# Patient Record
Sex: MALE | Race: BLACK OR AFRICAN AMERICAN | NOT HISPANIC OR LATINO | Employment: OTHER | URBAN - METROPOLITAN AREA
[De-identification: names, ages, dates, MRNs, and addresses within clinical notes are randomized per-mention and may not be internally consistent; named-entity substitution may affect disease eponyms.]

---

## 2017-01-03 RX ORDER — GLIMEPIRIDE 4 MG/1
TABLET ORAL
Qty: 90 TABLET | Refills: 1 | Status: SHIPPED | OUTPATIENT
Start: 2017-01-03 | End: 2017-06-25 | Stop reason: SDUPTHER

## 2017-01-03 RX ORDER — GLIMEPIRIDE 4 MG/1
TABLET ORAL
Qty: 90 TABLET | Refills: 0 | Status: SHIPPED | OUTPATIENT
Start: 2017-01-03 | End: 2017-01-03

## 2017-01-09 ENCOUNTER — OFFICE VISIT (OUTPATIENT)
Dept: FAMILY MEDICINE | Facility: CLINIC | Age: 82
End: 2017-01-09
Payer: MEDICARE

## 2017-01-09 VITALS
OXYGEN SATURATION: 97 % | WEIGHT: 183 LBS | RESPIRATION RATE: 18 BRPM | DIASTOLIC BLOOD PRESSURE: 80 MMHG | TEMPERATURE: 98 F | HEART RATE: 77 BPM | BODY MASS INDEX: 26.2 KG/M2 | SYSTOLIC BLOOD PRESSURE: 152 MMHG | HEIGHT: 70 IN

## 2017-01-09 DIAGNOSIS — J06.9 UPPER RESPIRATORY TRACT INFECTION, UNSPECIFIED TYPE: ICD-10-CM

## 2017-01-09 DIAGNOSIS — I10 ESSENTIAL HYPERTENSION: Primary | ICD-10-CM

## 2017-01-09 PROCEDURE — 99999 PR PBB SHADOW E&M-EST. PATIENT-LVL III: CPT | Mod: PBBFAC,,, | Performed by: FAMILY MEDICINE

## 2017-01-09 PROCEDURE — 99499 UNLISTED E&M SERVICE: CPT | Mod: S$GLB,,, | Performed by: FAMILY MEDICINE

## 2017-01-09 PROCEDURE — 1126F AMNT PAIN NOTED NONE PRSNT: CPT | Mod: S$GLB,,, | Performed by: FAMILY MEDICINE

## 2017-01-09 PROCEDURE — 1160F RVW MEDS BY RX/DR IN RCRD: CPT | Mod: S$GLB,,, | Performed by: FAMILY MEDICINE

## 2017-01-09 PROCEDURE — 1159F MED LIST DOCD IN RCRD: CPT | Mod: S$GLB,,, | Performed by: FAMILY MEDICINE

## 2017-01-09 PROCEDURE — 99214 OFFICE O/P EST MOD 30 MIN: CPT | Mod: S$GLB,,, | Performed by: FAMILY MEDICINE

## 2017-01-09 PROCEDURE — 3077F SYST BP >= 140 MM HG: CPT | Mod: S$GLB,,, | Performed by: FAMILY MEDICINE

## 2017-01-09 PROCEDURE — 3079F DIAST BP 80-89 MM HG: CPT | Mod: S$GLB,,, | Performed by: FAMILY MEDICINE

## 2017-01-09 PROCEDURE — 1157F ADVNC CARE PLAN IN RCRD: CPT | Mod: S$GLB,,, | Performed by: FAMILY MEDICINE

## 2017-01-09 NOTE — PROGRESS NOTES
Chief Complaint   Patient presents with    Diabetes     3 month follow-up    Hypertension    Cough    Chest Congestion       HPI  Peter Lopez is a 81 y.o. male with multiple medical diagnoses as listed in the medical history and problem list that presents for HTN, DM follow up.    DM2 - overall doing well with metformin.     HTN - states changed benicar to generic, states medications causing mild dizziness and will take it at night instead.  States non compliant and has missed doses.     URI - follow up from 10 days ago, overall improved, cough continues.     Pt is known to me and was last seen by me on 2016.    PAST MEDICAL HISTORY:  Past Medical History   Diagnosis Date    Chronic hepatitis C without mention of hepatic coma      genotype 1b; treatment naive    Diabetes mellitus type I     Hypertension        PAST SURGICAL HISTORY:  History reviewed. No pertinent past surgical history.    SOCIAL HISTORY:  Social History     Social History    Marital status:      Spouse name: N/A    Number of children: N/A    Years of education: N/A     Occupational History    Not on file.     Social History Main Topics    Smoking status: Former Smoker    Smokeless tobacco: Former User     Quit date: 1986    Alcohol use 0.0 oz/week     0 Standard drinks or equivalent per week      Comment: 2 bottles beer on weekends    Drug use: No    Sexual activity: Yes     Partners: Female     Other Topics Concern    Not on file     Social History Narrative    , resides in [place]. 7 children but lost one. 16 grandchildren. 4 great-grandchildren. Works at [employer]       FAMILY HISTORY:  Family History   Problem Relation Age of Onset    Cancer Mother     Asbestos Father     Liver disease Neg Hx        ALLERGIES AND MEDICATIONS: updated and reviewed.  Review of patient's allergies indicates:  No Known Allergies  Current Outpatient Prescriptions   Medication Sig Dispense Refill    glimepiride  "(AMARYL) 4 MG tablet TAKE 1 TABLET BY MOUTH EVERY DAY WITH BREAKFAST 90 tablet 1    metformin (GLUCOPHAGE) 1000 MG tablet TAKE 1 TABLET BY MOUTH TWICE A DAY WITH MEALS 60 tablet 5    olmesartan-hydrochlorothiazide (BENICAR HCT) 40-12.5 mg Tab Take 1 tablet by mouth once daily. 90 tablet 1     No current facility-administered medications for this visit.        ROS  Review of Systems   Constitutional: Negative for activity change, fatigue and fever.   HENT: Positive for congestion. Negative for rhinorrhea and sore throat.    Eyes: Negative for visual disturbance.   Respiratory: Positive for cough. Negative for chest tightness and shortness of breath.    Cardiovascular: Negative for chest pain.   Gastrointestinal: Negative for abdominal pain, diarrhea, nausea and vomiting.   Genitourinary: Negative for dysuria, frequency and urgency.   Musculoskeletal: Negative for back pain and myalgias.   Skin: Negative for rash.   Neurological: Negative for dizziness, syncope and numbness.   Psychiatric/Behavioral: Negative for agitation.       Physical Exam  Vitals:    01/09/17 0823   BP: (!) 152/80   Pulse: 77   Resp: 18   Temp: 98 °F (36.7 °C)    Body mass index is 26.26 kg/(m^2).  Weight: 83 kg (182 lb 15.7 oz)   Height: 5' 10" (177.8 cm)     Physical Exam   Constitutional: He is oriented to person, place, and time. He appears well-developed and well-nourished.   HENT:   Head: Normocephalic.   Eyes: EOM are normal.   Neck: Normal range of motion.   Neurological: He is alert and oriented to person, place, and time.   Skin: Skin is warm and dry.   Psychiatric: He has a normal mood and affect. His behavior is normal. Judgment and thought content normal.   Nursing note and vitals reviewed.      Health Maintenance       Date Due Completion Date    TETANUS VACCINE 12/15/1955 ---    Zoster Vaccine 12/15/1997 ---    Pneumococcal (65+) (2 of 2 - PCV13) 1/8/2015 1/8/2014    Urine Microalbumin 1/21/2016 1/21/2015    Foot Exam 7/6/2016 " 7/6/2015 (Done)    Override on 7/6/2015: Done    Hemoglobin A1c 4/10/2017 10/10/2016    Eye Exam 10/25/2017 10/25/2016 (Done)    Override on 10/25/2016: Done (dr reardon)    Override on 4/25/2016: Done    Override on 4/6/2015: Done    Override on 9/19/2014: Done (Dr. Gilbert Denney)    Lipid Panel 11/14/2017 11/14/2016          Assessment & Plan    Essential hypertension  - Continue current medication regimen as prescribed  - Overall doing well, stressed compliance with medications.     Uncontrolled type 2 diabetes mellitus without complication, without long-term current use of insulin  - Continue current medication regimen as prescribed  - Doing well at this time    URI  - Cont cough, overall improved.   - Continue current medication regimen as prescribed  - Hydrate and rest    Return in about 4 weeks (around 2/6/2017), or if symptoms worsen or fail to improve.

## 2017-03-27 ENCOUNTER — OFFICE VISIT (OUTPATIENT)
Dept: FAMILY MEDICINE | Facility: CLINIC | Age: 82
End: 2017-03-27
Payer: MEDICARE

## 2017-03-27 VITALS
HEIGHT: 70 IN | OXYGEN SATURATION: 96 % | TEMPERATURE: 97 F | BODY MASS INDEX: 25.85 KG/M2 | HEART RATE: 93 BPM | WEIGHT: 180.56 LBS | SYSTOLIC BLOOD PRESSURE: 126 MMHG | DIASTOLIC BLOOD PRESSURE: 60 MMHG | RESPIRATION RATE: 16 BRPM

## 2017-03-27 DIAGNOSIS — R07.89 CHEST TIGHTNESS: ICD-10-CM

## 2017-03-27 DIAGNOSIS — J01.10 ACUTE FRONTAL SINUSITIS, RECURRENCE NOT SPECIFIED: ICD-10-CM

## 2017-03-27 DIAGNOSIS — I10 ESSENTIAL HYPERTENSION: Primary | ICD-10-CM

## 2017-03-27 PROCEDURE — 93005 ELECTROCARDIOGRAM TRACING: CPT | Mod: S$GLB,,, | Performed by: FAMILY MEDICINE

## 2017-03-27 PROCEDURE — 99214 OFFICE O/P EST MOD 30 MIN: CPT | Mod: S$GLB,,, | Performed by: FAMILY MEDICINE

## 2017-03-27 PROCEDURE — 1157F ADVNC CARE PLAN IN RCRD: CPT | Mod: S$GLB,,, | Performed by: FAMILY MEDICINE

## 2017-03-27 PROCEDURE — 99499 UNLISTED E&M SERVICE: CPT | Mod: S$GLB,,, | Performed by: FAMILY MEDICINE

## 2017-03-27 PROCEDURE — 93010 ELECTROCARDIOGRAM REPORT: CPT | Mod: S$GLB,,, | Performed by: INTERNAL MEDICINE

## 2017-03-27 PROCEDURE — 99999 PR PBB SHADOW E&M-EST. PATIENT-LVL III: CPT | Mod: PBBFAC,,, | Performed by: FAMILY MEDICINE

## 2017-03-27 PROCEDURE — 1159F MED LIST DOCD IN RCRD: CPT | Mod: S$GLB,,, | Performed by: FAMILY MEDICINE

## 2017-03-27 PROCEDURE — 1160F RVW MEDS BY RX/DR IN RCRD: CPT | Mod: S$GLB,,, | Performed by: FAMILY MEDICINE

## 2017-03-27 PROCEDURE — 1126F AMNT PAIN NOTED NONE PRSNT: CPT | Mod: S$GLB,,, | Performed by: FAMILY MEDICINE

## 2017-03-27 PROCEDURE — 3078F DIAST BP <80 MM HG: CPT | Mod: S$GLB,,, | Performed by: FAMILY MEDICINE

## 2017-03-27 PROCEDURE — 3074F SYST BP LT 130 MM HG: CPT | Mod: S$GLB,,, | Performed by: FAMILY MEDICINE

## 2017-03-27 RX ORDER — METFORMIN HYDROCHLORIDE 1000 MG/1
1000 TABLET ORAL 2 TIMES DAILY WITH MEALS
Qty: 60 TABLET | Refills: 5 | Status: SHIPPED | OUTPATIENT
Start: 2017-03-27 | End: 2017-10-16 | Stop reason: SDUPTHER

## 2017-03-27 RX ORDER — LORATADINE 10 MG/1
10 TABLET ORAL DAILY
Qty: 30 TABLET | Refills: 1 | Status: SHIPPED | OUTPATIENT
Start: 2017-03-27 | End: 2017-10-16 | Stop reason: SDUPTHER

## 2017-03-27 RX ORDER — AZITHROMYCIN 250 MG/1
TABLET, FILM COATED ORAL
Qty: 6 TABLET | Refills: 0 | Status: SHIPPED | OUTPATIENT
Start: 2017-03-27 | End: 2017-07-10 | Stop reason: ALTCHOICE

## 2017-03-27 RX ORDER — OLMESARTAN MEDOXOMIL AND HYDROCHLOROTHIAZIDE 40/12.5 40; 12.5 MG/1; MG/1
1 TABLET ORAL DAILY
Qty: 90 TABLET | Refills: 1 | Status: SHIPPED | OUTPATIENT
Start: 2017-03-27 | End: 2017-07-27 | Stop reason: SDUPTHER

## 2017-03-27 NOTE — PROGRESS NOTES
Chief Complaint   Patient presents with    Cough    Medication Refill       HPI  Peter oLpez is a 81 y.o. male with multiple medical diagnoses as listed in the medical history and problem list that presents for follow up.    Cough - 1.5 week hx, meds: nyquil, centrum plus, cough suppressant, prod cough brown/yellow, +subj fever, +PND. No changes throughout day, no changes with exertion/working,     HTN - overall well controlled. Denies BV, HA    DM2 - overall stable, states no new neuro symptoms.     Pt is known to me and was last seen by me on 2017.    PAST MEDICAL HISTORY:  Past Medical History:   Diagnosis Date    Chronic hepatitis C without mention of hepatic coma     genotype 1b; treatment naive    Diabetes mellitus type I     Hypertension        PAST SURGICAL HISTORY:  History reviewed. No pertinent surgical history.    SOCIAL HISTORY:  Social History     Social History    Marital status:      Spouse name: N/A    Number of children: N/A    Years of education: N/A     Occupational History    Not on file.     Social History Main Topics    Smoking status: Former Smoker    Smokeless tobacco: Former User     Quit date: 1986    Alcohol use 0.0 oz/week     0 Standard drinks or equivalent per week      Comment: 2 bottles beer on weekends    Drug use: No    Sexual activity: Yes     Partners: Female     Other Topics Concern    Not on file     Social History Narrative    , resides in [place]. 7 children but lost one. 16 grandchildren. 4 great-grandchildren. Works at [employer]       FAMILY HISTORY:  Family History   Problem Relation Age of Onset    Cancer Mother     Asbestos Father     Liver disease Neg Hx        ALLERGIES AND MEDICATIONS: updated and reviewed.  Review of patient's allergies indicates:  No Known Allergies  Current Outpatient Prescriptions   Medication Sig Dispense Refill    glimepiride (AMARYL) 4 MG tablet TAKE 1 TABLET BY MOUTH EVERY DAY WITH BREAKFAST 90  "tablet 1    azithromycin (ZITHROMAX Z-BARAK) 250 MG tablet 2 tabs today, then 1 tab per day for 4 days. 6 tablet 0    loratadine (CLARITIN) 10 mg tablet Take 1 tablet (10 mg total) by mouth once daily. 30 tablet 1    metformin (GLUCOPHAGE) 1000 MG tablet Take 1 tablet (1,000 mg total) by mouth 2 (two) times daily with meals. 60 tablet 5    olmesartan-hydrochlorothiazide (BENICAR HCT) 40-12.5 mg Tab Take 1 tablet by mouth once daily. 90 tablet 1     No current facility-administered medications for this visit.        ROS  Review of Systems   Constitutional: Negative for activity change, appetite change, fatigue and fever.   HENT: Positive for postnasal drip, sinus pressure and sneezing. Negative for congestion, rhinorrhea and sore throat.    Eyes: Positive for itching. Negative for visual disturbance.   Respiratory: Negative for cough, chest tightness, shortness of breath and wheezing.    Cardiovascular: Negative for chest pain.   Gastrointestinal: Negative for abdominal pain, diarrhea, nausea and vomiting.   Endocrine: Negative for polydipsia.   Genitourinary: Negative for dysuria, frequency and urgency.   Musculoskeletal: Negative for back pain, myalgias and neck stiffness.   Skin: Negative for rash.   Neurological: Negative for dizziness, syncope and numbness.   Psychiatric/Behavioral: Negative for agitation and dysphoric mood.       Physical Exam  Vitals:    03/27/17 0826   BP: 126/60   Pulse: 93   Resp: 16   Temp: 97.4 °F (36.3 °C)    Body mass index is 25.91 kg/(m^2).  Weight: 81.9 kg (180 lb 8.9 oz)   Height: 5' 10" (177.8 cm)     Physical Exam   Constitutional: He is oriented to person, place, and time. He appears well-developed and well-nourished.   HENT:   Head: Normocephalic and atraumatic.   Erythematous pharynx   Eyes: Conjunctivae and EOM are normal. Pupils are equal, round, and reactive to light.   Neck: Normal range of motion. Neck supple.   Cardiovascular: Normal rate, regular rhythm and normal " heart sounds.    Pulmonary/Chest: Effort normal and breath sounds normal.   Abdominal: Soft. Bowel sounds are normal.   Musculoskeletal: Normal range of motion.   Neurological: He is alert and oriented to person, place, and time. He has normal reflexes.   Skin: Skin is warm and dry.   Psychiatric: He has a normal mood and affect. His behavior is normal. Judgment and thought content normal.       Health Maintenance       Date Due Completion Date    TETANUS VACCINE 12/15/1955 ---    Zoster Vaccine 12/15/1997 ---    Pneumococcal (65+) (2 of 2 - PCV13) 1/8/2015 1/8/2014    Urine Microalbumin 1/21/2016 1/21/2015    Foot Exam 7/6/2016 7/6/2015 (Done)    Override on 7/6/2015: Done    Hemoglobin A1c 4/10/2017 10/10/2016    Eye Exam 10/25/2017 10/25/2016 (Done)    Override on 10/25/2016: Done (dr reardon)    Override on 4/25/2016: Done    Override on 4/6/2015: Done    Override on 9/19/2014: Done (Dr. Gilbert Denney)    Lipid Panel 11/14/2017 11/14/2016          Assessment & Plan    Essential hypertension  -     olmesartan-hydrochlorothiazide (BENICAR HCT) 40-12.5 mg Tab; Take 1 tablet by mouth once daily.  Dispense: 90 tablet; Refill: 1  - Continue current medication regimen as prescribed    Uncontrolled type 2 diabetes mellitus without complication, without long-term current use of insulin  -     metformin (GLUCOPHAGE) 1000 MG tablet; Take 1 tablet (1,000 mg total) by mouth 2 (two) times daily with meals.  Dispense: 60 tablet; Refill: 5  - Continue current medication regimen as prescribed    Chest tightness  -     EKG 12-lead; Future    Acute frontal sinusitis, recurrence not specified  -     azithromycin (ZITHROMAX Z-BARAK) 250 MG tablet; 2 tabs today, then 1 tab per day for 4 days.  Dispense: 6 tablet; Refill: 0  -     loratadine (CLARITIN) 10 mg tablet; Take 1 tablet (10 mg total) by mouth once daily.  Dispense: 30 tablet; Refill: 1  - Will treat at this time.         Return in about 3 months (around 6/27/2017).

## 2017-03-27 NOTE — MR AVS SNAPSHOT
[facility] [address]  [place] [address]  Phone: [phone]  Fax: [phone]                  Peter Lopez   3/27/2017 8:40 AM   Office Visit    Description:  Male : 1935   Provider:  Soren Lloyd MD   Department:  [facility]           Reason for Visit     Cough     Medication Refill           Diagnoses this Visit        Comments    Essential hypertension    -  Primary     Uncontrolled type 2 diabetes mellitus without complication, without long-term current use of insulin         Chest tightness         Acute frontal sinusitis, recurrence not specified                To Do List           Goals (5 Years of Data)     None      Follow-Up and Disposition     Return in about 3 months (around 2017).       These Medications        Disp Refills Start End    metformin (GLUCOPHAGE) 1000 MG tablet 60 tablet 5 3/27/2017     Take 1 tablet (1,000 mg total) by mouth 2 (two) times daily with meals. - Oral    Pharmacy: [facility]/pharmacy [address] [place] [address] Ph #: [phone]       olmesartan-hydrochlorothiazide (BENICAR HCT) 40-12.5 mg Tab 90 tablet 1 3/27/2017     Take 1 tablet by mouth once daily. - Oral    Pharmacy: [facility]/pharmacy [address] [place] [address] [address] [place] [address] Ph #: [phone]       azithromycin (ZITHROMAX Z-BARAK) 250 MG tablet 6 tablet 0 3/27/2017     2 tabs today, then 1 tab per day for 4 days.    [facility] [place] [address] Ph #: [phone]       loratadine (CLARITIN) 10 mg tablet 30 tablet 1 3/27/2017     Take 1 tablet (10 mg total) by mouth once daily. - Oral    Pharmacy: [facility]/pharmacy [address] [place] [address] [address] [place] [address] Ph #: [phone]         [facility]     [facility] On Call Nurse Care Line -  Assistance  Registered nurses in the [facility] provide clinical advisement, health education, appointment booking, and  other advisory services.  Call for this free service at [phone].             Medications           Message regarding Medications     Verify the changes and/or additions to your medication regime listed below are the same as discussed with your clinician today.  If any of these changes or additions are incorrect, please notify your healthcare provider.        START taking these NEW medications        Refills    azithromycin (ZITHROMAX Z-ABRAK) 250 MG tablet 0    Si tabs today, then 1 tab per day for 4 days.    Class: Normal    loratadine (CLARITIN) 10 mg tablet 1    Sig: Take 1 tablet (10 mg total) by mouth once daily.    Class: Normal    Route: Oral      CHANGE how you are taking these medications     Start Taking Instead of    metformin (GLUCOPHAGE) 1000 MG tablet metformin (GLUCOPHAGE) 1000 MG tablet    Dosage:  Take 1 tablet (1,000 mg total) by mouth 2 (two) times daily with meals. Dosage:  TAKE 1 TABLET BY MOUTH TWICE A DAY WITH MEALS    Reason for Change:  Reorder            Verify that the below list of medications is an accurate representation of the medications you are currently taking.  If none reported, the list may be blank. If incorrect, please contact your healthcare provider. Carry this list with you in case of emergency.           Current Medications     glimepiride (AMARYL) 4 MG tablet TAKE 1 TABLET BY MOUTH EVERY DAY WITH BREAKFAST    azithromycin (ZITHROMAX Z-BARKA) 250 MG tablet 2 tabs today, then 1 tab per day for 4 days.    loratadine (CLARITIN) 10 mg tablet Take 1 tablet (10 mg total) by mouth once daily.    metformin (GLUCOPHAGE) 1000 MG tablet Take 1 tablet (1,000 mg total) by mouth 2 (two) times daily with meals.    olmesartan-hydrochlorothiazide (BENICAR HCT) 40-12.5 mg Tab Take 1 tablet by mouth once daily.           Clinical Reference Information           Your Vitals Were     BP Pulse Temp Resp Height Weight    126/60 (BP Location: Left arm, Patient Position: Sitting, BP Method:  "Manual) 93 97.4 °F (36.3 °C) (Oral) 16 5' 10" (1.778 m) 81.9 kg (180 lb 8.9 oz)    SpO2 BMI             96% 25.91 kg/m2         Blood Pressure          Most Recent Value    BP  126/60      Allergies as of 3/27/2017     No Known Allergies      Immunizations Administered on Date of Encounter - 3/27/2017     None      Orders Placed During Today's Visit     Future Labs/Procedures Expected by Expires    EKG 12-lead  As directed 3/27/2018      [facility] Sign-Up     Activating your [facility] account is as easy as 1-2-3!     1) Visit [email or web address], select Sign Up Now, enter this activation code and your date of birth, then select Next.  [email or web address]  Expires: 5/11/2017  9:45 AM      2) Create a username and password to use when you visit [facility] in the future and select a security question in case you lose your password and select Next.    3) Enter your e-mail address and click Sign Up!    Additional Information  If you have questions, please e-mail [email or web address] or call [phone] to talk to our [facility] staff. Remember, [facility] is NOT to be used for urgent needs. For medical emergencies, dial 911.         Language Assistance Services     ATTENTION: Language assistance services are available, free of charge. Please call [phone].      ATENCIÓN: Si habla español, tiene a taylor disposición servicios gratuitos de asistencia lingüística. Llame al [phone].     CHÚ Ý: N?u b?n nói Ti?ng Vi?t, có các d?ch v? h? tr? ngôn ng? mi?n phí dành cho b?n. G?i s? [phone].         [facility] complies with applicable Federal civil rights laws and does not discriminate on the basis of race, color, national origin, age, disability, or sex.        "

## 2017-04-21 DIAGNOSIS — E11.9 TYPE 2 DIABETES MELLITUS WITHOUT COMPLICATION: ICD-10-CM

## 2017-05-05 DIAGNOSIS — E11.9 TYPE 2 DIABETES MELLITUS WITHOUT COMPLICATION: ICD-10-CM

## 2017-05-22 LAB
LEFT EYE DM RETINOPATHY: ABNORMAL
RIGHT EYE DM RETINOPATHY: ABNORMAL

## 2017-06-26 RX ORDER — GLIMEPIRIDE 4 MG/1
TABLET ORAL
Qty: 90 TABLET | Refills: 0 | Status: SHIPPED | OUTPATIENT
Start: 2017-06-26 | End: 2017-09-29 | Stop reason: SDUPTHER

## 2017-07-10 ENCOUNTER — LAB VISIT (OUTPATIENT)
Dept: LAB | Facility: HOSPITAL | Age: 82
End: 2017-07-10
Attending: FAMILY MEDICINE
Payer: MEDICARE

## 2017-07-10 ENCOUNTER — OFFICE VISIT (OUTPATIENT)
Dept: FAMILY MEDICINE | Facility: CLINIC | Age: 82
End: 2017-07-10
Payer: MEDICARE

## 2017-07-10 VITALS
WEIGHT: 186.75 LBS | HEART RATE: 99 BPM | DIASTOLIC BLOOD PRESSURE: 60 MMHG | TEMPERATURE: 98 F | RESPIRATION RATE: 18 BRPM | BODY MASS INDEX: 26.74 KG/M2 | SYSTOLIC BLOOD PRESSURE: 136 MMHG | HEIGHT: 70 IN | OXYGEN SATURATION: 95 %

## 2017-07-10 DIAGNOSIS — N40.0 BENIGN PROSTATIC HYPERPLASIA WITHOUT LOWER URINARY TRACT SYMPTOMS: ICD-10-CM

## 2017-07-10 DIAGNOSIS — Z00.00 ANNUAL PHYSICAL EXAM: ICD-10-CM

## 2017-07-10 DIAGNOSIS — I10 ESSENTIAL HYPERTENSION: Primary | ICD-10-CM

## 2017-07-10 LAB
ALBUMIN SERPL BCP-MCNC: 3.7 G/DL
ALP SERPL-CCNC: 48 U/L
ALT SERPL W/O P-5'-P-CCNC: 45 U/L
ANION GAP SERPL CALC-SCNC: 11 MMOL/L
AST SERPL-CCNC: 39 U/L
BASOPHILS # BLD AUTO: 0.02 K/UL
BASOPHILS NFR BLD: 0.5 %
BILIRUB SERPL-MCNC: 0.6 MG/DL
BUN SERPL-MCNC: 23 MG/DL
CALCIUM SERPL-MCNC: 10.7 MG/DL
CHLORIDE SERPL-SCNC: 102 MMOL/L
CHOLEST/HDLC SERPL: 3.3 {RATIO}
CO2 SERPL-SCNC: 31 MMOL/L
COMPLEXED PSA SERPL-MCNC: 19 NG/ML
CREAT SERPL-MCNC: 1.5 MG/DL
DIFFERENTIAL METHOD: NORMAL
EOSINOPHIL # BLD AUTO: 0.1 K/UL
EOSINOPHIL NFR BLD: 2.9 %
ERYTHROCYTE [DISTWIDTH] IN BLOOD BY AUTOMATED COUNT: 13.8 %
EST. GFR  (AFRICAN AMERICAN): 50.5 ML/MIN/1.73 M^2
EST. GFR  (NON AFRICAN AMERICAN): 43.7 ML/MIN/1.73 M^2
GLUCOSE SERPL-MCNC: 224 MG/DL
HCT VFR BLD AUTO: 47.1 %
HDL/CHOLESTEROL RATIO: 29.9 %
HDLC SERPL-MCNC: 174 MG/DL
HDLC SERPL-MCNC: 52 MG/DL
HGB BLD-MCNC: 15.2 G/DL
LDLC SERPL CALC-MCNC: 97 MG/DL
LYMPHOCYTES # BLD AUTO: 1.8 K/UL
LYMPHOCYTES NFR BLD: 41 %
MCH RBC QN AUTO: 28.9 PG
MCHC RBC AUTO-ENTMCNC: 32.3 %
MCV RBC AUTO: 90 FL
MONOCYTES # BLD AUTO: 0.3 K/UL
MONOCYTES NFR BLD: 5.7 %
NEUTROPHILS # BLD AUTO: 2.2 K/UL
NEUTROPHILS NFR BLD: 49.7 %
NONHDLC SERPL-MCNC: 122 MG/DL
PLATELET # BLD AUTO: 277 K/UL
PMV BLD AUTO: 10.4 FL
POTASSIUM SERPL-SCNC: 4.9 MMOL/L
PROT SERPL-MCNC: 7.5 G/DL
RBC # BLD AUTO: 5.26 M/UL
SODIUM SERPL-SCNC: 144 MMOL/L
T4 FREE SERPL-MCNC: 1.15 NG/DL
TRIGL SERPL-MCNC: 125 MG/DL
TSH SERPL DL<=0.005 MIU/L-ACNC: 1.24 UIU/ML
WBC # BLD AUTO: 4.42 K/UL

## 2017-07-10 PROCEDURE — 84443 ASSAY THYROID STIM HORMONE: CPT

## 2017-07-10 PROCEDURE — 99397 PER PM REEVAL EST PAT 65+ YR: CPT | Mod: S$GLB,,, | Performed by: FAMILY MEDICINE

## 2017-07-10 PROCEDURE — 36415 COLL VENOUS BLD VENIPUNCTURE: CPT | Mod: PO

## 2017-07-10 PROCEDURE — 99999 PR PBB SHADOW E&M-EST. PATIENT-LVL III: CPT | Mod: PBBFAC,,, | Performed by: FAMILY MEDICINE

## 2017-07-10 PROCEDURE — 83036 HEMOGLOBIN GLYCOSYLATED A1C: CPT

## 2017-07-10 PROCEDURE — 84153 ASSAY OF PSA TOTAL: CPT

## 2017-07-10 PROCEDURE — 85025 COMPLETE CBC W/AUTO DIFF WBC: CPT

## 2017-07-10 PROCEDURE — 80053 COMPREHEN METABOLIC PANEL: CPT

## 2017-07-10 PROCEDURE — 99499 UNLISTED E&M SERVICE: CPT | Mod: S$GLB,,, | Performed by: FAMILY MEDICINE

## 2017-07-10 PROCEDURE — 80061 LIPID PANEL: CPT

## 2017-07-10 PROCEDURE — 84439 ASSAY OF FREE THYROXINE: CPT

## 2017-07-10 RX ORDER — METFORMIN HYDROCHLORIDE 1000 MG/1
1000 TABLET ORAL 2 TIMES DAILY WITH MEALS
Qty: 180 TABLET | Refills: 1 | Status: CANCELLED | OUTPATIENT
Start: 2017-07-10

## 2017-07-10 NOTE — PROGRESS NOTES
Chief Complaint   Patient presents with    Diabetes     patient is overdue for his A1c level       HPI  Peter Lopez is a 81 y.o. male with multiple medical diagnoses as listed in the medical history and problem list that presents for annual exam.    Annual exam - overall feeling well, denies new symptoms at this time    DM2 - needs labs, compliant with medications at this time    HTN - overall doing well, compliant with medications. No CP, HA or blurry vision.     Pt is known to me and was last seen by me on 3/27/2017.    PAST MEDICAL HISTORY:  Past Medical History:   Diagnosis Date    Chronic hepatitis C without mention of hepatic coma     genotype 1b; treatment naive    Diabetes mellitus type I     Hypertension        PAST SURGICAL HISTORY:  History reviewed. No pertinent surgical history.    SOCIAL HISTORY:  Social History     Social History    Marital status:      Spouse name: N/A    Number of children: N/A    Years of education: N/A     Occupational History    Not on file.     Social History Main Topics    Smoking status: Former Smoker    Smokeless tobacco: Former User     Quit date: 1986    Alcohol use 0.0 oz/week      Comment: 2 bottles beer on weekends    Drug use: No    Sexual activity: Yes     Partners: Female     Other Topics Concern    Not on file     Social History Narrative    , resides in [place]. 7 children but lost one. 16 grandchildren. 4 great-grandchildren. Works at [employer]       FAMILY HISTORY:  Family History   Problem Relation Age of Onset    Cancer Mother     Asbestos Father     Liver disease Neg Hx        ALLERGIES AND MEDICATIONS: updated and reviewed.  Review of patient's allergies indicates:  No Known Allergies  Current Outpatient Prescriptions   Medication Sig Dispense Refill    glimepiride (AMARYL) 4 MG tablet TAKE 1 TABLET BY MOUTH EVERY DAY WITH BREAKFAST 90 tablet 0    loratadine (CLARITIN) 10 mg tablet Take 1 tablet (10 mg total) by  "mouth once daily. 30 tablet 1    metformin (GLUCOPHAGE) 1000 MG tablet Take 1 tablet (1,000 mg total) by mouth 2 (two) times daily with meals. 60 tablet 5    olmesartan-hydrochlorothiazide (BENICAR HCT) 40-12.5 mg Tab Take 1 tablet by mouth once daily. 90 tablet 1     No current facility-administered medications for this visit.        ROS  Review of Systems   Constitutional: Negative for activity change, fatigue and fever.   HENT: Negative for congestion, rhinorrhea and sore throat.    Eyes: Negative for visual disturbance.   Respiratory: Negative for cough, chest tightness and shortness of breath.    Cardiovascular: Negative for chest pain.   Gastrointestinal: Negative for abdominal pain, diarrhea, nausea and vomiting.   Genitourinary: Negative for dysuria, frequency and urgency.   Musculoskeletal: Positive for arthralgias and myalgias. Negative for back pain.   Skin: Negative for rash.   Neurological: Negative for dizziness, syncope and numbness.   Psychiatric/Behavioral: Negative for agitation.       Physical Exam  Vitals:    07/10/17 0912   BP: 136/60   Pulse: 99   Resp: 18   Temp: 97.6 °F (36.4 °C)    Body mass index is 26.79 kg/m².  Weight: 84.7 kg (186 lb 11.7 oz)   Height: 5' 10" (177.8 cm)     Physical Exam   Constitutional: He is oriented to person, place, and time. He appears well-developed and well-nourished.   HENT:   Head: Normocephalic and atraumatic.   Eyes: Conjunctivae and EOM are normal. Pupils are equal, round, and reactive to light.   Neck: Normal range of motion. Neck supple.   Cardiovascular: Normal rate, regular rhythm and normal heart sounds.    Pulmonary/Chest: Effort normal and breath sounds normal.   Abdominal: Soft. Bowel sounds are normal.   Musculoskeletal: Normal range of motion.   Neurological: He is alert and oriented to person, place, and time. He has normal reflexes.   Skin: Skin is warm and dry.   Psychiatric: He has a normal mood and affect. His behavior is normal. Judgment " and thought content normal.       Health Maintenance       Date Due Completion Date    TETANUS VACCINE 12/15/1955 ---    Zoster Vaccine 12/15/1997 ---    Pneumococcal (65+) (2 of 2 - PCV13) 01/08/2015 1/8/2014    Urine Microalbumin 01/21/2016 1/21/2015    Hemoglobin A1c 04/10/2017 10/10/2016    Influenza Vaccine 08/01/2017 10/10/2016    Lipid Panel 11/14/2017 11/14/2016    Foot Exam 03/27/2018 3/27/2017    Override on 7/6/2015: Done    Eye Exam 05/22/2018 5/22/2017 (Done)    Override on 5/22/2017: Done (Dr. Olsen)    Override on 10/25/2016: Done (dr olsen)    Override on 4/25/2016: Done    Override on 4/6/2015: Done    Override on 9/19/2014: Done (Dr. Gilbert Denney)          Assessment & Plan    Essential hypertension  - Continue current medication regimen as prescribed  - Will assess labs    Uncontrolled type 2 diabetes mellitus without complication, without long-term current use of insulin  -     Comprehensive metabolic panel; Future; Expected date: 07/10/2017  -     CBC auto differential; Future; Expected date: 07/10/2017  -     Lipid panel; Future; Expected date: 07/10/2017  - Continue current medication regimen as prescribed    Benign prostatic hyperplasia without lower urinary tract symptoms  -     PSA, Screening; Future; Expected date: 07/10/2017  - Will assess labs    Annual physical exam  -     T4, free; Future; Expected date: 07/10/2017  -     TSH; Future; Expected date: 07/10/2017  -     Hemoglobin A1c; Future; Expected date: 07/10/2017  - Counseled on age appropriate medical preventative services, including age appropriate cancer screenings, over all nutritional health, need for a consistent exercise regimen and an over all push towards maintaining a vigorous and active lifestyle.      - Counseled on age appropriate vaccines and discussed upcoming health care needs based on age/gender.  Spent time with patient counseling on need for a good patient/doctor relationship moving forward.  Discussed use  of common OTC medications and supplements.  Discussed common dietary aids and use of caffeine and the need for good sleep hygiene and stress management.          Return in about 4 weeks (around 8/7/2017).

## 2017-07-11 LAB
ESTIMATED AVG GLUCOSE: 137 MG/DL
HBA1C MFR BLD HPLC: 6.4 %

## 2017-07-27 DIAGNOSIS — I10 ESSENTIAL HYPERTENSION: ICD-10-CM

## 2017-07-27 RX ORDER — OLMESARTAN MEDOXOMIL AND HYDROCHLOROTHIAZIDE 40/12.5 40; 12.5 MG/1; MG/1
1 TABLET ORAL DAILY
Qty: 90 TABLET | Refills: 1 | Status: SHIPPED | OUTPATIENT
Start: 2017-07-27 | End: 2017-10-16 | Stop reason: SDUPTHER

## 2017-09-12 ENCOUNTER — OFFICE VISIT (OUTPATIENT)
Dept: FAMILY MEDICINE | Facility: CLINIC | Age: 82
End: 2017-09-12
Payer: MEDICARE

## 2017-09-12 VITALS
WEIGHT: 185.19 LBS | OXYGEN SATURATION: 94 % | BODY MASS INDEX: 26.51 KG/M2 | RESPIRATION RATE: 14 BRPM | SYSTOLIC BLOOD PRESSURE: 128 MMHG | DIASTOLIC BLOOD PRESSURE: 72 MMHG | HEIGHT: 70 IN | HEART RATE: 107 BPM | TEMPERATURE: 99 F

## 2017-09-12 DIAGNOSIS — I10 ESSENTIAL HYPERTENSION: ICD-10-CM

## 2017-09-12 DIAGNOSIS — R50.9 FEVER, UNSPECIFIED FEVER CAUSE: ICD-10-CM

## 2017-09-12 DIAGNOSIS — Z23 NEED FOR PNEUMOCOCCAL VACCINATION: Primary | ICD-10-CM

## 2017-09-12 DIAGNOSIS — N39.0 URINARY TRACT INFECTION WITHOUT HEMATURIA, SITE UNSPECIFIED: ICD-10-CM

## 2017-09-12 LAB
BILIRUB SERPL-MCNC: NORMAL MG/DL
BLOOD URINE, POC: NORMAL
COLOR, POC UA: YELLOW
GLUCOSE UR QL STRIP: NORMAL
KETONES UR QL STRIP: NORMAL
LEUKOCYTE ESTERASE URINE, POC: NORMAL
NITRITE, POC UA: NORMAL
PH, POC UA: 5
PROTEIN, POC: 30
SPECIFIC GRAVITY, POC UA: 1.01
UROBILINOGEN, POC UA: NORMAL

## 2017-09-12 PROCEDURE — 87088 URINE BACTERIA CULTURE: CPT

## 2017-09-12 PROCEDURE — 3074F SYST BP LT 130 MM HG: CPT | Mod: S$GLB,,, | Performed by: FAMILY MEDICINE

## 2017-09-12 PROCEDURE — 3078F DIAST BP <80 MM HG: CPT | Mod: S$GLB,,, | Performed by: FAMILY MEDICINE

## 2017-09-12 PROCEDURE — 99999 PR PBB SHADOW E&M-EST. PATIENT-LVL III: CPT | Mod: PBBFAC,,, | Performed by: FAMILY MEDICINE

## 2017-09-12 PROCEDURE — 1159F MED LIST DOCD IN RCRD: CPT | Mod: S$GLB,,, | Performed by: FAMILY MEDICINE

## 2017-09-12 PROCEDURE — 87077 CULTURE AEROBIC IDENTIFY: CPT

## 2017-09-12 PROCEDURE — 87086 URINE CULTURE/COLONY COUNT: CPT

## 2017-09-12 PROCEDURE — 99499 UNLISTED E&M SERVICE: CPT | Mod: S$GLB,,, | Performed by: FAMILY MEDICINE

## 2017-09-12 PROCEDURE — 3008F BODY MASS INDEX DOCD: CPT | Mod: S$GLB,,, | Performed by: FAMILY MEDICINE

## 2017-09-12 PROCEDURE — 87186 SC STD MICRODIL/AGAR DIL: CPT

## 2017-09-12 PROCEDURE — 99214 OFFICE O/P EST MOD 30 MIN: CPT | Mod: S$GLB,,, | Performed by: FAMILY MEDICINE

## 2017-09-12 PROCEDURE — 81001 URINALYSIS AUTO W/SCOPE: CPT | Mod: S$GLB,,, | Performed by: FAMILY MEDICINE

## 2017-09-12 PROCEDURE — 1126F AMNT PAIN NOTED NONE PRSNT: CPT | Mod: S$GLB,,, | Performed by: FAMILY MEDICINE

## 2017-09-12 RX ORDER — CIPROFLOXACIN 500 MG/1
500 TABLET ORAL 2 TIMES DAILY
Qty: 14 TABLET | Refills: 0 | Status: SHIPPED | OUTPATIENT
Start: 2017-09-12 | End: 2017-09-19

## 2017-09-12 NOTE — PROGRESS NOTES
Chief Complaint   Patient presents with    Fever     fever past four days       HPI  Peter Lopez is a 81 y.o. male with multiple medical diagnoses as listed in the medical history and problem list that presents for fever.    Fever - day hx, +sore throat, mild cough, +subj fever, mild night sweats. Meds: aleve improved, no N/V or diarrhea.     Pt is known to me and was last seen by me on 7/10/2017.    PAST MEDICAL HISTORY:  Past Medical History:   Diagnosis Date    Chronic hepatitis C without mention of hepatic coma     genotype 1b; treatment naive    Diabetes mellitus type I     Hypertension        PAST SURGICAL HISTORY:  History reviewed. No pertinent surgical history.    SOCIAL HISTORY:  Social History     Social History    Marital status:      Spouse name: N/A    Number of children: N/A    Years of education: N/A     Occupational History    Not on file.     Social History Main Topics    Smoking status: Former Smoker    Smokeless tobacco: Former User     Quit date: 1986    Alcohol use 0.0 oz/week      Comment: 2 bottles beer on weekends    Drug use: No    Sexual activity: Yes     Partners: Female     Other Topics Concern    Not on file     Social History Narrative    , resides in [place]. 7 children but lost one. 16 grandchildren. 4 great-grandchildren. Works at [employer]       FAMILY HISTORY:  Family History   Problem Relation Age of Onset    Cancer Mother     Asbestos Father     Liver disease Neg Hx        ALLERGIES AND MEDICATIONS: updated and reviewed.  Review of patient's allergies indicates:  No Known Allergies  Current Outpatient Prescriptions   Medication Sig Dispense Refill    glimepiride (AMARYL) 4 MG tablet TAKE 1 TABLET BY MOUTH EVERY DAY WITH BREAKFAST 90 tablet 0    loratadine (CLARITIN) 10 mg tablet Take 1 tablet (10 mg total) by mouth once daily. 30 tablet 1    metformin (GLUCOPHAGE) 1000 MG tablet Take 1 tablet (1,000 mg total) by mouth 2 (two) times  "daily with meals. 60 tablet 5    olmesartan-hydrochlorothiazide (BENICAR HCT) 40-12.5 mg Tab TAKE 1 TABLET BY MOUTH ONCE DAILY. 90 tablet 1    ciprofloxacin HCl (CIPRO) 500 MG tablet Take 1 tablet (500 mg total) by mouth 2 (two) times daily. 14 tablet 0     No current facility-administered medications for this visit.        ROS  Review of Systems   Constitutional: Negative for activity change, appetite change and fever.   HENT: Positive for postnasal drip and rhinorrhea.    Respiratory: Positive for cough. Negative for shortness of breath and wheezing.    Cardiovascular: Negative for chest pain.   Gastrointestinal: Negative for abdominal pain, diarrhea and nausea.   Endocrine: Negative for polydipsia.   Genitourinary: Negative for dysuria.   Musculoskeletal: Negative for neck stiffness.   Skin: Negative for rash.   Neurological: Negative for numbness.   Psychiatric/Behavioral: Negative for agitation and dysphoric mood.       Physical Exam  Vitals:    09/12/17 1515   BP: 128/72   Pulse: 107   Resp: 14   Temp: 98.7 °F (37.1 °C)    Body mass index is 26.57 kg/m².  Weight: 84 kg (185 lb 3 oz)   Height: 5' 10" (177.8 cm)     Physical Exam   Constitutional: He is oriented to person, place, and time. He appears well-developed and well-nourished.   HENT:   Head: Normocephalic and atraumatic.   Eyes: Conjunctivae and EOM are normal. Pupils are equal, round, and reactive to light.   Neck: Normal range of motion. Neck supple.   Cardiovascular: Normal rate, regular rhythm and normal heart sounds.    Pulmonary/Chest: Effort normal and breath sounds normal.   Abdominal: Soft. Bowel sounds are normal.   Musculoskeletal: Normal range of motion.   Neurological: He is alert and oriented to person, place, and time. He has normal reflexes.   Skin: Skin is warm and dry.   Psychiatric: He has a normal mood and affect. His behavior is normal. Judgment and thought content normal.       Health Maintenance       Date Due Completion Date    " TETANUS VACCINE 12/15/1955 ---    Zoster Vaccine 12/15/1997 ---    Pneumococcal (65+) (2 of 2 - PCV13) 01/08/2015 1/8/2014    Urine Microalbumin 03/04/2017 3/4/2016    Influenza Vaccine 08/01/2017 10/10/2016    Hemoglobin A1c 01/10/2018 7/10/2017    Foot Exam 03/27/2018 3/27/2017    Override on 7/6/2015: Done    Eye Exam 05/22/2018 5/22/2017 (Done)    Override on 5/22/2017: Done (Dr. Olsen)    Override on 10/25/2016: Done (dr olsen)    Override on 4/25/2016: Done    Override on 4/6/2015: Done    Override on 9/19/2014: Done (Dr. Gilbert Denney)    Lipid Panel 07/10/2018 7/10/2017          Assessment & Plan    Fever, unspecified fever cause  -     POCT urinalysis, dipstick or tablet reag    Essential hypertension  - Continue current medication regimen as prescribed    Uncontrolled type 2 diabetes mellitus without complication, without long-term current use of insulin  - Continue current medication regimen as prescribed    Urinary tract infection without hematuria, site unspecified  -     Urine culture  -     ciprofloxacin HCl (CIPRO) 500 MG tablet; Take 1 tablet (500 mg total) by mouth 2 (two) times daily.  Dispense: 14 tablet; Refill: 0  - Treat and culture at this time          Return in about 2 weeks (around 9/26/2017).

## 2017-09-18 LAB — BACTERIA UR CULT: NORMAL

## 2017-09-29 RX ORDER — GLIMEPIRIDE 4 MG/1
TABLET ORAL
Qty: 90 TABLET | Refills: 0 | Status: SHIPPED | OUTPATIENT
Start: 2017-09-29 | End: 2017-10-16 | Stop reason: SDUPTHER

## 2017-10-16 ENCOUNTER — OFFICE VISIT (OUTPATIENT)
Dept: FAMILY MEDICINE | Facility: CLINIC | Age: 82
End: 2017-10-16
Payer: MEDICARE

## 2017-10-16 VITALS
WEIGHT: 185.63 LBS | SYSTOLIC BLOOD PRESSURE: 144 MMHG | BODY MASS INDEX: 28.13 KG/M2 | HEART RATE: 85 BPM | DIASTOLIC BLOOD PRESSURE: 80 MMHG | OXYGEN SATURATION: 97 % | HEIGHT: 68 IN | TEMPERATURE: 97 F

## 2017-10-16 DIAGNOSIS — I10 ESSENTIAL HYPERTENSION: ICD-10-CM

## 2017-10-16 DIAGNOSIS — Z23 NEED FOR INFLUENZA VACCINATION: ICD-10-CM

## 2017-10-16 DIAGNOSIS — J30.9 CHRONIC ALLERGIC RHINITIS, UNSPECIFIED SEASONALITY, UNSPECIFIED TRIGGER: Primary | ICD-10-CM

## 2017-10-16 PROCEDURE — 99499 UNLISTED E&M SERVICE: CPT | Mod: S$GLB,,, | Performed by: FAMILY MEDICINE

## 2017-10-16 PROCEDURE — 90662 IIV NO PRSV INCREASED AG IM: CPT | Mod: S$GLB,,, | Performed by: FAMILY MEDICINE

## 2017-10-16 PROCEDURE — 99999 PR PBB SHADOW E&M-EST. PATIENT-LVL III: CPT | Mod: PBBFAC,,, | Performed by: FAMILY MEDICINE

## 2017-10-16 PROCEDURE — 99214 OFFICE O/P EST MOD 30 MIN: CPT | Mod: S$GLB,,, | Performed by: FAMILY MEDICINE

## 2017-10-16 PROCEDURE — G0008 ADMIN INFLUENZA VIRUS VAC: HCPCS | Mod: S$GLB,,, | Performed by: FAMILY MEDICINE

## 2017-10-16 RX ORDER — METFORMIN HYDROCHLORIDE 1000 MG/1
1000 TABLET ORAL 2 TIMES DAILY WITH MEALS
Qty: 60 TABLET | Refills: 5 | Status: SHIPPED | OUTPATIENT
Start: 2017-10-16 | End: 2018-03-01 | Stop reason: SDUPTHER

## 2017-10-16 RX ORDER — GLIMEPIRIDE 4 MG/1
4 TABLET ORAL DAILY
Qty: 90 TABLET | Refills: 2 | Status: SHIPPED | OUTPATIENT
Start: 2017-10-16 | End: 2018-10-11 | Stop reason: SDUPTHER

## 2017-10-16 RX ORDER — OLMESARTAN MEDOXOMIL AND HYDROCHLOROTHIAZIDE 40/12.5 40; 12.5 MG/1; MG/1
1 TABLET ORAL DAILY
Qty: 90 TABLET | Refills: 2 | Status: SHIPPED | OUTPATIENT
Start: 2017-10-16 | End: 2018-12-18 | Stop reason: SDUPTHER

## 2017-10-16 RX ORDER — LORATADINE 10 MG/1
10 TABLET ORAL DAILY
Qty: 30 TABLET | Refills: 1 | Status: SHIPPED | OUTPATIENT
Start: 2017-10-16 | End: 2018-11-19 | Stop reason: SDUPTHER

## 2017-10-16 NOTE — PROGRESS NOTES
Chief Complaint   Patient presents with    Hypertension    Follow-up       HPI  Peter Lopez is a 81 y.o. male with multiple medical diagnoses as listed in the medical history and problem list that presents for follow up.      HTN - overall doing well. Denies CP, HA or blurry vision.     DM2 - overall also states doing well, compliant with medications, no new neuro symptoms.     Pt is known to me and was last seen by me on 2017.    PAST MEDICAL HISTORY:  Past Medical History:   Diagnosis Date    Chronic hepatitis C without mention of hepatic coma     genotype 1b; treatment naive    Diabetes mellitus type I     Hypertension        PAST SURGICAL HISTORY:  History reviewed. No pertinent surgical history.    SOCIAL HISTORY:  Social History     Social History    Marital status:      Spouse name: N/A    Number of children: N/A    Years of education: N/A     Occupational History    Not on file.     Social History Main Topics    Smoking status: Former Smoker    Smokeless tobacco: Former User     Quit date: 1986    Alcohol use 0.0 oz/week      Comment: 2 bottles beer on weekends    Drug use: No    Sexual activity: Yes     Partners: Female     Other Topics Concern    Not on file     Social History Narrative    , resides in [place]. 7 children but lost one. 16 grandchildren. 4 great-grandchildren. Works at [employer]       FAMILY HISTORY:  Family History   Problem Relation Age of Onset    Cancer Mother     Asbestos Father     Liver disease Neg Hx        ALLERGIES AND MEDICATIONS: updated and reviewed.  Review of patient's allergies indicates:  No Known Allergies  Current Outpatient Prescriptions   Medication Sig Dispense Refill    glimepiride (AMARYL) 4 MG tablet Take 1 tablet (4 mg total) by mouth once daily. 90 tablet 2    metformin (GLUCOPHAGE) 1000 MG tablet Take 1 tablet (1,000 mg total) by mouth 2 (two) times daily with meals. 60 tablet 5     "olmesartan-hydrochlorothiazide (BENICAR HCT) 40-12.5 mg Tab Take 1 tablet by mouth once daily. 90 tablet 2    loratadine (CLARITIN) 10 mg tablet Take 1 tablet (10 mg total) by mouth once daily. 30 tablet 1     No current facility-administered medications for this visit.        ROS  Review of Systems   Constitutional: Negative for activity change, fatigue and fever.   HENT: Negative for congestion, rhinorrhea and sore throat.    Eyes: Negative for visual disturbance.   Respiratory: Negative for cough, chest tightness and shortness of breath.    Cardiovascular: Negative for chest pain.   Gastrointestinal: Negative for abdominal pain, diarrhea, nausea and vomiting.   Genitourinary: Negative for dysuria, frequency and urgency.   Musculoskeletal: Positive for arthralgias and myalgias. Negative for back pain.   Skin: Negative for rash.   Neurological: Negative for dizziness, syncope and numbness.   Psychiatric/Behavioral: Negative for agitation.       Physical Exam  Vitals:    10/16/17 0803   BP: (!) 144/80   Pulse: 85   Temp: 97.3 °F (36.3 °C)    Body mass index is 28.22 kg/m².  Weight: 84.2 kg (185 lb 10 oz)   Height: 5' 8" (172.7 cm)     Physical Exam   Constitutional: He is oriented to person, place, and time. He appears well-developed and well-nourished.   HENT:   Head: Normocephalic and atraumatic.   Eyes: Conjunctivae and EOM are normal. Pupils are equal, round, and reactive to light.   Neck: Normal range of motion. Neck supple.   Cardiovascular: Normal rate, regular rhythm and normal heart sounds.    Pulmonary/Chest: Effort normal and breath sounds normal.   Abdominal: Soft. Bowel sounds are normal.   Musculoskeletal: Normal range of motion.   Neurological: He is alert and oriented to person, place, and time. He has normal reflexes.   Skin: Skin is warm and dry.   Psychiatric: He has a normal mood and affect. His behavior is normal. Judgment and thought content normal.       Health Maintenance       Date Due " Completion Date    TETANUS VACCINE 12/15/1955 ---    Zoster Vaccine 12/15/1997 ---    Pneumococcal (65+) (2 of 2 - PCV13) 01/08/2015 1/8/2014    Urine Microalbumin 03/04/2017 3/4/2016    Influenza Vaccine 08/01/2017 10/10/2016    Hemoglobin A1c 01/10/2018 7/10/2017    Foot Exam 03/27/2018 3/27/2017    Override on 7/6/2015: Done    Eye Exam 05/22/2018 5/22/2017 (Done)    Override on 5/22/2017: Done (Dr. Olsen)    Override on 10/25/2016: Done (dr olsen)    Override on 4/25/2016: Done    Override on 4/6/2015: Done    Override on 9/19/2014: Done (Dr. Gilbert Denney)    Lipid Panel 07/10/2018 7/10/2017          Assessment & Plan    Chronic allergic rhinitis, unspecified seasonality, unspecified trigger  -     loratadine (CLARITIN) 10 mg tablet; Take 1 tablet (10 mg total) by mouth once daily.  Dispense: 30 tablet; Refill: 1  - Continue current medication regimen as prescribed    Uncontrolled type 2 diabetes mellitus without complication, without long-term current use of insulin  -     glimepiride (AMARYL) 4 MG tablet; Take 1 tablet (4 mg total) by mouth once daily.  Dispense: 90 tablet; Refill: 2  -     metformin (GLUCOPHAGE) 1000 MG tablet; Take 1 tablet (1,000 mg total) by mouth 2 (two) times daily with meals.  Dispense: 60 tablet; Refill: 5  - Continue current medication regimen as prescribed  - Overall doing well    Essential hypertension  -     olmesartan-hydrochlorothiazide (BENICAR HCT) 40-12.5 mg Tab; Take 1 tablet by mouth once daily.  Dispense: 90 tablet; Refill: 2  - Cont to monitor closely    Need for influenza vaccination        -     Influenza - High Dose (65+) (PF) (IM)    Return in about 3 months (around 1/16/2018).

## 2018-02-16 ENCOUNTER — OFFICE VISIT (OUTPATIENT)
Dept: FAMILY MEDICINE | Facility: CLINIC | Age: 83
End: 2018-02-16
Payer: MEDICARE

## 2018-02-16 VITALS
BODY MASS INDEX: 28.13 KG/M2 | WEIGHT: 185.63 LBS | HEART RATE: 87 BPM | RESPIRATION RATE: 20 BRPM | TEMPERATURE: 98 F | OXYGEN SATURATION: 95 % | SYSTOLIC BLOOD PRESSURE: 124 MMHG | HEIGHT: 68 IN | DIASTOLIC BLOOD PRESSURE: 66 MMHG

## 2018-02-16 DIAGNOSIS — E11.8 TYPE 2 DIABETES MELLITUS WITH COMPLICATION, WITHOUT LONG-TERM CURRENT USE OF INSULIN: Primary | ICD-10-CM

## 2018-02-16 DIAGNOSIS — I10 ESSENTIAL HYPERTENSION: ICD-10-CM

## 2018-02-16 PROCEDURE — 3008F BODY MASS INDEX DOCD: CPT | Mod: S$GLB,,, | Performed by: FAMILY MEDICINE

## 2018-02-16 PROCEDURE — 1126F AMNT PAIN NOTED NONE PRSNT: CPT | Mod: S$GLB,,, | Performed by: FAMILY MEDICINE

## 2018-02-16 PROCEDURE — 1159F MED LIST DOCD IN RCRD: CPT | Mod: S$GLB,,, | Performed by: FAMILY MEDICINE

## 2018-02-16 PROCEDURE — 99999 PR PBB SHADOW E&M-EST. PATIENT-LVL III: CPT | Mod: PBBFAC,,, | Performed by: FAMILY MEDICINE

## 2018-02-16 PROCEDURE — 99214 OFFICE O/P EST MOD 30 MIN: CPT | Mod: S$GLB,,, | Performed by: FAMILY MEDICINE

## 2018-02-16 PROCEDURE — 99499 UNLISTED E&M SERVICE: CPT | Mod: S$GLB,,, | Performed by: FAMILY MEDICINE

## 2018-02-16 NOTE — PROGRESS NOTES
Chief Complaint   Patient presents with    Diabetes    Hypertension       HPI  Peter Lopez is a 82 y.o. male with multiple medical diagnoses as listed in the medical history and problem list that presents for DM2, HTN.    HTN - overall doing well, denies CP, HA or blurry vision    DM2 - overall doing well, denies new neuro symptoms.     Pt is known to me and was last seen by me on 10/16/2017.    PAST MEDICAL HISTORY:  Past Medical History:   Diagnosis Date    Chronic hepatitis C without mention of hepatic coma     genotype 1b; treatment naive    Diabetes mellitus type I     Hypertension        PAST SURGICAL HISTORY:  History reviewed. No pertinent surgical history.    SOCIAL HISTORY:  Social History     Social History    Marital status:      Spouse name: N/A    Number of children: N/A    Years of education: N/A     Occupational History    Not on file.     Social History Main Topics    Smoking status: Former Smoker    Smokeless tobacco: Former User     Quit date: 1986    Alcohol use 0.0 oz/week      Comment: 2 bottles beer on weekends    Drug use: No    Sexual activity: Yes     Partners: Female     Other Topics Concern    Not on file     Social History Narrative    , resides in [place]. 7 children but lost one. 16 grandchildren. 4 great-grandchildren. Works at [employer]       FAMILY HISTORY:  Family History   Problem Relation Age of Onset    Cancer Mother     Asbestos Father     Liver disease Neg Hx        ALLERGIES AND MEDICATIONS: updated and reviewed.  Review of patient's allergies indicates:  No Known Allergies  Current Outpatient Prescriptions   Medication Sig Dispense Refill    glimepiride (AMARYL) 4 MG tablet Take 1 tablet (4 mg total) by mouth once daily. 90 tablet 2    loratadine (CLARITIN) 10 mg tablet Take 1 tablet (10 mg total) by mouth once daily. 30 tablet 1    metformin (GLUCOPHAGE) 1000 MG tablet Take 1 tablet (1,000 mg total) by mouth 2 (two) times  "daily with meals. 60 tablet 5    olmesartan-hydrochlorothiazide (BENICAR HCT) 40-12.5 mg Tab Take 1 tablet by mouth once daily. 90 tablet 2     No current facility-administered medications for this visit.        ROS  Review of Systems   Constitutional: Negative for activity change, fatigue and fever.   HENT: Negative for congestion, rhinorrhea and sore throat.    Eyes: Negative for visual disturbance.   Respiratory: Negative for cough, chest tightness and shortness of breath.    Cardiovascular: Negative for chest pain.   Gastrointestinal: Negative for abdominal pain, diarrhea, nausea and vomiting.   Genitourinary: Negative for dysuria, frequency and urgency.   Musculoskeletal: Positive for arthralgias and myalgias. Negative for back pain.   Skin: Negative for rash.   Neurological: Negative for dizziness, syncope and numbness.   Psychiatric/Behavioral: Negative for agitation.       Physical Exam  Vitals:    02/16/18 1457   BP: 124/66   Pulse: 87   Resp: 20   Temp: 98 °F (36.7 °C)    Body mass index is 28.22 kg/m².  Weight: 84.2 kg (185 lb 10 oz)   Height: 5' 8" (172.7 cm)     Physical Exam   Constitutional: He is oriented to person, place, and time. He appears well-developed and well-nourished.   HENT:   Head: Normocephalic and atraumatic.   Eyes: Conjunctivae and EOM are normal. Pupils are equal, round, and reactive to light.   Neck: Normal range of motion. Neck supple.   Cardiovascular: Normal rate, regular rhythm and normal heart sounds.    Pulmonary/Chest: Effort normal and breath sounds normal.   Abdominal: Soft. Bowel sounds are normal.   Musculoskeletal: Normal range of motion.   Neurological: He is alert and oriented to person, place, and time. He has normal reflexes.   Skin: Skin is warm and dry.   Psychiatric: He has a normal mood and affect. His behavior is normal. Judgment and thought content normal.       Health Maintenance       Date Due Completion Date    Urine Microalbumin 03/04/2017 3/4/2016    " Hemoglobin A1c 01/10/2018 7/10/2017    Foot Exam 03/27/2018 3/27/2017    Override on 7/6/2015: Done    Pneumococcal (65+) (2 of 2 - PCV13) 02/16/2019 (Originally 1/8/2015) 1/8/2014    Eye Exam 05/22/2018 5/22/2017 (Done)    Override on 5/22/2017: Done (Dr. Olsen)    Override on 10/25/2016: Done (dr olsen)    Override on 4/25/2016: Done    Override on 4/6/2015: Done    Override on 9/19/2014: Done (Dr. Gilbert Denney)    Lipid Panel 07/10/2018 7/10/2017    TETANUS VACCINE 02/16/2028 2/16/2018 (Declined)    Override on 2/16/2018: Declined          Assessment & Plan    Type 2 diabetes mellitus with complication, without long-term current use of insulin, Uncontrolled type 2 diabetes mellitus without complication, without long-term current use of insulin  -     Hemoglobin A1c; Future; Expected date: 02/16/2018  - Assess labs today  -     Microalbumin/creatinine urine ratio    Essential hypertension  - Continue current medication regimen as prescribed  - Overall doing well    Follow-up in about 4 weeks (around 3/16/2018).

## 2018-03-02 RX ORDER — METFORMIN HYDROCHLORIDE 1000 MG/1
TABLET ORAL
Qty: 180 TABLET | Refills: 1 | Status: SHIPPED | OUTPATIENT
Start: 2018-03-02 | End: 2019-07-12 | Stop reason: SDUPTHER

## 2018-03-11 DIAGNOSIS — I10 ESSENTIAL HYPERTENSION: ICD-10-CM

## 2018-03-12 RX ORDER — OLMESARTAN MEDOXOMIL AND HYDROCHLOROTHIAZIDE 40/12.5 40; 12.5 MG/1; MG/1
1 TABLET ORAL DAILY
Qty: 90 TABLET | Refills: 1 | Status: SHIPPED | OUTPATIENT
Start: 2018-03-12 | End: 2019-04-15 | Stop reason: SDUPTHER

## 2018-04-09 ENCOUNTER — LAB VISIT (OUTPATIENT)
Dept: LAB | Facility: HOSPITAL | Age: 83
End: 2018-04-09
Attending: FAMILY MEDICINE
Payer: MEDICARE

## 2018-04-09 DIAGNOSIS — E11.8 TYPE 2 DIABETES MELLITUS WITH COMPLICATION, WITHOUT LONG-TERM CURRENT USE OF INSULIN: ICD-10-CM

## 2018-04-09 LAB
ESTIMATED AVG GLUCOSE: 126 MG/DL
HBA1C MFR BLD HPLC: 6 %

## 2018-04-09 PROCEDURE — 36415 COLL VENOUS BLD VENIPUNCTURE: CPT | Mod: PO

## 2018-04-09 PROCEDURE — 83036 HEMOGLOBIN GLYCOSYLATED A1C: CPT

## 2018-08-09 ENCOUNTER — HOSPITAL ENCOUNTER (EMERGENCY)
Facility: HOSPITAL | Age: 83
Discharge: HOME | End: 2018-08-09
Attending: EMERGENCY MEDICINE
Payer: COMMERCIAL

## 2018-08-09 VITALS
SYSTOLIC BLOOD PRESSURE: 176 MMHG | TEMPERATURE: 98 F | RESPIRATION RATE: 14 BRPM | BODY MASS INDEX: 25.9 KG/M2 | HEART RATE: 80 BPM | OXYGEN SATURATION: 95 % | WEIGHT: 185 LBS | HEIGHT: 71 IN | DIASTOLIC BLOOD PRESSURE: 102 MMHG

## 2018-08-09 DIAGNOSIS — S09.90XA INJURY OF HEAD, INITIAL ENCOUNTER: Primary | ICD-10-CM

## 2018-08-09 DIAGNOSIS — S01.81XA FACIAL LACERATION, INITIAL ENCOUNTER: ICD-10-CM

## 2018-08-09 PROCEDURE — 99283 EMERGENCY DEPT VISIT LOW MDM: CPT | Mod: 25

## 2018-08-09 PROCEDURE — 12011 RPR F/E/E/N/L/M 2.5 CM/<: CPT

## 2018-08-10 NOTE — DISCHARGE INSTRUCTIONS
Please return to the Emergency Department for any new or worsening symptoms including: worsening pain or bleeding, fever, chest pain, shortness of breath, loss of consciousness, dizziness, weakness, or any other concerns.     Please follow up with your Primary Care Provider within in the week. If you do not have one, you may contact the one listed on your discharge paperwork or you may also call the [facility] Appointment Desk at [phone] to schedule an appointment with one.

## 2018-08-10 NOTE — ED PROVIDER NOTES
Encounter Date: 8/9/2018    83 y/o male which presents with left eyebrow laceration. He was seen at [facility] and told he needs a CT to make sure he doesn't have a fracture under his laceration. Pt denies any headache, blurry vision, or eye pain. He was working at [employer] and hit himself with a hammer has he was trying to remove an anchor bolt from the wall.     I initially evaluated this patient and ordered workup while in [facility]. The patient will receive a full evaluation by an ED provider when space is available.  All results from tests ordered in [facility] will not be followed by the [facility] team, including myself. All results will be followed by the ED provider.       History     Chief Complaint   Patient presents with    Laceration     States he has a cut on his forehead that occured at 12:40 today. States tetanus shot is up to date     CC:  Laceration    HPI: Peter Lopez, a 82 y.o. male that presents to the ED for a laceration that occurred around 12:30 p.m..  He works at [employer], and was removing a anchor pulled from a wall and the in the hammer hit him above the left eye.  He reports no loss of consciousness, dizziness, vision changes, or paresthesias.  No medications or treatment attempted prior to arrival.  His last tetanus was approximately 4-5 years ago.          The history is provided by the patient. No  was used.     Review of patient's allergies indicates:  No Known Allergies  Past Medical History:   Diagnosis Date    Chronic hepatitis C without mention of hepatic coma     genotype 1b; treatment naive    Diabetes mellitus type I     Hypertension      No past surgical history on file.  Family History   Problem Relation Age of Onset    Cancer Mother     Asbestos Father     Liver disease Neg Hx      Social History   Substance Use Topics    Smoking status: Former Smoker    Smokeless tobacco: Former User     Quit date: 01/1986    Alcohol use 0.0 oz/week      Comment: 2 bottles beer on  weekends     Review of Systems   Constitutional: Negative for chills and fever.   HENT: Negative for ear discharge and trouble swallowing.    Eyes: Negative for photophobia, pain, discharge and visual disturbance.   Respiratory: Negative for chest tightness and shortness of breath.    Cardiovascular: Negative for chest pain and leg swelling.   Gastrointestinal: Negative for nausea and vomiting.   Musculoskeletal: Negative for back pain, neck pain and neck stiffness.   Skin: Positive for wound (Laceration above left eyebrow). Negative for rash.   Neurological: Negative for syncope, weakness, numbness and headaches.   Psychiatric/Behavioral: Negative for confusion.       Physical Exam     Initial Vitals [08/09/18 1934]   BP Pulse Resp Temp SpO2   -- 95 16 -- (!) 93 %      MAP       --         Physical Exam    Nursing note and vitals reviewed.  Constitutional: He appears well-developed and well-nourished. He is not diaphoretic. He is cooperative.  Non-toxic appearance. He does not have a sickly appearance. He does not appear ill. No distress.   HENT:   Head: Normocephalic and atraumatic.       Right Ear: Tympanic membrane and external ear normal. No hemotympanum.   Left Ear: Tympanic membrane and external ear normal. No hemotympanum.   Nose: No septal deviation or nasal septal hematoma.   Mouth/Throat: Uvula is midline and oropharynx is clear and moist.   Eyes: Conjunctivae, EOM and lids are normal. Pupils are equal, round, and reactive to light.   Neck: Normal range of motion.   Cardiovascular: Normal rate and regular rhythm.   Pulses:       Radial pulses are 2+ on the right side, and 2+ on the left side.   Pulmonary/Chest: No tachypnea and no bradypnea. No respiratory distress.   Musculoskeletal: Normal range of motion.   Neurological: He is alert and oriented to person, place, and time. He has normal strength. No sensory deficit. Coordination normal. GCS eye subscore is 4. GCS verbal subscore is 5. GCS motor  subscore is 6.   Skin: Skin is warm and dry. Laceration (0.75 cm laceration) noted. No bruising and no rash noted. No erythema.   Psychiatric: He has a normal mood and affect. His speech is normal and behavior is normal. Judgment and thought content normal.         ED Course   Lac Repair  Date/Time: 8/9/2018 9:15 PM  Performed by: ROB LALA  Authorized by: MARJAN MURGUIA   Consent Done: Yes  Consent: Verbal consent obtained.  Risks and benefits: risks, benefits and alternatives were discussed  Consent given by: patient  Patient understanding: patient states understanding of the procedure being performed  Patient identity confirmed: verbally with patient  Body area: head/neck  Location details: left eyebrow  Laceration length: 0.8 cm  Tendon involvement: none  Nerve involvement: none  Anesthesia: local infiltration    Anesthesia:  Anesthetic total: 0 mL  Irrigation solution: saline  Irrigation method: syringe  Amount of cleaning: standard  Debridement: none  Degree of undermining: none  Skin closure: glue  Technique: simple  Approximation: close  Approximation difficulty: simple  Patient tolerance: Patient tolerated the procedure well with no immediate complications        Labs Reviewed - No data to display       Imaging Results    None                APC / Resident Notes:   This is an evaluation of a 82 y.o. male that presents to the Emergency Department for a Laceration. Physical Exam shows a non-toxic, afebrile, and well appearing male. There is a laceration above the left brow. There is no surrounding erythema or area of increased warmth. No facial crepitus surrounding the laceration. EOM's are intact. No midline cervical tenderness to palpation. Equal sensation to the extremity. No visible tendon lacerations observed on exam.  The wound was irrigated and visually inspected prior to closure. No visible foreign bodies noted. Patient's blood pressure mildly elevated in the ED today, he does report his  blood pressure is usually elevated when he goes to the Doctors office.  Vital Signs Are Reassuring. Tetanus is up to date.  The wound is very well approximated, does not appear to be deep.  It is amenable for closure with glue. RESULTS:  CT of the max face with some left periorbital soft tissue swelling/injury. The wound was closed per the procedure note.      My overall impression is Laceration. I considered, but at this time, do not suspect cellulitis, compartment syndrome, underlying fracture, ocular injury, or suspect any retained foreign body at this time.     D/C Information: Laceration/Wound Care/Suture Removal instructions given. The diagnosis, treatment plan, instructions for follow-up and reevaluation with his PCP or Return to ED for suture removal as well as ED return precautions were discussed and understanding was verbalized. All questions or concerns have been addressed. This case was discussed with Dr. Piña who is in agreement with my assessment and plan. BURAK Vogel, [id number]              Attending Attestation:     Physician Attestation Statement for NP/PA:   I discussed this assessment and plan of this patient with the NP/PA, but I did not personally examine the patient. The face to face encounter was performed by the NP/PA.                     Clinical Impression:   The primary encounter diagnosis was Injury of head, initial encounter. A diagnosis of Facial laceration, initial encounter was also pertinent to this visit.      Disposition:   Disposition: Discharged  Condition: Stable                        JUAN ALBERTO Reyes  08/10/18 0011

## 2018-10-01 ENCOUNTER — OFFICE VISIT (OUTPATIENT)
Dept: FAMILY MEDICINE | Facility: CLINIC | Age: 83
End: 2018-10-01
Payer: MEDICARE

## 2018-10-01 ENCOUNTER — LAB VISIT (OUTPATIENT)
Dept: LAB | Facility: HOSPITAL | Age: 83
End: 2018-10-01
Attending: FAMILY MEDICINE
Payer: MEDICARE

## 2018-10-01 VITALS
OXYGEN SATURATION: 97 % | SYSTOLIC BLOOD PRESSURE: 124 MMHG | DIASTOLIC BLOOD PRESSURE: 78 MMHG | HEART RATE: 92 BPM | WEIGHT: 182.75 LBS | BODY MASS INDEX: 25.58 KG/M2 | HEIGHT: 71 IN | TEMPERATURE: 98 F | RESPIRATION RATE: 20 BRPM

## 2018-10-01 DIAGNOSIS — Z13.6 ENCOUNTER FOR LIPID SCREENING FOR CARDIOVASCULAR DISEASE: ICD-10-CM

## 2018-10-01 DIAGNOSIS — E11.9 TYPE 2 DIABETES MELLITUS WITHOUT COMPLICATION, WITHOUT LONG-TERM CURRENT USE OF INSULIN: ICD-10-CM

## 2018-10-01 DIAGNOSIS — Z23 NEED FOR INFLUENZA VACCINATION: ICD-10-CM

## 2018-10-01 DIAGNOSIS — Z13.220 ENCOUNTER FOR LIPID SCREENING FOR CARDIOVASCULAR DISEASE: ICD-10-CM

## 2018-10-01 DIAGNOSIS — I10 ESSENTIAL HYPERTENSION: ICD-10-CM

## 2018-10-01 DIAGNOSIS — I10 ESSENTIAL HYPERTENSION: Primary | ICD-10-CM

## 2018-10-01 LAB
ALBUMIN SERPL BCP-MCNC: 3.9 G/DL
ALP SERPL-CCNC: 54 U/L
ALT SERPL W/O P-5'-P-CCNC: 47 U/L
ANION GAP SERPL CALC-SCNC: 11 MMOL/L
AST SERPL-CCNC: 55 U/L
BASOPHILS # BLD AUTO: 0.02 K/UL
BASOPHILS NFR BLD: 0.5 %
BILIRUB SERPL-MCNC: 0.7 MG/DL
BUN SERPL-MCNC: 17 MG/DL
CALCIUM SERPL-MCNC: 10.2 MG/DL
CHLORIDE SERPL-SCNC: 104 MMOL/L
CHOLEST SERPL-MCNC: 152 MG/DL
CHOLEST SERPL-MCNC: 152 MG/DL
CHOLEST/HDLC SERPL: 2.8 {RATIO}
CHOLEST/HDLC SERPL: 2.8 {RATIO}
CO2 SERPL-SCNC: 28 MMOL/L
CREAT SERPL-MCNC: 1.4 MG/DL
DIFFERENTIAL METHOD: NORMAL
EOSINOPHIL # BLD AUTO: 0.1 K/UL
EOSINOPHIL NFR BLD: 1.5 %
ERYTHROCYTE [DISTWIDTH] IN BLOOD BY AUTOMATED COUNT: 13.8 %
EST. GFR  (AFRICAN AMERICAN): 54.5 ML/MIN/1.73 M^2
EST. GFR  (NON AFRICAN AMERICAN): 47.1 ML/MIN/1.73 M^2
ESTIMATED AVG GLUCOSE: 128 MG/DL
GLUCOSE SERPL-MCNC: 180 MG/DL
HBA1C MFR BLD HPLC: 6.1 %
HCT VFR BLD AUTO: 48.4 %
HDLC SERPL-MCNC: 55 MG/DL
HDLC SERPL-MCNC: 55 MG/DL
HDLC SERPL: 36.2 %
HDLC SERPL: 36.2 %
HGB BLD-MCNC: 15.5 G/DL
IMM GRANULOCYTES # BLD AUTO: 0.01 K/UL
IMM GRANULOCYTES NFR BLD AUTO: 0.2 %
LDLC SERPL CALC-MCNC: 68.4 MG/DL
LDLC SERPL CALC-MCNC: 68.4 MG/DL
LYMPHOCYTES # BLD AUTO: 1.9 K/UL
LYMPHOCYTES NFR BLD: 46 %
MCH RBC QN AUTO: 29.2 PG
MCHC RBC AUTO-ENTMCNC: 32 G/DL
MCV RBC AUTO: 91 FL
MONOCYTES # BLD AUTO: 0.3 K/UL
MONOCYTES NFR BLD: 6.2 %
NEUTROPHILS # BLD AUTO: 1.8 K/UL
NEUTROPHILS NFR BLD: 45.6 %
NONHDLC SERPL-MCNC: 97 MG/DL
NONHDLC SERPL-MCNC: 97 MG/DL
NRBC BLD-RTO: 0 /100 WBC
PLATELET # BLD AUTO: 299 K/UL
PMV BLD AUTO: 10.7 FL
POTASSIUM SERPL-SCNC: 5 MMOL/L
PROT SERPL-MCNC: 7.4 G/DL
RBC # BLD AUTO: 5.31 M/UL
SODIUM SERPL-SCNC: 143 MMOL/L
T4 FREE SERPL-MCNC: 1.15 NG/DL
TRIGL SERPL-MCNC: 143 MG/DL
TRIGL SERPL-MCNC: 143 MG/DL
TSH SERPL DL<=0.005 MIU/L-ACNC: 1.25 UIU/ML
WBC # BLD AUTO: 4.04 K/UL

## 2018-10-01 PROCEDURE — 3078F DIAST BP <80 MM HG: CPT | Mod: CPTII,,, | Performed by: FAMILY MEDICINE

## 2018-10-01 PROCEDURE — 99214 OFFICE O/P EST MOD 30 MIN: CPT | Mod: S$PBB,,, | Performed by: FAMILY MEDICINE

## 2018-10-01 PROCEDURE — 3074F SYST BP LT 130 MM HG: CPT | Mod: CPTII,,, | Performed by: FAMILY MEDICINE

## 2018-10-01 PROCEDURE — 84439 ASSAY OF FREE THYROXINE: CPT

## 2018-10-01 PROCEDURE — 80053 COMPREHEN METABOLIC PANEL: CPT

## 2018-10-01 PROCEDURE — 80061 LIPID PANEL: CPT

## 2018-10-01 PROCEDURE — 1101F PT FALLS ASSESS-DOCD LE1/YR: CPT | Mod: CPTII,,, | Performed by: FAMILY MEDICINE

## 2018-10-01 PROCEDURE — 36415 COLL VENOUS BLD VENIPUNCTURE: CPT | Mod: PO

## 2018-10-01 PROCEDURE — 99213 OFFICE O/P EST LOW 20 MIN: CPT | Mod: PBBFAC,PO,25 | Performed by: FAMILY MEDICINE

## 2018-10-01 PROCEDURE — 83036 HEMOGLOBIN GLYCOSYLATED A1C: CPT

## 2018-10-01 PROCEDURE — 99999 PR PBB SHADOW E&M-EST. PATIENT-LVL III: CPT | Mod: PBBFAC,,, | Performed by: FAMILY MEDICINE

## 2018-10-01 PROCEDURE — 90662 IIV NO PRSV INCREASED AG IM: CPT | Mod: PBBFAC,PO

## 2018-10-01 PROCEDURE — 84443 ASSAY THYROID STIM HORMONE: CPT

## 2018-10-01 PROCEDURE — 85025 COMPLETE CBC W/AUTO DIFF WBC: CPT

## 2018-10-01 NOTE — PROGRESS NOTES
Chief Complaint   Patient presents with    Diabetes    Hypertension       HPI  Peter Lopez is a 82 y.o. male with multiple medical diagnoses as listed in the medical history and problem list that presents for DM2, HTN.    DM2 - overall doing well, BG well controlled at home.       HTN - overall doing well, denies CP, BELCHER     Pt is known to me and was last seen by me on 2018.    PAST MEDICAL HISTORY:  Past Medical History:   Diagnosis Date    Chronic hepatitis C without mention of hepatic coma     genotype 1b; treatment naive    Diabetes mellitus type I     Hypertension        PAST SURGICAL HISTORY:  History reviewed. No pertinent surgical history.    SOCIAL HISTORY:  Social History     Socioeconomic History    Marital status:      Spouse name: Not on file    Number of children: Not on file    Years of education: Not on file    Highest education level: Not on file   Social Needs    Financial resource strain: Not on file    Food insecurity - worry: Not on file    Food insecurity - inability: Not on file    Transportation needs - medical: Not on file    Transportation needs - non-medical: Not on file   Occupational History    Not on file   Tobacco Use    Smoking status: Former Smoker    Smokeless tobacco: Former User     Quit date: 1986   Substance and Sexual Activity    Alcohol use: Yes     Alcohol/week: 0.0 oz     Comment: 2 bottles beer on weekends    Drug use: No    Sexual activity: Yes     Partners: Female   Other Topics Concern    Not on file   Social History Narrative    , resides in [place]. 7 children but lost one. 16 grandchildren. 4 great-grandchildren. Works at [employer]       FAMILY HISTORY:  Family History   Problem Relation Age of Onset    Cancer Mother     Asbestos Father     Liver disease Neg Hx        ALLERGIES AND MEDICATIONS: updated and reviewed.  Review of patient's allergies indicates:  No Known Allergies  Current Outpatient Medications  "  Medication Sig Dispense Refill    glimepiride (AMARYL) 4 MG tablet Take 1 tablet (4 mg total) by mouth once daily. 90 tablet 2    loratadine (CLARITIN) 10 mg tablet Take 1 tablet (10 mg total) by mouth once daily. 30 tablet 1    metFORMIN (GLUCOPHAGE) 1000 MG tablet TAKE 1 TABLET BY MOUTH 2 TIMES DAILY WITH MEAL 180 tablet 1    olmesartan-hydrochlorothiazide (BENICAR HCT) 40-12.5 mg Tab Take 1 tablet by mouth once daily. 90 tablet 2    olmesartan-hydrochlorothiazide (BENICAR HCT) 40-12.5 mg Tab TAKE 1 TABLET BY MOUTH ONCE DAILY. 90 tablet 1     No current facility-administered medications for this visit.        ROS  Review of Systems   Constitutional: Negative for activity change, fatigue and fever.   HENT: Negative for congestion, rhinorrhea and sore throat.    Eyes: Negative for visual disturbance.   Respiratory: Negative for cough, chest tightness and shortness of breath.    Cardiovascular: Negative for chest pain.   Gastrointestinal: Negative for abdominal pain, diarrhea, nausea and vomiting.   Genitourinary: Negative for dysuria, frequency and urgency.   Musculoskeletal: Positive for arthralgias and myalgias. Negative for back pain.   Skin: Negative for rash.   Neurological: Negative for dizziness, syncope and numbness.   Psychiatric/Behavioral: Negative for agitation.       Physical Exam  Vitals:    10/01/18 0856   BP: 124/78   Pulse: 92   Resp: 20   Temp: 98 °F (36.7 °C)    Body mass index is 25.49 kg/m².  Weight: 82.9 kg (182 lb 12.2 oz)   Height: 5' 11" (180.3 cm)     Physical Exam   Constitutional: He is oriented to person, place, and time. He appears well-developed and well-nourished.   HENT:   Head: Normocephalic and atraumatic.   Eyes: Conjunctivae and EOM are normal. Pupils are equal, round, and reactive to light.   Neck: Normal range of motion. Neck supple.   Cardiovascular: Normal rate, regular rhythm and normal heart sounds.   Pulmonary/Chest: Effort normal and breath sounds normal. "   Abdominal: Soft. Bowel sounds are normal.   Musculoskeletal: Normal range of motion.   Neurological: He is alert and oriented to person, place, and time. He has normal reflexes.   Skin: Skin is warm and dry.   Psychiatric: He has a normal mood and affect. His behavior is normal. Judgment and thought content normal.       Health Maintenance       Date Due Completion Date    Foot Exam 03/27/2018 3/27/2017    Override on 7/6/2015: Done    Eye Exam 05/22/2018 5/22/2017 (Done)    Override on 5/22/2017: Done (Dr. Olsen)    Override on 10/25/2016: Done (dr olsen)    Override on 4/25/2016: Done    Override on 4/6/2015: Done    Override on 9/19/2014: Done (Dr. Gilbert Denney)    Lipid Panel 07/10/2018 7/10/2017    Influenza Vaccine 08/01/2018 10/16/2017    Pneumococcal (65+) (2 of 2 - PCV13) 02/16/2019 (Originally 1/8/2015) 1/8/2014    Hemoglobin A1c 10/09/2018 4/9/2018    Urine Microalbumin 04/09/2019 4/9/2018    TETANUS VACCINE 02/16/2028 2/16/2018 (Declined)    Override on 2/16/2018: Declined          Assessment & Plan    Essential hypertension  -     CBC auto differential; Future; Expected date: 10/01/2018  -     Comprehensive metabolic panel; Future; Expected date: 10/01/2018  -     T4, free; Future; Expected date: 10/01/2018  -     TSH; Future; Expected date: 10/01/2018  -     Lipid panel; Future; Expected date: 10/01/2018  - Assess labs today    Need for influenza vaccination  -     Influenza - High Dose (65+) (PF) (IM)    Encounter for lipid screening for cardiovascular disease  -     Lipid panel; Future; Expected date: 10/01/2018    Type 2 diabetes mellitus without complication, without long-term current use of insulin  -     Hemoglobin A1c; Future; Expected date: 10/01/2018  - Overall doing well, will assess labs         Follow-up in about 3 months (around 1/1/2019), or if symptoms worsen or fail to improve.

## 2018-10-11 RX ORDER — GLIMEPIRIDE 4 MG/1
TABLET ORAL
Qty: 90 TABLET | Refills: 2 | Status: SHIPPED | OUTPATIENT
Start: 2018-10-11 | End: 2019-06-12 | Stop reason: SDUPTHER

## 2018-10-11 RX ORDER — METFORMIN HYDROCHLORIDE 1000 MG/1
TABLET ORAL
Qty: 60 TABLET | Refills: 5 | Status: SHIPPED | OUTPATIENT
Start: 2018-10-11 | End: 2019-04-15 | Stop reason: SDUPTHER

## 2018-10-15 ENCOUNTER — TELEPHONE (OUTPATIENT)
Dept: FAMILY MEDICINE | Facility: CLINIC | Age: 83
End: 2018-10-15

## 2018-10-15 DIAGNOSIS — J06.9 UPPER RESPIRATORY TRACT INFECTION, UNSPECIFIED TYPE: Primary | ICD-10-CM

## 2018-10-15 NOTE — TELEPHONE ENCOUNTER
----- Message from Jose Aliciaaaronmeghan sent at 10/15/2018  7:11 AM CDT -----  Contact: Bernadette/Spouse/[phone]  MELISSA Fleming called stating that the patient has fever, chills, and a cough.    .  [facility]/pharmacy #[id number] - [place] - [address]  [address] [address]  Phone: [phone] Fax: [phone]    Thank you.

## 2018-10-15 NOTE — TELEPHONE ENCOUNTER
Patient's wife states the patient is not sleeping and has a cough. She's that it happens every year after taking the FLU shot.

## 2018-10-16 NOTE — TELEPHONE ENCOUNTER
----- Message from Jorge Taylor sent at 10/16/2018  1:59 PM CDT -----  Contact: Bernadette(wife)[phone]  Patient's wife called the staff again. She would like something sent to the pharmacy for the patient. She reports that after taking the flu shot, the patient became sick and starting coughing. She would like it sent to [facility] on [address] .

## 2018-10-17 RX ORDER — BENZONATATE 100 MG/1
100 CAPSULE ORAL 3 TIMES DAILY PRN
Qty: 30 CAPSULE | Refills: 1 | Status: SHIPPED | OUTPATIENT
Start: 2018-10-17 | End: 2018-10-25

## 2018-10-17 NOTE — TELEPHONE ENCOUNTER
Called in tessalon perles for cough. If pt has other symptoms, SOB/Chest pains, feeling very sick, would recommend being seen at  or another office.

## 2018-10-25 ENCOUNTER — OFFICE VISIT (OUTPATIENT)
Dept: FAMILY MEDICINE | Facility: CLINIC | Age: 83
End: 2018-10-25
Payer: MEDICARE

## 2018-10-25 VITALS
DIASTOLIC BLOOD PRESSURE: 70 MMHG | RESPIRATION RATE: 16 BRPM | BODY MASS INDEX: 24.88 KG/M2 | OXYGEN SATURATION: 95 % | HEIGHT: 71 IN | TEMPERATURE: 98 F | WEIGHT: 177.69 LBS | HEART RATE: 94 BPM | SYSTOLIC BLOOD PRESSURE: 124 MMHG

## 2018-10-25 DIAGNOSIS — J18.9 ATYPICAL PNEUMONIA: Primary | ICD-10-CM

## 2018-10-25 PROCEDURE — 1101F PT FALLS ASSESS-DOCD LE1/YR: CPT | Mod: CPTII,,, | Performed by: PHYSICIAN ASSISTANT

## 2018-10-25 PROCEDURE — 3078F DIAST BP <80 MM HG: CPT | Mod: CPTII,,, | Performed by: PHYSICIAN ASSISTANT

## 2018-10-25 PROCEDURE — 99214 OFFICE O/P EST MOD 30 MIN: CPT | Mod: PBBFAC,PO | Performed by: PHYSICIAN ASSISTANT

## 2018-10-25 PROCEDURE — 99999 PR PBB SHADOW E&M-EST. PATIENT-LVL IV: CPT | Mod: PBBFAC,,, | Performed by: PHYSICIAN ASSISTANT

## 2018-10-25 PROCEDURE — 99214 OFFICE O/P EST MOD 30 MIN: CPT | Mod: S$PBB,,, | Performed by: PHYSICIAN ASSISTANT

## 2018-10-25 PROCEDURE — 3074F SYST BP LT 130 MM HG: CPT | Mod: CPTII,,, | Performed by: PHYSICIAN ASSISTANT

## 2018-10-25 RX ORDER — PROMETHAZINE HYDROCHLORIDE AND DEXTROMETHORPHAN HYDROBROMIDE 6.25; 15 MG/5ML; MG/5ML
5 SYRUP ORAL NIGHTLY PRN
Qty: 180 ML | Refills: 0 | Status: SHIPPED | OUTPATIENT
Start: 2018-10-25 | End: 2018-11-04

## 2018-10-25 RX ORDER — AZITHROMYCIN 250 MG/1
TABLET, FILM COATED ORAL
Qty: 6 TABLET | Refills: 0 | Status: SHIPPED | OUTPATIENT
Start: 2018-10-25 | End: 2018-11-19 | Stop reason: ALTCHOICE

## 2018-10-25 NOTE — PROGRESS NOTES
Subjective:       Patient ID: Peter Lopez is a 82 y.o. male.    Chief Complaint: Sinus Problem (cold, fever, cough, nasal congestion)    Cough   This is a new problem. The current episode started 1 to 4 weeks ago (20 days). The problem has been unchanged. The problem occurs hourly. The cough is productive of sputum. Associated symptoms include chills, a fever and sweats. Pertinent negatives include no myalgias, nasal congestion, rhinorrhea, shortness of breath or wheezing. Treatments tried: tessalon, mucinex, coricidin. There is no history of asthma, bronchitis or pneumonia.     Social History     Socioeconomic History    Marital status:      Spouse name: Not on file    Number of children: Not on file    Years of education: Not on file    Highest education level: Not on file   Social Needs    Financial resource strain: Not on file    Food insecurity - worry: Not on file    Food insecurity - inability: Not on file    Transportation needs - medical: Not on file    Transportation needs - non-medical: Not on file   Occupational History    Not on file   Tobacco Use    Smoking status: Former Smoker    Smokeless tobacco: Former User     Quit date: 1986   Substance and Sexual Activity    Alcohol use: Yes     Alcohol/week: 0.0 oz     Comment: 2 bottles beer on weekends    Drug use: No    Sexual activity: Yes     Partners: Female   Other Topics Concern    Not on file   Social History Narrative    , resides in [place]. 7 children but lost one. 16 grandchildren. 4 great-grandchildren. Works at [employer]       Review of Systems   Constitutional: Positive for chills and fever.   HENT: Negative for rhinorrhea.    Respiratory: Positive for cough. Negative for shortness of breath and wheezing.    Musculoskeletal: Negative for myalgias.       Objective:      Physical Exam   Constitutional: He is oriented to person, place, and time. He appears well-developed and well-nourished. No distress.    HENT:   Head: Normocephalic.   Cardiovascular: Normal rate and regular rhythm.   Pulmonary/Chest: Effort normal and breath sounds normal.   Lymphadenopathy:     He has no cervical adenopathy.   Neurological: He is alert and oriented to person, place, and time.   Skin: He is not diaphoretic.   Vitals reviewed.      Assessment:       1. Atypical pneumonia        Plan:     Peter was seen today for sinus problem.    Diagnoses and all orders for this visit:    Atypical pneumonia  -     azithromycin (Z-BARAK) 250 MG tablet; Take 2 pills day 1, then 1 pill day 2-5  -     promethazine-dextromethorphan (PROMETHAZINE-DM) 6.25-15 mg/5 mL Syrp; Take 5 mLs by mouth nightly as needed.  -     Push fluids, if no change return for xray

## 2018-11-12 ENCOUNTER — OFFICE VISIT (OUTPATIENT)
Dept: PODIATRY | Facility: CLINIC | Age: 83
End: 2018-11-12
Payer: MEDICARE

## 2018-11-12 VITALS
WEIGHT: 177 LBS | HEIGHT: 71 IN | DIASTOLIC BLOOD PRESSURE: 76 MMHG | BODY MASS INDEX: 24.78 KG/M2 | SYSTOLIC BLOOD PRESSURE: 112 MMHG

## 2018-11-12 DIAGNOSIS — B35.1 ONYCHOMYCOSIS DUE TO DERMATOPHYTE: ICD-10-CM

## 2018-11-12 DIAGNOSIS — E11.49 TYPE II DIABETES MELLITUS WITH NEUROLOGICAL MANIFESTATIONS: Primary | ICD-10-CM

## 2018-11-12 PROCEDURE — 1101F PT FALLS ASSESS-DOCD LE1/YR: CPT | Mod: CPTII,S$GLB,, | Performed by: PODIATRIST

## 2018-11-12 PROCEDURE — 99999 PR PBB SHADOW E&M-EST. PATIENT-LVL III: CPT | Mod: PBBFAC,,, | Performed by: PODIATRIST

## 2018-11-12 PROCEDURE — 99202 OFFICE O/P NEW SF 15 MIN: CPT | Mod: S$GLB,,, | Performed by: PODIATRIST

## 2018-11-12 PROCEDURE — 3078F DIAST BP <80 MM HG: CPT | Mod: CPTII,S$GLB,, | Performed by: PODIATRIST

## 2018-11-12 PROCEDURE — 3074F SYST BP LT 130 MM HG: CPT | Mod: CPTII,S$GLB,, | Performed by: PODIATRIST

## 2018-11-12 NOTE — PROGRESS NOTES
Subjective:      Patient ID: Peter Lopez is a 82 y.o. male.    Chief Complaint: Diabetes Mellitus (PCP Dr Schneider); Diabetic Foot Exam; and Nail Care    Peter is a 82 y.o. male who presents to the clinic upon referral from Dr. Aguiar  for evaluation and treatment of diabetic feet. Peter has a past medical history of Chronic hepatitis C without mention of hepatic coma, Diabetes mellitus type I, and Hypertension. Patient relates no major problem with feet. Reports elongated nails that catch on his socks. Requesting nail trimming.     PCP: Soren Schneider MD    Date Last Seen by PCP: 10/25/18    Current shoe gear: Tennis shoes    Hemoglobin A1C   Date Value Ref Range Status   10/01/2018 6.1 (H) 4.0 - 5.6 % Final     Comment:     ADA Screening Guidelines:  5.7-6.4%  Consistent with prediabetes  >or=6.5%  Consistent with diabetes  High levels of fetal hemoglobin interfere with the HbA1C  assay. Heterozygous hemoglobin variants (HbS, HgC, etc)do  not significantly interfere with this assay.   However, presence of multiple variants may affect accuracy.     04/09/2018 6.0 (H) 4.0 - 5.6 % Final     Comment:     According to ADA guidelines, hemoglobin A1c <7.0% represents  optimal control in non-pregnant diabetic patients. Different  metrics may apply to specific patient populations.   Standards of Medical Care in Diabetes-2016.  For the purpose of screening for the presence of diabetes:  <5.7%     Consistent with the absence of diabetes  5.7-6.4%  Consistent with increasing risk for diabetes   (prediabetes)  >or=6.5%  Consistent with diabetes  Currently, no consensus exists for use of hemoglobin A1c  for diagnosis of diabetes for children.  This Hemoglobin A1c assay has significant interference with fetal   hemoglobin   (HbF). The results are invalid for patients with abnormal amounts of   HbF,   including those with known Hereditary Persistence   of Fetal Hemoglobin. Heterozygous hemoglobin variants (HbAS, HbAC,   HbAD, HbAE, HbA2)  do not significantly interfere with this assay;   however, presence of multiple variants in a sample may impact the %   interference.     07/10/2017 6.4 (H) 4.0 - 5.6 % Final     Comment:     According to ADA guidelines, hemoglobin A1c <7.0% represents  optimal control in non-pregnant diabetic patients. Different  metrics may apply to specific patient populations.   Standards of Medical Care in Diabetes-2016.  For the purpose of screening for the presence of diabetes:  <5.7%     Consistent with the absence of diabetes  5.7-6.4%  Consistent with increasing risk for diabetes   (prediabetes)  >or=6.5%  Consistent with diabetes  Currently, no consensus exists for use of hemoglobin A1c  for diagnosis of diabetes for children.  This Hemoglobin A1c assay has significant interference with fetal   hemoglobin   (HbF). The results are invalid for patients with abnormal amounts of   HbF,   including those with known Hereditary Persistence   of Fetal Hemoglobin. Heterozygous hemoglobin variants (HbAS, HbAC,   HbAD, HbAE, HbA2) do not significantly interfere with this assay;   however, presence of multiple variants in a sample may impact the %   interference.             Review of Systems   Constitution: Negative for chills, diaphoresis, fever and weakness.   Cardiovascular: Negative for claudication, cyanosis, leg swelling and syncope.   Respiratory: Negative for cough and shortness of breath.    Skin: Positive for color change and nail changes. Negative for suspicious lesions.   Musculoskeletal: Negative for falls, joint pain, muscle cramps and muscle weakness.   Gastrointestinal: Negative for diarrhea, nausea and vomiting.   Neurological: Positive for paresthesias. Negative for disturbances in coordination, numbness, sensory change and tremors.   Psychiatric/Behavioral: Negative for altered mental status.           Objective:      Physical Exam   Constitutional: He appears well-developed. He is cooperative.   Oriented to time,  place, and person.   Cardiovascular:   DP and PT pulses are palpable bilaterally. 3 sec capillary refill time and toes and feet are warm to touch proximally .        Musculoskeletal:   Equinus noted b/l ankles with < 10 deg DF noted. MMT 5/5 in DF/PF/Inv/Ev resistance with no reproduction of pain in any direction. Passive range of motion of ankle and pedal joints is painless b/l.     Feet:   Right Foot:   Skin Integrity: Negative for callus or dry skin.   Left Foot:   Skin Integrity: Negative for callus or dry skin.   Lymphadenopathy:   Negative lymphadenopathy bilateral popliteal fossa and tarsal tunnel.   Neurological: He is alert.   Light touch, proprioception, and sharp/dull sensation are all intact bilaterally. Protective threshold with the Sumner-Wienstein monofilament is intact bilaterally.  Subjective paresthesias with no clearly identifiable source or trigger.      Skin:   Toenails 1-5 bilaterally are elongated by 2-3 mm, thickened by 2-3 mm, discolored/yellowed, dystrophic, brittle with subungual debris.       Psychiatric: He has a normal mood and affect.             Assessment:       Encounter Diagnoses   Name Primary?    Type II diabetes mellitus with neurological manifestations Yes    Onychomycosis due to dermatophyte          Plan:       Peter was seen today for diabetes mellitus, diabetic foot exam and nail care.    Diagnoses and all orders for this visit:    Type II diabetes mellitus with neurological manifestations    Onychomycosis due to dermatophyte      I counseled the patient on his conditions, their implications and medical management.    - Shoe inspection. Diabetic Foot Education. Patient reminded of the importance of good nutrition and blood sugar control to help prevent podiatric complications of diabetes. Patient instructed on proper foot hygeine. We discussed wearing proper shoe gear, daily foot inspections, never walking without protective shoe gear, never putting sharp instruments to  feet, routine podiatric nail visits every 6  months.   - With patient's permission, nails were aggressively reduced and debrided x 10 to their soft tissue attachment mechanically and with electric , removing all offending nail and debris. Patient relates relief following the procedure. He will continue to monitor the areas daily, inspect his feet, wear protective shoe gear when ambulatory, moisturizer to maintain skin integrity and follow in this office in approximately 6 months, sooner p.r.n.     F/u 6 months     Lucia Aguiar DPM

## 2018-11-19 ENCOUNTER — OFFICE VISIT (OUTPATIENT)
Dept: FAMILY MEDICINE | Facility: CLINIC | Age: 83
End: 2018-11-19
Payer: MEDICARE

## 2018-11-19 VITALS
SYSTOLIC BLOOD PRESSURE: 140 MMHG | OXYGEN SATURATION: 95 % | BODY MASS INDEX: 25 KG/M2 | DIASTOLIC BLOOD PRESSURE: 80 MMHG | TEMPERATURE: 98 F | WEIGHT: 178.56 LBS | HEIGHT: 71 IN | HEART RATE: 106 BPM | RESPIRATION RATE: 20 BRPM

## 2018-11-19 DIAGNOSIS — R42 DIZZINESS: ICD-10-CM

## 2018-11-19 DIAGNOSIS — J30.9 CHRONIC ALLERGIC RHINITIS: ICD-10-CM

## 2018-11-19 DIAGNOSIS — F43.21 GRIEVING: ICD-10-CM

## 2018-11-19 DIAGNOSIS — J06.9 UPPER RESPIRATORY TRACT INFECTION, UNSPECIFIED TYPE: Primary | ICD-10-CM

## 2018-11-19 PROCEDURE — 1101F PT FALLS ASSESS-DOCD LE1/YR: CPT | Mod: CPTII,S$GLB,, | Performed by: FAMILY MEDICINE

## 2018-11-19 PROCEDURE — 3079F DIAST BP 80-89 MM HG: CPT | Mod: CPTII,S$GLB,, | Performed by: FAMILY MEDICINE

## 2018-11-19 PROCEDURE — 99214 OFFICE O/P EST MOD 30 MIN: CPT | Mod: S$GLB,,, | Performed by: FAMILY MEDICINE

## 2018-11-19 PROCEDURE — 3077F SYST BP >= 140 MM HG: CPT | Mod: CPTII,S$GLB,, | Performed by: FAMILY MEDICINE

## 2018-11-19 PROCEDURE — 99999 PR PBB SHADOW E&M-EST. PATIENT-LVL III: CPT | Mod: PBBFAC,,, | Performed by: FAMILY MEDICINE

## 2018-11-19 RX ORDER — BENZONATATE 100 MG/1
100 CAPSULE ORAL 2 TIMES DAILY PRN
Qty: 30 CAPSULE | Refills: 2 | Status: SHIPPED | OUTPATIENT
Start: 2018-11-19 | End: 2018-11-29

## 2018-11-19 RX ORDER — LORATADINE 10 MG/1
10 TABLET ORAL DAILY
Qty: 30 TABLET | Refills: 1 | Status: SHIPPED | OUTPATIENT
Start: 2018-11-19 | End: 2019-01-10

## 2018-11-19 NOTE — PROGRESS NOTES
Chief Complaint   Patient presents with    Grief    Cough       HPI  Peter Lopez is a 82 y.o. male with multiple medical diagnoses as listed in the medical history and problem list that presents for follow up.    Pt's wife recently passed 2 days ago.     Cough - chronic, dx with atypical PNA, rx: abx, cough med, overall has improved although intermittent, +sputum, white/clear.     DM2 - amaryl, metformin    HTN - benicar HCT, overall doing well.     Pt is known to me and was last seen by me on 10/1/2018.    PAST MEDICAL HISTORY:  Past Medical History:   Diagnosis Date    Chronic hepatitis C without mention of hepatic coma     genotype 1b; treatment naive    Diabetes mellitus type I     Hypertension        PAST SURGICAL HISTORY:  History reviewed. No pertinent surgical history.    SOCIAL HISTORY:  Social History     Socioeconomic History    Marital status:      Spouse name: Not on file    Number of children: Not on file    Years of education: Not on file    Highest education level: Not on file   Social Needs    Financial resource strain: Not on file    Food insecurity - worry: Not on file    Food insecurity - inability: Not on file    Transportation needs - medical: Not on file    Transportation needs - non-medical: Not on file   Occupational History    Not on file   Tobacco Use    Smoking status: Former Smoker    Smokeless tobacco: Former User     Quit date: 1986   Substance and Sexual Activity    Alcohol use: Yes     Alcohol/week: 0.0 oz     Comment: 2 bottles beer on weekends    Drug use: No    Sexual activity: Yes     Partners: Female   Other Topics Concern    Not on file   Social History Narrative    , resides in [place]. 7 children but lost one. 16 grandchildren. 4 great-grandchildren. Works at "[employer] Home       FAMILY HISTORY:  Family History   Problem Relation Age of Onset    Cancer Mother     Asbestos Father     Liver disease Neg Hx        ALLERGIES AND  "MEDICATIONS: updated and reviewed.  Review of patient's allergies indicates:  No Known Allergies  Current Outpatient Medications   Medication Sig Dispense Refill    glimepiride (AMARYL) 4 MG tablet TAKE ONE TABLET BY MOUTH ONCE DAILY 90 tablet 2    loratadine (CLARITIN) 10 mg tablet Take 1 tablet (10 mg total) by mouth once daily. 30 tablet 1    metFORMIN (GLUCOPHAGE) 1000 MG tablet TAKE 1 TABLET BY MOUTH 2 TIMES DAILY WITH MEAL 180 tablet 1    metFORMIN (GLUCOPHAGE) 1000 MG tablet TAKE ONE TABLET BY MOUTH TWICE DAILY WITH MEALS 60 tablet 5    olmesartan-hydrochlorothiazide (BENICAR HCT) 40-12.5 mg Tab Take 1 tablet by mouth once daily. 90 tablet 2    olmesartan-hydrochlorothiazide (BENICAR HCT) 40-12.5 mg Tab TAKE 1 TABLET BY MOUTH ONCE DAILY. 90 tablet 1     No current facility-administered medications for this visit.        ROS  Review of Systems   Constitutional: Negative for activity change, fatigue and fever.   HENT: Negative for congestion, rhinorrhea and sore throat.    Eyes: Negative for visual disturbance.   Respiratory: Positive for cough. Negative for chest tightness and shortness of breath.    Cardiovascular: Negative for chest pain.   Gastrointestinal: Negative for abdominal pain, diarrhea, nausea and vomiting.   Genitourinary: Negative for dysuria, frequency and urgency.   Musculoskeletal: Negative for back pain and myalgias.   Skin: Negative for rash.   Neurological: Negative for dizziness, syncope and numbness.   Psychiatric/Behavioral: Negative for agitation.       Physical Exam  Vitals:    11/19/18 1526   BP: (!) 140/80   Pulse: 106   Resp: 20   Temp: 97.6 °F (36.4 °C)    Body mass index is 24.91 kg/m².  Weight: 81 kg (178 lb 9.2 oz)   Height: 5' 11" (180.3 cm)     Physical Exam   Constitutional: He is oriented to person, place, and time. He appears well-developed and well-nourished.   HENT:   Head: Normocephalic and atraumatic.   Eyes: Conjunctivae and EOM are normal. Pupils are equal, " round, and reactive to light.   Neck: Normal range of motion. Neck supple.   Cardiovascular: Normal rate, regular rhythm and normal heart sounds.   Pulmonary/Chest: Effort normal and breath sounds normal.   Abdominal: Soft. Bowel sounds are normal.   Musculoskeletal: Normal range of motion.   Neurological: He is alert and oriented to person, place, and time. He has normal reflexes.   Skin: Skin is warm and dry.   Psychiatric: He has a normal mood and affect. His behavior is normal. Judgment and thought content normal.       Health Maintenance       Date Due Completion Date    Pneumococcal (65+) (2 of 2 - PCV13) 02/16/2019 (Originally 1/8/2015) 1/8/2014    Hemoglobin A1c 04/01/2019 10/1/2018    Urine Microalbumin 04/09/2019 4/9/2018    Eye Exam 06/11/2019 6/11/2018    Override on 5/22/2017: Done (Dr. Olsen)    Override on 10/25/2016: Done (dr olsen)    Override on 4/25/2016: Done    Override on 4/6/2015: Done    Override on 9/19/2014: Done (Dr. Gilbert Denney)    Lipid Panel 10/01/2019 10/1/2018    Foot Exam 11/12/2019 11/12/2018 (Done)    Override on 11/12/2018: Done    Override on 7/6/2015: Done    TETANUS VACCINE 02/16/2028 2/16/2018 (Declined)    Override on 2/16/2018: Declined          Assessment & Plan    Upper respiratory tract infection, unspecified type  -     loratadine (CLARITIN) 10 mg tablet; Take 1 tablet (10 mg total) by mouth once daily.  Dispense: 30 tablet; Refill: 1  -     benzonatate (TESSALON) 100 MG capsule; Take 1 capsule (100 mg total) by mouth 2 (two) times daily as needed for Cough.  Dispense: 30 capsule; Refill: 2  - Will provide conservative relief    Chronic allergic rhinitis  -     loratadine (CLARITIN) 10 mg tablet; Take 1 tablet (10 mg total) by mouth once daily.  Dispense: 30 tablet; Refill: 1  -     benzonatate (TESSALON) 100 MG capsule; Take 1 capsule (100 mg total) by mouth 2 (two) times daily as needed for Cough.  Dispense: 30 capsule; Refill: 2    Grieving  - Marked  increased stressors at this time, due to wife's passing        Follow-up in about 4 weeks (around 12/17/2018), or if symptoms worsen or fail to improve.

## 2018-11-30 ENCOUNTER — OFFICE VISIT (OUTPATIENT)
Dept: FAMILY MEDICINE | Facility: CLINIC | Age: 83
End: 2018-11-30
Payer: MEDICARE

## 2018-11-30 ENCOUNTER — LAB VISIT (OUTPATIENT)
Dept: LAB | Facility: HOSPITAL | Age: 83
End: 2018-11-30
Attending: FAMILY MEDICINE
Payer: MEDICARE

## 2018-11-30 VITALS
DIASTOLIC BLOOD PRESSURE: 82 MMHG | RESPIRATION RATE: 20 BRPM | HEIGHT: 71 IN | WEIGHT: 175.5 LBS | HEART RATE: 88 BPM | BODY MASS INDEX: 24.57 KG/M2 | SYSTOLIC BLOOD PRESSURE: 130 MMHG | OXYGEN SATURATION: 96 % | TEMPERATURE: 98 F

## 2018-11-30 DIAGNOSIS — R42 DIZZINESS: ICD-10-CM

## 2018-11-30 DIAGNOSIS — I10 ESSENTIAL HYPERTENSION: Primary | ICD-10-CM

## 2018-11-30 DIAGNOSIS — E11.65 UNCONTROLLED TYPE 2 DIABETES MELLITUS WITH HYPERGLYCEMIA: ICD-10-CM

## 2018-11-30 LAB
ALBUMIN SERPL BCP-MCNC: 4 G/DL
ALP SERPL-CCNC: 38 U/L
ALT SERPL W/O P-5'-P-CCNC: 43 U/L
ANION GAP SERPL CALC-SCNC: 10 MMOL/L
AST SERPL-CCNC: 40 U/L
BASOPHILS # BLD AUTO: 0.03 K/UL
BASOPHILS NFR BLD: 0.8 %
BILIRUB SERPL-MCNC: 0.6 MG/DL
BILIRUB SERPL-MCNC: NEGATIVE MG/DL
BLOOD URINE, POC: NEGATIVE
BUN SERPL-MCNC: 22 MG/DL
CALCIUM SERPL-MCNC: 10.8 MG/DL
CHLORIDE SERPL-SCNC: 103 MMOL/L
CO2 SERPL-SCNC: 31 MMOL/L
COLOR, POC UA: YELLOW
CREAT SERPL-MCNC: 1.3 MG/DL
DIFFERENTIAL METHOD: ABNORMAL
EOSINOPHIL # BLD AUTO: 0 K/UL
EOSINOPHIL NFR BLD: 1 %
ERYTHROCYTE [DISTWIDTH] IN BLOOD BY AUTOMATED COUNT: 13.5 %
EST. GFR  (AFRICAN AMERICAN): 59.6 ML/MIN/1.73 M^2
EST. GFR  (NON AFRICAN AMERICAN): 51.5 ML/MIN/1.73 M^2
GLUCOSE SERPL-MCNC: 57 MG/DL
GLUCOSE UR QL STRIP: NORMAL
HCT VFR BLD AUTO: 46 %
HGB BLD-MCNC: 14.4 G/DL
IMM GRANULOCYTES # BLD AUTO: 0 K/UL
IMM GRANULOCYTES NFR BLD AUTO: 0 %
KETONES UR QL STRIP: NEGATIVE
LEUKOCYTE ESTERASE URINE, POC: ABNORMAL
LYMPHOCYTES # BLD AUTO: 1.9 K/UL
LYMPHOCYTES NFR BLD: 49.7 %
MCH RBC QN AUTO: 28.1 PG
MCHC RBC AUTO-ENTMCNC: 31.3 G/DL
MCV RBC AUTO: 90 FL
MONOCYTES # BLD AUTO: 0.3 K/UL
MONOCYTES NFR BLD: 7.9 %
NEUTROPHILS # BLD AUTO: 1.6 K/UL
NEUTROPHILS NFR BLD: 40.6 %
NITRITE, POC UA: NEGATIVE
NRBC BLD-RTO: 0 /100 WBC
PH, POC UA: 5
PLATELET # BLD AUTO: 353 K/UL
PMV BLD AUTO: 10.3 FL
POTASSIUM SERPL-SCNC: 4.9 MMOL/L
PROT SERPL-MCNC: 7.3 G/DL
PROTEIN, POC: ABNORMAL
RBC # BLD AUTO: 5.12 M/UL
SODIUM SERPL-SCNC: 144 MMOL/L
SPECIFIC GRAVITY, POC UA: 1.02
UROBILINOGEN, POC UA: NORMAL
WBC # BLD AUTO: 3.82 K/UL

## 2018-11-30 PROCEDURE — 3079F DIAST BP 80-89 MM HG: CPT | Mod: CPTII,S$GLB,, | Performed by: FAMILY MEDICINE

## 2018-11-30 PROCEDURE — 81001 URINALYSIS AUTO W/SCOPE: CPT | Mod: S$GLB,,, | Performed by: FAMILY MEDICINE

## 2018-11-30 PROCEDURE — 93010 ELECTROCARDIOGRAM REPORT: CPT | Mod: S$GLB,,, | Performed by: INTERNAL MEDICINE

## 2018-11-30 PROCEDURE — 1101F PT FALLS ASSESS-DOCD LE1/YR: CPT | Mod: CPTII,S$GLB,, | Performed by: FAMILY MEDICINE

## 2018-11-30 PROCEDURE — 99214 OFFICE O/P EST MOD 30 MIN: CPT | Mod: 25,S$GLB,, | Performed by: FAMILY MEDICINE

## 2018-11-30 PROCEDURE — 99999 PR PBB SHADOW E&M-EST. PATIENT-LVL III: CPT | Mod: PBBFAC,,, | Performed by: FAMILY MEDICINE

## 2018-11-30 PROCEDURE — 80053 COMPREHEN METABOLIC PANEL: CPT

## 2018-11-30 PROCEDURE — 36415 COLL VENOUS BLD VENIPUNCTURE: CPT | Mod: PO

## 2018-11-30 PROCEDURE — 99499 UNLISTED E&M SERVICE: CPT | Mod: S$GLB,,, | Performed by: FAMILY MEDICINE

## 2018-11-30 PROCEDURE — 85025 COMPLETE CBC W/AUTO DIFF WBC: CPT

## 2018-11-30 PROCEDURE — 3075F SYST BP GE 130 - 139MM HG: CPT | Mod: CPTII,S$GLB,, | Performed by: FAMILY MEDICINE

## 2018-11-30 PROCEDURE — 93005 ELECTROCARDIOGRAM TRACING: CPT | Mod: S$GLB,,, | Performed by: FAMILY MEDICINE

## 2018-11-30 NOTE — PROGRESS NOTES
Chief Complaint   Patient presents with    Dizziness    Cough       HPI  Peter Lopez is a 82 y.o. male with multiple medical diagnoses as listed in the medical history and problem list that presents for follow up.    Dizziness - episodes, more in am, no spinning sensation, no blurry vision, 1.5 week hx, intermittent aleve PM to help sleep. Increased urinary frequency.      Pt is known to me and was last seen by me on 2018.    PAST MEDICAL HISTORY:  Past Medical History:   Diagnosis Date    Chronic hepatitis C without mention of hepatic coma     genotype 1b; treatment naive    Diabetes mellitus type I     Hypertension        PAST SURGICAL HISTORY:  History reviewed. No pertinent surgical history.    SOCIAL HISTORY:  Social History     Socioeconomic History    Marital status:      Spouse name: Not on file    Number of children: Not on file    Years of education: Not on file    Highest education level: Not on file   Social Needs    Financial resource strain: Not on file    Food insecurity - worry: Not on file    Food insecurity - inability: Not on file    Transportation needs - medical: Not on file    Transportation needs - non-medical: Not on file   Occupational History    Not on file   Tobacco Use    Smoking status: Former Smoker    Smokeless tobacco: Former User     Quit date: 1986   Substance and Sexual Activity    Alcohol use: Yes     Alcohol/week: 0.0 oz     Comment: 2 bottles beer on weekends    Drug use: No    Sexual activity: Yes     Partners: Female   Other Topics Concern    Not on file   Social History Narrative    , resides in [place]. 7 children but lost one. 16 grandchildren. 4 great-grandchildren. Works at [employer]       FAMILY HISTORY:  Family History   Problem Relation Age of Onset    Cancer Mother     Asbestos Father     Liver disease Neg Hx        ALLERGIES AND MEDICATIONS: updated and reviewed.  Review of patient's allergies indicates:  No  "Known Allergies  Current Outpatient Medications   Medication Sig Dispense Refill    glimepiride (AMARYL) 4 MG tablet TAKE ONE TABLET BY MOUTH ONCE DAILY 90 tablet 2    loratadine (CLARITIN) 10 mg tablet Take 1 tablet (10 mg total) by mouth once daily. 30 tablet 1    metFORMIN (GLUCOPHAGE) 1000 MG tablet TAKE 1 TABLET BY MOUTH 2 TIMES DAILY WITH MEAL 180 tablet 1    metFORMIN (GLUCOPHAGE) 1000 MG tablet TAKE ONE TABLET BY MOUTH TWICE DAILY WITH MEALS 60 tablet 5    olmesartan-hydrochlorothiazide (BENICAR HCT) 40-12.5 mg Tab Take 1 tablet by mouth once daily. 90 tablet 2    olmesartan-hydrochlorothiazide (BENICAR HCT) 40-12.5 mg Tab TAKE 1 TABLET BY MOUTH ONCE DAILY. 90 tablet 1     No current facility-administered medications for this visit.        ROS  Review of Systems   Constitutional: Negative for activity change, fatigue and fever.   HENT: Negative for congestion, rhinorrhea and sore throat.    Eyes: Negative for visual disturbance.   Respiratory: Positive for cough. Negative for chest tightness and shortness of breath.    Cardiovascular: Negative for chest pain.   Gastrointestinal: Negative for abdominal pain, diarrhea, nausea and vomiting.   Genitourinary: Negative for dysuria, frequency and urgency.   Musculoskeletal: Negative for back pain and myalgias.   Skin: Negative for rash.   Neurological: Positive for dizziness. Negative for syncope and numbness.   Psychiatric/Behavioral: Negative for agitation.       Physical Exam  Vitals:    11/30/18 1305   BP: 130/82   Pulse: 88   Resp: 20   Temp: 98.3 °F (36.8 °C)    Body mass index is 24.48 kg/m².  Weight: 79.6 kg (175 lb 7.8 oz)   Height: 5' 11" (180.3 cm)     Physical Exam   Constitutional: He is oriented to person, place, and time. He appears well-developed and well-nourished.   HENT:   Head: Normocephalic and atraumatic.   Eyes: Conjunctivae and EOM are normal. Pupils are equal, round, and reactive to light.   Neck: Normal range of motion. Neck supple. "   Cardiovascular: Normal rate, regular rhythm and normal heart sounds.   Pulmonary/Chest: Effort normal and breath sounds normal.   Abdominal: Soft. Bowel sounds are normal.   Musculoskeletal: Normal range of motion.   Neurological: He is alert and oriented to person, place, and time. He has normal reflexes.   Skin: Skin is warm and dry.   Psychiatric: He has a normal mood and affect. His behavior is normal. Judgment and thought content normal.       Health Maintenance       Date Due Completion Date    Pneumococcal (65+) (2 of 2 - PCV13) 02/16/2019 (Originally 1/8/2015) 1/8/2014    Hemoglobin A1c 04/01/2019 10/1/2018    Urine Microalbumin 04/09/2019 4/9/2018    Eye Exam 06/11/2019 6/11/2018    Override on 5/22/2017: Done (Dr. Olsen)    Override on 10/25/2016: Done (dr olsen)    Override on 4/25/2016: Done    Override on 4/6/2015: Done    Override on 9/19/2014: Done (Dr. Gilbert Denney)    Lipid Panel 10/01/2019 10/1/2018    Foot Exam 11/12/2019 11/12/2018 (Done)    Override on 11/12/2018: Done    Override on 7/6/2015: Done    TETANUS VACCINE 02/16/2028 2/16/2018 (Declined)    Override on 2/16/2018: Declined          Assessment & Plan    Essential hypertension  - Continue current medication regimen as prescribed    Uncontrolled type 2 diabetes mellitus with hyperglycemia  - Continue current medication regimen as prescribed    Dizziness  -     POCT URINE DIPSTICK WITH MICROSCOPE, AUTOMATED  -     CBC auto differential; Future; Expected date: 11/30/2018  -     Comprehensive metabolic panel; Future; Expected date: 11/30/2018  -     EKG 12-lead; Future  - Will assess labs, monitor hydration   - Increased stressors at this time, daughter here for support        Follow-up in about 4 weeks (around 12/28/2018).

## 2018-12-10 ENCOUNTER — OFFICE VISIT (OUTPATIENT)
Dept: FAMILY MEDICINE | Facility: CLINIC | Age: 83
End: 2018-12-10
Payer: MEDICARE

## 2018-12-10 VITALS
TEMPERATURE: 98 F | WEIGHT: 183 LBS | HEART RATE: 93 BPM | SYSTOLIC BLOOD PRESSURE: 136 MMHG | HEIGHT: 71 IN | DIASTOLIC BLOOD PRESSURE: 70 MMHG | RESPIRATION RATE: 18 BRPM | OXYGEN SATURATION: 96 % | BODY MASS INDEX: 25.62 KG/M2

## 2018-12-10 DIAGNOSIS — Z63.4 BEREAVEMENT: ICD-10-CM

## 2018-12-10 DIAGNOSIS — E11.65 UNCONTROLLED TYPE 2 DIABETES MELLITUS WITH HYPERGLYCEMIA: ICD-10-CM

## 2018-12-10 DIAGNOSIS — I10 ESSENTIAL HYPERTENSION: Primary | ICD-10-CM

## 2018-12-10 PROCEDURE — 3075F SYST BP GE 130 - 139MM HG: CPT | Mod: CPTII,S$GLB,, | Performed by: FAMILY MEDICINE

## 2018-12-10 PROCEDURE — 99214 OFFICE O/P EST MOD 30 MIN: CPT | Mod: S$GLB,,, | Performed by: FAMILY MEDICINE

## 2018-12-10 PROCEDURE — 1101F PT FALLS ASSESS-DOCD LE1/YR: CPT | Mod: CPTII,S$GLB,, | Performed by: FAMILY MEDICINE

## 2018-12-10 PROCEDURE — 99999 PR PBB SHADOW E&M-EST. PATIENT-LVL III: CPT | Mod: PBBFAC,,, | Performed by: FAMILY MEDICINE

## 2018-12-10 PROCEDURE — 3078F DIAST BP <80 MM HG: CPT | Mod: CPTII,S$GLB,, | Performed by: FAMILY MEDICINE

## 2018-12-10 SDOH — SOCIAL DETERMINANTS OF HEALTH (SDOH): DISSAPEARANCE AND DEATH OF FAMILY MEMBER: Z63.4

## 2018-12-10 NOTE — PROGRESS NOTES
Chief Complaint   Patient presents with    Follow-up     Cough       HPI  Peter Lopez is a 83 y.o. male with multiple medical diagnoses as listed in the medical history and problem list that presents for follow up.      Pt states previous cough, has markedly improved. Also previous dizziness has markedly improved also.     Insomnia - has been affected by recent passing of wife. Meds: nyquil,     Pt is known to me and was last seen by me on 2018.    PAST MEDICAL HISTORY:  Past Medical History:   Diagnosis Date    Chronic hepatitis C without mention of hepatic coma     genotype 1b; treatment naive    Diabetes mellitus type I     Hypertension        PAST SURGICAL HISTORY:  History reviewed. No pertinent surgical history.    SOCIAL HISTORY:  Social History     Socioeconomic History    Marital status:      Spouse name: Not on file    Number of children: Not on file    Years of education: Not on file    Highest education level: Not on file   Social Needs    Financial resource strain: Not on file    Food insecurity - worry: Not on file    Food insecurity - inability: Not on file    Transportation needs - medical: Not on file    Transportation needs - non-medical: Not on file   Occupational History    Not on file   Tobacco Use    Smoking status: Former Smoker    Smokeless tobacco: Former User     Quit date: 1986   Substance and Sexual Activity    Alcohol use: Yes     Alcohol/week: 0.0 oz     Comment: 2 bottles beer on weekends    Drug use: No    Sexual activity: Yes     Partners: Female   Other Topics Concern    Not on file   Social History Narrative    , resides in [place]. 7 children but lost one. 16 grandchildren. 4 great-grandchildren. Works at [employer]       FAMILY HISTORY:  Family History   Problem Relation Age of Onset    Cancer Mother     Asbestos Father     Liver disease Neg Hx        ALLERGIES AND MEDICATIONS: updated and reviewed.  Review of patient's  "allergies indicates:  No Known Allergies  Current Outpatient Medications   Medication Sig Dispense Refill    glimepiride (AMARYL) 4 MG tablet TAKE ONE TABLET BY MOUTH ONCE DAILY 90 tablet 2    loratadine (CLARITIN) 10 mg tablet Take 1 tablet (10 mg total) by mouth once daily. 30 tablet 1    metFORMIN (GLUCOPHAGE) 1000 MG tablet TAKE 1 TABLET BY MOUTH 2 TIMES DAILY WITH MEAL 180 tablet 1    metFORMIN (GLUCOPHAGE) 1000 MG tablet TAKE ONE TABLET BY MOUTH TWICE DAILY WITH MEALS 60 tablet 5    olmesartan-hydrochlorothiazide (BENICAR HCT) 40-12.5 mg Tab Take 1 tablet by mouth once daily. 90 tablet 2    olmesartan-hydrochlorothiazide (BENICAR HCT) 40-12.5 mg Tab TAKE 1 TABLET BY MOUTH ONCE DAILY. 90 tablet 1     No current facility-administered medications for this visit.        ROS  Review of Systems   Constitutional: Negative for activity change, fatigue and fever.   HENT: Negative for congestion, rhinorrhea and sore throat.    Eyes: Negative for visual disturbance.   Respiratory: Positive for cough. Negative for chest tightness and shortness of breath.    Cardiovascular: Negative for chest pain.   Gastrointestinal: Negative for abdominal pain, diarrhea, nausea and vomiting.   Genitourinary: Negative for dysuria, frequency and urgency.   Musculoskeletal: Negative for back pain and myalgias.   Skin: Negative for rash.   Neurological: Negative for dizziness, syncope and numbness.   Psychiatric/Behavioral: Negative for agitation.       Physical Exam  Vitals:    12/10/18 0850   BP: 136/70   Pulse: 93   Resp: 18   Temp: 98.2 °F (36.8 °C)    Body mass index is 25.52 kg/m².  Weight: 83 kg (182 lb 15.7 oz)   Height: 5' 11" (180.3 cm)     Physical Exam   Constitutional: He is oriented to person, place, and time. He appears well-developed and well-nourished.   HENT:   Head: Normocephalic and atraumatic.   Eyes: Conjunctivae and EOM are normal. Pupils are equal, round, and reactive to light.   Neck: Normal range of motion. " Neck supple.   Cardiovascular: Normal rate, regular rhythm and normal heart sounds.   Pulmonary/Chest: Effort normal and breath sounds normal.   Abdominal: Soft. Bowel sounds are normal.   Musculoskeletal: Normal range of motion.   Neurological: He is alert and oriented to person, place, and time. He has normal reflexes.   Skin: Skin is warm and dry.   Psychiatric: He has a normal mood and affect. His behavior is normal. Judgment and thought content normal.       Health Maintenance       Date Due Completion Date    Pneumococcal Vaccine (65+ Low/Medium Risk) (2 of 2 - PCV13) 02/16/2019 (Originally 1/8/2015) 1/8/2014    Hemoglobin A1c 04/01/2019 10/1/2018    Urine Microalbumin 04/09/2019 4/9/2018    Eye Exam 06/11/2019 6/11/2018    Override on 5/22/2017: Done (Dr. Olsen)    Override on 10/25/2016: Done (dr olsen)    Override on 4/25/2016: Done    Override on 4/6/2015: Done    Override on 9/19/2014: Done (Dr. Gilbert Denney)    Lipid Panel 10/01/2019 10/1/2018    Foot Exam 11/12/2019 11/12/2018 (Done)    Override on 11/12/2018: Done    Override on 7/6/2015: Done    TETANUS VACCINE 02/16/2028 2/16/2018 (Declined)    Override on 2/16/2018: Declined          Assessment & Plan    Essential hypertension  - Continue current medication regimen as prescribed  - Overall doing well    Uncontrolled type 2 diabetes mellitus with hyperglycemia  - Continue current medication regimen as prescribed    Bereavement  - Overall improving, wife unfortunately passed recently  - States has plans to move out of the home and into assisted living  - No S/HI, +soiritual     Follow-up in about 4 weeks (around 1/7/2019), or if symptoms worsen or fail to improve.

## 2018-12-18 DIAGNOSIS — I10 ESSENTIAL HYPERTENSION: ICD-10-CM

## 2018-12-18 RX ORDER — OLMESARTAN MEDOXOMIL AND HYDROCHLOROTHIAZIDE 40/12.5 40; 12.5 MG/1; MG/1
TABLET ORAL
Qty: 90 TABLET | Refills: 2 | Status: SHIPPED | OUTPATIENT
Start: 2018-12-18 | End: 2019-07-29 | Stop reason: DRUGHIGH

## 2019-01-10 ENCOUNTER — OFFICE VISIT (OUTPATIENT)
Dept: FAMILY MEDICINE | Facility: CLINIC | Age: 84
End: 2019-01-10
Payer: MEDICARE

## 2019-01-10 ENCOUNTER — HOSPITAL ENCOUNTER (OUTPATIENT)
Dept: RADIOLOGY | Facility: HOSPITAL | Age: 84
Discharge: HOME | End: 2019-01-10
Attending: FAMILY MEDICINE
Payer: MEDICARE

## 2019-01-10 VITALS
HEIGHT: 71 IN | RESPIRATION RATE: 20 BRPM | OXYGEN SATURATION: 92 % | SYSTOLIC BLOOD PRESSURE: 158 MMHG | DIASTOLIC BLOOD PRESSURE: 82 MMHG | HEART RATE: 104 BPM | TEMPERATURE: 99 F | BODY MASS INDEX: 25.93 KG/M2 | WEIGHT: 185.19 LBS

## 2019-01-10 DIAGNOSIS — J06.9 UPPER RESPIRATORY TRACT INFECTION, UNSPECIFIED TYPE: ICD-10-CM

## 2019-01-10 DIAGNOSIS — J06.9 UPPER RESPIRATORY TRACT INFECTION, UNSPECIFIED TYPE: Primary | ICD-10-CM

## 2019-01-10 DIAGNOSIS — J18.9 ATYPICAL PNEUMONIA: ICD-10-CM

## 2019-01-10 PROCEDURE — 99214 PR OFFICE/OUTPT VISIT, EST, LEVL IV, 30-39 MIN: ICD-10-PCS | Mod: S$GLB,,, | Performed by: FAMILY MEDICINE

## 2019-01-10 PROCEDURE — 99999 PR PBB SHADOW E&M-EST. PATIENT-LVL III: CPT | Mod: PBBFAC,,, | Performed by: FAMILY MEDICINE

## 2019-01-10 PROCEDURE — 71046 XR CHEST PA AND LATERAL: ICD-10-PCS | Mod: 26,,, | Performed by: RADIOLOGY

## 2019-01-10 PROCEDURE — 99214 OFFICE O/P EST MOD 30 MIN: CPT | Mod: S$GLB,,, | Performed by: FAMILY MEDICINE

## 2019-01-10 PROCEDURE — 3077F SYST BP >= 140 MM HG: CPT | Mod: CPTII,S$GLB,, | Performed by: FAMILY MEDICINE

## 2019-01-10 PROCEDURE — 71046 X-RAY EXAM CHEST 2 VIEWS: CPT | Mod: TC,FY,PO

## 2019-01-10 PROCEDURE — 99999 PR PBB SHADOW E&M-EST. PATIENT-LVL III: ICD-10-PCS | Mod: PBBFAC,,, | Performed by: FAMILY MEDICINE

## 2019-01-10 PROCEDURE — 1101F PR PT FALLS ASSESS DOC 0-1 FALLS W/OUT INJ PAST YR: ICD-10-PCS | Mod: CPTII,S$GLB,, | Performed by: FAMILY MEDICINE

## 2019-01-10 PROCEDURE — 71046 X-RAY EXAM CHEST 2 VIEWS: CPT | Mod: 26,,, | Performed by: RADIOLOGY

## 2019-01-10 PROCEDURE — 3079F PR MOST RECENT DIASTOLIC BLOOD PRESSURE 80-89 MM HG: ICD-10-PCS | Mod: CPTII,S$GLB,, | Performed by: FAMILY MEDICINE

## 2019-01-10 PROCEDURE — 3079F DIAST BP 80-89 MM HG: CPT | Mod: CPTII,S$GLB,, | Performed by: FAMILY MEDICINE

## 2019-01-10 PROCEDURE — 3077F PR MOST RECENT SYSTOLIC BLOOD PRESSURE >= 140 MM HG: ICD-10-PCS | Mod: CPTII,S$GLB,, | Performed by: FAMILY MEDICINE

## 2019-01-10 PROCEDURE — 1101F PT FALLS ASSESS-DOCD LE1/YR: CPT | Mod: CPTII,S$GLB,, | Performed by: FAMILY MEDICINE

## 2019-01-10 RX ORDER — LORATADINE 10 MG/1
10 TABLET ORAL DAILY
Qty: 30 TABLET | Refills: 1 | Status: ON HOLD | OUTPATIENT
Start: 2019-01-10 | End: 2020-04-22 | Stop reason: HOSPADM

## 2019-01-10 RX ORDER — GUAIFENESIN/DEXTROMETHORPHAN 100-10MG/5
5 SYRUP ORAL 2 TIMES DAILY
Qty: 236 ML | Refills: 0 | Status: SHIPPED | OUTPATIENT
Start: 2019-01-10 | End: 2019-01-20

## 2019-01-10 RX ORDER — AZITHROMYCIN 250 MG/1
250 TABLET, FILM COATED ORAL DAILY
Qty: 6 TABLET | Refills: 0 | Status: SHIPPED | OUTPATIENT
Start: 2019-01-10 | End: 2019-01-22 | Stop reason: ALTCHOICE

## 2019-01-10 RX ORDER — AZITHROMYCIN 250 MG/1
250 TABLET, FILM COATED ORAL DAILY
Qty: 6 TABLET | Refills: 0 | Status: SHIPPED | OUTPATIENT
Start: 2019-01-10 | End: 2019-01-10 | Stop reason: SDUPTHER

## 2019-01-10 NOTE — PROGRESS NOTES
Chief Complaint   Patient presents with    URI       HPI  Peter Lopez is a 83 y.o. male with multiple medical diagnoses as listed in the medical history and problem list that presents for URI.    URI - 1 week hx, +PND, +fatigue, +sore throat, prod cough.     Pt is known to me and was last seen by me on 12/10/2018.    PAST MEDICAL HISTORY:  Past Medical History:   Diagnosis Date    Chronic hepatitis C without mention of hepatic coma     genotype 1b; treatment naive    Diabetes mellitus type I     Hypertension        PAST SURGICAL HISTORY:  History reviewed. No pertinent surgical history.    SOCIAL HISTORY:  Social History     Socioeconomic History    Marital status:      Spouse name: Not on file    Number of children: Not on file    Years of education: Not on file    Highest education level: Not on file   Social Needs    Financial resource strain: Not on file    Food insecurity - worry: Not on file    Food insecurity - inability: Not on file    Transportation needs - medical: Not on file    Transportation needs - non-medical: Not on file   Occupational History    Not on file   Tobacco Use    Smoking status: Former Smoker    Smokeless tobacco: Former User     Quit date: 1986   Substance and Sexual Activity    Alcohol use: Yes     Alcohol/week: 0.0 oz     Comment: 2 bottles beer on weekends    Drug use: No    Sexual activity: Yes     Partners: Female   Other Topics Concern    Not on file   Social History Narrative    , resides in [place]. 7 children but lost one. 16 grandchildren. 4 great-grandchildren. Works at [employer]       FAMILY HISTORY:  Family History   Problem Relation Age of Onset    Cancer Mother     Asbestos Father     Liver disease Neg Hx        ALLERGIES AND MEDICATIONS: updated and reviewed.  Review of patient's allergies indicates:  No Known Allergies  Current Outpatient Medications   Medication Sig Dispense Refill    glimepiride (AMARYL) 4 MG tablet  "TAKE ONE TABLET BY MOUTH ONCE DAILY 90 tablet 2    metFORMIN (GLUCOPHAGE) 1000 MG tablet TAKE 1 TABLET BY MOUTH 2 TIMES DAILY WITH MEAL 180 tablet 1    metFORMIN (GLUCOPHAGE) 1000 MG tablet TAKE ONE TABLET BY MOUTH TWICE DAILY WITH MEALS 60 tablet 5    olmesartan-hydrochlorothiazide (BENICAR HCT) 40-12.5 mg Tab TAKE 1 TABLET BY MOUTH ONCE DAILY. 90 tablet 1    olmesartan-hydrochlorothiazide (BENICAR HCT) 40-12.5 mg Tab TAKE ONE TABLET BY MOUTH ONCE DAILY 90 tablet 2    azithromycin (Z-BARAK) 250 MG tablet Take 1 tablet (250 mg total) by mouth once daily. Take 2 tabs now, then 1 tablet per day for 4 days. 6 tablet 0    loratadine (CLARITIN) 10 mg tablet Take 1 tablet (10 mg total) by mouth once daily. 30 tablet 1     No current facility-administered medications for this visit.        ROS  Review of Systems   Constitutional: Negative for activity change, appetite change, fatigue and fever.   HENT: Positive for postnasal drip, sinus pressure and sneezing. Negative for congestion, rhinorrhea and sore throat.    Eyes: Positive for itching. Negative for visual disturbance.   Respiratory: Positive for cough. Negative for chest tightness, shortness of breath and wheezing.    Cardiovascular: Negative for chest pain.   Gastrointestinal: Negative for abdominal pain, diarrhea, nausea and vomiting.   Endocrine: Negative for polydipsia.   Genitourinary: Negative for dysuria, frequency and urgency.   Musculoskeletal: Negative for back pain, myalgias and neck stiffness.   Skin: Negative for rash.   Neurological: Negative for dizziness, syncope and numbness.   Psychiatric/Behavioral: Negative for agitation and dysphoric mood.       Physical Exam  Vitals:    01/10/19 1519   BP: (!) 158/82   Pulse: 104   Resp: 20   Temp: 98.6 °F (37 °C)    Body mass index is 25.83 kg/m².  Weight: 84 kg (185 lb 3 oz)   Height: 5' 11" (180.3 cm)     Physical Exam   Constitutional: He is oriented to person, place, and time. He appears well-developed " and well-nourished.   HENT:   Head: Normocephalic.   Mouth/Throat: No oropharyngeal exudate.   Erythematous pharynx  Erythematous, edematous nares  Mild dull TMs bilaterally   Eyes: Pupils are equal, round, and reactive to light. No scleral icterus.   Neck: Normal range of motion. Neck supple.   Cardiovascular: Normal rate, regular rhythm and normal heart sounds.   Pulmonary/Chest: Effort normal and breath sounds normal. No respiratory distress.   Abdominal: Soft. Bowel sounds are normal. There is no tenderness.   Lymphadenopathy:     He has cervical adenopathy.   Neurological: He is alert and oriented to person, place, and time.   Skin: Skin is warm. No rash noted.   Psychiatric: He has a normal mood and affect. His behavior is normal. Judgment and thought content normal.       Health Maintenance       Date Due Completion Date    Pneumococcal Vaccine (65+ Low/Medium Risk) (2 of 2 - PCV13) 02/16/2019 (Originally 1/8/2015) 1/8/2014    Hemoglobin A1c 04/01/2019 10/1/2018    Urine Microalbumin 04/09/2019 4/9/2018    Eye Exam 06/11/2019 6/11/2018    Override on 5/22/2017: Done (Dr. Olsen)    Override on 10/25/2016: Done (dr olsen)    Override on 4/25/2016: Done    Override on 4/6/2015: Done    Override on 9/19/2014: Done (Dr. Gilbert Denney)    Lipid Panel 10/01/2019 10/1/2018    Foot Exam 11/12/2019 11/12/2018 (Done)    Override on 11/12/2018: Done    Override on 7/6/2015: Done    TETANUS VACCINE 02/16/2028 2/16/2018 (Declined)    Override on 2/16/2018: Declined          Assessment & Plan    Upper respiratory tract infection, unspecified type  -     X-Ray Chest PA And Lateral; Future; Expected date: 01/10/2019    Atypical pneumonia  -     Discontinue: azithromycin (Z-BARAK) 250 MG tablet; Take 1 tablet (250 mg total) by mouth once daily. Take 2 tabs now, then 1 tablet per day for 4 days.  Dispense: 6 tablet; Refill: 0  -     loratadine (CLARITIN) 10 mg tablet; Take 1 tablet (10 mg total) by mouth once daily.   Dispense: 30 tablet; Refill: 1  -     dextromethorphan-guaifenesin  mg/5 ml (ROBITUSSIN-DM)  mg/5 mL liquid; Take 5 mLs by mouth 2 (two) times daily. for 10 days  Dispense: 236 mL; Refill: 0  -     azithromycin (Z-BARAK) 250 MG tablet; Take 1 tablet (250 mg total) by mouth once daily. Take 2 tabs now, then 1 tablet per day for 4 days.  Dispense: 6 tablet; Refill: 0  - Will treat at this time, evaluated CXR        Follow-up in about 4 weeks (around 2/7/2019).

## 2019-01-22 ENCOUNTER — OFFICE VISIT (OUTPATIENT)
Dept: FAMILY MEDICINE | Facility: CLINIC | Age: 84
End: 2019-01-22
Payer: MEDICARE

## 2019-01-22 VITALS
HEART RATE: 102 BPM | HEIGHT: 70 IN | TEMPERATURE: 98 F | DIASTOLIC BLOOD PRESSURE: 70 MMHG | RESPIRATION RATE: 18 BRPM | SYSTOLIC BLOOD PRESSURE: 118 MMHG | WEIGHT: 181.69 LBS | OXYGEN SATURATION: 96 % | BODY MASS INDEX: 26.01 KG/M2

## 2019-01-22 DIAGNOSIS — E11.65 UNCONTROLLED TYPE 2 DIABETES MELLITUS WITH HYPERGLYCEMIA: ICD-10-CM

## 2019-01-22 DIAGNOSIS — J06.9 UPPER RESPIRATORY TRACT INFECTION, UNSPECIFIED TYPE: ICD-10-CM

## 2019-01-22 DIAGNOSIS — I10 ESSENTIAL HYPERTENSION: Primary | ICD-10-CM

## 2019-01-22 PROCEDURE — 3074F PR MOST RECENT SYSTOLIC BLOOD PRESSURE < 130 MM HG: ICD-10-PCS | Mod: CPTII,S$GLB,, | Performed by: FAMILY MEDICINE

## 2019-01-22 PROCEDURE — 99999 PR PBB SHADOW E&M-EST. PATIENT-LVL III: ICD-10-PCS | Mod: PBBFAC,,, | Performed by: FAMILY MEDICINE

## 2019-01-22 PROCEDURE — 3078F DIAST BP <80 MM HG: CPT | Mod: CPTII,S$GLB,, | Performed by: FAMILY MEDICINE

## 2019-01-22 PROCEDURE — 3078F PR MOST RECENT DIASTOLIC BLOOD PRESSURE < 80 MM HG: ICD-10-PCS | Mod: CPTII,S$GLB,, | Performed by: FAMILY MEDICINE

## 2019-01-22 PROCEDURE — 3074F SYST BP LT 130 MM HG: CPT | Mod: CPTII,S$GLB,, | Performed by: FAMILY MEDICINE

## 2019-01-22 PROCEDURE — 1101F PR PT FALLS ASSESS DOC 0-1 FALLS W/OUT INJ PAST YR: ICD-10-PCS | Mod: CPTII,S$GLB,, | Performed by: FAMILY MEDICINE

## 2019-01-22 PROCEDURE — 99999 PR PBB SHADOW E&M-EST. PATIENT-LVL III: CPT | Mod: PBBFAC,,, | Performed by: FAMILY MEDICINE

## 2019-01-22 PROCEDURE — 99214 OFFICE O/P EST MOD 30 MIN: CPT | Mod: S$GLB,,, | Performed by: FAMILY MEDICINE

## 2019-01-22 PROCEDURE — 99214 PR OFFICE/OUTPT VISIT, EST, LEVL IV, 30-39 MIN: ICD-10-PCS | Mod: S$GLB,,, | Performed by: FAMILY MEDICINE

## 2019-01-22 PROCEDURE — 1101F PT FALLS ASSESS-DOCD LE1/YR: CPT | Mod: CPTII,S$GLB,, | Performed by: FAMILY MEDICINE

## 2019-01-22 RX ORDER — CODEINE PHOSPHATE AND GUAIFENESIN 10; 100 MG/5ML; MG/5ML
5 SOLUTION ORAL 3 TIMES DAILY PRN
Qty: 180 ML | Refills: 0 | Status: SHIPPED | OUTPATIENT
Start: 2019-01-22 | End: 2019-02-01

## 2019-01-22 RX ORDER — AZELASTINE 1 MG/ML
1 SPRAY, METERED NASAL 2 TIMES DAILY
Qty: 30 ML | Refills: 1 | Status: SHIPPED | OUTPATIENT
Start: 2019-01-22 | End: 2019-07-29 | Stop reason: ALTCHOICE

## 2019-01-22 NOTE — PROGRESS NOTES
Chief Complaint   Patient presents with    Cough       HPI  Peter Lopez is a 83 y.o. male with multiple medical diagnoses as listed in the medical history and problem list that presents for cough.    Cough - intermittent productive, meds: OTC, no CP, no SOB, no fever.     Pt is known to me and was last seen by me on 1/10/2019.    PAST MEDICAL HISTORY:  Past Medical History:   Diagnosis Date    Chronic hepatitis C without mention of hepatic coma     genotype 1b; treatment naive    Diabetes mellitus type I     Hypertension        PAST SURGICAL HISTORY:  History reviewed. No pertinent surgical history.    SOCIAL HISTORY:  Social History     Socioeconomic History    Marital status:      Spouse name: Not on file    Number of children: Not on file    Years of education: Not on file    Highest education level: Not on file   Social Needs    Financial resource strain: Not on file    Food insecurity - worry: Not on file    Food insecurity - inability: Not on file    Transportation needs - medical: Not on file    Transportation needs - non-medical: Not on file   Occupational History    Not on file   Tobacco Use    Smoking status: Former Smoker    Smokeless tobacco: Former User     Quit date: 1986   Substance and Sexual Activity    Alcohol use: Yes     Alcohol/week: 0.0 oz     Comment: 2 bottles beer on weekends    Drug use: No    Sexual activity: Yes     Partners: Female   Other Topics Concern    Not on file   Social History Narrative    , resides in [place]. 7 children but lost one. 16 grandchildren. 4 great-grandchildren. Works at [employer]       FAMILY HISTORY:  Family History   Problem Relation Age of Onset    Cancer Mother     Asbestos Father     Liver disease Neg Hx        ALLERGIES AND MEDICATIONS: updated and reviewed.  Review of patient's allergies indicates:  No Known Allergies  Current Outpatient Medications   Medication Sig Dispense Refill    glimepiride (AMARYL) 4  "MG tablet TAKE ONE TABLET BY MOUTH ONCE DAILY 90 tablet 2    loratadine (CLARITIN) 10 mg tablet Take 1 tablet (10 mg total) by mouth once daily. 30 tablet 1    metFORMIN (GLUCOPHAGE) 1000 MG tablet TAKE 1 TABLET BY MOUTH 2 TIMES DAILY WITH MEAL 180 tablet 1    metFORMIN (GLUCOPHAGE) 1000 MG tablet TAKE ONE TABLET BY MOUTH TWICE DAILY WITH MEALS 60 tablet 5    olmesartan-hydrochlorothiazide (BENICAR HCT) 40-12.5 mg Tab TAKE 1 TABLET BY MOUTH ONCE DAILY. 90 tablet 1    olmesartan-hydrochlorothiazide (BENICAR HCT) 40-12.5 mg Tab TAKE ONE TABLET BY MOUTH ONCE DAILY 90 tablet 2    azelastine (ASTELIN) 137 mcg (0.1 %) nasal spray 1 spray (137 mcg total) by Nasal route 2 (two) times daily. 30 mL 1     No current facility-administered medications for this visit.        ROS  Review of Systems   Constitutional: Negative for activity change, fatigue and fever.   HENT: Negative for congestion, rhinorrhea and sore throat.    Eyes: Negative for visual disturbance.   Respiratory: Positive for cough. Negative for chest tightness and shortness of breath.    Cardiovascular: Negative for chest pain.   Gastrointestinal: Negative for abdominal pain, diarrhea, nausea and vomiting.   Genitourinary: Negative for dysuria, frequency and urgency.   Musculoskeletal: Negative for back pain and myalgias.   Skin: Negative for rash.   Neurological: Negative for dizziness, syncope and numbness.   Psychiatric/Behavioral: Negative for agitation.       Physical Exam  Vitals:    01/22/19 1428   BP: 118/70   Pulse: 102   Resp: 18   Temp: 98 °F (36.7 °C)    Body mass index is 26.07 kg/m².  Weight: 82.4 kg (181 lb 10.5 oz)   Height: 5' 10" (177.8 cm)     Physical Exam   Constitutional: He is oriented to person, place, and time. He appears well-developed and well-nourished.   HENT:   Head: Normocephalic and atraumatic.   Eyes: Conjunctivae and EOM are normal. Pupils are equal, round, and reactive to light.   Neck: Normal range of motion. Neck supple. "   Cardiovascular: Normal rate, regular rhythm and normal heart sounds.   Pulmonary/Chest: Effort normal and breath sounds normal.   Abdominal: Soft. Bowel sounds are normal.   Musculoskeletal: Normal range of motion.   Neurological: He is alert and oriented to person, place, and time. He has normal reflexes.   Skin: Skin is warm and dry.   Psychiatric: He has a normal mood and affect. His behavior is normal. Judgment and thought content normal.       Health Maintenance       Date Due Completion Date    Pneumococcal Vaccine (65+ Low/Medium Risk) (2 of 2 - PCV13) 02/16/2019 (Originally 1/8/2015) 1/8/2014    Hemoglobin A1c 04/01/2019 10/1/2018    Urine Microalbumin 04/09/2019 4/9/2018    Eye Exam 06/11/2019 6/11/2018    Override on 5/22/2017: Done (Dr. Olsen)    Override on 10/25/2016: Done (dr olsen)    Override on 4/25/2016: Done    Override on 4/6/2015: Done    Override on 9/19/2014: Done (Dr. Gilbert Denney)    Lipid Panel 10/01/2019 10/1/2018    Foot Exam 11/12/2019 11/12/2018 (Done)    Override on 11/12/2018: Done    Override on 7/6/2015: Done    TETANUS VACCINE 02/16/2028 2/16/2018 (Declined)    Override on 2/16/2018: Declined          Assessment & Plan    Essential hypertension  - Continue current medication regimen as prescribed    Uncontrolled type 2 diabetes mellitus with hyperglycemia  - Continue current medication regimen as prescribed    Upper respiratory tract infection, unspecified type  -     guaifenesin-codeine 100-10 mg/5 ml (CHERATUSSIN AC)  mg/5 mL syrup; Take 5 mLs by mouth 3 (three) times daily as needed.  Dispense: 180 mL; Refill: 0  -     azelastine (ASTELIN) 137 mcg (0.1 %) nasal spray; 1 spray (137 mcg total) by Nasal route 2 (two) times daily.  Dispense: 30 mL; Refill: 1  - Discussed multiple causes of chronic cough will monitor closely        Follow-up in about 4 weeks (around 2/19/2019).

## 2019-02-22 ENCOUNTER — OFFICE VISIT (OUTPATIENT)
Dept: FAMILY MEDICINE | Facility: CLINIC | Age: 84
End: 2019-02-22
Payer: MEDICARE

## 2019-02-22 VITALS
DIASTOLIC BLOOD PRESSURE: 60 MMHG | OXYGEN SATURATION: 96 % | TEMPERATURE: 98 F | RESPIRATION RATE: 20 BRPM | SYSTOLIC BLOOD PRESSURE: 124 MMHG | HEIGHT: 70 IN | BODY MASS INDEX: 26.48 KG/M2 | WEIGHT: 184.94 LBS | HEART RATE: 104 BPM

## 2019-02-22 DIAGNOSIS — R05.3 CHRONIC COUGHING: ICD-10-CM

## 2019-02-22 DIAGNOSIS — I10 ESSENTIAL HYPERTENSION: Primary | ICD-10-CM

## 2019-02-22 DIAGNOSIS — E11.65 UNCONTROLLED TYPE 2 DIABETES MELLITUS WITH HYPERGLYCEMIA: ICD-10-CM

## 2019-02-22 PROCEDURE — 99214 PR OFFICE/OUTPT VISIT, EST, LEVL IV, 30-39 MIN: ICD-10-PCS | Mod: S$GLB,,, | Performed by: FAMILY MEDICINE

## 2019-02-22 PROCEDURE — 1101F PT FALLS ASSESS-DOCD LE1/YR: CPT | Mod: CPTII,S$GLB,, | Performed by: FAMILY MEDICINE

## 2019-02-22 PROCEDURE — 3074F SYST BP LT 130 MM HG: CPT | Mod: CPTII,S$GLB,, | Performed by: FAMILY MEDICINE

## 2019-02-22 PROCEDURE — 3078F PR MOST RECENT DIASTOLIC BLOOD PRESSURE < 80 MM HG: ICD-10-PCS | Mod: CPTII,S$GLB,, | Performed by: FAMILY MEDICINE

## 2019-02-22 PROCEDURE — 3074F PR MOST RECENT SYSTOLIC BLOOD PRESSURE < 130 MM HG: ICD-10-PCS | Mod: CPTII,S$GLB,, | Performed by: FAMILY MEDICINE

## 2019-02-22 PROCEDURE — 1101F PR PT FALLS ASSESS DOC 0-1 FALLS W/OUT INJ PAST YR: ICD-10-PCS | Mod: CPTII,S$GLB,, | Performed by: FAMILY MEDICINE

## 2019-02-22 PROCEDURE — 99999 PR PBB SHADOW E&M-EST. PATIENT-LVL III: CPT | Mod: PBBFAC,,, | Performed by: FAMILY MEDICINE

## 2019-02-22 PROCEDURE — 3078F DIAST BP <80 MM HG: CPT | Mod: CPTII,S$GLB,, | Performed by: FAMILY MEDICINE

## 2019-02-22 PROCEDURE — 99999 PR PBB SHADOW E&M-EST. PATIENT-LVL III: ICD-10-PCS | Mod: PBBFAC,,, | Performed by: FAMILY MEDICINE

## 2019-02-22 PROCEDURE — 99214 OFFICE O/P EST MOD 30 MIN: CPT | Mod: S$GLB,,, | Performed by: FAMILY MEDICINE

## 2019-02-22 NOTE — PROGRESS NOTES
Chief Complaint   Patient presents with    Follow-up    Cough       HPI  Peter Lopez is a 83 y.o. male with multiple medical diagnoses as listed in the medical history and problem list that presents for chronic cough.     Chronic cough - few month hx overall, mild prod, previous URI has improved.     Pt is known to me and was last seen by me on 2019.    PAST MEDICAL HISTORY:  Past Medical History:   Diagnosis Date    Chronic hepatitis C without mention of hepatic coma     genotype 1b; treatment naive    Diabetes mellitus type I     Hypertension        PAST SURGICAL HISTORY:  History reviewed. No pertinent surgical history.    SOCIAL HISTORY:  Social History     Socioeconomic History    Marital status:      Spouse name: Not on file    Number of children: Not on file    Years of education: Not on file    Highest education level: Not on file   Social Needs    Financial resource strain: Not on file    Food insecurity - worry: Not on file    Food insecurity - inability: Not on file    Transportation needs - medical: Not on file    Transportation needs - non-medical: Not on file   Occupational History    Not on file   Tobacco Use    Smoking status: Former Smoker    Smokeless tobacco: Former User     Quit date: 1986   Substance and Sexual Activity    Alcohol use: Yes     Alcohol/week: 0.0 oz     Comment: 2 bottles beer on weekends    Drug use: No    Sexual activity: Yes     Partners: Female   Other Topics Concern    Not on file   Social History Narrative    , resides in [place]. 7 children but lost one. 16 grandchildren. 4 great-grandchildren. Works at [employer]       FAMILY HISTORY:  Family History   Problem Relation Age of Onset    Cancer Mother     Asbestos Father     Liver disease Neg Hx        ALLERGIES AND MEDICATIONS: updated and reviewed.  Review of patient's allergies indicates:  No Known Allergies  Current Outpatient Medications   Medication Sig Dispense  "Refill    azelastine (ASTELIN) 137 mcg (0.1 %) nasal spray 1 spray (137 mcg total) by Nasal route 2 (two) times daily. 30 mL 1    glimepiride (AMARYL) 4 MG tablet TAKE ONE TABLET BY MOUTH ONCE DAILY 90 tablet 2    loratadine (CLARITIN) 10 mg tablet Take 1 tablet (10 mg total) by mouth once daily. 30 tablet 1    metFORMIN (GLUCOPHAGE) 1000 MG tablet TAKE 1 TABLET BY MOUTH 2 TIMES DAILY WITH MEAL 180 tablet 1    metFORMIN (GLUCOPHAGE) 1000 MG tablet TAKE ONE TABLET BY MOUTH TWICE DAILY WITH MEALS 60 tablet 5    olmesartan-hydrochlorothiazide (BENICAR HCT) 40-12.5 mg Tab TAKE 1 TABLET BY MOUTH ONCE DAILY. 90 tablet 1    olmesartan-hydrochlorothiazide (BENICAR HCT) 40-12.5 mg Tab TAKE ONE TABLET BY MOUTH ONCE DAILY 90 tablet 2    ranitidine (ZANTAC) 150 MG tablet Take 1 tablet (150 mg total) by mouth nightly. 30 tablet 2     No current facility-administered medications for this visit.        ROS  Review of Systems   Constitutional: Negative for activity change, fatigue and fever.   HENT: Negative for congestion, rhinorrhea and sore throat.    Eyes: Negative for visual disturbance.   Respiratory: Positive for cough. Negative for chest tightness and shortness of breath.    Cardiovascular: Negative for chest pain.   Gastrointestinal: Negative for abdominal pain, diarrhea, nausea and vomiting.   Genitourinary: Negative for dysuria, frequency and urgency.   Musculoskeletal: Negative for back pain and myalgias.   Skin: Negative for rash.   Neurological: Negative for dizziness, syncope and numbness.   Psychiatric/Behavioral: Negative for agitation.       Physical Exam  Vitals:    02/22/19 1417   BP: 124/60   Pulse: 104   Resp: 20   Temp: 98.1 °F (36.7 °C)    Body mass index is 26.54 kg/m².  Weight: 83.9 kg (184 lb 15.5 oz)   Height: 5' 10" (177.8 cm)     Physical Exam   Constitutional: He is oriented to person, place, and time. He appears well-developed and well-nourished.   HENT:   Head: Normocephalic and atraumatic. "   Eyes: Conjunctivae and EOM are normal. Pupils are equal, round, and reactive to light.   Neck: Normal range of motion. Neck supple.   Cardiovascular: Normal rate, regular rhythm and normal heart sounds.   Pulmonary/Chest: Effort normal and breath sounds normal.   Abdominal: Soft. Bowel sounds are normal.   Musculoskeletal: Normal range of motion.   Neurological: He is alert and oriented to person, place, and time. He has normal reflexes.   Skin: Skin is warm and dry.   Psychiatric: He has a normal mood and affect. His behavior is normal. Judgment and thought content normal.       Health Maintenance       Date Due Completion Date    Pneumococcal Vaccine (65+ Low/Medium Risk) (2 of 2 - PCV13) 01/08/2015 1/8/2014    Hemoglobin A1c 04/01/2019 10/1/2018    Lipid Panel 10/01/2019 10/1/2018    Foot Exam 11/12/2019 11/12/2018 (Done)    Override on 11/12/2018: Done    Override on 7/6/2015: Done    Urine Microalbumin 11/30/2019 11/30/2018    Eye Exam 12/17/2019 12/17/2018    Override on 5/22/2017: Done (Dr. Olsen)    Override on 10/25/2016: Done (dr olsen)    Override on 4/25/2016: Done    Override on 4/6/2015: Done    Override on 9/19/2014: Done (Dr. Gilbert Denney)    TETANUS VACCINE 02/16/2028 2/16/2018 (Declined)    Override on 2/16/2018: Declined          Assessment & Plan    Essential hypertension  - Continue current medication regimen as prescribed  - Overall doing well    Uncontrolled type 2 diabetes mellitus with hyperglycemia  - Continue current medication regimen as prescribed    Chronic coughing  -     ranitidine (ZANTAC) 150 MG tablet; Take 1 tablet (150 mg total) by mouth nightly.  Dispense: 30 tablet; Refill: 2  - Discussed differential of chronic cough, will trial GERD treatment as symptoms are affected by meals/night.         Follow-up in about 3 months (around 5/22/2019), or if symptoms worsen or fail to improve.

## 2019-02-26 ENCOUNTER — OFFICE VISIT (OUTPATIENT)
Dept: FAMILY MEDICINE | Facility: CLINIC | Age: 84
End: 2019-02-26
Payer: MEDICARE

## 2019-02-26 VITALS
HEART RATE: 86 BPM | RESPIRATION RATE: 20 BRPM | BODY MASS INDEX: 26.34 KG/M2 | TEMPERATURE: 98 F | WEIGHT: 184 LBS | HEIGHT: 70 IN | DIASTOLIC BLOOD PRESSURE: 78 MMHG | OXYGEN SATURATION: 95 % | SYSTOLIC BLOOD PRESSURE: 140 MMHG

## 2019-02-26 DIAGNOSIS — R05.8 POST-VIRAL COUGH SYNDROME: Primary | ICD-10-CM

## 2019-02-26 PROCEDURE — 1101F PT FALLS ASSESS-DOCD LE1/YR: CPT | Mod: CPTII,S$GLB,, | Performed by: FAMILY MEDICINE

## 2019-02-26 PROCEDURE — 99999 PR PBB SHADOW E&M-EST. PATIENT-LVL III: CPT | Mod: PBBFAC,,, | Performed by: FAMILY MEDICINE

## 2019-02-26 PROCEDURE — 3077F PR MOST RECENT SYSTOLIC BLOOD PRESSURE >= 140 MM HG: ICD-10-PCS | Mod: CPTII,S$GLB,, | Performed by: FAMILY MEDICINE

## 2019-02-26 PROCEDURE — 1101F PR PT FALLS ASSESS DOC 0-1 FALLS W/OUT INJ PAST YR: ICD-10-PCS | Mod: CPTII,S$GLB,, | Performed by: FAMILY MEDICINE

## 2019-02-26 PROCEDURE — 99999 PR PBB SHADOW E&M-EST. PATIENT-LVL III: ICD-10-PCS | Mod: PBBFAC,,, | Performed by: FAMILY MEDICINE

## 2019-02-26 PROCEDURE — 3078F PR MOST RECENT DIASTOLIC BLOOD PRESSURE < 80 MM HG: ICD-10-PCS | Mod: CPTII,S$GLB,, | Performed by: FAMILY MEDICINE

## 2019-02-26 PROCEDURE — 3077F SYST BP >= 140 MM HG: CPT | Mod: CPTII,S$GLB,, | Performed by: FAMILY MEDICINE

## 2019-02-26 PROCEDURE — 99214 PR OFFICE/OUTPT VISIT, EST, LEVL IV, 30-39 MIN: ICD-10-PCS | Mod: S$GLB,,, | Performed by: FAMILY MEDICINE

## 2019-02-26 PROCEDURE — 3078F DIAST BP <80 MM HG: CPT | Mod: CPTII,S$GLB,, | Performed by: FAMILY MEDICINE

## 2019-02-26 PROCEDURE — 99214 OFFICE O/P EST MOD 30 MIN: CPT | Mod: S$GLB,,, | Performed by: FAMILY MEDICINE

## 2019-02-26 NOTE — PROGRESS NOTES
Subjective:       Patient ID: Peter Lopez is a 83 y.o. male.    Chief Complaint: Establish Care    HPI   84 yo male presents for f/u for cough. Pt states his cough is improving after his cough meds. He endorses congestion. Pt was given flonase but states he stopped the medication. Denies any f/c.    Review of Systems   Constitutional: Negative.    HENT: Negative.    Respiratory: Positive for cough (improving).    Cardiovascular: Negative.    Gastrointestinal: Negative.    Genitourinary: Negative.    Musculoskeletal: Negative.    Neurological: Negative.    Psychiatric/Behavioral: Negative.         Past Medical History:   Diagnosis Date    Chronic hepatitis C without mention of hepatic coma     genotype 1b; treatment naive    Diabetes mellitus type I     Hypertension      History reviewed. No pertinent surgical history.  Family History   Problem Relation Age of Onset    Cancer Mother     Asbestos Father     Liver disease Neg Hx      Social History     Socioeconomic History    Marital status:      Spouse name: None    Number of children: None    Years of education: None    Highest education level: None   Social Needs    Financial resource strain: None    Food insecurity - worry: None    Food insecurity - inability: None    Transportation needs - medical: None    Transportation needs - non-medical: None   Occupational History    None   Tobacco Use    Smoking status: Former Smoker    Smokeless tobacco: Former User     Quit date: 1986   Substance and Sexual Activity    Alcohol use: Yes     Alcohol/week: 0.0 oz     Comment: 2 bottles beer on weekends    Drug use: No    Sexual activity: Yes     Partners: Female   Other Topics Concern    None   Social History Narrative    , resides in [place]. 7 children but lost one. 16 grandchildren. 4 great-grandchildren. Works at [employer]        Objective:      Vitals:    19 1546   BP: (!) 140/78   Pulse: 86   Resp: 20   Temp: 98.2  °F (36.8 °C)     Physical Exam   Constitutional: He is oriented to person, place, and time. No distress.   HENT:   Head: Normocephalic and atraumatic.   Mouth/Throat: No oropharyngeal exudate.   B/l nasal turbinate swelling   Eyes: Conjunctivae are normal.   Neck: Neck supple.   Cardiovascular: Normal rate, regular rhythm and normal heart sounds. Exam reveals no gallop and no friction rub.   No murmur heard.  Pulmonary/Chest: Effort normal and breath sounds normal. He has no wheezes. He has no rales.   Musculoskeletal: He exhibits no edema.   Lymphadenopathy:     He has no cervical adenopathy.   Neurological: He is alert and oriented to person, place, and time.   Skin: Skin is warm and dry.   Psychiatric: He has a normal mood and affect. His behavior is normal. Judgment and thought content normal.        Assessment:       1. Post-viral cough syndrome        Plan:       Post-viral cough syndrome    continue cough meds till cough resolves  Restart flonase.    Follow-up if symptoms worsen or fail to improve.            Ran Lewis MD  2/26/2019 3:58 PM

## 2019-04-02 ENCOUNTER — OFFICE VISIT (OUTPATIENT)
Dept: FAMILY MEDICINE | Facility: CLINIC | Age: 84
End: 2019-04-02
Payer: MEDICARE

## 2019-04-02 VITALS
DIASTOLIC BLOOD PRESSURE: 84 MMHG | HEART RATE: 102 BPM | TEMPERATURE: 98 F | HEIGHT: 70 IN | SYSTOLIC BLOOD PRESSURE: 147 MMHG | OXYGEN SATURATION: 95 % | WEIGHT: 183.44 LBS | RESPIRATION RATE: 16 BRPM | BODY MASS INDEX: 26.26 KG/M2

## 2019-04-02 DIAGNOSIS — J01.90 ACUTE BACTERIAL SINUSITIS: Primary | ICD-10-CM

## 2019-04-02 DIAGNOSIS — B96.89 ACUTE BACTERIAL SINUSITIS: Primary | ICD-10-CM

## 2019-04-02 PROCEDURE — 3079F PR MOST RECENT DIASTOLIC BLOOD PRESSURE 80-89 MM HG: ICD-10-PCS | Mod: CPTII,S$GLB,, | Performed by: NURSE PRACTITIONER

## 2019-04-02 PROCEDURE — 99214 OFFICE O/P EST MOD 30 MIN: CPT | Mod: S$GLB,,, | Performed by: NURSE PRACTITIONER

## 2019-04-02 PROCEDURE — 1101F PR PT FALLS ASSESS DOC 0-1 FALLS W/OUT INJ PAST YR: ICD-10-PCS | Mod: CPTII,S$GLB,, | Performed by: NURSE PRACTITIONER

## 2019-04-02 PROCEDURE — 3079F DIAST BP 80-89 MM HG: CPT | Mod: CPTII,S$GLB,, | Performed by: NURSE PRACTITIONER

## 2019-04-02 PROCEDURE — 3077F PR MOST RECENT SYSTOLIC BLOOD PRESSURE >= 140 MM HG: ICD-10-PCS | Mod: CPTII,S$GLB,, | Performed by: NURSE PRACTITIONER

## 2019-04-02 PROCEDURE — 99214 PR OFFICE/OUTPT VISIT, EST, LEVL IV, 30-39 MIN: ICD-10-PCS | Mod: S$GLB,,, | Performed by: NURSE PRACTITIONER

## 2019-04-02 PROCEDURE — 3077F SYST BP >= 140 MM HG: CPT | Mod: CPTII,S$GLB,, | Performed by: NURSE PRACTITIONER

## 2019-04-02 PROCEDURE — 99999 PR PBB SHADOW E&M-EST. PATIENT-LVL III: ICD-10-PCS | Mod: PBBFAC,,, | Performed by: NURSE PRACTITIONER

## 2019-04-02 PROCEDURE — 1101F PT FALLS ASSESS-DOCD LE1/YR: CPT | Mod: CPTII,S$GLB,, | Performed by: NURSE PRACTITIONER

## 2019-04-02 PROCEDURE — 99999 PR PBB SHADOW E&M-EST. PATIENT-LVL III: CPT | Mod: PBBFAC,,, | Performed by: NURSE PRACTITIONER

## 2019-04-02 RX ORDER — FLUTICASONE PROPIONATE 50 MCG
1 SPRAY, SUSPENSION (ML) NASAL DAILY
Qty: 15.8 ML | Refills: 1 | Status: SHIPPED | OUTPATIENT
Start: 2019-04-02 | End: 2019-10-28 | Stop reason: SDUPTHER

## 2019-04-02 RX ORDER — AMOXICILLIN AND CLAVULANATE POTASSIUM 875; 125 MG/1; MG/1
1 TABLET, FILM COATED ORAL 2 TIMES DAILY
Qty: 14 TABLET | Refills: 0 | Status: SHIPPED | OUTPATIENT
Start: 2019-04-02 | End: 2019-07-29 | Stop reason: ALTCHOICE

## 2019-04-02 NOTE — PROGRESS NOTES
Routine Office Visit    Patient Name: Peter Lopez    : 1935  MRN: [id number]    Chief Complaint:  Sinus Problem    Subjective:  Peter is a 83 y.o. male who presents today for:    1.  Patient reports to the clinic with a 2 week history of runny nose, sinus pressure, nonproductive cough, subjective fevers.  Denies any chills denies any shortness of breath.  He never actually took his temperature at home.  He does reports some wheezing during the cough as well.  Denies any nausea vomiting or diarrhea.  Denies any heartburn like symptoms.  He does not smoke.  He has had the flu shot this year.  He has tried Mucinex, NyQuil, and Flonase for the symptoms.  Only the NyQuil helped with the symptoms somewhat.  Denies any sick contacts.    Past Medical History  Past Medical History:   Diagnosis Date    Chronic hepatitis C without mention of hepatic coma     genotype 1b; treatment naive    Diabetes mellitus type I     Hypertension        Past Surgical History  No past surgical history on file.    Family History  Family History   Problem Relation Age of Onset    Cancer Mother     Asbestos Father     Liver disease Neg Hx        Social History      Current Medications  Current Outpatient Medications on File Prior to Visit   Medication Sig Dispense Refill    glimepiride (AMARYL) 4 MG tablet TAKE ONE TABLET BY MOUTH ONCE DAILY 90 tablet 2    metFORMIN (GLUCOPHAGE) 1000 MG tablet TAKE 1 TABLET BY MOUTH 2 TIMES DAILY WITH MEAL 180 tablet 1    metFORMIN (GLUCOPHAGE) 1000 MG tablet TAKE ONE TABLET BY MOUTH TWICE DAILY WITH MEALS 60 tablet 5    olmesartan-hydrochlorothiazide (BENICAR HCT) 40-12.5 mg Tab TAKE 1 TABLET BY MOUTH ONCE DAILY. 90 tablet 1    olmesartan-hydrochlorothiazide (BENICAR HCT) 40-12.5 mg Tab TAKE ONE TABLET BY MOUTH ONCE DAILY 90 tablet 2    azelastine (ASTELIN) 137 mcg (0.1 %) nasal spray 1 spray (137 mcg total) by Nasal route 2 (two) times daily. 30 mL 1    loratadine (CLARITIN) 10 mg tablet Take 1  "tablet (10 mg total) by mouth once daily. 30 tablet 1    ranitidine (ZANTAC) 150 MG tablet Take 1 tablet (150 mg total) by mouth nightly. 30 tablet 2     No current facility-administered medications on file prior to visit.        Allergies   Review of patient's allergies indicates:  No Known Allergies    Review of Systems (Pertinent positives)  Review of Systems   Constitutional: Positive for fever and malaise/fatigue. Negative for chills, diaphoresis and weight loss.   HENT: Positive for congestion, sinus pain and sore throat. Negative for ear discharge, ear pain, hearing loss, nosebleeds and tinnitus.    Eyes: Negative.    Respiratory: Positive for cough and wheezing. Negative for hemoptysis, sputum production, shortness of breath and stridor.    Cardiovascular: Negative for chest pain, palpitations, orthopnea, claudication, leg swelling and PND.   Gastrointestinal: Negative.    Genitourinary: Negative.    Musculoskeletal: Negative.    Neurological: Negative.    Endo/Heme/Allergies: Negative.    Psychiatric/Behavioral: Negative.        BP (!) 147/84 (BP Location: Left arm, Patient Position: Sitting, BP Method: Small (Automatic))   Pulse 102   Temp 98.4 °F (36.9 °C) (Oral)   Resp 16   Ht 5' 10" (1.778 m)   Wt 83.2 kg (183 lb 6.8 oz)   SpO2 95%   BMI 26.32 kg/m²     Physical Exam   Constitutional: He is oriented to person, place, and time. He appears well-developed. No distress.   HENT:   Head: Normocephalic and atraumatic.   Right Ear: Tympanic membrane, external ear and ear canal normal.   Left Ear: Tympanic membrane, external ear and ear canal normal.   Nose: Mucosal edema and rhinorrhea present. Right sinus exhibits frontal sinus tenderness. Right sinus exhibits no maxillary sinus tenderness. Left sinus exhibits frontal sinus tenderness. Left sinus exhibits no maxillary sinus tenderness.   Mouth/Throat: Uvula is midline and mucous membranes are normal. Posterior oropharyngeal erythema present.   Eyes: " Pupils are equal, round, and reactive to light. Conjunctivae and EOM are normal.   Neck: Normal range of motion. Neck supple.   Cardiovascular: Normal rate, regular rhythm, normal heart sounds and intact distal pulses. Exam reveals no gallop and no friction rub.   No murmur heard.  Pulmonary/Chest: Effort normal and breath sounds normal. No stridor. No respiratory distress. He has no wheezes. He has no rales. He exhibits no tenderness.   Abdominal: Soft. Bowel sounds are normal. He exhibits no distension and no mass. There is no tenderness. There is no rebound and no guarding. No hernia.   Musculoskeletal: Normal range of motion.   Lymphadenopathy:     He has no cervical adenopathy.   Neurological: He is alert and oriented to person, place, and time.   Skin: Skin is warm and dry. Capillary refill takes less than 2 seconds. He is not diaphoretic.        Assessment/Plan:  Peter Lopez is a 83 y.o. male who presents today for :    Peter was seen today for cough, tension headache and fever.    Diagnoses and all orders for this visit:    Acute bacterial sinusitis  -     amoxicillin-clavulanate 875-125mg (AUGMENTIN) 875-125 mg per tablet; Take 1 tablet by mouth 2 (two) times daily.  -     fluticasone (FLONASE) 50 mcg/actuation nasal spray; 1 spray (50 mcg total) by Each Nare route once daily.  -     POCT Influenza A/B    POCT influenza negative for both a and B.  Symptoms greater than 10 days.  Positive sinus tenderness.  Will treat with antibiotic at this time.  Patient was instructed to take all antibiotics as prescribed.  Drink plenty of water.  Take Tylenol for any aches pains or fevers.  May continue Mucinex and NyQuil for the symptoms.  Over-the-counter Robitussin DM for cough.  Follow-up if no relief in 1-2 weeks.        This office note has been dictated.  This dictation has been generated using M-Modal Fluency Direct dictation; some phonetic errors may occur.   My collaborating physician is Dr. Suresh Can.

## 2019-04-15 ENCOUNTER — OFFICE VISIT (OUTPATIENT)
Dept: FAMILY MEDICINE | Facility: CLINIC | Age: 84
End: 2019-04-15
Payer: MEDICARE

## 2019-04-15 VITALS
TEMPERATURE: 98 F | SYSTOLIC BLOOD PRESSURE: 136 MMHG | OXYGEN SATURATION: 96 % | HEART RATE: 87 BPM | RESPIRATION RATE: 16 BRPM | BODY MASS INDEX: 26.16 KG/M2 | WEIGHT: 182.75 LBS | HEIGHT: 70 IN | DIASTOLIC BLOOD PRESSURE: 82 MMHG

## 2019-04-15 DIAGNOSIS — R05.9 COUGH: ICD-10-CM

## 2019-04-15 DIAGNOSIS — E11.21 DIABETES MELLITUS WITH NEPHROPATHY: Primary | ICD-10-CM

## 2019-04-15 DIAGNOSIS — B18.2 CHRONIC HEPATITIS C WITHOUT HEPATIC COMA: ICD-10-CM

## 2019-04-15 DIAGNOSIS — N18.30 CKD (CHRONIC KIDNEY DISEASE) STAGE 3, GFR 30-59 ML/MIN: ICD-10-CM

## 2019-04-15 DIAGNOSIS — I10 HYPERTENSION, BENIGN: ICD-10-CM

## 2019-04-15 PROCEDURE — 99999 PR PBB SHADOW E&M-EST. PATIENT-LVL IV: CPT | Mod: PBBFAC,,, | Performed by: FAMILY MEDICINE

## 2019-04-15 PROCEDURE — 3075F PR MOST RECENT SYSTOLIC BLOOD PRESS GE 130-139MM HG: ICD-10-PCS | Mod: CPTII,S$GLB,, | Performed by: FAMILY MEDICINE

## 2019-04-15 PROCEDURE — 99999 PR PBB SHADOW E&M-EST. PATIENT-LVL IV: ICD-10-PCS | Mod: PBBFAC,,, | Performed by: FAMILY MEDICINE

## 2019-04-15 PROCEDURE — 1101F PT FALLS ASSESS-DOCD LE1/YR: CPT | Mod: CPTII,S$GLB,, | Performed by: FAMILY MEDICINE

## 2019-04-15 PROCEDURE — 99499 UNLISTED E&M SERVICE: CPT | Mod: S$GLB,,, | Performed by: FAMILY MEDICINE

## 2019-04-15 PROCEDURE — 3079F DIAST BP 80-89 MM HG: CPT | Mod: CPTII,S$GLB,, | Performed by: FAMILY MEDICINE

## 2019-04-15 PROCEDURE — 3079F PR MOST RECENT DIASTOLIC BLOOD PRESSURE 80-89 MM HG: ICD-10-PCS | Mod: CPTII,S$GLB,, | Performed by: FAMILY MEDICINE

## 2019-04-15 PROCEDURE — 3075F SYST BP GE 130 - 139MM HG: CPT | Mod: CPTII,S$GLB,, | Performed by: FAMILY MEDICINE

## 2019-04-15 PROCEDURE — 1101F PR PT FALLS ASSESS DOC 0-1 FALLS W/OUT INJ PAST YR: ICD-10-PCS | Mod: CPTII,S$GLB,, | Performed by: FAMILY MEDICINE

## 2019-04-15 PROCEDURE — 99214 PR OFFICE/OUTPT VISIT, EST, LEVL IV, 30-39 MIN: ICD-10-PCS | Mod: S$GLB,,, | Performed by: FAMILY MEDICINE

## 2019-04-15 PROCEDURE — 99499 RISK ADDL DX/OHS AUDIT: ICD-10-PCS | Mod: S$GLB,,, | Performed by: FAMILY MEDICINE

## 2019-04-15 PROCEDURE — 99214 OFFICE O/P EST MOD 30 MIN: CPT | Mod: S$GLB,,, | Performed by: FAMILY MEDICINE

## 2019-04-15 RX ORDER — IPRATROPIUM BROMIDE 42 UG/1
2 SPRAY, METERED NASAL 4 TIMES DAILY
Qty: 15 ML | Refills: 0 | Status: SHIPPED | OUTPATIENT
Start: 2019-04-15 | End: 2020-06-16

## 2019-04-15 RX ORDER — BENZONATATE 100 MG/1
100 CAPSULE ORAL 3 TIMES DAILY PRN
Qty: 30 CAPSULE | Refills: 0 | Status: SHIPPED | OUTPATIENT
Start: 2019-04-15 | End: 2019-07-29

## 2019-04-15 NOTE — PROGRESS NOTES
Routine Office Visit    Patient Name: Peter Lopez    : 1935  MRN: [id number]    Subjective:  Peter is a 83 y.o. male who presents today for:   Chief Complaint   Patient presents with    Establish Care    Cough     coughing @ night breaks sleep taking nyquil       83-year-old male with diabetes, hypertension, and history of hepatitis-C comes in for routine care of his hypertension and diabetes.  The patient reports that he was recently seen for an upper respiratory infection.  He states that most of his symptoms are better but he continues to have a a productive cough.  He denies fevers, chills, shortness of breath, and wheezing.  He denies chest pain.  The patient does not check his sugar levels.  In terms of his hepatitis-C.  It is unclear whether he had this treated.  In the chart, he was referred to hepatology, but as per the chart note when he was contacted by the hepatologist, he declined being seen.  However, the patient reports that he received injections for the hepatitis for a prolonged period of time.  He states that everything was fine.    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 No pertinent surgical history.     Family History  Family History   Problem Relation Age of Onset    Cancer Mother     Asbestos Father     Liver disease Neg Hx        Social History  Social History     Socioeconomic History    Marital status:      Spouse name: Not on file    Number of children: Not on file    Years of education: Not on file    Highest education level: Not on file   Occupational History    Not on file   Social Needs    Financial resource strain: Not on file    Food insecurity:     Worry: Not on file     Inability: Not on file    Transportation needs:     Medical: Not on file     Non-medical:  Not on file   Tobacco Use    Smoking status: Former Smoker    Smokeless tobacco: Former User     Quit date: 1986   Substance and Sexual Activity    Alcohol use: Yes     Alcohol/week: 0.0 oz     Comment: 2 bottles beer on weekends    Drug use: No    Sexual activity: Yes     Partners: Female   Lifestyle    Physical activity:     Days per week: Not on file     Minutes per session: Not on file    Stress: Not on file   Relationships    Social connections:     Talks on phone: Not on file     Gets together: Not on file     Attends [employer] service: Not on file     Active member of club or organization: Not on file     Attends meetings of clubs or organizations: Not on file     Relationship status: Not on file   Other Topics Concern    Not on file   Social History Narrative    , resides in [place]. 7 children but lost one. 16 grandchildren. 4 great-grandchildren. Works at [employer]       Current Medications  Current Outpatient Medications on File Prior to Visit   Medication Sig Dispense Refill    glimepiride (AMARYL) 4 MG tablet TAKE ONE TABLET BY MOUTH ONCE DAILY 90 tablet 2    metFORMIN (GLUCOPHAGE) 1000 MG tablet TAKE 1 TABLET BY MOUTH 2 TIMES DAILY WITH MEAL 180 tablet 1    olmesartan-hydrochlorothiazide (BENICAR HCT) 40-12.5 mg Tab TAKE ONE TABLET BY MOUTH ONCE DAILY 90 tablet 2    amoxicillin-clavulanate 875-125mg (AUGMENTIN) 875-125 mg per tablet Take 1 tablet by mouth 2 (two) times daily. 14 tablet 0    azelastine (ASTELIN) 137 mcg (0.1 %) nasal spray 1 spray (137 mcg total) by Nasal route 2 (two) times daily. 30 mL 1    fluticasone (FLONASE) 50 mcg/actuation nasal spray 1 spray (50 mcg total) by Each Nare route once daily. 15.8 mL 1    loratadine (CLARITIN) 10 mg tablet Take 1 tablet (10 mg total) by mouth once daily. 30 tablet 1    ranitidine (ZANTAC) 150 MG tablet Take 1 tablet (150 mg total) by mouth nightly. 30 tablet 2    [DISCONTINUED] metFORMIN (GLUCOPHAGE) 1000 MG  "tablet TAKE ONE TABLET BY MOUTH TWICE DAILY WITH MEALS 60 tablet 5    [DISCONTINUED] olmesartan-hydrochlorothiazide (BENICAR HCT) 40-12.5 mg Tab TAKE 1 TABLET BY MOUTH ONCE DAILY. 90 tablet 1     No current facility-administered medications on file prior to visit.        Allergies   Review of patient's allergies indicates:  No Known Allergies    Review of Systems   Constitutional: Negative for chills and fever.   HENT: Negative for congestion, postnasal drip and rhinorrhea.    Respiratory: Positive for cough (productive). Negative for shortness of breath and wheezing.    Cardiovascular: Negative for chest pain and palpitations.   Gastrointestinal: Negative for abdominal pain, constipation, diarrhea and nausea.   Genitourinary: Negative for dysuria.     /82 (BP Location: Right arm, Patient Position: Sitting, BP Method: Medium (Manual))   Pulse 87   Temp 97.9 °F (36.6 °C) (Oral)   Resp 16   Ht 5' 10" (1.778 m)   Wt 82.9 kg (182 lb 12.2 oz)   SpO2 96%   BMI 26.22 kg/m²     Physical Exam   Constitutional: He appears well-developed and well-nourished.   HENT:   Head: Normocephalic.   Right Ear: External ear normal.   Left Ear: External ear normal.   Nose: Nose normal.   Mouth/Throat: No oropharyngeal exudate.   Neck: Normal range of motion. Neck supple. No tracheal deviation present.   Cardiovascular: Normal rate, regular rhythm, normal heart sounds and intact distal pulses.   No murmur heard.  Pulmonary/Chest: Effort normal and breath sounds normal. He has no wheezes. He has no rales.   Abdominal: Soft. Bowel sounds are normal. He exhibits no mass. There is no tenderness.   Musculoskeletal: He exhibits no edema.   Lymphadenopathy:     He has no cervical adenopathy.   Skin: He is not diaphoretic.   Vitals reviewed.        Assessment/Plan:  Peter was seen today for establish care and cough.    Diagnoses and all orders for this visit:    Diabetes mellitus with nephropathy  -     Comprehensive metabolic panel; " Future  -     CBC auto differential; Future  -     Hemoglobin A1c; Future  -     Microalbumin/creatinine urine ratio; Future  The last A1c was well controlled.  Will continue current regimen.  Check labs.  Follow-up in 6 months.    CKD (chronic kidney disease) stage 3, GFR 30-59 ml/min  -     CBC auto differential; Future  -     PTH, intact; Future  -     Vitamin D; Future  Patient has had labs consistent with chronic kidney disease stage 3 for several years now.  Meaning of this was discussed with the patient.  He is on an angiotensin receptor blocker.    Hypertension, benign  -     Comprehensive metabolic panel; Future  Blood pressure currently under fair control.  Continue current regimen.    Chronic hepatitis C without hepatic coma  -     HEPATITIS C GENOTYPE; Future  -     HCV FibroSURE; Future  Treatment unclear.  Will check labs.    Cough  -     ipratropium (ATROVENT) 42 mcg (0.06 %) nasal spray; 2 sprays by Nasal route 4 (four) times daily.  -     benzonatate (TESSALON) 100 MG capsule; Take 1 capsule (100 mg total) by mouth 3 (three) times daily as needed for Cough.  Discussed with patient that after upper respiratory infections he may have a lingering cough for 1-2 months.  He is otherwise asymptomatic.  Will treat symptoms a of the cough with Atrovent and Tessalon.        This office note has been dictated.  This dictation has been generated using M-Modal Fluency Direct dictation; some phonetic errors may occur.

## 2019-04-29 ENCOUNTER — TELEPHONE (OUTPATIENT)
Dept: FAMILY MEDICINE | Facility: CLINIC | Age: 84
End: 2019-04-29

## 2019-04-29 NOTE — TELEPHONE ENCOUNTER
Please call patient and let him know that labs show that his hepatitis c is still active. Schedule appt so we can discuss.

## 2019-05-03 ENCOUNTER — OFFICE VISIT (OUTPATIENT)
Dept: FAMILY MEDICINE | Facility: CLINIC | Age: 84
End: 2019-05-03
Payer: MEDICARE

## 2019-05-03 VITALS
BODY MASS INDEX: 26.16 KG/M2 | DIASTOLIC BLOOD PRESSURE: 72 MMHG | TEMPERATURE: 98 F | HEART RATE: 90 BPM | SYSTOLIC BLOOD PRESSURE: 126 MMHG | RESPIRATION RATE: 17 BRPM | OXYGEN SATURATION: 97 % | WEIGHT: 182.75 LBS | HEIGHT: 70 IN

## 2019-05-03 DIAGNOSIS — B18.2 CHRONIC HEPATITIS C WITHOUT HEPATIC COMA: ICD-10-CM

## 2019-05-03 DIAGNOSIS — R05.9 COUGH: Primary | ICD-10-CM

## 2019-05-03 PROCEDURE — 1101F PR PT FALLS ASSESS DOC 0-1 FALLS W/OUT INJ PAST YR: ICD-10-PCS | Mod: CPTII,S$GLB,, | Performed by: FAMILY MEDICINE

## 2019-05-03 PROCEDURE — 99214 OFFICE O/P EST MOD 30 MIN: CPT | Mod: 25,S$GLB,, | Performed by: FAMILY MEDICINE

## 2019-05-03 PROCEDURE — 94640 AIRWAY INHALATION TREATMENT: CPT | Mod: S$GLB,,, | Performed by: FAMILY MEDICINE

## 2019-05-03 PROCEDURE — 99499 UNLISTED E&M SERVICE: CPT | Mod: S$GLB,,, | Performed by: FAMILY MEDICINE

## 2019-05-03 PROCEDURE — 3078F PR MOST RECENT DIASTOLIC BLOOD PRESSURE < 80 MM HG: ICD-10-PCS | Mod: CPTII,S$GLB,, | Performed by: FAMILY MEDICINE

## 2019-05-03 PROCEDURE — 94640 PR INHAL RX, AIRWAY OBST/DX SPUTUM INDUCT: ICD-10-PCS | Mod: S$GLB,,, | Performed by: FAMILY MEDICINE

## 2019-05-03 PROCEDURE — 99214 PR OFFICE/OUTPT VISIT, EST, LEVL IV, 30-39 MIN: ICD-10-PCS | Mod: 25,S$GLB,, | Performed by: FAMILY MEDICINE

## 2019-05-03 PROCEDURE — 3078F DIAST BP <80 MM HG: CPT | Mod: CPTII,S$GLB,, | Performed by: FAMILY MEDICINE

## 2019-05-03 PROCEDURE — 3074F SYST BP LT 130 MM HG: CPT | Mod: CPTII,S$GLB,, | Performed by: FAMILY MEDICINE

## 2019-05-03 PROCEDURE — 99999 PR PBB SHADOW E&M-EST. PATIENT-LVL IV: ICD-10-PCS | Mod: PBBFAC,,, | Performed by: FAMILY MEDICINE

## 2019-05-03 PROCEDURE — 3074F PR MOST RECENT SYSTOLIC BLOOD PRESSURE < 130 MM HG: ICD-10-PCS | Mod: CPTII,S$GLB,, | Performed by: FAMILY MEDICINE

## 2019-05-03 PROCEDURE — 99999 PR PBB SHADOW E&M-EST. PATIENT-LVL IV: CPT | Mod: PBBFAC,,, | Performed by: FAMILY MEDICINE

## 2019-05-03 PROCEDURE — 99499 RISK ADDL DX/OHS AUDIT: ICD-10-PCS | Mod: S$GLB,,, | Performed by: FAMILY MEDICINE

## 2019-05-03 PROCEDURE — 1101F PT FALLS ASSESS-DOCD LE1/YR: CPT | Mod: CPTII,S$GLB,, | Performed by: FAMILY MEDICINE

## 2019-05-03 RX ORDER — ALBUTEROL SULFATE 90 UG/1
2 AEROSOL, METERED RESPIRATORY (INHALATION) EVERY 4 HOURS PRN
Qty: 1 INHALER | Refills: 2 | Status: SHIPPED | OUTPATIENT
Start: 2019-05-03 | End: 2020-03-30

## 2019-05-03 RX ORDER — ALBUTEROL SULFATE 0.83 MG/ML
2.5 SOLUTION RESPIRATORY (INHALATION)
Status: COMPLETED | OUTPATIENT
Start: 2019-05-03 | End: 2019-05-03

## 2019-05-03 RX ADMIN — ALBUTEROL SULFATE 2.5 MG: 0.83 SOLUTION RESPIRATORY (INHALATION) at 03:05

## 2019-05-06 ENCOUNTER — LAB VISIT (OUTPATIENT)
Dept: LAB | Facility: HOSPITAL | Age: 84
End: 2019-05-06
Attending: FAMILY MEDICINE
Payer: MEDICARE

## 2019-05-06 DIAGNOSIS — B18.2 CHRONIC HEPATITIS C WITHOUT HEPATIC COMA: ICD-10-CM

## 2019-05-06 PROCEDURE — 36415 COLL VENOUS BLD VENIPUNCTURE: CPT | Mod: PN

## 2019-05-06 PROCEDURE — 87517 HEPATITIS B DNA QUANT: CPT

## 2019-05-06 PROCEDURE — 87340 HEPATITIS B SURFACE AG IA: CPT

## 2019-05-06 PROCEDURE — 86706 HEP B SURFACE ANTIBODY: CPT

## 2019-05-06 PROCEDURE — 86704 HEP B CORE ANTIBODY TOTAL: CPT

## 2019-05-06 NOTE — PROGRESS NOTES
Routine Office Visit    Patient Name: Peter Lopez    : 1935  MRN: [id number]    Subjective:  Peter is a 83 y.o. male who presents today for:   Chief Complaint   Patient presents with    Follow-up    Cough     impaired sleep from hard non productive cough--not getting any better     83-year-old male comes in for follow-up on cough.  He has been going on for 3 to 4 weeks now.  He reports that it worsens at night time.  He states that he feels some chest tightness when he has it.  He states that the cough is dry.  He states that it makes it difficult to sleep.  He denies fevers and chills.  He denies abdominal pain.  The he denies swelling in the legs.  He denies inability to walk up a flight of stairs without shortness of breath.  He has no fevers or chills.    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 No pertinent surgical history.     Family History  Family History   Problem Relation Age of Onset    Cancer Mother     Asbestos Father     Liver disease Neg Hx        Social History  Social History     Socioeconomic History    Marital status:      Spouse name: Not on file    Number of children: Not on file    Years of education: Not on file    Highest education level: Not on file   Occupational History    Not on file   Social Needs    Financial resource strain: Not on file    Food insecurity:     Worry: Not on file     Inability: Not on file    Transportation needs:     Medical: Not on file     Non-medical: Not on file   Tobacco Use    Smoking status: Former Smoker    Smokeless tobacco: Former User     Quit date: 1986   Substance and Sexual Activity    Alcohol use: Yes     Alcohol/week: 0.0 oz     Comment: 2 bottles beer on weekends    Drug use: No    Sexual activity: Yes     Partners: Female    Lifestyle    Physical activity:     Days per week: Not on file     Minutes per session: Not on file    Stress: Not on file   Relationships    Social connections:     Talks on phone: Not on file     Gets together: Not on file     Attends [employer] service: Not on file     Active member of club or organization: Not on file     Attends meetings of clubs or organizations: Not on file     Relationship status: Not on file   Other Topics Concern    Not on file   Social History Narrative    , resides in [place]. 7 children but lost one. 16 grandchildren. 4 great-grandchildren. Works at [employer]       Current Medications  Current Outpatient Medications on File Prior to Visit   Medication Sig Dispense Refill    benzonatate (TESSALON) 100 MG capsule Take 1 capsule (100 mg total) by mouth 3 (three) times daily as needed for Cough. 30 capsule 0    fluticasone (FLONASE) 50 mcg/actuation nasal spray 1 spray (50 mcg total) by Each Nare route once daily. 15.8 mL 1    glimepiride (AMARYL) 4 MG tablet TAKE ONE TABLET BY MOUTH ONCE DAILY 90 tablet 2    ipratropium (ATROVENT) 42 mcg (0.06 %) nasal spray 2 sprays by Nasal route 4 (four) times daily. 15 mL 0    metFORMIN (GLUCOPHAGE) 1000 MG tablet TAKE 1 TABLET BY MOUTH 2 TIMES DAILY WITH MEAL 180 tablet 1    olmesartan-hydrochlorothiazide (BENICAR HCT) 40-12.5 mg Tab TAKE ONE TABLET BY MOUTH ONCE DAILY 90 tablet 2    amoxicillin-clavulanate 875-125mg (AUGMENTIN) 875-125 mg per tablet Take 1 tablet by mouth 2 (two) times daily. 14 tablet 0    azelastine (ASTELIN) 137 mcg (0.1 %) nasal spray 1 spray (137 mcg total) by Nasal route 2 (two) times daily. 30 mL 1    loratadine (CLARITIN) 10 mg tablet Take 1 tablet (10 mg total) by mouth once daily. 30 tablet 1    ranitidine (ZANTAC) 150 MG tablet Take 1 tablet (150 mg total) by mouth nightly. 30 tablet 2     No current facility-administered medications on file prior to visit.        Allergies   Review of  "patient's allergies indicates:  No Known Allergies    Review of Systems   Constitutional: Negative for chills and fever.   Respiratory: Positive for cough, chest tightness and shortness of breath (on and off). Negative for wheezing.    Cardiovascular: Negative for chest pain and palpitations.   Gastrointestinal: Negative for abdominal pain.   Genitourinary: Negative for dysuria.       /72 (BP Location: Left arm, Patient Position: Sitting, BP Method: Medium (Manual))   Pulse 90   Temp 97.6 °F (36.4 °C) (Oral)   Resp 17   Ht 5' 10" (1.778 m)   Wt 82.9 kg (182 lb 12.2 oz)   SpO2 97%   BMI 26.22 kg/m²     Physical Exam   Constitutional: He appears well-developed and well-nourished.   HENT:   Head: Normocephalic.   Right Ear: External ear normal.   Left Ear: External ear normal.   Nose: Nose normal.   Mouth/Throat: No oropharyngeal exudate.   Neck: Normal range of motion. Neck supple. No tracheal deviation present.   Cardiovascular: Normal rate, regular rhythm, normal heart sounds and intact distal pulses.   No murmur heard.  Pulmonary/Chest: Effort normal and breath sounds normal. He has no wheezes. He has no rales.   Abdominal: Soft. Bowel sounds are normal. He exhibits no mass. There is no tenderness.   Musculoskeletal: He exhibits no edema.   Lymphadenopathy:     He has no cervical adenopathy.   Skin: He is not diaphoretic.   Vitals reviewed.    Lab Visit on 04/15/2019   Component Date Value Ref Range Status    Microalbum.,U,Random 04/15/2019 19.0  ug/mL Final    Creatinine, Random Ur 04/15/2019 35.0  23.0 - 375.0 mg/dL Final    Comment: The random urine reference ranges provided were established   for 24 hour urine collections.  No reference ranges exist for  random urine specimens.  Correlate clinically.      Microalb Creat Ratio 04/15/2019 54.3* 0.0 - 30.0 ug/mg Final   Lab Visit on 04/15/2019   Component Date Value Ref Range Status    Sodium 04/15/2019 140  136 - 145 mmol/L Final    Potassium " 04/15/2019 5.2* 3.5 - 5.1 mmol/L Final    Specimen slightly hemolyzed    Chloride 04/15/2019 102  95 - 110 mmol/L Final    CO2 04/15/2019 29  23 - 29 mmol/L Final    Glucose 04/15/2019 75  70 - 110 mg/dL Final    BUN, Bld 04/15/2019 17  8 - 23 mg/dL Final    Creatinine 04/15/2019 1.1  0.5 - 1.4 mg/dL Final    Calcium 04/15/2019 10.2  8.7 - 10.5 mg/dL Final    Total Protein 04/15/2019 7.7  6.0 - 8.4 g/dL Final    Albumin 04/15/2019 3.9  3.5 - 5.2 g/dL Final    Total Bilirubin 04/15/2019 0.6  0.1 - 1.0 mg/dL Final    Comment: For infants and newborns, interpretation of results should be based  on gestational age, weight and in agreement with clinical  observations.  Premature Infant recommended reference ranges:  Up to 24 hours.............<8.0 mg/dL  Up to 48 hours............<12.0 mg/dL  3-5 days..................<15.0 mg/dL  6-29 days.................<15.0 mg/dL      Alkaline Phosphatase 04/15/2019 53* 55 - 135 U/L Final    AST 04/15/2019 46* 10 - 40 U/L Final    ALT 04/15/2019 43  10 - 44 U/L Final    Anion Gap 04/15/2019 9  8 - 16 mmol/L Final    eGFR if African American 04/15/2019 >60  >60 mL/min/1.73 m^2 Final    eGFR if non African American 04/15/2019 >60  >60 mL/min/1.73 m^2 Final    Comment: Calculation used to obtain the estimated glomerular filtration  rate (eGFR) is the CKD-EPI equation.       WBC 04/15/2019 3.50* 3.90 - 12.70 K/uL Final    RBC 04/15/2019 5.08  4.60 - 6.20 M/uL Final    Hemoglobin 04/15/2019 14.8  14.0 - 18.0 g/dL Final    Hematocrit 04/15/2019 45.6  40.0 - 54.0 % Final    Mean Corpuscular Volume 04/15/2019 90  82 - 98 fL Final    Mean Corpuscular Hemoglobin 04/15/2019 29.1  27.0 - 31.0 pg Final    Mean Corpuscular Hemoglobin Conc 04/15/2019 32.5  32.0 - 36.0 g/dL Final    RDW 04/15/2019 13.6  11.5 - 14.5 % Final    Platelets 04/15/2019 369* 150 - 350 K/uL Final    MPV 04/15/2019 9.7  9.2 - 12.9 fL Final    Gran # (ANC) 04/15/2019 1.7* 1.8 - 7.7 K/uL Final     Lymph # 04/15/2019 1.6  1.0 - 4.8 K/uL Final    Mono # 04/15/2019 0.2* 0.3 - 1.0 K/uL Final    Eos # 04/15/2019 0.0  0.0 - 0.5 K/uL Final    Baso # 04/15/2019 0.02  0.00 - 0.20 K/uL Final    Gran% 04/15/2019 47.1  38.0 - 73.0 % Final    Lymph% 04/15/2019 44.9  18.0 - 48.0 % Final    Mono% 04/15/2019 6.3  4.0 - 15.0 % Final    Eosinophil% 04/15/2019 1.1  0.0 - 8.0 % Final    Basophil% 04/15/2019 0.6  0.0 - 1.9 % Final    Differential Method 04/15/2019 Automated   Final    Hemoglobin A1C 04/15/2019 6.1* 4.0 - 5.6 % Final    Comment: ADA Screening Guidelines:  5.7-6.4%  Consistent with prediabetes  >or=6.5%  Consistent with diabetes  High levels of fetal hemoglobin interfere with the HbA1C  assay. Heterozygous hemoglobin variants (HbS, HgC, etc)do  not significantly interfere with this assay.   However, presence of multiple variants may affect accuracy.      Estimated Avg Glucose 04/15/2019 128  68 - 131 mg/dL Final    PTH, Intact 04/15/2019 38.6  9.0 - 77.0 pg/mL Final    Vit D, 25-Hydroxy 04/15/2019 16* 30 - 96 ng/mL Final    Comment: Vitamin D deficiency.........<10 ng/mL                              Vitamin D insufficiency......10-29 ng/mL       Vitamin D sufficiency........> or equal to 30 ng/mL  Vitamin D toxicity............>100 ng/mL      HCV Qualitative Result 04/15/2019 DETECTED* Not detected Final    HCV Quantitative Result 04/15/2019 721,525* <12 IU/mL Final    HCV Quantitative Log 04/15/2019 5.86* <1.08 Log (10) IU/mL Final    Hepatitis C Virus Genotype 04/15/2019 1b*  Final    Comment: The HCV quantitation (viral load) procedure utilizes a   real-time reverse transcriptase polymerase chain reaction  (PCR) test from [employer].  The amplification  target is a conserved region of the HCV genome.  The  lower limit of quantitation is 12 IU/mL (1.08 Log IU/mL)  and the upper limit of quantitation is 100 million IU/mL  8.00 log IU/mL).  The qualitative limit of detection  is 12 IU/mL  (1.08 Log IU/mL).  The hepatitis C virus (HCV) genotype was determined using  reverse transcription and PCR amplification of the 5   untranslated region and the core region of the HCV genome  followed by electrochemical detection (Favor XT8).   Specimens with HCV viral loads <625 IU/mL cannot be  genotyped.  These tests should not be used to establish a diagnosis  of HCV infection.    The HCV genotype test uses commercial reagents that have  not been approved or cleared by the FDA.  The FDA has  determined that such clearance or approval is not  necessary.  The performance characterist                           ics of this  procedure were determined by [facility].   Test performed at [facility],  [address] [place]  [address]     [phone]  Tolu Rodrigues MD  - Medical Director      Fibrosis Score 04/15/2019 0.63   Final    Fibrosis Stage 04/15/2019 F3   Final    FibroTest Interpretation 04/15/2019 advanced fibrosis   Final    Comment: FibroTest estimates liver fibrosis  FibroTest Score    Stage    Interpretation  -----------------------------------------  0.00-0.21        F0       no fibrosis  0.21-0.27        F0-F1    no fibrosis  0.27-0.31        F1       minimal fibrosis  0.31-0.48        F1-F2    minimal fibrosis  0.48-0.58        F2       moderate fibrosis  0.58-0.72        F3       advanced fibrosis  0.72-0.74        F3-F4    advanced fibrosis  0.74-1.00        F4       severe fibrosis (Cirrhosis)      Necroinflammat Activity Score 04/15/2019 0.39   Final    Necroinflammat Activity Grade 04/15/2019 A1-A2   Final    Necroinflammat Interp 04/15/2019 minimal activity   Final    Comment: ActiTest estimates necroinflammatory activity  ActiTest Score     Grade    Interpretation  ----------------------------------------  0.00-0.17        A0       no activity  0.17-0.29        A0-A1    no activity  0.29-0.36        A1       minimal activity  0.36-0.52        A1-A2     minimal activity  0.52-0.60        A2       significant activity  0.60-0.62        A2-A3    significant activity  0.62-1.00        A3       severe activity      FibroTest-ActiTest Comment 04/15/2019 SEE BELOW   Final    Comment: The reliability of results is dependent on compliance  with the preanalytical and analytical conditions  recommended by [employer] [facility]. The tests have to be  deferred for: acute hemolysis, acute hepatitis,  acute inflammation, extra hepatic cholestasis. The  advice of a specialist should be sought for  interpretation in chronic hemolysis and Gilbert's  syndrome. The test interpretation is not validated  in liver transplant patients. Isolated extreme values  of one of the components should lead to caution in  interpreting the results. In case of discordance  between a biopsy result and a test, it is recommended  to seek advice of a specialist. The causes of these  discordances could be due to a flaw of the test or to  a flaw in the biopsy: i.e. a liver biopsy has a 33%  variability rate for one fibrosis stage. FibroTest is  interpretable for chronic hepatitis B and C, alcoholic  and non alcoholic steatosis. ActiTest is interpretable  for chronic hepatitis B and C.  -------------------ADD                           ITIONAL INFORMATION-------------------  This test was developed and its performance characteristics   determined by [facility] in a manner consistent with   CLIA requirements. This test has not been cleared or   approved by the U.S. Food and Drug Administration.      Buck [employer] Serial Numver 04/15/2019 [id number]   Final    Apolipoprotein A-1 04/15/2019 171  >=120 mg/dL Final    Alpha 2-Macroglobulins, Qn 04/15/2019 333* 100 - 280 mg/dL Final    Haptoglobin 04/15/2019 139  30 - 200 mg/dL Final    ALT (SGPT) 04/15/2019 48  7 - 55 U/L Final    GGT 04/15/2019 40  8 - 61 U/L Final    BILIRUBIN, TOTAL 04/15/2019 0.6  <=1.2 mg/dL Final    Comment: Test Performed by:  [facility]  [facility] - [address] [address]           Assessment/Plan:  Peter was seen today for follow-up and cough.    Diagnoses and all orders for this visit:    Cough  -     X-Ray Chest PA And Lateral; Future  -     albuterol nebulizer solution 2.5 mg  -     albuterol (PROAIR HFA) 90 mcg/actuation inhaler; Inhale 2 puffs into the lungs every 4 (four) hours as needed (cough/shortness of breath). Rescue  Will repeat chest x-ray.  The patient received albuterol in the office and subsequently felt much better.  He states that the chest tightness that he had been having before felt much better.  Auscultation post nebulizer treatment revealed improved air exchange.   Patient educated on how to use a and albuterol inhaler.    Follow-up in a 1 to 2 weeks, however if patient has any worsening should come in sooner or present to the emergency room.    Chronic hepatitis C without hepatic coma  -     Hepatitis B surface antigen; Future  -     Hepatitis B core antibody, total; Future  -     Hepatitis B surface antibody; Future  -     HEPATITIS B VIRAL DNA, QUANTITATIVE; Future  Patient's hepatitis C labs were discussed.  The patient interested in treatment.    Discussed with patient that will need further labs prior to applying for medication and he agrees to get this done.      This office note has been dictated.  This dictation has been generated using M-Modal Fluency Direct dictation; some phonetic errors may occur.

## 2019-05-07 LAB
HBV CORE AB SERPL QL IA: POSITIVE
HBV SURFACE AB SER-ACNC: POSITIVE M[IU]/ML
HBV SURFACE AG SERPL QL IA: NEGATIVE

## 2019-05-09 ENCOUNTER — TELEPHONE (OUTPATIENT)
Dept: PHARMACY | Facility: CLINIC | Age: 84
End: 2019-05-09

## 2019-05-09 LAB
HBV DNA SERPL NAA+PROBE-ACNC: <10 IU/ML
HBV DNA SERPL NAA+PROBE-LOG IU: <1 LOG (10) IU/ML
HBV DNA SERPL QL NAA+PROBE: NOT DETECTED

## 2019-05-09 NOTE — PROGRESS NOTES
Addendum  Labs received and reviewed. Will send Rx for Mavyret, 8 week for prior authorization to specialty pharmacy  -5/9/2019  -Lucas Lloyd Jr., MD

## 2019-05-13 NOTE — TELEPHONE ENCOUNTER
DOCUMENTATION ONLY:  Prior authorization for Mavyret approved from 5/13/2019 to 07/08/2019 x 8 weeks of treatment.   Case ID# None    Co-pay: $2434.08    Patient Assistance IS required.     Forward to patient assistance for review.

## 2019-05-14 NOTE — TELEPHONE ENCOUNTER
Mavyret is approved by the patient's insurance.  We will reach out to the patient to notify of the approval, offer consultation, and schedule a delivery of the medication.     Sending a staff message to Dr Lloyd regarding approval

## 2019-05-14 NOTE — TELEPHONE ENCOUNTER
FOR DOCUMENTATION ONLY:  Financial Assistance for Mavyret is approved from 02/13/2019 to 5-13-20  [employer]  BIN: [id number]  ZAINAB: VERONICA  Id: [id number]  GRP: [id number]  7000.00 Eduardo

## 2019-05-15 ENCOUNTER — TELEPHONE (OUTPATIENT)
Dept: PHARMACY | Facility: CLINIC | Age: 84
End: 2019-05-15

## 2019-05-15 NOTE — TELEPHONE ENCOUNTER
Initial Mavyret consult completed on 5/15. Mavyret will be shipped on  to arrive at patient's home on  via [employer]. Patient will start Mavyret on TBD with provider. Address confirmed, CC on file. Confirmed 2 patient identifiers - name and . Therapy Appropriate.     Mavyret 100/40mg- Take three tablets by mouth once daily WITH FOOD x 8 weeks  Counseling was reviewed:   1. Patient MUST take Mavret at the SAME time every day.   2. Potential Side effects include: nausea, headaches, diarrhea and fatigue.   Headache: Patient may treat with OTC remedies. If Tylenol is used, dose should not exceed 2000mg per day.    4. Medication list reviewed. No DDIs or allergies noted. Patient MUST contact myself or provider prior to starting any new OTC, herbal, or prescription drugs to avoid potential DDIs.    DDI: Medication list reviewed and potential DDIs addressed.    Discussed the importance of staying well hydrated while on therapy. Compliance stressed - patient to take missed doses as soon as remembered, but NOT to take 2 doses in one day. Patient will report questions or concerns to myself or practitioner. Patient verbalizes understanding. Patient plans to start Mavyret on TBD with provider.  Consultation included: indication; goals of treatment; administration; storage and handling; side effects; how to handle side effects; the importance of compliance; how to handle missed doses; the importance of laboratory monitoring; the importance of keeping all follow up appointments.  Patient understands to report any medication changes to [facility] and provider. All questions answered and addressed to patients satisfaction. F/U will occur 7-10 days from start of treatment, [facility] to contact patient in 3 weeks for refills.     JOHNNY Jean.Ph., [facility]  Clinical Pharmacist, HIV/HCV  [facility]  Phone: [phone]

## 2019-05-15 NOTE — TELEPHONE ENCOUNTER
Patient called for initial consult and ship on Mavyret - na lvm.     Gilbert Vicente, JOHNNY.Ph., AAHIVP  Clinical Pharmacist, HIV/HCV  [facility]  Phone: [phone]

## 2019-05-23 ENCOUNTER — OFFICE VISIT (OUTPATIENT)
Dept: FAMILY MEDICINE | Facility: CLINIC | Age: 84
End: 2019-05-23
Payer: MEDICARE

## 2019-05-23 VITALS
OXYGEN SATURATION: 95 % | RESPIRATION RATE: 16 BRPM | SYSTOLIC BLOOD PRESSURE: 138 MMHG | BODY MASS INDEX: 25.62 KG/M2 | HEIGHT: 70 IN | DIASTOLIC BLOOD PRESSURE: 78 MMHG | HEART RATE: 100 BPM | WEIGHT: 179 LBS | TEMPERATURE: 98 F

## 2019-05-23 DIAGNOSIS — N18.30 CKD (CHRONIC KIDNEY DISEASE) STAGE 3, GFR 30-59 ML/MIN: ICD-10-CM

## 2019-05-23 DIAGNOSIS — I10 HYPERTENSION, BENIGN: ICD-10-CM

## 2019-05-23 DIAGNOSIS — B18.2 CHRONIC HEPATITIS C WITHOUT HEPATIC COMA: Primary | ICD-10-CM

## 2019-05-23 DIAGNOSIS — E11.21 DIABETES MELLITUS WITH NEPHROPATHY: ICD-10-CM

## 2019-05-23 PROCEDURE — 3075F SYST BP GE 130 - 139MM HG: CPT | Mod: CPTII,S$GLB,, | Performed by: FAMILY MEDICINE

## 2019-05-23 PROCEDURE — 99214 OFFICE O/P EST MOD 30 MIN: CPT | Mod: S$GLB,,, | Performed by: FAMILY MEDICINE

## 2019-05-23 PROCEDURE — 99214 PR OFFICE/OUTPT VISIT, EST, LEVL IV, 30-39 MIN: ICD-10-PCS | Mod: S$GLB,,, | Performed by: FAMILY MEDICINE

## 2019-05-23 PROCEDURE — 3078F DIAST BP <80 MM HG: CPT | Mod: CPTII,S$GLB,, | Performed by: FAMILY MEDICINE

## 2019-05-23 PROCEDURE — 99999 PR PBB SHADOW E&M-EST. PATIENT-LVL III: ICD-10-PCS | Mod: PBBFAC,,, | Performed by: FAMILY MEDICINE

## 2019-05-23 PROCEDURE — 99999 PR PBB SHADOW E&M-EST. PATIENT-LVL III: CPT | Mod: PBBFAC,,, | Performed by: FAMILY MEDICINE

## 2019-05-23 PROCEDURE — 1101F PT FALLS ASSESS-DOCD LE1/YR: CPT | Mod: CPTII,S$GLB,, | Performed by: FAMILY MEDICINE

## 2019-05-23 PROCEDURE — 3075F PR MOST RECENT SYSTOLIC BLOOD PRESS GE 130-139MM HG: ICD-10-PCS | Mod: CPTII,S$GLB,, | Performed by: FAMILY MEDICINE

## 2019-05-23 PROCEDURE — 3078F PR MOST RECENT DIASTOLIC BLOOD PRESSURE < 80 MM HG: ICD-10-PCS | Mod: CPTII,S$GLB,, | Performed by: FAMILY MEDICINE

## 2019-05-23 PROCEDURE — 1101F PR PT FALLS ASSESS DOC 0-1 FALLS W/OUT INJ PAST YR: ICD-10-PCS | Mod: CPTII,S$GLB,, | Performed by: FAMILY MEDICINE

## 2019-05-27 ENCOUNTER — TELEPHONE (OUTPATIENT)
Dept: PHARMACY | Facility: CLINIC | Age: 84
End: 2019-05-27

## 2019-05-27 NOTE — TELEPHONE ENCOUNTER
7 day touch base:    Confirmed 2 patient identifiers - name and .     Patient reported starting Mavyret on , taking medication with food daily. Reports a slight headache that resolved on it's own, other than that no side effects . No missed doses, no new medications, no new allergies or health conditions reported at this time. All questions answered and addressed to patients satisfaction. Pt verbalized understanding.  Will follow-up at refill.

## 2019-05-31 NOTE — PROGRESS NOTES
Routine Office Visit    Patient Name: Peter Lopez    : 1935  MRN: [id number]    Subjective:  Peter is a 83 y.o. male who presents today for:   Chief Complaint   Patient presents with    Hepatitis C     Mavyret med education    Medication Management     83-year-old male with diabetes, chronic kidney disease, hypertension, and the hepatitis C, comes in to discuss initiation of a hepatitis-C medication.  He has received the medication.  He has not started yet as he was instructed to wait until he saw me.  Since his last visit he reports that he takes all his other medications as prescribed a, and is not having any concerns with them.     HCV Labs  Genotype:      1b                 Viral load:                     721,525 (4/15/19)  Fibrosure:                    F3  Hep B sAg:                  Neg   Hep B cAb:                  Positive  Hep B sAb:                  Positive  HBV VL:                       Not detected  Hep B vaccine:            N/A      Past Medical History  Past Medical History:   Diagnosis Date    Chronic hepatitis C without mention of hepatic coma     genotype 1b; treatment naive    Diabetes mellitus type I     Hypertension        Past Surgical History  History reviewed. No pertinent surgical history.     Family History  Family History   Problem Relation Age of Onset    Cancer Mother     Asbestos Father     Liver disease Neg Hx        Social History  Social History     Socioeconomic History    Marital status:      Spouse name: Not on file    Number of children: Not on file    Years of education: Not on file    Highest education level: Not on file   Occupational History    Not on file   Social Needs    Financial resource strain: Not on file    Food insecurity:     Worry: Not on file     Inability: Not on file    Transportation needs:     Medical: Not on file     Non-medical: Not on file   Tobacco Use    Smoking status: Former Smoker    Smokeless tobacco: Former User     Quit  date: 1986   Substance and Sexual Activity    Alcohol use: Yes     Alcohol/week: 0.0 oz     Comment: 2 bottles beer on weekends    Drug use: No    Sexual activity: Yes     Partners: Female   Lifestyle    Physical activity:     Days per week: Not on file     Minutes per session: Not on file    Stress: Not on file   Relationships    Social connections:     Talks on phone: Not on file     Gets together: Not on file     Attends [employer] service: Not on file     Active member of club or organization: Not on file     Attends meetings of clubs or organizations: Not on file     Relationship status: Not on file   Other Topics Concern    Not on file   Social History Narrative    , resides in [place]. 7 children but lost one. 16 grandchildren. 4 great-grandchildren. Works at [employer]       Current Medications  Current Outpatient Medications on File Prior to Visit   Medication Sig Dispense Refill    albuterol (PROAIR HFA) 90 mcg/actuation inhaler Inhale 2 puffs into the lungs every 4 (four) hours as needed (cough/shortness of breath). Rescue 1 Inhaler 2    amoxicillin-clavulanate 875-125mg (AUGMENTIN) 875-125 mg per tablet Take 1 tablet by mouth 2 (two) times daily. 14 tablet 0    azelastine (ASTELIN) 137 mcg (0.1 %) nasal spray 1 spray (137 mcg total) by Nasal route 2 (two) times daily. 30 mL 1    benzonatate (TESSALON) 100 MG capsule Take 1 capsule (100 mg total) by mouth 3 (three) times daily as needed for Cough. 30 capsule 0    fluticasone (FLONASE) 50 mcg/actuation nasal spray 1 spray (50 mcg total) by Each Nare route once daily. 15.8 mL 1    glecaprevir-pibrentasvir (MAVYRET) 100-40 mg Tab Take 3 tablets by mouth once daily. 84 tablet 1    glimepiride (AMARYL) 4 MG tablet TAKE ONE TABLET BY MOUTH ONCE DAILY 90 tablet 2    ipratropium (ATROVENT) 42 mcg (0.06 %) nasal spray 2 sprays by Nasal route 4 (four) times daily. 15 mL 0    loratadine (CLARITIN) 10 mg tablet Take 1 tablet (10  "mg total) by mouth once daily. 30 tablet 1    metFORMIN (GLUCOPHAGE) 1000 MG tablet TAKE 1 TABLET BY MOUTH 2 TIMES DAILY WITH MEAL 180 tablet 1    olmesartan-hydrochlorothiazide (BENICAR HCT) 40-12.5 mg Tab TAKE ONE TABLET BY MOUTH ONCE DAILY 90 tablet 2    ranitidine (ZANTAC) 150 MG tablet Take 1 tablet (150 mg total) by mouth nightly. 30 tablet 2     No current facility-administered medications on file prior to visit.        Allergies   Review of patient's allergies indicates:  No Known Allergies    Review of Systems   Constitutional: Negative for chills, fever and unexpected weight change.   HENT: Negative for congestion, postnasal drip and rhinorrhea.    Respiratory: Negative for cough, shortness of breath and wheezing.    Cardiovascular: Negative for chest pain and palpitations.   Gastrointestinal: Negative for abdominal pain, constipation, diarrhea and nausea.   Genitourinary: Negative for dysuria.     /78 (BP Location: Left arm, Patient Position: Sitting, BP Method: Medium (Manual))   Pulse 100   Temp 98 °F (36.7 °C) (Oral)   Resp 16   Ht 5' 10" (1.778 m)   Wt 81.2 kg (179 lb 0.2 oz)   SpO2 95%   BMI 25.69 kg/m²     Physical Exam   Constitutional: He appears well-developed and well-nourished.   HENT:   Head: Normocephalic.   Right Ear: External ear normal.   Left Ear: External ear normal.   Nose: Nose normal.   Mouth/Throat: No oropharyngeal exudate.   Neck: Normal range of motion. Neck supple. No tracheal deviation present.   Cardiovascular: Normal rate, regular rhythm, normal heart sounds and intact distal pulses.   No murmur heard.  Pulmonary/Chest: Effort normal and breath sounds normal. He has no wheezes. He has no rales.   Abdominal: Soft. Bowel sounds are normal. He exhibits no mass. There is no tenderness.   Musculoskeletal: He exhibits no edema.   Lymphadenopathy:     He has no cervical adenopathy.   Skin: He is not diaphoretic.   Vitals reviewed.        Assessment/Plan:  Peter was seen " today for hepatitis c and medication management.    Diagnoses and all orders for this visit:    Chronic hepatitis C without hepatic coma  Discussed with patient long term consequences of hepatitis C, as well as benefits, risks, and side effects of treatment. Discussed modes of transmission. Encouraged patient not to share items which may lead to contamination/transmission such as toothbrushes, razor blades, and nail clippers.  Reviewed medication interactions.  Correct way of taking medication discussed with patient.  Patient was able to explain back how to take the medication.  Patient agrees to contact me before starting any other medications so we can review potential interactions.  Follow-up in the office in two weeks.  As patient is F3 fibrotic score, he will need annual surveillance for hepatocellular carcinoma even after treatment.    Medication:                 Mavyret for 8 weeks  Treatment start:          5/24/19  End of week 4:            6/21/19  End of week 8:   7/19/19  SVR12:                        After 10/11/19    Diabetes mellitus with nephropathy  Continue current regimen.  Patient agrees to monitor his sugars to make sure that they stay well controlled.    CKD (chronic kidney disease) stage 3, GFR 30-59 ml/min  No adjustment needed on hepatitis-C medication because of his chronic kidney disease.    Hypertension, benign  Continue current regimen.              -Lucas Lloyd Jr., MD, AAHIVS          This office note has been dictated.  This dictation has been generated using M-Modal Fluency Direct dictation; some phonetic errors may occur.

## 2019-06-03 ENCOUNTER — TELEPHONE (OUTPATIENT)
Dept: ADMINISTRATIVE | Facility: HOSPITAL | Age: 84
End: 2019-06-03

## 2019-06-03 NOTE — LETTER
Ml 3, 2019    Dr Olsen             [facility]  [address]  [facility] [address]  Phone: [phone] Ml 3, 2019     Patient: Peter Lopez    YOB: 1935   MRN   [id number]    Peter Lomasis a patient of Dr. Lucas Lloyd (PCP) at [facility]. While reviewing his/her chart, it has come to our attention that there is an outstanding lab/procedure. Please help keep our Health Maintenance records as accurate and as up to date as possible by supplying the following:     Diabetic Eye exam of 6/2018  To Present                                                                             Please fax to [facility] @ [phone].    Thank you for your assistance in our patient's healthcare.     Sincerely,     Meka Pugh MA  Panel Care Coordinator  [facility]

## 2019-06-06 ENCOUNTER — TELEPHONE (OUTPATIENT)
Dept: PHARMACY | Facility: CLINIC | Age: 84
End: 2019-06-06

## 2019-06-06 ENCOUNTER — OFFICE VISIT (OUTPATIENT)
Dept: FAMILY MEDICINE | Facility: CLINIC | Age: 84
End: 2019-06-06
Payer: MEDICARE

## 2019-06-06 VITALS
RESPIRATION RATE: 16 BRPM | WEIGHT: 179.69 LBS | DIASTOLIC BLOOD PRESSURE: 82 MMHG | SYSTOLIC BLOOD PRESSURE: 133 MMHG | TEMPERATURE: 98 F | HEART RATE: 103 BPM | OXYGEN SATURATION: 96 % | HEIGHT: 70 IN | BODY MASS INDEX: 25.73 KG/M2

## 2019-06-06 DIAGNOSIS — E11.21 DIABETES MELLITUS WITH NEPHROPATHY: ICD-10-CM

## 2019-06-06 DIAGNOSIS — N18.30 CKD (CHRONIC KIDNEY DISEASE) STAGE 3, GFR 30-59 ML/MIN: ICD-10-CM

## 2019-06-06 DIAGNOSIS — I10 ESSENTIAL HYPERTENSION: ICD-10-CM

## 2019-06-06 DIAGNOSIS — B18.2 CHRONIC HEPATITIS C WITHOUT HEPATIC COMA: Primary | ICD-10-CM

## 2019-06-06 LAB — GLUCOSE SERPL-MCNC: 208 MG/DL (ref 70–110)

## 2019-06-06 PROCEDURE — 3075F PR MOST RECENT SYSTOLIC BLOOD PRESS GE 130-139MM HG: ICD-10-PCS | Mod: CPTII,S$GLB,, | Performed by: FAMILY MEDICINE

## 2019-06-06 PROCEDURE — 1101F PT FALLS ASSESS-DOCD LE1/YR: CPT | Mod: CPTII,S$GLB,, | Performed by: FAMILY MEDICINE

## 2019-06-06 PROCEDURE — 3079F PR MOST RECENT DIASTOLIC BLOOD PRESSURE 80-89 MM HG: ICD-10-PCS | Mod: CPTII,S$GLB,, | Performed by: FAMILY MEDICINE

## 2019-06-06 PROCEDURE — 82962 POCT GLUCOSE, HAND-HELD DEVICE: ICD-10-PCS | Mod: S$GLB,,, | Performed by: FAMILY MEDICINE

## 2019-06-06 PROCEDURE — 99499 UNLISTED E&M SERVICE: CPT | Mod: S$GLB,,, | Performed by: FAMILY MEDICINE

## 2019-06-06 PROCEDURE — 82962 GLUCOSE BLOOD TEST: CPT | Mod: S$GLB,,, | Performed by: FAMILY MEDICINE

## 2019-06-06 PROCEDURE — 99214 PR OFFICE/OUTPT VISIT, EST, LEVL IV, 30-39 MIN: ICD-10-PCS | Mod: S$GLB,,, | Performed by: FAMILY MEDICINE

## 2019-06-06 PROCEDURE — 3079F DIAST BP 80-89 MM HG: CPT | Mod: CPTII,S$GLB,, | Performed by: FAMILY MEDICINE

## 2019-06-06 PROCEDURE — 99499 RISK ADDL DX/OHS AUDIT: ICD-10-PCS | Mod: S$GLB,,, | Performed by: FAMILY MEDICINE

## 2019-06-06 PROCEDURE — 3075F SYST BP GE 130 - 139MM HG: CPT | Mod: CPTII,S$GLB,, | Performed by: FAMILY MEDICINE

## 2019-06-06 PROCEDURE — 1101F PR PT FALLS ASSESS DOC 0-1 FALLS W/OUT INJ PAST YR: ICD-10-PCS | Mod: CPTII,S$GLB,, | Performed by: FAMILY MEDICINE

## 2019-06-06 PROCEDURE — 99999 PR PBB SHADOW E&M-EST. PATIENT-LVL III: CPT | Mod: PBBFAC,,, | Performed by: FAMILY MEDICINE

## 2019-06-06 PROCEDURE — 99214 OFFICE O/P EST MOD 30 MIN: CPT | Mod: S$GLB,,, | Performed by: FAMILY MEDICINE

## 2019-06-06 PROCEDURE — 99999 PR PBB SHADOW E&M-EST. PATIENT-LVL III: ICD-10-PCS | Mod: PBBFAC,,, | Performed by: FAMILY MEDICINE

## 2019-06-06 NOTE — TELEPHONE ENCOUNTER
Patient states has 2 weeks of Mavyret on hand and started Mavyret on 5/24.  Patient advised that [facility] will call back in 1 week to setup refill.  Patient denies any issues with taking Mavyret and denies any side effects.

## 2019-06-06 NOTE — PROGRESS NOTES
Routine Office Visit    Patient Name: Peter Lopez    : 1935  MRN: [id number]    Subjective:  Peter is a 83 y.o. male who presents today for:   Chief Complaint   Patient presents with    Hepatitis C    Follow-up     83-year-old male with hypertension, hepatitis-C, chronic kidney disease stage 3, and diabetes comes in for routine follow-up on the hepatitis Cels[place] since starting medication two weeks ago.  He reports that he is tolerating the medicine well.  He reports that at first he had some headaches but they have resolved.  He reports no other side effects.  He reports he takes it every morning.  For his diabetes, the patient is not checking his fingersticks.  He states that for breakfast he usually has donuts.  He reports taking all his other medications without concerns.    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 No pertinent surgical history.     Family History  Family History   Problem Relation Age of Onset    Cancer Mother     Asbestos Father     Liver disease Neg Hx        Social History  Social History     Socioeconomic History    Marital status:      Spouse name: Not on file    Number of children: Not on file    Years of education: Not on file    Highest education level: Not on file   Occupational History    Not on file   Social Needs    Financial resource strain: Not on file    Food insecurity:     Worry: Not on file     Inability: Not on file    Transportation needs:     Medical: Not on file     Non-medical: Not on file   Tobacco Use    Smoking status: Former Smoker    Smokeless tobacco: Former User     Quit date: 1986   Substance and Sexual Activity    Alcohol use: Yes     Alcohol/week: 0.0 oz     Comment: 2 bottles beer on weekends    Drug use: No    Sexual activity: Yes      Partners: Female   Lifestyle    Physical activity:     Days per week: Not on file     Minutes per session: Not on file    Stress: Not on file   Relationships    Social connections:     Talks on phone: Not on file     Gets together: Not on file     Attends Jainism service: Not on file     Active member of club or organization: Not on file     Attends meetings of clubs or organizations: Not on file     Relationship status: Not on file   Other Topics Concern    Not on file   Social History Narrative    , resides in [place]. 7 children but lost one. 16 grandchildren. 4 great-grandchildren. Works at [employer]       Current Medications  Current Outpatient Medications on File Prior to Visit   Medication Sig Dispense Refill    albuterol (PROAIR HFA) 90 mcg/actuation inhaler Inhale 2 puffs into the lungs every 4 (four) hours as needed (cough/shortness of breath). Rescue 1 Inhaler 2    amoxicillin-clavulanate 875-125mg (AUGMENTIN) 875-125 mg per tablet Take 1 tablet by mouth 2 (two) times daily. 14 tablet 0    azelastine (ASTELIN) 137 mcg (0.1 %) nasal spray 1 spray (137 mcg total) by Nasal route 2 (two) times daily. 30 mL 1    benzonatate (TESSALON) 100 MG capsule Take 1 capsule (100 mg total) by mouth 3 (three) times daily as needed for Cough. 30 capsule 0    fluticasone (FLONASE) 50 mcg/actuation nasal spray 1 spray (50 mcg total) by Each Nare route once daily. 15.8 mL 1    glecaprevir-pibrentasvir (MAVYRET) 100-40 mg Tab Take 3 tablets by mouth once daily. 84 tablet 1    glimepiride (AMARYL) 4 MG tablet TAKE ONE TABLET BY MOUTH ONCE DAILY 90 tablet 2    ipratropium (ATROVENT) 42 mcg (0.06 %) nasal spray 2 sprays by Nasal route 4 (four) times daily. 15 mL 0    loratadine (CLARITIN) 10 mg tablet Take 1 tablet (10 mg total) by mouth once daily. 30 tablet 1    metFORMIN (GLUCOPHAGE) 1000 MG tablet TAKE 1 TABLET BY MOUTH 2 TIMES DAILY WITH MEAL 180 tablet 1     "olmesartan-hydrochlorothiazide (BENICAR HCT) 40-12.5 mg Tab TAKE ONE TABLET BY MOUTH ONCE DAILY 90 tablet 2    ranitidine (ZANTAC) 150 MG tablet Take 1 tablet (150 mg total) by mouth nightly. 30 tablet 2     No current facility-administered medications on file prior to visit.        Allergies   Review of patient's allergies indicates:  No Known Allergies    Review of Systems   Constitutional: Negative for chills, fever and unexpected weight change.   HENT: Negative for congestion, postnasal drip and rhinorrhea.    Respiratory: Negative for cough, shortness of breath and wheezing.    Cardiovascular: Negative for chest pain and palpitations.   Gastrointestinal: Negative for abdominal pain, constipation, diarrhea and nausea.   Genitourinary: Negative for dysuria.     /82 (BP Location: Left arm, Patient Position: Sitting, BP Method: Medium (Automatic))   Pulse 103   Temp 97.7 °F (36.5 °C) (Oral)   Resp 16   Ht 5' 10" (1.778 m)   Wt 81.5 kg (179 lb 10.8 oz)   SpO2 96%   BMI 25.78 kg/m²     Physical Exam   Constitutional: He appears well-developed and well-nourished.   HENT:   Head: Normocephalic.   Right Ear: External ear normal.   Left Ear: External ear normal.   Nose: Nose normal.   Mouth/Throat: No oropharyngeal exudate.   Neck: Normal range of motion. Neck supple. No tracheal deviation present.   Cardiovascular: Normal rate, regular rhythm, normal heart sounds and intact distal pulses.   No murmur heard.  Pulmonary/Chest: Effort normal and breath sounds normal. He has no wheezes. He has no rales.   Abdominal: Soft. Bowel sounds are normal. He exhibits no mass. There is no tenderness.   Musculoskeletal: He exhibits no edema.   Lymphadenopathy:     He has no cervical adenopathy.   Skin: He is not diaphoretic.   Vitals reviewed.        Assessment/Plan:  Peter was seen today for hepatitis c and follow-up.    Diagnoses and all orders for this visit:    Chronic hepatitis C without hepatic coma  -     " Comprehensive metabolic panel; Future  -     Hepatitis C RNA, quantitative, PCR; Future  -     Hepatitis C RNA, quantitative, PCR; Future  -     HCV FibroSURE; Future  -     Comprehensive metabolic panel; Future  Patient doing well.  Continue Mavyret.  Will do labs once patient has completed four weeks of treatment and again at the end of treatment.  A patient to follow up in the office one week after and of treatment labs.  Advised not to start any other medications without letting me know first and he expressed understanding.  I spoke to his daughter by phone and updated her on his health status.  This was by his request    Diabetes mellitus with nephropathy  -     POCT Glucose, Hand-Held Device  Patient encouraged to please start checking his fingerstick levels.  However he declines.  Will continue to monitor his fingersticks when he comes to the office.    CKD (chronic kidney disease) stage 3, GFR 30-59 ml/min  Patient's medications are renally dosed    Essential hypertension  Continue current blood pressure regimen.              -Lucas Lloyd Jr., MD, AAHIVS          This office note has been dictated.  This dictation has been generated using M-Modal Fluency Direct dictation; some phonetic errors may occur.

## 2019-06-12 DIAGNOSIS — R05.9 COUGH: ICD-10-CM

## 2019-06-12 RX ORDER — GLIMEPIRIDE 4 MG/1
4 TABLET ORAL DAILY
Qty: 90 TABLET | Refills: 2 | Status: SHIPPED | OUTPATIENT
Start: 2019-06-12 | End: 2019-06-12 | Stop reason: SDUPTHER

## 2019-06-12 RX ORDER — GLIMEPIRIDE 4 MG/1
4 TABLET ORAL DAILY
Qty: 90 TABLET | Refills: 2 | Status: SHIPPED | OUTPATIENT
Start: 2019-06-12 | End: 2019-06-14 | Stop reason: SDUPTHER

## 2019-06-13 NOTE — TELEPHONE ENCOUNTER
Mavyret (2 of 2)  refill confirmed. We will ship Mavyret refill on  via fedex to arrive on . $0.00 copay- 004. Confirmed 2 patient identifiers - name and .     Patient has 7 doses of Mavyret remaining and takes it with breakfast daily.  Pt reports they are not having any side effects so far. No missed doses, no new medications, no new allergies or health conditions reported at this time.. All questions answered and addressed to patients satisfaction. Pt verbalized understanding.

## 2019-06-14 RX ORDER — GLIMEPIRIDE 4 MG/1
4 TABLET ORAL DAILY
Qty: 90 TABLET | Refills: 2 | Status: ON HOLD | OUTPATIENT
Start: 2019-06-14 | End: 2020-03-29 | Stop reason: HOSPADM

## 2019-06-21 PROBLEM — I70.0 ATHEROSCLEROSIS OF AORTA: Status: ACTIVE | Noted: 2019-05-03

## 2019-06-24 ENCOUNTER — LAB VISIT (OUTPATIENT)
Dept: LAB | Facility: HOSPITAL | Age: 84
End: 2019-06-24
Attending: FAMILY MEDICINE
Payer: MEDICARE

## 2019-06-24 ENCOUNTER — OFFICE VISIT (OUTPATIENT)
Dept: PODIATRY | Facility: CLINIC | Age: 84
End: 2019-06-24
Payer: MEDICARE

## 2019-06-24 VITALS — BODY MASS INDEX: 25.62 KG/M2 | WEIGHT: 179 LBS | HEIGHT: 70 IN

## 2019-06-24 DIAGNOSIS — B35.1 ONYCHOMYCOSIS DUE TO DERMATOPHYTE: ICD-10-CM

## 2019-06-24 DIAGNOSIS — E11.49 TYPE II DIABETES MELLITUS WITH NEUROLOGICAL MANIFESTATIONS: Primary | ICD-10-CM

## 2019-06-24 DIAGNOSIS — B18.2 CHRONIC HEPATITIS C WITHOUT HEPATIC COMA: ICD-10-CM

## 2019-06-24 LAB
ALBUMIN SERPL BCP-MCNC: 3.8 G/DL (ref 3.5–5.2)
ALP SERPL-CCNC: 52 U/L (ref 55–135)
ALT SERPL W/O P-5'-P-CCNC: 26 U/L (ref 10–44)
ANION GAP SERPL CALC-SCNC: 10 MMOL/L (ref 8–16)
AST SERPL-CCNC: 31 U/L (ref 10–40)
BILIRUB SERPL-MCNC: 0.8 MG/DL (ref 0.1–1)
BUN SERPL-MCNC: 20 MG/DL (ref 8–23)
CALCIUM SERPL-MCNC: 9.9 MG/DL (ref 8.7–10.5)
CHLORIDE SERPL-SCNC: 99 MMOL/L (ref 95–110)
CO2 SERPL-SCNC: 29 MMOL/L (ref 23–29)
CREAT SERPL-MCNC: 1.3 MG/DL (ref 0.5–1.4)
EST. GFR  (AFRICAN AMERICAN): 59.2 ML/MIN/1.73 M^2
EST. GFR  (NON AFRICAN AMERICAN): 51.2 ML/MIN/1.73 M^2
GLUCOSE SERPL-MCNC: 106 MG/DL (ref 70–110)
POTASSIUM SERPL-SCNC: 4.1 MMOL/L (ref 3.5–5.1)
PROT SERPL-MCNC: 7.4 G/DL (ref 6–8.4)
SODIUM SERPL-SCNC: 138 MMOL/L (ref 136–145)

## 2019-06-24 PROCEDURE — 99999 PR PBB SHADOW E&M-EST. PATIENT-LVL II: CPT | Mod: PBBFAC,,, | Performed by: PODIATRIST

## 2019-06-24 PROCEDURE — 80053 COMPREHEN METABOLIC PANEL: CPT

## 2019-06-24 PROCEDURE — 99213 OFFICE O/P EST LOW 20 MIN: CPT | Mod: S$GLB,,, | Performed by: PODIATRIST

## 2019-06-24 PROCEDURE — 1101F PT FALLS ASSESS-DOCD LE1/YR: CPT | Mod: CPTII,S$GLB,, | Performed by: PODIATRIST

## 2019-06-24 PROCEDURE — 1101F PR PT FALLS ASSESS DOC 0-1 FALLS W/OUT INJ PAST YR: ICD-10-PCS | Mod: CPTII,S$GLB,, | Performed by: PODIATRIST

## 2019-06-24 PROCEDURE — 36415 COLL VENOUS BLD VENIPUNCTURE: CPT | Mod: PO

## 2019-06-24 PROCEDURE — 99999 PR PBB SHADOW E&M-EST. PATIENT-LVL II: ICD-10-PCS | Mod: PBBFAC,,, | Performed by: PODIATRIST

## 2019-06-24 PROCEDURE — 87522 HEPATITIS C REVRS TRNSCRPJ: CPT

## 2019-06-24 PROCEDURE — 99213 PR OFFICE/OUTPT VISIT, EST, LEVL III, 20-29 MIN: ICD-10-PCS | Mod: S$GLB,,, | Performed by: PODIATRIST

## 2019-06-24 RX ORDER — CICLOPIROX 80 MG/ML
SOLUTION TOPICAL NIGHTLY
Qty: 1 BOTTLE | Refills: 3 | Status: ON HOLD | OUTPATIENT
Start: 2019-06-24 | End: 2020-04-22 | Stop reason: HOSPADM

## 2019-06-26 LAB
HCV RNA SERPL NAA+PROBE-LOG IU: <1.08 LOG (10) IU/ML
HCV RNA SERPL QL NAA+PROBE: NOT DETECTED IU/ML
HCV RNA SPEC NAA+PROBE-ACNC: <12 IU/ML

## 2019-06-28 NOTE — PROGRESS NOTES
Subjective:      Patient ID: Peter Lopez is a 83 y.o. male.    Chief Complaint: Diabetes Mellitus (PCP Dr Lloyd ); Diabetic Foot Exam; and Nail Problem    Peter is a 83 y.o. male who presents to the clinic upon referral from Dr. Steele ref. provider found  for evaluation and treatment of diabetic feet. Peter has a past medical history of Chronic hepatitis C without mention of hepatic coma, Diabetes mellitus type I, and Hypertension. Patient relates no major problem with feet. Reports elongated nails that catch on his socks. Requesting nail trimming.     PCP: Lucas Lloyd Jr, MD    Date Last Seen by PCP: 10/25/18    Current shoe gear: Tennis shoes    Hemoglobin A1C   Date Value Ref Range Status   04/15/2019 6.1 (H) 4.0 - 5.6 % Final     Comment:     ADA Screening Guidelines:  5.7-6.4%  Consistent with prediabetes  >or=6.5%  Consistent with diabetes  High levels of fetal hemoglobin interfere with the HbA1C  assay. Heterozygous hemoglobin variants (HbS, HgC, etc)do  not significantly interfere with this assay.   However, presence of multiple variants may affect accuracy.     10/01/2018 6.1 (H) 4.0 - 5.6 % Final     Comment:     ADA Screening Guidelines:  5.7-6.4%  Consistent with prediabetes  >or=6.5%  Consistent with diabetes  High levels of fetal hemoglobin interfere with the HbA1C  assay. Heterozygous hemoglobin variants (HbS, HgC, etc)do  not significantly interfere with this assay.   However, presence of multiple variants may affect accuracy.     04/09/2018 6.0 (H) 4.0 - 5.6 % Final     Comment:     According to ADA guidelines, hemoglobin A1c <7.0% represents  optimal control in non-pregnant diabetic patients. Different  metrics may apply to specific patient populations.   Standards of Medical Care in Diabetes-2016.  For the purpose of screening for the presence of diabetes:  <5.7%     Consistent with the absence of diabetes  5.7-6.4%  Consistent with increasing risk for diabetes   (prediabetes)  >or=6.5%  Consistent with  diabetes  Currently, no consensus exists for use of hemoglobin A1c  for diagnosis of diabetes for children.  This Hemoglobin A1c assay has significant interference with fetal   hemoglobin   (HbF). The results are invalid for patients with abnormal amounts of   HbF,   including those with known Hereditary Persistence   of Fetal Hemoglobin. Heterozygous hemoglobin variants (HbAS, HbAC,   HbAD, HbAE, HbA2) do not significantly interfere with this assay;   however, presence of multiple variants in a sample may impact the %   interference.             Review of Systems   Constitution: Negative for chills, diaphoresis and fever.   Cardiovascular: Negative for claudication, cyanosis, leg swelling and syncope.   Respiratory: Negative for cough and shortness of breath.    Skin: Positive for color change and nail changes. Negative for suspicious lesions.   Musculoskeletal: Negative for falls, joint pain, muscle cramps and muscle weakness.   Gastrointestinal: Negative for diarrhea, nausea and vomiting.   Neurological: Positive for paresthesias. Negative for disturbances in coordination, numbness, sensory change, tremors and weakness.   Psychiatric/Behavioral: Negative for altered mental status.           Objective:      Physical Exam   Constitutional: He appears well-developed. He is cooperative.   Oriented to time, place, and person.   Cardiovascular:   DP and PT pulses are palpable bilaterally. 3 sec capillary refill time and toes and feet are warm to touch proximally .        Musculoskeletal:   Equinus noted b/l ankles with < 10 deg DF noted. MMT 5/5 in DF/PF/Inv/Ev resistance with no reproduction of pain in any direction. Passive range of motion of ankle and pedal joints is painless b/l.     Feet:   Right Foot:   Skin Integrity: Negative for callus or dry skin.   Left Foot:   Skin Integrity: Negative for callus or dry skin.   Lymphadenopathy:   Negative lymphadenopathy bilateral popliteal fossa and tarsal tunnel.    Neurological: He is alert.   Light touch, proprioception, and sharp/dull sensation are all intact bilaterally. Protective threshold with the [place]-Wienstein monofilament is intact bilaterally.  Subjective paresthesias with no clearly identifiable source or trigger.      Skin:   Toenails 1-5 bilaterally are elongated by 2-3 mm, thickened by 2-3 mm, discolored/yellowed, dystrophic, brittle with subungual debris.       Psychiatric: He has a normal mood and affect.             Assessment:       Encounter Diagnoses   Name Primary?    Type II diabetes mellitus with neurological manifestations Yes    Onychomycosis due to dermatophyte          Plan:       Peter was seen today for diabetes mellitus, diabetic foot exam and nail problem.    Diagnoses and all orders for this visit:    Type II diabetes mellitus with neurological manifestations    Onychomycosis due to dermatophyte    Other orders  -     ciclopirox (PENLAC) 8 % Soln; Apply topically nightly.      I counseled the patient on his conditions, their implications and medical management.    - Shoe inspection. Diabetic Foot Education. Patient reminded of the importance of good nutrition and blood sugar control to help prevent podiatric complications of diabetes. Patient instructed on proper foot hygeine. We discussed wearing proper shoe gear, daily foot inspections, never walking without protective shoe gear, never putting sharp instruments to feet, routine podiatric nail visits every 6  months.   - With patient's permission, nails were aggressively reduced and debrided x 10 to their soft tissue attachment mechanically and with electric , removing all offending nail and debris. Patient relates relief following the procedure. He will continue to monitor the areas daily, inspect his feet, wear protective shoe gear when ambulatory, moisturizer to maintain skin integrity and follow in this office in approximately 6 months, sooner p.r.n.     At patient's request, I  discussed different treatments for toenail fungus. We discussed oral antifungals but I did not recommend them as a first line treatment since the medication is taken internally and can have side effects such as rash, taste disturbances, and liver enzyme elevation. We discussed topical Penlac to be applied daily and removed weekly. Pt. Expresses understanding and would like to try the Penlac. Rx sent to the pharmacy.       F/u 6 months     Lucia Aguiar DPM

## 2019-07-08 ENCOUNTER — TELEPHONE (OUTPATIENT)
Dept: FAMILY MEDICINE | Facility: CLINIC | Age: 84
End: 2019-07-08

## 2019-07-08 NOTE — TELEPHONE ENCOUNTER
----- Message from Freida Connelly sent at 7/8/2019 12:18 PM CDT -----  Contact: pt  Please call pt, states he was given a med (doesn't know name), 2 month supply and he only have 7 more. Please advise [phone] if does not answer please leave msg.

## 2019-07-12 RX ORDER — METFORMIN HYDROCHLORIDE 1000 MG/1
TABLET ORAL
Qty: 180 TABLET | Refills: 1 | Status: SHIPPED | OUTPATIENT
Start: 2019-07-12 | End: 2020-01-07

## 2019-07-22 ENCOUNTER — LAB VISIT (OUTPATIENT)
Dept: LAB | Facility: HOSPITAL | Age: 84
End: 2019-07-22
Attending: FAMILY MEDICINE
Payer: MEDICARE

## 2019-07-22 DIAGNOSIS — B18.2 CHRONIC HEPATITIS C WITHOUT HEPATIC COMA: ICD-10-CM

## 2019-07-22 LAB
ALBUMIN SERPL BCP-MCNC: 4 G/DL (ref 3.5–5.2)
ALP SERPL-CCNC: 49 U/L (ref 55–135)
ALT SERPL W/O P-5'-P-CCNC: 26 U/L (ref 10–44)
ANION GAP SERPL CALC-SCNC: 12 MMOL/L (ref 8–16)
AST SERPL-CCNC: 30 U/L (ref 10–40)
BILIRUB SERPL-MCNC: 0.7 MG/DL (ref 0.1–1)
BUN SERPL-MCNC: 21 MG/DL (ref 8–23)
CALCIUM SERPL-MCNC: 9.7 MG/DL (ref 8.7–10.5)
CHLORIDE SERPL-SCNC: 98 MMOL/L (ref 95–110)
CO2 SERPL-SCNC: 29 MMOL/L (ref 23–29)
CREAT SERPL-MCNC: 1.3 MG/DL (ref 0.5–1.4)
EST. GFR  (AFRICAN AMERICAN): 59 ML/MIN/1.73 M^2
EST. GFR  (NON AFRICAN AMERICAN): 51 ML/MIN/1.73 M^2
GLUCOSE SERPL-MCNC: 176 MG/DL (ref 70–110)
POTASSIUM SERPL-SCNC: 3.8 MMOL/L (ref 3.5–5.1)
PROT SERPL-MCNC: 7.2 G/DL (ref 6–8.4)
SODIUM SERPL-SCNC: 139 MMOL/L (ref 136–145)

## 2019-07-22 PROCEDURE — 81596 NFCT DS CHRNC HCV 6 ASSAYS: CPT

## 2019-07-22 PROCEDURE — 80053 COMPREHEN METABOLIC PANEL: CPT

## 2019-07-22 PROCEDURE — 36415 COLL VENOUS BLD VENIPUNCTURE: CPT | Mod: PN

## 2019-07-22 PROCEDURE — 87522 HEPATITIS C REVRS TRNSCRPJ: CPT

## 2019-07-25 LAB
A2 MACROGLOB SERPL-MCNC: 350 MG/DL (ref 100–280)
ALT SERPL W P-5'-P-CCNC: 27 U/L (ref 7–55)
APO A-I SERPL-MCNC: 140 MG/DL
BILIRUB SERPL-MCNC: 0.6 MG/DL
BIOPREDICTIVE SERIAL NUMBER: ABNORMAL
FIBROSIS STAGE SERPL QL: ABNORMAL
FIBROTEST INTERPRETATION: ABNORMAL
FIBROTEST-ACTITEST COMMENT: ABNORMAL
GGT SERPL-CCNC: 17 U/L (ref 8–61)
HAPTOGLOB SERPL-MCNC: 143 MG/DL (ref 30–200)
HCV RNA SERPL NAA+PROBE-LOG IU: <1.08 LOG (10) IU/ML
HCV RNA SERPL QL NAA+PROBE: NOT DETECTED IU/ML
HCV RNA SPEC NAA+PROBE-ACNC: <12 IU/ML
LIVER FIBR SCORE SERPL CALC.FIBROSURE: 0.65
NECROINFLAMMAT INTERP: ABNORMAL
NECROINFLAMMATORY ACT GRADE SERPL QL: ABNORMAL
NECROINFLAMMATORY ACT SCORE SERPL: 0.2

## 2019-07-29 ENCOUNTER — OFFICE VISIT (OUTPATIENT)
Dept: FAMILY MEDICINE | Facility: CLINIC | Age: 84
End: 2019-07-29
Payer: MEDICARE

## 2019-07-29 VITALS
DIASTOLIC BLOOD PRESSURE: 84 MMHG | OXYGEN SATURATION: 96 % | SYSTOLIC BLOOD PRESSURE: 142 MMHG | BODY MASS INDEX: 25.15 KG/M2 | HEIGHT: 70 IN | TEMPERATURE: 98 F | WEIGHT: 175.69 LBS | RESPIRATION RATE: 16 BRPM | HEART RATE: 101 BPM

## 2019-07-29 DIAGNOSIS — N52.9 ERECTILE DYSFUNCTION, UNSPECIFIED ERECTILE DYSFUNCTION TYPE: ICD-10-CM

## 2019-07-29 DIAGNOSIS — I70.0 ATHEROSCLEROSIS OF AORTA: ICD-10-CM

## 2019-07-29 DIAGNOSIS — B18.2 CHRONIC HEPATITIS C WITHOUT HEPATIC COMA: Primary | ICD-10-CM

## 2019-07-29 DIAGNOSIS — E11.21 DIABETES MELLITUS WITH NEPHROPATHY: ICD-10-CM

## 2019-07-29 DIAGNOSIS — I10 ESSENTIAL HYPERTENSION: ICD-10-CM

## 2019-07-29 PROCEDURE — 1101F PT FALLS ASSESS-DOCD LE1/YR: CPT | Mod: CPTII,S$GLB,, | Performed by: FAMILY MEDICINE

## 2019-07-29 PROCEDURE — 1101F PR PT FALLS ASSESS DOC 0-1 FALLS W/OUT INJ PAST YR: ICD-10-PCS | Mod: CPTII,S$GLB,, | Performed by: FAMILY MEDICINE

## 2019-07-29 PROCEDURE — 99214 PR OFFICE/OUTPT VISIT, EST, LEVL IV, 30-39 MIN: ICD-10-PCS | Mod: S$GLB,,, | Performed by: FAMILY MEDICINE

## 2019-07-29 PROCEDURE — 3079F PR MOST RECENT DIASTOLIC BLOOD PRESSURE 80-89 MM HG: ICD-10-PCS | Mod: CPTII,S$GLB,, | Performed by: FAMILY MEDICINE

## 2019-07-29 PROCEDURE — 99214 OFFICE O/P EST MOD 30 MIN: CPT | Mod: S$GLB,,, | Performed by: FAMILY MEDICINE

## 2019-07-29 PROCEDURE — 99499 RISK ADDL DX/OHS AUDIT: ICD-10-PCS | Mod: S$GLB,,, | Performed by: FAMILY MEDICINE

## 2019-07-29 PROCEDURE — 99999 PR PBB SHADOW E&M-EST. PATIENT-LVL III: ICD-10-PCS | Mod: PBBFAC,,, | Performed by: FAMILY MEDICINE

## 2019-07-29 PROCEDURE — 99499 UNLISTED E&M SERVICE: CPT | Mod: S$GLB,,, | Performed by: FAMILY MEDICINE

## 2019-07-29 PROCEDURE — 99999 PR PBB SHADOW E&M-EST. PATIENT-LVL III: CPT | Mod: PBBFAC,,, | Performed by: FAMILY MEDICINE

## 2019-07-29 PROCEDURE — 3079F DIAST BP 80-89 MM HG: CPT | Mod: CPTII,S$GLB,, | Performed by: FAMILY MEDICINE

## 2019-07-29 PROCEDURE — 3077F PR MOST RECENT SYSTOLIC BLOOD PRESSURE >= 140 MM HG: ICD-10-PCS | Mod: CPTII,S$GLB,, | Performed by: FAMILY MEDICINE

## 2019-07-29 PROCEDURE — 3077F SYST BP >= 140 MM HG: CPT | Mod: CPTII,S$GLB,, | Performed by: FAMILY MEDICINE

## 2019-07-29 RX ORDER — SILDENAFIL 100 MG/1
100 TABLET, FILM COATED ORAL DAILY PRN
Qty: 12 TABLET | Refills: 5 | Status: SHIPPED | OUTPATIENT
Start: 2019-07-29 | End: 2019-08-19 | Stop reason: SDUPTHER

## 2019-07-29 RX ORDER — OLMESARTAN MEDOXOMIL AND HYDROCHLOROTHIAZIDE 40/25 40; 25 MG/1; MG/1
1 TABLET ORAL DAILY
Qty: 90 TABLET | Refills: 1 | Status: SHIPPED | OUTPATIENT
Start: 2019-07-29 | End: 2020-01-07

## 2019-07-29 NOTE — PROGRESS NOTES
Routine Office Visit    Patient Name: Peter Lopez    : 1935  MRN: [id number]    Subjective:  Peter is a 83 y.o. male who presents today for:   Chief Complaint   Patient presents with    Hepatitis C    Follow-up     83-year-old male with hypertension, diabetes, and status post eight weeks of treatment for hepatitis C with Mayveret, comes in for follow-up on hepatitis-C.  He reports that he finished the medication without any concerns.  He reports that overall he feels fine.  He reports taking his hypertension and diabetic medications without concerns.  He is not routinely checking his sugars at home.  He does report that occasionally has some dizziness, but attributes this to drinking very little water.  He works outside and is whether he needs to increase his water intake.  He states dizziness happens just occasionally.    The patient does report that he has longstanding erectile dysfunction, and would like to try Viagra for it.  He states that he has taken in the past.  He was to make sure that it does not interact with other medications.    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 No pertinent surgical history.     Family History  Family History   Problem Relation Age of Onset    Cancer Mother     Asbestos Father     Liver disease Neg Hx        Social History  Social History     Socioeconomic History    Marital status:      Spouse name: Not on file    Number of children: Not on file    Years of education: Not on file    Highest education level: Not on file   Occupational History    Not on file   Social Needs    Financial resource strain: Not on file    Food insecurity:     Worry: Not on file     Inability: Not on file    Transportation needs:     Medical: Not on file     Non-medical: Not on  file   Tobacco Use    Smoking status: Former Smoker    Smokeless tobacco: Former User     Quit date: 1986   Substance and Sexual Activity    Alcohol use: Yes     Alcohol/week: 0.0 oz     Comment: 2 bottles beer on weekends    Drug use: No    Sexual activity: Yes     Partners: Female   Lifestyle    Physical activity:     Days per week: Not on file     Minutes per session: Not on file    Stress: Not on file   Relationships    Social connections:     Talks on phone: Not on file     Gets together: Not on file     Attends [employer] service: Not on file     Active member of club or organization: Not on file     Attends meetings of clubs or organizations: Not on file     Relationship status: Not on file   Other Topics Concern    Not on file   Social History Narrative    , resides in [place]. 7 children but lost one. 16 grandchildren. 4 great-grandchildren. Works at [email or web address]       Current Medications  Current Outpatient Medications on File Prior to Visit   Medication Sig Dispense Refill    albuterol (PROAIR HFA) 90 mcg/actuation inhaler Inhale 2 puffs into the lungs every 4 (four) hours as needed (cough/shortness of breath). Rescue 1 Inhaler 2    ciclopirox (PENLAC) 8 % Soln Apply topically nightly. 1 Bottle 3    fluticasone (FLONASE) 50 mcg/actuation nasal spray 1 spray (50 mcg total) by Each Nare route once daily. 15.8 mL 1    glimepiride (AMARYL) 4 MG tablet Take 1 tablet (4 mg total) by mouth once daily. 90 tablet 2    ipratropium (ATROVENT) 42 mcg (0.06 %) nasal spray 2 sprays by Nasal route 4 (four) times daily. 15 mL 0    loratadine (CLARITIN) 10 mg tablet Take 1 tablet (10 mg total) by mouth once daily. 30 tablet 1    metFORMIN (GLUCOPHAGE) 1000 MG tablet TAKE 1 TABLET BY MOUTH 2 TIMES DAILY WITH MEAL 180 tablet 1    ranitidine (ZANTAC) 150 MG tablet Take 1 tablet (150 mg total) by mouth nightly. 30 tablet 2    [DISCONTINUED] amoxicillin-clavulanate 875-125mg (AUGMENTIN)  "875-125 mg per tablet Take 1 tablet by mouth 2 (two) times daily. 14 tablet 0    [DISCONTINUED] azelastine (ASTELIN) 137 mcg (0.1 %) nasal spray 1 spray (137 mcg total) by Nasal route 2 (two) times daily. 30 mL 1    [DISCONTINUED] benzonatate (TESSALON) 100 MG capsule Take 1 capsule (100 mg total) by mouth 3 (three) times daily as needed for Cough. 30 capsule 0    [DISCONTINUED] glecaprevir-pibrentasvir (MAVYRET) 100-40 mg Tab Take 3 tablets by mouth once daily. 84 tablet 1    [DISCONTINUED] olmesartan-hydrochlorothiazide (BENICAR HCT) 40-12.5 mg Tab TAKE ONE TABLET BY MOUTH ONCE DAILY 90 tablet 2     No current facility-administered medications on file prior to visit.        Allergies   Review of patient's allergies indicates:  No Known Allergies    Review of Systems   Constitutional: Negative for chills, fever and unexpected weight change.   HENT: Negative for congestion, postnasal drip and rhinorrhea.    Respiratory: Negative for cough, shortness of breath and wheezing.    Cardiovascular: Negative for chest pain and palpitations.   Gastrointestinal: Negative for abdominal pain, constipation, diarrhea and nausea.   Genitourinary: Negative for dysuria.     BP (!) 142/84 (BP Location: Left arm, Patient Position: Sitting, BP Method: Small (Manual))   Pulse 101   Temp 97.7 °F (36.5 °C) (Oral)   Resp 16   Ht 5' 10" (1.778 m)   Wt 79.7 kg (175 lb 11.3 oz)   SpO2 96%   BMI 25.21 kg/m²     Physical Exam   Constitutional: He appears well-developed and well-nourished.   HENT:   Head: Normocephalic.   Right Ear: External ear normal.   Left Ear: External ear normal.   Nose: Nose normal.   Mouth/Throat: No oropharyngeal exudate.   Neck: Normal range of motion. Neck supple. No tracheal deviation present.   Cardiovascular: Normal rate, regular rhythm, normal heart sounds and intact distal pulses.   No murmur heard.  Pulmonary/Chest: Effort normal and breath sounds normal. He has no wheezes. He has no rales. "   Abdominal: Soft. Bowel sounds are normal. He exhibits no mass. There is no tenderness.   Musculoskeletal: He exhibits no edema.   Lymphadenopathy:     He has no cervical adenopathy.   Skin: He is not diaphoretic.   Vitals reviewed.    Lab Visit on 07/22/2019   Component Date Value Ref Range Status    Sodium 07/22/2019 139  136 - 145 mmol/L Final    Potassium 07/22/2019 3.8  3.5 - 5.1 mmol/L Final    Chloride 07/22/2019 98  95 - 110 mmol/L Final    CO2 07/22/2019 29  23 - 29 mmol/L Final    Glucose 07/22/2019 176* 70 - 110 mg/dL Final    BUN, Bld 07/22/2019 21  8 - 23 mg/dL Final    Creatinine 07/22/2019 1.3  0.5 - 1.4 mg/dL Final    Calcium 07/22/2019 9.7  8.7 - 10.5 mg/dL Final    Total Protein 07/22/2019 7.2  6.0 - 8.4 g/dL Final    Albumin 07/22/2019 4.0  3.5 - 5.2 g/dL Final    Total Bilirubin 07/22/2019 0.7  0.1 - 1.0 mg/dL Final    Comment: For infants and newborns, interpretation of results should be based  on gestational age, weight and in agreement with clinical  observations.  Premature Infant recommended reference ranges:  Up to 24 hours.............<8.0 mg/dL  Up to 48 hours............<12.0 mg/dL  3-5 days..................<15.0 mg/dL  6-29 days.................<15.0 mg/dL      Alkaline Phosphatase 07/22/2019 49* 55 - 135 U/L Final    AST 07/22/2019 30  10 - 40 U/L Final    ALT 07/22/2019 26  10 - 44 U/L Final    Anion Gap 07/22/2019 12  8 - 16 mmol/L Final    eGFR if  07/22/2019 59* >60 mL/min/1.73 m^2 Final    eGFR if non African American 07/22/2019 51* >60 mL/min/1.73 m^2 Final    Comment: Calculation used to obtain the estimated glomerular filtration  rate (eGFR) is the CKD-EPI equation.       HCV Log 07/22/2019 <1.08  <1.08 Log (10) IU/mL Final    Comment: This procedure utilizes a real-time polymerase chain   reaction test from [employer].  The amplification   target is a conserved region of the HCV genome.  The lower  limit of quantitation is 12 IU/mL  (1.08 Log IU/mL) and the   upper limit of quantitation is 100 million IU/mL (8.00 Log  IU/mL). The qualitative limit of detection is 12 IU/mL   (1.08 Log IU/mL).  Specimens reported as DETECTED but <12 IU/mL contain   detectable levels of hepatitis C RNA but the viral load is  below the limit of quantitation.  A Not detected result  does not rule out infection.  Test performed at [facility],  [address] , [place]  [address]     [phone]  Tolu Rodrigues MD  - Medical Director      HCV, Qualitative 07/22/2019 Not detected  Not detected IU/mL Final    HCV RNA Quant PCR 07/22/2019 <12  <12 IU/mL Final    Fibrosis Score 07/22/2019 0.65   Final    Fibrosis Stage 07/22/2019 F3   Final    FibroTest Interpretation 07/22/2019 advanced fibrosis   Final    Comment: FibroTest estimates liver fibrosis  FibroTest Score    Stage    Interpretation  -----------------------------------------  0.00-0.21        F0       no fibrosis  0.21-0.27        F0-F1    no fibrosis  0.27-0.31        F1       minimal fibrosis  0.31-0.48        F1-F2    minimal fibrosis  0.48-0.58        F2       moderate fibrosis  0.58-0.72        F3       advanced fibrosis  0.72-0.74        F3-F4    advanced fibrosis  0.74-1.00        F4       severe fibrosis (Cirrhosis)      Necroinflammat Activity Score 07/22/2019 0.20   Final    Necroinflammat Activity Grade 07/22/2019 A0-A1   Final    Necroinflammat Interp 07/22/2019 no activity   Final    Comment: ActiTest estimates necroinflammatory activity  ActiTest Score     Grade    Interpretation  ----------------------------------------  0.00-0.17        A0       no activity  0.17-0.29        A0-A1    no activity  0.29-0.36        A1       minimal activity  0.36-0.52        A1-A2    minimal activity  0.52-0.60        A2       significant activity  0.60-0.62        A2-A3    significant activity  0.62-1.00        A3       severe activity      FibroTest-ActiTest Comment 07/22/2019 SEE  BELOW   Final    Comment: The reliability of results is dependent on compliance  with the preanalytical and analytical conditions  recommended by [employer]. The tests have to be  deferred for: acute hemolysis, acute hepatitis,  acute inflammation, extra hepatic cholestasis. The  advice of a specialist should be sought for  interpretation in chronic hemolysis and Gilbert's  syndrome. The test interpretation is not validated  in liver transplant patients. Isolated extreme values  of one of the components should lead to caution in  interpreting the results. In case of discordance  between a biopsy result and a test, it is recommended  to seek advice of a specialist. The causes of these  discordances could be due to a flaw of the test or to  a flaw in the biopsy: i.e. a liver biopsy has a 33%  variability rate for one fibrosis stage. FibroTest is  interpretable for chronic hepatitis B and C, alcoholic  and non alcoholic steatosis. ActiTest is interpretable  for chronic hepatitis B and C.  -------------------ADD                           ITIONAL INFORMATION-------------------  This test was developed and its performance characteristics   determined by [facility] in a manner consistent with   CLIA requirements. This test has not been cleared or   approved by the U.S. Food and Drug Administration.      Phylogyive Serial Numver 07/22/2019 [id number]   Final    Apolipoprotein A-1 07/22/2019 140  >=120 mg/dL Final    Alpha 2-Macroglobulins, Qn 07/22/2019 350* 100 - 280 mg/dL Final    Haptoglobin 07/22/2019 143  30 - 200 mg/dL Final    ALT (SGPT) 07/22/2019 27  7 - 55 U/L Final    GGT 07/22/2019 17  8 - 61 U/L Final    BILIRUBIN, TOTAL 07/22/2019 0.6  <=1.2 mg/dL Final    Comment: Test Performed by:  [facility]  [address] [place] [address]  Test Performed by:  [facility]  [address] [place] [address]            Assessment/Plan:  Peter was seen today for hepatitis c and follow-up.    Diagnoses and all orders for this visit:    Chronic hepatitis C without hepatic coma  -     Comprehensive metabolic panel; Future  -     Hepatitis C RNA, quantitative, PCR; Future  Patient given his results showing no virus detected and treatment.  Discussed with him that we to get labs done in October for his SVR12, and I explained to him what this meant.    Essential hypertension  -     olmesartan-hydrochlorothiazide (BENICAR HCT) 40-25 mg per tablet; Take 1 tablet by mouth once daily.  Review blood pressure shows that his blood pressure readings have been elevated.  I will increase the diuretic component of his combination medicine, and have the patient follow up in two weeks..    Erectile dysfunction, unspecified erectile dysfunction type  -     sildenafil (VIAGRA) 100 MG tablet; Take 1 tablet (100 mg total) by mouth daily as needed for Erectile Dysfunction. 1 hour before sex, maximum of 1 tablet in 24 hours  Prescription for Viagra given.  Patient educated on how to take.  Warning signs for which to go to the hospital were discussed.    Diabetes mellitus with nephropathy  Continue current diabetic regimen.    Atherosclerosis of aorta  Will repeat his lipid panel at next visit, however patient has declined medication previously.              -Lucas Lloyd Jr., MD, AAHIVS          This office note has been dictated.  This dictation has been generated using M-Modal Fluency Direct dictation; some phonetic errors may occur.

## 2019-08-19 ENCOUNTER — OFFICE VISIT (OUTPATIENT)
Dept: FAMILY MEDICINE | Facility: CLINIC | Age: 84
End: 2019-08-19
Payer: MEDICARE

## 2019-08-19 VITALS
DIASTOLIC BLOOD PRESSURE: 76 MMHG | BODY MASS INDEX: 25.31 KG/M2 | WEIGHT: 176.81 LBS | SYSTOLIC BLOOD PRESSURE: 136 MMHG | OXYGEN SATURATION: 95 % | HEIGHT: 70 IN | HEART RATE: 95 BPM | RESPIRATION RATE: 16 BRPM | TEMPERATURE: 98 F

## 2019-08-19 DIAGNOSIS — I10 ESSENTIAL HYPERTENSION: Primary | ICD-10-CM

## 2019-08-19 DIAGNOSIS — N52.9 ERECTILE DYSFUNCTION, UNSPECIFIED ERECTILE DYSFUNCTION TYPE: ICD-10-CM

## 2019-08-19 DIAGNOSIS — Z23 NEED FOR VACCINATION: ICD-10-CM

## 2019-08-19 DIAGNOSIS — E11.21 DIABETES MELLITUS WITH NEPHROPATHY: ICD-10-CM

## 2019-08-19 PROCEDURE — 90670 PNEUMOCOCCAL CONJUGATE VACCINE 13-VALENT LESS THAN 5YO & GREATER THAN: ICD-10-PCS | Mod: S$GLB,,, | Performed by: FAMILY MEDICINE

## 2019-08-19 PROCEDURE — 99214 OFFICE O/P EST MOD 30 MIN: CPT | Mod: 25,S$GLB,, | Performed by: FAMILY MEDICINE

## 2019-08-19 PROCEDURE — G0009 PNEUMOCOCCAL CONJUGATE VACCINE 13-VALENT LESS THAN 5YO & GREATER THAN: ICD-10-PCS | Mod: S$GLB,,, | Performed by: FAMILY MEDICINE

## 2019-08-19 PROCEDURE — 3078F DIAST BP <80 MM HG: CPT | Mod: CPTII,S$GLB,, | Performed by: FAMILY MEDICINE

## 2019-08-19 PROCEDURE — 99999 PR PBB SHADOW E&M-EST. PATIENT-LVL IV: CPT | Mod: PBBFAC,,, | Performed by: FAMILY MEDICINE

## 2019-08-19 PROCEDURE — 99499 UNLISTED E&M SERVICE: CPT | Mod: S$GLB,,, | Performed by: FAMILY MEDICINE

## 2019-08-19 PROCEDURE — 90670 PCV13 VACCINE IM: CPT | Mod: S$GLB,,, | Performed by: FAMILY MEDICINE

## 2019-08-19 PROCEDURE — 99999 PR PBB SHADOW E&M-EST. PATIENT-LVL IV: ICD-10-PCS | Mod: PBBFAC,,, | Performed by: FAMILY MEDICINE

## 2019-08-19 PROCEDURE — 1101F PT FALLS ASSESS-DOCD LE1/YR: CPT | Mod: CPTII,S$GLB,, | Performed by: FAMILY MEDICINE

## 2019-08-19 PROCEDURE — 3078F PR MOST RECENT DIASTOLIC BLOOD PRESSURE < 80 MM HG: ICD-10-PCS | Mod: CPTII,S$GLB,, | Performed by: FAMILY MEDICINE

## 2019-08-19 PROCEDURE — G0009 ADMIN PNEUMOCOCCAL VACCINE: HCPCS | Mod: S$GLB,,, | Performed by: FAMILY MEDICINE

## 2019-08-19 PROCEDURE — 1101F PR PT FALLS ASSESS DOC 0-1 FALLS W/OUT INJ PAST YR: ICD-10-PCS | Mod: CPTII,S$GLB,, | Performed by: FAMILY MEDICINE

## 2019-08-19 PROCEDURE — 3075F SYST BP GE 130 - 139MM HG: CPT | Mod: CPTII,S$GLB,, | Performed by: FAMILY MEDICINE

## 2019-08-19 PROCEDURE — 99499 RISK ADDL DX/OHS AUDIT: ICD-10-PCS | Mod: S$GLB,,, | Performed by: FAMILY MEDICINE

## 2019-08-19 PROCEDURE — 99214 PR OFFICE/OUTPT VISIT, EST, LEVL IV, 30-39 MIN: ICD-10-PCS | Mod: 25,S$GLB,, | Performed by: FAMILY MEDICINE

## 2019-08-19 PROCEDURE — 3075F PR MOST RECENT SYSTOLIC BLOOD PRESS GE 130-139MM HG: ICD-10-PCS | Mod: CPTII,S$GLB,, | Performed by: FAMILY MEDICINE

## 2019-08-19 RX ORDER — SILDENAFIL 50 MG/1
50 TABLET, FILM COATED ORAL DAILY PRN
Qty: 10 TABLET | Refills: 5 | Status: ON HOLD | OUTPATIENT
Start: 2019-08-19 | End: 2020-04-22 | Stop reason: HOSPADM

## 2019-08-19 NOTE — PROGRESS NOTES
Routine Office Visit    Patient Name: Peter Lopez    : 1935  MRN: [id number]    Subjective:  Peter is a 83 y.o. male who presents today for:   Chief Complaint   Patient presents with    Hypertension       83-year-old male with hypertension diabetes comes for follow-up on his blood pressure since adjusting his blood pressure medication.  At the last visit, steroid component of his combination medication was increased.  He reports he is tolerating well.  He reports no side effects.  While here, he mentions that he was unable to get the prescription for Viagra filled because of cost.  He is still interested in prescription for Viagra.    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 No pertinent surgical history.     Family History  Family History   Problem Relation Age of Onset    Cancer Mother     Asbestos Father     Liver disease Neg Hx        Social History  Social History     Socioeconomic History    Marital status:      Spouse name: Not on file    Number of children: Not on file    Years of education: Not on file    Highest education level: Not on file   Occupational History    Not on file   Social Needs    Financial resource strain: Not on file    Food insecurity:     Worry: Not on file     Inability: Not on file    Transportation needs:     Medical: Not on file     Non-medical: Not on file   Tobacco Use    Smoking status: Former Smoker    Smokeless tobacco: Former User     Quit date: 1986   Substance and Sexual Activity    Alcohol use: Yes     Alcohol/week: 0.0 oz     Comment: 2 bottles beer on weekends    Drug use: No    Sexual activity: Yes     Partners: Female   Lifestyle    Physical activity:     Days per week: Not on file     Minutes per session: Not on file    Stress: Not on file    Relationships    Social connections:     Talks on phone: Not on file     Gets together: Not on file     Attends [employer] service: Not on file     Active member of club or organization: Not on file     Attends meetings of clubs or organizations: Not on file     Relationship status: Not on file   Other Topics Concern    Not on file   Social History Narrative    , resides in [place]. 7 children but lost one. 16 grandchildren. 4 great-grandchildren. Works at [employer]       Current Medications  Current Outpatient Medications on File Prior to Visit   Medication Sig Dispense Refill    albuterol (PROAIR HFA) 90 mcg/actuation inhaler Inhale 2 puffs into the lungs every 4 (four) hours as needed (cough/shortness of breath). Rescue 1 Inhaler 2    ciclopirox (PENLAC) 8 % Soln Apply topically nightly. 1 Bottle 3    fluticasone (FLONASE) 50 mcg/actuation nasal spray 1 spray (50 mcg total) by Each Nare route once daily. 15.8 mL 1    glimepiride (AMARYL) 4 MG tablet Take 1 tablet (4 mg total) by mouth once daily. 90 tablet 2    ipratropium (ATROVENT) 42 mcg (0.06 %) nasal spray 2 sprays by Nasal route 4 (four) times daily. 15 mL 0    loratadine (CLARITIN) 10 mg tablet Take 1 tablet (10 mg total) by mouth once daily. 30 tablet 1    metFORMIN (GLUCOPHAGE) 1000 MG tablet TAKE 1 TABLET BY MOUTH 2 TIMES DAILY WITH MEAL 180 tablet 1    olmesartan-hydrochlorothiazide (BENICAR HCT) 40-25 mg per tablet Take 1 tablet by mouth once daily. 90 tablet 1    ranitidine (ZANTAC) 150 MG tablet Take 1 tablet (150 mg total) by mouth nightly. 30 tablet 2    [DISCONTINUED] sildenafil (VIAGRA) 100 MG tablet Take 1 tablet (100 mg total) by mouth daily as needed for Erectile Dysfunction. 1 hour before sex, maximum of 1 tablet in 24 hours 12 tablet 5     No current facility-administered medications on file prior to visit.        Allergies   Review of patient's allergies indicates:  No Known Allergies    Review of Systems  "  Constitutional: Negative for chills and fever.   Respiratory: Negative for chest tightness, shortness of breath and wheezing.    Cardiovascular: Negative for chest pain and palpitations.   Gastrointestinal: Negative for abdominal pain.   Genitourinary: Negative for dysuria and hematuria.   Neurological: Negative for dizziness, light-headedness and headaches.     /76 (BP Location: Left arm, Patient Position: Sitting, BP Method: Medium (Manual))   Pulse 95   Temp 97.5 °F (36.4 °C) (Oral)   Resp 16   Ht 5' 10" (1.778 m)   Wt 80.2 kg (176 lb 12.9 oz)   SpO2 95%   BMI 25.37 kg/m²     Physical Exam   Constitutional: He appears well-developed and well-nourished.   HENT:   Head: Normocephalic.   Right Ear: External ear normal.   Left Ear: External ear normal.   Nose: Nose normal.   Mouth/Throat: No oropharyngeal exudate.   Neck: Normal range of motion. Neck supple. No tracheal deviation present.   Cardiovascular: Normal rate, regular rhythm and intact distal pulses.   No murmur heard.  Pulmonary/Chest: Effort normal and breath sounds normal. He has no wheezes. He has no rales.   Abdominal: Soft. Bowel sounds are normal. He exhibits no mass. There is no tenderness. There is no rigidity, no rebound, no guarding and no CVA tenderness.   Musculoskeletal: He exhibits no edema.   Lymphadenopathy:     He has no cervical adenopathy.   Skin: He is not diaphoretic.   Vitals reviewed.        Assessment/Plan:  Peter was seen today for hypertension.    Diagnoses and all orders for this visit:    Essential hypertension  -     Basic metabolic panel; Future  Blood pressure is improved.  Continue current regimen.  Will check renal function and potassium level since his diuretic was increased.    Erectile dysfunction, unspecified erectile dysfunction type  -     sildenafil (VIAGRA) 50 MG tablet; Take 1 tablet (50 mg total) by mouth daily as needed for Erectile Dysfunction. 1 hour before sex, maximum of 1 tablet in 24 " hours  Viagra prescription sent to [facility] mail order pharmacy as causes much more affordable.  Patient given contact information for the pharmacy.    Diabetes mellitus with nephropathy  -     Basic metabolic panel; Future  -     Hemoglobin A1c; Future  -     Lipid panel; Future  Patient is due for diabetic lab test soon as such will do this today as he is doing lab tests for other reasons.    Need for vaccination  -     (In Office Administered) Pneumococcal Conjugate Vaccine (13 Valent) (IM)  Patient due for Prevnar he previously had Pneumovax              -Lucas Lloyd Jr., MD, AAHIVS          This office note has been dictated.  This dictation has been generated using M-Modal Fluency Direct dictation; some phonetic errors may occur.

## 2019-08-19 NOTE — PROGRESS NOTES
Pt tolerated PCV 13 injection well. Instructed to wait in the clinic for 15 minutes and report any adverse effects immediately to the nurse. Verbalized understanding.

## 2019-08-27 ENCOUNTER — TELEPHONE (OUTPATIENT)
Dept: PAIN MEDICINE | Facility: CLINIC | Age: 84
End: 2019-08-27

## 2019-08-27 NOTE — TELEPHONE ENCOUNTER
----- Message from Lucas Lloyd Jr., MD sent at 8/26/2019 11:28 PM CDT -----  Labs stable.  Patient to follow up in October as scheduled labs to be done on twenty-first and see me on the twenty-eighth

## 2019-09-26 ENCOUNTER — PATIENT OUTREACH (OUTPATIENT)
Dept: ADMINISTRATIVE | Facility: OTHER | Age: 84
End: 2019-09-26

## 2019-09-30 ENCOUNTER — OFFICE VISIT (OUTPATIENT)
Dept: PODIATRY | Facility: CLINIC | Age: 84
End: 2019-09-30
Payer: MEDICARE

## 2019-09-30 VITALS
DIASTOLIC BLOOD PRESSURE: 68 MMHG | WEIGHT: 176 LBS | HEIGHT: 70 IN | SYSTOLIC BLOOD PRESSURE: 124 MMHG | BODY MASS INDEX: 25.2 KG/M2

## 2019-09-30 DIAGNOSIS — B35.1 ONYCHOMYCOSIS DUE TO DERMATOPHYTE: ICD-10-CM

## 2019-09-30 DIAGNOSIS — E11.49 TYPE II DIABETES MELLITUS WITH NEUROLOGICAL MANIFESTATIONS: Primary | ICD-10-CM

## 2019-09-30 PROCEDURE — 3074F PR MOST RECENT SYSTOLIC BLOOD PRESSURE < 130 MM HG: ICD-10-PCS | Mod: CPTII,S$GLB,, | Performed by: PODIATRIST

## 2019-09-30 PROCEDURE — 99999 PR PBB SHADOW E&M-EST. PATIENT-LVL III: ICD-10-PCS | Mod: PBBFAC,,, | Performed by: PODIATRIST

## 2019-09-30 PROCEDURE — 99213 PR OFFICE/OUTPT VISIT, EST, LEVL III, 20-29 MIN: ICD-10-PCS | Mod: S$GLB,,, | Performed by: PODIATRIST

## 2019-09-30 PROCEDURE — 1101F PR PT FALLS ASSESS DOC 0-1 FALLS W/OUT INJ PAST YR: ICD-10-PCS | Mod: CPTII,S$GLB,, | Performed by: PODIATRIST

## 2019-09-30 PROCEDURE — 3078F DIAST BP <80 MM HG: CPT | Mod: CPTII,S$GLB,, | Performed by: PODIATRIST

## 2019-09-30 PROCEDURE — 99999 PR PBB SHADOW E&M-EST. PATIENT-LVL III: CPT | Mod: PBBFAC,,, | Performed by: PODIATRIST

## 2019-09-30 PROCEDURE — 1101F PT FALLS ASSESS-DOCD LE1/YR: CPT | Mod: CPTII,S$GLB,, | Performed by: PODIATRIST

## 2019-09-30 PROCEDURE — 3078F PR MOST RECENT DIASTOLIC BLOOD PRESSURE < 80 MM HG: ICD-10-PCS | Mod: CPTII,S$GLB,, | Performed by: PODIATRIST

## 2019-09-30 PROCEDURE — 3074F SYST BP LT 130 MM HG: CPT | Mod: CPTII,S$GLB,, | Performed by: PODIATRIST

## 2019-09-30 PROCEDURE — 99213 OFFICE O/P EST LOW 20 MIN: CPT | Mod: S$GLB,,, | Performed by: PODIATRIST

## 2019-10-04 NOTE — PROGRESS NOTES
Subjective:      Patient ID: Peter Lopez is a 83 y.o. male.    Chief Complaint: Diabetes Mellitus (Pcp Dr. Lloyd 8/19/19); Diabetic Foot Exam; and Nail Care    Peter is a 83 y.o. male who presents to the clinic upon referral from Dr. Ivette abraham. provider found  for evaluation and treatment of diabetic feet. Peter has a past medical history of Chronic hepatitis C without mention of hepatic coma, Diabetes mellitus type I, and Hypertension. Patient relates no major problem with feet. Reports elongated nails that are difficult to trim. Requesting nail trimming.     PCP: Lucas Lloyd Jr, MD    Date Last Seen by PCP: 10/25/18    Current shoe gear: Tennis shoes    Hemoglobin A1C   Date Value Ref Range Status   08/19/2019 6.1 (H) 4.0 - 5.6 % Final     Comment:     ADA Screening Guidelines:  5.7-6.4%  Consistent with prediabetes  >or=6.5%  Consistent with diabetes  High levels of fetal hemoglobin interfere with the HbA1C  assay. Heterozygous hemoglobin variants (HbS, HgC, etc)do  not significantly interfere with this assay.   However, presence of multiple variants may affect accuracy.     04/15/2019 6.1 (H) 4.0 - 5.6 % Final     Comment:     ADA Screening Guidelines:  5.7-6.4%  Consistent with prediabetes  >or=6.5%  Consistent with diabetes  High levels of fetal hemoglobin interfere with the HbA1C  assay. Heterozygous hemoglobin variants (HbS, HgC, etc)do  not significantly interfere with this assay.   However, presence of multiple variants may affect accuracy.     10/01/2018 6.1 (H) 4.0 - 5.6 % Final     Comment:     ADA Screening Guidelines:  5.7-6.4%  Consistent with prediabetes  >or=6.5%  Consistent with diabetes  High levels of fetal hemoglobin interfere with the HbA1C  assay. Heterozygous hemoglobin variants (HbS, HgC, etc)do  not significantly interfere with this assay.   However, presence of multiple variants may affect accuracy.             Review of Systems   Constitution: Negative for chills, diaphoresis and fever.    Cardiovascular: Negative for claudication, cyanosis, leg swelling and syncope.   Respiratory: Negative for cough and shortness of breath.    Skin: Positive for color change and nail changes. Negative for suspicious lesions.   Musculoskeletal: Negative for falls, joint pain, muscle cramps and muscle weakness.   Gastrointestinal: Negative for diarrhea, nausea and vomiting.   Neurological: Positive for paresthesias. Negative for disturbances in coordination, numbness, sensory change, tremors and weakness.   Psychiatric/Behavioral: Negative for altered mental status.           Objective:      Physical Exam   Constitutional: He appears well-developed. He is cooperative.   Oriented to time, place, and person.   Cardiovascular:   DP and PT pulses are palpable bilaterally. 3 sec capillary refill time and toes and feet are warm to touch proximally .        Musculoskeletal:   Equinus noted b/l ankles with < 10 deg DF noted. MMT 5/5 in DF/PF/Inv/Ev resistance with no reproduction of pain in any direction. Passive range of motion of ankle and pedal joints is painless b/l.     Feet:   Right Foot:   Skin Integrity: Negative for callus or dry skin.   Left Foot:   Skin Integrity: Negative for callus or dry skin.   Lymphadenopathy:   Negative lymphadenopathy bilateral popliteal fossa and tarsal tunnel.   Neurological: He is alert.   Light touch, proprioception, and sharp/dull sensation are all intact bilaterally. Protective threshold with the Bainbridge-Wienstein monofilament is intact bilaterally.  Subjective paresthesias with no clearly identifiable source or trigger.      Skin:   Toenails 1-5 bilaterally are elongated by 2-3 mm, thickened by 2-3 mm, discolored/yellowed, dystrophic, brittle with subungual debris.       Psychiatric: He has a normal mood and affect.             Assessment:       Encounter Diagnoses   Name Primary?    Type II diabetes mellitus with neurological manifestations Yes    Onychomycosis due to dermatophyte           Plan:       Peter was seen today for diabetes mellitus, diabetic foot exam and nail care.    Diagnoses and all orders for this visit:    Type II diabetes mellitus with neurological manifestations    Onychomycosis due to dermatophyte      I counseled the patient on his conditions, their implications and medical management.    - Shoe inspection. Diabetic Foot Education. Patient reminded of the importance of good nutrition and blood sugar control to help prevent podiatric complications of diabetes. Patient instructed on proper foot hygeine. We discussed wearing proper shoe gear, daily foot inspections, never walking without protective shoe gear, never putting sharp instruments to feet, routine podiatric nail visits every 6  months.   - With patient's permission, nails were aggressively reduced and debrided x 10 to their soft tissue attachment mechanically and with electric , removing all offending nail and debris. Patient relates relief following the procedure. He will continue to monitor the areas daily, inspect his feet, wear protective shoe gear when ambulatory, moisturizer to maintain skin integrity and follow in this office in approximately 6 months, sooner p.r.n.       F/u 6 months     Lucia Aguiar DPM

## 2019-10-21 ENCOUNTER — LAB VISIT (OUTPATIENT)
Dept: LAB | Facility: HOSPITAL | Age: 84
End: 2019-10-21
Attending: FAMILY MEDICINE
Payer: MEDICARE

## 2019-10-21 DIAGNOSIS — B18.2 CHRONIC HEPATITIS C WITHOUT HEPATIC COMA: ICD-10-CM

## 2019-10-21 LAB
ALBUMIN SERPL BCP-MCNC: 3.9 G/DL (ref 3.5–5.2)
ALP SERPL-CCNC: 41 U/L (ref 55–135)
ALT SERPL W/O P-5'-P-CCNC: 25 U/L (ref 10–44)
ANION GAP SERPL CALC-SCNC: 10 MMOL/L (ref 8–16)
AST SERPL-CCNC: 31 U/L (ref 10–40)
BILIRUB SERPL-MCNC: 0.6 MG/DL (ref 0.1–1)
BUN SERPL-MCNC: 23 MG/DL (ref 8–23)
CALCIUM SERPL-MCNC: 10 MG/DL (ref 8.7–10.5)
CHLORIDE SERPL-SCNC: 102 MMOL/L (ref 95–110)
CO2 SERPL-SCNC: 30 MMOL/L (ref 23–29)
CREAT SERPL-MCNC: 1.2 MG/DL (ref 0.5–1.4)
EST. GFR  (AFRICAN AMERICAN): >60 ML/MIN/1.73 M^2
EST. GFR  (NON AFRICAN AMERICAN): 56 ML/MIN/1.73 M^2
GLUCOSE SERPL-MCNC: 111 MG/DL (ref 70–110)
POTASSIUM SERPL-SCNC: 4.8 MMOL/L (ref 3.5–5.1)
PROT SERPL-MCNC: 7.1 G/DL (ref 6–8.4)
SODIUM SERPL-SCNC: 142 MMOL/L (ref 136–145)

## 2019-10-21 PROCEDURE — 36415 COLL VENOUS BLD VENIPUNCTURE: CPT | Mod: PN

## 2019-10-21 PROCEDURE — 80053 COMPREHEN METABOLIC PANEL: CPT

## 2019-10-21 PROCEDURE — 87522 HEPATITIS C REVRS TRNSCRPJ: CPT

## 2019-10-28 ENCOUNTER — OFFICE VISIT (OUTPATIENT)
Dept: FAMILY MEDICINE | Facility: CLINIC | Age: 84
End: 2019-10-28
Payer: MEDICARE

## 2019-10-28 VITALS
HEART RATE: 95 BPM | WEIGHT: 174.38 LBS | RESPIRATION RATE: 16 BRPM | OXYGEN SATURATION: 96 % | SYSTOLIC BLOOD PRESSURE: 140 MMHG | HEIGHT: 70 IN | BODY MASS INDEX: 24.97 KG/M2 | DIASTOLIC BLOOD PRESSURE: 84 MMHG | TEMPERATURE: 100 F

## 2019-10-28 DIAGNOSIS — E11.21 DIABETES MELLITUS WITH NEPHROPATHY: ICD-10-CM

## 2019-10-28 DIAGNOSIS — B18.2 CHRONIC HEPATITIS C WITHOUT HEPATIC COMA: ICD-10-CM

## 2019-10-28 DIAGNOSIS — J00 COMMON COLD: Primary | ICD-10-CM

## 2019-10-28 DIAGNOSIS — I10 ESSENTIAL HYPERTENSION: ICD-10-CM

## 2019-10-28 DIAGNOSIS — R35.0 URINARY FREQUENCY: ICD-10-CM

## 2019-10-28 LAB
BILIRUB SERPL-MCNC: NORMAL MG/DL
BLOOD URINE, POC: NORMAL
COLOR, POC UA: NORMAL
CTP QC/QA: YES
GLUCOSE UR QL STRIP: NORMAL
KETONES UR QL STRIP: NORMAL
LEUKOCYTE ESTERASE URINE, POC: NORMAL
NITRITE, POC UA: NORMAL
PH, POC UA: 5
POC MOLECULAR INFLUENZA A AGN: NEGATIVE
POC MOLECULAR INFLUENZA B AGN: NEGATIVE
PROTEIN, POC: NORMAL
SPECIFIC GRAVITY, POC UA: 1.01
UROBILINOGEN, POC UA: NORMAL

## 2019-10-28 PROCEDURE — 87502 INFLUENZA DNA AMP PROBE: CPT | Mod: QW,S$GLB,, | Performed by: FAMILY MEDICINE

## 2019-10-28 PROCEDURE — 1101F PR PT FALLS ASSESS DOC 0-1 FALLS W/OUT INJ PAST YR: ICD-10-PCS | Mod: CPTII,S$GLB,, | Performed by: FAMILY MEDICINE

## 2019-10-28 PROCEDURE — 87502 POCT INFLUENZA A/B MOLECULAR: ICD-10-PCS | Mod: QW,S$GLB,, | Performed by: FAMILY MEDICINE

## 2019-10-28 PROCEDURE — 1101F PT FALLS ASSESS-DOCD LE1/YR: CPT | Mod: CPTII,S$GLB,, | Performed by: FAMILY MEDICINE

## 2019-10-28 PROCEDURE — 99999 PR PBB SHADOW E&M-EST. PATIENT-LVL III: ICD-10-PCS | Mod: PBBFAC,,, | Performed by: FAMILY MEDICINE

## 2019-10-28 PROCEDURE — 3079F PR MOST RECENT DIASTOLIC BLOOD PRESSURE 80-89 MM HG: ICD-10-PCS | Mod: CPTII,S$GLB,, | Performed by: FAMILY MEDICINE

## 2019-10-28 PROCEDURE — 99214 OFFICE O/P EST MOD 30 MIN: CPT | Mod: 25,S$GLB,, | Performed by: FAMILY MEDICINE

## 2019-10-28 PROCEDURE — 81001 URINALYSIS AUTO W/SCOPE: CPT | Mod: S$GLB,,, | Performed by: FAMILY MEDICINE

## 2019-10-28 PROCEDURE — 99214 PR OFFICE/OUTPT VISIT, EST, LEVL IV, 30-39 MIN: ICD-10-PCS | Mod: 25,S$GLB,, | Performed by: FAMILY MEDICINE

## 2019-10-28 PROCEDURE — 3079F DIAST BP 80-89 MM HG: CPT | Mod: CPTII,S$GLB,, | Performed by: FAMILY MEDICINE

## 2019-10-28 PROCEDURE — 81001 POCT URINALYSIS, DIPSTICK OR TABLET REAGENT, AUTOMATED, WITH MICROSCOP: ICD-10-PCS | Mod: S$GLB,,, | Performed by: FAMILY MEDICINE

## 2019-10-28 PROCEDURE — 3077F PR MOST RECENT SYSTOLIC BLOOD PRESSURE >= 140 MM HG: ICD-10-PCS | Mod: CPTII,S$GLB,, | Performed by: FAMILY MEDICINE

## 2019-10-28 PROCEDURE — 99999 PR PBB SHADOW E&M-EST. PATIENT-LVL III: CPT | Mod: PBBFAC,,, | Performed by: FAMILY MEDICINE

## 2019-10-28 PROCEDURE — 3077F SYST BP >= 140 MM HG: CPT | Mod: CPTII,S$GLB,, | Performed by: FAMILY MEDICINE

## 2019-10-28 RX ORDER — FLUTICASONE PROPIONATE 50 MCG
1 SPRAY, SUSPENSION (ML) NASAL DAILY
Qty: 15.8 ML | Refills: 1 | Status: SHIPPED | OUTPATIENT
Start: 2019-10-28 | End: 2020-06-16

## 2019-10-28 RX ORDER — BENZONATATE 100 MG/1
100 CAPSULE ORAL 3 TIMES DAILY PRN
Qty: 30 CAPSULE | Refills: 1 | Status: SHIPPED | OUTPATIENT
Start: 2019-10-28 | End: 2019-11-07

## 2019-10-28 NOTE — PROGRESS NOTES
Routine Office Visit    Patient Name: Peter Lopez    : 1935  MRN: [id number]    Subjective:  Peter is a 83 y.o. male who presents today for:   Chief Complaint   Patient presents with    Results    Cough     1 week onset on symptoms    Fever    Hoarse    Urinary Frequency     83-year-old male who recently completed treatment for hepatitis-C comes in for his sustained virologic response 12 results.  Labs were done last week.  The patient is also reporting that for the last week to week and a half, he has been having some chest congestion cough, sneezing, feeling feverish, having some chills but minor, and some body aches.  No headaches reported.  He reports some gastrointestinal symptoms.  He does report that for the same amount time has been urinating a lot more than normal.  He reports no pain with urination or blood in his urine or foul-smelling urine.    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 No pertinent surgical history.     Family History  Family History   Problem Relation Age of Onset    Cancer Mother     Asbestos Father     Liver disease Neg Hx        Social History  Social History     Socioeconomic History    Marital status:      Spouse name: Not on file    Number of children: Not on file    Years of education: Not on file    Highest education level: Not on file   Occupational History    Not on file   Social Needs    Financial resource strain: Not on file    Food insecurity:     Worry: Not on file     Inability: Not on file    Transportation needs:     Medical: Not on file     Non-medical: Not on file   Tobacco Use    Smoking status: Former Smoker    Smokeless tobacco: Former User     Quit date: 1986   Substance and Sexual Activity    Alcohol use: Yes     Alcohol/week: 0.0  standard drinks     Comment: 2 bottles beer on weekends    Drug use: No    Sexual activity: Yes     Partners: Female   Lifestyle    Physical activity:     Days per week: Not on file     Minutes per session: Not on file    Stress: Not on file   Relationships    Social connections:     Talks on phone: Not on file     Gets together: Not on file     Attends Ep[employer] service: Not on file     Active member of club or organization: Not on file     Attends meetings of clubs or organizations: Not on file     Relationship status: Not on file   Other Topics Concern    Not on file   Social History Narrative    , resides in [place]. 7 children but lost one. 16 grandchildren. 4 great-grandchildren. Works at [employer]       Current Medications  Current Outpatient Medications on File Prior to Visit   Medication Sig Dispense Refill    albuterol (PROAIR HFA) 90 mcg/actuation inhaler Inhale 2 puffs into the lungs every 4 (four) hours as needed (cough/shortness of breath). Rescue 1 Inhaler 2    ciclopirox (PENLAC) 8 % Soln Apply topically nightly. 1 Bottle 3    glimepiride (AMARYL) 4 MG tablet Take 1 tablet (4 mg total) by mouth once daily. 90 tablet 2    ipratropium (ATROVENT) 42 mcg (0.06 %) nasal spray 2 sprays by Nasal route 4 (four) times daily. 15 mL 0    loratadine (CLARITIN) 10 mg tablet Take 1 tablet (10 mg total) by mouth once daily. 30 tablet 1    metFORMIN (GLUCOPHAGE) 1000 MG tablet TAKE 1 TABLET BY MOUTH 2 TIMES DAILY WITH MEAL 180 tablet 1    olmesartan-hydrochlorothiazide (BENICAR HCT) 40-25 mg per tablet Take 1 tablet by mouth once daily. 90 tablet 1    ranitidine (ZANTAC) 150 MG tablet Take 1 tablet (150 mg total) by mouth nightly. 30 tablet 2    sildenafil (VIAGRA) 50 MG tablet Take 1 tablet (50 mg total) by mouth daily as needed for Erectile Dysfunction. 1 hour before sex, maximum of 1 tablet in 24 hours 10 tablet 5    [DISCONTINUED] fluticasone (FLONASE) 50 mcg/actuation nasal  "spray 1 spray (50 mcg total) by Each Nare route once daily. 15.8 mL 1     No current facility-administered medications on file prior to visit.        Allergies   Review of patient's allergies indicates:  No Known Allergies    Review of Systems   Constitutional: Positive for chills, fatigue and fever.   HENT: Positive for congestion, postnasal drip, rhinorrhea, sinus pressure and sore throat. Negative for ear discharge.    Eyes: Negative for discharge.   Respiratory: Positive for cough (productive). Negative for shortness of breath and wheezing.    Cardiovascular: Negative for palpitations.   Gastrointestinal: Negative for abdominal pain, blood in stool, constipation and diarrhea.   Genitourinary: Negative for dysuria.   Skin: Negative for rash.     BP (!) 140/84 (BP Location: Left arm, Patient Position: Sitting, BP Method: Medium (Automatic))   Pulse 95   Temp 99.7 °F (37.6 °C) (Oral)   Resp 16   Ht 5' 10" (1.778 m)   Wt 79.1 kg (174 lb 6.1 oz)   SpO2 96%   BMI 25.02 kg/m²     Physical Exam   Constitutional: He appears well-developed. No distress.   HENT:   Head: Normocephalic and atraumatic.   Right Ear: Tympanic membrane, external ear and ear canal normal.   Left Ear: Tympanic membrane, external ear and ear canal normal.   Nose: Mucosal edema and rhinorrhea present.  No foreign bodies. Right sinus exhibits no maxillary sinus tenderness. Left sinus exhibits no maxillary sinus tenderness.   Mouth/Throat: Posterior oropharyngeal erythema present.   Eyes: Pupils are equal, round, and reactive to light. EOM and lids are normal.   Neck: Trachea normal and normal range of motion. No tracheal tenderness present. No thyroid mass present.   Cardiovascular: Normal rate, regular rhythm, normal heart sounds and intact distal pulses.   Pulmonary/Chest: Effort normal and breath sounds normal. No respiratory distress. He has no wheezes. He has no rales.   Lymphadenopathy:     He has no cervical adenopathy.   Psychiatric: " He has a normal mood and affect. His behavior is normal.   Vitals reviewed.        Assessment/Plan:  Peter was seen today for results, cough, fever, hoarse and urinary frequency.    Diagnoses and all orders for this visit:    Common cold  -     POCT Influenza A/B Molecular  -     fluticasone propionate (FLONASE) 50 mcg/actuation nasal spray; 1 spray (50 mcg total) by Each Nostril route once daily.  -     benzonatate (TESSALON) 100 MG capsule; Take 1 capsule (100 mg total) by mouth 3 (three) times daily as needed for Cough.  Flu was ruled out with rapid testing.  Discussed with patient that his symptoms are consistent with a cold.  Advised patient to continue taking Coricidin and may add Tessalon.  Flonase for nasal congestion relief.  Advised patient to keep well hydrated and may make a pasta tea with lemon and honey to help with symptoms as well.  Patient is to be receiving if he has worsening symptoms, or if not improved another week.    Urinary frequency  -     POCT URINE DIPSTICK WITH MICROSCOPE, AUTOMATED  Advised patient to stay well hydrated.  Discussed with him that frequency may be from recent illness.    Essential hypertension  Continue current regimen.    Diabetes mellitus with nephropathy  Continue current regimen.    Chronic hepatitis C without hepatic coma  Lab shows the patient has achieved SVR12.  Given that he had an F3 score for fibrosis when treatment was initiated, will need yearly ultrasound              -Lucas Lloyd Jr., MD, AAHIVS          This office note has been dictated.  This dictation has been generated using M-Modal Fluency Direct dictation; some phonetic errors may occur.

## 2019-11-08 ENCOUNTER — OFFICE VISIT (OUTPATIENT)
Dept: FAMILY MEDICINE | Facility: CLINIC | Age: 84
End: 2019-11-08
Payer: MEDICARE

## 2019-11-08 VITALS
HEIGHT: 70 IN | WEIGHT: 174.38 LBS | HEART RATE: 53 BPM | OXYGEN SATURATION: 97 % | RESPIRATION RATE: 16 BRPM | SYSTOLIC BLOOD PRESSURE: 124 MMHG | TEMPERATURE: 98 F | BODY MASS INDEX: 24.97 KG/M2 | DIASTOLIC BLOOD PRESSURE: 72 MMHG

## 2019-11-08 DIAGNOSIS — J20.9 ACUTE BRONCHITIS, UNSPECIFIED ORGANISM: Primary | ICD-10-CM

## 2019-11-08 PROCEDURE — 99214 OFFICE O/P EST MOD 30 MIN: CPT | Mod: S$GLB,,, | Performed by: NURSE PRACTITIONER

## 2019-11-08 PROCEDURE — 3074F PR MOST RECENT SYSTOLIC BLOOD PRESSURE < 130 MM HG: ICD-10-PCS | Mod: CPTII,S$GLB,, | Performed by: NURSE PRACTITIONER

## 2019-11-08 PROCEDURE — 1101F PR PT FALLS ASSESS DOC 0-1 FALLS W/OUT INJ PAST YR: ICD-10-PCS | Mod: CPTII,S$GLB,, | Performed by: NURSE PRACTITIONER

## 2019-11-08 PROCEDURE — 3074F SYST BP LT 130 MM HG: CPT | Mod: CPTII,S$GLB,, | Performed by: NURSE PRACTITIONER

## 2019-11-08 PROCEDURE — 3078F PR MOST RECENT DIASTOLIC BLOOD PRESSURE < 80 MM HG: ICD-10-PCS | Mod: CPTII,S$GLB,, | Performed by: NURSE PRACTITIONER

## 2019-11-08 PROCEDURE — 3078F DIAST BP <80 MM HG: CPT | Mod: CPTII,S$GLB,, | Performed by: NURSE PRACTITIONER

## 2019-11-08 PROCEDURE — 99999 PR PBB SHADOW E&M-EST. PATIENT-LVL III: ICD-10-PCS | Mod: PBBFAC,,, | Performed by: NURSE PRACTITIONER

## 2019-11-08 PROCEDURE — 1101F PT FALLS ASSESS-DOCD LE1/YR: CPT | Mod: CPTII,S$GLB,, | Performed by: NURSE PRACTITIONER

## 2019-11-08 PROCEDURE — 99214 PR OFFICE/OUTPT VISIT, EST, LEVL IV, 30-39 MIN: ICD-10-PCS | Mod: S$GLB,,, | Performed by: NURSE PRACTITIONER

## 2019-11-08 PROCEDURE — 99999 PR PBB SHADOW E&M-EST. PATIENT-LVL III: CPT | Mod: PBBFAC,,, | Performed by: NURSE PRACTITIONER

## 2019-11-08 RX ORDER — BENZONATATE 100 MG/1
100 CAPSULE ORAL 3 TIMES DAILY PRN
Qty: 30 CAPSULE | Refills: 0 | Status: SHIPPED | OUTPATIENT
Start: 2019-11-08 | End: 2019-11-18

## 2019-11-08 RX ORDER — AZITHROMYCIN 250 MG/1
TABLET, FILM COATED ORAL
Qty: 6 TABLET | Refills: 0 | Status: SHIPPED | OUTPATIENT
Start: 2019-11-08 | End: 2019-11-13

## 2019-11-08 NOTE — PROGRESS NOTES
Routine Office Visit    Patient Name: Peter Lopez    : 1935  MRN: [id number]    Chief Complaint:  Cough    Subjective:  Peter is a 83 y.o. male who presents today for:    1.  Cough - patient presents today for evaluation of a cough and chest congestion. Was treated by his PCP for cold, but states that he is still coughing.  Endorses chest congestion as well as productive cough which has been stable for the last 2-3 weeks.  He denies any fevers or chills.  Denies any associated wheezing, chest pain, palpitations, or shortness of breath.  He works at [employer] and states that multiple people have been sick with similar symptoms.  He denies any shortness of breath or wheezing but does state that he has been using albuterol once per day for the last 3-4 months every morning.  He is a former smoker states that he smoked 1 pack per day for 40 years, but quit about 30 years ago.  He denies any chronic sputum production or consecutive coughing for 3 months and 2 consecutive years.  Denies any nasal congestion, runny nose, postnasal drip, sinus congestion, but he does report a slight sore throat which he attributes to the coughing.  Patient is known to me.    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 No pertinent surgical history.    Family History  Family History   Problem Relation Age of Onset    Cancer Mother     Asbestos Father     Liver disease Neg Hx        Social History  Social History     Socioeconomic History    Marital status:      Spouse name: Not on file    Number of children: Not on file    Years of education: Not on file    Highest education level: Not on file   Occupational History    Not on file   Social Needs    Financial resource strain: Not on file    Food insecurity:     Worry: Not on  file     Inability: Not on file    Transportation needs:     Medical: Not on file     Non-medical: Not on file   Tobacco Use    Smoking status: Former Smoker     Packs/day: 1.00     Years: 40.00     Pack years: 40.00     Last attempt to quit: 1989     Years since quittin.0    Smokeless tobacco: Former User     Quit date: 1986   Substance and Sexual Activity    Alcohol use: Yes     Alcohol/week: 0.0 standard drinks     Comment: 2 bottles beer on weekends    Drug use: No    Sexual activity: Yes     Partners: Female   Lifestyle    Physical activity:     Days per week: Not on file     Minutes per session: Not on file    Stress: Not on file   Relationships    Social connections:     Talks on phone: Not on file     Gets together: Not on file     Attends Yazidi service: Not on file     Active member of club or organization: Not on file     Attends meetings of clubs or organizations: Not on file     Relationship status: Not on file   Other Topics Concern    Not on file   Social History Narrative    , resides in [place]. 7 children but lost one. 16 grandchildren. 4 great-grandchildren. Works at [employer]       Current Medications  Current Outpatient Medications on File Prior to Visit   Medication Sig Dispense Refill    albuterol (PROAIR HFA) 90 mcg/actuation inhaler Inhale 2 puffs into the lungs every 4 (four) hours as needed (cough/shortness of breath). Rescue 1 Inhaler 2    [] benzonatate (TESSALON) 100 MG capsule Take 1 capsule (100 mg total) by mouth 3 (three) times daily as needed for Cough. 30 capsule 1    ciclopirox (PENLAC) 8 % Soln Apply topically nightly. 1 Bottle 3    fluticasone propionate (FLONASE) 50 mcg/actuation nasal spray 1 spray (50 mcg total) by Each Nostril route once daily. 15.8 mL 1    glimepiride (AMARYL) 4 MG tablet Take 1 tablet (4 mg total) by mouth once daily. 90 tablet 2    ipratropium (ATROVENT) 42 mcg (0.06 %) nasal spray 2 sprays by  "Nasal route 4 (four) times daily. 15 mL 0    loratadine (CLARITIN) 10 mg tablet Take 1 tablet (10 mg total) by mouth once daily. 30 tablet 1    metFORMIN (GLUCOPHAGE) 1000 MG tablet TAKE 1 TABLET BY MOUTH 2 TIMES DAILY WITH MEAL 180 tablet 1    olmesartan-hydrochlorothiazide (BENICAR HCT) 40-25 mg per tablet Take 1 tablet by mouth once daily. 90 tablet 1    ranitidine (ZANTAC) 150 MG tablet Take 1 tablet (150 mg total) by mouth nightly. 30 tablet 2    sildenafil (VIAGRA) 50 MG tablet Take 1 tablet (50 mg total) by mouth daily as needed for Erectile Dysfunction. 1 hour before sex, maximum of 1 tablet in 24 hours 10 tablet 5     No current facility-administered medications on file prior to visit.        Allergies   Review of patient's allergies indicates:  No Known Allergies    Review of Systems (Pertinent positives)  Review of Systems   Constitutional: Negative for chills, diaphoresis, fever, malaise/fatigue and weight loss.   HENT: Positive for sore throat. Negative for congestion, ear discharge, ear pain, hearing loss, nosebleeds, sinus pain and tinnitus.    Eyes: Negative.    Respiratory: Positive for cough and sputum production. Negative for hemoptysis, shortness of breath, wheezing and stridor.    Cardiovascular: Negative for chest pain, palpitations, orthopnea, claudication, leg swelling and PND.   Gastrointestinal: Negative for abdominal pain, blood in stool, constipation, diarrhea, heartburn, melena, nausea and vomiting.   Genitourinary: Negative.    Musculoskeletal: Negative.    Skin: Negative.    Neurological: Negative for dizziness, tingling, tremors, sensory change, speech change, focal weakness, seizures, loss of consciousness, weakness and headaches.   Endo/Heme/Allergies: Negative.    Psychiatric/Behavioral: Negative.        /72 (BP Location: Right arm, Patient Position: Sitting, BP Method: Large (Manual))   Pulse (!) 53   Temp 97.7 °F (36.5 °C) (Oral)   Resp 16   Ht 5' 10" (1.778 m)   " Wt 79.1 kg (174 lb 6.1 oz)   SpO2 97%   BMI 25.02 kg/m²     Physical Exam   Constitutional: He is oriented to person, place, and time. He appears well-developed and well-nourished. No distress.   HENT:   Head: Normocephalic and atraumatic.   Right Ear: Tympanic membrane, external ear and ear canal normal.   Left Ear: Tympanic membrane, external ear and ear canal normal.   Nose: Nose normal. No mucosal edema or rhinorrhea. Right sinus exhibits no maxillary sinus tenderness and no frontal sinus tenderness. Left sinus exhibits no maxillary sinus tenderness and no frontal sinus tenderness.   Mouth/Throat: Uvula is midline, oropharynx is clear and moist and mucous membranes are normal. No oropharyngeal exudate, posterior oropharyngeal edema, posterior oropharyngeal erythema or tonsillar abscesses. Tonsils are 0 on the right. Tonsils are 0 on the left. No tonsillar exudate.   Eyes: Pupils are equal, round, and reactive to light. Conjunctivae and EOM are normal.   Neck: Normal range of motion. Neck supple. No JVD present.   Cardiovascular: Normal rate, regular rhythm, normal heart sounds and intact distal pulses. Exam reveals no gallop and no friction rub.   No murmur heard.  Clinically euvolemic no JVD no lower extremity swelling   Pulmonary/Chest: Effort normal and breath sounds normal. No stridor. No respiratory distress. He has no wheezes. He has no rales. He exhibits no tenderness.   Abdominal: Soft. Bowel sounds are normal. He exhibits no distension and no mass. There is no tenderness. There is no rebound and no guarding. No hernia.   Musculoskeletal: Normal range of motion.   Lymphadenopathy:     He has no cervical adenopathy.   Neurological: He is alert and oriented to person, place, and time.   Skin: Skin is warm and dry. Capillary refill takes less than 2 seconds. He is not diaphoretic.   Vitals reviewed.       Assessment/Plan:  Peter Lopez is a 83 y.o. male who presents today for :    Peter was seen today for chest  congestion.    Diagnoses and all orders for this visit:    Acute bronchitis, unspecified organism  -     benzonatate (TESSALON) 100 MG capsule; Take 1 capsule (100 mg total) by mouth 3 (three) times daily as needed for Cough.  -     azithromycin (Z-BARAK) 250 MG tablet; Take 2 tablets by mouth on day 1; Take 1 tablet by mouth on days 2-5    Can use over-the-counter Mucinex for the symptoms.  Will add Tessalon since patient states that this worked for him in the past.  He is a former smoker and smoked for a long time.  Will treat with Z-Barak at this time because patient states that this helps with his coughing.  No need for systemic steroids at this time.  If this coughing does not improve in the next 3-4 days he will need to follow-up to consider chest x-ray/PFTs or other imaging/tests.  I educated patient that albuterol is an as needed medicine and he should use it every 4-6 hours as needed for wheezing or shortness of breath, not on a daily basis.  Encouraged patient to drink plenty of water.  Use warm salt water gargles or tea with honey as needed for sore throat.  Patient verbalized understanding of instructions.        This office note has been dictated.  This dictation has been generated using M-Modal Fluency Direct dictation; some phonetic errors may occur.   My collaborating physician is Dr. Suresh Can.

## 2019-11-11 ENCOUNTER — OFFICE VISIT (OUTPATIENT)
Dept: FAMILY MEDICINE | Facility: CLINIC | Age: 84
End: 2019-11-11
Payer: MEDICARE

## 2019-11-11 VITALS
HEIGHT: 70 IN | HEART RATE: 62 BPM | OXYGEN SATURATION: 96 % | RESPIRATION RATE: 16 BRPM | SYSTOLIC BLOOD PRESSURE: 124 MMHG | DIASTOLIC BLOOD PRESSURE: 52 MMHG | TEMPERATURE: 98 F | WEIGHT: 174.38 LBS | BODY MASS INDEX: 24.97 KG/M2

## 2019-11-11 DIAGNOSIS — R39.12 WEAK URINE STREAM: ICD-10-CM

## 2019-11-11 DIAGNOSIS — I49.9 IRREGULAR HEART RHYTHM: ICD-10-CM

## 2019-11-11 DIAGNOSIS — I49.3 FREQUENT PVCS: ICD-10-CM

## 2019-11-11 DIAGNOSIS — R97.20 ELEVATED PSA: Primary | ICD-10-CM

## 2019-11-11 LAB
BACTERIA #/AREA URNS HPF: NORMAL /HPF
BILIRUB UR QL STRIP: NEGATIVE
CLARITY UR: CLEAR
COLOR UR: YELLOW
GLUCOSE UR QL STRIP: NEGATIVE
HGB UR QL STRIP: NEGATIVE
KETONES UR QL STRIP: NEGATIVE
LEUKOCYTE ESTERASE UR QL STRIP: NEGATIVE
MICROSCOPIC COMMENT: NORMAL
NITRITE UR QL STRIP: NEGATIVE
PH UR STRIP: 5 [PH] (ref 5–8)
PROT UR QL STRIP: NEGATIVE
SP GR UR STRIP: 1.02 (ref 1–1.03)
SQUAMOUS #/AREA URNS HPF: 1 /HPF
URN SPEC COLLECT METH UR: ABNORMAL
UROBILINOGEN UR STRIP-ACNC: ABNORMAL EU/DL
WBC #/AREA URNS HPF: 1 /HPF (ref 0–5)

## 2019-11-11 PROCEDURE — 99999 PR PBB SHADOW E&M-EST. PATIENT-LVL IV: ICD-10-PCS | Mod: PBBFAC,,, | Performed by: NURSE PRACTITIONER

## 2019-11-11 PROCEDURE — 93005 EKG 12-LEAD: ICD-10-PCS | Mod: S$GLB,,, | Performed by: NURSE PRACTITIONER

## 2019-11-11 PROCEDURE — 93005 ELECTROCARDIOGRAM TRACING: CPT | Mod: S$GLB,,, | Performed by: NURSE PRACTITIONER

## 2019-11-11 PROCEDURE — 87086 URINE CULTURE/COLONY COUNT: CPT

## 2019-11-11 PROCEDURE — 1101F PT FALLS ASSESS-DOCD LE1/YR: CPT | Mod: CPTII,S$GLB,, | Performed by: NURSE PRACTITIONER

## 2019-11-11 PROCEDURE — 99214 PR OFFICE/OUTPT VISIT, EST, LEVL IV, 30-39 MIN: ICD-10-PCS | Mod: S$GLB,,, | Performed by: NURSE PRACTITIONER

## 2019-11-11 PROCEDURE — 99214 OFFICE O/P EST MOD 30 MIN: CPT | Mod: S$GLB,,, | Performed by: NURSE PRACTITIONER

## 2019-11-11 PROCEDURE — 3078F PR MOST RECENT DIASTOLIC BLOOD PRESSURE < 80 MM HG: ICD-10-PCS | Mod: CPTII,S$GLB,, | Performed by: NURSE PRACTITIONER

## 2019-11-11 PROCEDURE — 3078F DIAST BP <80 MM HG: CPT | Mod: CPTII,S$GLB,, | Performed by: NURSE PRACTITIONER

## 2019-11-11 PROCEDURE — 93010 ELECTROCARDIOGRAM REPORT: CPT | Mod: S$GLB,,, | Performed by: INTERNAL MEDICINE

## 2019-11-11 PROCEDURE — 1101F PR PT FALLS ASSESS DOC 0-1 FALLS W/OUT INJ PAST YR: ICD-10-PCS | Mod: CPTII,S$GLB,, | Performed by: NURSE PRACTITIONER

## 2019-11-11 PROCEDURE — 81000 URINALYSIS NONAUTO W/SCOPE: CPT

## 2019-11-11 PROCEDURE — 99999 PR PBB SHADOW E&M-EST. PATIENT-LVL IV: CPT | Mod: PBBFAC,,, | Performed by: NURSE PRACTITIONER

## 2019-11-11 PROCEDURE — 3074F PR MOST RECENT SYSTOLIC BLOOD PRESSURE < 130 MM HG: ICD-10-PCS | Mod: CPTII,S$GLB,, | Performed by: NURSE PRACTITIONER

## 2019-11-11 PROCEDURE — 93010 EKG 12-LEAD: ICD-10-PCS | Mod: S$GLB,,, | Performed by: INTERNAL MEDICINE

## 2019-11-11 PROCEDURE — 3074F SYST BP LT 130 MM HG: CPT | Mod: CPTII,S$GLB,, | Performed by: NURSE PRACTITIONER

## 2019-11-11 NOTE — PROGRESS NOTES
Routine Office Visit    Patient Name: Peter Lopez    : 1935  MRN: [id number]    Chief Complaint:  Urine problem    Subjective:  Peter is a 83 y.o. male who presents today for:    1.  Urine problem - patient reports today for evaluation of urinary issues.  States that for the past 2 or 3 years he has had intermittent weakened urinary stream.  He also endorses postvoid dribbling as well which has worsened in the last 3-4 weeks.  Denies any color change to the urine, foul smell to the urine, dysuria, hematuria, flank pain.  Reports occasional nocturia as well.  Denies any nausea, vomiting, diarrhea, or abdominal pain.  He did see urologist in the past for markedly elevated PSA, but he refused biopsy and further workup at that time.  He has not seen a urologist since then.  He denies any chest pain, palpitations, shortness of breath, or wheezing.  Denies any fevers or chills.  Patient is known to me and this is the extent of his concerns at this time.    Past Medical History  Past Medical History:   Diagnosis Date    Chronic hepatitis C without mention of hepatic coma     genotype 1b; treatment naive    Diabetes mellitus type I     Hypertension        Past Surgical History  No past surgical history on file.    Family History  Family History   Problem Relation Age of Onset    Cancer Mother     Asbestos Father     Liver disease Neg Hx        Social History  Social History     Socioeconomic History    Marital status:      Spouse name: Not on file    Number of children: Not on file    Years of education: Not on file    Highest education level: Not on file   Occupational History    Not on file   Social Needs    Financial resource strain: Not on file    Food insecurity:     Worry: Not on file     Inability: Not on file    Transportation needs:     Medical: Not on file     Non-medical: Not on file   Tobacco Use    Smoking status: Former Smoker     Packs/day: 1.00     Years: 40.00     Pack years: 40.00      Last attempt to quit: 1989     Years since quittin.0    Smokeless tobacco: Former User     Quit date: 1986   Substance and Sexual Activity    Alcohol use: Yes     Alcohol/week: 0.0 standard drinks     Comment: 2 bottles beer on weekends    Drug use: No    Sexual activity: Yes     Partners: Female   Lifestyle    Physical activity:     Days per week: Not on file     Minutes per session: Not on file    Stress: Not on file   Relationships    Social connections:     Talks on phone: Not on file     Gets together: Not on file     Attends Mandaen service: Not on file     Active member of club or organization: Not on file     Attends meetings of clubs or organizations: Not on file     Relationship status: Not on file   Other Topics Concern    Not on file   Social History Narrative    , resides in [place]. 7 children but lost one. 16 grandchildren. 4 great-grandchildren. Works at [employer]       Current Medications  Current Outpatient Medications on File Prior to Visit   Medication Sig Dispense Refill    albuterol (PROAIR HFA) 90 mcg/actuation inhaler Inhale 2 puffs into the lungs every 4 (four) hours as needed (cough/shortness of breath). Rescue 1 Inhaler 2    azithromycin (Z-BARAK) 250 MG tablet Take 2 tablets by mouth on day 1; Take 1 tablet by mouth on days 2-5 6 tablet 0    benzonatate (TESSALON) 100 MG capsule Take 1 capsule (100 mg total) by mouth 3 (three) times daily as needed for Cough. 30 capsule 0    ciclopirox (PENLAC) 8 % Soln Apply topically nightly. 1 Bottle 3    fluticasone propionate (FLONASE) 50 mcg/actuation nasal spray 1 spray (50 mcg total) by Each Nostril route once daily. 15.8 mL 1    glimepiride (AMARYL) 4 MG tablet Take 1 tablet (4 mg total) by mouth once daily. 90 tablet 2    ipratropium (ATROVENT) 42 mcg (0.06 %) nasal spray 2 sprays by Nasal route 4 (four) times daily. 15 mL 0    loratadine (CLARITIN) 10 mg tablet Take 1 tablet (10 mg total) by  "mouth once daily. 30 tablet 1    metFORMIN (GLUCOPHAGE) 1000 MG tablet TAKE 1 TABLET BY MOUTH 2 TIMES DAILY WITH MEAL 180 tablet 1    olmesartan-hydrochlorothiazide (BENICAR HCT) 40-25 mg per tablet Take 1 tablet by mouth once daily. 90 tablet 1    ranitidine (ZANTAC) 150 MG tablet Take 1 tablet (150 mg total) by mouth nightly. 30 tablet 2    sildenafil (VIAGRA) 50 MG tablet Take 1 tablet (50 mg total) by mouth daily as needed for Erectile Dysfunction. 1 hour before sex, maximum of 1 tablet in 24 hours 10 tablet 5     No current facility-administered medications on file prior to visit.        Allergies   Review of patient's allergies indicates:  No Known Allergies    Review of Systems (Pertinent positives)  Review of Systems   Constitutional: Negative for chills, diaphoresis, fever, malaise/fatigue and weight loss.   HENT: Negative.    Eyes: Negative.    Respiratory: Negative for cough, hemoptysis, sputum production, shortness of breath and wheezing.    Cardiovascular: Negative for chest pain, palpitations, orthopnea, claudication, leg swelling and PND.   Gastrointestinal: Negative.    Genitourinary: Negative for dysuria, flank pain, frequency, hematuria and urgency.        +weakened urinary stream, +PV dribbling   Musculoskeletal: Negative for back pain, falls, joint pain, myalgias and neck pain.   Skin: Negative.    Neurological: Negative for dizziness, tingling, tremors, sensory change, speech change, focal weakness, seizures, loss of consciousness, weakness and headaches.   Endo/Heme/Allergies: Negative.    Psychiatric/Behavioral: Negative.        BP (!) 124/52 (BP Location: Right arm)   Pulse 62   Temp 98.2 °F (36.8 °C) (Oral)   Resp 16   Ht 5' 10" (1.778 m)   Wt 79.1 kg (174 lb 6.1 oz)   SpO2 96%   BMI 25.02 kg/m²     Physical Exam   Constitutional: He is oriented to person, place, and time. He appears well-developed and well-nourished. No distress.   Eyes: Pupils are equal, round, and reactive to " light. Conjunctivae and EOM are normal.   Neck: Normal range of motion. Neck supple. No JVD present.   Cardiovascular: S1 normal, S2 normal and intact distal pulses. A regularly irregular rhythm present.  No extrasystoles are present. Exam reveals no gallop, no distant heart sounds, no friction rub and no decreased pulses.   No murmur heard.  Pulmonary/Chest: Effort normal and breath sounds normal. No stridor. No respiratory distress. He has no wheezes. He has no rales. He exhibits no tenderness.   Abdominal: Soft. Bowel sounds are normal. He exhibits no shifting dullness, no distension, no pulsatile liver, no fluid wave, no abdominal bruit, no ascites, no pulsatile midline mass and no mass. There is no hepatosplenomegaly. There is no tenderness. There is no rigidity, no rebound, no guarding, no CVA tenderness, no tenderness at McBurney's point and negative Wilson's sign. No hernia.   Musculoskeletal: Normal range of motion.   Lymphadenopathy:     He has no cervical adenopathy.   Neurological: He is alert and oriented to person, place, and time.   Skin: He is not diaphoretic.   Vitals reviewed.       Assessment/Plan:  Peter Lopez is a 83 y.o. male who presents today for :    Peter was seen today for urinary retention.    Diagnoses and all orders for this visit:    Elevated PSA  -     Ambulatory referral to Urology  -     Urine culture  -     URINALYSIS    Weak urine stream  -     Urine culture  -     URINALYSIS    Irregular heart rhythm  -     IN OFFICE EKG 12-LEAD (to Muse)     Previous PSA was markedly elevated, and the patient refused biopsy/further workup at that time.  Will recheck urine culture and urinalysis and place a referral to Urology.  No abdominal or urinary distention noted on examination.  I talked at length with the patient regarding why his PSA may be at elevated, including prostate cancer or BPH.  Will rule out or rule in urinary tract infection with urine culture.  In the meantime if patient develops  any acute urinary retention or if he is unable to void, then he needs to go to the emergency room.    Patient had regularly irregular rhythm on examination.  A 12 lead EKG was done which showed frequent PVCs.  Patient denies any cardiac complaints.  Denies any chest pain, palpitations, shortness of breath, or wheezing.  No lower extremity swelling or JVD on examination.  Will refer to Cardiology for further evaluation.    He is to follow up or go to the emergency room in the meantime for any other acute or concerning signs or symptoms.    Patient verbalized understanding of instructions.        This office note has been dictated.  This dictation has been generated using M-Modal Fluency Direct dictation; some phonetic errors may occur.   My collaborating physician is Dr. Sruesh Can.

## 2019-11-13 ENCOUNTER — PATIENT OUTREACH (OUTPATIENT)
Dept: ADMINISTRATIVE | Facility: OTHER | Age: 84
End: 2019-11-13

## 2019-11-13 ENCOUNTER — TELEPHONE (OUTPATIENT)
Dept: FAMILY MEDICINE | Facility: CLINIC | Age: 84
End: 2019-11-13

## 2019-11-13 LAB — BACTERIA UR CULT: NORMAL

## 2019-11-13 NOTE — TELEPHONE ENCOUNTER
----- Message from Monroe Malave NP sent at 11/13/2019  8:29 AM CST -----  Please call patient and let him know that his urine studies are negative for any infection.  He needs to follow up with Urology as we had discussed.  Thank you

## 2019-11-15 ENCOUNTER — TELEPHONE (OUTPATIENT)
Dept: FAMILY MEDICINE | Facility: CLINIC | Age: 84
End: 2019-11-15

## 2019-11-15 NOTE — TELEPHONE ENCOUNTER
I attempted to contact the pt and I was unable to leave a message. This was my third attempt to contact the pt. Letter mailed to pt with results and recommendation.

## 2019-11-18 ENCOUNTER — OFFICE VISIT (OUTPATIENT)
Dept: UROLOGY | Facility: CLINIC | Age: 84
End: 2019-11-18
Payer: MEDICARE

## 2019-11-18 VITALS
SYSTOLIC BLOOD PRESSURE: 134 MMHG | WEIGHT: 174 LBS | HEIGHT: 70 IN | DIASTOLIC BLOOD PRESSURE: 90 MMHG | BODY MASS INDEX: 24.91 KG/M2

## 2019-11-18 DIAGNOSIS — R39.12 WEAK URINARY STREAM: ICD-10-CM

## 2019-11-18 DIAGNOSIS — R97.20 ELEVATED PSA: Primary | ICD-10-CM

## 2019-11-18 DIAGNOSIS — R33.9 INCOMPLETE BLADDER EMPTYING: ICD-10-CM

## 2019-11-18 LAB
BILIRUB SERPL-MCNC: NORMAL MG/DL
BLOOD URINE, POC: NORMAL
COLOR, POC UA: YELLOW
GLUCOSE UR QL STRIP: NORMAL
KETONES UR QL STRIP: NORMAL
LEUKOCYTE ESTERASE URINE, POC: NORMAL
NITRITE, POC UA: NORMAL
PH, POC UA: 6
PROTEIN, POC: NORMAL
SPECIFIC GRAVITY, POC UA: 1010
UROBILINOGEN, POC UA: NORMAL

## 2019-11-18 PROCEDURE — 81001 PR  URINALYSIS, AUTO, W/SCOPE: ICD-10-PCS | Mod: S$GLB,,, | Performed by: NURSE PRACTITIONER

## 2019-11-18 PROCEDURE — 3075F SYST BP GE 130 - 139MM HG: CPT | Mod: CPTII,S$GLB,, | Performed by: NURSE PRACTITIONER

## 2019-11-18 PROCEDURE — 3080F DIAST BP >= 90 MM HG: CPT | Mod: CPTII,S$GLB,, | Performed by: NURSE PRACTITIONER

## 2019-11-18 PROCEDURE — 1101F PR PT FALLS ASSESS DOC 0-1 FALLS W/OUT INJ PAST YR: ICD-10-PCS | Mod: CPTII,S$GLB,, | Performed by: NURSE PRACTITIONER

## 2019-11-18 PROCEDURE — 99204 OFFICE O/P NEW MOD 45 MIN: CPT | Mod: 25,S$GLB,, | Performed by: NURSE PRACTITIONER

## 2019-11-18 PROCEDURE — 3075F PR MOST RECENT SYSTOLIC BLOOD PRESS GE 130-139MM HG: ICD-10-PCS | Mod: CPTII,S$GLB,, | Performed by: NURSE PRACTITIONER

## 2019-11-18 PROCEDURE — 51798 PR MEAS,POST-VOID RES,US,NON-IMAGING: ICD-10-PCS | Mod: S$GLB,,, | Performed by: NURSE PRACTITIONER

## 2019-11-18 PROCEDURE — 51798 US URINE CAPACITY MEASURE: CPT | Mod: S$GLB,,, | Performed by: NURSE PRACTITIONER

## 2019-11-18 PROCEDURE — 1101F PT FALLS ASSESS-DOCD LE1/YR: CPT | Mod: CPTII,S$GLB,, | Performed by: NURSE PRACTITIONER

## 2019-11-18 PROCEDURE — 99204 PR OFFICE/OUTPT VISIT, NEW, LEVL IV, 45-59 MIN: ICD-10-PCS | Mod: 25,S$GLB,, | Performed by: NURSE PRACTITIONER

## 2019-11-18 PROCEDURE — 3080F PR MOST RECENT DIASTOLIC BLOOD PRESSURE >= 90 MM HG: ICD-10-PCS | Mod: CPTII,S$GLB,, | Performed by: NURSE PRACTITIONER

## 2019-11-18 PROCEDURE — 99999 PR PBB SHADOW E&M-EST. PATIENT-LVL III: CPT | Mod: PBBFAC,,, | Performed by: NURSE PRACTITIONER

## 2019-11-18 PROCEDURE — 99999 PR PBB SHADOW E&M-EST. PATIENT-LVL III: ICD-10-PCS | Mod: PBBFAC,,, | Performed by: NURSE PRACTITIONER

## 2019-11-18 PROCEDURE — 81001 URINALYSIS AUTO W/SCOPE: CPT | Mod: S$GLB,,, | Performed by: NURSE PRACTITIONER

## 2019-11-18 RX ORDER — TAMSULOSIN HYDROCHLORIDE 0.4 MG/1
0.4 CAPSULE ORAL DAILY
Qty: 30 CAPSULE | Refills: 11 | Status: SHIPPED | OUTPATIENT
Start: 2019-11-18 | End: 2020-05-12 | Stop reason: SDUPTHER

## 2019-11-18 NOTE — PROGRESS NOTES
Subjective:       Patient ID: Peter Lopez is a 83 y.o. male who is a new patient was referred by Monroe Malave NP for evaluation of an elevated PSA    Chief Complaint:   Chief Complaint   Patient presents with    Elevated PSA     Pt. needs new PSA orders but he would like to dicuss with the provider what exactly does the bloodwork show and what you're looking for when you have that tyoe of bloodwork drawn.. pt. states when he goes to the bathroom flow starts off strong but than its weak.. he feels when he goes to the bathroom that he isn't emptying all the way because he urinates but than has to stand there because of dribbles at the end        Elevated PSA  Patient is here with an elevated PSA. He has been seen by Rubens Modi and Ivan in the past for this--last seen by Dr. Love in 2/2016. PSA was 12.5 at that time. He declined prostate biopsy and further work up previously.    He has no personal history and no family history of prostate cancer.  He has no prior genitourinary history of hematuria, hematospermia, prostatitis, UTI, urolithiasis, previous  surgery. Also reports weak urinary stream, sensation of DANA and post void dribbling. Nocturia x 4-5. Symptoms began a few weeks ago. He is currently not taking any prostate medications. Denies dysuria, gross hematuria, or flank pain. No recent PSA available to me    Previous PSA values are :  Lab Results   Component Value Date    PSA 19.0 (H) 07/10/2017    PSA 12.5 (H) 01/11/2016    PSA 4.33 (H) 01/03/2013     UCx 11/11/19--negative    PVR (bladder scan) today - 137 ml      ACTIVE MEDICAL ISSUES:  Patient Active Problem List   Diagnosis    HTN (hypertension)    Hepatitis C    Diabetes mellitus type II, uncontrolled    Atherosclerosis of aorta       PAST MEDICAL HISTORY  Past Medical History:   Diagnosis Date    Chronic hepatitis C without mention of hepatic coma     genotype 1b; treatment naive    Diabetes mellitus type I     Hypertension        PAST  SURGICAL HISTORY:  History reviewed. No pertinent surgical history.    SOCIAL HISTORY:  Social History     Tobacco Use    Smoking status: Former Smoker     Packs/day: 1.00     Years: 40.00     Pack years: 40.00     Last attempt to quit: 1989     Years since quittin.0    Smokeless tobacco: Former User     Quit date: 1986   Substance Use Topics    Alcohol use: Yes     Alcohol/week: 0.0 standard drinks     Comment: 2 bottles beer on weekends    Drug use: No       FAMILY HISTORY:  Family History   Problem Relation Age of Onset    Cancer Mother     Asbestos Father     Liver disease Neg Hx        ALLERGIES AND MEDICATIONS: updated and reviewed.  Review of patient's allergies indicates:  No Known Allergies  Current Outpatient Medications   Medication Sig    albuterol (PROAIR HFA) 90 mcg/actuation inhaler Inhale 2 puffs into the lungs every 4 (four) hours as needed (cough/shortness of breath). Rescue    benzonatate (TESSALON) 100 MG capsule Take 1 capsule (100 mg total) by mouth 3 (three) times daily as needed for Cough.    ciclopirox (PENLAC) 8 % Soln Apply topically nightly.    fluticasone propionate (FLONASE) 50 mcg/actuation nasal spray 1 spray (50 mcg total) by Each Nostril route once daily.    glimepiride (AMARYL) 4 MG tablet Take 1 tablet (4 mg total) by mouth once daily.    ipratropium (ATROVENT) 42 mcg (0.06 %) nasal spray 2 sprays by Nasal route 4 (four) times daily.    loratadine (CLARITIN) 10 mg tablet Take 1 tablet (10 mg total) by mouth once daily.    metFORMIN (GLUCOPHAGE) 1000 MG tablet TAKE 1 TABLET BY MOUTH 2 TIMES DAILY WITH MEAL    olmesartan-hydrochlorothiazide (BENICAR HCT) 40-25 mg per tablet Take 1 tablet by mouth once daily.    ranitidine (ZANTAC) 150 MG tablet Take 1 tablet (150 mg total) by mouth nightly.    sildenafil (VIAGRA) 50 MG tablet Take 1 tablet (50 mg total) by mouth daily as needed for Erectile Dysfunction. 1 hour before sex, maximum of 1 tablet in 24  "hours    tamsulosin (FLOMAX) 0.4 mg Cap Take 1 capsule (0.4 mg total) by mouth once daily.     No current facility-administered medications for this visit.        Review of Systems   Constitutional: Negative for activity change, chills, fatigue, fever and unexpected weight change.   Eyes: Negative for discharge, redness and visual disturbance.   Respiratory: Negative for cough, shortness of breath and wheezing.    Cardiovascular: Negative for chest pain and leg swelling.   Gastrointestinal: Negative for abdominal distention, abdominal pain, constipation, diarrhea, nausea and vomiting.   Genitourinary: Positive for frequency and urgency. Negative for decreased urine volume, difficulty urinating, discharge, dysuria, flank pain, hematuria, penile swelling, scrotal swelling and testicular pain.   Musculoskeletal: Negative for arthralgias, joint swelling and myalgias.   Skin: Negative for color change and rash.   Neurological: Negative for dizziness and light-headedness.   Psychiatric/Behavioral: Negative for behavioral problems and confusion. The patient is not nervous/anxious.        Objective:      Vitals:    11/18/19 1327   BP: (!) 134/90   Weight: 78.9 kg (174 lb)   Height: 5' 10" (1.778 m)     Physical Exam   Constitutional: He is oriented to person, place, and time. He appears well-developed.   HENT:   Head: Normocephalic and atraumatic.   Nose: Nose normal.   Eyes: Conjunctivae are normal. Right eye exhibits no discharge. Left eye exhibits no discharge.   Neck: Normal range of motion. Neck supple. No tracheal deviation present. No thyromegaly present.   Cardiovascular: Normal rate and regular rhythm.    Pulmonary/Chest: Effort normal. No respiratory distress. He has no wheezes.   Abdominal: Soft. He exhibits no distension. There is no hepatosplenomegaly. There is no tenderness. There is no CVA tenderness. No hernia.   Genitourinary:   Genitourinary Comments: Patient declined exam   Musculoskeletal: Normal range " of motion. He exhibits no edema.   Neurological: He is alert and oriented to person, place, and time.   Skin: Skin is warm and dry. No rash noted. No erythema.     Psychiatric: He has a normal mood and affect. His behavior is normal. Judgment normal.       Urine dipstick shows negative for all components.  Micro exam: negative for WBC's or RBC's.    Assessment:       1. Elevated PSA    2. Weak urinary stream    3. Incomplete bladder emptying          Plan:       1. Elevated PSA   - Discussed the etiology of elevated PSA above age-corrected normal including BPH, infection/inflammation of the prostate, and prostate cancer. We had a long discussion regarding workup of elevated PSA.    - He understands that a prostate biopsy is indicated for definitive diagnosis of prostate cancer. Risks, benefits, and alternative of TRUS PBx were discussed thoroughly. Risks include, but are not limited to, pain, bleeding, infection, and sepsis. His pre-procedure regimen would require enema the morning of PBx and appropriate PO antibiotics for 3 days starting the day prior to procedure. He will also receive IM injection of antibiotics immediately before the procedure. He understands even after a prostate biopsy, prostate cancer can be missed and close follow up is necessary, with possible further imaging and/or repeat biopsy in the future.  -He agrees to having a prostate biopsy--will obtain current PSA now  -NIMISHA next visit  - POCT urinalysis, dipstick or tablet reag  - PROSTATE SPECIFIC ANTIGEN, DIAGNOSTIC; Future    2. Weak urinary stream  - POCT urinalysis, dipstick or tablet reag  - tamsulosin (FLOMAX) 0.4 mg Cap; Take 1 capsule (0.4 mg total) by mouth once daily.  Dispense: 30 capsule; Refill: 11    3. Incomplete bladder emptying  -DANA likely contributing to LUTS  -Trial of Flomax. Rx sent to pharmacy  - POCT Bladder Scan  - tamsulosin (FLOMAX) 0.4 mg Cap; Take 1 capsule (0.4 mg total) by mouth once daily.  Dispense: 30 capsule;  Refill: 11            Follow up in about 2 weeks (around 12/2/2019) for Review PSA.

## 2019-11-18 NOTE — LETTER
November 18, 2019      Monroe Malave, NP  [address]  [facility] [address]           [facility] [place], [place] - Urology  [address]  [place] [address]  Phone: [phone]  Fax: [phone]          Patient: Peter Lopez   MR Number: [id number]   YOB: 1935   Date of Visit: 11/18/2019       Dear Monroe Malave:    Thank you for referring Peter Lopez to me for evaluation. Attached you will find relevant portions of my assessment and plan of care.    If you have questions, please do not hesitate to call me. I look forward to following Peter Lopez along with you.    Sincerely,    Fozia Gaston NP    Enclosure  CC:  No Recipients    If you would like to receive this communication electronically, please contact [email or web address] or [phone] to request more information on [employer] access.    For providers and/or their staff who would like to refer a patient to [facility], please contact us through our one-stop-shop provider referral line, [facility] , at [phone].    If you feel you have received this communication in error or would no longer like to receive these types of communications, please e-mail [email or web address]

## 2019-11-19 ENCOUNTER — PATIENT OUTREACH (OUTPATIENT)
Dept: ADMINISTRATIVE | Facility: OTHER | Age: 84
End: 2019-11-19

## 2019-11-20 ENCOUNTER — OFFICE VISIT (OUTPATIENT)
Dept: CARDIOLOGY | Facility: CLINIC | Age: 84
End: 2019-11-20
Payer: MEDICARE

## 2019-11-20 VITALS
OXYGEN SATURATION: 97 % | DIASTOLIC BLOOD PRESSURE: 79 MMHG | HEART RATE: 103 BPM | HEIGHT: 70 IN | WEIGHT: 174.19 LBS | SYSTOLIC BLOOD PRESSURE: 127 MMHG | BODY MASS INDEX: 24.94 KG/M2

## 2019-11-20 DIAGNOSIS — I49.3 PVC (PREMATURE VENTRICULAR CONTRACTION): Primary | ICD-10-CM

## 2019-11-20 PROCEDURE — 1159F MED LIST DOCD IN RCRD: CPT | Mod: S$GLB,,, | Performed by: INTERNAL MEDICINE

## 2019-11-20 PROCEDURE — 1126F AMNT PAIN NOTED NONE PRSNT: CPT | Mod: S$GLB,,, | Performed by: INTERNAL MEDICINE

## 2019-11-20 PROCEDURE — 3074F SYST BP LT 130 MM HG: CPT | Mod: CPTII,S$GLB,, | Performed by: INTERNAL MEDICINE

## 2019-11-20 PROCEDURE — 1101F PR PT FALLS ASSESS DOC 0-1 FALLS W/OUT INJ PAST YR: ICD-10-PCS | Mod: CPTII,S$GLB,, | Performed by: INTERNAL MEDICINE

## 2019-11-20 PROCEDURE — 3078F DIAST BP <80 MM HG: CPT | Mod: CPTII,S$GLB,, | Performed by: INTERNAL MEDICINE

## 2019-11-20 PROCEDURE — 99204 PR OFFICE/OUTPT VISIT, NEW, LEVL IV, 45-59 MIN: ICD-10-PCS | Mod: S$GLB,,, | Performed by: INTERNAL MEDICINE

## 2019-11-20 PROCEDURE — 99999 PR PBB SHADOW E&M-EST. PATIENT-LVL III: CPT | Mod: PBBFAC,,, | Performed by: INTERNAL MEDICINE

## 2019-11-20 PROCEDURE — 1101F PT FALLS ASSESS-DOCD LE1/YR: CPT | Mod: CPTII,S$GLB,, | Performed by: INTERNAL MEDICINE

## 2019-11-20 PROCEDURE — 99204 OFFICE O/P NEW MOD 45 MIN: CPT | Mod: S$GLB,,, | Performed by: INTERNAL MEDICINE

## 2019-11-20 PROCEDURE — 99999 PR PBB SHADOW E&M-EST. PATIENT-LVL III: ICD-10-PCS | Mod: PBBFAC,,, | Performed by: INTERNAL MEDICINE

## 2019-11-20 PROCEDURE — 1126F PR PAIN SEVERITY QUANTIFIED, NO PAIN PRESENT: ICD-10-PCS | Mod: S$GLB,,, | Performed by: INTERNAL MEDICINE

## 2019-11-20 PROCEDURE — 3074F PR MOST RECENT SYSTOLIC BLOOD PRESSURE < 130 MM HG: ICD-10-PCS | Mod: CPTII,S$GLB,, | Performed by: INTERNAL MEDICINE

## 2019-11-20 PROCEDURE — 3078F PR MOST RECENT DIASTOLIC BLOOD PRESSURE < 80 MM HG: ICD-10-PCS | Mod: CPTII,S$GLB,, | Performed by: INTERNAL MEDICINE

## 2019-11-20 PROCEDURE — 1159F PR MEDICATION LIST DOCUMENTED IN MEDICAL RECORD: ICD-10-PCS | Mod: S$GLB,,, | Performed by: INTERNAL MEDICINE

## 2019-11-20 NOTE — LETTER
November 20, 2019      Monroe Malave, NP  [address]  [facility] [address]           [facility] [place], [place] - Cardiology  [address]  [place] [address]  Phone: [phone]          Patient: Peter Lopez   MR Number: [id number]   YOB: 1935   Date of Visit: 11/20/2019       Dear Monroe Malave:    Thank you for referring Peter Lopez to me for evaluation. Attached you will find relevant portions of my assessment and plan of care.    If you have questions, please do not hesitate to call me. I look forward to following Peter Lopez along with you.    Sincerely,    Sean Schafer MD    Enclosure  CC:  No Recipients    If you would like to receive this communication electronically, please contact [email or web address] or [phone] to request more information on [employer] Link access.    For providers and/or their staff who would like to refer a patient to [facility], please contact us through our one-stop-shop provider referral line, [facility], at [phone].    If you feel you have received this communication in error or would no longer like to receive these types of communications, please e-mail [email or web address]

## 2019-11-20 NOTE — PROGRESS NOTES
CARDIOVASCULAR CONSULTATION    REASON FOR CONSULT:   Peter Lopez is a 83 y.o. male who presents for evaluation of frequent PVCs.      HISTORY OF PRESENT ILLNESS:     Patient is 83-year-old pleasant person.  Has been referred to me for frequent PVCs.  We did an EKG in the clinic today and I did not see any frequent PVCs on this EKG.  I also reviewed his past EKGs which have been scanned in the computer and did not see any frequent PVCs.  Patient denies any palpitations patient denies any chest pains at rest on exertion, orthopnea, PND.  However he had an EKG performed on 11/11/2019 and as per the report that EKG showed frequent PVCs as per the notes.  However, it is not scanned in the system and I am unable to see it myself.    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    ALLERGIES AND MEDICATION:   Review of patient's allergies indicates:  No Known Allergies     Medication List           Accurate as of November 20, 2019  3:20 PM. If you have any questions, ask your nurse or doctor.               CONTINUE taking these medications    albuterol 90 mcg/actuation inhaler  Commonly known as:  ProAir HFA  Inhale 2 puffs into the lungs every 4 (four) hours as needed (cough/shortness of breath). Rescue     ciclopirox 8 % Soln  Commonly known as:  PENLAC  Apply topically nightly.     fluticasone propionate 50 mcg/actuation nasal spray  Commonly known as:  FLONASE  1 spray (50 mcg total) by Each Nostril route once daily.     glimepiride 4 MG tablet  Commonly known as:  AMARYL  Take 1 tablet (4 mg total) by mouth once daily.     ipratropium 42 mcg (0.06 %) nasal spray  Commonly known as:  ATROVENT  2 sprays by Nasal route 4 (four) times daily.     loratadine  10 mg tablet  Commonly known as:  CLARITIN  Take 1 tablet (10 mg total) by mouth once daily.     metFORMIN 1000 MG tablet  Commonly known as:  GLUCOPHAGE  TAKE 1 TABLET BY MOUTH 2 TIMES DAILY WITH MEAL     olmesartan-hydrochlorothiazide 40-25 mg per tablet  Commonly known as:  BENICAR HCT  Take 1 tablet by mouth once daily.     ranitidine 150 MG tablet  Commonly known as:  ZANTAC  Take 1 tablet (150 mg total) by mouth nightly.     sildenafil 50 MG tablet  Commonly known as:  VIAGRA  Take 1 tablet (50 mg total) by mouth daily as needed for Erectile Dysfunction. 1 hour before sex, maximum of 1 tablet in 24 hours     tamsulosin 0.4 mg Cap  Commonly known as:  FLOMAX  Take 1 capsule (0.4 mg total) by mouth once daily.            SOCIAL HISTORY:     Social History     Socioeconomic History    Marital status:      Spouse name: Not on file    Number of children: Not on file    Years of education: Not on file    Highest education level: Not on file   Occupational History    Not on file   Social Needs    Financial resource strain: Not on file    Food insecurity:     Worry: Not on file     Inability: Not on file    Transportation needs:     Medical: Not on file     Non-medical: Not on file   Tobacco Use    Smoking status: Former Smoker     Packs/day: 1.00     Years: 40.00     Pack years: 40.00     Last attempt to quit: 1989     Years since quittin.0    Smokeless tobacco: Former User     Quit date: 1986   Substance and Sexual Activity    Alcohol use: Yes     Alcohol/week: 0.0 standard drinks     Comment: 2 bottles beer on weekends    Drug use: No    Sexual activity: Yes     Partners: Female   Lifestyle    Physical activity:     Days per week: Not on file     Minutes per session: Not on file    Stress: Not on file   Relationships    Social connections:     Talks on phone: Not on file     Gets together: Not on file     Attends Muslim service: Not on file     Active member of club or  "organization: Not on file     Attends meetings of clubs or organizations: Not on file     Relationship status: Not on file   Other Topics Concern    Not on file   Social History Narrative    , resides in [place]. 7 children but lost one. 16 grandchildren. 4 great-grandchildren. Works at [employer]       FAMILY HISTORY:     Family History   Problem Relation Age of Onset    Cancer Mother     Asbestos Father     Liver disease Neg Hx        REVIEW OF SYSTEMS:   Review of Systems   Constitution: Negative.   HENT: Negative.    Eyes: Negative.    Cardiovascular: Negative.    Respiratory: Negative.    Endocrine: Negative.    Hematologic/Lymphatic: Negative.    Skin: Negative.    Musculoskeletal: Negative.    Gastrointestinal: Negative.    Genitourinary: Negative.    Neurological: Negative.    Psychiatric/Behavioral: Negative.    Allergic/Immunologic: Negative.        A 10 point review of systems was performed and all the pertinent positives have been mentioned. Rest of review of systems was negative.        PHYSICAL EXAM:   There were no vitals filed for this visit. Body mass index is 24.99 kg/m².  Weight: 79 kg (174 lb 2.6 oz)   Height: 5' 10" (177.8 cm)     Physical Exam   Constitutional: He is oriented to person, place, and time. He appears well-developed and well-nourished.   HENT:   Head: Normocephalic.   Eyes: Pupils are equal, round, and reactive to light. Conjunctivae are normal.   Neck: Normal range of motion. Neck supple.   Cardiovascular: Normal rate, regular rhythm and normal heart sounds.   Pulmonary/Chest: Effort normal and breath sounds normal.   Abdominal: Soft. Bowel sounds are normal.   Neurological: He is alert and oriented to person, place, and time.   Skin: Skin is warm.   Vitals reviewed.        DATA:     Laboratory:  CBC:  Recent Labs   Lab 10/01/18  0944 18  1420 04/15/19  1015   WBC 4.04 3.82 L 3.50 L   Hemoglobin 15.5 14.4 14.8   Hematocrit 48.4 46.0 45.6   Platelets " 299 353 H 369 H       CHEMISTRIES:  Recent Labs   Lab 07/22/19  1117 08/19/19  1154 10/21/19  0709   Glucose 176 H 108 111 H   Sodium 139 141 142   Potassium 3.8 4.1 4.8   BUN, Bld 21 21 23   Creatinine 1.3 1.3 1.2   eGFR if  59 A 59 A >60   eGFR if non  51 A 51 A 56 A   Calcium 9.7 10.3 10.0       CARDIAC BIOMARKERS:        COAGS:        LIPIDS/LFTS:  Recent Labs   Lab 07/10/17  1040 10/01/18  0944  06/24/19  1433 07/22/19  1117 08/19/19  1154 10/21/19  0709   Cholesterol 174 152  152  --   --   --  169  --    Triglycerides 125 143  143  --   --   --  154 H  --    HDL 52 55  55  --   --   --  48  --    LDL Cholesterol 97.0 68.4  68.4  --   --   --  90.2  --    Non-HDL Cholesterol 122 97  97  --   --   --  121  --    AST 39 55 H   < > 31 30  --  31   ALT 45 H 47 H   < > 26 26  --  25    < > = values in this interval not displayed.       Hemoglobin A1C   Date Value Ref Range Status   08/19/2019 6.1 (H) 4.0 - 5.6 % Final     Comment:     ADA Screening Guidelines:  5.7-6.4%  Consistent with prediabetes  >or=6.5%  Consistent with diabetes  High levels of fetal hemoglobin interfere with the HbA1C  assay. Heterozygous hemoglobin variants (HbS, HgC, etc)do  not significantly interfere with this assay.   However, presence of multiple variants may affect accuracy.     04/15/2019 6.1 (H) 4.0 - 5.6 % Final     Comment:     ADA Screening Guidelines:  5.7-6.4%  Consistent with prediabetes  >or=6.5%  Consistent with diabetes  High levels of fetal hemoglobin interfere with the HbA1C  assay. Heterozygous hemoglobin variants (HbS, HgC, etc)do  not significantly interfere with this assay.   However, presence of multiple variants may affect accuracy.     10/01/2018 6.1 (H) 4.0 - 5.6 % Final     Comment:     ADA Screening Guidelines:  5.7-6.4%  Consistent with prediabetes  >or=6.5%  Consistent with diabetes  High levels of fetal hemoglobin interfere with the HbA1C  assay. Heterozygous hemoglobin  variants (HbS, HgC, etc)do  not significantly interfere with this assay.   However, presence of multiple variants may affect accuracy.         TSH  Recent Labs   Lab 07/10/17  1040 10/01/18  0944   TSH 1.242 1.254       The ASCVD Risk score (Eligiobao HOGUE Jr., et al., 2013) failed to calculate for the following reasons:    The 2013 ASCVD risk score is only valid for ages 40 to 79           Cardiovascular Testing:    EKG: (personally reviewed tracing)  EKG performed in clinic today demonstrated sinus tachycardia.      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  Further evaluation with the help of a Holter monitor as well as a 2D echocardiogram.    Follow-up in clinic after this testing.          Thank you very much for involving me in the care of your patient.  Please do not hesitate to contact me if there are any questions.      Sean Schafer MD, FAC, [facility]  Interventional Cardiologist, [facility].           This note was dictated with the help of speech recognition software.  There might be un-intended errors and/or substitutions.

## 2019-11-25 ENCOUNTER — LAB VISIT (OUTPATIENT)
Dept: LAB | Facility: HOSPITAL | Age: 84
End: 2019-11-25
Attending: NURSE PRACTITIONER
Payer: MEDICARE

## 2019-11-25 DIAGNOSIS — R97.20 ELEVATED PSA: ICD-10-CM

## 2019-11-25 LAB — COMPLEXED PSA SERPL-MCNC: 54.8 NG/ML (ref 0–4)

## 2019-11-25 PROCEDURE — 36415 COLL VENOUS BLD VENIPUNCTURE: CPT

## 2019-11-25 PROCEDURE — 84153 ASSAY OF PSA TOTAL: CPT

## 2019-11-27 ENCOUNTER — PATIENT OUTREACH (OUTPATIENT)
Dept: ADMINISTRATIVE | Facility: OTHER | Age: 84
End: 2019-11-27

## 2019-12-02 ENCOUNTER — OFFICE VISIT (OUTPATIENT)
Dept: FAMILY MEDICINE | Facility: CLINIC | Age: 84
End: 2019-12-02
Payer: MEDICARE

## 2019-12-02 ENCOUNTER — OFFICE VISIT (OUTPATIENT)
Dept: UROLOGY | Facility: CLINIC | Age: 84
End: 2019-12-02
Payer: MEDICARE

## 2019-12-02 VITALS
BODY MASS INDEX: 25.77 KG/M2 | DIASTOLIC BLOOD PRESSURE: 80 MMHG | SYSTOLIC BLOOD PRESSURE: 130 MMHG | HEIGHT: 70 IN | WEIGHT: 180 LBS

## 2019-12-02 VITALS
OXYGEN SATURATION: 96 % | RESPIRATION RATE: 16 BRPM | BODY MASS INDEX: 25.81 KG/M2 | SYSTOLIC BLOOD PRESSURE: 138 MMHG | TEMPERATURE: 98 F | HEIGHT: 70 IN | HEART RATE: 102 BPM | DIASTOLIC BLOOD PRESSURE: 88 MMHG | WEIGHT: 180.31 LBS

## 2019-12-02 DIAGNOSIS — R97.20 ELEVATED PSA: Primary | ICD-10-CM

## 2019-12-02 DIAGNOSIS — I10 ESSENTIAL HYPERTENSION: ICD-10-CM

## 2019-12-02 DIAGNOSIS — Z23 NEED FOR VACCINATION: ICD-10-CM

## 2019-12-02 DIAGNOSIS — R33.9 INCOMPLETE BLADDER EMPTYING: ICD-10-CM

## 2019-12-02 DIAGNOSIS — I49.3 FREQUENT PVCS: ICD-10-CM

## 2019-12-02 DIAGNOSIS — R39.12 WEAK URINARY STREAM: ICD-10-CM

## 2019-12-02 DIAGNOSIS — K21.9 GASTROESOPHAGEAL REFLUX DISEASE, ESOPHAGITIS PRESENCE NOT SPECIFIED: ICD-10-CM

## 2019-12-02 PROCEDURE — 3079F PR MOST RECENT DIASTOLIC BLOOD PRESSURE 80-89 MM HG: ICD-10-PCS | Mod: CPTII,S$GLB,, | Performed by: FAMILY MEDICINE

## 2019-12-02 PROCEDURE — 1101F PR PT FALLS ASSESS DOC 0-1 FALLS W/OUT INJ PAST YR: ICD-10-PCS | Mod: CPTII,S$GLB,, | Performed by: FAMILY MEDICINE

## 2019-12-02 PROCEDURE — 99214 PR OFFICE/OUTPT VISIT, EST, LEVL IV, 30-39 MIN: ICD-10-PCS | Mod: 25,S$GLB,, | Performed by: NURSE PRACTITIONER

## 2019-12-02 PROCEDURE — 3075F PR MOST RECENT SYSTOLIC BLOOD PRESS GE 130-139MM HG: ICD-10-PCS | Mod: CPTII,S$GLB,, | Performed by: FAMILY MEDICINE

## 2019-12-02 PROCEDURE — 1126F AMNT PAIN NOTED NONE PRSNT: CPT | Mod: S$GLB,,, | Performed by: NURSE PRACTITIONER

## 2019-12-02 PROCEDURE — 3075F PR MOST RECENT SYSTOLIC BLOOD PRESS GE 130-139MM HG: ICD-10-PCS | Mod: CPTII,S$GLB,, | Performed by: NURSE PRACTITIONER

## 2019-12-02 PROCEDURE — 3079F DIAST BP 80-89 MM HG: CPT | Mod: CPTII,S$GLB,, | Performed by: FAMILY MEDICINE

## 2019-12-02 PROCEDURE — 90662 FLU VACCINE - HIGH DOSE (65+) PRESERVATIVE FREE IM: ICD-10-PCS | Mod: S$GLB,,, | Performed by: FAMILY MEDICINE

## 2019-12-02 PROCEDURE — 99999 PR PBB SHADOW E&M-EST. PATIENT-LVL III: ICD-10-PCS | Mod: PBBFAC,,, | Performed by: NURSE PRACTITIONER

## 2019-12-02 PROCEDURE — 87086 URINE CULTURE/COLONY COUNT: CPT

## 2019-12-02 PROCEDURE — 99214 OFFICE O/P EST MOD 30 MIN: CPT | Mod: 25,S$GLB,, | Performed by: FAMILY MEDICINE

## 2019-12-02 PROCEDURE — 3075F SYST BP GE 130 - 139MM HG: CPT | Mod: CPTII,S$GLB,, | Performed by: FAMILY MEDICINE

## 2019-12-02 PROCEDURE — 99214 OFFICE O/P EST MOD 30 MIN: CPT | Mod: 25,S$GLB,, | Performed by: NURSE PRACTITIONER

## 2019-12-02 PROCEDURE — 81001 POCT URINALYSIS, DIPSTICK OR TABLET REAGENT, AUTOMATED, WITH MICROSCOP: ICD-10-PCS | Mod: S$GLB,,, | Performed by: NURSE PRACTITIONER

## 2019-12-02 PROCEDURE — 1126F PR PAIN SEVERITY QUANTIFIED, NO PAIN PRESENT: ICD-10-PCS | Mod: S$GLB,,, | Performed by: NURSE PRACTITIONER

## 2019-12-02 PROCEDURE — 90662 IIV NO PRSV INCREASED AG IM: CPT | Mod: S$GLB,,, | Performed by: FAMILY MEDICINE

## 2019-12-02 PROCEDURE — 1159F PR MEDICATION LIST DOCUMENTED IN MEDICAL RECORD: ICD-10-PCS | Mod: S$GLB,,, | Performed by: NURSE PRACTITIONER

## 2019-12-02 PROCEDURE — G0008 ADMIN INFLUENZA VIRUS VAC: HCPCS | Mod: S$GLB,,, | Performed by: FAMILY MEDICINE

## 2019-12-02 PROCEDURE — 3079F DIAST BP 80-89 MM HG: CPT | Mod: CPTII,S$GLB,, | Performed by: NURSE PRACTITIONER

## 2019-12-02 PROCEDURE — 99999 PR PBB SHADOW E&M-EST. PATIENT-LVL III: ICD-10-PCS | Mod: PBBFAC,,, | Performed by: FAMILY MEDICINE

## 2019-12-02 PROCEDURE — 1159F MED LIST DOCD IN RCRD: CPT | Mod: S$GLB,,, | Performed by: NURSE PRACTITIONER

## 2019-12-02 PROCEDURE — G0008 FLU VACCINE - HIGH DOSE (65+) PRESERVATIVE FREE IM: ICD-10-PCS | Mod: S$GLB,,, | Performed by: FAMILY MEDICINE

## 2019-12-02 PROCEDURE — 3075F SYST BP GE 130 - 139MM HG: CPT | Mod: CPTII,S$GLB,, | Performed by: NURSE PRACTITIONER

## 2019-12-02 PROCEDURE — 1101F PR PT FALLS ASSESS DOC 0-1 FALLS W/OUT INJ PAST YR: ICD-10-PCS | Mod: CPTII,S$GLB,, | Performed by: NURSE PRACTITIONER

## 2019-12-02 PROCEDURE — 99999 PR PBB SHADOW E&M-EST. PATIENT-LVL III: CPT | Mod: PBBFAC,,, | Performed by: FAMILY MEDICINE

## 2019-12-02 PROCEDURE — 51798 US URINE CAPACITY MEASURE: CPT | Mod: S$GLB,,, | Performed by: NURSE PRACTITIONER

## 2019-12-02 PROCEDURE — 1101F PT FALLS ASSESS-DOCD LE1/YR: CPT | Mod: CPTII,S$GLB,, | Performed by: NURSE PRACTITIONER

## 2019-12-02 PROCEDURE — 3079F PR MOST RECENT DIASTOLIC BLOOD PRESSURE 80-89 MM HG: ICD-10-PCS | Mod: CPTII,S$GLB,, | Performed by: NURSE PRACTITIONER

## 2019-12-02 PROCEDURE — 1159F MED LIST DOCD IN RCRD: CPT | Mod: S$GLB,,, | Performed by: FAMILY MEDICINE

## 2019-12-02 PROCEDURE — 51798 PR MEAS,POST-VOID RES,US,NON-IMAGING: ICD-10-PCS | Mod: S$GLB,,, | Performed by: NURSE PRACTITIONER

## 2019-12-02 PROCEDURE — 1101F PT FALLS ASSESS-DOCD LE1/YR: CPT | Mod: CPTII,S$GLB,, | Performed by: FAMILY MEDICINE

## 2019-12-02 PROCEDURE — 99999 PR PBB SHADOW E&M-EST. PATIENT-LVL III: CPT | Mod: PBBFAC,,, | Performed by: NURSE PRACTITIONER

## 2019-12-02 PROCEDURE — 99214 PR OFFICE/OUTPT VISIT, EST, LEVL IV, 30-39 MIN: ICD-10-PCS | Mod: 25,S$GLB,, | Performed by: FAMILY MEDICINE

## 2019-12-02 PROCEDURE — 81001 URINALYSIS AUTO W/SCOPE: CPT | Mod: S$GLB,,, | Performed by: NURSE PRACTITIONER

## 2019-12-02 PROCEDURE — 1159F PR MEDICATION LIST DOCUMENTED IN MEDICAL RECORD: ICD-10-PCS | Mod: S$GLB,,, | Performed by: FAMILY MEDICINE

## 2019-12-02 RX ORDER — CIPROFLOXACIN 500 MG/1
500 TABLET ORAL 2 TIMES DAILY
Qty: 6 TABLET | Refills: 0 | Status: SHIPPED | OUTPATIENT
Start: 2019-12-02 | End: 2019-12-02 | Stop reason: SDUPTHER

## 2019-12-02 RX ORDER — CIPROFLOXACIN 500 MG/1
500 TABLET ORAL 2 TIMES DAILY
Qty: 6 TABLET | Refills: 0 | Status: SHIPPED | OUTPATIENT
Start: 2019-12-02 | End: 2019-12-05

## 2019-12-02 RX ORDER — FAMOTIDINE 20 MG/1
20 TABLET, FILM COATED ORAL DAILY
Qty: 30 TABLET | Refills: 1 | Status: ON HOLD | OUTPATIENT
Start: 2019-12-02 | End: 2020-04-22 | Stop reason: HOSPADM

## 2019-12-02 NOTE — PROGRESS NOTES
Routine Office Visit    Patient Name: Peter Lopez    : 1935  MRN: [id number]    Subjective:  Peter is a 83 y.o. male who presents today for:   Chief Complaint   Patient presents with    Benign Prostatic Hypertrophy    Cough    Heart Problem     83-year-old male comes in for follow-up on prostate issue, a cough, and a heart concern.  He states that he was found to have a large prostate, and was started on a medication.  He would like to know whether he needs to continue.  The medication is Flomax.  He reports that since being at neck, urination is much improved. Reviewing medical record, he was also found to have an elevated PSA.  He had been advised to do a prostate biopsy, but had declined in the past.  He is open to having this done now.  He was also found to have an irregular heartbeat and an EKG had revealed PVCs.  The patient has follow-up with Cardiology, and he was recommended to have a Holter study and echocardiogram.  He would like to know what these mean.  Lastly, the patient reports a chronic cough that has been going on for several months.  It is associated with phlegm production.  The cough is worse at night.  The patient reports that he is not sure if he is having allergies as he has no other allergy symptoms.  Of note, he was previously on Zantac for GERD.  He is not taking his medication in several months.    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 No pertinent surgical history.     Family History  Family History   Problem Relation Age of Onset    Cancer Mother     Asbestos Father     Liver disease Neg Hx        Social History  Social History     Socioeconomic History    Marital status:      Spouse name: Not on file    Number of  children: Not on file    Years of education: Not on file    Highest education level: Not on file   Occupational History    Not on file   Social Needs    Financial resource strain: Not on file    Food insecurity:     Worry: Not on file     Inability: Not on file    Transportation needs:     Medical: Not on file     Non-medical: Not on file   Tobacco Use    Smoking status: Former Smoker     Packs/day: 1.00     Years: 40.00     Pack years: 40.00     Last attempt to quit: 1989     Years since quittin.0    Smokeless tobacco: Former User     Quit date: 1986   Substance and Sexual Activity    Alcohol use: Yes     Alcohol/week: 0.0 standard drinks     Comment: 2 bottles beer on weekends    Drug use: No    Sexual activity: Yes     Partners: Female   Lifestyle    Physical activity:     Days per week: Not on file     Minutes per session: Not on file    Stress: Not on file   Relationships    Social connections:     Talks on phone: Not on file     Gets together: Not on file     Attends Baptism service: Not on file     Active member of club or organization: Not on file     Attends meetings of clubs or organizations: Not on file     Relationship status: Not on file   Other Topics Concern    Not on file   Social History Narrative    , resides in [place]. 7 children but lost one. 16 grandchildren. 4 great-grandchildren. Works at [employer]       Current Medications  Current Outpatient Medications on File Prior to Visit   Medication Sig Dispense Refill    albuterol (PROAIR HFA) 90 mcg/actuation inhaler Inhale 2 puffs into the lungs every 4 (four) hours as needed (cough/shortness of breath). Rescue 1 Inhaler 2    glimepiride (AMARYL) 4 MG tablet Take 1 tablet (4 mg total) by mouth once daily. 90 tablet 2    metFORMIN (GLUCOPHAGE) 1000 MG tablet TAKE 1 TABLET BY MOUTH 2 TIMES DAILY WITH MEAL 180 tablet 1    olmesartan-hydrochlorothiazide (BENICAR HCT) 40-25 mg per tablet Take 1  "tablet by mouth once daily. 90 tablet 1    sildenafil (VIAGRA) 50 MG tablet Take 1 tablet (50 mg total) by mouth daily as needed for Erectile Dysfunction. 1 hour before sex, maximum of 1 tablet in 24 hours 10 tablet 5    tamsulosin (FLOMAX) 0.4 mg Cap Take 1 capsule (0.4 mg total) by mouth once daily. 30 capsule 11    ciclopirox (PENLAC) 8 % Soln Apply topically nightly. (Patient not taking: Reported on 12/2/2019) 1 Bottle 3    fluticasone propionate (FLONASE) 50 mcg/actuation nasal spray 1 spray (50 mcg total) by Each Nostril route once daily. (Patient not taking: Reported on 12/2/2019) 15.8 mL 1    ipratropium (ATROVENT) 42 mcg (0.06 %) nasal spray 2 sprays by Nasal route 4 (four) times daily. (Patient not taking: Reported on 12/2/2019) 15 mL 0    loratadine (CLARITIN) 10 mg tablet Take 1 tablet (10 mg total) by mouth once daily. (Patient not taking: Reported on 12/2/2019) 30 tablet 1    [DISCONTINUED] ranitidine (ZANTAC) 150 MG tablet Take 1 tablet (150 mg total) by mouth nightly. (Patient not taking: Reported on 12/2/2019) 30 tablet 2     No current facility-administered medications on file prior to visit.        Allergies   Review of patient's allergies indicates:  No Known Allergies    Review of Systems   Constitutional: Negative for chills and fever.   HENT: Negative for congestion.    Respiratory: Positive for cough. Negative for shortness of breath and wheezing.    Cardiovascular: Negative for chest pain and palpitations.   Gastrointestinal: Negative for abdominal pain.   Genitourinary: Negative for dysuria.         /88 (BP Location: Left arm, Patient Position: Sitting, BP Method: Medium (Automatic))   Pulse 102   Temp 98.2 °F (36.8 °C) (Oral)   Resp 16   Ht 5' 10" (1.778 m)   Wt 81.8 kg (180 lb 5.4 oz)   SpO2 96%   BMI 25.88 kg/m²     Physical Exam   Constitutional: He appears well-developed. No distress.   HENT:   Head: Normocephalic and atraumatic.   Right Ear: Tympanic membrane, " external ear and ear canal normal.   Left Ear: Tympanic membrane, external ear and ear canal normal.   Nose: Mucosal edema and rhinorrhea present.  No foreign bodies. Right sinus exhibits no maxillary sinus tenderness. Left sinus exhibits no maxillary sinus tenderness.   Mouth/Throat: No posterior oropharyngeal erythema.   Eyes: Pupils are equal, round, and reactive to light. EOM and lids are normal.   Neck: Trachea normal and normal range of motion. No tracheal tenderness present. No thyroid mass present.   Cardiovascular: Normal rate, regular rhythm, normal heart sounds and intact distal pulses.   Pulmonary/Chest: Effort normal and breath sounds normal. No respiratory distress. He has no wheezes. He has no rales.   Lymphadenopathy:     He has no cervical adenopathy.   Psychiatric: He has a normal mood and affect. His behavior is normal.   Vitals reviewed.        Assessment/Plan:  Peter was seen today for benign prostatic hypertrophy, cough and heart problem.    Diagnoses and all orders for this visit:    Elevated PSA  Patient was advised to continue taking Flomax, and follow up as scheduled with Urology today.  I discussed with the risks and benefits of prostate biopsy.  States that he is now leaning towards having it done.    Gastroesophageal reflux disease, esophagitis presence not specified  -     famotidine (PEPCID) 20 MG tablet; Take 1 tablet (20 mg total) by mouth once daily.  Patient's cough likely from GERD.  Did not have a cough while he was on Zantac.  Will start him on Pepcid given recent recall on Zantac.    Frequent PVCs  I explained to him what a Holter monitor and an echocardiogram on her.  Follow-up with Cardiology recommendations.    Need for vaccination  -     Influenza - High Dose (65+) (PF) (IM)  Flu vaccine today    Essential hypertension  Continue current regimeb              -Lucas Lloyd Jr., MD, AAHIVS          This office note has been dictated.  This dictation has been generated using  M-Modal Fluency Direct dictation; some phonetic errors may occur.   travis

## 2019-12-02 NOTE — PROGRESS NOTES
Subjective:       Patient ID: Peter Lopez is a 83 y.o. male who was last seen in this office 11/18/2019    Chief Complaint:   Chief Complaint   Patient presents with    Follow-up     PSA results .. has some questions about procedures     Elevated PSA  Patient is here with an elevated PSA. He has been seen by Rubens Modi and Ivan in the past for this--last seen by Dr. Love in 2/2016. PSA was 12.5 at that time. He declined prostate biopsy and further work up previously.    He has no personal history and no family history of prostate cancer.  He has no prior genitourinary history of hematuria, hematospermia, prostatitis, UTI, urolithiasis, previous  surgery. Also reports weak urinary stream, sensation of DANA and post void dribbling. Nocturia x 4-5. Symptoms began a few weeks ago. He is currently not taking any prostate medications. Denies dysuria, gross hematuria, or flank pain. No recent PSA available to me    Previous PSA values are :  Lab Results   Component Value Date    PSA 19.0 (H) 07/10/2017    PSA 12.5 (H) 01/11/2016    PSA 4.33 (H) 01/03/2013     UCx 11/11/19--negative    PVR (bladder scan) last visit - 137 ml    He returns today with a PSA. He reports improvement in his urinary stream with Flomax. No new complaints    PVR (bladder scan) today - 79 ml    ACTIVE MEDICAL ISSUES:  Patient Active Problem List   Diagnosis    HTN (hypertension)    Hepatitis C    Diabetes mellitus type II, uncontrolled    Atherosclerosis of aorta       ALLERGIES AND MEDICATIONS: updated and reviewed.  Review of patient's allergies indicates:  No Known Allergies  Current Outpatient Medications   Medication Sig    albuterol (PROAIR HFA) 90 mcg/actuation inhaler Inhale 2 puffs into the lungs every 4 (four) hours as needed (cough/shortness of breath). Rescue    ciclopirox (PENLAC) 8 % Soln Apply topically nightly. (Patient not taking: Reported on 12/2/2019)    ciprofloxacin HCl (CIPRO) 500 MG tablet Take 1 tablet (500 mg  total) by mouth 2 (two) times daily. for 3 days    famotidine (PEPCID) 20 MG tablet Take 1 tablet (20 mg total) by mouth once daily.    fluticasone propionate (FLONASE) 50 mcg/actuation nasal spray 1 spray (50 mcg total) by Each Nostril route once daily. (Patient not taking: Reported on 12/2/2019)    glimepiride (AMARYL) 4 MG tablet Take 1 tablet (4 mg total) by mouth once daily.    ipratropium (ATROVENT) 42 mcg (0.06 %) nasal spray 2 sprays by Nasal route 4 (four) times daily. (Patient not taking: Reported on 12/2/2019)    loratadine (CLARITIN) 10 mg tablet Take 1 tablet (10 mg total) by mouth once daily. (Patient not taking: Reported on 12/2/2019)    metFORMIN (GLUCOPHAGE) 1000 MG tablet TAKE 1 TABLET BY MOUTH 2 TIMES DAILY WITH MEAL    olmesartan-hydrochlorothiazide (BENICAR HCT) 40-25 mg per tablet Take 1 tablet by mouth once daily.    sildenafil (VIAGRA) 50 MG tablet Take 1 tablet (50 mg total) by mouth daily as needed for Erectile Dysfunction. 1 hour before sex, maximum of 1 tablet in 24 hours    tamsulosin (FLOMAX) 0.4 mg Cap Take 1 capsule (0.4 mg total) by mouth once daily.     No current facility-administered medications for this visit.        Review of Systems   Constitutional: Negative for activity change, chills, fatigue, fever and unexpected weight change.   Eyes: Negative for discharge, redness and visual disturbance.   Respiratory: Negative for cough, shortness of breath and wheezing.    Cardiovascular: Negative for chest pain and leg swelling.   Gastrointestinal: Negative for abdominal distention, abdominal pain, constipation, diarrhea, nausea and vomiting.   Genitourinary: Negative for decreased urine volume, difficulty urinating, discharge, dysuria, flank pain, frequency, hematuria, testicular pain and urgency.   Musculoskeletal: Negative for arthralgias, joint swelling and myalgias.   Skin: Negative for color change and rash.   Neurological: Negative for dizziness and light-headedness.  "  Psychiatric/Behavioral: Negative for behavioral problems and confusion. The patient is not nervous/anxious.        Objective:      Vitals:    12/02/19 1536   BP: 130/80   Weight: 81.6 kg (180 lb)   Height: 5' 10" (1.778 m)     Physical Exam   Nursing note and vitals reviewed.  Constitutional: He is oriented to person, place, and time. He appears well-developed.   HENT:   Head: Normocephalic and atraumatic.   Nose: Nose normal.   Eyes: Conjunctivae are normal. Right eye exhibits no discharge. Left eye exhibits no discharge.   Neck: Normal range of motion. Neck supple. No tracheal deviation present. No thyromegaly present.   Cardiovascular: Normal rate and regular rhythm.    Pulmonary/Chest: Effort normal. No stridor. No respiratory distress. He has no wheezes.   Abdominal: Soft. He exhibits no distension. There is no hepatosplenomegaly. There is no tenderness. There is no CVA tenderness. No hernia.   Genitourinary: Prostate is enlarged. Prostate is not tender.       Musculoskeletal: Normal range of motion. He exhibits no edema.   Neurological: He is alert and oriented to person, place, and time.   Skin: Skin is warm and dry. No rash noted. No erythema.     Psychiatric: He has a normal mood and affect. His behavior is normal. Judgment normal.       Urine dipstick shows negative for all components.  Micro exam: negative for WBC's or RBC's.     Ref Range & Units 7d ago   PSA DIAGNOSTIC 0.00 - 4.00 ng/mL 54.8High     Comment: PSA Expected levels:   Hormonal Therapy: <0.05 ng/ml   Prostatectomy: <0.01 ng/ml   Radiation Therapy: <1.00 ng/ml    Resulting Agency  [facility]         Specimen Collected: 11/25/19 11:42 Last Resulted: 11/25/19 19:48        Reviewed with patient    Assessment:       1. Elevated PSA    2. Weak urinary stream    3. Incomplete bladder emptying          Plan:       1. Elevated PSA   - Discussed the etiology of elevated PSA above age-corrected normal including BPH, infection/inflammation of the prostate, " and prostate cancer. We had a long discussion regarding workup of elevated PSA.    - He understands that a prostate biopsy is indicated for definitive diagnosis of prostate cancer. Risks, benefits, and alternative of TRUS PBx were discussed thoroughly. Risks include, but are not limited to, pain, bleeding, infection, and sepsis. His pre-procedure regimen would require enema the morning of PBx and appropriate PO antibiotics for 3 days starting the day prior to procedure. He will also receive IM injection of antibiotics immediately before the procedure. He understands even after a prostate biopsy, prostate cancer can be missed and close follow up is necessary, with possible further imaging and/or repeat biopsy in the future.  -PSA 11/25/19--54.8  -He agrees to having a prostate biopsy  - POCT urinalysis, dipstick or tablet reag  - Transrectal Ultrasound w/ Biopsy; Future    2. Weak urinary stream  -Improved  -Continue Flomax    3. Incomplete bladder emptying  -Improving  -Repeat PVR today--79 ml  -Continue Flomax  - POCT Bladder Scan  - Urine culture              Follow up for prostate biopsy.

## 2019-12-03 LAB
BILIRUB SERPL-MCNC: NORMAL MG/DL
BLOOD URINE, POC: NORMAL
COLOR, POC UA: YELLOW
GLUCOSE UR QL STRIP: NORMAL
KETONES UR QL STRIP: NORMAL
LEUKOCYTE ESTERASE URINE, POC: NORMAL
NITRITE, POC UA: NORMAL
PH, POC UA: 5
PROTEIN, POC: NORMAL
SPECIFIC GRAVITY, POC UA: 1010
UROBILINOGEN, POC UA: NORMAL

## 2019-12-04 LAB — BACTERIA UR CULT: NO GROWTH

## 2019-12-09 ENCOUNTER — HOSPITAL ENCOUNTER (OUTPATIENT)
Dept: CARDIOLOGY | Facility: HOSPITAL | Age: 84
Discharge: HOME | End: 2019-12-09
Attending: INTERNAL MEDICINE
Payer: MEDICARE

## 2019-12-09 DIAGNOSIS — I49.3 PVC (PREMATURE VENTRICULAR CONTRACTION): ICD-10-CM

## 2019-12-09 LAB
AORTIC ROOT ANNULUS: 3.3 CM
AORTIC VALVE CUSP SEPERATION: 1.91 CM
ASCENDING AORTA: 3.26 CM
AV INDEX (PROSTH): 0.84
AV MEAN GRADIENT: 1 MMHG
AV PEAK GRADIENT: 3 MMHG
AV VALVE AREA: 4.53 CM2
AV VELOCITY RATIO: 0.77
CV ECHO LV RWT: 0.6 CM
DOP CALC AO PEAK VEL: 0.92 M/S
DOP CALC AO VTI: 13.03 CM
DOP CALC LVOT AREA: 5.4 CM2
DOP CALC LVOT DIAMETER: 2.62 CM
DOP CALC LVOT PEAK VEL: 0.71 M/S
DOP CALC LVOT STROKE VOLUME: 59.06 CM3
DOP CALCLVOT PEAK VEL VTI: 10.96 CM
E WAVE DECELERATION TIME: 172.04 MSEC
E/A RATIO: 0.52
E/E' RATIO: 8.36 M/S
ECHO LV POSTERIOR WALL: 1.09 CM (ref 0.6–1.1)
FRACTIONAL SHORTENING: 29 % (ref 28–44)
INTERVENTRICULAR SEPTUM: 0.92 CM (ref 0.6–1.1)
IVRT: 0.14 MSEC
LA MAJOR: 4.22 CM
LA MINOR: 4.15 CM
LA WIDTH: 3.5 CM
LEFT ATRIUM SIZE: 2.92 CM
LEFT ATRIUM VOLUME: 36.35 CM3
LEFT INTERNAL DIMENSION IN SYSTOLE: 2.6 CM (ref 2.1–4)
LEFT VENTRICLE DIASTOLIC VOLUME: 56.54 ML
LEFT VENTRICLE SYSTOLIC VOLUME: 24.71 ML
LEFT VENTRICULAR INTERNAL DIMENSION IN DIASTOLE: 3.66 CM (ref 3.5–6)
LEFT VENTRICULAR MASS: 111.47 G
LV LATERAL E/E' RATIO: 7.67 M/S
LV SEPTAL E/E' RATIO: 9.2 M/S
MV PEAK A VEL: 0.88 M/S
MV PEAK E VEL: 0.46 M/S
PISA TR MAX VEL: 2.51 M/S
PV PEAK S VEL: 0.67 M/S
PV PEAK VELOCITY: 0.59 CM/S
RA MAJOR: 4.05 CM
RA PRESSURE: 3 MMHG
RA WIDTH: 2.78 CM
RIGHT VENTRICULAR END-DIASTOLIC DIMENSION: 2.24 CM
SINUS: 3.79 CM
STJ: 3.2 CM
TDI LATERAL: 0.06 M/S
TDI SEPTAL: 0.05 M/S
TDI: 0.06 M/S
TR MAX PG: 25 MMHG
TRICUSPID ANNULAR PLANE SYSTOLIC EXCURSION: 1.84 CM
TV REST PULMONARY ARTERY PRESSURE: 28 MMHG

## 2019-12-09 PROCEDURE — 93227 XTRNL ECG REC<48 HR R&I: CPT | Mod: ,,, | Performed by: INTERNAL MEDICINE

## 2019-12-09 PROCEDURE — 93226 XTRNL ECG REC<48 HR SCAN A/R: CPT

## 2019-12-09 PROCEDURE — 93306 TTE W/DOPPLER COMPLETE: CPT | Mod: 26,,, | Performed by: INTERNAL MEDICINE

## 2019-12-09 PROCEDURE — 93306 ECHO (CUPID ONLY): ICD-10-PCS | Mod: 26,,, | Performed by: INTERNAL MEDICINE

## 2019-12-09 PROCEDURE — 93227 HOLTER MONITOR - 24 HOUR (CUPID ONLY): ICD-10-PCS | Mod: ,,, | Performed by: INTERNAL MEDICINE

## 2019-12-09 PROCEDURE — 93306 TTE W/DOPPLER COMPLETE: CPT

## 2019-12-11 LAB
OHS CV EVENT MONITOR DAY: 1
OHS CV HOLTER LENGTH DECIMAL HOURS: 47.98
OHS CV HOLTER LENGTH HOURS: 23
OHS CV HOLTER LENGTH MINUTES: 59

## 2020-01-07 DIAGNOSIS — I10 ESSENTIAL HYPERTENSION: ICD-10-CM

## 2020-01-07 RX ORDER — METFORMIN HYDROCHLORIDE 1000 MG/1
TABLET ORAL
Qty: 180 TABLET | Refills: 3 | Status: SHIPPED | OUTPATIENT
Start: 2020-01-07 | End: 2021-01-29

## 2020-01-07 RX ORDER — OLMESARTAN MEDOXOMIL AND HYDROCHLOROTHIAZIDE 40/25 40; 25 MG/1; MG/1
1 TABLET ORAL DAILY
Qty: 90 TABLET | Refills: 3 | Status: SHIPPED | OUTPATIENT
Start: 2020-01-07 | End: 2020-11-10

## 2020-01-27 ENCOUNTER — OFFICE VISIT (OUTPATIENT)
Dept: PODIATRY | Facility: CLINIC | Age: 85
End: 2020-01-27
Payer: MEDICARE

## 2020-01-27 VITALS
WEIGHT: 179.88 LBS | DIASTOLIC BLOOD PRESSURE: 80 MMHG | BODY MASS INDEX: 25.75 KG/M2 | SYSTOLIC BLOOD PRESSURE: 130 MMHG | HEIGHT: 70 IN

## 2020-01-27 DIAGNOSIS — E11.49 TYPE II DIABETES MELLITUS WITH NEUROLOGICAL MANIFESTATIONS: Primary | ICD-10-CM

## 2020-01-27 DIAGNOSIS — B35.1 ONYCHOMYCOSIS DUE TO DERMATOPHYTE: ICD-10-CM

## 2020-01-27 PROCEDURE — 11721 PR DEBRIDEMENT OF NAILS, 6 OR MORE: ICD-10-PCS | Mod: Q9,S$GLB,, | Performed by: PODIATRIST

## 2020-01-27 PROCEDURE — 11721 DEBRIDE NAIL 6 OR MORE: CPT | Mod: Q9,S$GLB,, | Performed by: PODIATRIST

## 2020-01-27 PROCEDURE — 99999 PR PBB SHADOW E&M-EST. PATIENT-LVL III: CPT | Mod: PBBFAC,,, | Performed by: PODIATRIST

## 2020-01-27 PROCEDURE — 99999 PR PBB SHADOW E&M-EST. PATIENT-LVL III: ICD-10-PCS | Mod: PBBFAC,,, | Performed by: PODIATRIST

## 2020-01-27 PROCEDURE — 99499 NO LOS: ICD-10-PCS | Mod: S$GLB,,, | Performed by: PODIATRIST

## 2020-01-27 PROCEDURE — 99499 UNLISTED E&M SERVICE: CPT | Mod: S$GLB,,, | Performed by: PODIATRIST

## 2020-01-27 NOTE — PROGRESS NOTES
Subjective:      Patient ID: Peter Lopez is a 84 y.o. male.    Chief Complaint: Diabetes Mellitus (12/2/19 Dr Lloyd PCP) and Nail Care    Peter is a 84 y.o. male who presents to the clinic upon referral from Dr. Steele ref. provider found  for evaluation and treatment of diabetic feet. 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 Patient relates no major problem with feet. Reports elongated nails that are difficult to trim. Requesting nail trimming.     PCP: Lucas Lloyd Jr, MD    Date Last Seen by PCP: 10/25/18    Current shoe gear: Tennis shoes    Hemoglobin A1C   Date Value Ref Range Status   08/19/2019 6.1 (H) 4.0 - 5.6 % Final     Comment:     ADA Screening Guidelines:  5.7-6.4%  Consistent with prediabetes  >or=6.5%  Consistent with diabetes  High levels of fetal hemoglobin interfere with the HbA1C  assay. Heterozygous hemoglobin variants (HbS, HgC, etc)do  not significantly interfere with this assay.   However, presence of multiple variants may affect accuracy.     04/15/2019 6.1 (H) 4.0 - 5.6 % Final     Comment:     ADA Screening Guidelines:  5.7-6.4%  Consistent with prediabetes  >or=6.5%  Consistent with diabetes  High levels of fetal hemoglobin interfere with the HbA1C  assay. Heterozygous hemoglobin variants (HbS, HgC, etc)do  not significantly interfere with this assay.   However, presence of multiple variants may affect accuracy.     10/01/2018 6.1 (H) 4.0 - 5.6 % Final     Comment:     ADA Screening Guidelines:  5.7-6.4%  Consistent with prediabetes  >or=6.5%  Consistent with diabetes  High levels of fetal hemoglobin interfere with the HbA1C  assay. Heterozygous hemoglobin variants (HbS, HgC, etc)do  not significantly interfere with this assay.   However, presence of multiple variants may affect accuracy.             Review of Systems   Constitution: Negative for chills,  diaphoresis and fever.   Cardiovascular: Negative for claudication, cyanosis, leg swelling and syncope.   Respiratory: Negative for cough and shortness of breath.    Skin: Positive for color change and nail changes. Negative for suspicious lesions.   Musculoskeletal: Negative for falls, joint pain, muscle cramps and muscle weakness.   Gastrointestinal: Negative for diarrhea, nausea and vomiting.   Neurological: Positive for paresthesias. Negative for disturbances in coordination, numbness, sensory change, tremors and weakness.   Psychiatric/Behavioral: Negative for altered mental status.           Objective:      Physical Exam   Constitutional: He appears well-developed. He is cooperative.   Oriented to time, place, and person.   Cardiovascular:   DP and PT pulses are palpable bilaterally. 3 sec capillary refill time and toes and feet are warm to touch proximally .        Musculoskeletal:   Equinus noted b/l ankles with < 10 deg DF noted. MMT 5/5 in DF/PF/Inv/Ev resistance with no reproduction of pain in any direction. Passive range of motion of ankle and pedal joints is painless b/l.     Feet:   Right Foot:   Skin Integrity: Negative for callus or dry skin.   Left Foot:   Skin Integrity: Negative for callus or dry skin.   Lymphadenopathy:   Negative lymphadenopathy bilateral popliteal fossa and tarsal tunnel.   Neurological: He is alert.   Light touch, proprioception, and sharp/dull sensation are all intact bilaterally. Protective threshold with the [place]ienstein monofilament is intact bilaterally.  Subjective paresthesias with no clearly identifiable source or trigger.      Skin:   Toenails 1-5 bilaterally are elongated by 2-3 mm, thickened by 2-3 mm, discolored/yellowed, dystrophic, brittle with subungual debris.       Psychiatric: He has a normal mood and affect.             Assessment:       Encounter Diagnoses   Name Primary?    Type II diabetes mellitus with neurological manifestations Yes     Onychomycosis due to dermatophyte          Plan:       Peter was seen today for diabetes mellitus and nail care.    Diagnoses and all orders for this visit:    Type II diabetes mellitus with neurological manifestations    Onychomycosis due to dermatophyte      I counseled the patient on his conditions, their implications and medical management.    - Shoe inspection. Diabetic Foot Education. Patient reminded of the importance of good nutrition and blood sugar control to help prevent podiatric complications of diabetes. Patient instructed on proper foot hygeine. We discussed wearing proper shoe gear, daily foot inspections, never walking without protective shoe gear, never putting sharp instruments to feet, routine podiatric nail visits every 3  months.   - With patient's permission, nails were aggressively reduced and debrided x 10 to their soft tissue attachment mechanically and with electric , removing all offending nail and debris. Patient relates relief following the procedure. He will continue to monitor the areas daily, inspect his feet, wear protective shoe gear when ambulatory, moisturizer to maintain skin integrity and follow in this office in approximately 6 months, sooner p.r.n.       F/u 3 months     Lucia Aguiar DPM

## 2020-01-28 ENCOUNTER — OFFICE VISIT (OUTPATIENT)
Dept: FAMILY MEDICINE | Facility: CLINIC | Age: 85
End: 2020-01-28
Payer: MEDICARE

## 2020-01-28 VITALS
DIASTOLIC BLOOD PRESSURE: 76 MMHG | OXYGEN SATURATION: 95 % | RESPIRATION RATE: 16 BRPM | WEIGHT: 176.13 LBS | BODY MASS INDEX: 25.27 KG/M2 | SYSTOLIC BLOOD PRESSURE: 133 MMHG | TEMPERATURE: 98 F | HEART RATE: 106 BPM

## 2020-01-28 DIAGNOSIS — Z00.00 ROUTINE MEDICAL EXAM: Primary | ICD-10-CM

## 2020-01-28 DIAGNOSIS — I70.0 ATHEROSCLEROSIS OF AORTA: ICD-10-CM

## 2020-01-28 DIAGNOSIS — E11.21 DIABETES MELLITUS WITH NEPHROPATHY: ICD-10-CM

## 2020-01-28 DIAGNOSIS — N18.30 CKD (CHRONIC KIDNEY DISEASE) STAGE 3, GFR 30-59 ML/MIN: ICD-10-CM

## 2020-01-28 DIAGNOSIS — R97.20 ELEVATED PSA: ICD-10-CM

## 2020-01-28 DIAGNOSIS — I10 ESSENTIAL HYPERTENSION: ICD-10-CM

## 2020-01-28 PROCEDURE — 3075F SYST BP GE 130 - 139MM HG: CPT | Mod: CPTII,S$GLB,, | Performed by: FAMILY MEDICINE

## 2020-01-28 PROCEDURE — 3078F PR MOST RECENT DIASTOLIC BLOOD PRESSURE < 80 MM HG: ICD-10-PCS | Mod: CPTII,S$GLB,, | Performed by: FAMILY MEDICINE

## 2020-01-28 PROCEDURE — 3075F PR MOST RECENT SYSTOLIC BLOOD PRESS GE 130-139MM HG: ICD-10-PCS | Mod: CPTII,S$GLB,, | Performed by: FAMILY MEDICINE

## 2020-01-28 PROCEDURE — 99214 PR OFFICE/OUTPT VISIT, EST, LEVL IV, 30-39 MIN: ICD-10-PCS | Mod: S$GLB,,, | Performed by: FAMILY MEDICINE

## 2020-01-28 PROCEDURE — 99499 UNLISTED E&M SERVICE: CPT | Mod: S$GLB,,, | Performed by: FAMILY MEDICINE

## 2020-01-28 PROCEDURE — 99214 OFFICE O/P EST MOD 30 MIN: CPT | Mod: S$GLB,,, | Performed by: FAMILY MEDICINE

## 2020-01-28 PROCEDURE — 3078F DIAST BP <80 MM HG: CPT | Mod: CPTII,S$GLB,, | Performed by: FAMILY MEDICINE

## 2020-01-28 PROCEDURE — 99999 PR PBB SHADOW E&M-EST. PATIENT-LVL III: CPT | Mod: PBBFAC,,, | Performed by: FAMILY MEDICINE

## 2020-01-28 PROCEDURE — 99499 RISK ADDL DX/OHS AUDIT: ICD-10-PCS | Mod: S$GLB,,, | Performed by: FAMILY MEDICINE

## 2020-01-28 PROCEDURE — 99999 PR PBB SHADOW E&M-EST. PATIENT-LVL III: ICD-10-PCS | Mod: PBBFAC,,, | Performed by: FAMILY MEDICINE

## 2020-01-28 NOTE — PROGRESS NOTES
Subjective:       Patient ID: Peter Lopez is a 84 y.o. male.    Chief Complaint: Annual Exam and Hypertension    HPI   84 year old male with hypertension, diabetes, and history of hepatitis C, s/p treatment, and attained SVR, comes in for wellness check. He reports no acute concerns. He reports taking all medications as prescribed. He is scheduled for a prostate biopsy for elevated PSA, with urology. He states he has enough of all his medications.     Review of Systems   Constitutional: Negative for chills and fever.   HENT: Negative for congestion.    Eyes: Negative for visual disturbance.   Respiratory: Negative for chest tightness, shortness of breath and wheezing.    Cardiovascular: Negative for chest pain and palpitations.   Gastrointestinal: Negative for abdominal pain.   Endocrine: Negative for polydipsia, polyphagia and polyuria.   Genitourinary: Negative for dysuria and hematuria.   Skin: Negative for rash.   Neurological: Negative for dizziness, light-headedness and headaches.   Hematological: Does not bruise/bleed easily.       Objective:     /76 (BP Location: Left arm, Patient Position: Sitting, BP Method: Medium (Automatic))   Pulse 106   Temp 97.9 °F (36.6 °C) (Oral)   Resp 16   Wt 79.9 kg (176 lb 2.4 oz)   SpO2 95%   BMI 25.27 kg/m²     Physical Exam   Constitutional: He is oriented to person, place, and time. He appears well-developed and well-nourished. No distress.   HENT:   Head: Normocephalic and atraumatic.   Right Ear: Tympanic membrane, external ear and ear canal normal.   Left Ear: Tympanic membrane, external ear and ear canal normal.   Nose: Nose normal.   Mouth/Throat: Oropharynx is clear and moist.   Eyes: Pupils are equal, round, and reactive to light. Conjunctivae and EOM are normal. Right eye exhibits no discharge. Left eye exhibits no discharge.   Neck: Normal range of motion. Neck supple. No tracheal deviation present. No thyromegaly present.   Cardiovascular: Normal rate,  regular rhythm, S1 normal, S2 normal, normal heart sounds and intact distal pulses.   No murmur heard.  Pulses:       Radial pulses are 2+ on the right side, and 2+ on the left side.   Pulmonary/Chest: Effort normal and breath sounds normal. No respiratory distress. He has no wheezes. He has no rhonchi. He has no rales.   Abdominal: Soft. Bowel sounds are normal. He exhibits no distension and no mass. There is no tenderness. There is no rigidity, no guarding and no CVA tenderness.   Lymphadenopathy:     He has no cervical adenopathy.   Neurological: He is alert and oriented to person, place, and time. He has normal strength. He displays no atrophy. No cranial nerve deficit or sensory deficit. He exhibits normal muscle tone.   Reflex Scores:       Patellar reflexes are 2+ on the right side and 2+ on the left side.  Skin: Skin is warm and dry. Capillary refill takes less than 2 seconds. No rash noted. He is not diaphoretic.   Psychiatric: He has a normal mood and affect. His behavior is normal.   Vitals reviewed.      Assessment:       1. Routine medical exam    2. Diabetes mellitus with nephropathy    3. CKD (chronic kidney disease) stage 3, GFR 30-59 ml/min    4. Atherosclerosis of aorta    5. Essential hypertension    6. Elevated PSA        Plan:       Peter was seen today for annual exam and hypertension.    Diagnoses and all orders for this visit:    Routine medical exam  -     Comprehensive metabolic panel; Future  -     Hemoglobin A1c; Future  -     Lipid panel; Future  Age appropriate recommendations reviewed.  Labs for wellness check and his chronic medical problems ordered.    Diabetes mellitus with nephropathy  -     Comprehensive metabolic panel; Future  -     Hemoglobin A1c; Future  -     Lipid panel; Future  Up to date on foot and eye exam. Continue current regimen.    CKD (chronic kidney disease) stage 3, GFR 30-59 ml/min  -     PTH, intact; Future  -     CBC auto differential; Future    Atherosclerosis  of aorta  -     Lipid panel; Future  Currently not on statin, and patient does not want to start one    Essential hypertension  -     Comprehensive metabolic panel; Future  -     Lipid panel; Future  -     CBC auto differential; Future  Continue current regimen    Elevated PSA  Patient scheduled for biopsy

## 2020-02-11 ENCOUNTER — TELEPHONE (OUTPATIENT)
Dept: FAMILY MEDICINE | Facility: CLINIC | Age: 85
End: 2020-02-11

## 2020-02-11 NOTE — TELEPHONE ENCOUNTER
If he is having fevers, should make appointment sooner. If no availability with me, then can see NP

## 2020-02-11 NOTE — TELEPHONE ENCOUNTER
----- Message from Farnaz Page MA sent at 2/11/2020  2:06 PM CST -----  Contact: patient came in to make an urgent appointment  Patient complaining of chest congestion taking Mucinex OTC non productive cough with fever made appointment for Monday.

## 2020-02-17 ENCOUNTER — PROCEDURE VISIT (OUTPATIENT)
Dept: UROLOGY | Facility: CLINIC | Age: 85
End: 2020-02-17
Payer: MEDICARE

## 2020-02-17 ENCOUNTER — TELEPHONE (OUTPATIENT)
Dept: FAMILY MEDICINE | Facility: CLINIC | Age: 85
End: 2020-02-17

## 2020-02-17 ENCOUNTER — OFFICE VISIT (OUTPATIENT)
Dept: FAMILY MEDICINE | Facility: CLINIC | Age: 85
End: 2020-02-17
Payer: MEDICARE

## 2020-02-17 VITALS
DIASTOLIC BLOOD PRESSURE: 76 MMHG | WEIGHT: 175.25 LBS | SYSTOLIC BLOOD PRESSURE: 126 MMHG | HEART RATE: 101 BPM | RESPIRATION RATE: 16 BRPM | OXYGEN SATURATION: 96 % | HEIGHT: 70 IN | TEMPERATURE: 98 F | BODY MASS INDEX: 25.09 KG/M2

## 2020-02-17 DIAGNOSIS — N18.30 CKD (CHRONIC KIDNEY DISEASE) STAGE 3, GFR 30-59 ML/MIN: ICD-10-CM

## 2020-02-17 DIAGNOSIS — J20.9 ACUTE BRONCHITIS, UNSPECIFIED ORGANISM: Primary | ICD-10-CM

## 2020-02-17 DIAGNOSIS — R97.20 ELEVATED PSA: Primary | ICD-10-CM

## 2020-02-17 DIAGNOSIS — E11.21 DIABETES MELLITUS WITH NEPHROPATHY: ICD-10-CM

## 2020-02-17 DIAGNOSIS — I10 ESSENTIAL HYPERTENSION: ICD-10-CM

## 2020-02-17 PROCEDURE — 3074F PR MOST RECENT SYSTOLIC BLOOD PRESSURE < 130 MM HG: ICD-10-PCS | Mod: CPTII,S$GLB,, | Performed by: FAMILY MEDICINE

## 2020-02-17 PROCEDURE — 1159F MED LIST DOCD IN RCRD: CPT | Mod: S$GLB,,, | Performed by: FAMILY MEDICINE

## 2020-02-17 PROCEDURE — 1101F PT FALLS ASSESS-DOCD LE1/YR: CPT | Mod: CPTII,S$GLB,, | Performed by: FAMILY MEDICINE

## 2020-02-17 PROCEDURE — 99214 OFFICE O/P EST MOD 30 MIN: CPT | Mod: S$GLB,,, | Performed by: FAMILY MEDICINE

## 2020-02-17 PROCEDURE — 1159F PR MEDICATION LIST DOCUMENTED IN MEDICAL RECORD: ICD-10-PCS | Mod: S$GLB,,, | Performed by: FAMILY MEDICINE

## 2020-02-17 PROCEDURE — 1101F PR PT FALLS ASSESS DOC 0-1 FALLS W/OUT INJ PAST YR: ICD-10-PCS | Mod: CPTII,S$GLB,, | Performed by: FAMILY MEDICINE

## 2020-02-17 PROCEDURE — 3078F PR MOST RECENT DIASTOLIC BLOOD PRESSURE < 80 MM HG: ICD-10-PCS | Mod: CPTII,S$GLB,, | Performed by: FAMILY MEDICINE

## 2020-02-17 PROCEDURE — 1126F AMNT PAIN NOTED NONE PRSNT: CPT | Mod: S$GLB,,, | Performed by: FAMILY MEDICINE

## 2020-02-17 PROCEDURE — 99999 PR PBB SHADOW E&M-EST. PATIENT-LVL III: ICD-10-PCS | Mod: PBBFAC,,, | Performed by: FAMILY MEDICINE

## 2020-02-17 PROCEDURE — 3078F DIAST BP <80 MM HG: CPT | Mod: CPTII,S$GLB,, | Performed by: FAMILY MEDICINE

## 2020-02-17 PROCEDURE — 99214 PR OFFICE/OUTPT VISIT, EST, LEVL IV, 30-39 MIN: ICD-10-PCS | Mod: S$GLB,,, | Performed by: FAMILY MEDICINE

## 2020-02-17 PROCEDURE — 99999 PR PBB SHADOW E&M-EST. PATIENT-LVL III: CPT | Mod: PBBFAC,,, | Performed by: FAMILY MEDICINE

## 2020-02-17 PROCEDURE — 3074F SYST BP LT 130 MM HG: CPT | Mod: CPTII,S$GLB,, | Performed by: FAMILY MEDICINE

## 2020-02-17 PROCEDURE — 1126F PR PAIN SEVERITY QUANTIFIED, NO PAIN PRESENT: ICD-10-PCS | Mod: S$GLB,,, | Performed by: FAMILY MEDICINE

## 2020-02-17 RX ORDER — GENTAMICIN SULFATE 40 MG/ML
80 INJECTION, SOLUTION INTRAMUSCULAR; INTRAVENOUS
Status: CANCELLED | OUTPATIENT
Start: 2020-02-17 | End: 2020-02-17

## 2020-02-17 RX ORDER — AZITHROMYCIN 250 MG/1
TABLET, FILM COATED ORAL
Qty: 6 TABLET | Refills: 0 | Status: SHIPPED | OUTPATIENT
Start: 2020-02-17 | End: 2020-02-22

## 2020-02-17 NOTE — TELEPHONE ENCOUNTER
No pneumonia seen on chest xray Message left for patient on mobile phone. And letter mailed also with results

## 2020-03-28 ENCOUNTER — HOSPITAL ENCOUNTER (INPATIENT)
Facility: OTHER | Age: 85
LOS: 1 days | Discharge: HOME | DRG: 206 | End: 2020-03-29
Attending: EMERGENCY MEDICINE | Admitting: HOSPITALIST
Payer: MEDICARE

## 2020-03-28 DIAGNOSIS — R09.02 HYPOXIA: ICD-10-CM

## 2020-03-28 DIAGNOSIS — N18.30 CKD (CHRONIC KIDNEY DISEASE) STAGE 3, GFR 30-59 ML/MIN: ICD-10-CM

## 2020-03-28 DIAGNOSIS — R73.9 HYPERGLYCEMIA: ICD-10-CM

## 2020-03-28 DIAGNOSIS — E16.2 HYPOGLYCEMIA: ICD-10-CM

## 2020-03-28 DIAGNOSIS — Z20.822 SUSPECTED COVID-19 VIRUS INFECTION: Primary | ICD-10-CM

## 2020-03-28 DIAGNOSIS — E11.21 DIABETES MELLITUS WITH NEPHROPATHY: ICD-10-CM

## 2020-03-28 DIAGNOSIS — A41.9 SEPSIS: ICD-10-CM

## 2020-03-28 LAB
ALBUMIN SERPL BCP-MCNC: 3.2 G/DL (ref 3.5–5.2)
ALP SERPL-CCNC: 44 U/L (ref 55–135)
ALT SERPL W/O P-5'-P-CCNC: 39 U/L (ref 10–44)
ANION GAP SERPL CALC-SCNC: 13 MMOL/L (ref 8–16)
AST SERPL-CCNC: 63 U/L (ref 10–40)
BASOPHILS # BLD AUTO: 0 K/UL (ref 0–0.2)
BASOPHILS NFR BLD: 0 % (ref 0–1.9)
BILIRUB SERPL-MCNC: 0.3 MG/DL (ref 0.1–1)
BILIRUB UR QL STRIP: NEGATIVE
BNP SERPL-MCNC: 15 PG/ML (ref 0–99)
BUN SERPL-MCNC: 18 MG/DL (ref 8–23)
CALCIUM SERPL-MCNC: 9.6 MG/DL (ref 8.7–10.5)
CHLORIDE SERPL-SCNC: 93 MMOL/L (ref 95–110)
CHOLEST SERPL-MCNC: 130 MG/DL (ref 120–199)
CHOLEST/HDLC SERPL: 2.8 {RATIO} (ref 2–5)
CLARITY UR: CLEAR
CO2 SERPL-SCNC: 26 MMOL/L (ref 23–29)
COLOR UR: YELLOW
CREAT SERPL-MCNC: 1.2 MG/DL (ref 0.5–1.4)
CREAT SERPL-MCNC: 1.2 MG/DL (ref 0.5–1.4)
CRP SERPL-MCNC: 134.8 MG/L (ref 0–8.2)
DIFFERENTIAL METHOD: ABNORMAL
EOSINOPHIL # BLD AUTO: 0 K/UL (ref 0–0.5)
EOSINOPHIL NFR BLD: 0 % (ref 0–8)
ERYTHROCYTE [DISTWIDTH] IN BLOOD BY AUTOMATED COUNT: 13.1 % (ref 11.5–14.5)
EST. GFR  (AFRICAN AMERICAN): >60 ML/MIN/1.73 M^2
EST. GFR  (NON AFRICAN AMERICAN): 56 ML/MIN/1.73 M^2
FERRITIN SERPL-MCNC: 334 NG/ML (ref 20–300)
GLUCOSE SERPL-MCNC: 57 MG/DL (ref 70–110)
GLUCOSE UR QL STRIP: ABNORMAL
HCT VFR BLD AUTO: 39.3 % (ref 40–54)
HDLC SERPL-MCNC: 46 MG/DL (ref 40–75)
HDLC SERPL: 35.4 % (ref 20–50)
HGB BLD-MCNC: 13.1 G/DL (ref 14–18)
HGB UR QL STRIP: ABNORMAL
IMM GRANULOCYTES # BLD AUTO: 0.01 K/UL (ref 0–0.04)
IMM GRANULOCYTES NFR BLD AUTO: 0.3 % (ref 0–0.5)
INR PPP: 0.9 (ref 0.8–1.2)
KETONES UR QL STRIP: NEGATIVE
LACTATE SERPL-SCNC: 0.7 MMOL/L (ref 0.5–2.2)
LACTATE SERPL-SCNC: 0.9 MMOL/L (ref 0.5–2.2)
LDH SERPL L TO P-CCNC: 374 U/L (ref 110–260)
LDLC SERPL CALC-MCNC: 62 MG/DL (ref 63–159)
LEUKOCYTE ESTERASE UR QL STRIP: NEGATIVE
LYMPHOCYTES # BLD AUTO: 0.4 K/UL (ref 1–4.8)
LYMPHOCYTES NFR BLD: 10.8 % (ref 18–48)
MCH RBC QN AUTO: 28.3 PG (ref 27–31)
MCHC RBC AUTO-ENTMCNC: 33.3 G/DL (ref 32–36)
MCV RBC AUTO: 85 FL (ref 82–98)
MONOCYTES # BLD AUTO: 0.2 K/UL (ref 0.3–1)
MONOCYTES NFR BLD: 4.8 % (ref 4–15)
NEUTROPHILS # BLD AUTO: 3.4 K/UL (ref 1.8–7.7)
NEUTROPHILS NFR BLD: 84.1 % (ref 38–73)
NITRITE UR QL STRIP: NEGATIVE
NONHDLC SERPL-MCNC: 84 MG/DL
NRBC BLD-RTO: 0 /100 WBC
PH UR STRIP: 6 [PH] (ref 5–8)
PLATELET # BLD AUTO: 255 K/UL (ref 150–350)
PMV BLD AUTO: 9.5 FL (ref 9.2–12.9)
POC PTINR: 1.2 (ref 0.9–1.2)
POC PTWBT: 14.6 SEC (ref 9.7–14.3)
POCT GLUCOSE: 167 MG/DL (ref 70–110)
POTASSIUM SERPL-SCNC: 3.7 MMOL/L (ref 3.5–5.1)
PROCALCITONIN SERPL IA-MCNC: 0.07 NG/ML
PROT SERPL-MCNC: 7.1 G/DL (ref 6–8.4)
PROT UR QL STRIP: NEGATIVE
PROTHROMBIN TIME: 10 SEC (ref 9–12.5)
RBC # BLD AUTO: 4.63 M/UL (ref 4.6–6.2)
SAMPLE: ABNORMAL
SAMPLE: NORMAL
SODIUM SERPL-SCNC: 132 MMOL/L (ref 136–145)
SP GR UR STRIP: 1.02 (ref 1–1.03)
TRIGL SERPL-MCNC: 110 MG/DL (ref 30–150)
TROPONIN I SERPL DL<=0.01 NG/ML-MCNC: <0.006 NG/ML (ref 0–0.03)
TSH SERPL DL<=0.005 MIU/L-ACNC: 2.08 UIU/ML (ref 0.4–4)
URN SPEC COLLECT METH UR: ABNORMAL
UROBILINOGEN UR STRIP-ACNC: NEGATIVE EU/DL
WBC # BLD AUTO: 3.99 K/UL (ref 3.9–12.7)

## 2020-03-28 PROCEDURE — 83605 ASSAY OF LACTIC ACID: CPT

## 2020-03-28 PROCEDURE — 80061 LIPID PANEL: CPT

## 2020-03-28 PROCEDURE — 81003 URINALYSIS AUTO W/O SCOPE: CPT

## 2020-03-28 PROCEDURE — 99223 1ST HOSP IP/OBS HIGH 75: CPT | Mod: ,,, | Performed by: NURSE PRACTITIONER

## 2020-03-28 PROCEDURE — 82565 ASSAY OF CREATININE: CPT

## 2020-03-28 PROCEDURE — 82962 GLUCOSE BLOOD TEST: CPT

## 2020-03-28 PROCEDURE — 83880 ASSAY OF NATRIURETIC PEPTIDE: CPT

## 2020-03-28 PROCEDURE — 85610 PROTHROMBIN TIME: CPT

## 2020-03-28 PROCEDURE — 84145 PROCALCITONIN (PCT): CPT

## 2020-03-28 PROCEDURE — 85025 COMPLETE CBC W/AUTO DIFF WBC: CPT

## 2020-03-28 PROCEDURE — 83605 ASSAY OF LACTIC ACID: CPT | Mod: 91

## 2020-03-28 PROCEDURE — 99223 PR INITIAL HOSPITAL CARE,LEVL III: ICD-10-PCS | Mod: ,,, | Performed by: NURSE PRACTITIONER

## 2020-03-28 PROCEDURE — 96374 THER/PROPH/DIAG INJ IV PUSH: CPT

## 2020-03-28 PROCEDURE — 11000001 HC ACUTE MED/SURG PRIVATE ROOM

## 2020-03-28 PROCEDURE — 80053 COMPREHEN METABOLIC PANEL: CPT

## 2020-03-28 PROCEDURE — U0002 COVID-19 LAB TEST NON-CDC: HCPCS

## 2020-03-28 PROCEDURE — 25000003 PHARM REV CODE 250: Performed by: PHYSICIAN ASSISTANT

## 2020-03-28 PROCEDURE — 63600175 PHARM REV CODE 636 W HCPCS: Performed by: PHYSICIAN ASSISTANT

## 2020-03-28 PROCEDURE — 87040 BLOOD CULTURE FOR BACTERIA: CPT

## 2020-03-28 PROCEDURE — 84443 ASSAY THYROID STIM HORMONE: CPT

## 2020-03-28 PROCEDURE — 84484 ASSAY OF TROPONIN QUANT: CPT

## 2020-03-28 PROCEDURE — 93010 EKG 12-LEAD: ICD-10-PCS | Mod: ,,, | Performed by: INTERNAL MEDICINE

## 2020-03-28 PROCEDURE — 99900035 HC TECH TIME PER 15 MIN (STAT)

## 2020-03-28 PROCEDURE — 86140 C-REACTIVE PROTEIN: CPT

## 2020-03-28 PROCEDURE — 99499 NO LOS: ICD-10-PCS | Mod: GT,,, | Performed by: PSYCHIATRY & NEUROLOGY

## 2020-03-28 PROCEDURE — 99499 UNLISTED E&M SERVICE: CPT | Mod: GT,,, | Performed by: PSYCHIATRY & NEUROLOGY

## 2020-03-28 PROCEDURE — 99285 EMERGENCY DEPT VISIT HI MDM: CPT | Mod: 25

## 2020-03-28 PROCEDURE — 93005 ELECTROCARDIOGRAM TRACING: CPT

## 2020-03-28 PROCEDURE — 93010 ELECTROCARDIOGRAM REPORT: CPT | Mod: ,,, | Performed by: INTERNAL MEDICINE

## 2020-03-28 PROCEDURE — 83615 LACTATE (LD) (LDH) ENZYME: CPT

## 2020-03-28 PROCEDURE — 82728 ASSAY OF FERRITIN: CPT

## 2020-03-28 PROCEDURE — 36415 COLL VENOUS BLD VENIPUNCTURE: CPT

## 2020-03-28 RX ORDER — HYDROCHLOROTHIAZIDE 25 MG/1
25 TABLET ORAL DAILY
Status: DISCONTINUED | OUTPATIENT
Start: 2020-03-29 | End: 2020-03-29 | Stop reason: HOSPADM

## 2020-03-28 RX ORDER — OLMESARTAN MEDOXOMIL AND HYDROCHLOROTHIAZIDE 40/25 40; 25 MG/1; MG/1
1 TABLET ORAL DAILY
Status: DISCONTINUED | OUTPATIENT
Start: 2020-03-29 | End: 2020-03-28

## 2020-03-28 RX ORDER — ONDANSETRON 8 MG/1
8 TABLET, ORALLY DISINTEGRATING ORAL EVERY 8 HOURS PRN
Status: DISCONTINUED | OUTPATIENT
Start: 2020-03-28 | End: 2020-03-29 | Stop reason: HOSPADM

## 2020-03-28 RX ORDER — INSULIN ASPART 100 [IU]/ML
1-10 INJECTION, SOLUTION INTRAVENOUS; SUBCUTANEOUS
Status: DISCONTINUED | OUTPATIENT
Start: 2020-03-28 | End: 2020-03-29 | Stop reason: HOSPADM

## 2020-03-28 RX ORDER — IBUPROFEN 200 MG
24 TABLET ORAL
Status: DISCONTINUED | OUTPATIENT
Start: 2020-03-28 | End: 2020-03-29 | Stop reason: HOSPADM

## 2020-03-28 RX ORDER — SODIUM CHLORIDE 0.9 % (FLUSH) 0.9 %
10 SYRINGE (ML) INJECTION
Status: DISCONTINUED | OUTPATIENT
Start: 2020-03-28 | End: 2020-03-29 | Stop reason: HOSPADM

## 2020-03-28 RX ORDER — ACETAMINOPHEN 325 MG/1
650 TABLET ORAL EVERY 4 HOURS PRN
Status: DISCONTINUED | OUTPATIENT
Start: 2020-03-28 | End: 2020-03-29 | Stop reason: HOSPADM

## 2020-03-28 RX ORDER — IBUPROFEN 200 MG
16 TABLET ORAL
Status: DISCONTINUED | OUTPATIENT
Start: 2020-03-28 | End: 2020-03-29 | Stop reason: HOSPADM

## 2020-03-28 RX ORDER — OLMESARTAN MEDOXOMIL 20 MG/1
40 TABLET ORAL DAILY
Status: DISCONTINUED | OUTPATIENT
Start: 2020-03-29 | End: 2020-03-29 | Stop reason: HOSPADM

## 2020-03-28 RX ORDER — ACETAMINOPHEN 500 MG
1000 TABLET ORAL
Status: COMPLETED | OUTPATIENT
Start: 2020-03-28 | End: 2020-03-28

## 2020-03-28 RX ORDER — GLUCAGON 1 MG
1 KIT INJECTION
Status: DISCONTINUED | OUTPATIENT
Start: 2020-03-28 | End: 2020-03-29 | Stop reason: HOSPADM

## 2020-03-28 RX ORDER — TAMSULOSIN HYDROCHLORIDE 0.4 MG/1
0.4 CAPSULE ORAL DAILY
Status: DISCONTINUED | OUTPATIENT
Start: 2020-03-29 | End: 2020-03-29 | Stop reason: HOSPADM

## 2020-03-28 RX ORDER — HEPARIN SODIUM 5000 [USP'U]/ML
5000 INJECTION, SOLUTION INTRAVENOUS; SUBCUTANEOUS EVERY 8 HOURS
Status: DISCONTINUED | OUTPATIENT
Start: 2020-03-28 | End: 2020-03-29 | Stop reason: HOSPADM

## 2020-03-28 RX ADMIN — ACETAMINOPHEN 1000 MG: 500 TABLET ORAL at 06:03

## 2020-03-28 RX ADMIN — SODIUM CHLORIDE 1000 ML: 0.9 INJECTION, SOLUTION INTRAVENOUS at 05:03

## 2020-03-28 RX ADMIN — DEXTROSE MONOHYDRATE 25 G: 25 INJECTION, SOLUTION INTRAVENOUS at 07:03

## 2020-03-28 NOTE — ED PROVIDER NOTES
Encounter Date: 3/28/2020       History     Chief Complaint   Patient presents with    General Illness     Generalized illness with bodyaches for the past week. Per EMS pt 85 % on room air.      Afebrile 84-year-old male with PMH of DM, HTN, chronic hepatitis-C, and retinopathy presents to the ED for evaluation of viral illness symptoms.  He reports that symptoms have been present for approximately 1 week.  He does state that he was seen at another facility on Tuesday and states as he had no fever he was not swab for COVID although he had a suspicion.  He states symptoms have worsened since this time.  He does complain of shortness of breath and continued cough.  He presents via EMS and was found to have room air oxygen saturations at 85% with no prior history of respiratory distress reported from patient.  He denies any chest pain, abdominal pain, vomiting or rash.  He has not tried any medications for the symptoms    The history is provided by the patient.     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  Family History   Problem Relation Age of Onset    Cancer Mother     Asbestos Father     Liver disease Neg Hx      Social History     Tobacco Use    Smoking status: Former Smoker     Packs/day: 1.00     Years: 40.00     Pack years: 40.00     Last attempt to quit: 1989     Years since quittin.4    Smokeless tobacco: Former User     Quit date: 1986   Substance Use Topics    Alcohol use: Yes     Alcohol/week: 0.0 standard drinks     Comment: 2 bottles beer on weekends    Drug use: No     Review of Systems   Constitutional: Positive for chills, fatigue and fever.   HENT: Positive for congestion. Negative for sore  throat.    Respiratory: Positive for cough and shortness of breath.    Cardiovascular: Negative for chest pain and palpitations.   Gastrointestinal: Negative for nausea and vomiting.   Genitourinary: Negative for dysuria.   Musculoskeletal: Positive for arthralgias. Negative for back pain, neck pain and neck stiffness.   Skin: Negative for rash.   Allergic/Immunologic: Positive for immunocompromised state (Chronic hepatitis-C).   Neurological: Positive for weakness.   Hematological: Does not bruise/bleed easily.   Psychiatric/Behavioral: Negative for confusion.       Physical Exam     Initial Vitals [03/28/20 1742]   BP Pulse Resp Temp SpO2   (!) 150/86 (!) 143 (!) 33 100.1 °F (37.8 °C) (!) 89 %      MAP       --         Physical Exam    Nursing note and vitals reviewed.        ED Course   Procedures  Labs Reviewed   CBC W/ AUTO DIFFERENTIAL - Abnormal; Notable for the following components:       Result Value    Hemoglobin 13.1 (*)     Hematocrit 39.3 (*)     Lymph # 0.4 (*)     Mono # 0.2 (*)     Gran% 84.1 (*)     Lymph% 10.8 (*)     All other components within normal limits   COMPREHENSIVE METABOLIC PANEL - Abnormal; Notable for the following components:    Sodium 132 (*)     Chloride 93 (*)     Glucose 57 (*)     Albumin 3.2 (*)     Alkaline Phosphatase 44 (*)     AST 63 (*)     eGFR if non  56 (*)     All other components within normal limits   URINALYSIS, REFLEX TO URINE CULTURE - Abnormal; Notable for the following components:    Glucose, UA Trace (*)     Occult Blood UA Trace (*)     All other components within normal limits    Narrative:     Preferred Collection Type->Urine, Clean Catch   C-REACTIVE PROTEIN - Abnormal; Notable for the following components:    .8 (*)     All other components within normal limits   LACTATE DEHYDROGENASE - Abnormal; Notable for the following components:     (*)     All other components within normal limits   FERRITIN - Abnormal; Notable for the  following components:    Ferritin 334 (*)     All other components within normal limits   LIPID PANEL - Abnormal; Notable for the following components:    LDL Cholesterol 62.0 (*)     All other components within normal limits   ISTAT PROCEDURE - Abnormal; Notable for the following components:    POC PTWBT 14.6 (*)     All other components within normal limits   POCT GLUCOSE - Abnormal; Notable for the following components:    POCT Glucose 167 (*)     All other components within normal limits   CULTURE, BLOOD   CULTURE, BLOOD   LACTIC ACID, PLASMA   B-TYPE NATRIURETIC PEPTIDE   TROPONIN I   PROCALCITONIN   TSH   LIPID PANEL   TSH   SARS-COV-2 (COVID-19) QUALITATIVE PCR   LACTIC ACID, PLASMA   PROTIME-INR   ISTAT CREATININE          Imaging Results          CT Head Without Contrast (Final result)  Result time 03/28/20 19:52:19    Final result by Dorene Phillips MD (03/28/20 19:52:19)                 Impression:      No acute intracranial abnormalities identified.      Electronically signed by: Dorene Phillips MD  Date:    03/28/2020  Time:    19:52             Narrative:    EXAMINATION:  CT HEAD WITHOUT CONTRAST    CLINICAL HISTORY:  Confusion/delirium, altered LOC, unexplained;Stroke;    TECHNIQUE:  Low dose axial images were obtained through the head.  Coronal and sagittal reformations were also performed. Contrast was not administered.    COMPARISON:  None.    FINDINGS:  There is generalized cerebral volume loss chronic microvascular ischemic disease.  No evidence of acute/recent major vascular distribution cerebral infarction, intraparenchymal hemorrhage, or intra-axial space occupying lesion. The ventricular system is normal in size and configuration with no evidence of hydrocephalus. No effacement of the skull-base cisterns. No abnormal extra-axial fluid collections or blood products. Visualized paranasal sinuses and mastoid air cells are clear. The calvarium shows no significant abnormality.                                X-Ray Chest AP Portable (Final result)  Result time 03/28/20 18:21:26    Final result by Dorene Phillips MD (03/28/20 18:21:26)                 Impression:      Subtle airspace opacities within the bilateral midlung zones which may reflect multifocal infectious process.      Electronically signed by: Dorene Phillips MD  Date:    03/28/2020  Time:    18:21             Narrative:    EXAMINATION:  XR CHEST AP PORTABLE    CLINICAL HISTORY:  Sepsis;    TECHNIQUE:  Single frontal view of the chest was performed.    COMPARISON:  02/17/2020.    FINDINGS:  Cardiac silhouette is normal in size.  Lungs are symmetrically expanded.  Subtle airspace opacities are seen within the bilateral mid lung zones, right greater than left.  No confluent focal consolidation seen.  No evidence of pneumothorax or significant pleural effusion.  No acute osseous abnormality identified.                                 Medical Decision Making:   Initial Assessment:   84-year-old male presents with worsening shortness of breath and flu-like symptoms.  He is noted to be tachypneic and hypoxic upon initial presentation through EMS.  This did improve with 4 L nasal cannula.  Mucous membranes are noted to be dry.  Lungs with clear breath sounds bilaterally.  Heart with regular rate rhythm.  Abdomen is soft and nontender.  Fair range of motion of neck in all extremities with strength equal bilaterally.  No focal neuro deficit.  Skin free of rash pallor diaphoresis.  Differential Diagnosis:   Differential Diagnosis includes, but is not limited to:  PE, MI/ACS, pneumothorax, pericardial effusion/tamonade, pneumonia, lung abscess, pericarditis/myocarditis, pleural effusion, lung mass, CHF exacerbation, asthma exacerbation, COPD exacerbation, aspirated/ingested foreign body, airway obstruction, CO poisoning, anemia, metabolic derangement, allergy/atopy, influenza, viral URI, viral syndrome.    Clinical Tests:   Lab Tests: Ordered and  Reviewed  Radiological Study: Ordered and Reviewed  Medical Tests: Ordered and Reviewed  ED Management:  Given patient's complaints and vitals concern of sepsis related to COVID infection.  Labs including CRP, LDH unfair 10 ordered.  As previously mentioned initial fluid bolus was ordered however was changed to 1 L of normal saline.  Chest x-ray is concerning for COVID as subtle airspace opacities noted to bilateral lung zones.  Noted elevation in LDH, paranoid and CRP.  Troponin and BNP are unremarkable.  Lactic and procalcitonin i are unremarkable suggesting low suspicion for community-acquired pneumonia.  Initial blood glucose was noted to be low at 57 this did improve to 167 after D50.  Given patient's comorbidities and hypoxia believe he would benefit from admission for further evaluation and supplemental oxygen.  Did discuss with patient he is agreeable to plan.  He will be admitted to hospitalist service.                   ED Course as of Mar 28 2154   Sat Mar 28, 2020   1802 Sepsis order set initiated.  Contrary to order 30 mg/kg bolus  patient will only receive 1 L bolus of NS    [LC]   1924 I was called into patient's room from nurse as he was reporting left sided weakness.  Neurological exam was completed with deficit to left upper and lower extremity.  Sensation intact    [LC]   1931 Of note patient's blood glucose is on chemistry was 57; this is likely lower than this and D50 was ordered.  This could be precipitating patient's complaints although given risk factors an NIH scale stroke code was continued    [LC]   2003 Patient did report complete complete resolution in symptoms.  Discussed with Dr. Barbour (neurologist) with no recommendations to initiate stroke code    [LC]      ED Course User Index  [LC] YONY Roman                Clinical Impression:       ICD-10-CM ICD-9-CM   1. Suspected Covid-19 Virus Infection R68.89    2. Sepsis A41.9 038.9     995.91   3. Hypoglycemia E16.2 251.2   4.  Hyperglycemia R73.9 790.29                                YONY Roman  03/28/20 2159

## 2020-03-29 VITALS
RESPIRATION RATE: 20 BRPM | SYSTOLIC BLOOD PRESSURE: 122 MMHG | OXYGEN SATURATION: 92 % | BODY MASS INDEX: 26.8 KG/M2 | TEMPERATURE: 99 F | DIASTOLIC BLOOD PRESSURE: 76 MMHG | HEIGHT: 70 IN | HEART RATE: 120 BPM | WEIGHT: 187.19 LBS

## 2020-03-29 PROBLEM — R09.02 HYPOXIA: Status: ACTIVE | Noted: 2020-03-29

## 2020-03-29 LAB
ALBUMIN SERPL BCP-MCNC: 2.9 G/DL (ref 3.5–5.2)
ALP SERPL-CCNC: 35 U/L (ref 55–135)
ALT SERPL W/O P-5'-P-CCNC: 32 U/L (ref 10–44)
ANION GAP SERPL CALC-SCNC: 12 MMOL/L (ref 8–16)
AST SERPL-CCNC: 60 U/L (ref 10–40)
BASOPHILS # BLD AUTO: 0 K/UL (ref 0–0.2)
BASOPHILS NFR BLD: 0 % (ref 0–1.9)
BILIRUB SERPL-MCNC: 0.3 MG/DL (ref 0.1–1)
BUN SERPL-MCNC: 14 MG/DL (ref 8–23)
CALCIUM SERPL-MCNC: 9.1 MG/DL (ref 8.7–10.5)
CHLORIDE SERPL-SCNC: 98 MMOL/L (ref 95–110)
CO2 SERPL-SCNC: 25 MMOL/L (ref 23–29)
CREAT SERPL-MCNC: 1.1 MG/DL (ref 0.5–1.4)
DIFFERENTIAL METHOD: ABNORMAL
EOSINOPHIL # BLD AUTO: 0 K/UL (ref 0–0.5)
EOSINOPHIL NFR BLD: 0 % (ref 0–8)
ERYTHROCYTE [DISTWIDTH] IN BLOOD BY AUTOMATED COUNT: 13.1 % (ref 11.5–14.5)
EST. GFR  (AFRICAN AMERICAN): >60 ML/MIN/1.73 M^2
EST. GFR  (NON AFRICAN AMERICAN): >60 ML/MIN/1.73 M^2
GLUCOSE SERPL-MCNC: 46 MG/DL (ref 70–110)
HCT VFR BLD AUTO: 37.2 % (ref 40–54)
HGB BLD-MCNC: 12.2 G/DL (ref 14–18)
IMM GRANULOCYTES # BLD AUTO: 0 K/UL (ref 0–0.04)
IMM GRANULOCYTES NFR BLD AUTO: 0 % (ref 0–0.5)
LYMPHOCYTES # BLD AUTO: 0.4 K/UL (ref 1–4.8)
LYMPHOCYTES NFR BLD: 8.6 % (ref 18–48)
MAGNESIUM SERPL-MCNC: 1.7 MG/DL (ref 1.6–2.6)
MCH RBC QN AUTO: 28.2 PG (ref 27–31)
MCHC RBC AUTO-ENTMCNC: 32.8 G/DL (ref 32–36)
MCV RBC AUTO: 86 FL (ref 82–98)
MONOCYTES # BLD AUTO: 0.2 K/UL (ref 0.3–1)
MONOCYTES NFR BLD: 4.4 % (ref 4–15)
NEUTROPHILS # BLD AUTO: 3.5 K/UL (ref 1.8–7.7)
NEUTROPHILS NFR BLD: 87 % (ref 38–73)
NRBC BLD-RTO: 0 /100 WBC
PHOSPHATE SERPL-MCNC: 3.1 MG/DL (ref 2.7–4.5)
PLATELET # BLD AUTO: 235 K/UL (ref 150–350)
PMV BLD AUTO: 9.2 FL (ref 9.2–12.9)
POCT GLUCOSE: 117 MG/DL (ref 70–110)
POCT GLUCOSE: 152 MG/DL (ref 70–110)
POCT GLUCOSE: 175 MG/DL (ref 70–110)
POCT GLUCOSE: 43 MG/DL (ref 70–110)
POCT GLUCOSE: 460 MG/DL (ref 70–110)
POCT GLUCOSE: 70 MG/DL (ref 70–110)
POTASSIUM SERPL-SCNC: 3.4 MMOL/L (ref 3.5–5.1)
PROT SERPL-MCNC: 6.5 G/DL (ref 6–8.4)
RBC # BLD AUTO: 4.32 M/UL (ref 4.6–6.2)
SARS-COV-2 RNA RESP QL NAA+PROBE: DETECTED
SODIUM SERPL-SCNC: 135 MMOL/L (ref 136–145)
TROPONIN I SERPL DL<=0.01 NG/ML-MCNC: 0.01 NG/ML (ref 0–0.03)
TROPONIN I SERPL DL<=0.01 NG/ML-MCNC: 0.01 NG/ML (ref 0–0.03)
WBC # BLD AUTO: 4.07 K/UL (ref 3.9–12.7)

## 2020-03-29 PROCEDURE — 63600175 PHARM REV CODE 636 W HCPCS: Performed by: NURSE PRACTITIONER

## 2020-03-29 PROCEDURE — 84100 ASSAY OF PHOSPHORUS: CPT

## 2020-03-29 PROCEDURE — 83735 ASSAY OF MAGNESIUM: CPT

## 2020-03-29 PROCEDURE — 99238 HOSP IP/OBS DSCHRG MGMT 30/<: CPT | Mod: ,,, | Performed by: HOSPITALIST

## 2020-03-29 PROCEDURE — 84484 ASSAY OF TROPONIN QUANT: CPT

## 2020-03-29 PROCEDURE — 25000003 PHARM REV CODE 250: Performed by: NURSE PRACTITIONER

## 2020-03-29 PROCEDURE — 85025 COMPLETE CBC W/AUTO DIFF WBC: CPT

## 2020-03-29 PROCEDURE — 80053 COMPREHEN METABOLIC PANEL: CPT

## 2020-03-29 PROCEDURE — 99238 PR HOSPITAL DISCHARGE DAY,<30 MIN: ICD-10-PCS | Mod: ,,, | Performed by: HOSPITALIST

## 2020-03-29 PROCEDURE — 36415 COLL VENOUS BLD VENIPUNCTURE: CPT

## 2020-03-29 RX ADMIN — HEPARIN SODIUM 5000 UNITS: 5000 INJECTION, SOLUTION INTRAVENOUS; SUBCUTANEOUS at 05:03

## 2020-03-29 RX ADMIN — HYDROCHLOROTHIAZIDE 25 MG: 25 TABLET ORAL at 09:03

## 2020-03-29 RX ADMIN — DEXTROSE MONOHYDRATE 25 G: 25 INJECTION, SOLUTION INTRAVENOUS at 03:03

## 2020-03-29 RX ADMIN — HEPARIN SODIUM 5000 UNITS: 5000 INJECTION, SOLUTION INTRAVENOUS; SUBCUTANEOUS at 04:03

## 2020-03-29 RX ADMIN — OLMESARTAN MEDOXOMIL 40 MG: 20 TABLET, FILM COATED ORAL at 09:03

## 2020-03-29 RX ADMIN — INSULIN ASPART 2 UNITS: 100 INJECTION, SOLUTION INTRAVENOUS; SUBCUTANEOUS at 11:03

## 2020-03-29 RX ADMIN — TAMSULOSIN HYDROCHLORIDE 0.4 MG: 0.4 CAPSULE ORAL at 09:03

## 2020-03-29 RX ADMIN — ACETAMINOPHEN 650 MG: 325 TABLET ORAL at 05:03

## 2020-03-29 NOTE — NURSING
Critical lab called at 2:58 of glucose of 46, rechecked with finger stick and glucose at 43, notified NP and gave 25 g of dextrose IV as ordered    Will continue to monitor

## 2020-03-29 NOTE — ED NOTES
Stroke symptoms resolved, pt able to move L arm and L leg, no drift noted, speech clear. YONY Hidalgo notified.

## 2020-03-29 NOTE — HPI
From H&P by Ranjan Douglas NP:  The patient is an afebrile 84-year-old male with PMH of DM, HTN, chronic hepatitis-C, and retinopathy presents for evaluation of viral illness symptoms.  He reports that symptoms have been present for approximately 1 week.  He does state that he was seen at another facility on Tuesday and states as he had no fever he was not swab for COVID although he had a suspicion.  He states symptoms have worsened since this time.  He does complain of shortness of breath and continued cough.  He presents via EMS and was found to have room air oxygen saturations at 85% with no prior history of respiratory distress reported from patient.  He denies any chest pain, abdominal pain, vomiting or rash.  He has not tried any medications for the symptoms

## 2020-03-29 NOTE — ASSESSMENT & PLAN NOTE
CXR- Subtle airspace opacities within the bilateral midlung zones which may reflect multifocal infectious process.    Covid pending  Isolation  Oxygen

## 2020-03-29 NOTE — SIGNIFICANT EVENT
Patient had oxygen saturation 85% on room air at rest.  When 3 liters were placed it improved to 93%, and then 96%.

## 2020-03-29 NOTE — ED NOTES
Upon entering pt room for assessment pt states that he is unable to move his left arm or leg. Pt denies numbness, tingling, unable to move. + drift to L arm and L leg, aaox 3, slurred speech and slight facial droop noted.  YONY Hidalgo notified and at bedside to assess pt.

## 2020-03-29 NOTE — PROGRESS NOTES
Blood glucose 70 at 8am. Awake and alert, in no acute distress, no s/s of hypoglycemia noted. Breakfast and orange juice given, patient ate 50% breakfast. Will continue to monitor and re check BG shortly.

## 2020-03-29 NOTE — PLAN OF CARE
Pt in bed, no noted acute distress, no complaints of pain, pt had episode of generalized weakness and confusion r/t hypoglycemia, NP notified and 25 g of dextrose was administered, blood glucose at 117 at recheck, VSS on 4 L/min via N.C., bed in low locked position, call light in reach, hourly rounds complete, will continue to assess

## 2020-03-29 NOTE — PLAN OF CARE
Received secure chat from patient's nurse requesting a telephone call to patient daughter    Called patient daughter, Mckinley; addressed her concerns about her father not returning to work and reliable transportation home from hospital.

## 2020-03-29 NOTE — PLAN OF CARE
3/29/2020    Patient: Peter Lopez    YOB: 1935    To whom it may concern,    Peter Lopez was admitted to the hospital under my care on 3/28/2020 and was discharged on 3/29/2020.  Cleared to return to work on 4/13/20.    If you have any questions or concerns, please don't hesitate to contact the department of hospital medicine at [phone]    Sincerely,    Debbie Rogers MD MPH  Department of Hospital Medicine

## 2020-03-29 NOTE — CONSULTS
Consults   84-year-old male with onset of generalized weakness associated with a temperature which brought him into the emergency room.  While in the ED, at 7:15 p.m., the patient developed left upper and lower extremity paresis.  He had a glucose drawn which was 57.  He was treated with 1 amp of D50 and his symptoms resolved.  Spoke with our with Dr. Miller insert macro canceled because patient's symptoms more likely referable to hypoglycemia.

## 2020-03-29 NOTE — H&P
[facility] Hospital Medicine  History & Physical    Patient Name: Peter Lopez  MRN: [id number]  Admission Date: 3/28/2020  Attending Physician: Chago Hollis MD   Primary Care Provider: Lucas Lloyd Jr, MD         Patient information was obtained from patient, past medical records and ER records.     Subjective:     Principal Problem:Suspected Covid-19 Virus Infection    Chief Complaint:   Chief Complaint   Patient presents with    General Illness     Generalized illness with bodyaches for the past week. Per EMS pt 85 % on room air.         HPI: The patient is an afebrile 84-year-old male with PMH of DM, HTN, chronic hepatitis-C, and retinopathy presents for evaluation of viral illness symptoms.  He reports that symptoms have been present for approximately 1 week.  He does state that he was seen at another facility on Tuesday and states as he had no fever he was not swab for COVID although he had a suspicion.  He states symptoms have worsened since this time.  He does complain of shortness of breath and continued cough.  He presents via EMS and was found to have room air oxygen saturations at 85% with no prior history of respiratory distress reported from patient.  He denies any chest pain, abdominal pain, vomiting or rash.  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 No pertinent surgical history.    Review of patient's allergies indicates:  No Known Allergies    No current facility-administered medications on file prior to encounter.      Current Outpatient Medications on File Prior to Encounter   Medication Sig    glimepiride (AMARYL) 4 MG tablet Take 1 tablet (4 mg total) by mouth once daily.    metFORMIN (GLUCOPHAGE) 1000 MG  tablet TAKE 1 TABLET BY MOUTH TWICE DAILY WITH MEALS    olmesartan-hydrochlorothiazide (BENICAR HCT) 40-25 mg per tablet TAKE 1 TABLET BY MOUTH ONCE DAILY    albuterol (PROAIR HFA) 90 mcg/actuation inhaler Inhale 2 puffs into the lungs every 4 (four) hours as needed (cough/shortness of breath). Rescue    ciclopirox (PENLAC) 8 % Soln Apply topically nightly.    famotidine (PEPCID) 20 MG tablet Take 1 tablet (20 mg total) by mouth once daily.    fluticasone propionate (FLONASE) 50 mcg/actuation nasal spray 1 spray (50 mcg total) by Each Nostril route once daily.    ipratropium (ATROVENT) 42 mcg (0.06 %) nasal spray 2 sprays by Nasal route 4 (four) times daily.    loratadine (CLARITIN) 10 mg tablet Take 1 tablet (10 mg total) by mouth once daily.    sildenafil (VIAGRA) 50 MG tablet Take 1 tablet (50 mg total) by mouth daily as needed for Erectile Dysfunction. 1 hour before sex, maximum of 1 tablet in 24 hours    tamsulosin (FLOMAX) 0.4 mg Cap Take 1 capsule (0.4 mg total) by mouth once daily.     Family History     Problem Relation (Age of Onset)    Asbestos Father    Cancer Mother        Tobacco Use    Smoking status: Former Smoker     Packs/day: 1.00     Years: 40.00     Pack years: 40.00     Last attempt to quit: 1989     Years since quittin.4    Smokeless tobacco: Former User     Quit date: 1986   Substance and Sexual Activity    Alcohol use: Yes     Alcohol/week: 0.0 standard drinks     Comment: 2 bottles beer on weekends    Drug use: No    Sexual activity: Yes     Partners: Female     Review of Systems   Constitutional: Positive for activity change, appetite change and fatigue. Negative for fever.   HENT: Negative for congestion, ear pain and postnasal drip.    Eyes: Negative for discharge.   Respiratory: Positive for cough and shortness of breath. Negative for apnea and wheezing.    Cardiovascular: Negative for chest pain and leg swelling.   Gastrointestinal: Negative for abdominal  distention, abdominal pain, nausea and vomiting.   Endocrine: Negative for polydipsia, polyphagia and polyuria.   Genitourinary: Negative for difficulty urinating, flank pain, frequency, hematuria and urgency.   Musculoskeletal: Positive for myalgias. Negative for arthralgias and joint swelling.   Skin: Negative for pallor and rash.   Allergic/Immunologic: Negative for environmental allergies and food allergies.   Neurological: Negative for dizziness, speech difficulty, weakness, light-headedness and headaches.   Hematological: Does not bruise/bleed easily.   Psychiatric/Behavioral: Negative for agitation.     Objective:     Vital Signs (Most Recent):  Temp: 97.9 °F (36.6 °C) (03/28/20 2238)  Pulse: 98 (03/28/20 2238)  Resp: 18 (03/28/20 2238)  BP: (!) 143/77 (03/28/20 2238)  SpO2: 100 % (03/28/20 2238) Vital Signs (24h Range):  Temp:  [97.9 °F (36.6 °C)-100.1 °F (37.8 °C)] 97.9 °F (36.6 °C)  Pulse:  [] 98  Resp:  [18-33] 18  SpO2:  [89 %-100 %] 100 %  BP: (119-170)/(68-86) 143/77     Weight: 84.4 kg (186 lb)  Body mass index is 26.69 kg/m².    Physical Exam   Constitutional: He is oriented to person, place, and time. He appears well-developed and well-nourished.   HENT:   Head: Normocephalic.   Eyes: Conjunctivae are normal.   Neck: Normal range of motion. Neck supple.   Cardiovascular: Regular rhythm, normal heart sounds and intact distal pulses. Tachycardia present.   Pulses:       Radial pulses are 2+ on the right side, and 2+ on the left side.   Pulmonary/Chest: Effort normal. He has decreased breath sounds in the right lower field.   Abdominal: Soft. He exhibits no distension. Bowel sounds are increased. There is no tenderness.   Musculoskeletal: Normal range of motion.   Neurological: He is alert and oriented to person, place, and time. He has normal strength. GCS eye subscore is 4. GCS verbal subscore is 5. GCS motor subscore is 6.   Skin: Skin is warm and dry.   Psychiatric: He has a normal mood and  affect. His speech is normal and behavior is normal.           Significant Labs:   CBC:   Recent Labs   Lab 03/28/20  1753   WBC 3.99   HGB 13.1*   HCT 39.3*        CMP:   Recent Labs   Lab 03/28/20  1753   *   K 3.7   CL 93*   CO2 26   GLU 57*   BUN 18   CREATININE 1.2   CALCIUM 9.6   PROT 7.1   ALBUMIN 3.2*   BILITOT 0.3   ALKPHOS 44*   AST 63*   ALT 39   ANIONGAP 13   EGFRNONAA 56*       Significant Imaging: I have reviewed all pertinent imaging results/findings within the past 24 hours.    Assessment/Plan:     * Suspected Covid-19 Virus Infection  CXR- Subtle airspace opacities within the bilateral midlung zones which may reflect multifocal infectious process.    Covid pending  Isolation  Oxygen      CKD (chronic kidney disease) stage 3, GFR 30-59 ml/min  Creatinine 1.2, at baseline    Monitor for acute decompensation      Diabetes mellitus with nephropathy  BG- 57    A1c pending  Low dose SSI  BG AC/HS      HTN (hypertension)  Hypertensive currently    Continue olmesartan/hctz        VTE Risk Mitigation (From admission, onward)         Ordered     heparin (porcine) injection 5,000 Units  Every 8 hours      03/28/20 2232     IP VTE HIGH RISK PATIENT  Once      03/28/20 2232                   Ranjan Douglas NP  Department of Hospital Medicine   [facility]

## 2020-03-29 NOTE — SUBJECTIVE & OBJECTIVE
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 No pertinent surgical history.    Review of patient's allergies indicates:  No Known Allergies    No current facility-administered medications on file prior to encounter.      Current Outpatient Medications on File Prior to Encounter   Medication Sig    glimepiride (AMARYL) 4 MG tablet Take 1 tablet (4 mg total) by mouth once daily.    metFORMIN (GLUCOPHAGE) 1000 MG tablet TAKE 1 TABLET BY MOUTH TWICE DAILY WITH MEALS    olmesartan-hydrochlorothiazide (BENICAR HCT) 40-25 mg per tablet TAKE 1 TABLET BY MOUTH ONCE DAILY    albuterol (PROAIR HFA) 90 mcg/actuation inhaler Inhale 2 puffs into the lungs every 4 (four) hours as needed (cough/shortness of breath). Rescue    ciclopirox (PENLAC) 8 % Soln Apply topically nightly.    famotidine (PEPCID) 20 MG tablet Take 1 tablet (20 mg total) by mouth once daily.    fluticasone propionate (FLONASE) 50 mcg/actuation nasal spray 1 spray (50 mcg total) by Each Nostril route once daily.    ipratropium (ATROVENT) 42 mcg (0.06 %) nasal spray 2 sprays by Nasal route 4 (four) times daily.    loratadine (CLARITIN) 10 mg tablet Take 1 tablet (10 mg total) by mouth once daily.    sildenafil (VIAGRA) 50 MG tablet Take 1 tablet (50 mg total) by mouth daily as needed for Erectile Dysfunction. 1 hour before sex, maximum of 1 tablet in 24 hours    tamsulosin (FLOMAX) 0.4 mg Cap Take 1 capsule (0.4 mg total) by mouth once daily.     Family History     Problem Relation (Age of Onset)    Asbestos Father    Cancer Mother        Tobacco Use    Smoking status: Former Smoker     Packs/day: 1.00     Years: 40.00     Pack years: 40.00     Last attempt to quit: 1989     Years since quittin.4    Smokeless tobacco: Former User      Quit date: 01/1986   Substance and Sexual Activity    Alcohol use: Yes     Alcohol/week: 0.0 standard drinks     Comment: 2 bottles beer on weekends    Drug use: No    Sexual activity: Yes     Partners: Female     Review of Systems   Constitutional: Positive for activity change, appetite change and fatigue. Negative for fever.   HENT: Negative for congestion, ear pain and postnasal drip.    Eyes: Negative for discharge.   Respiratory: Positive for cough and shortness of breath. Negative for apnea and wheezing.    Cardiovascular: Negative for chest pain and leg swelling.   Gastrointestinal: Negative for abdominal distention, abdominal pain, nausea and vomiting.   Endocrine: Negative for polydipsia, polyphagia and polyuria.   Genitourinary: Negative for difficulty urinating, flank pain, frequency, hematuria and urgency.   Musculoskeletal: Positive for myalgias. Negative for arthralgias and joint swelling.   Skin: Negative for pallor and rash.   Allergic/Immunologic: Negative for environmental allergies and food allergies.   Neurological: Negative for dizziness, speech difficulty, weakness, light-headedness and headaches.   Hematological: Does not bruise/bleed easily.   Psychiatric/Behavioral: Negative for agitation.     Objective:     Vital Signs (Most Recent):  Temp: 97.9 °F (36.6 °C) (03/28/20 2238)  Pulse: 98 (03/28/20 2238)  Resp: 18 (03/28/20 2238)  BP: (!) 143/77 (03/28/20 2238)  SpO2: 100 % (03/28/20 2238) Vital Signs (24h Range):  Temp:  [97.9 °F (36.6 °C)-100.1 °F (37.8 °C)] 97.9 °F (36.6 °C)  Pulse:  [] 98  Resp:  [18-33] 18  SpO2:  [89 %-100 %] 100 %  BP: (119-170)/(68-86) 143/77     Weight: 84.4 kg (186 lb)  Body mass index is 26.69 kg/m².    Physical Exam   Constitutional: He is oriented to person, place, and time. He appears well-developed and well-nourished.   HENT:   Head: Normocephalic.   Eyes: Conjunctivae are normal.   Neck: Normal range of motion. Neck supple.   Cardiovascular: Regular  rhythm, normal heart sounds and intact distal pulses. Tachycardia present.   Pulses:       Radial pulses are 2+ on the right side, and 2+ on the left side.   Pulmonary/Chest: Effort normal. He has decreased breath sounds in the right lower field.   Abdominal: Soft. He exhibits no distension. Bowel sounds are increased. There is no tenderness.   Musculoskeletal: Normal range of motion.   Neurological: He is alert and oriented to person, place, and time. He has normal strength. GCS eye subscore is 4. GCS verbal subscore is 5. GCS motor subscore is 6.   Skin: Skin is warm and dry.   Psychiatric: He has a normal mood and affect. His speech is normal and behavior is normal.           Significant Labs:   CBC:   Recent Labs   Lab 03/28/20  1753   WBC 3.99   HGB 13.1*   HCT 39.3*        CMP:   Recent Labs   Lab 03/28/20  1753   *   K 3.7   CL 93*   CO2 26   GLU 57*   BUN 18   CREATININE 1.2   CALCIUM 9.6   PROT 7.1   ALBUMIN 3.2*   BILITOT 0.3   ALKPHOS 44*   AST 63*   ALT 39   ANIONGAP 13   EGFRNONAA 56*       Significant Imaging: I have reviewed all pertinent imaging results/findings within the past 24 hours.

## 2020-03-29 NOTE — PLAN OF CARE
Received approval from [facility]      Delivered one portable oxygen tank to patient's hospital room; Notified patient's nurse, Samantha equipment placed at door and to place a respiratory communication in [facility] for patient education.   Communicated to nurse patient need to call [facility] when they make it home to completed home concentrator set up. ; Verbalizes understanding.

## 2020-03-30 ENCOUNTER — HOSPITAL ENCOUNTER (EMERGENCY)
Facility: OTHER | Age: 85
Discharge: HOME | End: 2020-03-30
Attending: EMERGENCY MEDICINE
Payer: MEDICARE

## 2020-03-30 VITALS
TEMPERATURE: 99 F | HEART RATE: 107 BPM | SYSTOLIC BLOOD PRESSURE: 141 MMHG | DIASTOLIC BLOOD PRESSURE: 75 MMHG | RESPIRATION RATE: 22 BRPM | OXYGEN SATURATION: 100 %

## 2020-03-30 DIAGNOSIS — U07.1 COVID-19 VIRUS INFECTION: Primary | ICD-10-CM

## 2020-03-30 DIAGNOSIS — R06.09 DOE (DYSPNEA ON EXERTION): ICD-10-CM

## 2020-03-30 DIAGNOSIS — R05.9 COUGH: ICD-10-CM

## 2020-03-30 LAB
ALBUMIN SERPL BCP-MCNC: 3 G/DL (ref 3.5–5.2)
ALP SERPL-CCNC: 38 U/L (ref 55–135)
ALT SERPL W/O P-5'-P-CCNC: 36 U/L (ref 10–44)
ANION GAP SERPL CALC-SCNC: 15 MMOL/L (ref 8–16)
AST SERPL-CCNC: 64 U/L (ref 10–40)
BASOPHILS # BLD AUTO: 0.01 K/UL (ref 0–0.2)
BASOPHILS NFR BLD: 0.2 % (ref 0–1.9)
BILIRUB SERPL-MCNC: 0.3 MG/DL (ref 0.1–1)
BUN SERPL-MCNC: 21 MG/DL (ref 8–23)
CALCIUM SERPL-MCNC: 9.3 MG/DL (ref 8.7–10.5)
CHLORIDE SERPL-SCNC: 92 MMOL/L (ref 95–110)
CO2 SERPL-SCNC: 27 MMOL/L (ref 23–29)
CREAT SERPL-MCNC: 1.4 MG/DL (ref 0.5–1.4)
CRP SERPL-MCNC: 217.8 MG/L (ref 0–8.2)
DIFFERENTIAL METHOD: ABNORMAL
EOSINOPHIL # BLD AUTO: 0 K/UL (ref 0–0.5)
EOSINOPHIL NFR BLD: 0 % (ref 0–8)
ERYTHROCYTE [DISTWIDTH] IN BLOOD BY AUTOMATED COUNT: 13.3 % (ref 11.5–14.5)
EST. GFR  (AFRICAN AMERICAN): 54 ML/MIN/1.73 M^2
EST. GFR  (NON AFRICAN AMERICAN): 46 ML/MIN/1.73 M^2
GLUCOSE SERPL-MCNC: 163 MG/DL (ref 70–110)
HCT VFR BLD AUTO: 39.8 % (ref 40–54)
HGB BLD-MCNC: 12.9 G/DL (ref 14–18)
IMM GRANULOCYTES # BLD AUTO: 0.02 K/UL (ref 0–0.04)
IMM GRANULOCYTES NFR BLD AUTO: 0.5 % (ref 0–0.5)
LYMPHOCYTES # BLD AUTO: 0.4 K/UL (ref 1–4.8)
LYMPHOCYTES NFR BLD: 9.3 % (ref 18–48)
MCH RBC QN AUTO: 28.1 PG (ref 27–31)
MCHC RBC AUTO-ENTMCNC: 32.4 G/DL (ref 32–36)
MCV RBC AUTO: 87 FL (ref 82–98)
MONOCYTES # BLD AUTO: 0.3 K/UL (ref 0.3–1)
MONOCYTES NFR BLD: 6.4 % (ref 4–15)
NEUTROPHILS # BLD AUTO: 3.4 K/UL (ref 1.8–7.7)
NEUTROPHILS NFR BLD: 83.6 % (ref 38–73)
NRBC BLD-RTO: 0 /100 WBC
PLATELET # BLD AUTO: 304 K/UL (ref 150–350)
PMV BLD AUTO: 9.8 FL (ref 9.2–12.9)
POTASSIUM SERPL-SCNC: 4.3 MMOL/L (ref 3.5–5.1)
PROCALCITONIN SERPL IA-MCNC: 0.15 NG/ML
PROT SERPL-MCNC: 7.3 G/DL (ref 6–8.4)
RBC # BLD AUTO: 4.59 M/UL (ref 4.6–6.2)
SODIUM SERPL-SCNC: 134 MMOL/L (ref 136–145)
TROPONIN I SERPL DL<=0.01 NG/ML-MCNC: 0.01 NG/ML (ref 0–0.03)
WBC # BLD AUTO: 4.09 K/UL (ref 3.9–12.7)

## 2020-03-30 PROCEDURE — 85025 COMPLETE CBC W/AUTO DIFF WBC: CPT

## 2020-03-30 PROCEDURE — 96360 HYDRATION IV INFUSION INIT: CPT

## 2020-03-30 PROCEDURE — 86140 C-REACTIVE PROTEIN: CPT

## 2020-03-30 PROCEDURE — 80053 COMPREHEN METABOLIC PANEL: CPT

## 2020-03-30 PROCEDURE — 96361 HYDRATE IV INFUSION ADD-ON: CPT

## 2020-03-30 PROCEDURE — 84145 PROCALCITONIN (PCT): CPT

## 2020-03-30 PROCEDURE — 63600175 PHARM REV CODE 636 W HCPCS: Performed by: EMERGENCY MEDICINE

## 2020-03-30 PROCEDURE — 99284 EMERGENCY DEPT VISIT MOD MDM: CPT | Mod: 25

## 2020-03-30 PROCEDURE — 84484 ASSAY OF TROPONIN QUANT: CPT

## 2020-03-30 RX ORDER — ALBUTEROL SULFATE 90 UG/1
2 AEROSOL, METERED RESPIRATORY (INHALATION) EVERY 4 HOURS PRN
Qty: 18 G | Refills: 0 | Status: SHIPPED | OUTPATIENT
Start: 2020-03-30 | End: 2020-03-30 | Stop reason: SDUPTHER

## 2020-03-30 RX ORDER — AZITHROMYCIN 250 MG/1
250 TABLET, FILM COATED ORAL DAILY
Qty: 6 TABLET | Refills: 0 | Status: SHIPPED | OUTPATIENT
Start: 2020-03-30 | End: 2020-03-30 | Stop reason: SDUPTHER

## 2020-03-30 RX ORDER — AZITHROMYCIN 250 MG/1
250 TABLET, FILM COATED ORAL DAILY
Qty: 6 TABLET | Refills: 0 | Status: ON HOLD | OUTPATIENT
Start: 2020-03-30 | End: 2020-04-22 | Stop reason: HOSPADM

## 2020-03-30 RX ORDER — ALBUTEROL SULFATE 90 UG/1
2 AEROSOL, METERED RESPIRATORY (INHALATION) EVERY 4 HOURS PRN
Qty: 18 G | Refills: 0 | Status: SHIPPED | OUTPATIENT
Start: 2020-03-30 | End: 2023-04-11

## 2020-03-30 RX ORDER — ONDANSETRON 4 MG/1
4 TABLET, ORALLY DISINTEGRATING ORAL EVERY 8 HOURS PRN
Qty: 15 TABLET | Refills: 0 | Status: SHIPPED | OUTPATIENT
Start: 2020-03-30 | End: 2020-03-30 | Stop reason: SDUPTHER

## 2020-03-30 RX ORDER — ONDANSETRON 4 MG/1
4 TABLET, ORALLY DISINTEGRATING ORAL EVERY 8 HOURS PRN
Qty: 15 TABLET | Refills: 0 | Status: SHIPPED | OUTPATIENT
Start: 2020-03-30 | End: 2020-06-16

## 2020-03-30 RX ADMIN — SODIUM CHLORIDE 500 ML: 0.9 INJECTION, SOLUTION INTRAVENOUS at 11:03

## 2020-03-30 NOTE — PROGRESS NOTES
All discharge instructions reviewed with patient. Home oxygen equipment reviewed and patient verbalized and demonstrated understanding. PRN tylenol given this shift for elevated temp with positive results. Peripheral IV's and telemetry box removed per order prior to discharge, patient tolerated well. Patient transported off unit via WC, to be driven home by ambulance. Safety precautions maintained. Surgical mask in place per order.

## 2020-03-30 NOTE — PLAN OF CARE
[facility]    HOME HEALTH ORDERS  FACE TO FACE ENCOUNTER    Patient Name: Peter Lopez  YOB: 1935    PCP: Lucas Lloyd Jr, MD   PCP Address: Al[address]  PCP Phone Number: [phone]  PCP Fax: [phone]    Encounter Date: 03/30/2020    Admit to Home Health    Diagnoses:  COVID-19 infection, hypoxia    Future Appointments   Date Time Provider Department Center   5/4/2020  8:30 AM Lucia Aguiar DPM [facility] POD [place]   5/11/2020  2:00 PM ULTRASOUND, UROLOGY [facility]   6/1/2020  1:15 PM EDY Love MD [facility]   6/8/2020  8:40 AM Lucas Lloyd Jr., MD [facility]     Follow-up Information     Go to  [facility].    Specialty:  Emergency Medicine  Why:  If symptoms worsen  Contact information:  [address]  [facility] [address] [phone]                   I have seen and examined this patient face to face today. My clinical findings that support the need for the home health skilled services and home bound status are the following:  Weakness/numbness causing balance and gait disturbance due to Weakness/Debility making it taxing to leave home.  Medical restrictions requiring assistance of another human to leave home due to  Home oxygen requirement.    Allergies:Review of patient's allergies indicates:  No Known Allergies    Diet: regular diet    Activities: activity as tolerated    Nursing:   SN to complete comprehensive assessment including routine vital signs. Instruct on disease process and s/s of complications to report to MD. Review/verify medication list sent home with the patient at time of discharge  and instruct patient/caregiver as needed. Frequency may be adjusted depending on start of care date.    Notify MD if SBP > 160 or < 90; DBP > 90 or < 50; HR > 120 or < 50; Temp > 101;       MISCELLANEOUS CARE:  Home Oxygen:  Oxygen at 2 L/min nasal canula to be  used:  Continuously. and Assess oxygen saturation via pulse oximeter as needed for increase in SOB.    Medications: Review discharge medications with patient and family and provide education.      Current Discharge Medication List      START taking these medications    Details   azithromycin (Z-BARAK) 250 MG tablet Take 1 tablet (250 mg total) by mouth once daily. Take first 2 tablets together, then 1 every day until finished.  Qty: 6 tablet, Refills: 0      ondansetron (ZOFRAN-ODT) 4 MG TbDL Take 1 tablet (4 mg total) by mouth every 8 (eight) hours as needed.  Qty: 15 tablet, Refills: 0         CONTINUE these medications which have CHANGED    Details   albuterol (PROAIR HFA) 90 mcg/actuation inhaler Inhale 2 puffs into the lungs every 4 (four) hours as needed for Wheezing (cough/shortness of breath). Rescue  Qty: 18 g, Refills: 0    Associated Diagnoses: Cough         CONTINUE these medications which have NOT CHANGED    Details   metFORMIN (GLUCOPHAGE) 1000 MG tablet TAKE 1 TABLET BY MOUTH TWICE DAILY WITH MEALS  Qty: 180 tablet, Refills: 3    Associated Diagnoses: Uncontrolled type 2 diabetes mellitus without complication, without long-term current use of insulin      ciclopirox (PENLAC) 8 % Soln Apply topically nightly.  Qty: 1 Bottle, Refills: 3      famotidine (PEPCID) 20 MG tablet Take 1 tablet (20 mg total) by mouth once daily.  Qty: 30 tablet, Refills: 1    Associated Diagnoses: Gastroesophageal reflux disease, esophagitis presence not specified      fluticasone propionate (FLONASE) 50 mcg/actuation nasal spray 1 spray (50 mcg total) by Each Nostril route once daily.  Qty: 15.8 mL, Refills: 1    Associated Diagnoses: Common cold      ipratropium (ATROVENT) 42 mcg (0.06 %) nasal spray 2 sprays by Nasal route 4 (four) times daily.  Qty: 15 mL, Refills: 0    Associated Diagnoses: Cough      loratadine (CLARITIN) 10 mg tablet Take 1 tablet (10 mg total) by mouth once daily.  Qty: 30 tablet, Refills: 1     Associated Diagnoses: Atypical pneumonia      olmesartan-hydrochlorothiazide (BENICAR HCT) 40-25 mg per tablet TAKE 1 TABLET BY MOUTH ONCE DAILY  Qty: 90 tablet, Refills: 3    Associated Diagnoses: Essential hypertension      sildenafil (VIAGRA) 50 MG tablet Take 1 tablet (50 mg total) by mouth daily as needed for Erectile Dysfunction. 1 hour before sex, maximum of 1 tablet in 24 hours  Qty: 10 tablet, Refills: 5    Associated Diagnoses: Erectile dysfunction, unspecified erectile dysfunction type      tamsulosin (FLOMAX) 0.4 mg Cap Take 1 capsule (0.4 mg total) by mouth once daily.  Qty: 30 capsule, Refills: 11    Associated Diagnoses: Weak urinary stream; Incomplete bladder emptying             I certify that this patient is confined to his home and needs intermittent skilled nursing care.

## 2020-03-30 NOTE — ED NOTES
Mckinley Rodriguez, patients daughter - [phone], would like to be called with an update or upon discharge/disposition.

## 2020-03-30 NOTE — PLAN OF CARE
Long conversation with pt daughter, Mckinley Rodriguez [phone].   Pt was discharged from hospital yesterday with new home oxygen.  Pt refused delivery of concentrator when he arrived at home.  Wore his portable tank all night.    Today felt worse and presented to ER.     Daughter quinn, states she feels her dad should be admitted, as he got worse at home.  Explained pt got worse because he did not allow delivery of necessary oxygen.  Daughter understands.    Daughter requests HH,, explained there are shortage of HH companies, but CM will send request for home health to [facility] and provided [facility] contact info to daughter.  She will keep calling [facility] to followup.      Referral (facesheet and MD signed HH orders faxed to [facility] today) via hard fax at [facility].      Spoke with Krysten at [facility], she will arrange delivery of new portable tank and home concentrator to pt home address today.  [place] provided with number for [facility].  She will call them when pt arrives at home.    Updated ER nurse, ER nurse to call daughter when pt is leaving hospital via [facility] transport.      All information added to AVS for pt and family reference.    No further DC needs from CM perspective.

## 2020-03-30 NOTE — ED NOTES
Pt arrives to ED via EMS c/o worsening SOB.  He was discharged from here on yesterday. Hx of HTN and DM2.  Denies dizziness, fever, N/V, or any known contact with COVID.  He lives alone.  He is A/Ox4, VSS, ABCs intact, NAD noted.  Pt has been placed on continuous pulse ox and cardiac monitoring.  3L NC in place with O2 Sats at 100%. Bed is in lowest position with siderails up x2, call bell is within reach, cane and urinal at bedside.  Pt denies further need at this time.  Will continue to monitor.

## 2020-03-30 NOTE — ED PROVIDER NOTES
"Encounter Date: 3/30/2020    SCRIBE #1 NOTE: I, Shereen Navarro, am scribing for, and in the presence of, Dr. Martin.       History     Chief Complaint   Patient presents with    Shortness of Breath     Pt d/c'd from [facility] yesterday, returns d/t worsening SOB     Time seen by provider: 10:37 AM    This is a 84 y.o. male with a history of DM and HTN who presents due to concern for worsening malaise after being discharged from [facility] yesterday. He was admitted 2 days ago for fever, cough, and shortness of breath with a positive COVID19 test and hypoxia. He improved on oxygen and was sent home with O2 yesterday. He reports feeling "okay" when he left the hospital, but when he got home, he began to feel subjective fevers, fatigue, and malaise which made it difficult for him to sleep. He denies any worsening shortness of breath with activities or worsening cough. He has been using home oxygen and did not take his usual medications today yet. He had an episode of hypoglycemia as an inpatient, so was told to hold a DM medication, but does not have a finger-stick machine to test blood sugar at home. He has no other complaints at the time.    The history is provided by the patient.     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  Family History   Problem Relation Age of Onset    Cancer Mother     Asbestos Father     Liver disease Neg Hx      Social History     Tobacco Use    Smoking status: Former Smoker     Packs/day: 1.00     Years: 40.00     Pack years: 40.00     Last attempt to quit: 1989     Years since quittin.4    Smokeless tobacco: Former User     Quit date: 1986   Substance Use Topics    Alcohol use: Yes     " Alcohol/week: 0.0 standard drinks     Comment: 2 bottles beer on weekends    Drug use: No     Review of Systems   Constitutional: Positive for fatigue and fever (subjective).        Positive for malaise.   HENT: Negative for sore throat.    Eyes: Negative for visual disturbance.   Respiratory: Positive for cough (unchanged) and shortness of breath (unchanged).         Negative for dyspnea on exertion.   Cardiovascular: Negative for chest pain.   Gastrointestinal: Negative for nausea.   Musculoskeletal: Negative for back pain.   Skin: Negative for rash.   Neurological: Negative for dizziness and headaches.   Psychiatric/Behavioral: Positive for sleep disturbance.       Physical Exam     Initial Vitals [03/30/20 0958]   BP Pulse Resp Temp SpO2   118/71 (!) 118 (!) 24 98.7 °F (37.1 °C) 100 %      MAP       --         Physical Exam    Nursing note and vitals reviewed.  Constitutional: He appears well-developed and well-nourished. He is not diaphoretic. No distress.   HENT:   Head: Normocephalic and atraumatic.   Eyes: EOM are normal.   Neck: Normal range of motion.   Cardiovascular: Tachycardia present.    Pulmonary/Chest: No stridor. No respiratory distress.   Speaking full sentences.  No tachypnea.  No accessory muscle use.  No respiratory distress.   Musculoskeletal: Normal range of motion.   Neurological: He is alert and oriented to person, place, and time. GCS score is 15. GCS eye subscore is 4. GCS verbal subscore is 5. GCS motor subscore is 6.   Skin: Skin is dry. No rash noted.   Psychiatric: Judgment and thought content normal.         ED Course   Procedures  Labs Reviewed   CBC W/ AUTO DIFFERENTIAL - Abnormal; Notable for the following components:       Result Value    RBC 4.59 (*)     Hemoglobin 12.9 (*)     Hematocrit 39.8 (*)     Lymph # 0.4 (*)     Gran% 83.6 (*)     Lymph% 9.3 (*)     All other components within normal limits   COMPREHENSIVE METABOLIC PANEL - Abnormal; Notable for the following  components:    Sodium 134 (*)     Chloride 92 (*)     Glucose 163 (*)     Albumin 3.0 (*)     Alkaline Phosphatase 38 (*)     AST 64 (*)     eGFR if  54 (*)     eGFR if non  46 (*)     All other components within normal limits   C-REACTIVE PROTEIN - Abnormal; Notable for the following components:    .8 (*)     All other components within normal limits   TROPONIN I   PROCALCITONIN          Imaging Results           X-Ray Chest AP Portable (Final result)  Result time 03/30/20 11:55:30    Final result by Gema Jo MD (03/30/20 11:55:30)                 Impression:      Right midlung airspace opacity with background of interstitial opacities, concerning for developing pneumonia possibly superimposed on interstitial edema.    This report was flagged in Epic as abnormal.      Electronically signed by: Gema Jo  Date:    03/30/2020  Time:    11:55             Narrative:    EXAMINATION:  XR CHEST AP PORTABLE    CLINICAL HISTORY:  Other forms of dyspnea    TECHNIQUE:  Single view of the chest was obtained.    COMPARISON:  Multiple priors, most recent 03/28/2020    FINDINGS:  Normal cardiomediastinal contour. Diffuse interstitial airspace opacities.  Scarring at the left lung base.  Right middle lobe more focal airspace opacity.  No pleural effusion or pneumothorax.                              X-Rays:     Medical Decision Making:   History:   Old Medical Records: I decided to obtain old medical records.  Initial Assessment:       84-year-old male with history of HTN/DM presents with fatigue, subjective fevers, malaise at recurred last night.  He was admitted here for viral URI symptoms with positive COVID and hypoxia 2 days ago, and states stable on O2 via nasal cannula.  He states he felt better prior to discharge yesterday and ate a normal meal, and was discharged on home oxygen which he has been compliant with since.  Soon after arriving home he felt symptoms recur, though  denies any worsening cough or SOB to me.  Per chart review, patient did have hypoglycemia episode while in ED 2 days ago with right-sided weakness; stroke code called but he was found to have fingerstick 57 and symptoms resolved with glucose.  He was told to stop taking Amaryl on discharge yesterday, and has not taking any of his medications yet today, but also does not have a fingerstick machine at home.  On initial vitals patient tachycardic to 110s but afebrile with normal BP.  He has no respiratory distress or tachypnea on my exam, and is maintaining 100% O2 sat on baseline 3 L O2.   He is still having symptomatic COVID infection, and was reassured that these recurrent symptoms of fever, malaise, fatigue are somewhat expected.  Since he is oxygenating well on home O2, no immediate indication for readmission, but will recheck labs and chest x-ray and monitor in ED after gentle hydration.      Labs reassuring with lymphopenia and elevated CRP expected of COVID-19 infection, though CRP is more elevated now than previous.  Chest x-ray does show right mid lung airspace opacity with previous interstitial opacities, concerning for developing pneumonia superimposed over interstitial edema.  Patient has had no antibiotics during admission.  I discussed this case with hospitalist who discharged him yesterday Dr. Rogers, who agrees that patient does not need readmission if he has no new O2 requirements.  He feels better with IVF in ED and is eating at bedside without difficulty.  Will Rx Z-Jayjay to cover potential bacterial pneumonia given changed chest x-ray, and also Rx albuterol p.r.n. wheezing and Zofran p.r.n. Nausea.  I discussed his case with his daughter, who is concerned about patient being discharged since he lives alone.  I understood her concerns, though he does not meet medical criteria for readmission, especially since hospital resources are more limited during ongoing COVID-19 pandemic.    consulted and will arrange for home health, and patient will be followed up by our outpatient COVID monitoring program.  We will arrange transportation for patient back home, and he still has his home O2 arranged maintained a normal O2 sat on his baseline O2 throughout ED stay with no tachypnea or SOB.  He understands his COVID-19 positive status and to return to the ED for any worsening SOB, though for appetite, malaise, and intermittent fevers are somewhat expected.  He will try to increase p.o. intake and take Tylenol p.r.n. Fevers, and is comfortable with discharge plan.      Independently Interpreted Test(s):   I have ordered and independently interpreted X-rays - see prior notes.  Clinical Tests:   Lab Tests: Ordered and Reviewed  Radiological Study: Ordered and Reviewed            Scribe Attestation:   Scribe #1: I performed the above scribed service and the documentation accurately describes the services I performed. I attest to the accuracy of the note.    Attending Attestation:           Physician Attestation for Scribe:  Physician Attestation Statement for Scribe #1: I, Dr. Martin, reviewed documentation, as scribed by Shereen Navarro in my presence, and it is both accurate and complete.                               Clinical Impression:     1. COVID-19 virus infection    2. MONTELONGO (dyspnea on exertion)    3. Cough                ED Disposition Condition    Discharge Stable        ED Prescriptions     Medication Sig Dispense Start Date End Date Auth. Provider    azithromycin (Z-BARAK) 250 MG tablet  (Status: Discontinued) Take 1 tablet (250 mg total) by mouth once daily. Take first 2 tablets together, then 1 every day until finished. 6 tablet 3/30/2020 3/30/2020 Ramesh Martin MD    albuterol (PROAIR HFA) 90 mcg/actuation inhaler  (Status: Discontinued) Inhale 2 puffs into the lungs every 4 (four) hours as needed for Wheezing (cough/shortness of breath). Rescue 18 g 3/30/2020 3/30/2020 Ramesh CHUA  MD Mario    ondansetron (ZOFRAN-ODT) 4 MG TbDL  (Status: Discontinued) Take 1 tablet (4 mg total) by mouth every 8 (eight) hours as needed. 15 tablet 3/30/2020 3/30/2020 Ramesh Martin MD    albuterol (PROAIR HFA) 90 mcg/actuation inhaler Inhale 2 puffs into the lungs every 4 (four) hours as needed for Wheezing (cough/shortness of breath). Rescue 18 g 3/30/2020  Ramesh Martin MD    azithromycin (Z-BARAK) 250 MG tablet Take 1 tablet (250 mg total) by mouth once daily. Take first 2 tablets together, then 1 every day until finished. 6 tablet 3/30/2020  MD cheo Casperdansetron (ZOFRAN-ODT) 4 MG TbDL Take 1 tablet (4 mg total) by mouth every 8 (eight) hours as needed. 15 tablet 3/30/2020  Ramesh Martin MD        Follow-up Information     Follow up With Specialties Details Why Contact Info    [facility] Go to  If symptoms worsen [address] [address] [phone]    [facility] Provider   [address]  [facility] [address]  [phone]      [facility] DME Provider  they will call you to schedule first appointment for home health [address] [place]  [address]  [facility] [address]  [phone]                                       Ramesh Martin MD  03/30/20 6513

## 2020-03-30 NOTE — HOSPITAL COURSE
Patient was admitted on oxygen.  He was comfortable overnight at 100% although occasionally dipped into the low 90's.  He had some fevers overnight but was otherwise asymptomatic and not short of breath.  In addition he was hypoglycemic in the morning with BG 46 on the chemistry.  He ate breakfast and recovered.  On discharge he stated he felt well.  He was discharged home on oxygen, and his Amaryl was discontinued for the time being given the hypoglycemic episode.  He was given quarantine instructions and was told to reschedule his PCP appointment which had been scheduled for 3/30/20.

## 2020-03-30 NOTE — DISCHARGE SUMMARY
[facility] Hospital Medicine  Discharge Summary      Patient Name: Peter Lopez  MRN: [id number]  Admission Date: 3/28/2020  Hospital Length of Stay: 1 days  Discharge Date and Time: 3/29/2020  7:01 PM  Attending Physician: No att. providers found   Discharging Provider: Debbie Campos MD  Primary Care Provider: Lucas Lloyd Jr, MD      HPI:   From H&P by Ranjan Douglas NP:  The patient is an afebrile 84-year-old male with PMH of DM, HTN, chronic hepatitis-C, and retinopathy presents for evaluation of viral illness symptoms.  He reports that symptoms have been present for approximately 1 week.  He does state that he was seen at another facility on Tuesday and states as he had no fever he was not swab for COVID although he had a suspicion.  He states symptoms have worsened since this time.  He does complain of shortness of breath and continued cough.  He presents via EMS and was found to have room air oxygen saturations at 85% with no prior history of respiratory distress reported from patient.  He denies any chest pain, abdominal pain, vomiting or rash.  He has not tried any medications for the symptoms          Hospital Course:   Patient was admitted on oxygen.  He was comfortable overnight at 100% although occasionally dipped into the low 90's.  He had some fevers overnight but was otherwise asymptomatic and not short of breath.  In addition he was hypoglycemic in the morning with BG 46 on the chemistry.  He ate breakfast and recovered.  On discharge he stated he felt well.  He was discharged home on oxygen, and his Amaryl was discontinued for the time being given the hypoglycemic episode.  He was given quarantine instructions and was told to reschedule his PCP appointment which had been scheduled for 3/30/20.         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    Specialty:  Family Medicine  Why:  As scheduled  Contact information:  [address]  [place] [address]  [phone]             [facility]    Specialty:  DME Provider  Why:  Please call [facility] when you make it home to complete your home oxygen set up   Contact information:  [address] [facility] [address]  [phone]                 Patient Instructions:      OXYGEN FOR HOME USE     Order Specific Question Answer Comments   Liter Flow 3    Duration Continuous    Qualifying SpO2: 85%    Testing done at: Rest    Route nasal cannula    Portable mode: pulse dose acceptable    Device home concentrator with portable unit    Length of need (in months): 3 mos    Patient condition with qualifying saturation  COVID-19   Height: 5' 10" (1.778 m)    Weight: 84.9 kg (187 lb 2.7 oz)    Does patient have medical equipment at home? none    Alternative treatment measures have been tried or considered and deemed clinically ineffective. Yes      Diet diabetic     Activity as tolerated          Medications:  Reconciled Home Medications:      Medication List      CONTINUE taking these medications    albuterol 90 mcg/actuation inhaler  Commonly known as:  PROAIR HFA  Inhale 2 puffs into the lungs every 4 (four) hours as needed (cough/shortness of breath). Rescue     ciclopirox 8 % Soln  Commonly known as:  PENLAC  Apply topically nightly.     famotidine 20 MG tablet  Commonly known as:  PEPCID  Take 1 tablet (20 mg total) by mouth once daily.     fluticasone propionate 50 mcg/actuation nasal spray  Commonly known as:  FLONASE  1 spray (50 mcg " total) by Each Nostril route once daily.     ipratropium 42 mcg (0.06 %) nasal spray  Commonly known as:  ATROVENT  2 sprays by Nasal route 4 (four) times daily.     loratadine 10 mg tablet  Commonly known as:  CLARITIN  Take 1 tablet (10 mg total) by mouth once daily.     metFORMIN 1000 MG tablet  Commonly known as:  GLUCOPHAGE  TAKE 1 TABLET BY MOUTH TWICE DAILY WITH MEALS     olmesartan-hydrochlorothiazide 40-25 mg per tablet  Commonly known as:  BENICAR HCT  TAKE 1 TABLET BY MOUTH ONCE DAILY     sildenafiL 50 MG tablet  Commonly known as:  VIAGRA  Take 1 tablet (50 mg total) by mouth daily as needed for Erectile Dysfunction. 1 hour before sex, maximum of 1 tablet in 24 hours     tamsulosin 0.4 mg Cap  Commonly known as:  FLOMAX  Take 1 capsule (0.4 mg total) by mouth once daily.        STOP taking these medications    glimepiride 4 MG tablet  Commonly known as:  AMARYL            Time spent on the discharge of patient: <30 minutes  Patient was seen and examined on the date of discharge and determined to be suitable for discharge.         Debbie Campos MD  Department of Hospital Medicine  [facility]

## 2020-03-31 ENCOUNTER — TELEPHONE (OUTPATIENT)
Dept: EMERGENCY MEDICINE | Facility: OTHER | Age: 85
End: 2020-03-31

## 2020-03-31 ENCOUNTER — PATIENT OUTREACH (OUTPATIENT)
Dept: ADMINISTRATIVE | Facility: CLINIC | Age: 85
End: 2020-03-31

## 2020-03-31 ENCOUNTER — TELEPHONE (OUTPATIENT)
Dept: FAMILY MEDICINE | Facility: CLINIC | Age: 85
End: 2020-03-31

## 2020-03-31 DIAGNOSIS — N18.30 CKD (CHRONIC KIDNEY DISEASE) STAGE 3, GFR 30-59 ML/MIN: ICD-10-CM

## 2020-03-31 DIAGNOSIS — R09.02 HYPOXIA: Primary | ICD-10-CM

## 2020-03-31 DIAGNOSIS — E11.21 DIABETES MELLITUS WITH NEPHROPATHY: ICD-10-CM

## 2020-03-31 DIAGNOSIS — U07.1 COVID-19 VIRUS DETECTED: ICD-10-CM

## 2020-03-31 DIAGNOSIS — I10 ESSENTIAL HYPERTENSION: ICD-10-CM

## 2020-03-31 NOTE — TELEPHONE ENCOUNTER
You can let her know I can put an order, however, they would not go out daily. And they may not go out given that he tested positive for COVID as it puts others at risk.     What is recommended is continued symptomatic care with the home oxygen and symptom management. If he develops respiratory distress symptoms calling 911.     Again I can place an order but this does not mean they will go out

## 2020-03-31 NOTE — TELEPHONE ENCOUNTER
I spoke to the pt daughter and she understands the limitations for HH. They request an order be placed as Pt family members are all Positive for COVID-19 and he is alone due to quarantine. Pt did report that he slept better last night and the oxygen was delivered and set up yesterday.

## 2020-03-31 NOTE — PATIENT INSTRUCTIONS
"Dyspnea (Shortness Of Breath)  Shortness of Breath (also known as "Dyspnea") is the sense that you can't catch your breath or can't get enough air.  Dyspnea can be caused by many different conditions such as:  Acute asthma attack  Worsening of emphysema (also called "COPD") -- a lung disease that is caused by smoking  A mucus plug blocks a large air passage in the lung -- this can occur with emphysema or chronic bronchitis  Congestive Heart Failure ("CHF") -- when a weak heart muscle allows excess fluid to collect in the lungs  Panic attacks, anxiety -- fear can cause rapid breathing ("hyperventilation")  Pneumonia -- infection in the lung tissue  Exposure to toxic fumes or smoke  Pulmonary embolus (blood clot to the lung)  Based on your visit today, the exact cause of your shortness of breath is not certain. Your tests do not show any of the serious causes of dyspnea. Sometimes, further testing is needed to find out if a serious problem exists. Therefore, it is important for you to watch for any new symptoms or worsening of your condition and follow up with your doctor as directed.  Home Care:  When your symptoms are better, resume your usual activities.  If you smoke, you need to stop. Join a stop-smoking program or ask your doctor for help.  Follow Up  with your doctor or as advised by our staff.  Get Prompt Medical Attention  if any of the following occur:  Increasing shortness of breath or wheezing  Redness, pain or swelling in one leg  Swelling in both legs or ankles  Unexpected weight gain  Chest, arm, shoulder, neck or upper back pain  Dizziness, weakness or fainting  Palpitations (the sense that your heart is fluttering, beating fast or hard)  Fever of 100.4ºF (38ºC) or higher, or as directed by your healthcare provider  Cough with dark colored or bloody sputum (mucus)  © 2121-9136 [employer], [address], [place] [address]. All rights reserved. This information is not intended as a " substitute for professional medical care. Always follow your healthcare professional's instructions.

## 2020-03-31 NOTE — TELEPHONE ENCOUNTER
Spoke with Pt over phone regarding recent ER discharge. He states overall doing fair. States mild fatigue. But doing fair.

## 2020-03-31 NOTE — TELEPHONE ENCOUNTER
"----- Message from Farnaz Page MA sent at 3/31/2020  9:05 AM CDT -----  Contact: Mckinley "daughter" [phone]  She is upset and scared of course and wants to have home health orders for a nurse to visit her father daily  ----- Message -----  From: Lucas Lloyd Jr., MD  Sent: 3/31/2020   8:09 AM CDT  To: Farnaz Page MA    Ask her what the question is  ----- Message -----  From: Farnaz Page MA  Sent: 3/30/2020   4:52 PM CDT  To: Lucas Lloyd Jr., MD     Mckinley kerr has concerns about home health concerns because her father is being released from Ed with a Pos Covid 19 Dx and she just needs reassuring he will be taken care of and vitals will be taken daily.Also, she has questions about O2 which is being ordered [facility] for O2    ----- Message -----  From: Alyssia Thompson  Sent: 3/30/2020   4:20 PM CDT  To: [facility] Staff    Type: Patient Call Back    Who called:Mckinley"daughter"    What is the request in detail: calling because he was tested positive for COVID-19 and she would like a call back from someone in the office    Can the clinic reply by [facility]? Call back    Would the patient rather a call back or a response via [facility]? Call back    Best call back number:[phone]                "

## 2020-04-01 ENCOUNTER — TELEPHONE (OUTPATIENT)
Dept: FAMILY MEDICINE | Facility: CLINIC | Age: 85
End: 2020-04-01

## 2020-04-01 DIAGNOSIS — E11.21 DIABETES MELLITUS WITH NEPHROPATHY: Primary | ICD-10-CM

## 2020-04-01 RX ORDER — INSULIN PUMP SYRINGE, 3 ML
EACH MISCELLANEOUS
Qty: 1 EACH | Refills: 0 | Status: SHIPPED | OUTPATIENT
Start: 2020-04-01 | End: 2020-06-16

## 2020-04-01 RX ORDER — LANCETS
EACH MISCELLANEOUS
Qty: 30 EACH | Refills: 11 | Status: SHIPPED | OUTPATIENT
Start: 2020-04-01 | End: 2020-06-16

## 2020-04-01 NOTE — TELEPHONE ENCOUNTER
----- Message from Farnaz Page MA sent at 4/1/2020  8:28 AM CDT -----  Contact: Mckinley Rodriguez - Daughter [phone]        ----- Message -----  From: Rachele Gonzales  Sent: 4/1/2020   8:23 AM CDT  To: [facility] Zavala Staff    Hello,     Pt's daughter (Mckinley Rodriguez) states her father has been diagnosed with Coronavirus. Pt is 84 years old and is home alone. Pt is afraid and is requesting to be admitted to a hospital facility to be monitored during this time. Pt's daughter is requesting a call back from Dr. Lloyd to discuss. Mckinley can be reached at [phone].    Thanks

## 2020-04-01 NOTE — TELEPHONE ENCOUNTER
spoke to patients daughter in reference to the need for her dad to rest hydrate and eat properly and take required medications and stop diabetic medications to regain his strength and she expressed understanding and will try to call her father every other hour to insure he is hrdrating and eating

## 2020-04-01 NOTE — TELEPHONE ENCOUNTER
Please let her know that I do not make admission decisions. I only see patients outpatient. Only after evaluation in the ED is that determined. When he was last seen he did not meet admission criteria. If he worsens he will have to go to ED, however, the hospital is not necessarily the safer place to be as that is where the critically ill/sick patients are.

## 2020-04-02 DIAGNOSIS — R09.02 HYPOXIA: ICD-10-CM

## 2020-04-02 DIAGNOSIS — U07.1 COVID-19 VIRUS DETECTED: Primary | ICD-10-CM

## 2020-04-02 LAB
BACTERIA BLD CULT: NORMAL
BACTERIA BLD CULT: NORMAL

## 2020-04-03 ENCOUNTER — HOSPITAL ENCOUNTER (INPATIENT)
Facility: HOSPITAL | Age: 85
LOS: 19 days | Discharge: NURSING, REHAB OR LONG-TERM CARE | DRG: 177 | End: 2020-04-22
Attending: EMERGENCY MEDICINE | Admitting: EMERGENCY MEDICINE
Payer: MEDICARE

## 2020-04-03 ENCOUNTER — TELEPHONE (OUTPATIENT)
Dept: FAMILY MEDICINE | Facility: CLINIC | Age: 85
End: 2020-04-03

## 2020-04-03 DIAGNOSIS — R06.03 RESPIRATORY DISTRESS: ICD-10-CM

## 2020-04-03 DIAGNOSIS — U07.1 COVID-19 VIRUS INFECTION: Primary | ICD-10-CM

## 2020-04-03 DIAGNOSIS — U07.1 COVID-19: ICD-10-CM

## 2020-04-03 DIAGNOSIS — Z20.822 SUSPECTED COVID-19 VIRUS INFECTION: ICD-10-CM

## 2020-04-03 DIAGNOSIS — E11.42 TYPE 2 DIABETES MELLITUS WITH DIABETIC POLYNEUROPATHY, WITHOUT LONG-TERM CURRENT USE OF INSULIN: ICD-10-CM

## 2020-04-03 DIAGNOSIS — R09.02 HYPOXIA: ICD-10-CM

## 2020-04-03 PROBLEM — I70.0 ATHEROSCLEROSIS OF AORTA: Status: RESOLVED | Noted: 2019-05-03 | Resolved: 2020-04-03

## 2020-04-03 PROBLEM — N18.30 CKD (CHRONIC KIDNEY DISEASE) STAGE 3, GFR 30-59 ML/MIN: Status: RESOLVED | Noted: 2020-01-28 | Resolved: 2020-04-03

## 2020-04-03 LAB
ALBUMIN SERPL BCP-MCNC: 2.6 G/DL (ref 3.5–5.2)
ALLENS TEST: ABNORMAL
ALP SERPL-CCNC: 55 U/L (ref 55–135)
ALT SERPL W/O P-5'-P-CCNC: 28 U/L (ref 10–44)
ANION GAP SERPL CALC-SCNC: 13 MMOL/L (ref 8–16)
AST SERPL-CCNC: 51 U/L (ref 10–40)
BASOPHILS # BLD AUTO: 0.01 K/UL (ref 0–0.2)
BASOPHILS NFR BLD: 0.2 % (ref 0–1.9)
BILIRUB SERPL-MCNC: 0.3 MG/DL (ref 0.1–1)
BNP SERPL-MCNC: 143 PG/ML (ref 0–99)
BUN SERPL-MCNC: 15 MG/DL (ref 8–23)
CALCIUM SERPL-MCNC: 8.8 MG/DL (ref 8.7–10.5)
CHLORIDE SERPL-SCNC: 92 MMOL/L (ref 95–110)
CK SERPL-CCNC: 146 U/L (ref 20–200)
CO2 SERPL-SCNC: 26 MMOL/L (ref 23–29)
CREAT SERPL-MCNC: 1.1 MG/DL (ref 0.5–1.4)
CRP SERPL-MCNC: 206.9 MG/L (ref 0–8.2)
DELSYS: ABNORMAL
DIFFERENTIAL METHOD: ABNORMAL
EOSINOPHIL # BLD AUTO: 0 K/UL (ref 0–0.5)
EOSINOPHIL NFR BLD: 0.3 % (ref 0–8)
ERYTHROCYTE [DISTWIDTH] IN BLOOD BY AUTOMATED COUNT: 13.2 % (ref 11.5–14.5)
EST. GFR  (AFRICAN AMERICAN): >60 ML/MIN/1.73 M^2
EST. GFR  (NON AFRICAN AMERICAN): >60 ML/MIN/1.73 M^2
FERRITIN SERPL-MCNC: 520 NG/ML (ref 20–300)
FIO2: 100
FLOW: 15
GLUCOSE SERPL-MCNC: 134 MG/DL (ref 70–110)
HCO3 UR-SCNC: 27.7 MMOL/L (ref 24–28)
HCT VFR BLD AUTO: 37.5 % (ref 40–54)
HGB BLD-MCNC: 12.1 G/DL (ref 14–18)
IMM GRANULOCYTES # BLD AUTO: 0.08 K/UL (ref 0–0.04)
IMM GRANULOCYTES NFR BLD AUTO: 1.2 % (ref 0–0.5)
LACTATE SERPL-SCNC: 2 MMOL/L (ref 0.5–2.2)
LDH SERPL L TO P-CCNC: 364 U/L (ref 110–260)
LYMPHOCYTES # BLD AUTO: 0.6 K/UL (ref 1–4.8)
LYMPHOCYTES NFR BLD: 9.1 % (ref 18–48)
MCH RBC QN AUTO: 27.8 PG (ref 27–31)
MCHC RBC AUTO-ENTMCNC: 32.3 G/DL (ref 32–36)
MCV RBC AUTO: 86 FL (ref 82–98)
MODE: ABNORMAL
MONOCYTES # BLD AUTO: 0.4 K/UL (ref 0.3–1)
MONOCYTES NFR BLD: 5.4 % (ref 4–15)
NEUTROPHILS # BLD AUTO: 5.5 K/UL (ref 1.8–7.7)
NEUTROPHILS NFR BLD: 83.8 % (ref 38–73)
NRBC BLD-RTO: 0 /100 WBC
PCO2 BLDA: 39 MMHG (ref 35–45)
PH SMN: 7.46 [PH] (ref 7.35–7.45)
PLATELET # BLD AUTO: 360 K/UL (ref 150–350)
PMV BLD AUTO: 9.4 FL (ref 9.2–12.9)
PO2 BLDA: 75 MMHG (ref 80–100)
POC BE: 4 MMOL/L
POC SATURATED O2: 96 % (ref 95–100)
POC TCO2: 29 MMOL/L (ref 23–27)
POCT GLUCOSE: 134 MG/DL (ref 70–110)
POCT GLUCOSE: 138 MG/DL (ref 70–110)
POTASSIUM SERPL-SCNC: 4.3 MMOL/L (ref 3.5–5.1)
PROCALCITONIN SERPL IA-MCNC: 0.14 NG/ML
PROT SERPL-MCNC: 7 G/DL (ref 6–8.4)
RBC # BLD AUTO: 4.36 M/UL (ref 4.6–6.2)
SAMPLE: ABNORMAL
SITE: ABNORMAL
SODIUM SERPL-SCNC: 131 MMOL/L (ref 136–145)
SP02: 97
TROPONIN I SERPL DL<=0.01 NG/ML-MCNC: 0.12 NG/ML (ref 0–0.03)
TROPONIN I SERPL DL<=0.01 NG/ML-MCNC: 0.16 NG/ML (ref 0–0.03)
WBC # BLD AUTO: 6.61 K/UL (ref 3.9–12.7)

## 2020-04-03 PROCEDURE — 63600175 PHARM REV CODE 636 W HCPCS: Performed by: EMERGENCY MEDICINE

## 2020-04-03 PROCEDURE — 80053 COMPREHEN METABOLIC PANEL: CPT

## 2020-04-03 PROCEDURE — 96361 HYDRATE IV INFUSION ADD-ON: CPT

## 2020-04-03 PROCEDURE — C1751 CATH, INF, PER/CENT/MIDLINE: HCPCS

## 2020-04-03 PROCEDURE — 83880 ASSAY OF NATRIURETIC PEPTIDE: CPT

## 2020-04-03 PROCEDURE — 93010 EKG 12-LEAD: ICD-10-PCS | Mod: ,,, | Performed by: INTERNAL MEDICINE

## 2020-04-03 PROCEDURE — 27000221 HC OXYGEN, UP TO 24 HOURS

## 2020-04-03 PROCEDURE — 82550 ASSAY OF CK (CPK): CPT

## 2020-04-03 PROCEDURE — 25000003 PHARM REV CODE 250: Performed by: FAMILY MEDICINE

## 2020-04-03 PROCEDURE — 36600 WITHDRAWAL OF ARTERIAL BLOOD: CPT

## 2020-04-03 PROCEDURE — 82803 BLOOD GASES ANY COMBINATION: CPT

## 2020-04-03 PROCEDURE — 85025 COMPLETE CBC W/AUTO DIFF WBC: CPT

## 2020-04-03 PROCEDURE — 84145 PROCALCITONIN (PCT): CPT

## 2020-04-03 PROCEDURE — 63600175 PHARM REV CODE 636 W HCPCS: Performed by: FAMILY MEDICINE

## 2020-04-03 PROCEDURE — 99285 EMERGENCY DEPT VISIT HI MDM: CPT | Mod: 25

## 2020-04-03 PROCEDURE — 84484 ASSAY OF TROPONIN QUANT: CPT | Mod: 91

## 2020-04-03 PROCEDURE — 82728 ASSAY OF FERRITIN: CPT

## 2020-04-03 PROCEDURE — 96360 HYDRATION IV INFUSION INIT: CPT

## 2020-04-03 PROCEDURE — 93005 ELECTROCARDIOGRAM TRACING: CPT

## 2020-04-03 PROCEDURE — 36569 INSJ PICC 5 YR+ W/O IMAGING: CPT

## 2020-04-03 PROCEDURE — 12000002 HC ACUTE/MED SURGE SEMI-PRIVATE ROOM

## 2020-04-03 PROCEDURE — 82962 GLUCOSE BLOOD TEST: CPT

## 2020-04-03 PROCEDURE — 83615 LACTATE (LD) (LDH) ENZYME: CPT

## 2020-04-03 PROCEDURE — 87040 BLOOD CULTURE FOR BACTERIA: CPT | Mod: 59

## 2020-04-03 PROCEDURE — 86140 C-REACTIVE PROTEIN: CPT

## 2020-04-03 PROCEDURE — 93010 ELECTROCARDIOGRAM REPORT: CPT | Mod: ,,, | Performed by: INTERNAL MEDICINE

## 2020-04-03 PROCEDURE — 83605 ASSAY OF LACTIC ACID: CPT

## 2020-04-03 PROCEDURE — 99900035 HC TECH TIME PER 15 MIN (STAT)

## 2020-04-03 PROCEDURE — 96372 THER/PROPH/DIAG INJ SC/IM: CPT

## 2020-04-03 RX ORDER — TAMSULOSIN HYDROCHLORIDE 0.4 MG/1
0.4 CAPSULE ORAL DAILY
Status: DISCONTINUED | OUTPATIENT
Start: 2020-04-04 | End: 2020-04-22 | Stop reason: HOSPADM

## 2020-04-03 RX ORDER — AZITHROMYCIN 250 MG/1
500 TABLET, FILM COATED ORAL DAILY
Status: DISCONTINUED | OUTPATIENT
Start: 2020-04-04 | End: 2020-04-05

## 2020-04-03 RX ORDER — HEPARIN SODIUM 5000 [USP'U]/ML
5000 INJECTION, SOLUTION INTRAVENOUS; SUBCUTANEOUS EVERY 8 HOURS
Status: DISCONTINUED | OUTPATIENT
Start: 2020-04-03 | End: 2020-04-07

## 2020-04-03 RX ORDER — SODIUM CHLORIDE 0.9 % (FLUSH) 0.9 %
10 SYRINGE (ML) INJECTION
Status: DISCONTINUED | OUTPATIENT
Start: 2020-04-03 | End: 2020-04-07

## 2020-04-03 RX ORDER — HYDRALAZINE HYDROCHLORIDE 20 MG/ML
10 INJECTION INTRAMUSCULAR; INTRAVENOUS EVERY 6 HOURS PRN
Status: DISCONTINUED | OUTPATIENT
Start: 2020-04-03 | End: 2020-04-07

## 2020-04-03 RX ORDER — INSULIN ASPART 100 [IU]/ML
0-5 INJECTION, SOLUTION INTRAVENOUS; SUBCUTANEOUS
Status: DISCONTINUED | OUTPATIENT
Start: 2020-04-03 | End: 2020-04-22 | Stop reason: HOSPADM

## 2020-04-03 RX ORDER — IBUPROFEN 200 MG
24 TABLET ORAL
Status: DISCONTINUED | OUTPATIENT
Start: 2020-04-03 | End: 2020-04-22 | Stop reason: HOSPADM

## 2020-04-03 RX ORDER — HYDROXYCHLOROQUINE SULFATE 200 MG/1
400 TABLET, FILM COATED ORAL DAILY
Status: COMPLETED | OUTPATIENT
Start: 2020-04-05 | End: 2020-04-08

## 2020-04-03 RX ORDER — FAMOTIDINE 20 MG/1
20 TABLET, FILM COATED ORAL DAILY
Status: DISCONTINUED | OUTPATIENT
Start: 2020-04-04 | End: 2020-04-22 | Stop reason: HOSPADM

## 2020-04-03 RX ORDER — GUAIFENESIN 600 MG/1
600 TABLET, EXTENDED RELEASE ORAL 2 TIMES DAILY
Status: DISCONTINUED | OUTPATIENT
Start: 2020-04-03 | End: 2020-04-15

## 2020-04-03 RX ORDER — ACETAMINOPHEN 325 MG/1
650 TABLET ORAL EVERY 6 HOURS PRN
Status: DISCONTINUED | OUTPATIENT
Start: 2020-04-03 | End: 2020-04-22 | Stop reason: HOSPADM

## 2020-04-03 RX ORDER — GLUCAGON 1 MG
1 KIT INJECTION
Status: DISCONTINUED | OUTPATIENT
Start: 2020-04-03 | End: 2020-04-22 | Stop reason: HOSPADM

## 2020-04-03 RX ORDER — HYDROXYCHLOROQUINE SULFATE 200 MG/1
400 TABLET, FILM COATED ORAL 2 TIMES DAILY
Status: COMPLETED | OUTPATIENT
Start: 2020-04-03 | End: 2020-04-04

## 2020-04-03 RX ORDER — IBUPROFEN 200 MG
16 TABLET ORAL
Status: DISCONTINUED | OUTPATIENT
Start: 2020-04-03 | End: 2020-04-22 | Stop reason: HOSPADM

## 2020-04-03 RX ORDER — CETIRIZINE HYDROCHLORIDE 10 MG/1
10 TABLET ORAL DAILY
Status: DISCONTINUED | OUTPATIENT
Start: 2020-04-04 | End: 2020-04-07

## 2020-04-03 RX ORDER — ONDANSETRON 2 MG/ML
4 INJECTION INTRAMUSCULAR; INTRAVENOUS EVERY 6 HOURS PRN
Status: DISCONTINUED | OUTPATIENT
Start: 2020-04-03 | End: 2020-04-22 | Stop reason: HOSPADM

## 2020-04-03 RX ADMIN — SODIUM CHLORIDE 500 ML: 0.9 INJECTION, SOLUTION INTRAVENOUS at 03:04

## 2020-04-03 RX ADMIN — GUAIFENESIN 600 MG: 600 TABLET, EXTENDED RELEASE ORAL at 10:04

## 2020-04-03 RX ADMIN — HEPARIN SODIUM 5000 UNITS: 5000 INJECTION INTRAVENOUS; SUBCUTANEOUS at 10:04

## 2020-04-03 RX ADMIN — HYDROXYCHLOROQUINE SULFATE 400 MG: 200 TABLET, FILM COATED ORAL at 10:04

## 2020-04-03 NOTE — ED NOTES
Pt satting 86% on 5L oxygen by NC. Pt currently placed on non-rebreather and satting 97%. MD stewart.

## 2020-04-03 NOTE — ED TRIAGE NOTES
Pt presents to the ED via EMS reporting that the pt recently tested positive for COVID and has been c/o fatigue as well as weakness, a fever, and SOB. Pt also c/o productive cough as well as diarrhea. Pt denies any headaches or dizziness currently. EMS reports pt's initial RA sats were 78% and pt placed on non-rebreather. Pt AAOx4. Pt placed on cardiac monitor.

## 2020-04-03 NOTE — TELEPHONE ENCOUNTER
Spoke to patients daughter gave her the information for PHN and she just wanted to let Dr Lloyd know her dad was readmitted via the ED  
5

## 2020-04-03 NOTE — PROCEDURES
"Peter Lopez is a 84 y.o. male patient.    Temp: 100.2 °F (37.9 °C) (04/03/20 1433)  Pulse: 107 (04/03/20 1517)  Resp: (!) 31 (04/03/20 1433)  BP: (!) 153/78 (04/03/20 1546)  SpO2: 98 % (04/03/20 1546)  Weight: 84.8 kg (187 lb) (04/03/20 1433)  Height: 5' 10" (177.8 cm) (04/03/20 1433)    PICC  Date/Time: 4/3/2020 4:00 PM  Performed by: Evan Brand RN  Consent Done: Yes  Time out: Immediately prior to procedure a time out was called to verify the correct patient, procedure, equipment, support staff and site/side marked as required  Indications: med administration and vascular access  Anesthesia: local infiltration  Local anesthetic: lidocaine 1% without epinephrine  Anesthetic Total (mL): 5  Description of findings: picc  Preparation: skin prepped with chlorhexidine (without alcohol)  Skin prep agent dried: skin prep agent completely dried prior to procedure  Sterile barriers: all five maximum sterile barriers used - cap, mask, sterile gown, sterile gloves, and large sterile sheet  Hand hygiene: hand hygiene performed prior to central venous catheter insertion  Location details: left basilic  Catheter type: triple lumen  Catheter size: 5 Fr  Catheter Length: 42cm    Ultrasound guidance: yes  Vessel Caliber: medium and patent, compressibility normal  Vascular Doppler: not done  Needle advanced into vessel with real time Ultrasound guidance.  Guidewire confirmed in vessel.  Sterile sheath used.  no esophageal manometryNumber of attempts: 1  Post-procedure: blood return through all ports, chlorhexidine patch and sterile dressing applied  Estimated blood loss (mL): 0  Specimens: No  Implants: No  Assessment: placement verified by x-ray, tip termination and successful placement  Complications: none          Evan Brand  4/3/2020  "

## 2020-04-03 NOTE — ED NOTES
Pt resting comfortably. VSS. In NAD at this time. Will continue to monitor. Call light at bedside.

## 2020-04-03 NOTE — ED PROVIDER NOTES
Encounter Date: 4/3/2020       History     Chief Complaint   Patient presents with    Fatigue     EMS reports pt with +COVID test. c/o weakness today and persistent fever and shortness of breath. initial RA sats of 78%. placed on NRB     84-year-old male with known COVID-19 positive history, diabetes, hypertension, chronic high see presenting today secondary to worsening shortness of breath and fevers and fatigue.  Per EMS, patient was wearing his oxygen at albeit the tubing with so long and he was across the room for the oxygen was they were unsure whether not he was actually getting the oxygen.  He was at 78% on this extended tubing.  They placed the patient on non-rebreather and his oxygen saturations increased up to 98%.  At the time of when he was on the nasal cannula he was tachypneic and talking in 1-2 word sentences.  This improved with being placed on a non-rebreather with EMS.  Patient has felt more fatigued and tired and subjective fevers.  Has been compliant with medications.  Productive cough.  Decreased p.o. intake.        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 No pertinent surgical history.  Family History   Problem Relation Age of Onset    Cancer Mother     Asbestos Father     Liver disease Neg Hx      Social History     Tobacco Use    Smoking status: Former Smoker     Packs/day: 1.00     Years: 40.00     Pack years: 40.00     Last attempt to quit: 1989     Years since quittin.4    Smokeless tobacco: Former User     Quit date: 1986   Substance Use Topics    Alcohol use: Not Currently     Alcohol/week: 0.0 standard drinks     Comment: 2 bottles beer on weekends    Drug use: No     Review of Systems    Constitutional: Positive for activity change, appetite change, chills, fatigue and fever.   HENT: Positive for rhinorrhea. Negative for sore throat.    Respiratory: Positive for cough and shortness of breath.    Cardiovascular: Negative for chest pain.   Gastrointestinal: Negative for nausea.   Genitourinary: Negative for dysuria.   Musculoskeletal: Negative for back pain.   Skin: Negative for rash.   Neurological: Positive for weakness.   Hematological: Does not bruise/bleed easily.   All other systems reviewed and are negative.      Physical Exam     Initial Vitals [04/03/20 1433]   BP Pulse Resp Temp SpO2   119/82 (!) 116 (!) 31 100.2 °F (37.9 °C) 98 %      MAP       --         Physical Exam    Nursing note and vitals reviewed.  Constitutional: He appears well-developed and well-nourished.   HENT:   Head: Normocephalic and atraumatic.   Mouth/Throat: Oropharynx is clear and moist.   Eyes: EOM are normal. Pupils are equal, round, and reactive to light.   Neck: Normal range of motion.   Cardiovascular: Regular rhythm.   Tachycardic   Pulmonary/Chest: No stridor.   Tachypneic about 30.  However this is when patient was being transferred over.  Will reassessed whenever more comfortable in bed.  Talking in a couple word sentences.  On non-rebreather.   Abdominal: Soft. Bowel sounds are normal. He exhibits no distension. There is no tenderness.   Musculoskeletal: Normal range of motion. He exhibits no edema or tenderness.   Neurological: He is alert and oriented to person, place, and time. He has normal strength. GCS score is 15. GCS eye subscore is 4. GCS verbal subscore is 5. GCS motor subscore is 6.   Skin: Skin is warm and dry. Capillary refill takes less than 2 seconds.   Psychiatric: He has a normal mood and affect. Thought content normal.         ED Course   Procedures  Labs Reviewed   CBC W/ AUTO DIFFERENTIAL - Abnormal; Notable for the following components:       Result Value    RBC 4.36 (*)     Hemoglobin  12.1 (*)     Hematocrit 37.5 (*)     Platelets 360 (*)     Immature Granulocytes 1.2 (*)     Immature Grans (Abs) 0.08 (*)     Lymph # 0.6 (*)     Gran% 83.8 (*)     Lymph% 9.1 (*)     All other components within normal limits   COMPREHENSIVE METABOLIC PANEL - Abnormal; Notable for the following components:    Sodium 131 (*)     Chloride 92 (*)     Glucose 134 (*)     Albumin 2.6 (*)     AST 51 (*)     All other components within normal limits   C-REACTIVE PROTEIN - Abnormal; Notable for the following components:    .9 (*)     All other components within normal limits   FERRITIN - Abnormal; Notable for the following components:    Ferritin 520 (*)     All other components within normal limits   LACTATE DEHYDROGENASE - Abnormal; Notable for the following components:     (*)     All other components within normal limits    Narrative:     Recoll. [id number] by Katie at 04/03/2020 16:20, reason:   HEMOLYZED/DISCARDED/CHELLY   TROPONIN I - Abnormal; Notable for the following components:    Troponin I 0.116 (*)     All other components within normal limits   B-TYPE NATRIURETIC PEPTIDE - Abnormal; Notable for the following components:     (*)     All other components within normal limits   POCT GLUCOSE - Abnormal; Notable for the following components:    POCT Glucose 138 (*)     All other components within normal limits   ISTAT PROCEDURE - Abnormal; Notable for the following components:    POC PH 7.460 (*)     POC PO2 75 (*)     POC TCO2 29 (*)     All other components within normal limits   CULTURE, BLOOD   CULTURE, BLOOD   CK   LACTIC ACID, PLASMA   PROCALCITONIN    Narrative:     Recoll. [id number] by [facility] at 04/03/2020 16:20, reason:   HEMOLYZED/DISCARDED/CHELLY   POCT GLUCOSE MONITORING CONTINUOUS     EKG Readings: (Independently Interpreted)   EKG done at 3:10 p.m. showing sinus tachycardia with a rate of 109.  Rightward axis.  No ST elevation or major T-wave abnormalities.  QRS is 78 QTC is 484.   Nonspecific EKG.       Imaging Results          X-Ray Chest AP Portable (Final result)  Result time 04/03/20 16:17:20    Final result by Otilio Vargas MD (04/03/20 16:17:20)                 Impression:      Continued accentuation of the interstitial markings with an area of ill-defined airspace consolidation at the right midlung zone.  Once again, findings may represent developing pneumonia.  No significant interval change.      Electronically signed by: Otilio Vargas MD  Date:    04/03/2020  Time:    16:17             Narrative:    EXAMINATION:  XR CHEST AP PORTABLE    CLINICAL HISTORY:  Suspected Covid-19 Virus Infection;    TECHNIQUE:  Single frontal view of the chest was performed.    COMPARISON:  03/30/2020    FINDINGS:  Heart size and pulmonary vascularity are stable.  Tortuous thoracic aorta and brachiocephalic vessels with atherosclerotic calcification in the wall of the aortic arch.  Lungs are satisfactorily expanded.  Continued accentuation of the interstitial markings bilaterally with some fibrotic appearing changes at the left base.  What appears to be an area of ill-defined airspace consolidation is again seen at the right midlung zone.  No definite pleural fluid or pneumothorax.  Skeletal structures appear intact.                                 Medical Decision Making:   Initial Assessment:   84-year-old male presenting secondary to signs and symptoms of the COVID-19 infection.  Patient was hypoxic with EMS on his oxygen albeit unsure if he was actually receiving his oxygen due to EMS reporting the tubing was all the way throughout his house connected to the oxygen tank.  His respiratory rate and respiratory status improved with being on non-rebreather.  Patient to be transferred over to his 3 L baseline to assess his respiratory status that point.  Will get labs, imaging, EKG.  Patient has been compliant with all patient antibiotics including azithromycin an outpatient medications.  Will reassess.  Will  give 500 mg bolus.  Will hold off on aggressively hydrating patient due to new COVID-19 recommendations.    Also but lower on the differential includes pneumonia, ACS, fluid overload, CHF, arrhythmia, bronchitis.    On 3 L of oxygen patient was satting 84%.  On 5 L oxygen satting 86%.  Patient placed on non-rebreather and sats increased to the mid to high 90s.  Patient informed he would needed be admitted to the hospital.  Patient would like to be intubated if necessary and also resuscitated if necessary.  Full code.    Patient's labs are notable for an elevated troponin.  Spoke with Dr. Cruz admitted the patient to her service.  She does not 1 aspirin.  Requested I placed orders for hydroxychloroquine.  Lactic acid reassuring.  Sodium slightly low.  LDH is elevated.  Patient is aware in agreement to admission.  Admitted to the ICU.    Please put in 35 minutes of critical care due to patient having a high risk of respiratory failure.   Separate from teaching and exclusive of procedure and ekg time  Includes:  Time at bedside  Time reviewing test results  Time discussing case with staff  Time documenting the medical record  Time spent with family members  Time spent with consults  Management    Clinical Tests:   Lab Tests: Ordered and Reviewed  Radiological Study: Reviewed and Ordered  Medical Tests: Ordered and Reviewed                                 Clinical Impression:       ICD-10-CM ICD-9-CM   1. COVID-19 U07.1     J98.8    2. Suspected Covid-19 Virus Infection R68.89    3. Hypoxia R09.02 799.02   4. Respiratory distress R06.03 786.09             ED Disposition Condition    Admit                           Donny Winston MD  04/03/20 9614

## 2020-04-04 PROBLEM — R79.89 ELEVATED TROPONIN: Status: ACTIVE | Noted: 2020-04-04

## 2020-04-04 PROBLEM — D64.9 ANEMIA: Status: ACTIVE | Noted: 2020-04-04

## 2020-04-04 PROBLEM — J96.01 ACUTE HYPOXEMIC RESPIRATORY FAILURE: Status: ACTIVE | Noted: 2020-04-04

## 2020-04-04 LAB
ALBUMIN SERPL BCP-MCNC: 2.3 G/DL (ref 3.5–5.2)
ALLENS TEST: ABNORMAL
ALP SERPL-CCNC: 50 U/L (ref 55–135)
ALT SERPL W/O P-5'-P-CCNC: 22 U/L (ref 10–44)
ANION GAP SERPL CALC-SCNC: 10 MMOL/L (ref 8–16)
AST SERPL-CCNC: 42 U/L (ref 10–40)
BASOPHILS # BLD AUTO: 0.01 K/UL (ref 0–0.2)
BASOPHILS NFR BLD: 0.2 % (ref 0–1.9)
BILIRUB SERPL-MCNC: 0.5 MG/DL (ref 0.1–1)
BUN SERPL-MCNC: 13 MG/DL (ref 8–23)
CALCIUM SERPL-MCNC: 8.4 MG/DL (ref 8.7–10.5)
CHLORIDE SERPL-SCNC: 96 MMOL/L (ref 95–110)
CO2 SERPL-SCNC: 29 MMOL/L (ref 23–29)
CREAT SERPL-MCNC: 1 MG/DL (ref 0.5–1.4)
DELSYS: ABNORMAL
DIFFERENTIAL METHOD: ABNORMAL
EOSINOPHIL # BLD AUTO: 0 K/UL (ref 0–0.5)
EOSINOPHIL NFR BLD: 0.5 % (ref 0–8)
ERYTHROCYTE [DISTWIDTH] IN BLOOD BY AUTOMATED COUNT: 13.4 % (ref 11.5–14.5)
EST. GFR  (AFRICAN AMERICAN): >60 ML/MIN/1.73 M^2
EST. GFR  (NON AFRICAN AMERICAN): >60 ML/MIN/1.73 M^2
FIO2: 100
GLUCOSE SERPL-MCNC: 135 MG/DL (ref 70–110)
HCO3 UR-SCNC: 27.6 MMOL/L (ref 24–28)
HCT VFR BLD AUTO: 34.1 % (ref 40–54)
HGB BLD-MCNC: 11 G/DL (ref 14–18)
IMM GRANULOCYTES # BLD AUTO: 0.05 K/UL (ref 0–0.04)
IMM GRANULOCYTES NFR BLD AUTO: 0.9 % (ref 0–0.5)
LYMPHOCYTES # BLD AUTO: 0.6 K/UL (ref 1–4.8)
LYMPHOCYTES NFR BLD: 9.8 % (ref 18–48)
MCH RBC QN AUTO: 28 PG (ref 27–31)
MCHC RBC AUTO-ENTMCNC: 32.3 G/DL (ref 32–36)
MCV RBC AUTO: 87 FL (ref 82–98)
MODE: ABNORMAL
MONOCYTES # BLD AUTO: 0.4 K/UL (ref 0.3–1)
MONOCYTES NFR BLD: 6.6 % (ref 4–15)
NEUTROPHILS # BLD AUTO: 4.7 K/UL (ref 1.8–7.7)
NEUTROPHILS NFR BLD: 82 % (ref 38–73)
NRBC BLD-RTO: 0 /100 WBC
PCO2 BLDA: 37.5 MMHG (ref 35–45)
PH SMN: 7.47 [PH] (ref 7.35–7.45)
PLATELET # BLD AUTO: 339 K/UL (ref 150–350)
PMV BLD AUTO: 9.3 FL (ref 9.2–12.9)
PO2 BLDA: 90 MMHG (ref 80–100)
POC BE: 4 MMOL/L
POC SATURATED O2: 98 % (ref 95–100)
POC TCO2: 29 MMOL/L (ref 23–27)
POTASSIUM SERPL-SCNC: 3.6 MMOL/L (ref 3.5–5.1)
PROT SERPL-MCNC: 6.5 G/DL (ref 6–8.4)
RBC # BLD AUTO: 3.93 M/UL (ref 4.6–6.2)
SAMPLE: ABNORMAL
SITE: ABNORMAL
SODIUM SERPL-SCNC: 135 MMOL/L (ref 136–145)
TROPONIN I SERPL DL<=0.01 NG/ML-MCNC: 0.11 NG/ML (ref 0–0.03)
WBC # BLD AUTO: 5.72 K/UL (ref 3.9–12.7)

## 2020-04-04 PROCEDURE — 85025 COMPLETE CBC W/AUTO DIFF WBC: CPT

## 2020-04-04 PROCEDURE — 63600175 PHARM REV CODE 636 W HCPCS: Performed by: FAMILY MEDICINE

## 2020-04-04 PROCEDURE — 25000003 PHARM REV CODE 250: Performed by: EMERGENCY MEDICINE

## 2020-04-04 PROCEDURE — 63700000 PHARM REV CODE 250 ALT 637 W/O HCPCS: Performed by: EMERGENCY MEDICINE

## 2020-04-04 PROCEDURE — 82803 BLOOD GASES ANY COMBINATION: CPT

## 2020-04-04 PROCEDURE — 25000003 PHARM REV CODE 250: Performed by: FAMILY MEDICINE

## 2020-04-04 PROCEDURE — 84484 ASSAY OF TROPONIN QUANT: CPT

## 2020-04-04 PROCEDURE — 20000000 HC ICU ROOM

## 2020-04-04 PROCEDURE — 80053 COMPREHEN METABOLIC PANEL: CPT

## 2020-04-04 PROCEDURE — 36600 WITHDRAWAL OF ARTERIAL BLOOD: CPT

## 2020-04-04 PROCEDURE — 99900035 HC TECH TIME PER 15 MIN (STAT)

## 2020-04-04 RX ADMIN — CETIRIZINE HYDROCHLORIDE 10 MG: 10 TABLET, FILM COATED ORAL at 09:04

## 2020-04-04 RX ADMIN — TAMSULOSIN HYDROCHLORIDE 0.4 MG: 0.4 CAPSULE ORAL at 09:04

## 2020-04-04 RX ADMIN — HEPARIN SODIUM 5000 UNITS: 5000 INJECTION INTRAVENOUS; SUBCUTANEOUS at 06:04

## 2020-04-04 RX ADMIN — GUAIFENESIN 600 MG: 600 TABLET, EXTENDED RELEASE ORAL at 07:04

## 2020-04-04 RX ADMIN — HEPARIN SODIUM 5000 UNITS: 5000 INJECTION INTRAVENOUS; SUBCUTANEOUS at 10:04

## 2020-04-04 RX ADMIN — AZITHROMYCIN MONOHYDRATE 500 MG: 250 TABLET ORAL at 09:04

## 2020-04-04 RX ADMIN — HYDROXYCHLOROQUINE SULFATE 400 MG: 200 TABLET, FILM COATED ORAL at 09:04

## 2020-04-04 RX ADMIN — FAMOTIDINE 20 MG: 20 TABLET ORAL at 09:04

## 2020-04-04 RX ADMIN — GUAIFENESIN 600 MG: 600 TABLET, EXTENDED RELEASE ORAL at 09:04

## 2020-04-04 NOTE — HOSPITAL COURSE
Patient is a 84 year-old man with hypertension, diabetes mellitus type II, chronic hepatitis C admitted to the hospital for treatment of acute hypoxic respiratory failure secondary to COVID-19 infection. Patient admitted to the intensive care unit and treated with non-rebreather mask. Patient improved and he was weaned to Venti Mask and was stepped down to the floor. Patient's oxygen requirements continue to improve and now weaned down to NC. Still desaturating below 88% on exertion with room air prior to d/c, but only requiring 2L NC supplementation. PT/OT recommending SNF placement.

## 2020-04-04 NOTE — HPI
"Per Dr. Mike Guerrreo:    "Pt is a 85 yo M with a h/o NIDDM2, HTN, chronic HCV, and recent COVID-19 who presents to the ER for worsening weakness, persistent fevers, and dyspnea.  Pt was brought by EMS who found him with an oxygen saturation of 78%.  Pt was placed on non-rebreather and sat's did come up to 98%.  He was on nasal canula prior to arrival but the tubing was long per reports and he was noted to be tachypneic and tachycardic on it.  Pt could speak only a few sentences due to the dyspnea.  Of note patient was on antibiotics as outpatient and was compliant.  He does have a productive cough, decreased po intake, and subjective fevers."  "

## 2020-04-04 NOTE — ASSESSMENT & PLAN NOTE
- COVID-19 testing Collection Date: 3/28/2020 Collection Time:   5:55 PM  - Infection Control notified     - Isolation:   - Airborne, Contact and Droplet Precautions  - Cohort patients into COVID units  - N95 masks must be fit tested, wear eye protection  - 20 second hand hygiene              - Limit visitors per hospital policy              - Consolidating lab draws, nursing care, provider visits, and interventions    - Diagnostics: (leukopenia, hyponatremia, hyperferritinemia, elevated troponin, elevated d-dimer, age, and comorbidities are significant predictors of poor clinical outcome)  CBC, CMP, Procalcitonin, Ferritin, CRP, LDH, Troponin, Rapid Flu, Blood Culture x2 and Portable CXR    - Management:  Supplemental O2 to maintain SpO2 >92%  Continuous/intermittent Pulse Ox  Albuterol INHALER PRN (avoid nebulization of secretions)  Avoiding BiPAP to prevent aerosolization (including home BiPAP)  Started on plaquenil and azithromycin

## 2020-04-04 NOTE — ASSESSMENT & PLAN NOTE
Due to COVID19 infection   On hydroxychloroquine and azithromycin x5 days  On NRB with SpO2 high 90s-- wean to ventimask 50%

## 2020-04-04 NOTE — ED NOTES
Pt given dinner tray. Pt has no complaints at this time and is resting. VSS. In NAD. Will continue to monitor. Call light at bedside.

## 2020-04-04 NOTE — ASSESSMENT & PLAN NOTE
A1c:   Lab Results   Component Value Date    HGBA1C 6.1 (H) 01/28/2020   Hold home metformin    Meds: SSI PRN to maintain goal 140-180-- not requiring   ADA diet, accuchecks ACHS, hypoglycemic protocol    Renal function normal and at baseline- monitor

## 2020-04-04 NOTE — PROGRESS NOTES
[facility] Hospital Medicine  Progress Note    Patient Name: Peter Lopez  MRN: [id number]  Patient Class: IP- Inpatient   Admission Date: 4/3/2020  Length of Stay: 1 days  Attending Physician: Rubi Cruz MD  Primary Care Provider: Lucas Lloyd Jr, MD        Subjective:     Principal Problem:Acute hypoxemic respiratory failure        HPI:  Pt is a 85 yo M with a h/o NIDDM2, HTN, chronic HCV, and recent COVID-19 who presents to the ER for worsening weakness, persistent fevers, and dyspnea.  Pt was brought by EMS who found him with an oxygen saturation of 78%.  Pt was placed on non-rebreather and sat's did come up to 98%.  He was on nasal canula prior to arrival but the tubing was long per reports and he was noted to be tachypneic and tachycardic on it.  Pt could speak only a few sentences due to the dyspnea.  Of note patient was on antibiotics as outpatient and was compliant.  He does have a productive cough, decreased po intake, and subjective fevers.      Overview/Hospital Course:  Admitted with hypoxic respiratory failure due to COVID. Started on NRB. Remains stable.     Interval History: Upset that he is still in the ED. Shortness of breath better. Still has a cough. Doesn't like the food.     Review of Systems   Constitutional: Negative for chills and fever.   Respiratory: Positive for cough and shortness of breath.    Cardiovascular: Negative for chest pain and leg swelling.   Gastrointestinal: Negative for abdominal pain, constipation, diarrhea, nausea and vomiting.   Genitourinary: Negative for difficulty urinating.     Objective:     Vital Signs (Most Recent):  Temp: 98.6 °F (37 °C) (04/04/20 1445)  Pulse: 106 (04/04/20 1702)  Resp: 19 (04/04/20 1631)  BP: 127/73 (04/04/20 1702)  SpO2: 96 % (04/04/20 1750) Vital Signs (24h Range):  Temp:  [98.6 °F (37 °C)-99.4 °F (37.4 °C)] 98.6 °F (37 °C)  Pulse:  [] 106  Resp:  [18-22] 19  SpO2:  [95 %-99 %] 96 %  BP: (112-174)/(61-99) 127/73      Weight: 84.8 kg (187 lb)  Body mass index is 26.83 kg/m².    Intake/Output Summary (Last 24 hours) at 4/4/2020 1803  Last data filed at 4/4/2020 1754  Gross per 24 hour   Intake --   Output 975 ml   Net -975 ml      Physical Exam   Constitutional: He appears well-developed and well-nourished. No distress.   HENT:   Head: Normocephalic and atraumatic.   Mouth/Throat: Oropharynx is clear and moist.   Cardiovascular:   Tachycardic- sinus on monitor   Pulmonary/Chest: Effort normal and breath sounds normal. No stridor. No respiratory distress. He has no wheezes. He has no rales.   97% on 100% NRB   Abdominal: Soft. Bowel sounds are normal. He exhibits no distension and no mass. There is no tenderness. There is no guarding.   Musculoskeletal: He exhibits no edema.   Neurological: He is alert.   Skin: Skin is warm and dry. He is not diaphoretic.   Nursing note and vitals reviewed.      Significant Labs: All pertinent labs within the past 24 hours have been reviewed.    Significant Imaging: I have reviewed and interpreted all pertinent imaging results/findings within the past 24 hours.      Assessment/Plan:      * Acute hypoxemic respiratory failure  Due to COVID19 infection   On hydroxychloroquine and azithromycin x5 days  On NRB with SpO2 high 90s-- wean to ventimask 50%    Elevated troponin  Likely due to demand from COVID infection  No signs of ischemia      Anemia  No signs of bleeding - monitor       COVID-19  Noted- see respiratory failure       Diabetes mellitus with nephropathy  A1c:   Lab Results   Component Value Date    HGBA1C 6.1 (H) 01/28/2020   Hold home metformin    Meds: SSI PRN to maintain goal 140-180-- not requiring   ADA diet, accuchecks ACHS, hypoglycemic protocol    Renal function normal and at baseline- monitor         HTN (hypertension)  On olmesartan-HCTZ at home  BP normotensive without Rx-- monitor         VTE Risk Mitigation (From admission, onward)         Ordered     heparin (porcine)  injection 5,000 Units  Every 8 hours      04/03/20 2114     IP VTE HIGH RISK PATIENT  Once      04/03/20 2051     Place sequential compression device  Until discontinued      04/03/20 2051     Place DARIEL hose  Until discontinued      04/03/20 2051                Called daughter Mckinley Rodriguez. Updated her. She is very concerned about her father going home. He lives alone. Reassured her that discharge planning will be more coordinated at this discharge.       Rubi Cruz MD  Department of Hospital Medicine   [facility]

## 2020-04-04 NOTE — SUBJECTIVE & OBJECTIVE
Interval History: Upset that he is still in the ED. Shortness of breath better. Still has a cough. Doesn't like the food.     Review of Systems   Constitutional: Negative for chills and fever.   Respiratory: Positive for cough and shortness of breath.    Cardiovascular: Negative for chest pain and leg swelling.   Gastrointestinal: Negative for abdominal pain, constipation, diarrhea, nausea and vomiting.   Genitourinary: Negative for difficulty urinating.     Objective:     Vital Signs (Most Recent):  Temp: 98.6 °F (37 °C) (04/04/20 1445)  Pulse: 106 (04/04/20 1702)  Resp: 19 (04/04/20 1631)  BP: 127/73 (04/04/20 1702)  SpO2: 96 % (04/04/20 1750) Vital Signs (24h Range):  Temp:  [98.6 °F (37 °C)-99.4 °F (37.4 °C)] 98.6 °F (37 °C)  Pulse:  [] 106  Resp:  [18-22] 19  SpO2:  [95 %-99 %] 96 %  BP: (112-174)/(61-99) 127/73     Weight: 84.8 kg (187 lb)  Body mass index is 26.83 kg/m².    Intake/Output Summary (Last 24 hours) at 4/4/2020 1803  Last data filed at 4/4/2020 1754  Gross per 24 hour   Intake --   Output 975 ml   Net -975 ml      Physical Exam   Constitutional: He appears well-developed and well-nourished. No distress.   HENT:   Head: Normocephalic and atraumatic.   Mouth/Throat: Oropharynx is clear and moist.   Cardiovascular:   Tachycardic- sinus on monitor   Pulmonary/Chest: Effort normal and breath sounds normal. No stridor. No respiratory distress. He has no wheezes. He has no rales.   97% on 100% NRB   Abdominal: Soft. Bowel sounds are normal. He exhibits no distension and no mass. There is no tenderness. There is no guarding.   Musculoskeletal: He exhibits no edema.   Neurological: He is alert.   Skin: Skin is warm and dry. He is not diaphoretic.   Nursing note and vitals reviewed.      Significant Labs: All pertinent labs within the past 24 hours have been reviewed.    Significant Imaging: I have reviewed and interpreted all pertinent imaging results/findings within the past 24 hours.

## 2020-04-04 NOTE — ED NOTES
Per , she asked to put the pt on a venti mask at 50%. Pt placed on venti mask and currently satting 95%. In NAD. Will continue to monitor.

## 2020-04-04 NOTE — SUBJECTIVE & OBJECTIVE
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 No pertinent surgical history.    Review of patient's allergies indicates:  No Known Allergies    No current facility-administered medications on file prior to encounter.      Current Outpatient Medications on File Prior to Encounter   Medication Sig    albuterol (PROAIR HFA) 90 mcg/actuation inhaler Inhale 2 puffs into the lungs every 4 (four) hours as needed for Wheezing (cough/shortness of breath). Rescue    azithromycin (Z-BARAK) 250 MG tablet Take 1 tablet (250 mg total) by mouth once daily. Take first 2 tablets together, then 1 every day until finished.    blood sugar diagnostic Strp To check BG 1 times daily, to use with insurance preferred meter    blood-glucose meter kit To check BG 1 times daily, to use with insurance preferred meter    ciclopirox (PENLAC) 8 % Soln Apply topically nightly.    famotidine (PEPCID) 20 MG tablet Take 1 tablet (20 mg total) by mouth once daily.    fluticasone propionate (FLONASE) 50 mcg/actuation nasal spray 1 spray (50 mcg total) by Each Nostril route once daily.    ipratropium (ATROVENT) 42 mcg (0.06 %) nasal spray 2 sprays by Nasal route 4 (four) times daily.    lancets Misc To check BG 1 times daily, to use with insurance preferred meter    loratadine (CLARITIN) 10 mg tablet Take 1 tablet (10 mg total) by mouth once daily.    metFORMIN (GLUCOPHAGE) 1000 MG tablet TAKE 1 TABLET BY MOUTH TWICE DAILY WITH MEALS    olmesartan-hydrochlorothiazide (BENICAR HCT) 40-25 mg per tablet TAKE 1 TABLET BY MOUTH ONCE DAILY    ondansetron (ZOFRAN-ODT) 4 MG TbDL Take 1 tablet (4 mg total) by mouth every 8 (eight) hours as needed.    sildenafil (VIAGRA) 50 MG tablet Take 1 tablet (50 mg total) by mouth daily as needed for Erectile  Dysfunction. 1 hour before sex, maximum of 1 tablet in 24 hours    tamsulosin (FLOMAX) 0.4 mg Cap Take 1 capsule (0.4 mg total) by mouth once daily.     Family History     Problem Relation (Age of Onset)    Asbestos Father    Cancer Mother        Tobacco Use    Smoking status: Former Smoker     Packs/day: 1.00     Years: 40.00     Pack years: 40.00     Last attempt to quit: 1989     Years since quittin.4    Smokeless tobacco: Former User     Quit date: 1986   Substance and Sexual Activity    Alcohol use: Not Currently     Alcohol/week: 0.0 standard drinks     Comment: 2 bottles beer on weekends    Drug use: No    Sexual activity: Yes     Partners: Female     Review of Systems   Constitutional: Positive for activity change, appetite change, fatigue and fever. Negative for diaphoresis.   HENT: Positive for rhinorrhea. Negative for facial swelling and nosebleeds.    Eyes: Negative for pain and visual disturbance.   Respiratory: Positive for cough and shortness of breath. Negative for chest tightness.    Cardiovascular: Negative for chest pain and palpitations.   Gastrointestinal: Negative for diarrhea and nausea.   Endocrine: Negative for cold intolerance and heat intolerance.   Genitourinary: Negative for hematuria and urgency.   Musculoskeletal: Negative for arthralgias and back pain.   Skin: Negative for pallor and rash.   Neurological: Negative for syncope and speech difficulty.   Hematological: Negative for adenopathy. Does not bruise/bleed easily.   Psychiatric/Behavioral: Negative for confusion and hallucinations.     Objective:     Vital Signs (Most Recent):  Temp: 99.4 °F (37.4 °C) (20)  Pulse: 102 (20)  Resp: (!) 22 (20)  BP: 132/77 (20)  SpO2: 97 % (20) Vital Signs (24h Range):  Temp:  [98 °F (36.7 °C)-100.2 °F (37.9 °C)] 99.4 °F (37.4 °C)  Pulse:  [101-116] 102  Resp:  [20-31] 22  SpO2:  [86 %-100 %] 97 %  BP: (119-158)/(74-88) 132/77      Weight: 84.8 kg (187 lb)  Body mass index is 26.83 kg/m².    Physical Exam   Constitutional: No distress.   HENT:   Head: Normocephalic and atraumatic.   Eyes: Pupils are equal, round, and reactive to light. EOM are normal.   Neck: Normal range of motion. No tracheal deviation present.   Cardiovascular: Normal rate and regular rhythm.   Pulmonary/Chest: He is in respiratory distress. He has rales.   Abdominal: Soft. Bowel sounds are normal.   Musculoskeletal: Normal range of motion. He exhibits no deformity.   Neurological: He is alert. He exhibits normal muscle tone.   Skin: Skin is warm. Capillary refill takes 2 to 3 seconds. He is not diaphoretic. No pallor.   Psychiatric: He has a normal mood and affect. His behavior is normal.   Vitals reviewed.

## 2020-04-04 NOTE — PROVIDER PROGRESS NOTES - EMERGENCY DEPT.
Encounter Date: 4/3/2020    ED Physician Progress Notes        Physician Note:   Attempted to call family unsuccessfully  Donny Winston

## 2020-04-04 NOTE — H&P
[facility] Hospital Medicine  History & Physical    Patient Name: Peter Lopez  MRN: [id number]  Admission Date: 4/3/2020  Attending Physician: Donny Winston MD   Primary Care Provider: Lucas Lloyd Jr, MD         Patient information was obtained from ER records.     Subjective:     Principal Problem:<principal problem not specified>    Chief Complaint:   Chief Complaint   Patient presents with    Fatigue     EMS reports pt with +COVID test. c/o weakness today and persistent fever and shortness of breath. initial RA sats of 78%. placed on NRB        HPI: Pt is a 83 yo M with a h/o NIDDM2, HTN, chronic HCV, and recent COVID-19 who presents to the ER for worsening weakness, persistent fevers, and dyspnea.  Pt was brought by EMS who found him with an oxygen saturation of 78%.  Pt was placed on non-rebreather and sat's did come up to 98%.  He was on nasal canula prior to arrival but the tubing was long per reports and he was noted to be tachypneic and tachycardic on it.  Pt could speak only a few sentences due to the dyspnea.  Of note patient was on antibiotics as outpatient and was compliant.  He does have a productive cough, decreased po intake, and subjective fevers.      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 No pertinent surgical history.    Review of patient's allergies indicates:  No Known Allergies    No current facility-administered medications on file prior to encounter.      Current Outpatient Medications on File Prior to Encounter   Medication Sig    albuterol (PROAIR HFA) 90 mcg/actuation inhaler Inhale 2 puffs into the lungs every 4 (four) hours as needed for Wheezing (cough/shortness of breath). Rescue    azithromycin (Z-BARAK) 250 MG tablet Take 1 tablet (250 mg total) by mouth  once daily. Take first 2 tablets together, then 1 every day until finished.    blood sugar diagnostic Strp To check BG 1 times daily, to use with insurance preferred meter    blood-glucose meter kit To check BG 1 times daily, to use with insurance preferred meter    ciclopirox (PENLAC) 8 % Soln Apply topically nightly.    famotidine (PEPCID) 20 MG tablet Take 1 tablet (20 mg total) by mouth once daily.    fluticasone propionate (FLONASE) 50 mcg/actuation nasal spray 1 spray (50 mcg total) by Each Nostril route once daily.    ipratropium (ATROVENT) 42 mcg (0.06 %) nasal spray 2 sprays by Nasal route 4 (four) times daily.    lancets Misc To check BG 1 times daily, to use with insurance preferred meter    loratadine (CLARITIN) 10 mg tablet Take 1 tablet (10 mg total) by mouth once daily.    metFORMIN (GLUCOPHAGE) 1000 MG tablet TAKE 1 TABLET BY MOUTH TWICE DAILY WITH MEALS    olmesartan-hydrochlorothiazide (BENICAR HCT) 40-25 mg per tablet TAKE 1 TABLET BY MOUTH ONCE DAILY    ondansetron (ZOFRAN-ODT) 4 MG TbDL Take 1 tablet (4 mg total) by mouth every 8 (eight) hours as needed.    sildenafil (VIAGRA) 50 MG tablet Take 1 tablet (50 mg total) by mouth daily as needed for Erectile Dysfunction. 1 hour before sex, maximum of 1 tablet in 24 hours    tamsulosin (FLOMAX) 0.4 mg Cap Take 1 capsule (0.4 mg total) by mouth once daily.     Family History     Problem Relation (Age of Onset)    Asbestos Father    Cancer Mother        Tobacco Use    Smoking status: Former Smoker     Packs/day: 1.00     Years: 40.00     Pack years: 40.00     Last attempt to quit: 1989     Years since quittin.4    Smokeless tobacco: Former User     Quit date: 1986   Substance and Sexual Activity    Alcohol use: Not Currently     Alcohol/week: 0.0 standard drinks     Comment: 2 bottles beer on weekends    Drug use: No    Sexual activity: Yes     Partners: Female     Review of Systems   Constitutional: Positive for activity  change, appetite change, fatigue and fever. Negative for diaphoresis.   HENT: Positive for rhinorrhea. Negative for facial swelling and nosebleeds.    Eyes: Negative for pain and visual disturbance.   Respiratory: Positive for cough and shortness of breath. Negative for chest tightness.    Cardiovascular: Negative for chest pain and palpitations.   Gastrointestinal: Negative for diarrhea and nausea.   Endocrine: Negative for cold intolerance and heat intolerance.   Genitourinary: Negative for hematuria and urgency.   Musculoskeletal: Negative for arthralgias and back pain.   Skin: Negative for pallor and rash.   Neurological: Negative for syncope and speech difficulty.   Hematological: Negative for adenopathy. Does not bruise/bleed easily.   Psychiatric/Behavioral: Negative for confusion and hallucinations.     Objective:     Vital Signs (Most Recent):  Temp: 99.4 °F (37.4 °C) (04/03/20 2033)  Pulse: 102 (04/03/20 2033)  Resp: (!) 22 (04/03/20 2033)  BP: 132/77 (04/03/20 2033)  SpO2: 97 % (04/03/20 2033) Vital Signs (24h Range):  Temp:  [98 °F (36.7 °C)-100.2 °F (37.9 °C)] 99.4 °F (37.4 °C)  Pulse:  [101-116] 102  Resp:  [20-31] 22  SpO2:  [86 %-100 %] 97 %  BP: (119-158)/(74-88) 132/77     Weight: 84.8 kg (187 lb)  Body mass index is 26.83 kg/m².    Physical Exam   Constitutional: No distress.   HENT:   Head: Normocephalic and atraumatic.   Eyes: Pupils are equal, round, and reactive to light. EOM are normal.   Neck: Normal range of motion. No tracheal deviation present.   Cardiovascular: Normal rate and regular rhythm.   Pulmonary/Chest: He is in respiratory distress. He has rales.   Abdominal: Soft. Bowel sounds are normal.   Musculoskeletal: Normal range of motion. He exhibits no deformity.   Neurological: He is alert. He exhibits normal muscle tone.   Skin: Skin is warm. Capillary refill takes 2 to 3 seconds. He is not diaphoretic. No pallor.   Psychiatric: He has a normal mood and affect. His behavior is  normal.   Vitals reviewed.        Assessment/Plan:     COVID-19  - COVID-19 testing Collection Date: 3/28/2020 Collection Time:   5:55 PM  - Infection Control notified     - Isolation:   - Airborne, Contact and Droplet Precautions  - Cohort patients into COVID units  - N95 masks must be fit tested, wear eye protection  - 20 second hand hygiene              - Limit visitors per hospital policy              - Consolidating lab draws, nursing care, provider visits, and interventions    - Diagnostics: (leukopenia, hyponatremia, hyperferritinemia, elevated troponin, elevated d-dimer, age, and comorbidities are significant predictors of poor clinical outcome)  CBC, CMP, Procalcitonin, Ferritin, CRP, LDH, Troponin, Rapid Flu, Blood Culture x2 and Portable CXR    - Management:  Supplemental O2 to maintain SpO2 >92%  Continuous/intermittent Pulse Ox  Albuterol INHALER PRN (avoid nebulization of secretions)  Avoiding BiPAP to prevent aerosolization (including home BiPAP)  Started on plaquenil and azithromycin            Diabetes mellitus with nephropathy  A1C.  AC/HS accuchecks.  SSI.  Titrate up as needed.       HTN (hypertension)  Review home meds once med rec complete.  PRN IV hydralazine        VTE Risk Mitigation (From admission, onward)         Ordered     heparin (porcine) injection 5,000 Units  Every 8 hours      04/03/20 2114     IP VTE HIGH RISK PATIENT  Once      04/03/20 2051     Place sequential compression device  Until discontinued      04/03/20 2051     Place DARIEL hose  Until discontinued      04/03/20 2051                   Mike Guerrero MD  Department of Hospital Medicine   [facility]

## 2020-04-05 LAB
ALBUMIN SERPL BCP-MCNC: 2.3 G/DL (ref 3.5–5.2)
ALP SERPL-CCNC: 49 U/L (ref 55–135)
ALT SERPL W/O P-5'-P-CCNC: 22 U/L (ref 10–44)
ANION GAP SERPL CALC-SCNC: 12 MMOL/L (ref 8–16)
AST SERPL-CCNC: 44 U/L (ref 10–40)
BASOPHILS # BLD AUTO: 0.02 K/UL (ref 0–0.2)
BASOPHILS NFR BLD: 0.3 % (ref 0–1.9)
BILIRUB SERPL-MCNC: 0.5 MG/DL (ref 0.1–1)
BUN SERPL-MCNC: 12 MG/DL (ref 8–23)
CALCIUM SERPL-MCNC: 8.4 MG/DL (ref 8.7–10.5)
CHLORIDE SERPL-SCNC: 95 MMOL/L (ref 95–110)
CO2 SERPL-SCNC: 27 MMOL/L (ref 23–29)
CREAT SERPL-MCNC: 0.9 MG/DL (ref 0.5–1.4)
DIFFERENTIAL METHOD: ABNORMAL
EOSINOPHIL # BLD AUTO: 0 K/UL (ref 0–0.5)
EOSINOPHIL NFR BLD: 0.6 % (ref 0–8)
ERYTHROCYTE [DISTWIDTH] IN BLOOD BY AUTOMATED COUNT: 13.3 % (ref 11.5–14.5)
EST. GFR  (AFRICAN AMERICAN): >60 ML/MIN/1.73 M^2
EST. GFR  (NON AFRICAN AMERICAN): >60 ML/MIN/1.73 M^2
ESTIMATED AVG GLUCOSE: 137 MG/DL (ref 68–131)
GLUCOSE SERPL-MCNC: 130 MG/DL (ref 70–110)
HBA1C MFR BLD HPLC: 6.4 % (ref 4–5.6)
HCT VFR BLD AUTO: 33 % (ref 40–54)
HGB BLD-MCNC: 10.8 G/DL (ref 14–18)
IMM GRANULOCYTES # BLD AUTO: 0.05 K/UL (ref 0–0.04)
IMM GRANULOCYTES NFR BLD AUTO: 0.8 % (ref 0–0.5)
LYMPHOCYTES # BLD AUTO: 0.5 K/UL (ref 1–4.8)
LYMPHOCYTES NFR BLD: 8.2 % (ref 18–48)
MCH RBC QN AUTO: 28.2 PG (ref 27–31)
MCHC RBC AUTO-ENTMCNC: 32.7 G/DL (ref 32–36)
MCV RBC AUTO: 86 FL (ref 82–98)
MONOCYTES # BLD AUTO: 0.4 K/UL (ref 0.3–1)
MONOCYTES NFR BLD: 6.6 % (ref 4–15)
NEUTROPHILS # BLD AUTO: 5.3 K/UL (ref 1.8–7.7)
NEUTROPHILS NFR BLD: 83.5 % (ref 38–73)
NRBC BLD-RTO: 0 /100 WBC
PLATELET # BLD AUTO: 364 K/UL (ref 150–350)
PMV BLD AUTO: 8.9 FL (ref 9.2–12.9)
POCT GLUCOSE: 126 MG/DL (ref 70–110)
POCT GLUCOSE: 137 MG/DL (ref 70–110)
POCT GLUCOSE: 141 MG/DL (ref 70–110)
POCT GLUCOSE: 141 MG/DL (ref 70–110)
POTASSIUM SERPL-SCNC: 3.6 MMOL/L (ref 3.5–5.1)
PROT SERPL-MCNC: 6.4 G/DL (ref 6–8.4)
RBC # BLD AUTO: 3.83 M/UL (ref 4.6–6.2)
SODIUM SERPL-SCNC: 134 MMOL/L (ref 136–145)
WBC # BLD AUTO: 6.33 K/UL (ref 3.9–12.7)

## 2020-04-05 PROCEDURE — 25000003 PHARM REV CODE 250: Performed by: EMERGENCY MEDICINE

## 2020-04-05 PROCEDURE — 63700000 PHARM REV CODE 250 ALT 637 W/O HCPCS: Performed by: EMERGENCY MEDICINE

## 2020-04-05 PROCEDURE — 99223 1ST HOSP IP/OBS HIGH 75: CPT | Mod: ,,, | Performed by: INTERNAL MEDICINE

## 2020-04-05 PROCEDURE — 94761 N-INVAS EAR/PLS OXIMETRY MLT: CPT

## 2020-04-05 PROCEDURE — 83036 HEMOGLOBIN GLYCOSYLATED A1C: CPT

## 2020-04-05 PROCEDURE — 63600175 PHARM REV CODE 636 W HCPCS: Performed by: FAMILY MEDICINE

## 2020-04-05 PROCEDURE — 85025 COMPLETE CBC W/AUTO DIFF WBC: CPT

## 2020-04-05 PROCEDURE — 27000221 HC OXYGEN, UP TO 24 HOURS

## 2020-04-05 PROCEDURE — 11000001 HC ACUTE MED/SURG PRIVATE ROOM

## 2020-04-05 PROCEDURE — 25000003 PHARM REV CODE 250: Performed by: FAMILY MEDICINE

## 2020-04-05 PROCEDURE — 99223 PR INITIAL HOSPITAL CARE,LEVL III: ICD-10-PCS | Mod: ,,, | Performed by: INTERNAL MEDICINE

## 2020-04-05 PROCEDURE — 63600175 PHARM REV CODE 636 W HCPCS: Performed by: HOSPITALIST

## 2020-04-05 PROCEDURE — 80053 COMPREHEN METABOLIC PANEL: CPT

## 2020-04-05 PROCEDURE — 25000003 PHARM REV CODE 250: Performed by: HOSPITALIST

## 2020-04-05 RX ORDER — AZITHROMYCIN 250 MG/1
250 TABLET, FILM COATED ORAL DAILY
Status: COMPLETED | OUTPATIENT
Start: 2020-04-06 | End: 2020-04-08

## 2020-04-05 RX ADMIN — AZITHROMYCIN MONOHYDRATE 500 MG: 250 TABLET ORAL at 09:04

## 2020-04-05 RX ADMIN — HYDROXYCHLOROQUINE SULFATE 400 MG: 200 TABLET ORAL at 09:04

## 2020-04-05 RX ADMIN — HEPARIN SODIUM 5000 UNITS: 5000 INJECTION INTRAVENOUS; SUBCUTANEOUS at 09:04

## 2020-04-05 RX ADMIN — TAMSULOSIN HYDROCHLORIDE 0.4 MG: 0.4 CAPSULE ORAL at 09:04

## 2020-04-05 RX ADMIN — HEPARIN SODIUM 5000 UNITS: 5000 INJECTION INTRAVENOUS; SUBCUTANEOUS at 02:04

## 2020-04-05 RX ADMIN — HEPARIN SODIUM 5000 UNITS: 5000 INJECTION INTRAVENOUS; SUBCUTANEOUS at 06:04

## 2020-04-05 RX ADMIN — GUAIFENESIN 600 MG: 600 TABLET, EXTENDED RELEASE ORAL at 09:04

## 2020-04-05 RX ADMIN — FAMOTIDINE 20 MG: 20 TABLET ORAL at 09:04

## 2020-04-05 RX ADMIN — CETIRIZINE HYDROCHLORIDE 10 MG: 10 TABLET, FILM COATED ORAL at 09:04

## 2020-04-05 NOTE — HPI
85 yo M with a h/o NIDDM2, HTN, chronic HCV, and recent COVID-19 who presents to the ER for worsening weakness, persistent fevers, and dyspnea.  Pt was brought by EMS who found him with an oxygen saturation of 78%.  Pt was placed on non-rebreather and sat's did come up to 98%.  He was on nasal canula prior to arrival but the tubing was long per reports and he was noted to be tachypneic and tachycardic on it.  Pt could speak only a few sentences due to the dyspnea.  Of note patient was on antibiotics as outpatient and was compliant.  He does have a productive cough, decreased po intake, and subjective fevers.      Pulmonary consulted for acute hypoxic respiratory failure. Patient now on Ventimask.

## 2020-04-05 NOTE — EICU
eICU Physician Brief Note    Chart reviewed. On camera, the patient is lying in bed, mildly restless but in no distress and not using any accessory respiratory muscles, is on ventimask 50%. Mr Lopez is an 84 yr old man presenting with dyspnea and fever, has been diagnosed with COVID-19 on 3/28. He was noted to be hypoxemic and required 100% NRBM to keep an adequate saturation. Afdter about 24 hours, he is now being trsf from ED and his O2 has been decreased to FiO2 to venti-mask 50%.  PMH: DM2, HTN, HCV.  Labs and investigations reviewed.  Current medications reviewed.  A/P:  1. Acute hypoxemic respiratory failure  Titrate FiO2 to sat 88-92% and monitor respiratory status closely.  2. COVID-19 PNA  Started on Azithromycin/Plaquenil.  Supportive care.  3. DM2 - Insulin sliding scale. mOnitor blood glucose and adjust accordingly.  4. HTN - off antihypertensives at this time.   5. GI/DVT prophylaxis - Heparin SC, mobilize out of bed.

## 2020-04-05 NOTE — ASSESSMENT & PLAN NOTE
Due to COVID19 infection   On hydroxychloroquine and azithromycin x5 days  Initially on NRB  Weaned to ventimask 50% on 4/5- remaining stable  Wean O2 as tolerated  Pulmonary following

## 2020-04-05 NOTE — PLAN OF CARE
Patient alert and oriented. 50%VM in place. sats 90-93%. VSS. SOB on minimum exertions, position changes,etc; but no resp distress noted @ this time. Voids per urinal,but diaper in place per request of patient for added protection. Remains fall from falls or injuries. Will continue to provide comfort measures as needed. Covid-19 precautions maintained per protocol.

## 2020-04-05 NOTE — PROGRESS NOTES
[facility] Hospital Medicine  Progress Note    Patient Name: Peter Lopez  MRN: [id number]  Patient Class: IP- Inpatient   Admission Date: 4/3/2020  Length of Stay: 2 days  Attending Physician: Rubi Cruz MD  Primary Care Provider: Lucas Lloyd Jr, MD        Subjective:     Principal Problem:Acute hypoxemic respiratory failure        HPI:  Pt is a 83 yo M with a h/o NIDDM2, HTN, chronic HCV, and recent COVID-19 who presents to the ER for worsening weakness, persistent fevers, and dyspnea.  Pt was brought by EMS who found him with an oxygen saturation of 78%.  Pt was placed on non-rebreather and sat's did come up to 98%.  He was on nasal canula prior to arrival but the tubing was long per reports and he was noted to be tachypneic and tachycardic on it.  Pt could speak only a few sentences due to the dyspnea.  Of note patient was on antibiotics as outpatient and was compliant.  He does have a productive cough, decreased po intake, and subjective fevers.      Overview/Hospital Course:  Admitted with hypoxic respiratory failure due to COVID. Started on NRB. Remains stable. Weaned to ventimask on 4/4.     Interval History: Still short of breath with minimal exertion. Coughing. Poor sleep last night.     Review of Systems   Constitutional: Negative for chills and fever.   Respiratory: Positive for cough and shortness of breath.    Cardiovascular: Negative for chest pain and leg swelling.   Gastrointestinal: Negative for abdominal pain, constipation, diarrhea, nausea and vomiting.   Genitourinary: Negative for difficulty urinating.   Psychiatric/Behavioral: Positive for sleep disturbance.     Objective:     Vital Signs (Most Recent):  Temp: 97.8 °F (36.6 °C) (04/05/20 0800)  Pulse: (!) 111 (04/05/20 0830)  Resp: (!) 36 (04/05/20 0830)  BP: (!) 146/85 (04/05/20 0830)  SpO2: (!) 94 % (04/05/20 0833) Vital Signs (24h Range):  Temp:  [97.8 °F (36.6 °C)-98.7 °F (37.1 °C)] 97.8 °F (36.6 °C)  Pulse:   [101-126] 111  Resp:  [14-38] 36  SpO2:  [70 %-99 %] 94 %  BP: (112-166)/(61-95) 146/85     Weight: 74 kg (163 lb 2.2 oz)  Body mass index is 23.41 kg/m².    Intake/Output Summary (Last 24 hours) at 4/5/2020 1139  Last data filed at 4/5/2020 0900  Gross per 24 hour   Intake 460 ml   Output 1125 ml   Net -665 ml      Physical Exam   Constitutional: He appears well-developed and well-nourished. No distress.   HENT:   Head: Normocephalic and atraumatic.   Mouth/Throat: Oropharynx is clear and moist.   Cardiovascular:   Tachycardic- sinus on monitor   Pulmonary/Chest: Effort normal and breath sounds normal. No stridor. No respiratory distress. He has no wheezes. He has no rales.   95% on 50% ventimask   Abdominal: Soft. Bowel sounds are normal. He exhibits no distension and no mass. There is no tenderness. There is no guarding.   Musculoskeletal: He exhibits no edema.   Neurological: He is alert.   Skin: Skin is warm and dry. He is not diaphoretic.   Nursing note and vitals reviewed.      Significant Labs: All pertinent labs within the past 24 hours have been reviewed.    Significant Imaging: I have reviewed and interpreted all pertinent imaging results/findings within the past 24 hours.      Assessment/Plan:      * Acute hypoxemic respiratory failure  Due to COVID19 infection   On hydroxychloroquine and azithromycin x5 days  Initially on NRB  Weaned to ventimask 50% on 4/5- remaining stable  Wean O2 as tolerated  Pulmonary following     Elevated troponin  Likely due to demand from COVID infection  No signs of ischemia      Anemia  No signs of bleeding - monitor       COVID-19  Noted- see respiratory failure       Diabetes mellitus with nephropathy  A1c:   Lab Results   Component Value Date    HGBA1C 6.1 (H) 01/28/2020   Hold home metformin    Meds: SSI PRN to maintain goal 140-180-- not requiring   ADA diet, accuchecks ACHS, hypoglycemic protocol    Renal function normal and at baseline- monitor         HTN  (hypertension)  On olmesartan-HCTZ at home  BP normotensive without Rx-- monitor         VTE Risk Mitigation (From admission, onward)         Ordered     heparin (porcine) injection 5,000 Units  Every 8 hours      04/03/20 2114     IP VTE HIGH RISK PATIENT  Once      04/03/20 2051     Place sequential compression device  Until discontinued      04/03/20 2051     Place DARIEL hose  Until discontinued      04/03/20 2051                Critical care time spent on the evaluation and treatment of severe organ dysfunction, review of pertinent labs and imaging studies, discussions with consulting providers and discussions with patient/family: 40 minutes.    Called daughter Mckinley Rodriguez to update. All questions answered.       Rubi Cruz MD  Department of Hospital Medicine   [facility]

## 2020-04-05 NOTE — SUBJECTIVE & OBJECTIVE
Interval History: Still short of breath with minimal exertion. Coughing. Poor sleep last night.     Review of Systems   Constitutional: Negative for chills and fever.   Respiratory: Positive for cough and shortness of breath.    Cardiovascular: Negative for chest pain and leg swelling.   Gastrointestinal: Negative for abdominal pain, constipation, diarrhea, nausea and vomiting.   Genitourinary: Negative for difficulty urinating.   Psychiatric/Behavioral: Positive for sleep disturbance.     Objective:     Vital Signs (Most Recent):  Temp: 97.8 °F (36.6 °C) (04/05/20 0800)  Pulse: (!) 111 (04/05/20 0830)  Resp: (!) 36 (04/05/20 0830)  BP: (!) 146/85 (04/05/20 0830)  SpO2: (!) 94 % (04/05/20 0833) Vital Signs (24h Range):  Temp:  [97.8 °F (36.6 °C)-98.7 °F (37.1 °C)] 97.8 °F (36.6 °C)  Pulse:  [101-126] 111  Resp:  [14-38] 36  SpO2:  [70 %-99 %] 94 %  BP: (112-166)/(61-95) 146/85     Weight: 74 kg (163 lb 2.2 oz)  Body mass index is 23.41 kg/m².    Intake/Output Summary (Last 24 hours) at 4/5/2020 1139  Last data filed at 4/5/2020 0900  Gross per 24 hour   Intake 460 ml   Output 1125 ml   Net -665 ml      Physical Exam   Constitutional: He appears well-developed and well-nourished. No distress.   HENT:   Head: Normocephalic and atraumatic.   Mouth/Throat: Oropharynx is clear and moist.   Cardiovascular:   Tachycardic- sinus on monitor   Pulmonary/Chest: Effort normal and breath sounds normal. No stridor. No respiratory distress. He has no wheezes. He has no rales.   95% on 50% ventimask   Abdominal: Soft. Bowel sounds are normal. He exhibits no distension and no mass. There is no tenderness. There is no guarding.   Musculoskeletal: He exhibits no edema.   Neurological: He is alert.   Skin: Skin is warm and dry. He is not diaphoretic.   Nursing note and vitals reviewed.      Significant Labs: All pertinent labs within the past 24 hours have been reviewed.    Significant Imaging: I have reviewed and interpreted all  pertinent imaging results/findings within the past 24 hours.

## 2020-04-05 NOTE — ASSESSMENT & PLAN NOTE
Patient with acute hypoxic respiratory failure secondary to COVID-19. She seems to be improving. NRB--> VM. Good O2 sats. Mild airspace opacities.   -Recheck CRP  -HQ and Azithro  -Wean O2 as tolerated.    Consider goals of care discussions.

## 2020-04-05 NOTE — PROVIDER TRANSFER
Mr Peter Lopez is a 84 y.o. man with DM, HTN who presented with respiratory failure due to COVID. He was previously hospitalized for this and was discharged with home O2. Per his daughter, he lives at home alone and had difficulty getting his home O2. He was started on NRB. Weaned to ventimask.    To do  - wean O2 as tolerated  - PT, OT consults when appropriate  - when discharging him home, would call daughter to discuss DC plan

## 2020-04-05 NOTE — CONSULTS
[facility]  Pulmonology  Consult Note    Patient Name: Peter Lopez  MRN: [id number]  Admission Date: 4/3/2020  Hospital Length of Stay: 2 days  Code Status: Full Code  Attending Physician: Rubi Cruz MD  Primary Care Provider: Lucas Lloyd Jr, MD   Principal Problem: Acute hypoxemic respiratory failure    Inpatient consult to Critical Care Medicine  Consult performed by: Rajeev Tobin MD  Consult ordered by: Donny Winston MD  Reason for consult: Acute hypoxic respiratory failure        Subjective:     HPI:  85 yo M with a h/o NIDDM2, HTN, chronic HCV, and recent COVID-19 who presents to the ER for worsening weakness, persistent fevers, and dyspnea.  Pt was brought by EMS who found him with an oxygen saturation of 78%.  Pt was placed on non-rebreather and sat's did come up to 98%.  He was on nasal canula prior to arrival but the tubing was long per reports and he was noted to be tachypneic and tachycardic on it.  Pt could speak only a few sentences due to the dyspnea.  Of note patient was on antibiotics as outpatient and was compliant.  He does have a productive cough, decreased po intake, and subjective fevers.      Pulmonary consulted for acute hypoxic respiratory failure. Patient now on Ventimask.     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 No pertinent surgical history.    Review of patient's allergies indicates:  No Known Allergies    No current facility-administered medications on file prior to encounter.      Current Outpatient Medications on File Prior to Encounter   Medication Sig    albuterol (PROAIR HFA) 90 mcg/actuation inhaler Inhale 2 puffs into the lungs every 4 (four) hours as needed for Wheezing (cough/shortness of breath). Rescue    azithromycin (Z-BARAK) 250  MG tablet Take 1 tablet (250 mg total) by mouth once daily. Take first 2 tablets together, then 1 every day until finished.    blood sugar diagnostic Strp To check BG 1 times daily, to use with insurance preferred meter    blood-glucose meter kit To check BG 1 times daily, to use with insurance preferred meter    ciclopirox (PENLAC) 8 % Soln Apply topically nightly.    famotidine (PEPCID) 20 MG tablet Take 1 tablet (20 mg total) by mouth once daily.    fluticasone propionate (FLONASE) 50 mcg/actuation nasal spray 1 spray (50 mcg total) by Each Nostril route once daily.    ipratropium (ATROVENT) 42 mcg (0.06 %) nasal spray 2 sprays by Nasal route 4 (four) times daily.    lancets Misc To check BG 1 times daily, to use with insurance preferred meter    loratadine (CLARITIN) 10 mg tablet Take 1 tablet (10 mg total) by mouth once daily.    metFORMIN (GLUCOPHAGE) 1000 MG tablet TAKE 1 TABLET BY MOUTH TWICE DAILY WITH MEALS    olmesartan-hydrochlorothiazide (BENICAR HCT) 40-25 mg per tablet TAKE 1 TABLET BY MOUTH ONCE DAILY    ondansetron (ZOFRAN-ODT) 4 MG TbDL Take 1 tablet (4 mg total) by mouth every 8 (eight) hours as needed.    sildenafil (VIAGRA) 50 MG tablet Take 1 tablet (50 mg total) by mouth daily as needed for Erectile Dysfunction. 1 hour before sex, maximum of 1 tablet in 24 hours    tamsulosin (FLOMAX) 0.4 mg Cap Take 1 capsule (0.4 mg total) by mouth once daily.     Family History     Problem Relation (Age of Onset)    Asbestos Father    Cancer Mother        Tobacco Use    Smoking status: Former Smoker     Packs/day: 1.00     Years: 40.00     Pack years: 40.00     Last attempt to quit: 1989     Years since quittin.4    Smokeless tobacco: Former User     Quit date: 1986   Substance and Sexual Activity    Alcohol use: Not Currently     Alcohol/week: 0.0 standard drinks     Comment: 2 bottles beer on weekends    Drug use: No    Sexual activity: Yes     Partners: Female     Review of  Systems   Constitutional: Positive for activity change, appetite change, fatigue and fever. Negative for diaphoresis.   HENT: Positive for rhinorrhea. Negative for facial swelling and nosebleeds.    Eyes: Negative for pain and visual disturbance.   Respiratory: Positive for cough and shortness of breath. Negative for chest tightness.    Cardiovascular: Negative for chest pain and palpitations.   Gastrointestinal: Negative for diarrhea and nausea.   Endocrine: Negative for cold intolerance and heat intolerance.   Genitourinary: Negative for hematuria and urgency.   Musculoskeletal: Negative for arthralgias and back pain.   Skin: Negative for pallor and rash.   Neurological: Negative for syncope and speech difficulty.   Hematological: Negative for adenopathy. Does not bruise/bleed easily.   Psychiatric/Behavioral: Negative for confusion and hallucinations.     Objective:     Vital Signs (Most Recent):  Temp: 98.7 °F (37.1 °C) (04/05/20 0330)  Pulse: 109 (04/05/20 0730)  Resp: (!) 33 (04/05/20 0730)  BP: (!) 147/77 (04/05/20 0730)  SpO2: (!) 94 % (04/05/20 0833) Vital Signs (24h Range):  Temp:  [98 °F (36.7 °C)-98.7 °F (37.1 °C)] 98.7 °F (37.1 °C)  Pulse:  [101-126] 109  Resp:  [14-38] 33  SpO2:  [70 %-99 %] 94 %  BP: (112-166)/(61-95) 147/77     Weight: 74 kg (163 lb 2.2 oz)  Body mass index is 23.41 kg/m².    Physical Exam   Constitutional: No distress.   HENT:   Head: Normocephalic and atraumatic.   Eyes: Pupils are equal, round, and reactive to light. EOM are normal.   Neck: Normal range of motion. No tracheal deviation present.   Cardiovascular: Normal rate and regular rhythm.   Pulmonary/Chest: He is in respiratory distress. He has rales.   Abdominal: Soft. Bowel sounds are normal.   Musculoskeletal: Normal range of motion. He exhibits no deformity.   Neurological: He is alert. He exhibits normal muscle tone.   Skin: Skin is warm. Capillary refill takes 2 to 3 seconds. He is not diaphoretic. No pallor.    Psychiatric: He has a normal mood and affect. His behavior is normal.   Vitals reviewed.        Assessment/Plan:     * Acute hypoxemic respiratory failure  Patient with acute hypoxic respiratory failure secondary to COVID-19. She seems to be improving. NRB--> VM. Good O2 sats. Mild airspace opacities.   -Recheck CRP  -HQ and Azithro  -Wean O2 as tolerated.    Consider goals of care discussions.     COVID-19  Positive          Thank you for your consult. I will follow-up with patient. Please contact us if you have any additional questions.     Rajeev Tobin MD  Pulmonology  [facility]

## 2020-04-05 NOTE — NURSING
Patient arrived to room 413 in bed with transport.  Patient on 15L 50% venti mask with oxygen saturation of 95% and resting comfortably.  Patient is a,a,ox4, VSS, and has no complaints at this time.   Will continue to monitor.

## 2020-04-05 NOTE — SUBJECTIVE & OBJECTIVE
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 No pertinent surgical history.    Review of patient's allergies indicates:  No Known Allergies    No current facility-administered medications on file prior to encounter.      Current Outpatient Medications on File Prior to Encounter   Medication Sig    albuterol (PROAIR HFA) 90 mcg/actuation inhaler Inhale 2 puffs into the lungs every 4 (four) hours as needed for Wheezing (cough/shortness of breath). Rescue    azithromycin (Z-BARAK) 250 MG tablet Take 1 tablet (250 mg total) by mouth once daily. Take first 2 tablets together, then 1 every day until finished.    blood sugar diagnostic Strp To check BG 1 times daily, to use with insurance preferred meter    blood-glucose meter kit To check BG 1 times daily, to use with insurance preferred meter    ciclopirox (PENLAC) 8 % Soln Apply topically nightly.    famotidine (PEPCID) 20 MG tablet Take 1 tablet (20 mg total) by mouth once daily.    fluticasone propionate (FLONASE) 50 mcg/actuation nasal spray 1 spray (50 mcg total) by Each Nostril route once daily.    ipratropium (ATROVENT) 42 mcg (0.06 %) nasal spray 2 sprays by Nasal route 4 (four) times daily.    lancets Misc To check BG 1 times daily, to use with insurance preferred meter    loratadine (CLARITIN) 10 mg tablet Take 1 tablet (10 mg total) by mouth once daily.    metFORMIN (GLUCOPHAGE) 1000 MG tablet TAKE 1 TABLET BY MOUTH TWICE DAILY WITH MEALS    olmesartan-hydrochlorothiazide (BENICAR HCT) 40-25 mg per tablet TAKE 1 TABLET BY MOUTH ONCE DAILY    ondansetron (ZOFRAN-ODT) 4 MG TbDL Take 1 tablet (4 mg total) by mouth every 8 (eight) hours as needed.    sildenafil (VIAGRA) 50 MG tablet Take 1 tablet (50 mg total) by mouth daily as needed for Erectile  Dysfunction. 1 hour before sex, maximum of 1 tablet in 24 hours    tamsulosin (FLOMAX) 0.4 mg Cap Take 1 capsule (0.4 mg total) by mouth once daily.     Family History     Problem Relation (Age of Onset)    Asbestos Father    Cancer Mother        Tobacco Use    Smoking status: Former Smoker     Packs/day: 1.00     Years: 40.00     Pack years: 40.00     Last attempt to quit: 1989     Years since quittin.4    Smokeless tobacco: Former User     Quit date: 1986   Substance and Sexual Activity    Alcohol use: Not Currently     Alcohol/week: 0.0 standard drinks     Comment: 2 bottles beer on weekends    Drug use: No    Sexual activity: Yes     Partners: Female     Review of Systems   Constitutional: Positive for activity change, appetite change, fatigue and fever. Negative for diaphoresis.   HENT: Positive for rhinorrhea. Negative for facial swelling and nosebleeds.    Eyes: Negative for pain and visual disturbance.   Respiratory: Positive for cough and shortness of breath. Negative for chest tightness.    Cardiovascular: Negative for chest pain and palpitations.   Gastrointestinal: Negative for diarrhea and nausea.   Endocrine: Negative for cold intolerance and heat intolerance.   Genitourinary: Negative for hematuria and urgency.   Musculoskeletal: Negative for arthralgias and back pain.   Skin: Negative for pallor and rash.   Neurological: Negative for syncope and speech difficulty.   Hematological: Negative for adenopathy. Does not bruise/bleed easily.   Psychiatric/Behavioral: Negative for confusion and hallucinations.     Objective:     Vital Signs (Most Recent):  Temp: 98.7 °F (37.1 °C) (20 0330)  Pulse: 109 (20)  Resp: (!) 33 (20)  BP: (!) 147/77 (20)  SpO2: (!) 94 % (20 0833) Vital Signs (24h Range):  Temp:  [98 °F (36.7 °C)-98.7 °F (37.1 °C)] 98.7 °F (37.1 °C)  Pulse:  [101-126] 109  Resp:  [14-38] 33  SpO2:  [70 %-99 %] 94 %  BP: (112-166)/(61-95)  147/77     Weight: 74 kg (163 lb 2.2 oz)  Body mass index is 23.41 kg/m².    Physical Exam   Constitutional: No distress.   HENT:   Head: Normocephalic and atraumatic.   Eyes: Pupils are equal, round, and reactive to light. EOM are normal.   Neck: Normal range of motion. No tracheal deviation present.   Cardiovascular: Normal rate and regular rhythm.   Pulmonary/Chest: He is in respiratory distress. He has rales.   Abdominal: Soft. Bowel sounds are normal.   Musculoskeletal: Normal range of motion. He exhibits no deformity.   Neurological: He is alert. He exhibits normal muscle tone.   Skin: Skin is warm. Capillary refill takes 2 to 3 seconds. He is not diaphoretic. No pallor.   Psychiatric: He has a normal mood and affect. His behavior is normal.   Vitals reviewed.

## 2020-04-05 NOTE — PLAN OF CARE
Patient transferred from ED to ICU this shift on 50%FiO2 Ventimask. AAOx4, afebrile, ST on cardaic monitor. BP and O2 sats stable, remains tachypenic with RR in 30s. UO adeqaute, no BM. SCDs in use. Pt repositions independently. Instructed not to get out of bed and to call for assistance. Call light in reach. All questions and concerns addressed. No falls, injury, or skin breakdown this shift. COVID precautions maintained per facility policy.

## 2020-04-06 LAB
ALBUMIN SERPL BCP-MCNC: 2.2 G/DL (ref 3.5–5.2)
ALP SERPL-CCNC: 53 U/L (ref 55–135)
ALT SERPL W/O P-5'-P-CCNC: 21 U/L (ref 10–44)
ANION GAP SERPL CALC-SCNC: 13 MMOL/L (ref 8–16)
AST SERPL-CCNC: 39 U/L (ref 10–40)
BASOPHILS # BLD AUTO: 0.02 K/UL (ref 0–0.2)
BASOPHILS NFR BLD: 0.3 % (ref 0–1.9)
BILIRUB SERPL-MCNC: 0.5 MG/DL (ref 0.1–1)
BUN SERPL-MCNC: 12 MG/DL (ref 8–23)
CALCIUM SERPL-MCNC: 8.5 MG/DL (ref 8.7–10.5)
CHLORIDE SERPL-SCNC: 95 MMOL/L (ref 95–110)
CO2 SERPL-SCNC: 28 MMOL/L (ref 23–29)
CREAT SERPL-MCNC: 0.9 MG/DL (ref 0.5–1.4)
DIFFERENTIAL METHOD: ABNORMAL
EOSINOPHIL # BLD AUTO: 0.1 K/UL (ref 0–0.5)
EOSINOPHIL NFR BLD: 1 % (ref 0–8)
ERYTHROCYTE [DISTWIDTH] IN BLOOD BY AUTOMATED COUNT: 13.2 % (ref 11.5–14.5)
EST. GFR  (AFRICAN AMERICAN): >60 ML/MIN/1.73 M^2
EST. GFR  (NON AFRICAN AMERICAN): >60 ML/MIN/1.73 M^2
GLUCOSE SERPL-MCNC: 112 MG/DL (ref 70–110)
HCT VFR BLD AUTO: 33.9 % (ref 40–54)
HGB BLD-MCNC: 10.6 G/DL (ref 14–18)
IMM GRANULOCYTES # BLD AUTO: 0.1 K/UL (ref 0–0.04)
IMM GRANULOCYTES NFR BLD AUTO: 1.5 % (ref 0–0.5)
LYMPHOCYTES # BLD AUTO: 0.5 K/UL (ref 1–4.8)
LYMPHOCYTES NFR BLD: 7.9 % (ref 18–48)
MCH RBC QN AUTO: 27.2 PG (ref 27–31)
MCHC RBC AUTO-ENTMCNC: 31.3 G/DL (ref 32–36)
MCV RBC AUTO: 87 FL (ref 82–98)
MONOCYTES # BLD AUTO: 0.5 K/UL (ref 0.3–1)
MONOCYTES NFR BLD: 7.1 % (ref 4–15)
NEUTROPHILS # BLD AUTO: 5.5 K/UL (ref 1.8–7.7)
NEUTROPHILS NFR BLD: 82.2 % (ref 38–73)
NRBC BLD-RTO: 0 /100 WBC
PLATELET # BLD AUTO: 421 K/UL (ref 150–350)
PMV BLD AUTO: 9.3 FL (ref 9.2–12.9)
POCT GLUCOSE: 118 MG/DL (ref 70–110)
POCT GLUCOSE: 126 MG/DL (ref 70–110)
POCT GLUCOSE: 129 MG/DL (ref 70–110)
POCT GLUCOSE: 139 MG/DL (ref 70–110)
POTASSIUM SERPL-SCNC: 3.6 MMOL/L (ref 3.5–5.1)
PROT SERPL-MCNC: 6.4 G/DL (ref 6–8.4)
RBC # BLD AUTO: 3.9 M/UL (ref 4.6–6.2)
SODIUM SERPL-SCNC: 136 MMOL/L (ref 136–145)
WBC # BLD AUTO: 6.72 K/UL (ref 3.9–12.7)

## 2020-04-06 PROCEDURE — 25000003 PHARM REV CODE 250: Performed by: HOSPITALIST

## 2020-04-06 PROCEDURE — 80053 COMPREHEN METABOLIC PANEL: CPT

## 2020-04-06 PROCEDURE — 94761 N-INVAS EAR/PLS OXIMETRY MLT: CPT

## 2020-04-06 PROCEDURE — 11000001 HC ACUTE MED/SURG PRIVATE ROOM

## 2020-04-06 PROCEDURE — 27000221 HC OXYGEN, UP TO 24 HOURS

## 2020-04-06 PROCEDURE — 85025 COMPLETE CBC W/AUTO DIFF WBC: CPT

## 2020-04-06 PROCEDURE — 63700000 PHARM REV CODE 250 ALT 637 W/O HCPCS: Performed by: HOSPITALIST

## 2020-04-06 PROCEDURE — 63600175 PHARM REV CODE 636 W HCPCS: Performed by: HOSPITALIST

## 2020-04-06 RX ADMIN — CETIRIZINE HYDROCHLORIDE 10 MG: 10 TABLET, FILM COATED ORAL at 09:04

## 2020-04-06 RX ADMIN — HYDROXYCHLOROQUINE SULFATE 400 MG: 200 TABLET ORAL at 09:04

## 2020-04-06 RX ADMIN — GUAIFENESIN 600 MG: 600 TABLET, EXTENDED RELEASE ORAL at 09:04

## 2020-04-06 RX ADMIN — HEPARIN SODIUM 5000 UNITS: 5000 INJECTION INTRAVENOUS; SUBCUTANEOUS at 09:04

## 2020-04-06 RX ADMIN — FAMOTIDINE 20 MG: 20 TABLET ORAL at 09:04

## 2020-04-06 RX ADMIN — TAMSULOSIN HYDROCHLORIDE 0.4 MG: 0.4 CAPSULE ORAL at 09:04

## 2020-04-06 RX ADMIN — HEPARIN SODIUM 5000 UNITS: 5000 INJECTION INTRAVENOUS; SUBCUTANEOUS at 03:04

## 2020-04-06 RX ADMIN — AZITHROMYCIN MONOHYDRATE 250 MG: 250 TABLET ORAL at 09:04

## 2020-04-06 RX ADMIN — HEPARIN SODIUM 5000 UNITS: 5000 INJECTION INTRAVENOUS; SUBCUTANEOUS at 06:04

## 2020-04-06 NOTE — SUBJECTIVE & OBJECTIVE
Interval History:   Still on VM but saturating well. He admits he still feels weak. Was cold last night and could not sleep. Not sob while on VM though. Eating a little bit today though not a large appetite.    Review of Systems   Constitutional: Positive for activity change, appetite change and fatigue.   Respiratory: Negative for shortness of breath.    Gastrointestinal: Negative for abdominal pain and nausea.     Objective:     Vital Signs (Most Recent):  Temp: 98.3 °F (36.8 °C) (04/06/20 1100)  Pulse: 102 (04/06/20 1100)  Resp: (!) 21 (04/06/20 1100)  BP: (!) 141/79 (04/06/20 1100)  SpO2: (!) 92 % (04/06/20 1100) Vital Signs (24h Range):  Temp:  [97.6 °F (36.4 °C)-99.5 °F (37.5 °C)] 98.3 °F (36.8 °C)  Pulse:  [102-120] 102  Resp:  [14-33] 21  SpO2:  [88 %-96 %] 92 %  BP: (122-157)/(61-85) 141/79     Weight: 74 kg (163 lb 2.2 oz)  Body mass index is 23.41 kg/m².    Intake/Output Summary (Last 24 hours) at 4/6/2020 1240  Last data filed at 4/5/2020 2125  Gross per 24 hour   Intake --   Output 200 ml   Net -200 ml      Physical Exam   Constitutional: He is oriented to person, place, and time. He appears well-developed and well-nourished. No distress.   Pulmonary/Chest: Effort normal.   Abdominal: Soft. Bowel sounds are normal.   Neurological: He is alert and oriented to person, place, and time.   Psychiatric:   A little frustrated with temp of room today but overall very cooperative and conversant. Normally uses glasses and hearing aids but he was actually quite communicative and understanding despite not having these this admission.       Significant Labs:   CMP:   Recent Labs   Lab 04/05/20  0530 04/06/20  0515   * 136   K 3.6 3.6   CL 95 95   CO2 27 28   * 112*   BUN 12 12   CREATININE 0.9 0.9   CALCIUM 8.4* 8.5*   PROT 6.4 6.4   ALBUMIN 2.3* 2.2*   BILITOT 0.5 0.5   ALKPHOS 49* 53*   AST 44* 39   ALT 22 21   ANIONGAP 12 13   EGFRNONAA >60 >60

## 2020-04-06 NOTE — PLAN OF CARE
Pt remains free form falls and pressure injuries,able to make needs known,david meds well,abx remains in progress,no s/s adverse reaction noted,fluid and food intake fair,O2 per venting mask in use with continuous pulse monitoring,safety, maintain continue monitoring.

## 2020-04-06 NOTE — PLAN OF CARE
Problem: Fall Injury Risk  Goal: Absence of Fall and Fall-Related Injury  Outcome: Ongoing, Progressing  Intervention: Identify and Manage Contributors to Fall Injury Risk  Flowsheets (Taken 4/6/2020 1512)  Self-Care Promotion: independence encouraged;BADL personal objects within reach;BADL personal routines maintained;meal setup provided  Medication Review/Management: medications reviewed;high risk medications identified     Problem: Adult Inpatient Plan of Care  Goal: Plan of Care Review  Outcome: Ongoing, Progressing  Flowsheets (Taken 4/6/2020 1512)  Plan of Care Reviewed With: patient  Goal: Patient-Specific Goal (Individualization)  Outcome: Ongoing, Progressing  Goal: Absence of Hospital-Acquired Illness or Injury  Outcome: Ongoing, Progressing  Intervention: Identify and Manage Fall Risk  Flowsheets (Taken 4/6/2020 1512)  Safety Promotion/Fall Prevention: assistive device/personal item within reach;bed alarm set;Fall Risk reviewed with patient/family;nonskid shoes/socks when out of bed;medications reviewed;side rails raised x 2;room near unit station;instructed to call staff for mobility  Goal: Optimal Comfort and Wellbeing  Outcome: Ongoing, Progressing  Goal: Readiness for Transition of Care  Outcome: Ongoing, Progressing  Goal: Rounds/Family Conference  Outcome: Ongoing, Progressing     Problem: Skin Injury Risk Increased  Goal: Skin Health and Integrity  Outcome: Ongoing, Progressing  Intervention: Optimize Skin Protection  Flowsheets (Taken 4/6/2020 1512)  Pressure Reduction Techniques: weight shift assistance provided  Skin Protection: tubing/devices free from skin contact  Head of Bed (HOB): HOB at 30-45 degrees     Problem: Diabetes Comorbidity  Goal: Blood Glucose Level Within Desired Range  Outcome: Ongoing, Progressing  Intervention: Maintain Glycemic Control  Flowsheets (Taken 4/6/2020 1512)  Glycemic Management: blood glucose monitoring     Problem: Infection  Goal: Infection Symptom  Resolution  Outcome: Ongoing, Progressing  Intervention: Prevent or Manage Infection  Flowsheets (Taken 4/6/2020 1512)  Infection Management: aseptic technique maintained  Isolation Precautions: airborne precautions maintained;droplet precautions maintained;contact precautions maintained

## 2020-04-06 NOTE — ASSESSMENT & PLAN NOTE
Weaned from NRB to VM previously. Has been stable overnight on VM. Wean as tolerated.  Continuing course of Azithro and HCQ

## 2020-04-06 NOTE — ASSESSMENT & PLAN NOTE
Due to COVID19 infection .  Initially on NRB, weaned to ventimask 50% on 4/5- remaining stable.  Wean O2 as tolerated.

## 2020-04-06 NOTE — ASSESSMENT & PLAN NOTE
Holding home metformin.   Meds: SSI PRN to maintain goal 140-180-- not requiring.  ADA diet, accuchecks ACHS, hypoglycemic protocol.

## 2020-04-07 PROBLEM — D64.9 ANEMIA: Status: RESOLVED | Noted: 2020-04-04 | Resolved: 2020-04-07

## 2020-04-07 PROBLEM — R79.89 ELEVATED TROPONIN: Status: RESOLVED | Noted: 2020-04-04 | Resolved: 2020-04-07

## 2020-04-07 LAB
ALBUMIN SERPL BCP-MCNC: 2.2 G/DL (ref 3.5–5.2)
ALP SERPL-CCNC: 49 U/L (ref 55–135)
ALT SERPL W/O P-5'-P-CCNC: 26 U/L (ref 10–44)
ANION GAP SERPL CALC-SCNC: 10 MMOL/L (ref 8–16)
AST SERPL-CCNC: 42 U/L (ref 10–40)
BACTERIA BLD CULT: NORMAL
BACTERIA BLD CULT: NORMAL
BASOPHILS # BLD AUTO: 0.02 K/UL (ref 0–0.2)
BASOPHILS NFR BLD: 0.3 % (ref 0–1.9)
BILIRUB SERPL-MCNC: 0.5 MG/DL (ref 0.1–1)
BUN SERPL-MCNC: 13 MG/DL (ref 8–23)
CALCIUM SERPL-MCNC: 8.7 MG/DL (ref 8.7–10.5)
CHLORIDE SERPL-SCNC: 95 MMOL/L (ref 95–110)
CO2 SERPL-SCNC: 29 MMOL/L (ref 23–29)
CREAT SERPL-MCNC: 0.9 MG/DL (ref 0.5–1.4)
DIFFERENTIAL METHOD: ABNORMAL
EOSINOPHIL # BLD AUTO: 0.1 K/UL (ref 0–0.5)
EOSINOPHIL NFR BLD: 1.5 % (ref 0–8)
ERYTHROCYTE [DISTWIDTH] IN BLOOD BY AUTOMATED COUNT: 13.2 % (ref 11.5–14.5)
EST. GFR  (AFRICAN AMERICAN): >60 ML/MIN/1.73 M^2
EST. GFR  (NON AFRICAN AMERICAN): >60 ML/MIN/1.73 M^2
GLUCOSE SERPL-MCNC: 116 MG/DL (ref 70–110)
HCT VFR BLD AUTO: 32.9 % (ref 40–54)
HGB BLD-MCNC: 10.7 G/DL (ref 14–18)
IMM GRANULOCYTES # BLD AUTO: 0.07 K/UL (ref 0–0.04)
IMM GRANULOCYTES NFR BLD AUTO: 1.1 % (ref 0–0.5)
LYMPHOCYTES # BLD AUTO: 0.5 K/UL (ref 1–4.8)
LYMPHOCYTES NFR BLD: 8 % (ref 18–48)
MCH RBC QN AUTO: 27.6 PG (ref 27–31)
MCHC RBC AUTO-ENTMCNC: 32.5 G/DL (ref 32–36)
MCV RBC AUTO: 85 FL (ref 82–98)
MONOCYTES # BLD AUTO: 0.6 K/UL (ref 0.3–1)
MONOCYTES NFR BLD: 8.4 % (ref 4–15)
NEUTROPHILS # BLD AUTO: 5.4 K/UL (ref 1.8–7.7)
NEUTROPHILS NFR BLD: 80.7 % (ref 38–73)
NRBC BLD-RTO: 0 /100 WBC
PLATELET # BLD AUTO: 499 K/UL (ref 150–350)
PMV BLD AUTO: 9.2 FL (ref 9.2–12.9)
POCT GLUCOSE: 104 MG/DL (ref 70–110)
POCT GLUCOSE: 130 MG/DL (ref 70–110)
POCT GLUCOSE: 131 MG/DL (ref 70–110)
POCT GLUCOSE: 135 MG/DL (ref 70–110)
POTASSIUM SERPL-SCNC: 3.7 MMOL/L (ref 3.5–5.1)
PROT SERPL-MCNC: 6.7 G/DL (ref 6–8.4)
RBC # BLD AUTO: 3.87 M/UL (ref 4.6–6.2)
SODIUM SERPL-SCNC: 134 MMOL/L (ref 136–145)
WBC # BLD AUTO: 6.66 K/UL (ref 3.9–12.7)

## 2020-04-07 PROCEDURE — 25000003 PHARM REV CODE 250: Performed by: HOSPITALIST

## 2020-04-07 PROCEDURE — 63600175 PHARM REV CODE 636 W HCPCS: Performed by: HOSPITALIST

## 2020-04-07 PROCEDURE — 85025 COMPLETE CBC W/AUTO DIFF WBC: CPT

## 2020-04-07 PROCEDURE — 63700000 PHARM REV CODE 250 ALT 637 W/O HCPCS: Performed by: HOSPITALIST

## 2020-04-07 PROCEDURE — 80053 COMPREHEN METABOLIC PANEL: CPT

## 2020-04-07 PROCEDURE — 11000001 HC ACUTE MED/SURG PRIVATE ROOM

## 2020-04-07 RX ORDER — ENOXAPARIN SODIUM 100 MG/ML
40 INJECTION SUBCUTANEOUS EVERY 24 HOURS
Status: DISCONTINUED | OUTPATIENT
Start: 2020-04-07 | End: 2020-04-22 | Stop reason: HOSPADM

## 2020-04-07 RX ADMIN — GUAIFENESIN 600 MG: 600 TABLET, EXTENDED RELEASE ORAL at 08:04

## 2020-04-07 RX ADMIN — ACETAMINOPHEN 650 MG: 325 TABLET ORAL at 09:04

## 2020-04-07 RX ADMIN — TAMSULOSIN HYDROCHLORIDE 0.4 MG: 0.4 CAPSULE ORAL at 08:04

## 2020-04-07 RX ADMIN — AZITHROMYCIN MONOHYDRATE 250 MG: 250 TABLET ORAL at 08:04

## 2020-04-07 RX ADMIN — HYDROXYCHLOROQUINE SULFATE 400 MG: 200 TABLET ORAL at 08:04

## 2020-04-07 RX ADMIN — FAMOTIDINE 20 MG: 20 TABLET ORAL at 08:04

## 2020-04-07 RX ADMIN — ENOXAPARIN SODIUM 40 MG: 100 INJECTION SUBCUTANEOUS at 05:04

## 2020-04-07 RX ADMIN — HEPARIN SODIUM 5000 UNITS: 5000 INJECTION INTRAVENOUS; SUBCUTANEOUS at 06:04

## 2020-04-07 RX ADMIN — CETIRIZINE HYDROCHLORIDE 10 MG: 10 TABLET, FILM COATED ORAL at 08:04

## 2020-04-07 NOTE — ASSESSMENT & PLAN NOTE
Normotensive.  Holding home blood pressure medications (olmesartan and hydrochlorothiazide).  Continue to monitor.

## 2020-04-07 NOTE — PLAN OF CARE
[id number] - [place], [place] - [address]  [address]  [facility] [address]  Phone: [phone] Fax: [phone]    [facility] - [place], [place] - [address] [address]  [address] [address]  [address]  [facility] [address]  Phone: [phone] Fax: [phone]    [id number] - [place], [place] - [address]   [address] [place]  [address]   [facility] [address]  Phone: [phone] Fax: [phone]       04/07/20 1456   Discharge Assessment   Assessment Type Discharge Planning Assessment   Confirmed/corrected address and phone number on facesheet? Yes   Assessment information obtained from? Other   Expected Length of Stay (days) 4   Communicated expected length of stay with patient/caregiver yes   Prior to hospitilization cognitive status: Alert/Oriented   Prior to hospitalization functional status: Independent   Current cognitive status: Alert/Oriented   Current Functional Status: Needs Assistance   Lives With alone   Able to Return to Prior Arrangements yes   Is patient able to care for self after discharge? No   Patient's perception of discharge disposition home or selfcare   Readmission Within the Last 30 Days no previous admission in last 30 days   Patient currently being followed by outpatient case management? No   Patient currently receives any other outside agency services? No   Equipment Currently Used at Home none   Part D Coverage PHN   Do you have any problems affording any of your prescribed medications? No   Is the patient taking medications as prescribed? yes   Does the patient have transportation home? Yes   Transportation Anticipated family or friend will provide   Does the patient receive services at the [facility]mad[facility] Clinic? No   Discharge Plan A Home Health   Discharge Plan B Home with family   DME Needed Upon Discharge  none   Patient/Family in Agreement with Plan yes

## 2020-04-07 NOTE — SUBJECTIVE & OBJECTIVE
Interval History:  No acute events overnight.  Oxygenation requirements unchanged.  Patient remains on Venti Mask.    Review of Systems   Constitutional: Positive for fatigue. Negative for chills and fever.   Respiratory: Positive for shortness of breath.    Cardiovascular: Negative for chest pain.   Gastrointestinal: Negative for abdominal pain, constipation, diarrhea, nausea and vomiting.     Objective:     Vital Signs (Most Recent):  Temp: 98 °F (36.7 °C) (04/07/20 0944)  Pulse: 106 (04/07/20 0944)  Resp: 16 (04/07/20 0944)  BP: 137/76 (04/07/20 0944)  SpO2: (!) 92 % (04/07/20 0944) Vital Signs (24h Range):  Temp:  [98 °F (36.7 °C)-99.4 °F (37.4 °C)] 98 °F (36.7 °C)  Pulse:  [102-110] 106  Resp:  [16-22] 16  SpO2:  [92 %-100 %] 92 %  BP: (128-149)/(74-83) 137/76     Weight: 74 kg (163 lb 2.2 oz)  Body mass index is 23.41 kg/m².    Intake/Output Summary (Last 24 hours) at 4/7/2020 1035  Last data filed at 4/6/2020 1800  Gross per 24 hour   Intake --   Output 350 ml   Net -350 ml      Physical Exam   Constitutional: He is oriented to person, place, and time.   Cardiovascular: Normal rate, regular rhythm, normal heart sounds and intact distal pulses. Exam reveals no gallop and no friction rub.   No murmur heard.  Pulmonary/Chest: No respiratory distress. He has no wheezes. He has rales.   Breathing reasonably comfortably on Venti Mask with 50 percent FiO2 with oxygen saturation of 92 percent.   Abdominal: Soft. Bowel sounds are normal. He exhibits no distension. There is no tenderness.   Musculoskeletal: Normal range of motion. He exhibits no edema or tenderness.   Neurological: He is alert and oriented to person, place, and time.   Skin: Skin is warm and dry. No rash noted. No erythema. No pallor.       Significant Labs: All pertinent labs within the past 24 hours have been reviewed.    Significant Imaging: I have reviewed all pertinent imaging results/findings within the past 24 hours.

## 2020-04-07 NOTE — ASSESSMENT & PLAN NOTE
Secondary to COVID19 infection.  Initially on non-rebreather mask and weaned to Venti Mask 50%.  No significant improvement overnight.  Continue with supportive care.  Continue to wean as tolerated.

## 2020-04-07 NOTE — PROGRESS NOTES
"[facility] Hospital Medicine  Progress Note    Patient Name: Peter Lopez  MRN: [id number]  Patient Class: IP- Inpatient   Admission Date: 4/3/2020  Length of Stay: 4 days  Attending Physician: Chago Hollis MD  Primary Care Provider: Lucas Lloyd Jr, MD        Subjective:     Principal Problem:Acute hypoxemic respiratory failure      HPI:  Per Dr. Mike Guerrero:    "Pt is a 83 yo M with a h/o NIDDM2, HTN, chronic HCV, and recent COVID-19 who presents to the ER for worsening weakness, persistent fevers, and dyspnea.  Pt was brought by EMS who found him with an oxygen saturation of 78%.  Pt was placed on non-rebreather and sat's did come up to 98%.  He was on nasal canula prior to arrival but the tubing was long per reports and he was noted to be tachypneic and tachycardic on it.  Pt could speak only a few sentences due to the dyspnea.  Of note patient was on antibiotics as outpatient and was compliant.  He does have a productive cough, decreased po intake, and subjective fevers."    Overview/Hospital Course:  Patient is a 84 year-old man with hypertension, diabetes mellitus type II, chronic hepatitis C admitted to the hospital for treatment of acute hypoxic respiratory failure secondary to COVID-19 infection.   Patient admitted to the intensive care unit and treated with non-rebreather mask.  Patient improved and he was weaned to Venti Mask and was step down to the floor.    Interval History:  No acute events overnight.  Oxygenation requirements unchanged.  Patient remains on Venti Mask.    Review of Systems   Constitutional: Positive for fatigue. Negative for chills and fever.   Respiratory: Positive for shortness of breath.    Cardiovascular: Negative for chest pain.   Gastrointestinal: Negative for abdominal pain, constipation, diarrhea, nausea and vomiting.     Objective:     Vital Signs (Most Recent):  Temp: 98 °F (36.7 °C) (04/07/20 0944)  Pulse: 106 (04/07/20 0944)  Resp: 16 (04/07/20 " 0944)  BP: 137/76 (04/07/20 0944)  SpO2: (!) 92 % (04/07/20 0944) Vital Signs (24h Range):  Temp:  [98 °F (36.7 °C)-99.4 °F (37.4 °C)] 98 °F (36.7 °C)  Pulse:  [102-110] 106  Resp:  [16-22] 16  SpO2:  [92 %-100 %] 92 %  BP: (128-149)/(74-83) 137/76     Weight: 74 kg (163 lb 2.2 oz)  Body mass index is 23.41 kg/m².    Intake/Output Summary (Last 24 hours) at 4/7/2020 1035  Last data filed at 4/6/2020 1800  Gross per 24 hour   Intake --   Output 350 ml   Net -350 ml      Physical Exam   Constitutional: He is oriented to person, place, and time.   Cardiovascular: Normal rate, regular rhythm, normal heart sounds and intact distal pulses. Exam reveals no gallop and no friction rub.   No murmur heard.  Pulmonary/Chest: No respiratory distress. He has no wheezes. He has rales.   Breathing reasonably comfortably on Venti Mask with 50 percent FiO2 with oxygen saturation of 92 percent.   Abdominal: Soft. Bowel sounds are normal. He exhibits no distension. There is no tenderness.   Musculoskeletal: Normal range of motion. He exhibits no edema or tenderness.   Neurological: He is alert and oriented to person, place, and time.   Skin: Skin is warm and dry. No rash noted. No erythema. No pallor.       Significant Labs: All pertinent labs within the past 24 hours have been reviewed.    Significant Imaging: I have reviewed all pertinent imaging results/findings within the past 24 hours.      Assessment/Plan:      * Acute hypoxemic respiratory failure  Secondary to COVID19 infection.  Initially on non-rebreather mask and weaned to Venti Mask 50%.  No significant improvement overnight.  Continue with supportive care.  Continue to wean as tolerated.    COVID-19  Continue with previously started off-label azithromycin and hydroxychloroquine course.  Otherwise continue with supportive care and wean oxygen as possible.    Essential hypertension  Normotensive.  Holding home blood pressure medications (olmesartan and hydrochlorothiazide).   Continue to monitor.    Type 2 diabetes mellitus with diabetic polyneuropathy, without long-term current use of insulin  Holding metformin. Patient with reasonable glycemic control. Continue with diabetic diet.    VTE Risk Mitigation (From admission, onward)         Ordered     enoxaparin injection 40 mg  Daily      04/07/20 1045     IP VTE HIGH RISK PATIENT  Once      04/03/20 2051     Place sequential compression device  Until discontinued      04/03/20 2051     Place DARIEL hose  Until discontinued      04/03/20 2051                Chago Hollis MD  Department of Hospital Medicine   [facility]

## 2020-04-07 NOTE — ASSESSMENT & PLAN NOTE
Continue with previously started off-label azithromycin and hydroxychloroquine course.  Otherwise continue with supportive care and wean oxygen as possible.

## 2020-04-07 NOTE — PLAN OF CARE
Pt remained free from falls and injury during shift, medication administered per MD orders, pt is able to make needs known, 3LNC, continuous pulse ox at bedside, patient safety maintained, bed in low lock position with call bell in reach, will continue  to monitor.

## 2020-04-08 LAB
ANION GAP SERPL CALC-SCNC: 9 MMOL/L (ref 8–16)
BASOPHILS # BLD AUTO: 0.02 K/UL (ref 0–0.2)
BASOPHILS NFR BLD: 0.3 % (ref 0–1.9)
BUN SERPL-MCNC: 14 MG/DL (ref 8–23)
CALCIUM SERPL-MCNC: 8.5 MG/DL (ref 8.7–10.5)
CHLORIDE SERPL-SCNC: 96 MMOL/L (ref 95–110)
CO2 SERPL-SCNC: 31 MMOL/L (ref 23–29)
CREAT SERPL-MCNC: 1 MG/DL (ref 0.5–1.4)
DIFFERENTIAL METHOD: ABNORMAL
EOSINOPHIL # BLD AUTO: 0.1 K/UL (ref 0–0.5)
EOSINOPHIL NFR BLD: 1.3 % (ref 0–8)
ERYTHROCYTE [DISTWIDTH] IN BLOOD BY AUTOMATED COUNT: 13.4 % (ref 11.5–14.5)
EST. GFR  (AFRICAN AMERICAN): >60 ML/MIN/1.73 M^2
EST. GFR  (NON AFRICAN AMERICAN): >60 ML/MIN/1.73 M^2
GLUCOSE SERPL-MCNC: 114 MG/DL (ref 70–110)
HCT VFR BLD AUTO: 31.6 % (ref 40–54)
HGB BLD-MCNC: 10.1 G/DL (ref 14–18)
IMM GRANULOCYTES # BLD AUTO: 0.07 K/UL (ref 0–0.04)
IMM GRANULOCYTES NFR BLD AUTO: 1 % (ref 0–0.5)
LYMPHOCYTES # BLD AUTO: 0.6 K/UL (ref 1–4.8)
LYMPHOCYTES NFR BLD: 9 % (ref 18–48)
MAGNESIUM SERPL-MCNC: 2.1 MG/DL (ref 1.6–2.6)
MCH RBC QN AUTO: 28.3 PG (ref 27–31)
MCHC RBC AUTO-ENTMCNC: 32 G/DL (ref 32–36)
MCV RBC AUTO: 89 FL (ref 82–98)
MONOCYTES # BLD AUTO: 0.6 K/UL (ref 0.3–1)
MONOCYTES NFR BLD: 8.6 % (ref 4–15)
NEUTROPHILS # BLD AUTO: 5.4 K/UL (ref 1.8–7.7)
NEUTROPHILS NFR BLD: 79.8 % (ref 38–73)
NRBC BLD-RTO: 0 /100 WBC
PHOSPHATE SERPL-MCNC: 3.3 MG/DL (ref 2.7–4.5)
PLATELET # BLD AUTO: 520 K/UL (ref 150–350)
PMV BLD AUTO: 9.2 FL (ref 9.2–12.9)
POCT GLUCOSE: 106 MG/DL (ref 70–110)
POCT GLUCOSE: 121 MG/DL (ref 70–110)
POCT GLUCOSE: 168 MG/DL (ref 70–110)
POTASSIUM SERPL-SCNC: 3.8 MMOL/L (ref 3.5–5.1)
RBC # BLD AUTO: 3.57 M/UL (ref 4.6–6.2)
SODIUM SERPL-SCNC: 136 MMOL/L (ref 136–145)
WBC # BLD AUTO: 6.76 K/UL (ref 3.9–12.7)

## 2020-04-08 PROCEDURE — 63600175 PHARM REV CODE 636 W HCPCS: Performed by: HOSPITALIST

## 2020-04-08 PROCEDURE — 63700000 PHARM REV CODE 250 ALT 637 W/O HCPCS: Performed by: HOSPITALIST

## 2020-04-08 PROCEDURE — 25000003 PHARM REV CODE 250: Performed by: HOSPITALIST

## 2020-04-08 PROCEDURE — 80048 BASIC METABOLIC PNL TOTAL CA: CPT

## 2020-04-08 PROCEDURE — 85025 COMPLETE CBC W/AUTO DIFF WBC: CPT

## 2020-04-08 PROCEDURE — 11000001 HC ACUTE MED/SURG PRIVATE ROOM

## 2020-04-08 PROCEDURE — 83735 ASSAY OF MAGNESIUM: CPT

## 2020-04-08 PROCEDURE — 84100 ASSAY OF PHOSPHORUS: CPT

## 2020-04-08 RX ADMIN — ENOXAPARIN SODIUM 40 MG: 100 INJECTION SUBCUTANEOUS at 04:04

## 2020-04-08 RX ADMIN — HYDROXYCHLOROQUINE SULFATE 400 MG: 200 TABLET ORAL at 08:04

## 2020-04-08 RX ADMIN — AZITHROMYCIN MONOHYDRATE 250 MG: 250 TABLET ORAL at 08:04

## 2020-04-08 RX ADMIN — FAMOTIDINE 20 MG: 20 TABLET ORAL at 08:04

## 2020-04-08 RX ADMIN — TAMSULOSIN HYDROCHLORIDE 0.4 MG: 0.4 CAPSULE ORAL at 08:04

## 2020-04-08 RX ADMIN — GUAIFENESIN 600 MG: 600 TABLET, EXTENDED RELEASE ORAL at 08:04

## 2020-04-08 NOTE — PLAN OF CARE
Patient remained free from falls and injury during shift, medication administered per MD orders, pt on 50% venti mask sat 92-98%, provided patient with oral care, patient able to make needs known, patient safety maintained, will continue to monitor.

## 2020-04-09 PROBLEM — U07.1 ACUTE RESPIRATORY DISTRESS SYNDROME (ARDS) DUE TO COVID-19 VIRUS: Status: ACTIVE | Noted: 2020-04-04

## 2020-04-09 PROBLEM — J80 ACUTE RESPIRATORY DISTRESS SYNDROME (ARDS) DUE TO COVID-19 VIRUS: Status: ACTIVE | Noted: 2020-04-04

## 2020-04-09 LAB
ANION GAP SERPL CALC-SCNC: 10 MMOL/L (ref 8–16)
BASOPHILS # BLD AUTO: 0.02 K/UL (ref 0–0.2)
BASOPHILS NFR BLD: 0.3 % (ref 0–1.9)
BUN SERPL-MCNC: 13 MG/DL (ref 8–23)
CALCIUM SERPL-MCNC: 8.6 MG/DL (ref 8.7–10.5)
CHLORIDE SERPL-SCNC: 97 MMOL/L (ref 95–110)
CO2 SERPL-SCNC: 30 MMOL/L (ref 23–29)
CREAT SERPL-MCNC: 1 MG/DL (ref 0.5–1.4)
DIFFERENTIAL METHOD: ABNORMAL
EOSINOPHIL # BLD AUTO: 0.1 K/UL (ref 0–0.5)
EOSINOPHIL NFR BLD: 1.3 % (ref 0–8)
ERYTHROCYTE [DISTWIDTH] IN BLOOD BY AUTOMATED COUNT: 13.4 % (ref 11.5–14.5)
EST. GFR  (AFRICAN AMERICAN): >60 ML/MIN/1.73 M^2
EST. GFR  (NON AFRICAN AMERICAN): >60 ML/MIN/1.73 M^2
GIANT PLATELETS BLD QL SMEAR: PRESENT
GLUCOSE SERPL-MCNC: 115 MG/DL (ref 70–110)
HCT VFR BLD AUTO: 30.8 % (ref 40–54)
HGB BLD-MCNC: 9.8 G/DL (ref 14–18)
IMM GRANULOCYTES # BLD AUTO: 0.04 K/UL (ref 0–0.04)
IMM GRANULOCYTES NFR BLD AUTO: 0.6 % (ref 0–0.5)
LYMPHOCYTES # BLD AUTO: 0.7 K/UL (ref 1–4.8)
LYMPHOCYTES NFR BLD: 10.5 % (ref 18–48)
MAGNESIUM SERPL-MCNC: 2.1 MG/DL (ref 1.6–2.6)
MCH RBC QN AUTO: 27.8 PG (ref 27–31)
MCHC RBC AUTO-ENTMCNC: 31.8 G/DL (ref 32–36)
MCV RBC AUTO: 88 FL (ref 82–98)
MONOCYTES # BLD AUTO: 0.6 K/UL (ref 0.3–1)
MONOCYTES NFR BLD: 9.3 % (ref 4–15)
NEUTROPHILS # BLD AUTO: 4.9 K/UL (ref 1.8–7.7)
NEUTROPHILS NFR BLD: 78 % (ref 38–73)
NRBC BLD-RTO: 0 /100 WBC
PHOSPHATE SERPL-MCNC: 2.9 MG/DL (ref 2.7–4.5)
PLATELET # BLD AUTO: 640 K/UL (ref 150–350)
PLATELET BLD QL SMEAR: ABNORMAL
PMV BLD AUTO: 9.2 FL (ref 9.2–12.9)
POCT GLUCOSE: 121 MG/DL (ref 70–110)
POCT GLUCOSE: 130 MG/DL (ref 70–110)
POCT GLUCOSE: 139 MG/DL (ref 70–110)
POCT GLUCOSE: 143 MG/DL (ref 70–110)
POTASSIUM SERPL-SCNC: 4 MMOL/L (ref 3.5–5.1)
RBC # BLD AUTO: 3.52 M/UL (ref 4.6–6.2)
SODIUM SERPL-SCNC: 137 MMOL/L (ref 136–145)
TOXIC GRANULES BLD QL SMEAR: PRESENT
WBC # BLD AUTO: 6.27 K/UL (ref 3.9–12.7)

## 2020-04-09 PROCEDURE — 83735 ASSAY OF MAGNESIUM: CPT

## 2020-04-09 PROCEDURE — 63600175 PHARM REV CODE 636 W HCPCS: Performed by: HOSPITALIST

## 2020-04-09 PROCEDURE — 80048 BASIC METABOLIC PNL TOTAL CA: CPT

## 2020-04-09 PROCEDURE — 85025 COMPLETE CBC W/AUTO DIFF WBC: CPT

## 2020-04-09 PROCEDURE — 27000221 HC OXYGEN, UP TO 24 HOURS

## 2020-04-09 PROCEDURE — 94761 N-INVAS EAR/PLS OXIMETRY MLT: CPT

## 2020-04-09 PROCEDURE — 11000001 HC ACUTE MED/SURG PRIVATE ROOM

## 2020-04-09 PROCEDURE — 84100 ASSAY OF PHOSPHORUS: CPT

## 2020-04-09 PROCEDURE — 25000003 PHARM REV CODE 250: Performed by: HOSPITALIST

## 2020-04-09 RX ADMIN — FAMOTIDINE 20 MG: 20 TABLET ORAL at 08:04

## 2020-04-09 RX ADMIN — ENOXAPARIN SODIUM 40 MG: 100 INJECTION SUBCUTANEOUS at 05:04

## 2020-04-09 RX ADMIN — GUAIFENESIN 600 MG: 600 TABLET, EXTENDED RELEASE ORAL at 09:04

## 2020-04-09 RX ADMIN — TAMSULOSIN HYDROCHLORIDE 0.4 MG: 0.4 CAPSULE ORAL at 08:04

## 2020-04-09 RX ADMIN — GUAIFENESIN 600 MG: 600 TABLET, EXTENDED RELEASE ORAL at 08:04

## 2020-04-09 NOTE — SUBJECTIVE & OBJECTIVE
Interval History:  No acute events overnight.  Oxygenation requirements unchanged.  Patient remains on Venti Mask.    Review of Systems   Constitutional: Positive for fatigue. Negative for chills and fever.   Respiratory: Positive for shortness of breath.    Cardiovascular: Negative for chest pain.   Gastrointestinal: Negative for abdominal pain, constipation, diarrhea, nausea and vomiting.     Objective:     Vital Signs (Most Recent):  Temp: 98.8 °F (37.1 °C) (04/08/20 2100)  Pulse: 101 (04/08/20 2100)  Resp: 20 (04/08/20 2100)  BP: 120/65 (04/08/20 2100)  SpO2: (!) 90 % (04/08/20 2100) Vital Signs (24h Range):  Temp:  [96.9 °F (36.1 °C)-98.8 °F (37.1 °C)] 98.8 °F (37.1 °C)  Pulse:  [] 101  Resp:  [18-24] 20  SpO2:  [90 %-97 %] 90 %  BP: (118-141)/(65-73) 120/65     Weight: 74 kg (163 lb 2.2 oz)  Body mass index is 23.41 kg/m².    Intake/Output Summary (Last 24 hours) at 4/8/2020 2109  Last data filed at 4/8/2020 0500  Gross per 24 hour   Intake --   Output 300 ml   Net -300 ml      Physical Exam   Constitutional: He is oriented to person, place, and time.   Cardiovascular: Normal rate, regular rhythm, normal heart sounds and intact distal pulses. Exam reveals no gallop and no friction rub.   No murmur heard.  Pulmonary/Chest: No respiratory distress. He has no wheezes. He has rales.   Breathing reasonably comfortably on Venti Mask with 50 percent FiO2 with oxygen saturation of 92 percent.   Abdominal: Soft. Bowel sounds are normal. He exhibits no distension. There is no tenderness.   Musculoskeletal: Normal range of motion. He exhibits no edema or tenderness.   Neurological: He is alert and oriented to person, place, and time.   Skin: Skin is warm and dry. No rash noted. No erythema. No pallor.       Significant Labs: All pertinent labs within the past 24 hours have been reviewed.    Significant Imaging: I have reviewed all pertinent imaging results/findings within the past 24 hours.

## 2020-04-09 NOTE — PLAN OF CARE
Pt remained free from falls and injury during shift, medication administered per MD orders, venti mask 50% 15L sats 94/96%, tried weaning patient to 12L 31% but sats where 88/90%, patient able to make needs known through out shift, patient safety maintained, will continue to monitor.

## 2020-04-09 NOTE — PROGRESS NOTES
"[facility] Medicine  Progress Note    Patient Name: Peter Lopez  MRN: [id number]  Patient Class: IP- Inpatient   Admission Date: 4/3/2020  Length of Stay: 5 days  Attending Physician: Chago Hollis MD  Primary Care Provider: Lucas Lloyd Jr, MD        Subjective:     Principal Problem:Acute hypoxemic respiratory failure        HPI:  Per Dr. Mike Guerrero:    "Pt is a 83 yo M with a h/o NIDDM2, HTN, chronic HCV, and recent COVID-19 who presents to the ER for worsening weakness, persistent fevers, and dyspnea.  Pt was brought by EMS who found him with an oxygen saturation of 78%.  Pt was placed on non-rebreather and sat's did come up to 98%.  He was on nasal canula prior to arrival but the tubing was long per reports and he was noted to be tachypneic and tachycardic on it.  Pt could speak only a few sentences due to the dyspnea.  Of note patient was on antibiotics as outpatient and was compliant.  He does have a productive cough, decreased po intake, and subjective fevers."    Overview/Hospital Course:  Patient is a 84 year-old man with hypertension, diabetes mellitus type II, chronic hepatitis C admitted to the hospital for treatment of acute hypoxic respiratory failure secondary to COVID-19 infection.   Patient admitted to the intensive care unit and treated with non-rebreather mask.  Patient improved and he was weaned to Venti Mask and was step down to the floor.    Interval History:  No acute events overnight.  Oxygenation requirements unchanged.  Patient remains on Venti Mask.    Review of Systems   Constitutional: Positive for fatigue. Negative for chills and fever.   Respiratory: Positive for shortness of breath.    Cardiovascular: Negative for chest pain.   Gastrointestinal: Negative for abdominal pain, constipation, diarrhea, nausea and vomiting.     Objective:     Vital Signs (Most Recent):  Temp: 98.8 °F (37.1 °C) (04/08/20 2100)  Pulse: 101 (04/08/20 2100)  Resp: 20 (04/08/20 " 2100)  BP: 120/65 (04/08/20 2100)  SpO2: (!) 90 % (04/08/20 2100) Vital Signs (24h Range):  Temp:  [96.9 °F (36.1 °C)-98.8 °F (37.1 °C)] 98.8 °F (37.1 °C)  Pulse:  [] 101  Resp:  [18-24] 20  SpO2:  [90 %-97 %] 90 %  BP: (118-141)/(65-73) 120/65     Weight: 74 kg (163 lb 2.2 oz)  Body mass index is 23.41 kg/m².    Intake/Output Summary (Last 24 hours) at 4/8/2020 2109  Last data filed at 4/8/2020 0500  Gross per 24 hour   Intake --   Output 300 ml   Net -300 ml      Physical Exam   Constitutional: He is oriented to person, place, and time.   Cardiovascular: Normal rate, regular rhythm, normal heart sounds and intact distal pulses. Exam reveals no gallop and no friction rub.   No murmur heard.  Pulmonary/Chest: No respiratory distress. He has no wheezes. He has rales.   Breathing reasonably comfortably on Venti Mask with 50 percent FiO2 with oxygen saturation of 92 percent.   Abdominal: Soft. Bowel sounds are normal. He exhibits no distension. There is no tenderness.   Musculoskeletal: Normal range of motion. He exhibits no edema or tenderness.   Neurological: He is alert and oriented to person, place, and time.   Skin: Skin is warm and dry. No rash noted. No erythema. No pallor.       Significant Labs: All pertinent labs within the past 24 hours have been reviewed.    Significant Imaging: I have reviewed all pertinent imaging results/findings within the past 24 hours.      Assessment/Plan:      * Acute hypoxemic respiratory failure  Secondary to COVID19 infection.  Initially on non-rebreather mask and weaned to Venti Mask 50%.  No significant improvement overnight.  Continue with supportive care.  Continue to wean as tolerated.    COVID-19  Continue with previously started off-label azithromycin and hydroxychloroquine course.  Otherwise continue with supportive care and wean oxygen as possible.    Essential hypertension  Normotensive.  Holding home blood pressure medications (olmesartan and  hydrochlorothiazide).  Continue to monitor.    Type 2 diabetes mellitus with diabetic polyneuropathy, without long-term current use of insulin  Holding metformin. Patient with reasonable glycemic control. Continue with diabetic diet.      VTE Risk Mitigation (From admission, onward)         Ordered     enoxaparin injection 40 mg  Daily      04/07/20 1045     IP VTE HIGH RISK PATIENT  Once      04/03/20 2051     Place sequential compression device  Until discontinued      04/03/20 2051     Place DARIEL hose  Until discontinued      04/03/20 2051                      Chago Hollis MD  Department of Hospital Medicine   [facility]

## 2020-04-10 LAB
ANION GAP SERPL CALC-SCNC: 10 MMOL/L (ref 8–16)
BASOPHILS # BLD AUTO: 0.03 K/UL (ref 0–0.2)
BASOPHILS NFR BLD: 0.4 % (ref 0–1.9)
BUN SERPL-MCNC: 11 MG/DL (ref 8–23)
CALCIUM SERPL-MCNC: 8.9 MG/DL (ref 8.7–10.5)
CHLORIDE SERPL-SCNC: 99 MMOL/L (ref 95–110)
CO2 SERPL-SCNC: 28 MMOL/L (ref 23–29)
CREAT SERPL-MCNC: 1 MG/DL (ref 0.5–1.4)
DIFFERENTIAL METHOD: ABNORMAL
EOSINOPHIL # BLD AUTO: 0.1 K/UL (ref 0–0.5)
EOSINOPHIL NFR BLD: 0.9 % (ref 0–8)
ERYTHROCYTE [DISTWIDTH] IN BLOOD BY AUTOMATED COUNT: 13.2 % (ref 11.5–14.5)
EST. GFR  (AFRICAN AMERICAN): >60 ML/MIN/1.73 M^2
EST. GFR  (NON AFRICAN AMERICAN): >60 ML/MIN/1.73 M^2
GLUCOSE SERPL-MCNC: 115 MG/DL (ref 70–110)
HCT VFR BLD AUTO: 34.7 % (ref 40–54)
HGB BLD-MCNC: 11 G/DL (ref 14–18)
IMM GRANULOCYTES # BLD AUTO: 0.03 K/UL (ref 0–0.04)
IMM GRANULOCYTES NFR BLD AUTO: 0.4 % (ref 0–0.5)
LYMPHOCYTES # BLD AUTO: 0.8 K/UL (ref 1–4.8)
LYMPHOCYTES NFR BLD: 11.2 % (ref 18–48)
MAGNESIUM SERPL-MCNC: 2.1 MG/DL (ref 1.6–2.6)
MCH RBC QN AUTO: 27.8 PG (ref 27–31)
MCHC RBC AUTO-ENTMCNC: 31.7 G/DL (ref 32–36)
MCV RBC AUTO: 88 FL (ref 82–98)
MONOCYTES # BLD AUTO: 0.6 K/UL (ref 0.3–1)
MONOCYTES NFR BLD: 9 % (ref 4–15)
NEUTROPHILS # BLD AUTO: 5.4 K/UL (ref 1.8–7.7)
NEUTROPHILS NFR BLD: 78.1 % (ref 38–73)
NRBC BLD-RTO: 0 /100 WBC
PHOSPHATE SERPL-MCNC: 3 MG/DL (ref 2.7–4.5)
PLATELET # BLD AUTO: 636 K/UL (ref 150–350)
PMV BLD AUTO: 9 FL (ref 9.2–12.9)
POCT GLUCOSE: 119 MG/DL (ref 70–110)
POCT GLUCOSE: 123 MG/DL (ref 70–110)
POCT GLUCOSE: 128 MG/DL (ref 70–110)
POCT GLUCOSE: 154 MG/DL (ref 70–110)
POTASSIUM SERPL-SCNC: 4.4 MMOL/L (ref 3.5–5.1)
RBC # BLD AUTO: 3.96 M/UL (ref 4.6–6.2)
SODIUM SERPL-SCNC: 137 MMOL/L (ref 136–145)
WBC # BLD AUTO: 6.9 K/UL (ref 3.9–12.7)

## 2020-04-10 PROCEDURE — 83735 ASSAY OF MAGNESIUM: CPT

## 2020-04-10 PROCEDURE — 36415 COLL VENOUS BLD VENIPUNCTURE: CPT

## 2020-04-10 PROCEDURE — 11000001 HC ACUTE MED/SURG PRIVATE ROOM

## 2020-04-10 PROCEDURE — 63600175 PHARM REV CODE 636 W HCPCS: Performed by: HOSPITALIST

## 2020-04-10 PROCEDURE — 25000003 PHARM REV CODE 250: Performed by: HOSPITALIST

## 2020-04-10 PROCEDURE — 80048 BASIC METABOLIC PNL TOTAL CA: CPT

## 2020-04-10 PROCEDURE — 94761 N-INVAS EAR/PLS OXIMETRY MLT: CPT

## 2020-04-10 PROCEDURE — 85025 COMPLETE CBC W/AUTO DIFF WBC: CPT

## 2020-04-10 PROCEDURE — 84100 ASSAY OF PHOSPHORUS: CPT

## 2020-04-10 PROCEDURE — 27000221 HC OXYGEN, UP TO 24 HOURS

## 2020-04-10 RX ADMIN — ENOXAPARIN SODIUM 40 MG: 100 INJECTION SUBCUTANEOUS at 04:04

## 2020-04-10 RX ADMIN — TAMSULOSIN HYDROCHLORIDE 0.4 MG: 0.4 CAPSULE ORAL at 08:04

## 2020-04-10 RX ADMIN — GUAIFENESIN 600 MG: 600 TABLET, EXTENDED RELEASE ORAL at 08:04

## 2020-04-10 RX ADMIN — FAMOTIDINE 20 MG: 20 TABLET ORAL at 08:04

## 2020-04-10 NOTE — SUBJECTIVE & OBJECTIVE
Interval History:  No acute events overnight.  Pt denies any new complaints. Still requiring oxygen    Review of Systems   Constitutional: Positive for fatigue. Negative for chills and fever.   Respiratory: Positive for shortness of breath.    Cardiovascular: Negative for chest pain.   Gastrointestinal: Negative for abdominal pain, constipation, diarrhea, nausea and vomiting.     Objective:     Vital Signs (Most Recent):  Temp: 99.1 °F (37.3 °C) (04/10/20 1042)  Pulse: 99 (04/10/20 1042)  Resp: 18 (04/10/20 1042)  BP: (!) 142/78 (04/10/20 1042)  SpO2: 95 % (04/10/20 1042) Vital Signs (24h Range):  Temp:  [97.7 °F (36.5 °C)-99.1 °F (37.3 °C)] 99.1 °F (37.3 °C)  Pulse:  [] 99  Resp:  [18-20] 18  SpO2:  [93 %-95 %] 95 %  BP: (128-146)/(71-83) 142/78     Weight: 74 kg (163 lb 2.2 oz)  Body mass index is 23.41 kg/m².    Intake/Output Summary (Last 24 hours) at 4/10/2020 1509  Last data filed at 4/10/2020 0722  Gross per 24 hour   Intake --   Output 850 ml   Net -850 ml      Physical Exam   Constitutional: He is oriented to person, place, and time.   Cardiovascular: Normal rate, regular rhythm, normal heart sounds and intact distal pulses. Exam reveals no gallop and no friction rub.   No murmur heard.  Pulmonary/Chest: No respiratory distress. He has no wheezes. He has rales.   Breathing reasonably comfortably on supplemental oxygen delivered via nasal cannula.   Abdominal: Soft. Bowel sounds are normal. He exhibits no distension. There is no tenderness.   Musculoskeletal: Normal range of motion. He exhibits no edema or tenderness.   Neurological: He is alert and oriented to person, place, and time.   Skin: Skin is warm and dry. No rash noted. No erythema. No pallor.       Significant Labs: All pertinent labs within the past 24 hours have been reviewed.    Significant Imaging: I have reviewed all pertinent imaging results/findings within the past 24 hours.

## 2020-04-10 NOTE — PROGRESS NOTES
"[facility] Hospital Medicine  Progress Note    Patient Name: Peter Lopez  MRN: [id number]  Patient Class: IP- Inpatient   Admission Date: 4/3/2020  Length of Stay: 6 days  Attending Physician: Chago Hollis MD  Primary Care Provider: Lucas Lloyd Jr, MD        Subjective:     Principal Problem:Acute respiratory distress syndrome (ARDS) due to COVID-19 virus        HPI:  Per Dr. Mike Guerrero:    "Pt is a 83 yo M with a h/o NIDDM2, HTN, chronic HCV, and recent COVID-19 who presents to the ER for worsening weakness, persistent fevers, and dyspnea.  Pt was brought by EMS who found him with an oxygen saturation of 78%.  Pt was placed on non-rebreather and sat's did come up to 98%.  He was on nasal canula prior to arrival but the tubing was long per reports and he was noted to be tachypneic and tachycardic on it.  Pt could speak only a few sentences due to the dyspnea.  Of note patient was on antibiotics as outpatient and was compliant.  He does have a productive cough, decreased po intake, and subjective fevers."    Overview/Hospital Course:  Patient is a 84 year-old man with hypertension, diabetes mellitus type II, chronic hepatitis C admitted to the hospital for treatment of acute hypoxic respiratory failure secondary to COVID-19 infection.   Patient admitted to the intensive care unit and treated with non-rebreather mask.  Patient improved and he was weaned to Venti Mask and was step down to the floor.    Patient's oxygen requirements continues to improve and now weaned down to 5 liters/minute of supplemental oxygen delivered via nasal cannula.    Interval History:  No acute events overnight.  Oxygenation requirements improved.    Patient maintaining reasonable oxygen saturation on 5 liters/minute of supplemental oxygen today.    Review of Systems   Constitutional: Positive for fatigue. Negative for chills and fever.   Respiratory: Positive for shortness of breath.    Cardiovascular: Negative for " chest pain.   Gastrointestinal: Negative for abdominal pain, constipation, diarrhea, nausea and vomiting.     Objective:     Vital Signs (Most Recent):  Temp: 98.3 °F (36.8 °C) (04/09/20 1700)  Pulse: 99 (04/09/20 1700)  Resp: 19 (04/09/20 1700)  BP: 128/80 (04/09/20 1700)  SpO2: (!) 94 % (04/09/20 1700) Vital Signs (24h Range):  Temp:  [97.8 °F (36.6 °C)-98.8 °F (37.1 °C)] 98.3 °F (36.8 °C)  Pulse:  [] 99  Resp:  [19-21] 19  SpO2:  [90 %-97 %] 94 %  BP: (112-134)/(64-80) 128/80     Weight: 74 kg (163 lb 2.2 oz)  Body mass index is 23.41 kg/m².    Intake/Output Summary (Last 24 hours) at 4/9/2020 2015  Last data filed at 4/9/2020 1700  Gross per 24 hour   Intake --   Output 950 ml   Net -950 ml      Physical Exam   Constitutional: He is oriented to person, place, and time.   Cardiovascular: Normal rate, regular rhythm, normal heart sounds and intact distal pulses. Exam reveals no gallop and no friction rub.   No murmur heard.  Pulmonary/Chest: No respiratory distress. He has no wheezes. He has rales.   Breathing reasonably comfortably on supplemental oxygen delivered via nasal cannula.   Abdominal: Soft. Bowel sounds are normal. He exhibits no distension. There is no tenderness.   Musculoskeletal: Normal range of motion. He exhibits no edema or tenderness.   Neurological: He is alert and oriented to person, place, and time.   Skin: Skin is warm and dry. No rash noted. No erythema. No pallor.       Significant Labs: All pertinent labs within the past 24 hours have been reviewed.    Significant Imaging: I have reviewed all pertinent imaging results/findings within the past 24 hours.      Assessment/Plan:      * Acute respiratory distress syndrome (ARDS) due to COVID-19 virus  Secondary to COVID19 infection.  Initially on non-rebreather mask and weaned to Venti Mask 50%.    Wean further down to 5 L of supplemental oxygen nasal cannula.  Patient completed course of off-label azithromycin and hydroxychloroquine.   Continue with supportive care.  Continue to wean as tolerated.    Essential hypertension  Normotensive.  Holding home blood pressure medications (olmesartan and hydrochlorothiazide).  Continue to monitor.    Type 2 diabetes mellitus with diabetic polyneuropathy, without long-term current use of insulin  Holding metformin. Patient with reasonable glycemic control. Continue with diabetic diet.      VTE Risk Mitigation (From admission, onward)         Ordered     enoxaparin injection 40 mg  Daily      04/07/20 1045     IP VTE HIGH RISK PATIENT  Once      04/03/20 2051     Place sequential compression device  Until discontinued      04/03/20 2051     Place DARIEL hose  Until discontinued      04/03/20 2051                      Chago Hollis MD  Department of Hospital Medicine   [facility]

## 2020-04-10 NOTE — PROGRESS NOTES
"[facility] Hospital Medicine  Progress Note    Patient Name: Peter Lopez  MRN: [id number]  Patient Class: IP- Inpatient   Admission Date: 4/3/2020  Length of Stay: 7 days  Attending Physician: Mike Guerrero MD  Primary Care Provider: Lucas Lloyd Jr, MD        Subjective:     Principal Problem:Acute respiratory distress syndrome (ARDS) due to COVID-19 virus        HPI:  Per Dr. Mike Guerrero:    "Pt is a 83 yo M with a h/o NIDDM2, HTN, chronic HCV, and recent COVID-19 who presents to the ER for worsening weakness, persistent fevers, and dyspnea.  Pt was brought by EMS who found him with an oxygen saturation of 78%.  Pt was placed on non-rebreather and sat's did come up to 98%.  He was on nasal canula prior to arrival but the tubing was long per reports and he was noted to be tachypneic and tachycardic on it.  Pt could speak only a few sentences due to the dyspnea.  Of note patient was on antibiotics as outpatient and was compliant.  He does have a productive cough, decreased po intake, and subjective fevers."    Overview/Hospital Course:  Patient is a 84 year-old man with hypertension, diabetes mellitus type II, chronic hepatitis C admitted to the hospital for treatment of acute hypoxic respiratory failure secondary to COVID-19 infection.   Patient admitted to the intensive care unit and treated with non-rebreather mask.  Patient improved and he was weaned to Venti Mask and was step down to the floor.    Patient's oxygen requirements continues to improve and now weaned down to 5 liters/minute of supplemental oxygen delivered via nasal cannula.    Interval History:  No acute events overnight.  Pt denies any new complaints. Still requiring oxygen    Review of Systems   Constitutional: Positive for fatigue. Negative for chills and fever.   Respiratory: Positive for shortness of breath.    Cardiovascular: Negative for chest pain.   Gastrointestinal: Negative for abdominal pain, constipation, diarrhea, nausea " and vomiting.     Objective:     Vital Signs (Most Recent):  Temp: 99.1 °F (37.3 °C) (04/10/20 1042)  Pulse: 99 (04/10/20 1042)  Resp: 18 (04/10/20 1042)  BP: (!) 142/78 (04/10/20 1042)  SpO2: 95 % (04/10/20 1042) Vital Signs (24h Range):  Temp:  [97.7 °F (36.5 °C)-99.1 °F (37.3 °C)] 99.1 °F (37.3 °C)  Pulse:  [] 99  Resp:  [18-20] 18  SpO2:  [93 %-95 %] 95 %  BP: (128-146)/(71-83) 142/78     Weight: 74 kg (163 lb 2.2 oz)  Body mass index is 23.41 kg/m².    Intake/Output Summary (Last 24 hours) at 4/10/2020 1509  Last data filed at 4/10/2020 0722  Gross per 24 hour   Intake --   Output 850 ml   Net -850 ml      Physical Exam   Constitutional: He is oriented to person, place, and time.   Cardiovascular: Normal rate, regular rhythm, normal heart sounds and intact distal pulses. Exam reveals no gallop and no friction rub.   No murmur heard.  Pulmonary/Chest: No respiratory distress. He has no wheezes. He has rales.   Breathing reasonably comfortably on supplemental oxygen delivered via nasal cannula.   Abdominal: Soft. Bowel sounds are normal. He exhibits no distension. There is no tenderness.   Musculoskeletal: Normal range of motion. He exhibits no edema or tenderness.   Neurological: He is alert and oriented to person, place, and time.   Skin: Skin is warm and dry. No rash noted. No erythema. No pallor.       Significant Labs: All pertinent labs within the past 24 hours have been reviewed.    Significant Imaging: I have reviewed all pertinent imaging results/findings within the past 24 hours.      Assessment/Plan:      * Acute respiratory distress syndrome (ARDS) due to COVID-19 virus  Secondary to COVID19 infection.  Initially on non-rebreather mask and weaned to Venti Mask 50%.    Wean further down to 5 L of supplemental oxygen nasal cannula.  Patient completed course of off-label azithromycin and hydroxychloroquine.  Continue with supportive care.  Continue to wean as tolerated.    Type 2 diabetes mellitus  with diabetic polyneuropathy, without long-term current use of insulin  Holding metformin. Patient with reasonable glycemic control. Continue with diabetic diet.    Essential hypertension  Normotensive.  Holding home blood pressure medications (olmesartan and hydrochlorothiazide).  Continue to monitor.      VTE Risk Mitigation (From admission, onward)         Ordered     enoxaparin injection 40 mg  Daily      04/07/20 1045     IP VTE HIGH RISK PATIENT  Once      04/03/20 2051     Place sequential compression device  Until discontinued      04/03/20 2051     Place DARIEL hose  Until discontinued      04/03/20 2051                      Mike Guerrero MD  Department of Hospital Medicine   [facility]

## 2020-04-10 NOTE — PLAN OF CARE
Problem: Fall Injury Risk  Goal: Absence of Fall and Fall-Related Injury  Outcome: Ongoing, Progressing     Problem: Skin Injury Risk Increased  Goal: Skin Health and Integrity  Outcome: Ongoing, Progressing     Problem: Infection  Goal: Infection Symptom Resolution  Outcome: Ongoing, Progressing     Problem: ARDS (Acute Respiratory Distress Syndrome)  Goal: Effective Oxygenation  Outcome: Ongoing, Progressing

## 2020-04-10 NOTE — SUBJECTIVE & OBJECTIVE
Interval History:  No acute events overnight.  Oxygenation requirements improved.    Patient maintaining reasonable oxygen saturation on 5 liters/minute of supplemental oxygen today.    Review of Systems   Constitutional: Positive for fatigue. Negative for chills and fever.   Respiratory: Positive for shortness of breath.    Cardiovascular: Negative for chest pain.   Gastrointestinal: Negative for abdominal pain, constipation, diarrhea, nausea and vomiting.     Objective:     Vital Signs (Most Recent):  Temp: 98.3 °F (36.8 °C) (04/09/20 1700)  Pulse: 99 (04/09/20 1700)  Resp: 19 (04/09/20 1700)  BP: 128/80 (04/09/20 1700)  SpO2: (!) 94 % (04/09/20 1700) Vital Signs (24h Range):  Temp:  [97.8 °F (36.6 °C)-98.8 °F (37.1 °C)] 98.3 °F (36.8 °C)  Pulse:  [] 99  Resp:  [19-21] 19  SpO2:  [90 %-97 %] 94 %  BP: (112-134)/(64-80) 128/80     Weight: 74 kg (163 lb 2.2 oz)  Body mass index is 23.41 kg/m².    Intake/Output Summary (Last 24 hours) at 4/9/2020 2015  Last data filed at 4/9/2020 1700  Gross per 24 hour   Intake --   Output 950 ml   Net -950 ml      Physical Exam   Constitutional: He is oriented to person, place, and time.   Cardiovascular: Normal rate, regular rhythm, normal heart sounds and intact distal pulses. Exam reveals no gallop and no friction rub.   No murmur heard.  Pulmonary/Chest: No respiratory distress. He has no wheezes. He has rales.   Breathing reasonably comfortably on supplemental oxygen delivered via nasal cannula.   Abdominal: Soft. Bowel sounds are normal. He exhibits no distension. There is no tenderness.   Musculoskeletal: Normal range of motion. He exhibits no edema or tenderness.   Neurological: He is alert and oriented to person, place, and time.   Skin: Skin is warm and dry. No rash noted. No erythema. No pallor.       Significant Labs: All pertinent labs within the past 24 hours have been reviewed.    Significant Imaging: I have reviewed all pertinent imaging results/findings  within the past 24 hours.

## 2020-04-10 NOTE — ASSESSMENT & PLAN NOTE
Secondary to COVID19 infection.  Initially on non-rebreather mask and weaned to Venti Mask 50%.    Wean further down to 5 L of supplemental oxygen nasal cannula.  Patient completed course of off-label azithromycin and hydroxychloroquine.  Continue with supportive care.  Continue to wean as tolerated.

## 2020-04-11 LAB
ANION GAP SERPL CALC-SCNC: 10 MMOL/L (ref 8–16)
BASOPHILS # BLD AUTO: 0.02 K/UL (ref 0–0.2)
BASOPHILS NFR BLD: 0.3 % (ref 0–1.9)
BUN SERPL-MCNC: 12 MG/DL (ref 8–23)
CALCIUM SERPL-MCNC: 8.9 MG/DL (ref 8.7–10.5)
CHLORIDE SERPL-SCNC: 99 MMOL/L (ref 95–110)
CO2 SERPL-SCNC: 25 MMOL/L (ref 23–29)
CREAT SERPL-MCNC: 1 MG/DL (ref 0.5–1.4)
DIFFERENTIAL METHOD: ABNORMAL
EOSINOPHIL # BLD AUTO: 0.1 K/UL (ref 0–0.5)
EOSINOPHIL NFR BLD: 0.9 % (ref 0–8)
ERYTHROCYTE [DISTWIDTH] IN BLOOD BY AUTOMATED COUNT: 13.2 % (ref 11.5–14.5)
EST. GFR  (AFRICAN AMERICAN): >60 ML/MIN/1.73 M^2
EST. GFR  (NON AFRICAN AMERICAN): >60 ML/MIN/1.73 M^2
GLUCOSE SERPL-MCNC: 131 MG/DL (ref 70–110)
HCT VFR BLD AUTO: 33.5 % (ref 40–54)
HGB BLD-MCNC: 10.6 G/DL (ref 14–18)
IMM GRANULOCYTES # BLD AUTO: 0.03 K/UL (ref 0–0.04)
IMM GRANULOCYTES NFR BLD AUTO: 0.4 % (ref 0–0.5)
LYMPHOCYTES # BLD AUTO: 0.8 K/UL (ref 1–4.8)
LYMPHOCYTES NFR BLD: 12.2 % (ref 18–48)
MAGNESIUM SERPL-MCNC: 2 MG/DL (ref 1.6–2.6)
MCH RBC QN AUTO: 27.5 PG (ref 27–31)
MCHC RBC AUTO-ENTMCNC: 31.6 G/DL (ref 32–36)
MCV RBC AUTO: 87 FL (ref 82–98)
MONOCYTES # BLD AUTO: 0.7 K/UL (ref 0.3–1)
MONOCYTES NFR BLD: 9.8 % (ref 4–15)
NEUTROPHILS # BLD AUTO: 5.1 K/UL (ref 1.8–7.7)
NEUTROPHILS NFR BLD: 76.4 % (ref 38–73)
NRBC BLD-RTO: 0 /100 WBC
PHOSPHATE SERPL-MCNC: 3 MG/DL (ref 2.7–4.5)
PLATELET # BLD AUTO: 651 K/UL (ref 150–350)
PMV BLD AUTO: 8.9 FL (ref 9.2–12.9)
POCT GLUCOSE: 143 MG/DL (ref 70–110)
POCT GLUCOSE: 144 MG/DL (ref 70–110)
POCT GLUCOSE: 148 MG/DL (ref 70–110)
POCT GLUCOSE: 174 MG/DL (ref 70–110)
POTASSIUM SERPL-SCNC: 4.3 MMOL/L (ref 3.5–5.1)
RBC # BLD AUTO: 3.85 M/UL (ref 4.6–6.2)
SODIUM SERPL-SCNC: 134 MMOL/L (ref 136–145)
WBC # BLD AUTO: 6.71 K/UL (ref 3.9–12.7)

## 2020-04-11 PROCEDURE — 84100 ASSAY OF PHOSPHORUS: CPT

## 2020-04-11 PROCEDURE — 83735 ASSAY OF MAGNESIUM: CPT

## 2020-04-11 PROCEDURE — 25000003 PHARM REV CODE 250: Performed by: HOSPITALIST

## 2020-04-11 PROCEDURE — 11000001 HC ACUTE MED/SURG PRIVATE ROOM

## 2020-04-11 PROCEDURE — 63600175 PHARM REV CODE 636 W HCPCS: Performed by: HOSPITALIST

## 2020-04-11 PROCEDURE — 85025 COMPLETE CBC W/AUTO DIFF WBC: CPT

## 2020-04-11 PROCEDURE — 80048 BASIC METABOLIC PNL TOTAL CA: CPT

## 2020-04-11 RX ADMIN — GUAIFENESIN 600 MG: 600 TABLET, EXTENDED RELEASE ORAL at 08:04

## 2020-04-11 RX ADMIN — ENOXAPARIN SODIUM 40 MG: 100 INJECTION SUBCUTANEOUS at 04:04

## 2020-04-11 RX ADMIN — FAMOTIDINE 20 MG: 20 TABLET ORAL at 08:04

## 2020-04-11 RX ADMIN — TAMSULOSIN HYDROCHLORIDE 0.4 MG: 0.4 CAPSULE ORAL at 08:04

## 2020-04-11 NOTE — SUBJECTIVE & OBJECTIVE
Interval History:  No acute events overnight.  Pt denies any new complaints. Still requiring oxygen    Review of Systems   Constitutional: Positive for fatigue. Negative for chills and fever.   Respiratory: Positive for shortness of breath.    Cardiovascular: Negative for chest pain.   Gastrointestinal: Negative for abdominal pain, constipation, diarrhea, nausea and vomiting.     Objective:     Vital Signs (Most Recent):  Temp: 98.4 °F (36.9 °C) (04/11/20 1100)  Pulse: 95 (04/11/20 1100)  Resp: 20 (04/11/20 1100)  BP: 138/78 (04/11/20 1100)  SpO2: (!) 90 % (04/11/20 1100) Vital Signs (24h Range):  Temp:  [98 °F (36.7 °C)-99.1 °F (37.3 °C)] 98.4 °F (36.9 °C)  Pulse:  [] 95  Resp:  [18-20] 20  SpO2:  [90 %-96 %] 90 %  BP: (132-154)/(72-78) 138/78     Weight: 74 kg (163 lb 2.2 oz)  Body mass index is 23.41 kg/m².    Intake/Output Summary (Last 24 hours) at 4/11/2020 1402  Last data filed at 4/11/2020 0830  Gross per 24 hour   Intake 240 ml   Output 900 ml   Net -660 ml      Physical Exam   Constitutional: He is oriented to person, place, and time.   Cardiovascular: Normal rate, regular rhythm, normal heart sounds and intact distal pulses. Exam reveals no gallop and no friction rub.   No murmur heard.  Pulmonary/Chest: No respiratory distress. He has no wheezes. He has rales.   Breathing reasonably comfortably on supplemental oxygen delivered via nasal cannula.   Abdominal: Soft. Bowel sounds are normal. He exhibits no distension. There is no tenderness.   Musculoskeletal: Normal range of motion. He exhibits no edema or tenderness.   Neurological: He is alert and oriented to person, place, and time.   Skin: Skin is warm and dry. No rash noted. No erythema. No pallor.       Significant Labs: All pertinent labs within the past 24 hours have been reviewed.    Significant Imaging: I have reviewed all pertinent imaging results/findings within the past 24 hours.

## 2020-04-11 NOTE — PROGRESS NOTES
"[facility] Hospital Medicine  Progress Note    Patient Name: Peter Lopez  MRN: [id number]  Patient Class: IP- Inpatient   Admission Date: 4/3/2020  Length of Stay: 8 days  Attending Physician: Mike Guerrero MD  Primary Care Provider: Lucas Lloyd Jr, MD        Subjective:     Principal Problem:Acute respiratory distress syndrome (ARDS) due to COVID-19 virus        HPI:  Per Dr. Mike Guerrero:    "Pt is a 83 yo M with a h/o NIDDM2, HTN, chronic HCV, and recent COVID-19 who presents to the ER for worsening weakness, persistent fevers, and dyspnea.  Pt was brought by EMS who found him with an oxygen saturation of 78%.  Pt was placed on non-rebreather and sat's did come up to 98%.  He was on nasal canula prior to arrival but the tubing was long per reports and he was noted to be tachypneic and tachycardic on it.  Pt could speak only a few sentences due to the dyspnea.  Of note patient was on antibiotics as outpatient and was compliant.  He does have a productive cough, decreased po intake, and subjective fevers."    Overview/Hospital Course:  Patient is a 84 year-old man with hypertension, diabetes mellitus type II, chronic hepatitis C admitted to the hospital for treatment of acute hypoxic respiratory failure secondary to COVID-19 infection.   Patient admitted to the intensive care unit and treated with non-rebreather mask.  Patient improved and he was weaned to Venti Mask and was step down to the floor.    Patient's oxygen requirements continues to improve and now weaned down to 5 liters/minute of supplemental oxygen delivered via nasal cannula.    Interval History:  No acute events overnight.  Pt denies any new complaints. Still requiring oxygen    Review of Systems   Constitutional: Positive for fatigue. Negative for chills and fever.   Respiratory: Positive for shortness of breath.    Cardiovascular: Negative for chest pain.   Gastrointestinal: Negative for abdominal pain, constipation, diarrhea, nausea " and vomiting.     Objective:     Vital Signs (Most Recent):  Temp: 98.4 °F (36.9 °C) (04/11/20 1100)  Pulse: 95 (04/11/20 1100)  Resp: 20 (04/11/20 1100)  BP: 138/78 (04/11/20 1100)  SpO2: (!) 90 % (04/11/20 1100) Vital Signs (24h Range):  Temp:  [98 °F (36.7 °C)-99.1 °F (37.3 °C)] 98.4 °F (36.9 °C)  Pulse:  [] 95  Resp:  [18-20] 20  SpO2:  [90 %-96 %] 90 %  BP: (132-154)/(72-78) 138/78     Weight: 74 kg (163 lb 2.2 oz)  Body mass index is 23.41 kg/m².    Intake/Output Summary (Last 24 hours) at 4/11/2020 1402  Last data filed at 4/11/2020 0830  Gross per 24 hour   Intake 240 ml   Output 900 ml   Net -660 ml      Physical Exam   Constitutional: He is oriented to person, place, and time.   Cardiovascular: Normal rate, regular rhythm, normal heart sounds and intact distal pulses. Exam reveals no gallop and no friction rub.   No murmur heard.  Pulmonary/Chest: No respiratory distress. He has no wheezes. He has rales.   Breathing reasonably comfortably on supplemental oxygen delivered via nasal cannula.   Abdominal: Soft. Bowel sounds are normal. He exhibits no distension. There is no tenderness.   Musculoskeletal: Normal range of motion. He exhibits no edema or tenderness.   Neurological: He is alert and oriented to person, place, and time.   Skin: Skin is warm and dry. No rash noted. No erythema. No pallor.       Significant Labs: All pertinent labs within the past 24 hours have been reviewed.    Significant Imaging: I have reviewed all pertinent imaging results/findings within the past 24 hours.      Assessment/Plan:      * Acute respiratory distress syndrome (ARDS) due to COVID-19 virus  Secondary to COVID19 infection.  Initially on non-rebreather mask and weaned to Venti Mask 50%.    Wean further down to 5 L of supplemental oxygen nasal cannula.  Patient completed course of off-label azithromycin and hydroxychloroquine.  Continue with supportive care.  Continue to wean as tolerated.    Type 2 diabetes  mellitus with diabetic polyneuropathy, without long-term current use of insulin  Holding metformin. Patient with reasonable glycemic control. Continue with diabetic diet.    Essential hypertension  Normotensive.  Holding home blood pressure medications (olmesartan and hydrochlorothiazide).  Continue to monitor.      VTE Risk Mitigation (From admission, onward)         Ordered     enoxaparin injection 40 mg  Daily      04/07/20 1045     IP VTE HIGH RISK PATIENT  Once      04/03/20 2051     Place sequential compression device  Until discontinued      04/03/20 2051     Place DARIEL hose  Until discontinued      04/03/20 2051                      Mike Guerrero MD  Department of Hospital Medicine   [facility]

## 2020-04-12 LAB
POCT GLUCOSE: 129 MG/DL (ref 70–110)
POCT GLUCOSE: 133 MG/DL (ref 70–110)
POCT GLUCOSE: 141 MG/DL (ref 70–110)
POCT GLUCOSE: 143 MG/DL (ref 70–110)

## 2020-04-12 PROCEDURE — 63600175 PHARM REV CODE 636 W HCPCS: Performed by: HOSPITALIST

## 2020-04-12 PROCEDURE — 11000001 HC ACUTE MED/SURG PRIVATE ROOM

## 2020-04-12 PROCEDURE — 25000003 PHARM REV CODE 250: Performed by: HOSPITALIST

## 2020-04-12 RX ADMIN — ENOXAPARIN SODIUM 40 MG: 100 INJECTION SUBCUTANEOUS at 05:04

## 2020-04-12 RX ADMIN — GUAIFENESIN 600 MG: 600 TABLET, EXTENDED RELEASE ORAL at 08:04

## 2020-04-12 RX ADMIN — FAMOTIDINE 20 MG: 20 TABLET ORAL at 08:04

## 2020-04-12 RX ADMIN — TAMSULOSIN HYDROCHLORIDE 0.4 MG: 0.4 CAPSULE ORAL at 08:04

## 2020-04-12 NOTE — PLAN OF CARE
Pt resting comfortably throughout shift. Remained stable on 4L NC with humidification with no complaints. No events overnight, call light in place.

## 2020-04-12 NOTE — PROGRESS NOTES
"[facility] Hospital Medicine  Progress Note    Patient Name: Peter Lopez  MRN: [id number]  Patient Class: IP- Inpatient   Admission Date: 4/3/2020  Length of Stay: 9 days  Attending Physician: Mike Guerrero MD  Primary Care Provider: Lucas Lloyd Jr, MD        Subjective:     Principal Problem:Acute respiratory distress syndrome (ARDS) due to COVID-19 virus        HPI:  Per Dr. Mike Guerrero:    "Pt is a 83 yo M with a h/o NIDDM2, HTN, chronic HCV, and recent COVID-19 who presents to the ER for worsening weakness, persistent fevers, and dyspnea.  Pt was brought by EMS who found him with an oxygen saturation of 78%.  Pt was placed on non-rebreather and sat's did come up to 98%.  He was on nasal canula prior to arrival but the tubing was long per reports and he was noted to be tachypneic and tachycardic on it.  Pt could speak only a few sentences due to the dyspnea.  Of note patient was on antibiotics as outpatient and was compliant.  He does have a productive cough, decreased po intake, and subjective fevers."    Overview/Hospital Course:  Patient is a 84 year-old man with hypertension, diabetes mellitus type II, chronic hepatitis C admitted to the hospital for treatment of acute hypoxic respiratory failure secondary to COVID-19 infection.   Patient admitted to the intensive care unit and treated with non-rebreather mask.  Patient improved and he was weaned to Venti Mask and was step down to the floor.    Patient's oxygen requirements continues to improve and now weaned down to 5 liters/minute of supplemental oxygen delivered via nasal cannula.    Interval History:  No acute events overnight.  Pt denies any new complaints. Still requiring oxygen    Review of Systems   Constitutional: Positive for fatigue. Negative for chills and fever.   Respiratory: Positive for shortness of breath.    Cardiovascular: Negative for chest pain.   Gastrointestinal: Negative for abdominal pain, constipation, diarrhea, nausea " and vomiting.     Objective:     Vital Signs (Most Recent):  Temp: 98.9 °F (37.2 °C) (04/12/20 1100)  Pulse: 97 (04/12/20 1100)  Resp: 19 (04/12/20 1100)  BP: (!) 142/78 (04/12/20 1100)  SpO2: (!) 93 % (04/12/20 1100) Vital Signs (24h Range):  Temp:  [98 °F (36.7 °C)-98.9 °F (37.2 °C)] 98.9 °F (37.2 °C)  Pulse:  [] 97  Resp:  [19-20] 19  SpO2:  [87 %-96 %] 93 %  BP: (133-158)/(78-92) 142/78     Weight: 74 kg (163 lb 2.2 oz)  Body mass index is 23.41 kg/m².    Intake/Output Summary (Last 24 hours) at 4/12/2020 1510  Last data filed at 4/12/2020 1200  Gross per 24 hour   Intake 120 ml   Output 725 ml   Net -605 ml      Physical Exam   Constitutional: He is oriented to person, place, and time.   Cardiovascular: Normal rate, regular rhythm, normal heart sounds and intact distal pulses. Exam reveals no gallop and no friction rub.   No murmur heard.  Pulmonary/Chest: No respiratory distress. He has no wheezes. He has rales.   Breathing reasonably comfortably on supplemental oxygen delivered via nasal cannula.   Abdominal: Soft. Bowel sounds are normal. He exhibits no distension. There is no tenderness.   Musculoskeletal: Normal range of motion. He exhibits no edema or tenderness.   Neurological: He is alert and oriented to person, place, and time.   Skin: Skin is warm and dry. No rash noted. No erythema. No pallor.       Significant Labs: All pertinent labs within the past 24 hours have been reviewed.    Significant Imaging: I have reviewed all pertinent imaging results/findings within the past 24 hours.      Assessment/Plan:      * Acute respiratory distress syndrome (ARDS) due to COVID-19 virus  Secondary to COVID19 infection.  Wean  supplemental oxygen nasal cannula.  Patient completed course of off-label azithromycin and hydroxychloroquine.  Continue with supportive care.  Continue to wean as tolerated.    Type 2 diabetes mellitus with diabetic polyneuropathy, without long-term current use of insulin  Holding  metformin. Patient with reasonable glycemic control. Continue with diabetic diet.    Essential hypertension  Normotensive.  Holding home blood pressure medications (olmesartan and hydrochlorothiazide).  Continue to monitor.    VTE Risk Mitigation (From admission, onward)         Ordered     enoxaparin injection 40 mg  Daily      04/07/20 1045     IP VTE HIGH RISK PATIENT  Once      04/03/20 2051     Place sequential compression device  Until discontinued      04/03/20 2051     Place DARIEL hose  Until discontinued      04/03/20 2051                      Mike Guerrero MD  Department of Hospital Medicine   [facility]

## 2020-04-12 NOTE — SUBJECTIVE & OBJECTIVE
Interval History:  No acute events overnight.  Pt denies any new complaints. Still requiring oxygen    Review of Systems   Constitutional: Positive for fatigue. Negative for chills and fever.   Respiratory: Positive for shortness of breath.    Cardiovascular: Negative for chest pain.   Gastrointestinal: Negative for abdominal pain, constipation, diarrhea, nausea and vomiting.     Objective:     Vital Signs (Most Recent):  Temp: 98.9 °F (37.2 °C) (04/12/20 1100)  Pulse: 97 (04/12/20 1100)  Resp: 19 (04/12/20 1100)  BP: (!) 142/78 (04/12/20 1100)  SpO2: (!) 93 % (04/12/20 1100) Vital Signs (24h Range):  Temp:  [98 °F (36.7 °C)-98.9 °F (37.2 °C)] 98.9 °F (37.2 °C)  Pulse:  [] 97  Resp:  [19-20] 19  SpO2:  [87 %-96 %] 93 %  BP: (133-158)/(78-92) 142/78     Weight: 74 kg (163 lb 2.2 oz)  Body mass index is 23.41 kg/m².    Intake/Output Summary (Last 24 hours) at 4/12/2020 1510  Last data filed at 4/12/2020 1200  Gross per 24 hour   Intake 120 ml   Output 725 ml   Net -605 ml      Physical Exam   Constitutional: He is oriented to person, place, and time.   Cardiovascular: Normal rate, regular rhythm, normal heart sounds and intact distal pulses. Exam reveals no gallop and no friction rub.   No murmur heard.  Pulmonary/Chest: No respiratory distress. He has no wheezes. He has rales.   Breathing reasonably comfortably on supplemental oxygen delivered via nasal cannula.   Abdominal: Soft. Bowel sounds are normal. He exhibits no distension. There is no tenderness.   Musculoskeletal: Normal range of motion. He exhibits no edema or tenderness.   Neurological: He is alert and oriented to person, place, and time.   Skin: Skin is warm and dry. No rash noted. No erythema. No pallor.       Significant Labs: All pertinent labs within the past 24 hours have been reviewed.    Significant Imaging: I have reviewed all pertinent imaging results/findings within the past 24 hours.

## 2020-04-12 NOTE — CONSULTS
[facility] Hospital Medicine  Telemedicine Consult Note    Patient Name: Peter Lopez  MRN: [id number]  Admission Date: 4/3/2020  Hospital Length of Stay: 9 days  Attending Physician: Mike Guerrero MD   Primary Care Provider: Lucas Lloyd Jr, MD       Mr. Peter Lopez has been accepted for transfer to [facility] and will be followed through telemedicine services beginning 04/13/20  at 6 AM.        Marilyn Rogers MD  Department of Hospital Medicine   [facility]

## 2020-04-13 PROBLEM — U07.1 COVID-19 VIRUS INFECTION: Status: ACTIVE | Noted: 2020-04-13

## 2020-04-13 LAB
ANION GAP SERPL CALC-SCNC: 9 MMOL/L (ref 8–16)
BASOPHILS # BLD AUTO: 0.04 K/UL (ref 0–0.2)
BASOPHILS NFR BLD: 0.7 % (ref 0–1.9)
BUN SERPL-MCNC: 12 MG/DL (ref 8–23)
CALCIUM SERPL-MCNC: 9.1 MG/DL (ref 8.7–10.5)
CHLORIDE SERPL-SCNC: 100 MMOL/L (ref 95–110)
CO2 SERPL-SCNC: 27 MMOL/L (ref 23–29)
CREAT SERPL-MCNC: 0.9 MG/DL (ref 0.5–1.4)
CRP SERPL-MCNC: 83.5 MG/L (ref 0–8.2)
DIFFERENTIAL METHOD: ABNORMAL
EOSINOPHIL # BLD AUTO: 0.1 K/UL (ref 0–0.5)
EOSINOPHIL NFR BLD: 1.6 % (ref 0–8)
ERYTHROCYTE [DISTWIDTH] IN BLOOD BY AUTOMATED COUNT: 13.2 % (ref 11.5–14.5)
EST. GFR  (AFRICAN AMERICAN): >60 ML/MIN/1.73 M^2
EST. GFR  (NON AFRICAN AMERICAN): >60 ML/MIN/1.73 M^2
GLUCOSE SERPL-MCNC: 126 MG/DL (ref 70–110)
HCT VFR BLD AUTO: 32.4 % (ref 40–54)
HGB BLD-MCNC: 10.3 G/DL (ref 14–18)
IMM GRANULOCYTES # BLD AUTO: 0.02 K/UL (ref 0–0.04)
IMM GRANULOCYTES NFR BLD AUTO: 0.4 % (ref 0–0.5)
LYMPHOCYTES # BLD AUTO: 1 K/UL (ref 1–4.8)
LYMPHOCYTES NFR BLD: ABNORMAL % (ref 18–48)
MAGNESIUM SERPL-MCNC: 2 MG/DL (ref 1.6–2.6)
MCH RBC QN AUTO: 27.7 PG (ref 27–31)
MCHC RBC AUTO-ENTMCNC: 31.8 G/DL (ref 32–36)
MCV RBC AUTO: 87 FL (ref 82–98)
MONOCYTES # BLD AUTO: 0.5 K/UL (ref 0.3–1)
MONOCYTES NFR BLD: 9.5 % (ref 4–15)
NEUTROPHILS # BLD AUTO: 4 K/UL (ref 1.8–7.7)
NEUTROPHILS NFR BLD: 70.5 % (ref 38–73)
NRBC BLD-RTO: 0 /100 WBC
PHOSPHATE SERPL-MCNC: 3.2 MG/DL (ref 2.7–4.5)
PLATELET # BLD AUTO: 625 K/UL (ref 150–350)
PLATELET BLD QL SMEAR: ABNORMAL
PMV BLD AUTO: 8.9 FL (ref 9.2–12.9)
POCT GLUCOSE: 130 MG/DL (ref 70–110)
POCT GLUCOSE: 143 MG/DL (ref 70–110)
POCT GLUCOSE: 152 MG/DL (ref 70–110)
POCT GLUCOSE: 153 MG/DL (ref 70–110)
POTASSIUM SERPL-SCNC: 4.2 MMOL/L (ref 3.5–5.1)
RBC # BLD AUTO: 3.72 M/UL (ref 4.6–6.2)
SODIUM SERPL-SCNC: 136 MMOL/L (ref 136–145)
WBC # BLD AUTO: 5.67 K/UL (ref 3.9–12.7)

## 2020-04-13 PROCEDURE — 94761 N-INVAS EAR/PLS OXIMETRY MLT: CPT

## 2020-04-13 PROCEDURE — 97161 PT EVAL LOW COMPLEX 20 MIN: CPT

## 2020-04-13 PROCEDURE — 99233 SBSQ HOSP IP/OBS HIGH 50: CPT | Mod: ,,, | Performed by: INTERNAL MEDICINE

## 2020-04-13 PROCEDURE — 80048 BASIC METABOLIC PNL TOTAL CA: CPT

## 2020-04-13 PROCEDURE — 97165 OT EVAL LOW COMPLEX 30 MIN: CPT

## 2020-04-13 PROCEDURE — 63600175 PHARM REV CODE 636 W HCPCS: Performed by: HOSPITALIST

## 2020-04-13 PROCEDURE — 97803 MED NUTRITION INDIV SUBSEQ: CPT

## 2020-04-13 PROCEDURE — 85025 COMPLETE CBC W/AUTO DIFF WBC: CPT

## 2020-04-13 PROCEDURE — 25000003 PHARM REV CODE 250: Performed by: HOSPITALIST

## 2020-04-13 PROCEDURE — 99233 PR SUBSEQUENT HOSPITAL CARE,LEVL III: ICD-10-PCS | Mod: ,,, | Performed by: INTERNAL MEDICINE

## 2020-04-13 PROCEDURE — 86140 C-REACTIVE PROTEIN: CPT

## 2020-04-13 PROCEDURE — 84100 ASSAY OF PHOSPHORUS: CPT

## 2020-04-13 PROCEDURE — 25000003 PHARM REV CODE 250: Performed by: INTERNAL MEDICINE

## 2020-04-13 PROCEDURE — 97530 THERAPEUTIC ACTIVITIES: CPT

## 2020-04-13 PROCEDURE — 83735 ASSAY OF MAGNESIUM: CPT

## 2020-04-13 PROCEDURE — 11000001 HC ACUTE MED/SURG PRIVATE ROOM

## 2020-04-13 RX ORDER — CHOLECALCIFEROL (VITAMIN D3) 25 MCG
1000 TABLET ORAL 2 TIMES DAILY
Status: DISCONTINUED | OUTPATIENT
Start: 2020-04-13 | End: 2020-04-22 | Stop reason: HOSPADM

## 2020-04-13 RX ORDER — ASCORBIC ACID 250 MG
250 TABLET ORAL 2 TIMES DAILY
Status: DISCONTINUED | OUTPATIENT
Start: 2020-04-13 | End: 2020-04-22 | Stop reason: HOSPADM

## 2020-04-13 RX ADMIN — TAMSULOSIN HYDROCHLORIDE 0.4 MG: 0.4 CAPSULE ORAL at 09:04

## 2020-04-13 RX ADMIN — GUAIFENESIN AND DEXTROMETHORPHAN HYDROBROMIDE 1 TABLET: 600; 30 TABLET, EXTENDED RELEASE ORAL at 09:04

## 2020-04-13 RX ADMIN — VITAMIN D, TAB 1000IU (100/BT) 1000 UNITS: 25 TAB at 09:04

## 2020-04-13 RX ADMIN — Medication 250 MG: at 03:04

## 2020-04-13 RX ADMIN — Medication 1 TABLET: at 03:04

## 2020-04-13 RX ADMIN — FAMOTIDINE 20 MG: 20 TABLET ORAL at 08:04

## 2020-04-13 RX ADMIN — GUAIFENESIN 600 MG: 600 TABLET, EXTENDED RELEASE ORAL at 09:04

## 2020-04-13 RX ADMIN — VITAMIN D, TAB 1000IU (100/BT) 1000 UNITS: 25 TAB at 03:04

## 2020-04-13 RX ADMIN — Medication 250 MG: at 09:04

## 2020-04-13 RX ADMIN — GUAIFENESIN 600 MG: 600 TABLET, EXTENDED RELEASE ORAL at 08:04

## 2020-04-13 RX ADMIN — ENOXAPARIN SODIUM 40 MG: 100 INJECTION SUBCUTANEOUS at 05:04

## 2020-04-13 NOTE — PLAN OF CARE
Recommendations  1. Continue diabetic diet, encourage PO intake.   2. Consider bowel regimen, Last BM 4/6   3. Weigh pt weekly.     Goals: Consume >/=75% of meals  Nutrition Goal Status: new  Communication of RD Recs: (POC)

## 2020-04-13 NOTE — PHYSICIAN QUERY
"PT Name: Peter Lopez  MR #: [id number]     Physician Query Form - Documentation Clarification    Lisa Francois RN, CCDS; [email or web address]    This form is a permanent document in the medical record.     Query Date: April 13, 2020  By submitting this query, we are merely seeking further clarification of documentation. Please utilize your independent clinical judgment when addressing the question(s) below.    The Medical record reflects the following:  Supporting Clinical Findings Location in Medical Record   Elevated troponin Likely due to demand from COVID infection No signs of ischemia   Hosp Pn 4/4 - 4/6   dyspnea; speaks few words  tachypneic; tachycardi;  rhinorrhea  resp distress; rales H&P   per ems 78% sats on extended O2 tubing at home. Tachypneic; taling in 1-2 word sentences  placed on NRB - sats up to 98%  HR-116 RR-31 T-100.2 119/82  Sats-98% on NRB  Tachypneic about 30  Talking in a couple word sentences  3 L of oxygen satting 84%;  5 L oxygen satting 86%. ?  NRB w/increase - mid/high 90s sat    Dx: suspected covid 19;  Hypoxia; Resp distress   ED MD       4/3/2020 15:09 4/3/2020 16:20 4/3/2020 23:16 4/4/2020 06:39         LD  364 (H)     Troponin I 0.116 (H)  0.156 (H) 0.112 (H)        Lab                                                                     Doctor, Please specify diagnosis or diagnoses associated with above clinical findings.                Please clarify "demand".      Provider Use Only    ( x ) Abnormal Troponin only    (  ) Demand Ischemia     (  ) Other meaning - specify: ________________                                                                                                          [  ] Clinically Undetermined               "

## 2020-04-13 NOTE — PROGRESS NOTES
[facility] Medicine  Progress Note    Patient Name: Peter Lopez  MRN: [id number]  Patient Class: IP- Inpatient   Admission Date: 4/3/2020  Length of Stay: 10 days  Attending Physician: Marilyn Rogers MD  Primary Care Provider: Lucas Lloyd Jr, MD    [facility] Team: VIRTUAL [facility] MEDICINE TEAM 4 Marilyn Rogers MD    This service was provided through telemedicine.  Start time:958  Chief complaint: COVID-19  The patient location is: [facility]  The patient arrived at:   Present with the patient at the time of the telemed/virtual assessment: YONY Mota  End time: 1007  Total time spent with patient:  9 min  I have assessed these findings virtually using telemed platform and with assistance of bedside nurse or telemedicine presenter  The attending portion of this evaluation, treatment, and documentation was performed per Marilyn Rogers MD via audiovisual.    Patient was transferred to the telemedicine service on: 2020    Subjective:     Principal Problem:COVID-19 virus infection    New History / Events Overnight: No significant events reported by Nursing.  Patient complains of dyspnea and hypoxia. Symptoms have been gradually improving since yesterday. Associated symptoms include: fatigue. Treatment thus far includes Oxygen. Supportive care effective. Patient lives alone.  His wife  last year. This is the patient's 4th presentation for COVID-19.  SpO2 97% on 3 L NC  Date of initial symptoms:  Approximately 3/21  Data reviewed 2020:  .  H&H stable.  Lymphopenia improved    Review of Systems   Constitutional: Positive for fatigue. Negative for fever.   Respiratory: Positive for shortness of breath.    Psychiatric/Behavioral: Positive for dysphoric mood.     Objective:     Vital Signs (Most Recent):  Temp: 98.2 °F (36.8 °C) (20)  Pulse: 95 (20)  Resp: 18 (20)  BP: 135/77 (20)  SpO2: 97 % (20 0850) Vital Signs  (24h Range):  Temp:  [98.2 °F (36.8 °C)-98.9 °F (37.2 °C)] 98.2 °F (36.8 °C)  Pulse:  [] 95  Resp:  [16-19] 18  SpO2:  [90 %-97 %] 97 %  BP: (127-147)/(76-81) 135/77     Weight: 74 kg (163 lb 2.2 oz)  Body mass index is 23.41 kg/m².    Intake/Output Summary (Last 24 hours) at 4/13/2020 0951  Last data filed at 4/13/2020 0848  Gross per 24 hour   Intake 120 ml   Output 1100 ml   Net -980 ml      Physical Exam   Constitutional: He is cooperative. No distress.   HENT:   Right Ear: Decreased hearing is noted.   Left Ear: Decreased hearing is noted.   Eyes: Conjunctivae and lids are normal. Right eye exhibits no discharge. Left eye exhibits no discharge. No scleral icterus.   Cardiovascular: Normal rate and regular rhythm.   Auscultation performed by PA   Pulmonary/Chest: Effort normal. No accessory muscle usage. No tachypnea. No respiratory distress. He has rales in the right lower field and the left lower field.   Auscultation performed by PA   Abdominal: Soft. Normal appearance and bowel sounds are normal. He exhibits no distension.   Auscultation and palpation performed by PA   Musculoskeletal: He exhibits no edema.   Neurological: He is alert. He is not disoriented.   Skin: No rash noted. He is not diaphoretic. No cyanosis. No pallor.   Psychiatric: His affect is blunt. He exhibits a depressed mood.       Significant Labs: SARS- CoV-2 (COVID-19) QUALITATIVE PCR   Order: [id number]   Status:  Final result      Ref Range & Units 03/28/20 17:55   SARS-CoV2 (COVID-19) Qualitative PCR Not Detected Detected      Abnormal     Comment: This test utilizes a real-time reverse transcription   polymerase chain reaction procedure to amplify and   detect the SARS-CoV-2 RdRp and N genes.  The   analytical sensitivity (limit of detection) of this   assay is 100 copies/mL.     A Detected result is considered positive for COVID-19.   This patient is considered infected with the   SARS-CoV-2 virus and is presumed to be  contagious.     A Not Detected result means that SARS-CoV-2 RNA is not   present above the limit of detection. It does not rule   out the possibility of COVID-19 and should not be the   sole basis for treatment decisions.  If COVID-19 is   strongly suspected based on clinical and exposure   history,re-testing should be considered.     This test is only for use under Food and Drug   Administration s Emergency Use Authorization (EUA).   Commercial reagents are provided by [email or web address].   Performance characteristics of the EUA have been   independently verified by [facility]   Department of Pathology and Laboratory Medicine.      Resulting Agency  [facility]      Narrative   Performed by: [facility]   Is the patient symptomatic?->Yes  What symptom criteria does the patient meet?->Fever >/= 100.4  What symptom criteria does the patient meet?->Cough  What symptom criteria does the patient meet?->Difficulty  breathing  Has patient had prolonged contact with positive  individual?->No      Specimen Collected: 03/28/20 17:55 Last Resulted: 03/29/20 19:25           Recent Labs   Lab 04/10/20  0755 04/11/20  0534 04/13/20  0459   WBC 6.90 6.71 5.67   HGB 11.0* 10.6* 10.3*   HCT 34.7* 33.5* 32.4*   * 651* 625*     Recent Labs   Lab 04/10/20  0755 04/11/20  0534 04/13/20  0459   GRAN 78.1*  5.4 76.4*  5.1 70.5  4.0   LYMPH 11.2*  0.8* 12.2*  0.8* 17.3@w25*  1.0   MONO 9.0  0.6 9.8  0.7 9.5  0.5   EOS 0.1 0.1 0.1     Recent Labs   Lab 04/09/20  0502 04/10/20  0755 04/11/20  0534 04/13/20  0459    137 134* 136   K 4.0 4.4 4.3 4.2   CL 97 99 99 100   CO2 30* 28 25 27   BUN 13 11 12 12   CREATININE 1.0 1.0 1.0 0.9   * 115* 131* 126*   CALCIUM 8.6* 8.9 8.9 9.1   MG 2.1 2.1 2.0  --    PHOS 2.9 3.0 3.0  --      Recent Labs   Lab 04/07/20  0621   ALKPHOS 49*   ALT 26   AST 42*   ALBUMIN 2.2*   PROT 6.7   BILITOT 0.5     Recent Labs   Lab 04/13/20  0459   CRP 83.5*     Recent Labs   Lab 03/28/20  0954  03/28/20  2253 03/30/20  1234 04/03/20  1509 04/03/20  1620   PROCAL 0.07  --  0.15  --  0.14   LACTATE 0.9 0.7  --  2.0  --    FERRITIN 334*  --   --  520*  --        Recent Labs   Lab 01/28/20  1446 04/05/20  0530   HGBA1C 6.1* 6.4*     Recent Labs   Lab 04/12/20  2043 04/13/20  0748 04/13/20  1132   POCTGLUCOSE 129* 143* 130*     Recent Labs   Lab 03/28/20  1753   TSH 2.079       Significant Imaging: CXR: I have reviewed all pertinent results/findings within the past 24 hours and my personal findings are:  Interstitial markings present 4/3   EXAMINATION:  XR CHEST AP PORTABLE    CLINICAL HISTORY:  Suspected Covid-19 Virus Infection;    TECHNIQUE:  Single frontal view of the chest was performed.    COMPARISON:  03/30/2020    FINDINGS:  Heart size and pulmonary vascularity are stable.  Tortuous thoracic aorta and brachiocephalic vessels with atherosclerotic calcification in the wall of the aortic arch.  Lungs are satisfactorily expanded.  Continued accentuation of the interstitial markings bilaterally with some fibrotic appearing changes at the left base.  What appears to be an area of ill-defined airspace consolidation is again seen at the right midlung zone.  No definite pleural fluid or pneumothorax.  Skeletal structures appear intact.      Impression       Continued accentuation of the interstitial markings with an area of ill-defined airspace consolidation at the right midlung zone.  Once again, findings may represent developing pneumonia.  No significant interval change.      Electronically signed by: Otilio Vargas MD  Date: 04/03/2020  Time: 16:17        Assessment/Plan:      Active Diagnoses:    Diagnosis Date Noted POA    PRINCIPAL PROBLEM:  COVID-19 virus infection [U07.1] 04/13/2020 Yes    Acute respiratory distress syndrome (ARDS) due to COVID-19 virus [U07.1, J80] 04/04/2020 Yes    Type 2 diabetes mellitus with diabetic polyneuropathy, without long-term current use of insulin [E11.42] 10/16/2013 Yes     Essential hypertension [I10] 10/02/2012 Yes      Problems Resolved During this Admission:    Diagnosis Date Noted Date Resolved POA    Anemia [D64.9] 04/04/2020 04/07/2020 Yes    Elevated troponin [R79.89] 04/04/2020 04/07/2020 Yes    Suspected Covid-19 Virus Infection [R68.89] 03/28/2020 04/03/2020 Yes       Overview / ICU Course:    84 year-old man with hypertension, diabetes mellitus type II, chronic hepatitis C admitted to the hospital for treatment of acute hypoxic respiratory failure secondary to COVID-19 infection.   Patient admitted to the intensive care unit and treated with non-rebreather mask.  Patient improved and he was weaned to Venti Mask and was step down to the floor.    Patient's oxygen requirements continues to improve and now weaned down to 5 liters/minute of supplemental oxygen delivered via nasal cannula.    Inpatient Medications Prescribed for Management of Current Problems:     Scheduled Meds:    ascorbic acid (vitamin C)  250 mg Oral BID    dextromethorphan-guaifenesin  mg  1 tablet Oral BID    enoxaparin  40 mg Subcutaneous Daily    famotidine  20 mg Oral Daily    guaiFENesin  600 mg Oral BID    multivitamin  1 tablet Oral Daily    tamsulosin  0.4 mg Oral Daily    vitamin D  1,000 Units Oral BID     Continuous Infusions:   As Needed: acetaminophen, dextrose 50%, dextrose 50%, glucagon (human recombinant), glucose, glucose, insulin aspart U-100, ondansetron    Assessment and Plan by Problem    Covid-19 Virus Infection  Person Under Investigation (PUI) for COVID-19  - COVID-19 testing: Collection Date: 3/28/2020 Collection Time:   5:55 PM  - Infection Control notified    - Isolation:   - Airborne and Droplet Precautions  - Surgical mask on patient   - N95 masks must be fit tested, wear eye protection  - 20 second hand hygiene   - Limit visitors per hospital policy   - Consolidate lab draws, nursing care, and interventions    - Diagnostics: (Lymphopenia, hyponatremia,  hyperferritinemia, elevated troponin, elevated d-dimer, age, and comorbidities are significant predictors of poor clinical outcome)   - CBC:   trend Q48hrs  - CMP:        trend Q48hrs  - Procalcitonin:  - D-dimer:  repeat prior to discharge  - Ferritin:  repeat prior to discharge   - CRP:        trend Q48hrs  - LDH:   repeat prior to discharge  - BNP:  - Troponin:    - ECG:   - rapid Flu:   - RIP only if BMT/solid transplant:   - Legionella antigen:   - Blood culture x2:   - Sputum culture:   - CXR:   - UA and culture:   - CPK:    - Management:   Bundle care as able to maintain isolation & minimize in/out of room   - Supplemental O2 to maintain SpO2 92%-96%   if requiring 6L NC or higher, place on nonrebreather and discuss case with MICU   - Telemetry & continuous pulse oximetry    - If wheezing   - albuterol inhaler 2-4 puff Q6hr PRN    - ipratropium daily    - acetaminophen 650mg PO Q6hr PRN fever   - loperamide PRN for viral diarrhea  - Empiric antibiotics per likely source & patient allergies    - CAP: x 5 day course (d/c early if low concern for bacterial co-infection)  Ceftriaxone 1g IV Q24hrs            Azithromycin 500mg IV day #1, then 250mg PO daily x4 days     If azithromycin is not available, start doxycycline                 If MRSA risk factors, add Vancomycin IV (PharmD consult)     - Investigational Treatment Protocol: (if patient meets criteria)   [email or web address]     - statin: atorvastatin 40mg po daily (if CPK WNL)    - start HCQ 400mg PO BID x1 day, then 400mg PO daily x 4 days   (check glucose 6 phosphate dehydrogenase (NOT G6PD Quant), ECG on start & @48hrs, and start Qshift POCT glucose)    Safety notes:   - Avoid NIPPV (CPAP/BiPAP) to prevent aerosolization, use on a case-by-case basis only if in negative pressure room   - Cautious use of NSAIDS for fever per WHO recommendations (3/16/2020)   -  No new ACEi/ARB start or discontinuation of chronic med unless hypotensive (Esler et al. Journal of Hypertension 2020, 38:000-000)   - Careful use of steroids in the absence of other indications (shock, ARDS)   - Fluid sparing resuscitation, avoid maintenance fluids       * Acute respiratory distress syndrome (ARDS) due to COVID-19 virus  Secondary to COVID19 infection.  Weaned supplemental oxygen nasal cannula.    Patient completed course of azithromycin and hydroxychloroquine.    Continued with supportive care.  Continue to wean O2 as tolerated.  Supplementation with multivitamin, vitamin-C, and vitamin D initiated 4/13     Type 2 diabetes mellitus with diabetic polyneuropathy, without long-term current use of insulin  Holding metformin. Patient with adequate glycemic control.   Continue with diabetic diet. Continue to monitor BGs.     Essential hypertension  Normotensive.    Holding home blood pressure medications (olmesartan and hydrochlorothiazide).    Continue to monitor.      HIGH RISK CONDITION(S):   Patient has a condition that poses threat to life and bodily function: Severe Respiratory Distress due to COVID-19    Discharge plan and follow up  Home-Health Care Svc - patient needs supervision at home.  Consider [facility] if family is unable to provide adequate supervision to assist patient with ADLs, medications, and adherence to oxygen by nasal cannula.  Awaiting OT/PT evaluation. Patient considering NH placement.  [facility]   Previous admission:  3/30/20 plus ED visits in past 30 days.    Goals of Care:  The patient's likely prognosis, which is fair has been discussed with patient. The patient's values and preferences for future care and at the very end of life are to focus on remaining as independent as possible. Accordingly, we have decided that the best plan to meet the patient's goals includes continuing with treatment  Code Status: Full Code  Comfort Only: No  Hospice: No    Diet: Diet diabetic  [facility]; 2000 Calorie  GI Prophylaxis: Not indicated  Significant LDAs:   IV Access Type: Peripheral  Urinary Catheter Indication if present: Patient Does Not Have Urinary Catheter  Other Lines/Tubes/Drains:    VTE Risk Mitigation (From admission, onward)         Ordered     enoxaparin injection 40 mg  Daily      04/07/20 1045     IP VTE HIGH RISK PATIENT  Once      04/03/20 2051     Place sequential compression device  Until discontinued      04/03/20 2051     Place DARIEL hose  Until discontinued      04/03/20 2051              Marilyn Rogers MD  Department of Hospital Medicine   [facility]

## 2020-04-13 NOTE — SUBJECTIVE & OBJECTIVE
Interval History:  No acute events overnight.  Pt denies any new complaints.   Review of Systems   Constitutional: Positive for fatigue. Negative for chills and fever.   Respiratory: Positive for shortness of breath.    Cardiovascular: Negative for chest pain.   Gastrointestinal: Negative for abdominal pain, constipation, diarrhea, nausea and vomiting.     Objective:     Vital Signs (Most Recent):  Temp: 98.3 °F (36.8 °C) (04/13/20 1541)  Pulse: 97 (04/13/20 1541)  Resp: 20 (04/13/20 1541)  BP: 134/79 (04/13/20 1541)  SpO2: 98 % (04/13/20 1541) Vital Signs (24h Range):  Temp:  [98.1 °F (36.7 °C)-98.4 °F (36.9 °C)] 98.3 °F (36.8 °C)  Pulse:  [] 97  Resp:  [16-20] 20  SpO2:  [90 %-98 %] 98 %  BP: (127-147)/(74-81) 134/79     Weight: 74 kg (163 lb 2.3 oz)  Body mass index is 23.41 kg/m².    Intake/Output Summary (Last 24 hours) at 4/13/2020 1605  Last data filed at 4/13/2020 1500  Gross per 24 hour   Intake 120 ml   Output 1150 ml   Net -1030 ml      Physical Exam   Constitutional: He is oriented to person, place, and time.   Cardiovascular: Normal rate, regular rhythm, normal heart sounds and intact distal pulses. Exam reveals no gallop and no friction rub.   No murmur heard.  Pulmonary/Chest: No respiratory distress. He has no wheezes. He has rales.   Breathing reasonably comfortably on supplemental oxygen delivered via nasal cannula.   Abdominal: Soft. Bowel sounds are normal. He exhibits no distension. There is no tenderness.   Musculoskeletal: Normal range of motion. He exhibits no edema or tenderness.   Neurological: He is alert and oriented to person, place, and time.   Skin: Skin is warm and dry. No rash noted. No erythema. No pallor.       Significant Labs: All pertinent labs within the past 24 hours have been reviewed.    Significant Imaging: I have reviewed all pertinent imaging results/findings within the past 24 hours.

## 2020-04-13 NOTE — PT/OT/SLP EVAL
"Physical Therapy Evaluation    Patient Name:  Peter Lopez   MRN:  [id number]    Recommendations:     Discharge Recommendations:  (HHPT)   Discharge Equipment Recommendations: (Ongoing asssessment pending pt progress)   Barriers to discharge: Decreased caregiver support and COVID +, may need home O2    Assessment:     Peter Lopez is a 84 y.o. male admitted with a medical diagnosis of COVID-19 virus infection.  He presents with the following impairments/functional limitations:  weakness, impaired self care skills, impaired balance, decreased coordination, decreased safety awareness, impaired endurance, impaired functional mobilty, impaired coordination, gait instability, impaired cardiopulmonary response to activity .    Rehab Prognosis: Good; patient would benefit from acute skilled PT services to address these deficits and reach maximum level of function.    Recent Surgery: * No surgery found *      Plan:     During this hospitalization, patient to be seen (5-6x/wk) to address the identified rehab impairments via gait training, therapeutic activities, therapeutic exercises and progress toward the following goals:    · Plan of Care Expires:  04/27/20    Subjective     Chief Complaint: fatigue  Patient/Family Comments/goals: "I need to pee"  Pain/Comfort:  · Pain Rating 1: 0/10    Patients cultural, spiritual, Voodoo conflicts given the current situation: no    Living Environment:  Pt lives alone in a [facility] with 0STE   Prior to admission, patients level of function was Independent, working as a fork (per patient).  Equipment used at home: none.  DME owned (not currently used): none.  Upon discharge, patient will have assistance from ?.    Objective:     Communicated with nsg prior to session.  Patient found HOB elevated with telemetry, pulse ox (continuous), oxygen, peripheral IV  upon PT entry to room.    General Precautions: Standard, airborne, droplet, fall, respiratory, special contact   Orthopedic " Precautions:N/A   Braces: N/A     Exams:  · Cognitive Exam:  Patient is oriented to Person, Place, Time and Situation  · Gross Motor Coordination:  mildly impaired 2/2 deconditioning and gen weakness  · Postural Exam:  Patient presented with the following abnormalities:    · -       Rounded shoulders  · -       Forward head  · Sensation:    · -       Intact  light/touch B LE's  · Skin Integrity/Edema:      · -       Skin integrity: Visible skin intact  · RLE ROM: WFL  · RLE Strength: WFL  · LLE ROM: WFL  · LLE Strength: WFL    Functional Mobility:  · Bed Mobility:     · Scooting: stand by assistance  · Supine to Sit: stand by assistance  · Transfers:     · Sit to Stand:  contact guard assistance and with vc for boudreaux dplacement/safety with rolling walker  · Gait: Approx 10' with RW and CGA with VC for walker management/safety  · Balance: Fair+ sit, Fair stand      Therapeutic Activities and Exercises:   Dangle protocol, pt sat at EOB approx 8m with Supervision.  Educaed patient on pursed lip breathing,  Pt able to return demonstration with max Vc.  SPO2 89-96% on 3L O2 via NC.  Pt stood with SBA for balance and urinated with urinal with set-up assist approx 1m.      AM-PAC 6 CLICK MOBILITY  Total Score:18     Patient left up in chair with all lines intact, call button in reach and nsg notified.    GOALS:   Multidisciplinary Problems     Physical Therapy Goals        Problem: Physical Therapy Goal    Goal Priority Disciplines Outcome Goal Variances Interventions   Physical Therapy Goal     PT, PT/OT Ongoing, Progressing     Description:  Goals to be met by: 2020     Patient will increase functional independence with mobility by performin. Supine to sit with Modified Wilson  2. Sit to stand transfer with Modified Wilson  3. Gait  x 25-5- feet with Modified Wilson using Rolling Walker.   4. 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 No pertinent surgical history.    Time Tracking:     PT Received On: 04/13/20  PT Start Time: 1010     PT Stop Time: 1036  PT Total Time (min): 26 min     Billable Minutes: Evaluation 15 and Therapeutic Activity 9      Mali Howe, PT  04/13/2020

## 2020-04-13 NOTE — PT/OT/SLP EVAL
Occupational Therapy   Evaluation    Name: Peter Lopez  MRN: [id number]  Admitting Diagnosis:  COVID-19 virus infection      Recommendations:     Discharge Recommendations: (TBD)  Discharge Equipment Recommendations:  (TBD)  Barriers to discharge:  Other (Comment)(The patient lives alone)    Assessment:     Peter Lopez is a 84 y.o. male with a medical diagnosis of COVID-19 virus infection.  He presents with self care and functional mobility deficits R/T decreased PSO2 with functional activity. Performance deficits affecting function: weakness, impaired endurance, impaired self care skills, impaired functional mobilty, gait instability, impaired balance, decreased upper extremity function, decreased safety awareness, impaired cardiopulmonary response to activity.      Rehab Prognosis: Good; patient would benefit from acute skilled OT services to address these deficits and reach maximum level of function.       Plan:     Patient to be seen 3 x/week, 5 x/week to address the above listed problems via self-care/home management, therapeutic exercises, therapeutic activities  · Plan of Care Expires: 05/13/20  · Plan of Care Reviewed with: patient    Subjective     Chief Complaint: agreeable to OT  Patient/Family Comments/goals: get better to return home    Occupational Profile:  Living Environment: The patient lives alone in a SS house with no concerns.  Previous level of function: (I) self care and amb without AD  Roles and Routines: The patient works and drives  Equipment Used at Home:  none  Assistance upon Discharge: did not state    Pain/Comfort:  · Pain Rating 1: 0/10    Patients cultural, spiritual, Mandaen conflicts given the current situation: no    Objective:     Communicated with: Ingrid saleem prior to session.  Patient found up in chair with telemetry, pulse ox (continuous), peripheral IV, oxygen upon OT entry to room.    General Precautions: Standard, respiratory, fall   Orthopedic Precautions:N/A   Braces: N/A      Occupational Performance:    Bed Mobility:    · N/T    Functional Mobility/Transfers:  · Patient completed Sit <> Stand Transfer with stand by assistance and contact guard assistance  with  no assistive device   · Functional Mobility: The patient amb from the chair<>sink with CGA/(S), w/o AD    Activities of Daily Living:  · Grooming: stand by assistance /CGA to stand at the sink to rusty his face and rinse his moth. The patient required VC to keep O2 on while washing his face with decreased PSO2 to 85% when removed. The patient was able to apply dentures while seated in the chair.    Cognitive/Visual Perceptual:  Cognitive/Psychosocial Skills:     -       Oriented to: Person, Place and Situation   -       Follows Commands/attention:Follows two-step commands  -       Communication: clear/fluent  -       Memory: No Deficits noted and verbal cues needed to remain on O2 NC while performing self care  -       Safety awareness/insight to disability: impaired   -       Mood/Affect/Coping skills/emotional control: Appropriate to situation    Physical Exam:  Balance: -       fair  Postural examination/scapula alignment:    -       No postural abnormalities identified  Skin integrity: Visible skin intact  Upper Extremity Range of Motion:     -       Right Upper Extremity: WFL  -       Left Upper Extremity: WFL  Upper Extremity Strength:    -       Right Upper Extremity: WFL  -       Left Upper Extremity: WFL   Strength:    -       Right Upper Extremity: WFL  -       Left Upper Extremity: WFL    AMPAC 6 Click ADL:  AMPAC Total Score: 20    Treatment & Education:  The patient participated in OT eval and self care at the sink. The patient was educated re: the need to wear O2 at all times.  Education:    Patient left up in chair with all lines intact and call button in reach    GOALS:   Multidisciplinary Problems     Occupational Therapy Goals        Problem: Occupational Therapy Goal    Goal Priority Disciplines Outcome  Interventions   Occupational Therapy Goal     OT, PT/OT Ongoing, Progressing    Description:  Goals to be met by: 5/13/20     Patient will increase functional independence with ADLs by performing:    UE Dressing with Modified [place] and Supervision.  LE Dressing with Modified [place] and Supervision.  Grooming while standing at sink with Modified [place] and Supervision.  Supine to sit with Modified [place] and Supervision.  Step transfer with Modified [place] and Supervision  Toilet transfer to toilet with Modified [place] and Supervision.  Upper extremity exercise program x10 reps per handout, with independence.  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 No pertinent surgical history.    Time Tracking:     OT Date of Treatment: 04/13/20  OT Start Time: 1102  OT Stop Time: 1117  OT Total Time (min): 15 min    Billable Minutes:Evaluation 15    Zofia Quick OT  4/13/2020

## 2020-04-13 NOTE — PLAN OF CARE
Problem: Occupational Therapy Goal  Goal: Occupational Therapy Goal  Description  Goals to be met by: 5/13/20     Patient will increase functional independence with ADLs by performing:    UE Dressing with Modified [place] and Supervision.  LE Dressing with Modified [place] and Supervision.  Grooming while standing at sink with Modified [place] and Supervision.  Supine to sit with Modified [place] and Supervision.  Step transfer with Modified [place] and Supervision  Toilet transfer to toilet with Modified [place] and Supervision.  Upper extremity exercise program x10 reps per handout, with independence.  Educate the patient re: Home Safety and Energy Conservation     Outcome: Ongoing, Progressing    The patient tolerated standing at the sink to wash his face and rinse his mouth. The patient required verbal cues for pursed lipped breathing during functional activities with PSO2 of 88%.  Patient will benefit from OT to address functional deficits.         SUBJECTIVE:                                                    Santa Kinsey is a 42 year old female who presents to clinic today for the following health issues:    HPI    Vaginal Symptoms     Onset:  3-23-17    Description:  Vaginal Discharge: yellow, white   Itching (Pruritis): no   Burning sensation:  YES- not when peeing just burning  Odor: no     Accompanying Signs & Symptoms:  Pain with Urination: no   Abdominal Pain: YES  Fever: no    History:   Sexually active: no   New Partner: no   Possibility of Pregnancy:  No    Precipitating factors:   Recent Antibiotic Use: YES- only what we have on file    Alleviating factors:  Helped what given here just comes right back when out of system    Therapies Tried and outcome: medication prescribed by KV at office visits.     Problem list and histories reviewed & adjusted, as indicated.  Additional history: as documented        Patient Active Problem List   Diagnosis     Atypical depressive disorder     Encounter for long-term current use of medication     Mild major depression (H)     Hyperlipidemia LDL goal <160     Anxiety     Encounter for smoking cessation counseling     Tobacco use disorder     Past Surgical History:   Procedure Laterality Date     C APPENDECTOMY         Social History   Substance Use Topics     Smoking status: Current Every Day Smoker     Packs/day: 0.75     Years: 8.00     Smokeless tobacco: Never Used     Alcohol use No     Family History   Problem Relation Age of Onset     CANCER Paternal Grandfather      Breast Cancer Paternal Aunt          Current Outpatient Prescriptions   Medication Sig Dispense Refill     metroNIDAZOLE (METROGEL) 0.75 % vaginal gel Place 1 applicator (5 g) vaginally At Bedtime for 5 days And then 1-2 times a week to prevent the infection 70 g 3     hydrocortisone (ANUSOL-HC) 2.5 % cream Place rectally 2 times daily 28.35 g 0     fluconazole (DIFLUCAN) 150 MG tablet Take 1 tablet (150 mg) by mouth every 3 days (Patient  not taking: Reported on 5/11/2017) 3 tablet 0     metroNIDAZOLE (FLAGYL) 500 MG tablet Take 1 tablet (500 mg) by mouth 2 times daily (Patient not taking: Reported on 5/11/2017) 14 tablet 0     Allergies   Allergen Reactions     No Known Drug Allergies      BP Readings from Last 3 Encounters:   05/11/17 122/74   03/28/17 94/62   03/23/17 108/64    Wt Readings from Last 3 Encounters:   05/11/17 114 lb (51.7 kg)   04/21/17 113 lb (51.3 kg)   03/28/17 116 lb 12.8 oz (53 kg)                  Labs reviewed in EPIC    ROS:  Constitutional, HEENT, cardiovascular, pulmonary, gi and gu systems are negative, except as otherwise noted.    OBJECTIVE:                                                    /74  Pulse 68  Temp 98.2  F (36.8  C) (Temporal)  Resp 12  Wt 114 lb (51.7 kg)  SpO2 100%  BMI 20.07 kg/m2  Body mass index is 20.07 kg/(m^2).  Physical Exam   Constitutional: She appears well-developed and well-nourished.   HENT:   Head: Normocephalic and atraumatic.   Cardiovascular: Normal rate and regular rhythm.    Pulmonary/Chest: Effort normal and breath sounds normal.   Abdominal: Soft. Bowel sounds are normal.   No Suprapubic or CVA  tenderness   Genitourinary: Vaginal discharge found.         Diagnostic Test Results:  none      ASSESSMENT/PLAN:                                                      Problem List Items Addressed This Visit     None      Visit Diagnoses     Need for prophylactic vaccination with tetanus-diphtheria (TD)    -  Primary    Vaginal discharge        Relevant Medications    metroNIDAZOLE (METROGEL) 0.75 % vaginal gel    Other Relevant Orders    Wet prep (Completed)    *UA reflex to Microscopic    Bacterial vaginosis        Relevant Medications    metroNIDAZOLE (METROGEL) 0.75 % vaginal gel         Pt with recurrent bacterial vaginosis  Wet prep negative but symptoms consistent with a recurrence  Trial metronidazole gel  Follow u/a results   Discussed home care  Reportable signs and  symptoms discussed  RTC if symptoms persist or fail to improve        Brandy Starr MD  [facility]

## 2020-04-13 NOTE — PROGRESS NOTES
"[facility] Hospital Medicine  Progress Note    Patient Name: Peter Lopez  MRN: [id number]  Patient Class: IP- Inpatient   Admission Date: 4/3/2020  Length of Stay: 10 days  Attending Physician: Marilyn Rogers MD  Primary Care Provider: Lucas Lloyd Jr, MD        Subjective:     Principal Problem:COVID-19 virus infection        HPI:  Per Dr. Mike Guerrero:    "Pt is a 83 yo M with a h/o NIDDM2, HTN, chronic HCV, and recent COVID-19 who presents to the ER for worsening weakness, persistent fevers, and dyspnea.  Pt was brought by EMS who found him with an oxygen saturation of 78%.  Pt was placed on non-rebreather and sat's did come up to 98%.  He was on nasal canula prior to arrival but the tubing was long per reports and he was noted to be tachypneic and tachycardic on it.  Pt could speak only a few sentences due to the dyspnea.  Of note patient was on antibiotics as outpatient and was compliant.  He does have a productive cough, decreased po intake, and subjective fevers."    Overview/Hospital Course:  Patient is a 84 year-old man with hypertension, diabetes mellitus type II, chronic hepatitis C admitted to the hospital for treatment of acute hypoxic respiratory failure secondary to COVID-19 infection.   Patient admitted to the intensive care unit and treated with non-rebreather mask.  Patient improved and he was weaned to Venti Mask and was step down to the floor.    Patient's oxygen requirements continues to improve and now weaned down to 5 liters/minute of supplemental oxygen delivered via nasal cannula.    Interval History:  No acute events overnight.  Pt denies any new complaints.   Review of Systems   Constitutional: Positive for fatigue. Negative for chills and fever.   Respiratory: Positive for shortness of breath.    Cardiovascular: Negative for chest pain.   Gastrointestinal: Negative for abdominal pain, constipation, diarrhea, nausea and vomiting.     Objective:     Vital Signs (Most " Recent):  Temp: 98.3 °F (36.8 °C) (04/13/20 1541)  Pulse: 97 (04/13/20 1541)  Resp: 20 (04/13/20 1541)  BP: 134/79 (04/13/20 1541)  SpO2: 98 % (04/13/20 1541) Vital Signs (24h Range):  Temp:  [98.1 °F (36.7 °C)-98.4 °F (36.9 °C)] 98.3 °F (36.8 °C)  Pulse:  [] 97  Resp:  [16-20] 20  SpO2:  [90 %-98 %] 98 %  BP: (127-147)/(74-81) 134/79     Weight: 74 kg (163 lb 2.3 oz)  Body mass index is 23.41 kg/m².    Intake/Output Summary (Last 24 hours) at 4/13/2020 1605  Last data filed at 4/13/2020 1500  Gross per 24 hour   Intake 120 ml   Output 1150 ml   Net -1030 ml      Physical Exam   Constitutional: He is oriented to person, place, and time.   Cardiovascular: Normal rate, regular rhythm, normal heart sounds and intact distal pulses. Exam reveals no gallop and no friction rub.   No murmur heard.  Pulmonary/Chest: No respiratory distress. He has no wheezes. He has rales.   Breathing reasonably comfortably on supplemental oxygen delivered via nasal cannula.   Abdominal: Soft. Bowel sounds are normal. He exhibits no distension. There is no tenderness.   Musculoskeletal: Normal range of motion. He exhibits no edema or tenderness.   Neurological: He is alert and oriented to person, place, and time.   Skin: Skin is warm and dry. No rash noted. No erythema. No pallor.       Significant Labs: All pertinent labs within the past 24 hours have been reviewed.    Significant Imaging: I have reviewed all pertinent imaging results/findings within the past 24 hours.      Assessment/Plan:      Acute respiratory distress syndrome (ARDS) due to COVID-19 virus  Secondary to COVID19 infection.  Wean  supplemental oxygen nasal cannula.  Patient completed course of off-label azithromycin and hydroxychloroquine.  Continue with supportive care.  Continue to wean as tolerated.    Type 2 diabetes mellitus with diabetic polyneuropathy, without long-term current use of insulin  Holding metformin. Patient with reasonable glycemic control.  Continue with diabetic diet.    Essential hypertension  Normotensive.  Holding home blood pressure medications (olmesartan and hydrochlorothiazide).  Continue to monitor.      VTE Risk Mitigation (From admission, onward)         Ordered     enoxaparin injection 40 mg  Daily      04/07/20 1045     IP VTE HIGH RISK PATIENT  Once      04/03/20 2051     Place sequential compression device  Until discontinued      04/03/20 2051     Place DARIEL hose  Until discontinued      04/03/20 2051                      Mike Guerrero MD  Department of Hospital Medicine   [facility]

## 2020-04-13 NOTE — PROGRESS NOTES
"[facility]  Adult Nutrition  Progress Note    SUMMARY       Recommendations  1. Continue diabetic diet, encourage PO intake.   2. Consider bowel regimen, Last BM    3. Weigh pt weekly.     Goals: Consume >/=75% of meals  Nutrition Goal Status: new  Communication of RD Recs: (POC)    Reason for Assessment    Reason For Assessment: length of stay(x 10 days)  Diagnosis: pulmonary disease(COVID-19)  Relevant Medical History: Hep C, DM type 1 HTN, retinopathy  Interdisciplinary Rounds: did not attend    General Information Comments: 84 y.o male presented to the ED with worsening cough, SOB, fever and decreased appetite, Pt was already dx with COVID-19. Pt on 3 L NC. Pt on a diabetic diet, intake %. Wt loss noted per chart hx. Last BM . NFPE not performed at this time d/t COVID-19 precautions. Will reassess at a later date.     Nutrition Discharge Planning: Diabetic diet    Nutrition Risk Screen    Nutrition Risk Screen: no indicators present    Nutrition/Diet History    Spiritual, Cultural Beliefs, Hinduism Practices, Values that Affect Care: no  Food Allergies: NKFA  Factors Affecting Nutritional Intake: None identified at this time    Anthropometrics    Temp: 98.1 °F (36.7 °C)  Height Method: Stated  Height: 5' 10" (177.8 cm)  Height (inches): 70 in  Weight Method: Bed Scale  Weight: 74 kg (163 lb 2.3 oz)  Weight (lb): 163.14 lb  Ideal Body Weight (IBW), Male: 166 lb  % Ideal Body Weight, Male (lb): 98.28 %  BMI (Calculated): 23.4  BMI Grade: 18.5-24.9 - normal  Usual Body Weight (UBW), k.5 kg(20)  % Usual Body Weight: 93.28  % Weight Change From Usual Weight: -6.92 %    Lab/Procedures/Meds    Pertinent Labs Reviewed: reviewed  Pertinent Labs Comments: Glu 126  Pertinent Medications Reviewed: reviewed  Pertinent Medications Comments: Vit D, Vit C, MVI    Estimated/Assessed Needs    Weight Used For Calorie Calculations: 74 kg (163 lb 2.3 oz)  Energy Calorie Requirements (kcal): " 1850- 2200 kcal (25-30 kcal/kg)  Energy Need Method: Kcal/kg  Protein Requirements: 60- 75g (0.8 -1.0 g/kg)  Weight Used For Protein Calculations: 74 kg (163 lb 2.3 oz)  Fluid Requirements (mL): 1850 ml or per MD  Estimated Fluid Requirement Method: RDA Method  RDA Method (mL): 1850  CHO Requirement: 230 g daily    Nutrition Prescription Ordered    Current Diet Order: Diabetic diet  Nutrition Order Comments: ordered 4/3    Evaluation of Received Nutrient/Fluid Intake    Comments: Last BM 4/6  Tolerance: tolerating  % Intake of Estimated Energy Needs: 75 - 100 %  % Meal Intake: 75 - 100 %    Nutrition Risk    Level of Risk/Frequency of Follow-up: low - moderate     Assessment and Plan  Nutrition Problem  Elevated glucose labs    Related to (etiology):   Type 1 DM    Signs and Symptoms (as evidenced by):   Glu 126    Interventions(treatment strategy):  1. Carbohydrate modified diet  2. Collaboration with other providers    Nutrition Diagnosis Status:   New    Monitor and Evaluation    Food and Nutrient Intake: energy intake, food and beverage intake  Food and Nutrient Adminstration: diet order  Anthropometric Measurements: weight, weight change  Biochemical Data, Medical Tests and Procedures: glucose/endocrine profile  Nutrition-Focused Physical Findings: overall appearance, skin     Malnutrition Assessment   NFPE not performed at this time d/t COVID-19 precautions. Will reassess at a later date.  Pt may be at risk for protein-calorie malnutrition 2' severe weight loss.     Skin (Micronutrient): (Henrik=18)  Teeth (Micronutrient): (WDL)   Weight Loss (Malnutrition): 5% in 1 month(7% in 2 months)   Subcutaneous Fat Loss (Final Summary): well nourished  Muscle Loss Evaluation (Final Summary): well nourished       Nutrition Follow-Up    RD Follow-up?: Yes

## 2020-04-14 ENCOUNTER — TELEPHONE (OUTPATIENT)
Dept: FAMILY MEDICINE | Facility: CLINIC | Age: 85
End: 2020-04-14

## 2020-04-14 PROBLEM — F43.29 GRIEF REACTION WITH PROLONGED BEREAVEMENT: Status: ACTIVE | Noted: 2020-04-14

## 2020-04-14 PROBLEM — H91.93 DECREASED HEARING OF BOTH EARS: Status: ACTIVE | Noted: 2020-04-14

## 2020-04-14 PROBLEM — F32.9 REACTIVE DEPRESSION: Status: ACTIVE | Noted: 2020-04-14

## 2020-04-14 PROBLEM — Z97.3 WEARS GLASSES: Status: ACTIVE | Noted: 2020-04-14

## 2020-04-14 LAB
POCT GLUCOSE: 134 MG/DL (ref 70–110)
POCT GLUCOSE: 138 MG/DL (ref 70–110)
POCT GLUCOSE: 139 MG/DL (ref 70–110)
POCT GLUCOSE: 201 MG/DL (ref 70–110)

## 2020-04-14 PROCEDURE — 25000003 PHARM REV CODE 250: Performed by: HOSPITALIST

## 2020-04-14 PROCEDURE — 11000001 HC ACUTE MED/SURG PRIVATE ROOM

## 2020-04-14 PROCEDURE — 97530 THERAPEUTIC ACTIVITIES: CPT | Mod: CQ

## 2020-04-14 PROCEDURE — 25000003 PHARM REV CODE 250: Performed by: INTERNAL MEDICINE

## 2020-04-14 PROCEDURE — 63600175 PHARM REV CODE 636 W HCPCS: Performed by: HOSPITALIST

## 2020-04-14 PROCEDURE — 97535 SELF CARE MNGMENT TRAINING: CPT

## 2020-04-14 PROCEDURE — 97110 THERAPEUTIC EXERCISES: CPT | Mod: CQ

## 2020-04-14 PROCEDURE — 99232 PR SUBSEQUENT HOSPITAL CARE,LEVL II: ICD-10-PCS | Mod: ,,, | Performed by: INTERNAL MEDICINE

## 2020-04-14 PROCEDURE — 97116 GAIT TRAINING THERAPY: CPT | Mod: CQ

## 2020-04-14 PROCEDURE — 94761 N-INVAS EAR/PLS OXIMETRY MLT: CPT

## 2020-04-14 PROCEDURE — 27000221 HC OXYGEN, UP TO 24 HOURS

## 2020-04-14 PROCEDURE — 99232 SBSQ HOSP IP/OBS MODERATE 35: CPT | Mod: ,,, | Performed by: INTERNAL MEDICINE

## 2020-04-14 RX ORDER — POLYETHYLENE GLYCOL 3350 17 G/17G
17 POWDER, FOR SOLUTION ORAL 2 TIMES DAILY
Status: DISCONTINUED | OUTPATIENT
Start: 2020-04-14 | End: 2020-04-22 | Stop reason: HOSPADM

## 2020-04-14 RX ORDER — AMOXICILLIN 250 MG
1 CAPSULE ORAL 2 TIMES DAILY
Status: DISCONTINUED | OUTPATIENT
Start: 2020-04-14 | End: 2020-04-22 | Stop reason: HOSPADM

## 2020-04-14 RX ADMIN — FAMOTIDINE 20 MG: 20 TABLET ORAL at 09:04

## 2020-04-14 RX ADMIN — GUAIFENESIN AND DEXTROMETHORPHAN HYDROBROMIDE 1 TABLET: 600; 30 TABLET, EXTENDED RELEASE ORAL at 09:04

## 2020-04-14 RX ADMIN — VITAMIN D, TAB 1000IU (100/BT) 1000 UNITS: 25 TAB at 09:04

## 2020-04-14 RX ADMIN — GUAIFENESIN 600 MG: 600 TABLET, EXTENDED RELEASE ORAL at 09:04

## 2020-04-14 RX ADMIN — Medication 1 TABLET: at 09:04

## 2020-04-14 RX ADMIN — Medication 250 MG: at 09:04

## 2020-04-14 RX ADMIN — SENNOSIDES AND DOCUSATE SODIUM 1 TABLET: 8.6; 5 TABLET ORAL at 09:04

## 2020-04-14 RX ADMIN — POLYETHYLENE GLYCOL 3350 17 G: 17 POWDER, FOR SOLUTION ORAL at 09:04

## 2020-04-14 RX ADMIN — SENNOSIDES AND DOCUSATE SODIUM 1 TABLET: 8.6; 5 TABLET ORAL at 10:04

## 2020-04-14 RX ADMIN — ENOXAPARIN SODIUM 40 MG: 100 INJECTION SUBCUTANEOUS at 05:04

## 2020-04-14 RX ADMIN — TAMSULOSIN HYDROCHLORIDE 0.4 MG: 0.4 CAPSULE ORAL at 09:04

## 2020-04-14 RX ADMIN — POLYETHYLENE GLYCOL 3350 17 G: 17 POWDER, FOR SOLUTION ORAL at 10:04

## 2020-04-14 NOTE — PT/OT/SLP PROGRESS
Occupational Therapy   Treatment    Name: Peter Lopez  MRN: [id number]  Admitting Diagnosis:  COVID-19 virus infection       Recommendations:     Discharge Recommendations: home health OT  Discharge Equipment Recommendations:  shower chair  Barriers to discharge:  Other (Comment)(The patient lives alone)    Assessment:     Peter Lopez is a 84 y.o. male with a medical diagnosis of COVID-19 virus infection.  Performance deficits affecting function are weakness, impaired endurance, impaired self care skills, impaired balance, gait instability, impaired functional mobilty, decreased upper extremity function, decreased safety awareness, impaired cardiopulmonary response to activity.   The patient requires CGA/SBA for functional mobility.     The patient is able to perform seated self care with PSO2 >90%. The patient decreased to 87%,  while standing at the sink. The patient required increased time and verbal cues for pursed lipped breathing to achieve 90% PSO2 and .    Rehab Prognosis:  Good; patient would benefit from acute skilled OT services to address these deficits and reach maximum level of function.       Plan:     Patient to be seen 3 x/week, 5 x/week to address the above listed problems via self-care/home management, therapeutic activities, therapeutic exercises  · Plan of Care Expires: 05/13/20  · Plan of Care Reviewed with: patient    Subjective     Pain/Comfort:  · Pain Rating 1: 0/10    Objective:     Communicated with: nurseSarah prior to session.  Patient found HOB elevated with telemetry, peripheral IV, pulse ox (continuous), oxygen(3L NC) upon OT entry to room.    General Precautions: Standard, airborne, fall, droplet, special contact, respiratory   Orthopedic Precautions:N/A   Braces: N/A     Occupational Performance:     Bed Mobility:    · Patient completed Scooting/Bridging with stand by assistance  · Patient completed Supine to Sit with stand by assistance (assist needed to manage the O2 and  "Pulse ox lines)     Functional Mobility/Transfers:  · Patient completed Sit <> Stand Transfer with contact guard assistance with no assistive device.   · Functional Mobility: The patient amb without AD with CGA from the bed to the sink. The patient tolerated standing at the sink 5 min for self care with VC to performed pursed lipped breathing to increase PSO2. The patient amb to the chair and "plopped" into the chair with c/o fatigue.      Activities of Daily Living:  · Feeding:  modified independence to drink from a cup while seated in the chair  · Grooming: stand by assistance and contact guard assistance to stand at the sink to rinse mouth with miouthwash and rinse dentures. The patient was able to wipe his face and hands.  · Bathing: stand by assistance and set up to bathe uperbaody using wipes. The patient was dependent to wipe his back.  · Upper Body Dressing: minimum assistance to don/doff gown (limited by lines)  · Lower Body Dressing: dependence to don/doff socks while seated on the B      [facility] 6 Click ADL: 19    Treatment & Education:  The patient participated in functional tasks as noted above. The patient was educated re: pursed lipped breathing with decreased PSO2.    Patient left up in chair, reclined with all lines intact, call button in reach and nurseSarah notifiedEducation:      GOALS:   Multidisciplinary Problems     Occupational Therapy Goals        Problem: Occupational Therapy Goal    Goal Priority Disciplines Outcome Interventions   Occupational Therapy Goal     OT, PT/OT Ongoing, Progressing    Description:  Goals to be met by: 5/13/20     Patient will increase functional independence with ADLs by performing:    UE Dressing with Modified [place] and Supervision.  LE Dressing with Modified [place] and Supervision.  Grooming while standing at sink with Modified [place] and Supervision.  Supine to sit with Modified [place] and Supervision.  Step transfer with Modified " [place] and Supervision  Toilet transfer to toilet with Modified [place] and Supervision.  Upper extremity exercise program x10 reps per handout, with independence.  Educate the patient re: Home Safety and Energy Conservation                      Time Tracking:     OT Date of Treatment: 04/14/20  OT Start Time: 1105  OT Stop Time: 1130  OT Total Time (min): 25 min    Billable Minutes:Self Care/Home Management 25    Zofiamarvin Quick OT  4/14/2020

## 2020-04-14 NOTE — ASSESSMENT & PLAN NOTE
Secondary to COVID19 infection.  Wean  supplemental oxygen nasal cannula.  Patient completed course of off-label azithromycin and hydroxychloroquine.  Continue with supportive care.  Continue to wean as tolerated.  Supplementation with multivitamin, vitamin-C, and vitamin D initiated 4/13

## 2020-04-14 NOTE — ASSESSMENT & PLAN NOTE
Patient with bereavement from the loss of his wife last year. Has some support from family at home but inadequate to keep up with all ADLs and a household.   Patient requests NH placement.   Consider discharge to [facility] until SARS-CoV-2 negative for admission to NH or able to move in with family..

## 2020-04-14 NOTE — PLAN OF CARE
04/14/2020      Peter Lopez  [address]   [place] [address]          Hospital Medicine Dept.  [facility] [address] [place] [address] [phone] after hours  [phone] fax Peter Lopez has been hospitalized at the [facility] since 4/3/2020.       POSITIVE for COVID-19 (coronavirus).     Status:  Final result       Ref Range & Units 03/28/20 17:55   SARS-CoV2 (COVID-19) Qualitative PCR Not Detected Detected      Abnormal     Comment: This test utilizes a real-time reverse transcription   polymerase chain reaction procedure to amplify and   detect the SARS-CoV-2 RdRp and N genes.  The   analytical sensitivity (limit of detection) of this   assay is 100 copies/mL.     A Detected result is considered positive for COVID-19.   This patient is considered infected with the   SARS-CoV-2 virus and is presumed to be contagious.     A Not Detected result means that SARS-CoV-2 RNA is not   present above the limit of detection. It does not rule   out the possibility of COVID-19 and should not be the   sole basis for treatment decisions.  If COVID-19 is   strongly suspected based on clinical and exposure   history,re-testing should be considered.     This test is only for use under Food and Drug   Administration s Emergency Use Authorization (EUA).   Commercial reagents are provided by [employer].   Performance characteristics of the EUA have been   independently verified by [facility]   Department of Pathology and Laboratory Medicine.      Resulting Agency   [facility]       Narrative   Performed by: [facility]   Is the patient symptomatic?->Yes  What symptom criteria does the patient meet?->Fever >/= 100.4  What symptom criteria does the patient meet?->Cough  What symptom criteria does the patient meet?->Difficulty  breathing  Has patient had prolonged contact with positive  individual?->No      Specimen Collected: 03/28/20 17:55 Last  Resulted: 03/29/20 19:25           Marilyn Rogers MD  04/14/2020

## 2020-04-14 NOTE — PT/OT/SLP PROGRESS
Physical Therapy Treatment    Patient Name:  Peter Lopez   MRN:  [id number]    Recommendations:     Discharge Recommendations:  (HHPT)   Discharge Equipment Recommendations: (Ongoing asssessment pending pt progress)   Barriers to discharge: Decreased caregiver support and COVID +, may need home O2    Assessment:     Peter Lopez is a 84 y.o. male admitted with a medical diagnosis of COVID-19 virus infection.  He presents with the following impairments/functional limitations:  weakness, impaired endurance, gait instability, pain, decreased safety awareness, impaired balance, impaired cardiopulmonary response to activity, decreased lower extremity function, decreased upper extremity function .    Rehab Prognosis: Good; patient would benefit from acute skilled PT services to address these deficits and reach maximum level of function.    Recent Surgery: * No surgery found *      Plan:     During this hospitalization, patient to be seen (5-6x/wk) to address the identified rehab impairments via gait training, therapeutic activities, therapeutic exercises and progress toward the following goals:    · Plan of Care Expires:  04/27/20    Subjective     Chief Complaint: SOB with activity   Patient/Family Comments/goals: pt agreeable to treatment   Pain/Comfort:  · Pain Rating 1: 0/10  · Pain Rating Post-Intervention 1: 0/10      Objective:     Communicated with nurse  prior to session.  Patient found up in chair with telemetry, oxygen, pulse ox (continuous) upon PT entry to room.     General Precautions: Standard, airborne, fall, droplet, contact, respiratory   Orthopedic Precautions:N/A   Braces: N/A     Functional Mobility:  · Bed Mobility:     · Scooting: supervision  · Sit to Supine: supervision  · Transfers:     · Sit to Stand: x 2 trials from chair and 1 trial from bed contact guard assistance with rolling walker. V/T cues for safety technique and walker management,   · Gait:   Pt ambulated ~ 12 ft with RW, SBA (3L O2NC) noted  with SpO2 decreased to 86% , HR : 125 . Pt required seated rest break and VC's for Pursed lip breathing , SpO2 recovered to 92% (with long seated rest break)  , HR : 111 bpm. Pt with decreased josias, decreased step length, no LOB .  Nurse notified  · Balance:  Fair +       AM-PAC 6 CLICK MOBILITY  Turning over in bed (including adjusting bedclothes, sheets and blankets)?: 4  Sitting down on and standing up from a chair with arms (e.g., wheelchair, bedside commode, etc.): 3  Moving from lying on back to sitting on the side of the bed?: 4  Moving to and from a bed to a chair (including a wheelchair)?: 3  Need to walk in hospital room?: 3  Climbing 3-5 steps with a railing?: 3  Basic Mobility Total Score: 20       Therapeutic Activities and Exercises:   Pt performed seated BLE 15 reps :   AP, LAQ, HS,  Hip abd/add with pillow , Hip flexion. V/T's cues for technique and sequence. Pt required seated rest breaks throughout treatment. Educated pt on safety awareness with all OOB mobility , transfer and gait training.     Patient left HOB elevated with all lines intact, call button in reach, bed alarm on and nurse notified..    GOALS:   Multidisciplinary Problems     Physical Therapy Goals        Problem: Physical Therapy Goal    Goal Priority Disciplines Outcome Goal Variances Interventions   Physical Therapy Goal     PT, PT/OT Ongoing, Progressing     Description:  Goals to be met by: 2020     Patient will increase functional independence with mobility by performin. Supine to sit with Modified [place]  2. Sit to stand transfer with Modified [place]  3. Gait  x 25-5- feet with Modified [place] using Rolling Walker.   4. Lower extremity exercise program x10 reps per handout, with supervision                      Time Tracking:     PT Received On: 20  PT Start Time: 1522     PT Stop Time: 1602  PT Total Time (min): 40 min     Billable Minutes: Gait Training 11, Therapeutic Activity 14 and  Therapeutic Exercise 15    Treatment Type: Treatment  PT/PTA: PTA     PTA Visit Number: 1     Jennie Melgar PTA  04/14/2020

## 2020-04-14 NOTE — ASSESSMENT & PLAN NOTE
Patient has not been doing well at home. Mood may improve once in NH as he requests. Start trial of SSRI. Will need OP follow up.

## 2020-04-14 NOTE — ASSESSMENT & PLAN NOTE
A1C 6.4.  Holding metformin. Patient with reasonable glycemic control. Continue with diabetic diet, accuchecks, and SSI

## 2020-04-14 NOTE — PLAN OF CARE
Contacted by MD regarding discharge plan; stated that patient is requesting nursing home placement for senior living care; willing to discharge to the [facility] until covid infection is cleared and proceed from there; [place] sent referrals to multiple facilities via [facility] to eval for senior living care; if no acceptance pt will discharge to [facility]       04/14/20 1035   Post-Acute Status   Post-Acute Authorization Placement   Post-Acute Placement Status Referrals Sent   Discharge Delays   (Covid +)   Discharge Plan   Discharge Plan A   (TBD)

## 2020-04-14 NOTE — LETTER
[address] [facility] ? [place], [address] ? Phone [phone] ? Fax   ? [email or web address]          Return to Work    Patient: Peter Lopez  YOB: 1935   Date: 04/14/2020      To Whom It May Concern:     Peter Lopez was in contact with my office on 04/14/2020. COVID-19 is present in our communities across the state. There is limited testing for COVID at this time, so not all patients can be tested. In this situation, your employee meets the following criteria:     Peter Lopez has met the criteria for COVID-19 testing and has a POSITIVE result. He presented to the emergency room on 3/28/2020and admitted, found to be COVID positive, and discharged on 3/29/2020. The patient was readmitted on 4/3/2020 secondary to COVID and is still admitted in the hospital as of today. His discharge date is still pending.     If you have any questions or concerns, or if I can be of further assistance, please do not hesitate to contact me.         Sincerely,      Lucas Lloyd Jr, MD

## 2020-04-14 NOTE — TELEPHONE ENCOUNTER
I called patient on cell number while he is admitted at the hospital. He states that he thinks he was terminated at his job, and we have permission to send whatever letter his daughter stated to his job.  I called the daughter, Ms Rodriguez. She states that [employer] initially gave him a Return to Work date of April 11, and when she contacted them, was told that to extend his return to work date, they need confirmation that he is still in the hospital secondary to COVID.

## 2020-04-14 NOTE — TELEPHONE ENCOUNTER
----- Message from Mariam Bolden sent at 4/14/2020 11:36 AM CDT -----  Contact: Mckinley-Daughter  Type: Patient Call Back    Who called:Mckinley    What is the request in detail: Mckinley called to request a letter or written documentation stating that patient has been hospitalized for testing positive for COVID-19. Letter can be faxed to [facility] @ [phone]    Can the clinic reply by [facility]?    Would the patient rather a call back or a response via [facility]? Call    Best call back number:[phone]

## 2020-04-14 NOTE — PLAN OF CARE
Problem: Occupational Therapy Goal  Goal: Occupational Therapy Goal  Description  Goals to be met by: 5/13/20     Patient will increase functional independence with ADLs by performing:    UE Dressing with Modified [place] and Supervision.  LE Dressing with Modified [place] and Supervision.  Grooming while standing at sink with Modified [place] and Supervision.  Supine to sit with Modified [place] and Supervision.  Step transfer with Modified [place] and Supervision  Toilet transfer to toilet with Modified [place] and Supervision.  Upper extremity exercise program x10 reps per handout, with independence.  Educate the patient re: Home Safety and Energy Conservation     Outcome: Ongoing, Progressing   The patient requires CGA/SBA for functional mobility. The patient is able to perform seated self care with PSO2 >90%. The patient decreased to 87%,  while standing at the sink. The patient required increased time and verbal cues for pursed lipped breathing to achieve 90% PSO2 and .

## 2020-04-14 NOTE — PLAN OF CARE
04/13/20 0910   Discharge Reassessment   Assessment Type Discharge Planning Reassessment   Do you have any problems affording any of your prescribed medications? No   Discharge Plan A   (TBD)   Discharge Plan B Home Health   DME Needed Upon Discharge  none   Patient choice form signed by patient/caregiver N/A   Anticipated Discharge Disposition Home-Health   Can the patient/caregiver answer the patient profile reliably? Yes, cognitively intact   How does the patient rate their overall health at the present time? Fair   Describe the patient's ability to walk at the present time. Minor restrictions or changes   How often would a person be available to care for the patient? Infrequently   Number of comorbid conditions (as recorded on the chart) Three   Post-Acute Status   Post-Acute Authorization Other   Other Status   (TBD)

## 2020-04-14 NOTE — PLAN OF CARE
Problem: Physical Therapy Goal  Goal: Physical Therapy Goal  Description  Goals to be met by: 2020     Patient will increase functional independence with mobility by performin. Supine to sit with Modified [place]  2. Sit to stand transfer with Modified [place]  3. Gait  x 25-5- feet with Modified [place] using Rolling Walker.   4. Lower extremity exercise program x10 reps per handout, with supervision     Outcome: Ongoing, Progressing   Pt ambulated ~ 12 ft with RW, SBA (3L O2NC) noted with SpO2 decreased to 86% , HR : 125 . Pt required seated rest break and VC's for Pursed lip breathing , SpO2 recovered to 92% , HR : 111 bpm. Nurse notified.

## 2020-04-14 NOTE — PROGRESS NOTES
[facility] Hospital Medicine  Telemedicine Progress Note    Patient Name: Peter Lopez  MRN: [id number]  Patient Class: IP- Inpatient   Admission Date: 4/3/2020  Length of Stay: 11 days  Attending Physician: Marilyn Rogers MD  Primary Care Provider: Lucas Lloyd Jr, MD    [facility] Medicine Team: VIRTUAL [facility] MEDICINE TEAM 4 Marilyn Rogers MD    This service was provided through telemedicine.  Start time:956  Chief complaint: COVID-19  The patient location is: [facility]  The patient arrived at:   Present with the patient at the time of the telemed/virtual assessment: YONY Mota  End time: 1008  Total time spent with patient:  12 min  I have assessed these findings virtually using telemed platform and with assistance of bedside nurse or telemedicine presenter  The attending portion of this evaluation, treatment, and documentation was performed per Marilyn Rogers MD via audiovisual.    Patient was transferred to the telemedicine service on: 2020    Subjective:     Principal Problem:COVID-19 virus infection    New History / Events Overnight: No significant events reported by Nursing.  Patient complains of dyspnea and hypoxia. Symptoms have been gradually improving since yesterday. Associated symptoms include: fatigue. Treatment thus far includes Oxygen. Supportive care effective. Patient lives alone.  His wife  last year. This is the patient's 3rd presentation for COVID-19.  SpO2 96% on 3 L NC  Date of initial symptoms:  Approximately 3/21  Data reviewed 2020:  sCr stable    Review of Systems   Constitutional: Positive for fatigue. Negative for fever.   Respiratory: Positive for shortness of breath.    Psychiatric/Behavioral: Positive for dysphoric mood.     Objective:     Vital Signs (Most Recent):  Temp: 97.5 °F (36.4 °C) (20 115)  Pulse: 104 (20 1159)  Resp: 16 (20 115)  BP: 116/69 (20 115)  SpO2: 97 % (20 115) Vital Signs (24h  Range):  Temp:  [97.5 °F (36.4 °C)-98.9 °F (37.2 °C)] 97.5 °F (36.4 °C)  Pulse:  [] 104  Resp:  [16-20] 16  SpO2:  [94 %-98 %] 97 %  BP: (116-138)/(69-93) 116/69     Weight: 74 kg (163 lb 2.3 oz)  Body mass index is 23.41 kg/m².    Intake/Output Summary (Last 24 hours) at 4/14/2020 1504  Last data filed at 4/14/2020 0800  Gross per 24 hour   Intake 120 ml   Output 1150 ml   Net -1030 ml      Physical Exam   Constitutional: He is cooperative. No distress.   HENT:   Right Ear: Decreased hearing is noted.   Left Ear: Decreased hearing is noted.   Eyes: Conjunctivae and lids are normal. Right eye exhibits no discharge. Left eye exhibits no discharge. No scleral icterus.   Pulmonary/Chest: Effort normal. No accessory muscle usage. No tachypnea. No respiratory distress.   Abdominal: Normal appearance. He exhibits no distension.   Neurological: He is alert. He is not disoriented.   Skin: No rash noted. He is not diaphoretic. No cyanosis. No pallor.   Psychiatric: His affect is blunt. He exhibits a depressed mood.       Significant Labs: SARS- CoV-2 (COVID-19) QUALITATIVE PCR   Order: [id number]   Status:  Final result      Ref Range & Units 03/28/20 17:55   SARS-CoV2 (COVID-19) Qualitative PCR Not Detected Detected      Abnormal     Comment: This test utilizes a real-time reverse transcription   polymerase chain reaction procedure to amplify and   detect the SARS-CoV-2 RdRp and N genes.  The   analytical sensitivity (limit of detection) of this   assay is 100 copies/mL.     A Detected result is considered positive for COVID-19.   This patient is considered infected with the   SARS-CoV-2 virus and is presumed to be contagious.     A Not Detected result means that SARS-CoV-2 RNA is not   present above the limit of detection. It does not rule   out the possibility of COVID-19 and should not be the   sole basis for treatment decisions.  If COVID-19 is   strongly suspected based on clinical and exposure    history,re-testing should be considered.     This test is only for use under Food and Drug   Administration s Emergency Use Authorization (EUA).   Commercial reagents are provided by [employer]   Performance characteristics of the EUA have been   independently verified by [facility]   Department of Pathology and Laboratory Medicine.      Resulting Agency  [facility]      Narrative   Performed by: [facility]   Is the patient symptomatic?->Yes  What symptom criteria does the patient meet?->Fever >/= 100.4  What symptom criteria does the patient meet?->Cough  What symptom criteria does the patient meet?->Difficulty  breathing  Has patient had prolonged contact with positive  individual?->No      Specimen Collected: 03/28/20 17:55 Last Resulted: 03/29/20 19:25           Recent Labs   Lab 04/10/20  0755 04/11/20  0534 04/13/20  0459   WBC 6.90 6.71 5.67   HGB 11.0* 10.6* 10.3*   HCT 34.7* 33.5* 32.4*   * 651* 625*     Recent Labs   Lab 04/10/20  0755 04/11/20  0534 04/13/20 0459   GRAN 78.1*  5.4 76.4*  5.1 70.5  4.0   LYMPH 11.2*  0.8* 12.2*  0.8* 17.3@w25*  1.0   MONO 9.0  0.6 9.8  0.7 9.5  0.5   EOS 0.1 0.1 0.1     Recent Labs   Lab 04/10/20  0755 04/11/20  0534 04/13/20 0459    134* 136   K 4.4 4.3 4.2   CL 99 99 100   CO2 28 25 27   BUN 11 12 12   CREATININE 1.0 1.0 0.9   * 131* 126*   CALCIUM 8.9 8.9 9.1   MG 2.1 2.0 2.0   PHOS 3.0 3.0 3.2     Recent Labs   Lab 04/13/20  0459   CRP 83.5*     Recent Labs   Lab 03/28/20  1753 03/28/20  2253 03/30/20  1234 04/03/20  1509 04/03/20  1620   PROCAL 0.07  --  0.15  --  0.14   LACTATE 0.9 0.7  --  2.0  --    FERRITIN 334*  --   --  520*  --        Recent Labs   Lab 01/28/20  1446 04/05/20  0530   HGBA1C 6.1* 6.4*     Recent Labs   Lab 04/13/20  2042 04/14/20  0741 04/14/20  1113   POCTGLUCOSE 152* 134* 139*     Recent Labs   Lab 03/28/20  1753   TSH 2.079       Significant Imaging: CXR: I have reviewed all pertinent  results/findings within the past 24 hours and my personal findings are:  Interstitial markings present 4/3   EXAMINATION:  XR CHEST AP PORTABLE    CLINICAL HISTORY:  Suspected Covid-19 Virus Infection;    TECHNIQUE:  Single frontal view of the chest was performed.    COMPARISON:  03/30/2020    FINDINGS:  Heart size and pulmonary vascularity are stable.  Tortuous thoracic aorta and brachiocephalic vessels with atherosclerotic calcification in the wall of the aortic arch.  Lungs are satisfactorily expanded.  Continued accentuation of the interstitial markings bilaterally with some fibrotic appearing changes at the left base.  What appears to be an area of ill-defined airspace consolidation is again seen at the right midlung zone.  No definite pleural fluid or pneumothorax.  Skeletal structures appear intact.      Impression       Continued accentuation of the interstitial markings with an area of ill-defined airspace consolidation at the right midlung zone.  Once again, findings may represent developing pneumonia.  No significant interval change.      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] 04/14/2020 Yes    Reactive depression [F32.9] 04/14/2020 Yes    Acute respiratory distress syndrome (ARDS) due to COVID-19 virus [U07.1, J80] 04/04/2020 Yes    Type 2 diabetes mellitus with diabetic polyneuropathy, without long-term current use of insulin [E11.42] 10/16/2013 Yes    Essential hypertension [I10] 10/02/2012 Yes      Problems Resolved During this Admission:    Diagnosis Date Noted Date Resolved POA    Anemia [D64.9] 04/04/2020 04/07/2020 Yes    Elevated troponin [R79.89] 04/04/2020 04/07/2020 Yes    Suspected Covid-19 Virus Infection [R68.89] 03/28/2020 04/03/2020 Yes       Overview  / ICU Course:    84 year-old man with hypertension, diabetes mellitus type II, chronic hepatitis C admitted to the hospital for treatment of acute hypoxic respiratory failure secondary to COVID-19 infection.   Patient admitted to the intensive care unit and treated with non-rebreather mask.  Patient improved and he was weaned to Venti Mask and was step down to the floor.    Patient's oxygen requirements continues to improve and now weaned down to 5 liters/minute of supplemental oxygen delivered via nasal cannula.    Inpatient Medications Prescribed for Management of Current Problems:     Scheduled Meds:    ascorbic acid (vitamin C)  250 mg Oral BID    dextromethorphan-guaifenesin  mg  1 tablet Oral BID    enoxaparin  40 mg Subcutaneous Daily    famotidine  20 mg Oral Daily    guaiFENesin  600 mg Oral BID    multivitamin  1 tablet Oral Daily    polyethylene glycol  17 g Oral BID    senna-docusate 8.6-50 mg  1 tablet Oral BID    tamsulosin  0.4 mg Oral Daily    vitamin D  1,000 Units Oral BID     Continuous Infusions:   As Needed: acetaminophen, dextrose 50%, dextrose 50%, glucagon (human recombinant), glucose, glucose, insulin aspart U-100, ondansetron, sodium phosphates    Assessment and Plan by Problem    Covid-19 Virus Infection  Person Under Investigation (PUI) for COVID-19  - COVID-19 testing: Collection Date: 3/28/2020 Collection Time:   5:55 PM  - Infection Control notified    - Isolation:   - Airborne and Droplet Precautions  - Surgical mask on patient   - N95 masks must be fit tested, wear eye protection  - 20 second hand hygiene   - Limit visitors per hospital policy   - Consolidate lab draws, nursing care, and interventions    - Diagnostics: (Lymphopenia, hyponatremia, hyperferritinemia, elevated troponin, elevated d-dimer, age, and comorbidities are significant predictors of poor clinical outcome)   - CBC:   trend Q48hrs  - CMP:        trend Q48hrs  - Procalcitonin:  - D-dimer:  repeat  prior to discharge  - Ferritin:  repeat prior to discharge   - CRP:        trend Q48hrs  - LDH:   repeat prior to discharge  - BNP:  - Troponin:    - ECG:   - rapid Flu:   - RIP only if BMT/solid transplant:   - Legionella antigen:   - Blood culture x2:   - Sputum culture:   - CXR:   - UA and culture:   - CPK:    - Management:   Bundle care as able to maintain isolation & minimize in/out of room   - Supplemental O2 to maintain SpO2 92%-96%   if requiring 6L NC or higher, place on nonrebreather and discuss case with MICU   - Telemetry & continuous pulse oximetry    - If wheezing   - albuterol inhaler 2-4 puff Q6hr PRN    - ipratropium daily    - acetaminophen 650mg PO Q6hr PRN fever   - loperamide PRN for viral diarrhea  - Empiric antibiotics per likely source & patient allergies    - CAP: x 5 day course (d/c early if low concern for bacterial co-infection)  Ceftriaxone 1g IV Q24hrs            Azithromycin 500mg IV day #1, then 250mg PO daily x4 days     If azithromycin is not available, start doxycycline                 If MRSA risk factors, add Vancomycin IV (PharmD consult)     - Investigational Treatment Protocol: (if patient meets criteria)   [email or web address]     - statin: atorvastatin 40mg po daily (if CPK WNL)    - start HCQ 400mg PO BID x1 day, then 400mg PO daily x 4 days   (check glucose 6 phosphate dehydrogenase (NOT G6PD Quant), ECG on start & @48hrs, and start Qshift POCT glucose)    Safety notes:   - Avoid NIPPV (CPAP/BiPAP) to prevent aerosolization, use on a case-by-case basis only if in negative pressure room   - Cautious use of NSAIDS for fever per WHO recommendations (3/16/2020)   - No new ACEi/ARB start or discontinuation of chronic med unless hypotensive (Esler et al. Journal of Hypertension 2020, 38:000-000)   - Careful use of steroids in the absence of other indications (shock, ARDS)   - Fluid  sparing resuscitation, avoid maintenance fluids       * Acute respiratory distress syndrome (ARDS) due to COVID-19 virus  Secondary to COVID19 infection.  Weaned supplemental oxygen nasal cannula.    Patient completed course of azithromycin and hydroxychloroquine.    Continued with supportive care.  Continue to wean O2 as tolerated.  Supplementation with multivitamin, vitamin-C, and vitamin D initiated 4/13     Type 2 diabetes mellitus with diabetic polyneuropathy, without long-term current use of insulin  Holding metformin. Patient with adequate glycemic control.   Continue with diabetic diet. Continue to monitor BGs.     Essential hypertension  Normotensive.    Holding home blood pressure medications (olmesartan and hydrochlorothiazide).    Continue to monitor.     Grief reaction with prolonged bereavement  Reactive depression  Patient with bereavement due to the loss of his wife last year.   Has some support from family at home but inadequate to keep up with all ADLs and a household.   Patient requests NH placement. Daughter is not in agreement with NH placement at this time as she may move her father to her home.  Consider discharge to [facility] until SARS-CoV-2 negative.     Decreased hearing of both ears  Wears glasses and Hearing Aids  Patient does not have his glasses or hearing aids with him.      HIGH RISK CONDITION(S):   Patient has a condition that poses threat to life and bodily function: Severe Respiratory Distress due to COVID-19    Discharge plan and follow up  Home-Health Care Svc - patient needs assistance and supervision at home.  Consider [facility] if family is unable to provide adequate supervision to assist patient with ADLs, medications, and adherence to oxygen by nasal cannula.   GOLDEN 4/14/2020  Previous admission:  3/30/20 plus ED visits in past 30 days.    Goals of Care:  The patient's likely prognosis, which is fair has been discussed with patient. The patient's  values and preferences for future care and at the very end of life are to focus on remaining as independent as possible. Accordingly, we have decided that the best plan to meet the patient's goals includes continuing with treatment  Code Status: Full Code  Comfort Only: No  Hospice: No    Diet: Diet diabetic [facility]; 2000 Calorie  GI Prophylaxis: Not indicated  Significant LDAs:   IV Access Type: Peripheral  Urinary Catheter Indication if present: Patient Does Not Have Urinary Catheter  Other Lines/Tubes/Drains:    VTE Risk Mitigation (From admission, onward)         Ordered     enoxaparin injection 40 mg  Daily      04/07/20 1045     IP VTE HIGH RISK PATIENT  Once      04/03/20 2051     Place sequential compression device  Until discontinued      04/03/20 2051     Place DARIEL hose  Until discontinued      04/03/20 2051              Marilyn Rogers MD  Department of Hospital Medicine   [facility]

## 2020-04-15 PROBLEM — H91.93 DECREASED HEARING OF BOTH EARS: Status: RESOLVED | Noted: 2020-04-14 | Resolved: 2020-04-15

## 2020-04-15 PROBLEM — Z75.8 DISCHARGE PLANNING ISSUES: Status: ACTIVE | Noted: 2020-04-14

## 2020-04-15 PROBLEM — J80 ACUTE RESPIRATORY DISTRESS SYNDROME (ARDS) DUE TO COVID-19 VIRUS: Status: RESOLVED | Noted: 2020-04-04 | Resolved: 2020-04-15

## 2020-04-15 PROBLEM — U07.1 ACUTE RESPIRATORY DISTRESS SYNDROME (ARDS) DUE TO COVID-19 VIRUS: Status: RESOLVED | Noted: 2020-04-04 | Resolved: 2020-04-15

## 2020-04-15 PROBLEM — Z02.9 DISCHARGE PLANNING ISSUES: Status: ACTIVE | Noted: 2020-04-14

## 2020-04-15 PROBLEM — Z97.3 WEARS GLASSES: Status: RESOLVED | Noted: 2020-04-14 | Resolved: 2020-04-15

## 2020-04-15 LAB
ALBUMIN SERPL BCP-MCNC: 1.9 G/DL (ref 3.5–5.2)
ALP SERPL-CCNC: 36 U/L (ref 55–135)
ALT SERPL W/O P-5'-P-CCNC: 28 U/L (ref 10–44)
ANION GAP SERPL CALC-SCNC: 6 MMOL/L (ref 8–16)
AST SERPL-CCNC: 25 U/L (ref 10–40)
BASOPHILS # BLD AUTO: 0.04 K/UL (ref 0–0.2)
BASOPHILS NFR BLD: 0.8 % (ref 0–1.9)
BILIRUB SERPL-MCNC: 0.3 MG/DL (ref 0.1–1)
BUN SERPL-MCNC: 10 MG/DL (ref 8–23)
CALCIUM SERPL-MCNC: 7.6 MG/DL (ref 8.7–10.5)
CHLORIDE SERPL-SCNC: 106 MMOL/L (ref 95–110)
CK SERPL-CCNC: 65 U/L (ref 20–200)
CO2 SERPL-SCNC: 25 MMOL/L (ref 23–29)
CREAT SERPL-MCNC: 0.8 MG/DL (ref 0.5–1.4)
CRP SERPL-MCNC: 43.9 MG/L (ref 0–8.2)
DIFFERENTIAL METHOD: ABNORMAL
EOSINOPHIL # BLD AUTO: 0.1 K/UL (ref 0–0.5)
EOSINOPHIL NFR BLD: 1.3 % (ref 0–8)
ERYTHROCYTE [DISTWIDTH] IN BLOOD BY AUTOMATED COUNT: 13.2 % (ref 11.5–14.5)
EST. GFR  (AFRICAN AMERICAN): >60 ML/MIN/1.73 M^2
EST. GFR  (NON AFRICAN AMERICAN): >60 ML/MIN/1.73 M^2
FERRITIN SERPL-MCNC: 374 NG/ML (ref 20–300)
GLUCOSE SERPL-MCNC: 114 MG/DL (ref 70–110)
HCT VFR BLD AUTO: 32.6 % (ref 40–54)
HGB BLD-MCNC: 10.4 G/DL (ref 14–18)
IMM GRANULOCYTES # BLD AUTO: 0.02 K/UL (ref 0–0.04)
IMM GRANULOCYTES NFR BLD AUTO: 0.4 % (ref 0–0.5)
LDH SERPL L TO P-CCNC: 271 U/L (ref 110–260)
LYMPHOCYTES # BLD AUTO: 1.1 K/UL (ref 1–4.8)
LYMPHOCYTES NFR BLD: 20.4 % (ref 18–48)
MCH RBC QN AUTO: 27.8 PG (ref 27–31)
MCHC RBC AUTO-ENTMCNC: 31.9 G/DL (ref 32–36)
MCV RBC AUTO: 87 FL (ref 82–98)
MONOCYTES # BLD AUTO: 0.5 K/UL (ref 0.3–1)
MONOCYTES NFR BLD: 9 % (ref 4–15)
NEUTROPHILS # BLD AUTO: 3.6 K/UL (ref 1.8–7.7)
NEUTROPHILS NFR BLD: 68.1 % (ref 38–73)
NRBC BLD-RTO: 0 /100 WBC
PLATELET # BLD AUTO: 587 K/UL (ref 150–350)
PMV BLD AUTO: 8.8 FL (ref 9.2–12.9)
POCT GLUCOSE: 126 MG/DL (ref 70–110)
POCT GLUCOSE: 132 MG/DL (ref 70–110)
POCT GLUCOSE: 141 MG/DL (ref 70–110)
POCT GLUCOSE: 152 MG/DL (ref 70–110)
POTASSIUM SERPL-SCNC: 3.7 MMOL/L (ref 3.5–5.1)
PROT SERPL-MCNC: 5.9 G/DL (ref 6–8.4)
RBC # BLD AUTO: 3.74 M/UL (ref 4.6–6.2)
SODIUM SERPL-SCNC: 137 MMOL/L (ref 136–145)
TROPONIN I SERPL DL<=0.01 NG/ML-MCNC: <0.006 NG/ML (ref 0–0.03)
WBC # BLD AUTO: 5.24 K/UL (ref 3.9–12.7)

## 2020-04-15 PROCEDURE — 25000003 PHARM REV CODE 250: Performed by: HOSPITALIST

## 2020-04-15 PROCEDURE — 97110 THERAPEUTIC EXERCISES: CPT | Mod: CQ

## 2020-04-15 PROCEDURE — 36415 COLL VENOUS BLD VENIPUNCTURE: CPT

## 2020-04-15 PROCEDURE — 82728 ASSAY OF FERRITIN: CPT

## 2020-04-15 PROCEDURE — 25000003 PHARM REV CODE 250: Performed by: INTERNAL MEDICINE

## 2020-04-15 PROCEDURE — 80053 COMPREHEN METABOLIC PANEL: CPT

## 2020-04-15 PROCEDURE — 84484 ASSAY OF TROPONIN QUANT: CPT

## 2020-04-15 PROCEDURE — 97530 THERAPEUTIC ACTIVITIES: CPT | Mod: CO

## 2020-04-15 PROCEDURE — 94761 N-INVAS EAR/PLS OXIMETRY MLT: CPT

## 2020-04-15 PROCEDURE — 27000221 HC OXYGEN, UP TO 24 HOURS

## 2020-04-15 PROCEDURE — 25000003 PHARM REV CODE 250: Performed by: FAMILY MEDICINE

## 2020-04-15 PROCEDURE — 86140 C-REACTIVE PROTEIN: CPT

## 2020-04-15 PROCEDURE — 97530 THERAPEUTIC ACTIVITIES: CPT | Mod: CQ

## 2020-04-15 PROCEDURE — 63600175 PHARM REV CODE 636 W HCPCS: Performed by: FAMILY MEDICINE

## 2020-04-15 PROCEDURE — 11000001 HC ACUTE MED/SURG PRIVATE ROOM

## 2020-04-15 PROCEDURE — 83615 LACTATE (LD) (LDH) ENZYME: CPT

## 2020-04-15 PROCEDURE — 97535 SELF CARE MNGMENT TRAINING: CPT | Mod: CO

## 2020-04-15 PROCEDURE — 63600175 PHARM REV CODE 636 W HCPCS: Performed by: HOSPITALIST

## 2020-04-15 PROCEDURE — 82550 ASSAY OF CK (CPK): CPT

## 2020-04-15 PROCEDURE — 85025 COMPLETE CBC W/AUTO DIFF WBC: CPT

## 2020-04-15 RX ORDER — GUAIFENESIN 600 MG/1
600 TABLET, EXTENDED RELEASE ORAL 2 TIMES DAILY PRN
Status: DISCONTINUED | OUTPATIENT
Start: 2020-04-15 | End: 2020-04-22 | Stop reason: HOSPADM

## 2020-04-15 RX ORDER — CITALOPRAM 10 MG/1
10 TABLET ORAL DAILY
Status: DISCONTINUED | OUTPATIENT
Start: 2020-04-15 | End: 2020-04-22 | Stop reason: HOSPADM

## 2020-04-15 RX ADMIN — Medication 250 MG: at 09:04

## 2020-04-15 RX ADMIN — FAMOTIDINE 20 MG: 20 TABLET ORAL at 09:04

## 2020-04-15 RX ADMIN — GUAIFENESIN AND DEXTROMETHORPHAN HYDROBROMIDE 1 TABLET: 600; 30 TABLET, EXTENDED RELEASE ORAL at 09:04

## 2020-04-15 RX ADMIN — Medication 1 TABLET: at 09:04

## 2020-04-15 RX ADMIN — GUAIFENESIN 600 MG: 600 TABLET, EXTENDED RELEASE ORAL at 09:04

## 2020-04-15 RX ADMIN — SENNOSIDES AND DOCUSATE SODIUM 1 TABLET: 8.6; 5 TABLET ORAL at 09:04

## 2020-04-15 RX ADMIN — ACETAMINOPHEN 650 MG: 325 TABLET ORAL at 05:04

## 2020-04-15 RX ADMIN — CITALOPRAM 10 MG: 10 TABLET, FILM COATED ORAL at 03:04

## 2020-04-15 RX ADMIN — ALTEPLASE 2 MG: 2.2 INJECTION, POWDER, LYOPHILIZED, FOR SOLUTION INTRAVENOUS at 04:04

## 2020-04-15 RX ADMIN — Medication 250 MG: at 10:04

## 2020-04-15 RX ADMIN — VITAMIN D, TAB 1000IU (100/BT) 1000 UNITS: 25 TAB at 10:04

## 2020-04-15 RX ADMIN — VITAMIN D, TAB 1000IU (100/BT) 1000 UNITS: 25 TAB at 09:04

## 2020-04-15 RX ADMIN — SENNOSIDES AND DOCUSATE SODIUM 1 TABLET: 8.6; 5 TABLET ORAL at 10:04

## 2020-04-15 RX ADMIN — ENOXAPARIN SODIUM 40 MG: 100 INJECTION SUBCUTANEOUS at 05:04

## 2020-04-15 RX ADMIN — TAMSULOSIN HYDROCHLORIDE 0.4 MG: 0.4 CAPSULE ORAL at 09:04

## 2020-04-15 RX ADMIN — POLYETHYLENE GLYCOL 3350 17 G: 17 POWDER, FOR SOLUTION ORAL at 10:04

## 2020-04-15 NOTE — SUBJECTIVE & OBJECTIVE
Interval History:  No acute events overnight.  Pt denies any new complaints.   Review of Systems   Constitutional: Positive for fatigue. Negative for chills and fever.   Respiratory: Positive for shortness of breath.    Cardiovascular: Negative for chest pain.   Gastrointestinal: Negative for abdominal pain, nausea and vomiting.     Objective:     Vital Signs (Most Recent):  Temp: 97.3 °F (36.3 °C) (04/15/20 1100)  Pulse: 98 (04/15/20 1100)  Resp: 19 (04/15/20 1100)  BP: 139/79 (04/15/20 1100)  SpO2: 96 % (04/15/20 1100) Vital Signs (24h Range):  Temp:  [97.3 °F (36.3 °C)-98.8 °F (37.1 °C)] 97.3 °F (36.3 °C)  Pulse:  [] 98  Resp:  [16-20] 19  SpO2:  [95 %-99 %] 96 %  BP: (130-143)/(78-86) 139/79     Weight: 74 kg (163 lb 2.3 oz)  Body mass index is 23.41 kg/m².    Intake/Output Summary (Last 24 hours) at 4/15/2020 1426  Last data filed at 4/15/2020 1400  Gross per 24 hour   Intake 245 ml   Output 701 ml   Net -456 ml      Physical Exam   Constitutional: No distress.   HENT:   Head: Normocephalic and atraumatic.   Cardiovascular: Normal rate, regular rhythm and normal heart sounds.   Pulmonary/Chest: No respiratory distress. He has no wheezes. He has rales.   Diminished breath sounds   Abdominal: Soft. Bowel sounds are normal. He exhibits no distension. There is no tenderness.   Neurological:   Alert, but lethargic   Skin: Skin is warm and dry. No rash noted. No erythema. No pallor.       Significant Labs: All pertinent labs within the past 24 hours have been reviewed.    Significant Imaging: I have reviewed all pertinent imaging results/findings within the past 24 hours.

## 2020-04-15 NOTE — NURSING
Pt contacted nurses station stating that he spilt urinal contents in bed. Assistance with perianal care provided and linens changed. Pt assisted to chair during encounter. Denies pain, complains of SOB. Replaced oxygen nasal cannula which was removed during ambulation to bathroom (performed independently)

## 2020-04-15 NOTE — PROGRESS NOTES
"[facility] Hospital Medicine  Progress Note    Patient Name: Peter Lopez  MRN: [id number]  Patient Class: IP- Inpatient   Admission Date: 4/3/2020  Length of Stay: 12 days  Attending Physician: Mike Guerrero MD  Primary Care Provider: Lucas Lloyd Jr, MD        Subjective:     Principal Problem:COVID-19 virus infection        HPI:  Per Dr. Mike Guerrero:    "Pt is a 83 yo M with a h/o NIDDM2, HTN, chronic HCV, and recent COVID-19 who presents to the ER for worsening weakness, persistent fevers, and dyspnea.  Pt was brought by EMS who found him with an oxygen saturation of 78%.  Pt was placed on non-rebreather and sat's did come up to 98%.  He was on nasal canula prior to arrival but the tubing was long per reports and he was noted to be tachypneic and tachycardic on it.  Pt could speak only a few sentences due to the dyspnea.  Of note patient was on antibiotics as outpatient and was compliant.  He does have a productive cough, decreased po intake, and subjective fevers."    Overview/Hospital Course:  Patient is a 84 year-old man with hypertension, diabetes mellitus type II, chronic hepatitis C admitted to the hospital for treatment of acute hypoxic respiratory failure secondary to COVID-19 infection.   Patient admitted to the intensive care unit and treated with non-rebreather mask.  Patient improved and he was weaned to Venti Mask and was step down to the floor.    Patient's oxygen requirements continues to improve and now weaned down to 5 liters/minute of supplemental oxygen delivered via nasal cannula.    Interval History:  No acute events overnight.  Pt denies any new complaints.   Review of Systems   Constitutional: Positive for fatigue. Negative for chills and fever.   Respiratory: Positive for shortness of breath.    Cardiovascular: Negative for chest pain.   Gastrointestinal: Negative for abdominal pain, nausea and vomiting.     Objective:     Vital Signs (Most Recent):  Temp: 97.3 °F (36.3 °C) " (04/15/20 1100)  Pulse: 98 (04/15/20 1100)  Resp: 19 (04/15/20 1100)  BP: 139/79 (04/15/20 1100)  SpO2: 96 % (04/15/20 1100) Vital Signs (24h Range):  Temp:  [97.3 °F (36.3 °C)-98.8 °F (37.1 °C)] 97.3 °F (36.3 °C)  Pulse:  [] 98  Resp:  [16-20] 19  SpO2:  [95 %-99 %] 96 %  BP: (130-143)/(78-86) 139/79     Weight: 74 kg (163 lb 2.3 oz)  Body mass index is 23.41 kg/m².    Intake/Output Summary (Last 24 hours) at 4/15/2020 1426  Last data filed at 4/15/2020 1400  Gross per 24 hour   Intake 245 ml   Output 701 ml   Net -456 ml      Physical Exam   Constitutional: No distress.   HENT:   Head: Normocephalic and atraumatic.   Cardiovascular: Normal rate, regular rhythm and normal heart sounds.   Pulmonary/Chest: No respiratory distress. He has no wheezes. He has rales.   Diminished breath sounds   Abdominal: Soft. Bowel sounds are normal. He exhibits no distension. There is no tenderness.   Neurological:   Alert, but lethargic   Skin: Skin is warm and dry. No rash noted. No erythema. No pallor.       Significant Labs: All pertinent labs within the past 24 hours have been reviewed.    Significant Imaging: I have reviewed all pertinent imaging results/findings within the past 24 hours.      Assessment/Plan:      * COVID-19 virus infection  - COVID-19 testing Collection Date: 3/28/2020 Collection Time:   5:55 PM  - Infection Control notified    - pt showed improvement but on 4/15 was noted to get worse with increasing oxygen demand and CXR showing progression of COVID-19 disease.      - Isolation:   - Airborne, Contact and Droplet Precautions  - Cohort patients into COVID units  - N95 masks must be fit tested, wear eye protection  - 20 second hand hygiene              - Limit visitors per hospital policy              - Consolidating lab draws, nursing care, provider visits, and interventions    - Diagnostics: (leukopenia, hyponatremia, hyperferritinemia, elevated troponin, elevated d-dimer, age, and comorbidities are  significant predictors of poor clinical outcome)  CBC, CMP, Ferritin, CRP, LDH and Portable CXR    - Management:  Supplemental O2 to maintain SpO2 >92%  Continuous/intermittent Pulse Ox  Albuterol treatment PRN   Patient completed course of azithromycin & plaquenil                   Discharge planning issues  Patient with bereavement from the loss of his wife last year. Has some support from family at home but inadequate to keep up with all ADLs and a household.   Patient requests NH placement.   Consider discharge to [facility] until SARS-CoV-2 negative for admission to NH or able to move in with family..    Reactive depression  Patient has not been doing well at home. Mood may improve once in NH as he requests. Start trial of SSRI. Will need OP follow up.        Type 2 diabetes mellitus with diabetic polyneuropathy, without long-term current use of insulin  A1C 6.4.  Holding metformin. Patient with reasonable glycemic control. Continue with diabetic diet, accuchecks, and SSI    Essential hypertension  Normotensive.  Holding home blood pressure medications (olmesartan and hydrochlorothiazide).  Continue to monitor.      VTE Risk Mitigation (From admission, onward)         Ordered     enoxaparin injection 40 mg  Daily      04/07/20 1045     IP VTE HIGH RISK PATIENT  Once      04/03/20 2051     Place sequential compression device  Until discontinued      04/03/20 2051     Place DARIEL hose  Until discontinued      04/03/20 2051                      Mike Guerrero MD  Department of Hospital Medicine   [facility]

## 2020-04-15 NOTE — PT/OT/SLP PROGRESS
Physical Therapy Treatment    Patient Name:  Peter Lopez   MRN:  [id number]    Recommendations:     Discharge Recommendations:  (HHPT)   Discharge Equipment Recommendations: (Ongoing asssessment pending pt progress)   Barriers to discharge: Decreased caregiver support and COVID +, may need home O2    Assessment:     Peter Lopez is a 84 y.o. male admitted with a medical diagnosis of COVID-19 virus infection.  He presents with the following impairments/functional limitations:  weakness, impaired endurance, gait instability, decreased lower extremity function, decreased upper extremity function, impaired balance, impaired cardiopulmonary response to activity, decreased safety awareness .    Rehab Prognosis: Good; patient would benefit from acute skilled PT services to address these deficits and reach maximum level of function.    Recent Surgery: * No surgery found *      Plan:     During this hospitalization, patient to be seen (5-6x/wk) to address the identified rehab impairments via gait training, therapeutic activities, therapeutic exercises and progress toward the following goals:    · Plan of Care Expires:  04/27/20    Subjective     Chief Complaint:  Weakness and fatigue   Patient/Family Comments/goals: pt agreeable to treatment with encouragement   Pain/Comfort:  · Pain Rating 1: 0/10  · Pain Rating Post-Intervention 1: 0/10      Objective:     Communicated with nurse Smith prior to session.  Patient found HOB elevated with bed alarm, telemetry, oxygen, pulse ox (continuous) upon PT entry to room.     General Precautions: Standard, fall, airborne, contact, droplet, respiratory   Orthopedic Precautions:N/A   Braces: N/A     Functional Mobility:  · Bed Mobility:     · Rolling Right: contact guard assistance  · Scooting: contact guard assistance  · Supine to Sit: contact guard assistance  · Transfers:     · Sit to Stand:  contact guard assistance with rolling walker  · Bed to Chair: contact guard assistance with  rolling  walker  using  Step Transfer  · Gait:  Pt ambulated ~ 5-6 steps from bed to chair with RW, CGA. Limited with gait training 2* pt c/o fatigue. SpO2 maintain from 91% - 96% on 2 L O2NC on exertions , at rest : 100 % . Nurse notified.   · Balance: fair        AM-PAC 6 CLICK MOBILITY  Turning over in bed (including adjusting bedclothes, sheets and blankets)?: 4  Sitting down on and standing up from a chair with arms (e.g., wheelchair, bedside commode, etc.): 3  Moving from lying on back to sitting on the side of the bed?: 3  Moving to and from a bed to a chair (including a wheelchair)?: 3  Need to walk in hospital room?: 3  Climbing 3-5 steps with a railing?: 3  Basic Mobility Total Score: 19       Therapeutic Activities and Exercises:   Pt performed seated BLE 10 reps x 2 trials:   AP, LAQ, HS,  Hip abd/add , Hip flexion. V/T's cues for technique and sequence.    Educated pt on safety awareness with all OOB mobility , transfer and gait training.     Patient left up in chair with all lines intact, call button in reach and nurse notified..    GOALS:   Multidisciplinary Problems     Physical Therapy Goals        Problem: Physical Therapy Goal    Goal Priority Disciplines Outcome Goal Variances Interventions   Physical Therapy Goal     PT, PT/OT Ongoing, Progressing     Description:  Goals to be met by: 2020     Patient will increase functional independence with mobility by performin. Supine to sit with Modified [place]  2. Sit to stand transfer with Modified [place]  3. Gait  x 25-5- feet with Modified [place] using Rolling Walker.   4. Lower extremity exercise program x10 reps per handout, with supervision                      Time Tracking:     PT Received On: 04/15/20  PT Start Time: 1629     PT Stop Time: 1654  PT Total Time (min): 25 min     Billable Minutes: Therapeutic Activity 12 and Therapeutic Exercise 13    Treatment Type: Treatment  PT/PTA: PTA     PTA Visit Number: 2     Jennie T  Talia, PTA  04/15/2020

## 2020-04-15 NOTE — PLAN OF CARE
TN talked with Dr. Guerrero, hospitalist says cardiac markers fine but cxr compared to previous noted changes. On 4l/nc plan for discharge on tomorrow. Hope to get sats to 3liters.Lisa Corey RN, BSN, [facility]  4/15/2020    TN addressed dtr/Mckinley Michael's questions regarding her father's disposition and or next level of care. She says he cannot go home and is ok with the [facility]..Lisa Corey RN, BSN, [facility]  4/15/2020  '     04/15/20 1344   Discharge Reassessment   Assessment Type Discharge Planning Reassessment   Provided patient/caregiver education on the expected discharge date and the discharge plan Yes   Do you have any problems affording any of your prescribed medications? No   Discharge Plan A Other  (To Medical Monitored [facility] at [facility] in [facility]   Discharge Plan B Other  (TBD)   DME Needed Upon Discharge  other (see comments)  (TBD)   Anticipated Discharge Disposition HC Inst [facility]  ([facility])   Can the patient/caregiver answer the patient profile reliably? Yes, cognitively intact   How does the patient rate their overall health at the present time? Fair   Describe the patient's ability to walk at the present time. Walks with the help of equipment   How often would a person be available to care for the patient? Whenever needed   Number of comorbid conditions (as recorded on the chart) Three   During the past month, has the patient often been bothered by feeling down, depressed or hopeless? No   During the past month, has the patient often been bothered by little interest or pleasure in doing things? No   Post-Acute Status   Post-Acute Authorization Other   Post-Acute Placement Status Discharge Plan Changed  (Plan for [facility] to [facility])   Discharge Delays None known at this time   .Lisa Corey RN, BSN, [facility]  4/15/2020

## 2020-04-15 NOTE — UM SECONDARY REVIEW
Medical Affairs    IP Extended Stay > 10  Hospital Day # 12    Patient is a 84 year-old man with hypertension, diabetes mellitus type II, chronic hepatitis C admitted to the hospital for treatment of acute hypoxic respiratory failure secondary to COVID-19 infection.   Patient admitted to the intensive care unit and treated with non-rebreather mask.  Patient improved and he was weaned to Venti Mask and was step down to the floor.    Patient's oxygen requirements continues to improve and now weaned down to 5 liters/minute of supplemental oxygen delivered via nasal cannula.     pt showed improvement but on 4/15 was noted to get worse with increasing oxygen demand and CXR showing progression of COVID-19 disease.      When ready will go to [facility]  {[facility] Review Outcome:[id number]}

## 2020-04-15 NOTE — PT/OT/SLP PROGRESS
"Occupational Therapy   Treatment    Name: Peter Lopez  MRN: [id number]  Admitting Diagnosis:  COVID-19 virus infection       Recommendations:     Discharge Recommendations: home health OT  Discharge Equipment Recommendations:  shower chair  Barriers to discharge:       Assessment:     Peter Lopez is a 84 y.o. male with a medical diagnosis of COVID-19 virus infection.  He presents with the following performance deficits affecting function: weakness, impaired endurance, impaired self care skills, impaired functional mobilty, gait instability, impaired balance, decreased upper extremity function, decreased lower extremity function, decreased safety awareness, impaired cardiopulmonary response to activity.     Pt demos decline today with increased  lethargy and weakness. Pt did not have much to say at all, except that he felt very weak and tired. When asked if patient felt worse from previous day- patient did say "yes". He denied any dizziness, SOB, or pain. He was able to follow commands, but required increased assistance to mobilize and became increasingly more lethargic throughout session. Pt was then assisted back to supine for safety concerns. Vitals stable: /29, 96% SPO2 4L, HR 98 bpm. Pts nurse Sarah notified of pts status.     Rehab Prognosis:  Fair; patient would benefit from acute skilled OT services to address these deficits and reach maximum level of function.       Plan:     Patient to be seen 3 x/week, 5 x/week to address the above listed problems via self-care/home management, therapeutic activities, therapeutic exercises  · Plan of Care Expires: 05/13/20  · Plan of Care Reviewed with: patient    Subjective   Pt agreeable to therapy.  " I am very weak"  Pain/Comfort:  · Pain Rating 1: 0/10    Objective:     Communicated with: Nurse Garcia prior to session.  Patient found HOB elevated with tracheostomy upon OT entry to room.    General Precautions: Standard, airborne, fall, droplet, special contact, " respiratory  (COVID-19+)  Orthopedic Precautions:N/A   Braces: N/A     Occupational Performance: Pt found on 4L O2 NC, SPO2 95% at rest, decreased to mid 80's with exertion, able to recover quickly with cues for PLB and rest. HR increased ~120 bpm.      Bed Mobility:    · Patient completed Scooting/Bridging with contact guard assistance to scoot along EOB  · Patient completed Supine to Sit with stand by assistance, HOB elevated  · Patient completed Sit to Supine with minimum assistance     Functional Mobility/Transfers:  · Patient completed Sit <> Stand Transfer with minimum assistance and moderate assistance  with  rolling walker  from EOB. Pt with LOB and B knee buckle. Pt assisted back to seated position for safety 2* increased instability/lethargy.  · Functional Mobility: Unsafe to perform at this time.    Activities of Daily Living:  · Grooming: set up assistance to wash face and hands with washcloth seated EOB    · Upper Body Dressing: moderate assistance to marilu back gown seated EOB  · Toileting: stand by assistance to place/use urinal seated EOB x 2      AMPAC 6 Click ADL: 18    Treatment & Education:   Pt performed bed mobility, transfers, and ADL's as noted above.   Pt tolerated sitting EOB ~12 mins with SBA initially, however, pt became increasingly more lethargic and then required CGA 2* anterior leaning.    Pt educated on PLB technique with exertion, cues for implementation throughout session.       Patient left HOB elevated with all lines intact, call button in reach, bed alarm on and Nurse Sarah notifiedEducation:   of patient's status. Also, notified nurse to please call pts daughter Mckinley with update on patient per her request as she called during therapy session.    GOALS:   Multidisciplinary Problems     Occupational Therapy Goals        Problem: Occupational Therapy Goal    Goal Priority Disciplines Outcome Interventions   Occupational Therapy Goal     OT, PT/OT Ongoing, Progressing     Description:  Goals to be met by: 5/13/20     Patient will increase functional independence with ADLs by performing:    UE Dressing with Modified [place] and Supervision.  LE Dressing with Modified [place] and Supervision.  Grooming while standing at sink with Modified [place] and Supervision.  Supine to sit with Modified [place] and Supervision.  Step transfer with Modified [place] and Supervision  Toilet transfer to toilet with Modified [place] and Supervision.  Upper extremity exercise program x10 reps per handout, with independence.  Educate the patient re: Home Safety and Energy Conservation                      Time Tracking:     OT Date of Treatment: 04/15/20  OT Start Time: 1042  OT Stop Time: 1107  OT Total Time (min): 25 min    Billable Minutes:Self Care/Home Management 10  Therapeutic Activity 15    JAMESON Cox  4/15/2020

## 2020-04-15 NOTE — PLAN OF CARE
"  Problem: Occupational Therapy Goal  Goal: Occupational Therapy Goal  Description  Goals to be met by: 5/13/20     Patient will increase functional independence with ADLs by performing:    UE Dressing with Modified [place] and Supervision.  LE Dressing with Modified [place] and Supervision.  Grooming while standing at sink with Modified [place] and Supervision.  Supine to sit with Modified [place] and Supervision.  Step transfer with Modified [place] and Supervision  Toilet transfer to toilet with Modified [place] and Supervision.  Upper extremity exercise program x10 reps per handout, with independence.  Educate the patient re: Home Safety and Energy Conservation     Outcome: Ongoing, Progressing   Pt demos decline in functional status today (different from previous session on 4/14). Pt noted to be very lethargic with increased weakness. Pt did not have much to say at all, except that he felt very weak and tired. When asked if patient felt worse from previous day- patient did say "yes". He denied any dizziness, SOB, or pain. He was able to follow commands, but required increased assistance to mobilize and became increasingly more lethargic throughout session. Pt was then assisted back to supine for safety concerns. Vitals stable: /29, 96% SPO2 3L, HR 98 bpm. Pts nurse Sarah notified of pts status.   "

## 2020-04-15 NOTE — PLAN OF CARE
Problem: Physical Therapy Goal  Goal: Physical Therapy Goal  Description  Goals to be met by: 2020     Patient will increase functional independence with mobility by performin. Supine to sit with Modified [place]  2. Sit to stand transfer with Modified [place]  3. Gait  x 25-5- feet with Modified [place] using Rolling Walker.   4. Lower extremity exercise program x10 reps per handout, with supervision     Outcome: Ongoing, Progressing   Pt ambulated ~ 5-6 steps from bed to chair with RW, CGA. Limited with gait training 2* pt c/o fatigue. SpO2 maintain from 91% - 96% on 2 L O2NC on exertions , at rest : 100 % .

## 2020-04-15 NOTE — PROGRESS NOTES
Activase administered per MD order to White Port of PICC line. Will let dwell for 30 minutes.    1715- able to aspirate 5ml of blood from white port of Picc Line. Flushed white port of PICC line with 20 cc NS flush. Pt tolerated well. No distress noted. Pt sitting up in chair eating dinner.

## 2020-04-15 NOTE — ASSESSMENT & PLAN NOTE
- COVID-19 testing Collection Date: 3/28/2020 Collection Time:   5:55 PM  - Infection Control notified    - pt showed improvement but on 4/15 was noted to get worse with increasing oxygen demand and CXR showing progression of COVID-19 disease.      - Isolation:   - Airborne, Contact and Droplet Precautions  - Cohort patients into COVID units  - N95 masks must be fit tested, wear eye protection  - 20 second hand hygiene              - Limit visitors per hospital policy              - Consolidating lab draws, nursing care, provider visits, and interventions    - Diagnostics: (leukopenia, hyponatremia, hyperferritinemia, elevated troponin, elevated d-dimer, age, and comorbidities are significant predictors of poor clinical outcome)  CBC, CMP, Ferritin, CRP, LDH and Portable CXR    - Management:  Supplemental O2 to maintain SpO2 >92%  Continuous/intermittent Pulse Ox  Albuterol treatment PRN   Patient completed course of azithromycin & plaquenil

## 2020-04-16 LAB
BNP SERPL-MCNC: 14 PG/ML (ref 0–99)
POCT GLUCOSE: 121 MG/DL (ref 70–110)
POCT GLUCOSE: 122 MG/DL (ref 70–110)
POCT GLUCOSE: 129 MG/DL (ref 70–110)
POCT GLUCOSE: 141 MG/DL (ref 70–110)
POCT GLUCOSE: 162 MG/DL (ref 70–110)

## 2020-04-16 PROCEDURE — 25000003 PHARM REV CODE 250: Performed by: HOSPITALIST

## 2020-04-16 PROCEDURE — 63600175 PHARM REV CODE 636 W HCPCS: Performed by: HOSPITALIST

## 2020-04-16 PROCEDURE — 11000001 HC ACUTE MED/SURG PRIVATE ROOM

## 2020-04-16 PROCEDURE — 25000003 PHARM REV CODE 250: Performed by: INTERNAL MEDICINE

## 2020-04-16 PROCEDURE — 25000003 PHARM REV CODE 250: Performed by: FAMILY MEDICINE

## 2020-04-16 PROCEDURE — 97110 THERAPEUTIC EXERCISES: CPT | Mod: CQ

## 2020-04-16 PROCEDURE — 97116 GAIT TRAINING THERAPY: CPT | Mod: CQ

## 2020-04-16 PROCEDURE — 83880 ASSAY OF NATRIURETIC PEPTIDE: CPT

## 2020-04-16 PROCEDURE — 97530 THERAPEUTIC ACTIVITIES: CPT | Mod: CO

## 2020-04-16 RX ADMIN — Medication 1 TABLET: at 09:04

## 2020-04-16 RX ADMIN — FAMOTIDINE 20 MG: 20 TABLET ORAL at 09:04

## 2020-04-16 RX ADMIN — SENNOSIDES AND DOCUSATE SODIUM 1 TABLET: 8.6; 5 TABLET ORAL at 09:04

## 2020-04-16 RX ADMIN — TAMSULOSIN HYDROCHLORIDE 0.4 MG: 0.4 CAPSULE ORAL at 09:04

## 2020-04-16 RX ADMIN — ENOXAPARIN SODIUM 40 MG: 100 INJECTION SUBCUTANEOUS at 05:04

## 2020-04-16 RX ADMIN — SENNOSIDES AND DOCUSATE SODIUM 1 TABLET: 8.6; 5 TABLET ORAL at 08:04

## 2020-04-16 RX ADMIN — Medication 250 MG: at 08:04

## 2020-04-16 RX ADMIN — CITALOPRAM 10 MG: 10 TABLET, FILM COATED ORAL at 09:04

## 2020-04-16 RX ADMIN — VITAMIN D, TAB 1000IU (100/BT) 1000 UNITS: 25 TAB at 08:04

## 2020-04-16 RX ADMIN — VITAMIN D, TAB 1000IU (100/BT) 1000 UNITS: 25 TAB at 09:04

## 2020-04-16 RX ADMIN — Medication 250 MG: at 09:04

## 2020-04-16 NOTE — SUBJECTIVE & OBJECTIVE
Interval History:  No acute events overnight.  Pt denies any new complaints.  States he is feeling better today.  Is up in chair eating food.  More interactive.  Oxygen requirement has gone down to 3L NC.    Review of Systems   Constitutional: Positive for fatigue. Negative for chills and fever.   Respiratory: Positive for shortness of breath.    Cardiovascular: Negative for chest pain.   Gastrointestinal: Negative for abdominal pain, nausea and vomiting.     Objective:     Vital Signs (Most Recent):  Temp: 97.7 °F (36.5 °C) (04/16/20 1132)  Pulse: 98 (04/16/20 1132)  Resp: 18 (04/16/20 1132)  BP: (!) 148/85 (04/16/20 1132)  SpO2: 95 % (04/16/20 1132) Vital Signs (24h Range):  Temp:  [96.7 °F (35.9 °C)-98.3 °F (36.8 °C)] 97.7 °F (36.5 °C)  Pulse:  [] 98  Resp:  [18-22] 18  SpO2:  [93 %-100 %] 95 %  BP: (127-148)/(79-85) 148/85     Weight: 74 kg (163 lb 2.3 oz)  Body mass index is 23.41 kg/m².    Intake/Output Summary (Last 24 hours) at 4/16/2020 1520  Last data filed at 4/16/2020 1133  Gross per 24 hour   Intake 120 ml   Output 800 ml   Net -680 ml      Physical Exam   Constitutional: No distress.   HENT:   Head: Normocephalic and atraumatic.   Cardiovascular: Normal rate, regular rhythm and normal heart sounds.   Pulmonary/Chest: No respiratory distress. He has no wheezes. He has rales.   Diminished breath sounds   Abdominal: Soft. Bowel sounds are normal. He exhibits no distension. There is no tenderness.   Neurological:   Alert, but lethargic   Skin: Skin is warm and dry. No rash noted. No erythema. No pallor.       Significant Labs: All pertinent labs within the past 24 hours have been reviewed.    Significant Imaging: I have reviewed all pertinent imaging results/findings within the past 24 hours.

## 2020-04-16 NOTE — PT/OT/SLP PROGRESS
Occupational Therapy      Patient Name:  Peter Lopez   MRN:  [id number]    [id number] (8 mins, 1 Therapeutic Activity)  Pt found HOB elevated, 2L O2 NC with SPO2 96%. Pt reports he already participated in therapy today (PT). Educated patient on acute OT services/role/goals and encouraged OOB>chair for dinner, however, pt declined and appeared agitated with attempts of encouragement. Patient stated that he was really not up for doing anything right now, but would try tomorrow morning. He was agreeable to HEP education.  Pt provided with verbal instruction/demonstartion and handout of UE AROM in all planes of motion. Pt was able to return demonstration. Encouraged patient to perform therex tonight x5-10 reps utilizing slow pace and breathing techniques. Pt also provided with handout regarding energy conservation techniques and self pacing activity to maximize recover. Pt verbalizes understanding of education. Will follow up tomorrow.     JAMESON Cox  4/16/2020

## 2020-04-16 NOTE — ASSESSMENT & PLAN NOTE
Patient has not been doing well at home. Mood may improve once in NH as he requests. Started trial of SSRI. Will need OP follow up.

## 2020-04-16 NOTE — PROGRESS NOTES
"[facility] Medicine  Progress Note    Patient Name: Peter Lopez  MRN: [id number]  Patient Class: IP- Inpatient   Admission Date: 4/3/2020  Length of Stay: 13 days  Attending Physician: Mike Guerrero MD  Primary Care Provider: Lucas Lloyd Jr, MD        Subjective:     Principal Problem:COVID-19 virus infection        HPI:  Per Dr. Mike Guerrero:    "Pt is a 85 yo M with a h/o NIDDM2, HTN, chronic HCV, and recent COVID-19 who presents to the ER for worsening weakness, persistent fevers, and dyspnea.  Pt was brought by EMS who found him with an oxygen saturation of 78%.  Pt was placed on non-rebreather and sat's did come up to 98%.  He was on nasal canula prior to arrival but the tubing was long per reports and he was noted to be tachypneic and tachycardic on it.  Pt could speak only a few sentences due to the dyspnea.  Of note patient was on antibiotics as outpatient and was compliant.  He does have a productive cough, decreased po intake, and subjective fevers."    Overview/Hospital Course:  Patient is a 84 year-old man with hypertension, diabetes mellitus type II, chronic hepatitis C admitted to the hospital for treatment of acute hypoxic respiratory failure secondary to COVID-19 infection.   Patient admitted to the intensive care unit and treated with non-rebreather mask.  Patient improved and he was weaned to Venti Mask and was step down to the floor.    Patient's oxygen requirements continues to improve and now weaned down.  Is a candidate to [facility], once transfer approved, awaiting  guidance. Pt's daughter concerned that patient is too sick to come home.      Interval History:  No acute events overnight.  Pt denies any new complaints.  States he is feeling better today.  Is up in chair eating food.  More interactive.  Oxygen requirement has gone down to 3L NC.    Review of Systems   Constitutional: Positive for fatigue. Negative for chills and fever. "   Respiratory: Positive for shortness of breath.    Cardiovascular: Negative for chest pain.   Gastrointestinal: Negative for abdominal pain, nausea and vomiting.     Objective:     Vital Signs (Most Recent):  Temp: 97.7 °F (36.5 °C) (04/16/20 1132)  Pulse: 98 (04/16/20 1132)  Resp: 18 (04/16/20 1132)  BP: (!) 148/85 (04/16/20 1132)  SpO2: 95 % (04/16/20 1132) Vital Signs (24h Range):  Temp:  [96.7 °F (35.9 °C)-98.3 °F (36.8 °C)] 97.7 °F (36.5 °C)  Pulse:  [] 98  Resp:  [18-22] 18  SpO2:  [93 %-100 %] 95 %  BP: (127-148)/(79-85) 148/85     Weight: 74 kg (163 lb 2.3 oz)  Body mass index is 23.41 kg/m².    Intake/Output Summary (Last 24 hours) at 4/16/2020 1520  Last data filed at 4/16/2020 1133  Gross per 24 hour   Intake 120 ml   Output 800 ml   Net -680 ml      Physical Exam   Constitutional: No distress.   HENT:   Head: Normocephalic and atraumatic.   Cardiovascular: Normal rate, regular rhythm and normal heart sounds.   Pulmonary/Chest: No respiratory distress. He has no wheezes. He has rales.   Diminished breath sounds   Abdominal: Soft. Bowel sounds are normal. He exhibits no distension. There is no tenderness.   Neurological:   Alert, but lethargic   Skin: Skin is warm and dry. No rash noted. No erythema. No pallor.       Significant Labs: All pertinent labs within the past 24 hours have been reviewed.    Significant Imaging: I have reviewed all pertinent imaging results/findings within the past 24 hours.      Assessment/Plan:      * COVID-19 virus infection  - COVID-19 testing Collection Date: 3/28/2020 Collection Time:   5:55 PM  - Infection Control notified    - pt showed improvement but on 4/15 was noted to get worse with increasing oxygen demand and CXR showing progression of COVID-19 disease.      - Isolation:   - Airborne, Contact and Droplet Precautions  - Cohort patients into COVID units  - N95 masks must be fit tested, wear eye protection  - 20 second hand hygiene              - Limit visitors  per hospital policy              - Consolidating lab draws, nursing care, provider visits, and interventions    - Diagnostics: (leukopenia, hyponatremia, hyperferritinemia, elevated troponin, elevated d-dimer, age, and comorbidities are significant predictors of poor clinical outcome)  CBC, CMP, Ferritin, CRP, LDH and Portable CXR    - Management:  Supplemental O2 to maintain SpO2 >92%  Continuous/intermittent Pulse Ox  Albuterol treatment PRN   Patient completed course of azithromycin & plaquenil                   Discharge planning issues  Patient with bereavement from the loss of his wife last year. Has some support from family at home but inadequate to keep up with all ADLs and a household.     Pt awaiting discharge to [facility] when center accepting new transfers.  Daughter feels patient too ill to return home.      Reactive depression  Patient has not been doing well at home. Mood may improve once in NH as he requests. Started trial of SSRI. Will need OP follow up.        Type 2 diabetes mellitus with diabetic polyneuropathy, without long-term current use of insulin  A1C 6.4.  Holding metformin. Patient with reasonable glycemic control. Continue with diabetic diet, accuchecks, and SSI    Essential hypertension  Normotensive.  Holding home blood pressure medications (olmesartan and hydrochlorothiazide).  Continue to monitor.    VTE Risk Mitigation (From admission, onward)         Ordered     enoxaparin injection 40 mg  Daily      04/07/20 1045     IP VTE HIGH RISK PATIENT  Once      04/03/20 2051     Place sequential compression device  Until discontinued      04/03/20 2051     Place DARIEL hose  Until discontinued      04/03/20 2051                      Mike Guerrero MD  Department of Hospital Medicine   [facility]

## 2020-04-16 NOTE — ASSESSMENT & PLAN NOTE
Patient with bereavement from the loss of his wife last year. Has some support from family at home but inadequate to keep up with all ADLs and a household.     Pt awaiting discharge to [facility] when center accepting new transfers.  Daughter feels patient too ill to return home.

## 2020-04-16 NOTE — PT/OT/SLP PROGRESS
Physical Therapy Treatment    Patient Name:  Peter Lopez   MRN:  [id number]    Recommendations:     Discharge Recommendations:  (HHPT)   Discharge Equipment Recommendations: (Ongoing asssessment pending pt progress)   Barriers to discharge: Decreased caregiver support    Assessment:     Peter Lopez is a 84 y.o. male admitted with a medical diagnosis of COVID-19 virus infection.  He presents with the following impairments/functional limitations:  weakness, impaired endurance, gait instability, decreased lower extremity function, decreased upper extremity function, decreased safety awareness, impaired cardiopulmonary response to activity . Pt ambulated ~ 20 ft and 5-6 steps from bed to chair with RW, CGA (2L O2NC ). Noted with SpO2 decreased to 87% ,HR : 125 pt required seated rest break and pursed lip breathing technique , SpO2 recover to 93% , HR : 109 .     Rehab Prognosis: Good; patient would benefit from acute skilled PT services to address these deficits and reach maximum level of function.    Recent Surgery: * No surgery found *      Plan:     During this hospitalization, patient to be seen (5-6x/wk) to address the identified rehab impairments via gait training, therapeutic activities, therapeutic exercises and progress toward the following goals:    · Plan of Care Expires:  04/27/20    Subjective     Chief Complaint: weakness  Patient/Family Comments/goals: Pt agreeable to treatment   Pain/Comfort:  · Pain Rating 1: 0/10  · Pain Rating Post-Intervention 1: 0/10      Objective:     Communicated with nurse Page  prior to session.  Patient found HOB elevated with telemetry, oxygen, pulse ox (continuous) upon PT entry to room.     General Precautions: Standard, airborne, contact, fall, droplet, respiratory   Orthopedic Precautions:N/A   Braces: N/A     Functional Mobility:  · Bed Mobility:     · Rolling Right: stand by assistance  · Scooting: stand by assistance  · Supine to Sit: minimum assistance  · Transfers:      · Sit to Stand: x 2 trials from bed stand by assistance with rolling walker. VC's for safety technique and walker management.   · Bed to Chair: contact guard assistance with  rolling walker  using  Step Transfer  · Gait:Pt ambulated ~ 20 ft and 5-6 steps from bed to chair with RW, CGA (2L O2NC ). Noted with decreased josias, decreased step length, no LOB.  Noted with SpO2 decreased to 87% ,HR : 125 pt required seated rest break and pursed lip breathing technique , SpO2 recover to 93% , HR : 109 . VC's for safety technique and walker management.      · Balance: fair       AM-PAC 6 CLICK MOBILITY  Turning over in bed (including adjusting bedclothes, sheets and blankets)?: 4  Sitting down on and standing up from a chair with arms (e.g., wheelchair, bedside commode, etc.): 3  Moving from lying on back to sitting on the side of the bed?: 3  Moving to and from a bed to a chair (including a wheelchair)?: 3  Need to walk in hospital room?: 3  Climbing 3-5 steps with a railing?: 3  Basic Mobility Total Score: 19       Therapeutic Activities and Exercises:   Pt performed seated BLE 10 reps x 2 trials:   AP, LAQ, HS,  Hip abd/add with pillow , Hip flexion (10 reps in sitting) and 10 reps marching in place in standing with RW, CGA . V/T's cues for technique and sequence. Pt tolerated well.   Educated pt on safety awareness with all OOB mobility , transfer and gait training.     Patient left up in chair on air cushion  with all lines intact, call button in reach and NURSE notified..    GOALS:   Multidisciplinary Problems     Physical Therapy Goals        Problem: Physical Therapy Goal    Goal Priority Disciplines Outcome Goal Variances Interventions   Physical Therapy Goal     PT, PT/OT Ongoing, Progressing     Description:  Goals to be met by: 2020     Patient will increase functional independence with mobility by performin. Supine to sit with Modified [place]  2. Sit to stand transfer with Modified  [place]  3. Gait  x 25-5- feet with Modified [place] using Rolling Walker.   4. Lower extremity exercise program x10 reps per handout, with supervision                      Time Tracking:     PT Received On: 04/16/20  PT Start Time: 1130     PT Stop Time: 1200  PT Total Time (min): 30 min     Billable Minutes: Gait Training 14 and Therapeutic Exercise 16    Treatment Type: Treatment  PT/PTA: PTA     PTA Visit Number: 3     Tram T Talia, PTA  04/16/2020

## 2020-04-16 NOTE — PLAN OF CARE
Problem: Physical Therapy Goal  Goal: Physical Therapy Goal  Description  Goals to be met by: 2020     Patient will increase functional independence with mobility by performin. Supine to sit with Modified [place]  2. Sit to stand transfer with Modified [place]  3. Gait  x 25-5- feet with Modified [place] using Rolling Walker.   4. Lower extremity exercise program x10 reps per handout, with supervision     Outcome: Ongoing, Progressing   Pt ambulated ~ 20 ft and 5-6 steps from bed to chair with RW, CGA (2L O2NC ). Noted with SpO2 decreased to 87% ,HR : 125 pt required seated rest break and pursed lip breathing technique , SpO2 recover to 93% , HR : 109 .

## 2020-04-17 LAB
ALBUMIN SERPL BCP-MCNC: 2.4 G/DL (ref 3.5–5.2)
ALP SERPL-CCNC: 42 U/L (ref 55–135)
ALT SERPL W/O P-5'-P-CCNC: 36 U/L (ref 10–44)
ANION GAP SERPL CALC-SCNC: 8 MMOL/L (ref 8–16)
AST SERPL-CCNC: 31 U/L (ref 10–40)
BASOPHILS # BLD AUTO: 0.04 K/UL (ref 0–0.2)
BASOPHILS NFR BLD: 0.8 % (ref 0–1.9)
BILIRUB SERPL-MCNC: 0.2 MG/DL (ref 0.1–1)
BUN SERPL-MCNC: 9 MG/DL (ref 8–23)
CALCIUM SERPL-MCNC: 9.2 MG/DL (ref 8.7–10.5)
CHLORIDE SERPL-SCNC: 101 MMOL/L (ref 95–110)
CO2 SERPL-SCNC: 28 MMOL/L (ref 23–29)
CREAT SERPL-MCNC: 1 MG/DL (ref 0.5–1.4)
CRP SERPL-MCNC: 24.7 MG/L (ref 0–8.2)
DIFFERENTIAL METHOD: ABNORMAL
EOSINOPHIL # BLD AUTO: 0.1 K/UL (ref 0–0.5)
EOSINOPHIL NFR BLD: 2.3 % (ref 0–8)
ERYTHROCYTE [DISTWIDTH] IN BLOOD BY AUTOMATED COUNT: 13.4 % (ref 11.5–14.5)
EST. GFR  (AFRICAN AMERICAN): >60 ML/MIN/1.73 M^2
EST. GFR  (NON AFRICAN AMERICAN): >60 ML/MIN/1.73 M^2
GLUCOSE SERPL-MCNC: 127 MG/DL (ref 70–110)
HCT VFR BLD AUTO: 33.3 % (ref 40–54)
HGB BLD-MCNC: 10.5 G/DL (ref 14–18)
IMM GRANULOCYTES # BLD AUTO: 0.01 K/UL (ref 0–0.04)
IMM GRANULOCYTES NFR BLD AUTO: 0.2 % (ref 0–0.5)
LYMPHOCYTES # BLD AUTO: 1.3 K/UL (ref 1–4.8)
LYMPHOCYTES NFR BLD: 27.7 % (ref 18–48)
MCH RBC QN AUTO: 27.6 PG (ref 27–31)
MCHC RBC AUTO-ENTMCNC: 31.5 G/DL (ref 32–36)
MCV RBC AUTO: 88 FL (ref 82–98)
MONOCYTES # BLD AUTO: 0.5 K/UL (ref 0.3–1)
MONOCYTES NFR BLD: 9.9 % (ref 4–15)
NEUTROPHILS # BLD AUTO: 2.8 K/UL (ref 1.8–7.7)
NEUTROPHILS NFR BLD: 59.1 % (ref 38–73)
NRBC BLD-RTO: 0 /100 WBC
PLATELET # BLD AUTO: 533 K/UL (ref 150–350)
PMV BLD AUTO: 8.7 FL (ref 9.2–12.9)
POCT GLUCOSE: 112 MG/DL (ref 70–110)
POCT GLUCOSE: 123 MG/DL (ref 70–110)
POCT GLUCOSE: 150 MG/DL (ref 70–110)
POCT GLUCOSE: 153 MG/DL (ref 70–110)
POTASSIUM SERPL-SCNC: 4.3 MMOL/L (ref 3.5–5.1)
PROT SERPL-MCNC: 6.9 G/DL (ref 6–8.4)
RBC # BLD AUTO: 3.8 M/UL (ref 4.6–6.2)
SODIUM SERPL-SCNC: 137 MMOL/L (ref 136–145)
WBC # BLD AUTO: 4.73 K/UL (ref 3.9–12.7)

## 2020-04-17 PROCEDURE — 80053 COMPREHEN METABOLIC PANEL: CPT

## 2020-04-17 PROCEDURE — 85025 COMPLETE CBC W/AUTO DIFF WBC: CPT

## 2020-04-17 PROCEDURE — 25000003 PHARM REV CODE 250: Performed by: HOSPITALIST

## 2020-04-17 PROCEDURE — 11000001 HC ACUTE MED/SURG PRIVATE ROOM

## 2020-04-17 PROCEDURE — 97110 THERAPEUTIC EXERCISES: CPT

## 2020-04-17 PROCEDURE — 97110 THERAPEUTIC EXERCISES: CPT | Mod: CQ

## 2020-04-17 PROCEDURE — 63600175 PHARM REV CODE 636 W HCPCS: Performed by: HOSPITALIST

## 2020-04-17 PROCEDURE — 97530 THERAPEUTIC ACTIVITIES: CPT | Mod: CQ

## 2020-04-17 PROCEDURE — 86140 C-REACTIVE PROTEIN: CPT

## 2020-04-17 PROCEDURE — 36415 COLL VENOUS BLD VENIPUNCTURE: CPT

## 2020-04-17 PROCEDURE — 97116 GAIT TRAINING THERAPY: CPT | Mod: CQ

## 2020-04-17 PROCEDURE — 97530 THERAPEUTIC ACTIVITIES: CPT

## 2020-04-17 PROCEDURE — 25000003 PHARM REV CODE 250: Performed by: FAMILY MEDICINE

## 2020-04-17 PROCEDURE — 25000003 PHARM REV CODE 250: Performed by: INTERNAL MEDICINE

## 2020-04-17 RX ADMIN — Medication 250 MG: at 08:04

## 2020-04-17 RX ADMIN — Medication 1 TABLET: at 08:04

## 2020-04-17 RX ADMIN — TAMSULOSIN HYDROCHLORIDE 0.4 MG: 0.4 CAPSULE ORAL at 08:04

## 2020-04-17 RX ADMIN — SENNOSIDES AND DOCUSATE SODIUM 1 TABLET: 8.6; 5 TABLET ORAL at 08:04

## 2020-04-17 RX ADMIN — VITAMIN D, TAB 1000IU (100/BT) 1000 UNITS: 25 TAB at 08:04

## 2020-04-17 RX ADMIN — ENOXAPARIN SODIUM 40 MG: 100 INJECTION SUBCUTANEOUS at 05:04

## 2020-04-17 RX ADMIN — FAMOTIDINE 20 MG: 20 TABLET ORAL at 08:04

## 2020-04-17 RX ADMIN — CITALOPRAM 10 MG: 10 TABLET, FILM COATED ORAL at 08:04

## 2020-04-17 RX ADMIN — ACETAMINOPHEN 650 MG: 325 TABLET ORAL at 08:04

## 2020-04-17 NOTE — SUBJECTIVE & OBJECTIVE
Interval History: Reports coughing with SOB. Denies any chest pain/LE edema. No acute overnight events.    Review of Systems   Constitutional: Negative for diaphoresis and fever.   HENT: Negative for dental problem and nosebleeds.    Eyes: Negative for visual disturbance.   Respiratory: Positive for cough and shortness of breath.    Cardiovascular: Negative for chest pain.   Gastrointestinal: Negative for nausea and vomiting.   Psychiatric/Behavioral: Negative for agitation.     Objective:     Vital Signs (Most Recent):  Temp: 98.1 °F (36.7 °C) (04/17/20 1112)  Pulse: 96 (04/17/20 1112)  Resp: 18 (04/17/20 1112)  BP: 137/74 (04/17/20 1112)  SpO2: 96 % (04/17/20 1112) Vital Signs (24h Range):  Temp:  [97.9 °F (36.6 °C)-98.3 °F (36.8 °C)] 98.1 °F (36.7 °C)  Pulse:  [] 96  Resp:  [18-20] 18  SpO2:  [94 %-97 %] 96 %  BP: (128-153)/(74-85) 137/74     Weight: 74 kg (163 lb 2.3 oz)  Body mass index is 23.41 kg/m².    Intake/Output Summary (Last 24 hours) at 4/17/2020 1512  Last data filed at 4/17/2020 1230  Gross per 24 hour   Intake 480 ml   Output 1550 ml   Net -1070 ml      Physical Exam   Constitutional: He is oriented to person, place, and time.   HENT:   Head: Normocephalic.   Eyes: EOM are normal.   Neck: Normal range of motion. Neck supple.   Cardiovascular: Normal rate and regular rhythm.   Pulmonary/Chest:   Diminished breath sounds   Abdominal: Soft. Bowel sounds are normal.   Neurological: He is alert and oriented to person, place, and time.   Psychiatric: He has a normal mood and affect.       Significant Labs:   CBC:   Recent Labs   Lab 04/17/20  0342   WBC 4.73   HGB 10.5*   HCT 33.3*   *     CMP:   Recent Labs   Lab 04/17/20  0342      K 4.3      CO2 28   *   BUN 9   CREATININE 1.0   CALCIUM 9.2   PROT 6.9   ALBUMIN 2.4*   BILITOT 0.2   ALKPHOS 42*   AST 31   ALT 36   ANIONGAP 8   EGFRNONAA >60     All pertinent labs within the past 24 hours have been reviewed.    Significant  Imaging: I have reviewed and interpreted all pertinent imaging results/findings within the past 24 hours.

## 2020-04-17 NOTE — ASSESSMENT & PLAN NOTE
Patient with bereavement from the loss of his wife last year. Has some support from family at home but inadequate to keep up with all ADLs and a household.     Pt awaiting discharge to [facility] when center accepting new transfers.  Daughter feels patient too ill to return home.

## 2020-04-17 NOTE — PT/OT/SLP PROGRESS
"Physical Therapy Treatment    Patient Name:  Peter Lopez   MRN:  [id number]    Recommendations:     Discharge Recommendations:  (HHPT)   Discharge Equipment Recommendations: (Ongoing asssessment pending pt progress)   Barriers to discharge: Decreased caregiver support    Assessment:     Peter Lopez is a 84 y.o. male admitted with a medical diagnosis of COVID-19 virus infection.  He presents with the following impairments/functional limitations:  weakness, impaired endurance, gait instability, decreased lower extremity function, decreased upper extremity function, impaired balance, decreased safety awareness, pain, impaired cardiopulmonary response to activity, decreased ROM .    Rehab Prognosis: Good; patient would benefit from acute skilled PT services to address these deficits and reach maximum level of function.    Recent Surgery: * No surgery found *      Plan:     During this hospitalization, patient to be seen (5-6x/wk) to address the identified rehab impairments via gait training, therapeutic activities, therapeutic exercises and progress toward the following goals:    · Plan of Care Expires:  04/27/20    Subjective     Chief Complaint: " tired"   Patient/Family Comments/goals: pt agreeable to treatment with encouragement.   Pain/Comfort:  · Pain Rating 1: 0/10  · Pain Rating Post-Intervention 1: 0/10      Objective:     Communicated with nurse Page prior to session.  Patient found up in chair with oxygen, pulse ox (continuous), telemetry upon PT entry to room.     General Precautions: Standard, airborne, contact, fall, droplet, respiratory   Orthopedic Precautions:N/A   Braces: N/A     Functional Mobility:  · Bed Mobility:     · Scooting: supervision  · Sit to Supine: supervision  · Transfers:     · Sit to Stand:  stand by assistance with rolling walker  · Chair to bed : stand by assistance and contact guard assistance with  rolling walker  using  Step Transfer  · Gait: Pt ambulated ~ 20 ft with RW, SBA (1.5 L " O2NC ) , SpO2 decreased to 87% required seated rest break and pursed lip breathing technique, SpO2 recovered to 93% . Pt with slow josias, decreased step length , no LOB .  Nurse notified.     · Balance: fair +       AM-PAC 6 CLICK MOBILITY  Turning over in bed (including adjusting bedclothes, sheets and blankets)?: 4  Sitting down on and standing up from a chair with arms (e.g., wheelchair, bedside commode, etc.): 4  Moving from lying on back to sitting on the side of the bed?: 3  Moving to and from a bed to a chair (including a wheelchair)?: 3  Need to walk in hospital room?: 3  Climbing 3-5 steps with a railing?: 3  Basic Mobility Total Score: 20       Therapeutic Activities and Exercises:   BLE HEP given with instructions and demonstrations (pt verbalize understanding). Encouraged pt to perform BLE ex's per handout throughout the day.   Pt performed in reclined chair  BLE 10 reps (AAROM-AROM BLE ):  AP, SAQ, HS,  Hip abd/add , SLR.  V/T's cues for technique and sequence.   Pt performed seated BLE 10 reps :   AP, LAQ, HS,  Hip abd/add with pillow , Hip flexion. V/T's cues for technique and sequence. Pt tolerated well.   Educated pt on safety awareness with all OOB mobility , transfer and gait training.   Pt tolerated standing balance with SBA to use urinal  . No LOB .     Patient left HOB elevated with all lines intact, call button in reach and NURSE notified..    GOALS:   Multidisciplinary Problems     Physical Therapy Goals        Problem: Physical Therapy Goal    Goal Priority Disciplines Outcome Goal Variances Interventions   Physical Therapy Goal     PT, PT/OT Ongoing, Progressing     Description:  Goals to be met by: 2020     Patient will increase functional independence with mobility by performin. Supine to sit with Modified [place]  2. Sit to stand transfer with Modified [place]  3. Gait  x 25-5- feet with Modified [place] using Rolling Walker.   4. Lower extremity exercise  program x10 reps per handout, with supervision                      Time Tracking:     PT Received On: 04/17/20  PT Start Time: 1402     PT Stop Time: 1440  PT Total Time (min): 38 min     Billable Minutes: Gait Training 12, Therapeutic Activity 10 and Therapeutic Exercise 15    Treatment Type: Treatment  PT/PTA: PTA     PTA Visit Number: 4     Jennie Melgar, PTA  04/17/2020

## 2020-04-17 NOTE — PT/OT/SLP PROGRESS
"Occupational Therapy   Treatment    Name: Peter Lopez  MRN: [id number]  Admitting Diagnosis:  COVID-19 virus infection       Recommendations:     Discharge Recommendations: home health OT(if patient does not progress will benefit from SNF)  Discharge Equipment Recommendations:  bedside commode, shower chair, walker, rolling  Barriers to discharge:  Decreased caregiver support(lives alone)    Assessment:     Peter Lopez is a 84 y.o. male with a medical diagnosis of COVID-19 virus infection.  Performance deficits affecting function are weakness, impaired endurance, impaired self care skills, impaired balance, gait instability, impaired functional mobilty, decreased coordination, decreased upper extremity function, decreased lower extremity function, impaired cardiopulmonary response to activity, decreased safety awareness.   The patient participated in OT with max encouragement. The patient had a flat affect and was weaker than in previous OT treatments. The patient has a flat affect and appears depressed with statements of "the doctor says my lungs aren't better" The patient lives alone and may need to go to SNF.          Rehab Prognosis:  Good; patient would benefit from acute skilled OT services to address these deficits and reach maximum level of function.       Plan:     Patient to be seen 3 x/week, 5 x/week to address the above listed problems via self-care/home management, therapeutic activities, therapeutic exercises  · Plan of Care Expires: 05/13/20  · Plan of Care Reviewed with: patient    Subjective     Pain/Comfort:  · Pain Rating 1: 0/10    Objective:     Communicated with: nurseSarah prior to session.  Patient found up in chair with telemetry, oxygen, pulse ox (continuous)(1.5 L O2 NC) upon OT entry to room.    General Precautions: Standard, airborne, contact, fall, droplet, respiratory   Orthopedic Precautions:N/A   Braces: N/A     Occupational Performance:     Bed Mobility:    · N/T     Functional " Mobility/Transfers:  · Patient completed Sit <> Stand Transfer with contact guard assistance and minimum assistance  with  hand-held assist   · Functional Mobility: The patient stood from the chair for pressure relief/weight shift. The patient tolerated standing 1-2 min with encouragement to open his eyes and perform pursed lipped breathing.    Activities of Daily Living:  · The patient refused to participate in self care tasks. The patient drank from a cup x1 with SBA/encouragement.      AMPA 6 Click ADL: 17    Treatment & Education: The patient PSOT was 95 at rest but decreased to 87% with activity with increased time and VC to increase to 91%.  · Pt completed  x 10 BUE AROM in seated position:  · Wrist flexion/extension  · Elbow flexion/extension  · Shoulder flexion/extension (AAROM to BUE)        Forward punches    Patient left up in chair, reclined with all lines intact, call button in reach and nurseSarah notifiedEducation:      GOALS:   Multidisciplinary Problems     Occupational Therapy Goals        Problem: Occupational Therapy Goal    Goal Priority Disciplines Outcome Interventions   Occupational Therapy Goal     OT, PT/OT Ongoing, Progressing    Description:  Goals to be met by: 5/13/20     Patient will increase functional independence with ADLs by performing:    UE Dressing with Modified [place] and Supervision.  LE Dressing with Modified [place] and Supervision.  Grooming while standing at sink with Modified [place] and Supervision.  Supine to sit with Modified [place] and Supervision.  Step transfer with Modified [place] and Supervision  Toilet transfer to toilet with Modified [place] and Supervision.  Upper extremity exercise program x10 reps per handout, with independence.  Educate the patient re: Home Safety and Energy Conservation                      Time Tracking:     OT Date of Treatment: 04/17/20  OT Start Time: 1108  OT Stop Time: 1135  OT Total Time (min):  27 min    Billable Minutes:Therapeutic Activity 12  Therapeutic Exercise 15  Total Time 27    Zofia Quick OT  4/17/2020

## 2020-04-17 NOTE — PROGRESS NOTES
"[facility]  Progress Note    Patient Name: Peter Lopez  MRN: [id number]  Patient Class: IP- Inpatient   Admission Date: 4/3/2020  Length of Stay: 14 days  Attending Physician: Jesus Stone MD  Primary Care Provider: Lucas Lloyd Jr, MD        Subjective:     Principal Problem:COVID-19 virus infection        HPI:  Per Dr. Mike Guerrero:    "Pt is a 83 yo M with a h/o NIDDM2, HTN, chronic HCV, and recent COVID-19 who presents to the ER for worsening weakness, persistent fevers, and dyspnea.  Pt was brought by EMS who found him with an oxygen saturation of 78%.  Pt was placed on non-rebreather and sat's did come up to 98%.  He was on nasal canula prior to arrival but the tubing was long per reports and he was noted to be tachypneic and tachycardic on it.  Pt could speak only a few sentences due to the dyspnea.  Of note patient was on antibiotics as outpatient and was compliant.  He does have a productive cough, decreased po intake, and subjective fevers."    Overview/Hospital Course:  Patient is a 84 year-old man with hypertension, diabetes mellitus type II, chronic hepatitis C admitted to the hospital for treatment of acute hypoxic respiratory failure secondary to COVID-19 infection. Patient admitted to the intensive care unit and treated with non-rebreather mask. Patient improved and he was weaned to Venti Mask and was step down to the floor. Patient's oxygen requirements continues to improve and now weaned down to NC. He is a candidate for the [facility], once transfer approved, since he is able to perform ADLs (including ambulating to the bathroom). Pt's daughter concerned that he is too sick to go home since he lives alone.      Interval History: Reports coughing with SOB. Denies any chest pain/LE edema. No acute overnight events.    Review of Systems   Constitutional: Negative for diaphoresis and fever.   HENT: Negative for dental problem and nosebleeds.    Eyes: Negative " for visual disturbance.   Respiratory: Positive for cough and shortness of breath.    Cardiovascular: Negative for chest pain.   Gastrointestinal: Negative for nausea and vomiting.   Psychiatric/Behavioral: Negative for agitation.     Objective:     Vital Signs (Most Recent):  Temp: 98.1 °F (36.7 °C) (04/17/20 1112)  Pulse: 96 (04/17/20 1112)  Resp: 18 (04/17/20 1112)  BP: 137/74 (04/17/20 1112)  SpO2: 96 % (04/17/20 1112) Vital Signs (24h Range):  Temp:  [97.9 °F (36.6 °C)-98.3 °F (36.8 °C)] 98.1 °F (36.7 °C)  Pulse:  [] 96  Resp:  [18-20] 18  SpO2:  [94 %-97 %] 96 %  BP: (128-153)/(74-85) 137/74     Weight: 74 kg (163 lb 2.3 oz)  Body mass index is 23.41 kg/m².    Intake/Output Summary (Last 24 hours) at 4/17/2020 1512  Last data filed at 4/17/2020 1230  Gross per 24 hour   Intake 480 ml   Output 1550 ml   Net -1070 ml      Physical Exam   Constitutional: He is oriented to person, place, and time.   HENT:   Head: Normocephalic.   Eyes: EOM are normal.   Neck: Normal range of motion. Neck supple.   Cardiovascular: Normal rate and regular rhythm.   Pulmonary/Chest:   Diminished breath sounds   Abdominal: Soft. Bowel sounds are normal.   Neurological: He is alert and oriented to person, place, and time.   Psychiatric: He has a normal mood and affect.       Significant Labs:   CBC:   Recent Labs   Lab 04/17/20  0342   WBC 4.73   HGB 10.5*   HCT 33.3*   *     CMP:   Recent Labs   Lab 04/17/20  0342      K 4.3      CO2 28   *   BUN 9   CREATININE 1.0   CALCIUM 9.2   PROT 6.9   ALBUMIN 2.4*   BILITOT 0.2   ALKPHOS 42*   AST 31   ALT 36   ANIONGAP 8   EGFRNONAA >60     All pertinent labs within the past 24 hours have been reviewed.    Significant Imaging: I have reviewed and interpreted all pertinent imaging results/findings within the past 24 hours.      Assessment/Plan:      * COVID-19 virus infection  - COVID-19 testing Collection Date: 3/28/2020 Collection Time:   5:55 PM  - Infection  Control notified    - pt showed improvement but on 4/15 was noted to get worse with increasing oxygen demand and CXR showing progression of COVID-19 disease.      - Isolation:   - Airborne, Contact and Droplet Precautions  - Cohort patients into COVID units  - N95 masks must be fit tested, wear eye protection  - 20 second hand hygiene              - Limit visitors per hospital policy              - Consolidating lab draws, nursing care, provider visits, and interventions    - Diagnostics: (leukopenia, hyponatremia, hyperferritinemia, elevated troponin, elevated d-dimer, age, and comorbidities are significant predictors of poor clinical outcome)  CBC, CMP, Ferritin, CRP, LDH and Portable CXR    - Management:  Supplemental O2 to maintain SpO2 >92%  Continuous/intermittent Pulse Ox  Albuterol treatment PRN   Patient completed course of azithromycin & plaquenil        Reactive depression  Patient has not been doing well at home. Mood may improve once in NH as he requests. Started trial of SSRI. Will need OP follow up.        Discharge planning issues  Patient with bereavement from the loss of his wife last year. Has some support from family at home but inadequate to keep up with all ADLs and a household.     Pt awaiting discharge to [facility] when center accepting new transfers.  Daughter feels patient too ill to return home.      Type 2 diabetes mellitus with diabetic polyneuropathy, without long-term current use of insulin  A1C 6.4.  Holding metformin. Patient with reasonable glycemic control. Continue with diabetic diet, accuchecks, and SSI    Essential hypertension  Normotensive.  Holding home blood pressure medications (olmesartan and hydrochlorothiazide).  Continue to monitor.      VTE Risk Mitigation (From admission, onward)         Ordered     enoxaparin injection 40 mg  Daily      04/07/20 1045     IP VTE HIGH RISK PATIENT  Once      04/03/20 2051     Place sequential compression device  Until  discontinued      04/03/20 2051     Place DARIEL hose  Until discontinued      04/03/20 2051                      Jesus Stone MD  Department of Hospital Medicine   [facility]

## 2020-04-17 NOTE — PLAN OF CARE
Problem: Physical Therapy Goal  Goal: Physical Therapy Goal  Description  Goals to be met by: 2020     Patient will increase functional independence with mobility by performin. Supine to sit with Modified [place]  2. Sit to stand transfer with Modified [place]  3. Gait  x 25-5- feet with Modified [place] using Rolling Walker.   4. Lower extremity exercise program x10 reps per handout, with supervision     Outcome: Ongoing, Progressing   Pt ambulated ~ 20 ft with RW, SBA (1.5 L O2NC ) , SpO2 decreased to 87% required seated rest break and pursed lip breathing technique, SpO2 recovered to 93% . Nurse notified.

## 2020-04-18 LAB
POCT GLUCOSE: 119 MG/DL (ref 70–110)
POCT GLUCOSE: 134 MG/DL (ref 70–110)
POCT GLUCOSE: 135 MG/DL (ref 70–110)
POCT GLUCOSE: 161 MG/DL (ref 70–110)

## 2020-04-18 PROCEDURE — 25000003 PHARM REV CODE 250: Performed by: INTERNAL MEDICINE

## 2020-04-18 PROCEDURE — 25000003 PHARM REV CODE 250: Performed by: FAMILY MEDICINE

## 2020-04-18 PROCEDURE — 25000003 PHARM REV CODE 250: Performed by: HOSPITALIST

## 2020-04-18 PROCEDURE — 63600175 PHARM REV CODE 636 W HCPCS: Performed by: HOSPITALIST

## 2020-04-18 PROCEDURE — 11000001 HC ACUTE MED/SURG PRIVATE ROOM

## 2020-04-18 RX ORDER — TALC
3 POWDER (GRAM) TOPICAL NIGHTLY PRN
Status: DISCONTINUED | OUTPATIENT
Start: 2020-04-18 | End: 2020-04-22 | Stop reason: HOSPADM

## 2020-04-18 RX ADMIN — CITALOPRAM 10 MG: 10 TABLET, FILM COATED ORAL at 09:04

## 2020-04-18 RX ADMIN — ENOXAPARIN SODIUM 40 MG: 100 INJECTION SUBCUTANEOUS at 05:04

## 2020-04-18 RX ADMIN — Medication 3 MG: at 08:04

## 2020-04-18 RX ADMIN — POLYETHYLENE GLYCOL 3350 17 G: 17 POWDER, FOR SOLUTION ORAL at 09:04

## 2020-04-18 RX ADMIN — Medication 250 MG: at 09:04

## 2020-04-18 RX ADMIN — TAMSULOSIN HYDROCHLORIDE 0.4 MG: 0.4 CAPSULE ORAL at 09:04

## 2020-04-18 RX ADMIN — VITAMIN D, TAB 1000IU (100/BT) 1000 UNITS: 25 TAB at 08:04

## 2020-04-18 RX ADMIN — Medication 250 MG: at 08:04

## 2020-04-18 RX ADMIN — SENNOSIDES AND DOCUSATE SODIUM 1 TABLET: 8.6; 5 TABLET ORAL at 09:04

## 2020-04-18 RX ADMIN — FAMOTIDINE 20 MG: 20 TABLET ORAL at 09:04

## 2020-04-18 RX ADMIN — VITAMIN D, TAB 1000IU (100/BT) 1000 UNITS: 25 TAB at 09:04

## 2020-04-18 RX ADMIN — SENNOSIDES AND DOCUSATE SODIUM 1 TABLET: 8.6; 5 TABLET ORAL at 08:04

## 2020-04-18 RX ADMIN — Medication 1 TABLET: at 09:04

## 2020-04-18 NOTE — ASSESSMENT & PLAN NOTE
Patient has not been doing well at home after the death of his wife. Mood may improve once in NH as he requests. Started trial of Citalopram. Will need OP follow up.

## 2020-04-18 NOTE — PLAN OF CARE
Patient is alert/oriented, no report of pain or discomfort. Continues with oxygen at 21.5 l/nc, sats 96-98%. Blood glucose 150 mg/dl at hs, no ss coverage required. Voiding per urinal. Instructed patient to call for assistance, bed alarm on.

## 2020-04-18 NOTE — SUBJECTIVE & OBJECTIVE
Interval History: Reports coughing with SOB. Denies any chest pain/LE edema. No acute overnight events.    Review of Systems   Constitutional: Negative for diaphoresis and fever.   HENT: Negative for dental problem and nosebleeds.    Eyes: Negative for visual disturbance.   Respiratory: Positive for cough and shortness of breath.    Cardiovascular: Negative for chest pain.   Gastrointestinal: Negative for nausea and vomiting.   Psychiatric/Behavioral: Negative for agitation.     Objective:     Vital Signs (Most Recent):  Temp: 97.9 °F (36.6 °C) (04/18/20 1100)  Pulse: 90 (04/18/20 1100)  Resp: 20 (04/18/20 1100)  BP: (!) 144/80 (04/18/20 1100)  SpO2: 95 % (04/18/20 1100) Vital Signs (24h Range):  Temp:  [97.8 °F (36.6 °C)-98.5 °F (36.9 °C)] 97.9 °F (36.6 °C)  Pulse:  [] 90  Resp:  [18-20] 20  SpO2:  [94 %-98 %] 95 %  BP: (126-145)/(72-90) 144/80     Weight: 74 kg (163 lb 2.3 oz)  Body mass index is 23.41 kg/m².    Intake/Output Summary (Last 24 hours) at 4/18/2020 1537  Last data filed at 4/18/2020 1200  Gross per 24 hour   Intake 720 ml   Output 1200 ml   Net -480 ml      Physical Exam   Constitutional: He is oriented to person, place, and time.   HENT:   Head: Normocephalic.   Eyes: EOM are normal.   Neck: Normal range of motion. Neck supple.   Cardiovascular: Normal rate and regular rhythm.   Pulmonary/Chest:   Diminished breath sounds   Abdominal: Soft. Bowel sounds are normal.   Neurological: He is alert and oriented to person, place, and time.   Psychiatric: He has a normal mood and affect.       Significant Labs:   CBC:   Recent Labs   Lab 04/17/20  0342   WBC 4.73   HGB 10.5*   HCT 33.3*   *     CMP:   Recent Labs   Lab 04/17/20  0342      K 4.3      CO2 28   *   BUN 9   CREATININE 1.0   CALCIUM 9.2   PROT 6.9   ALBUMIN 2.4*   BILITOT 0.2   ALKPHOS 42*   AST 31   ALT 36   ANIONGAP 8   EGFRNONAA >60     All pertinent labs within the past 24 hours have been  reviewed.    Significant Imaging: I have reviewed and interpreted all pertinent imaging results/findings within the past 24 hours.

## 2020-04-18 NOTE — NURSING
Pt free from falls/injury. No complaints of pain. Remains on 1.5L O2. Pt currently refusing SCDs. Isolation precautions maintained. Call light in reach. Will continue to monitor.

## 2020-04-18 NOTE — PROGRESS NOTES
"[facility]  Progress Note    Patient Name: Peter Lopez  MRN: [id number]  Patient Class: IP- Inpatient   Admission Date: 4/3/2020  Length of Stay: 15 days  Attending Physician: Jesus Stone MD  Primary Care Provider: Lucas Lloyd Jr, MD        Subjective:     Principal Problem:COVID-19 virus infection        HPI:  Per Dr. Mike Guerrero:    "Pt is a 83 yo M with a h/o NIDDM2, HTN, chronic HCV, and recent COVID-19 who presents to the ER for worsening weakness, persistent fevers, and dyspnea.  Pt was brought by EMS who found him with an oxygen saturation of 78%.  Pt was placed on non-rebreather and sat's did come up to 98%.  He was on nasal canula prior to arrival but the tubing was long per reports and he was noted to be tachypneic and tachycardic on it.  Pt could speak only a few sentences due to the dyspnea.  Of note patient was on antibiotics as outpatient and was compliant.  He does have a productive cough, decreased po intake, and subjective fevers."    Overview/Hospital Course:  Patient is a 84 year-old man with hypertension, diabetes mellitus type II, chronic hepatitis C admitted to the hospital for treatment of acute hypoxic respiratory failure secondary to COVID-19 infection. Patient admitted to the intensive care unit and treated with non-rebreather mask. Patient improved and he was weaned to Venti Mask and was step down to the floor. Patient's oxygen requirements continues to improve and now weaned down to NC. He is a candidate for the [facility], once transfer approved, since he is able to perform ADLs (including ambulating to the bathroom). Pt's daughter concerned that he is too sick to go home since he lives alone.      Interval History: Reports coughing with SOB. Denies any chest pain/LE edema. No acute overnight events. I spoke to the patient's daughter on 04/18 to give her an update on his case.    Review of Systems   Constitutional: Negative for diaphoresis and " fever.   HENT: Negative for dental problem and nosebleeds.    Eyes: Negative for visual disturbance.   Respiratory: Positive for cough and shortness of breath.    Cardiovascular: Negative for chest pain.   Gastrointestinal: Negative for nausea and vomiting.   Psychiatric/Behavioral: Negative for agitation.     Objective:     Vital Signs (Most Recent):  Temp: 97.9 °F (36.6 °C) (04/18/20 1100)  Pulse: 90 (04/18/20 1100)  Resp: 20 (04/18/20 1100)  BP: (!) 144/80 (04/18/20 1100)  SpO2: 95 % (04/18/20 1100) Vital Signs (24h Range):  Temp:  [97.8 °F (36.6 °C)-98.5 °F (36.9 °C)] 97.9 °F (36.6 °C)  Pulse:  [] 90  Resp:  [18-20] 20  SpO2:  [94 %-98 %] 95 %  BP: (126-145)/(72-90) 144/80     Weight: 74 kg (163 lb 2.3 oz)  Body mass index is 23.41 kg/m².    Intake/Output Summary (Last 24 hours) at 4/18/2020 1537  Last data filed at 4/18/2020 1200  Gross per 24 hour   Intake 720 ml   Output 1200 ml   Net -480 ml      Physical Exam   Constitutional: He is oriented to person, place, and time.   HENT:   Head: Normocephalic.   Eyes: EOM are normal.   Neck: Normal range of motion. Neck supple.   Cardiovascular: Normal rate and regular rhythm.   Pulmonary/Chest:   Diminished breath sounds   Abdominal: Soft. Bowel sounds are normal.   Neurological: He is alert and oriented to person, place, and time.   Psychiatric: He has a normal mood and affect.       Significant Labs:   CBC:   Recent Labs   Lab 04/17/20  0342   WBC 4.73   HGB 10.5*   HCT 33.3*   *     CMP:   Recent Labs   Lab 04/17/20  0342      K 4.3      CO2 28   *   BUN 9   CREATININE 1.0   CALCIUM 9.2   PROT 6.9   ALBUMIN 2.4*   BILITOT 0.2   ALKPHOS 42*   AST 31   ALT 36   ANIONGAP 8   EGFRNONAA >60     All pertinent labs within the past 24 hours have been reviewed.    Significant Imaging: I have reviewed and interpreted all pertinent imaging results/findings within the past 24 hours.      Assessment/Plan:      * COVID-19 virus infection  -  COVID-19 testing Collection Date: 3/28/2020 Collection Time:   5:55 PM  - Infection Control notified    - pt showed improvement but on 4/15 was noted to get worse with increasing oxygen demand and CXR showing progression of COVID-19 disease.      - Isolation:   - Airborne, Contact and Droplet Precautions  - Cohort patients into COVID units  - N95 masks must be fit tested, wear eye protection  - 20 second hand hygiene              - Limit visitors per hospital policy              - Consolidating lab draws, nursing care, provider visits, and interventions    - Diagnostics: (leukopenia, hyponatremia, hyperferritinemia, elevated troponin, elevated d-dimer, age, and comorbidities are significant predictors of poor clinical outcome)  CBC, CMP, Ferritin, CRP, LDH and Portable CXR    - Management:  Supplemental O2 to maintain SpO2 >92%  Continuous/intermittent Pulse Ox  Albuterol treatment PRN   Patient completed course of azithromycin & plaquenil        Essential hypertension  Normotensive.  Holding home blood pressure medications (olmesartan and hydrochlorothiazide).  Continue to monitor.    Type 2 diabetes mellitus with diabetic polyneuropathy, without long-term current use of insulin  A1C 6.4.  Holding metformin. Patient with reasonable glycemic control. Continue with diabetic diet, accuchecks, and SSI    Reactive depression  Patient has not been doing well at home after the death of his wife. Mood may improve once in NH as he requests. Started trial of Citalopram. Will need OP follow up.        Discharge planning issues  Patient with bereavement from the loss of his wife last year. Has some support from family at home but inadequate to keep up with all ADLs and a household.     Pt awaiting discharge to [facility] when center accepting new transfers.  Daughter feels patient too ill to return home.        VTE Risk Mitigation (From admission, onward)         Ordered     enoxaparin injection 40 mg  Daily       04/07/20 1045     IP VTE HIGH RISK PATIENT  Once      04/03/20 2051     Place sequential compression device  Until discontinued      04/03/20 2051     Place DARIEL hose  Until discontinued      04/03/20 2051                      Jesus Stone MD  Department of Hospital Medicine   [facility]

## 2020-04-19 LAB
ALBUMIN SERPL BCP-MCNC: 2.4 G/DL (ref 3.5–5.2)
ALP SERPL-CCNC: 38 U/L (ref 55–135)
ALT SERPL W/O P-5'-P-CCNC: 33 U/L (ref 10–44)
ANION GAP SERPL CALC-SCNC: 9 MMOL/L (ref 8–16)
AST SERPL-CCNC: 27 U/L (ref 10–40)
BASOPHILS # BLD AUTO: 0.03 K/UL (ref 0–0.2)
BASOPHILS NFR BLD: 0.7 % (ref 0–1.9)
BILIRUB SERPL-MCNC: 0.2 MG/DL (ref 0.1–1)
BUN SERPL-MCNC: 12 MG/DL (ref 8–23)
CALCIUM SERPL-MCNC: 9 MG/DL (ref 8.7–10.5)
CHLORIDE SERPL-SCNC: 100 MMOL/L (ref 95–110)
CO2 SERPL-SCNC: 28 MMOL/L (ref 23–29)
CREAT SERPL-MCNC: 1 MG/DL (ref 0.5–1.4)
CRP SERPL-MCNC: 12.4 MG/L (ref 0–8.2)
DIFFERENTIAL METHOD: ABNORMAL
EOSINOPHIL # BLD AUTO: 0.1 K/UL (ref 0–0.5)
EOSINOPHIL NFR BLD: 2.8 % (ref 0–8)
ERYTHROCYTE [DISTWIDTH] IN BLOOD BY AUTOMATED COUNT: 13.7 % (ref 11.5–14.5)
EST. GFR  (AFRICAN AMERICAN): >60 ML/MIN/1.73 M^2
EST. GFR  (NON AFRICAN AMERICAN): >60 ML/MIN/1.73 M^2
GLUCOSE SERPL-MCNC: 121 MG/DL (ref 70–110)
HCT VFR BLD AUTO: 33.5 % (ref 40–54)
HGB BLD-MCNC: 10.6 G/DL (ref 14–18)
IMM GRANULOCYTES # BLD AUTO: 0.01 K/UL (ref 0–0.04)
IMM GRANULOCYTES NFR BLD AUTO: 0.2 % (ref 0–0.5)
LYMPHOCYTES # BLD AUTO: 1.3 K/UL (ref 1–4.8)
LYMPHOCYTES NFR BLD: 29.9 % (ref 18–48)
MCH RBC QN AUTO: 28.2 PG (ref 27–31)
MCHC RBC AUTO-ENTMCNC: 31.6 G/DL (ref 32–36)
MCV RBC AUTO: 89 FL (ref 82–98)
MONOCYTES # BLD AUTO: 0.4 K/UL (ref 0.3–1)
MONOCYTES NFR BLD: 8 % (ref 4–15)
NEUTROPHILS # BLD AUTO: 2.5 K/UL (ref 1.8–7.7)
NEUTROPHILS NFR BLD: 58.4 % (ref 38–73)
NRBC BLD-RTO: 0 /100 WBC
PLATELET # BLD AUTO: 506 K/UL (ref 150–350)
PMV BLD AUTO: 8.6 FL (ref 9.2–12.9)
POCT GLUCOSE: 114 MG/DL (ref 70–110)
POCT GLUCOSE: 121 MG/DL (ref 70–110)
POCT GLUCOSE: 137 MG/DL (ref 70–110)
POCT GLUCOSE: 218 MG/DL (ref 70–110)
POTASSIUM SERPL-SCNC: 4.4 MMOL/L (ref 3.5–5.1)
PROT SERPL-MCNC: 6.6 G/DL (ref 6–8.4)
RBC # BLD AUTO: 3.76 M/UL (ref 4.6–6.2)
SODIUM SERPL-SCNC: 137 MMOL/L (ref 136–145)
WBC # BLD AUTO: 4.35 K/UL (ref 3.9–12.7)

## 2020-04-19 PROCEDURE — 94761 N-INVAS EAR/PLS OXIMETRY MLT: CPT

## 2020-04-19 PROCEDURE — 25000003 PHARM REV CODE 250: Performed by: FAMILY MEDICINE

## 2020-04-19 PROCEDURE — 85025 COMPLETE CBC W/AUTO DIFF WBC: CPT

## 2020-04-19 PROCEDURE — 86140 C-REACTIVE PROTEIN: CPT

## 2020-04-19 PROCEDURE — 25000003 PHARM REV CODE 250: Performed by: HOSPITALIST

## 2020-04-19 PROCEDURE — 63600175 PHARM REV CODE 636 W HCPCS: Performed by: HOSPITALIST

## 2020-04-19 PROCEDURE — 36415 COLL VENOUS BLD VENIPUNCTURE: CPT

## 2020-04-19 PROCEDURE — 11000001 HC ACUTE MED/SURG PRIVATE ROOM

## 2020-04-19 PROCEDURE — 97110 THERAPEUTIC EXERCISES: CPT | Mod: CQ

## 2020-04-19 PROCEDURE — 97116 GAIT TRAINING THERAPY: CPT | Mod: CQ

## 2020-04-19 PROCEDURE — 80053 COMPREHEN METABOLIC PANEL: CPT

## 2020-04-19 PROCEDURE — 25000003 PHARM REV CODE 250: Performed by: INTERNAL MEDICINE

## 2020-04-19 RX ADMIN — Medication 1 TABLET: at 08:04

## 2020-04-19 RX ADMIN — VITAMIN D, TAB 1000IU (100/BT) 1000 UNITS: 25 TAB at 08:04

## 2020-04-19 RX ADMIN — INSULIN ASPART 2 UNITS: 100 INJECTION, SOLUTION INTRAVENOUS; SUBCUTANEOUS at 04:04

## 2020-04-19 RX ADMIN — Medication 250 MG: at 08:04

## 2020-04-19 RX ADMIN — TAMSULOSIN HYDROCHLORIDE 0.4 MG: 0.4 CAPSULE ORAL at 08:04

## 2020-04-19 RX ADMIN — FAMOTIDINE 20 MG: 20 TABLET ORAL at 08:04

## 2020-04-19 RX ADMIN — SENNOSIDES AND DOCUSATE SODIUM 1 TABLET: 8.6; 5 TABLET ORAL at 08:04

## 2020-04-19 RX ADMIN — POLYETHYLENE GLYCOL 3350 17 G: 17 POWDER, FOR SOLUTION ORAL at 08:04

## 2020-04-19 RX ADMIN — ENOXAPARIN SODIUM 40 MG: 100 INJECTION SUBCUTANEOUS at 04:04

## 2020-04-19 RX ADMIN — CITALOPRAM 10 MG: 10 TABLET, FILM COATED ORAL at 08:04

## 2020-04-19 NOTE — SUBJECTIVE & OBJECTIVE
Interval History: Reports coughing with SOB. Denies any chest pain/LE edema. No acute overnight events.    Review of Systems   Constitutional: Negative for diaphoresis and fever.   HENT: Negative for dental problem and nosebleeds.    Eyes: Negative for visual disturbance.   Respiratory: Positive for cough and shortness of breath.    Cardiovascular: Negative for chest pain.   Gastrointestinal: Negative for nausea and vomiting.   Psychiatric/Behavioral: Negative for agitation.     Objective:     Vital Signs (Most Recent):  Temp: 97.5 °F (36.4 °C) (04/19/20 1126)  Pulse: 86 (04/19/20 1126)  Resp: 19 (04/19/20 1126)  BP: (!) 149/83 (04/19/20 1126)  SpO2: 95 % (04/19/20 1126) Vital Signs (24h Range):  Temp:  [97.5 °F (36.4 °C)-98.2 °F (36.8 °C)] 97.5 °F (36.4 °C)  Pulse:  [86-96] 86  Resp:  [16-19] 19  SpO2:  [94 %-98 %] 95 %  BP: (129-149)/(73-83) 149/83     Weight: 74 kg (163 lb 2.3 oz)  Body mass index is 23.41 kg/m².    Intake/Output Summary (Last 24 hours) at 4/19/2020 1318  Last data filed at 4/19/2020 1100  Gross per 24 hour   Intake 240 ml   Output 800 ml   Net -560 ml      Physical Exam   Constitutional: He is oriented to person, place, and time.   HENT:   Head: Normocephalic.   Eyes: EOM are normal.   Neck: Normal range of motion. Neck supple.   Cardiovascular: Normal rate and regular rhythm.   Pulmonary/Chest:   Diminished breath sounds   Abdominal: Soft. Bowel sounds are normal.   Neurological: He is alert and oriented to person, place, and time.   Psychiatric: He has a normal mood and affect.       Significant Labs:   CBC:   Recent Labs   Lab 04/19/20  0505   WBC 4.35   HGB 10.6*   HCT 33.5*   *     CMP:   Recent Labs   Lab 04/19/20  0505      K 4.4      CO2 28   *   BUN 12   CREATININE 1.0   CALCIUM 9.0   PROT 6.6   ALBUMIN 2.4*   BILITOT 0.2   ALKPHOS 38*   AST 27   ALT 33   ANIONGAP 9   EGFRNONAA >60     All pertinent labs within the past 24 hours have been  reviewed.    Significant Imaging: I have reviewed and interpreted all pertinent imaging results/findings within the past 24 hours.

## 2020-04-19 NOTE — PLAN OF CARE
Patient alert and oriented, no report or pain or discomfort. Blood glucose 135 mg/dl, no insulin required. Good appetite, voiding per urinal. Medicated with melatonin for sleep, oxygen at 2l/nc, sats 97-98%.

## 2020-04-19 NOTE — PROGRESS NOTES
"[facility] [place] [facility]  Progress Note    Patient Name: Peter Lopez  MRN: [id number]  Patient Class: IP- Inpatient   Admission Date: 4/3/2020  Length of Stay: 16 days  Attending Physician: Jesus Stone MD  Primary Care Provider: Lucas Lloyd Jr, MD        Subjective:     Principal Problem:COVID-19 virus infection        HPI:  Per Dr. Mike Guerrero:    "Pt is a 85 yo M with a h/o NIDDM2, HTN, chronic HCV, and recent COVID-19 who presents to the ER for worsening weakness, persistent fevers, and dyspnea.  Pt was brought by EMS who found him with an oxygen saturation of 78%.  Pt was placed on non-rebreather and sat's did come up to 98%.  He was on nasal canula prior to arrival but the tubing was long per reports and he was noted to be tachypneic and tachycardic on it.  Pt could speak only a few sentences due to the dyspnea.  Of note patient was on antibiotics as outpatient and was compliant.  He does have a productive cough, decreased po intake, and subjective fevers."    Overview/Hospital Course:  Patient is a 84 year-old man with hypertension, diabetes mellitus type II, chronic hepatitis C admitted to the hospital for treatment of acute hypoxic respiratory failure secondary to COVID-19 infection. Patient admitted to the intensive care unit and treated with non-rebreather mask. Patient improved and he was weaned to Venti Mask and was step down to the floor. Patient's oxygen requirements continues to improve and now weaned down to NC. He is a candidate for the [facility], once transfer approved, since he is able to perform ADLs (including ambulating to the bathroom). Pt's daughter concerned that he is too sick to go home since he lives alone.      Interval History: Reports coughing with SOB. Denies any chest pain/LE edema. No acute overnight events. I speak to the patient's daughter daily to give her updates on his case.    Review of Systems   Constitutional: Negative for diaphoresis and " fever.   HENT: Negative for dental problem and nosebleeds.    Eyes: Negative for visual disturbance.   Respiratory: Positive for cough and shortness of breath.    Cardiovascular: Negative for chest pain.   Gastrointestinal: Negative for nausea and vomiting.   Psychiatric/Behavioral: Negative for agitation.     Objective:     Vital Signs (Most Recent):  Temp: 97.5 °F (36.4 °C) (04/19/20 1126)  Pulse: 86 (04/19/20 1126)  Resp: 19 (04/19/20 1126)  BP: (!) 149/83 (04/19/20 1126)  SpO2: 95 % (04/19/20 1126) Vital Signs (24h Range):  Temp:  [97.5 °F (36.4 °C)-98.2 °F (36.8 °C)] 97.5 °F (36.4 °C)  Pulse:  [86-96] 86  Resp:  [16-19] 19  SpO2:  [94 %-98 %] 95 %  BP: (129-149)/(73-83) 149/83     Weight: 74 kg (163 lb 2.3 oz)  Body mass index is 23.41 kg/m².    Intake/Output Summary (Last 24 hours) at 4/19/2020 1318  Last data filed at 4/19/2020 1100  Gross per 24 hour   Intake 240 ml   Output 800 ml   Net -560 ml      Physical Exam   Constitutional: He is oriented to person, place, and time.   HENT:   Head: Normocephalic.   Eyes: EOM are normal.   Neck: Normal range of motion. Neck supple.   Cardiovascular: Normal rate and regular rhythm.   Pulmonary/Chest:   Diminished breath sounds   Abdominal: Soft. Bowel sounds are normal.   Neurological: He is alert and oriented to person, place, and time.   Psychiatric: He has a normal mood and affect.       Significant Labs:   CBC:   Recent Labs   Lab 04/19/20  0505   WBC 4.35   HGB 10.6*   HCT 33.5*   *     CMP:   Recent Labs   Lab 04/19/20  0505      K 4.4      CO2 28   *   BUN 12   CREATININE 1.0   CALCIUM 9.0   PROT 6.6   ALBUMIN 2.4*   BILITOT 0.2   ALKPHOS 38*   AST 27   ALT 33   ANIONGAP 9   EGFRNONAA >60     All pertinent labs within the past 24 hours have been reviewed.    Significant Imaging: I have reviewed and interpreted all pertinent imaging results/findings within the past 24 hours.      Assessment/Plan:      * COVID-19 virus infection  -  COVID-19 testing Collection Date: 3/28/2020 Collection Time:   5:55 PM  - Infection Control notified    - pt showed improvement but on 4/15 was noted to get worse with increasing oxygen demand and CXR showing progression of COVID-19 disease.      - Isolation:   - Airborne, Contact and Droplet Precautions  - Cohort patients into COVID units  - N95 masks must be fit tested, wear eye protection  - 20 second hand hygiene              - Limit visitors per hospital policy              - Consolidating lab draws, nursing care, provider visits, and interventions    - Diagnostics: (leukopenia, hyponatremia, hyperferritinemia, elevated troponin, elevated d-dimer, age, and comorbidities are significant predictors of poor clinical outcome)  CBC, CMP, Ferritin, CRP, LDH and Portable CXR    - Management:  Supplemental O2 to maintain SpO2 >92%  Continuous/intermittent Pulse Ox  Albuterol treatment PRN   Patient completed course of azithromycin & plaquenil    Essential hypertension  Normotensive.  Holding home blood pressure medications (olmesartan and hydrochlorothiazide).  Continue to monitor.    Type 2 diabetes mellitus with diabetic polyneuropathy, without long-term current use of insulin  A1C 6.4.  Holding metformin. Patient with reasonable glycemic control. Continue with diabetic diet, accuchecks, and SSI    Reactive depression  Patient has not been doing well at home after the death of his wife. Mood may improve once in NH as he requests. Started trial of Citalopram. Will need OP follow up.     Discharge planning issues  Patient with bereavement from the loss of his wife last year. Has some support from family at home but inadequate to keep up with all ADLs and a household.     Pt awaiting discharge to [facility] when center accepting new transfers.  Daughter feels patient too ill to return home.        VTE Risk Mitigation (From admission, onward)         Ordered     enoxaparin injection 40 mg  Daily      04/07/20 1801      IP VTE HIGH RISK PATIENT  Once      04/03/20 2051     Place sequential compression device  Until discontinued      04/03/20 2051     Place DARIEL hose  Until discontinued      04/03/20 2051                      Jesus Stone MD  Department of Hospital Medicine   [facility]

## 2020-04-19 NOTE — PLAN OF CARE
Problem: Fall Injury Risk  Goal: Absence of Fall and Fall-Related Injury  Outcome: Ongoing, Progressing  Intervention: Identify and Manage Contributors to Fall Injury Risk  Flowsheets (Taken 4/19/2020 1515)  Self-Care Promotion: independence encouraged; BADL personal objects within reach; meal setup provided  Medication Review/Management: medications reviewed     Problem: Diabetes Comorbidity  Goal: Blood Glucose Level Within Desired Range  Outcome: Ongoing, Progressing  Intervention: Maintain Glycemic Control  Flowsheets (Taken 4/19/2020 1515)  Glycemic Management: blood glucose monitoring     Problem: Infection  Goal: Infection Symptom Resolution  Outcome: Ongoing, Progressing  Intervention: Prevent or Manage Infection  Flowsheets (Taken 4/19/2020 1515)  Fever Reduction/Comfort Measures: medication administered; fluid intake increased  Infection Management: aseptic technique maintained  Isolation Precautions: airborne precautions maintained; contact precautions maintained; droplet precautions maintained     Problem: ARDS (Acute Respiratory Distress Syndrome)  Goal: Effective Oxygenation  Outcome: Ongoing, Progressing  Intervention: Optimize Oxygenation, Ventilation and Perfusion  Flowsheets (Taken 4/19/2020 1515)  Airway/Ventilation Management: humidification applied      Pt AAOx4, VSS, maintains SPO2 95% on 2L NC with humidification, continuous pulse ox monitoring, denies pain, remains free of fall, urinal within reach - voids without difficulty, airborne/ droplet/ contact precaution maintained. Daughter (Dianelys) updated of pt's plan of care.

## 2020-04-19 NOTE — PLAN OF CARE
Patient supervision for bed mobility and SBA for sit to stand transfers. Gait x 5 feet x 2 trials using rolling walker with CGA and cueing for safety and sequencing.

## 2020-04-19 NOTE — ASSESSMENT & PLAN NOTE
Patient with bereavement from the loss of his wife last year. Has some support from family at home but inadequate to keep up with all ADLs and a household.     Pt awaiting discharge to [facility] when center accepting new transfers.  Daughter feels patient too ill to return home.

## 2020-04-19 NOTE — PT/OT/SLP PROGRESS
Physical Therapy Treatment    Patient Name:  Peter Lopez   MRN:  [id number]    Recommendations:     Discharge Recommendations:  home health PT   Discharge Equipment Recommendations: bedside commode, shower chair, walker, rolling   Barriers to discharge: Decreased caregiver support    Assessment:     Peter Lopez is a 84 y.o. male admitted with a medical diagnosis of COVID-19 virus infection.  He presents with the following impairments/functional limitations:  weakness, impaired endurance, impaired sensation, decreased lower extremity function, decreased upper extremity function, impaired balance, decreased safety awareness, impaired cardiopulmonary response to activity, decreased ROM Patient supervision for bed mobility and SBA for sit to stand transfers. Gait x 5 feet x 2 trials using rolling walker with CGA and cueing for safety and sequencing. .    Rehab Prognosis: Good; patient would benefit from acute skilled PT services to address these deficits and reach maximum level of function.    Recent Surgery: * No surgery found *      Plan:     During this hospitalization, patient to be seen 5 x/week(5-6x a week) to address the identified rehab impairments via gait training, therapeutic activities, therapeutic exercises and progress toward the following goals:    · Plan of Care Expires:  04/27/20    Subjective     Chief Complaint: feeling dizzy once sitting up   Patient/Family Comments/goals: To go home  Pain/Comfort:  · Pain Rating 1: 0/10  · Pain Rating Post-Intervention 1: 0/10      Objective:     Communicated with nurse Prado prior to session.  Patient found supine with pulse ox (continuous), oxygen, telemetry upon PT entry to room.     General Precautions: Standard, airborne, fall, respiratory, contact   Orthopedic Precautions:N/A   Braces: N/A     Functional Mobility:  · Bed Mobility:     · Rolling Right: supervision  · Bridging: supervision  · Supine to Sit: stand by assistance  · Sit to Supine: stand by  assistance  · Transfers:     · Sit to Stand:  stand by assistance with rolling walker  · Gait:  Gait x 15 feet x 2 trials using rolling walker with CGA and cueing for safety and sequencing.       AM-PAC 6 CLICK MOBILITY  Turning over in bed (including adjusting bedclothes, sheets and blankets)?: 4  Sitting down on and standing up from a chair with arms (e.g., wheelchair, bedside commode, etc.): 4  Moving from lying on back to sitting on the side of the bed?: 3  Moving to and from a bed to a chair (including a wheelchair)?: 3  Need to walk in hospital room?: 3  Climbing 3-5 steps with a railing?: 3  Basic Mobility Total Score: 20       Therapeutic Activities and Exercises:   Patient performed seated bilateral lower extremity therapeutic exercises including heel toes raises, long arc quads, hip flexion (seated marching) 2 x 10 reps each with cueing for proper form /tehcnique     Patient left supine with all lines intact, call button in reach and bed alarm on..    GOALS:   Multidisciplinary Problems     Physical Therapy Goals        Problem: Physical Therapy Goal    Goal Priority Disciplines Outcome Goal Variances Interventions   Physical Therapy Goal     PT, PT/OT Ongoing, Progressing     Description:  Goals to be met by: 2020     Patient will increase functional independence with mobility by performin. Supine to sit with Modified [place]  2. Sit to stand transfer with Modified [place]  3. Gait  x 25-5- feet with Modified [place] using Rolling Walker.   4. Lower extremity exercise program x10 reps per handout, with supervision                      Time Tracking:     PT Received On: 20  PT Start Time: 0152     PT Stop Time: 0217  PT Total Time (min): 25 min     Billable Minutes: Gait Training 9 and Therapeutic Exercise 16    Treatment Type: Treatment  PT/PTA: PTA     PTA Visit Number: 5     Lois Willett, ELMA  2020

## 2020-04-20 LAB
POCT GLUCOSE: 123 MG/DL (ref 70–110)
POCT GLUCOSE: 133 MG/DL (ref 70–110)
POCT GLUCOSE: 168 MG/DL (ref 70–110)
POCT GLUCOSE: 183 MG/DL (ref 70–110)

## 2020-04-20 PROCEDURE — 25000003 PHARM REV CODE 250: Performed by: HOSPITALIST

## 2020-04-20 PROCEDURE — 97530 THERAPEUTIC ACTIVITIES: CPT | Mod: CO

## 2020-04-20 PROCEDURE — 97116 GAIT TRAINING THERAPY: CPT

## 2020-04-20 PROCEDURE — 97803 MED NUTRITION INDIV SUBSEQ: CPT

## 2020-04-20 PROCEDURE — 25000003 PHARM REV CODE 250: Performed by: FAMILY MEDICINE

## 2020-04-20 PROCEDURE — 11000001 HC ACUTE MED/SURG PRIVATE ROOM

## 2020-04-20 PROCEDURE — 63600175 PHARM REV CODE 636 W HCPCS: Performed by: HOSPITALIST

## 2020-04-20 PROCEDURE — 94761 N-INVAS EAR/PLS OXIMETRY MLT: CPT

## 2020-04-20 PROCEDURE — 25000003 PHARM REV CODE 250: Performed by: INTERNAL MEDICINE

## 2020-04-20 PROCEDURE — 27000221 HC OXYGEN, UP TO 24 HOURS

## 2020-04-20 RX ADMIN — SENNOSIDES AND DOCUSATE SODIUM 1 TABLET: 8.6; 5 TABLET ORAL at 08:04

## 2020-04-20 RX ADMIN — ENOXAPARIN SODIUM 40 MG: 100 INJECTION SUBCUTANEOUS at 05:04

## 2020-04-20 RX ADMIN — FAMOTIDINE 20 MG: 20 TABLET ORAL at 08:04

## 2020-04-20 RX ADMIN — POLYETHYLENE GLYCOL 3350 17 G: 17 POWDER, FOR SOLUTION ORAL at 08:04

## 2020-04-20 RX ADMIN — Medication 1 TABLET: at 08:04

## 2020-04-20 RX ADMIN — CITALOPRAM 10 MG: 10 TABLET, FILM COATED ORAL at 08:04

## 2020-04-20 RX ADMIN — Medication 250 MG: at 09:04

## 2020-04-20 RX ADMIN — Medication 250 MG: at 08:04

## 2020-04-20 RX ADMIN — VITAMIN D, TAB 1000IU (100/BT) 1000 UNITS: 25 TAB at 08:04

## 2020-04-20 RX ADMIN — TAMSULOSIN HYDROCHLORIDE 0.4 MG: 0.4 CAPSULE ORAL at 08:04

## 2020-04-20 NOTE — PLAN OF CARE
Problem: Physical Therapy Goal  Goal: Physical Therapy Goal  Description  Goals to be met by: 2020     Patient will increase functional independence with mobility by performin. Supine to sit with Modified [place]  2. Sit to stand transfer with Modified [place]  3. Gait  x 25-50- feet with Modified [place] using Rolling Walker.   4. Lower extremity exercise program x10 reps per handout, with supervision      Outcome: Ongoing, Progressing   Pt limited by decreased motivation and SPO2 on RA at rest 85%, SPO2 on 2L with ambulation 86%.  Continue with POC.  Pt may need SNF as he is not able to safely return home alone at this time   NSTEMI (non-ST elevated myocardial infarction) yes

## 2020-04-20 NOTE — ASSESSMENT & PLAN NOTE
Patient has not been doing well at home after the death of his wife. Trial of Citalopram started. Will need OP follow up.

## 2020-04-20 NOTE — PT/OT/SLP PROGRESS
Physical Therapy Treatment    Patient Name:  Peter Lopez   MRN:  [id number]    Recommendations:     Discharge Recommendations:  (Pt may need SNF as he is not safe to return home alone at this time)   Discharge Equipment Recommendations: bedside commode, walker, rolling, shower chair   Barriers to discharge: Decreased caregiver support and SPO2 decreased to 86% on 2L O2 with ambulation    Assessment:     Peter Lopez is a 84 y.o. male admitted with a medical diagnosis of COVID-19 virus infection.  He presents with the following impairments/functional limitations:  weakness, impaired endurance, impaired self care skills, impaired balance, decreased safety awareness, impaired functional mobilty, impaired coordination, gait instability, impaired cardiopulmonary response to activity decreased motivation.    Rehab Prognosis: Good; patient would benefit from acute skilled PT services to address these deficits and reach maximum level of function.    Recent Surgery: * No surgery found *      Plan:     During this hospitalization, patient to be seen (5-6x/wk) to address the identified rehab impairments via gait training, therapeutic activities, therapeutic exercises and progress toward the following goals:    · Plan of Care Expires:  04/27/20    Subjective     Chief Complaint: Pt frustrated with discharge plans  Patient/Family Comments/goals: Pt states that he cannot go home by himself.  Pt is very withdrawn, delayed responses, decreased motivation, at times agitated    Pain/Comfort:  Pain Rating 1: 0/10      Objective:     Communicated with nsg prior to session.  Patient found HOB elevated with pulse ox (continuous), telemetry(Nursing had turned off O2 for RA trial.  Pt satting at 85% on RA and Pt placed back on 2L O2 by RUTLEDGE with nursing approval) upon PT entry to room.     General Precautions: Standard, airborne, droplet, fall, respiratory, special contact   Orthopedic Precautions:N/A   Braces: N/A     Functional Mobility:  · Bed  Mobility:     · Scooting: modified independence  · Supine to Sit: modified independence  · Transfers:     · Sit to Stand:  contact guard assistance and with VC/TC for forward weight shift over LUDMILA and hand placement/safety with rolling walker  · Gait: Gait training approx 20' with RW and SBA/CGA with VC/TC for increased trunk extension and walker management/safety.  Pt ambulates with max decreased josias, indicating increased fall risk.  · Balance: Fair+ sit, Fair+/Fair stand      AM-PAC 6 CLICK MOBILITY  Turning over in bed (including adjusting bedclothes, sheets and blankets)?: 4  Sitting down on and standing up from a chair with arms (e.g., wheelchair, bedside commode, etc.): 3  Moving from lying on back to sitting on the side of the bed?: 4  Moving to and from a bed to a chair (including a wheelchair)?: 3  Need to walk in hospital room?: 3  Climbing 3-5 steps with a railing?: 3  Basic Mobility Total Score: 20       Therapeutic Activities and Exercises:   SPO2 on 2L O2 at rest 92%, decreased to 86% with ambulation.  Educated patient on pursed lip breathing 2/2 decreased SPO2.  Pt able to return demonstration with Max VC.  SPO2 recovered to 93% on 2L O2    Patient left up in chair with all lines intact, call button in reach, RUTLEDGE notified and RUTLEDGE present..    GOALS:   Multidisciplinary Problems     Physical Therapy Goals        Problem: Physical Therapy Goal    Goal Priority Disciplines Outcome Goal Variances Interventions   Physical Therapy Goal     PT, PT/OT Ongoing, Progressing     Description:  Goals to be met by: 2020     Patient will increase functional independence with mobility by performin. Supine to sit with Modified [place]  2. Sit to stand transfer with Modified [place]  3. Gait  x 25-50- feet with Modified [place] using Rolling Walker.   4. Lower extremity exercise program x10 reps per handout, with supervision                       Time Tracking:     PT Received On:  04/20/20  PT Start Time: 1052     PT Stop Time: 1102  PT Total Time (min): 10 min     Billable Minutes: Gait Training 10 RUTLEDGE present    Treatment Type: Treatment  PT/PTA: PT     PTA Visit Number: 0     Mali Howe, PT  04/20/2020

## 2020-04-20 NOTE — PROGRESS NOTES
"[facility] Hospital Medicine  Progress Note    Patient Name: Peter Lopez  MRN: [id number]  Patient Class: IP- Inpatient   Admission Date: 4/3/2020  Length of Stay: 17 days  Attending Physician: Jesus Stone MD  Primary Care Provider: Lucas Lloyd Jr, MD        Subjective:     Principal Problem:COVID-19 virus infection    HPI:  Per Dr. Mike Guerrero:    "Pt is a 83 yo M with a h/o NIDDM2, HTN, chronic HCV, and recent COVID-19 who presents to the ER for worsening weakness, persistent fevers, and dyspnea.  Pt was brought by EMS who found him with an oxygen saturation of 78%.  Pt was placed on non-rebreather and sat's did come up to 98%.  He was on nasal canula prior to arrival but the tubing was long per reports and he was noted to be tachypneic and tachycardic on it.  Pt could speak only a few sentences due to the dyspnea.  Of note patient was on antibiotics as outpatient and was compliant.  He does have a productive cough, decreased po intake, and subjective fevers."    Overview/Hospital Course:  Patient is a 84 year-old man with hypertension, diabetes mellitus type II, chronic hepatitis C admitted to the hospital for treatment of acute hypoxic respiratory failure secondary to COVID-19 infection. Patient admitted to the intensive care unit and treated with non-rebreather mask. Patient improved and he was weaned to Venti Mask and was step down to the floor. Patient's oxygen requirements continues to improve and now weaned down to NC. Pt's daughter concerned that he is too sick to go home since he lives alone.      Interval History:   Saturating well on 2L NC. No issues overnight. Patient without complaints. Limited conversation as patient is very brief with his answers. Does want me to contact his daughter and keep her updated though.     Review of Systems   Constitutional: Negative for activity change.   Respiratory: Negative for shortness of breath.    Gastrointestinal: Negative for abdominal pain, " nausea and vomiting.     Objective:     Vital Signs (Most Recent):  Temp: 98.5 °F (36.9 °C) (04/20/20 0737)  Pulse: 83 (04/20/20 0737)  Resp: 17 (04/20/20 0737)  BP: (!) 143/81 (04/20/20 0737)  SpO2: 97 % (04/20/20 0737) Vital Signs (24h Range):  Temp:  [97.5 °F (36.4 °C)-98.8 °F (37.1 °C)] 98.5 °F (36.9 °C)  Pulse:  [83-96] 83  Resp:  [16-20] 17  SpO2:  [95 %-97 %] 97 %  BP: (124-157)/(64-83) 143/81     Weight: 74 kg (163 lb 2.3 oz)  Body mass index is 23.41 kg/m².    Intake/Output Summary (Last 24 hours) at 4/20/2020 0990  Last data filed at 4/20/2020 0827  Gross per 24 hour   Intake 600 ml   Output 1250 ml   Net -650 ml      Physical Exam   Constitutional: He appears well-developed and well-nourished. He appears distressed.   Pulmonary/Chest: Effort normal. No respiratory distress.   Abdominal: He exhibits no distension.   Musculoskeletal: He exhibits no edema.       Significant Labs:   Recent Results (from the past 24 hour(s))   POCT glucose    Collection Time: 04/19/20 11:28 AM   Result Value Ref Range    POCT Glucose 114 (H) 70 - 110 mg/dL   POCT glucose    Collection Time: 04/19/20  4:36 PM   Result Value Ref Range    POCT Glucose 218 (H) 70 - 110 mg/dL   POCT glucose    Collection Time: 04/19/20  8:47 PM   Result Value Ref Range    POCT Glucose 137 (H) 70 - 110 mg/dL   POCT glucose    Collection Time: 04/20/20  7:38 AM   Result Value Ref Range    POCT Glucose 123 (H) 70 - 110 mg/dL       Assessment/Plan:      * COVID-19 virus infection  Improving.   Saturating well on 2L NC. Check 02 on room air with rest and exertion today.  PT recommends HHPT per yesterdays note. Will discuss with daughter and try to determine a safe plan of discharge in the next day or 2.  ADDENDUM: PT/OT recommending SNF which seems to be appropriate. Discussed with daughter today and patient would have no possible support at home during his recovery.    Reactive depression  Patient has not been doing well at home after the death of his  wife. Trial of Citalopram started. Will need OP follow up.     Type 2 diabetes mellitus with diabetic polyneuropathy, without long-term current use of insulin  Holding home metformin.   Needed 2U correction insulin in past 24 hrs. Continue to monitor for now.    Essential hypertension  Normotensive. Holding home antihypertensives for now.      VTE Risk Mitigation (From admission, onward)         Ordered     enoxaparin injection 40 mg  Daily      04/07/20 1045     IP VTE HIGH RISK PATIENT  Once      04/03/20 2051     Place sequential compression device  Until discontinued      04/03/20 2051     Place DARIEL hose  Until discontinued      04/03/20 2051                      Bret Sims MD  Department of Hospital Medicine   [facility]

## 2020-04-20 NOTE — PT/OT/SLP PROGRESS
Occupational Therapy   Treatment    Name: Peter Lopez  MRN: [id number]  Admitting Diagnosis:  COVID-19 virus infection       Recommendations:     Discharge Recommendations: SNF per supervising OT Zofia Blel. Pt is not able to return home safely at this time.   Discharge Equipment Recommendations:  bedside commode, shower chair, walker, rolling  Barriers to discharge:       Assessment:     Peter Lopez is a 84 y.o. male with a medical diagnosis of COVID-19 virus infection.  He presents with the following performance deficits affecting function: weakness, impaired endurance, impaired self care skills, gait instability, impaired functional mobilty, impaired balance, decreased coordination, decreased upper extremity function, decreased lower extremity function, decreased safety awareness, impaired cardiopulmonary response to activity.  Pt demos decreased motivation/initiation to perform therapy tasks. Pt appears withdrawn with flat affect, max cues to engage in every day tasks.  Pt with decreased SPO2 on RA at rest 85% and 86% on 2L with exertion. Pt will benefit from continued OT services to address functional deficits and maximize level of functional independence prior to d/c.    Rehab Prognosis:  Good; patient would benefit from acute skilled OT services to address these deficits and reach maximum level of function.       Plan:     Patient to be seen 3 x/week, 5 x/week to address the above listed problems via self-care/home management, therapeutic activities, therapeutic exercises  · Plan of Care Expires: 05/13/20  · Plan of Care Reviewed with: patient    Subjective     Pain/Comfort:  · Pain Rating 1: 0/10    Objective:     Communicated with: Nurse Lashonda prior to session.  Patient found HOB elevated with pulse ox (continuous), telemetry upon OT entry to room.    General Precautions: Standard, airborne, contact, fall, droplet, respiratory (COVID+)  Orthopedic Precautions:N/A   Braces: N/A     Occupational Performance: Pt  found on RA, SPO2 at rest 85%. Pt placed back on 2L O2 and able to increase to 96%. Pt 92% with bed mobility and decreased to 86% with ambulation. HR ~127 BPM with exertion. Pt required increased time, seated rest, and max cues for breathing techniques to recover to 93%. Pts nurse notified.    Bed Mobility:    · Patient completed Scooting/Bridging with supervision  · Patient completed Supine to Sit with supervision     Functional Mobility/Transfers:  · Patient completed Sit <> Stand Transfer with contact guard assistance  with  rolling walker   · Functional Mobility: Pt ambulates in room with SBA-CGA and RW, max cues for technique, safety , and to keep head up.    Activities of Daily Living:  · Grooming: stand by assistance seated at sink to wash face and hands with washcloth. Pt declined oral care.   · Upper Body Dressing: minimum assistance to marilu back gown seated EOB  · Toileting: modified independence to use urinal       [facility] 6 Click ADL: 18    Treatment & Education:   Pt performed bed mobility, transfers, and ADL's as noted above.   Pt educated on proper upright positioning throughout day to allow increased oxygen into lungs--importance of OOB/EOB activity to maximize recovery, especially for all meals.   Pt educated on PLB technique with exertion, max cues for implementation throughout session.   BUE AROM performed utilizing slow pace, breathing techniques, and between rest for recovery. Pt perform 1x10 reps shoulder flex/ext, forward punches, elbow flex/ext, and fist pumps with verbal cues.     Patient left up in chair with all lines intact, call button in reach and nurse presentEducation:  . Pt instructed to call for nursing assist when ready to get back to bed. Pt verbalizes understanding.    GOALS:   Multidisciplinary Problems     Occupational Therapy Goals        Problem: Occupational Therapy Goal    Goal Priority Disciplines Outcome Interventions   Occupational Therapy Goal     OT, PT/OT Ongoing,  Progressing    Description:  Goals to be met by: 5/13/20     Patient will increase functional independence with ADLs by performing:    UE Dressing with Modified [place] and Supervision.  LE Dressing with Modified [place] and Supervision.  Grooming while standing at sink with Modified [place] and Supervision.  Supine to sit with Modified [place] and Supervision.  Step transfer with Modified [place] and Supervision  Toilet transfer to toilet with Modified [place] and Supervision.  Upper extremity exercise program x10 reps per handout, with independence.  Educate the patient re: Home Safety and Energy Conservation                      Time Tracking:     OT Date of Treatment: 04/20/20  OT Start Time: 1052  OT Stop Time: 1116  OT Total Time (min): 24 min    Billable Minutes:Therapeutic Activity 14 (PT present)    JAMESON Cox  4/20/2020

## 2020-04-20 NOTE — PLAN OF CARE
Recommendations  1. Continue diabetic diet, encourage PO intake.  2. Weigh pt weekly.     Goals: Consume >/=75% of meals  Nutrition Goal Status: goal met  Communication of RD Recs: (POC)

## 2020-04-20 NOTE — ASSESSMENT & PLAN NOTE
Holding home metformin.   Needed 2U correction insulin in past 24 hrs. Continue to monitor for now.

## 2020-04-20 NOTE — PLAN OF CARE
Problem: Occupational Therapy Goal  Goal: Occupational Therapy Goal  Description  Goals to be met by: 5/13/20     Patient will increase functional independence with ADLs by performing:    UE Dressing with Modified [place] and Supervision.  LE Dressing with Modified [place] and Supervision.  Grooming while standing at sink with Modified [place] and Supervision.  Supine to sit with Modified [place] and Supervision.  Step transfer with Modified [place] and Supervision  Toilet transfer to toilet with Modified [place] and Supervision.  Upper extremity exercise program x10 reps per handout, with independence.  Educate the patient re: Home Safety and Energy Conservation     Outcome: Ongoing, Progressing   Pt demos decreased motivation/initiation to perform therapy tasks. Pt appears withdrawn with flat affect, max cues to engage in every day tasks.  Pt with decreased SPO2 on RA at rest 85% and 86% on 2L with exertion. Pt will benefit from continued OT services to address functional deficits and maximize level of functional independence prior to d/c.

## 2020-04-20 NOTE — NURSING
Pt sitting up in bed.  Alert and oriented.  O2 titrated to room air from 2L.  On RA, oxygen sat at 92%.  Will continue to monitor.

## 2020-04-20 NOTE — SUBJECTIVE & OBJECTIVE
Interval History:   Saturating well on 2L NC. No issues overnight. Patient without complaints. Limited conversation as patient is very brief with his answers. Does want me to contact his daughter and keep her updated though.     Review of Systems   Constitutional: Negative for activity change.   Respiratory: Negative for shortness of breath.    Gastrointestinal: Negative for abdominal pain, nausea and vomiting.     Objective:     Vital Signs (Most Recent):  Temp: 98.5 °F (36.9 °C) (04/20/20 0737)  Pulse: 83 (04/20/20 0737)  Resp: 17 (04/20/20 0737)  BP: (!) 143/81 (04/20/20 0737)  SpO2: 97 % (04/20/20 0737) Vital Signs (24h Range):  Temp:  [97.5 °F (36.4 °C)-98.8 °F (37.1 °C)] 98.5 °F (36.9 °C)  Pulse:  [83-96] 83  Resp:  [16-20] 17  SpO2:  [95 %-97 %] 97 %  BP: (124-157)/(64-83) 143/81     Weight: 74 kg (163 lb 2.3 oz)  Body mass index is 23.41 kg/m².    Intake/Output Summary (Last 24 hours) at 4/20/2020 0945  Last data filed at 4/20/2020 0827  Gross per 24 hour   Intake 600 ml   Output 1250 ml   Net -650 ml      Physical Exam   Constitutional: He appears well-developed and well-nourished. He appears distressed.   Pulmonary/Chest: Effort normal. No respiratory distress.   Abdominal: He exhibits no distension.   Musculoskeletal: He exhibits no edema.       Significant Labs:   Recent Results (from the past 24 hour(s))   POCT glucose    Collection Time: 04/19/20 11:28 AM   Result Value Ref Range    POCT Glucose 114 (H) 70 - 110 mg/dL   POCT glucose    Collection Time: 04/19/20  4:36 PM   Result Value Ref Range    POCT Glucose 218 (H) 70 - 110 mg/dL   POCT glucose    Collection Time: 04/19/20  8:47 PM   Result Value Ref Range    POCT Glucose 137 (H) 70 - 110 mg/dL   POCT glucose    Collection Time: 04/20/20  7:38 AM   Result Value Ref Range    POCT Glucose 123 (H) 70 - 110 mg/dL

## 2020-04-20 NOTE — ASSESSMENT & PLAN NOTE
Improving.   Saturating well on 2L NC. Check 02 on room air with rest and exertion today.  PT recommends HHPT per yesterdays note. Will discuss with daughter and try to determine a safe plan of discharge in the next day or 2.

## 2020-04-20 NOTE — PROGRESS NOTES
"[facility]  Adult Nutrition  Progress Note    SUMMARY       Recommendations  1. Continue diabetic diet, encourage PO intake.  2. Weigh pt weekly.     Goals: Consume >/=75% of meals  Nutrition Goal Status: goal met  Communication of RD Recs: (POC)    Reason for Assessment    Reason For Assessment: RD follow-up  Diagnosis: pulmonary disease(COVID-19)  Relevant Medical History: Hep C, DM type 1 HTN, retinopathy  Interdisciplinary Rounds: did not attend    General Information Comments: Pt on 2L NC for respiratory support. Pt on diabetic diet, intake %. Last BM . No reports of N/V/diarrhea/constipation. No difficulty chewing and swallowing. May d/c to [facility]. NFPE not performed at this time d/t COVID-19 precautions will reassess at a later date.     Nutrition Discharge Planning: Diabetic diet    Nutrition Risk Screen    Nutrition Risk Screen: no indicators present    Nutrition/Diet History    Spiritual, Cultural Beliefs, Sikh Practices, Values that Affect Care: no  Food Allergies: NKFA  Factors Affecting Nutritional Intake: None identified at this time    Anthropometrics    Temp: 98.5 °F (36.9 °C)  Height Method: Stated  Height: 5' 10" (177.8 cm)  Height (inches): 70 in  Weight Method: Bed Scale  Weight: 74 kg (163 lb 2.3 oz)  Weight (lb): 163.14 lb  Ideal Body Weight (IBW), Male: 166 lb  % Ideal Body Weight, Male (lb): 98.28 %  BMI (Calculated): 23.4  BMI Grade: 18.5-24.9 - normal  Usual Body Weight (UBW), k.5 kg(20)  % Usual Body Weight: 93.28  % Weight Change From Usual Weight: -6.92 %     Lab/Procedures/Meds    Pertinent Labs Reviewed: reviewed  Pertinent Labs Comments: Glu 121, Alkphos 38, Alb 2.2 CRP 12.4  Pertinent Medications Reviewed: reviewed  Pertinent Medications Comments: Vit C, MVI, senna, Vit D    Estimated/Assessed Needs    Weight Used For Calorie Calculations: 74 kg (163 lb 2.3 oz)  Energy Calorie Requirements (kcal): 1850- 2200 kcal (25-30 " kcal/kg)  Energy Need Method: Kcal/kg  Protein Requirements: 60- 75g (0.8 -1.0 g/kg)  Weight Used For Protein Calculations: 74 kg (163 lb 2.3 oz)  Fluid Requirements (mL): 1850 ml or per MD  Estimated Fluid Requirement Method: RDA Method  RDA Method (mL): 1850  CHO Requirement: 230 g daily    Nutrition Prescription Ordered    Current Diet Order: Diabetic diet  Nutrition Order Comments: ordered 4/3    Evaluation of Received Nutrient/Fluid Intake    I/O: -890  Energy Calories Required: meeting needs  Protein Required: meeting needs  Fluid Required: meeting needs  Comments: Last BM 4/18  Tolerance: tolerating  % Intake of Estimated Energy Needs: 75 - 100 %  % Meal Intake: 75 - 100 %    Nutrition Risk    Level of Risk/Frequency of Follow-up: (1x/week)     Assessment and Plan    Nutrition Problem  Elevated glucose labs     Related to (etiology):   Type 1 DM     Signs and Symptoms (as evidenced by):   Glu 126     Interventions(treatment strategy):  1. Carbohydrate modified diet  2. Collaboration with other providers     Nutrition Diagnosis Status:   Continues    Monitor and Evaluation    Food and Nutrient Intake: energy intake, food and beverage intake  Food and Nutrient Adminstration: diet order  Anthropometric Measurements: weight, weight change  Biochemical Data, Medical Tests and Procedures: glucose/endocrine profile  Nutrition-Focused Physical Findings: overall appearance, skin     Malnutrition Assessment     Skin (Micronutrient): (Henrik=18)  Teeth (Micronutrient): (WDL)    Weight Loss (Malnutrition): 5% in 1 month(7% in 2 months)   Subcutaneous Fat Loss (Final Summary): well nourished  Muscle Loss Evaluation (Final Summary): well nourished       Nutrition Follow-Up    RD Follow-up?: Yes

## 2020-04-20 NOTE — NURSING
Spoke with pt's daughter, Dianelys, with an update on status and vital signs.  She had questions about the discharge process.  She would like case management to reach out to her sister, Mckinley, with information about discharge prior to RL being discharged.

## 2020-04-21 LAB
POCT GLUCOSE: 122 MG/DL (ref 70–110)
POCT GLUCOSE: 132 MG/DL (ref 70–110)
POCT GLUCOSE: 175 MG/DL (ref 70–110)
POCT GLUCOSE: 183 MG/DL (ref 70–110)

## 2020-04-21 PROCEDURE — 97116 GAIT TRAINING THERAPY: CPT | Mod: CQ

## 2020-04-21 PROCEDURE — 97110 THERAPEUTIC EXERCISES: CPT | Mod: CO

## 2020-04-21 PROCEDURE — 97535 SELF CARE MNGMENT TRAINING: CPT | Mod: CO

## 2020-04-21 PROCEDURE — 25000003 PHARM REV CODE 250: Performed by: FAMILY MEDICINE

## 2020-04-21 PROCEDURE — 11000001 HC ACUTE MED/SURG PRIVATE ROOM

## 2020-04-21 PROCEDURE — 25000003 PHARM REV CODE 250: Performed by: INTERNAL MEDICINE

## 2020-04-21 PROCEDURE — 97110 THERAPEUTIC EXERCISES: CPT | Mod: CQ

## 2020-04-21 PROCEDURE — 97530 THERAPEUTIC ACTIVITIES: CPT | Mod: CQ

## 2020-04-21 PROCEDURE — 25000003 PHARM REV CODE 250: Performed by: HOSPITALIST

## 2020-04-21 PROCEDURE — 63600175 PHARM REV CODE 636 W HCPCS: Performed by: HOSPITALIST

## 2020-04-21 RX ADMIN — TAMSULOSIN HYDROCHLORIDE 0.4 MG: 0.4 CAPSULE ORAL at 09:04

## 2020-04-21 RX ADMIN — Medication 1 TABLET: at 09:04

## 2020-04-21 RX ADMIN — Medication 250 MG: at 09:04

## 2020-04-21 RX ADMIN — VITAMIN D, TAB 1000IU (100/BT) 1000 UNITS: 25 TAB at 09:04

## 2020-04-21 RX ADMIN — ENOXAPARIN SODIUM 40 MG: 100 INJECTION SUBCUTANEOUS at 05:04

## 2020-04-21 RX ADMIN — POLYETHYLENE GLYCOL 3350 17 G: 17 POWDER, FOR SOLUTION ORAL at 09:04

## 2020-04-21 RX ADMIN — FAMOTIDINE 20 MG: 20 TABLET ORAL at 09:04

## 2020-04-21 RX ADMIN — SENNOSIDES AND DOCUSATE SODIUM 1 TABLET: 8.6; 5 TABLET ORAL at 09:04

## 2020-04-21 RX ADMIN — CITALOPRAM 10 MG: 10 TABLET, FILM COATED ORAL at 09:04

## 2020-04-21 NOTE — PLAN OF CARE
[facility] received notification via [employer] that patient was accepted to [facility]; [facility] contacted admissions; stated that patient can not admit today but they are able to accept in the AM; MD notified and made aware    [facility] called; request for Form 142 faxed to [facility]; pending receipt; willupload via [employer] [facility] once received         04/21/20 0636   Post-Acute Status   Post-Acute Authorization Placement   Post-Acute Placement Status Set-up Complete  ([facility])   Discharge Delays None known at this time   Discharge Plan   Discharge Plan A Skilled Nursing Facility

## 2020-04-21 NOTE — PROGRESS NOTES
"[facility] Hospital Medicine  Progress Note    Patient Name: ePter Lopez  MRN: [id number]  Patient Class: IP- Inpatient   Admission Date: 4/3/2020  Length of Stay: 18 days  Attending Physician: Jesus Stone MD  Primary Care Provider: Lucas Lloyd Jr, MD      Subjective:     Principal Problem:COVID-19 virus infection    HPI:  Per Dr. Mike Guerrero:    "Pt is a 83 yo M with a h/o NIDDM2, HTN, chronic HCV, and recent COVID-19 who presents to the ER for worsening weakness, persistent fevers, and dyspnea.  Pt was brought by EMS who found him with an oxygen saturation of 78%.  Pt was placed on non-rebreather and sat's did come up to 98%.  He was on nasal canula prior to arrival but the tubing was long per reports and he was noted to be tachypneic and tachycardic on it.  Pt could speak only a few sentences due to the dyspnea.  Of note patient was on antibiotics as outpatient and was compliant.  He does have a productive cough, decreased po intake, and subjective fevers."    Overview/Hospital Course:  Patient is a 84 year-old man with hypertension, diabetes mellitus type II, chronic hepatitis C admitted to the hospital for treatment of acute hypoxic respiratory failure secondary to COVID-19 infection. Patient admitted to the intensive care unit and treated with non-rebreather mask. Patient improved and he was weaned to Venti Mask and was stepped down to the floor. Patient's oxygen requirements continue to improve and now weaned down to NC. Pt's daughter concerned that he is too sick to go home since he lives alone. PT/OT recommending SNF placement.    Interval History:   Continues to saturate well on 2L NC. Did drop to 85% on room air with exertion yesterday. Pt without complaint today. PT/OT recommended SNF yesterday.     Review of Systems   Constitutional: Negative for appetite change.   Respiratory: Negative for cough and shortness of breath.    Cardiovascular: Negative for chest pain.   Gastrointestinal: " Negative for nausea and vomiting.     Objective:     Vital Signs (Most Recent):  Temp: 97.7 °F (36.5 °C) (04/21/20 0715)  Pulse: 97 (04/21/20 0715)  Resp: 18 (04/21/20 0715)  BP: (!) 157/84 (04/21/20 0715)  SpO2: 96 % (04/21/20 0715) Vital Signs (24h Range):  Temp:  [97.7 °F (36.5 °C)-98.8 °F (37.1 °C)] 97.7 °F (36.5 °C)  Pulse:  [88-99] 97  Resp:  [16-18] 18  SpO2:  [94 %-98 %] 96 %  BP: (129-160)/(75-84) 157/84     Weight: 74 kg (163 lb 2.3 oz)  Body mass index is 23.41 kg/m².    Intake/Output Summary (Last 24 hours) at 4/21/2020 0948  Last data filed at 4/21/2020 0548  Gross per 24 hour   Intake 480 ml   Output 750 ml   Net -270 ml      Physical Exam   Constitutional: He is oriented to person, place, and time. He appears well-developed and well-nourished. No distress.   Pulmonary/Chest: Effort normal. No respiratory distress.   Abdominal: Soft. He exhibits no distension. There is no tenderness.   Musculoskeletal: He exhibits no edema.   Neurological: He is alert and oriented to person, place, and time.       Significant Labs:   Recent Results (from the past 24 hour(s))   POCT glucose    Collection Time: 04/20/20 11:26 AM   Result Value Ref Range    POCT Glucose 133 (H) 70 - 110 mg/dL   POCT glucose    Collection Time: 04/20/20  4:50 PM   Result Value Ref Range    POCT Glucose 183 (H) 70 - 110 mg/dL   POCT glucose    Collection Time: 04/20/20  7:57 PM   Result Value Ref Range    POCT Glucose 168 (H) 70 - 110 mg/dL   POCT glucose    Collection Time: 04/21/20  8:17 AM   Result Value Ref Range    POCT Glucose 183 (H) 70 - 110 mg/dL       Assessment/Plan:      * COVID-19 virus infection  Improving. Needed NRB early in admission.  Saturating well on 2L NC. Desats on room air with exertion as of yesterday.  PT/OT recc for SNF. Placement pending.    Discharge planning issues  Patient with bereavement from the loss of his wife last year. Has some support from family at home but inadequate to keep up with all ADLs and a  household. Daughter feels patient too ill to return home. Has become progressively weaker since initial COVID diagnosis. Previously discharged and went back to ER twice.  SNF appropriate. Placement pending.  SSRI trial started previously, will need outpatient f/u.    Type 2 diabetes mellitus with diabetic polyneuropathy, without long-term current use of insulin  Holding home metformin.   Controlled with correction insulin prn. Continue to monitor and adjust as needed.    Essential hypertension  Normotensive. Holding home antihypertensives for now.      VTE Risk Mitigation (From admission, onward)         Ordered     enoxaparin injection 40 mg  Daily      04/07/20 1045     IP VTE HIGH RISK PATIENT  Once      04/03/20 2051     Place sequential compression device  Until discontinued      04/03/20 2051     Place DARIEL hose  Until discontinued      04/03/20 2051                      Bret Sims MD  Department of Hospital Medicine   [facility]

## 2020-04-21 NOTE — PLAN OF CARE
Problem: Physical Therapy Goal  Goal: Physical Therapy Goal  Description  Goals to be met by: 2020     Patient will increase functional independence with mobility by performin. Supine to sit with Modified [place]  2. Sit to stand transfer with Modified [place]  3. Gait  x 25-50- feet with Modified [place] using Rolling Walker.   4. Lower extremity exercise program x10 reps per handout, with supervision      Outcome: Ongoing, Progressing   Pt ambulated ~ 20 ft with RW, CGA(2L O2NC). Noted with SpO2 decreased to 88% , required VC's for pursed lip breathing technique.

## 2020-04-21 NOTE — ASSESSMENT & PLAN NOTE
Patient with bereavement from the loss of his wife last year. Has some support from family at home but inadequate to keep up with all ADLs and a household. Daughter feels patient too ill to return home. Has become progressively weaker since initial COVID diagnosis. Previously discharged and went back to ER twice.  SNF appropriate. Placement pending.

## 2020-04-21 NOTE — PLAN OF CARE
Pt remains free form falls and pressure injuries,able to make needs known,david meds well,abx remains in progress,no s/s adverse reaction noted,fluid and food intake fair,O2 per nasal cannula  in use with continuous pulse monitoring,safety, maintain continue monitoring.   Problem: Fall Injury Risk  Goal: Absence of Fall and Fall-Related Injury  Outcome: Ongoing, Progressing  Intervention: Identify and Manage Contributors to Fall Injury Risk  Flowsheets (Taken 4/21/2020 1532)  Self-Care Promotion: independence encouraged; BADL personal objects within reach; meal setup provided  Medication Review/Management: medications reviewed; high risk medications identified     Problem: Adult Inpatient Plan of Care  Goal: Plan of Care Review  Outcome: Ongoing, Progressing  Flowsheets (Taken 4/21/2020 1532)  Plan of Care Reviewed With: patient  Goal: Patient-Specific Goal (Individualization)  Outcome: Ongoing, Progressing  Goal: Absence of Hospital-Acquired Illness or Injury  Outcome: Ongoing, Progressing  Intervention: Identify and Manage Fall Risk  Flowsheets (Taken 4/21/2020 1532)  Safety Promotion/Fall Prevention: assistive device/personal item within reach; bed alarm set; medications reviewed; room near unit station; nonskid shoes/socks when out of bed; instructed to call staff for mobility; side rails raised x 2  Goal: Optimal Comfort and Wellbeing  Outcome: Ongoing, Progressing  Goal: Readiness for Transition of Care  Outcome: Ongoing, Progressing  Goal: Rounds/Family Conference  Outcome: Ongoing, Progressing     Problem: Skin Injury Risk Increased  Goal: Skin Health and Integrity  Outcome: Ongoing, Progressing  Intervention: Optimize Skin Protection  Flowsheets (Taken 4/21/2020 1532)  Pressure Reduction Techniques: frequent weight shift encouraged; weight shift assistance provided  Skin Protection: tubing/devices free from skin contact  Head of Bed (HOB): HOB at 30-45 degrees     Problem: Diabetes Comorbidity  Goal: Blood  Glucose Level Within Desired Range  Outcome: Ongoing, Progressing  Intervention: Maintain Glycemic Control  Flowsheets (Taken 4/21/2020 1532)  Glycemic Management: blood glucose monitoring     Problem: Infection  Goal: Infection Symptom Resolution  Outcome: Ongoing, Progressing  Intervention: Prevent or Manage Infection  Flowsheets (Taken 4/21/2020 1532)  Fever Reduction/Comfort Measures: medication administered  Infection Management: aseptic technique maintained  Isolation Precautions: airborne precautions maintained; droplet precautions maintained; contact precautions maintained     Problem: ARDS (Acute Respiratory Distress Syndrome)  Goal: Effective Oxygenation  Outcome: Ongoing, Progressing

## 2020-04-21 NOTE — PT/OT/SLP PROGRESS
Occupational Therapy   Treatment    Name: Peter Lopez  MRN: [id number]  Admitting Diagnosis:  COVID-19 virus infection       Recommendations:     Discharge Recommendations: home health OT(if patient does not progress will benefit from SNF)  Discharge Equipment Recommendations:  bedside commode, shower chair, walker, rolling  Barriers to discharge:       Assessment:     Peter Lopez is a 84 y.o. male with a medical diagnosis of COVID-19 virus infection.  He presents with the following performance deficits affecting function: weakness, impaired endurance, impaired self care skills, impaired functional mobilty, gait instability, impaired balance, decreased upper extremity function, decreased lower extremity function, decreased safety awareness, impaired cardiopulmonary response to activity.   Pt in better spirits with increased motivation today. Pt tolerated standing at sink briefly for hand hygiene, however, limited 2* decreased SPO2 to 84% on 2L O2. Pt is very deconditioned from COVID-19 and will benefit from continued OT services to address functional deficits and maximize level of functional independence prior to d/c.    Rehab Prognosis:  Good; patient would benefit from acute skilled OT services to address these deficits and reach maximum level of function.       Plan:     Patient to be seen 3 x/week, 5 x/week to address the above listed problems via self-care/home management, therapeutic activities, therapeutic exercises  · Plan of Care Expires: 05/13/20  · Plan of Care Reviewed with: patient    Subjective   Pt agreeable to therapy.  Pt reports ambulating to restroom with nursing today.  Pain/Comfort:  · Pain Rating 1: 0/10    Objective:     Communicated with: Nurse Sheriff prior to session.  Patient found HOB elevated with pulse ox (continuous), telemetry upon OT entry to room.    General Precautions: Standard, airborne, contact, fall, droplet, respiratory (COVID-19+)  Orthopedic Precautions:N/A   Braces: N/A      Occupational Performance: Pt on 2L O2, SPO2 96% at rest, decreased to 84% and  bpm with functional mobility and standing tasks. Pt able to recover to 93% with long seated rest break and PLB technique.    Bed Mobility:    · Patient completed Scooting/Bridging with supervision  · Patient completed Supine to Sit with supervision     Functional Mobility/Transfers:  · Patient completed Sit <> Stand Transfer with contact guard assistance  with  rolling walker   · Functional Mobility: Pt able to ambulate bed>sink for ADL's with SBA-CGA and RW. Pt with slow mobility, required VC's for upright posture.     Activities of Daily Living:  · Grooming: stand by assistance to perform hand hygiene and wash face with washcloth while standing at sink, SBA-CGA for dynamic standing balance during task  · Toileting: contact guard assistance for standing balance to place/use urinal, unilateral support on RW. No LOB      [facility] 6 Click ADL: 18    Treatment & Education:  · Pt performed bed mobility, functional transfers, and ADL's as noted above.  · Pt tolerated dynamic standing balance during ADL tasks with SBA-CGA.   · Pt required cues throughout session to implement PLB technique.   · BUE AROM performed (one UE at a time) x 10 reps shoulder flex/ext, elbow flex/ext, forward punches, and forearm pronation/supination between rest. Pt reports compliance with therex.   · Pt edu on OOB activity as tolerated with staff assistance to maximize recovery.  · Pt verbalizes understanding of all education provided.    Patient left up in chair with all lines intact, call button in reach and nurse Sharita notifiedEducation:      GOALS:   Multidisciplinary Problems     Occupational Therapy Goals        Problem: Occupational Therapy Goal    Goal Priority Disciplines Outcome Interventions   Occupational Therapy Goal     OT, PT/OT Ongoing, Progressing    Description:  Goals to be met by: 5/13/20     Patient will increase functional independence  with ADLs by performing:    UE Dressing with Modified [place] and Supervision.  LE Dressing with Modified [place] and Supervision.  Grooming while standing at sink with Modified [place] and Supervision.  Supine to sit with Modified [place] and Supervision.  Step transfer with Modified [place] and Supervision  Toilet transfer to toilet with Modified [place] and Supervision.  Upper extremity exercise program x10 reps per handout, with independence.  Educate the patient re: Home Safety and Energy Conservation                      Time Tracking:     OT Date of Treatment: 04/21/20  OT Start Time: 1442  OT Stop Time: 1508  OT Total Time (min): 26 min    Billable Minutes:Self Care/Home Management 18  Therapeutic Exercise 8    JAMESON Cox  4/21/2020

## 2020-04-21 NOTE — SUBJECTIVE & OBJECTIVE
Interval History:   Continues to saturate well on 2L NC. Did drop to 85% on room air with exertion yesterday. Pt without complaint today. PT/OT recommended SNF yesterday.     Review of Systems   Constitutional: Negative for appetite change.   Respiratory: Negative for cough and shortness of breath.    Cardiovascular: Negative for chest pain.   Gastrointestinal: Negative for nausea and vomiting.     Objective:     Vital Signs (Most Recent):  Temp: 97.7 °F (36.5 °C) (04/21/20 0715)  Pulse: 97 (04/21/20 0715)  Resp: 18 (04/21/20 0715)  BP: (!) 157/84 (04/21/20 0715)  SpO2: 96 % (04/21/20 0715) Vital Signs (24h Range):  Temp:  [97.7 °F (36.5 °C)-98.8 °F (37.1 °C)] 97.7 °F (36.5 °C)  Pulse:  [88-99] 97  Resp:  [16-18] 18  SpO2:  [94 %-98 %] 96 %  BP: (129-160)/(75-84) 157/84     Weight: 74 kg (163 lb 2.3 oz)  Body mass index is 23.41 kg/m².    Intake/Output Summary (Last 24 hours) at 4/21/2020 0948  Last data filed at 4/21/2020 0548  Gross per 24 hour   Intake 480 ml   Output 750 ml   Net -270 ml      Physical Exam   Constitutional: He is oriented to person, place, and time. He appears well-developed and well-nourished. No distress.   Pulmonary/Chest: Effort normal. No respiratory distress.   Abdominal: Soft. He exhibits no distension. There is no tenderness.   Musculoskeletal: He exhibits no edema.   Neurological: He is alert and oriented to person, place, and time.       Significant Labs:   Recent Results (from the past 24 hour(s))   POCT glucose    Collection Time: 04/20/20 11:26 AM   Result Value Ref Range    POCT Glucose 133 (H) 70 - 110 mg/dL   POCT glucose    Collection Time: 04/20/20  4:50 PM   Result Value Ref Range    POCT Glucose 183 (H) 70 - 110 mg/dL   POCT glucose    Collection Time: 04/20/20  7:57 PM   Result Value Ref Range    POCT Glucose 168 (H) 70 - 110 mg/dL   POCT glucose    Collection Time: 04/21/20  8:17 AM   Result Value Ref Range    POCT Glucose 183 (H) 70 - 110 mg/dL

## 2020-04-21 NOTE — PLAN OF CARE
Problem: Occupational Therapy Goal  Goal: Occupational Therapy Goal  Description  Goals to be met by: 5/13/20     Patient will increase functional independence with ADLs by performing:    UE Dressing with Modified [place] and Supervision.  LE Dressing with Modified [place] and Supervision.  Grooming while standing at sink with Modified [place] and Supervision.  Supine to sit with Modified [place] and Supervision.  Step transfer with Modified [place] and Supervision  Toilet transfer to toilet with Modified [place] and Supervision.  Upper extremity exercise program x10 reps per handout, with independence.  Educate the patient re: Home Safety and Energy Conservation     Outcome: Ongoing, Progressing   Pt in better spirits with increased motivation today. Pt tolerated standing at sink briefly for hand hygiene, however, limited 2* decreased SPO2 to 84% on 2L O2. Pt is very deconditioned from COVID-19 and will benefit from continued OT services to address functional deficits and maximize level of functional independence prior to d/c.

## 2020-04-21 NOTE — ASSESSMENT & PLAN NOTE
Holding home metformin.   Controlled with correction insulin prn. Continue to monitor and adjust as needed.

## 2020-04-21 NOTE — ASSESSMENT & PLAN NOTE
Improving. Needed NRB early in admission.  Saturating well on 2L NC. Desats on room air with exertion as of yesterday.  PT/OT recc for SNF. Placement pending.

## 2020-04-21 NOTE — PLAN OF CARE
Patient has been referred to multiple facilities with 0 acceptance; [place] to follow up with a phone call to outstanding reviews; additional referral sent today via [facility]     04/21/20 9973   Post-Acute Status   Post-Acute Authorization Placement   Post-Acute Placement Status Referrals Sent   Discharge Delays None known at this time   Discharge Plan   Discharge Plan A Skilled Nursing Facility   Discharge Plan B New Nursing Home placement - longterm care facility

## 2020-04-21 NOTE — PT/OT/SLP PROGRESS
Physical Therapy Treatment    Patient Name:  Peter Lopez   MRN:  [id number]    Recommendations:     Discharge Recommendations:  (Pt may need SNF as he is not safe to return home alone at this time)   Discharge Equipment Recommendations: bedside commode, walker, rolling, shower chair   Barriers to discharge: Decreased caregiver support    Assessment:     Peter Lopez is a 84 y.o. male admitted with a medical diagnosis of COVID-19 virus infection.  He presents with the following impairments/functional limitations:  weakness, impaired endurance, gait instability, impaired balance, decreased lower extremity function, decreased upper extremity function, decreased safety awareness, impaired cardiopulmonary response to activity, decreased ROM .    Rehab Prognosis: Good; patient would benefit from acute skilled PT services to address these deficits and reach maximum level of function.    Recent Surgery: * No surgery found *      Plan:     During this hospitalization, patient to be seen (5-6x/wk) to address the identified rehab impairments via gait training, therapeutic activities, therapeutic exercises and progress toward the following goals:    · Plan of Care Expires:  04/27/20    Subjective     Chief Complaint: fatigue and BLE weakness  Patient/Family Comments/goals: pt agreeable to therapy treatment   Pain/Comfort:  · Pain Rating 1: 0/10  · Pain Rating Post-Intervention 1: 0/10      Objective:     Communicated with nurse  prior to session.  Patient found up in chair with bed alarm, oxygen, telemetry, pulse ox (continuous) upon PT entry to room.     General Precautions: Standard, contact, airborne, droplet, respiratory, diabetic   Orthopedic Precautions:N/A   Braces: N/A     Functional Mobility:  · Transfers:     · Sit to Stand:  contact guard assistance and minimum assistance with rolling walker  · Toilet Transfer: from chair and toilet seat contact guard assistance with  rolling walker  using  Step Transfer (pt ambulated to the  bathroom)   · Gait: Pt ambulated ~ 20 ft with RW, CGA,  SpO2 decreased to 88% required seated rest break and pursed lip breathing technique, SpO2 recovered to 94% . Pt with max slow josias, decreased step length , noted with R buckled during gait training however no LOB . V/T cues for safety technique and walker management.      · Balance:  Fair       AM-PAC 6 CLICK MOBILITY  Turning over in bed (including adjusting bedclothes, sheets and blankets)?: 4  Sitting down on and standing up from a chair with arms (e.g., wheelchair, bedside commode, etc.): 3  Moving from lying on back to sitting on the side of the bed?: 3  Moving to and from a bed to a chair (including a wheelchair)?: 3  Need to walk in hospital room?: 3  Climbing 3-5 steps with a railing?: 2  Basic Mobility Total Score: 18       Therapeutic Activities and Exercises:   Performed modified 5 sit to stand test from chair with B arm rest and RW , SBA/CGA  , pt able to complete test in 45 seconds , that indicate pt with high fall risks.   Pt performed seated BLE 10 reps :   AP, LAQ, HS,  Hip abd/add with pillow , Hip flexion. V/T's cues for technique and sequence. Pt tolerated well.   Educated pt on safety awareness with all OOB mobility , transfer and gait training.   Pt able to wipe perineum in sitting  with set up assistance and tolerated standing balance for hand hygiene at the sink with CGA.     Patient left up in chair on cushion with all lines intact, call button in reach and nurse notified..    GOALS:   Multidisciplinary Problems     Physical Therapy Goals        Problem: Physical Therapy Goal    Goal Priority Disciplines Outcome Goal Variances Interventions   Physical Therapy Goal     PT, PT/OT Ongoing, Progressing     Description:  Goals to be met by: 2020     Patient will increase functional independence with mobility by performin. Supine to sit with Modified [place]  2. Sit to stand transfer with Modified [place]  3. Gait  x  25-50- feet with Modified [place] using Rolling Walker.   4. Lower extremity exercise program x10 reps per handout, with supervision                       Time Tracking:     PT Received On: 04/21/20  PT Start Time: 1605     PT Stop Time: 1643  PT Total Time (min): 38 min     Billable Minutes: Gait Training 14, Therapeutic Activity 14 and Therapeutic Exercise 10    Treatment Type: Treatment  PT/PTA: PTA     PTA Visit Number: 1     Tram T Talia, PTA  04/21/2020

## 2020-04-22 VITALS
OXYGEN SATURATION: 99 % | BODY MASS INDEX: 23.35 KG/M2 | SYSTOLIC BLOOD PRESSURE: 143 MMHG | RESPIRATION RATE: 19 BRPM | DIASTOLIC BLOOD PRESSURE: 79 MMHG | HEIGHT: 70 IN | TEMPERATURE: 98 F | HEART RATE: 85 BPM | WEIGHT: 163.13 LBS

## 2020-04-22 LAB
POCT GLUCOSE: 115 MG/DL (ref 70–110)
POCT GLUCOSE: 130 MG/DL (ref 70–110)
POCT GLUCOSE: 159 MG/DL (ref 70–110)

## 2020-04-22 PROCEDURE — 97116 GAIT TRAINING THERAPY: CPT | Mod: CQ

## 2020-04-22 PROCEDURE — 25000003 PHARM REV CODE 250: Performed by: FAMILY MEDICINE

## 2020-04-22 PROCEDURE — 97535 SELF CARE MNGMENT TRAINING: CPT | Mod: CO

## 2020-04-22 PROCEDURE — 97110 THERAPEUTIC EXERCISES: CPT | Mod: CQ

## 2020-04-22 PROCEDURE — 30200315 PPD INTRADERMAL TEST REV CODE 302: Performed by: INTERNAL MEDICINE

## 2020-04-22 PROCEDURE — 86580 TB INTRADERMAL TEST: CPT | Performed by: INTERNAL MEDICINE

## 2020-04-22 PROCEDURE — 25000003 PHARM REV CODE 250: Performed by: HOSPITALIST

## 2020-04-22 PROCEDURE — 27000221 HC OXYGEN, UP TO 24 HOURS

## 2020-04-22 PROCEDURE — 25000003 PHARM REV CODE 250: Performed by: INTERNAL MEDICINE

## 2020-04-22 RX ORDER — CHOLECALCIFEROL (VITAMIN D3) 25 MCG
1000 TABLET ORAL 2 TIMES DAILY
Start: 2020-04-22 | End: 2020-06-16

## 2020-04-22 RX ORDER — CITALOPRAM 10 MG/1
10 TABLET ORAL DAILY
Qty: 30 TABLET | Refills: 11 | Status: SHIPPED | OUTPATIENT
Start: 2020-04-23 | End: 2020-06-16

## 2020-04-22 RX ORDER — AMOXICILLIN 250 MG
1 CAPSULE ORAL 2 TIMES DAILY
Start: 2020-04-22 | End: 2020-06-16

## 2020-04-22 RX ADMIN — Medication 250 MG: at 08:04

## 2020-04-22 RX ADMIN — POLYETHYLENE GLYCOL 3350 17 G: 17 POWDER, FOR SOLUTION ORAL at 08:04

## 2020-04-22 RX ADMIN — TAMSULOSIN HYDROCHLORIDE 0.4 MG: 0.4 CAPSULE ORAL at 08:04

## 2020-04-22 RX ADMIN — SENNOSIDES AND DOCUSATE SODIUM 1 TABLET: 8.6; 5 TABLET ORAL at 08:04

## 2020-04-22 RX ADMIN — CITALOPRAM 10 MG: 10 TABLET, FILM COATED ORAL at 08:04

## 2020-04-22 RX ADMIN — TUBERCULIN PURIFIED PROTEIN DERIVATIVE 5 UNITS: 5 INJECTION, SOLUTION INTRADERMAL at 09:04

## 2020-04-22 RX ADMIN — FAMOTIDINE 20 MG: 20 TABLET ORAL at 08:04

## 2020-04-22 RX ADMIN — VITAMIN D, TAB 1000IU (100/BT) 1000 UNITS: 25 TAB at 08:04

## 2020-04-22 RX ADMIN — Medication 1 TABLET: at 08:04

## 2020-04-22 NOTE — PLAN OF CARE
Patient has been accepted to transfer to [facility]; TN spoke with daughter Mckinley who is in agreement; call report information given to nurse, Sharita; report to be called to [phone] and patient is to be transferred to room 19B;     Transportation arranged with a wheelchair van; Nurse Sharita made aware and was informed that all discharge planning needs have been addressed and patient can be discharged from  standpoint       04/22/20 1640   Final Note   Assessment Type Final Discharge Note   Anticipated Discharge Disposition SNF   Right Care Referral Info   Post Acute Recommendation SNF / Sub-Acute Rehab   Facility Name [facility]   Post-Acute Status   Post-Acute Authorization Placement   Post-Acute Placement Status Set-up Complete   Other Status No Post-Acute Service Needs   Discharge Delays None known at this time

## 2020-04-22 NOTE — PT/OT/SLP PROGRESS
"Occupational Therapy   Treatment    Name: Peter Lopez  MRN: [id number]  Admitting Diagnosis:  COVID-19 virus infection       Recommendations:     Discharge Recommendations: SNF  Discharge Equipment Recommendations:  bedside commode, shower chair, walker, rolling  Barriers to discharge:       Assessment:     Peter Lopez is a 84 y.o. male with a medical diagnosis of COVID-19 virus infection.  He presents with the performance deficits affecting function: weakness, impaired endurance, impaired self care skills, impaired functional mobilty, gait instability, impaired balance, decreased coordination, decreased upper extremity function, decreased lower extremity function, decreased safety awareness, impaired cardiopulmonary response to activity. Pt able to ambulate to bathroom today with CGA-close SBA and no AD. Pt very deconditioned from COVID-19, but is making progress toward OT goals. Pt with increased motivation. Pt will benefit from continued OT services to address functional deficits and maximize functional independence prior to d/c home.    Rehab Prognosis:  Good; patient would benefit from acute skilled OT services to address these deficits and reach maximum level of function.       Plan:     Patient to be seen 3 x/week, 5 x/week to address the above listed problems via self-care/home management, therapeutic activities, therapeutic exercises  · Plan of Care Expires: 05/13/20  · Plan of Care Reviewed with: patient    Subjective   Pt agreeable to therapy.   "I have not been using the walker today."  Pain/Comfort:  · Pain Rating 1: 0/10    Objective:     Communicated with: Nurse Sheriff prior to session.  Patient found up in chair with telemetry, pulse ox (continuous), oxygen upon OT entry to room.    General Precautions: Standard, airborne, contact, fall, droplet, respiratory (COVID+)  Orthopedic Precautions:N/A   Braces: N/A     Occupational Performance: Pt on 2L O2 with SPO2 95%, unable to monitor during ambulation 2* " continuous pulse ox malfunctioning. Pt only with minimal c/o of feeling short winded after ambulation to bathroom.    Bed Mobility:    · Patient completed Scooting/Bridging with supervision  · Patient completed Supine to Sit with supervision  · Patient completed Sit to Supine with supervision     Functional Mobility/Transfers:  · Patient completed Sit <> Stand Transfer with stand by assistance and contact guard assistance  with  no assistive device   · Patient completed Bed <> Chair Transfer using Step Transfer technique with stand by assistance and contact guard assistance with no assistive device  · Patient completed Toilet Transfer Step Transfer technique with stand by assistance and contact guard assistance with  no AD  · Functional Mobility: Pt able to ambulate bed<> bathroom with CGA-close SBA and no AD. Pt with slow pace but no LOB.     Activities of Daily Living:  · Grooming: stand by assistance standing at sink for hand hygiene after toileting  · Toileting: supervision for posterior pericare seated on toilet      [facility] 6 Click ADL: 20    Treatment & Education:  · Pt performed bed mobility, functional transfers, and ADL's as note above.  · Pt educated on safety with all OOB activity  · Pt encouraged to continue to perform BUE HEP therex via handout to increase strength and endurance. Pt reports he has been performing throughout day.    Patient left with bed in chair position with all lines intact, call button in reach and nurse Sharita notifiedEducation:      GOALS:   Multidisciplinary Problems     Occupational Therapy Goals        Problem: Occupational Therapy Goal    Goal Priority Disciplines Outcome Interventions   Occupational Therapy Goal     OT, PT/OT Ongoing, Progressing    Description:  Goals to be met by: 5/13/20     Patient will increase functional independence with ADLs by performing:    UE Dressing with Modified [place] and Supervision.  LE Dressing with Modified [place] and  Supervision.  Grooming while standing at sink with Modified [place] and Supervision.  Supine to sit with Modified [place] and Supervision.  Step transfer with Modified [place] and Supervision  Toilet transfer to toilet with Modified [place] and Supervision.  Upper extremity exercise program x10 reps per handout, with independence.  Educate the patient re: Home Safety and Energy Conservation                      Time Tracking:     OT Date of Treatment: 04/22/20  OT Start Time: 1509  OT Stop Time: 1532  OT Total Time (min): 23 min    Billable Minutes:Self Care/Home Management 23    JAMESON Cox  4/22/2020

## 2020-04-22 NOTE — PLAN OF CARE
Problem: Occupational Therapy Goal  Goal: Occupational Therapy Goal  Description  Goals to be met by: 5/13/20     Patient will increase functional independence with ADLs by performing:    UE Dressing with Modified [place] and Supervision.  LE Dressing with Modified [place] and Supervision.  Grooming while standing at sink with Modified [place] and Supervision.  Supine to sit with Modified [place] and Supervision.  Step transfer with Modified [place] and Supervision  Toilet transfer to toilet with Modified [place] and Supervision.  Upper extremity exercise program x10 reps per handout, with independence.  Educate the patient re: Home Safety and Energy Conservation     Outcome: Ongoing, Progressing   Pt able to ambulate to bathroom with CGA and no AD today. Pt continues to progress well toward OT goals. Pt will benefit from continued OT services to address functional deficits and maximize level of functional independence. Continue OT POC as indicated.

## 2020-04-22 NOTE — PROGRESS NOTES
ADT 30 order placed for Van Transportation.      Requested  time: 1730    If transportation does not arrive at ETA time nurse will be instructed to follow protocol for transportation below:   How can I get in touch directly with dispatch, if needed?                 · Non-emergent (Van): [phone]        ++NURSING:  If Van does not arrive at requested time please call the above Non Emergent Dispatcher.  If issue not resolved please escalate to your charge nurse for further instructions.

## 2020-04-22 NOTE — NURSING
Pt tested for home oxygen O2 SATs noted at 89 on RA at rest,O2 SATs 81 on RA with exercise,required O2@ 2L to bring O2 SATs to 94%.

## 2020-04-22 NOTE — PLAN OF CARE
Problem: Physical Therapy Goal  Goal: Physical Therapy Goal  Description  Goals to be met by: 2020     Patient will increase functional independence with mobility by performin. Supine to sit with Modified [place]  2. Sit to stand transfer with Modified [place]  3. Gait  x 25-50- feet with Modified [place] using Rolling Walker.   4. Lower extremity exercise program x10 reps per handout, with supervision      Outcome: Ongoing, Progressing   Pt ambulated ~ 20 ft x 2 trials with RW, CGA( 2L O2NC). Pt will benefit from further skilled therapy in order to return to PLOF.

## 2020-04-22 NOTE — PLAN OF CARE
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]  Yes    Type 2 diabetes mellitus with diabetic polyneuropathy, without long-term current use of insulin [E11.42]  Yes    Essential hypertension [I10]  Yes      Resolved Hospital Problems    Diagnosis Date Resolved POA    Acute respiratory distress syndrome (ARDS) due to COVID-19 virus [U07.1, J80] 04/15/2020 Yes     Priority: 2     Decreased hearing of both ears [H91.93] 04/15/2020 Yes     Requires Hearing Aids      Wears glasses [Z97.3] 04/15/2020 Yes    Anemia [D64.9] 04/07/2020 Yes    Elevated troponin [R79.89] 04/07/2020 Yes    Suspected Covid-19 Virus Infection [R68.89] 04/03/2020 Yes       Patient is homebound due to:  COVID-19 virus infection    Allergies:Review of patient's allergies indicates:  No Known Allergies    Vitals:     Once daily        Diet:   Diabetic diet    Acitivities:          Per facility protocol. No weight bearing restrictions.   - Up in a chair each morning as tolerated   - Scheduled walks once each shift (every 8 hours)   - May ambulate independently   - May use walker, cane, or self-propelled wheelchair    Nursing Precautions:     - Fall precautions per nursing home protocol   - Decubitus precautions:        -  for positioning   - Pressure reducing foam mattress   - Turn patient every two hours. Use wedge pillows to anchor patient    CONSULTS:      Physical Therapy to evaluate and treat     Occupational Therapy to evaluate and treat     Nutrition  to evaluate and recommend diet               Diabetic Instructions: Check blood sugar:     Fingerstick blood sugar a.m and p.m.      Report CBG < 60 or > 400 to physician.                                          Insulin Sliding Scale          Glucose  Novolog Insulin Subcutaneous        0 - 60   Orange juice or glucose tablet, hold insulin      No insulin   201-250  2 units   251-300  4 units   301-350  6 units   351-400  8 units   >400   10 units then call physician      Medications: Discontinue all previous medication orders, if any. See new list below.   Peter Lopez   Home Medication Instructions MATILDE:[id number]    Printed on:04/22/20 8459   Medication Information                      albuterol (PROAIR HFA) 90 mcg/actuation inhaler  Inhale 2 puffs into the lungs every 4 (four) hours as needed for Wheezing (cough/shortness of breath). Rescue             blood sugar diagnostic Strp  To check BG 1 times daily, to use with insurance preferred meter             blood-glucose meter kit  To check BG 1 times daily, to use with insurance preferred meter             citalopram (CELEXA) 10 MG tablet  Take 1 tablet (10 mg total) by mouth once daily.             fluticasone propionate (FLONASE) 50 mcg/actuation nasal spray  1 spray (50 mcg total) by Each Nostril route once daily.             ipratropium (ATROVENT) 42 mcg (0.06 %) nasal spray  2 sprays by Nasal route 4 (four) times daily.             lancets Misc  To check BG 1 times daily, to use with insurance preferred meter             metFORMIN (GLUCOPHAGE) 1000 MG tablet  TAKE 1 TABLET BY MOUTH TWICE DAILY WITH MEALS             olmesartan-hydrochlorothiazide (BENICAR HCT) 40-25 mg per tablet  TAKE 1 TABLET BY MOUTH ONCE DAILY             ondansetron (ZOFRAN-ODT) 4 MG TbDL  Take 1 tablet (4 mg total) by mouth every 8 (eight) hours as needed.             senna-docusate 8.6-50 mg (PERICOLACE) 8.6-50 mg per tablet  Take 1 tablet by mouth 2 (two) times daily.              tamsulosin (FLOMAX) 0.4 mg Cap  Take 1 capsule (0.4 mg total) by mouth once daily.             vitamin D (VITAMIN D3) 1000 units Tab  Take 1 tablet (1,000 Units total) by mouth 2 (two) times daily.                 Oxygen Needs:  On 2L 02 via nasal cannula for resolving COVID infection. Please check 02 sats every shift and titrate to keep 02 90% or above.     _________________________________  Bret Sims MD  04/22/2020

## 2020-04-22 NOTE — NURSING
Pt remains free from falls or injuries. O2 in use. Pt aaox4. Call light in reach. Will continue to monitor.

## 2020-04-22 NOTE — PT/OT/SLP PROGRESS
Physical Therapy Treatment    Patient Name:  Peter Lopez   MRN:  [id number]    Recommendations:     Discharge Recommendations:  (Pt may need SNF as he is not safe to return home alone at this time)   Discharge Equipment Recommendations: bedside commode, walker, rolling, shower chair   Barriers to discharge: Decreased caregiver support    Assessment:     Peter Lopez is a 84 y.o. male admitted with a medical diagnosis of COVID-19 virus infection.  He presents with the following impairments/functional limitations:  weakness, impaired endurance, gait instability, decreased upper extremity function, impaired balance, decreased lower extremity function, decreased ROM, decreased safety awareness, impaired cardiopulmonary response to activity .    Rehab Prognosis: Good; patient would benefit from acute skilled PT services to address these deficits and reach maximum level of function.    Recent Surgery: * No surgery found *      Plan:     During this hospitalization, patient to be seen (5-6x/wk) to address the identified rehab impairments via gait training, therapeutic activities, therapeutic exercises and progress toward the following goals:    · Plan of Care Expires:  04/27/20    Subjective     Chief Complaint: none   Patient/Family Comments/goals: to get stronger   Pain/Comfort:  · Pain Rating 1: 0/10  · Pain Rating Post-Intervention 1: 0/10      Objective:     Communicated with nurse Sharita prior to session.  Patient found up in chair with bed alarm, telemetry, pulse ox (continuous), oxygen upon PT entry to room.     General Precautions: Standard, fall, airborne, contact, droplet, respiratory   Orthopedic Precautions:N/A   Braces: N/A     Functional Mobility:  · Transfers:     · Sit to Stand:  stand by assistance and contact guard assistance with rolling walker  · Gait: trained ~ 20 ft x 2 trials with RW, CGA( 2L O2NC).  SpO2 decreased to 85% required 1 standing rest break rest break and pursed lip breathing technique, SpO2  recovered to 94%-95% . Pt with max slow josias, decreased step length ,  no LOB . V/T cues for safety technique and walker management.       · Balance: fair        AM-PAC 6 CLICK MOBILITY  Turning over in bed (including adjusting bedclothes, sheets and blankets)?: 4  Sitting down on and standing up from a chair with arms (e.g., wheelchair, bedside commode, etc.): 4  Moving from lying on back to sitting on the side of the bed?: 3  Moving to and from a bed to a chair (including a wheelchair)?: 3  Need to walk in hospital room?: 3  Climbing 3-5 steps with a railing?: 2  Basic Mobility Total Score: 19       Therapeutic Activities and Exercises:   Pt performed seated BLE 20 reps:   AP, LAQ, HS, Hip flexion and BLE x 20 reps in reclined chair : AP, QS, hip ABD/ADD. V/T's cues for technique and sequence. Pt tolerated well.   Educated pt on safety awareness with all OOB mobility , transfer and gait training.     Patient left up in chair on green air cushion  with all lines intact, call button in reach and NURSE notified..    GOALS:   Multidisciplinary Problems     Physical Therapy Goals        Problem: Physical Therapy Goal    Goal Priority Disciplines Outcome Goal Variances Interventions   Physical Therapy Goal     PT, PT/OT Ongoing, Progressing     Description:  Goals to be met by: 2020     Patient will increase functional independence with mobility by performin. Supine to sit with Modified [place]  2. Sit to stand transfer with Modified [place]  3. Gait  x 25-50- feet with Modified [place] using Rolling Walker.   4. Lower extremity exercise program x10 reps per handout, with supervision                       Time Tracking:     PT Received On: 20  PT Start Time: 1039     PT Stop Time: 1103  PT Total Time (min): 24 min     Billable Minutes: Gait Training 12 and Therapeutic Exercise 12    Treatment Type: Treatment  PT/PTA: PTA     PTA Visit Number: 2     Jennie Melgar PTA  2020

## 2020-04-22 NOTE — NURSING
2 [facility]  EMT here to transport pt to [place],to assisted to stretcher w/o injury,pt now leaving unit,paperwork given to EMT.

## 2020-04-22 NOTE — PROGRESS NOTES
PICC line discontinued from left upper arm, length verified with insertion documentation. 43cm. tip intact. Pressure dressing applied.

## 2020-04-22 NOTE — ASSESSMENT & PLAN NOTE
Patient with bereavement from the loss of his wife last year. Has some support from family at home but inadequate to keep up with all ADLs and a household. Daughter feels patient too ill to return home. Has become progressively weaker since initial COVID diagnosis. Previously discharged and went back to ER twice. SNF appropriate.

## 2020-04-22 NOTE — DISCHARGE SUMMARY
"[facility] Medicine  Discharge Summary      Patient Name: Peter Lopez  MRN: [id number]  Admission Date: 4/3/2020  Hospital Length of Stay: 19 days  Discharge Date and Time:  04/22/2020 12:58 PM  Attending Physician: Jesus Stone MD   Discharging Provider: Bret Sims MD  Primary Care Provider: Lucas Lloyd Jr, MD      HPI:   Per Dr. Mike Guerrero:    "Pt is a 85 yo M with a h/o NIDDM2, HTN, chronic HCV, and recent COVID-19 who presents to the ER for worsening weakness, persistent fevers, and dyspnea.  Pt was brought by EMS who found him with an oxygen saturation of 78%.  Pt was placed on non-rebreather and sat's did come up to 98%.  He was on nasal canula prior to arrival but the tubing was long per reports and he was noted to be tachypneic and tachycardic on it.  Pt could speak only a few sentences due to the dyspnea.  Of note patient was on antibiotics as outpatient and was compliant.  He does have a productive cough, decreased po intake, and subjective fevers."    * No surgery found *      Hospital Course:   Patient is a 84 year-old man with hypertension, diabetes mellitus type II, chronic hepatitis C admitted to the hospital for treatment of acute hypoxic respiratory failure secondary to COVID-19 infection. Patient admitted to the intensive care unit and treated with non-rebreather mask. Patient improved and he was weaned to Venti Mask and was stepped down to the floor. Patient's oxygen requirements continue to improve and now weaned down to NC. Still desaturating below 88% on exertion with room air prior to d/c, but only requiring 2L NC supplementation. PT/OT recommending SNF placement.     Consults:   Consults (From admission, onward)        Status Ordering Provider     Inpatient consult to Critical Care Medicine  Once     Provider:  Donny Winston MD    Completed DONNY WINSTON     Inpatient consult to Hospitalist  Once     Provider:  Mike Guerrero MD    Completed MIKE GUERRERO "     Inpatient consult to Pulmonology  Once     Provider:  Rajeev Tobin MD    Completed XANDER MARINO     Inpatient consult to Social Work  Once     Provider:  (Not yet assigned)    Acknowledged SANDEEP VÍCTOR          * COVID-19 virus infection  Improving. Admitted on 4/3/20. Needed NRB early in admission.  Saturating well on 2L NC prior as of 4/22/20. Desats on room air with exertion still.  PT/OT recc for SNF. Stable for transfer.    Acute respiratory distress syndrome (ARDS)   Secondary to COVID19 infection.   Resolved.  Completed course of off-label azithromycin and hydroxychloroquine.    Reactive depression  Trial of Citalopram started. Will need OP follow up.     Discharge planning issues  Patient with bereavement from the loss of his wife last year. Has some support from family at home but inadequate to keep up with all ADLs and a household. Daughter feels patient too ill to return home. Has become progressively weaker since initial COVID diagnosis. Previously discharged and went back to ER twice. SNF appropriate.     Type 2 diabetes mellitus with diabetic polyneuropathy, without long-term current use of insulin  Controlled with minimal correction insulin prn. Restarting home metformin upon d/c.    Essential hypertension  Controlled. Restarting home anti-hypertensives upon d/c.    Elevated troponin-resolved as of 4/7/2020  Likely due to demand from COVID infection. No signs of ischemia    Anemia-resolved as of 4/7/2020  Stable. No signs of bleeding.      Final Active Diagnoses:    Diagnosis Date Noted POA    PRINCIPAL PROBLEM:  COVID-19 virus infection [U07.1] 04/13/2020 Yes    Discharge planning issues [Z02.9] 04/14/2020 Not Applicable    Reactive depression [F32.9] 04/14/2020 Yes    Type 2 diabetes mellitus with diabetic polyneuropathy, without long-term current use of insulin [E11.42] 10/16/2013 Yes    Essential hypertension [I10] 10/02/2012 Yes      Problems Resolved During this  Admission:    Diagnosis Date Noted Date Resolved POA    Acute respiratory distress syndrome (ARDS) due to COVID-19 virus [U07.1, J80] 04/04/2020 04/15/2020 Yes    Anemia [D64.9] 04/04/2020 04/07/2020 Yes    Elevated troponin [R79.89] 04/04/2020 04/07/2020 Yes       Discharged Condition: stable    Disposition: Skilled Nursing Facility    Follow Up:  Follow-up Information     Lucas Lloyd Jr, MD.    Specialty:  Family Medicine  Contact information:  [address] [address]  [phone]                 Patient Instructions:      Diet diabetic     Vital signs per facility protocol     Intake and output per facility protocol     Skin assessment every shift      Notify Physician     Order Specific Question Answer Comments   Temperature (F) greater than 101    Systolic Blood Pressure SBP greater than or equal to 160    Systolic Blood Pressure SBP less than or equal to 90    Diastolic Blood Pressure DBP greater than or equal to 110    Diastolic Blood Pressure DBP less than or equal to 60    Pulse greater than or equal to 120    Pulse less than or equal to 50    Respirations Rate RR greater than or equal to 30    Respirations Rate RR less than or equal to 6    Urine output less than 60 over 2 hours   SPO2% less than 90      Full code     Pulse Oximetry     Oxygen       Significant Diagnostic Studies:   Recent Results (from the past 336 hour(s))   POCT glucose    Collection Time: 04/08/20  4:27 PM   Result Value Ref Range    POCT Glucose 168 (H) 70 - 110 mg/dL   CBC auto differential    Collection Time: 04/09/20  5:01 AM   Result Value Ref Range    WBC 6.27 3.90 - 12.70 K/uL    RBC 3.52 (L) 4.60 - 6.20 M/uL    Hemoglobin 9.8 (L) 14.0 - 18.0 g/dL    Hematocrit 30.8 (L) 40.0 - 54.0 %    Mean Corpuscular Volume 88 82 - 98 fL    Mean Corpuscular Hemoglobin 27.8 27.0 - 31.0 pg    Mean Corpuscular Hemoglobin Conc 31.8 (L) 32.0 - 36.0 g/dL    RDW 13.4 11.5 - 14.5 %    Platelets 640 (H) 150 - 350 K/uL    MPV 9.2 9.2 -  12.9 fL    Immature Granulocytes 0.6 (H) 0.0 - 0.5 %    Gran # (ANC) 4.9 1.8 - 7.7 K/uL    Immature Grans (Abs) 0.04 0.00 - 0.04 K/uL    Lymph # 0.7 (L) 1.0 - 4.8 K/uL    Mono # 0.6 0.3 - 1.0 K/uL    Eos # 0.1 0.0 - 0.5 K/uL    Baso # 0.02 0.00 - 0.20 K/uL    nRBC 0 0 /100 WBC    Gran% 78.0 (H) 38.0 - 73.0 %    Lymph% 10.5 (L) 18.0 - 48.0 %    Mono% 9.3 4.0 - 15.0 %    Eosinophil% 1.3 0.0 - 8.0 %    Basophil% 0.3 0.0 - 1.9 %    Platelet Estimate Increased (A)     Toxic Granulation Present     Large/Giant Platelets Present     Differential Method Automated    Basic metabolic panel    Collection Time: 04/09/20  5:02 AM   Result Value Ref Range    Sodium 137 136 - 145 mmol/L    Potassium 4.0 3.5 - 5.1 mmol/L    Chloride 97 95 - 110 mmol/L    CO2 30 (H) 23 - 29 mmol/L    Glucose 115 (H) 70 - 110 mg/dL    BUN, Bld 13 8 - 23 mg/dL    Creatinine 1.0 0.5 - 1.4 mg/dL    Calcium 8.6 (L) 8.7 - 10.5 mg/dL    Anion Gap 10 8 - 16 mmol/L    eGFR if African American >60 >60 mL/min/1.73 m^2    eGFR if non African American >60 >60 mL/min/1.73 m^2   Magnesium    Collection Time: 04/09/20  5:02 AM   Result Value Ref Range    Magnesium 2.1 1.6 - 2.6 mg/dL   Phosphorus    Collection Time: 04/09/20  5:02 AM   Result Value Ref Range    Phosphorus 2.9 2.7 - 4.5 mg/dL   POCT glucose    Collection Time: 04/09/20  8:12 AM   Result Value Ref Range    POCT Glucose 121 (H) 70 - 110 mg/dL   POCT glucose    Collection Time: 04/09/20 11:24 AM   Result Value Ref Range    POCT Glucose 143 (H) 70 - 110 mg/dL   POCT glucose    Collection Time: 04/09/20  4:57 PM   Result Value Ref Range    POCT Glucose 130 (H) 70 - 110 mg/dL   POCT glucose    Collection Time: 04/09/20  7:29 PM   Result Value Ref Range    POCT Glucose 139 (H) 70 - 110 mg/dL   POCT glucose    Collection Time: 04/10/20  7:27 AM   Result Value Ref Range    POCT Glucose 119 (H) 70 - 110 mg/dL   CBC auto differential    Collection Time: 04/10/20  7:55 AM   Result Value Ref Range    WBC 6.90  3.90 - 12.70 K/uL    RBC 3.96 (L) 4.60 - 6.20 M/uL    Hemoglobin 11.0 (L) 14.0 - 18.0 g/dL    Hematocrit 34.7 (L) 40.0 - 54.0 %    Mean Corpuscular Volume 88 82 - 98 fL    Mean Corpuscular Hemoglobin 27.8 27.0 - 31.0 pg    Mean Corpuscular Hemoglobin Conc 31.7 (L) 32.0 - 36.0 g/dL    RDW 13.2 11.5 - 14.5 %    Platelets 636 (H) 150 - 350 K/uL    MPV 9.0 (L) 9.2 - 12.9 fL    Immature Granulocytes 0.4 0.0 - 0.5 %    Gran # (ANC) 5.4 1.8 - 7.7 K/uL    Immature Grans (Abs) 0.03 0.00 - 0.04 K/uL    Lymph # 0.8 (L) 1.0 - 4.8 K/uL    Mono # 0.6 0.3 - 1.0 K/uL    Eos # 0.1 0.0 - 0.5 K/uL    Baso # 0.03 0.00 - 0.20 K/uL    nRBC 0 0 /100 WBC    Gran% 78.1 (H) 38.0 - 73.0 %    Lymph% 11.2 (L) 18.0 - 48.0 %    Mono% 9.0 4.0 - 15.0 %    Eosinophil% 0.9 0.0 - 8.0 %    Basophil% 0.4 0.0 - 1.9 %    Differential Method Automated    Basic metabolic panel    Collection Time: 04/10/20  7:55 AM   Result Value Ref Range    Sodium 137 136 - 145 mmol/L    Potassium 4.4 3.5 - 5.1 mmol/L    Chloride 99 95 - 110 mmol/L    CO2 28 23 - 29 mmol/L    Glucose 115 (H) 70 - 110 mg/dL    BUN, Bld 11 8 - 23 mg/dL    Creatinine 1.0 0.5 - 1.4 mg/dL    Calcium 8.9 8.7 - 10.5 mg/dL    Anion Gap 10 8 - 16 mmol/L    eGFR if African American >60 >60 mL/min/1.73 m^2    eGFR if non African American >60 >60 mL/min/1.73 m^2   Magnesium    Collection Time: 04/10/20  7:55 AM   Result Value Ref Range    Magnesium 2.1 1.6 - 2.6 mg/dL   Phosphorus    Collection Time: 04/10/20  7:55 AM   Result Value Ref Range    Phosphorus 3.0 2.7 - 4.5 mg/dL   POCT glucose    Collection Time: 04/10/20 11:20 AM   Result Value Ref Range    POCT Glucose 128 (H) 70 - 110 mg/dL   POCT glucose    Collection Time: 04/10/20  4:47 PM   Result Value Ref Range    POCT Glucose 123 (H) 70 - 110 mg/dL   POCT glucose    Collection Time: 04/10/20  7:58 PM   Result Value Ref Range    POCT Glucose 154 (H) 70 - 110 mg/dL   CBC auto differential    Collection Time: 04/11/20  5:34 AM   Result Value Ref  Range    WBC 6.71 3.90 - 12.70 K/uL    RBC 3.85 (L) 4.60 - 6.20 M/uL    Hemoglobin 10.6 (L) 14.0 - 18.0 g/dL    Hematocrit 33.5 (L) 40.0 - 54.0 %    Mean Corpuscular Volume 87 82 - 98 fL    Mean Corpuscular Hemoglobin 27.5 27.0 - 31.0 pg    Mean Corpuscular Hemoglobin Conc 31.6 (L) 32.0 - 36.0 g/dL    RDW 13.2 11.5 - 14.5 %    Platelets 651 (H) 150 - 350 K/uL    MPV 8.9 (L) 9.2 - 12.9 fL    Immature Granulocytes 0.4 0.0 - 0.5 %    Gran # (ANC) 5.1 1.8 - 7.7 K/uL    Immature Grans (Abs) 0.03 0.00 - 0.04 K/uL    Lymph # 0.8 (L) 1.0 - 4.8 K/uL    Mono # 0.7 0.3 - 1.0 K/uL    Eos # 0.1 0.0 - 0.5 K/uL    Baso # 0.02 0.00 - 0.20 K/uL    nRBC 0 0 /100 WBC    Gran% 76.4 (H) 38.0 - 73.0 %    Lymph% 12.2 (L) 18.0 - 48.0 %    Mono% 9.8 4.0 - 15.0 %    Eosinophil% 0.9 0.0 - 8.0 %    Basophil% 0.3 0.0 - 1.9 %    Differential Method Automated    Basic metabolic panel    Collection Time: 04/11/20  5:34 AM   Result Value Ref Range    Sodium 134 (L) 136 - 145 mmol/L    Potassium 4.3 3.5 - 5.1 mmol/L    Chloride 99 95 - 110 mmol/L    CO2 25 23 - 29 mmol/L    Glucose 131 (H) 70 - 110 mg/dL    BUN, Bld 12 8 - 23 mg/dL    Creatinine 1.0 0.5 - 1.4 mg/dL    Calcium 8.9 8.7 - 10.5 mg/dL    Anion Gap 10 8 - 16 mmol/L    eGFR if African American >60 >60 mL/min/1.73 m^2    eGFR if non African American >60 >60 mL/min/1.73 m^2   Magnesium    Collection Time: 04/11/20  5:34 AM   Result Value Ref Range    Magnesium 2.0 1.6 - 2.6 mg/dL   Phosphorus    Collection Time: 04/11/20  5:34 AM   Result Value Ref Range    Phosphorus 3.0 2.7 - 4.5 mg/dL   POCT glucose    Collection Time: 04/11/20  7:31 AM   Result Value Ref Range    POCT Glucose 143 (H) 70 - 110 mg/dL   POCT glucose    Collection Time: 04/11/20 11:02 AM   Result Value Ref Range    POCT Glucose 148 (H) 70 - 110 mg/dL   POCT glucose    Collection Time: 04/11/20  3:56 PM   Result Value Ref Range    POCT Glucose 144 (H) 70 - 110 mg/dL   POCT glucose    Collection Time: 04/11/20  7:48 PM    Result Value Ref Range    POCT Glucose 174 (H) 70 - 110 mg/dL   POCT glucose    Collection Time: 04/12/20  7:28 AM   Result Value Ref Range    POCT Glucose 143 (H) 70 - 110 mg/dL   POCT glucose    Collection Time: 04/12/20 10:52 AM   Result Value Ref Range    POCT Glucose 141 (H) 70 - 110 mg/dL   POCT glucose    Collection Time: 04/12/20  4:00 PM   Result Value Ref Range    POCT Glucose 133 (H) 70 - 110 mg/dL   POCT glucose    Collection Time: 04/12/20  8:43 PM   Result Value Ref Range    POCT Glucose 129 (H) 70 - 110 mg/dL   Basic metabolic panel    Collection Time: 04/13/20  4:59 AM   Result Value Ref Range    Sodium 136 136 - 145 mmol/L    Potassium 4.2 3.5 - 5.1 mmol/L    Chloride 100 95 - 110 mmol/L    CO2 27 23 - 29 mmol/L    Glucose 126 (H) 70 - 110 mg/dL    BUN, Bld 12 8 - 23 mg/dL    Creatinine 0.9 0.5 - 1.4 mg/dL    Calcium 9.1 8.7 - 10.5 mg/dL    Anion Gap 9 8 - 16 mmol/L    eGFR if African American >60 >60 mL/min/1.73 m^2    eGFR if non African American >60 >60 mL/min/1.73 m^2   C-Reactive Protein    Collection Time: 04/13/20  4:59 AM   Result Value Ref Range    CRP 83.5 (H) 0.0 - 8.2 mg/L   CBC auto differential    Collection Time: 04/13/20  4:59 AM   Result Value Ref Range    WBC 5.67 3.90 - 12.70 K/uL    RBC 3.72 (L) 4.60 - 6.20 M/uL    Hemoglobin 10.3 (L) 14.0 - 18.0 g/dL    Hematocrit 32.4 (L) 40.0 - 54.0 %    Mean Corpuscular Volume 87 82 - 98 fL    Mean Corpuscular Hemoglobin 27.7 27.0 - 31.0 pg    Mean Corpuscular Hemoglobin Conc 31.8 (L) 32.0 - 36.0 g/dL    RDW 13.2 11.5 - 14.5 %    Platelets 625 (H) 150 - 350 K/uL    MPV 8.9 (L) 9.2 - 12.9 fL    Immature Granulocytes 0.4 0.0 - 0.5 %    Gran # (ANC) 4.0 1.8 - 7.7 K/uL    Immature Grans (Abs) 0.02 0.00 - 0.04 K/uL    Lymph # 1.0 1.0 - 4.8 K/uL    Mono # 0.5 0.3 - 1.0 K/uL    Eos # 0.1 0.0 - 0.5 K/uL    Baso # 0.04 0.00 - 0.20 K/uL    nRBC 0 0 /100 WBC    Gran% 70.5 38.0 - 73.0 %    Lymph% 17.3@w25 (L) 18.0 - 48.0 %    Mono% 9.5 4.0 - 15.0 %     Eosinophil% 1.6 0.0 - 8.0 %    Basophil% 0.7 0.0 - 1.9 %    Platelet Estimate Increased (A)     Differential Method Automated    Magnesium    Collection Time: 04/13/20  4:59 AM   Result Value Ref Range    Magnesium 2.0 1.6 - 2.6 mg/dL   Phosphorus    Collection Time: 04/13/20  4:59 AM   Result Value Ref Range    Phosphorus 3.2 2.7 - 4.5 mg/dL   POCT glucose    Collection Time: 04/13/20  7:48 AM   Result Value Ref Range    POCT Glucose 143 (H) 70 - 110 mg/dL   POCT glucose    Collection Time: 04/13/20 11:32 AM   Result Value Ref Range    POCT Glucose 130 (H) 70 - 110 mg/dL   POCT glucose    Collection Time: 04/13/20  3:43 PM   Result Value Ref Range    POCT Glucose 153 (H) 70 - 110 mg/dL   POCT glucose    Collection Time: 04/13/20  8:42 PM   Result Value Ref Range    POCT Glucose 152 (H) 70 - 110 mg/dL   POCT glucose    Collection Time: 04/14/20  7:41 AM   Result Value Ref Range    POCT Glucose 134 (H) 70 - 110 mg/dL   POCT glucose    Collection Time: 04/14/20 11:13 AM   Result Value Ref Range    POCT Glucose 139 (H) 70 - 110 mg/dL   POCT glucose    Collection Time: 04/14/20  4:03 PM   Result Value Ref Range    POCT Glucose 138 (H) 70 - 110 mg/dL   POCT glucose    Collection Time: 04/14/20  7:38 PM   Result Value Ref Range    POCT Glucose 201 (H) 70 - 110 mg/dL   C-Reactive Protein    Collection Time: 04/15/20  4:45 AM   Result Value Ref Range    CRP 43.9 (H) 0.0 - 8.2 mg/L   Comprehensive metabolic panel    Collection Time: 04/15/20  4:45 AM   Result Value Ref Range    Sodium 137 136 - 145 mmol/L    Potassium 3.7 3.5 - 5.1 mmol/L    Chloride 106 95 - 110 mmol/L    CO2 25 23 - 29 mmol/L    Glucose 114 (H) 70 - 110 mg/dL    BUN, Bld 10 8 - 23 mg/dL    Creatinine 0.8 0.5 - 1.4 mg/dL    Calcium 7.6 (L) 8.7 - 10.5 mg/dL    Total Protein 5.9 (L) 6.0 - 8.4 g/dL    Albumin 1.9 (L) 3.5 - 5.2 g/dL    Total Bilirubin 0.3 0.1 - 1.0 mg/dL    Alkaline Phosphatase 36 (L) 55 - 135 U/L    AST 25 10 - 40 U/L    ALT 28 10 - 44 U/L     Anion Gap 6 (L) 8 - 16 mmol/L    eGFR if African American >60 >60 mL/min/1.73 m^2    eGFR if non African American >60 >60 mL/min/1.73 m^2   CBC auto differential    Collection Time: 04/15/20  6:29 AM   Result Value Ref Range    WBC 5.24 3.90 - 12.70 K/uL    RBC 3.74 (L) 4.60 - 6.20 M/uL    Hemoglobin 10.4 (L) 14.0 - 18.0 g/dL    Hematocrit 32.6 (L) 40.0 - 54.0 %    Mean Corpuscular Volume 87 82 - 98 fL    Mean Corpuscular Hemoglobin 27.8 27.0 - 31.0 pg    Mean Corpuscular Hemoglobin Conc 31.9 (L) 32.0 - 36.0 g/dL    RDW 13.2 11.5 - 14.5 %    Platelets 587 (H) 150 - 350 K/uL    MPV 8.8 (L) 9.2 - 12.9 fL    Immature Granulocytes 0.4 0.0 - 0.5 %    Gran # (ANC) 3.6 1.8 - 7.7 K/uL    Immature Grans (Abs) 0.02 0.00 - 0.04 K/uL    Lymph # 1.1 1.0 - 4.8 K/uL    Mono # 0.5 0.3 - 1.0 K/uL    Eos # 0.1 0.0 - 0.5 K/uL    Baso # 0.04 0.00 - 0.20 K/uL    nRBC 0 0 /100 WBC    Gran% 68.1 38.0 - 73.0 %    Lymph% 20.4 18.0 - 48.0 %    Mono% 9.0 4.0 - 15.0 %    Eosinophil% 1.3 0.0 - 8.0 %    Basophil% 0.8 0.0 - 1.9 %    Differential Method Automated    Troponin I    Collection Time: 04/15/20  6:31 AM   Result Value Ref Range    Troponin I <0.006 0.000 - 0.026 ng/mL   CK    Collection Time: 04/15/20  6:31 AM   Result Value Ref Range    CPK 65 20 - 200 U/L   Ferritin    Collection Time: 04/15/20  6:31 AM   Result Value Ref Range    Ferritin 374 (H) 20.0 - 300.0 ng/mL   POCT glucose    Collection Time: 04/15/20  7:52 AM   Result Value Ref Range    POCT Glucose 126 (H) 70 - 110 mg/dL   POCT glucose    Collection Time: 04/15/20 11:01 AM   Result Value Ref Range    POCT Glucose 132 (H) 70 - 110 mg/dL   POCT glucose    Collection Time: 04/15/20  3:44 PM   Result Value Ref Range    POCT Glucose 141 (H) 70 - 110 mg/dL   Lactate dehydrogenase    Collection Time: 04/15/20  6:29 PM   Result Value Ref Range     (H) 110 - 260 U/L   POCT glucose    Collection Time: 04/15/20  8:10 PM   Result Value Ref Range    POCT Glucose 152 (H) 70 - 110  mg/dL   Brain Natriuretic Peptide    Collection Time: 04/16/20  5:34 AM   Result Value Ref Range    BNP 14 0 - 99 pg/mL   POCT glucose    Collection Time: 04/16/20  7:23 AM   Result Value Ref Range    POCT Glucose 122 (H) 70 - 110 mg/dL   POCT glucose    Collection Time: 04/16/20 10:55 AM   Result Value Ref Range    POCT Glucose 121 (H) 70 - 110 mg/dL   POCT glucose    Collection Time: 04/16/20  3:50 PM   Result Value Ref Range    POCT Glucose 162 (H) 70 - 110 mg/dL   POCT glucose    Collection Time: 04/16/20  8:16 PM   Result Value Ref Range    POCT Glucose 129 (H) 70 - 110 mg/dL   POCT glucose    Collection Time: 04/16/20  9:02 PM   Result Value Ref Range    POCT Glucose 141 (H) 70 - 110 mg/dL   C-Reactive Protein    Collection Time: 04/17/20  3:42 AM   Result Value Ref Range    CRP 24.7 (H) 0.0 - 8.2 mg/L   CBC auto differential    Collection Time: 04/17/20  3:42 AM   Result Value Ref Range    WBC 4.73 3.90 - 12.70 K/uL    RBC 3.80 (L) 4.60 - 6.20 M/uL    Hemoglobin 10.5 (L) 14.0 - 18.0 g/dL    Hematocrit 33.3 (L) 40.0 - 54.0 %    Mean Corpuscular Volume 88 82 - 98 fL    Mean Corpuscular Hemoglobin 27.6 27.0 - 31.0 pg    Mean Corpuscular Hemoglobin Conc 31.5 (L) 32.0 - 36.0 g/dL    RDW 13.4 11.5 - 14.5 %    Platelets 533 (H) 150 - 350 K/uL    MPV 8.7 (L) 9.2 - 12.9 fL    Immature Granulocytes 0.2 0.0 - 0.5 %    Gran # (ANC) 2.8 1.8 - 7.7 K/uL    Immature Grans (Abs) 0.01 0.00 - 0.04 K/uL    Lymph # 1.3 1.0 - 4.8 K/uL    Mono # 0.5 0.3 - 1.0 K/uL    Eos # 0.1 0.0 - 0.5 K/uL    Baso # 0.04 0.00 - 0.20 K/uL    nRBC 0 0 /100 WBC    Gran% 59.1 38.0 - 73.0 %    Lymph% 27.7 18.0 - 48.0 %    Mono% 9.9 4.0 - 15.0 %    Eosinophil% 2.3 0.0 - 8.0 %    Basophil% 0.8 0.0 - 1.9 %    Differential Method Automated    Comprehensive metabolic panel    Collection Time: 04/17/20  3:42 AM   Result Value Ref Range    Sodium 137 136 - 145 mmol/L    Potassium 4.3 3.5 - 5.1 mmol/L    Chloride 101 95 - 110 mmol/L    CO2 28 23 - 29 mmol/L     Glucose 127 (H) 70 - 110 mg/dL    BUN, Bld 9 8 - 23 mg/dL    Creatinine 1.0 0.5 - 1.4 mg/dL    Calcium 9.2 8.7 - 10.5 mg/dL    Total Protein 6.9 6.0 - 8.4 g/dL    Albumin 2.4 (L) 3.5 - 5.2 g/dL    Total Bilirubin 0.2 0.1 - 1.0 mg/dL    Alkaline Phosphatase 42 (L) 55 - 135 U/L    AST 31 10 - 40 U/L    ALT 36 10 - 44 U/L    Anion Gap 8 8 - 16 mmol/L    eGFR if African American >60 >60 mL/min/1.73 m^2    eGFR if non African American >60 >60 mL/min/1.73 m^2   POCT glucose    Collection Time: 04/17/20  7:49 AM   Result Value Ref Range    POCT Glucose 112 (H) 70 - 110 mg/dL   POCT glucose    Collection Time: 04/17/20 11:14 AM   Result Value Ref Range    POCT Glucose 123 (H) 70 - 110 mg/dL   POCT glucose    Collection Time: 04/17/20  4:25 PM   Result Value Ref Range    POCT Glucose 153 (H) 70 - 110 mg/dL   POCT glucose    Collection Time: 04/17/20  8:35 PM   Result Value Ref Range    POCT Glucose 150 (H) 70 - 110 mg/dL   POCT glucose    Collection Time: 04/18/20  7:44 AM   Result Value Ref Range    POCT Glucose 119 (H) 70 - 110 mg/dL   POCT glucose    Collection Time: 04/18/20 11:23 AM   Result Value Ref Range    POCT Glucose 134 (H) 70 - 110 mg/dL   POCT glucose    Collection Time: 04/18/20  4:18 PM   Result Value Ref Range    POCT Glucose 161 (H) 70 - 110 mg/dL   POCT glucose    Collection Time: 04/18/20  7:59 PM   Result Value Ref Range    POCT Glucose 135 (H) 70 - 110 mg/dL   C-Reactive Protein    Collection Time: 04/19/20  5:05 AM   Result Value Ref Range    CRP 12.4 (H) 0.0 - 8.2 mg/L   CBC auto differential    Collection Time: 04/19/20  5:05 AM   Result Value Ref Range    WBC 4.35 3.90 - 12.70 K/uL    RBC 3.76 (L) 4.60 - 6.20 M/uL    Hemoglobin 10.6 (L) 14.0 - 18.0 g/dL    Hematocrit 33.5 (L) 40.0 - 54.0 %    Mean Corpuscular Volume 89 82 - 98 fL    Mean Corpuscular Hemoglobin 28.2 27.0 - 31.0 pg    Mean Corpuscular Hemoglobin Conc 31.6 (L) 32.0 - 36.0 g/dL    RDW 13.7 11.5 - 14.5 %    Platelets 506 (H) 150 - 350  K/uL    MPV 8.6 (L) 9.2 - 12.9 fL    Immature Granulocytes 0.2 0.0 - 0.5 %    Gran # (ANC) 2.5 1.8 - 7.7 K/uL    Immature Grans (Abs) 0.01 0.00 - 0.04 K/uL    Lymph # 1.3 1.0 - 4.8 K/uL    Mono # 0.4 0.3 - 1.0 K/uL    Eos # 0.1 0.0 - 0.5 K/uL    Baso # 0.03 0.00 - 0.20 K/uL    nRBC 0 0 /100 WBC    Gran% 58.4 38.0 - 73.0 %    Lymph% 29.9 18.0 - 48.0 %    Mono% 8.0 4.0 - 15.0 %    Eosinophil% 2.8 0.0 - 8.0 %    Basophil% 0.7 0.0 - 1.9 %    Differential Method Automated    Comprehensive metabolic panel    Collection Time: 04/19/20  5:05 AM   Result Value Ref Range    Sodium 137 136 - 145 mmol/L    Potassium 4.4 3.5 - 5.1 mmol/L    Chloride 100 95 - 110 mmol/L    CO2 28 23 - 29 mmol/L    Glucose 121 (H) 70 - 110 mg/dL    BUN, Bld 12 8 - 23 mg/dL    Creatinine 1.0 0.5 - 1.4 mg/dL    Calcium 9.0 8.7 - 10.5 mg/dL    Total Protein 6.6 6.0 - 8.4 g/dL    Albumin 2.4 (L) 3.5 - 5.2 g/dL    Total Bilirubin 0.2 0.1 - 1.0 mg/dL    Alkaline Phosphatase 38 (L) 55 - 135 U/L    AST 27 10 - 40 U/L    ALT 33 10 - 44 U/L    Anion Gap 9 8 - 16 mmol/L    eGFR if African American >60 >60 mL/min/1.73 m^2    eGFR if non African American >60 >60 mL/min/1.73 m^2   POCT glucose    Collection Time: 04/19/20  7:42 AM   Result Value Ref Range    POCT Glucose 121 (H) 70 - 110 mg/dL   POCT glucose    Collection Time: 04/19/20 11:28 AM   Result Value Ref Range    POCT Glucose 114 (H) 70 - 110 mg/dL   POCT glucose    Collection Time: 04/19/20  4:36 PM   Result Value Ref Range    POCT Glucose 218 (H) 70 - 110 mg/dL   POCT glucose    Collection Time: 04/19/20  8:47 PM   Result Value Ref Range    POCT Glucose 137 (H) 70 - 110 mg/dL   POCT glucose    Collection Time: 04/20/20  7:38 AM   Result Value Ref Range    POCT Glucose 123 (H) 70 - 110 mg/dL   POCT glucose    Collection Time: 04/20/20 11:26 AM   Result Value Ref Range    POCT Glucose 133 (H) 70 - 110 mg/dL   POCT glucose    Collection Time: 04/20/20  4:50 PM   Result Value Ref Range    POCT Glucose  183 (H) 70 - 110 mg/dL   POCT glucose    Collection Time: 04/20/20  7:57 PM   Result Value Ref Range    POCT Glucose 168 (H) 70 - 110 mg/dL   POCT glucose    Collection Time: 04/21/20  8:17 AM   Result Value Ref Range    POCT Glucose 183 (H) 70 - 110 mg/dL   POCT glucose    Collection Time: 04/21/20 10:58 AM   Result Value Ref Range    POCT Glucose 132 (H) 70 - 110 mg/dL   POCT glucose    Collection Time: 04/21/20  4:36 PM   Result Value Ref Range    POCT Glucose 122 (H) 70 - 110 mg/dL   POCT glucose    Collection Time: 04/21/20  8:00 PM   Result Value Ref Range    POCT Glucose 175 (H) 70 - 110 mg/dL   POCT glucose    Collection Time: 04/22/20  7:33 AM   Result Value Ref Range    POCT Glucose 115 (H) 70 - 110 mg/dL   POCT glucose    Collection Time: 04/22/20 11:06 AM   Result Value Ref Range    POCT Glucose 130 (H) 70 - 110 mg/dL     Imaging Results          X-Ray Chest AP Portable (Final result)  Result time 04/03/20 16:17:20    Final result by Otilio Vargas MD (04/03/20 16:17:20)                 Impression:      Continued accentuation of the interstitial markings with an area of ill-defined airspace consolidation at the right midlung zone.  Once again, findings may represent developing pneumonia.  No significant interval change.      Electronically signed by: Otilio Vargas MD  Date:    04/03/2020  Time:    16:17             Narrative:    EXAMINATION:  XR CHEST AP PORTABLE    CLINICAL HISTORY:  Suspected Covid-19 Virus Infection;    TECHNIQUE:  Single frontal view of the chest was performed.    COMPARISON:  03/30/2020    FINDINGS:  Heart size and pulmonary vascularity are stable.  Tortuous thoracic aorta and brachiocephalic vessels with atherosclerotic calcification in the wall of the aortic arch.  Lungs are satisfactorily expanded.  Continued accentuation of the interstitial markings bilaterally with some fibrotic appearing changes at the left base.  What appears to be an area of ill-defined airspace consolidation is  again seen at the right midlung zone.  No definite pleural fluid or pneumothorax.  Skeletal structures appear intact.                                Pending Diagnostic Studies:     None         Medications:  Reconciled Home Medications:      Medication List      START taking these medications    citalopram 10 MG tablet  Commonly known as:  CELEXA  Take 1 tablet (10 mg total) by mouth once daily.  Start taking on:  April 23, 2020     senna-docusate 8.6-50 mg 8.6-50 mg per tablet  Commonly known as:  PERICOLACE  Take 1 tablet by mouth 2 (two) times daily.     vitamin D 1000 units Tab  Commonly known as:  VITAMIN D3  Take 1 tablet (1,000 Units total) by mouth 2 (two) times daily.        CONTINUE taking these medications    albuterol 90 mcg/actuation inhaler  Commonly known as:  PROAIR HFA  Inhale 2 puffs into the lungs every 4 (four) hours as needed for Wheezing (cough/shortness of breath). Rescue     blood sugar diagnostic Strp  To check BG 1 times daily, to use with insurance preferred meter     blood-glucose meter kit  To check BG 1 times daily, to use with insurance preferred meter     fluticasone propionate 50 mcg/actuation nasal spray  Commonly known as:  FLONASE  1 spray (50 mcg total) by Each Nostril route once daily.     ipratropium 42 mcg (0.06 %) nasal spray  Commonly known as:  ATROVENT  2 sprays by Nasal route 4 (four) times daily.     lancets Misc  To check BG 1 times daily, to use with insurance preferred meter     metFORMIN 1000 MG tablet  Commonly known as:  GLUCOPHAGE  TAKE 1 TABLET BY MOUTH TWICE DAILY WITH MEALS     olmesartan-hydrochlorothiazide 40-25 mg per tablet  Commonly known as:  BENICAR HCT  TAKE 1 TABLET BY MOUTH ONCE DAILY     ondansetron 4 MG Tbdl  Commonly known as:  ZOFRAN-ODT  Take 1 tablet (4 mg total) by mouth every 8 (eight) hours as needed.     tamsulosin 0.4 mg Cap  Commonly known as:  FLOMAX  Take 1 capsule (0.4 mg total) by mouth once daily.        STOP taking these medications     azithromycin 250 MG tablet  Commonly known as:  Z-BARAK     ciclopirox 8 % Soln  Commonly known as:  PENLAC     famotidine 20 MG tablet  Commonly known as:  PEPCID     loratadine 10 mg tablet  Commonly known as:  CLARITIN     sildenafiL 50 MG tablet  Commonly known as:  VIAGRA            Indwelling Lines/Drains at time of discharge:   Lines/Drains/Airways     Peripherally Inserted Central Catheter Line            PICC Triple Lumen 04/03/20 1600 left basilic 18 days                Time spent on the discharge of patient: 35 minutes  Patient was seen and examined on the date of discharge and determined to be suitable for discharge.         Bret Sims MD  Department of Hospital Medicine  [facility]

## 2020-04-22 NOTE — NURSING
Report called into [facility] nurse Clyde Stone RN.PICC line about to be removed,pt remains free from falls and pressure injuries,david meds well,transportation set up for 5:29pm.

## 2020-04-22 NOTE — ASSESSMENT & PLAN NOTE
Secondary to COVID19 infection.   Resolved.  Completed course of off-label azithromycin and hydroxychloroquine.

## 2020-04-22 NOTE — ASSESSMENT & PLAN NOTE
Improving. Admitted on 4/3/20. Needed NRB early in admission.  Saturating well on 2L NC prior as of 4/22/20. Desats on room air with exertion still.  PT/OT recc for SNF. Stable for transfer.

## 2020-04-23 ENCOUNTER — OUTPATIENT CASE MANAGEMENT (OUTPATIENT)
Dept: ADMINISTRATIVE | Facility: OTHER | Age: 85
End: 2020-04-23

## 2020-04-23 ENCOUNTER — DOCUMENTATION ONLY (OUTPATIENT)
Dept: HEPATOLOGY | Facility: HOSPITAL | Age: 85
End: 2020-04-23

## 2020-04-23 ENCOUNTER — TELEPHONE (OUTPATIENT)
Dept: FAMILY MEDICINE | Facility: CLINIC | Age: 85
End: 2020-04-23

## 2020-04-23 NOTE — TELEPHONE ENCOUNTER
Daughter stated her father was discharged to [facility]  [phone]

## 2020-04-23 NOTE — TELEPHONE ENCOUNTER
----- Message from Melchor Davis sent at 4/23/2020  9:35 AM CDT -----  Contact: Nando Rodriguez   Name of Who is Calling: Nando Rodriguez    What is the request in detail:Nando Rodriguez is requesting a call back ion regards to father being discharged on yesterday and having to go to a nursing home ....   Please contact to further discuss and advise      Can the clinic reply by [facility]: no     What Number to Call Back if not in [facility]:  [phone] Mckinley Rodriguez

## 2020-04-23 NOTE — PROGRESS NOTES
[facility]                                 Skilled Nursing Facility                   Progress Note     Admit Date: 4/22/20  [facility]   Principal Problem:  Debility R/T COVID 19 infection  HPI obtained from patient interview and chart review     Visit Date: 4/23/2020    Chief Complaint: Establish Care/ Initial Visit s/p hospitalization 2/2 COVID 19 infection    HPI:   Mr. Lopez is an 83 yo M with a h/o NIDDM2, HTN, chronic HCV. He was recently diagnosed with COVID-19 on 03/28/2019, discharged home on 3/30. He presented to the ED on 4/3 due to worsening weakness, persistent fevers, dyspnea and readmitted with hypoxia. He was treated with non-rebreather in ICU, and improved. He previously completed an antibiotic regimen at home. PT/OT recommended SNF placement. Patient admitted to facility and placed in isolation unit per their protocol.     Patient will be treated at [facility] with PT and OT to improve functional status and ability to perform ADLs.     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    Past Surgical History: Patient has no past surgical history on file.    Social History: Patient reports that he quit smoking about 30 years ago. He has a 40.00 pack-year smoking history. He quit smokeless tobacco use about 34 years ago. He reports that he drank alcohol. He reports that he does not use drugs.    Family History: family history includes Asbestos in his father; Cancer in his mother.    Allergies: Patient has No Known Allergies.    ROS  Constitutional: Negative for fever or fatigue.   Eyes: Negative for blurred vision, double vision and discharge.   Respiratory: Negative for cough, + shortness of breath, intermittent, and wheezing.    Cardiovascular: Negative for  chest pain, palpitations, and leg swelling.   Gastrointestinal: Negative for abdominal pain, constipation, diarrhea, nausea and vomiting.   Genitourinary: Negative for dysuria, frequency and urgency.   Musculoskeletal:  + generalized weakness. Negative for back pain and myalgias.   Skin: Negative for itching and rash.   Neurological: Negative for dizziness, speech change, and headaches.   Psychiatric/Behavioral: Negative for depression. The patient is not nervous/anxious.      PEx     Constitutional: Patient appears well-developed and in no distress   Head: Normocephalic and atraumatic.   Eyes: Pupils are equal, round, and reactive to light.   Neck: Normal range of motion. Neck supple.   Cardiovascular: Normal rate, regular rhythm and normal heart sounds.    Pulmonary/Chest: Effort normal and breath sounds are diminished, + NC O2  Abdominal: Soft. Bowel sounds are normal.   Musculoskeletal: Normal range of motion.   Neurological: Alert and oriented to person, place, and time.   Psychiatric: Normal mood and affect. Behavior is normal.   Skin: Skin is warm and dry. Full skin assessment completed during visit, no concerns noted      Assessment and Plan:    Aftercare of recent COVID-19 virus infection, ongoing  -COVID 19 test + on 3/28/19  Recent Hx of Acute respiratory distress syndrome (ARDS), improved  -Completed course of off-label azithromycin and hydroxychloroquine.  -continue NC O2, Alb inh, Atrovent nasal spray 4 times daily, continue isolation per facility protocol    Debility r/t COVID 19 infection, ongoing  - Continue with PT/OT for gait training and strengthening and restoration of ADL's   - Encourage mobility, OOB in chair, and early ambulation as appropriate  - Fall precautions   - Monitor for bowel and bladder dysfunction  - Monitor for and prevent skin breakdown and pressure ulcers  - Continue  Zofran p.r.n., senna -Colace b.i.d., vitamin-D 3 b.i.d.     Reactive depression, initial episode, new  -Started  Celexa during admit, continue on SNF      Type 2 diabetes mellitus with diabetic polyneuropathy, without long-term current use of insulin,  Chronic, stable  - continue metformin b.i.d., Accu-Cheks, SSI NovoLog     Essential hypertension, chronic, stable   -continue Benicar q.d.     BPH, chronic, stable   -Continue Flomax     allergic rhinitis, chronic, stable   -continue  Flonase     remote history of chronic hep C infection    Future Appointments   Date Time Provider Department Center   5/4/2020  8:30 AM Lucia Aguiar DPM [facility] POD [place]   5/11/2020  2:00 PM ULTRASOUND, UROLOGY [facility]   6/1/2020  1:15 PM EDY Love MD [facility] [place]   6/8/2020  8:40 AM Lucas Lloyd Jr., MD [facility] -          I certify that SNF services are required to be given on an inpatient basis because Peter Lopez needs for skilled nursing care and/or skilled rehabilitation are required on a daily basis and such services can only practically be provided in a skilled nursing facility setting and are for an ongoing condition for which she received inpatient care in the hospital.     Total time of the visit 112 minutes  Non physical exam/ non charting time: 80 minutes   Start Time:0800  Stop Time:0952  Description of non physical exam/non charting time: counseling patient on clinical conditions and therapies provided regarding chronic condition management, COVID 19 aftercare, therapy plan of care.  Extensive chart review completed including all consultation notes.  All pertinent laboratory and radiographical images reviewed.        Myrna Pritchard NP          Patient note was created using MModal Dictation.  Any errors in syntax or even information may not have been identified and edited on initial review prior to signing this note.

## 2020-04-24 ENCOUNTER — TELEPHONE (OUTPATIENT)
Dept: FAMILY MEDICINE | Facility: CLINIC | Age: 85
End: 2020-04-24

## 2020-04-24 NOTE — PROGRESS NOTES
[facility]                                 Skilled Nursing Facility                   Progress Note     Admit Date: 4/22/20  [facility]   Principal Problem:  Debility R/T COVID 19 infection  HPI obtained from patient interview and chart review     Telemedicine Visit:  Originating site: [facility] [address] [place] [address]  Distant Site: [address], [place] [address]     Start Time:1000  Stop Time:1020    Visit Date: 4/27/2020    Chief Complaint:  BP/HR monitoring, PT/OT progression    HPI:   Mr. Lopez is an 83 yo M with a h/o NIDDM2, HTN, chronic HCV. He was recently diagnosed with COVID-19 on 03/28/2019, discharged home on 3/30. He presented to the ED on 4/3 due to worsening weakness, persistent fevers, dyspnea and readmitted with hypoxia. He was treated with non-rebreather in ICU, and improved. He previously completed an antibiotic regimen at home. PT/OT recommended SNF placement. Patient admitted to facility and placed in isolation unit per their protocol.     Patient will be treated at [facility] with PT and OT to improve functional status and ability to perform ADLs.     Interval history: Telemedicine visit completed via FaceTime per nurse at bedside. /72, HR 82, O2 sats 99% on nasal cannula O2.  Most recent weight 153 lb, will trend.  Blood sugar ranging 148-211, stable on current regimen.  No complaints from patient today.  No shortness of breath.  He is progressing slowly with PT/OT to regain strength.  No concerns per nursing. He has moved off of [address] upstairs to the regular floor per facility protocol, they are basing their decisions off of their own criteria at this facility from consultation with another physician. Will continue to monitor and treat of chronic conditions.    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    Past Surgical History: Patient has no past surgical history on file.    Social History: Patient reports that he quit smoking about 30 years ago. He has a 40.00 pack-year smoking history. He quit smokeless tobacco use about 34 years ago. He reports that he drank alcohol. He reports that he does not use drugs.    Family History: family history includes Asbestos in his father; Cancer in his mother.    Allergies: Patient has No Known Allergies.    ROS  Constitutional: Negative for fever or fatigue.   Eyes: Negative for blurred vision, double vision and discharge.   Respiratory: Negative for cough, + shortness of breath, intermittent, and wheezing.    Cardiovascular: Negative for chest pain, palpitations, and leg swelling.   Gastrointestinal: Negative for abdominal pain, constipation, diarrhea, nausea and vomiting.   Genitourinary: Negative for dysuria, frequency and urgency.   Musculoskeletal:  + generalized weakness. Negative for back pain and myalgias.   Skin: Negative for itching and rash.   Neurological: Negative for dizziness, speech change, and headaches.   Psychiatric/Behavioral: Negative for depression. The patient is not nervous/anxious.      PEx per LPN at bedside     Constitutional: Patient appears well-developed and in no distress   Head: Normocephalic and atraumatic.   Eyes: Pupils are equal, round, and reactive to light.   Neck: Normal range of motion. Neck supple.   Cardiovascular: Normal rate, regular rhythm and normal heart sounds.    Pulmonary/Chest: Effort normal and breath sounds are diminished, + NC O2  Abdominal: Soft. Bowel sounds are normal.   Musculoskeletal: Normal range of motion.   Neurological: Alert and oriented to person, place, and time.   Psychiatric: Normal mood and affect. Behavior is normal.   Skin: Skin is warm and dry.       Assessment and Plan:    Aftercare of recent COVID-19 virus  infection, ongoing  -COVID 19 test + on 3/28/19  Recent Hx of Acute respiratory distress syndrome (ARDS), improved  -Completed course of off-label azithromycin and hydroxychloroquine.  -4/27 continue NC O2, Alb inh, Atrovent nasal spray 4 times daily    Debility r/t COVID 19 infection, ongoing  - Continue with PT/OT for gait training and strengthening and restoration of ADL's   - Encourage mobility, OOB in chair, and early ambulation as appropriate  - Fall precautions   - Monitor for bowel and bladder dysfunction  - Monitor for and prevent skin breakdown and pressure ulcers  - Continue  Zofran p.r.n., senna -Colace b.i.d., vitamin-D 3 b.i.d.  -4/27 continue PT/OT     Type 2 diabetes mellitus with diabetic polyneuropathy, without long-term current use of insulin,  Chronic, stable  - 4/27 continue metformin b.i.d., Accu-Cheks, SSI NovoLog     Essential hypertension, chronic, stable   -4/27 continue Benicar q.d.    Continued     Reactive depression, initial episode, new  -Started Celexa during admit, continue on SNF      BPH, chronic, stable   -Continue Flomax     allergic rhinitis, chronic, stable   -continue  Flonase     remote history of chronic hep C infection    Future Appointments   Date Time Provider Department Center   5/4/2020  8:30 AM Lucia Aguiar DPM [facility] KELLY [place]   5/11/2020  2:00 PM ULTRASOUND, UROLOGY [facility] [place]   6/1/2020  1:15 PM EDY Love MD [facility] [place]   6/8/2020  8:40 AM Lucas Lloyd Jr., MD [facility]             Myrna Pritchard NP          Patient note was created using MModal Dictation.  Any errors in syntax or even information may not have been identified and edited on initial review prior to signing this note.

## 2020-04-24 NOTE — TELEPHONE ENCOUNTER
Call pt daughter as requested pt daughter states my dad is on medication for depress pt daughter also states she had some [place] about medication . Staff now advising pt daughter regarding medication pt daughter verbalized with understanding  requesting virtual visit staff now advising pt daughter visit with pt  Pt daughter verbalized with understanding daughter states that there no need for appt with provider at this time will call nursing home provider about medication thank you

## 2020-04-24 NOTE — TELEPHONE ENCOUNTER
----- Message from Krystina Stout sent at 4/24/2020 12:52 PM CDT -----  Contact: cinthia /daughter /[phone]   Name of Who is Calling:     What is the request in detail: request call back in reference to questions/concerns about medication that is being giving to above patient  Please contact to further discuss and advise      Can the clinic reply by [facility]: no     What Number to Call Back if not in [facility]:  cinthia /daughter /[phone]

## 2020-04-27 ENCOUNTER — DOCUMENTATION ONLY (OUTPATIENT)
Dept: HEPATOLOGY | Facility: HOSPITAL | Age: 85
End: 2020-04-27

## 2020-04-27 ENCOUNTER — TELEPHONE (OUTPATIENT)
Dept: FAMILY MEDICINE | Facility: CLINIC | Age: 85
End: 2020-04-27

## 2020-04-27 NOTE — TELEPHONE ENCOUNTER
----- Message from Farnaz Page MA sent at 4/27/2020  9:48 AM CDT -----  Contact: Nando Rodriguez   It was just a FYI the daughter just wanted you to know her dad's status  ----- Message -----  From: Lucas Lloyd Jr., MD  Sent: 4/24/2020   2:22 PM CDT  To: [facility]ino Zav[facility] Staff    I am not in the office until Monday.   She can send your question, but I will not be back until Monday  ----- Message -----  From: Farnaz Page MA  Sent: 4/23/2020   9:42 AM CDT  To: Lucas Lloyd Jr., MD    [facility] phone# [phone]  Because of weakness and need for rehab.        ----- Message -----  From: Melchor Davis  Sent: 4/23/2020   9:35 AM CDT  To: [facility]ino Zavala Staff    Name of Who is Calling: Nando Rodriguez    What is the request in detail:Nando Rodriguez is requesting a call back ion regards to father being discharged on yesterday and having to go to a nursing home ....   Please contact to further discuss and advise      Can the clinic reply by [facility]: no     [facility]:  [phone] Mckinley Rodriguez

## 2020-04-27 NOTE — TELEPHONE ENCOUNTER
Spoke to daughter on phone. She wanted to make sure I was aware of dad being in SNF and that they would be able to communicate with me if needed.

## 2020-04-29 ENCOUNTER — DOCUMENTATION ONLY (OUTPATIENT)
Dept: HEPATOLOGY | Facility: HOSPITAL | Age: 85
End: 2020-04-29

## 2020-04-29 NOTE — PROGRESS NOTES
[facility]                                 Skilled Nursing Facility                   Progress Note     Admit Date: 4/22/20  GOLDEN   Principal Problem:  Debility R/T COVID 19 infection  HPI obtained from patient interview and chart review     Telemedicine Visit:  Originating site: [facility] [address] [place] [address]  Distant Site: [address], [place] [address]     Start Time:1000  Stop Time:1020    Visit Date: 4/29/2020    Chief Complaint:  BP/HR monitoring, PT/OT progression    HPI:   Mr. Lopez is an 83 yo M with a h/o NIDDM2, HTN, chronic HCV. He was recently diagnosed with COVID-19 on 03/28/2019, discharged home on 3/30. He presented to the ED on 4/3 due to worsening weakness, persistent fevers, dyspnea and readmitted with hypoxia. He was treated with non-rebreather in ICU, and improved. He previously completed an antibiotic regimen at home. PT/OT recommended SNF placement. Patient admitted to facility and placed in isolation unit per their protocol.     Patient will be treated at [facility] SNF with PT and OT to improve functional status and ability to perform ADLs.     Interval history: Telemedicine visit completed via FaceTime per nurse at bedside. /78, HR 74, O2 sats 98% on nasal cannula O2.   Blood sugar ranging 113, stable on current regimen.  No complaints from patient today.  No shortness of breath.  He is progressing slowly with PT/OT to regain strength.  No concerns per nursing. He has moved off of [place] [id number] zhang upstairs to the regular floor per facility protocol, they are basing their decisions off of their own criteria at this facility from consultation with another physician. Will continue to monitor and treat of chronic conditions.    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    Past Surgical History: Patient has no past surgical history on file.    Social History: Patient reports that he quit smoking about 30 years ago. He has a 40.00 pack-year smoking history. He quit smokeless tobacco use about 34 years ago. He reports that he drank alcohol. He reports that he does not use drugs.    Family History: family history includes Asbestos in his father; Cancer in his mother.    Allergies: Patient has No Known Allergies.    ROS  Constitutional: Negative for fever or fatigue.   Eyes: Negative for blurred vision, double vision and discharge.   Respiratory: Negative for cough, + shortness of breath, intermittent, and wheezing.    Cardiovascular: Negative for chest pain, palpitations, and leg swelling.   Gastrointestinal: Negative for abdominal pain, constipation, diarrhea, nausea and vomiting.   Genitourinary: Negative for dysuria, frequency and urgency.   Musculoskeletal:  + generalized weakness. Negative for back pain and myalgias.   Skin: Negative for itching and rash.   Neurological: Negative for dizziness, speech change, and headaches.   Psychiatric/Behavioral: Negative for depression. The patient is not nervous/anxious.      PEx per LPN at bedside     Constitutional: Patient appears well-developed and in no distress   Head: Normocephalic and atraumatic.   Eyes: Pupils are equal, round, and reactive to light.   Neck: Normal range of motion. Neck supple.   Cardiovascular: Normal rate, regular rhythm and normal heart sounds.    Pulmonary/Chest: Effort normal and breath sounds are diminished, + NC O2  Abdominal: Soft. Bowel sounds are normal.   Musculoskeletal: Normal range of motion.   Neurological: Alert and oriented to person, place, and time.   Psychiatric: Normal mood and affect. Behavior is normal.   Skin: Skin is warm and dry.       Assessment and Plan:    Aftercare of recent COVID-19 virus infection, ongoing  -COVID 19  test + on 3/28/19  Recent Hx of Acute respiratory distress syndrome (ARDS), improved  -Completed course of off-label azithromycin and hydroxychloroquine.  -4/29 continue NC O2, Alb inh, Atrovent nasal spray 4 times daily    Debility r/t COVID 19 infection, ongoing  - Continue with PT/OT for gait training and strengthening and restoration of ADL's   - Encourage mobility, OOB in chair, and early ambulation as appropriate  - Fall precautions   - Monitor for bowel and bladder dysfunction  - Monitor for and prevent skin breakdown and pressure ulcers  - Continue  Zofran p.r.n., senna -Colace b.i.d., vitamin-D 3 b.i.d.  -4/29 continue PT/OT     Type 2 diabetes mellitus with diabetic polyneuropathy, without long-term current use of insulin,  Chronic, stable  - 4/29 continue metformin b.i.d., Accu-Cheks, SSI NovoLog     Essential hypertension, chronic, stable   -4/29 continue Benicar q.d.    Continued     Reactive depression, initial episode, new  -Started Celexa during admit, continue on SNF      BPH, chronic, stable   -Continue Flomax     allergic rhinitis, chronic, stable   -continue  Flonase     remote history of chronic hep C infection    Future Appointments   Date Time Provider Department Center   5/11/2020  2:00 PM ULTRASOUND, UROLOGY [facility]   6/1/2020  1:15 PM EDY Love MD [facility]   6/8/2020  8:40 AM Lucas Lloyd Jr., MD [facility] - B   6/22/2020  8:45 AM Lucia Aguiar DPM [facility] POD Jo Pritchadr NP          Patient note was created using MModal Dictation.  Any errors in syntax or even information may not have been identified and edited on initial review prior to signing this note.

## 2020-05-02 NOTE — PROGRESS NOTES
[facility]                                 Skilled Nursing Facility                   Progress Note     Admit Date: 4/22/20  GOLDEN   Principal Problem:  Debility R/T COVID 19 infection  HPI obtained from patient interview and chart review       Visit Date: 5/3/2020    Chief Complaint:  BP/HR monitoring, PT/OT progression    HPI:   Mr. Lopez is an 83 yo M with a h/o NIDDM2, HTN, chronic HCV. He was recently diagnosed with COVID-19 on 03/28/2019, discharged home on 3/30. He presented to the ED on 4/3 due to worsening weakness, persistent fevers, dyspnea and readmitted with hypoxia. He was treated with non-rebreather in ICU, and improved. He previously completed an antibiotic regimen at home. PT/OT recommended SNF placement. Patient admitted to facility and placed in isolation unit per their protocol.     Patient will be treated at [facility] SNF with PT and OT to improve functional status and ability to perform ADLs.     Interval history: BP/HR stable, O2 sats 96 % on nasal cannula O2.  Blood sugar stable on current regimen.  No complaints from patient today.  No shortness of breath.  He is progressing slowly with PT/OT to regain strength.  Patient is reporting constipation, states that he takes Mag citrate at home once a week for bowel regulation, will adjust BM regulation medications today.  Patient is also concerned that his vital signs and oxygen are not being checked more frequently, will discuss with facility staff.  No concerns per nursing. He has moved off of [place] [id number] zhang upstairs to the regular floor per facility protocol, they are basing their decisions off of their own criteria at this facility from consultation with another physician. Will continue to monitor and treat of chronic conditions.    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    Past Surgical History: Patient has no past surgical history on file.    Social History: Patient reports that he quit smoking about 30 years ago. He has a 40.00 pack-year smoking history. He quit smokeless tobacco use about 34 years ago. He reports that he drank alcohol. He reports that he does not use drugs.    Family History: family history includes Asbestos in his father; Cancer in his mother.    Allergies: Patient has No Known Allergies.    ROS  Constitutional: Negative for fever or fatigue.   Eyes: Negative for blurred vision, double vision and discharge.   Respiratory: Negative for cough, -shortness of breath, intermittent, and wheezing.    Cardiovascular: Negative for chest pain, palpitations, and leg swelling.   Gastrointestinal: Negative for abdominal pain, + constipation, -diarrhea, nausea and vomiting.   Genitourinary: Negative for dysuria, frequency and urgency.   Musculoskeletal:  + generalized weakness. Negative for back pain and myalgias.   Skin: Negative for itching and rash.   Neurological: Negative for dizziness, speech change, and headaches.   Psychiatric/Behavioral: Negative for depression. The patient is not nervous/anxious.      PEx     Constitutional: Patient appears well-developed and in no distress   Head: Normocephalic and atraumatic.   Eyes: Pupils are equal, round, and reactive to light.   Neck: Normal range of motion. Neck supple.   Cardiovascular: Normal rate, regular rhythm and normal heart sounds.    Pulmonary/Chest: Effort normal and breath sounds are diminished, + NC O2  Abdominal: Soft. Bowel sounds are normal.   Musculoskeletal: Normal range of motion.   Neurological: Alert and oriented to person, place, and time.   Psychiatric: Normal mood and affect. Behavior is normal.   Skin: Skin is warm and dry.       Assessment and Plan:    Aftercare of recent COVID-19 virus infection, ongoing  -COVID 19  test + on 3/28/19  Recent Hx of Acute respiratory distress syndrome (ARDS), improved  -Completed course of off-label azithromycin and hydroxychloroquine.  -5/3 continue NC O2, Alb inh, Atrovent nasal spray 4 times daily    Mild constipation, new  Debility r/t COVID 19 infection, ongoing  - Continue with PT/OT for gait training and strengthening and restoration of ADL's   - Encourage mobility, OOB in chair, and early ambulation as appropriate  - Fall precautions   - Monitor for bowel and bladder dysfunction  - Monitor for and prevent skin breakdown and pressure ulcers  - Continue  Zofran p.r.n., senna -Colace b.i.d., vitamin-D 3 b.i.d.  -5/3 initiate MiraLax q.d., initiate lactulose 30 g p.r.n. no BM x2 days, initiate vital signs with O2 sat to be checked Q shift, continue PT/OT     Type 2 diabetes mellitus with diabetic polyneuropathy, without long-term current use of insulin,  Chronic, stable  - 5/3 continue metformin b.i.d., Accu-Cheks, SSI NovoLog     Essential hypertension, chronic, stable   -5/3 continue Benicar q.d.    Continued     Reactive depression, initial episode, new  -Started Celexa during admit, continue on SNF      BPH, chronic, stable   -Continue Flomax     allergic rhinitis, chronic, stable   -continue  Flonase     remote history of chronic hep C infection    Future Appointments   Date Time Provider Department Center   5/11/2020  2:00 PM ULTRASOUND, UROLOGY [facility] [place]   6/1/2020  1:15 PM EDY Love MD [facility]   6/8/2020  8:40 AM Lucas Lloyd Jr., MD [facility] - B   6/22/2020  8:45 AM Lucia Aguiar DPM [facility] POD Jo Pritchard, [place]          Patient note was created using MModal Dictation.  Any errors in syntax or even information may not have been identified and edited on initial review prior to signing this note.

## 2020-05-03 ENCOUNTER — DOCUMENTATION ONLY (OUTPATIENT)
Dept: HEPATOLOGY | Facility: HOSPITAL | Age: 85
End: 2020-05-03

## 2020-05-05 NOTE — PROGRESS NOTES
[facility]                                                                                Skilled nursing facility                                                                                  Discharge Summary      Date of Discharge: 5/8/2020  Admit Date: 4/22/20  Principal Problem:  Debility R/T COVID 19 infection    HPI:   Mr. Lopez is an 83 yo M with a h/o NIDDM2, HTN, chronic HCV. He was recently diagnosed with COVID-19 on 03/28/2019, discharged home on 3/30. He presented to the ED on 4/3 due to worsening weakness, persistent fevers, dyspnea and readmitted with hypoxia. He was treated with non-rebreather in ICU, and improved. He previously completed an antibiotic regimen at home. PT/OT recommended SNF placement. Patient admitted to facility and placed in isolation unit per their protocol.     Hospital Course  Patient progressed well with PT and OT. Patient had no significant events during their stay at SNF. Home health was set up. DME was ordered if needed. Follow up appointment to be made by patient within one week. All prescriptions and discharge instructions were ordered to be given to the patient prior to discharge.     PEx     Constitutional: Patient appears well-developed and in no distress   Head: Normocephalic and atraumatic.   Eyes: Pupils are equal, round, and reactive to light.   Neck: Normal range of motion. Neck supple.   Cardiovascular: Normal rate, regular rhythm and normal heart sounds.    Pulmonary/Chest: Effort normal and breath sounds are diminished, + NC O2  Abdominal: Soft. Bowel sounds are normal.   Musculoskeletal: Normal range of motion.   Neurological: Alert and oriented to person, place, and time.   Psychiatric: Normal mood and affect. Behavior is normal.   Skin: Skin is warm and dry.        Discharge Diet:regular diet with Normal Fluid intake of 1500 - 2000 mL per day    Activity: activity as  tolerated    Discharge Condition: Stable    Disposition: Home-Health Care Svc    Tests pending at the time of discharge: none    Assessment and Plan:    Aftercare of recent COVID-19 virus infection, ongoing  -COVID 19 test + on 3/28/19  Recent Hx of Acute respiratory distress syndrome (ARDS), improved  -Completed course of off-label azithromycin and hydroxychloroquine.    Mild constipation, improved  Debility r/t COVID 19 infection, ongoing  - Continue with PT/OT for gait training and strengthening and restoration of ADL's   - Encourage mobility, OOB in chair, and early ambulation as appropriate  - Fall precautions   - Monitor for bowel and bladder dysfunction  - Monitor for and prevent skin breakdown and pressure ulcers  - Continue  Zofran p.r.n., senna -Colace b.i.d., vitamin-D 3 b.i.d.     Type 2 diabetes mellitus with diabetic polyneuropathy, without long-term current use of insulin,  Chronic, stable  - continue metformin b.i.d., Accu-Cheks, SSI NovoLog     Essential hypertension, chronic, stable   -continue Benicar q.d.    Reactive depression, initial episode, new  -Started Celexa during admit     BPH, chronic, stable   -Continue Flomax     allergic rhinitis, chronic, stable   -continue  Flonase     remote history of chronic hep C infection    Future Appointments   Date Time Provider Department Center   6/8/2020  8:40 AM Lucas Lloyd Jr., MD [facility]   6/22/2020  8:45 AM Lucia Aguiar DPM [facility] KELLY Osorio       Time spent on the discharge of patient: 36 minutes.  Patient was seen and examined on the date of discharge and determined to be suitable for discharge.      Myrna Pritchard, NP

## 2020-05-06 ENCOUNTER — DOCUMENTATION ONLY (OUTPATIENT)
Dept: HEPATOLOGY | Facility: HOSPITAL | Age: 85
End: 2020-05-06

## 2020-05-09 PROCEDURE — G0180 MD CERTIFICATION HHA PATIENT: HCPCS | Mod: ,,, | Performed by: INTERNAL MEDICINE

## 2020-05-09 PROCEDURE — G0180 PR HOME HEALTH MD CERTIFICATION: ICD-10-PCS | Mod: ,,, | Performed by: INTERNAL MEDICINE

## 2020-05-11 ENCOUNTER — TELEPHONE (OUTPATIENT)
Dept: FAMILY MEDICINE | Facility: CLINIC | Age: 85
End: 2020-05-11

## 2020-05-11 NOTE — TELEPHONE ENCOUNTER
"----- Message from Natalia Tompkins MA sent at 5/8/2020  1:03 PM CDT -----  Contact: Mckinley"daughter"[phone]      ----- Message -----  From: Alyssia Thompson  Sent: 5/8/2020  11:59 AM CDT  To: Gabino Zavala Staff    Type: Patient Call Back    Who called: Mckinley "daughter"    What is the request in detail: calling in regards to patient being released from the nursing home and he is being released with oxygen and home health. Just wanted to inform the doctor and also requesting a sooner follow up appointment with the doctor because the next available is in July    Can the clinic reply by [facility]? Call back    Would the patient rather a call back or a response via [facility]? Call back    Best call back number: [phone]          "

## 2020-05-11 NOTE — TELEPHONE ENCOUNTER
Pt daughter contacted and pt was scheduled for a hospital follow up on 05/26/2020 at 1:00PM with PCP.

## 2020-05-12 DIAGNOSIS — R39.12 WEAK URINARY STREAM: ICD-10-CM

## 2020-05-12 DIAGNOSIS — R33.9 INCOMPLETE BLADDER EMPTYING: ICD-10-CM

## 2020-05-12 RX ORDER — TAMSULOSIN HYDROCHLORIDE 0.4 MG/1
0.4 CAPSULE ORAL DAILY
Qty: 30 CAPSULE | Refills: 11 | Status: SHIPPED | OUTPATIENT
Start: 2020-05-12 | End: 2021-02-15 | Stop reason: SDUPTHER

## 2020-05-26 ENCOUNTER — OFFICE VISIT (OUTPATIENT)
Dept: FAMILY MEDICINE | Facility: CLINIC | Age: 85
End: 2020-05-26
Payer: MEDICARE

## 2020-05-26 VITALS
BODY MASS INDEX: 22.19 KG/M2 | HEIGHT: 70 IN | HEART RATE: 100 BPM | WEIGHT: 155 LBS | RESPIRATION RATE: 16 BRPM | OXYGEN SATURATION: 97 % | DIASTOLIC BLOOD PRESSURE: 68 MMHG | TEMPERATURE: 98 F | SYSTOLIC BLOOD PRESSURE: 120 MMHG

## 2020-05-26 DIAGNOSIS — U07.1 COVID-19 VIRUS INFECTION: Primary | ICD-10-CM

## 2020-05-26 DIAGNOSIS — F32.9 REACTIVE DEPRESSION: ICD-10-CM

## 2020-05-26 DIAGNOSIS — E88.09 HYPOALBUMINEMIA: ICD-10-CM

## 2020-05-26 DIAGNOSIS — I10 ESSENTIAL HYPERTENSION: ICD-10-CM

## 2020-05-26 DIAGNOSIS — E44.0 MODERATE PROTEIN-CALORIE MALNUTRITION: ICD-10-CM

## 2020-05-26 DIAGNOSIS — R19.7 DIARRHEA, UNSPECIFIED TYPE: ICD-10-CM

## 2020-05-26 DIAGNOSIS — E11.21 DIABETES MELLITUS WITH NEPHROPATHY: ICD-10-CM

## 2020-05-26 PROCEDURE — 99999 PR PBB SHADOW E&M-EST. PATIENT-LVL IV: ICD-10-PCS | Mod: PBBFAC,,, | Performed by: FAMILY MEDICINE

## 2020-05-26 PROCEDURE — 99999 PR PBB SHADOW E&M-EST. PATIENT-LVL IV: CPT | Mod: PBBFAC,,, | Performed by: FAMILY MEDICINE

## 2020-05-26 PROCEDURE — 99214 PR OFFICE/OUTPT VISIT, EST, LEVL IV, 30-39 MIN: ICD-10-PCS | Mod: S$GLB,,, | Performed by: FAMILY MEDICINE

## 2020-05-26 PROCEDURE — 1159F PR MEDICATION LIST DOCUMENTED IN MEDICAL RECORD: ICD-10-PCS | Mod: S$GLB,,, | Performed by: FAMILY MEDICINE

## 2020-05-26 PROCEDURE — 3074F SYST BP LT 130 MM HG: CPT | Mod: CPTII,S$GLB,, | Performed by: FAMILY MEDICINE

## 2020-05-26 PROCEDURE — 3078F PR MOST RECENT DIASTOLIC BLOOD PRESSURE < 80 MM HG: ICD-10-PCS | Mod: CPTII,S$GLB,, | Performed by: FAMILY MEDICINE

## 2020-05-26 PROCEDURE — 1101F PR PT FALLS ASSESS DOC 0-1 FALLS W/OUT INJ PAST YR: ICD-10-PCS | Mod: CPTII,S$GLB,, | Performed by: FAMILY MEDICINE

## 2020-05-26 PROCEDURE — 99214 OFFICE O/P EST MOD 30 MIN: CPT | Mod: S$GLB,,, | Performed by: FAMILY MEDICINE

## 2020-05-26 PROCEDURE — 99499 UNLISTED E&M SERVICE: CPT | Mod: S$GLB,,, | Performed by: FAMILY MEDICINE

## 2020-05-26 PROCEDURE — 99499 RISK ADDL DX/OHS AUDIT: ICD-10-PCS | Mod: S$GLB,,, | Performed by: FAMILY MEDICINE

## 2020-05-26 PROCEDURE — 3078F DIAST BP <80 MM HG: CPT | Mod: CPTII,S$GLB,, | Performed by: FAMILY MEDICINE

## 2020-05-26 PROCEDURE — 3074F PR MOST RECENT SYSTOLIC BLOOD PRESSURE < 130 MM HG: ICD-10-PCS | Mod: CPTII,S$GLB,, | Performed by: FAMILY MEDICINE

## 2020-05-26 PROCEDURE — 1159F MED LIST DOCD IN RCRD: CPT | Mod: S$GLB,,, | Performed by: FAMILY MEDICINE

## 2020-05-26 PROCEDURE — 1101F PT FALLS ASSESS-DOCD LE1/YR: CPT | Mod: CPTII,S$GLB,, | Performed by: FAMILY MEDICINE

## 2020-05-26 NOTE — PATIENT INSTRUCTIONS
Celexa- One day yes and one day no for 1 week. If after 1 week being off of it, diarrhea continues, let me know so we can do stools studies

## 2020-05-26 NOTE — PROGRESS NOTES
"Subjective:       Patient ID: Peter Lopez is a 84 y.o. male.    Chief Complaint: Hospital Follow Up    HPI   84 year old male comes in for follow up from hospitalization and post discharge nursing home stay. He was admitted from 04/3/2020 tp 4/22/2020 for acute respiratory distress syndrome due to COVID-19 virus infection. He was discharge on oxygen to nursing facility. He was discharged from skilled nursing to home with home health on 05/08/2020 and with home oxygen. He was also receiving home oxygen. He states that he is eaing better but knows he has lost some weight. He still feels a bit tired. He has not been able to resume all his activities without assistance. He reports that he is in daily communication with his daughter.   He does reports that since starting depression medication he has been having diarrhea. He would like to discontinue the medication as he feels he does not need it.     Review of Systems   Constitutional: Positive for activity change and fatigue. Negative for diaphoresis and fever.   Respiratory: Negative for cough, shortness of breath and wheezing.    Cardiovascular: Negative for chest pain.   Gastrointestinal: Positive for diarrhea. Negative for abdominal pain, anal bleeding, blood in stool, nausea and rectal pain.   Genitourinary: Negative for dysuria, flank pain and frequency.   Musculoskeletal: Positive for gait problem. Negative for joint swelling.   Skin: Negative for rash.       Objective:   /68 (BP Location: Left arm, Patient Position: Sitting, BP Method: Medium (Manual))   Pulse 100   Temp 97.6 °F (36.4 °C) (Oral)   Resp 16   Ht 5' 10" (1.778 m)   Wt 70.3 kg (154 lb 15.7 oz)   SpO2 97%   BMI 22.24 kg/m²     Physical Exam   Constitutional: No distress.   Walking with assistance of walker   HENT:   Head: Normocephalic and atraumatic.   Eyes: Pupils are equal, round, and reactive to light. EOM are normal.   Neck: Normal range of motion. Neck supple.   Cardiovascular: Normal " rate, regular rhythm, normal heart sounds and intact distal pulses.   Pulmonary/Chest: Effort normal and breath sounds normal.   Abdominal: Bowel sounds are normal.       Assessment:       1. COVID-19 virus infection    2. Essential hypertension    3. Reactive depression    4. Hypoalbuminemia    5. Moderate protein-calorie malnutrition    6. Diabetes mellitus with nephropathy    7. Diarrhea, unspecified type        Plan:       Peter was seen today for hospital follow up.    Diagnoses and all orders for this visit:    COVID-19 virus infection  Patient continues to improve.  Continue monitoring    Essential hypertension  -     Comprehensive metabolic panel; Future  Will send orders to home nurse to check 2 or 3 times weekly. If BP continues low, will decrease dose of meds.    Reactive depression  Will wean of Celexa.  One day yes and one day now.  If diarrhea continues for a week after, will let me know and I will order stools studies.    Hypoalbuminemia  -     Comprehensive metabolic panel; Future    Moderate protein-calorie malnutrition  -     CBC auto differential; Future  -     Iron and TIBC; Future    Diabetes mellitus with nephropathy  -     Comprehensive metabolic panel; Future  -     Hemoglobin A1C; Future    Diarrhea, unspecified  See above

## 2020-05-28 ENCOUNTER — TELEPHONE (OUTPATIENT)
Dept: FAMILY MEDICINE | Facility: CLINIC | Age: 85
End: 2020-05-28

## 2020-05-28 NOTE — TELEPHONE ENCOUNTER
Called Mckinley back concerning Celexa instructions for pt. Addressed directions per MD to inform the pt and verbalized understanding. Awaiting fax from [employer].

## 2020-05-28 NOTE — TELEPHONE ENCOUNTER
----- Message from Jorge Taylor sent at 5/28/2020  8:06 AM CDT -----  Contact: Mckinley (daughter)  [phone]  Type: Patient Call Back    Who called:Mckinley     What is the request in detail: Mckinley, daughter of the patient is requesting a call back from the staff in regards to medication: celexa. She wants to know how is the patient supposed to be taking it, as she was told he takes it every other day. The patient has already taken it on: Sunday- Tuesday of this week, skipped yesterday and is taking it today. She wants to know if he should take it on Saturday. Also, she would like to know if paperwork has been received from [employer] on behalf of the patient.    Can the clinic reply by [facility]?no    Would the patient rather a call back or a response via [facility]? Call back     Best call back number:[phone]

## 2020-05-29 ENCOUNTER — TELEPHONE (OUTPATIENT)
Dept: FAMILY MEDICINE | Facility: CLINIC | Age: 85
End: 2020-05-29

## 2020-05-29 NOTE — TELEPHONE ENCOUNTER
----- Message from Nelsy Xie sent at 5/29/2020 10:05 AM CDT -----  Contact: Madison from [facility]   Who called:Madison from [facility]     What is the request in detail: Madison is requesting a call back. She would like to know if the staff has any additional orders for home health to monitor the pt blood pressure. She states they were planning on discharging the pt this week, so they would like a call back before the nurse gets in.   Please advise.    Can the clinic reply by [facility]? No    Best call back number: [phone] ext. [phone]    Additional Information: N/A

## 2020-05-29 NOTE — TELEPHONE ENCOUNTER
I spoke to Nilda and they will continue to see pt for BP checks pending insurance approval. They will fax blood pressure readings. They can authorize for two weeks and then reevaluate.

## 2020-06-01 ENCOUNTER — TELEPHONE (OUTPATIENT)
Dept: FAMILY MEDICINE | Facility: CLINIC | Age: 85
End: 2020-06-01

## 2020-06-01 NOTE — TELEPHONE ENCOUNTER
----- Message from Florina Davis sent at 6/1/2020 10:40 AM CDT -----  Type: Patient Call Back    Who called: Self     What is the request in detail:Patient would like to speak with the nurse to find out if he should stop taking citalopram (CELEXA) 10 MG tablet    Can the clinic reply by [facility]? No     Would the patient rather a call back or a response via [facility]? Call     Best call back number: [phone]

## 2020-06-01 NOTE — TELEPHONE ENCOUNTER
Calling patients daughter because her father is confused about Celexa about the diarrhea and patient wants to know if he should stop taking the medication or continue and diarrhea has stopped since Saturday.

## 2020-06-05 ENCOUNTER — TELEPHONE (OUTPATIENT)
Dept: FAMILY MEDICINE | Facility: CLINIC | Age: 85
End: 2020-06-05

## 2020-06-05 DIAGNOSIS — R09.02 HYPOXIA: Primary | ICD-10-CM

## 2020-06-05 NOTE — TELEPHONE ENCOUNTER
Called Charlene about receiving the [employer] forms, no answer. Voice message left to call the office. Please advise.

## 2020-06-05 NOTE — TELEPHONE ENCOUNTER
----- Message from Oriana Snell sent at 6/5/2020  4:06 PM CDT -----  Contact: Nando Chang [phone]  Type: Patient Call Back    Who called: Nando Chang    What is the request in detail: Calling to find out if Dr Lloyd sent paperwork back to [employer], for short term disability. Need to know if Please call.     Would the patient rather a call back or a response via [facility]? Call back     Best call back number: [phone]

## 2020-06-08 ENCOUNTER — DOCUMENT SCAN (OUTPATIENT)
Dept: HOME HEALTH SERVICES | Facility: HOSPITAL | Age: 85
End: 2020-06-08
Payer: MEDICARE

## 2020-06-12 ENCOUNTER — OFFICE VISIT (OUTPATIENT)
Dept: PULMONOLOGY | Facility: CLINIC | Age: 85
End: 2020-06-12
Payer: MEDICARE

## 2020-06-12 ENCOUNTER — HOSPITAL ENCOUNTER (OUTPATIENT)
Dept: RADIOLOGY | Facility: HOSPITAL | Age: 85
Discharge: HOME | End: 2020-06-12
Attending: INTERNAL MEDICINE
Payer: MEDICARE

## 2020-06-12 VITALS
DIASTOLIC BLOOD PRESSURE: 70 MMHG | WEIGHT: 157.31 LBS | TEMPERATURE: 98 F | OXYGEN SATURATION: 94 % | SYSTOLIC BLOOD PRESSURE: 105 MMHG | HEIGHT: 70 IN | HEART RATE: 108 BPM | BODY MASS INDEX: 22.52 KG/M2

## 2020-06-12 DIAGNOSIS — R09.02 HYPOXIA: ICD-10-CM

## 2020-06-12 DIAGNOSIS — J84.9 ILD (INTERSTITIAL LUNG DISEASE): ICD-10-CM

## 2020-06-12 DIAGNOSIS — J96.11 CHRONIC RESPIRATORY FAILURE WITH HYPOXIA: ICD-10-CM

## 2020-06-12 PROCEDURE — 1126F PR PAIN SEVERITY QUANTIFIED, NO PAIN PRESENT: ICD-10-PCS | Mod: S$GLB,,, | Performed by: INTERNAL MEDICINE

## 2020-06-12 PROCEDURE — 1101F PT FALLS ASSESS-DOCD LE1/YR: CPT | Mod: CPTII,S$GLB,, | Performed by: INTERNAL MEDICINE

## 2020-06-12 PROCEDURE — 1126F AMNT PAIN NOTED NONE PRSNT: CPT | Mod: S$GLB,,, | Performed by: INTERNAL MEDICINE

## 2020-06-12 PROCEDURE — 1159F PR MEDICATION LIST DOCUMENTED IN MEDICAL RECORD: ICD-10-PCS | Mod: S$GLB,,, | Performed by: INTERNAL MEDICINE

## 2020-06-12 PROCEDURE — 99499 RISK ADDL DX/OHS AUDIT: ICD-10-PCS | Mod: S$GLB,,, | Performed by: INTERNAL MEDICINE

## 2020-06-12 PROCEDURE — 3074F SYST BP LT 130 MM HG: CPT | Mod: CPTII,S$GLB,, | Performed by: INTERNAL MEDICINE

## 2020-06-12 PROCEDURE — 99214 PR OFFICE/OUTPT VISIT, EST, LEVL IV, 30-39 MIN: ICD-10-PCS | Mod: S$GLB,,, | Performed by: INTERNAL MEDICINE

## 2020-06-12 PROCEDURE — 71046 XR CHEST PA AND LATERAL: ICD-10-PCS | Mod: 26,,, | Performed by: RADIOLOGY

## 2020-06-12 PROCEDURE — 1159F MED LIST DOCD IN RCRD: CPT | Mod: S$GLB,,, | Performed by: INTERNAL MEDICINE

## 2020-06-12 PROCEDURE — 71046 X-RAY EXAM CHEST 2 VIEWS: CPT | Mod: 26,,, | Performed by: RADIOLOGY

## 2020-06-12 PROCEDURE — 3078F DIAST BP <80 MM HG: CPT | Mod: CPTII,S$GLB,, | Performed by: INTERNAL MEDICINE

## 2020-06-12 PROCEDURE — 99999 PR PBB SHADOW E&M-EST. PATIENT-LVL V: ICD-10-PCS | Mod: PBBFAC,,, | Performed by: INTERNAL MEDICINE

## 2020-06-12 PROCEDURE — 99999 PR PBB SHADOW E&M-EST. PATIENT-LVL V: CPT | Mod: PBBFAC,,, | Performed by: INTERNAL MEDICINE

## 2020-06-12 PROCEDURE — 71046 X-RAY EXAM CHEST 2 VIEWS: CPT | Mod: TC,FY

## 2020-06-12 PROCEDURE — 3078F PR MOST RECENT DIASTOLIC BLOOD PRESSURE < 80 MM HG: ICD-10-PCS | Mod: CPTII,S$GLB,, | Performed by: INTERNAL MEDICINE

## 2020-06-12 PROCEDURE — 99214 OFFICE O/P EST MOD 30 MIN: CPT | Mod: S$GLB,,, | Performed by: INTERNAL MEDICINE

## 2020-06-12 PROCEDURE — 1101F PR PT FALLS ASSESS DOC 0-1 FALLS W/OUT INJ PAST YR: ICD-10-PCS | Mod: CPTII,S$GLB,, | Performed by: INTERNAL MEDICINE

## 2020-06-12 PROCEDURE — 99499 UNLISTED E&M SERVICE: CPT | Mod: S$GLB,,, | Performed by: INTERNAL MEDICINE

## 2020-06-12 PROCEDURE — 3074F PR MOST RECENT SYSTOLIC BLOOD PRESSURE < 130 MM HG: ICD-10-PCS | Mod: CPTII,S$GLB,, | Performed by: INTERNAL MEDICINE

## 2020-06-12 NOTE — LETTER
Ml 15, 2020      Lucas Lloyd Jr., MD  [address] [facility] [place] [address]           [facility]  [address]  [facility] [address]  Phone: [phone]  Fax: [phone]          Patient: Peter Lopez   MR Number: [id number]   YOB: 1935   Date of Visit: 6/12/2020       Dear Dr. Lucas Lloyd Jr.:    Thank you for referring Peter Lopez to me for evaluation. Attached you will find relevant portions of my assessment and plan of care.    If you have questions, please do not hesitate to call me. I look forward to following Peter Lopez along with you.    Sincerely,    Sarkis Hyman MD    Enclosure  CC:  No Recipients    If you would like to receive this communication electronically, please contact [email or web address] or [phone] to request more information on [facility] access.    For providers and/or their staff who would like to refer a patient to [facility], please contact us through our one-stop-shop provider referral line, [facility], at [phone].    If you feel you have received this communication in error or would no longer like to receive these types of communications, please e-mail [email or web address]

## 2020-06-12 NOTE — PROGRESS NOTES
Peter Lopez  was seen as a new patient at the request  Lucas Lloyd Jr., MD for the evaluation of  sob.    CHIEF COMPLAINT:  Shortness of Breath, Consult, and Hypoxia      HISTORY OF PRESENT ILLNESS: Peter Lopez is a 84 y.o. 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  Patient smoked .75 ppd x 40 years.  Patient quit smoking in 1990s.  Patient was recent diagnosed with covid 19 on 3/28/20.  Patient was discharged to home with home monitored.  Patient represented to ED of 3/29/20 and was discharged to home.  Represented back to ed on 4/3/20.  Hospitalized until 4/22/20 for hypoxic respiratory failure requiring nrb.  Subsequently discharged to [facility], [place].  Patient was discharged to home with 2 lpm.      Today, patient denied chest pain.  Able to perform adl.  Patient is hesitant to go outside due oxygen requirement.  No fever/chill.  No wheezing.  Occasional cough.  Good appetite.      PAST MEDICAL HISTORY:    Active Ambulatory Problems     Diagnosis Date Noted    Essential hypertension 10/02/2012    Type 2 diabetes mellitus with diabetic polyneuropathy, without long-term current use of insulin 10/16/2013    ILD (interstitial lung disease) 04/04/2020    Chronic respiratory failure with hypoxia 04/13/2020    Discharge planning issues 04/14/2020    Reactive depression 04/14/2020     Resolved Ambulatory Problems     Diagnosis Date Noted    Elevated PSA 01/10/2013    Hepatitis C 10/16/2013    BPH (benign prostatic hyperplasia) 04/05/2016    Atherosclerosis of aorta 05/03/2019    CKD (chronic kidney disease) stage 3, GFR 30-59 ml/min 01/28/2020    Hypoglycemia     Hypoxia 03/29/2020    Anemia 04/04/2020    Elevated troponin 04/04/2020     Past Medical History:   Diagnosis Date    Chronic hepatitis C without mention of hepatic coma     Diabetes mellitus type I      Hypertension     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 No pertinent surgical history.      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 Rescue 18 g 0    blood sugar diagnostic Strp To check BG 1 times daily, to use with insurance preferred meter 30 each 11    blood-glucose meter kit To check BG 1 times daily, to use with insurance preferred meter 1 each 0    citalopram (CELEXA) 10 MG tablet Take 1 tablet (10 mg total) by mouth once daily. 30 tablet 11    fluticasone propionate (FLONASE) 50 mcg/actuation nasal spray 1 spray (50 mcg total) by Each Nostril route once daily. 15.8 mL 1    ipratropium (ATROVENT) 42 mcg (0.06 %) nasal spray 2 sprays by Nasal route 4 (four) times daily. 15 mL 0    lancets Misc To check BG 1 times daily, to use with insurance preferred meter 30 each 11    metFORMIN (GLUCOPHAGE) 1000 MG tablet TAKE 1 TABLET BY MOUTH TWICE DAILY WITH MEALS 180 tablet 3    olmesartan-hydrochlorothiazide (BENICAR HCT) 40-25 mg per  "tablet TAKE 1 TABLET BY MOUTH ONCE DAILY 90 tablet 3    ondansetron (ZOFRAN-ODT) 4 MG TbDL Take 1 tablet (4 mg total) by mouth every 8 (eight) hours as needed. 15 tablet 0    senna-docusate 8.6-50 mg (PERICOLACE) 8.6-50 mg per tablet Take 1 tablet by mouth 2 (two) times daily.      tamsulosin (FLOMAX) 0.4 mg Cap Take 1 capsule (0.4 mg total) by mouth once daily. 30 capsule 11    vitamin D (VITAMIN D3) 1000 units Tab Take 1 tablet (1,000 Units total) by mouth 2 (two) times daily.       No current facility-administered medications for this visit.                   REVIEW OF SYSTEMS:     Pulmonary related symptoms as per HPI.  Gen:  no weight loss, no fever, no night sweat  HEENT:  no visual changes, no sore throat, + hearing loss  CV:  No chest pain, no orthopnea, no PND  GI:  no melena, no hematochezia, no diarhea, no constipation.  :  no dysuria, no hematuria, no hesistancy, no dribbling  Neuro:  no syncope, no vertigo, no tinitus  Psych:  No homocide or suicide ideation; no depression.  Endocrine:  No heat or cold intolerance.  Sleep:  No snoring; no witnessed apnea.  Feeling rested upon awake.    Otherwise, a balance of systems reviewed is negative.          PHYSICAL EXAM:  Vitals:    06/12/20 1112   BP: 105/70   Pulse: 108   Temp: 97.8 °F (36.6 °C)   TempSrc: Oral   SpO2: (!) 94%   Weight: 71.3 kg (157 lb 4.8 oz)   Height: 5' 10" (1.778 m)   PainSc: 0-No pain     Body mass index is 22.57 kg/m².     GENERAL:  well develop; no apparent distress  HEENT:  no nasal congestion; no discharge noted; class 2 modified mallampatti.  +denture   NECK:  supple; no palpable masses.  CARDIO: regular rate and rhythm  PULM:  clear to auscultation bilaterally; no intercostals retractions; no accessory muscle usage   ABDOMEN:  soft nontender/nondistended.  +bowel sound  EXTREMITIES no cce  NEURO:  CN II-XII intact.  5/5 motor in all extremities.  sensation grossly intact   to light touch.  PSYCH:  normal affect.  Alert and " oriented x 4    LABS  Pulmonary Functions Testing Results(personally reviewed):  none  ABG (personally reviewed):  4/4/20 7.48/38/90/28 on nrb  CXR (personally reviewed):  4/15/20 bilateral airspace disease  CT CHEST(personally reviewed):  None    covid 19 3/29/20 positive.        ASSESSMENT/PLAN  Problem List Items Addressed This Visit     Chronic respiratory failure with hypoxia    Overview     s/p hypoxic respiratory failure.  Bilateral airspace disease on cxr.  Clinically improved.           Current Assessment & Plan     Will send for 6 min walk to assess need for continue oxygen supplement.           Relevant Orders    X-Ray Chest PA And Lateral (Completed)    Complete PFT with bronchodilator    Stress test, pulmonary    ILD (interstitial lung disease)    Overview     Bilateral airspace disease on cxr from covid 19.  Will send baseline pft and 6 min walk.  cxr with improved aeration.         Relevant Orders    X-Ray Chest PA And Lateral (Completed)    Complete PFT with bronchodilator    Stress test, pulmonary      Other Visit Diagnoses     Hypoxia            covid 19 - diagnosed 3/28/20.  Doing better.      Tobacco abuse - quit since 1991.  Encourage continue with smoking cessation.        Patient will No follow-ups on file. with md/np.    CC: Send copy of this note to Lucas Lloyd Jr., MD

## 2020-06-15 ENCOUNTER — TELEPHONE (OUTPATIENT)
Dept: PULMONOLOGY | Facility: CLINIC | Age: 85
End: 2020-06-15

## 2020-06-15 NOTE — TELEPHONE ENCOUNTER
----- Message from Sarkis Hyman MD sent at 6/12/2020  5:01 PM CDT -----  Please advise patient that breathing test is much improved when compared to prior cxr in 4/20.  This suggest that lung injury from COVID infection is improving.

## 2020-06-15 NOTE — TELEPHONE ENCOUNTER
Contacted patient and informed patient of chest xray results per Dr. Hyman. Patient confirmed his PFT scheduled for June 19.

## 2020-06-16 ENCOUNTER — OFFICE VISIT (OUTPATIENT)
Dept: FAMILY MEDICINE | Facility: CLINIC | Age: 85
End: 2020-06-16
Payer: MEDICARE

## 2020-06-16 VITALS
TEMPERATURE: 97 F | DIASTOLIC BLOOD PRESSURE: 64 MMHG | RESPIRATION RATE: 12 BRPM | WEIGHT: 157.44 LBS | HEIGHT: 72 IN | SYSTOLIC BLOOD PRESSURE: 138 MMHG | BODY MASS INDEX: 21.32 KG/M2 | HEART RATE: 95 BPM | OXYGEN SATURATION: 94 %

## 2020-06-16 DIAGNOSIS — J96.11 CHRONIC RESPIRATORY FAILURE WITH HYPOXIA: Primary | ICD-10-CM

## 2020-06-16 DIAGNOSIS — E11.21 DIABETES MELLITUS WITH NEPHROPATHY: ICD-10-CM

## 2020-06-16 DIAGNOSIS — I10 ESSENTIAL HYPERTENSION: ICD-10-CM

## 2020-06-16 DIAGNOSIS — E83.52 HYPERCALCEMIA: ICD-10-CM

## 2020-06-16 DIAGNOSIS — R19.7 DIARRHEA, UNSPECIFIED TYPE: ICD-10-CM

## 2020-06-16 PROCEDURE — 99499 RISK ADDL DX/OHS AUDIT: ICD-10-PCS | Mod: S$GLB,,, | Performed by: FAMILY MEDICINE

## 2020-06-16 PROCEDURE — 3078F PR MOST RECENT DIASTOLIC BLOOD PRESSURE < 80 MM HG: ICD-10-PCS | Mod: CPTII,S$GLB,, | Performed by: FAMILY MEDICINE

## 2020-06-16 PROCEDURE — 99999 PR PBB SHADOW E&M-EST. PATIENT-LVL IV: CPT | Mod: PBBFAC,,, | Performed by: FAMILY MEDICINE

## 2020-06-16 PROCEDURE — 1159F MED LIST DOCD IN RCRD: CPT | Mod: S$GLB,,, | Performed by: FAMILY MEDICINE

## 2020-06-16 PROCEDURE — 99999 PR PBB SHADOW E&M-EST. PATIENT-LVL IV: ICD-10-PCS | Mod: PBBFAC,,, | Performed by: FAMILY MEDICINE

## 2020-06-16 PROCEDURE — 1126F PR PAIN SEVERITY QUANTIFIED, NO PAIN PRESENT: ICD-10-PCS | Mod: S$GLB,,, | Performed by: FAMILY MEDICINE

## 2020-06-16 PROCEDURE — 3075F SYST BP GE 130 - 139MM HG: CPT | Mod: CPTII,S$GLB,, | Performed by: FAMILY MEDICINE

## 2020-06-16 PROCEDURE — 1126F AMNT PAIN NOTED NONE PRSNT: CPT | Mod: S$GLB,,, | Performed by: FAMILY MEDICINE

## 2020-06-16 PROCEDURE — 99214 OFFICE O/P EST MOD 30 MIN: CPT | Mod: S$GLB,,, | Performed by: FAMILY MEDICINE

## 2020-06-16 PROCEDURE — 3075F PR MOST RECENT SYSTOLIC BLOOD PRESS GE 130-139MM HG: ICD-10-PCS | Mod: CPTII,S$GLB,, | Performed by: FAMILY MEDICINE

## 2020-06-16 PROCEDURE — 99214 PR OFFICE/OUTPT VISIT, EST, LEVL IV, 30-39 MIN: ICD-10-PCS | Mod: S$GLB,,, | Performed by: FAMILY MEDICINE

## 2020-06-16 PROCEDURE — 99499 UNLISTED E&M SERVICE: CPT | Mod: S$GLB,,, | Performed by: FAMILY MEDICINE

## 2020-06-16 PROCEDURE — 1101F PR PT FALLS ASSESS DOC 0-1 FALLS W/OUT INJ PAST YR: ICD-10-PCS | Mod: CPTII,S$GLB,, | Performed by: FAMILY MEDICINE

## 2020-06-16 PROCEDURE — 3078F DIAST BP <80 MM HG: CPT | Mod: CPTII,S$GLB,, | Performed by: FAMILY MEDICINE

## 2020-06-16 PROCEDURE — 1101F PT FALLS ASSESS-DOCD LE1/YR: CPT | Mod: CPTII,S$GLB,, | Performed by: FAMILY MEDICINE

## 2020-06-16 PROCEDURE — 1159F PR MEDICATION LIST DOCUMENTED IN MEDICAL RECORD: ICD-10-PCS | Mod: S$GLB,,, | Performed by: FAMILY MEDICINE

## 2020-06-16 RX ORDER — INSULIN PUMP SYRINGE, 3 ML
EACH MISCELLANEOUS
Qty: 1 EACH | Refills: 0 | Status: SHIPPED | OUTPATIENT
Start: 2020-06-16 | End: 2022-01-14

## 2020-06-16 RX ORDER — LANCETS
EACH MISCELLANEOUS
Qty: 30 EACH | Refills: 11 | Status: SHIPPED | OUTPATIENT
Start: 2020-06-16 | End: 2022-01-11 | Stop reason: SDUPTHER

## 2020-06-16 NOTE — PROGRESS NOTES
"Subjective:       Patient ID: Peter Lopez is a 84 y.o. male.    Chief Complaint: Follow-up    HPI   84 year old male who was hospitalized in April and then was in skilled nursing in May, prior to going home, for COVID-19 pneumonia, comes in for routine follow up since stopping Celexa. He states that since he stopped it, diarrhea resolved and depression has not returned. He followed up with pulmonary last week. He still has oxygen tank to use PRN. He is still not ready to return to work.    Review of Systems   Constitutional: Negative for chills and fever.   Respiratory: Negative for shortness of breath and wheezing.    Cardiovascular: Negative for chest pain and palpitations.   Gastrointestinal: Negative for abdominal pain, blood in stool, constipation and diarrhea.   Endocrine: Negative for polyuria.   Genitourinary: Negative for dysuria and hematuria.         Objective:     /64 (BP Location: Left arm, Patient Position: Sitting, BP Method: Medium (Automatic))   Pulse 95   Temp 97.3 °F (36.3 °C)   Resp 12   Ht 5' 11.5" (1.816 m)   Wt 71.4 kg (157 lb 6.5 oz)   SpO2 (!) 94%   BMI 21.65 kg/m²     Physical Exam  Vitals signs reviewed.   Constitutional:       Appearance: Normal appearance.   HENT:      Head: Normocephalic and atraumatic.      Right Ear: External ear normal.      Left Ear: External ear normal.      Nose: Nose normal.      Mouth/Throat:      Mouth: Mucous membranes are moist.   Eyes:      Extraocular Movements: Extraocular movements intact.      Pupils: Pupils are equal, round, and reactive to light.   Neck:      Musculoskeletal: Normal range of motion and neck supple.   Cardiovascular:      Pulses: Normal pulses.   Pulmonary:      Effort: Pulmonary effort is normal. No respiratory distress.      Breath sounds: Normal breath sounds. No wheezing.   Neurological:      Mental Status: He is alert.         Assessment:       1. Chronic respiratory failure with hypoxia    2. Essential hypertension    3. " Hypercalcemia    4. Diarrhea, unspecified type    5. Diabetes mellitus with nephropathy        Plan:       Peter was seen today for follow-up.    Diagnoses and all orders for this visit:    Chronic respiratory failure with hypoxia  Doing better.  Follow up with pulmonary recommendations    Essential hypertension  Fair BP control    Hypercalcemia  -     Calcium; Future  -     Calcium, Ionized; Future  -     PTH, intact; Future  Recheck calcium levels    Diarrhea, unspecified type  Resolved    Diabetes mellitus with nephropathy  -     blood-glucose meter kit; To check BG 1 times daily, to use with insurance preferred meter  -     lancets Misc; To check BG 1 times daily, to use with insurance preferred meter  -     blood sugar diagnostic Strp; To check BG 1 times daily, to use with insurance preferred meter  Patient requested new testing supply and these will be faxed in.

## 2020-06-17 ENCOUNTER — TELEPHONE (OUTPATIENT)
Dept: FAMILY MEDICINE | Facility: CLINIC | Age: 85
End: 2020-06-17

## 2020-06-17 NOTE — TELEPHONE ENCOUNTER
Patient informed that Dr Lloyd can not clear him to drive at this moment and he said OK and expressed understanding.

## 2020-06-17 NOTE — TELEPHONE ENCOUNTER
----- Message from Lucas Lloyd Jr., MD sent at 6/16/2020  2:01 PM CDT -----  I cannot  clear him for that yet  ----- Message -----  From: Farnaz Page MA  Sent: 6/16/2020   1:53 PM CDT  To: Lucas Lloyd Jr., MD    Patient wants to know if it is okay for him to drive his vehicle

## 2020-06-19 ENCOUNTER — HOSPITAL ENCOUNTER (OUTPATIENT)
Dept: RESPIRATORY THERAPY | Facility: HOSPITAL | Age: 85
Discharge: HOME | End: 2020-06-19
Attending: INTERNAL MEDICINE
Payer: MEDICARE

## 2020-06-19 VITALS — RESPIRATION RATE: 18 BRPM | OXYGEN SATURATION: 100 % | HEART RATE: 115 BPM

## 2020-06-19 DIAGNOSIS — J84.9 ILD (INTERSTITIAL LUNG DISEASE): ICD-10-CM

## 2020-06-19 DIAGNOSIS — J96.11 CHRONIC RESPIRATORY FAILURE WITH HYPOXIA: ICD-10-CM

## 2020-06-19 PROCEDURE — 94060 PR EVAL OF BRONCHOSPASM: ICD-10-PCS | Mod: 26,59,, | Performed by: INTERNAL MEDICINE

## 2020-06-19 PROCEDURE — 94729 DIFFUSING CAPACITY: CPT | Mod: 26,,, | Performed by: INTERNAL MEDICINE

## 2020-06-19 PROCEDURE — 94727 GAS DIL/WSHOT DETER LNG VOL: CPT

## 2020-06-19 PROCEDURE — 94618 PULMONARY STRESS TESTING: ICD-10-PCS | Mod: 26,,, | Performed by: INTERNAL MEDICINE

## 2020-06-19 PROCEDURE — 94618 PULMONARY STRESS TESTING: CPT

## 2020-06-19 PROCEDURE — 94060 EVALUATION OF WHEEZING: CPT | Mod: 26,59,, | Performed by: INTERNAL MEDICINE

## 2020-06-19 PROCEDURE — 94729 DIFFUSING CAPACITY: CPT

## 2020-06-19 PROCEDURE — 94729 PR C02/MEMBANE DIFFUSE CAPACITY: ICD-10-PCS | Mod: 26,,, | Performed by: INTERNAL MEDICINE

## 2020-06-19 PROCEDURE — 94727 PR PULM FUNCTION TEST BY GAS: ICD-10-PCS | Mod: 26,,, | Performed by: INTERNAL MEDICINE

## 2020-06-19 PROCEDURE — 94618 PULMONARY STRESS TESTING: CPT | Mod: 26,,, | Performed by: INTERNAL MEDICINE

## 2020-06-19 PROCEDURE — 94727 GAS DIL/WSHOT DETER LNG VOL: CPT | Mod: 26,,, | Performed by: INTERNAL MEDICINE

## 2020-06-19 PROCEDURE — 25000242 PHARM REV CODE 250 ALT 637 W/ HCPCS: Performed by: INTERNAL MEDICINE

## 2020-06-19 PROCEDURE — 94010 BREATHING CAPACITY TEST: CPT

## 2020-06-19 RX ORDER — ALBUTEROL SULFATE 2.5 MG/.5ML
2.5 SOLUTION RESPIRATORY (INHALATION) ONCE
Status: COMPLETED | OUTPATIENT
Start: 2020-06-19 | End: 2020-06-19

## 2020-06-19 RX ADMIN — ALBUTEROL SULFATE 2.5 MG: 2.5 SOLUTION RESPIRATORY (INHALATION) at 02:06

## 2020-06-20 LAB
BRPFT: ABNORMAL
DLCO ADJ PRE: 9.31 ML/(MIN*MMHG) (ref 17.96–31.81)
DLCO SINGLE BREATH LLN: 17.96
DLCO SINGLE BREATH PRE REF: 37.4 %
DLCO SINGLE BREATH REF: 24.88
DLCOC SBVA LLN: 2.36
DLCOC SBVA PRE REF: 64.2 %
DLCOC SBVA REF: 3.48
DLCOC SINGLE BREATH LLN: 17.96
DLCOC SINGLE BREATH PRE REF: 37.4 %
DLCOC SINGLE BREATH REF: 24.88
DLCOVA LLN: 2.36
DLCOVA PRE REF: 64.2 %
DLCOVA PRE: 2.24 ML/(MIN*MMHG*L) (ref 2.36–4.61)
DLCOVA REF: 3.48
DLVAADJ PRE: 2.24 ML/(MIN*MMHG*L) (ref 2.36–4.61)
ERVN2 LLN: -16449.09
ERVN2 PRE REF: 98.6 %
ERVN2 PRE: 0.9 L (ref -16449.09–16450.91)
ERVN2 REF: 0.91
FEF 25 75 CHG: 5.7 %
FEF 25 75 LLN: 0.49
FEF 25 75 POST REF: 38.2 %
FEF 25 75 PRE REF: 36.1 %
FEF 25 75 REF: 1.69
FET100 CHG: 0.5 %
FEV1 CHG: 2.2 %
FEV1 FVC CHG: -3.5 %
FEV1 FVC LLN: 60
FEV1 FVC POST REF: 69.3 %
FEV1 FVC PRE REF: 71.8 %
FEV1 FVC REF: 75
FEV1 LLN: 1.55
FEV1 POST REF: 77.1 %
FEV1 PRE REF: 75.4 %
FEV1 REF: 2.4
FRCN2 LLN: 2.83
FRCN2 PRE REF: 83.1 %
FRCN2 REF: 3.81
FVC CHG: 5.9 %
FVC LLN: 2.24
FVC POST REF: 110.3 %
FVC PRE REF: 104.1 %
FVC REF: 3.23
IVC PRE: 2.83 L (ref 2.24–4.22)
IVC SINGLE BREATH LLN: 2.24
IVC SINGLE BREATH PRE REF: 87.6 %
IVC SINGLE BREATH REF: 3.23
PEF CHG: 15.2 %
PEF LLN: 4.88
PEF POST REF: 47.9 %
PEF PRE REF: 41.5 %
PEF REF: 7.54
POST FEF 25 75: 0.65 L/S (ref 0.49–2.9)
POST FET 100: 11.91 SEC
POST FEV1 FVC: 51.92 % (ref 60.32–89.54)
POST FEV1: 1.85 L (ref 1.55–3.24)
POST FVC: 3.56 L (ref 2.24–4.22)
POST PEF: 3.61 L/S (ref 4.88–10.2)
PRE DLCO: 9.31 ML/(MIN*MMHG) (ref 17.96–31.81)
PRE FEF 25 75: 0.61 L/S (ref 0.49–2.9)
PRE FET 100: 11.86 SEC
PRE FEV1 FVC: 53.81 % (ref 60.32–89.54)
PRE FEV1: 1.81 L (ref 1.55–3.24)
PRE FRC N2: 3.17 L
PRE FVC: 3.36 L (ref 2.24–4.22)
PRE PEF: 3.13 L/S (ref 4.88–10.2)
RVN2 LLN: 2.23
RVN2 PRE REF: 41.9 %
RVN2 PRE: 1.22 L (ref 2.23–3.58)
RVN2 REF: 2.91
RVN2TLCN2 LLN: 36.96
RVN2TLCN2 PRE REF: 57.9 %
RVN2TLCN2 PRE: 26.59 % (ref 36.96–54.92)
RVN2TLCN2 REF: 45.94
TLCN2 LLN: 5.99
TLCN2 PRE REF: 64.1 %
TLCN2 PRE: 4.58 L (ref 5.99–8.29)
TLCN2 REF: 7.14
VA PRE: 4.16 L (ref 6.99–6.99)
VA SINGLE BREATH LLN: 6.99
VA SINGLE BREATH PRE REF: 59.6 %
VA SINGLE BREATH REF: 6.99
VCMAXN2 LLN: 2.24
VCMAXN2 PRE REF: 104.1 %
VCMAXN2 PRE: 3.36 L (ref 2.24–4.22)
VCMAXN2 REF: 3.23

## 2020-06-22 ENCOUNTER — OFFICE VISIT (OUTPATIENT)
Dept: PODIATRY | Facility: CLINIC | Age: 85
End: 2020-06-22
Payer: MEDICARE

## 2020-06-22 VITALS
HEART RATE: 109 BPM | WEIGHT: 157.44 LBS | BODY MASS INDEX: 22.04 KG/M2 | HEIGHT: 71 IN | DIASTOLIC BLOOD PRESSURE: 71 MMHG | SYSTOLIC BLOOD PRESSURE: 117 MMHG

## 2020-06-22 DIAGNOSIS — B35.1 ONYCHOMYCOSIS DUE TO DERMATOPHYTE: ICD-10-CM

## 2020-06-22 DIAGNOSIS — E11.49 TYPE II DIABETES MELLITUS WITH NEUROLOGICAL MANIFESTATIONS: Primary | ICD-10-CM

## 2020-06-22 PROCEDURE — 99999 PR PBB SHADOW E&M-EST. PATIENT-LVL III: ICD-10-PCS | Mod: PBBFAC,,, | Performed by: PODIATRIST

## 2020-06-22 PROCEDURE — 99999 PR PBB SHADOW E&M-EST. PATIENT-LVL III: CPT | Mod: PBBFAC,,, | Performed by: PODIATRIST

## 2020-06-22 PROCEDURE — 11721 PR DEBRIDEMENT OF NAILS, 6 OR MORE: ICD-10-PCS | Mod: S$GLB,,, | Performed by: PODIATRIST

## 2020-06-22 PROCEDURE — 99499 UNLISTED E&M SERVICE: CPT | Mod: S$GLB,,, | Performed by: PODIATRIST

## 2020-06-22 PROCEDURE — 99499 RISK ADDL DX/OHS AUDIT: ICD-10-PCS | Mod: S$GLB,,, | Performed by: PODIATRIST

## 2020-06-22 PROCEDURE — 11721 DEBRIDE NAIL 6 OR MORE: CPT | Mod: S$GLB,,, | Performed by: PODIATRIST

## 2020-06-22 NOTE — PROGRESS NOTES
Subjective:      Patient ID: Peter Lopez is a 84 y.o. male.    Chief Complaint: Diabetes Mellitus (ov 6/17/20 [facility] p[facility]) and Nail Care    Peter is a 84 y.o. male who presents to the clinic upon referral from Dr. Ivette abraham. provider found  for evaluation and treatment of diabetic feet. 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 Patient relates no major problem with feet. Reports elongated nails that are difficult to trim. Requesting nail trimming. Reports admitted in April 2020 for COVID-19. Currently on oxygen.     PCP: Lucas Lloyd Jr, MD    Date Last Seen by PCP: 10/25/18    Current shoe gear: Tennis shoes    Hemoglobin A1C   Date Value Ref Range Status   06/03/2020 5.9 (H) 4.0 - 5.6 % Final     Comment:     ADA Screening Guidelines:  5.7-6.4%  Consistent with prediabetes  >or=6.5%  Consistent with diabetes  High levels of fetal hemoglobin interfere with the HbA1C  assay. Heterozygous hemoglobin variants (HbS, HgC, etc)do  not significantly interfere with this assay.   However, presence of multiple variants may affect accuracy.     04/05/2020 6.4 (H) 4.0 - 5.6 % Final     Comment:     ADA Screening Guidelines:  5.7-6.4%  Consistent with prediabetes  >or=6.5%  Consistent with diabetes  High levels of fetal hemoglobin interfere with the HbA1C  assay. Heterozygous hemoglobin variants (HbS, HgC, etc)do  not significantly interfere with this assay.   However, presence of multiple variants may affect accuracy.     01/28/2020 6.1 (H) 4.0 - 5.6 % Final     Comment:     ADA Screening Guidelines:  5.7-6.4%  Consistent with prediabetes  >or=6.5%  Consistent with diabetes  High levels of fetal hemoglobin interfere with the HbA1C  assay. Heterozygous hemoglobin variants (HbS, HgC, etc)do  not significantly interfere with this assay.   However, presence of multiple variants may affect accuracy.              Review of Systems   Constitution: Negative for chills, diaphoresis and fever.   Cardiovascular: Negative for claudication, cyanosis, leg swelling and syncope.   Respiratory: Negative for cough and shortness of breath.    Skin: Positive for color change and nail changes. Negative for suspicious lesions.   Musculoskeletal: Negative for falls, joint pain, muscle cramps and muscle weakness.   Gastrointestinal: Negative for diarrhea, nausea and vomiting.   Neurological: Positive for paresthesias. Negative for disturbances in coordination, numbness, sensory change, tremors and weakness.   Psychiatric/Behavioral: Negative for altered mental status.           Objective:      Physical Exam  Constitutional:       Appearance: He is well-developed.      Comments: Oriented to time, place, and person.   Cardiovascular:      Comments: DP and PT pulses are palpable bilaterally. 3 sec capillary refill time and toes and feet are warm to touch proximally .       Musculoskeletal:      Comments: Equinus noted b/l ankles with < 10 deg DF noted. MMT 5/5 in DF/PF/Inv/Ev resistance with no reproduction of pain in any direction. Passive range of motion of ankle and pedal joints is painless b/l.     Feet:      Right foot:      Skin integrity: No callus or dry skin.      Left foot:      Skin integrity: No callus or dry skin.   Lymphadenopathy:      Comments: Negative lymphadenopathy bilateral popliteal fossa and tarsal tunnel.   Skin:     Comments: Toenails 1-5 bilaterally are elongated by 2-3 mm, thickened by 2-3 mm, discolored/yellowed, dystrophic, brittle with subungual debris.       Neurological:      Mental Status: He is alert.      Comments: Light touch, proprioception, and sharp/dull sensation are all diminished bilaterally. Protective threshold with the Ellery-Wienstein monofilament is diminished  bilaterally.  Subjective paresthesias with no clearly identifiable source or trigger.      Psychiatric:         Behavior: Behavior  is cooperative.               Assessment:       Encounter Diagnoses   Name Primary?    Type II diabetes mellitus with neurological manifestations Yes    Onychomycosis due to dermatophyte          Plan:       Peter was seen today for diabetes mellitus and nail care.    Diagnoses and all orders for this visit:    Type II diabetes mellitus with neurological manifestations    Onychomycosis due to dermatophyte      I counseled the patient on his conditions, their implications and medical management.    - Shoe inspection. Diabetic Foot Education. Patient reminded of the importance of good nutrition and blood sugar control to help prevent podiatric complications of diabetes. Patient instructed on proper foot hygeine. We discussed wearing proper shoe gear, daily foot inspections, never walking without protective shoe gear, never putting sharp instruments to feet, routine podiatric nail visits every 3  months.   - With patient's permission, nails were aggressively reduced and debrided x 10 to their soft tissue attachment mechanically and with electric , removing all offending nail and debris. Patient relates relief following the procedure. He will continue to monitor the areas daily, inspect his feet, wear protective shoe gear when ambulatory, moisturizer to maintain skin integrity and follow in this office in approximately 6 months, sooner p.r.n.       F/u 3 months     Lucia Aguiar DPM

## 2020-06-24 ENCOUNTER — TELEPHONE (OUTPATIENT)
Dept: PULMONOLOGY | Facility: CLINIC | Age: 85
End: 2020-06-24

## 2020-06-24 NOTE — TELEPHONE ENCOUNTER
----- Message from Sarkis Hyman MD sent at 6/24/2020 10:25 AM CDT -----  Please advise patient that walk test showed that patient still need oxygen.  Patient should continue with prescribed oxygen.

## 2020-06-24 NOTE — TELEPHONE ENCOUNTER
Spoke with patient and informed patient of test results per Dr. Hyman and for patient to continue with oxygen use. Patient verbalized understanding and had no additional questions at this time.

## 2020-06-25 ENCOUNTER — TELEPHONE (OUTPATIENT)
Dept: FAMILY MEDICINE | Facility: CLINIC | Age: 85
End: 2020-06-25

## 2020-06-25 ENCOUNTER — EXTERNAL HOME HEALTH (OUTPATIENT)
Dept: HOME HEALTH SERVICES | Facility: HOSPITAL | Age: 85
End: 2020-06-25
Payer: MEDICARE

## 2020-06-25 NOTE — TELEPHONE ENCOUNTER
----- Message from Kacie Davis sent at 6/25/2020 12:08 PM CDT -----  Regarding: Call  Contact: Patient  Type: Patient Call Back    Who called:Patient    What is the request in detail: He is requesting a call back. Please advise.    Can the clinic reply by [facility]? No    Would the patient rather a call back or a response via [facility]? Call    Best call back number:[phone] (home)     Additional Information:n/a

## 2020-06-25 NOTE — TELEPHONE ENCOUNTER
I spoke to the pt and he advised he received the Glucose machine but he has never used a Glucometer and he does not know how to use it. Please advise Nurse visit for instructions on use?

## 2020-06-26 NOTE — TELEPHONE ENCOUNTER
I spoke to the pt and he would like to come in next week. Unable to schedule as the nurse schedule is closed. Will call Pt later today to schedule.

## 2020-06-30 ENCOUNTER — TELEPHONE (OUTPATIENT)
Dept: FAMILY MEDICINE | Facility: CLINIC | Age: 85
End: 2020-06-30

## 2020-06-30 ENCOUNTER — CLINICAL SUPPORT (OUTPATIENT)
Dept: FAMILY MEDICINE | Facility: CLINIC | Age: 85
End: 2020-06-30
Payer: MEDICARE

## 2020-06-30 DIAGNOSIS — E11.65 UNCONTROLLED TYPE 2 DIABETES MELLITUS WITH HYPERGLYCEMIA: Primary | ICD-10-CM

## 2020-06-30 PROCEDURE — 99499 NO LOS: ICD-10-PCS | Mod: S$GLB,,, | Performed by: FAMILY MEDICINE

## 2020-06-30 PROCEDURE — 99499 UNLISTED E&M SERVICE: CPT | Mod: S$GLB,,, | Performed by: FAMILY MEDICINE

## 2020-06-30 NOTE — TELEPHONE ENCOUNTER
I have not received any new paperwork for his disability since 6/5/2020. What was sent on 6/5/2020 was what was last received.     They she specifically request what they need faxed over, and patient should then make an appt so we can discuss a return to work date so we have a clear return to work date and everyone is on the same page.

## 2020-06-30 NOTE — TELEPHONE ENCOUNTER
I spoke to the pt daughter and they are experiencing difficulty from [employer], the company handling disability claims for [employer]. Pt did not receive his disability check and [employer] states they have not received any communication from us since 06/05/2020. Daughter is requesting a letter be sent from PCP with an update on the pt condition that lists dates of office visits with a projected return to work date. She is also requesting the records from Dr. Hyman's visit. Should she contact Dr. Hyman? The following should be included on all pages of correspondence. Leave case# [id number] and Associate WIN# [id number]. Please fax to: [phone] or [phone]

## 2020-06-30 NOTE — TELEPHONE ENCOUNTER
----- Message from Farnaz Page MA sent at 6/30/2020  1:43 PM CDT -----    ----- Message -----  From: Olivia Mathews  Sent: 6/30/2020  11:02 AM CDT  To: [facility] Staff    Name of Who is Calling: Mckinley BARRIOS (Daughter)      What is the request in detail: Would like to speak to staff in regards to speaking to the benefit provider  or his medications and needed to provide information with his PCP for his disability. Please advise.       Can the clinic reply by [facility]: No      What Number to [facility] Back [facility]: [phone]

## 2020-06-30 NOTE — PROGRESS NOTES
Pt came in and was provided teaching for Glucometer Use. Glucose was tested and was 143. Pt has not had lunch. He will call with questions.

## 2020-07-01 NOTE — TELEPHONE ENCOUNTER
I spoke to the pt daughter and advised Dr. Lloyd would like to do a virtual visit for the pt and conduct a three way call with her so that they  can all discuss the Pt goals for returning to work. The daughter agrees and was scheduled for next week to give her time to contact [place] and confirm exactly what information they need and what format it needs to be returned in. She will contact Dr. Hyman's office for their records.

## 2020-07-07 ENCOUNTER — OFFICE VISIT (OUTPATIENT)
Dept: FAMILY MEDICINE | Facility: CLINIC | Age: 85
End: 2020-07-07
Payer: MEDICARE

## 2020-07-07 DIAGNOSIS — Z99.81 OXYGEN DEPENDENT: ICD-10-CM

## 2020-07-07 DIAGNOSIS — J96.11 CHRONIC RESPIRATORY FAILURE WITH HYPOXIA: Primary | ICD-10-CM

## 2020-07-07 DIAGNOSIS — Z02.9 ADMINISTRATIVE ENCOUNTER: ICD-10-CM

## 2020-07-07 DIAGNOSIS — I10 ESSENTIAL HYPERTENSION: ICD-10-CM

## 2020-07-07 DIAGNOSIS — E11.21 DIABETES MELLITUS WITH NEPHROPATHY: ICD-10-CM

## 2020-07-07 PROCEDURE — 99443 PR PHYSICIAN TELEPHONE EVALUATION 21-30 MIN: CPT | Mod: 95,,, | Performed by: FAMILY MEDICINE

## 2020-07-07 PROCEDURE — 99443 PR PHYSICIAN TELEPHONE EVALUATION 21-30 MIN: ICD-10-PCS | Mod: 95,,, | Performed by: FAMILY MEDICINE

## 2020-07-07 NOTE — LETTER
July 7, 2020      [facility]  [address], [address]  [place] [address]  Phone: [phone]       Patient: Peter Lopez   Claim Number [id number]    YOB: 1935  Date of Visit: 07/07/2020    To Whom It May Concern:    Dara Lopez had a virtual visit with me on 0707/2020 for follow up on COVID-19. He had been admitted to the hospital earlier this year and seen by myself for this for hospital follow-up in May and June. Since his last visit with me, he has also seen Pulmonary. His last pulmonary function test on 6/20/2020 shows continued obstruction, and as such patient needs to continue oxygen use for know.   The patient was advised to follow up with pulmonary prior to being able to be clear to return to work.      Sincerely,      Lucas Lloyd Jr., MD

## 2020-07-13 NOTE — PROGRESS NOTES
Established Patient - Audio Only Telehealth Visit     The patient location is: [facility]  The chief complaint leading to consultation is: Return to Work  Visit type: Virtual visit with audio only (telephone)  Total time spent with patient: 25 minutes       The reason for the audio only service rather than synchronous audio and video virtual visit was related to technical difficulties or patient preference/necessity.     Each patient to whom I provide medical services by telemedicine is:  (1) informed of the relationship between the physician and patient and the respective role of any other health care provider with respect to management of the patient; and (2) notified that they may decline to receive medical services by telemedicine and may withdraw from such care at any time. Patient verbally consented to receive this service via voice-only telephone call.        This service was not originating from a related E/M service provided within the previous 7 days nor will  to an E/M service or procedure within the next 24 hours or my soonest available appointment.  Prevailing standard of care was able to be met in this audio-only visit.      Routine Office Visit    Patient Name: Peter Lopez    : 1935  MRN: [id number]    Subjective:  Peter is a 84 y.o. male who presents today for:   Chief Complaint   Patient presents with    Letter for School/Work       84 year old male made an audio on appointment in which his daughter was also on 3-way call to discuss return to work. His employer- [employer], has not paid him his short term disability stating that the did not receive documentation from us. This was previously faxed to them. The daughter is requesting I send to her what was faxed and that I write a letter with an update. The patient reports that he recently had a test by pulmonary. He states that he then received a phone call that he needed to continue using oxygen based on the results. He states he still  gets winded with exertion. He has not made a pulmonary follow up.    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 No pertinent surgical history.     Family History  Family History   Problem Relation Age of Onset    Cancer Mother     Asbestos Father     Liver disease Neg Hx        Social History  Social History     Socioeconomic History    Marital status:      Spouse name: Not on file    Number of children: Not on file    Years of education: Not on file    Highest education level: Not on file   Occupational History    Not on file   Social Needs    Financial resource strain: Not on file    Food insecurity     Worry: Not on file     Inability: Not on file    Transportation needs     Medical: Not on file     Non-medical: Not on file   Tobacco Use    Smoking status: Former Smoker     Packs/day: 1.00     Years: 40.00     Pack years: 40.00     Quit date: 1991     Years since quittin.6    Smokeless tobacco: Former User     Quit date: 1986   Substance and Sexual Activity    Alcohol use: Not Currently     Alcohol/week: 0.0 standard drinks     Comment: 2 bottles beer on weekends    Drug use: No    Sexual activity: Yes     Partners: Female     Comment:     Lifestyle    Physical activity     Days per week: Not on file     Minutes per session: Not on file    Stress: Not on file   Relationships    Social connections     Talks on phone: Not on file     Gets together: Not on file     Attends [employer] service: Not on file     Active member of club or organization: Not on file     Attends meetings of clubs or organizations: Not on file     Relationship status: Not on file   Other Topics Concern    Not on file   Social History  Narrative    , resides in [place]. 7 children but lost one. 16 grandchildren. 4 great-grandchildren. Works at [employer]       Current Medications  Current Outpatient Medications on File Prior to Visit   Medication Sig Dispense Refill    albuterol (PROAIR HFA) 90 mcg/actuation inhaler Inhale 2 puffs into the lungs every 4 (four) hours as needed for Wheezing (cough/shortness of breath). Rescue 18 g 0    blood sugar diagnostic Strp To check BG 1 times daily, to use with insurance preferred meter 30 each 11    blood-glucose meter kit To check BG 1 times daily, to use with insurance preferred meter 1 each 0    lancets Misc To check BG 1 times daily, to use with insurance preferred meter 30 each 11    metFORMIN (GLUCOPHAGE) 1000 MG tablet TAKE 1 TABLET BY MOUTH TWICE DAILY WITH MEALS 180 tablet 3    olmesartan-hydrochlorothiazide (BENICAR HCT) 40-25 mg per tablet TAKE 1 TABLET BY MOUTH ONCE DAILY 90 tablet 3    tamsulosin (FLOMAX) 0.4 mg Cap Take 1 capsule (0.4 mg total) by mouth once daily. 30 capsule 11     No current facility-administered medications on file prior to visit.        Allergies   Review of patient's allergies indicates:  No Known Allergies    Review of Systems   Constitutional: Positive for fatigue. Negative for chills, diaphoresis, fever and unexpected weight change.   Eyes: Negative for visual disturbance.   Respiratory: Positive for shortness of breath (with exertion). Negative for cough, chest tightness and wheezing.    Cardiovascular: Negative for chest pain, palpitations and leg swelling.   Gastrointestinal: Negative for abdominal pain, blood in stool, constipation and diarrhea.   Genitourinary: Negative for dysuria and hematuria.         There were no vitals taken for this visit.        Assessment/Plan:  Peter was seen today for letter for school/work.    Diagnoses and all orders for this visit:    Chronic respiratory failure with hypoxia    Oxygen dependent    Essential  hypertension    Diabetes mellitus with nephropathy    Administrative encounter    - Advised patient to make sure to follow up with pulmonary recommendations  - Will forward to daughter forms that were faxed  - Letter prepared that patient was seen by pulmonary recently and not cleared to return to work yet.               -Lucas Lloyd Jr., MD, AAHIVS          This office note has been dictated.  This dictation has been generated using M-Modal Fluency Direct dictation; some phonetic errors may occur.

## 2020-07-20 ENCOUNTER — OFFICE VISIT (OUTPATIENT)
Dept: FAMILY MEDICINE | Facility: CLINIC | Age: 85
End: 2020-07-20
Payer: MEDICARE

## 2020-07-20 DIAGNOSIS — I10 ESSENTIAL HYPERTENSION: ICD-10-CM

## 2020-07-20 DIAGNOSIS — Z99.81 OXYGEN DEPENDENT: ICD-10-CM

## 2020-07-20 DIAGNOSIS — E11.21 DIABETES MELLITUS WITH NEPHROPATHY: ICD-10-CM

## 2020-07-20 DIAGNOSIS — J96.11 CHRONIC RESPIRATORY FAILURE WITH HYPOXIA: Primary | ICD-10-CM

## 2020-07-20 PROCEDURE — 99442 PR PHYSICIAN TELEPHONE EVALUATION 11-20 MIN: ICD-10-PCS | Mod: 95,,, | Performed by: FAMILY MEDICINE

## 2020-07-20 PROCEDURE — 99442 PR PHYSICIAN TELEPHONE EVALUATION 11-20 MIN: CPT | Mod: 95,,, | Performed by: FAMILY MEDICINE

## 2020-07-23 ENCOUNTER — TELEPHONE (OUTPATIENT)
Dept: FAMILY MEDICINE | Facility: CLINIC | Age: 85
End: 2020-07-23

## 2020-07-23 NOTE — TELEPHONE ENCOUNTER
----- Message from Akash Miller sent at 7/23/2020  9:48 AM CDT -----  Regarding: Pt Advice  Contact: LAUREN SHERMAN [[id number]]  Name of Who is Calling: LAUREN SHERMAN [[id number]]      What is the request in detail: Would like to speak with staff in regards to medication. States physician was suppose to call the Pulmonologist.  Please advise      Can the clinic reply by [facility]: no      What Number to Call Back if not [facility]: [phone]

## 2020-07-24 NOTE — PROGRESS NOTES
Established Patient - Audio Only Telehealth Visit     The patient location is: [facility]  The chief complaint leading to consultation is: shortness of breath  Visit type: Virtual visit with audio only (telephone)  Total time spent with patient: 17       The reason for the audio only service rather than synchronous audio and video virtual visit was related to technical difficulties or patient preference/necessity.     Each patient to whom I provide medical services by telemedicine is:  (1) informed of the relationship between the physician and patient and the respective role of any other health care provider with respect to management of the patient; and (2) notified that they may decline to receive medical services by telemedicine and may withdraw from such care at any time. Patient verbally consented to receive this service via voice-only telephone call.       This service was not originating from a related E/M service provided within the previous 7 days nor will  to an E/M service or procedure within the next 24 hours or my soonest available appointment.  Prevailing standard of care was able to be met in this audio-only visit.      Subjective:       Patient ID: Peter Lopez is a 84 y.o. male.    Chief Complaint: Shortness of Breath    HPI   84 year old male who was hospitalized for COVID-19 in March and April followed by care in a rehab facility. He has not been able to return to work. He is a  at [employer]. He states that he still gets short winded with walking moderate distance and with moderate exertion and has to use his oxygen. At rest he does not use the oxygen. He reports no fever or chills. He has not made follow up with pulmonary.    In June he had PFT done showing Obstruction, and he was advised to continue using oxygen    Review of Systems   Constitutional: Negative for activity change, appetite change and unexpected weight change.   Eyes: Negative for visual disturbance.   Respiratory:  Positive for shortness of breath. Negative for chest tightness and wheezing.    Cardiovascular: Negative for chest pain, palpitations and leg swelling.   Gastrointestinal: Negative for abdominal pain, blood in stool, change in bowel habit, constipation, diarrhea, nausea and change in bowel habit.   Endocrine: Negative for polydipsia, polyphagia and polyuria.   Genitourinary: Negative for dysuria.         Objective:       Physical Exam  Constitutional:       Comments: Speaking in full sentences on phone         Assessment:       1. Chronic respiratory failure with hypoxia    2. Oxygen dependent    3. Essential hypertension    4. Diabetes mellitus with nephropathy        Plan:       Peter was seen today for shortness of breath.    Diagnoses and all orders for this visit:    Chronic respiratory failure with hypoxia    Oxygen dependent    Essential hypertension    Diabetes mellitus with nephropathy      -Advised continue using oxygen as advised by pulmonary  -Advised given symptoms, follow up with pulmonary  -Patient still not able to work

## 2020-07-27 LAB
LEFT EYE DM RETINOPATHY: POSITIVE
RIGHT EYE DM RETINOPATHY: POSITIVE

## 2020-07-28 ENCOUNTER — TELEPHONE (OUTPATIENT)
Dept: FAMILY MEDICINE | Facility: CLINIC | Age: 85
End: 2020-07-28

## 2020-07-28 NOTE — TELEPHONE ENCOUNTER
----- Message from Lyla Barbour sent at 7/28/2020  8:18 AM CDT -----  Regarding: would like to speak to nurse  Type: Patient Call Back    Who called:Self    What is the request in detail:Patient would like a call regarding his upcoming appt.    Can the clinic reply by [facility]?NO    Would the patient rather a call back or a response via [facility]? Callback    Best call back number:[phone]

## 2020-08-03 ENCOUNTER — OFFICE VISIT (OUTPATIENT)
Dept: PULMONOLOGY | Facility: CLINIC | Age: 85
End: 2020-08-03
Payer: MEDICARE

## 2020-08-03 VITALS
BODY MASS INDEX: 23.1 KG/M2 | HEART RATE: 103 BPM | DIASTOLIC BLOOD PRESSURE: 82 MMHG | HEIGHT: 71 IN | OXYGEN SATURATION: 98 % | WEIGHT: 165 LBS | TEMPERATURE: 98 F | SYSTOLIC BLOOD PRESSURE: 134 MMHG

## 2020-08-03 DIAGNOSIS — J44.9 CHRONIC OBSTRUCTIVE PULMONARY DISEASE, UNSPECIFIED COPD TYPE: Primary | ICD-10-CM

## 2020-08-03 DIAGNOSIS — J96.11 CHRONIC RESPIRATORY FAILURE WITH HYPOXIA: ICD-10-CM

## 2020-08-03 DIAGNOSIS — R06.02 SHORTNESS OF BREATH: ICD-10-CM

## 2020-08-03 PROCEDURE — 3075F PR MOST RECENT SYSTOLIC BLOOD PRESS GE 130-139MM HG: ICD-10-PCS | Mod: CPTII,S$GLB,, | Performed by: NURSE PRACTITIONER

## 2020-08-03 PROCEDURE — 1159F MED LIST DOCD IN RCRD: CPT | Mod: S$GLB,,, | Performed by: NURSE PRACTITIONER

## 2020-08-03 PROCEDURE — 3075F SYST BP GE 130 - 139MM HG: CPT | Mod: CPTII,S$GLB,, | Performed by: NURSE PRACTITIONER

## 2020-08-03 PROCEDURE — 1101F PT FALLS ASSESS-DOCD LE1/YR: CPT | Mod: CPTII,S$GLB,, | Performed by: NURSE PRACTITIONER

## 2020-08-03 PROCEDURE — 1101F PR PT FALLS ASSESS DOC 0-1 FALLS W/OUT INJ PAST YR: ICD-10-PCS | Mod: CPTII,S$GLB,, | Performed by: NURSE PRACTITIONER

## 2020-08-03 PROCEDURE — 1126F AMNT PAIN NOTED NONE PRSNT: CPT | Mod: S$GLB,,, | Performed by: NURSE PRACTITIONER

## 2020-08-03 PROCEDURE — 99999 PR PBB SHADOW E&M-EST. PATIENT-LVL IV: CPT | Mod: PBBFAC,,, | Performed by: NURSE PRACTITIONER

## 2020-08-03 PROCEDURE — 99999 PR PBB SHADOW E&M-EST. PATIENT-LVL IV: ICD-10-PCS | Mod: PBBFAC,,, | Performed by: NURSE PRACTITIONER

## 2020-08-03 PROCEDURE — 3079F PR MOST RECENT DIASTOLIC BLOOD PRESSURE 80-89 MM HG: ICD-10-PCS | Mod: CPTII,S$GLB,, | Performed by: NURSE PRACTITIONER

## 2020-08-03 PROCEDURE — 1126F PR PAIN SEVERITY QUANTIFIED, NO PAIN PRESENT: ICD-10-PCS | Mod: S$GLB,,, | Performed by: NURSE PRACTITIONER

## 2020-08-03 PROCEDURE — 3079F DIAST BP 80-89 MM HG: CPT | Mod: CPTII,S$GLB,, | Performed by: NURSE PRACTITIONER

## 2020-08-03 PROCEDURE — 1159F PR MEDICATION LIST DOCUMENTED IN MEDICAL RECORD: ICD-10-PCS | Mod: S$GLB,,, | Performed by: NURSE PRACTITIONER

## 2020-08-03 PROCEDURE — 99214 PR OFFICE/OUTPT VISIT, EST, LEVL IV, 30-39 MIN: ICD-10-PCS | Mod: S$GLB,,, | Performed by: NURSE PRACTITIONER

## 2020-08-03 PROCEDURE — 99214 OFFICE O/P EST MOD 30 MIN: CPT | Mod: S$GLB,,, | Performed by: NURSE PRACTITIONER

## 2020-08-03 RX ORDER — UMECLIDINIUM BROMIDE AND VILANTEROL TRIFENATATE 62.5; 25 UG/1; UG/1
1 POWDER RESPIRATORY (INHALATION) DAILY
Qty: 1 EACH | Refills: 5 | Status: SHIPPED | OUTPATIENT
Start: 2020-08-03 | End: 2020-09-03 | Stop reason: ALTCHOICE

## 2020-08-03 NOTE — PROGRESS NOTES
HISTORY OF PRESENT ILLNESS: Peter Lopez is a 84 y.o. 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  Patient smoked .75 ppd x 40 years.  Patient quit smoking in 1990s.  Patient was recent diagnosed with covid 19 hospitalized 4/3/20.  until 4/22/20 for hypoxic respiratory failure requiring nrb.  Subsequently discharged to [facility] [place] [place].  Patient was discharged to home with 2 lpm.      Today, patient denied chest pain.  Able to perform adl.  Patient is hesitant to go outside due oxygen requirement.  Usually walks around his block almost daily and finds that his breathing is heavier with this..  No fever/chill.  No wheezing.  Occasional cough.  Good appetite.  Works at [employer] and currently on leave.    PAST MEDICAL HISTORY:    Active Ambulatory Problems     Diagnosis Date Noted    Essential hypertension 10/02/2012    Type 2 diabetes mellitus with diabetic polyneuropathy, without long-term current use of insulin 10/16/2013    ILD (interstitial lung disease) 04/04/2020    Chronic respiratory failure with hypoxia 04/13/2020    Discharge planning issues 04/14/2020    Reactive depression 04/14/2020    Chronic obstructive pulmonary disease 08/10/2020     Resolved Ambulatory Problems     Diagnosis Date Noted    Elevated PSA 01/10/2013    Hepatitis C 10/16/2013    BPH (benign prostatic hyperplasia) 04/05/2016    Atherosclerosis of aorta 05/03/2019    CKD (chronic kidney disease) stage 3, GFR 30-59 ml/min 01/28/2020    Hypoglycemia     Hypoxia 03/29/2020    Anemia 04/04/2020    Elevated troponin 04/04/2020     Past Medical History:   Diagnosis Date    Chronic hepatitis C without mention of hepatic coma     Diabetes mellitus type I     Hypertension     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 Rescue 18 g 0    blood sugar diagnostic Strp To check BG 1 times daily, to use with insurance preferred meter 30 each 11    blood-glucose meter kit To check BG 1 times daily, to use with insurance preferred meter 1 each 0    lancets Misc To check BG 1 times daily, to use with insurance preferred meter 30 each 11    metFORMIN (GLUCOPHAGE) 1000 MG tablet TAKE 1 TABLET BY MOUTH TWICE DAILY WITH MEALS 180 tablet 3    olmesartan-hydrochlorothiazide (BENICAR HCT) 40-25 mg per tablet TAKE 1 TABLET BY MOUTH ONCE DAILY 90 tablet 3    tamsulosin (FLOMAX) 0.4 mg Cap Take 1 capsule (0.4 mg total) by mouth once daily. 30 capsule 11    umeclidinium-vilanteroL (ANORO ELLIPTA) 62.5-25 mcg/actuation DsDv Inhale 1 puff into the lungs once daily. Controller 1 each 5     No current facility-administered medications for this visit.                   REVIEW OF SYSTEMS:     Pulmonary related symptoms as per HPI.  Gen:  no weight loss, no fever, no night sweat  HEENT:  no  "visual changes, no sore throat, + hearing loss  CV:  No chest pain, no orthopnea, no PND  GI:  no melena, no hematochezia, no diarhea, no constipation.  :  no dysuria, no hematuria, no hesistancy, no dribbling  Neuro:  no syncope, no vertigo, no tinitus  Psych:  No homocide or suicide ideation; no depression.  Endocrine:  No heat or cold intolerance.  Sleep:  No snoring; no witnessed apnea.  Feeling rested upon awake.    Otherwise, a balance of systems reviewed is negative.          PHYSICAL EXAM:  Vitals:    08/03/20 1111 08/03/20 1153   BP: (!) 151/88 134/82   Pulse: 103    Temp: 97.6 °F (36.4 °C)    SpO2: 98%    Weight: 74.9 kg (165 lb 0.2 oz)    Height: 5' 11" (1.803 m)    PainSc: 0-No pain      Body mass index is 23.01 kg/m².     GENERAL:  well develop; no apparent distress  CARDIO: regular rate and rhythm  PULM:  clear to auscultation bilaterally; no intercostals retractions; no accessory muscle usage   EXTREMITIES no cce  NEURO:  CN II-XII intact.  5/5 motor in all extremities.  sensation grossly intact   to light touch.  PSYCH:  normal affect.  Alert and oriented x 4    LABS  Pulmonary Functions Testing Results(personally reviewed):  Ratio 54 fev1 75.4 fvc 104 no improvement following bronchodilator  tlc 64.1 dlco 37.4  Spirometry shows mild obstruction. Lung volume determination shows moderate restriction is also present.  Spirometry remains unimproved following bronchodilator. DLCO is severely decrease  Walk 94/85/86  ABG (personally reviewed):  4/4/20 7.48/38/90/28 on nrb  CXR (personally reviewed):  4/15/20 bilateral airspace disease  CT CHEST: none    covid 19 3/29/20 positive.        ASSESSMENT/PLAN  Problem List Items Addressed This Visit        Unprioritized    Chronic obstructive pulmonary disease - Primary    Overview     Pulmonary Functions Testing Results(personally reviewed):  Ratio 54 fev1 75.4 fvc 104 no improvement following bronchodilator  tlc 64.1 dlco 37.4    Pt with evidence copd, " recommend controller inhaler to preserve lung function         Relevant Medications    umeclidinium-vilanteroL (ANORO ELLIPTA) 62.5-25 mcg/actuation DsDv    Other Relevant Orders    CT Chest Without Contrast    Chronic respiratory failure with hypoxia    Overview     s/p hypoxic respiratory failure following covid.  Bilateral airspace disease on cxr.  Clinically improved.             Other Visit Diagnoses     Shortness of breath        Relevant Orders    CT Chest Without Contrast            Patient will Follow up in about 1 month (around 9/3/2020). with md/np.

## 2020-08-06 ENCOUNTER — TELEPHONE (OUTPATIENT)
Dept: FAMILY MEDICINE | Facility: CLINIC | Age: 85
End: 2020-08-06

## 2020-08-06 NOTE — TELEPHONE ENCOUNTER
----- Message from Mariluz Deluca sent at 8/6/2020 10:47 AM CDT -----  Type: Patient Call Back    Who called:Mckinley    What is the request in detail:pt daughter need to speak to nurse about some things in regard to pt    Can the clinic reply by [facility]?no    Would the patient rather a call back or a response via [facility]? call    Best call back number:

## 2020-08-07 NOTE — TELEPHONE ENCOUNTER
Called pt's daughter Mckinley and informed that pt needs OV with PCP to be able to speak with her during his appt. Mckinley verbalized understanding and states pt will keep OV scheduled for 8/17/20.

## 2020-08-10 ENCOUNTER — HOSPITAL ENCOUNTER (OUTPATIENT)
Dept: RADIOLOGY | Facility: HOSPITAL | Age: 85
Discharge: HOME | End: 2020-08-10
Attending: NURSE PRACTITIONER
Payer: MEDICARE

## 2020-08-10 DIAGNOSIS — J44.9 CHRONIC OBSTRUCTIVE PULMONARY DISEASE, UNSPECIFIED COPD TYPE: ICD-10-CM

## 2020-08-10 DIAGNOSIS — R06.02 SHORTNESS OF BREATH: ICD-10-CM

## 2020-08-10 PROCEDURE — 71250 CT THORAX DX C-: CPT | Mod: 26,,, | Performed by: RADIOLOGY

## 2020-08-10 PROCEDURE — 71250 CT THORAX DX C-: CPT | Mod: TC

## 2020-08-10 PROCEDURE — 71250 CT CHEST WITHOUT CONTRAST: ICD-10-PCS | Mod: 26,,, | Performed by: RADIOLOGY

## 2020-08-14 LAB
LEFT EYE DM RETINOPATHY: POSITIVE
RIGHT EYE DM RETINOPATHY: POSITIVE

## 2020-08-17 ENCOUNTER — TELEPHONE (OUTPATIENT)
Dept: FAMILY MEDICINE | Facility: CLINIC | Age: 85
End: 2020-08-17

## 2020-08-17 ENCOUNTER — OFFICE VISIT (OUTPATIENT)
Dept: FAMILY MEDICINE | Facility: CLINIC | Age: 85
End: 2020-08-17
Payer: MEDICARE

## 2020-08-17 VITALS
WEIGHT: 166 LBS | HEART RATE: 97 BPM | TEMPERATURE: 98 F | DIASTOLIC BLOOD PRESSURE: 60 MMHG | OXYGEN SATURATION: 100 % | BODY MASS INDEX: 23.24 KG/M2 | RESPIRATION RATE: 17 BRPM | HEIGHT: 71 IN | SYSTOLIC BLOOD PRESSURE: 110 MMHG

## 2020-08-17 DIAGNOSIS — J96.11 CHRONIC RESPIRATORY FAILURE WITH HYPOXIA: Primary | ICD-10-CM

## 2020-08-17 DIAGNOSIS — E11.21 DIABETES MELLITUS WITH NEPHROPATHY: ICD-10-CM

## 2020-08-17 DIAGNOSIS — Z76.89 RETURN TO WORK EVALUATION: ICD-10-CM

## 2020-08-17 DIAGNOSIS — I10 ESSENTIAL HYPERTENSION: ICD-10-CM

## 2020-08-17 PROCEDURE — 1126F AMNT PAIN NOTED NONE PRSNT: CPT | Mod: S$GLB,,, | Performed by: FAMILY MEDICINE

## 2020-08-17 PROCEDURE — 1126F PR PAIN SEVERITY QUANTIFIED, NO PAIN PRESENT: ICD-10-PCS | Mod: S$GLB,,, | Performed by: FAMILY MEDICINE

## 2020-08-17 PROCEDURE — 1101F PT FALLS ASSESS-DOCD LE1/YR: CPT | Mod: CPTII,S$GLB,, | Performed by: FAMILY MEDICINE

## 2020-08-17 PROCEDURE — 1159F PR MEDICATION LIST DOCUMENTED IN MEDICAL RECORD: ICD-10-PCS | Mod: S$GLB,,, | Performed by: FAMILY MEDICINE

## 2020-08-17 PROCEDURE — 99999 PR PBB SHADOW E&M-EST. PATIENT-LVL V: CPT | Mod: PBBFAC,,, | Performed by: FAMILY MEDICINE

## 2020-08-17 PROCEDURE — 3078F DIAST BP <80 MM HG: CPT | Mod: CPTII,S$GLB,, | Performed by: FAMILY MEDICINE

## 2020-08-17 PROCEDURE — 99999 PR PBB SHADOW E&M-EST. PATIENT-LVL V: ICD-10-PCS | Mod: PBBFAC,,, | Performed by: FAMILY MEDICINE

## 2020-08-17 PROCEDURE — 99214 PR OFFICE/OUTPT VISIT, EST, LEVL IV, 30-39 MIN: ICD-10-PCS | Mod: S$GLB,,, | Performed by: FAMILY MEDICINE

## 2020-08-17 PROCEDURE — 3074F SYST BP LT 130 MM HG: CPT | Mod: CPTII,S$GLB,, | Performed by: FAMILY MEDICINE

## 2020-08-17 PROCEDURE — 1159F MED LIST DOCD IN RCRD: CPT | Mod: S$GLB,,, | Performed by: FAMILY MEDICINE

## 2020-08-17 PROCEDURE — 1101F PR PT FALLS ASSESS DOC 0-1 FALLS W/OUT INJ PAST YR: ICD-10-PCS | Mod: CPTII,S$GLB,, | Performed by: FAMILY MEDICINE

## 2020-08-17 PROCEDURE — 3078F PR MOST RECENT DIASTOLIC BLOOD PRESSURE < 80 MM HG: ICD-10-PCS | Mod: CPTII,S$GLB,, | Performed by: FAMILY MEDICINE

## 2020-08-17 PROCEDURE — 99214 OFFICE O/P EST MOD 30 MIN: CPT | Mod: S$GLB,,, | Performed by: FAMILY MEDICINE

## 2020-08-17 PROCEDURE — 3074F PR MOST RECENT SYSTOLIC BLOOD PRESSURE < 130 MM HG: ICD-10-PCS | Mod: CPTII,S$GLB,, | Performed by: FAMILY MEDICINE

## 2020-08-17 NOTE — PROGRESS NOTES
"Subjective:       Patient ID: Peter Lopez is a 84 y.o. male.    Chief Complaint: Follow-up (Pt would like to discuss Anoro medication. )    HPI   84 year old male who was hospitalized with COVID-19 earlier this year, and has been seeing pulmonary for chronic respiratory failure with hypoxia, and oxygen dependence comes in stating that he is upset that I have not been communicating with [employer], his employer, because he has not received his disability check. He states that he expects me to communicate with [employer] all his specialist visits. He has not called [employer] himself.   I informed him that I have not received anything from his job since the last fax I sent to them. He continues to insist that I should be following up with them regularly.  He states that he also does not see why he is measuring his sugars, because he never had to do this in the past.  When asked if he spoke to pulmonary about his return to work, he first denies seeing anyone in pulmonary. He then states that I need to talk to pulmonary not him. He is also confused insisting I prescribed him Anoro Ellipta he brings in, when it was prescribed by pulmonary.    Review of Systems   Constitutional: Positive for activity change and fatigue. Negative for chills, diaphoresis, fever and unexpected weight change.   Respiratory: Positive for shortness of breath (on exertion). Negative for chest tightness and wheezing.    Cardiovascular: Negative for chest pain, palpitations and leg swelling.   Gastrointestinal: Negative for abdominal pain, blood in stool, change in bowel habit and change in bowel habit.         Objective:     /60 (BP Location: Left arm, Patient Position: Sitting, BP Method: Medium (Manual))   Pulse 97   Temp 98.4 °F (36.9 °C) (Oral)   Resp 17   Ht 5' 11" (1.803 m)   Wt 75.3 kg (166 lb 0.1 oz)   SpO2 100%   BMI 23.15 kg/m²     Physical Exam  Vitals signs reviewed.   Constitutional:       Appearance: Normal appearance.   HENT:      " Head: Normocephalic and atraumatic.      Right Ear: External ear normal.      Left Ear: External ear normal.      Nose: Nose normal.      Mouth/Throat:      Mouth: Mucous membranes are moist.   Eyes:      Extraocular Movements: Extraocular movements intact.      Pupils: Pupils are equal, round, and reactive to light.   Neck:      Musculoskeletal: Normal range of motion and neck supple.   Cardiovascular:      Rate and Rhythm: Normal rate and regular rhythm.      Pulses: Normal pulses.   Pulmonary:      Effort: Pulmonary effort is normal. No respiratory distress.      Breath sounds: Normal breath sounds. No wheezing.   Neurological:      Mental Status: He is alert.         Assessment:       1. Chronic respiratory failure with hypoxia    2. Diabetes mellitus with nephropathy    3. Essential hypertension    4. Return to work evaluation        Plan:       Peter was seen today for follow-up.    Diagnoses and all orders for this visit:    Chronic respiratory failure with hypoxia  Discussed with patient that he needs to communicate with pulmonary clearly about his return to work potential. I discussed with him that at present his respiratory issues are his main barrier, and that this is what seems holding him back from working and he needs to start considering realistic ability to return to work.  He states that he wants to see the MD, and I offered him to see referral coordinator to switch appointment to MD, and then he refused.    Diabetes mellitus with nephropathy  Continue current regimen.   Can cut back on testing if he desires.    Essential hypertension  Continue current regimen    Return to work evaluation  Discussed with patient that I cannot possibly predict when his company needs communication from me and that it is his responsibility to follow up with them on what they need. He stated that he wanted me to call right now, and I informed him that was not possible as other patients are waiting to be seen, but that he  "could call. He stated "I frankly don't care about other patients, I care about me." Again I informed him he needed to find out what his company needed from us and that we could forward that on. Patient was upset at this.  "

## 2020-08-17 NOTE — TELEPHONE ENCOUNTER
----- Message from Yanni Fernandez sent at 8/17/2020 10:19 AM CDT -----  Name of Who is Calling: Stephanie(Daughter)      What is the request in detail: Pt is calling to speak to staff in regards to a missed call regarding her dad .... Please call to further assist .       Can the clinic reply by [facility]: N      What Number to Call Back if not in [facility]: [phone]

## 2020-08-17 NOTE — LETTER
RE: Peter Lopez,  1935    2020    To whom it may concern:    Mr. Peter Lopez, was seen by me on 2020 via virtual audio visit, and again on 2020 in person. Both visits were for follow up on chronic respiratory failure with hypoxia as well as his diabetes and hypertension. The patient is to follow up with pulmonary for further recommendations, as at present has no follow scheduled with myself.    Thank you,    Lucas Lloyd Jr., MD, AAHIVS

## 2020-08-17 NOTE — TELEPHONE ENCOUNTER
I spoke to the pt daughter and they need a letter for OV 07/20/2020 and today's visit with an update on the pt, what the visit was for and if pt needs a follow up visit. I have the contact information to send the letters and the daughter would like to be contacted when the letters have been sent.

## 2020-08-20 ENCOUNTER — TELEPHONE (OUTPATIENT)
Dept: PULMONOLOGY | Facility: CLINIC | Age: 85
End: 2020-08-20

## 2020-08-20 NOTE — TELEPHONE ENCOUNTER
----- Message from Latanya Aguilar sent at 8/20/2020  2:22 PM CDT -----  Regarding: pt's daughter cinthia [phone]  Type: Patient Call Back    Who called:pt's daughter cinthia [phone]    What is the request in detail:daughter is following up on disability paperwork. Call cinthia    Can the clinic reply by [facility]?    Would the patient rather a call back or a response via [facility]? call    Best call back number:pt's daughter cinthia [phone]    Additional Information

## 2020-08-21 ENCOUNTER — TELEPHONE (OUTPATIENT)
Dept: PULMONOLOGY | Facility: CLINIC | Age: 85
End: 2020-08-21

## 2020-09-01 ENCOUNTER — PATIENT OUTREACH (OUTPATIENT)
Dept: ADMINISTRATIVE | Facility: HOSPITAL | Age: 85
End: 2020-09-01

## 2020-09-01 ENCOUNTER — TELEPHONE (OUTPATIENT)
Dept: FAMILY MEDICINE | Facility: CLINIC | Age: 85
End: 2020-09-01

## 2020-09-01 NOTE — TELEPHONE ENCOUNTER
Pt wanted to come in for the flu shot. Advise pt that nurse may rec'd them this week. I also told pt to contact the office next week to see if the nurse rec'd it and to make appt.

## 2020-09-02 ENCOUNTER — PATIENT OUTREACH (OUTPATIENT)
Dept: ADMINISTRATIVE | Facility: OTHER | Age: 85
End: 2020-09-02

## 2020-09-03 ENCOUNTER — HOSPITAL ENCOUNTER (OUTPATIENT)
Dept: CARDIOLOGY | Facility: HOSPITAL | Age: 85
Discharge: HOME | End: 2020-09-03
Attending: NURSE PRACTITIONER
Payer: MEDICARE

## 2020-09-03 ENCOUNTER — OFFICE VISIT (OUTPATIENT)
Dept: PULMONOLOGY | Facility: CLINIC | Age: 85
End: 2020-09-03
Payer: MEDICARE

## 2020-09-03 VITALS
BODY MASS INDEX: 23.3 KG/M2 | OXYGEN SATURATION: 97 % | RESPIRATION RATE: 18 BRPM | SYSTOLIC BLOOD PRESSURE: 108 MMHG | WEIGHT: 166.44 LBS | HEART RATE: 125 BPM | TEMPERATURE: 98 F | HEIGHT: 71 IN | DIASTOLIC BLOOD PRESSURE: 62 MMHG

## 2020-09-03 DIAGNOSIS — R00.0 TACHYCARDIA WITH HEART RATE 121-140 BEATS PER MINUTE: ICD-10-CM

## 2020-09-03 DIAGNOSIS — J84.9 ILD (INTERSTITIAL LUNG DISEASE): ICD-10-CM

## 2020-09-03 DIAGNOSIS — J44.9 CHRONIC OBSTRUCTIVE PULMONARY DISEASE, UNSPECIFIED COPD TYPE: Primary | ICD-10-CM

## 2020-09-03 DIAGNOSIS — J96.11 CHRONIC RESPIRATORY FAILURE WITH HYPOXIA: ICD-10-CM

## 2020-09-03 DIAGNOSIS — R26.81 GAIT INSTABILITY: ICD-10-CM

## 2020-09-03 PROCEDURE — 3074F PR MOST RECENT SYSTOLIC BLOOD PRESSURE < 130 MM HG: ICD-10-PCS | Mod: CPTII,S$GLB,, | Performed by: NURSE PRACTITIONER

## 2020-09-03 PROCEDURE — 93010 ELECTROCARDIOGRAM REPORT: CPT | Mod: ,,, | Performed by: INTERNAL MEDICINE

## 2020-09-03 PROCEDURE — 1159F MED LIST DOCD IN RCRD: CPT | Mod: S$GLB,,, | Performed by: NURSE PRACTITIONER

## 2020-09-03 PROCEDURE — 99999 PR PBB SHADOW E&M-EST. PATIENT-LVL IV: CPT | Mod: PBBFAC,,, | Performed by: NURSE PRACTITIONER

## 2020-09-03 PROCEDURE — 1126F AMNT PAIN NOTED NONE PRSNT: CPT | Mod: S$GLB,,, | Performed by: NURSE PRACTITIONER

## 2020-09-03 PROCEDURE — 99214 PR OFFICE/OUTPT VISIT, EST, LEVL IV, 30-39 MIN: ICD-10-PCS | Mod: S$GLB,,, | Performed by: NURSE PRACTITIONER

## 2020-09-03 PROCEDURE — 99214 OFFICE O/P EST MOD 30 MIN: CPT | Mod: S$GLB,,, | Performed by: NURSE PRACTITIONER

## 2020-09-03 PROCEDURE — 3078F DIAST BP <80 MM HG: CPT | Mod: CPTII,S$GLB,, | Performed by: NURSE PRACTITIONER

## 2020-09-03 PROCEDURE — 93005 ELECTROCARDIOGRAM TRACING: CPT

## 2020-09-03 PROCEDURE — 3078F PR MOST RECENT DIASTOLIC BLOOD PRESSURE < 80 MM HG: ICD-10-PCS | Mod: CPTII,S$GLB,, | Performed by: NURSE PRACTITIONER

## 2020-09-03 PROCEDURE — 1126F PR PAIN SEVERITY QUANTIFIED, NO PAIN PRESENT: ICD-10-PCS | Mod: S$GLB,,, | Performed by: NURSE PRACTITIONER

## 2020-09-03 PROCEDURE — 99999 PR PBB SHADOW E&M-EST. PATIENT-LVL IV: ICD-10-PCS | Mod: PBBFAC,,, | Performed by: NURSE PRACTITIONER

## 2020-09-03 PROCEDURE — 1101F PT FALLS ASSESS-DOCD LE1/YR: CPT | Mod: CPTII,S$GLB,, | Performed by: NURSE PRACTITIONER

## 2020-09-03 PROCEDURE — 1101F PR PT FALLS ASSESS DOC 0-1 FALLS W/OUT INJ PAST YR: ICD-10-PCS | Mod: CPTII,S$GLB,, | Performed by: NURSE PRACTITIONER

## 2020-09-03 PROCEDURE — 3074F SYST BP LT 130 MM HG: CPT | Mod: CPTII,S$GLB,, | Performed by: NURSE PRACTITIONER

## 2020-09-03 PROCEDURE — 93010 EKG 12-LEAD: ICD-10-PCS | Mod: ,,, | Performed by: INTERNAL MEDICINE

## 2020-09-03 PROCEDURE — 1159F PR MEDICATION LIST DOCUMENTED IN MEDICAL RECORD: ICD-10-PCS | Mod: S$GLB,,, | Performed by: NURSE PRACTITIONER

## 2020-09-03 NOTE — PROGRESS NOTES
HISTORY OF PRESENT ILLNESS: Peter Lopez is a 84 y.o. 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  Patient smoked .75 ppd x 40 years.  Patient quit smoking in .  Patient history covid 19 hospitalized 4/3/20  until 20 for hypoxic respiratory failure requiring nrb.  Subsequently discharged to [facility], [place].  Patient was discharged to home with 2 lpm.      Today, patient denied chest pain.  Able to perform adl. Walks around his block about 4 blocks almost daily but finds that his breathing is heavier since covid. Feels that the inhalers does help improve breathing. Feel off balance as well.  No fever/chill.  No wheezing.  Occasional cough.  Good appetite.  Works at [employer] and currently on leave still. Would like to go back and wants to know if he can be cleared. If not, pt [facility] for [employer] disability processing. Pt records will be released to [facility] per patient request (see DEN), LESLIE [id number], Case number: [id number],  1935    Patient walked in clinic today 6 minute. Oxygen 89% desaturation. -130s.     PAST MEDICAL HISTORY:    Active Ambulatory Problems     Diagnosis Date Noted    Essential hypertension 10/02/2012    Type 2 diabetes mellitus with diabetic polyneuropathy, without long-term current use of insulin 10/16/2013    ILD (interstitial lung disease) 2020    Chronic respiratory failure with hypoxia 2020    Discharge planning issues 2020    Reactive depression 2020    Chronic obstructive pulmonary disease 08/10/2020    Gait instability 2020    Tachycardia with heart rate 121-140 beats per minute 2020     Resolved Ambulatory Problems     Diagnosis Date Noted    Elevated PSA 01/10/2013    Hepatitis C 10/16/2013    BPH (benign prostatic hyperplasia) 2016     Atherosclerosis of aorta 2019    CKD (chronic kidney disease) stage 3, GFR 30-59 ml/min 2020    Hypoglycemia     Hypoxia 2020    Anemia 2020    Elevated troponin 2020     Past Medical History:   Diagnosis Date    Chronic hepatitis C without mention of hepatic coma     Diabetes mellitus type I     Hypertension     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 Rescue 18 g 0    blood sugar diagnostic Strp To check BG 1 times daily, to use with insurance preferred meter 30 each 11    blood-glucose meter kit To check BG 1 times daily, to use with insurance preferred meter 1 each 0    lancets Misc To check BG 1 times daily, to use with insurance preferred meter 30 each 11    metFORMIN (GLUCOPHAGE) 1000 MG tablet TAKE 1 TABLET BY MOUTH TWICE DAILY WITH MEALS 180 tablet 3    olmesartan-hydrochlorothiazide (BENICAR HCT) 40-25 mg per tablet TAKE 1 TABLET  "BY MOUTH ONCE DAILY 90 tablet 3    tamsulosin (FLOMAX) 0.4 mg Cap Take 1 capsule (0.4 mg total) by mouth once daily. 30 capsule 11    tiotropium bromide (SPIRIVA RESPIMAT) 2.5 mcg/actuation Mist Inhale 2 puffs into the lungs once daily. Controller (discontinue anoro) 4 g 5     No current facility-administered medications for this visit.                   REVIEW OF SYSTEMS:     Pulmonary related symptoms as per HPI.  Gen:  no weight loss, no fever, no night sweat  HEENT:  no visual changes, no sore throat, + hearing loss  CV:  No chest pain, no orthopnea, no PND, no palpitations  GI:  no melena, no hematochezia, no diarhea, no constipation.  :  no dysuria, no hematuria, no hesistancy, no dribbling  Neuro:  no syncope, no vertigo, no tinitus  Psych:  No homocide or suicide ideation; no depression.  Endocrine:  No heat or cold intolerance.  Sleep:  No snoring; no witnessed apnea.  Feeling rested upon awake.    Otherwise, a balance of systems reviewed is negative.          PHYSICAL EXAM:  Vitals:    09/03/20 0901   BP: 108/62   Pulse: (!) 125   Resp: 18   Temp: 98 °F (36.7 °C)   SpO2: 97%   Weight: 75.5 kg (166 lb 7.2 oz)   Height: 5' 11" (1.803 m)   PainSc: 0-No pain   PainLoc: Shoulder     Body mass index is 23.21 kg/m².     GENERAL:  well develop; no apparent distress  CARDIO: regular rhythm, tachycardic   PULM:  Right lower bases with distant crackles, no intercostals retractions; no accessory muscle usage   EXTREMITIES no cce  NEURO:  CN II-XII intact.  5/5 motor in all extremities.  sensation grossly intact   to light touch.  PSYCH:  normal affect.  Alert and oriented x 4, ambulatory    LABS  Pulmonary Functions Testing Results(personally reviewed):  Ratio 54 fev1 75.4 fvc 104 no improvement following bronchodilator  tlc 64.1 dlco 37.4  Spirometry shows mild obstruction. Lung volume determination shows moderate restriction is also present.  Spirometry remains unimproved following bronchodilator. DLCO is " severely decrease  Walk 94/85/86  ABG (personally reviewed):  4/4/20 7.48/38/90/28 on nrb  CXR (personally reviewed):  4/15/20 bilateral airspace disease  CT CHEST: There are moderate emphysematous changes throughout the lungs.  There is bibasilar bronchiectasis, architectural distortion and fibrosis without evidence of honeycombing.  No focal airspace opacity.    covid 19 3/29/20 positive.        ASSESSMENT/PLAN  Problem List Items Addressed This Visit        Unprioritized    Chronic obstructive pulmonary disease - Primary    Overview     Pulmonary Functions Testing Results(personally reviewed):  Ratio 54 fev1 75.4 fvc 104 no improvement following bronchodilator  tlc 64.1 dlco 37.4    Pt with evidence copd, pt on anoro. EKG normal sinus but very tachycardic . Likely SE Beta-agonist. Will step down from anoro to spiriva.      Patient not advised to return to work at this time.   Pt records will be released to [facility] per patient request (see DEN), LESLIE [id number], Case number: [id number]         Relevant Medications    tiotropium bromide (SPIRIVA RESPIMAT) 2.5 mcg/actuation Mist    Chronic respiratory failure with hypoxia    Overview     s/p hypoxic respiratory failure following covid.  Bilateral airspace disease and indication fibrosis on imaging.  Clinically improving. Improved oxygen saturation today with ambulation.           Gait instability    Overview     Extremity weakness and instability following covid. Will refer to physical therapy.          Relevant Orders    Ambulatory referral/consult to Physical/Occupational Therapy    ILD (interstitial lung disease)    Overview     Bilateral airspace and interstitial disease on imaging. Possibly residual changes from covid. Will repeat in 3 months for reevaluation         Relevant Orders    CT Chest Without Contrast    Tachycardia with heart rate 121-140 beats per minute    Overview     EKG normal rhythm. Pt asymptomatic.  Likely SE from bronchodilator. Step  down to spiriva.          Relevant Orders    SCHEDULED EKG 12-LEAD (to Muse) (Completed)    SCHEDULED EKG 12-LEAD (to Muse)        Patient will Follow up in about 1 month (around 10/3/2020). with md/np.

## 2020-09-04 ENCOUNTER — TELEPHONE (OUTPATIENT)
Dept: OTOLARYNGOLOGY | Facility: CLINIC | Age: 85
End: 2020-09-04

## 2020-09-04 NOTE — TELEPHONE ENCOUNTER
----- Message from Latanya Aguilar sent at 9/4/2020 10:43 AM CDT -----  Regarding: [facility] [phone]  Type: RX Refill Request    Who Called: pt    Have you contacted your pharmacy:    Refill or New Rxtiotropium bromide (SPIRIVA RESPIMAT) 2.5 mcg/actuation Mist - can pharmacy change to 1.25 mcg. Call pharmacy    RX Name and Strength:  How is the patient currently taking it? (ex. 1XDay):    Is this a 30 day or 90 day RX:    Preferred Pharmacy with phone number:    Local or Mail Order:    Ordering Provider:    Would the patient rather a call back or a response via [facility]?     Best Call Back Number:    Additional Information:

## 2020-09-08 ENCOUNTER — TELEPHONE (OUTPATIENT)
Dept: FAMILY MEDICINE | Facility: CLINIC | Age: 85
End: 2020-09-08

## 2020-09-08 ENCOUNTER — TELEPHONE (OUTPATIENT)
Dept: PULMONOLOGY | Facility: CLINIC | Age: 85
End: 2020-09-08

## 2020-09-08 NOTE — TELEPHONE ENCOUNTER
Spoke with patient he states that he would need for you to keep contacting his job almost every two weeks to continue getting his disability check message sent to Provider

## 2020-09-08 NOTE — TELEPHONE ENCOUNTER
----- Message from Akash Miller sent at 9/8/2020  2:41 PM CDT -----  Regarding: Pt Advice  Contact: LAUREN SHERMAN [[id number]]  Name of Who is Calling: LAUREN SHERMAN [[id number]]      What is the request in detail: Would like to speak with physician in regards to medication. No other information provided. Please advise      Can the clinic reply by [facility]: no      What Number to Call Back if not in [facility]: [phone]

## 2020-09-08 NOTE — TELEPHONE ENCOUNTER
Spoke with patient he stated he started the Spiriva a few days ago patient will take medication for another week and call back to let WENCESLAO Bridges know if help with his breathing.

## 2020-09-08 NOTE — TELEPHONE ENCOUNTER
----- Message from Leatha Pierce sent at 9/8/2020  2:34 PM CDT -----  Regarding: Call Back  Name of Who is Calling : LAUREN SHERMAN [[id number]]    Patient is requesting a call from staff in regards to scheduling the flu shot  .....Please contact to further discuss and advise.    Can the clinic reply by [facility] : No     What Number to Call Back :  [phone]

## 2020-09-10 ENCOUNTER — CLINICAL SUPPORT (OUTPATIENT)
Dept: FAMILY MEDICINE | Facility: CLINIC | Age: 85
End: 2020-09-10
Payer: MEDICARE

## 2020-09-10 DIAGNOSIS — Z23 NEED FOR VACCINATION: Primary | ICD-10-CM

## 2020-09-10 PROCEDURE — 99499 NO LOS: ICD-10-PCS | Mod: S$GLB,,, | Performed by: FAMILY MEDICINE

## 2020-09-10 PROCEDURE — 99499 UNLISTED E&M SERVICE: CPT | Mod: S$GLB,,, | Performed by: FAMILY MEDICINE

## 2020-09-10 PROCEDURE — 90694 FLU VACCINE - QUADRIVALENT - ADJUVANTED: ICD-10-PCS | Mod: S$GLB,,, | Performed by: FAMILY MEDICINE

## 2020-09-10 PROCEDURE — 90694 VACC AIIV4 NO PRSRV 0.5ML IM: CPT | Mod: S$GLB,,, | Performed by: FAMILY MEDICINE

## 2020-09-10 NOTE — PROGRESS NOTES
Patient tolerated flu med. administration well.  Instructed to wait for 15 minutes to monitor for any adverse reactions.

## 2020-09-17 ENCOUNTER — TELEPHONE (OUTPATIENT)
Dept: PULMONOLOGY | Facility: CLINIC | Age: 85
End: 2020-09-17

## 2020-09-28 ENCOUNTER — OFFICE VISIT (OUTPATIENT)
Dept: PODIATRY | Facility: CLINIC | Age: 85
End: 2020-09-28
Payer: MEDICARE

## 2020-09-28 VITALS
WEIGHT: 166.44 LBS | HEIGHT: 71 IN | BODY MASS INDEX: 23.3 KG/M2 | SYSTOLIC BLOOD PRESSURE: 110 MMHG | DIASTOLIC BLOOD PRESSURE: 62 MMHG

## 2020-09-28 DIAGNOSIS — B35.1 ONYCHOMYCOSIS DUE TO DERMATOPHYTE: ICD-10-CM

## 2020-09-28 DIAGNOSIS — E11.49 TYPE II DIABETES MELLITUS WITH NEUROLOGICAL MANIFESTATIONS: Primary | ICD-10-CM

## 2020-09-28 PROCEDURE — 11721 DEBRIDE NAIL 6 OR MORE: CPT | Mod: Q9,S$GLB,, | Performed by: PODIATRIST

## 2020-09-28 PROCEDURE — 99499 UNLISTED E&M SERVICE: CPT | Mod: S$GLB,,, | Performed by: PODIATRIST

## 2020-09-28 PROCEDURE — 99499 NO LOS: ICD-10-PCS | Mod: S$GLB,,, | Performed by: PODIATRIST

## 2020-09-28 PROCEDURE — 11721 PR DEBRIDEMENT OF NAILS, 6 OR MORE: ICD-10-PCS | Mod: Q9,S$GLB,, | Performed by: PODIATRIST

## 2020-09-28 PROCEDURE — 99999 PR PBB SHADOW E&M-EST. PATIENT-LVL III: CPT | Mod: PBBFAC,,, | Performed by: PODIATRIST

## 2020-09-28 PROCEDURE — 99999 PR PBB SHADOW E&M-EST. PATIENT-LVL III: ICD-10-PCS | Mod: PBBFAC,,, | Performed by: PODIATRIST

## 2020-09-28 NOTE — PROGRESS NOTES
Subjective:      Patient ID: Peter Lopez is a 84 y.o. male.    Chief Complaint: Diabetes Mellitus (Dr Lloyd PCP 7/20/20) and Nail Care    Peter is a 84 y.o. male who presents to the clinic upon referral from Dr. Steele ref. provider found  for evaluation and treatment of diabetic feet. 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 Patient relates no major problem with feet. Reports elongated nails that are difficult to trim. Requesting nail trimming.     PCP: Lucas Lloyd Jr, MD    Date Last Seen by PCP: 10/25/18    Current shoe gear: Tennis shoes    Hemoglobin A1C   Date Value Ref Range Status   06/03/2020 5.9 (H) 4.0 - 5.6 % Final     Comment:     ADA Screening Guidelines:  5.7-6.4%  Consistent with prediabetes  >or=6.5%  Consistent with diabetes  High levels of fetal hemoglobin interfere with the HbA1C  assay. Heterozygous hemoglobin variants (HbS, HgC, etc)do  not significantly interfere with this assay.   However, presence of multiple variants may affect accuracy.     04/05/2020 6.4 (H) 4.0 - 5.6 % Final     Comment:     ADA Screening Guidelines:  5.7-6.4%  Consistent with prediabetes  >or=6.5%  Consistent with diabetes  High levels of fetal hemoglobin interfere with the HbA1C  assay. Heterozygous hemoglobin variants (HbS, HgC, etc)do  not significantly interfere with this assay.   However, presence of multiple variants may affect accuracy.     01/28/2020 6.1 (H) 4.0 - 5.6 % Final     Comment:     ADA Screening Guidelines:  5.7-6.4%  Consistent with prediabetes  >or=6.5%  Consistent with diabetes  High levels of fetal hemoglobin interfere with the HbA1C  assay. Heterozygous hemoglobin variants (HbS, HgC, etc)do  not significantly interfere with this assay.   However, presence of multiple variants may affect accuracy.             Review of Systems   Constitution: Negative for chills,  diaphoresis and fever.   Cardiovascular: Negative for claudication, cyanosis, leg swelling and syncope.   Respiratory: Negative for cough and shortness of breath.    Skin: Positive for color change and nail changes. Negative for suspicious lesions.   Musculoskeletal: Negative for falls, joint pain, muscle cramps and muscle weakness.   Gastrointestinal: Negative for diarrhea, nausea and vomiting.   Neurological: Positive for paresthesias. Negative for disturbances in coordination, numbness, sensory change, tremors and weakness.   Psychiatric/Behavioral: Negative for altered mental status.           Objective:      Physical Exam  Constitutional:       Appearance: He is well-developed.      Comments: Oriented to time, place, and person.   Cardiovascular:      Comments: DP and PT pulses are palpable bilaterally. 3 sec capillary refill time and toes and feet are warm to touch proximally .       Musculoskeletal:      Comments: Equinus noted b/l ankles with < 10 deg DF noted. MMT 5/5 in DF/PF/Inv/Ev resistance with no reproduction of pain in any direction. Passive range of motion of ankle and pedal joints is painless b/l.     Feet:      Right foot:      Skin integrity: No callus or dry skin.      Left foot:      Skin integrity: No callus or dry skin.   Lymphadenopathy:      Comments: Negative lymphadenopathy bilateral popliteal fossa and tarsal tunnel.   Skin:     Comments: Toenails 1-5 bilaterally are elongated by 2-3 mm, thickened by 2-3 mm, discolored/yellowed, dystrophic, brittle with subungual debris.       Neurological:      Mental Status: He is alert.      Comments: Light touch, proprioception, and sharp/dull sensation are all diminished bilaterally. Protective threshold with the Ghent-Wienstein monofilament is diminished  bilaterally.  Subjective paresthesias with no clearly identifiable source or trigger.      Psychiatric:         Behavior: Behavior is cooperative.               Assessment:       Encounter  Diagnoses   Name Primary?    Type II diabetes mellitus with neurological manifestations Yes    Onychomycosis due to dermatophyte          Plan:       Peter was seen today for diabetes mellitus and nail care.    Diagnoses and all orders for this visit:    Type II diabetes mellitus with neurological manifestations    Onychomycosis due to dermatophyte      I counseled the patient on his conditions, their implications and medical management.    - Shoe inspection. Diabetic Foot Education. Patient reminded of the importance of good nutrition and blood sugar control to help prevent podiatric complications of diabetes. Patient instructed on proper foot hygeine. We discussed wearing proper shoe gear, daily foot inspections, never walking without protective shoe gear, never putting sharp instruments to feet, routine podiatric nail visits every 3  months.   - With patient's permission, nails were aggressively reduced and debrided x 10 to their soft tissue attachment mechanically and with electric , removing all offending nail and debris. Patient relates relief following the procedure. He will continue to monitor the areas daily, inspect his feet, wear protective shoe gear when ambulatory, moisturizer to maintain skin integrity and follow in this office in approximately 6 months, sooner p.r.n.       F/u 3 months     Lucia Aguiar DPM

## 2020-10-06 ENCOUNTER — OFFICE VISIT (OUTPATIENT)
Dept: PULMONOLOGY | Facility: CLINIC | Age: 85
End: 2020-10-06
Payer: MEDICARE

## 2020-10-06 VITALS
OXYGEN SATURATION: 95 % | HEART RATE: 114 BPM | SYSTOLIC BLOOD PRESSURE: 160 MMHG | DIASTOLIC BLOOD PRESSURE: 85 MMHG | TEMPERATURE: 97 F | WEIGHT: 168.63 LBS | BODY MASS INDEX: 23.61 KG/M2 | HEIGHT: 71 IN

## 2020-10-06 DIAGNOSIS — R00.0 TACHYCARDIA: ICD-10-CM

## 2020-10-06 DIAGNOSIS — I10 ESSENTIAL HYPERTENSION: ICD-10-CM

## 2020-10-06 DIAGNOSIS — J96.11 CHRONIC RESPIRATORY FAILURE WITH HYPOXIA: ICD-10-CM

## 2020-10-06 DIAGNOSIS — J44.9 CHRONIC OBSTRUCTIVE PULMONARY DISEASE, UNSPECIFIED COPD TYPE: Primary | ICD-10-CM

## 2020-10-06 DIAGNOSIS — Z01.818 PREPROCEDURAL EXAMINATION: ICD-10-CM

## 2020-10-06 DIAGNOSIS — J84.9 ILD (INTERSTITIAL LUNG DISEASE): ICD-10-CM

## 2020-10-06 PROCEDURE — 99499 UNLISTED E&M SERVICE: CPT | Mod: S$GLB,,, | Performed by: NURSE PRACTITIONER

## 2020-10-06 PROCEDURE — 3077F SYST BP >= 140 MM HG: CPT | Mod: CPTII,S$GLB,, | Performed by: NURSE PRACTITIONER

## 2020-10-06 PROCEDURE — 3079F PR MOST RECENT DIASTOLIC BLOOD PRESSURE 80-89 MM HG: ICD-10-PCS | Mod: CPTII,S$GLB,, | Performed by: NURSE PRACTITIONER

## 2020-10-06 PROCEDURE — 99999 PR PBB SHADOW E&M-EST. PATIENT-LVL IV: CPT | Mod: PBBFAC,,, | Performed by: NURSE PRACTITIONER

## 2020-10-06 PROCEDURE — 1159F MED LIST DOCD IN RCRD: CPT | Mod: S$GLB,,, | Performed by: NURSE PRACTITIONER

## 2020-10-06 PROCEDURE — 1126F PR PAIN SEVERITY QUANTIFIED, NO PAIN PRESENT: ICD-10-PCS | Mod: S$GLB,,, | Performed by: NURSE PRACTITIONER

## 2020-10-06 PROCEDURE — 1101F PR PT FALLS ASSESS DOC 0-1 FALLS W/OUT INJ PAST YR: ICD-10-PCS | Mod: CPTII,S$GLB,, | Performed by: NURSE PRACTITIONER

## 2020-10-06 PROCEDURE — 3077F PR MOST RECENT SYSTOLIC BLOOD PRESSURE >= 140 MM HG: ICD-10-PCS | Mod: CPTII,S$GLB,, | Performed by: NURSE PRACTITIONER

## 2020-10-06 PROCEDURE — 1159F PR MEDICATION LIST DOCUMENTED IN MEDICAL RECORD: ICD-10-PCS | Mod: S$GLB,,, | Performed by: NURSE PRACTITIONER

## 2020-10-06 PROCEDURE — 3079F DIAST BP 80-89 MM HG: CPT | Mod: CPTII,S$GLB,, | Performed by: NURSE PRACTITIONER

## 2020-10-06 PROCEDURE — 99214 OFFICE O/P EST MOD 30 MIN: CPT | Mod: S$GLB,,, | Performed by: NURSE PRACTITIONER

## 2020-10-06 PROCEDURE — 1126F AMNT PAIN NOTED NONE PRSNT: CPT | Mod: S$GLB,,, | Performed by: NURSE PRACTITIONER

## 2020-10-06 PROCEDURE — 1101F PT FALLS ASSESS-DOCD LE1/YR: CPT | Mod: CPTII,S$GLB,, | Performed by: NURSE PRACTITIONER

## 2020-10-06 PROCEDURE — 99999 PR PBB SHADOW E&M-EST. PATIENT-LVL IV: ICD-10-PCS | Mod: PBBFAC,,, | Performed by: NURSE PRACTITIONER

## 2020-10-06 PROCEDURE — 99214 PR OFFICE/OUTPT VISIT, EST, LEVL IV, 30-39 MIN: ICD-10-PCS | Mod: S$GLB,,, | Performed by: NURSE PRACTITIONER

## 2020-10-06 PROCEDURE — 99499 RISK ADDL DX/OHS AUDIT: ICD-10-PCS | Mod: S$GLB,,, | Performed by: NURSE PRACTITIONER

## 2020-10-06 NOTE — PROGRESS NOTES
HISTORY OF PRESENT ILLNESS: Peter Lopez is a 84 y.o. 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  Patient smoked .75 ppd x 40 years.  Patient quit smoking in .  Patient history covid 19 hospitalized 4/3/20  until 20 for hypoxic respiratory failure requiring nrb.  Subsequently discharged to [facility] [place].  Patient was discharged to home with 2 lpm.      Today, patient reports that symptoms are stable. No obvious improvement or worsening.  Able to perform adl. Walks around his block about 4 blocks almost daily but finds that his breathing is heavier since covid. Feels that the inhalers does help improve breathing. Spiriva seems to work better than the anoro. Feel off balance as well.  Has not been contacted yet by PT. No fever/chill.  No wheezing.  Occasional cough.  Good appetite.  Works at [employer] and currently on leave still. Would like to go back and wants to know if he can be cleared. If not, pt request records to [facility] for [employer] disability processing. Pt records will be released to [facility] per patient request (see DEN), LESLIE [id number], Case number: [id number],  1935        PAST MEDICAL HISTORY:    Active Ambulatory Problems     Diagnosis Date Noted    Essential hypertension 10/02/2012    Type 2 diabetes mellitus with diabetic polyneuropathy, without long-term current use of insulin 10/16/2013    ILD (interstitial lung disease) 2020    Chronic respiratory failure with hypoxia 2020    Discharge planning issues 2020    Reactive depression 2020    Chronic obstructive pulmonary disease 08/10/2020    Gait instability 2020    Tachycardia with heart rate 121-140 beats per minute 2020     Resolved Ambulatory Problems     Diagnosis Date Noted    Elevated PSA 01/10/2013    Hepatitis C 10/16/2013    BPH  (benign prostatic hyperplasia) 2016    Atherosclerosis of aorta 2019    CKD (chronic kidney disease) stage 3, GFR 30-59 ml/min 2020    Hypoglycemia     Hypoxia 2020    Anemia 2020    Elevated troponin 2020     Past Medical History:   Diagnosis Date    Chronic hepatitis C without mention of hepatic coma     Diabetes mellitus type I     Hypertension     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 Rescue 18 g 0    blood sugar diagnostic Strp To check BG 1 times daily, to use with insurance preferred meter 30 each 11    blood-glucose meter kit To check BG 1 times daily, to use with insurance preferred meter 1 each 0    lancets Misc To check BG 1 times daily, to use with insurance preferred meter 30 each 11    metFORMIN (GLUCOPHAGE) 1000 MG tablet TAKE 1 TABLET BY MOUTH TWICE DAILY WITH MEALS 180 tablet 3    olmesartan-hydrochlorothiazide  "(BENICAR HCT) 40-25 mg per tablet TAKE 1 TABLET BY MOUTH ONCE DAILY 90 tablet 3    tamsulosin (FLOMAX) 0.4 mg Cap Take 1 capsule (0.4 mg total) by mouth once daily. 30 capsule 11    tiotropium bromide (SPIRIVA RESPIMAT) 2.5 mcg/actuation Mist Inhale 2 puffs into the lungs once daily. Controller (discontinue anoro) 4 g 5     No current facility-administered medications for this visit.                   REVIEW OF SYSTEMS:     Pulmonary related symptoms as per HPI.  Gen:  no weight loss, no fever, no night sweat  HEENT:  no visual changes, no sore throat, + hearing loss  CV:  No chest pain, no orthopnea, no PND, no palpitations  GI:  no melena, no hematochezia, no diarhea, no constipation.  :  no dysuria, no hematuria, no hesistancy, no dribbling  Neuro:  no syncope, no vertigo, no tinitus  Psych:  No homocide or suicide ideation; no depression.  Endocrine:  No heat or cold intolerance.  Sleep:  No snoring; no witnessed apnea.  Feeling rested upon awake.    Otherwise, a balance of systems reviewed is negative.          PHYSICAL EXAM:  Vitals:    10/06/20 0935   BP: (!) 160/85   Pulse: (!) 114   Temp: 97.3 °F (36.3 °C)   SpO2: 95%   Weight: 76.5 kg (168 lb 10.4 oz)   Height: 5' 11" (1.803 m)   PainSc: 0-No pain     Body mass index is 23.52 kg/m².     GENERAL:  well develop; no apparent distress  CARDIO: regular rhythm, tachycardic   PULM:  Right lower bases with distant crackles, no intercostals retractions; no accessory muscle usage   EXTREMITIES no cce  NEURO:  CN II-XII intact.  5/5 motor in all extremities.  sensation grossly intact   to light touch.  PSYCH:  normal affect.  Alert and oriented x 4, ambulatory    LABS  Pulmonary Functions Testing Results(personally reviewed):  Ratio 54 fev1 75.4 fvc 104 no improvement following bronchodilator  tlc 64.1 dlco 37.4  Spirometry shows mild obstruction. Lung volume determination shows moderate restriction is also present.  Spirometry remains unimproved following " bronchodilator. DLCO is severely decrease  Walk 94/85/86  ABG (personally reviewed):  4/4/20 7.48/38/90/28 on nrb  CXR (personally reviewed):  4/15/20 bilateral airspace disease  CT CHEST: There are moderate emphysematous changes throughout the lungs.  There is bibasilar bronchiectasis, architectural distortion and fibrosis without evidence of honeycombing.  No focal airspace opacity.    covid 19 3/29/20 positive.        ASSESSMENT/PLAN  Problem List Items Addressed This Visit        Unprioritized    Chronic obstructive pulmonary disease - Primary    Overview     Pulmonary Functions Testing Results(personally reviewed):  Ratio 54 fev1 75.4 fvc 104 no improvement following bronchodilator  tlc 64.1 dlco 37.4    Pt with evidence copd, pt on spiriva. Symptoms stable.  EKG normal sinus but very tachycardic on exertion. Pt recommended to follow up with cardiology.     Patient not advised to return to work at this time.   Pt records will be released to [facility] per patient request (see DEN), [facility] [id number], Case number: [id number]         Chronic respiratory failure with hypoxia    Overview     s/p hypoxic respiratory failure following covid.  Bilateral airspace disease and indication fibrosis on imaging.  Clinically improving.          Essential hypertension    Overview     F/u with cardiology recommended         Relevant Orders    Ambulatory referral/consult to Cardiology    ILD (interstitial lung disease)    Overview     Bilateral airspace and interstitial disease on imaging. Possibly residual changes from covid. Will repeat in Dec along with PFT for reevaluation         Relevant Orders    Spirometry with/without bronchodilator & DLCO    Six Minute Walk Test to qualify for Home Oxygen    Tachycardia with heart rate 121-140 beats per minute    Overview     EKG normal rhythm. Pt asymptomatic.  Likely SE from bronchodilator. Step down to spiriva.            Other Visit Diagnoses     Preprocedural examination         Relevant Orders    COVID-19 Routine Screening        Patient will Follow up in about 1 month (around 11/6/2020). with md/np.

## 2020-10-09 ENCOUNTER — TELEPHONE (OUTPATIENT)
Dept: PULMONOLOGY | Facility: CLINIC | Age: 85
End: 2020-10-09

## 2020-10-09 NOTE — TELEPHONE ENCOUNTER
Spoke with patient daughter and informed her that Janey Chart notes has not been finished yet. Janey states that she should be done today and will fax over to zayda.    ----- Message from Shonda Boucher sent at 10/9/2020 10:46 AM CDT -----  Regarding: Nyra-Daughter  Type: Patient Call Back    Who called: Daughter-Mckinley    What is the request in detail: Mckinley is calling to check the status of the last clinical note being sent to zayda. Please fax [phone]   Alternate fax [phone] [facility] [phone]  Can the clinic reply by [facility]?    Would the patient rather a call back or a response via [facility]? Call     Best call back number:[phone]

## 2020-10-26 ENCOUNTER — OFFICE VISIT (OUTPATIENT)
Dept: CARDIOLOGY | Facility: CLINIC | Age: 85
End: 2020-10-26
Payer: MEDICARE

## 2020-10-26 VITALS
WEIGHT: 168 LBS | SYSTOLIC BLOOD PRESSURE: 152 MMHG | HEART RATE: 112 BPM | DIASTOLIC BLOOD PRESSURE: 81 MMHG | BODY MASS INDEX: 23.52 KG/M2 | HEIGHT: 71 IN

## 2020-10-26 DIAGNOSIS — J44.9 CHRONIC OBSTRUCTIVE PULMONARY DISEASE, UNSPECIFIED COPD TYPE: ICD-10-CM

## 2020-10-26 DIAGNOSIS — I10 ESSENTIAL HYPERTENSION: Primary | ICD-10-CM

## 2020-10-26 DIAGNOSIS — R00.0 TACHYCARDIA WITH HEART RATE 121-140 BEATS PER MINUTE: ICD-10-CM

## 2020-10-26 DIAGNOSIS — R00.0 TACHYCARDIA: ICD-10-CM

## 2020-10-26 DIAGNOSIS — E11.42 TYPE 2 DIABETES MELLITUS WITH DIABETIC POLYNEUROPATHY, WITHOUT LONG-TERM CURRENT USE OF INSULIN: ICD-10-CM

## 2020-10-26 DIAGNOSIS — J84.9 ILD (INTERSTITIAL LUNG DISEASE): ICD-10-CM

## 2020-10-26 PROCEDURE — 1159F PR MEDICATION LIST DOCUMENTED IN MEDICAL RECORD: ICD-10-PCS | Mod: S$GLB,,, | Performed by: INTERNAL MEDICINE

## 2020-10-26 PROCEDURE — 93000 EKG 12-LEAD: ICD-10-PCS | Mod: S$GLB,,, | Performed by: INTERNAL MEDICINE

## 2020-10-26 PROCEDURE — 99499 UNLISTED E&M SERVICE: CPT | Mod: S$GLB,,, | Performed by: INTERNAL MEDICINE

## 2020-10-26 PROCEDURE — 3079F DIAST BP 80-89 MM HG: CPT | Mod: CPTII,S$GLB,, | Performed by: INTERNAL MEDICINE

## 2020-10-26 PROCEDURE — 1126F PR PAIN SEVERITY QUANTIFIED, NO PAIN PRESENT: ICD-10-PCS | Mod: S$GLB,,, | Performed by: INTERNAL MEDICINE

## 2020-10-26 PROCEDURE — 93000 ELECTROCARDIOGRAM COMPLETE: CPT | Mod: S$GLB,,, | Performed by: INTERNAL MEDICINE

## 2020-10-26 PROCEDURE — 3077F PR MOST RECENT SYSTOLIC BLOOD PRESSURE >= 140 MM HG: ICD-10-PCS | Mod: CPTII,S$GLB,, | Performed by: INTERNAL MEDICINE

## 2020-10-26 PROCEDURE — 1159F MED LIST DOCD IN RCRD: CPT | Mod: S$GLB,,, | Performed by: INTERNAL MEDICINE

## 2020-10-26 PROCEDURE — 3079F PR MOST RECENT DIASTOLIC BLOOD PRESSURE 80-89 MM HG: ICD-10-PCS | Mod: CPTII,S$GLB,, | Performed by: INTERNAL MEDICINE

## 2020-10-26 PROCEDURE — 99214 PR OFFICE/OUTPT VISIT, EST, LEVL IV, 30-39 MIN: ICD-10-PCS | Mod: S$GLB,,, | Performed by: INTERNAL MEDICINE

## 2020-10-26 PROCEDURE — 1101F PR PT FALLS ASSESS DOC 0-1 FALLS W/OUT INJ PAST YR: ICD-10-PCS | Mod: CPTII,S$GLB,, | Performed by: INTERNAL MEDICINE

## 2020-10-26 PROCEDURE — 3077F SYST BP >= 140 MM HG: CPT | Mod: CPTII,S$GLB,, | Performed by: INTERNAL MEDICINE

## 2020-10-26 PROCEDURE — 1126F AMNT PAIN NOTED NONE PRSNT: CPT | Mod: S$GLB,,, | Performed by: INTERNAL MEDICINE

## 2020-10-26 PROCEDURE — 99999 PR PBB SHADOW E&M-EST. PATIENT-LVL III: ICD-10-PCS | Mod: PBBFAC,,, | Performed by: INTERNAL MEDICINE

## 2020-10-26 PROCEDURE — 99999 PR PBB SHADOW E&M-EST. PATIENT-LVL III: CPT | Mod: PBBFAC,,, | Performed by: INTERNAL MEDICINE

## 2020-10-26 PROCEDURE — 99214 OFFICE O/P EST MOD 30 MIN: CPT | Mod: S$GLB,,, | Performed by: INTERNAL MEDICINE

## 2020-10-26 PROCEDURE — 99499 RISK ADDL DX/OHS AUDIT: ICD-10-PCS | Mod: S$GLB,,, | Performed by: INTERNAL MEDICINE

## 2020-10-26 PROCEDURE — 1101F PT FALLS ASSESS-DOCD LE1/YR: CPT | Mod: CPTII,S$GLB,, | Performed by: INTERNAL MEDICINE

## 2020-10-26 NOTE — PROGRESS NOTES
CARDIOVASCULAR CONSULTATION    REASON FOR CONSULT:   Peter Lopez is a 84 y.o. male who presents for evaluation of frequent PVCs.      HISTORY OF PRESENT ILLNESS:     Patient is 83-year-old pleasant person.  Has been referred to me for frequent PVCs.  We did an EKG in the clinic today and I did not see any frequent PVCs on this EKG.  I also reviewed his past EKGs which have been scanned in the computer and did not see any frequent PVCs.  Patient denies any palpitations patient denies any chest pains at rest on exertion, orthopnea, PND.  However he had an EKG performed on 11/11/2019 and as per the report that EKG showed frequent PVCs as per the notes.  However, it is not scanned in the system and I am unable to see it myself.    Notes from October 2020:  Patient here for follow-up.  Denies any chest pains at rest on exertion, orthopnea, PND.  States his recovering from school with infection.  EKG done in the clinic today was personally reviewed and demonstrated sinus tachycardia at a heart rate of 106 beats per minute, nonspecific ST changes.  Patient denies any chest at rest on exertion, orthopnea, PND, swelling feet.      Concentric left ventricular hypertrophy.  Normal left ventricular systolic function. The estimated ejection fraction is 55%  Normal LV diastolic function.  Normal right ventricular systolic function.  No pulmonary hypertension present.        Normal central venous pressure (3 mm Hg).  The estimated PA systolic pressure is 28 mm Hg  Technically adequate study  Avg  bpm  Rare PVCs, very rare PACs.  No atrial fibrillation.  Sinus tachycardia - 18 hours, 50 minutes.      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 No pertinent  surgical history.    ALLERGIES AND MEDICATION:   Review of patient's allergies indicates:  No Known Allergies     Medication List          Accurate as of 2020  8:49 AM. If you have any questions, ask your nurse or doctor.            CONTINUE taking these medications    albuterol 90 mcg/actuation inhaler  Commonly known as: PROAIR HFA  Inhale 2 puffs into the lungs every 4 (four) hours as needed for Wheezing (cough/shortness of breath). Rescue     blood sugar diagnostic Strp  To check BG 1 times daily, to use with insurance preferred meter     blood-glucose meter kit  To check BG 1 times daily, to use with insurance preferred meter     lancets Misc  To check BG 1 times daily, to use with insurance preferred meter     metFORMIN 1000 MG tablet  Commonly known as: GLUCOPHAGE  TAKE 1 TABLET BY MOUTH TWICE DAILY WITH MEALS     olmesartan-hydrochlorothiazide 40-25 mg per tablet  Commonly known as: BENICAR HCT  TAKE 1 TABLET BY MOUTH ONCE DAILY     tamsulosin 0.4 mg Cap  Commonly known as: FLOMAX  Take 1 capsule (0.4 mg total) by mouth once daily.     tiotropium bromide 2.5 mcg/actuation Mist  Commonly known as: SPIRIVA RESPIMAT  Inhale 2 puffs into the lungs once daily. Controller (discontinue anoro)            SOCIAL HISTORY:     Social History     Socioeconomic History    Marital status:      Spouse name: Not on file    Number of children: Not on file    Years of education: Not on file    Highest education level: Not on file   Occupational History    Not on file   Social Needs    Financial resource strain: Not on file    Food insecurity     Worry: Not on file     Inability: Not on file    Transportation needs     Medical: Not on file     Non-medical: Not on file   Tobacco Use    Smoking status: Former Smoker     Packs/day: 1.00     Years: 40.00     Pack years: 40.00     Quit date: 1991     Years since quittin.9    Smokeless tobacco: Former User     Quit date: 1986   Substance and  "Sexual Activity    Alcohol use: Not Currently     Alcohol/week: 0.0 standard drinks     Comment: 2 bottles beer on weekends    Drug use: No    Sexual activity: Yes     Partners: Female     Comment:     Lifestyle    Physical activity     Days per week: Not on file     Minutes per session: Not on file    Stress: Not on file   Relationships    Social connections     Talks on phone: Not on file     Gets together: Not on file     Attends [employer] service: Not on file     Active member of club or organization: Not on file     Attends meetings of clubs or organizations: Not on file     Relationship status: Not on file   Other Topics Concern    Not on file   Social History Narrative    , resides in [place]. 7 children but lost one. 16 grandchildren. 4 great-grandchildren. Works at [employer]       FAMILY HISTORY:     Family History   Problem Relation Age of Onset    Cancer Mother     Asbestos Father     Liver disease Neg Hx        REVIEW OF SYSTEMS:   Review of Systems   Constitution: Negative.   HENT: Negative.    Eyes: Negative.    Cardiovascular: Negative.    Respiratory: Negative.    Endocrine: Negative.    Hematologic/Lymphatic: Negative.    Skin: Negative.    Musculoskeletal: Negative.    Gastrointestinal: Negative.    Genitourinary: Negative.    Neurological: Negative.    Psychiatric/Behavioral: Negative.    Allergic/Immunologic: Negative.        A 10 point review of systems was performed and all the pertinent positives have been mentioned. Rest of review of systems was negative.        PHYSICAL EXAM:     Vitals:    10/26/20 0846   BP: (!) 152/81   Pulse: (!) 112    Body mass index is 23.43 kg/m².  Weight: 76.2 kg (168 lb)   Height: 5' 11" (180.3 cm)     Physical Exam   Constitutional: He is oriented to person, place, and time. He appears well-developed and well-nourished.   HENT:   Head: Normocephalic.   Eyes: Pupils are equal, round, and reactive to light. Conjunctivae are " normal.   Neck: Normal range of motion. Neck supple.   Cardiovascular: Normal rate, regular rhythm and normal heart sounds.   Pulmonary/Chest: Effort normal and breath sounds normal.   Abdominal: Soft. Bowel sounds are normal.   Neurological: He is alert and oriented to person, place, and time.   Skin: Skin is warm.   Vitals reviewed.        DATA:     Laboratory:  CBC:  Recent Labs   Lab 04/17/20  0342 04/19/20  0505 06/03/20  1021   WBC 4.73 4.35 4.31   Hemoglobin 10.5 L 10.6 L 12.1 L   Hematocrit 33.3 L 33.5 L 38.5 L   Platelets 533 H 506 H 298       CHEMISTRIES:  Recent Labs   Lab 04/10/20  0755 04/11/20  0534 04/13/20  0459  04/17/20  0342 04/19/20  0505 06/03/20  1021 06/16/20  1350   Glucose 115 H 131 H 126 H   < > 127 H 121 H 159 H  --    Sodium 137 134 L 136   < > 137 137 137  --    Potassium 4.4 4.3 4.2   < > 4.3 4.4 4.8  --    BUN, Bld 11 12 12   < > 9 12 21  --    Creatinine 1.0 1.0 0.9   < > 1.0 1.0 1.3  --    eGFR if  >60 >60 >60   < > >60 >60 59 A  --    eGFR if non African American >60 >60 >60   < > >60 >60 51 A  --    Calcium 8.9 8.9 9.1   < > 9.2 9.0 10.9 H 10.4   Magnesium 2.1 2.0 2.0  --   --   --   --   --     < > = values in this interval not displayed.       CARDIAC BIOMARKERS:  Recent Labs   Lab 04/03/20  1509 04/03/20  2316 04/04/20  0639 04/15/20  0631     --   --  65   Troponin I 0.116 H 0.156 H 0.112 H <0.006       COAGS:  Recent Labs   Lab 03/28/20  2253   INR 0.9       LIPIDS/LFTS:  Recent Labs   Lab 08/19/19  1154  01/28/20  1446 03/28/20  1753  04/17/20  0342 04/19/20  0505 06/03/20  1021   Cholesterol 169  --  139 130  --   --   --   --    Triglycerides 154 H  --  103 110  --   --   --   --    HDL 48  --  47 46  --   --   --   --    LDL Cholesterol 90.2  --  71.4 62.0 L  --   --   --   --    Non-HDL Cholesterol 121  --  92 84  --   --   --   --    AST  --    < > 39 63 H   < > 31 27 22   ALT  --    < > 28 39   < > 36 33 14    < > = values in this interval not  displayed.       Hemoglobin A1C   Date Value Ref Range Status   06/03/2020 5.9 (H) 4.0 - 5.6 % Final     Comment:     ADA Screening Guidelines:  5.7-6.4%  Consistent with prediabetes  >or=6.5%  Consistent with diabetes  High levels of fetal hemoglobin interfere with the HbA1C  assay. Heterozygous hemoglobin variants (HbS, HgC, etc)do  not significantly interfere with this assay.   However, presence of multiple variants may affect accuracy.     04/05/2020 6.4 (H) 4.0 - 5.6 % Final     Comment:     ADA Screening Guidelines:  5.7-6.4%  Consistent with prediabetes  >or=6.5%  Consistent with diabetes  High levels of fetal hemoglobin interfere with the HbA1C  assay. Heterozygous hemoglobin variants (HbS, HgC, etc)do  not significantly interfere with this assay.   However, presence of multiple variants may affect accuracy.     01/28/2020 6.1 (H) 4.0 - 5.6 % Final     Comment:     ADA Screening Guidelines:  5.7-6.4%  Consistent with prediabetes  >or=6.5%  Consistent with diabetes  High levels of fetal hemoglobin interfere with the HbA1C  assay. Heterozygous hemoglobin variants (HbS, HgC, etc)do  not significantly interfere with this assay.   However, presence of multiple variants may affect accuracy.         TSH  Recent Labs   Lab 10/01/18  0944 03/28/20  1753   TSH 1.254 2.079       The ASCVD Risk score (Eligio MYKEL Jr., et al., 2013) failed to calculate for the following reasons:    The 2013 ASCVD risk score is only valid for ages 40 to 79           Cardiovascular Testing:    EKG: (personally reviewed tracing)  EKG performed in clinic today demonstrated sinus tachycardia.      ASSESSMENT AND PLAN     Patient Active Problem List   Diagnosis    Essential hypertension    Type 2 diabetes mellitus with diabetic polyneuropathy, without long-term current use of insulin    ILD (interstitial lung disease)    Chronic respiratory failure with hypoxia    Discharge planning issues    Reactive depression    Chronic obstructive  pulmonary disease    Gait instability    Tachycardia with heart rate 121-140 beats per minute       Frequent PVCs as per notes from primary care.  Further evaluation with the help of a Holter monitor as well as a 2D echocardiogram.  2D echocardiogram showed normal left ventricle systolic function.  Holter monitor did not show very large amount of PVCs.  No further cardiac workup indicated at the current time.    Hypertension:  Blood pressure mildly elevated in clinic, however patient states that he was [place] train and got laid and hence was running into the appointment.  States at home the blood pressure stays well controlled.    Follow-up in clinic in 6 months          Thank you very much for involving me in the care of your patient.  Please do not hesitate to contact me if there are any questions.      Sean Schafre MD, FACC, [facility]  Interventional Cardiologist, [facility].           This note was dictated with the help of speech recognition software.  There might be un-intended errors and/or substitutions.

## 2020-11-02 ENCOUNTER — OFFICE VISIT (OUTPATIENT)
Dept: PULMONOLOGY | Facility: CLINIC | Age: 85
End: 2020-11-02
Payer: MEDICARE

## 2020-11-02 VITALS
SYSTOLIC BLOOD PRESSURE: 147 MMHG | OXYGEN SATURATION: 97 % | HEART RATE: 102 BPM | WEIGHT: 172 LBS | DIASTOLIC BLOOD PRESSURE: 93 MMHG | HEIGHT: 71 IN | BODY MASS INDEX: 24.08 KG/M2

## 2020-11-02 DIAGNOSIS — J84.9 ILD (INTERSTITIAL LUNG DISEASE): ICD-10-CM

## 2020-11-02 DIAGNOSIS — J44.9 CHRONIC OBSTRUCTIVE PULMONARY DISEASE, UNSPECIFIED COPD TYPE: ICD-10-CM

## 2020-11-02 DIAGNOSIS — J96.11 CHRONIC RESPIRATORY FAILURE WITH HYPOXIA: Primary | ICD-10-CM

## 2020-11-02 PROCEDURE — 1126F PR PAIN SEVERITY QUANTIFIED, NO PAIN PRESENT: ICD-10-PCS | Mod: S$GLB,,, | Performed by: NURSE PRACTITIONER

## 2020-11-02 PROCEDURE — 3080F PR MOST RECENT DIASTOLIC BLOOD PRESSURE >= 90 MM HG: ICD-10-PCS | Mod: CPTII,S$GLB,, | Performed by: NURSE PRACTITIONER

## 2020-11-02 PROCEDURE — 1101F PR PT FALLS ASSESS DOC 0-1 FALLS W/OUT INJ PAST YR: ICD-10-PCS | Mod: CPTII,S$GLB,, | Performed by: NURSE PRACTITIONER

## 2020-11-02 PROCEDURE — 3080F DIAST BP >= 90 MM HG: CPT | Mod: CPTII,S$GLB,, | Performed by: NURSE PRACTITIONER

## 2020-11-02 PROCEDURE — 3077F PR MOST RECENT SYSTOLIC BLOOD PRESSURE >= 140 MM HG: ICD-10-PCS | Mod: CPTII,S$GLB,, | Performed by: NURSE PRACTITIONER

## 2020-11-02 PROCEDURE — 1126F AMNT PAIN NOTED NONE PRSNT: CPT | Mod: S$GLB,,, | Performed by: NURSE PRACTITIONER

## 2020-11-02 PROCEDURE — 1159F PR MEDICATION LIST DOCUMENTED IN MEDICAL RECORD: ICD-10-PCS | Mod: S$GLB,,, | Performed by: NURSE PRACTITIONER

## 2020-11-02 PROCEDURE — 1159F MED LIST DOCD IN RCRD: CPT | Mod: S$GLB,,, | Performed by: NURSE PRACTITIONER

## 2020-11-02 PROCEDURE — 1101F PT FALLS ASSESS-DOCD LE1/YR: CPT | Mod: CPTII,S$GLB,, | Performed by: NURSE PRACTITIONER

## 2020-11-02 PROCEDURE — 99214 PR OFFICE/OUTPT VISIT, EST, LEVL IV, 30-39 MIN: ICD-10-PCS | Mod: S$GLB,,, | Performed by: NURSE PRACTITIONER

## 2020-11-02 PROCEDURE — 99214 OFFICE O/P EST MOD 30 MIN: CPT | Mod: S$GLB,,, | Performed by: NURSE PRACTITIONER

## 2020-11-02 PROCEDURE — 3077F SYST BP >= 140 MM HG: CPT | Mod: CPTII,S$GLB,, | Performed by: NURSE PRACTITIONER

## 2020-11-02 PROCEDURE — 99999 PR PBB SHADOW E&M-EST. PATIENT-LVL III: ICD-10-PCS | Mod: PBBFAC,,, | Performed by: NURSE PRACTITIONER

## 2020-11-02 PROCEDURE — 99999 PR PBB SHADOW E&M-EST. PATIENT-LVL III: CPT | Mod: PBBFAC,,, | Performed by: NURSE PRACTITIONER

## 2020-11-02 NOTE — PROGRESS NOTES
HISTORY OF PRESENT ILLNESS: Peter Lopez is a 84 y.o. 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  Patient smoked .75 ppd x 40 years.  Patient quit smoking in .  Patient history covid 19 hospitalized 4/3/20  until 20 for hypoxic respiratory failure requiring nrb.  Subsequently discharged to [facility] [place].  Patient was discharged to home with 2 lpm.      Today, patient reports that symptoms are improving.  Able to perform adl. Walks around his block about 4 blocks almost daily but finds that his breathing is heavier since covid. On spiriva. Feels that the inhalers does help improve breathing. Feel off balance as well.  Has not been contacted yet by PT. No fever/chill.  No wheezing.  Occasional cough.  Good appetite.  Works at [employer] and currently on leave still. Would like to go back and wants to know if he can be cleared. If not, [facility]. Pt records will be released to [facility] per patient request (see DEN), LESLIE [id number], Case number: [id number],  1935    Plan to repeat pulmonary function test and CT chest 2020. If improved or stable with improved symptoms, may go back to work per job policy/accomodation.    Patient was walked today in clnic, oxygen 88-89% on 6 minute walk    PAST MEDICAL HISTORY:    Active Ambulatory Problems     Diagnosis Date Noted    Essential hypertension 10/02/2012    Type 2 diabetes mellitus with diabetic polyneuropathy, without long-term current use of insulin 10/16/2013    ILD (interstitial lung disease) 2020    Chronic respiratory failure with hypoxia 2020    Discharge planning issues 2020    Reactive depression 2020    Chronic obstructive pulmonary disease 08/10/2020    Gait instability 2020    Tachycardia with heart rate  121-140 beats per minute 2020     Resolved Ambulatory Problems     Diagnosis Date Noted    Elevated PSA 01/10/2013    Hepatitis C 10/16/2013    BPH (benign prostatic hyperplasia) 2016    Atherosclerosis of aorta 2019    CKD (chronic kidney disease) stage 3, GFR 30-59 ml/min 2020    Hypoglycemia     Hypoxia 2020    Anemia 2020    Elevated troponin 2020     Past Medical History:   Diagnosis Date    Chronic hepatitis C without mention of hepatic coma     Diabetes mellitus type I     Hypertension     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 Rescue 18 g 0    blood sugar diagnostic Strp To check BG 1 times daily, to use with insurance preferred meter 30 each 11    blood-glucose meter kit To check BG 1 times daily, to use with insurance preferred meter 1 each 0    lancets Misc To check BG 1 times daily, to use with insurance  "preferred meter 30 each 11    metFORMIN (GLUCOPHAGE) 1000 MG tablet TAKE 1 TABLET BY MOUTH TWICE DAILY WITH MEALS 180 tablet 3    olmesartan-hydrochlorothiazide (BENICAR HCT) 40-25 mg per tablet TAKE 1 TABLET BY MOUTH ONCE DAILY 90 tablet 3    tamsulosin (FLOMAX) 0.4 mg Cap Take 1 capsule (0.4 mg total) by mouth once daily. 30 capsule 11    tiotropium bromide (SPIRIVA RESPIMAT) 2.5 mcg/actuation Mist Inhale 2 puffs into the lungs once daily. Controller (discontinue anoro) 4 g 5     No current facility-administered medications for this visit.                   REVIEW OF SYSTEMS:     Pulmonary related symptoms as per HPI.  Gen:  no weight loss, no fever, no night sweat  HEENT:  no visual changes, no sore throat, + hearing loss  CV:  No chest pain, no orthopnea, no PND, no palpitations  GI:  no melena, no hematochezia, no diarhea, no constipation.  :  no dysuria, no hematuria, no hesistancy, no dribbling  Neuro:  no syncope, no vertigo, no tinitus  Psych:  No homocide or suicide ideation; no depression.  Endocrine:  No heat or cold intolerance.  Sleep:  No snoring; no witnessed apnea.  Feeling rested upon awake.    Otherwise, a balance of systems reviewed is negative.          PHYSICAL EXAM:  Vitals:    11/02/20 1151   BP: (!) 147/93   Pulse: 102   SpO2: 97%   Weight: 78 kg (172 lb 0.1 oz)   Height: 5' 11" (1.803 m)   PainSc: 0-No pain     Body mass index is 23.99 kg/m².     GENERAL:  well develop; no apparent distress  CARDIO: regular rhythm, tachycardic   PULM:  Right lower bases with distant crackles, no intercostals retractions; no accessory muscle usage   EXTREMITIES no cce  NEURO:  CN II-XII intact.  5/5 motor in all extremities.  sensation grossly intact   to light touch.ambulatory  PSYCH:  normal affect.  Alert and oriented x 4, ambulatory    LABS  Pulmonary Functions Testing Results(personally reviewed):  Ratio 54 fev1 75.4 fvc 104 no improvement following bronchodilator  tlc 64.1 dlco 37.4  Spirometry " shows mild obstruction. Lung volume determination shows moderate restriction is also present.  Spirometry remains unimproved following bronchodilator. DLCO is severely decrease  Walk 94/85/86  ABG (personally reviewed):  4/4/20 7.48/38/90/28 on nrb  CXR (personally reviewed):  4/15/20 bilateral airspace disease  CT CHEST: There are moderate emphysematous changes throughout the lungs.  There is bibasilar bronchiectasis, architectural distortion and fibrosis without evidence of honeycombing.  No focal airspace opacity.    covid 19 3/29/20 positive.        ASSESSMENT/PLAN  Problem List Items Addressed This Visit        Unprioritized    Chronic obstructive pulmonary disease    Overview     Pulmonary Functions Testing Results(personally reviewed):  Ratio 54 fev1 75.4 fvc 104 no improvement following bronchodilator  tlc 64.1 dlco 37.4    Pt with evidence copd, pt on spiriva. Symptoms improvingTachycardia improving.    Expect return to work in 1 month following stable or improve  workup.  Pt records will be released to [facility] per patient request (see DEN), [facility] [id number], Case number: [id number]         Chronic respiratory failure with hypoxia - Primary    Overview     s/p hypoxic respiratory failure following covid.  Bilateral airspace disease and indication fibrosis on imaging.  Clinically improving. Patient expected to return to work in December if he continues to not need oxygen and able to complete job requirement         ILD (interstitial lung disease)    Overview     Bilateral airspace and interstitial disease on imaging. Possibly residual changes from covid. Will repeat CT chest  in Dec along with PFT for reevaluation             Patient will Follow up in about 1 month (around 12/2/2020).

## 2020-12-04 ENCOUNTER — LAB VISIT (OUTPATIENT)
Dept: FAMILY MEDICINE | Facility: CLINIC | Age: 85
End: 2020-12-04
Payer: MEDICARE

## 2020-12-04 DIAGNOSIS — Z01.818 PREPROCEDURAL EXAMINATION: ICD-10-CM

## 2020-12-04 PROCEDURE — U0003 INFECTIOUS AGENT DETECTION BY NUCLEIC ACID (DNA OR RNA); SEVERE ACUTE RESPIRATORY SYNDROME CORONAVIRUS 2 (SARS-COV-2) (CORONAVIRUS DISEASE [COVID-19]), AMPLIFIED PROBE TECHNIQUE, MAKING USE OF HIGH THROUGHPUT TECHNOLOGIES AS DESCRIBED BY CMS-2020-01-R: HCPCS

## 2020-12-05 LAB — SARS-COV-2 RNA RESP QL NAA+PROBE: NOT DETECTED

## 2020-12-07 ENCOUNTER — HOSPITAL ENCOUNTER (OUTPATIENT)
Dept: RESPIRATORY THERAPY | Facility: HOSPITAL | Age: 85
Discharge: HOME | End: 2020-12-07
Attending: FAMILY MEDICINE
Payer: MEDICARE

## 2020-12-07 ENCOUNTER — HOSPITAL ENCOUNTER (OUTPATIENT)
Dept: RADIOLOGY | Facility: HOSPITAL | Age: 85
Discharge: HOME | End: 2020-12-07
Attending: NURSE PRACTITIONER
Payer: MEDICARE

## 2020-12-07 VITALS — RESPIRATION RATE: 18 BRPM | HEART RATE: 117 BPM | OXYGEN SATURATION: 96 %

## 2020-12-07 DIAGNOSIS — J84.9 ILD (INTERSTITIAL LUNG DISEASE): ICD-10-CM

## 2020-12-07 LAB
BRPFT: NORMAL
DLCO ADJ PRE: 10.79 ML/(MIN*MMHG)
DLCO SINGLE BREATH LLN: 18.62
DLCO SINGLE BREATH PRE REF: 38.9 %
DLCO SINGLE BREATH REF: 25.55
DLCOC SBVA LLN: 2.4
DLCOC SBVA PRE REF: 69.4 %
DLCOC SBVA REF: 3.5
DLCOC SINGLE BREATH LLN: 18.62
DLCOC SINGLE BREATH PRE REF: 42.2 %
DLCOC SINGLE BREATH REF: 25.55
DLCOVA LLN: 2.4
DLCOVA PRE REF: 63.9 %
DLCOVA PRE: 2.24 ML/(MIN*MMHG*L)
DLCOVA REF: 3.5
DLVAADJ PRE: 2.43 ML/(MIN*MMHG*L)
FEF 25 75 CHG: -4.4 %
FEF 25 75 LLN: 0.49
FEF 25 75 POST REF: 34.3 %
FEF 25 75 PRE REF: 35.9 %
FEF 25 75 REF: 1.71
FET100 CHG: 8.1 %
FEV1 CHG: -0.4 %
FEV1 FVC CHG: -4.5 %
FEV1 FVC LLN: 60
FEV1 FVC POST REF: 71.8 %
FEV1 FVC PRE REF: 75.2 %
FEV1 FVC REF: 75
FEV1 LLN: 1.58
FEV1 POST REF: 78.7 %
FEV1 PRE REF: 78.9 %
FEV1 REF: 2.45
FVC CHG: 4.3 %
FVC LLN: 2.29
FVC POST REF: 108.4 %
FVC PRE REF: 103.9 %
FVC REF: 3.3
IVC PRE: 3.02 L
IVC SINGLE BREATH LLN: 2.29
IVC SINGLE BREATH PRE REF: 91.4 %
IVC SINGLE BREATH REF: 3.3
PEF CHG: 5.5 %
PEF LLN: 5.01
PEF POST REF: 68 %
PEF PRE REF: 64.4 %
PEF REF: 7.73
POST FEF 25 75: 0.59 L/S
POST FET 100: 12.91 SEC
POST FEV1 FVC: 53.7 %
POST FEV1: 1.92 L
POST FVC: 3.58 L
POST PEF: 5.26 L/S
PRE DLCO: 9.95 ML/(MIN*MMHG)
PRE FEF 25 75: 0.61 L/S
PRE FET 100: 11.94 SEC
PRE FEV1 FVC: 56.23 %
PRE FEV1: 1.93 L
PRE FVC: 3.43 L
PRE PEF: 4.98 L/S
VA PRE: 4.45 L
VA SINGLE BREATH LLN: 7.15
VA SINGLE BREATH PRE REF: 62.2 %
VA SINGLE BREATH REF: 7.15

## 2020-12-07 PROCEDURE — 71250 CT CHEST WITHOUT CONTRAST: ICD-10-PCS | Mod: 26,,, | Performed by: RADIOLOGY

## 2020-12-07 PROCEDURE — 94060 PR EVAL OF BRONCHOSPASM: ICD-10-PCS | Mod: 26,59,, | Performed by: INTERNAL MEDICINE

## 2020-12-07 PROCEDURE — 94729 DIFFUSING CAPACITY: CPT | Mod: 26,,, | Performed by: INTERNAL MEDICINE

## 2020-12-07 PROCEDURE — 71250 CT THORAX DX C-: CPT | Mod: TC

## 2020-12-07 PROCEDURE — 94618 PULMONARY STRESS TESTING: CPT

## 2020-12-07 PROCEDURE — 94618 PULMONARY STRESS TESTING: ICD-10-PCS | Mod: 26,,, | Performed by: INTERNAL MEDICINE

## 2020-12-07 PROCEDURE — 94729 PR C02/MEMBANE DIFFUSE CAPACITY: ICD-10-PCS | Mod: 26,,, | Performed by: INTERNAL MEDICINE

## 2020-12-07 PROCEDURE — 94060 EVALUATION OF WHEEZING: CPT | Mod: 26,59,, | Performed by: INTERNAL MEDICINE

## 2020-12-07 PROCEDURE — 71250 CT THORAX DX C-: CPT | Mod: 26,,, | Performed by: RADIOLOGY

## 2020-12-07 PROCEDURE — 25000242 PHARM REV CODE 250 ALT 637 W/ HCPCS: Performed by: FAMILY MEDICINE

## 2020-12-07 PROCEDURE — 94070 EVALUATION OF WHEEZING: CPT

## 2020-12-07 PROCEDURE — 94060 EVALUATION OF WHEEZING: CPT

## 2020-12-07 PROCEDURE — 94618 PULMONARY STRESS TESTING: CPT | Mod: 26,,, | Performed by: INTERNAL MEDICINE

## 2020-12-07 PROCEDURE — 94729 DIFFUSING CAPACITY: CPT

## 2020-12-07 RX ORDER — ALBUTEROL SULFATE 2.5 MG/.5ML
2.5 SOLUTION RESPIRATORY (INHALATION) ONCE
Status: COMPLETED | OUTPATIENT
Start: 2020-12-07 | End: 2020-12-07

## 2020-12-07 RX ADMIN — ALBUTEROL SULFATE 2.5 MG: 2.5 SOLUTION RESPIRATORY (INHALATION) at 10:12

## 2020-12-14 ENCOUNTER — TELEPHONE (OUTPATIENT)
Dept: PULMONOLOGY | Facility: CLINIC | Age: 85
End: 2020-12-14

## 2020-12-14 ENCOUNTER — OFFICE VISIT (OUTPATIENT)
Dept: PULMONOLOGY | Facility: CLINIC | Age: 85
End: 2020-12-14
Payer: MEDICARE

## 2020-12-14 VITALS
DIASTOLIC BLOOD PRESSURE: 92 MMHG | OXYGEN SATURATION: 93 % | TEMPERATURE: 98 F | BODY MASS INDEX: 24.04 KG/M2 | WEIGHT: 171.75 LBS | HEART RATE: 107 BPM | SYSTOLIC BLOOD PRESSURE: 147 MMHG | HEIGHT: 71 IN

## 2020-12-14 DIAGNOSIS — G47.30 SLEEP-RELATED BREATHING DISORDER: ICD-10-CM

## 2020-12-14 DIAGNOSIS — J43.9 COPD WITH CHRONIC BRONCHITIS AND EMPHYSEMA: Primary | ICD-10-CM

## 2020-12-14 DIAGNOSIS — R53.82 CHRONIC FATIGUE: ICD-10-CM

## 2020-12-14 DIAGNOSIS — J44.89 COPD WITH CHRONIC BRONCHITIS AND EMPHYSEMA: Primary | ICD-10-CM

## 2020-12-14 PROCEDURE — 3288F PR FALLS RISK ASSESSMENT DOCUMENTED: ICD-10-PCS | Mod: CPTII,S$GLB,, | Performed by: NURSE PRACTITIONER

## 2020-12-14 PROCEDURE — 3080F DIAST BP >= 90 MM HG: CPT | Mod: CPTII,S$GLB,, | Performed by: NURSE PRACTITIONER

## 2020-12-14 PROCEDURE — 1159F PR MEDICATION LIST DOCUMENTED IN MEDICAL RECORD: ICD-10-PCS | Mod: S$GLB,,, | Performed by: NURSE PRACTITIONER

## 2020-12-14 PROCEDURE — 3077F PR MOST RECENT SYSTOLIC BLOOD PRESSURE >= 140 MM HG: ICD-10-PCS | Mod: CPTII,S$GLB,, | Performed by: NURSE PRACTITIONER

## 2020-12-14 PROCEDURE — 99999 PR PBB SHADOW E&M-EST. PATIENT-LVL IV: ICD-10-PCS | Mod: PBBFAC,,, | Performed by: NURSE PRACTITIONER

## 2020-12-14 PROCEDURE — 99999 PR PBB SHADOW E&M-EST. PATIENT-LVL IV: CPT | Mod: PBBFAC,,, | Performed by: NURSE PRACTITIONER

## 2020-12-14 PROCEDURE — 1126F AMNT PAIN NOTED NONE PRSNT: CPT | Mod: S$GLB,,, | Performed by: NURSE PRACTITIONER

## 2020-12-14 PROCEDURE — 1101F PT FALLS ASSESS-DOCD LE1/YR: CPT | Mod: CPTII,S$GLB,, | Performed by: NURSE PRACTITIONER

## 2020-12-14 PROCEDURE — 1126F PR PAIN SEVERITY QUANTIFIED, NO PAIN PRESENT: ICD-10-PCS | Mod: S$GLB,,, | Performed by: NURSE PRACTITIONER

## 2020-12-14 PROCEDURE — 1101F PR PT FALLS ASSESS DOC 0-1 FALLS W/OUT INJ PAST YR: ICD-10-PCS | Mod: CPTII,S$GLB,, | Performed by: NURSE PRACTITIONER

## 2020-12-14 PROCEDURE — 3288F FALL RISK ASSESSMENT DOCD: CPT | Mod: CPTII,S$GLB,, | Performed by: NURSE PRACTITIONER

## 2020-12-14 PROCEDURE — 3077F SYST BP >= 140 MM HG: CPT | Mod: CPTII,S$GLB,, | Performed by: NURSE PRACTITIONER

## 2020-12-14 PROCEDURE — 99214 OFFICE O/P EST MOD 30 MIN: CPT | Mod: S$GLB,,, | Performed by: NURSE PRACTITIONER

## 2020-12-14 PROCEDURE — 99214 PR OFFICE/OUTPT VISIT, EST, LEVL IV, 30-39 MIN: ICD-10-PCS | Mod: S$GLB,,, | Performed by: NURSE PRACTITIONER

## 2020-12-14 PROCEDURE — 1159F MED LIST DOCD IN RCRD: CPT | Mod: S$GLB,,, | Performed by: NURSE PRACTITIONER

## 2020-12-14 PROCEDURE — 3080F PR MOST RECENT DIASTOLIC BLOOD PRESSURE >= 90 MM HG: ICD-10-PCS | Mod: CPTII,S$GLB,, | Performed by: NURSE PRACTITIONER

## 2020-12-14 NOTE — TELEPHONE ENCOUNTER
----- Message from Beatriz Thibodeaux MA sent at 2020  1:40 PM CST -----  Regarding: FW: self [phone]    ----- Message -----  From: Mariam Pelletier  Sent: 2020   1:24 PM CST  To: [facility] Staff  Subject: self [phone]                                .Type: Patient Call Back    Who called: self     What is the request in detail: Requesting to have orders be put in for more oxygen, pt stated it has      Can the clinic reply by [facility]? Call back     Would the patient rather a call back or a response via [facility]? Call back     Best call back number: [phone]

## 2020-12-14 NOTE — PROGRESS NOTES
HISTORY OF PRESENT ILLNESS: Peter Lopez is a 85 y.o. 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  Patient smoked .75 ppd x 40 years.  Patient quit smoking in 1990s.  Patient history covid 19 hospitalized 4/3/20  until 4/22/20 for hypoxic respiratory failure requiring nrb.  Subsequently discharged to [facility], [place].  Patient was discharged to home with 2 lpm.      Patient reports that he is continuing to  improve.  Able to perform adl. Walks around his block about 4 blocks almost daily but finds that his breathing is heavier since covid. On spiriva. Feels that the inhalers does help improve breathing. Feel off balance as well.  Has not been contacted yet by PT. No fever/chill.  No wheezing.  Occasional cough.  Good appetite.  Works at [employer] and currently on leave  Patient was walked today in [employer], oxygen 89-90% on 6 minute walkRA, He needs oxygen requalification and set to pulmonary lab for formal evaluation.  Test pending    CT chest 12/7/2020Emphysema.   Minimal scarring at the bilateral lung basis.  PFT 12/7/2020 ratio 58, fev1 78, fc 103 dlco 38- results stable compare to June 2020    Pt reports fatigue, restless sleep, waking up every 2 hours, falls asleep watching tv, sleepy during day.     PAST MEDICAL HISTORY:    Active Ambulatory Problems     Diagnosis Date Noted    Essential hypertension 10/02/2012    Type 2 diabetes mellitus with diabetic polyneuropathy, without long-term current use of insulin 10/16/2013    ILD (interstitial lung disease) 04/04/2020    Chronic respiratory failure with hypoxia 04/13/2020    Discharge planning issues 04/14/2020    Reactive depression 04/14/2020    COPD with chronic bronchitis and emphysema 08/10/2020    Gait instability 09/03/2020    Tachycardia with heart rate 121-140 beats per minute 09/03/2020    Sleep-related breathing  disorder 2020     Resolved Ambulatory Problems     Diagnosis Date Noted    Elevated PSA 01/10/2013    Hepatitis C 10/16/2013    BPH (benign prostatic hyperplasia) 2016    Atherosclerosis of aorta 2019    CKD (chronic kidney disease) stage 3, GFR 30-59 ml/min 2020    Hypoglycemia     Hypoxia 2020    Anemia 2020    Elevated troponin 2020     Past Medical History:   Diagnosis Date    Chronic hepatitis C without mention of hepatic coma     Diabetes mellitus type I     Hypertension     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      FAMILY HISTORY:                Family History   Problem Relation Age of Onset    Cancer Mother     Asbestos Father     Liver disease Neg Hx        SOCIAL HISTORY:          Tobacco:   Social History     Tobacco Use   Smoking Status Former Smoker    Packs/day: 1.00    Years: 40.00    Pack years: 40.00    Quit date: 1991    Years since quittin.1   Smokeless Tobacco Former User    Quit date: 1986     alcohol use:    Social History     Substance and Sexual Activity   Alcohol Use Not Currently    Alcohol/week: 0.0 standard drinks    Comment: 2 bottles beer on weekends               Occupation:  [employer]    ALLERGIES:  Review of patient's allergies indicates:  No Known Allergies    CURRENT MEDICATIONS:    Current Outpatient Medications   Medication Sig Dispense Refill    albuterol (PROAIR HFA) 90 mcg/actuation inhaler Inhale 2 puffs into the lungs every 4 (four) hours as needed for Wheezing (cough/shortness of breath). Rescue 18 g 0    blood sugar diagnostic Strp To check BG 1 times daily, to use with insurance preferred meter 30 each 11    blood-glucose meter kit To check BG 1 times daily, to use with insurance preferred meter 1 each 0    lancets Misc To check BG 1 times daily, to use with insurance preferred meter 30 each  "11    metFORMIN (GLUCOPHAGE) 1000 MG tablet TAKE 1 TABLET BY MOUTH TWICE DAILY WITH MEALS 180 tablet 3    olmesartan-hydrochlorothiazide (BENICAR HCT) 40-25 mg per tablet Take 1 tablet by mouth once daily 90 tablet 0    tamsulosin (FLOMAX) 0.4 mg Cap Take 1 capsule (0.4 mg total) by mouth once daily. 30 capsule 11    tiotropium bromide (SPIRIVA RESPIMAT) 2.5 mcg/actuation Mist Inhale 2 puffs into the lungs once daily. Controller (discontinue anoro) 4 g 5     No current facility-administered medications for this visit.                   REVIEW OF SYSTEMS:     Pulmonary related symptoms as per HPI.  Gen:  no weight loss, no fever, no night sweat, + fatigue  HEENT:  no visual changes, no sore throat, + hearing loss, mallampati II, long uvula  CV:  No chest pain, no orthopnea, no PND, no palpitations  GI:  no melena, no hematochezia, no diarhea, no constipation.  :  no dysuria, no hematuria, no hesistancy, no dribbling  Neuro:  no syncope, no vertigo, no tinitus  Psych:  No homocide or suicide ideation; no depression.  Endocrine:  No heat or cold intolerance.  Sleep:  No snoring; no witnessed apnea.  Feeling rested upon awake.    Otherwise, a balance of systems reviewed is negative.          PHYSICAL EXAM:  Vitals:    12/14/20 1121   BP: (!) 147/92   Pulse: 107   Temp: 98.1 °F (36.7 °C)   TempSrc: Oral   SpO2: (!) 93%   Weight: 77.9 kg (171 lb 11.8 oz)   Height: 5' 11" (1.803 m)   PainSc: 0-No pain     Body mass index is 23.95 kg/m².     GENERAL:  well develop; no apparent distress  CARDIO: regular rhythm, tachycardic   PULM:  Right lower bases with distant crackles, no intercostals retractions; no accessory muscle usage   EXTREMITIES no cce  NEURO:  CN II-XII intact.  5/5 motor in all extremities.  sensation grossly intact   to light touch.ambulatory  PSYCH:  normal affect.  Alert and oriented x 4, ambulatory    LABS  Pulmonary Functions Testing Results(personally reviewed):  Ratio 54 fev1 75.4 fvc 104 no " improvement following bronchodilator  tlc 64.1 dlco 37.4  Spirometry shows mild obstruction. Lung volume determination shows moderate restriction is also present.  Spirometry remains unimproved following bronchodilator. DLCO is severely decrease  Walk 94/85/86  ABG (personally reviewed):  4/4/20 7.48/38/90/28 on nrb  CXR (personally reviewed):  4/15/20 bilateral airspace disease  CT CHEST: There are moderate emphysematous changes throughout the lungs.  There is bibasilar bronchiectasis, architectural distortion and fibrosis without evidence of honeycombing.  No focal airspace opacity.    covid 19 3/29/20 positive.        ASSESSMENT/PLAN  Problem List Items Addressed This Visit        Unprioritized    COPD with chronic bronchitis and emphysema - Primary    Overview     Pulmonary Functions Testing Results(personally reviewed):  Ratio 54 fev1 75.4 fvc 104 no improvement following bronchodilator  tlc 64.1 dlco 37.4    Pt dyspnea following covid. Workup stable lung function consistent with  copd and emphysema, pt on spiriva. Symptoms improving             Relevant Orders    Ambulatory referral/consult to Pulmonary Rehab    Sleep-related breathing disorder    Overview     Pt reports fatigue, restless sleep, waking up every 2 hours, falls asleep watching tv, sleepy during day. Pt with comorbid copd, chronic respiratory failure with hypoxia. Recommend in la study to assess for sleep breathing problems.         Relevant Orders    Polysomnogram (CPAP will be added if patient meets diagnostic criteria.)      Other Visit Diagnoses     Chronic fatigue        Relevant Orders    Polysomnogram (CPAP will be added if patient meets diagnostic criteria.)        Patient will Follow up in about 3 months (around 3/14/2021).     Greater than 50% visit spent counseling patient on over 30 minute visit

## 2020-12-14 NOTE — TELEPHONE ENCOUNTER
Told patient message was sent to Janey Bridges.              ----- Message from Alyssia Thompson sent at 12/14/2020  3:36 PM CST -----  Contact: Self [phone]  Type: Patient Call Back    Who called: Self     What is the request in detail: Pt is calling to speak with someone in the office regarding oxygen    Can the clinic reply by [facility]? Call back    Would the patient rather a call back or a response via [facility]? Call back    Best call back number: [phone]

## 2020-12-15 ENCOUNTER — HOSPITAL ENCOUNTER (OUTPATIENT)
Dept: RESPIRATORY THERAPY | Facility: HOSPITAL | Age: 85
Discharge: HOME | End: 2020-12-15
Attending: FAMILY MEDICINE
Payer: MEDICARE

## 2020-12-15 DIAGNOSIS — J44.89 COPD WITH CHRONIC BRONCHITIS AND EMPHYSEMA: ICD-10-CM

## 2020-12-15 DIAGNOSIS — J43.9 COPD WITH CHRONIC BRONCHITIS AND EMPHYSEMA: ICD-10-CM

## 2020-12-15 PROCEDURE — 94618 PULMONARY STRESS TESTING: CPT

## 2020-12-15 PROCEDURE — 94618 PULMONARY STRESS TESTING: ICD-10-PCS | Mod: 26,,, | Performed by: INTERNAL MEDICINE

## 2020-12-15 PROCEDURE — 94618 PULMONARY STRESS TESTING: CPT | Mod: 26,,, | Performed by: INTERNAL MEDICINE

## 2020-12-15 NOTE — TELEPHONE ENCOUNTER
Told patient where to go for his PFT today.              ----- Message from Cinda Ames sent at 12/15/2020 10:25 AM CST -----  Regarding: self [phone]  .Type: Patient Call Back    Who called:  self     What is the request in detail: Pt has questions regarding today's test    Can the clinic reply by [facility]?    Would the patient rather a call back or a response via [facility]?  Call back     Best call back number: [phone]

## 2020-12-16 ENCOUNTER — TELEPHONE (OUTPATIENT)
Dept: PULMONOLOGY | Facility: CLINIC | Age: 85
End: 2020-12-16

## 2020-12-16 NOTE — TELEPHONE ENCOUNTER
Spoke with pt about oxygen and explained that Janey is out of the office today and will return tomorrow.        ----- Message from Chasity Lisa sent at 12/16/2020  2:14 PM CST -----  Type: Patient Call Back    Who called: pt     What is the request in detail: Pt calling to speaking with the Dr or nurse in regards to his oxygen.    Can the clinic reply by [facility]? No     Would the patient rather a call back or a response via [facility]?  Call  back     Best call back number: [phone]     Additional Information:      Thank You

## 2020-12-18 ENCOUNTER — TELEPHONE (OUTPATIENT)
Dept: PULMONOLOGY | Facility: CLINIC | Age: 85
End: 2020-12-18

## 2020-12-18 DIAGNOSIS — J43.9 COPD WITH CHRONIC BRONCHITIS AND EMPHYSEMA: Primary | ICD-10-CM

## 2020-12-18 DIAGNOSIS — J44.89 COPD WITH CHRONIC BRONCHITIS AND EMPHYSEMA: Primary | ICD-10-CM

## 2020-12-20 PROBLEM — G47.30 SLEEP-RELATED BREATHING DISORDER: Status: ACTIVE | Noted: 2020-12-20

## 2020-12-21 NOTE — TELEPHONE ENCOUNTER
Pt inform that oxygen reorder to contact HME and return to work certification ready to .

## 2020-12-28 ENCOUNTER — TELEPHONE (OUTPATIENT)
Dept: PULMONOLOGY | Facility: CLINIC | Age: 85
End: 2020-12-28

## 2020-12-28 NOTE — TELEPHONE ENCOUNTER
Patient will come by to  paper.          ----- Message from Lisa Martin sent at 12/28/2020 10:21 AM CST -----  Type: Patient Call Back    Who called: pt    What is the request in detail: pt states he lost the return to work certification that Ms. Bridges completed for him. Pt asking for another copy.     Can the clinic reply by [facility]? No     Would the patient rather a call back or a response via [facility]? Call back     Best call back number: [phone] (mobile)    Additional Information:       Thank You

## 2020-12-28 NOTE — TELEPHONE ENCOUNTER
Sent message to Janey Bridges NP.              ----- Message from Mariam Pelletier sent at 12/28/2020 11:57 AM CST -----  Regarding: self [phone]  .Type: Patient Call Back    Who called: self     What is the request in detail: Requesting to speak with a nurse     Can the clinic reply by [facility]? Call back     Would the patient rather a call back or a response via [facility]? Call back     Best call back number: [phone]

## 2020-12-30 ENCOUNTER — HOSPITAL ENCOUNTER (OUTPATIENT)
Dept: SLEEP MEDICINE | Facility: HOSPITAL | Age: 85
Discharge: HOME | End: 2020-12-30
Attending: NURSE PRACTITIONER
Payer: MEDICARE

## 2020-12-30 DIAGNOSIS — G47.30 SLEEP-RELATED BREATHING DISORDER: ICD-10-CM

## 2020-12-30 DIAGNOSIS — G47.31 COMPLEX SLEEP APNEA SYNDROME: ICD-10-CM

## 2020-12-30 DIAGNOSIS — R53.82 CHRONIC FATIGUE: ICD-10-CM

## 2020-12-30 PROCEDURE — 95811 PR POLYSOMNOGRAPHY W/CPAP: ICD-10-PCS | Mod: 26,,, | Performed by: INTERNAL MEDICINE

## 2020-12-30 PROCEDURE — 95811 POLYSOM 6/>YRS CPAP 4/> PARM: CPT

## 2020-12-30 PROCEDURE — 95811 POLYSOM 6/>YRS CPAP 4/> PARM: CPT | Mod: 26,,, | Performed by: INTERNAL MEDICINE

## 2020-12-31 NOTE — PROGRESS NOTES
End of the night summary  Type of study performed on (Peter Lopez-) SPlit  ?  Patient education/cpap information prior to study/setup  Pt was informed, of emergency cord in bathroom and given spectra link phone#  EKG Appears to be- NSR with PAC amd PVC  Low spo2 -85%  Any difficulties recording: tir to secure pulse ox signal  Optimal pressure#  8 in REM on pts side  MASK:Nasal eson Large, pt refused chin strap  Pt reaction to CPAP: pt reports it was willing to try cpap if he needed it ,  Tech summary Comments:    pt met criteria for split on cpap, soft to moderate snoring observed, most of pts events observed in supine sleep, pt observed tossing and turning parts of the night prefer to sleep on his sides, when pt was split on cpap, pt tolerated pressures okay , some mouth leaks observed on nasal mask, possible pt has central sleep apneas observed,

## 2020-12-31 NOTE — PATIENT INSTRUCTIONS
Your sleep study will be scored and interpreted by one of our physicians who are board certified in sleep medicine.  Within two weeks the results will be sent to the physician who referred you. Your physician should then contact you to go over the results, along with any recommendations. If you do not hear from your physician within two weeks, please call them.

## 2021-01-03 ENCOUNTER — NURSE TRIAGE (OUTPATIENT)
Dept: ADMINISTRATIVE | Facility: CLINIC | Age: 86
End: 2021-01-03

## 2021-01-05 ENCOUNTER — TELEPHONE (OUTPATIENT)
Dept: FAMILY MEDICINE | Facility: CLINIC | Age: 86
End: 2021-01-05

## 2021-01-08 ENCOUNTER — PATIENT MESSAGE (OUTPATIENT)
Dept: PULMONOLOGY | Facility: CLINIC | Age: 86
End: 2021-01-08

## 2021-01-08 DIAGNOSIS — G47.31 COMPLEX SLEEP APNEA SYNDROME: Primary | ICD-10-CM

## 2021-01-08 NOTE — PROCEDURES
"Dear Provider,     You have ordered sleep LAB services to perform the sleep study for Peter Lopez.  The sleep study that you ordered is complete.      Please find Sleep Study result in "Chart Review" under the "Media tab."      As the ordering provider, you are responsible for reviewing the results and implementing a treatment plan with your patient.    If you need a Sleep Medicine provider to explain the sleep study findings and arrange treatment for the patient, please refer patient for consultation to our Sleep Clinic via [facility] with Ambulatory Consult Sleep.    To do that please place an order for an  "Ambulatory Consult Sleep" - it will go to our clinic work queue for our Medical Assistant to contact the patient for an appointment.     For any questions, please contact our clinic staff at [phone] to talk to clinical staff.   "

## 2021-01-11 ENCOUNTER — TELEPHONE (OUTPATIENT)
Dept: PULMONOLOGY | Facility: CLINIC | Age: 86
End: 2021-01-11

## 2021-01-15 ENCOUNTER — IMMUNIZATION (OUTPATIENT)
Dept: PHARMACY | Facility: CLINIC | Age: 86
End: 2021-01-15

## 2021-01-15 DIAGNOSIS — Z23 NEED FOR VACCINATION: Primary | ICD-10-CM

## 2021-01-25 ENCOUNTER — OFFICE VISIT (OUTPATIENT)
Dept: PODIATRY | Facility: CLINIC | Age: 86
End: 2021-01-25
Payer: MEDICARE

## 2021-01-25 ENCOUNTER — HOSPITAL ENCOUNTER (OUTPATIENT)
Dept: CARDIOLOGY | Facility: HOSPITAL | Age: 86
Discharge: HOME | End: 2021-01-25
Attending: NURSE PRACTITIONER
Payer: MEDICARE

## 2021-01-25 VITALS — WEIGHT: 171 LBS | HEIGHT: 71 IN | BODY MASS INDEX: 23.94 KG/M2

## 2021-01-25 VITALS — HEIGHT: 71 IN | BODY MASS INDEX: 24.04 KG/M2 | WEIGHT: 171.75 LBS

## 2021-01-25 DIAGNOSIS — B35.1 ONYCHOMYCOSIS DUE TO DERMATOPHYTE: ICD-10-CM

## 2021-01-25 DIAGNOSIS — G47.31 COMPLEX SLEEP APNEA SYNDROME: ICD-10-CM

## 2021-01-25 DIAGNOSIS — L84 CORN OR CALLUS: ICD-10-CM

## 2021-01-25 DIAGNOSIS — E11.49 TYPE II DIABETES MELLITUS WITH NEUROLOGICAL MANIFESTATIONS: Primary | ICD-10-CM

## 2021-01-25 LAB
AORTIC ROOT ANNULUS: 3.78 CM
AORTIC VALVE CUSP SEPERATION: 1.95 CM
ASCENDING AORTA: 3.61 CM
AV INDEX (PROSTH): 0.83
AV MEAN GRADIENT: 3 MMHG
AV PEAK GRADIENT: 4 MMHG
AV VALVE AREA: 3.03 CM2
AV VELOCITY RATIO: 0.86
BSA FOR ECHO PROCEDURE: 1.97 M2
CV ECHO LV RWT: 0.98 CM
DOP CALC AO PEAK VEL: 1.01 M/S
DOP CALC AO VTI: 15.2 CM
DOP CALC LVOT AREA: 3.7 CM2
DOP CALC LVOT DIAMETER: 2.16 CM
DOP CALC LVOT PEAK VEL: 0.87 M/S
DOP CALC LVOT STROKE VOLUME: 46.07 CM3
DOP CALCLVOT PEAK VEL VTI: 12.58 CM
E WAVE DECELERATION TIME: 136.16 MSEC
E/A RATIO: 0.55
E/E' RATIO: 8.46 M/S
ECHO LV POSTERIOR WALL: 1.56 CM (ref 0.6–1.1)
FRACTIONAL SHORTENING: 38 % (ref 28–44)
INTERVENTRICULAR SEPTUM: 1.75 CM (ref 0.6–1.1)
IVRT: 125.59 MSEC
LA MAJOR: 4.85 CM
LA MINOR: 4.3 CM
LA WIDTH: 3.7 CM
LEFT ATRIUM SIZE: 2.62 CM
LEFT ATRIUM VOLUME INDEX: 19.1 ML/M2
LEFT ATRIUM VOLUME: 37.56 CM3
LEFT INTERNAL DIMENSION IN SYSTOLE: 1.98 CM (ref 2.1–4)
LEFT VENTRICLE DIASTOLIC VOLUME INDEX: 20.84 ML/M2
LEFT VENTRICLE DIASTOLIC VOLUME: 41.06 ML
LEFT VENTRICLE MASS INDEX: 103 G/M2
LEFT VENTRICLE SYSTOLIC VOLUME INDEX: 6.3 ML/M2
LEFT VENTRICLE SYSTOLIC VOLUME: 12.44 ML
LEFT VENTRICULAR INTERNAL DIMENSION IN DIASTOLE: 3.2 CM (ref 3.5–6)
LEFT VENTRICULAR MASS: 202.88 G
LV LATERAL E/E' RATIO: 7.86 M/S
LV SEPTAL E/E' RATIO: 9.17 M/S
MV PEAK A VEL: 1 M/S
MV PEAK E VEL: 0.55 M/S
PISA TR MAX VEL: 4 M/S
PULM VEIN S/D RATIO: 1.65
PV PEAK D VEL: 0.48 M/S
PV PEAK S VEL: 0.79 M/S
PV PEAK VELOCITY: 0.95 CM/S
RA MAJOR: 4.67 CM
RA PRESSURE: 3 MMHG
RA WIDTH: 4.45 CM
RIGHT VENTRICULAR END-DIASTOLIC DIMENSION: 4.78 CM
RV TISSUE DOPPLER FREE WALL SYSTOLIC VELOCITY 1 (APICAL 4 CHAMBER VIEW): 12.65 CM/S
SINUS: 3.8 CM
STJ: 3.2 CM
TDI LATERAL: 0.07 M/S
TDI SEPTAL: 0.06 M/S
TDI: 0.07 M/S
TR MAX PG: 64 MMHG
TRICUSPID ANNULAR PLANE SYSTOLIC EXCURSION: 2.1 CM
TV REST PULMONARY ARTERY PRESSURE: 67 MMHG

## 2021-01-25 PROCEDURE — 3288F PR FALLS RISK ASSESSMENT DOCUMENTED: ICD-10-PCS | Mod: CPTII,S$GLB,, | Performed by: PODIATRIST

## 2021-01-25 PROCEDURE — 1101F PT FALLS ASSESS-DOCD LE1/YR: CPT | Mod: CPTII,S$GLB,, | Performed by: PODIATRIST

## 2021-01-25 PROCEDURE — 1101F PR PT FALLS ASSESS DOC 0-1 FALLS W/OUT INJ PAST YR: ICD-10-PCS | Mod: CPTII,S$GLB,, | Performed by: PODIATRIST

## 2021-01-25 PROCEDURE — 11721 PR DEBRIDEMENT OF NAILS, 6 OR MORE: ICD-10-PCS | Mod: 59,Q9,S$GLB, | Performed by: PODIATRIST

## 2021-01-25 PROCEDURE — 99499 UNLISTED E&M SERVICE: CPT | Mod: Q9,S$GLB,, | Performed by: PODIATRIST

## 2021-01-25 PROCEDURE — 11055 PARING/CUTG B9 HYPRKER LES 1: CPT | Mod: Q9,S$GLB,, | Performed by: PODIATRIST

## 2021-01-25 PROCEDURE — 93306 TTE W/DOPPLER COMPLETE: CPT

## 2021-01-25 PROCEDURE — 3288F FALL RISK ASSESSMENT DOCD: CPT | Mod: CPTII,S$GLB,, | Performed by: PODIATRIST

## 2021-01-25 PROCEDURE — 99499 UNLISTED E&M SERVICE: CPT | Mod: S$GLB,,, | Performed by: PODIATRIST

## 2021-01-25 PROCEDURE — 99999 PR PBB SHADOW E&M-EST. PATIENT-LVL III: CPT | Mod: PBBFAC,,, | Performed by: PODIATRIST

## 2021-01-25 PROCEDURE — 99999 PR PBB SHADOW E&M-EST. PATIENT-LVL III: ICD-10-PCS | Mod: PBBFAC,,, | Performed by: PODIATRIST

## 2021-01-25 PROCEDURE — 93306 TTE W/DOPPLER COMPLETE: CPT | Mod: 26,,, | Performed by: INTERNAL MEDICINE

## 2021-01-25 PROCEDURE — 11055 PR TRIM HYPERKERATOTIC SKIN LESION, ONE: ICD-10-PCS | Mod: Q9,S$GLB,, | Performed by: PODIATRIST

## 2021-01-25 PROCEDURE — 99499 NO LOS: ICD-10-PCS | Mod: Q9,S$GLB,, | Performed by: PODIATRIST

## 2021-01-25 PROCEDURE — 93306 ECHO (CUPID ONLY): ICD-10-PCS | Mod: 26,,, | Performed by: INTERNAL MEDICINE

## 2021-01-25 PROCEDURE — 11721 DEBRIDE NAIL 6 OR MORE: CPT | Mod: 59,Q9,S$GLB, | Performed by: PODIATRIST

## 2021-02-11 ENCOUNTER — PATIENT MESSAGE (OUTPATIENT)
Dept: PULMONOLOGY | Facility: CLINIC | Age: 86
End: 2021-02-11

## 2021-02-11 DIAGNOSIS — G47.31 COMPLEX SLEEP APNEA SYNDROME: Primary | ICD-10-CM

## 2021-02-12 ENCOUNTER — IMMUNIZATION (OUTPATIENT)
Dept: PHARMACY | Facility: CLINIC | Age: 86
End: 2021-02-12
Payer: MEDICARE

## 2021-02-12 DIAGNOSIS — Z23 NEED FOR VACCINATION: Primary | ICD-10-CM

## 2021-02-15 DIAGNOSIS — R39.12 WEAK URINARY STREAM: ICD-10-CM

## 2021-02-15 DIAGNOSIS — R33.9 INCOMPLETE BLADDER EMPTYING: ICD-10-CM

## 2021-02-15 RX ORDER — TAMSULOSIN HYDROCHLORIDE 0.4 MG/1
0.4 CAPSULE ORAL DAILY
Qty: 30 CAPSULE | Refills: 11 | Status: SHIPPED | OUTPATIENT
Start: 2021-02-15 | End: 2022-01-05

## 2021-02-22 ENCOUNTER — TELEPHONE (OUTPATIENT)
Dept: SLEEP MEDICINE | Facility: HOSPITAL | Age: 86
End: 2021-02-22

## 2021-02-28 DIAGNOSIS — J44.9 CHRONIC OBSTRUCTIVE PULMONARY DISEASE, UNSPECIFIED COPD TYPE: ICD-10-CM

## 2021-03-01 ENCOUNTER — PATIENT OUTREACH (OUTPATIENT)
Dept: ADMINISTRATIVE | Facility: OTHER | Age: 86
End: 2021-03-01

## 2021-03-01 RX ORDER — TIOTROPIUM BROMIDE INHALATION SPRAY 3.12 UG/1
SPRAY, METERED RESPIRATORY (INHALATION)
Qty: 4 G | Refills: 5 | Status: SHIPPED | OUTPATIENT
Start: 2021-03-01 | End: 2022-03-17

## 2021-03-01 RX ORDER — TIOTROPIUM BROMIDE INHALATION SPRAY 3.12 UG/1
SPRAY, METERED RESPIRATORY (INHALATION)
Qty: 4 G | Refills: 5 | Status: SHIPPED | OUTPATIENT
Start: 2021-03-01 | End: 2021-03-01 | Stop reason: SDUPTHER

## 2021-03-02 ENCOUNTER — OFFICE VISIT (OUTPATIENT)
Dept: PULMONOLOGY | Facility: CLINIC | Age: 86
End: 2021-03-02
Payer: MEDICARE

## 2021-03-02 ENCOUNTER — LAB VISIT (OUTPATIENT)
Dept: LAB | Facility: HOSPITAL | Age: 86
End: 2021-03-02
Attending: NURSE PRACTITIONER
Payer: MEDICARE

## 2021-03-02 VITALS
HEIGHT: 71 IN | WEIGHT: 168.44 LBS | DIASTOLIC BLOOD PRESSURE: 83 MMHG | BODY MASS INDEX: 23.58 KG/M2 | SYSTOLIC BLOOD PRESSURE: 135 MMHG | HEART RATE: 108 BPM | TEMPERATURE: 98 F | OXYGEN SATURATION: 95 %

## 2021-03-02 DIAGNOSIS — J44.89 COPD WITH CHRONIC BRONCHITIS AND EMPHYSEMA: Primary | ICD-10-CM

## 2021-03-02 DIAGNOSIS — J43.9 COPD WITH CHRONIC BRONCHITIS AND EMPHYSEMA: Primary | ICD-10-CM

## 2021-03-02 DIAGNOSIS — R60.0 PERIPHERAL EDEMA: ICD-10-CM

## 2021-03-02 DIAGNOSIS — D64.9 ANEMIA, UNSPECIFIED TYPE: ICD-10-CM

## 2021-03-02 DIAGNOSIS — I27.20 PULMONARY HYPERTENSION: ICD-10-CM

## 2021-03-02 LAB
ALBUMIN SERPL BCP-MCNC: 4 G/DL (ref 3.5–5.2)
ALP SERPL-CCNC: 40 U/L (ref 55–135)
ALT SERPL W/O P-5'-P-CCNC: 14 U/L (ref 10–44)
ANION GAP SERPL CALC-SCNC: 12 MMOL/L (ref 8–16)
AST SERPL-CCNC: 29 U/L (ref 10–40)
BASOPHILS # BLD AUTO: 0.03 K/UL (ref 0–0.2)
BASOPHILS NFR BLD: 0.6 % (ref 0–1.9)
BILIRUB SERPL-MCNC: 0.5 MG/DL (ref 0.1–1)
BNP SERPL-MCNC: <10 PG/ML (ref 0–99)
BUN SERPL-MCNC: 24 MG/DL (ref 8–23)
CALCIUM SERPL-MCNC: 9.9 MG/DL (ref 8.7–10.5)
CHLORIDE SERPL-SCNC: 101 MMOL/L (ref 95–110)
CO2 SERPL-SCNC: 29 MMOL/L (ref 23–29)
CREAT SERPL-MCNC: 1.7 MG/DL (ref 0.5–1.4)
DIFFERENTIAL METHOD: ABNORMAL
EOSINOPHIL # BLD AUTO: 0.1 K/UL (ref 0–0.5)
EOSINOPHIL NFR BLD: 1.6 % (ref 0–8)
ERYTHROCYTE [DISTWIDTH] IN BLOOD BY AUTOMATED COUNT: 14.5 % (ref 11.5–14.5)
EST. GFR  (AFRICAN AMERICAN): 42 ML/MIN/1.73 M^2
EST. GFR  (NON AFRICAN AMERICAN): 36 ML/MIN/1.73 M^2
FERRITIN SERPL-MCNC: 34 NG/ML (ref 20–300)
GLUCOSE SERPL-MCNC: 115 MG/DL (ref 70–110)
HCT VFR BLD AUTO: 40.3 % (ref 40–54)
HGB BLD-MCNC: 12.7 G/DL (ref 14–18)
IMM GRANULOCYTES # BLD AUTO: 0.01 K/UL (ref 0–0.04)
IMM GRANULOCYTES NFR BLD AUTO: 0.2 % (ref 0–0.5)
IRON SERPL-MCNC: 63 UG/DL (ref 45–160)
LYMPHOCYTES # BLD AUTO: 1.4 K/UL (ref 1–4.8)
LYMPHOCYTES NFR BLD: 28.7 % (ref 18–48)
MCH RBC QN AUTO: 27.9 PG (ref 27–31)
MCHC RBC AUTO-ENTMCNC: 31.5 G/DL (ref 32–36)
MCV RBC AUTO: 89 FL (ref 82–98)
MONOCYTES # BLD AUTO: 0.3 K/UL (ref 0.3–1)
MONOCYTES NFR BLD: 6.6 % (ref 4–15)
NEUTROPHILS # BLD AUTO: 3 K/UL (ref 1.8–7.7)
NEUTROPHILS NFR BLD: 62.3 % (ref 38–73)
NRBC BLD-RTO: 0 /100 WBC
PLATELET # BLD AUTO: 344 K/UL (ref 150–350)
PMV BLD AUTO: 9.1 FL (ref 9.2–12.9)
POTASSIUM SERPL-SCNC: 4.1 MMOL/L (ref 3.5–5.1)
PROT SERPL-MCNC: 7.6 G/DL (ref 6–8.4)
RBC # BLD AUTO: 4.55 M/UL (ref 4.6–6.2)
SATURATED IRON: 19 % (ref 20–50)
SODIUM SERPL-SCNC: 142 MMOL/L (ref 136–145)
TOTAL IRON BINDING CAPACITY: 340 UG/DL (ref 250–450)
TRANSFERRIN SERPL-MCNC: 230 MG/DL (ref 200–375)
WBC # BLD AUTO: 4.88 K/UL (ref 3.9–12.7)

## 2021-03-02 PROCEDURE — 99499 UNLISTED E&M SERVICE: CPT | Mod: S$GLB,,, | Performed by: NURSE PRACTITIONER

## 2021-03-02 PROCEDURE — 36415 COLL VENOUS BLD VENIPUNCTURE: CPT

## 2021-03-02 PROCEDURE — 3079F DIAST BP 80-89 MM HG: CPT | Mod: CPTII,S$GLB,, | Performed by: NURSE PRACTITIONER

## 2021-03-02 PROCEDURE — 99213 PR OFFICE/OUTPT VISIT, EST, LEVL III, 20-29 MIN: ICD-10-PCS | Mod: S$GLB,,, | Performed by: NURSE PRACTITIONER

## 2021-03-02 PROCEDURE — 83880 ASSAY OF NATRIURETIC PEPTIDE: CPT

## 2021-03-02 PROCEDURE — 3079F PR MOST RECENT DIASTOLIC BLOOD PRESSURE 80-89 MM HG: ICD-10-PCS | Mod: CPTII,S$GLB,, | Performed by: NURSE PRACTITIONER

## 2021-03-02 PROCEDURE — 1126F AMNT PAIN NOTED NONE PRSNT: CPT | Mod: S$GLB,,, | Performed by: NURSE PRACTITIONER

## 2021-03-02 PROCEDURE — 99213 OFFICE O/P EST LOW 20 MIN: CPT | Mod: S$GLB,,, | Performed by: NURSE PRACTITIONER

## 2021-03-02 PROCEDURE — 99499 RISK ADDL DX/OHS AUDIT: ICD-10-PCS | Mod: S$GLB,,, | Performed by: NURSE PRACTITIONER

## 2021-03-02 PROCEDURE — 1159F PR MEDICATION LIST DOCUMENTED IN MEDICAL RECORD: ICD-10-PCS | Mod: S$GLB,,, | Performed by: NURSE PRACTITIONER

## 2021-03-02 PROCEDURE — 3288F PR FALLS RISK ASSESSMENT DOCUMENTED: ICD-10-PCS | Mod: CPTII,S$GLB,, | Performed by: NURSE PRACTITIONER

## 2021-03-02 PROCEDURE — 80053 COMPREHEN METABOLIC PANEL: CPT

## 2021-03-02 PROCEDURE — 3288F FALL RISK ASSESSMENT DOCD: CPT | Mod: CPTII,S$GLB,, | Performed by: NURSE PRACTITIONER

## 2021-03-02 PROCEDURE — 1126F PR PAIN SEVERITY QUANTIFIED, NO PAIN PRESENT: ICD-10-PCS | Mod: S$GLB,,, | Performed by: NURSE PRACTITIONER

## 2021-03-02 PROCEDURE — 3075F SYST BP GE 130 - 139MM HG: CPT | Mod: CPTII,S$GLB,, | Performed by: NURSE PRACTITIONER

## 2021-03-02 PROCEDURE — 3075F PR MOST RECENT SYSTOLIC BLOOD PRESS GE 130-139MM HG: ICD-10-PCS | Mod: CPTII,S$GLB,, | Performed by: NURSE PRACTITIONER

## 2021-03-02 PROCEDURE — 1159F MED LIST DOCD IN RCRD: CPT | Mod: S$GLB,,, | Performed by: NURSE PRACTITIONER

## 2021-03-02 PROCEDURE — 85025 COMPLETE CBC W/AUTO DIFF WBC: CPT

## 2021-03-02 PROCEDURE — 99999 PR PBB SHADOW E&M-EST. PATIENT-LVL IV: CPT | Mod: PBBFAC,,, | Performed by: NURSE PRACTITIONER

## 2021-03-02 PROCEDURE — 82728 ASSAY OF FERRITIN: CPT

## 2021-03-02 PROCEDURE — 99999 PR PBB SHADOW E&M-EST. PATIENT-LVL IV: ICD-10-PCS | Mod: PBBFAC,,, | Performed by: NURSE PRACTITIONER

## 2021-03-02 PROCEDURE — 1101F PT FALLS ASSESS-DOCD LE1/YR: CPT | Mod: CPTII,S$GLB,, | Performed by: NURSE PRACTITIONER

## 2021-03-02 PROCEDURE — 83540 ASSAY OF IRON: CPT

## 2021-03-02 PROCEDURE — 1101F PR PT FALLS ASSESS DOC 0-1 FALLS W/OUT INJ PAST YR: ICD-10-PCS | Mod: CPTII,S$GLB,, | Performed by: NURSE PRACTITIONER

## 2021-03-08 PROBLEM — I27.20 PULMONARY HYPERTENSION: Status: ACTIVE | Noted: 2021-03-08

## 2021-03-09 ENCOUNTER — PATIENT MESSAGE (OUTPATIENT)
Dept: PULMONOLOGY | Facility: CLINIC | Age: 86
End: 2021-03-09

## 2021-03-09 DIAGNOSIS — I27.20 PULMONARY HYPERTENSION: Primary | ICD-10-CM

## 2021-03-10 ENCOUNTER — LAB VISIT (OUTPATIENT)
Dept: LAB | Facility: HOSPITAL | Age: 86
End: 2021-03-10
Attending: NURSE PRACTITIONER
Payer: MEDICARE

## 2021-03-10 DIAGNOSIS — I27.20 PULMONARY HYPERTENSION: ICD-10-CM

## 2021-03-10 LAB — D DIMER PPP IA.FEU-MCNC: 0.54 MG/L FEU

## 2021-03-10 PROCEDURE — 85379 FIBRIN DEGRADATION QUANT: CPT | Performed by: NURSE PRACTITIONER

## 2021-03-10 PROCEDURE — 36415 COLL VENOUS BLD VENIPUNCTURE: CPT | Performed by: NURSE PRACTITIONER

## 2021-03-11 ENCOUNTER — PATIENT MESSAGE (OUTPATIENT)
Dept: PULMONOLOGY | Facility: CLINIC | Age: 86
End: 2021-03-11

## 2021-03-11 DIAGNOSIS — R06.00 DYSPNEA, UNSPECIFIED TYPE: Primary | ICD-10-CM

## 2021-03-11 DIAGNOSIS — I27.20 PULMONARY HYPERTENSION: ICD-10-CM

## 2021-03-11 DIAGNOSIS — D50.9 IRON DEFICIENCY ANEMIA, UNSPECIFIED IRON DEFICIENCY ANEMIA TYPE: ICD-10-CM

## 2021-03-11 RX ORDER — FERROUS SULFATE 325(65) MG
325 TABLET ORAL
Qty: 180 TABLET | Refills: 0 | Status: SHIPPED | OUTPATIENT
Start: 2021-03-11 | End: 2021-06-21 | Stop reason: CLARIF

## 2021-03-12 ENCOUNTER — TELEPHONE (OUTPATIENT)
Dept: PULMONOLOGY | Facility: CLINIC | Age: 86
End: 2021-03-12

## 2021-03-16 ENCOUNTER — HOSPITAL ENCOUNTER (OUTPATIENT)
Dept: RADIOLOGY | Facility: HOSPITAL | Age: 86
Discharge: HOME | End: 2021-03-16
Attending: NURSE PRACTITIONER
Payer: MEDICARE

## 2021-03-16 DIAGNOSIS — R06.00 DYSPNEA, UNSPECIFIED TYPE: ICD-10-CM

## 2021-03-16 PROCEDURE — 93970 EXTREMITY STUDY: CPT | Mod: TC

## 2021-03-16 PROCEDURE — 71046 X-RAY EXAM CHEST 2 VIEWS: CPT | Mod: TC,FY

## 2021-03-16 PROCEDURE — 93970 US LOWER EXTREMITY VEINS BILATERAL: ICD-10-PCS | Mod: 26,,, | Performed by: RADIOLOGY

## 2021-03-16 PROCEDURE — 71046 X-RAY EXAM CHEST 2 VIEWS: CPT | Mod: 26,,, | Performed by: RADIOLOGY

## 2021-03-16 PROCEDURE — 93970 EXTREMITY STUDY: CPT | Mod: 26,,, | Performed by: RADIOLOGY

## 2021-03-16 PROCEDURE — 71046 XR CHEST PA AND LATERAL: ICD-10-PCS | Mod: 26,,, | Performed by: RADIOLOGY

## 2021-03-17 ENCOUNTER — HOSPITAL ENCOUNTER (OUTPATIENT)
Dept: RADIOLOGY | Facility: HOSPITAL | Age: 86
Discharge: HOME | End: 2021-03-17
Attending: NURSE PRACTITIONER
Payer: MEDICARE

## 2021-03-17 DIAGNOSIS — R06.00 DYSPNEA, UNSPECIFIED TYPE: ICD-10-CM

## 2021-03-17 DIAGNOSIS — I27.20 PULMONARY HYPERTENSION: ICD-10-CM

## 2021-03-17 PROCEDURE — 78582 NM LUNG VENTILATION AND PERFUSION IMAGING: ICD-10-PCS | Mod: 26,,, | Performed by: RADIOLOGY

## 2021-03-17 PROCEDURE — 78582 LUNG VENTILAT&PERFUS IMAGING: CPT | Mod: 26,,, | Performed by: RADIOLOGY

## 2021-03-17 PROCEDURE — A9540 TC99M MAA: HCPCS

## 2021-04-13 ENCOUNTER — OFFICE VISIT (OUTPATIENT)
Dept: PODIATRY | Facility: CLINIC | Age: 86
End: 2021-04-13
Payer: MEDICARE

## 2021-04-13 VITALS — HEIGHT: 71 IN | BODY MASS INDEX: 23.58 KG/M2 | WEIGHT: 168.44 LBS

## 2021-04-13 DIAGNOSIS — B35.1 ONYCHOMYCOSIS DUE TO DERMATOPHYTE: ICD-10-CM

## 2021-04-13 DIAGNOSIS — E11.49 TYPE II DIABETES MELLITUS WITH NEUROLOGICAL MANIFESTATIONS: Primary | ICD-10-CM

## 2021-04-13 PROCEDURE — 11721 DEBRIDE NAIL 6 OR MORE: CPT | Mod: Q9,S$GLB,, | Performed by: PODIATRIST

## 2021-04-13 PROCEDURE — 1101F PR PT FALLS ASSESS DOC 0-1 FALLS W/OUT INJ PAST YR: ICD-10-PCS | Mod: CPTII,S$GLB,, | Performed by: PODIATRIST

## 2021-04-13 PROCEDURE — 99999 PR PBB SHADOW E&M-EST. PATIENT-LVL III: CPT | Mod: PBBFAC,,, | Performed by: PODIATRIST

## 2021-04-13 PROCEDURE — 1126F AMNT PAIN NOTED NONE PRSNT: CPT | Mod: S$GLB,,, | Performed by: PODIATRIST

## 2021-04-13 PROCEDURE — 3288F PR FALLS RISK ASSESSMENT DOCUMENTED: ICD-10-PCS | Mod: CPTII,S$GLB,, | Performed by: PODIATRIST

## 2021-04-13 PROCEDURE — 99999 PR PBB SHADOW E&M-EST. PATIENT-LVL III: ICD-10-PCS | Mod: PBBFAC,,, | Performed by: PODIATRIST

## 2021-04-13 PROCEDURE — 99499 UNLISTED E&M SERVICE: CPT | Mod: S$GLB,,, | Performed by: PODIATRIST

## 2021-04-13 PROCEDURE — 99499 NO LOS: ICD-10-PCS | Mod: S$GLB,,, | Performed by: PODIATRIST

## 2021-04-13 PROCEDURE — 1126F PR PAIN SEVERITY QUANTIFIED, NO PAIN PRESENT: ICD-10-PCS | Mod: S$GLB,,, | Performed by: PODIATRIST

## 2021-04-13 PROCEDURE — 11721 PR DEBRIDEMENT OF NAILS, 6 OR MORE: ICD-10-PCS | Mod: Q9,S$GLB,, | Performed by: PODIATRIST

## 2021-04-13 PROCEDURE — 3288F FALL RISK ASSESSMENT DOCD: CPT | Mod: CPTII,S$GLB,, | Performed by: PODIATRIST

## 2021-04-13 PROCEDURE — 1101F PT FALLS ASSESS-DOCD LE1/YR: CPT | Mod: CPTII,S$GLB,, | Performed by: PODIATRIST

## 2021-04-14 LAB
LEFT EYE DM RETINOPATHY: POSITIVE
RIGHT EYE DM RETINOPATHY: POSITIVE

## 2021-04-28 ENCOUNTER — OFFICE VISIT (OUTPATIENT)
Dept: FAMILY MEDICINE | Facility: CLINIC | Age: 86
End: 2021-04-28
Payer: MEDICARE

## 2021-04-28 VITALS
DIASTOLIC BLOOD PRESSURE: 60 MMHG | HEIGHT: 71 IN | OXYGEN SATURATION: 96 % | BODY MASS INDEX: 22.99 KG/M2 | TEMPERATURE: 98 F | SYSTOLIC BLOOD PRESSURE: 116 MMHG | WEIGHT: 164.25 LBS | RESPIRATION RATE: 19 BRPM | HEART RATE: 110 BPM

## 2021-04-28 DIAGNOSIS — R97.20 ELEVATED PSA: Primary | ICD-10-CM

## 2021-04-28 DIAGNOSIS — I70.0 ATHEROSCLEROSIS OF AORTA: ICD-10-CM

## 2021-04-28 DIAGNOSIS — K59.00 CONSTIPATION, UNSPECIFIED CONSTIPATION TYPE: ICD-10-CM

## 2021-04-28 DIAGNOSIS — R12 HEARTBURN: ICD-10-CM

## 2021-04-28 DIAGNOSIS — R19.5 DARK STOOLS: ICD-10-CM

## 2021-04-28 DIAGNOSIS — R13.10 DYSPHAGIA, UNSPECIFIED TYPE: ICD-10-CM

## 2021-04-28 DIAGNOSIS — N18.31 CHRONIC KIDNEY DISEASE, STAGE 3A: ICD-10-CM

## 2021-04-28 PROCEDURE — 99999 PR PBB SHADOW E&M-EST. PATIENT-LVL V: CPT | Mod: PBBFAC,,, | Performed by: NURSE PRACTITIONER

## 2021-04-28 PROCEDURE — 1159F PR MEDICATION LIST DOCUMENTED IN MEDICAL RECORD: ICD-10-PCS | Mod: S$GLB,,, | Performed by: NURSE PRACTITIONER

## 2021-04-28 PROCEDURE — 99499 UNLISTED E&M SERVICE: CPT | Mod: S$GLB,,, | Performed by: NURSE PRACTITIONER

## 2021-04-28 PROCEDURE — 1126F AMNT PAIN NOTED NONE PRSNT: CPT | Mod: S$GLB,,, | Performed by: NURSE PRACTITIONER

## 2021-04-28 PROCEDURE — 3288F FALL RISK ASSESSMENT DOCD: CPT | Mod: CPTII,S$GLB,, | Performed by: NURSE PRACTITIONER

## 2021-04-28 PROCEDURE — 99214 OFFICE O/P EST MOD 30 MIN: CPT | Mod: S$GLB,,, | Performed by: NURSE PRACTITIONER

## 2021-04-28 PROCEDURE — 99499 RISK ADDL DX/OHS AUDIT: ICD-10-PCS | Mod: S$GLB,,, | Performed by: NURSE PRACTITIONER

## 2021-04-28 PROCEDURE — 1101F PT FALLS ASSESS-DOCD LE1/YR: CPT | Mod: CPTII,S$GLB,, | Performed by: NURSE PRACTITIONER

## 2021-04-28 PROCEDURE — 99214 PR OFFICE/OUTPT VISIT, EST, LEVL IV, 30-39 MIN: ICD-10-PCS | Mod: S$GLB,,, | Performed by: NURSE PRACTITIONER

## 2021-04-28 PROCEDURE — 1159F MED LIST DOCD IN RCRD: CPT | Mod: S$GLB,,, | Performed by: NURSE PRACTITIONER

## 2021-04-28 PROCEDURE — 3288F PR FALLS RISK ASSESSMENT DOCUMENTED: ICD-10-PCS | Mod: CPTII,S$GLB,, | Performed by: NURSE PRACTITIONER

## 2021-04-28 PROCEDURE — 99999 PR PBB SHADOW E&M-EST. PATIENT-LVL V: ICD-10-PCS | Mod: PBBFAC,,, | Performed by: NURSE PRACTITIONER

## 2021-04-28 PROCEDURE — 1126F PR PAIN SEVERITY QUANTIFIED, NO PAIN PRESENT: ICD-10-PCS | Mod: S$GLB,,, | Performed by: NURSE PRACTITIONER

## 2021-04-28 PROCEDURE — 1101F PR PT FALLS ASSESS DOC 0-1 FALLS W/OUT INJ PAST YR: ICD-10-PCS | Mod: CPTII,S$GLB,, | Performed by: NURSE PRACTITIONER

## 2021-04-28 RX ORDER — ASPIRIN 81 MG
100 TABLET, DELAYED RELEASE (ENTERIC COATED) ORAL DAILY
Qty: 60 TABLET | Refills: 2 | Status: SHIPPED | OUTPATIENT
Start: 2021-04-28 | End: 2021-12-28

## 2021-04-28 RX ORDER — FAMOTIDINE 10 MG/1
10 TABLET ORAL DAILY PRN
Qty: 60 TABLET | Refills: 0 | Status: SHIPPED | OUTPATIENT
Start: 2021-04-28 | End: 2021-06-21 | Stop reason: CLARIF

## 2021-05-10 ENCOUNTER — OFFICE VISIT (OUTPATIENT)
Dept: UROLOGY | Facility: CLINIC | Age: 86
End: 2021-05-10
Payer: MEDICARE

## 2021-05-10 ENCOUNTER — TELEPHONE (OUTPATIENT)
Dept: UROLOGY | Facility: CLINIC | Age: 86
End: 2021-05-10

## 2021-05-10 VITALS
DIASTOLIC BLOOD PRESSURE: 80 MMHG | SYSTOLIC BLOOD PRESSURE: 131 MMHG | WEIGHT: 164 LBS | BODY MASS INDEX: 22.96 KG/M2 | HEIGHT: 71 IN

## 2021-05-10 DIAGNOSIS — R97.20 ELEVATED PSA: Primary | ICD-10-CM

## 2021-05-10 DIAGNOSIS — R30.0 DYSURIA: ICD-10-CM

## 2021-05-10 DIAGNOSIS — R33.9 URINARY RETENTION: ICD-10-CM

## 2021-05-10 PROCEDURE — 99999 PR PBB SHADOW E&M-EST. PATIENT-LVL III: CPT | Mod: PBBFAC,,, | Performed by: STUDENT IN AN ORGANIZED HEALTH CARE EDUCATION/TRAINING PROGRAM

## 2021-05-10 PROCEDURE — 1126F AMNT PAIN NOTED NONE PRSNT: CPT | Mod: S$GLB,,, | Performed by: STUDENT IN AN ORGANIZED HEALTH CARE EDUCATION/TRAINING PROGRAM

## 2021-05-10 PROCEDURE — 87077 CULTURE AEROBIC IDENTIFY: CPT | Performed by: STUDENT IN AN ORGANIZED HEALTH CARE EDUCATION/TRAINING PROGRAM

## 2021-05-10 PROCEDURE — 87186 SC STD MICRODIL/AGAR DIL: CPT | Performed by: STUDENT IN AN ORGANIZED HEALTH CARE EDUCATION/TRAINING PROGRAM

## 2021-05-10 PROCEDURE — 99214 OFFICE O/P EST MOD 30 MIN: CPT | Mod: S$GLB,,, | Performed by: STUDENT IN AN ORGANIZED HEALTH CARE EDUCATION/TRAINING PROGRAM

## 2021-05-10 PROCEDURE — 1126F PR PAIN SEVERITY QUANTIFIED, NO PAIN PRESENT: ICD-10-PCS | Mod: S$GLB,,, | Performed by: STUDENT IN AN ORGANIZED HEALTH CARE EDUCATION/TRAINING PROGRAM

## 2021-05-10 PROCEDURE — 99999 PR PBB SHADOW E&M-EST. PATIENT-LVL III: ICD-10-PCS | Mod: PBBFAC,,, | Performed by: STUDENT IN AN ORGANIZED HEALTH CARE EDUCATION/TRAINING PROGRAM

## 2021-05-10 PROCEDURE — 3288F PR FALLS RISK ASSESSMENT DOCUMENTED: ICD-10-PCS | Mod: CPTII,S$GLB,, | Performed by: STUDENT IN AN ORGANIZED HEALTH CARE EDUCATION/TRAINING PROGRAM

## 2021-05-10 PROCEDURE — 3288F FALL RISK ASSESSMENT DOCD: CPT | Mod: CPTII,S$GLB,, | Performed by: STUDENT IN AN ORGANIZED HEALTH CARE EDUCATION/TRAINING PROGRAM

## 2021-05-10 PROCEDURE — 1101F PT FALLS ASSESS-DOCD LE1/YR: CPT | Mod: CPTII,S$GLB,, | Performed by: STUDENT IN AN ORGANIZED HEALTH CARE EDUCATION/TRAINING PROGRAM

## 2021-05-10 PROCEDURE — 87088 URINE BACTERIA CULTURE: CPT | Performed by: STUDENT IN AN ORGANIZED HEALTH CARE EDUCATION/TRAINING PROGRAM

## 2021-05-10 PROCEDURE — 99214 PR OFFICE/OUTPT VISIT, EST, LEVL IV, 30-39 MIN: ICD-10-PCS | Mod: S$GLB,,, | Performed by: STUDENT IN AN ORGANIZED HEALTH CARE EDUCATION/TRAINING PROGRAM

## 2021-05-10 PROCEDURE — 87086 URINE CULTURE/COLONY COUNT: CPT | Performed by: STUDENT IN AN ORGANIZED HEALTH CARE EDUCATION/TRAINING PROGRAM

## 2021-05-10 PROCEDURE — 1159F MED LIST DOCD IN RCRD: CPT | Mod: S$GLB,,, | Performed by: STUDENT IN AN ORGANIZED HEALTH CARE EDUCATION/TRAINING PROGRAM

## 2021-05-10 PROCEDURE — 1101F PR PT FALLS ASSESS DOC 0-1 FALLS W/OUT INJ PAST YR: ICD-10-PCS | Mod: CPTII,S$GLB,, | Performed by: STUDENT IN AN ORGANIZED HEALTH CARE EDUCATION/TRAINING PROGRAM

## 2021-05-10 PROCEDURE — 1159F PR MEDICATION LIST DOCUMENTED IN MEDICAL RECORD: ICD-10-PCS | Mod: S$GLB,,, | Performed by: STUDENT IN AN ORGANIZED HEALTH CARE EDUCATION/TRAINING PROGRAM

## 2021-05-10 RX ORDER — CIPROFLOXACIN 500 MG/1
500 TABLET ORAL EVERY 12 HOURS
Qty: 6 TABLET | Refills: 0 | Status: SHIPPED | OUTPATIENT
Start: 2021-05-10 | End: 2021-05-13

## 2021-05-10 RX ORDER — BISACODYL 10 MG
10 SUPPOSITORY, RECTAL RECTAL ONCE
Qty: 1 SUPPOSITORY | Refills: 0 | Status: SHIPPED | OUTPATIENT
Start: 2021-05-10 | End: 2021-05-10

## 2021-05-10 RX ORDER — BISACODYL 10 MG
10 SUPPOSITORY, RECTAL RECTAL ONCE
Qty: 1 SUPPOSITORY | Refills: 0 | Status: SHIPPED | OUTPATIENT
Start: 2021-05-10 | End: 2021-05-10 | Stop reason: SDUPTHER

## 2021-05-10 RX ORDER — CIPROFLOXACIN 500 MG/1
500 TABLET ORAL EVERY 12 HOURS
Qty: 6 TABLET | Refills: 0 | Status: SHIPPED | OUTPATIENT
Start: 2021-05-10 | End: 2021-05-10 | Stop reason: SDUPTHER

## 2021-05-11 ENCOUNTER — TELEPHONE (OUTPATIENT)
Dept: UROLOGY | Facility: CLINIC | Age: 86
End: 2021-05-11

## 2021-05-12 ENCOUNTER — PATIENT OUTREACH (OUTPATIENT)
Dept: ADMINISTRATIVE | Facility: HOSPITAL | Age: 86
End: 2021-05-12

## 2021-05-13 LAB — BACTERIA UR CULT: ABNORMAL

## 2021-05-14 RX ORDER — AMPICILLIN 500 MG/1
500 CAPSULE ORAL 4 TIMES DAILY
Qty: 20 CAPSULE | Refills: 0 | Status: SHIPPED | OUTPATIENT
Start: 2021-05-14 | End: 2021-05-22

## 2021-05-20 ENCOUNTER — HOSPITAL ENCOUNTER (OUTPATIENT)
Facility: HOSPITAL | Age: 86
Discharge: HOME | End: 2021-05-21
Attending: EMERGENCY MEDICINE | Admitting: EMERGENCY MEDICINE
Payer: MEDICARE

## 2021-05-20 ENCOUNTER — OFFICE VISIT (OUTPATIENT)
Dept: UROLOGY | Facility: CLINIC | Age: 86
End: 2021-05-20
Payer: MEDICARE

## 2021-05-20 VITALS — BODY MASS INDEX: 23.27 KG/M2 | HEIGHT: 70 IN | WEIGHT: 162.56 LBS

## 2021-05-20 DIAGNOSIS — N39.0 URINARY TRACT INFECTION ASSOCIATED WITH INDWELLING URETHRAL CATHETER, SUBSEQUENT ENCOUNTER: ICD-10-CM

## 2021-05-20 DIAGNOSIS — R07.9 CHEST PAIN: ICD-10-CM

## 2021-05-20 DIAGNOSIS — T83.511D URINARY TRACT INFECTION ASSOCIATED WITH INDWELLING URETHRAL CATHETER, SUBSEQUENT ENCOUNTER: ICD-10-CM

## 2021-05-20 DIAGNOSIS — R97.20 ELEVATED PSA: ICD-10-CM

## 2021-05-20 DIAGNOSIS — R33.9 URINARY RETENTION: Primary | ICD-10-CM

## 2021-05-20 DIAGNOSIS — I10 ESSENTIAL HYPERTENSION: ICD-10-CM

## 2021-05-20 DIAGNOSIS — R26.81 GAIT INSTABILITY: ICD-10-CM

## 2021-05-20 DIAGNOSIS — R33.9 URINARY RETENTION: ICD-10-CM

## 2021-05-20 DIAGNOSIS — E11.42 TYPE 2 DIABETES MELLITUS WITH DIABETIC POLYNEUROPATHY, WITHOUT LONG-TERM CURRENT USE OF INSULIN: Primary | ICD-10-CM

## 2021-05-20 DIAGNOSIS — J43.9 COPD WITH CHRONIC BRONCHITIS AND EMPHYSEMA: ICD-10-CM

## 2021-05-20 DIAGNOSIS — N18.31 CHRONIC KIDNEY DISEASE, STAGE 3A: ICD-10-CM

## 2021-05-20 DIAGNOSIS — J84.9 ILD (INTERSTITIAL LUNG DISEASE): ICD-10-CM

## 2021-05-20 DIAGNOSIS — J44.89 COPD WITH CHRONIC BRONCHITIS AND EMPHYSEMA: ICD-10-CM

## 2021-05-20 PROBLEM — D50.9 IRON DEFICIENCY ANEMIA: Chronic | Status: ACTIVE | Noted: 2021-03-11

## 2021-05-20 PROBLEM — I27.20 PULMONARY HYPERTENSION: Chronic | Status: ACTIVE | Noted: 2021-03-08

## 2021-05-20 LAB
ALBUMIN SERPL BCP-MCNC: 3.9 G/DL (ref 3.5–5.2)
ALP SERPL-CCNC: 41 U/L (ref 55–135)
ALT SERPL W/O P-5'-P-CCNC: 16 U/L (ref 10–44)
ANION GAP SERPL CALC-SCNC: 14 MMOL/L (ref 8–16)
ASCENDING AORTA: 3.31 CM
AST SERPL-CCNC: 35 U/L (ref 10–40)
AV MEAN GRADIENT: 3 MMHG
AV PEAK GRADIENT: 5 MMHG
BASOPHILS # BLD AUTO: 0.03 K/UL (ref 0–0.2)
BASOPHILS NFR BLD: 0.7 % (ref 0–1.9)
BILIRUB SERPL-MCNC: 0.5 MG/DL (ref 0.1–1)
BNP SERPL-MCNC: <10 PG/ML (ref 0–99)
BSA FOR ECHO PROCEDURE: 1.91 M2
BUN SERPL-MCNC: 21 MG/DL (ref 8–23)
CALCIUM SERPL-MCNC: 10.1 MG/DL (ref 8.7–10.5)
CHLORIDE SERPL-SCNC: 94 MMOL/L (ref 95–110)
CO2 SERPL-SCNC: 22 MMOL/L (ref 23–29)
CREAT SERPL-MCNC: 1.7 MG/DL (ref 0.5–1.4)
CTP QC/QA: YES
CV ECHO LV RWT: 0.95 CM
DIFFERENTIAL METHOD: ABNORMAL
DOP CALC AO PEAK VEL: 1.08 M/S
DOP CALC AO VTI: 16.15 CM
DOP CALC LVOT AREA: 3.9 CM2
DOP CALC LVOT DIAMETER: 2.23 CM
E WAVE DECELERATION TIME: 181.46 MSEC
E/A RATIO: 0.47
E/E' RATIO: 9.27 M/S
ECHO LV POSTERIOR WALL: 1.37 CM (ref 0.6–1.1)
EJECTION FRACTION: 60 %
EOSINOPHIL # BLD AUTO: 0 K/UL (ref 0–0.5)
EOSINOPHIL NFR BLD: 0.7 % (ref 0–8)
ERYTHROCYTE [DISTWIDTH] IN BLOOD BY AUTOMATED COUNT: 12.8 % (ref 11.5–14.5)
EST. GFR  (AFRICAN AMERICAN): 42 ML/MIN/1.73 M^2
EST. GFR  (NON AFRICAN AMERICAN): 36 ML/MIN/1.73 M^2
FRACTIONAL SHORTENING: 32 % (ref 28–44)
GLUCOSE SERPL-MCNC: 123 MG/DL (ref 70–110)
HCT VFR BLD AUTO: 38.4 % (ref 40–54)
HGB BLD-MCNC: 13.1 G/DL (ref 14–18)
IMM GRANULOCYTES # BLD AUTO: 0.01 K/UL (ref 0–0.04)
IMM GRANULOCYTES NFR BLD AUTO: 0.2 % (ref 0–0.5)
INTERVENTRICULAR SEPTUM: 1.59 CM (ref 0.6–1.1)
LA MAJOR: 5.04 CM
LA MINOR: 4.29 CM
LA WIDTH: 2.69 CM
LEFT ATRIUM SIZE: 3.04 CM
LEFT ATRIUM VOLUME INDEX: 16.9 ML/M2
LEFT ATRIUM VOLUME: 32.22 CM3
LEFT INTERNAL DIMENSION IN SYSTOLE: 1.97 CM (ref 2.1–4)
LEFT VENTRICLE DIASTOLIC VOLUME INDEX: 16.55 ML/M2
LEFT VENTRICLE DIASTOLIC VOLUME: 31.62 ML
LEFT VENTRICLE MASS INDEX: 77 G/M2
LEFT VENTRICLE SYSTOLIC VOLUME INDEX: 6.4 ML/M2
LEFT VENTRICLE SYSTOLIC VOLUME: 12.23 ML
LEFT VENTRICULAR INTERNAL DIMENSION IN DIASTOLE: 2.88 CM (ref 3.5–6)
LEFT VENTRICULAR MASS: 146.44 G
LV LATERAL E/E' RATIO: 8.5 M/S
LV SEPTAL E/E' RATIO: 10.2 M/S
LYMPHOCYTES # BLD AUTO: 0.9 K/UL (ref 1–4.8)
LYMPHOCYTES NFR BLD: 22.4 % (ref 18–48)
MCH RBC QN AUTO: 29.4 PG (ref 27–31)
MCHC RBC AUTO-ENTMCNC: 34.1 G/DL (ref 32–36)
MCV RBC AUTO: 86 FL (ref 82–98)
MONOCYTES # BLD AUTO: 0.3 K/UL (ref 0.3–1)
MONOCYTES NFR BLD: 6 % (ref 4–15)
MV PEAK A VEL: 1.08 M/S
MV PEAK E VEL: 0.51 M/S
MV STENOSIS PRESSURE HALF TIME: 52.62 MS
MV VALVE AREA P 1/2 METHOD: 4.18 CM2
NEUTROPHILS # BLD AUTO: 2.9 K/UL (ref 1.8–7.7)
NEUTROPHILS NFR BLD: 70 % (ref 38–73)
NRBC BLD-RTO: 0 /100 WBC
PISA TR MAX VEL: 3.68 M/S
PLATELET # BLD AUTO: 316 K/UL (ref 150–450)
PMV BLD AUTO: 9.2 FL (ref 9.2–12.9)
POCT GLUCOSE: 107 MG/DL (ref 70–110)
POCT GLUCOSE: 137 MG/DL (ref 70–110)
POTASSIUM SERPL-SCNC: 4.8 MMOL/L (ref 3.5–5.1)
PROT SERPL-MCNC: 8 G/DL (ref 6–8.4)
RA MAJOR: 3.68 CM
RA PRESSURE: 3 MMHG
RA WIDTH: 3.16 CM
RBC # BLD AUTO: 4.46 M/UL (ref 4.6–6.2)
RV TISSUE DOPPLER FREE WALL SYSTOLIC VELOCITY 1 (APICAL 4 CHAMBER VIEW): 14.48 CM/S
SARS-COV-2 RDRP RESP QL NAA+PROBE: NEGATIVE
SINUS: 3.92 CM
SODIUM SERPL-SCNC: 130 MMOL/L (ref 136–145)
STJ: 3.57 CM
TDI LATERAL: 0.06 M/S
TDI SEPTAL: 0.05 M/S
TDI: 0.06 M/S
TR MAX PG: 54 MMHG
TRICUSPID ANNULAR PLANE SYSTOLIC EXCURSION: 2.32 CM
TROPONIN I SERPL DL<=0.01 NG/ML-MCNC: 0.01 NG/ML (ref 0–0.03)
TROPONIN I SERPL DL<=0.01 NG/ML-MCNC: 0.02 NG/ML (ref 0–0.03)
TROPONIN I SERPL DL<=0.01 NG/ML-MCNC: <0.006 NG/ML (ref 0–0.03)
TSH SERPL DL<=0.005 MIU/L-ACNC: 0.99 UIU/ML (ref 0.4–4)
TV REST PULMONARY ARTERY PRESSURE: 57 MMHG
WBC # BLD AUTO: 4.16 K/UL (ref 3.9–12.7)

## 2021-05-20 PROCEDURE — 1159F PR MEDICATION LIST DOCUMENTED IN MEDICAL RECORD: ICD-10-PCS | Mod: S$GLB,,, | Performed by: STUDENT IN AN ORGANIZED HEALTH CARE EDUCATION/TRAINING PROGRAM

## 2021-05-20 PROCEDURE — 99999 PR PBB SHADOW E&M-EST. PATIENT-LVL III: ICD-10-PCS | Mod: PBBFAC,,, | Performed by: STUDENT IN AN ORGANIZED HEALTH CARE EDUCATION/TRAINING PROGRAM

## 2021-05-20 PROCEDURE — 83880 ASSAY OF NATRIURETIC PEPTIDE: CPT | Performed by: EMERGENCY MEDICINE

## 2021-05-20 PROCEDURE — 36415 COLL VENOUS BLD VENIPUNCTURE: CPT | Performed by: HOSPITALIST

## 2021-05-20 PROCEDURE — U0002 COVID-19 LAB TEST NON-CDC: HCPCS | Performed by: EMERGENCY MEDICINE

## 2021-05-20 PROCEDURE — 83036 HEMOGLOBIN GLYCOSYLATED A1C: CPT | Performed by: HOSPITALIST

## 2021-05-20 PROCEDURE — 3288F PR FALLS RISK ASSESSMENT DOCUMENTED: ICD-10-PCS | Mod: CPTII,S$GLB,, | Performed by: STUDENT IN AN ORGANIZED HEALTH CARE EDUCATION/TRAINING PROGRAM

## 2021-05-20 PROCEDURE — 25000003 PHARM REV CODE 250: Performed by: HOSPITALIST

## 2021-05-20 PROCEDURE — 51700 IRRIGATION OF BLADDER: ICD-10-PCS | Mod: S$GLB,,, | Performed by: STUDENT IN AN ORGANIZED HEALTH CARE EDUCATION/TRAINING PROGRAM

## 2021-05-20 PROCEDURE — 84443 ASSAY THYROID STIM HORMONE: CPT | Performed by: EMERGENCY MEDICINE

## 2021-05-20 PROCEDURE — 94761 N-INVAS EAR/PLS OXIMETRY MLT: CPT

## 2021-05-20 PROCEDURE — 93005 ELECTROCARDIOGRAM TRACING: CPT

## 2021-05-20 PROCEDURE — 51700 IRRIGATION OF BLADDER: CPT | Mod: S$GLB,,, | Performed by: STUDENT IN AN ORGANIZED HEALTH CARE EDUCATION/TRAINING PROGRAM

## 2021-05-20 PROCEDURE — 25000003 PHARM REV CODE 250: Performed by: EMERGENCY MEDICINE

## 2021-05-20 PROCEDURE — 99499 NO LOS: ICD-10-PCS | Mod: S$GLB,,, | Performed by: STUDENT IN AN ORGANIZED HEALTH CARE EDUCATION/TRAINING PROGRAM

## 2021-05-20 PROCEDURE — 99900035 HC TECH TIME PER 15 MIN (STAT)

## 2021-05-20 PROCEDURE — 84484 ASSAY OF TROPONIN QUANT: CPT | Mod: 91 | Performed by: EMERGENCY MEDICINE

## 2021-05-20 PROCEDURE — 3288F FALL RISK ASSESSMENT DOCD: CPT | Mod: CPTII,S$GLB,, | Performed by: STUDENT IN AN ORGANIZED HEALTH CARE EDUCATION/TRAINING PROGRAM

## 2021-05-20 PROCEDURE — 96374 THER/PROPH/DIAG INJ IV PUSH: CPT | Mod: 59

## 2021-05-20 PROCEDURE — 1101F PR PT FALLS ASSESS DOC 0-1 FALLS W/OUT INJ PAST YR: ICD-10-PCS | Mod: CPTII,S$GLB,, | Performed by: STUDENT IN AN ORGANIZED HEALTH CARE EDUCATION/TRAINING PROGRAM

## 2021-05-20 PROCEDURE — 93010 ELECTROCARDIOGRAM REPORT: CPT | Mod: ,,, | Performed by: INTERNAL MEDICINE

## 2021-05-20 PROCEDURE — 63600175 PHARM REV CODE 636 W HCPCS: Performed by: HOSPITALIST

## 2021-05-20 PROCEDURE — 99499 UNLISTED E&M SERVICE: CPT | Mod: S$GLB,,, | Performed by: STUDENT IN AN ORGANIZED HEALTH CARE EDUCATION/TRAINING PROGRAM

## 2021-05-20 PROCEDURE — 93325 DOPPLER ECHO COLOR FLOW MAPG: CPT

## 2021-05-20 PROCEDURE — 84484 ASSAY OF TROPONIN QUANT: CPT | Performed by: HOSPITALIST

## 2021-05-20 PROCEDURE — 99285 EMERGENCY DEPT VISIT HI MDM: CPT | Mod: 25

## 2021-05-20 PROCEDURE — 93010 EKG 12-LEAD: ICD-10-PCS | Mod: ,,, | Performed by: INTERNAL MEDICINE

## 2021-05-20 PROCEDURE — G0378 HOSPITAL OBSERVATION PER HR: HCPCS

## 2021-05-20 PROCEDURE — 96372 THER/PROPH/DIAG INJ SC/IM: CPT | Mod: 59 | Performed by: EMERGENCY MEDICINE

## 2021-05-20 PROCEDURE — 1159F MED LIST DOCD IN RCRD: CPT | Mod: S$GLB,,, | Performed by: STUDENT IN AN ORGANIZED HEALTH CARE EDUCATION/TRAINING PROGRAM

## 2021-05-20 PROCEDURE — 99999 PR PBB SHADOW E&M-EST. PATIENT-LVL III: CPT | Mod: PBBFAC,,, | Performed by: STUDENT IN AN ORGANIZED HEALTH CARE EDUCATION/TRAINING PROGRAM

## 2021-05-20 PROCEDURE — 80053 COMPREHEN METABOLIC PANEL: CPT | Performed by: EMERGENCY MEDICINE

## 2021-05-20 PROCEDURE — 85025 COMPLETE CBC W/AUTO DIFF WBC: CPT | Performed by: EMERGENCY MEDICINE

## 2021-05-20 PROCEDURE — 93321 DOPPLER ECHO F-UP/LMTD STD: CPT

## 2021-05-20 PROCEDURE — 82962 GLUCOSE BLOOD TEST: CPT

## 2021-05-20 PROCEDURE — 1101F PT FALLS ASSESS-DOCD LE1/YR: CPT | Mod: CPTII,S$GLB,, | Performed by: STUDENT IN AN ORGANIZED HEALTH CARE EDUCATION/TRAINING PROGRAM

## 2021-05-20 RX ORDER — IPRATROPIUM BROMIDE AND ALBUTEROL SULFATE 2.5; .5 MG/3ML; MG/3ML
3 SOLUTION RESPIRATORY (INHALATION) EVERY 4 HOURS PRN
Status: DISCONTINUED | OUTPATIENT
Start: 2021-05-20 | End: 2021-05-21 | Stop reason: HOSPADM

## 2021-05-20 RX ORDER — ASPIRIN 325 MG
325 TABLET ORAL
Status: COMPLETED | OUTPATIENT
Start: 2021-05-20 | End: 2021-05-20

## 2021-05-20 RX ORDER — ACETAMINOPHEN 325 MG/1
650 TABLET ORAL EVERY 6 HOURS PRN
Status: DISCONTINUED | OUTPATIENT
Start: 2021-05-20 | End: 2021-05-21 | Stop reason: HOSPADM

## 2021-05-20 RX ORDER — SODIUM CHLORIDE 0.9 % (FLUSH) 0.9 %
10 SYRINGE (ML) INJECTION
Status: DISCONTINUED | OUTPATIENT
Start: 2021-05-20 | End: 2021-05-21 | Stop reason: HOSPADM

## 2021-05-20 RX ORDER — AMPICILLIN 500 MG/1
500 CAPSULE ORAL 4 TIMES DAILY
Status: DISCONTINUED | OUTPATIENT
Start: 2021-05-20 | End: 2021-05-21 | Stop reason: HOSPADM

## 2021-05-20 RX ORDER — ENOXAPARIN SODIUM 100 MG/ML
40 INJECTION SUBCUTANEOUS EVERY 24 HOURS
Status: DISCONTINUED | OUTPATIENT
Start: 2021-05-20 | End: 2021-05-21 | Stop reason: HOSPADM

## 2021-05-20 RX ORDER — DOCUSATE SODIUM 100 MG/1
100 CAPSULE, LIQUID FILLED ORAL DAILY
Status: DISCONTINUED | OUTPATIENT
Start: 2021-05-20 | End: 2021-05-21 | Stop reason: HOSPADM

## 2021-05-20 RX ORDER — VALSARTAN 80 MG/1
320 TABLET ORAL DAILY
Status: DISCONTINUED | OUTPATIENT
Start: 2021-05-21 | End: 2021-05-21 | Stop reason: HOSPADM

## 2021-05-20 RX ORDER — TAMSULOSIN HYDROCHLORIDE 0.4 MG/1
0.4 CAPSULE ORAL DAILY
Status: DISCONTINUED | OUTPATIENT
Start: 2021-05-20 | End: 2021-05-21 | Stop reason: HOSPADM

## 2021-05-20 RX ORDER — INSULIN ASPART 100 [IU]/ML
1-10 INJECTION, SOLUTION INTRAVENOUS; SUBCUTANEOUS
Status: DISCONTINUED | OUTPATIENT
Start: 2021-05-20 | End: 2021-05-21 | Stop reason: HOSPADM

## 2021-05-20 RX ORDER — HYDROCHLOROTHIAZIDE 25 MG/1
25 TABLET ORAL DAILY
Status: DISCONTINUED | OUTPATIENT
Start: 2021-05-21 | End: 2021-05-21 | Stop reason: HOSPADM

## 2021-05-20 RX ORDER — IBUPROFEN 200 MG
16 TABLET ORAL
Status: DISCONTINUED | OUTPATIENT
Start: 2021-05-20 | End: 2021-05-21 | Stop reason: HOSPADM

## 2021-05-20 RX ORDER — NITROGLYCERIN 0.4 MG/1
0.4 TABLET SUBLINGUAL EVERY 5 MIN PRN
Status: DISCONTINUED | OUTPATIENT
Start: 2021-05-21 | End: 2021-05-21 | Stop reason: HOSPADM

## 2021-05-20 RX ORDER — ONDANSETRON 2 MG/ML
4 INJECTION INTRAMUSCULAR; INTRAVENOUS EVERY 6 HOURS PRN
Status: DISCONTINUED | OUTPATIENT
Start: 2021-05-20 | End: 2021-05-21 | Stop reason: HOSPADM

## 2021-05-20 RX ORDER — IBUPROFEN 200 MG
24 TABLET ORAL
Status: DISCONTINUED | OUTPATIENT
Start: 2021-05-20 | End: 2021-05-21 | Stop reason: HOSPADM

## 2021-05-20 RX ORDER — TALC
6 POWDER (GRAM) TOPICAL NIGHTLY PRN
Status: DISCONTINUED | OUTPATIENT
Start: 2021-05-20 | End: 2021-05-21 | Stop reason: HOSPADM

## 2021-05-20 RX ORDER — ASPIRIN 81 MG/1
81 TABLET ORAL DAILY
Status: DISCONTINUED | OUTPATIENT
Start: 2021-05-21 | End: 2021-05-21 | Stop reason: HOSPADM

## 2021-05-20 RX ORDER — LIDOCAINE HYDROCHLORIDE 20 MG/ML
JELLY TOPICAL ONCE AS NEEDED
Status: COMPLETED | OUTPATIENT
Start: 2021-05-20 | End: 2021-05-21

## 2021-05-20 RX ORDER — GLUCAGON 1 MG
1 KIT INJECTION
Status: DISCONTINUED | OUTPATIENT
Start: 2021-05-20 | End: 2021-05-21 | Stop reason: HOSPADM

## 2021-05-20 RX ADMIN — AMPICILLIN 500 MG: 500 CAPSULE ORAL at 04:05

## 2021-05-20 RX ADMIN — ENOXAPARIN SODIUM 40 MG: 40 INJECTION SUBCUTANEOUS at 04:05

## 2021-05-20 RX ADMIN — AMPICILLIN 500 MG: 500 CAPSULE ORAL at 08:05

## 2021-05-20 RX ADMIN — ASPIRIN 325 MG ORAL TABLET 325 MG: 325 PILL ORAL at 11:05

## 2021-05-21 VITALS
DIASTOLIC BLOOD PRESSURE: 75 MMHG | TEMPERATURE: 98 F | WEIGHT: 162.56 LBS | HEIGHT: 70 IN | OXYGEN SATURATION: 97 % | RESPIRATION RATE: 18 BRPM | SYSTOLIC BLOOD PRESSURE: 128 MMHG | HEART RATE: 104 BPM | BODY MASS INDEX: 23.27 KG/M2

## 2021-05-21 LAB
BACTERIA #/AREA URNS HPF: ABNORMAL /HPF
BILIRUB UR QL STRIP: NEGATIVE
CHOLEST SERPL-MCNC: 122 MG/DL (ref 120–199)
CHOLEST/HDLC SERPL: 2.8 {RATIO} (ref 2–5)
CLARITY UR: CLEAR
COLOR UR: COLORLESS
CV STRESS BASE HR: 103 BPM
DIASTOLIC BLOOD PRESSURE: 63 MMHG
ESTIMATED AVG GLUCOSE: 143 MG/DL (ref 68–131)
GLUCOSE UR QL STRIP: NEGATIVE
HBA1C MFR BLD: 6.6 % (ref 4–5.6)
HDLC SERPL-MCNC: 44 MG/DL (ref 40–75)
HDLC SERPL: 36.1 % (ref 20–50)
HGB UR QL STRIP: ABNORMAL
HYALINE CASTS #/AREA URNS LPF: 1 /LPF
KETONES UR QL STRIP: NEGATIVE
LDLC SERPL CALC-MCNC: 64.6 MG/DL (ref 63–159)
LEUKOCYTE ESTERASE UR QL STRIP: ABNORMAL
MICROSCOPIC COMMENT: ABNORMAL
NITRITE UR QL STRIP: NEGATIVE
NONHDLC SERPL-MCNC: 78 MG/DL
OHS CV CPX 85 PERCENT MAX PREDICTED HEART RATE MALE: 116
OHS CV CPX MAX PREDICTED HEART RATE: 137
OHS CV CPX PATIENT IS FEMALE: 0
OHS CV CPX PATIENT IS MALE: 1
OHS CV CPX PEAK DIASTOLIC BLOOD PRESSURE: 62 MMHG
OHS CV CPX PEAK HEAR RATE: 131 BPM
OHS CV CPX PEAK RATE PRESSURE PRODUCT: NORMAL
OHS CV CPX PEAK SYSTOLIC BLOOD PRESSURE: 123 MMHG
OHS CV CPX PERCENT MAX PREDICTED HEART RATE ACHIEVED: 96
OHS CV CPX RATE PRESSURE PRODUCT PRESENTING: NORMAL
PH UR STRIP: 6 [PH] (ref 5–8)
POCT GLUCOSE: 121 MG/DL (ref 70–110)
POCT GLUCOSE: 121 MG/DL (ref 70–110)
PROT UR QL STRIP: ABNORMAL
RBC #/AREA URNS HPF: >100 /HPF (ref 0–4)
SP GR UR STRIP: 1.01 (ref 1–1.03)
SYSTOLIC BLOOD PRESSURE: 102 MMHG
TRIGL SERPL-MCNC: 67 MG/DL (ref 30–150)
TROPONIN I SERPL DL<=0.01 NG/ML-MCNC: 0.01 NG/ML (ref 0–0.03)
TROPONIN I SERPL DL<=0.01 NG/ML-MCNC: 0.01 NG/ML (ref 0–0.03)
URN SPEC COLLECT METH UR: ABNORMAL
UROBILINOGEN UR STRIP-ACNC: NEGATIVE EU/DL
WBC #/AREA URNS HPF: 45 /HPF (ref 0–5)
WBC CLUMPS URNS QL MICRO: ABNORMAL

## 2021-05-21 PROCEDURE — 63600175 PHARM REV CODE 636 W HCPCS: Performed by: HOSPITALIST

## 2021-05-21 PROCEDURE — 25000003 PHARM REV CODE 250: Performed by: NURSE PRACTITIONER

## 2021-05-21 PROCEDURE — 84484 ASSAY OF TROPONIN QUANT: CPT | Mod: 91 | Performed by: NURSE PRACTITIONER

## 2021-05-21 PROCEDURE — 36415 COLL VENOUS BLD VENIPUNCTURE: CPT | Performed by: NURSE PRACTITIONER

## 2021-05-21 PROCEDURE — 80061 LIPID PANEL: CPT | Performed by: HOSPITALIST

## 2021-05-21 PROCEDURE — 81000 URINALYSIS NONAUTO W/SCOPE: CPT | Performed by: HOSPITALIST

## 2021-05-21 PROCEDURE — 99220 PR INITIAL OBSERVATION CARE,LEVL III: CPT | Mod: ,,, | Performed by: INTERNAL MEDICINE

## 2021-05-21 PROCEDURE — 63600175 PHARM REV CODE 636 W HCPCS: Performed by: INTERNAL MEDICINE

## 2021-05-21 PROCEDURE — G0378 HOSPITAL OBSERVATION PER HR: HCPCS

## 2021-05-21 PROCEDURE — 36415 COLL VENOUS BLD VENIPUNCTURE: CPT | Performed by: HOSPITALIST

## 2021-05-21 PROCEDURE — 87086 URINE CULTURE/COLONY COUNT: CPT | Performed by: HOSPITALIST

## 2021-05-21 PROCEDURE — 99900035 HC TECH TIME PER 15 MIN (STAT)

## 2021-05-21 PROCEDURE — 25000003 PHARM REV CODE 250: Performed by: HOSPITALIST

## 2021-05-21 PROCEDURE — 99220 PR INITIAL OBSERVATION CARE,LEVL III: ICD-10-PCS | Mod: ,,, | Performed by: INTERNAL MEDICINE

## 2021-05-21 RX ORDER — ATORVASTATIN CALCIUM 20 MG/1
20 TABLET, FILM COATED ORAL NIGHTLY
Qty: 90 TABLET | Refills: 3 | Status: SHIPPED | OUTPATIENT
Start: 2021-05-21 | End: 2021-12-22 | Stop reason: SDUPTHER

## 2021-05-21 RX ORDER — DEXTROMETHORPHAN POLISTIREX 30 MG/5 ML
1 SUSPENSION, EXTENDED RELEASE 12 HR ORAL ONCE
Status: DISCONTINUED | OUTPATIENT
Start: 2021-05-21 | End: 2021-05-21 | Stop reason: HOSPADM

## 2021-05-21 RX ORDER — ATORVASTATIN CALCIUM 10 MG/1
20 TABLET, FILM COATED ORAL NIGHTLY
Status: DISCONTINUED | OUTPATIENT
Start: 2021-05-21 | End: 2021-05-21 | Stop reason: HOSPADM

## 2021-05-21 RX ORDER — POLYETHYLENE GLYCOL 3350 17 G/17G
17 POWDER, FOR SOLUTION ORAL DAILY
Status: DISCONTINUED | OUTPATIENT
Start: 2021-05-21 | End: 2021-05-21 | Stop reason: HOSPADM

## 2021-05-21 RX ORDER — SODIUM CHLORIDE 9 MG/ML
INJECTION, SOLUTION INTRAVENOUS CONTINUOUS
Status: DISCONTINUED | OUTPATIENT
Start: 2021-05-21 | End: 2021-05-21

## 2021-05-21 RX ORDER — REGADENOSON 0.08 MG/ML
0.4 INJECTION, SOLUTION INTRAVENOUS ONCE
Status: COMPLETED | OUTPATIENT
Start: 2021-05-21 | End: 2021-05-21

## 2021-05-21 RX ORDER — MORPHINE SULFATE 4 MG/ML
2 INJECTION, SOLUTION INTRAMUSCULAR; INTRAVENOUS ONCE
Status: COMPLETED | OUTPATIENT
Start: 2021-05-21 | End: 2021-05-21

## 2021-05-21 RX ORDER — POLYETHYLENE GLYCOL 3350 17 G/17G
17 POWDER, FOR SOLUTION ORAL 2 TIMES DAILY PRN
Qty: 510 G | Refills: 0 | Status: SHIPPED | OUTPATIENT
Start: 2021-05-21 | End: 2021-06-21 | Stop reason: CLARIF

## 2021-05-21 RX ADMIN — REGADENOSON 0.4 MG: 0.08 INJECTION, SOLUTION INTRAVENOUS at 12:05

## 2021-05-21 RX ADMIN — VALSARTAN 320 MG: 80 TABLET, FILM COATED ORAL at 08:05

## 2021-05-21 RX ADMIN — AMPICILLIN 500 MG: 500 CAPSULE ORAL at 08:05

## 2021-05-21 RX ADMIN — ASPIRIN 81 MG: 81 TABLET, COATED ORAL at 08:05

## 2021-05-21 RX ADMIN — AMPICILLIN 500 MG: 500 CAPSULE ORAL at 01:05

## 2021-05-21 RX ADMIN — Medication 6 MG: at 02:05

## 2021-05-21 RX ADMIN — LIDOCAINE HYDROCHLORIDE 10 ML: 20 JELLY TOPICAL at 05:05

## 2021-05-21 RX ADMIN — HYDROCHLOROTHIAZIDE 25 MG: 25 TABLET ORAL at 08:05

## 2021-05-21 RX ADMIN — DOCUSATE SODIUM 100 MG: 100 CAPSULE, LIQUID FILLED ORAL at 08:05

## 2021-05-21 RX ADMIN — MORPHINE SULFATE 2 MG: 4 INJECTION, SOLUTION INTRAMUSCULAR; INTRAVENOUS at 02:05

## 2021-05-21 RX ADMIN — SODIUM CHLORIDE: 0.9 INJECTION, SOLUTION INTRAVENOUS at 09:05

## 2021-05-21 RX ADMIN — TAMSULOSIN HYDROCHLORIDE 0.4 MG: 0.4 CAPSULE ORAL at 02:05

## 2021-05-23 LAB — BACTERIA UR CULT: NO GROWTH

## 2021-05-24 ENCOUNTER — TELEPHONE (OUTPATIENT)
Dept: UROLOGY | Facility: CLINIC | Age: 86
End: 2021-05-24

## 2021-05-27 ENCOUNTER — PROCEDURE VISIT (OUTPATIENT)
Dept: UROLOGY | Facility: CLINIC | Age: 86
End: 2021-05-27
Payer: MEDICARE

## 2021-05-27 DIAGNOSIS — R97.20 ELEVATED PSA: Primary | ICD-10-CM

## 2021-05-27 PROCEDURE — 88305 TISSUE EXAM BY PATHOLOGIST: ICD-10-PCS | Mod: 26,,, | Performed by: PATHOLOGY

## 2021-05-27 PROCEDURE — 88305 TISSUE EXAM BY PATHOLOGIST: CPT | Mod: 26,,, | Performed by: PATHOLOGY

## 2021-05-27 PROCEDURE — 55700 TRANSRECTAL ULTRASOUND W/ BIOPSY: ICD-10-PCS | Mod: S$GLB,,, | Performed by: STUDENT IN AN ORGANIZED HEALTH CARE EDUCATION/TRAINING PROGRAM

## 2021-05-27 PROCEDURE — 51798 POCT BLADDER SCAN: ICD-10-PCS | Mod: S$GLB,,, | Performed by: STUDENT IN AN ORGANIZED HEALTH CARE EDUCATION/TRAINING PROGRAM

## 2021-05-27 PROCEDURE — 96372 THER/PROPH/DIAG INJ SC/IM: CPT | Mod: 59,S$GLB,, | Performed by: STUDENT IN AN ORGANIZED HEALTH CARE EDUCATION/TRAINING PROGRAM

## 2021-05-27 PROCEDURE — 51798 US URINE CAPACITY MEASURE: CPT | Mod: S$GLB,,, | Performed by: STUDENT IN AN ORGANIZED HEALTH CARE EDUCATION/TRAINING PROGRAM

## 2021-05-27 PROCEDURE — 76872 US TRANSRECTAL: CPT | Mod: 26,59,S$GLB, | Performed by: STUDENT IN AN ORGANIZED HEALTH CARE EDUCATION/TRAINING PROGRAM

## 2021-05-27 PROCEDURE — 76872 TRANSRECTAL ULTRASOUND W/ BIOPSY: ICD-10-PCS | Mod: 26,59,S$GLB, | Performed by: STUDENT IN AN ORGANIZED HEALTH CARE EDUCATION/TRAINING PROGRAM

## 2021-05-27 PROCEDURE — 88305 TISSUE EXAM BY PATHOLOGIST: CPT | Mod: 59 | Performed by: PATHOLOGY

## 2021-05-27 PROCEDURE — 96372 PR INJECTION,THERAP/PROPH/DIAG2ST, IM OR SUBCUT: ICD-10-PCS | Mod: 59,S$GLB,, | Performed by: STUDENT IN AN ORGANIZED HEALTH CARE EDUCATION/TRAINING PROGRAM

## 2021-05-27 PROCEDURE — 55700 TRANSRECTAL ULTRASOUND W/ BIOPSY: CPT | Mod: S$GLB,,, | Performed by: STUDENT IN AN ORGANIZED HEALTH CARE EDUCATION/TRAINING PROGRAM

## 2021-05-27 RX ORDER — GENTAMICIN SULFATE 40 MG/ML
80 INJECTION, SOLUTION INTRAMUSCULAR; INTRAVENOUS
Status: COMPLETED | OUTPATIENT
Start: 2021-05-27 | End: 2021-05-27

## 2021-05-27 RX ADMIN — GENTAMICIN SULFATE 80 MG: 40 INJECTION, SOLUTION INTRAMUSCULAR; INTRAVENOUS at 11:05

## 2021-05-28 LAB — POC RESIDUAL URINE VOLUME: 285 ML (ref 0–100)

## 2021-06-04 ENCOUNTER — TELEPHONE (OUTPATIENT)
Dept: UROLOGY | Facility: CLINIC | Age: 86
End: 2021-06-04

## 2021-06-04 ENCOUNTER — PATIENT MESSAGE (OUTPATIENT)
Dept: UROLOGY | Facility: CLINIC | Age: 86
End: 2021-06-04

## 2021-06-08 ENCOUNTER — OFFICE VISIT (OUTPATIENT)
Dept: PULMONOLOGY | Facility: CLINIC | Age: 86
End: 2021-06-08
Payer: MEDICARE

## 2021-06-08 VITALS
HEART RATE: 109 BPM | SYSTOLIC BLOOD PRESSURE: 112 MMHG | HEIGHT: 70 IN | OXYGEN SATURATION: 95 % | TEMPERATURE: 98 F | DIASTOLIC BLOOD PRESSURE: 72 MMHG | BODY MASS INDEX: 22.9 KG/M2 | WEIGHT: 159.94 LBS

## 2021-06-08 DIAGNOSIS — J43.9 COPD WITH CHRONIC BRONCHITIS AND EMPHYSEMA: Primary | Chronic | ICD-10-CM

## 2021-06-08 DIAGNOSIS — I27.20 PULMONARY HYPERTENSION: ICD-10-CM

## 2021-06-08 DIAGNOSIS — G47.31 COMPLEX SLEEP APNEA SYNDROME: ICD-10-CM

## 2021-06-08 DIAGNOSIS — R26.81 GAIT INSTABILITY: ICD-10-CM

## 2021-06-08 DIAGNOSIS — J44.89 COPD WITH CHRONIC BRONCHITIS AND EMPHYSEMA: Primary | Chronic | ICD-10-CM

## 2021-06-08 PROCEDURE — 99999 PR PBB SHADOW E&M-EST. PATIENT-LVL IV: CPT | Mod: PBBFAC,,, | Performed by: NURSE PRACTITIONER

## 2021-06-08 PROCEDURE — 99999 PR PBB SHADOW E&M-EST. PATIENT-LVL IV: ICD-10-PCS | Mod: PBBFAC,,, | Performed by: NURSE PRACTITIONER

## 2021-06-08 PROCEDURE — 1101F PR PT FALLS ASSESS DOC 0-1 FALLS W/OUT INJ PAST YR: ICD-10-PCS | Mod: CPTII,S$GLB,, | Performed by: NURSE PRACTITIONER

## 2021-06-08 PROCEDURE — 1101F PT FALLS ASSESS-DOCD LE1/YR: CPT | Mod: CPTII,S$GLB,, | Performed by: NURSE PRACTITIONER

## 2021-06-08 PROCEDURE — 99213 OFFICE O/P EST LOW 20 MIN: CPT | Mod: S$GLB,,, | Performed by: NURSE PRACTITIONER

## 2021-06-08 PROCEDURE — 3288F PR FALLS RISK ASSESSMENT DOCUMENTED: ICD-10-PCS | Mod: CPTII,S$GLB,, | Performed by: NURSE PRACTITIONER

## 2021-06-08 PROCEDURE — 1159F MED LIST DOCD IN RCRD: CPT | Mod: S$GLB,,, | Performed by: NURSE PRACTITIONER

## 2021-06-08 PROCEDURE — 1159F PR MEDICATION LIST DOCUMENTED IN MEDICAL RECORD: ICD-10-PCS | Mod: S$GLB,,, | Performed by: NURSE PRACTITIONER

## 2021-06-08 PROCEDURE — 3288F FALL RISK ASSESSMENT DOCD: CPT | Mod: CPTII,S$GLB,, | Performed by: NURSE PRACTITIONER

## 2021-06-08 PROCEDURE — 1126F PR PAIN SEVERITY QUANTIFIED, NO PAIN PRESENT: ICD-10-PCS | Mod: S$GLB,,, | Performed by: NURSE PRACTITIONER

## 2021-06-08 PROCEDURE — 99213 PR OFFICE/OUTPT VISIT, EST, LEVL III, 20-29 MIN: ICD-10-PCS | Mod: S$GLB,,, | Performed by: NURSE PRACTITIONER

## 2021-06-08 PROCEDURE — 1126F AMNT PAIN NOTED NONE PRSNT: CPT | Mod: S$GLB,,, | Performed by: NURSE PRACTITIONER

## 2021-06-09 ENCOUNTER — TELEPHONE (OUTPATIENT)
Dept: UROLOGY | Facility: CLINIC | Age: 86
End: 2021-06-09

## 2021-06-09 DIAGNOSIS — C61 MALIGNANT NEOPLASM OF PROSTATE: ICD-10-CM

## 2021-06-09 DIAGNOSIS — C68.9 MALIGNANT NEOPLASM OF URINARY ORGAN, UNSPECIFIED: ICD-10-CM

## 2021-06-09 LAB
FINAL PATHOLOGIC DIAGNOSIS: ABNORMAL
GROSS: ABNORMAL
Lab: ABNORMAL

## 2021-06-10 ENCOUNTER — OFFICE VISIT (OUTPATIENT)
Dept: FAMILY MEDICINE | Facility: CLINIC | Age: 86
End: 2021-06-10
Payer: MEDICARE

## 2021-06-10 VITALS
RESPIRATION RATE: 18 BRPM | TEMPERATURE: 98 F | SYSTOLIC BLOOD PRESSURE: 102 MMHG | HEIGHT: 70 IN | DIASTOLIC BLOOD PRESSURE: 66 MMHG | BODY MASS INDEX: 22.91 KG/M2 | WEIGHT: 160.06 LBS | OXYGEN SATURATION: 95 % | HEART RATE: 123 BPM

## 2021-06-10 DIAGNOSIS — E11.21 DIABETES MELLITUS WITH NEPHROPATHY: ICD-10-CM

## 2021-06-10 DIAGNOSIS — N18.31 CHRONIC KIDNEY DISEASE, STAGE 3A: ICD-10-CM

## 2021-06-10 DIAGNOSIS — Z00.00 GENERAL MEDICAL EXAM: Primary | ICD-10-CM

## 2021-06-10 DIAGNOSIS — I10 ESSENTIAL HYPERTENSION: ICD-10-CM

## 2021-06-10 DIAGNOSIS — C61 PROSTATE CANCER: ICD-10-CM

## 2021-06-10 PROCEDURE — 99397 PR PREVENTIVE VISIT,EST,65 & OVER: ICD-10-PCS | Mod: S$GLB,,, | Performed by: FAMILY MEDICINE

## 2021-06-10 PROCEDURE — 1101F PR PT FALLS ASSESS DOC 0-1 FALLS W/OUT INJ PAST YR: ICD-10-PCS | Mod: CPTII,S$GLB,, | Performed by: FAMILY MEDICINE

## 2021-06-10 PROCEDURE — 3288F PR FALLS RISK ASSESSMENT DOCUMENTED: ICD-10-PCS | Mod: CPTII,S$GLB,, | Performed by: FAMILY MEDICINE

## 2021-06-10 PROCEDURE — 99397 PER PM REEVAL EST PAT 65+ YR: CPT | Mod: S$GLB,,, | Performed by: FAMILY MEDICINE

## 2021-06-10 PROCEDURE — 99999 PR PBB SHADOW E&M-EST. PATIENT-LVL IV: ICD-10-PCS | Mod: PBBFAC,,, | Performed by: FAMILY MEDICINE

## 2021-06-10 PROCEDURE — 3288F FALL RISK ASSESSMENT DOCD: CPT | Mod: CPTII,S$GLB,, | Performed by: FAMILY MEDICINE

## 2021-06-10 PROCEDURE — 1126F PR PAIN SEVERITY QUANTIFIED, NO PAIN PRESENT: ICD-10-PCS | Mod: S$GLB,,, | Performed by: FAMILY MEDICINE

## 2021-06-10 PROCEDURE — 1101F PT FALLS ASSESS-DOCD LE1/YR: CPT | Mod: CPTII,S$GLB,, | Performed by: FAMILY MEDICINE

## 2021-06-10 PROCEDURE — 99999 PR PBB SHADOW E&M-EST. PATIENT-LVL IV: CPT | Mod: PBBFAC,,, | Performed by: FAMILY MEDICINE

## 2021-06-10 PROCEDURE — 1126F AMNT PAIN NOTED NONE PRSNT: CPT | Mod: S$GLB,,, | Performed by: FAMILY MEDICINE

## 2021-06-10 RX ORDER — METFORMIN HYDROCHLORIDE 1000 MG/1
1000 TABLET ORAL
Qty: 90 TABLET | Refills: 1 | Status: SHIPPED | OUTPATIENT
Start: 2021-06-10 | End: 2021-07-30

## 2021-06-11 ENCOUNTER — HOSPITAL ENCOUNTER (OUTPATIENT)
Dept: RADIOLOGY | Facility: HOSPITAL | Age: 86
Discharge: HOME | End: 2021-06-11
Attending: STUDENT IN AN ORGANIZED HEALTH CARE EDUCATION/TRAINING PROGRAM
Payer: MEDICARE

## 2021-06-11 DIAGNOSIS — C68.9 MALIGNANT NEOPLASM OF URINARY ORGAN, UNSPECIFIED: ICD-10-CM

## 2021-06-11 DIAGNOSIS — C61 MALIGNANT NEOPLASM OF PROSTATE: ICD-10-CM

## 2021-06-11 PROCEDURE — 78306 NM BONE SCAN WHOLE BODY: ICD-10-PCS | Mod: 26,,, | Performed by: RADIOLOGY

## 2021-06-11 PROCEDURE — A9503 TC99M MEDRONATE: HCPCS

## 2021-06-11 PROCEDURE — 78306 BONE IMAGING WHOLE BODY: CPT | Mod: 26,,, | Performed by: RADIOLOGY

## 2021-06-11 PROCEDURE — 25500020 PHARM REV CODE 255: Performed by: STUDENT IN AN ORGANIZED HEALTH CARE EDUCATION/TRAINING PROGRAM

## 2021-06-11 PROCEDURE — 74177 CT ABD & PELVIS W/CONTRAST: CPT | Mod: TC

## 2021-06-11 PROCEDURE — 74177 CT ABDOMEN PELVIS WITH CONTRAST: ICD-10-PCS | Mod: 26,,, | Performed by: RADIOLOGY

## 2021-06-11 PROCEDURE — 74177 CT ABD & PELVIS W/CONTRAST: CPT | Mod: 26,,, | Performed by: RADIOLOGY

## 2021-06-11 RX ADMIN — IOHEXOL 75 ML: 350 INJECTION, SOLUTION INTRAVENOUS at 09:06

## 2021-06-11 RX ADMIN — IOHEXOL 15 ML: 300 INJECTION, SOLUTION INTRAVENOUS at 09:06

## 2021-06-14 ENCOUNTER — OFFICE VISIT (OUTPATIENT)
Dept: UROLOGY | Facility: CLINIC | Age: 86
End: 2021-06-14
Payer: MEDICARE

## 2021-06-14 VITALS — HEIGHT: 70 IN | WEIGHT: 159.38 LBS | BODY MASS INDEX: 22.82 KG/M2

## 2021-06-14 DIAGNOSIS — C61 MALIGNANT NEOPLASM OF PROSTATE: Primary | ICD-10-CM

## 2021-06-14 DIAGNOSIS — R33.9 URINARY RETENTION: ICD-10-CM

## 2021-06-14 PROCEDURE — 1126F AMNT PAIN NOTED NONE PRSNT: CPT | Mod: S$GLB,,, | Performed by: STUDENT IN AN ORGANIZED HEALTH CARE EDUCATION/TRAINING PROGRAM

## 2021-06-14 PROCEDURE — 99999 PR PBB SHADOW E&M-EST. PATIENT-LVL IV: CPT | Mod: PBBFAC,,, | Performed by: STUDENT IN AN ORGANIZED HEALTH CARE EDUCATION/TRAINING PROGRAM

## 2021-06-14 PROCEDURE — 3288F FALL RISK ASSESSMENT DOCD: CPT | Mod: CPTII,S$GLB,, | Performed by: STUDENT IN AN ORGANIZED HEALTH CARE EDUCATION/TRAINING PROGRAM

## 2021-06-14 PROCEDURE — 1159F MED LIST DOCD IN RCRD: CPT | Mod: S$GLB,,, | Performed by: STUDENT IN AN ORGANIZED HEALTH CARE EDUCATION/TRAINING PROGRAM

## 2021-06-14 PROCEDURE — 99214 OFFICE O/P EST MOD 30 MIN: CPT | Mod: 57,S$GLB,, | Performed by: STUDENT IN AN ORGANIZED HEALTH CARE EDUCATION/TRAINING PROGRAM

## 2021-06-14 PROCEDURE — 1159F PR MEDICATION LIST DOCUMENTED IN MEDICAL RECORD: ICD-10-PCS | Mod: S$GLB,,, | Performed by: STUDENT IN AN ORGANIZED HEALTH CARE EDUCATION/TRAINING PROGRAM

## 2021-06-14 PROCEDURE — 1101F PT FALLS ASSESS-DOCD LE1/YR: CPT | Mod: CPTII,S$GLB,, | Performed by: STUDENT IN AN ORGANIZED HEALTH CARE EDUCATION/TRAINING PROGRAM

## 2021-06-14 PROCEDURE — 3288F PR FALLS RISK ASSESSMENT DOCUMENTED: ICD-10-PCS | Mod: CPTII,S$GLB,, | Performed by: STUDENT IN AN ORGANIZED HEALTH CARE EDUCATION/TRAINING PROGRAM

## 2021-06-14 PROCEDURE — 1126F PR PAIN SEVERITY QUANTIFIED, NO PAIN PRESENT: ICD-10-PCS | Mod: S$GLB,,, | Performed by: STUDENT IN AN ORGANIZED HEALTH CARE EDUCATION/TRAINING PROGRAM

## 2021-06-14 PROCEDURE — 99214 PR OFFICE/OUTPT VISIT, EST, LEVL IV, 30-39 MIN: ICD-10-PCS | Mod: 57,S$GLB,, | Performed by: STUDENT IN AN ORGANIZED HEALTH CARE EDUCATION/TRAINING PROGRAM

## 2021-06-14 PROCEDURE — 99999 PR PBB SHADOW E&M-EST. PATIENT-LVL IV: ICD-10-PCS | Mod: PBBFAC,,, | Performed by: STUDENT IN AN ORGANIZED HEALTH CARE EDUCATION/TRAINING PROGRAM

## 2021-06-14 PROCEDURE — 1101F PR PT FALLS ASSESS DOC 0-1 FALLS W/OUT INJ PAST YR: ICD-10-PCS | Mod: CPTII,S$GLB,, | Performed by: STUDENT IN AN ORGANIZED HEALTH CARE EDUCATION/TRAINING PROGRAM

## 2021-06-14 RX ORDER — BICALUTAMIDE 50 MG/1
50 TABLET, FILM COATED ORAL DAILY
Qty: 30 TABLET | Refills: 11 | Status: SHIPPED | OUTPATIENT
Start: 2021-06-14 | End: 2021-12-22

## 2021-06-14 RX ORDER — LEUPROLIDE ACETATE 45 MG
45 KIT INTRAMUSCULAR
Qty: 90 MG | Refills: 0 | Status: SHIPPED | OUTPATIENT
Start: 2021-06-14 | End: 2022-02-04 | Stop reason: SDUPTHER

## 2021-06-15 ENCOUNTER — TELEPHONE (OUTPATIENT)
Dept: UROLOGY | Facility: CLINIC | Age: 86
End: 2021-06-15

## 2021-06-17 ENCOUNTER — ANESTHESIA EVENT (OUTPATIENT)
Dept: SURGERY | Facility: HOSPITAL | Age: 86
End: 2021-06-17
Payer: MEDICARE

## 2021-06-18 ENCOUNTER — OFFICE VISIT (OUTPATIENT)
Dept: CARDIOLOGY | Facility: CLINIC | Age: 86
End: 2021-06-18
Payer: MEDICARE

## 2021-06-18 VITALS
SYSTOLIC BLOOD PRESSURE: 110 MMHG | HEIGHT: 70 IN | DIASTOLIC BLOOD PRESSURE: 58 MMHG | BODY MASS INDEX: 22.96 KG/M2 | OXYGEN SATURATION: 95 % | WEIGHT: 160.38 LBS | HEART RATE: 110 BPM

## 2021-06-18 DIAGNOSIS — N18.31 CHRONIC KIDNEY DISEASE, STAGE 3A: ICD-10-CM

## 2021-06-18 DIAGNOSIS — J43.9 COPD WITH CHRONIC BRONCHITIS AND EMPHYSEMA: ICD-10-CM

## 2021-06-18 DIAGNOSIS — D50.9 IRON DEFICIENCY ANEMIA, UNSPECIFIED IRON DEFICIENCY ANEMIA TYPE: ICD-10-CM

## 2021-06-18 DIAGNOSIS — J84.9 ILD (INTERSTITIAL LUNG DISEASE): ICD-10-CM

## 2021-06-18 DIAGNOSIS — R06.00 DYSPNEA, UNSPECIFIED TYPE: ICD-10-CM

## 2021-06-18 DIAGNOSIS — E11.42 TYPE 2 DIABETES MELLITUS WITH DIABETIC POLYNEUROPATHY, WITHOUT LONG-TERM CURRENT USE OF INSULIN: ICD-10-CM

## 2021-06-18 DIAGNOSIS — I10 ESSENTIAL HYPERTENSION: ICD-10-CM

## 2021-06-18 DIAGNOSIS — Z75.8 DISCHARGE PLANNING ISSUES: ICD-10-CM

## 2021-06-18 DIAGNOSIS — R26.81 GAIT INSTABILITY: ICD-10-CM

## 2021-06-18 DIAGNOSIS — I70.0 AORTIC ATHEROSCLEROSIS: ICD-10-CM

## 2021-06-18 DIAGNOSIS — F32.9 REACTIVE DEPRESSION: ICD-10-CM

## 2021-06-18 DIAGNOSIS — C61 MALIGNANT NEOPLASM OF PROSTATE: ICD-10-CM

## 2021-06-18 DIAGNOSIS — G47.31 COMPLEX SLEEP APNEA SYNDROME: ICD-10-CM

## 2021-06-18 DIAGNOSIS — I27.20 PULMONARY HYPERTENSION: Primary | ICD-10-CM

## 2021-06-18 DIAGNOSIS — R07.2 PRECORDIAL PAIN: ICD-10-CM

## 2021-06-18 DIAGNOSIS — R00.0 TACHYCARDIA WITH HEART RATE 121-140 BEATS PER MINUTE: ICD-10-CM

## 2021-06-18 DIAGNOSIS — J44.89 COPD WITH CHRONIC BRONCHITIS AND EMPHYSEMA: ICD-10-CM

## 2021-06-18 PROCEDURE — 99499 RISK ADDL DX/OHS AUDIT: ICD-10-PCS | Mod: S$GLB,,, | Performed by: INTERNAL MEDICINE

## 2021-06-18 PROCEDURE — 3288F PR FALLS RISK ASSESSMENT DOCUMENTED: ICD-10-PCS | Mod: CPTII,S$GLB,, | Performed by: INTERNAL MEDICINE

## 2021-06-18 PROCEDURE — 1126F PR PAIN SEVERITY QUANTIFIED, NO PAIN PRESENT: ICD-10-PCS | Mod: S$GLB,,, | Performed by: INTERNAL MEDICINE

## 2021-06-18 PROCEDURE — 1159F MED LIST DOCD IN RCRD: CPT | Mod: S$GLB,,, | Performed by: INTERNAL MEDICINE

## 2021-06-18 PROCEDURE — 99999 PR PBB SHADOW E&M-EST. PATIENT-LVL IV: ICD-10-PCS | Mod: PBBFAC,,, | Performed by: INTERNAL MEDICINE

## 2021-06-18 PROCEDURE — 99999 PR PBB SHADOW E&M-EST. PATIENT-LVL IV: CPT | Mod: PBBFAC,,, | Performed by: INTERNAL MEDICINE

## 2021-06-18 PROCEDURE — 99215 PR OFFICE/OUTPT VISIT, EST, LEVL V, 40-54 MIN: ICD-10-PCS | Mod: S$GLB,,, | Performed by: INTERNAL MEDICINE

## 2021-06-18 PROCEDURE — 3288F FALL RISK ASSESSMENT DOCD: CPT | Mod: CPTII,S$GLB,, | Performed by: INTERNAL MEDICINE

## 2021-06-18 PROCEDURE — 1101F PR PT FALLS ASSESS DOC 0-1 FALLS W/OUT INJ PAST YR: ICD-10-PCS | Mod: CPTII,S$GLB,, | Performed by: INTERNAL MEDICINE

## 2021-06-18 PROCEDURE — 1126F AMNT PAIN NOTED NONE PRSNT: CPT | Mod: S$GLB,,, | Performed by: INTERNAL MEDICINE

## 2021-06-18 PROCEDURE — 99215 OFFICE O/P EST HI 40 MIN: CPT | Mod: S$GLB,,, | Performed by: INTERNAL MEDICINE

## 2021-06-18 PROCEDURE — 99499 UNLISTED E&M SERVICE: CPT | Mod: S$GLB,,, | Performed by: INTERNAL MEDICINE

## 2021-06-18 PROCEDURE — 1101F PT FALLS ASSESS-DOCD LE1/YR: CPT | Mod: CPTII,S$GLB,, | Performed by: INTERNAL MEDICINE

## 2021-06-18 PROCEDURE — 1159F PR MEDICATION LIST DOCUMENTED IN MEDICAL RECORD: ICD-10-PCS | Mod: S$GLB,,, | Performed by: INTERNAL MEDICINE

## 2021-06-21 ENCOUNTER — HOSPITAL ENCOUNTER (OUTPATIENT)
Dept: PREADMISSION TESTING | Facility: HOSPITAL | Age: 86
Discharge: HOME | End: 2021-06-21
Attending: STUDENT IN AN ORGANIZED HEALTH CARE EDUCATION/TRAINING PROGRAM
Payer: MEDICARE

## 2021-06-21 ENCOUNTER — TELEPHONE (OUTPATIENT)
Dept: UROLOGY | Facility: CLINIC | Age: 86
End: 2021-06-21

## 2021-06-21 VITALS
DIASTOLIC BLOOD PRESSURE: 70 MMHG | HEART RATE: 121 BPM | BODY MASS INDEX: 22.98 KG/M2 | WEIGHT: 160.5 LBS | HEIGHT: 70 IN | RESPIRATION RATE: 18 BRPM | TEMPERATURE: 97 F | SYSTOLIC BLOOD PRESSURE: 116 MMHG | OXYGEN SATURATION: 96 %

## 2021-06-21 DIAGNOSIS — Z01.818 PREOP TESTING: Primary | ICD-10-CM

## 2021-06-21 LAB
ANION GAP SERPL CALC-SCNC: 10 MMOL/L (ref 8–16)
BASOPHILS # BLD AUTO: 0.03 K/UL (ref 0–0.2)
BASOPHILS NFR BLD: 0.7 % (ref 0–1.9)
BUN SERPL-MCNC: 21 MG/DL (ref 8–23)
CALCIUM SERPL-MCNC: 9.8 MG/DL (ref 8.7–10.5)
CHLORIDE SERPL-SCNC: 103 MMOL/L (ref 95–110)
CO2 SERPL-SCNC: 28 MMOL/L (ref 23–29)
CREAT SERPL-MCNC: 2 MG/DL (ref 0.5–1.4)
DIFFERENTIAL METHOD: ABNORMAL
EOSINOPHIL # BLD AUTO: 0.1 K/UL (ref 0–0.5)
EOSINOPHIL NFR BLD: 2.4 % (ref 0–8)
ERYTHROCYTE [DISTWIDTH] IN BLOOD BY AUTOMATED COUNT: 13.3 % (ref 11.5–14.5)
EST. GFR  (AFRICAN AMERICAN): 35 ML/MIN/1.73 M^2
EST. GFR  (NON AFRICAN AMERICAN): 30 ML/MIN/1.73 M^2
GLUCOSE SERPL-MCNC: 174 MG/DL (ref 70–110)
HCT VFR BLD AUTO: 41 % (ref 40–54)
HGB BLD-MCNC: 13.2 G/DL (ref 14–18)
IMM GRANULOCYTES # BLD AUTO: 0.01 K/UL (ref 0–0.04)
IMM GRANULOCYTES NFR BLD AUTO: 0.2 % (ref 0–0.5)
LYMPHOCYTES # BLD AUTO: 1.3 K/UL (ref 1–4.8)
LYMPHOCYTES NFR BLD: 27.8 % (ref 18–48)
MCH RBC QN AUTO: 28.5 PG (ref 27–31)
MCHC RBC AUTO-ENTMCNC: 32.2 G/DL (ref 32–36)
MCV RBC AUTO: 89 FL (ref 82–98)
MONOCYTES # BLD AUTO: 0.4 K/UL (ref 0.3–1)
MONOCYTES NFR BLD: 7.7 % (ref 4–15)
NEUTROPHILS # BLD AUTO: 2.8 K/UL (ref 1.8–7.7)
NEUTROPHILS NFR BLD: 61.2 % (ref 38–73)
NRBC BLD-RTO: 0 /100 WBC
PLATELET # BLD AUTO: 313 K/UL (ref 150–450)
PMV BLD AUTO: 9.3 FL (ref 9.2–12.9)
POTASSIUM SERPL-SCNC: 4 MMOL/L (ref 3.5–5.1)
RBC # BLD AUTO: 4.63 M/UL (ref 4.6–6.2)
SODIUM SERPL-SCNC: 141 MMOL/L (ref 136–145)
WBC # BLD AUTO: 4.54 K/UL (ref 3.9–12.7)

## 2021-06-21 PROCEDURE — 80048 BASIC METABOLIC PNL TOTAL CA: CPT | Performed by: STUDENT IN AN ORGANIZED HEALTH CARE EDUCATION/TRAINING PROGRAM

## 2021-06-21 PROCEDURE — 85025 COMPLETE CBC W/AUTO DIFF WBC: CPT | Performed by: STUDENT IN AN ORGANIZED HEALTH CARE EDUCATION/TRAINING PROGRAM

## 2021-06-22 ENCOUNTER — TELEPHONE (OUTPATIENT)
Dept: UROLOGY | Facility: CLINIC | Age: 86
End: 2021-06-22

## 2021-06-22 ENCOUNTER — ANESTHESIA (OUTPATIENT)
Dept: SURGERY | Facility: HOSPITAL | Age: 86
End: 2021-06-22
Payer: MEDICARE

## 2021-06-22 ENCOUNTER — HOSPITAL ENCOUNTER (OUTPATIENT)
Facility: HOSPITAL | Age: 86
Discharge: HOME | End: 2021-06-22
Attending: STUDENT IN AN ORGANIZED HEALTH CARE EDUCATION/TRAINING PROGRAM | Admitting: STUDENT IN AN ORGANIZED HEALTH CARE EDUCATION/TRAINING PROGRAM
Payer: MEDICARE

## 2021-06-22 VITALS
HEART RATE: 97 BPM | WEIGHT: 160.5 LBS | RESPIRATION RATE: 18 BRPM | TEMPERATURE: 98 F | DIASTOLIC BLOOD PRESSURE: 70 MMHG | BODY MASS INDEX: 23.03 KG/M2 | OXYGEN SATURATION: 86 % | SYSTOLIC BLOOD PRESSURE: 124 MMHG

## 2021-06-22 DIAGNOSIS — C61 MALIGNANT NEOPLASM OF PROSTATE: ICD-10-CM

## 2021-06-22 DIAGNOSIS — C61 MALIGNANT NEOPLASM OF PROSTATE: Primary | ICD-10-CM

## 2021-06-22 DIAGNOSIS — E11.42 TYPE 2 DIABETES MELLITUS WITH DIABETIC POLYNEUROPATHY, WITHOUT LONG-TERM CURRENT USE OF INSULIN: ICD-10-CM

## 2021-06-22 DIAGNOSIS — R33.9 URINARY RETENTION: Primary | ICD-10-CM

## 2021-06-22 LAB — POCT GLUCOSE: 157 MG/DL (ref 70–110)

## 2021-06-22 PROCEDURE — 71000016 HC POSTOP RECOV ADDL HR: Performed by: STUDENT IN AN ORGANIZED HEALTH CARE EDUCATION/TRAINING PROGRAM

## 2021-06-22 PROCEDURE — 63600175 PHARM REV CODE 636 W HCPCS: Performed by: REGISTERED NURSE

## 2021-06-22 PROCEDURE — 25000003 PHARM REV CODE 250: Performed by: REGISTERED NURSE

## 2021-06-22 PROCEDURE — 37000008 HC ANESTHESIA 1ST 15 MINUTES: Performed by: STUDENT IN AN ORGANIZED HEALTH CARE EDUCATION/TRAINING PROGRAM

## 2021-06-22 PROCEDURE — D9220A PRA ANESTHESIA: Mod: CRNA,,, | Performed by: REGISTERED NURSE

## 2021-06-22 PROCEDURE — 25000003 PHARM REV CODE 250: Performed by: STUDENT IN AN ORGANIZED HEALTH CARE EDUCATION/TRAINING PROGRAM

## 2021-06-22 PROCEDURE — 71000039 HC RECOVERY, EACH ADD'L HOUR: Performed by: STUDENT IN AN ORGANIZED HEALTH CARE EDUCATION/TRAINING PROGRAM

## 2021-06-22 PROCEDURE — 63600175 PHARM REV CODE 636 W HCPCS: Performed by: STUDENT IN AN ORGANIZED HEALTH CARE EDUCATION/TRAINING PROGRAM

## 2021-06-22 PROCEDURE — 82962 GLUCOSE BLOOD TEST: CPT | Performed by: STUDENT IN AN ORGANIZED HEALTH CARE EDUCATION/TRAINING PROGRAM

## 2021-06-22 PROCEDURE — 37000009 HC ANESTHESIA EA ADD 15 MINS: Performed by: STUDENT IN AN ORGANIZED HEALTH CARE EDUCATION/TRAINING PROGRAM

## 2021-06-22 PROCEDURE — 88305 TISSUE EXAM BY PATHOLOGIST: ICD-10-PCS | Mod: 26,,, | Performed by: PATHOLOGY

## 2021-06-22 PROCEDURE — D9220A PRA ANESTHESIA: Mod: ANES,,, | Performed by: ANESTHESIOLOGY

## 2021-06-22 PROCEDURE — 52601 PROSTATECTOMY (TURP): CPT | Mod: ,,, | Performed by: STUDENT IN AN ORGANIZED HEALTH CARE EDUCATION/TRAINING PROGRAM

## 2021-06-22 PROCEDURE — 88305 TISSUE EXAM BY PATHOLOGIST: CPT | Mod: 26,,, | Performed by: PATHOLOGY

## 2021-06-22 PROCEDURE — 88305 TISSUE EXAM BY PATHOLOGIST: CPT | Performed by: PATHOLOGY

## 2021-06-22 PROCEDURE — 36000706: Performed by: STUDENT IN AN ORGANIZED HEALTH CARE EDUCATION/TRAINING PROGRAM

## 2021-06-22 PROCEDURE — 71000015 HC POSTOP RECOV 1ST HR: Performed by: STUDENT IN AN ORGANIZED HEALTH CARE EDUCATION/TRAINING PROGRAM

## 2021-06-22 PROCEDURE — 27201423 OPTIME MED/SURG SUP & DEVICES STERILE SUPPLY: Performed by: STUDENT IN AN ORGANIZED HEALTH CARE EDUCATION/TRAINING PROGRAM

## 2021-06-22 PROCEDURE — D9220A PRA ANESTHESIA: ICD-10-PCS | Mod: ANES,,, | Performed by: ANESTHESIOLOGY

## 2021-06-22 PROCEDURE — 63600175 PHARM REV CODE 636 W HCPCS: Performed by: ANESTHESIOLOGY

## 2021-06-22 PROCEDURE — D9220A PRA ANESTHESIA: ICD-10-PCS | Mod: CRNA,,, | Performed by: REGISTERED NURSE

## 2021-06-22 PROCEDURE — 71000033 HC RECOVERY, INTIAL HOUR: Performed by: STUDENT IN AN ORGANIZED HEALTH CARE EDUCATION/TRAINING PROGRAM

## 2021-06-22 PROCEDURE — 36000707: Performed by: STUDENT IN AN ORGANIZED HEALTH CARE EDUCATION/TRAINING PROGRAM

## 2021-06-22 PROCEDURE — 52601 PR TRANSURETHRAL ELEC-SURG PROSTATECTOM: ICD-10-PCS | Mod: ,,, | Performed by: STUDENT IN AN ORGANIZED HEALTH CARE EDUCATION/TRAINING PROGRAM

## 2021-06-22 RX ORDER — LIDOCAINE HYDROCHLORIDE 20 MG/ML
JELLY TOPICAL
Status: DISCONTINUED | OUTPATIENT
Start: 2021-06-22 | End: 2021-06-22 | Stop reason: HOSPADM

## 2021-06-22 RX ORDER — FENTANYL CITRATE 50 UG/ML
INJECTION, SOLUTION INTRAMUSCULAR; INTRAVENOUS
Status: DISCONTINUED | OUTPATIENT
Start: 2021-06-22 | End: 2021-06-22

## 2021-06-22 RX ORDER — PROPOFOL 10 MG/ML
VIAL (ML) INTRAVENOUS
Status: DISCONTINUED | OUTPATIENT
Start: 2021-06-22 | End: 2021-06-22

## 2021-06-22 RX ORDER — SODIUM CHLORIDE 0.9 % (FLUSH) 0.9 %
10 SYRINGE (ML) INJECTION
Status: DISCONTINUED | OUTPATIENT
Start: 2021-06-22 | End: 2021-06-22 | Stop reason: HOSPADM

## 2021-06-22 RX ORDER — ONDANSETRON 2 MG/ML
4 INJECTION INTRAMUSCULAR; INTRAVENOUS DAILY PRN
Status: DISCONTINUED | OUTPATIENT
Start: 2021-06-22 | End: 2021-06-22 | Stop reason: HOSPADM

## 2021-06-22 RX ORDER — PHENYLEPHRINE HYDROCHLORIDE 10 MG/ML
INJECTION INTRAVENOUS
Status: DISCONTINUED | OUTPATIENT
Start: 2021-06-22 | End: 2021-06-22

## 2021-06-22 RX ORDER — SUCCINYLCHOLINE CHLORIDE 20 MG/ML
INJECTION INTRAMUSCULAR; INTRAVENOUS
Status: DISCONTINUED | OUTPATIENT
Start: 2021-06-22 | End: 2021-06-22

## 2021-06-22 RX ORDER — LIDOCAINE HYDROCHLORIDE 20 MG/ML
INJECTION INTRAVENOUS
Status: DISCONTINUED | OUTPATIENT
Start: 2021-06-22 | End: 2021-06-22

## 2021-06-22 RX ORDER — ROCURONIUM BROMIDE 10 MG/ML
INJECTION, SOLUTION INTRAVENOUS
Status: DISCONTINUED | OUTPATIENT
Start: 2021-06-22 | End: 2021-06-22

## 2021-06-22 RX ORDER — ONDANSETRON 2 MG/ML
INJECTION INTRAMUSCULAR; INTRAVENOUS
Status: DISCONTINUED | OUTPATIENT
Start: 2021-06-22 | End: 2021-06-22

## 2021-06-22 RX ORDER — ONDANSETRON 4 MG/1
8 TABLET, ORALLY DISINTEGRATING ORAL EVERY 8 HOURS PRN
Status: DISCONTINUED | OUTPATIENT
Start: 2021-06-22 | End: 2021-06-22 | Stop reason: HOSPADM

## 2021-06-22 RX ORDER — HYDROMORPHONE HYDROCHLORIDE 2 MG/ML
0.2 INJECTION, SOLUTION INTRAMUSCULAR; INTRAVENOUS; SUBCUTANEOUS EVERY 5 MIN PRN
Status: DISCONTINUED | OUTPATIENT
Start: 2021-06-22 | End: 2021-06-22 | Stop reason: HOSPADM

## 2021-06-22 RX ORDER — PHENAZOPYRIDINE HYDROCHLORIDE 100 MG/1
100 TABLET, FILM COATED ORAL ONCE
Status: COMPLETED | OUTPATIENT
Start: 2021-06-22 | End: 2021-06-22

## 2021-06-22 RX ORDER — DEXAMETHASONE SODIUM PHOSPHATE 4 MG/ML
INJECTION, SOLUTION INTRA-ARTICULAR; INTRALESIONAL; INTRAMUSCULAR; INTRAVENOUS; SOFT TISSUE
Status: DISCONTINUED | OUTPATIENT
Start: 2021-06-22 | End: 2021-06-22

## 2021-06-22 RX ORDER — CEFDINIR 300 MG/1
300 CAPSULE ORAL DAILY
Qty: 5 CAPSULE | Refills: 0 | Status: SHIPPED | OUTPATIENT
Start: 2021-06-22 | End: 2021-06-27

## 2021-06-22 RX ORDER — ATROPA BELLADONNA AND OPIUM 16.2; 3 MG/1; MG/1
30 SUPPOSITORY RECTAL
Status: COMPLETED | OUTPATIENT
Start: 2021-06-22 | End: 2021-06-22

## 2021-06-22 RX ADMIN — HYDROMORPHONE HYDROCHLORIDE 0.2 MG: 2 INJECTION INTRAMUSCULAR; INTRAVENOUS; SUBCUTANEOUS at 09:06

## 2021-06-22 RX ADMIN — SUGAMMADEX 150 MG: 100 INJECTION, SOLUTION INTRAVENOUS at 09:06

## 2021-06-22 RX ADMIN — FENTANYL CITRATE 50 MCG: 50 INJECTION, SOLUTION INTRAMUSCULAR; INTRAVENOUS at 07:06

## 2021-06-22 RX ADMIN — ATROPA BELLADONNA AND OPIUM 30 MG: 16.2; 3 SUPPOSITORY RECTAL at 09:06

## 2021-06-22 RX ADMIN — Medication 100 MG: at 07:06

## 2021-06-22 RX ADMIN — ROCURONIUM BROMIDE 20 MG: 10 INJECTION, SOLUTION INTRAVENOUS at 08:06

## 2021-06-22 RX ADMIN — PHENYLEPHRINE HYDROCHLORIDE 200 MCG: 10 INJECTION INTRAVENOUS at 08:06

## 2021-06-22 RX ADMIN — SUCCINYLCHOLINE CHLORIDE 80 MG: 20 INJECTION, SOLUTION INTRAMUSCULAR; INTRAVENOUS at 07:06

## 2021-06-22 RX ADMIN — CEFTRIAXONE 2 G: 1 INJECTION, SOLUTION INTRAVENOUS at 07:06

## 2021-06-22 RX ADMIN — PHENYLEPHRINE HYDROCHLORIDE 100 MCG: 10 INJECTION INTRAVENOUS at 08:06

## 2021-06-22 RX ADMIN — PROPOFOL 100 MG: 10 INJECTION, EMULSION INTRAVENOUS at 07:06

## 2021-06-22 RX ADMIN — ONDANSETRON 4 MG: 2 INJECTION, SOLUTION INTRAMUSCULAR; INTRAVENOUS at 08:06

## 2021-06-22 RX ADMIN — DEXAMETHASONE SODIUM PHOSPHATE 4 MG: 4 INJECTION, SOLUTION INTRAMUSCULAR; INTRAVENOUS at 07:06

## 2021-06-22 RX ADMIN — PHENAZOPYRIDINE HYDROCHLORIDE 100 MG: 100 TABLET ORAL at 10:06

## 2021-06-25 ENCOUNTER — OFFICE VISIT (OUTPATIENT)
Dept: UROLOGY | Facility: CLINIC | Age: 86
End: 2021-06-25
Payer: MEDICARE

## 2021-06-25 ENCOUNTER — TELEPHONE (OUTPATIENT)
Dept: UROLOGY | Facility: CLINIC | Age: 86
End: 2021-06-25

## 2021-06-25 VITALS — BODY MASS INDEX: 22.73 KG/M2 | WEIGHT: 158.75 LBS | HEIGHT: 70 IN

## 2021-06-25 DIAGNOSIS — C61 MALIGNANT NEOPLASM OF PROSTATE: Primary | ICD-10-CM

## 2021-06-25 LAB
FINAL PATHOLOGIC DIAGNOSIS: NORMAL
GROSS: NORMAL
Lab: NORMAL

## 2021-06-25 PROCEDURE — 3288F FALL RISK ASSESSMENT DOCD: CPT | Mod: CPTII,S$GLB,, | Performed by: STUDENT IN AN ORGANIZED HEALTH CARE EDUCATION/TRAINING PROGRAM

## 2021-06-25 PROCEDURE — 99499 RISK ADDL DX/OHS AUDIT: ICD-10-PCS | Mod: S$GLB,,, | Performed by: STUDENT IN AN ORGANIZED HEALTH CARE EDUCATION/TRAINING PROGRAM

## 2021-06-25 PROCEDURE — 99999 PR PBB SHADOW E&M-EST. PATIENT-LVL IV: CPT | Mod: PBBFAC,,, | Performed by: STUDENT IN AN ORGANIZED HEALTH CARE EDUCATION/TRAINING PROGRAM

## 2021-06-25 PROCEDURE — 3288F PR FALLS RISK ASSESSMENT DOCUMENTED: ICD-10-PCS | Mod: CPTII,S$GLB,, | Performed by: STUDENT IN AN ORGANIZED HEALTH CARE EDUCATION/TRAINING PROGRAM

## 2021-06-25 PROCEDURE — 1101F PT FALLS ASSESS-DOCD LE1/YR: CPT | Mod: CPTII,S$GLB,, | Performed by: STUDENT IN AN ORGANIZED HEALTH CARE EDUCATION/TRAINING PROGRAM

## 2021-06-25 PROCEDURE — 99024 PR POST-OP FOLLOW-UP VISIT: ICD-10-PCS | Mod: S$GLB,,, | Performed by: STUDENT IN AN ORGANIZED HEALTH CARE EDUCATION/TRAINING PROGRAM

## 2021-06-25 PROCEDURE — 1101F PR PT FALLS ASSESS DOC 0-1 FALLS W/OUT INJ PAST YR: ICD-10-PCS | Mod: CPTII,S$GLB,, | Performed by: STUDENT IN AN ORGANIZED HEALTH CARE EDUCATION/TRAINING PROGRAM

## 2021-06-25 PROCEDURE — 1126F PR PAIN SEVERITY QUANTIFIED, NO PAIN PRESENT: ICD-10-PCS | Mod: S$GLB,,, | Performed by: STUDENT IN AN ORGANIZED HEALTH CARE EDUCATION/TRAINING PROGRAM

## 2021-06-25 PROCEDURE — 99499 UNLISTED E&M SERVICE: CPT | Mod: S$GLB,,, | Performed by: STUDENT IN AN ORGANIZED HEALTH CARE EDUCATION/TRAINING PROGRAM

## 2021-06-25 PROCEDURE — 99024 POSTOP FOLLOW-UP VISIT: CPT | Mod: S$GLB,,, | Performed by: STUDENT IN AN ORGANIZED HEALTH CARE EDUCATION/TRAINING PROGRAM

## 2021-06-25 PROCEDURE — 1126F AMNT PAIN NOTED NONE PRSNT: CPT | Mod: S$GLB,,, | Performed by: STUDENT IN AN ORGANIZED HEALTH CARE EDUCATION/TRAINING PROGRAM

## 2021-06-25 PROCEDURE — 99999 PR PBB SHADOW E&M-EST. PATIENT-LVL IV: ICD-10-PCS | Mod: PBBFAC,,, | Performed by: STUDENT IN AN ORGANIZED HEALTH CARE EDUCATION/TRAINING PROGRAM

## 2021-07-01 ENCOUNTER — TELEPHONE (OUTPATIENT)
Dept: UROLOGY | Facility: CLINIC | Age: 86
End: 2021-07-01

## 2021-07-02 ENCOUNTER — TELEPHONE (OUTPATIENT)
Dept: UROLOGY | Facility: CLINIC | Age: 86
End: 2021-07-02

## 2021-07-06 ENCOUNTER — OFFICE VISIT (OUTPATIENT)
Dept: RADIATION ONCOLOGY | Facility: CLINIC | Age: 86
End: 2021-07-06
Payer: MEDICARE

## 2021-07-06 VITALS
WEIGHT: 160.5 LBS | BODY MASS INDEX: 22.47 KG/M2 | RESPIRATION RATE: 17 BRPM | HEART RATE: 97 BPM | HEIGHT: 71 IN | DIASTOLIC BLOOD PRESSURE: 69 MMHG | SYSTOLIC BLOOD PRESSURE: 123 MMHG

## 2021-07-06 DIAGNOSIS — C61 MALIGNANT NEOPLASM OF PROSTATE: ICD-10-CM

## 2021-07-06 PROCEDURE — 99999 PR PBB SHADOW E&M-EST. PATIENT-LVL IV: ICD-10-PCS | Mod: PBBFAC,,, | Performed by: RADIOLOGY

## 2021-07-06 PROCEDURE — 99204 OFFICE O/P NEW MOD 45 MIN: CPT | Mod: S$GLB,,, | Performed by: RADIOLOGY

## 2021-07-06 PROCEDURE — 1101F PT FALLS ASSESS-DOCD LE1/YR: CPT | Mod: CPTII,S$GLB,, | Performed by: RADIOLOGY

## 2021-07-06 PROCEDURE — 99499 RISK ADDL DX/OHS AUDIT: ICD-10-PCS | Mod: S$GLB,,, | Performed by: RADIOLOGY

## 2021-07-06 PROCEDURE — 99499 UNLISTED E&M SERVICE: CPT | Mod: S$GLB,,, | Performed by: RADIOLOGY

## 2021-07-06 PROCEDURE — 99999 PR PBB SHADOW E&M-EST. PATIENT-LVL IV: CPT | Mod: PBBFAC,,, | Performed by: RADIOLOGY

## 2021-07-06 PROCEDURE — 3288F FALL RISK ASSESSMENT DOCD: CPT | Mod: CPTII,S$GLB,, | Performed by: RADIOLOGY

## 2021-07-06 PROCEDURE — 3288F PR FALLS RISK ASSESSMENT DOCUMENTED: ICD-10-PCS | Mod: CPTII,S$GLB,, | Performed by: RADIOLOGY

## 2021-07-06 PROCEDURE — 1101F PR PT FALLS ASSESS DOC 0-1 FALLS W/OUT INJ PAST YR: ICD-10-PCS | Mod: CPTII,S$GLB,, | Performed by: RADIOLOGY

## 2021-07-06 PROCEDURE — 99204 PR OFFICE/OUTPT VISIT, NEW, LEVL IV, 45-59 MIN: ICD-10-PCS | Mod: S$GLB,,, | Performed by: RADIOLOGY

## 2021-07-06 PROCEDURE — 1159F PR MEDICATION LIST DOCUMENTED IN MEDICAL RECORD: ICD-10-PCS | Mod: S$GLB,,, | Performed by: RADIOLOGY

## 2021-07-06 PROCEDURE — 1159F MED LIST DOCD IN RCRD: CPT | Mod: S$GLB,,, | Performed by: RADIOLOGY

## 2021-07-08 ENCOUNTER — TELEPHONE (OUTPATIENT)
Dept: RADIATION ONCOLOGY | Facility: CLINIC | Age: 86
End: 2021-07-08

## 2021-07-09 ENCOUNTER — CLINICAL SUPPORT (OUTPATIENT)
Dept: UROLOGY | Facility: CLINIC | Age: 86
End: 2021-07-09
Payer: MEDICARE

## 2021-07-09 DIAGNOSIS — C61 MALIGNANT NEOPLASM OF PROSTATE: Primary | ICD-10-CM

## 2021-07-09 PROCEDURE — 96402 PR CHEMOTHER HORMON ANTINEOPL SUB-Q/IM: ICD-10-PCS | Mod: S$GLB,,, | Performed by: STUDENT IN AN ORGANIZED HEALTH CARE EDUCATION/TRAINING PROGRAM

## 2021-07-09 PROCEDURE — 99999 PR PBB SHADOW E&M-EST. PATIENT-LVL II: CPT | Mod: PBBFAC,,,

## 2021-07-09 PROCEDURE — 99999 PR PBB SHADOW E&M-EST. PATIENT-LVL II: ICD-10-PCS | Mod: PBBFAC,,,

## 2021-07-09 PROCEDURE — 96402 CHEMO HORMON ANTINEOPL SQ/IM: CPT | Mod: S$GLB,,, | Performed by: STUDENT IN AN ORGANIZED HEALTH CARE EDUCATION/TRAINING PROGRAM

## 2021-07-12 ENCOUNTER — TELEPHONE (OUTPATIENT)
Dept: PODIATRY | Facility: CLINIC | Age: 86
End: 2021-07-12

## 2021-07-12 ENCOUNTER — TELEPHONE (OUTPATIENT)
Dept: UROLOGY | Facility: CLINIC | Age: 86
End: 2021-07-12

## 2021-07-19 ENCOUNTER — HOSPITAL ENCOUNTER (OUTPATIENT)
Dept: RADIOLOGY | Facility: HOSPITAL | Age: 86
Discharge: HOME | End: 2021-07-19
Attending: RADIOLOGY
Payer: MEDICARE

## 2021-07-19 DIAGNOSIS — C61 MALIGNANT NEOPLASM OF PROSTATE: ICD-10-CM

## 2021-07-19 LAB
CREAT SERPL-MCNC: 1.9 MG/DL (ref 0.5–1.4)
SAMPLE: ABNORMAL

## 2021-07-19 PROCEDURE — 72197 MRI PROSTATE W W/O CONTRAST: ICD-10-PCS | Mod: 26,,, | Performed by: RADIOLOGY

## 2021-07-19 PROCEDURE — A9585 GADOBUTROL INJECTION: HCPCS | Performed by: RADIOLOGY

## 2021-07-19 PROCEDURE — 72197 MRI PELVIS W/O & W/DYE: CPT | Mod: 26,,, | Performed by: RADIOLOGY

## 2021-07-19 PROCEDURE — 25500020 PHARM REV CODE 255: Performed by: RADIOLOGY

## 2021-07-19 PROCEDURE — 72197 MRI PELVIS W/O & W/DYE: CPT | Mod: TC

## 2021-07-19 RX ORDER — GADOBUTROL 604.72 MG/ML
10 INJECTION INTRAVENOUS
Status: COMPLETED | OUTPATIENT
Start: 2021-07-19 | End: 2021-07-19

## 2021-07-19 RX ADMIN — GADOBUTROL 10 ML: 604.72 INJECTION INTRAVENOUS at 07:07

## 2021-08-02 ENCOUNTER — APPOINTMENT (OUTPATIENT)
Dept: RADIATION THERAPY | Facility: OTHER | Age: 86
End: 2021-08-02
Attending: RADIOLOGY
Payer: MEDICARE

## 2021-08-03 ENCOUNTER — TELEPHONE (OUTPATIENT)
Dept: RADIATION ONCOLOGY | Facility: CLINIC | Age: 86
End: 2021-08-03

## 2021-08-05 ENCOUNTER — DOCUMENTATION ONLY (OUTPATIENT)
Dept: HEMATOLOGY/ONCOLOGY | Facility: CLINIC | Age: 86
End: 2021-08-05

## 2021-08-05 ENCOUNTER — HOSPITAL ENCOUNTER (OUTPATIENT)
Dept: RADIATION THERAPY | Facility: HOSPITAL | Age: 86
Discharge: HOME | End: 2021-08-05
Attending: RADIOLOGY
Payer: MEDICARE

## 2021-08-05 PROCEDURE — 77263 THER RADIOLOGY TX PLNG CPLX: CPT | Mod: ,,, | Performed by: RADIOLOGY

## 2021-08-05 PROCEDURE — 77014 HC CT GUIDANCE RADIATION THERAPY FLDS PLACEMENT: CPT | Mod: TC | Performed by: RADIOLOGY

## 2021-08-05 PROCEDURE — 77334 RADIATION TREATMENT AID(S): CPT | Mod: 26,,, | Performed by: RADIOLOGY

## 2021-08-05 PROCEDURE — 77334 PR  RADN TREATMENT AID(S) COMPLX: ICD-10-PCS | Mod: 26,,, | Performed by: RADIOLOGY

## 2021-08-05 PROCEDURE — 77263 PR  RADIATION THERAPY PLAN COMPLEX: ICD-10-PCS | Mod: ,,, | Performed by: RADIOLOGY

## 2021-08-05 PROCEDURE — 77334 RADIATION TREATMENT AID(S): CPT | Mod: TC | Performed by: RADIOLOGY

## 2021-08-05 PROCEDURE — 77290 THER RAD SIMULAJ FIELD CPLX: CPT | Mod: TC | Performed by: RADIOLOGY

## 2021-08-11 PROCEDURE — 77301 RADIOTHERAPY DOSE PLAN IMRT: CPT | Mod: 26,,, | Performed by: RADIOLOGY

## 2021-08-11 PROCEDURE — 77301 PR  INTEN MOD RADIOTHER PLAN W/DOSE VOL HIST: ICD-10-PCS | Mod: 26,,, | Performed by: RADIOLOGY

## 2021-08-11 PROCEDURE — 77301 RADIOTHERAPY DOSE PLAN IMRT: CPT | Mod: TC | Performed by: RADIOLOGY

## 2021-08-12 PROCEDURE — 77300 PR RADIATION THERAPY,DOSIMETRY PLAN: ICD-10-PCS | Mod: 26,,, | Performed by: RADIOLOGY

## 2021-08-12 PROCEDURE — 77338 DESIGN MLC DEVICE FOR IMRT: CPT | Mod: 26,,, | Performed by: RADIOLOGY

## 2021-08-12 PROCEDURE — 77014 PR  CT GUIDANCE PLACEMENT RAD THERAPY FIELDS: CPT | Mod: 26,,, | Performed by: RADIOLOGY

## 2021-08-12 PROCEDURE — 77338 PR  MLC IMRT DESIGN & CONSTRUCTION PER IMRT PLAN: ICD-10-PCS | Mod: 26,,, | Performed by: RADIOLOGY

## 2021-08-12 PROCEDURE — 77385 HC IMRT, SIMPLE: CPT | Performed by: RADIOLOGY

## 2021-08-12 PROCEDURE — 77338 DESIGN MLC DEVICE FOR IMRT: CPT | Mod: TC | Performed by: RADIOLOGY

## 2021-08-12 PROCEDURE — 77300 RADIATION THERAPY DOSE PLAN: CPT | Mod: 26,,, | Performed by: RADIOLOGY

## 2021-08-12 PROCEDURE — 77300 RADIATION THERAPY DOSE PLAN: CPT | Mod: TC | Performed by: RADIOLOGY

## 2021-08-12 PROCEDURE — 77014 HC CT GUIDANCE RADIATION THERAPY FLDS PLACEMENT: CPT | Mod: TC | Performed by: RADIOLOGY

## 2021-08-12 PROCEDURE — 77014 PR  CT GUIDANCE PLACEMENT RAD THERAPY FIELDS: ICD-10-PCS | Mod: 26,,, | Performed by: RADIOLOGY

## 2021-08-13 DIAGNOSIS — I10 ESSENTIAL HYPERTENSION: ICD-10-CM

## 2021-08-13 PROCEDURE — 77385 HC IMRT, SIMPLE: CPT | Performed by: RADIOLOGY

## 2021-08-13 PROCEDURE — 77014 PR  CT GUIDANCE PLACEMENT RAD THERAPY FIELDS: CPT | Mod: 26,,, | Performed by: RADIOLOGY

## 2021-08-13 PROCEDURE — 77014 HC CT GUIDANCE RADIATION THERAPY FLDS PLACEMENT: CPT | Mod: TC | Performed by: RADIOLOGY

## 2021-08-13 PROCEDURE — 77014 PR  CT GUIDANCE PLACEMENT RAD THERAPY FIELDS: ICD-10-PCS | Mod: 26,,, | Performed by: RADIOLOGY

## 2021-08-13 RX ORDER — OLMESARTAN MEDOXOMIL AND HYDROCHLOROTHIAZIDE 40/25 40; 25 MG/1; MG/1
1 TABLET ORAL DAILY
Qty: 90 TABLET | Refills: 0 | Status: SHIPPED | OUTPATIENT
Start: 2021-08-13 | End: 2021-12-22 | Stop reason: SDUPTHER

## 2021-08-17 PROCEDURE — 77014 HC CT GUIDANCE RADIATION THERAPY FLDS PLACEMENT: CPT | Mod: TC | Performed by: RADIOLOGY

## 2021-08-17 PROCEDURE — 77385 HC IMRT, SIMPLE: CPT | Performed by: RADIOLOGY

## 2021-08-17 PROCEDURE — 77014 PR  CT GUIDANCE PLACEMENT RAD THERAPY FIELDS: CPT | Mod: 26,,, | Performed by: RADIOLOGY

## 2021-08-17 PROCEDURE — 77014 PR  CT GUIDANCE PLACEMENT RAD THERAPY FIELDS: ICD-10-PCS | Mod: 26,,, | Performed by: RADIOLOGY

## 2021-08-18 PROCEDURE — 77014 HC CT GUIDANCE RADIATION THERAPY FLDS PLACEMENT: CPT | Mod: TC | Performed by: RADIOLOGY

## 2021-08-18 PROCEDURE — 77385 HC IMRT, SIMPLE: CPT | Performed by: RADIOLOGY

## 2021-08-18 PROCEDURE — 77014 PR  CT GUIDANCE PLACEMENT RAD THERAPY FIELDS: CPT | Mod: 26,,, | Performed by: RADIOLOGY

## 2021-08-18 PROCEDURE — 77014 PR  CT GUIDANCE PLACEMENT RAD THERAPY FIELDS: ICD-10-PCS | Mod: 26,,, | Performed by: RADIOLOGY

## 2021-08-19 PROCEDURE — 77385 HC IMRT, SIMPLE: CPT | Performed by: RADIOLOGY

## 2021-08-19 PROCEDURE — 77014 HC CT GUIDANCE RADIATION THERAPY FLDS PLACEMENT: CPT | Mod: TC | Performed by: RADIOLOGY

## 2021-08-19 PROCEDURE — 77014 PR  CT GUIDANCE PLACEMENT RAD THERAPY FIELDS: ICD-10-PCS | Mod: 26,,, | Performed by: RADIOLOGY

## 2021-08-19 PROCEDURE — 77014 PR  CT GUIDANCE PLACEMENT RAD THERAPY FIELDS: CPT | Mod: 26,,, | Performed by: RADIOLOGY

## 2021-08-20 PROCEDURE — 77385 HC IMRT, SIMPLE: CPT | Performed by: RADIOLOGY

## 2021-08-20 PROCEDURE — 77336 RADIATION PHYSICS CONSULT: CPT | Performed by: RADIOLOGY

## 2021-08-20 PROCEDURE — 77014 PR  CT GUIDANCE PLACEMENT RAD THERAPY FIELDS: CPT | Mod: 26,,, | Performed by: RADIOLOGY

## 2021-08-20 PROCEDURE — 77014 HC CT GUIDANCE RADIATION THERAPY FLDS PLACEMENT: CPT | Mod: TC | Performed by: RADIOLOGY

## 2021-08-20 PROCEDURE — 77014 PR  CT GUIDANCE PLACEMENT RAD THERAPY FIELDS: ICD-10-PCS | Mod: 26,,, | Performed by: RADIOLOGY

## 2021-08-23 PROCEDURE — 77014 PR  CT GUIDANCE PLACEMENT RAD THERAPY FIELDS: ICD-10-PCS | Mod: 26,,, | Performed by: RADIOLOGY

## 2021-08-23 PROCEDURE — 77014 PR  CT GUIDANCE PLACEMENT RAD THERAPY FIELDS: CPT | Mod: 26,,, | Performed by: RADIOLOGY

## 2021-08-23 PROCEDURE — 77385 HC IMRT, SIMPLE: CPT | Performed by: RADIOLOGY

## 2021-08-23 PROCEDURE — 77014 HC CT GUIDANCE RADIATION THERAPY FLDS PLACEMENT: CPT | Mod: TC | Performed by: RADIOLOGY

## 2021-08-24 PROCEDURE — 77385 HC IMRT, SIMPLE: CPT | Performed by: RADIOLOGY

## 2021-08-24 PROCEDURE — 77014 HC CT GUIDANCE RADIATION THERAPY FLDS PLACEMENT: CPT | Mod: TC | Performed by: RADIOLOGY

## 2021-08-24 PROCEDURE — 77014 PR  CT GUIDANCE PLACEMENT RAD THERAPY FIELDS: CPT | Mod: 26,,, | Performed by: RADIOLOGY

## 2021-08-24 PROCEDURE — 77014 PR  CT GUIDANCE PLACEMENT RAD THERAPY FIELDS: ICD-10-PCS | Mod: 26,,, | Performed by: RADIOLOGY

## 2021-08-25 PROCEDURE — 77014 HC CT GUIDANCE RADIATION THERAPY FLDS PLACEMENT: CPT | Mod: TC | Performed by: RADIOLOGY

## 2021-08-25 PROCEDURE — 77014 PR  CT GUIDANCE PLACEMENT RAD THERAPY FIELDS: CPT | Mod: 26,,, | Performed by: RADIOLOGY

## 2021-08-25 PROCEDURE — 77385 HC IMRT, SIMPLE: CPT | Performed by: RADIOLOGY

## 2021-08-25 PROCEDURE — 77014 PR  CT GUIDANCE PLACEMENT RAD THERAPY FIELDS: ICD-10-PCS | Mod: 26,,, | Performed by: RADIOLOGY

## 2021-08-26 PROCEDURE — 77014 PR  CT GUIDANCE PLACEMENT RAD THERAPY FIELDS: ICD-10-PCS | Mod: 26,,, | Performed by: RADIOLOGY

## 2021-08-26 PROCEDURE — 77014 HC CT GUIDANCE RADIATION THERAPY FLDS PLACEMENT: CPT | Mod: TC | Performed by: RADIOLOGY

## 2021-08-26 PROCEDURE — 77385 HC IMRT, SIMPLE: CPT | Performed by: RADIOLOGY

## 2021-08-26 PROCEDURE — 77014 PR  CT GUIDANCE PLACEMENT RAD THERAPY FIELDS: CPT | Mod: 26,,, | Performed by: RADIOLOGY

## 2021-08-27 ENCOUNTER — TELEPHONE (OUTPATIENT)
Dept: PODIATRY | Facility: CLINIC | Age: 86
End: 2021-08-27

## 2021-08-27 PROCEDURE — 77014 PR  CT GUIDANCE PLACEMENT RAD THERAPY FIELDS: ICD-10-PCS | Mod: 26,,, | Performed by: RADIOLOGY

## 2021-08-27 PROCEDURE — 77336 RADIATION PHYSICS CONSULT: CPT | Performed by: RADIOLOGY

## 2021-08-27 PROCEDURE — 77014 PR  CT GUIDANCE PLACEMENT RAD THERAPY FIELDS: CPT | Mod: 26,,, | Performed by: RADIOLOGY

## 2021-08-27 PROCEDURE — 77014 HC CT GUIDANCE RADIATION THERAPY FLDS PLACEMENT: CPT | Mod: TC | Performed by: RADIOLOGY

## 2021-08-27 PROCEDURE — 77385 HC IMRT, SIMPLE: CPT | Performed by: RADIOLOGY

## 2021-09-02 ENCOUNTER — HOSPITAL ENCOUNTER (OUTPATIENT)
Dept: RADIATION THERAPY | Facility: HOSPITAL | Age: 86
Discharge: HOME | End: 2021-09-02
Attending: RADIOLOGY
Payer: MEDICARE

## 2021-09-02 ENCOUNTER — APPOINTMENT (OUTPATIENT)
Dept: RADIATION THERAPY | Facility: OTHER | Age: 86
End: 2021-09-02
Attending: RADIOLOGY
Payer: MEDICARE

## 2021-09-07 PROCEDURE — 77014 HC CT GUIDANCE RADIATION THERAPY FLDS PLACEMENT: CPT | Mod: TC | Performed by: RADIOLOGY

## 2021-09-07 PROCEDURE — 77014 PR  CT GUIDANCE PLACEMENT RAD THERAPY FIELDS: ICD-10-PCS | Mod: 26,,, | Performed by: RADIOLOGY

## 2021-09-07 PROCEDURE — 77014 PR  CT GUIDANCE PLACEMENT RAD THERAPY FIELDS: CPT | Mod: 26,,, | Performed by: RADIOLOGY

## 2021-09-07 PROCEDURE — 77385 HC IMRT, SIMPLE: CPT | Performed by: RADIOLOGY

## 2021-09-08 PROCEDURE — 77385 HC IMRT, SIMPLE: CPT | Performed by: RADIOLOGY

## 2021-09-08 PROCEDURE — 77014 PR  CT GUIDANCE PLACEMENT RAD THERAPY FIELDS: ICD-10-PCS | Mod: 26,,, | Performed by: RADIOLOGY

## 2021-09-08 PROCEDURE — 77014 PR  CT GUIDANCE PLACEMENT RAD THERAPY FIELDS: CPT | Mod: 26,,, | Performed by: RADIOLOGY

## 2021-09-08 PROCEDURE — 77014 HC CT GUIDANCE RADIATION THERAPY FLDS PLACEMENT: CPT | Mod: TC | Performed by: RADIOLOGY

## 2021-09-09 ENCOUNTER — DOCUMENTATION ONLY (OUTPATIENT)
Dept: RADIATION ONCOLOGY | Facility: CLINIC | Age: 86
End: 2021-09-09

## 2021-09-09 ENCOUNTER — DOCUMENTATION ONLY (OUTPATIENT)
Dept: HEMATOLOGY/ONCOLOGY | Facility: CLINIC | Age: 86
End: 2021-09-09

## 2021-09-09 PROCEDURE — 77014 PR  CT GUIDANCE PLACEMENT RAD THERAPY FIELDS: CPT | Mod: 26,,, | Performed by: RADIOLOGY

## 2021-09-09 PROCEDURE — 77014 PR  CT GUIDANCE PLACEMENT RAD THERAPY FIELDS: ICD-10-PCS | Mod: 26,,, | Performed by: RADIOLOGY

## 2021-09-09 PROCEDURE — 77014 HC CT GUIDANCE RADIATION THERAPY FLDS PLACEMENT: CPT | Mod: TC | Performed by: RADIOLOGY

## 2021-09-09 PROCEDURE — 77385 HC IMRT, SIMPLE: CPT | Performed by: RADIOLOGY

## 2021-09-10 ENCOUNTER — DOCUMENTATION ONLY (OUTPATIENT)
Dept: HEMATOLOGY/ONCOLOGY | Facility: CLINIC | Age: 86
End: 2021-09-10

## 2021-09-10 PROCEDURE — 77014 PR  CT GUIDANCE PLACEMENT RAD THERAPY FIELDS: CPT | Mod: 26,,, | Performed by: RADIOLOGY

## 2021-09-10 PROCEDURE — 77014 PR  CT GUIDANCE PLACEMENT RAD THERAPY FIELDS: ICD-10-PCS | Mod: 26,,, | Performed by: RADIOLOGY

## 2021-09-10 PROCEDURE — 77385 HC IMRT, SIMPLE: CPT | Performed by: RADIOLOGY

## 2021-09-10 PROCEDURE — 77014 HC CT GUIDANCE RADIATION THERAPY FLDS PLACEMENT: CPT | Mod: TC | Performed by: RADIOLOGY

## 2021-09-13 ENCOUNTER — TELEPHONE (OUTPATIENT)
Dept: FAMILY MEDICINE | Facility: CLINIC | Age: 86
End: 2021-09-13

## 2021-09-13 ENCOUNTER — LAB VISIT (OUTPATIENT)
Dept: LAB | Facility: HOSPITAL | Age: 86
End: 2021-09-13
Attending: FAMILY MEDICINE
Payer: MEDICARE

## 2021-09-13 ENCOUNTER — OFFICE VISIT (OUTPATIENT)
Dept: FAMILY MEDICINE | Facility: CLINIC | Age: 86
End: 2021-09-13
Payer: MEDICARE

## 2021-09-13 VITALS
OXYGEN SATURATION: 96 % | WEIGHT: 163.81 LBS | HEART RATE: 104 BPM | HEIGHT: 71 IN | BODY MASS INDEX: 22.93 KG/M2 | DIASTOLIC BLOOD PRESSURE: 76 MMHG | SYSTOLIC BLOOD PRESSURE: 124 MMHG

## 2021-09-13 DIAGNOSIS — E11.21 DIABETES MELLITUS WITH NEPHROPATHY: Primary | ICD-10-CM

## 2021-09-13 DIAGNOSIS — N18.31 CHRONIC KIDNEY DISEASE, STAGE 3A: ICD-10-CM

## 2021-09-13 DIAGNOSIS — C61 PROSTATE CANCER: ICD-10-CM

## 2021-09-13 DIAGNOSIS — I10 ESSENTIAL HYPERTENSION: ICD-10-CM

## 2021-09-13 DIAGNOSIS — E11.21 DIABETES MELLITUS WITH NEPHROPATHY: ICD-10-CM

## 2021-09-13 LAB
ALBUMIN SERPL BCP-MCNC: 3.9 G/DL (ref 3.5–5.2)
ALP SERPL-CCNC: 41 U/L (ref 55–135)
ALT SERPL W/O P-5'-P-CCNC: 20 U/L (ref 10–44)
ANION GAP SERPL CALC-SCNC: 11 MMOL/L (ref 8–16)
AST SERPL-CCNC: 34 U/L (ref 10–40)
BASOPHILS # BLD AUTO: 0.01 K/UL (ref 0–0.2)
BASOPHILS NFR BLD: 0.3 % (ref 0–1.9)
BILIRUB SERPL-MCNC: 0.5 MG/DL (ref 0.1–1)
BUN SERPL-MCNC: 51 MG/DL (ref 8–23)
CALCIUM SERPL-MCNC: 10.7 MG/DL (ref 8.7–10.5)
CHLORIDE SERPL-SCNC: 101 MMOL/L (ref 95–110)
CHOLEST SERPL-MCNC: 117 MG/DL (ref 120–199)
CHOLEST/HDLC SERPL: 2.7 {RATIO} (ref 2–5)
CO2 SERPL-SCNC: 30 MMOL/L (ref 23–29)
CREAT SERPL-MCNC: 2.7 MG/DL (ref 0.5–1.4)
DIFFERENTIAL METHOD: ABNORMAL
EOSINOPHIL # BLD AUTO: 0.1 K/UL (ref 0–0.5)
EOSINOPHIL NFR BLD: 2.6 % (ref 0–8)
ERYTHROCYTE [DISTWIDTH] IN BLOOD BY AUTOMATED COUNT: 14.1 % (ref 11.5–14.5)
EST. GFR  (AFRICAN AMERICAN): 24 ML/MIN/1.73 M^2
EST. GFR  (NON AFRICAN AMERICAN): 21 ML/MIN/1.73 M^2
GLUCOSE SERPL-MCNC: 118 MG/DL (ref 70–110)
HCT VFR BLD AUTO: 34.4 % (ref 40–54)
HDLC SERPL-MCNC: 44 MG/DL (ref 40–75)
HDLC SERPL: 37.6 % (ref 20–50)
HGB BLD-MCNC: 11.1 G/DL (ref 14–18)
IMM GRANULOCYTES # BLD AUTO: 0.01 K/UL (ref 0–0.04)
IMM GRANULOCYTES NFR BLD AUTO: 0.3 % (ref 0–0.5)
LDLC SERPL CALC-MCNC: 57.8 MG/DL (ref 63–159)
LYMPHOCYTES # BLD AUTO: 0.5 K/UL (ref 1–4.8)
LYMPHOCYTES NFR BLD: 17.2 % (ref 18–48)
MCH RBC QN AUTO: 28.5 PG (ref 27–31)
MCHC RBC AUTO-ENTMCNC: 32.3 G/DL (ref 32–36)
MCV RBC AUTO: 88 FL (ref 82–98)
MONOCYTES # BLD AUTO: 0.3 K/UL (ref 0.3–1)
MONOCYTES NFR BLD: 9.2 % (ref 4–15)
NEUTROPHILS # BLD AUTO: 2.1 K/UL (ref 1.8–7.7)
NEUTROPHILS NFR BLD: 70.4 % (ref 38–73)
NONHDLC SERPL-MCNC: 73 MG/DL
NRBC BLD-RTO: 0 /100 WBC
PLATELET # BLD AUTO: 303 K/UL (ref 150–450)
PMV BLD AUTO: 9 FL (ref 9.2–12.9)
POTASSIUM SERPL-SCNC: 3.8 MMOL/L (ref 3.5–5.1)
PROT SERPL-MCNC: 7.3 G/DL (ref 6–8.4)
RBC # BLD AUTO: 3.89 M/UL (ref 4.6–6.2)
SODIUM SERPL-SCNC: 142 MMOL/L (ref 136–145)
TRIGL SERPL-MCNC: 76 MG/DL (ref 30–150)
WBC # BLD AUTO: 3.03 K/UL (ref 3.9–12.7)

## 2021-09-13 PROCEDURE — 3288F FALL RISK ASSESSMENT DOCD: CPT | Mod: CPTII,S$GLB,, | Performed by: FAMILY MEDICINE

## 2021-09-13 PROCEDURE — 36415 COLL VENOUS BLD VENIPUNCTURE: CPT | Mod: PN | Performed by: FAMILY MEDICINE

## 2021-09-13 PROCEDURE — 1160F PR REVIEW ALL MEDS BY PRESCRIBER/CLIN PHARMACIST DOCUMENTED: ICD-10-PCS | Mod: CPTII,S$GLB,, | Performed by: FAMILY MEDICINE

## 2021-09-13 PROCEDURE — 99214 OFFICE O/P EST MOD 30 MIN: CPT | Mod: S$GLB,,, | Performed by: FAMILY MEDICINE

## 2021-09-13 PROCEDURE — 3288F PR FALLS RISK ASSESSMENT DOCUMENTED: ICD-10-PCS | Mod: CPTII,S$GLB,, | Performed by: FAMILY MEDICINE

## 2021-09-13 PROCEDURE — 80061 LIPID PANEL: CPT | Performed by: FAMILY MEDICINE

## 2021-09-13 PROCEDURE — 80053 COMPREHEN METABOLIC PANEL: CPT | Performed by: FAMILY MEDICINE

## 2021-09-13 PROCEDURE — 99999 PR PBB SHADOW E&M-EST. PATIENT-LVL III: ICD-10-PCS | Mod: PBBFAC,,, | Performed by: FAMILY MEDICINE

## 2021-09-13 PROCEDURE — 3074F SYST BP LT 130 MM HG: CPT | Mod: CPTII,S$GLB,, | Performed by: FAMILY MEDICINE

## 2021-09-13 PROCEDURE — 77014 PR  CT GUIDANCE PLACEMENT RAD THERAPY FIELDS: CPT | Mod: 26,,, | Performed by: RADIOLOGY

## 2021-09-13 PROCEDURE — 85025 COMPLETE CBC W/AUTO DIFF WBC: CPT | Performed by: FAMILY MEDICINE

## 2021-09-13 PROCEDURE — 83036 HEMOGLOBIN GLYCOSYLATED A1C: CPT | Performed by: FAMILY MEDICINE

## 2021-09-13 PROCEDURE — 1101F PT FALLS ASSESS-DOCD LE1/YR: CPT | Mod: CPTII,S$GLB,, | Performed by: FAMILY MEDICINE

## 2021-09-13 PROCEDURE — 1160F RVW MEDS BY RX/DR IN RCRD: CPT | Mod: CPTII,S$GLB,, | Performed by: FAMILY MEDICINE

## 2021-09-13 PROCEDURE — 3078F DIAST BP <80 MM HG: CPT | Mod: CPTII,S$GLB,, | Performed by: FAMILY MEDICINE

## 2021-09-13 PROCEDURE — 77385 HC IMRT, SIMPLE: CPT | Performed by: RADIOLOGY

## 2021-09-13 PROCEDURE — 77014 PR  CT GUIDANCE PLACEMENT RAD THERAPY FIELDS: ICD-10-PCS | Mod: 26,,, | Performed by: RADIOLOGY

## 2021-09-13 PROCEDURE — 1159F MED LIST DOCD IN RCRD: CPT | Mod: CPTII,S$GLB,, | Performed by: FAMILY MEDICINE

## 2021-09-13 PROCEDURE — 77014 HC CT GUIDANCE RADIATION THERAPY FLDS PLACEMENT: CPT | Mod: TC | Performed by: RADIOLOGY

## 2021-09-13 PROCEDURE — 99214 PR OFFICE/OUTPT VISIT, EST, LEVL IV, 30-39 MIN: ICD-10-PCS | Mod: S$GLB,,, | Performed by: FAMILY MEDICINE

## 2021-09-13 PROCEDURE — 3078F PR MOST RECENT DIASTOLIC BLOOD PRESSURE < 80 MM HG: ICD-10-PCS | Mod: CPTII,S$GLB,, | Performed by: FAMILY MEDICINE

## 2021-09-13 PROCEDURE — 1159F PR MEDICATION LIST DOCUMENTED IN MEDICAL RECORD: ICD-10-PCS | Mod: CPTII,S$GLB,, | Performed by: FAMILY MEDICINE

## 2021-09-13 PROCEDURE — 3074F PR MOST RECENT SYSTOLIC BLOOD PRESSURE < 130 MM HG: ICD-10-PCS | Mod: CPTII,S$GLB,, | Performed by: FAMILY MEDICINE

## 2021-09-13 PROCEDURE — 1101F PR PT FALLS ASSESS DOC 0-1 FALLS W/OUT INJ PAST YR: ICD-10-PCS | Mod: CPTII,S$GLB,, | Performed by: FAMILY MEDICINE

## 2021-09-13 PROCEDURE — 99999 PR PBB SHADOW E&M-EST. PATIENT-LVL III: CPT | Mod: PBBFAC,,, | Performed by: FAMILY MEDICINE

## 2021-09-13 PROCEDURE — 77336 RADIATION PHYSICS CONSULT: CPT | Performed by: RADIOLOGY

## 2021-09-13 RX ORDER — METFORMIN HYDROCHLORIDE 1000 MG/1
1000 TABLET ORAL
Qty: 90 TABLET | Refills: 1 | Status: SHIPPED | OUTPATIENT
Start: 2021-09-13 | End: 2021-12-22 | Stop reason: SDUPTHER

## 2021-09-14 LAB
ESTIMATED AVG GLUCOSE: 171 MG/DL (ref 68–131)
HBA1C MFR BLD: 7.6 % (ref 4–5.6)

## 2021-09-14 PROCEDURE — 77014 PR  CT GUIDANCE PLACEMENT RAD THERAPY FIELDS: ICD-10-PCS | Mod: 26,,, | Performed by: RADIOLOGY

## 2021-09-14 PROCEDURE — 77014 PR  CT GUIDANCE PLACEMENT RAD THERAPY FIELDS: CPT | Mod: 26,,, | Performed by: RADIOLOGY

## 2021-09-14 PROCEDURE — 77014 HC CT GUIDANCE RADIATION THERAPY FLDS PLACEMENT: CPT | Mod: TC | Performed by: RADIOLOGY

## 2021-09-14 PROCEDURE — 77385 HC IMRT, SIMPLE: CPT | Performed by: RADIOLOGY

## 2021-09-15 PROCEDURE — 77014 PR  CT GUIDANCE PLACEMENT RAD THERAPY FIELDS: ICD-10-PCS | Mod: 26,,, | Performed by: RADIOLOGY

## 2021-09-15 PROCEDURE — 77385 HC IMRT, SIMPLE: CPT | Performed by: RADIOLOGY

## 2021-09-15 PROCEDURE — 77014 PR  CT GUIDANCE PLACEMENT RAD THERAPY FIELDS: CPT | Mod: 26,,, | Performed by: RADIOLOGY

## 2021-09-15 PROCEDURE — 77014 HC CT GUIDANCE RADIATION THERAPY FLDS PLACEMENT: CPT | Mod: TC | Performed by: RADIOLOGY

## 2021-09-16 PROCEDURE — 77014 PR  CT GUIDANCE PLACEMENT RAD THERAPY FIELDS: CPT | Mod: 26,,, | Performed by: RADIOLOGY

## 2021-09-16 PROCEDURE — 77014 HC CT GUIDANCE RADIATION THERAPY FLDS PLACEMENT: CPT | Mod: TC | Performed by: RADIOLOGY

## 2021-09-16 PROCEDURE — 77014 PR  CT GUIDANCE PLACEMENT RAD THERAPY FIELDS: ICD-10-PCS | Mod: 26,,, | Performed by: RADIOLOGY

## 2021-09-16 PROCEDURE — 77385 HC IMRT, SIMPLE: CPT | Performed by: RADIOLOGY

## 2021-09-17 PROCEDURE — 77014 HC CT GUIDANCE RADIATION THERAPY FLDS PLACEMENT: CPT | Mod: TC | Performed by: RADIOLOGY

## 2021-09-17 PROCEDURE — 77014 PR  CT GUIDANCE PLACEMENT RAD THERAPY FIELDS: CPT | Mod: 26,,, | Performed by: RADIOLOGY

## 2021-09-17 PROCEDURE — 77385 HC IMRT, SIMPLE: CPT | Performed by: RADIOLOGY

## 2021-09-17 PROCEDURE — 77014 PR  CT GUIDANCE PLACEMENT RAD THERAPY FIELDS: ICD-10-PCS | Mod: 26,,, | Performed by: RADIOLOGY

## 2021-09-20 ENCOUNTER — DOCUMENTATION ONLY (OUTPATIENT)
Dept: RADIATION ONCOLOGY | Facility: CLINIC | Age: 86
End: 2021-09-20

## 2021-09-20 ENCOUNTER — DOCUMENTATION ONLY (OUTPATIENT)
Dept: HEMATOLOGY/ONCOLOGY | Facility: CLINIC | Age: 86
End: 2021-09-20

## 2021-09-20 PROCEDURE — 77014 HC CT GUIDANCE RADIATION THERAPY FLDS PLACEMENT: CPT | Mod: TC | Performed by: RADIOLOGY

## 2021-09-20 PROCEDURE — 77014 PR  CT GUIDANCE PLACEMENT RAD THERAPY FIELDS: ICD-10-PCS | Mod: 26,,, | Performed by: RADIOLOGY

## 2021-09-20 PROCEDURE — 77385 HC IMRT, SIMPLE: CPT | Performed by: RADIOLOGY

## 2021-09-20 PROCEDURE — 77014 PR  CT GUIDANCE PLACEMENT RAD THERAPY FIELDS: CPT | Mod: 26,,, | Performed by: RADIOLOGY

## 2021-09-21 PROCEDURE — 77014 PR  CT GUIDANCE PLACEMENT RAD THERAPY FIELDS: ICD-10-PCS | Mod: 26,,, | Performed by: RADIOLOGY

## 2021-09-21 PROCEDURE — 77336 RADIATION PHYSICS CONSULT: CPT | Performed by: RADIOLOGY

## 2021-09-21 PROCEDURE — 77014 HC CT GUIDANCE RADIATION THERAPY FLDS PLACEMENT: CPT | Mod: TC | Performed by: RADIOLOGY

## 2021-09-21 PROCEDURE — 77014 PR  CT GUIDANCE PLACEMENT RAD THERAPY FIELDS: CPT | Mod: 26,,, | Performed by: RADIOLOGY

## 2021-09-21 PROCEDURE — 77385 HC IMRT, SIMPLE: CPT | Performed by: RADIOLOGY

## 2021-09-22 PROCEDURE — 77385 HC IMRT, SIMPLE: CPT | Performed by: RADIOLOGY

## 2021-09-22 PROCEDURE — 77014 PR  CT GUIDANCE PLACEMENT RAD THERAPY FIELDS: CPT | Mod: 26,,, | Performed by: RADIOLOGY

## 2021-09-22 PROCEDURE — 77014 PR  CT GUIDANCE PLACEMENT RAD THERAPY FIELDS: ICD-10-PCS | Mod: 26,,, | Performed by: RADIOLOGY

## 2021-09-22 PROCEDURE — 77014 HC CT GUIDANCE RADIATION THERAPY FLDS PLACEMENT: CPT | Mod: TC | Performed by: RADIOLOGY

## 2021-09-23 PROCEDURE — 77014 PR  CT GUIDANCE PLACEMENT RAD THERAPY FIELDS: ICD-10-PCS | Mod: 26,,, | Performed by: RADIOLOGY

## 2021-09-23 PROCEDURE — 77014 PR  CT GUIDANCE PLACEMENT RAD THERAPY FIELDS: CPT | Mod: 26,,, | Performed by: RADIOLOGY

## 2021-09-23 PROCEDURE — 77014 HC CT GUIDANCE RADIATION THERAPY FLDS PLACEMENT: CPT | Mod: TC | Performed by: RADIOLOGY

## 2021-09-23 PROCEDURE — 77385 HC IMRT, SIMPLE: CPT | Performed by: RADIOLOGY

## 2021-09-24 PROCEDURE — 77014 PR  CT GUIDANCE PLACEMENT RAD THERAPY FIELDS: ICD-10-PCS | Mod: 26,,, | Performed by: RADIOLOGY

## 2021-09-24 PROCEDURE — 77014 HC CT GUIDANCE RADIATION THERAPY FLDS PLACEMENT: CPT | Mod: TC | Performed by: RADIOLOGY

## 2021-09-24 PROCEDURE — 77014 PR  CT GUIDANCE PLACEMENT RAD THERAPY FIELDS: CPT | Mod: 26,,, | Performed by: RADIOLOGY

## 2021-09-24 PROCEDURE — 77385 HC IMRT, SIMPLE: CPT | Performed by: RADIOLOGY

## 2021-09-27 PROCEDURE — 77385 HC IMRT, SIMPLE: CPT | Performed by: RADIOLOGY

## 2021-09-27 PROCEDURE — 77014 HC CT GUIDANCE RADIATION THERAPY FLDS PLACEMENT: CPT | Mod: TC | Performed by: RADIOLOGY

## 2021-09-27 PROCEDURE — 77014 PR  CT GUIDANCE PLACEMENT RAD THERAPY FIELDS: CPT | Mod: 26,,, | Performed by: RADIOLOGY

## 2021-09-27 PROCEDURE — 77014 PR  CT GUIDANCE PLACEMENT RAD THERAPY FIELDS: ICD-10-PCS | Mod: 26,,, | Performed by: RADIOLOGY

## 2021-09-28 PROCEDURE — 77014 HC CT GUIDANCE RADIATION THERAPY FLDS PLACEMENT: CPT | Mod: TC | Performed by: RADIOLOGY

## 2021-09-28 PROCEDURE — 77014 PR  CT GUIDANCE PLACEMENT RAD THERAPY FIELDS: CPT | Mod: 26,,, | Performed by: RADIOLOGY

## 2021-09-28 PROCEDURE — 77336 RADIATION PHYSICS CONSULT: CPT | Performed by: RADIOLOGY

## 2021-09-28 PROCEDURE — 77014 PR  CT GUIDANCE PLACEMENT RAD THERAPY FIELDS: ICD-10-PCS | Mod: 26,,, | Performed by: RADIOLOGY

## 2021-09-28 PROCEDURE — 77385 HC IMRT, SIMPLE: CPT | Performed by: RADIOLOGY

## 2021-09-29 PROCEDURE — 77385 HC IMRT, SIMPLE: CPT | Performed by: RADIOLOGY

## 2021-09-29 PROCEDURE — 77014 PR  CT GUIDANCE PLACEMENT RAD THERAPY FIELDS: ICD-10-PCS | Mod: 26,,, | Performed by: RADIOLOGY

## 2021-09-29 PROCEDURE — 77014 PR  CT GUIDANCE PLACEMENT RAD THERAPY FIELDS: CPT | Mod: 26,,, | Performed by: RADIOLOGY

## 2021-09-29 PROCEDURE — 77014 HC CT GUIDANCE RADIATION THERAPY FLDS PLACEMENT: CPT | Mod: TC | Performed by: RADIOLOGY

## 2021-10-02 DIAGNOSIS — E11.21 DIABETES MELLITUS WITH NEPHROPATHY: Primary | ICD-10-CM

## 2021-10-02 DIAGNOSIS — N18.31 CHRONIC KIDNEY DISEASE, STAGE 3A: ICD-10-CM

## 2021-10-02 DIAGNOSIS — I10 ESSENTIAL HYPERTENSION: ICD-10-CM

## 2021-10-05 ENCOUNTER — TELEPHONE (OUTPATIENT)
Dept: FAMILY MEDICINE | Facility: CLINIC | Age: 86
End: 2021-10-05

## 2021-10-06 DIAGNOSIS — C61 MALIGNANT NEOPLASM OF PROSTATE: Primary | ICD-10-CM

## 2021-11-01 ENCOUNTER — CLINICAL SUPPORT (OUTPATIENT)
Dept: FAMILY MEDICINE | Facility: CLINIC | Age: 86
End: 2021-11-01
Payer: MEDICARE

## 2021-11-01 DIAGNOSIS — Z23 NEED FOR VACCINATION: Primary | ICD-10-CM

## 2021-11-01 PROCEDURE — 99499 UNLISTED E&M SERVICE: CPT | Mod: S$GLB,,, | Performed by: FAMILY MEDICINE

## 2021-11-01 PROCEDURE — G0008 FLU VACCINE - QUADRIVALENT - ADJUVANTED: ICD-10-PCS | Mod: S$GLB,,, | Performed by: FAMILY MEDICINE

## 2021-11-01 PROCEDURE — 90694 VACC AIIV4 NO PRSRV 0.5ML IM: CPT | Mod: S$GLB,,, | Performed by: FAMILY MEDICINE

## 2021-11-01 PROCEDURE — 99499 NO LOS: ICD-10-PCS | Mod: S$GLB,,, | Performed by: FAMILY MEDICINE

## 2021-11-01 PROCEDURE — G0008 ADMIN INFLUENZA VIRUS VAC: HCPCS | Mod: S$GLB,,, | Performed by: FAMILY MEDICINE

## 2021-11-01 PROCEDURE — 90694 FLU VACCINE - QUADRIVALENT - ADJUVANTED: ICD-10-PCS | Mod: S$GLB,,, | Performed by: FAMILY MEDICINE

## 2021-11-26 ENCOUNTER — LAB VISIT (OUTPATIENT)
Dept: LAB | Facility: OTHER | Age: 86
End: 2021-11-26
Attending: RADIOLOGY
Payer: MEDICARE

## 2021-11-26 DIAGNOSIS — C61 MALIGNANT NEOPLASM OF PROSTATE: ICD-10-CM

## 2021-11-26 LAB — COMPLEXED PSA SERPL-MCNC: 0.07 NG/ML (ref 0–4)

## 2021-11-26 PROCEDURE — 36415 COLL VENOUS BLD VENIPUNCTURE: CPT | Performed by: RADIOLOGY

## 2021-11-26 PROCEDURE — 84153 ASSAY OF PSA TOTAL: CPT | Performed by: RADIOLOGY

## 2021-12-05 ENCOUNTER — PATIENT MESSAGE (OUTPATIENT)
Dept: RADIATION ONCOLOGY | Facility: CLINIC | Age: 86
End: 2021-12-05
Payer: MEDICARE

## 2021-12-06 ENCOUNTER — TELEPHONE (OUTPATIENT)
Dept: RADIATION ONCOLOGY | Facility: CLINIC | Age: 86
End: 2021-12-06
Payer: MEDICARE

## 2021-12-13 ENCOUNTER — OFFICE VISIT (OUTPATIENT)
Dept: RADIATION ONCOLOGY | Facility: CLINIC | Age: 86
End: 2021-12-13
Attending: RADIOLOGY
Payer: MEDICARE

## 2021-12-13 VITALS
HEART RATE: 99 BPM | SYSTOLIC BLOOD PRESSURE: 124 MMHG | DIASTOLIC BLOOD PRESSURE: 67 MMHG | RESPIRATION RATE: 16 BRPM | HEIGHT: 70 IN | BODY MASS INDEX: 23.79 KG/M2 | WEIGHT: 166.19 LBS

## 2021-12-13 DIAGNOSIS — C61 MALIGNANT NEOPLASM OF PROSTATE: Primary | ICD-10-CM

## 2021-12-13 PROCEDURE — 99999 PR PBB SHADOW E&M-EST. PATIENT-LVL III: CPT | Mod: PBBFAC,,, | Performed by: RADIOLOGY

## 2021-12-13 PROCEDURE — 99999 PR PBB SHADOW E&M-EST. PATIENT-LVL III: ICD-10-PCS | Mod: PBBFAC,,, | Performed by: RADIOLOGY

## 2021-12-13 PROCEDURE — 99499 NO LOS: ICD-10-PCS | Mod: S$GLB,,, | Performed by: RADIOLOGY

## 2021-12-13 PROCEDURE — 99499 UNLISTED E&M SERVICE: CPT | Mod: S$GLB,,, | Performed by: RADIOLOGY

## 2021-12-15 ENCOUNTER — LAB VISIT (OUTPATIENT)
Dept: LAB | Facility: HOSPITAL | Age: 86
End: 2021-12-15
Attending: FAMILY MEDICINE
Payer: MEDICARE

## 2021-12-15 DIAGNOSIS — N18.31 CHRONIC KIDNEY DISEASE, STAGE 3A: ICD-10-CM

## 2021-12-15 DIAGNOSIS — I10 ESSENTIAL HYPERTENSION: ICD-10-CM

## 2021-12-15 DIAGNOSIS — E11.21 DIABETES MELLITUS WITH NEPHROPATHY: ICD-10-CM

## 2021-12-15 LAB
ALBUMIN SERPL BCP-MCNC: 3.9 G/DL (ref 3.5–5.2)
ALP SERPL-CCNC: 43 U/L (ref 55–135)
ALT SERPL W/O P-5'-P-CCNC: 24 U/L (ref 10–44)
ANION GAP SERPL CALC-SCNC: 13 MMOL/L (ref 8–16)
AST SERPL-CCNC: 35 U/L (ref 10–40)
BASOPHILS # BLD AUTO: 0.02 K/UL (ref 0–0.2)
BASOPHILS NFR BLD: 0.6 % (ref 0–1.9)
BILIRUB SERPL-MCNC: 0.4 MG/DL (ref 0.1–1)
BUN SERPL-MCNC: 43 MG/DL (ref 8–23)
CALCIUM SERPL-MCNC: 10.2 MG/DL (ref 8.7–10.5)
CHLORIDE SERPL-SCNC: 103 MMOL/L (ref 95–110)
CO2 SERPL-SCNC: 28 MMOL/L (ref 23–29)
CREAT SERPL-MCNC: 2.2 MG/DL (ref 0.5–1.4)
DIFFERENTIAL METHOD: ABNORMAL
EOSINOPHIL # BLD AUTO: 0.1 K/UL (ref 0–0.5)
EOSINOPHIL NFR BLD: 2.6 % (ref 0–8)
ERYTHROCYTE [DISTWIDTH] IN BLOOD BY AUTOMATED COUNT: 13.4 % (ref 11.5–14.5)
EST. GFR  (AFRICAN AMERICAN): 31 ML/MIN/1.73 M^2
EST. GFR  (NON AFRICAN AMERICAN): 27 ML/MIN/1.73 M^2
ESTIMATED AVG GLUCOSE: 148 MG/DL (ref 68–131)
GLUCOSE SERPL-MCNC: 153 MG/DL (ref 70–110)
HBA1C MFR BLD: 6.8 % (ref 4–5.6)
HCT VFR BLD AUTO: 33 % (ref 40–54)
HGB BLD-MCNC: 10.3 G/DL (ref 14–18)
IMM GRANULOCYTES # BLD AUTO: 0.01 K/UL (ref 0–0.04)
IMM GRANULOCYTES NFR BLD AUTO: 0.3 % (ref 0–0.5)
LYMPHOCYTES # BLD AUTO: 1 K/UL (ref 1–4.8)
LYMPHOCYTES NFR BLD: 30 % (ref 18–48)
MCH RBC QN AUTO: 29.3 PG (ref 27–31)
MCHC RBC AUTO-ENTMCNC: 31.2 G/DL (ref 32–36)
MCV RBC AUTO: 94 FL (ref 82–98)
MONOCYTES # BLD AUTO: 0.3 K/UL (ref 0.3–1)
MONOCYTES NFR BLD: 7.2 % (ref 4–15)
NEUTROPHILS # BLD AUTO: 2.1 K/UL (ref 1.8–7.7)
NEUTROPHILS NFR BLD: 59.3 % (ref 38–73)
NRBC BLD-RTO: 0 /100 WBC
PLATELET # BLD AUTO: 310 K/UL (ref 150–450)
PMV BLD AUTO: 9.3 FL (ref 9.2–12.9)
POTASSIUM SERPL-SCNC: 4.2 MMOL/L (ref 3.5–5.1)
PROT SERPL-MCNC: 7.6 G/DL (ref 6–8.4)
PTH-INTACT SERPL-MCNC: 53.8 PG/ML (ref 9–77)
RBC # BLD AUTO: 3.52 M/UL (ref 4.6–6.2)
SODIUM SERPL-SCNC: 144 MMOL/L (ref 136–145)
WBC # BLD AUTO: 3.47 K/UL (ref 3.9–12.7)

## 2021-12-15 PROCEDURE — 80053 COMPREHEN METABOLIC PANEL: CPT | Performed by: FAMILY MEDICINE

## 2021-12-15 PROCEDURE — 83970 ASSAY OF PARATHORMONE: CPT | Performed by: FAMILY MEDICINE

## 2021-12-15 PROCEDURE — 83036 HEMOGLOBIN GLYCOSYLATED A1C: CPT | Performed by: FAMILY MEDICINE

## 2021-12-15 PROCEDURE — 36415 COLL VENOUS BLD VENIPUNCTURE: CPT | Mod: PN | Performed by: FAMILY MEDICINE

## 2021-12-15 PROCEDURE — 85025 COMPLETE CBC W/AUTO DIFF WBC: CPT | Performed by: FAMILY MEDICINE

## 2021-12-22 ENCOUNTER — OFFICE VISIT (OUTPATIENT)
Dept: FAMILY MEDICINE | Facility: CLINIC | Age: 86
End: 2021-12-22
Payer: MEDICARE

## 2021-12-22 VITALS
HEART RATE: 108 BPM | TEMPERATURE: 98 F | WEIGHT: 167.13 LBS | RESPIRATION RATE: 18 BRPM | BODY MASS INDEX: 23.93 KG/M2 | HEIGHT: 70 IN | DIASTOLIC BLOOD PRESSURE: 64 MMHG | OXYGEN SATURATION: 96 % | SYSTOLIC BLOOD PRESSURE: 122 MMHG

## 2021-12-22 DIAGNOSIS — I10 ESSENTIAL HYPERTENSION: ICD-10-CM

## 2021-12-22 DIAGNOSIS — E11.21 DIABETES MELLITUS WITH NEPHROPATHY: Primary | ICD-10-CM

## 2021-12-22 DIAGNOSIS — F33.42 RECURRENT MAJOR DEPRESSIVE DISORDER, IN FULL REMISSION: ICD-10-CM

## 2021-12-22 DIAGNOSIS — N18.9 ANEMIA ASSOCIATED WITH CHRONIC RENAL FAILURE: ICD-10-CM

## 2021-12-22 DIAGNOSIS — D63.1 ANEMIA ASSOCIATED WITH CHRONIC RENAL FAILURE: ICD-10-CM

## 2021-12-22 DIAGNOSIS — N18.32 CHRONIC RENAL FAILURE, STAGE 3B: ICD-10-CM

## 2021-12-22 PROBLEM — J96.11 CHRONIC RESPIRATORY FAILURE WITH HYPOXIA: Status: RESOLVED | Noted: 2020-04-13 | Resolved: 2021-12-22

## 2021-12-22 PROCEDURE — 99214 PR OFFICE/OUTPT VISIT, EST, LEVL IV, 30-39 MIN: ICD-10-PCS | Mod: S$GLB,,, | Performed by: FAMILY MEDICINE

## 2021-12-22 PROCEDURE — 1160F PR REVIEW ALL MEDS BY PRESCRIBER/CLIN PHARMACIST DOCUMENTED: ICD-10-PCS | Mod: CPTII,S$GLB,, | Performed by: FAMILY MEDICINE

## 2021-12-22 PROCEDURE — 99499 RISK ADDL DX/OHS AUDIT: ICD-10-PCS | Mod: S$GLB,,, | Performed by: FAMILY MEDICINE

## 2021-12-22 PROCEDURE — 99499 UNLISTED E&M SERVICE: CPT | Mod: S$GLB,,, | Performed by: FAMILY MEDICINE

## 2021-12-22 PROCEDURE — 3074F PR MOST RECENT SYSTOLIC BLOOD PRESSURE < 130 MM HG: ICD-10-PCS | Mod: CPTII,S$GLB,, | Performed by: FAMILY MEDICINE

## 2021-12-22 PROCEDURE — 3288F PR FALLS RISK ASSESSMENT DOCUMENTED: ICD-10-PCS | Mod: CPTII,S$GLB,, | Performed by: FAMILY MEDICINE

## 2021-12-22 PROCEDURE — 99214 OFFICE O/P EST MOD 30 MIN: CPT | Mod: S$GLB,,, | Performed by: FAMILY MEDICINE

## 2021-12-22 PROCEDURE — 1126F AMNT PAIN NOTED NONE PRSNT: CPT | Mod: CPTII,S$GLB,, | Performed by: FAMILY MEDICINE

## 2021-12-22 PROCEDURE — 1101F PT FALLS ASSESS-DOCD LE1/YR: CPT | Mod: CPTII,S$GLB,, | Performed by: FAMILY MEDICINE

## 2021-12-22 PROCEDURE — 1159F MED LIST DOCD IN RCRD: CPT | Mod: CPTII,S$GLB,, | Performed by: FAMILY MEDICINE

## 2021-12-22 PROCEDURE — 1160F RVW MEDS BY RX/DR IN RCRD: CPT | Mod: CPTII,S$GLB,, | Performed by: FAMILY MEDICINE

## 2021-12-22 PROCEDURE — 99999 PR PBB SHADOW E&M-EST. PATIENT-LVL IV: CPT | Mod: PBBFAC,,, | Performed by: FAMILY MEDICINE

## 2021-12-22 PROCEDURE — 1126F PR PAIN SEVERITY QUANTIFIED, NO PAIN PRESENT: ICD-10-PCS | Mod: CPTII,S$GLB,, | Performed by: FAMILY MEDICINE

## 2021-12-22 PROCEDURE — 3078F DIAST BP <80 MM HG: CPT | Mod: CPTII,S$GLB,, | Performed by: FAMILY MEDICINE

## 2021-12-22 PROCEDURE — 3288F FALL RISK ASSESSMENT DOCD: CPT | Mod: CPTII,S$GLB,, | Performed by: FAMILY MEDICINE

## 2021-12-22 PROCEDURE — 3074F SYST BP LT 130 MM HG: CPT | Mod: CPTII,S$GLB,, | Performed by: FAMILY MEDICINE

## 2021-12-22 PROCEDURE — 1159F PR MEDICATION LIST DOCUMENTED IN MEDICAL RECORD: ICD-10-PCS | Mod: CPTII,S$GLB,, | Performed by: FAMILY MEDICINE

## 2021-12-22 PROCEDURE — 99999 PR PBB SHADOW E&M-EST. PATIENT-LVL IV: ICD-10-PCS | Mod: PBBFAC,,, | Performed by: FAMILY MEDICINE

## 2021-12-22 PROCEDURE — 1101F PR PT FALLS ASSESS DOC 0-1 FALLS W/OUT INJ PAST YR: ICD-10-PCS | Mod: CPTII,S$GLB,, | Performed by: FAMILY MEDICINE

## 2021-12-22 PROCEDURE — 3078F PR MOST RECENT DIASTOLIC BLOOD PRESSURE < 80 MM HG: ICD-10-PCS | Mod: CPTII,S$GLB,, | Performed by: FAMILY MEDICINE

## 2021-12-22 RX ORDER — METFORMIN HYDROCHLORIDE 1000 MG/1
1000 TABLET ORAL
Qty: 90 TABLET | Refills: 1 | Status: SHIPPED | OUTPATIENT
Start: 2021-12-22 | End: 2022-03-21

## 2021-12-22 RX ORDER — OLMESARTAN MEDOXOMIL AND HYDROCHLOROTHIAZIDE 40/25 40; 25 MG/1; MG/1
1 TABLET ORAL DAILY
Qty: 90 TABLET | Refills: 3 | Status: SHIPPED | OUTPATIENT
Start: 2021-12-22 | End: 2022-03-21

## 2021-12-22 RX ORDER — ATORVASTATIN CALCIUM 20 MG/1
20 TABLET, FILM COATED ORAL NIGHTLY
Qty: 90 TABLET | Refills: 3 | Status: SHIPPED | OUTPATIENT
Start: 2021-12-22 | End: 2023-01-11 | Stop reason: SDUPTHER

## 2021-12-29 ENCOUNTER — PATIENT OUTREACH (OUTPATIENT)
Dept: ADMINISTRATIVE | Facility: OTHER | Age: 86
End: 2021-12-29
Payer: MEDICARE

## 2022-01-02 NOTE — PROGRESS NOTES
Subjective:       Patient ID: Peter Lopez is a 86 y.o. male.    Chief Complaint: Diabetes, Hypertension, and Depression    Diabetes  He presents for his follow-up diabetic visit. He has type 2 diabetes mellitus. His disease course has been stable. There are no hypoglycemic associated symptoms. Pertinent negatives for hypoglycemia include no headaches, nervousness/anxiousness or sweats. There are no diabetic associated symptoms. Pertinent negatives for diabetes include no blurred vision and no chest pain. Pertinent negatives for diabetic complications include no CVA, PVD or retinopathy. Risk factors for coronary artery disease include diabetes mellitus, hypertension and male sex. Current diabetic treatment includes oral agent (monotherapy). He is compliant with treatment all of the time. He is following a generally healthy diet. When asked about meal planning, he reported none. He has not had a previous visit with a dietitian. He participates in exercise intermittently. There is no change in his home blood glucose trend. An ACE inhibitor/angiotensin II receptor blocker is being taken.   Hypertension  This is a chronic problem. Pertinent negatives include no anxiety, blurred vision, chest pain, headaches, malaise/fatigue, neck pain, orthopnea, palpitations, peripheral edema, PND, shortness of breath or sweats. There are no associated agents to hypertension. Risk factors for coronary artery disease include diabetes mellitus, male gender and dyslipidemia. Past treatments include diuretics and angiotensin blockers. The current treatment provides significant improvement. Hypertensive end-organ damage includes kidney disease. There is no history of angina, CVA, PVD or retinopathy. Identifiable causes of hypertension include chronic renal disease.   Depression  Visit Type: follow-up  Patient is not experiencing: anhedonia, chest pain, decreased concentration, depressed mood, excessive worry, feelings of hopelessness,  hyperventilation, insomnia, irritability, memory impairment, nervousness/anxiety, palpitations, psychomotor agitation, psychomotor retardation, shortness of breath, suicidal ideas, suicidal planning and thoughts of death.      Review of Systems   Constitutional: Negative for irritability and malaise/fatigue.   Eyes: Negative for blurred vision.   Respiratory: Negative for shortness of breath.    Cardiovascular: Negative for chest pain, palpitations, orthopnea and PND.   Musculoskeletal: Negative for neck pain.   Neurological: Negative for headaches.   Psychiatric/Behavioral: Positive for depression. Negative for decreased concentration and suicidal ideas. The patient is not nervous/anxious and does not have insomnia.          Objective:      Physical Exam  Vitals reviewed.   Constitutional:       Appearance: He is well-developed. He is not diaphoretic.   HENT:      Head: Normocephalic.      Right Ear: External ear normal.      Left Ear: External ear normal.      Nose: Nose normal.      Mouth/Throat:      Pharynx: No oropharyngeal exudate.   Neck:      Trachea: No tracheal deviation.   Cardiovascular:      Rate and Rhythm: Normal rate and regular rhythm.      Heart sounds: Normal heart sounds.   Pulmonary:      Effort: Pulmonary effort is normal.      Breath sounds: Normal breath sounds. No wheezing or rales.   Abdominal:      General: Bowel sounds are normal.      Palpations: Abdomen is soft. Abdomen is not rigid. There is no mass.      Tenderness: There is no abdominal tenderness. There is no guarding or rebound.   Musculoskeletal:      Cervical back: Normal range of motion and neck supple.   Lymphadenopathy:      Cervical: No cervical adenopathy.   Neurological:      Mental Status: He is oriented to person, place, and time.      Sensory: No sensory deficit.      Motor: No atrophy.      Gait: Gait normal.      Deep Tendon Reflexes:      Reflex Scores:       Patellar reflexes are 2+ on the right side and 2+ on the  left side.        Lab Visit on 12/15/2021   Component Date Value Ref Range Status    Microalbumin, Urine 12/15/2021 56.0  ug/mL Final    Creatinine, Urine 12/15/2021 132.0  23.0 - 375.0 mg/dL Final    Microalb/Creat Ratio 12/15/2021 42.4* 0.0 - 30.0 ug/mg Final   Lab Visit on 12/15/2021   Component Date Value Ref Range Status    Sodium 12/15/2021 144  136 - 145 mmol/L Final    Potassium 12/15/2021 4.2  3.5 - 5.1 mmol/L Final    Chloride 12/15/2021 103  95 - 110 mmol/L Final    CO2 12/15/2021 28  23 - 29 mmol/L Final    Glucose 12/15/2021 153* 70 - 110 mg/dL Final    BUN 12/15/2021 43* 8 - 23 mg/dL Final    Creatinine 12/15/2021 2.2* 0.5 - 1.4 mg/dL Final    Calcium 12/15/2021 10.2  8.7 - 10.5 mg/dL Final    Total Protein 12/15/2021 7.6  6.0 - 8.4 g/dL Final    Albumin 12/15/2021 3.9  3.5 - 5.2 g/dL Final    Total Bilirubin 12/15/2021 0.4  0.1 - 1.0 mg/dL Final    Comment: For infants and newborns, interpretation of results should be based  on gestational age, weight and in agreement with clinical  observations.    Premature Infant recommended reference ranges:  Up to 24 hours.............<8.0 mg/dL  Up to 48 hours............<12.0 mg/dL  3-5 days..................<15.0 mg/dL  6-29 days.................<15.0 mg/dL      Alkaline Phosphatase 12/15/2021 43* 55 - 135 U/L Final    AST 12/15/2021 35  10 - 40 U/L Final    ALT 12/15/2021 24  10 - 44 U/L Final    Anion Gap 12/15/2021 13  8 - 16 mmol/L Final    eGFR if  12/15/2021 31* >60 mL/min/1.73 m^2 Final    eGFR if non  12/15/2021 27* >60 mL/min/1.73 m^2 Final    Comment: Calculation used to obtain the estimated glomerular filtration  rate (eGFR) is the CKD-EPI equation.       Hemoglobin A1C 12/15/2021 6.8* 4.0 - 5.6 % Final    Comment: ADA Screening Guidelines:  5.7-6.4%  Consistent with prediabetes  >or=6.5%  Consistent with diabetes    High levels of fetal hemoglobin interfere with the HbA1C  assay. Heterozygous  hemoglobin variants (HbS, HgC, etc)do  not significantly interfere with this assay.   However, presence of multiple variants may affect accuracy.      Estimated Avg Glucose 12/15/2021 148* 68 - 131 mg/dL Final    WBC 12/15/2021 3.47* 3.90 - 12.70 K/uL Final    RBC 12/15/2021 3.52* 4.60 - 6.20 M/uL Final    Hemoglobin 12/15/2021 10.3* 14.0 - 18.0 g/dL Final    Hematocrit 12/15/2021 33.0* 40.0 - 54.0 % Final    MCV 12/15/2021 94  82 - 98 fL Final    MCH 12/15/2021 29.3  27.0 - 31.0 pg Final    MCHC 12/15/2021 31.2* 32.0 - 36.0 g/dL Final    RDW 12/15/2021 13.4  11.5 - 14.5 % Final    Platelets 12/15/2021 310  150 - 450 K/uL Final    MPV 12/15/2021 9.3  9.2 - 12.9 fL Final    Immature Granulocytes 12/15/2021 0.3  0.0 - 0.5 % Final    Gran # (ANC) 12/15/2021 2.1  1.8 - 7.7 K/uL Final    Immature Grans (Abs) 12/15/2021 0.01  0.00 - 0.04 K/uL Final    Comment: Mild elevation in immature granulocytes is non specific and   can be seen in a variety of conditions including stress response,   acute inflammation, trauma and pregnancy. Correlation with other   laboratory and clinical findings is essential.      Lymph # 12/15/2021 1.0  1.0 - 4.8 K/uL Final    Mono # 12/15/2021 0.3  0.3 - 1.0 K/uL Final    Eos # 12/15/2021 0.1  0.0 - 0.5 K/uL Final    Baso # 12/15/2021 0.02  0.00 - 0.20 K/uL Final    nRBC 12/15/2021 0  0 /100 WBC Final    Gran % 12/15/2021 59.3  38.0 - 73.0 % Final    Lymph % 12/15/2021 30.0  18.0 - 48.0 % Final    Mono % 12/15/2021 7.2  4.0 - 15.0 % Final    Eosinophil % 12/15/2021 2.6  0.0 - 8.0 % Final    Basophil % 12/15/2021 0.6  0.0 - 1.9 % Final    Differential Method 12/15/2021 Automated   Final    PTH, Intact 12/15/2021 53.8  9.0 - 77.0 pg/mL Final   Lab Visit on 11/26/2021   Component Date Value Ref Range Status    PSA Diagnostic 11/26/2021 0.07  0.00 - 4.00 ng/mL Final    Comment: PSA Expected levels:  Hormonal Therapy: <0.05 ng/ml  Prostatectomy: <0.01 ng/ml  Radiation  Therapy: <1.00 ng/ml         Assessment:       Problem List Items Addressed This Visit        Cardiac/Vascular    Essential hypertension (Chronic)    Relevant Medications    olmesartan-hydrochlorothiazide (BENICAR HCT) 40-25 mg per tablet    Other Relevant Orders    Comprehensive Metabolic Panel    Microalbumin/Creatinine Ratio, Urine    Lipid Panel    TSH       Renal/    Chronic kidney disease, stage 3a (Chronic)      Other Visit Diagnoses     Diabetes mellitus with nephropathy    -  Primary    Relevant Medications    atorvastatin (LIPITOR) 20 MG tablet    metFORMIN (GLUCOPHAGE) 1000 MG tablet    Other Relevant Orders    Comprehensive Metabolic Panel    Hemoglobin A1C    Microalbumin/Creatinine Ratio, Urine    Lipid Panel    TSH    Recurrent major depressive disorder, in full remission        Anemia associated with chronic renal failure        Relevant Orders    CBC Auto Differential    Iron and TIBC    Ferritin    TSH          Plan:       Peter was seen today for diabetes, hypertension and depression.    Diagnoses and all orders for this visit:    Diabetes mellitus with nephropathy  -     atorvastatin (LIPITOR) 20 MG tablet; Take 1 tablet (20 mg total) by mouth every evening.  -     metFORMIN (GLUCOPHAGE) 1000 MG tablet; Take 1 tablet (1,000 mg total) by mouth daily with breakfast.  -     Comprehensive Metabolic Panel; Future  -     Hemoglobin A1C; Future  -     Microalbumin/Creatinine Ratio, Urine; Future  -     Lipid Panel; Future  -     TSH; Future  A1c controlled  Continue current regimen    Essential hypertension  -     olmesartan-hydrochlorothiazide (BENICAR HCT) 40-25 mg per tablet; Take 1 tablet by mouth once daily.  -     Comprehensive Metabolic Panel; Future  -     Microalbumin/Creatinine Ratio, Urine; Future  -     Lipid Panel; Future  -     TSH; Future  Patient presents for follow up on hypertension. Reports good compliance with medications, without any side effects and no concerns today. Blood  pressure reading today is good.  Continue current regimen.    Chronic renal failure, stage 3b  -     Comprehensive Metabolic Panel; Future  -     CBC Auto Differential; Future  -     Microalbumin/Creatinine Ratio, Urine; Future  -     TSH; Future  Renal function slightly improved from previous  Continue good hydration and avoiding nephrotoxic agents    Recurrent major depressive disorder, in full remission  Controlled    Anemia associated with chronic renal failure  -     CBC Auto Differential; Future  -     Iron and TIBC; Future  -     Ferritin; Future  -     TSH; Future  Check anemia panel

## 2022-01-05 ENCOUNTER — OFFICE VISIT (OUTPATIENT)
Dept: UROLOGY | Facility: CLINIC | Age: 87
End: 2022-01-05
Payer: MEDICARE

## 2022-01-05 ENCOUNTER — TELEPHONE (OUTPATIENT)
Dept: SURGERY | Facility: CLINIC | Age: 87
End: 2022-01-05
Payer: MEDICARE

## 2022-01-05 VITALS — WEIGHT: 170 LBS | HEIGHT: 70 IN | BODY MASS INDEX: 24.34 KG/M2

## 2022-01-05 DIAGNOSIS — C61 MALIGNANT NEOPLASM OF PROSTATE: Primary | ICD-10-CM

## 2022-01-05 DIAGNOSIS — R22.9 SINGLE SKIN NODULE: ICD-10-CM

## 2022-01-05 PROCEDURE — 99999 PR PBB SHADOW E&M-EST. PATIENT-LVL IV: CPT | Mod: PBBFAC,,, | Performed by: STUDENT IN AN ORGANIZED HEALTH CARE EDUCATION/TRAINING PROGRAM

## 2022-01-05 PROCEDURE — 1159F MED LIST DOCD IN RCRD: CPT | Mod: CPTII,S$GLB,, | Performed by: STUDENT IN AN ORGANIZED HEALTH CARE EDUCATION/TRAINING PROGRAM

## 2022-01-05 PROCEDURE — 1101F PR PT FALLS ASSESS DOC 0-1 FALLS W/OUT INJ PAST YR: ICD-10-PCS | Mod: CPTII,S$GLB,, | Performed by: STUDENT IN AN ORGANIZED HEALTH CARE EDUCATION/TRAINING PROGRAM

## 2022-01-05 PROCEDURE — 3288F FALL RISK ASSESSMENT DOCD: CPT | Mod: CPTII,S$GLB,, | Performed by: STUDENT IN AN ORGANIZED HEALTH CARE EDUCATION/TRAINING PROGRAM

## 2022-01-05 PROCEDURE — 1126F AMNT PAIN NOTED NONE PRSNT: CPT | Mod: CPTII,S$GLB,, | Performed by: STUDENT IN AN ORGANIZED HEALTH CARE EDUCATION/TRAINING PROGRAM

## 2022-01-05 PROCEDURE — 3288F PR FALLS RISK ASSESSMENT DOCUMENTED: ICD-10-PCS | Mod: CPTII,S$GLB,, | Performed by: STUDENT IN AN ORGANIZED HEALTH CARE EDUCATION/TRAINING PROGRAM

## 2022-01-05 PROCEDURE — 1160F PR REVIEW ALL MEDS BY PRESCRIBER/CLIN PHARMACIST DOCUMENTED: ICD-10-PCS | Mod: CPTII,S$GLB,, | Performed by: STUDENT IN AN ORGANIZED HEALTH CARE EDUCATION/TRAINING PROGRAM

## 2022-01-05 PROCEDURE — 99999 PR PBB SHADOW E&M-EST. PATIENT-LVL IV: ICD-10-PCS | Mod: PBBFAC,,, | Performed by: STUDENT IN AN ORGANIZED HEALTH CARE EDUCATION/TRAINING PROGRAM

## 2022-01-05 PROCEDURE — 1160F RVW MEDS BY RX/DR IN RCRD: CPT | Mod: CPTII,S$GLB,, | Performed by: STUDENT IN AN ORGANIZED HEALTH CARE EDUCATION/TRAINING PROGRAM

## 2022-01-05 PROCEDURE — 1126F PR PAIN SEVERITY QUANTIFIED, NO PAIN PRESENT: ICD-10-PCS | Mod: CPTII,S$GLB,, | Performed by: STUDENT IN AN ORGANIZED HEALTH CARE EDUCATION/TRAINING PROGRAM

## 2022-01-05 PROCEDURE — 99499 UNLISTED E&M SERVICE: CPT | Mod: S$GLB,,, | Performed by: STUDENT IN AN ORGANIZED HEALTH CARE EDUCATION/TRAINING PROGRAM

## 2022-01-05 PROCEDURE — 99214 OFFICE O/P EST MOD 30 MIN: CPT | Mod: S$GLB,,, | Performed by: STUDENT IN AN ORGANIZED HEALTH CARE EDUCATION/TRAINING PROGRAM

## 2022-01-05 PROCEDURE — 99214 PR OFFICE/OUTPT VISIT, EST, LEVL IV, 30-39 MIN: ICD-10-PCS | Mod: S$GLB,,, | Performed by: STUDENT IN AN ORGANIZED HEALTH CARE EDUCATION/TRAINING PROGRAM

## 2022-01-05 PROCEDURE — 99499 RISK ADDL DX/OHS AUDIT: ICD-10-PCS | Mod: S$GLB,,, | Performed by: STUDENT IN AN ORGANIZED HEALTH CARE EDUCATION/TRAINING PROGRAM

## 2022-01-05 PROCEDURE — 1101F PT FALLS ASSESS-DOCD LE1/YR: CPT | Mod: CPTII,S$GLB,, | Performed by: STUDENT IN AN ORGANIZED HEALTH CARE EDUCATION/TRAINING PROGRAM

## 2022-01-05 PROCEDURE — 1159F PR MEDICATION LIST DOCUMENTED IN MEDICAL RECORD: ICD-10-PCS | Mod: CPTII,S$GLB,, | Performed by: STUDENT IN AN ORGANIZED HEALTH CARE EDUCATION/TRAINING PROGRAM

## 2022-01-05 NOTE — PROGRESS NOTES
Patient ID: Peter Lopez is a 86 y.o. male.    Chief Complaint: Follow-up (6 month follow up, PSA review )      HPI  86 y.o. who presents to the Urology clinic for evaluation of prostate cancer. Patient is s/p radiation therapy 9/2021. Last PSA 0.07. He is here today for his 2nd ADT injection. Patient notes exhaustion with ADT injections which is bothersome but does not impair is ability to work. Patient denies incontinence, hx of TURP due to bladder outlet obstruction. Patient denies dysuria, hematuria. Patient denies unexplained weight loss. Patient noted nodule on his L shoulder for the past 2 weeks which has improved with warm compresses. Patient works out with 2-5 lb weights and goes on walks occasionally. No significant weight loss.     Medically Necessary ROS documented in HPI    Past Medical History  Active Ambulatory Problems     Diagnosis Date Noted    Essential hypertension 10/02/2012    Type 2 diabetes mellitus with diabetic polyneuropathy, without long-term current use of insulin 10/16/2013    Aortic atherosclerosis 05/03/2019    Chronic kidney disease, stage 3a 01/28/2020    ILD (interstitial lung disease) 04/04/2020    Discharge planning issues 04/14/2020    Reactive depression 04/14/2020    COPD with chronic bronchitis and emphysema 08/10/2020    Gait instability 09/03/2020    Tachycardia with heart rate 121-140 beats per minute 09/03/2020    Sleep-related breathing disorder 12/20/2020    Chronic fatigue     Complex sleep apnea syndrome     Pulmonary hypertension 03/08/2021    Dyspnea 03/11/2021    Iron deficiency anemia 03/11/2021    Chest pain 05/20/2021    Malignant neoplasm of prostate 06/14/2021    Urinary retention 06/22/2021     Resolved Ambulatory Problems     Diagnosis Date Noted    Elevated PSA 01/10/2013    Hepatitis C 10/16/2013    BPH (benign prostatic hyperplasia) 04/05/2016    Hypoglycemia     Hypoxia 03/29/2020    Anemia 04/04/2020    Elevated troponin  04/04/2020    Chronic respiratory failure with hypoxia 04/13/2020     Past Medical History:   Diagnosis Date    Chronic hepatitis C without mention of hepatic coma     CKD (chronic kidney disease)     COPD (chronic obstructive pulmonary disease)     Diabetes mellitus type I     H/O colonoscopy     [place] (hard of hearing)     Hypertension     On home oxygen therapy     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  Vaccinated.  C A       Social History  Social Connections: Not on file       Medications    Current Outpatient Medications:     albuterol (PROAIR HFA) 90 mcg/actuation inhaler, Inhale 2 puffs into the lungs every 4 (four) hours as needed for Wheezing (cough/shortness of breath). Rescue, Disp: 18 g, Rfl: 0    atorvastatin (LIPITOR) 20 MG tablet, Take 1 tablet (20 mg total) by mouth every evening., Disp: 90 tablet, Rfl: 3    blood sugar diagnostic Strp, To check BG 1 times daily, to use with insurance preferred meter, Disp: 30 each, Rfl: 11    lancets Misc, To check BG 1 times daily, to use with insurance preferred meter, Disp: 30 each, Rfl: 11    leuprolide, 6 month, (LUPRON DEPOT, 6 MONTH,) 45 mg SyKt injection, Inject 45 mg into the muscle every 6 (six) months., Disp: 90 mg, Rfl: 0    metFORMIN (GLUCOPHAGE) 1000 MG tablet, Take 1 tablet (1,000 mg total) by mouth daily with breakfast., Disp: 90 tablet, Rfl: 1    olmesartan-hydrochlorothiazide (BENICAR HCT) 40-25 mg per tablet, Take 1 tablet by mouth once daily., Disp: 90 tablet, Rfl: 3    STOOL SOFTENER 100 mg capsule, Take 1  capsule by mouth once daily, Disp: 60 capsule, Rfl: 0    tamsulosin (FLOMAX) 0.4 mg Cap, Take 1 capsule (0.4 mg total) by mouth once daily., Disp: 30 capsule, Rfl: 11    tiotropium bromide (SPIRIVA RESPIMAT) 2.5 mcg/actuation inhaler, INHALE 2 SPRAY(S) BY MOUTH ONCE DAILY, Disp: 4 g, Rfl: 5    blood-glucose meter kit, To check BG 1 times daily, to use with insurance preferred meter, Disp: 1 each, Rfl: 0    Current Facility-Administered Medications:     leuprolide (6 month) injection 45 mg, 45 mg, Intramuscular, Q6 Months, Jesus Cole MD, 45 mg at 07/09/21 1015    Allergies  Review of patient's allergies indicates:  No Known Allergies    Patient's PMH, FH, Social hx, Medications, allergies reviewed and updated as pertinent to today's visit    Objective:      Physical Exam  Constitutional:       General: He is not in acute distress.     Appearance: He is well-developed. He is not ill-appearing, toxic-appearing or diaphoretic.   HENT:      Head: Normocephalic and atraumatic.      Mouth/Throat:      Mouth: Mucous membranes are moist.   Eyes:      Conjunctiva/sclera: Conjunctivae normal.   Cardiovascular:      Rate and Rhythm: Normal rate and regular rhythm.   Pulmonary:      Effort: Pulmonary effort is normal. No respiratory distress.   Abdominal:      General: Abdomen is flat. There is no distension.      Palpations: Abdomen is soft. There is no mass.      Tenderness: There is no abdominal tenderness. There is no guarding.   Musculoskeletal:         General: No swelling or deformity.      Cervical back: Neck supple.      Comments: No spinal tenderness   Skin:     General: Skin is warm.      Capillary Refill: Capillary refill takes less than 2 seconds.      Findings: No rash.      Comments: L shoulder, 2cm firm, rubbery mass  No erythema, mild tenderness  No axillary adenopathy bilaterally  No mass on R shoulder   Neurological:      Mental Status: He is alert and oriented to person, place, and time.      Gait:  Gait normal.   Psychiatric:         Mood and Affect: Mood normal.         Thought Content: Thought content normal.         Judgment: Judgment normal.             Lab Results   Component Value Date    PSADIAG 0.07 11/26/2021    PSADIAG 54.8 (H) 11/25/2019        Assessment:       1. Malignant neoplasm of prostate    2. Single skin nodule        Plan:       Prostate cancer, stage IIC, t3b  Negative extraprostatic disease on initial staging imaging ( bone scan/ CTAP)  S/p XRT 9/2021  Discussed ADT risks/benefits again ( plan for 12-18 additional months of therapy per Dr. Isidro/ rad onc)  Exhaustion/fatigue is anticipated due to hormone deprivation  RTC 6 months for next Lupron injection/PSA  Flomax discontinued as patient has already had a bladder outlet procedure performed.     Follow up with Rad onc 12/2022    General surgery consult placed for skin nodule, message sent to PCP as well for awareness

## 2022-01-05 NOTE — TELEPHONE ENCOUNTER
Spoke with pt regarding referral and scheduling an appointment with Dr. Yates. Scheduled appointment for Jan. 11th for 11:30. Pt confirmed date and time.    ----- Message from Jesus Cole MD sent at 1/5/2022 11:05 AM CST -----  Regarding: skin nodule  Patient seen in Urology, noted to have skin nodule. Gen surgery referral placed for evaluation with next avail provider.     PCP included in  message for FYI.       Thanks

## 2022-01-11 ENCOUNTER — OFFICE VISIT (OUTPATIENT)
Dept: SURGERY | Facility: CLINIC | Age: 87
End: 2022-01-11
Payer: MEDICARE

## 2022-01-11 ENCOUNTER — OFFICE VISIT (OUTPATIENT)
Dept: FAMILY MEDICINE | Facility: CLINIC | Age: 87
End: 2022-01-11
Payer: MEDICARE

## 2022-01-11 ENCOUNTER — TELEPHONE (OUTPATIENT)
Dept: FAMILY MEDICINE | Facility: CLINIC | Age: 87
End: 2022-01-11

## 2022-01-11 VITALS
RESPIRATION RATE: 18 BRPM | OXYGEN SATURATION: 97 % | HEART RATE: 115 BPM | HEIGHT: 70 IN | SYSTOLIC BLOOD PRESSURE: 116 MMHG | BODY MASS INDEX: 24.43 KG/M2 | DIASTOLIC BLOOD PRESSURE: 72 MMHG | WEIGHT: 170.63 LBS | TEMPERATURE: 98 F

## 2022-01-11 VITALS
DIASTOLIC BLOOD PRESSURE: 69 MMHG | OXYGEN SATURATION: 95 % | WEIGHT: 170.63 LBS | HEIGHT: 70 IN | HEART RATE: 127 BPM | BODY MASS INDEX: 24.43 KG/M2 | SYSTOLIC BLOOD PRESSURE: 107 MMHG

## 2022-01-11 DIAGNOSIS — J44.89 COPD WITH CHRONIC BRONCHITIS: ICD-10-CM

## 2022-01-11 DIAGNOSIS — L50.9 HIVES: Primary | ICD-10-CM

## 2022-01-11 DIAGNOSIS — E11.21 DIABETES MELLITUS WITH NEPHROPATHY: Primary | ICD-10-CM

## 2022-01-11 DIAGNOSIS — I27.20 PULMONARY HYPERTENSION: ICD-10-CM

## 2022-01-11 DIAGNOSIS — E11.21 DIABETES MELLITUS WITH NEPHROPATHY: ICD-10-CM

## 2022-01-11 DIAGNOSIS — I10 ESSENTIAL HYPERTENSION: ICD-10-CM

## 2022-01-11 DIAGNOSIS — I70.0 ATHEROSCLEROSIS OF AORTA: ICD-10-CM

## 2022-01-11 DIAGNOSIS — F33.42 RECURRENT MAJOR DEPRESSIVE DISORDER, IN FULL REMISSION: ICD-10-CM

## 2022-01-11 DIAGNOSIS — N18.32 CHRONIC RENAL FAILURE, STAGE 3B: ICD-10-CM

## 2022-01-11 DIAGNOSIS — M67.412: Primary | ICD-10-CM

## 2022-01-11 PROCEDURE — 1160F RVW MEDS BY RX/DR IN RCRD: CPT | Mod: CPTII,S$GLB,, | Performed by: FAMILY MEDICINE

## 2022-01-11 PROCEDURE — 96372 PR INJECTION,THERAP/PROPH/DIAG2ST, IM OR SUBCUT: ICD-10-PCS | Mod: S$GLB,,, | Performed by: FAMILY MEDICINE

## 2022-01-11 PROCEDURE — 99499 RISK ADDL DX/OHS AUDIT: ICD-10-PCS | Mod: S$GLB,,, | Performed by: FAMILY MEDICINE

## 2022-01-11 PROCEDURE — 99999 PR PBB SHADOW E&M-EST. PATIENT-LVL IV: ICD-10-PCS | Mod: PBBFAC,,, | Performed by: FAMILY MEDICINE

## 2022-01-11 PROCEDURE — 1159F MED LIST DOCD IN RCRD: CPT | Mod: CPTII,S$GLB,, | Performed by: SURGERY

## 2022-01-11 PROCEDURE — 96372 THER/PROPH/DIAG INJ SC/IM: CPT | Mod: S$GLB,,, | Performed by: FAMILY MEDICINE

## 2022-01-11 PROCEDURE — 1101F PR PT FALLS ASSESS DOC 0-1 FALLS W/OUT INJ PAST YR: ICD-10-PCS | Mod: CPTII,S$GLB,, | Performed by: FAMILY MEDICINE

## 2022-01-11 PROCEDURE — 99214 OFFICE O/P EST MOD 30 MIN: CPT | Mod: 25,S$GLB,, | Performed by: FAMILY MEDICINE

## 2022-01-11 PROCEDURE — 99499 UNLISTED E&M SERVICE: CPT | Mod: S$GLB,,, | Performed by: FAMILY MEDICINE

## 2022-01-11 PROCEDURE — 3288F FALL RISK ASSESSMENT DOCD: CPT | Mod: CPTII,S$GLB,, | Performed by: SURGERY

## 2022-01-11 PROCEDURE — 99203 OFFICE O/P NEW LOW 30 MIN: CPT | Mod: S$GLB,,, | Performed by: SURGERY

## 2022-01-11 PROCEDURE — 3078F PR MOST RECENT DIASTOLIC BLOOD PRESSURE < 80 MM HG: ICD-10-PCS | Mod: CPTII,S$GLB,, | Performed by: FAMILY MEDICINE

## 2022-01-11 PROCEDURE — 1126F AMNT PAIN NOTED NONE PRSNT: CPT | Mod: CPTII,S$GLB,, | Performed by: SURGERY

## 2022-01-11 PROCEDURE — 1160F PR REVIEW ALL MEDS BY PRESCRIBER/CLIN PHARMACIST DOCUMENTED: ICD-10-PCS | Mod: CPTII,S$GLB,, | Performed by: FAMILY MEDICINE

## 2022-01-11 PROCEDURE — 1101F PR PT FALLS ASSESS DOC 0-1 FALLS W/OUT INJ PAST YR: ICD-10-PCS | Mod: CPTII,S$GLB,, | Performed by: SURGERY

## 2022-01-11 PROCEDURE — 3288F PR FALLS RISK ASSESSMENT DOCUMENTED: ICD-10-PCS | Mod: CPTII,S$GLB,, | Performed by: SURGERY

## 2022-01-11 PROCEDURE — 3074F SYST BP LT 130 MM HG: CPT | Mod: CPTII,S$GLB,, | Performed by: FAMILY MEDICINE

## 2022-01-11 PROCEDURE — 1159F PR MEDICATION LIST DOCUMENTED IN MEDICAL RECORD: ICD-10-PCS | Mod: CPTII,S$GLB,, | Performed by: SURGERY

## 2022-01-11 PROCEDURE — 3288F PR FALLS RISK ASSESSMENT DOCUMENTED: ICD-10-PCS | Mod: CPTII,S$GLB,, | Performed by: FAMILY MEDICINE

## 2022-01-11 PROCEDURE — 1126F PR PAIN SEVERITY QUANTIFIED, NO PAIN PRESENT: ICD-10-PCS | Mod: CPTII,S$GLB,, | Performed by: FAMILY MEDICINE

## 2022-01-11 PROCEDURE — 3288F FALL RISK ASSESSMENT DOCD: CPT | Mod: CPTII,S$GLB,, | Performed by: FAMILY MEDICINE

## 2022-01-11 PROCEDURE — 3078F PR MOST RECENT DIASTOLIC BLOOD PRESSURE < 80 MM HG: ICD-10-PCS | Mod: CPTII,S$GLB,, | Performed by: SURGERY

## 2022-01-11 PROCEDURE — 3074F PR MOST RECENT SYSTOLIC BLOOD PRESSURE < 130 MM HG: ICD-10-PCS | Mod: CPTII,S$GLB,, | Performed by: FAMILY MEDICINE

## 2022-01-11 PROCEDURE — 3078F DIAST BP <80 MM HG: CPT | Mod: CPTII,S$GLB,, | Performed by: FAMILY MEDICINE

## 2022-01-11 PROCEDURE — 3074F PR MOST RECENT SYSTOLIC BLOOD PRESSURE < 130 MM HG: ICD-10-PCS | Mod: CPTII,S$GLB,, | Performed by: SURGERY

## 2022-01-11 PROCEDURE — 99999 PR PBB SHADOW E&M-EST. PATIENT-LVL III: CPT | Mod: PBBFAC,,, | Performed by: SURGERY

## 2022-01-11 PROCEDURE — 1126F PR PAIN SEVERITY QUANTIFIED, NO PAIN PRESENT: ICD-10-PCS | Mod: CPTII,S$GLB,, | Performed by: SURGERY

## 2022-01-11 PROCEDURE — 1159F MED LIST DOCD IN RCRD: CPT | Mod: CPTII,S$GLB,, | Performed by: FAMILY MEDICINE

## 2022-01-11 PROCEDURE — 3078F DIAST BP <80 MM HG: CPT | Mod: CPTII,S$GLB,, | Performed by: SURGERY

## 2022-01-11 PROCEDURE — 99214 PR OFFICE/OUTPT VISIT, EST, LEVL IV, 30-39 MIN: ICD-10-PCS | Mod: 25,S$GLB,, | Performed by: FAMILY MEDICINE

## 2022-01-11 PROCEDURE — 1101F PT FALLS ASSESS-DOCD LE1/YR: CPT | Mod: CPTII,S$GLB,, | Performed by: SURGERY

## 2022-01-11 PROCEDURE — 1126F AMNT PAIN NOTED NONE PRSNT: CPT | Mod: CPTII,S$GLB,, | Performed by: FAMILY MEDICINE

## 2022-01-11 PROCEDURE — 99999 PR PBB SHADOW E&M-EST. PATIENT-LVL III: ICD-10-PCS | Mod: PBBFAC,,, | Performed by: SURGERY

## 2022-01-11 PROCEDURE — 1159F PR MEDICATION LIST DOCUMENTED IN MEDICAL RECORD: ICD-10-PCS | Mod: CPTII,S$GLB,, | Performed by: FAMILY MEDICINE

## 2022-01-11 PROCEDURE — 1101F PT FALLS ASSESS-DOCD LE1/YR: CPT | Mod: CPTII,S$GLB,, | Performed by: FAMILY MEDICINE

## 2022-01-11 PROCEDURE — 99203 PR OFFICE/OUTPT VISIT, NEW, LEVL III, 30-44 MIN: ICD-10-PCS | Mod: S$GLB,,, | Performed by: SURGERY

## 2022-01-11 PROCEDURE — 3074F SYST BP LT 130 MM HG: CPT | Mod: CPTII,S$GLB,, | Performed by: SURGERY

## 2022-01-11 PROCEDURE — 99999 PR PBB SHADOW E&M-EST. PATIENT-LVL IV: CPT | Mod: PBBFAC,,, | Performed by: FAMILY MEDICINE

## 2022-01-11 RX ORDER — HYDROXYZINE HYDROCHLORIDE 25 MG/1
25 TABLET, FILM COATED ORAL 3 TIMES DAILY PRN
Qty: 60 TABLET | Refills: 0 | Status: SHIPPED | OUTPATIENT
Start: 2022-01-11 | End: 2022-06-29

## 2022-01-11 RX ORDER — LANCETS
EACH MISCELLANEOUS
Qty: 30 EACH | Refills: 11 | Status: SHIPPED | OUTPATIENT
Start: 2022-01-11 | End: 2022-01-14

## 2022-01-11 NOTE — PROGRESS NOTES
85 y/o man referred to me for left shoulder nodule which is asymptomatic. His main complaint is itching diffusely.  He was unable to sleep last night at all and is very concerned about the itching.  His explanation is he is itching because he did not get the shot for his prostate.    I tried to explain that I don't believe not getting the shot would cause such a reaction, but se seems determined.    I recommend he discuss this with Urology but he may want to discuss the ithcing and urticaria that he has diffusely with his primary care physician.    As for his shoulder lesion, it is likely an asymptomatic ganglion cyst given its location, and does not need to be addressed at this time.  If he develops symptoms, I would recommend an ortho eval.

## 2022-01-11 NOTE — PROGRESS NOTES
01/11/22 1445   Depression Patient Health Questionnaire (PHQ-2)   Over the last two weeks how often have you been bothered by little interest or pleasure in doing things 0   Over the last two weeks how often have you been bothered by feeling down, depressed or hopeless 0   PHQ-2 Total Score 0

## 2022-01-11 NOTE — TELEPHONE ENCOUNTER
----- Message from Kacie Davis sent at 1/11/2022  3:48 PM CST -----  Regarding: [facility]  Type:  Pharmacy Calling to Clarify an RX    Name of Caller: Brandy    Pharmacy Name: [facility]    Prescription Name:     What do they need to clarify? She needs to confirm glucometer. Please send script for glucometer orders and lancets. Please generic and they will figure out what insurance covers.     Can you be contacted via [facility]?    Best Call Back Number:     Additional Information:   [facility] [address] [place], [place] - [address] [place] [address]  Phone: [phone] Fax: [phone]

## 2022-01-13 PROBLEM — R26.81 GAIT INSTABILITY: Status: RESOLVED | Noted: 2020-09-03 | Resolved: 2022-01-13

## 2022-01-13 PROBLEM — Z02.9 DISCHARGE PLANNING ISSUES: Status: RESOLVED | Noted: 2020-04-14 | Resolved: 2022-01-13

## 2022-01-13 PROBLEM — Z75.8 DISCHARGE PLANNING ISSUES: Status: RESOLVED | Noted: 2020-04-14 | Resolved: 2022-01-13

## 2022-01-13 PROBLEM — R07.9 CHEST PAIN: Status: RESOLVED | Noted: 2021-05-20 | Resolved: 2022-01-13

## 2022-01-13 NOTE — PROGRESS NOTES
Subjective:       Patient ID: Peter Lopez is a 86 y.o. male.    Chief Complaint: Rash (Pt states he itching all over his body ) and Urticaria    Diabetes  He presents for his follow-up diabetic visit. He has type 2 diabetes mellitus. His disease course has been stable. There are no hypoglycemic associated symptoms. Pertinent negatives for hypoglycemia include no headaches, nervousness/anxiousness or sweats. There are no diabetic associated symptoms. Pertinent negatives for diabetes include no blurred vision and no chest pain. Pertinent negatives for diabetic complications include no CVA, PVD or retinopathy. Risk factors for coronary artery disease include diabetes mellitus, hypertension and male sex. Current diabetic treatment includes oral agent (monotherapy). He is compliant with treatment all of the time. He is following a generally healthy diet. When asked about meal planning, he reported none. He has not had a previous visit with a dietitian. He participates in exercise intermittently. There is no change in his home blood glucose trend. An ACE inhibitor/angiotensin II receptor blocker is being taken.   Hypertension  This is a chronic problem. Pertinent negatives include no anxiety, blurred vision, chest pain, headaches, malaise/fatigue, neck pain, orthopnea, palpitations, peripheral edema, PND, shortness of breath or sweats. There are no associated agents to hypertension. Risk factors for coronary artery disease include diabetes mellitus, male gender and dyslipidemia. Past treatments include diuretics and angiotensin blockers. The current treatment provides significant improvement. Hypertensive end-organ damage includes kidney disease. There is no history of angina, CVA, PVD or retinopathy. Identifiable causes of hypertension include chronic renal disease.   Depression  Visit Type: follow-up  Patient is not experiencing: anhedonia, chest pain, decreased concentration, depressed mood, excessive worry, feelings of  hopelessness, hyperventilation, insomnia, irritability, memory impairment, nervousness/anxiety, palpitations, psychomotor agitation, psychomotor retardation, shortness of breath, suicidal ideas, suicidal planning and thoughts of death.    Urticaria  This is a new problem. The current episode started in the past 7 days. The affected locations include the back, torso, left upper leg, left lower leg, left arm, right arm, right upper leg and right lower leg. The rash is characterized by swelling and itchiness. Associated with: started after taking Nyquil. Pertinent negatives include no congestion, cough, fever, rhinorrhea, shortness of breath or vomiting. Past treatments include nothing.     Review of Systems   Constitutional: Negative for fever, irritability and malaise/fatigue.   HENT: Negative for nasal congestion and rhinorrhea.    Eyes: Negative for blurred vision.   Respiratory: Negative for cough and shortness of breath.    Cardiovascular: Negative for chest pain, palpitations, orthopnea and PND.   Gastrointestinal: Negative for vomiting.   Musculoskeletal: Negative for neck pain.   Integumentary:  Positive for rash. Negative for wound.   Neurological: Negative for headaches.   Psychiatric/Behavioral: Positive for depression. Negative for decreased concentration and suicidal ideas. The patient is not nervous/anxious and does not have insomnia.          Objective:      Physical Exam  Vitals reviewed.   Constitutional:       Appearance: He is well-developed. He is not diaphoretic.   HENT:      Head: Normocephalic.      Right Ear: External ear normal.      Left Ear: External ear normal.      Nose: Nose normal.      Mouth/Throat:      Pharynx: No pharyngeal swelling, oropharyngeal exudate or posterior oropharyngeal erythema.   Neck:      Trachea: No tracheal deviation.   Cardiovascular:      Rate and Rhythm: Regular rhythm. Tachycardia present.      Heart sounds: Normal heart sounds.   Pulmonary:      Effort:  Pulmonary effort is normal.      Breath sounds: Normal breath sounds. No wheezing or rales.   Abdominal:      General: Bowel sounds are normal.      Palpations: Abdomen is soft. Abdomen is not rigid. There is no mass.      Tenderness: There is no abdominal tenderness. There is no guarding or rebound.   Musculoskeletal:      Cervical back: Normal range of motion and neck supple.   Lymphadenopathy:      Cervical: No cervical adenopathy.   Skin:     Findings: Rash present. Rash is urticarial (scattered throughout body).   Neurological:      Mental Status: He is oriented to person, place, and time.      Sensory: No sensory deficit.      Motor: No atrophy.      Gait: Gait normal.      Deep Tendon Reflexes:      Reflex Scores:       Patellar reflexes are 2+ on the right side and 2+ on the left side.        Lab Visit on 12/15/2021   Component Date Value Ref Range Status    Microalbumin, Urine 12/15/2021 56.0  ug/mL Final    Creatinine, Urine 12/15/2021 132.0  23.0 - 375.0 mg/dL Final    Microalb/Creat Ratio 12/15/2021 42.4* 0.0 - 30.0 ug/mg Final   Lab Visit on 12/15/2021   Component Date Value Ref Range Status    Sodium 12/15/2021 144  136 - 145 mmol/L Final    Potassium 12/15/2021 4.2  3.5 - 5.1 mmol/L Final    Chloride 12/15/2021 103  95 - 110 mmol/L Final    CO2 12/15/2021 28  23 - 29 mmol/L Final    Glucose 12/15/2021 153* 70 - 110 mg/dL Final    BUN 12/15/2021 43* 8 - 23 mg/dL Final    Creatinine 12/15/2021 2.2* 0.5 - 1.4 mg/dL Final    Calcium 12/15/2021 10.2  8.7 - 10.5 mg/dL Final    Total Protein 12/15/2021 7.6  6.0 - 8.4 g/dL Final    Albumin 12/15/2021 3.9  3.5 - 5.2 g/dL Final    Total Bilirubin 12/15/2021 0.4  0.1 - 1.0 mg/dL Final    Comment: For infants and newborns, interpretation of results should be based  on gestational age, weight and in agreement with clinical  observations.    Premature Infant recommended reference ranges:  Up to 24 hours.............<8.0 mg/dL  Up to 48  hours............<12.0 mg/dL  3-5 days..................<15.0 mg/dL  6-29 days.................<15.0 mg/dL      Alkaline Phosphatase 12/15/2021 43* 55 - 135 U/L Final    AST 12/15/2021 35  10 - 40 U/L Final    ALT 12/15/2021 24  10 - 44 U/L Final    Anion Gap 12/15/2021 13  8 - 16 mmol/L Final    eGFR if  12/15/2021 31* >60 mL/min/1.73 m^2 Final    eGFR if non  12/15/2021 27* >60 mL/min/1.73 m^2 Final    Comment: Calculation used to obtain the estimated glomerular filtration  rate (eGFR) is the CKD-EPI equation.       Hemoglobin A1C 12/15/2021 6.8* 4.0 - 5.6 % Final    Comment: ADA Screening Guidelines:  5.7-6.4%  Consistent with prediabetes  >or=6.5%  Consistent with diabetes    High levels of fetal hemoglobin interfere with the HbA1C  assay. Heterozygous hemoglobin variants (HbS, HgC, etc)do  not significantly interfere with this assay.   However, presence of multiple variants may affect accuracy.      Estimated Avg Glucose 12/15/2021 148* 68 - 131 mg/dL Final    WBC 12/15/2021 3.47* 3.90 - 12.70 K/uL Final    RBC 12/15/2021 3.52* 4.60 - 6.20 M/uL Final    Hemoglobin 12/15/2021 10.3* 14.0 - 18.0 g/dL Final    Hematocrit 12/15/2021 33.0* 40.0 - 54.0 % Final    MCV 12/15/2021 94  82 - 98 fL Final    MCH 12/15/2021 29.3  27.0 - 31.0 pg Final    MCHC 12/15/2021 31.2* 32.0 - 36.0 g/dL Final    RDW 12/15/2021 13.4  11.5 - 14.5 % Final    Platelets 12/15/2021 310  150 - 450 K/uL Final    MPV 12/15/2021 9.3  9.2 - 12.9 fL Final    Immature Granulocytes 12/15/2021 0.3  0.0 - 0.5 % Final    Gran # (ANC) 12/15/2021 2.1  1.8 - 7.7 K/uL Final    Immature Grans (Abs) 12/15/2021 0.01  0.00 - 0.04 K/uL Final    Comment: Mild elevation in immature granulocytes is non specific and   can be seen in a variety of conditions including stress response,   acute inflammation, trauma and pregnancy. Correlation with other   laboratory and clinical findings is essential.      Lymph #  12/15/2021 1.0  1.0 - 4.8 K/uL Final    Mono # 12/15/2021 0.3  0.3 - 1.0 K/uL Final    Eos # 12/15/2021 0.1  0.0 - 0.5 K/uL Final    Baso # 12/15/2021 0.02  0.00 - 0.20 K/uL Final    nRBC 12/15/2021 0  0 /100 WBC Final    Gran % 12/15/2021 59.3  38.0 - 73.0 % Final    Lymph % 12/15/2021 30.0  18.0 - 48.0 % Final    Mono % 12/15/2021 7.2  4.0 - 15.0 % Final    Eosinophil % 12/15/2021 2.6  0.0 - 8.0 % Final    Basophil % 12/15/2021 0.6  0.0 - 1.9 % Final    Differential Method 12/15/2021 Automated   Final    PTH, Intact 12/15/2021 53.8  9.0 - 77.0 pg/mL Final       Assessment:       Problem List Items Addressed This Visit        Pulmonary    Pulmonary hypertension (Chronic)       Cardiac/Vascular    Essential hypertension (Chronic)      Other Visit Diagnoses     Hives    -  Primary    Relevant Medications    methylPREDNISolone sodium succinate injection 40 mg (Completed)    hydrOXYzine HCL (ATARAX) 25 MG tablet    Diabetes mellitus with nephropathy        Relevant Medications    lancets Misc    blood sugar diagnostic Strp    Chronic renal failure, stage 3b        Recurrent major depressive disorder, in full remission        Atherosclerosis of aorta        COPD with chronic bronchitis              Plan:       Peter was seen today for rash and urticaria.    Diagnoses and all orders for this visit:    Hives  -     methylPREDNISolone sodium succinate injection 40 mg  -     hydrOXYzine HCL (ATARAX) 25 MG tablet; Take 1 tablet (25 mg total) by mouth 3 (three) times daily as needed for Itching.  IM Solumedrol today.  Advised to keep an eye on sugars  Continue hydroxyzine PRN for itching/hives until resolution    Essential hypertension  Patient presents for follow up on hypertension. Reports good compliance with medications, without any side effects and no concerns today. Blood pressure reading today is good.  Continue regimen    Diabetes mellitus with nephropathy  -     lancets Misc; To check BG 1 times daily, to  use with insurance preferred meter  -     blood sugar diagnostic Strp; To check BG 1 times daily, to use with insurance preferred meter  Continue diabetic regimen    Chronic renal failure, stage 3b  Continue BP and sugar control, as well a good hydration    Recurrent major depressive disorder, in full remission  No acute concerns    Atherosclerosis of aorta  Continue statin    Pulmonary hypertension  No acute concerns reported    COPD with chronic bronchitis  No acute concerns reported.

## 2022-01-14 RX ORDER — LANCETS
EACH MISCELLANEOUS
Qty: 50 EACH | Refills: 11 | Status: SHIPPED | OUTPATIENT
Start: 2022-01-14 | End: 2022-06-29

## 2022-01-14 RX ORDER — INSULIN PUMP SYRINGE, 3 ML
EACH MISCELLANEOUS
Qty: 1 EACH | Refills: 0 | Status: SHIPPED | OUTPATIENT
Start: 2022-01-14 | End: 2023-12-04

## 2022-01-24 ENCOUNTER — PES CALL (OUTPATIENT)
Dept: ADMINISTRATIVE | Facility: CLINIC | Age: 87
End: 2022-01-24
Payer: MEDICARE

## 2022-01-25 ENCOUNTER — TELEPHONE (OUTPATIENT)
Dept: UROLOGY | Facility: CLINIC | Age: 87
End: 2022-01-25
Payer: MEDICARE

## 2022-01-25 RX ORDER — TRIPTORELIN PAMOATE 22.5 MG
22.5 KIT INTRAMUSCULAR
Qty: 1 EACH | Refills: 4 | Status: CANCELLED | OUTPATIENT
Start: 2022-01-25 | End: 2023-01-25

## 2022-01-25 NOTE — TELEPHONE ENCOUNTER
Lupron has not come in yet and maybe on backorder. We have Valorie in office, would you like to send a auth for Trelstar or wait for Lupron?

## 2022-01-25 NOTE — TELEPHONE ENCOUNTER
----- Message from Sonali Fink sent at 1/25/2022 11:37 AM CST -----  Regarding: self [phone]  Type: Patient Call Back    Who called: self    What is the request in detail: Patient would like to speak to nurse. Patient needs his shot he showed up and was told he would get a call back and no one has gotten in touch with him.     Can the clinic reply by [facility]? no    Would the patient rather a call back or a response via [facility]? Call back    Best call back number: [phone]

## 2022-01-26 DIAGNOSIS — C61 MALIGNANT NEOPLASM OF PROSTATE: Primary | ICD-10-CM

## 2022-01-26 RX ORDER — TRIPTORELIN PAMOATE 22.5 MG
22.5 KIT INTRAMUSCULAR
Qty: 2 EACH | Refills: 0 | OUTPATIENT
Start: 2022-01-26 | End: 2022-02-04

## 2022-01-28 ENCOUNTER — TELEPHONE (OUTPATIENT)
Dept: UROLOGY | Facility: CLINIC | Age: 87
End: 2022-01-28
Payer: MEDICARE

## 2022-01-28 NOTE — TELEPHONE ENCOUNTER
----- Message from Janet La sent at 1/28/2022  9:24 AM CST -----  Type:  Patient Returning Call    Who Called: self     Who Left Message for Patient: Drea    Does the patient know what this is regarding?: no     Would the patient rather a call back or a response via [facility]? Call back     Best Call Back Number: cell: [phone] home: [phone]

## 2022-02-01 ENCOUNTER — SPECIALTY PHARMACY (OUTPATIENT)
Dept: PHARMACY | Facility: CLINIC | Age: 87
End: 2022-02-01
Payer: MEDICARE

## 2022-02-02 ENCOUNTER — CLINICAL SUPPORT (OUTPATIENT)
Dept: UROLOGY | Facility: CLINIC | Age: 87
End: 2022-02-02
Payer: MEDICARE

## 2022-02-02 DIAGNOSIS — C61 MALIGNANT NEOPLASM OF PROSTATE: Primary | ICD-10-CM

## 2022-02-02 PROCEDURE — 96402 PR CHEMOTHER HORMON ANTINEOPL SUB-Q/IM: ICD-10-PCS | Mod: S$GLB,,, | Performed by: STUDENT IN AN ORGANIZED HEALTH CARE EDUCATION/TRAINING PROGRAM

## 2022-02-02 PROCEDURE — 96402 CHEMO HORMON ANTINEOPL SQ/IM: CPT | Mod: S$GLB,,, | Performed by: STUDENT IN AN ORGANIZED HEALTH CARE EDUCATION/TRAINING PROGRAM

## 2022-02-02 PROCEDURE — 99999 PR PBB SHADOW E&M-EST. PATIENT-LVL II: ICD-10-PCS | Mod: PBBFAC,,,

## 2022-02-02 PROCEDURE — 99999 PR PBB SHADOW E&M-EST. PATIENT-LVL II: CPT | Mod: PBBFAC,,,

## 2022-02-03 VITALS — WEIGHT: 170.63 LBS | BODY MASS INDEX: 24.48 KG/M2

## 2022-02-04 DIAGNOSIS — C61 MALIGNANT NEOPLASM OF PROSTATE: Primary | ICD-10-CM

## 2022-02-04 RX ORDER — LEUPROLIDE ACETATE 45 MG
45 KIT INTRAMUSCULAR
Qty: 2 EACH | Refills: 0 | Status: SHIPPED | OUTPATIENT
Start: 2022-02-04 | End: 2022-02-24

## 2022-02-04 NOTE — TELEPHONE ENCOUNTER
PA request denied. Trelstar is not covered under part D. Will reach out to provider regarding possible buy and bill, then close pt's referral.

## 2022-02-11 ENCOUNTER — SPECIALTY PHARMACY (OUTPATIENT)
Dept: PHARMACY | Facility: CLINIC | Age: 87
End: 2022-02-11
Payer: MEDICARE

## 2022-02-11 NOTE — TELEPHONE ENCOUNTER
Grupo order received. PA submitted via [facility]. Awaiting determination. Key: [id number] - PA Case ID: [id number]

## 2022-02-15 NOTE — TELEPHONE ENCOUNTER
Request Reference Number: [id number]. LUPRON DEPOT INJ 45MG is approved through 12/31/2022.     Estimated copay is 5956.44. will route to  for review.

## 2022-02-21 NOTE — TELEPHONE ENCOUNTER
BENEFIT INVESTIGATION:  [employer] Medicare plan.   [facility] in network  Estimated co pay: $2763.56  This fill pushes patient into catastrophic stage of coverage.   Will pay 5% coinsurance in catastrophic.  Co pay assistance required. Forwarded to FA team for review.

## 2022-02-22 NOTE — TELEPHONE ENCOUNTER
Lupron approved by insurance with $2763.56 copay. No FA options available currently. Buy and Bill may be an option. Forwarding to [facility] to follow up accordingly.

## 2022-02-24 ENCOUNTER — TELEPHONE (OUTPATIENT)
Dept: FAMILY MEDICINE | Facility: CLINIC | Age: 87
End: 2022-02-24
Payer: MEDICARE

## 2022-02-24 DIAGNOSIS — J96.11 CHRONIC RESPIRATORY FAILURE WITH HYPOXIA: Primary | ICD-10-CM

## 2022-02-24 NOTE — TELEPHONE ENCOUNTER
----- Message from Bernadette Hubbard MA sent at 2/21/2022 11:28 AM CST -----  Pt states he need his oxygen tank picked up   ----- Message -----  From: Puja Leon  Sent: 2/21/2022  10:28 AM CST  To: [facility] Staff    Type: Patient Call Back    Who called: self     What is the request in detail: patient stated that is very important that someone calls him. Please call    Can the clinic reply by [facility]? no    Would the patient rather a call back or a response via [facility]? call    Best call back number: .[phone]

## 2022-02-25 NOTE — TELEPHONE ENCOUNTER
Messaged provider's office that there is no assistance at this time for pt to receive through pharmacy benefits. Will need to go through medical insurance.

## 2022-02-28 ENCOUNTER — TELEPHONE (OUTPATIENT)
Dept: FAMILY MEDICINE | Facility: CLINIC | Age: 87
End: 2022-02-28
Payer: MEDICARE

## 2022-02-28 NOTE — TELEPHONE ENCOUNTER
Called [facility] , was connected to the oxygen department . States that in order to have his oxygen picked up Dr. Lloyd would have to place an order . Patient doesn't want to pay his bill, please advise

## 2022-02-28 NOTE — TELEPHONE ENCOUNTER
----- Message from Oriana Snell sent at 2/28/2022  3:34 PM CST -----  Contact: Patient [phone]  Type: Patient Call Back    Who called: Patient     What is the request in detail: Would like nurse to contact [facility] at [phone], in regards to picking up his oxygen machines. Patient was told that the doctor's office had to call. Please call pt once this is done.     Would the patient rather a call back or a response via [facility]? Call back    Best call back number: [phone]

## 2022-03-02 ENCOUNTER — TELEPHONE (OUTPATIENT)
Dept: FAMILY MEDICINE | Facility: CLINIC | Age: 87
End: 2022-03-02
Payer: MEDICARE

## 2022-03-02 NOTE — TELEPHONE ENCOUNTER
----- Message from Bernadette Hubbard MA sent at 3/2/2022  1:19 PM CST -----  Regarding: FW: self [phone]  Please Advise   ----- Message -----  From: Sonali Fink  Sent: 3/2/2022   1:07 PM CST  To: [facility] Staff  Subject: self [phone]                                Type: Patient Call Back    Who called: self    What is the request in detail: patient asked that someone from providers office call him. Wouldn't give details.     Can the clinic reply by [facility]? no    Would the patient rather a call back or a response via [facility]?  Call back    Best call back number: [phone]

## 2022-03-02 NOTE — TELEPHONE ENCOUNTER
Rt patient call , he says order was received and he was told they'll be picking up the oxygen tanks on Monday .

## 2022-03-11 ENCOUNTER — OFFICE VISIT (OUTPATIENT)
Dept: URGENT CARE | Facility: CLINIC | Age: 87
End: 2022-03-11
Payer: MEDICARE

## 2022-03-11 VITALS
SYSTOLIC BLOOD PRESSURE: 121 MMHG | WEIGHT: 164 LBS | HEART RATE: 90 BPM | DIASTOLIC BLOOD PRESSURE: 71 MMHG | OXYGEN SATURATION: 98 % | BODY MASS INDEX: 23.48 KG/M2 | HEIGHT: 70 IN | TEMPERATURE: 97 F | RESPIRATION RATE: 16 BRPM

## 2022-03-11 DIAGNOSIS — R11.2 NON-INTRACTABLE VOMITING WITH NAUSEA, UNSPECIFIED VOMITING TYPE: Primary | ICD-10-CM

## 2022-03-11 DIAGNOSIS — K59.00 CONSTIPATION, UNSPECIFIED CONSTIPATION TYPE: ICD-10-CM

## 2022-03-11 PROCEDURE — 74019 RADEX ABDOMEN 2 VIEWS: CPT | Mod: S$GLB,,, | Performed by: RADIOLOGY

## 2022-03-11 PROCEDURE — 1159F PR MEDICATION LIST DOCUMENTED IN MEDICAL RECORD: ICD-10-PCS | Mod: CPTII,S$GLB,,

## 2022-03-11 PROCEDURE — 3074F PR MOST RECENT SYSTOLIC BLOOD PRESSURE < 130 MM HG: ICD-10-PCS | Mod: CPTII,S$GLB,,

## 2022-03-11 PROCEDURE — 1160F RVW MEDS BY RX/DR IN RCRD: CPT | Mod: CPTII,S$GLB,,

## 2022-03-11 PROCEDURE — 3078F DIAST BP <80 MM HG: CPT | Mod: CPTII,S$GLB,,

## 2022-03-11 PROCEDURE — 1159F MED LIST DOCD IN RCRD: CPT | Mod: CPTII,S$GLB,,

## 2022-03-11 PROCEDURE — 3078F PR MOST RECENT DIASTOLIC BLOOD PRESSURE < 80 MM HG: ICD-10-PCS | Mod: CPTII,S$GLB,,

## 2022-03-11 PROCEDURE — 99203 OFFICE O/P NEW LOW 30 MIN: CPT | Mod: S$GLB,,,

## 2022-03-11 PROCEDURE — 1160F PR REVIEW ALL MEDS BY PRESCRIBER/CLIN PHARMACIST DOCUMENTED: ICD-10-PCS | Mod: CPTII,S$GLB,,

## 2022-03-11 PROCEDURE — 3074F SYST BP LT 130 MM HG: CPT | Mod: CPTII,S$GLB,,

## 2022-03-11 PROCEDURE — 99203 PR OFFICE/OUTPT VISIT, NEW, LEVL III, 30-44 MIN: ICD-10-PCS | Mod: S$GLB,,,

## 2022-03-11 PROCEDURE — 74019 XR ABDOMEN FLAT AND ERECT: ICD-10-PCS | Mod: S$GLB,,, | Performed by: RADIOLOGY

## 2022-03-11 RX ORDER — ONDANSETRON 4 MG/1
4 TABLET, FILM COATED ORAL EVERY 8 HOURS PRN
Qty: 30 TABLET | Refills: 0 | Status: SHIPPED | OUTPATIENT
Start: 2022-03-11 | End: 2022-06-29

## 2022-03-11 NOTE — PROGRESS NOTES
"Subjective:       Patient ID: Peter Lopez is a 86 y.o. male.    Vitals:  height is 5' 10" (1.778 m) and weight is 74.4 kg (164 lb). His temporal temperature is 97.2 °F (36.2 °C). His blood pressure is 121/71 and his pulse is 90. His respiration is 16 and oxygen saturation is 98%.     Chief Complaint: Nausea    Pt is an 85 y/o male with a hx of prostate cancer, T2DM, HTN, CKD stage 3 who presents with a 3-day hx of nausea. Had two episodes of NBNB emesis. First episode was 2 days ago and he vomited after eating cake and ice cream. Second episode today after eating a sandwich. Denies any abdominal pain. No hx of abdominal surgeries. Also c/o constipation for the past 5 days. Usually has 1 BM per day. Last BM yesterday but he reports that it was harder than usual. Before that, did not have a BM for 2-3 days. Has taken mag citrate without significant relief. States he picked up miralax to try today but has not taken it yet. Still passing gas. Denies fevers, back pain, bloody BMs, hematemesis, CP, SOB, dysuria, frequency, urgency, diarrhea.    Nausea  This is a new problem. Episode onset: 3 days ago. The problem occurs constantly. The problem has been unchanged. Associated symptoms include nausea and vomiting. Pertinent negatives include no abdominal pain, chest pain, chills, congestion, coughing, fever, headaches, myalgias, neck pain, rash or sore throat. The symptoms are aggravated by eating. He has tried nothing for the symptoms.       Constitution: Negative for chills and fever.   HENT: Negative for ear pain, congestion and sore throat.    Neck: Negative for neck pain.   Cardiovascular: Negative for chest pain.   Respiratory: Negative for cough and shortness of breath.    Gastrointestinal: Positive for nausea, vomiting and constipation. Negative for abdominal pain, diarrhea, bright red blood in stool, dark colored stools and heartburn.   Genitourinary: Negative for dysuria, frequency, urgency and flank pain. "   Musculoskeletal: Negative for muscle ache.   Skin: Negative for rash.   Neurological: Negative for dizziness, light-headedness and headaches.       Objective:      Physical Exam   Constitutional: He is oriented to person, place, and time. He appears well-developed.   HENT:   Head: Normocephalic and atraumatic.   Ears:   Right Ear: External ear normal.   Left Ear: External ear normal.   Nose: Nose normal.   Mouth/Throat: Mucous membranes are normal.   Eyes: Conjunctivae and lids are normal.   Neck: Trachea normal. Neck supple.   Cardiovascular: Normal rate, regular rhythm and normal heart sounds.   Pulmonary/Chest: Effort normal and breath sounds normal. No respiratory distress.   Abdominal: Normal appearance and bowel sounds are normal. He exhibits no distension, no abdominal bruit, no pulsatile midline mass and no mass. Soft. There is no abdominal tenderness. There is no rebound, no guarding, no left CVA tenderness and no right CVA tenderness.   Musculoskeletal: Normal range of motion.         General: Normal range of motion.   Neurological: He is alert and oriented to person, place, and time. He has normal strength.   Skin: Skin is warm, dry, intact, not diaphoretic and not pale. Capillary refill takes less than 2 seconds.   Psychiatric: His speech is normal and behavior is normal. Judgment and thought content normal.   Nursing note and vitals reviewed.  XR ABDOMEN FLAT AND ERECT    Result Date: 3/11/2022  EXAMINATION: XR ABDOMEN FLAT AND ERECT CLINICAL HISTORY: Nausea with vomiting, unspecified FINDINGS: Abdomen flat erect: There is bibasal subsegmental atelectasis.  There is constipation.  No obstruction, ileus, or perforation seen. Electronically signed by: Leno Bradley MD Date:    03/11/2022 Time:    15:36        Assessment:       1. Non-intractable vomiting with nausea, unspecified vomiting type    2. Constipation, unspecified constipation type          Plan:         Non-intractable vomiting with nausea,  unspecified vomiting type  -     XR ABDOMEN FLAT AND ERECT; Future; Expected date: 03/11/2022    Constipation, unspecified constipation type    Other orders  -     ondansetron (ZOFRAN) 4 MG tablet; Take 1 tablet (4 mg total) by mouth every 8 (eight) hours as needed for Nausea.  Dispense: 30 tablet; Refill: 0           Medical Decision Making:   Initial Assessment:   Pt is an 85 y/o male with a hx of prostate cancer, T2DM, HTN, CKD stage 3 who presents with a 3-day hx of nausea. Had two episodes of NBNB emesis. Also c/o constipation. Last BM yesterday. Previous BM before that was 2-3 days ago. His normal is 1 BM per day.  Differential Diagnosis:   DDx includes but not limited to gastroenteritis, PUD, GERD, constipation, bowel obstruction  Clinical Tests:   Radiological Study: Ordered and Reviewed  Urgent Care Management:  Reviewed XR with pt. Constipation seen without obstruction, ileus, or perforation. Zofran given for nausea. Pt juan luis has miralax at home that he can try. Also instructed to increase fluid intake. Return and ED precautions for worsening symptoms, such as fevers, worsening abdominal pain, intractable vomiting. Pt has PCP f/up on 3/14/22. Pt verbalizes understanding and agrees with plan.         Patient Instructions   - Rest.    - Drink plenty of fluids.  - Pedialyte, Gatorade/powerade, water     - Tylenol or Ibuprofen as directed as needed for fever/pain.      - Take zofran (ondansetron) 4-8 mg every 8 hours as needed, as prescribed for nausea     - can take over-the-counter Pepto-Bismol to help with diarrhea.     -Dicyclomine is an anti-spasmodic that helps with stomach pain/cramps associated with diarrhea. It is to be taken only as needed.      - Follow up with your PCP or specialty clinic as directed in the next 2-3 days if not improved or as needed.  You can call [phone] to schedule an appointment with the appropriate provider.    - Go to the ER if you develop any new or worsening  symptoms     - You must understand that you have received an Urgent Care treatment only and that you may be released before all of your medical problems are known or treated.   - You, the patient, will arrange for follow up care as instructed.   - If your condition worsens or fails to improve we recommend that you receive another evaluation at the ER immediately or contact your PCP to discuss your concerns or return here.       Constipation  - you can use the phosphates (fleet) enema and the bisacodyl suppository as directed to clear any blockage from underneath.  After you having good bowel movement, you can use magnesium citrate (laxative-sold otc in clear glass bottle usually) if needed for constipation.  You can also take polyethylene glycol daily as directed to help with constipation and help to regulate bowel pattern.  Make sure to drink lots of water while taking this medication.

## 2022-03-11 NOTE — PATIENT INSTRUCTIONS
- Rest.    - Drink plenty of fluids.  - Pedialyte, Gatorade/powerade, water     - Tylenol or Ibuprofen as directed as needed for fever/pain.      - Take zofran (ondansetron) 4-8 mg every 8 hours as needed, as prescribed for nausea     - can take over-the-counter Pepto-Bismol to help with diarrhea.     -Dicyclomine is an anti-spasmodic that helps with stomach pain/cramps associated with diarrhea. It is to be taken only as needed.      - Follow up with your PCP or specialty clinic as directed in the next 2-3 days if not improved or as needed.  You can call [phone] to schedule an appointment with the appropriate provider.    - Go to the ER if you develop any new or worsening symptoms     - You must understand that you have received an Urgent Care treatment only and that you may be released before all of your medical problems are known or treated.   - You, the patient, will arrange for follow up care as instructed.   - If your condition worsens or fails to improve we recommend that you receive another evaluation at the ER immediately or contact your PCP to discuss your concerns or return here.       Constipation  - you can use the phosphates (fleet) enema and the bisacodyl suppository as directed to clear any blockage from underneath.  After you having good bowel movement, you can use magnesium citrate (laxative-sold otc in clear glass bottle usually) if needed for constipation.  You can also take polyethylene glycol daily as directed to help with constipation and help to regulate bowel pattern.  Make sure to drink lots of water while taking this medication.

## 2022-03-14 ENCOUNTER — LAB VISIT (OUTPATIENT)
Dept: LAB | Facility: HOSPITAL | Age: 87
End: 2022-03-14
Attending: FAMILY MEDICINE
Payer: MEDICARE

## 2022-03-14 DIAGNOSIS — I10 ESSENTIAL HYPERTENSION: ICD-10-CM

## 2022-03-14 DIAGNOSIS — D63.1 ANEMIA ASSOCIATED WITH CHRONIC RENAL FAILURE: ICD-10-CM

## 2022-03-14 DIAGNOSIS — E11.21 DIABETES MELLITUS WITH NEPHROPATHY: ICD-10-CM

## 2022-03-14 DIAGNOSIS — N18.32 CHRONIC RENAL FAILURE, STAGE 3B: ICD-10-CM

## 2022-03-14 DIAGNOSIS — N18.9 ANEMIA ASSOCIATED WITH CHRONIC RENAL FAILURE: ICD-10-CM

## 2022-03-14 LAB
ALBUMIN SERPL BCP-MCNC: 3.6 G/DL (ref 3.5–5.2)
ALP SERPL-CCNC: 42 U/L (ref 55–135)
ALT SERPL W/O P-5'-P-CCNC: 24 U/L (ref 10–44)
ANION GAP SERPL CALC-SCNC: 13 MMOL/L (ref 8–16)
AST SERPL-CCNC: 30 U/L (ref 10–40)
BASOPHILS # BLD AUTO: 0.03 K/UL (ref 0–0.2)
BASOPHILS NFR BLD: 0.9 % (ref 0–1.9)
BILIRUB SERPL-MCNC: 0.4 MG/DL (ref 0.1–1)
BUN SERPL-MCNC: 43 MG/DL (ref 8–23)
CALCIUM SERPL-MCNC: 10 MG/DL (ref 8.7–10.5)
CHLORIDE SERPL-SCNC: 98 MMOL/L (ref 95–110)
CHOLEST SERPL-MCNC: 118 MG/DL (ref 120–199)
CHOLEST/HDLC SERPL: 2.9 {RATIO} (ref 2–5)
CO2 SERPL-SCNC: 27 MMOL/L (ref 23–29)
CREAT SERPL-MCNC: 2.4 MG/DL (ref 0.5–1.4)
DIFFERENTIAL METHOD: ABNORMAL
EOSINOPHIL # BLD AUTO: 0.1 K/UL (ref 0–0.5)
EOSINOPHIL NFR BLD: 2 % (ref 0–8)
ERYTHROCYTE [DISTWIDTH] IN BLOOD BY AUTOMATED COUNT: 14 % (ref 11.5–14.5)
EST. GFR  (AFRICAN AMERICAN): 28 ML/MIN/1.73 M^2
EST. GFR  (NON AFRICAN AMERICAN): 24 ML/MIN/1.73 M^2
ESTIMATED AVG GLUCOSE: 163 MG/DL (ref 68–131)
FERRITIN SERPL-MCNC: 87 NG/ML (ref 20–300)
GLUCOSE SERPL-MCNC: 138 MG/DL (ref 70–110)
HBA1C MFR BLD: 7.3 % (ref 4–5.6)
HCT VFR BLD AUTO: 33.4 % (ref 40–54)
HDLC SERPL-MCNC: 41 MG/DL (ref 40–75)
HDLC SERPL: 34.7 % (ref 20–50)
HGB BLD-MCNC: 10.5 G/DL (ref 14–18)
IMM GRANULOCYTES # BLD AUTO: 0.01 K/UL (ref 0–0.04)
IMM GRANULOCYTES NFR BLD AUTO: 0.3 % (ref 0–0.5)
IRON SERPL-MCNC: 61 UG/DL (ref 45–160)
LDLC SERPL CALC-MCNC: 59.8 MG/DL (ref 63–159)
LYMPHOCYTES # BLD AUTO: 0.7 K/UL (ref 1–4.8)
LYMPHOCYTES NFR BLD: 21.1 % (ref 18–48)
MCH RBC QN AUTO: 28.6 PG (ref 27–31)
MCHC RBC AUTO-ENTMCNC: 31.4 G/DL (ref 32–36)
MCV RBC AUTO: 91 FL (ref 82–98)
MONOCYTES # BLD AUTO: 0.4 K/UL (ref 0.3–1)
MONOCYTES NFR BLD: 10 % (ref 4–15)
NEUTROPHILS # BLD AUTO: 2.3 K/UL (ref 1.8–7.7)
NEUTROPHILS NFR BLD: 65.7 % (ref 38–73)
NONHDLC SERPL-MCNC: 77 MG/DL
NRBC BLD-RTO: 0 /100 WBC
PLATELET # BLD AUTO: 310 K/UL (ref 150–450)
PMV BLD AUTO: 9.4 FL (ref 9.2–12.9)
POTASSIUM SERPL-SCNC: 4.2 MMOL/L (ref 3.5–5.1)
PROT SERPL-MCNC: 7.6 G/DL (ref 6–8.4)
RBC # BLD AUTO: 3.67 M/UL (ref 4.6–6.2)
SATURATED IRON: 21 % (ref 20–50)
SODIUM SERPL-SCNC: 138 MMOL/L (ref 136–145)
TOTAL IRON BINDING CAPACITY: 286 UG/DL (ref 250–450)
TRANSFERRIN SERPL-MCNC: 193 MG/DL (ref 200–375)
TRIGL SERPL-MCNC: 86 MG/DL (ref 30–150)
TSH SERPL DL<=0.005 MIU/L-ACNC: 1.34 UIU/ML (ref 0.4–4)
WBC # BLD AUTO: 3.5 K/UL (ref 3.9–12.7)

## 2022-03-14 PROCEDURE — 82728 ASSAY OF FERRITIN: CPT | Performed by: FAMILY MEDICINE

## 2022-03-14 PROCEDURE — 36415 COLL VENOUS BLD VENIPUNCTURE: CPT | Mod: PN | Performed by: FAMILY MEDICINE

## 2022-03-14 PROCEDURE — 84466 ASSAY OF TRANSFERRIN: CPT | Performed by: FAMILY MEDICINE

## 2022-03-14 PROCEDURE — 80053 COMPREHEN METABOLIC PANEL: CPT | Performed by: FAMILY MEDICINE

## 2022-03-14 PROCEDURE — 80061 LIPID PANEL: CPT | Performed by: FAMILY MEDICINE

## 2022-03-14 PROCEDURE — 83036 HEMOGLOBIN GLYCOSYLATED A1C: CPT | Performed by: FAMILY MEDICINE

## 2022-03-14 PROCEDURE — 84443 ASSAY THYROID STIM HORMONE: CPT | Performed by: FAMILY MEDICINE

## 2022-03-14 PROCEDURE — 85025 COMPLETE CBC W/AUTO DIFF WBC: CPT | Performed by: FAMILY MEDICINE

## 2022-03-15 ENCOUNTER — OFFICE VISIT (OUTPATIENT)
Dept: FAMILY MEDICINE | Facility: CLINIC | Age: 87
End: 2022-03-15
Payer: MEDICARE

## 2022-03-15 ENCOUNTER — HOSPITAL ENCOUNTER (EMERGENCY)
Facility: HOSPITAL | Age: 87
Discharge: HOME | End: 2022-03-15
Attending: EMERGENCY MEDICINE
Payer: MEDICARE

## 2022-03-15 VITALS
WEIGHT: 165.38 LBS | SYSTOLIC BLOOD PRESSURE: 124 MMHG | BODY MASS INDEX: 23.68 KG/M2 | OXYGEN SATURATION: 95 % | TEMPERATURE: 99 F | DIASTOLIC BLOOD PRESSURE: 72 MMHG | HEART RATE: 107 BPM | RESPIRATION RATE: 18 BRPM | HEIGHT: 70 IN

## 2022-03-15 VITALS
DIASTOLIC BLOOD PRESSURE: 68 MMHG | SYSTOLIC BLOOD PRESSURE: 113 MMHG | WEIGHT: 155 LBS | HEIGHT: 71 IN | TEMPERATURE: 98 F | HEART RATE: 101 BPM | OXYGEN SATURATION: 94 % | BODY MASS INDEX: 21.7 KG/M2 | RESPIRATION RATE: 14 BRPM

## 2022-03-15 DIAGNOSIS — N18.30 STAGE 3 CHRONIC KIDNEY DISEASE, UNSPECIFIED WHETHER STAGE 3A OR 3B CKD: ICD-10-CM

## 2022-03-15 DIAGNOSIS — K59.00 CONSTIPATION, UNSPECIFIED CONSTIPATION TYPE: Primary | ICD-10-CM

## 2022-03-15 LAB
ALBUMIN SERPL BCP-MCNC: 3.8 G/DL (ref 3.5–5.2)
ALP SERPL-CCNC: 41 U/L (ref 55–135)
ALT SERPL W/O P-5'-P-CCNC: 24 U/L (ref 10–44)
ANION GAP SERPL CALC-SCNC: 10 MMOL/L (ref 8–16)
AST SERPL-CCNC: 37 U/L (ref 10–40)
BACTERIA #/AREA URNS HPF: NORMAL /HPF
BASOPHILS # BLD AUTO: 0.02 K/UL (ref 0–0.2)
BASOPHILS NFR BLD: 0.5 % (ref 0–1.9)
BILIRUB SERPL-MCNC: 0.4 MG/DL (ref 0.1–1)
BILIRUB UR QL STRIP: NEGATIVE
BUN SERPL-MCNC: 38 MG/DL (ref 8–23)
CALCIUM SERPL-MCNC: 10.1 MG/DL (ref 8.7–10.5)
CHLORIDE SERPL-SCNC: 100 MMOL/L (ref 95–110)
CLARITY UR: CLEAR
CO2 SERPL-SCNC: 26 MMOL/L (ref 23–29)
COLOR UR: COLORLESS
CREAT SERPL-MCNC: 2.4 MG/DL (ref 0.5–1.4)
DIFFERENTIAL METHOD: ABNORMAL
EOSINOPHIL # BLD AUTO: 0.1 K/UL (ref 0–0.5)
EOSINOPHIL NFR BLD: 2 % (ref 0–8)
ERYTHROCYTE [DISTWIDTH] IN BLOOD BY AUTOMATED COUNT: 14 % (ref 11.5–14.5)
EST. GFR  (AFRICAN AMERICAN): 28 ML/MIN/1.73 M^2
EST. GFR  (NON AFRICAN AMERICAN): 24 ML/MIN/1.73 M^2
GLUCOSE SERPL-MCNC: 117 MG/DL (ref 70–110)
GLUCOSE UR QL STRIP: NEGATIVE
HCT VFR BLD AUTO: 33.1 % (ref 40–54)
HGB BLD-MCNC: 10.7 G/DL (ref 14–18)
HGB UR QL STRIP: NEGATIVE
IMM GRANULOCYTES # BLD AUTO: 0.01 K/UL (ref 0–0.04)
IMM GRANULOCYTES NFR BLD AUTO: 0.2 % (ref 0–0.5)
KETONES UR QL STRIP: NEGATIVE
LEUKOCYTE ESTERASE UR QL STRIP: ABNORMAL
LYMPHOCYTES # BLD AUTO: 0.8 K/UL (ref 1–4.8)
LYMPHOCYTES NFR BLD: 20.1 % (ref 18–48)
MCH RBC QN AUTO: 28.8 PG (ref 27–31)
MCHC RBC AUTO-ENTMCNC: 32.3 G/DL (ref 32–36)
MCV RBC AUTO: 89 FL (ref 82–98)
MICROSCOPIC COMMENT: NORMAL
MONOCYTES # BLD AUTO: 0.4 K/UL (ref 0.3–1)
MONOCYTES NFR BLD: 8.7 % (ref 4–15)
NEUTROPHILS # BLD AUTO: 2.8 K/UL (ref 1.8–7.7)
NEUTROPHILS NFR BLD: 68.5 % (ref 38–73)
NITRITE UR QL STRIP: NEGATIVE
NRBC BLD-RTO: 0 /100 WBC
PH UR STRIP: 6 [PH] (ref 5–8)
PLATELET # BLD AUTO: 298 K/UL (ref 150–450)
PMV BLD AUTO: 9.5 FL (ref 9.2–12.9)
POTASSIUM SERPL-SCNC: 4.3 MMOL/L (ref 3.5–5.1)
PROT SERPL-MCNC: 7.8 G/DL (ref 6–8.4)
PROT UR QL STRIP: NEGATIVE
RBC # BLD AUTO: 3.71 M/UL (ref 4.6–6.2)
SODIUM SERPL-SCNC: 136 MMOL/L (ref 136–145)
SP GR UR STRIP: 1.01 (ref 1–1.03)
URN SPEC COLLECT METH UR: ABNORMAL
UROBILINOGEN UR STRIP-ACNC: NEGATIVE EU/DL
WBC # BLD AUTO: 4.03 K/UL (ref 3.9–12.7)
WBC #/AREA URNS HPF: 2 /HPF (ref 0–5)

## 2022-03-15 PROCEDURE — 96361 HYDRATE IV INFUSION ADD-ON: CPT

## 2022-03-15 PROCEDURE — 25000003 PHARM REV CODE 250: Performed by: EMERGENCY MEDICINE

## 2022-03-15 PROCEDURE — 96374 THER/PROPH/DIAG INJ IV PUSH: CPT

## 2022-03-15 PROCEDURE — 3074F SYST BP LT 130 MM HG: CPT | Mod: CPTII,S$GLB,, | Performed by: NURSE PRACTITIONER

## 2022-03-15 PROCEDURE — 1159F PR MEDICATION LIST DOCUMENTED IN MEDICAL RECORD: ICD-10-PCS | Mod: CPTII,S$GLB,, | Performed by: NURSE PRACTITIONER

## 2022-03-15 PROCEDURE — 1126F AMNT PAIN NOTED NONE PRSNT: CPT | Mod: CPTII,S$GLB,, | Performed by: NURSE PRACTITIONER

## 2022-03-15 PROCEDURE — 99999 PR PBB SHADOW E&M-EST. PATIENT-LVL IV: CPT | Mod: PBBFAC,,, | Performed by: NURSE PRACTITIONER

## 2022-03-15 PROCEDURE — 3288F PR FALLS RISK ASSESSMENT DOCUMENTED: ICD-10-PCS | Mod: CPTII,S$GLB,, | Performed by: NURSE PRACTITIONER

## 2022-03-15 PROCEDURE — 99284 EMERGENCY DEPT VISIT MOD MDM: CPT | Mod: 25

## 2022-03-15 PROCEDURE — 80053 COMPREHEN METABOLIC PANEL: CPT | Performed by: EMERGENCY MEDICINE

## 2022-03-15 PROCEDURE — 1126F PR PAIN SEVERITY QUANTIFIED, NO PAIN PRESENT: ICD-10-PCS | Mod: CPTII,S$GLB,, | Performed by: NURSE PRACTITIONER

## 2022-03-15 PROCEDURE — 1101F PT FALLS ASSESS-DOCD LE1/YR: CPT | Mod: CPTII,S$GLB,, | Performed by: NURSE PRACTITIONER

## 2022-03-15 PROCEDURE — 85025 COMPLETE CBC W/AUTO DIFF WBC: CPT | Performed by: EMERGENCY MEDICINE

## 2022-03-15 PROCEDURE — 1101F PR PT FALLS ASSESS DOC 0-1 FALLS W/OUT INJ PAST YR: ICD-10-PCS | Mod: CPTII,S$GLB,, | Performed by: NURSE PRACTITIONER

## 2022-03-15 PROCEDURE — 1159F MED LIST DOCD IN RCRD: CPT | Mod: CPTII,S$GLB,, | Performed by: NURSE PRACTITIONER

## 2022-03-15 PROCEDURE — 99999 PR PBB SHADOW E&M-EST. PATIENT-LVL IV: ICD-10-PCS | Mod: PBBFAC,,, | Performed by: NURSE PRACTITIONER

## 2022-03-15 PROCEDURE — 1160F PR REVIEW ALL MEDS BY PRESCRIBER/CLIN PHARMACIST DOCUMENTED: ICD-10-PCS | Mod: CPTII,S$GLB,, | Performed by: NURSE PRACTITIONER

## 2022-03-15 PROCEDURE — 3078F PR MOST RECENT DIASTOLIC BLOOD PRESSURE < 80 MM HG: ICD-10-PCS | Mod: CPTII,S$GLB,, | Performed by: NURSE PRACTITIONER

## 2022-03-15 PROCEDURE — 3078F DIAST BP <80 MM HG: CPT | Mod: CPTII,S$GLB,, | Performed by: NURSE PRACTITIONER

## 2022-03-15 PROCEDURE — 1160F RVW MEDS BY RX/DR IN RCRD: CPT | Mod: CPTII,S$GLB,, | Performed by: NURSE PRACTITIONER

## 2022-03-15 PROCEDURE — 3074F PR MOST RECENT SYSTOLIC BLOOD PRESSURE < 130 MM HG: ICD-10-PCS | Mod: CPTII,S$GLB,, | Performed by: NURSE PRACTITIONER

## 2022-03-15 PROCEDURE — 99213 OFFICE O/P EST LOW 20 MIN: CPT | Mod: S$GLB,,, | Performed by: NURSE PRACTITIONER

## 2022-03-15 PROCEDURE — 3288F FALL RISK ASSESSMENT DOCD: CPT | Mod: CPTII,S$GLB,, | Performed by: NURSE PRACTITIONER

## 2022-03-15 PROCEDURE — 81000 URINALYSIS NONAUTO W/SCOPE: CPT | Performed by: EMERGENCY MEDICINE

## 2022-03-15 PROCEDURE — 99213 PR OFFICE/OUTPT VISIT, EST, LEVL III, 20-29 MIN: ICD-10-PCS | Mod: S$GLB,,, | Performed by: NURSE PRACTITIONER

## 2022-03-15 RX ORDER — SYRING-NEEDL,DISP,INSUL,0.3 ML 29 G X1/2"
296 SYRINGE, EMPTY DISPOSABLE MISCELLANEOUS
Status: COMPLETED | OUTPATIENT
Start: 2022-03-15 | End: 2022-03-15

## 2022-03-15 RX ORDER — PSEUDOEPHEDRINE/ACETAMINOPHEN 30MG-500MG
100 TABLET ORAL
Status: COMPLETED | OUTPATIENT
Start: 2022-03-15 | End: 2022-03-15

## 2022-03-15 RX ORDER — ONDANSETRON 2 MG/ML
4 INJECTION INTRAMUSCULAR; INTRAVENOUS
Status: DISCONTINUED | OUTPATIENT
Start: 2022-03-15 | End: 2022-03-15 | Stop reason: HOSPADM

## 2022-03-15 RX ADMIN — SODIUM CHLORIDE 500 ML: 0.9 INJECTION, SOLUTION INTRAVENOUS at 09:03

## 2022-03-15 RX ADMIN — Medication 100 ML: at 09:03

## 2022-03-15 RX ADMIN — MAGNESIUM CITRATE 296 ML: 1.75 LIQUID ORAL at 09:03

## 2022-03-15 NOTE — PROGRESS NOTES
Routine Office Visit    Patient Name: Peter Lopez    : 1935  MRN: [id number]    Chief Complaint:  Constipation    Subjective:  Peter is a 86 y.o. male who presents today for:    1. Constipation - patient who is known to me with a history of CKD, diabetes reports today for evaluation.  He normally deals with chronic constipation within the last 5 days his symptoms have worsened.  He is having very small hard bowel movements but is not able to have a regular BM.  He did go to urgent care for this where he had an x-ray done which showed constipation but no bowel obstruction.  Since then he took 3-4 doses of MiraLax as well as 2 doses of Mag citrate without relief.  He reports he is eating enough food and is passing gas.  He did do with vomiting early on in the symptoms but has not vomited since the urgent care visit.    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  Vaccinated.  C A       Family History  Family History   Problem Relation Age of Onset    Cancer Mother     Asbestos Father     Liver disease Neg Hx        Social History  Social History      Socioeconomic History    Marital status:    Tobacco Use    Smoking status: Former Smoker     Packs/day: 1.00     Years: 40.00     Pack years: 40.00     Quit date: 1991     Years since quittin.3    Smokeless tobacco: Never Used   Substance and Sexual Activity    Alcohol use: Not Currently     Alcohol/week: 0.0 standard drinks     Comment: 2 bottles beer on weekends    Drug use: No    Sexual activity: Yes     Partners: Female     Comment:     Social History Narrative    , resides in [place]. 7 children but lost one. 16 grandchildren. 4 great-grandchildren. Works at [employer]       Current Medications  Current Outpatient Medications on File Prior to Visit   Medication Sig Dispense Refill    albuterol (PROAIR HFA) 90 mcg/actuation inhaler Inhale 2 puffs into the lungs every 4 (four) hours as needed for Wheezing (cough/shortness of breath). Rescue 18 g 0    atorvastatin (LIPITOR) 20 MG tablet Take 1 tablet (20 mg total) by mouth every evening. 90 tablet 3    blood sugar diagnostic Strp To check BG 1 times daily, to use with insurance preferred meter 50 each 11    blood-glucose meter kit To check BG 1 times daily, to use with insurance preferred meter 1 each 0    hydrOXYzine HCL (ATARAX) 25 MG tablet Take 1 tablet (25 mg total) by mouth 3 (three) times daily as needed for Itching. 60 tablet 0    lancets Misc To check BG 1 times daily, to use with insurance preferred meter 50 each 11    metFORMIN (GLUCOPHAGE) 1000 MG tablet Take 1 tablet (1,000 mg total) by mouth daily with breakfast. 90 tablet 1    olmesartan-hydrochlorothiazide (BENICAR HCT) 40-25 mg per tablet Take 1 tablet by mouth once daily. 90 tablet 3    ondansetron (ZOFRAN) 4 MG tablet Take 1 tablet (4 mg total) by mouth every 8 (eight) hours as needed for Nausea. 30 tablet 0    STOOL SOFTENER 100 mg capsule Take 1 capsule by mouth once daily 60 capsule 0    tiotropium bromide (SPIRIVA RESPIMAT) 2.5  "mcg/actuation inhaler INHALE 2 SPRAY(S) BY MOUTH ONCE DAILY 4 g 5    [DISCONTINUED] leuprolide, 6 month, (LUPRON DEPOT, 6 MONTH,) 45 mg SyKt injection Inject 45 mg into the muscle every 6 (six) months. 2 each 0     Current Facility-Administered Medications on File Prior to Visit   Medication Dose Route Frequency Provider Last Rate Last Admin    leuprolide (6 month) injection 45 mg  45 mg Intramuscular 1 time in Clinic/HOD Jesus Cole MD           Allergies   Review of patient's allergies indicates:  No Known Allergies    Review of Systems (Pertinent positives)  Review of Systems   Constitutional: Negative.  Negative for chills, diaphoresis, fever, malaise/fatigue and weight loss.   HENT: Negative.    Eyes: Negative.    Respiratory: Negative.  Negative for cough, shortness of breath and wheezing.    Cardiovascular: Negative.  Negative for chest pain, palpitations and orthopnea.   Gastrointestinal: Positive for constipation and nausea. Negative for abdominal pain, blood in stool, melena and vomiting.   Genitourinary: Negative for dysuria, flank pain, frequency, hematuria and urgency.   Musculoskeletal: Negative.    Skin: Negative.    Neurological: Negative.    Endo/Heme/Allergies: Negative.    Psychiatric/Behavioral: Negative.        /72 (BP Location: Right arm, Patient Position: Sitting, BP Method: Medium (Manual))   Pulse 107   Temp 98.5 °F (36.9 °C) (Oral)   Resp 18   Ht 5' 10" (1.778 m)   Wt 75 kg (165 lb 5.5 oz)   SpO2 95%   BMI 23.72 kg/m²     Physical Exam  Vitals reviewed.   Constitutional:       General: He is not in acute distress.     Appearance: Normal appearance. He is not ill-appearing, toxic-appearing or diaphoretic.   HENT:      Head: Normocephalic and atraumatic.   Eyes:      Extraocular Movements: Extraocular movements intact.      Conjunctiva/sclera: Conjunctivae normal.      Pupils: Pupils are equal, round, and reactive to light.   Cardiovascular:      Rate and Rhythm: Normal " rate and regular rhythm.      Pulses: Normal pulses.      Heart sounds: Normal heart sounds.   Pulmonary:      Effort: Pulmonary effort is normal.      Breath sounds: Normal breath sounds.   Abdominal:      General: Bowel sounds are normal. There is no distension.      Palpations: Abdomen is soft. There is no mass.      Tenderness: There is no abdominal tenderness.   Musculoskeletal:         General: No swelling or tenderness. Normal range of motion.   Skin:     General: Skin is warm and dry.      Capillary Refill: Capillary refill takes less than 2 seconds.   Neurological:      General: No focal deficit present.      Mental Status: He is alert and oriented to person, place, and time.   Psychiatric:         Mood and Affect: Mood normal.         Behavior: Behavior normal.          Assessment/Plan:  Peter Lopez is a 86 y.o. male who presents today for :    Diagnoses and all orders for this visit:    Constipation, unspecified constipation type  -     Ambulatory referral/consult to Gastroenterology; Future    I had a lengthy discussion with the patient regarding his symptoms.  He also wanted to call his daughter Mckinley during the visit who I spoke with over the phone.  Offered lactulose but patient like relief of his constipation today.  I did discuss than that he can go to the emergency room if he wants for evaluation as he may need an enema; patient ultimately wants to go to the ER for this and will go by personal transportation.  I do not suspect anything emergent or acute going on given his examination.  Recommended patient drink plenty of water.  High-fiber diet may help.  Will refer to Gastroenterology further evaluation.  All questions answered.  Patient verbalized understanding of instructions.        This office note has been dictated.  This dictation has been generated using M-Modal Fluency Direct dictation; some phonetic errors may occur.   My collaborating physician is Dr. Suresh Can.

## 2022-03-15 NOTE — ED PROVIDER NOTES
"SCRIBE #1 NOTE: I, Blake Davis, am scribing for, and in the presence of,  Dulce Murillo MD. I have scribed the following portions of the note - Other sections scribed: HPI, CASPER PE.           EM PHYSICIAN NOTE    HPI  This patient presents with a complaint of   Chief Complaint   Patient presents with    Constipation     Pt reporting he was sent here from MD office for x-ray "showing blockage". Pt states he was sent here for an "enema". Pt "can't remember" last BM.       HPI: 86 y.o. male with a PMHx of DM I, CKD, and Prostate Cancer presents to the ED for constipation. Patient reports constipation for 4-5 days. He states last bowel movement was approximately 4-5 days ago. Patient also  endorses emesis 4 days ago, decreased appetite, abdominal discomfort. He tried taking Miralax and Citrate without any relief. Patient states last colonoscopy was "in my seventies." Pt additionally notes having trouble with dieting since his wife passed away. Pt explains he does not know if he gets enough fiber in his diet. Denies nausea, leg swelling, rash. No other exacerbating or alleviating factors. No other associated symptoms.     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  GENERAL/CONSTITUTIONAL: There is no report of fever, fatigue, weakness, or unexplained weight loss.  CARDIOVASCULAR: There is no report of chest pain   RESPIRATORY: There is no report of cough or SOB  GASTROINTESTINAL: SEE HPI.  MUSCULOSKELETAL: There is no report of joint or muscle pain. No muscle weakness or tenderness.  SKIN AND BREASTS: There is no report of easy bruising, skin redness, skin rash.  HEMATOLOGIC/LYMPHATIC: There is no report of anemia, bleeding or clotting defects. There is no report of anticoagulant use.  The remainder of the ROS is negative.        PHYSICAL EXAMINATION    ED Triage Vitals [03/15/22 0858]   Enc Vitals Group      BP (!) 102/58      Pulse 107      Resp 18      Temp 98.2 °F (36.8 °C)      Temp src Oral      SpO2 98 %      Weight 155 lb      Height 5' 11"      Head Circumference       Peak Flow       Pain Score       Pain Loc       Pain Edu?       Excl. in GC?      Vital signs and Pulse Ox reviewed in clinical context. Abnormalities noted:  Blood pressure is low.  Pt's level of consciousness is alert, and the patient is in mild distress.  Skin: warm, pink and dry.  Capillary refill is less than 2 seconds.  Mucosa:moist  Head and Neck: WNL  Cardiac exam: RRR  Pulmonary exam: unlabored and clear  Abd Exam: soft nontender   Musculoskeletal: no joint tenderness, deformity or swelling   Neurologic: GCS: GCS 15; 5 over 5 strength, cranial nerves intact, neck supple       Initial Impression:  Constipation  Plan:  Enema and reassess  Micelle TANIKA Murillo MD, 10:00 AM 3/15/2022      Medical decision making:   Nurses notes and Vital Signs reviewed.  Orders Placed This Encounter   Procedures    CT Abdomen Pelvis  Without Contrast    CBC Auto Differential    Comprehensive Metabolic Panel    Urinalysis, Reflex to Urine Culture Urine, Clean Catch    Urinalysis Microscopic    Ambulatory referral/consult to Gastroenterology    Cardiac Monitoring - Adult    Nursing communication    " Insert Saline lock IV       ED Course as of 03/15/22 1413   Tue Mar 15, 2022   1011 The patient became nauseated after enema. [MH]   1102 CBC reveals mild anemia of 10 and 33.  [MH]   1102 UA is normal [MH]   1245 CMP reveals a BUN of 38 and a creatinine of 2.4.  This is unchanged from prior. [MH]   1246 CT of the abdomen ( ordered non-contrast due to elevated creatinine) wnl [MH]   1403 Feeling better; tolerating p.o. []      ED Course User Index  [] Dulce Murillo MD       Diagnoses that have been ruled out:   None   Diagnoses that are still under consideration:   None   Final diagnoses:   Constipation, unspecified constipation type   Stage 3 chronic kidney disease, unspecified whether stage 3a or 3b CKD            Follow-up Information     Follow up With Specialties Details Why Contact Info    Lucas Lloyd Jr., MD Family Medicine Call in 3 days Call to schedule an appointment [facility] [id number] [phone]          ED Prescriptions     None          This note was created using Dictation Software.  This program may occasionally misinterpret certain words and phrases.      SCRIBE ATTESTATION NOTE:  I attest that I personally performed the services documented by the scribe and acknowledged and confirm the content of the note.   Nurses notes were reviewed.  Dulce Murillo        Nurses notes were reviewed.       ED Disposition Condition    Discharge Stable                          Dulce Murillo MD  03/15/22 1413

## 2022-03-15 NOTE — ED TRIAGE NOTES
"Pt presents to ED via personal transportation with complaints of constipation. Pt states he went to his MD, who "is sending him to the gastrologist." Pt explains that "my wife cooked my food, I never knew how to cook. Now, she's passed away, and I don't eat right. I haven't eaten a nice full meal." Pt explains he does not know if he gets enough fiber. Pt does state he has been taking víctor lax, "4 doses, but it don't work for me. I tried the citrate too." Pt states he does not have pain, but it is discomfort like bloating. Last time pt had a bowel movement, pt states "it was exceptionally hard." Pt does not remember when bowel movement was. Vomited Friday night. Last colonoscopy was approximately "in my 70's." Pt appears stable currently, placed on cardiac monitor and continuous pulse ox. Pt AAOX4, all vitals WNL.   "

## 2022-03-15 NOTE — PROGRESS NOTES
Patient, Peter Lopez (MRN #[id number]), presented with a recent Estimated Glumerular Filtration Rate (EGFR) between 15 and 29 consistent with the definition of chronic kidney disease stage 4 (ICD10 - N18.4).    eGFR if    Date Value Ref Range Status   03/14/2022 28 (A) >60 mL/min/1.73 m^2 Final       The patient's chronic kidney disease stage 4 was monitored, evaluated, addressed and/or treated. This addendum to the medical record is made on 03/15/2022.

## 2022-03-15 NOTE — DISCHARGE INSTRUCTIONS
Future Appointments   Date Time Provider Department Center   3/21/2022 10:40 AM Lucas Lloyd Jr., MD [facility] FM IM [place] - B   7/6/2022 10:45 AM Jesus Cole MD [facility] URO [facility]   12/8/2022  8:30 AM LAB, [facility]   12/12/2022  9:30 AM Moises Isidro Jr., MD [facility] RAD ONC [facility]

## 2022-03-16 ENCOUNTER — TELEPHONE (OUTPATIENT)
Dept: FAMILY MEDICINE | Facility: CLINIC | Age: 87
End: 2022-03-16
Payer: MEDICARE

## 2022-03-16 NOTE — TELEPHONE ENCOUNTER
----- Message from Lucas Lloyd Jr., MD sent at 3/15/2022 11:53 PM CDT -----  Please let patient know that based on his kidney function, I went him to stop his Metformin. We will discuss other options for his sugar when he comes in for his follow up next week, but stop the Metformin immediately

## 2022-03-21 ENCOUNTER — OFFICE VISIT (OUTPATIENT)
Dept: FAMILY MEDICINE | Facility: CLINIC | Age: 87
End: 2022-03-21
Payer: MEDICARE

## 2022-03-21 VITALS
DIASTOLIC BLOOD PRESSURE: 64 MMHG | BODY MASS INDEX: 23.18 KG/M2 | TEMPERATURE: 98 F | HEIGHT: 71 IN | OXYGEN SATURATION: 98 % | SYSTOLIC BLOOD PRESSURE: 112 MMHG | RESPIRATION RATE: 18 BRPM | WEIGHT: 165.56 LBS | HEART RATE: 114 BPM

## 2022-03-21 DIAGNOSIS — I10 ESSENTIAL HYPERTENSION: Primary | ICD-10-CM

## 2022-03-21 DIAGNOSIS — D63.1 ANEMIA DUE TO STAGE 4 CHRONIC KIDNEY DISEASE: ICD-10-CM

## 2022-03-21 DIAGNOSIS — E11.21 DIABETES MELLITUS WITH NEPHROPATHY: ICD-10-CM

## 2022-03-21 DIAGNOSIS — K59.09 CHRONIC CONSTIPATION: ICD-10-CM

## 2022-03-21 DIAGNOSIS — I70.0 ATHEROSCLEROSIS OF AORTA: ICD-10-CM

## 2022-03-21 DIAGNOSIS — N18.4 STAGE 4 CHRONIC KIDNEY DISEASE: ICD-10-CM

## 2022-03-21 DIAGNOSIS — N18.4 ANEMIA DUE TO STAGE 4 CHRONIC KIDNEY DISEASE: ICD-10-CM

## 2022-03-21 PROCEDURE — 3051F PR MOST RECENT HEMOGLOBIN A1C LEVEL 7.0 - < 8.0%: ICD-10-PCS | Mod: CPTII,S$GLB,, | Performed by: FAMILY MEDICINE

## 2022-03-21 PROCEDURE — 1159F MED LIST DOCD IN RCRD: CPT | Mod: CPTII,S$GLB,, | Performed by: FAMILY MEDICINE

## 2022-03-21 PROCEDURE — 99214 PR OFFICE/OUTPT VISIT, EST, LEVL IV, 30-39 MIN: ICD-10-PCS | Mod: S$GLB,,, | Performed by: FAMILY MEDICINE

## 2022-03-21 PROCEDURE — 1126F AMNT PAIN NOTED NONE PRSNT: CPT | Mod: CPTII,S$GLB,, | Performed by: FAMILY MEDICINE

## 2022-03-21 PROCEDURE — 3074F PR MOST RECENT SYSTOLIC BLOOD PRESSURE < 130 MM HG: ICD-10-PCS | Mod: CPTII,S$GLB,, | Performed by: FAMILY MEDICINE

## 2022-03-21 PROCEDURE — 3051F HG A1C>EQUAL 7.0%<8.0%: CPT | Mod: CPTII,S$GLB,, | Performed by: FAMILY MEDICINE

## 2022-03-21 PROCEDURE — 99999 PR PBB SHADOW E&M-EST. PATIENT-LVL V: ICD-10-PCS | Mod: PBBFAC,,, | Performed by: FAMILY MEDICINE

## 2022-03-21 PROCEDURE — 3288F PR FALLS RISK ASSESSMENT DOCUMENTED: ICD-10-PCS | Mod: CPTII,S$GLB,, | Performed by: FAMILY MEDICINE

## 2022-03-21 PROCEDURE — 1159F PR MEDICATION LIST DOCUMENTED IN MEDICAL RECORD: ICD-10-PCS | Mod: CPTII,S$GLB,, | Performed by: FAMILY MEDICINE

## 2022-03-21 PROCEDURE — 3078F PR MOST RECENT DIASTOLIC BLOOD PRESSURE < 80 MM HG: ICD-10-PCS | Mod: CPTII,S$GLB,, | Performed by: FAMILY MEDICINE

## 2022-03-21 PROCEDURE — 1126F PR PAIN SEVERITY QUANTIFIED, NO PAIN PRESENT: ICD-10-PCS | Mod: CPTII,S$GLB,, | Performed by: FAMILY MEDICINE

## 2022-03-21 PROCEDURE — 3074F SYST BP LT 130 MM HG: CPT | Mod: CPTII,S$GLB,, | Performed by: FAMILY MEDICINE

## 2022-03-21 PROCEDURE — 99999 PR PBB SHADOW E&M-EST. PATIENT-LVL V: CPT | Mod: PBBFAC,,, | Performed by: FAMILY MEDICINE

## 2022-03-21 PROCEDURE — 99214 OFFICE O/P EST MOD 30 MIN: CPT | Mod: S$GLB,,, | Performed by: FAMILY MEDICINE

## 2022-03-21 PROCEDURE — 3288F FALL RISK ASSESSMENT DOCD: CPT | Mod: CPTII,S$GLB,, | Performed by: FAMILY MEDICINE

## 2022-03-21 PROCEDURE — 1101F PT FALLS ASSESS-DOCD LE1/YR: CPT | Mod: CPTII,S$GLB,, | Performed by: FAMILY MEDICINE

## 2022-03-21 PROCEDURE — 1101F PR PT FALLS ASSESS DOC 0-1 FALLS W/OUT INJ PAST YR: ICD-10-PCS | Mod: CPTII,S$GLB,, | Performed by: FAMILY MEDICINE

## 2022-03-21 PROCEDURE — 3078F DIAST BP <80 MM HG: CPT | Mod: CPTII,S$GLB,, | Performed by: FAMILY MEDICINE

## 2022-03-21 RX ORDER — LACTULOSE 10 G/15ML
20 SOLUTION ORAL; RECTAL 2 TIMES DAILY PRN
Qty: 473 ML | Refills: 1 | Status: SHIPPED | OUTPATIENT
Start: 2022-03-21 | End: 2022-06-29 | Stop reason: SDUPTHER

## 2022-03-21 RX ORDER — OLMESARTAN MEDOXOMIL 40 MG/1
40 TABLET ORAL DAILY
Qty: 90 TABLET | Refills: 1 | Status: SHIPPED | OUTPATIENT
Start: 2022-03-21 | End: 2022-08-29

## 2022-03-21 RX ORDER — PNEUMOCOCCAL 13-VALENT CONJUGATE VACCINE 2.2; 2.2; 2.2; 2.2; 2.2; 4.4; 2.2; 2.2; 2.2; 2.2; 2.2; 2.2; 2.2 UG/.5ML; UG/.5ML; UG/.5ML; UG/.5ML; UG/.5ML; UG/.5ML; UG/.5ML; UG/.5ML; UG/.5ML; UG/.5ML; UG/.5ML; UG/.5ML; UG/.5ML
INJECTION, SUSPENSION INTRAMUSCULAR
COMMUNITY
Start: 2021-10-19 | End: 2022-06-29

## 2022-03-21 NOTE — PATIENT INSTRUCTIONS
Stop olmesartan-hctz and start olmesartan. We are making this change because of the kidney function    For the constipation, you can use the lactulose solution - 30 mL (2 tablespoons) up to twice a day when needed

## 2022-03-21 NOTE — PROGRESS NOTES
03/21/22 1006   Depression Patient Health Questionnaire (PHQ-2)   Over the last two weeks how often have you been bothered by little interest or pleasure in doing things 0   Over the last two weeks how often have you been bothered by feeling down, depressed or hopeless 0   PHQ-2 Total Score 0

## 2022-03-22 DIAGNOSIS — C61 MALIGNANT NEOPLASM OF PROSTATE: Primary | ICD-10-CM

## 2022-03-27 NOTE — PROGRESS NOTES
Subjective:       Patient ID: Peter Lopez is a 86 y.o. male.    Chief Complaint: No chief complaint on file.    Diabetes  He presents for his follow-up diabetic visit. He has type 2 diabetes mellitus. His disease course has been stable. There are no hypoglycemic associated symptoms. Pertinent negatives for hypoglycemia include no headaches, nervousness/anxiousness or sweats. There are no diabetic associated symptoms. Pertinent negatives for diabetes include no blurred vision, no chest pain, no foot paresthesias, no polydipsia, no polyphagia, no polyuria, no visual change and no weakness. Pertinent negatives for diabetic complications include no CVA, PVD or retinopathy. Risk factors for coronary artery disease include diabetes mellitus, hypertension and male sex. Current diabetic treatment includes oral agent (monotherapy). He is compliant with treatment all of the time. He is following a generally healthy diet. When asked about meal planning, he reported none. He has not had a previous visit with a dietitian. He participates in exercise intermittently. There is no change in his home blood glucose trend. An ACE inhibitor/angiotensin II receptor blocker is being taken.   Hypertension  This is a chronic problem. Pertinent negatives include no anxiety, blurred vision, chest pain, headaches, malaise/fatigue, neck pain, orthopnea, palpitations, peripheral edema, PND, shortness of breath or sweats. There are no associated agents to hypertension. Risk factors for coronary artery disease include diabetes mellitus, male gender and dyslipidemia. Past treatments include diuretics and angiotensin blockers. The current treatment provides significant improvement. Hypertensive end-organ damage includes kidney disease. There is no history of angina, CVA, PVD or retinopathy. Identifiable causes of hypertension include chronic renal disease.   Depression  Visit Type: follow-up  Patient is not experiencing: anhedonia, chest pain,  decreased concentration, depressed mood, excessive worry, feelings of hopelessness, hyperventilation, insomnia, irritability, memory impairment, nervousness/anxiety, palpitations, psychomotor agitation, psychomotor retardation, shortness of breath, suicidal ideas, suicidal planning and thoughts of death.      Review of Systems   Constitutional: Negative for irritability, malaise/fatigue and unexpected weight change.   HENT: Negative for ear pain and sore throat.    Eyes: Negative for blurred vision and visual disturbance.   Respiratory: Negative for shortness of breath.    Cardiovascular: Negative for chest pain, palpitations, orthopnea and PND.   Gastrointestinal: Positive for constipation. Negative for abdominal pain and blood in stool.   Endocrine: Negative for cold intolerance, heat intolerance, polydipsia, polyphagia and polyuria.   Genitourinary: Negative for dysuria and frequency.   Musculoskeletal: Negative for neck pain.   Neurological: Negative for weakness, numbness and headaches.   Hematological: Negative for adenopathy.   Psychiatric/Behavioral: Positive for depression. Negative for decreased concentration and suicidal ideas. The patient is not nervous/anxious and does not have insomnia.          Objective:      Physical Exam  Vitals reviewed.   Constitutional:       Appearance: He is well-developed. He is not diaphoretic.   HENT:      Head: Normocephalic.      Right Ear: External ear normal.      Left Ear: External ear normal.      Nose: Nose normal.      Mouth/Throat:      Pharynx: No pharyngeal swelling, oropharyngeal exudate or posterior oropharyngeal erythema.   Neck:      Trachea: No tracheal deviation.   Cardiovascular:      Rate and Rhythm: Regular rhythm. Tachycardia present.      Heart sounds: Normal heart sounds.   Pulmonary:      Effort: Pulmonary effort is normal.      Breath sounds: Normal breath sounds. No wheezing or rales.   Abdominal:      General: Bowel sounds are normal.       Palpations: Abdomen is soft. Abdomen is not rigid. There is no mass.      Tenderness: There is no abdominal tenderness. There is no guarding or rebound.   Musculoskeletal:      Cervical back: Normal range of motion and neck supple.   Lymphadenopathy:      Cervical: No cervical adenopathy.   Neurological:      Mental Status: He is oriented to person, place, and time.      Sensory: No sensory deficit.      Motor: No atrophy.      Gait: Gait normal.      Deep Tendon Reflexes:      Reflex Scores:       Patellar reflexes are 2+ on the right side and 2+ on the left side.        Admission on 03/15/2022, Discharged on 03/15/2022   Component Date Value Ref Range Status    WBC 03/15/2022 4.03  3.90 - 12.70 K/uL Final    RBC 03/15/2022 3.71 (A) 4.60 - 6.20 M/uL Final    Hemoglobin 03/15/2022 10.7 (A) 14.0 - 18.0 g/dL Final    Hematocrit 03/15/2022 33.1 (A) 40.0 - 54.0 % Final    MCV 03/15/2022 89  82 - 98 fL Final    MCH 03/15/2022 28.8  27.0 - 31.0 pg Final    MCHC 03/15/2022 32.3  32.0 - 36.0 g/dL Final    RDW 03/15/2022 14.0  11.5 - 14.5 % Final    Platelets 03/15/2022 298  150 - 450 K/uL Final    MPV 03/15/2022 9.5  9.2 - 12.9 fL Final    Immature Granulocytes 03/15/2022 0.2  0.0 - 0.5 % Final    Gran # (ANC) 03/15/2022 2.8  1.8 - 7.7 K/uL Final    Immature Grans (Abs) 03/15/2022 0.01  0.00 - 0.04 K/uL Final    Comment: Mild elevation in immature granulocytes is non specific and   can be seen in a variety of conditions including stress response,   acute inflammation, trauma and pregnancy. Correlation with other   laboratory and clinical findings is essential.      Lymph # 03/15/2022 0.8 (A) 1.0 - 4.8 K/uL Final    Mono # 03/15/2022 0.4  0.3 - 1.0 K/uL Final    Eos # 03/15/2022 0.1  0.0 - 0.5 K/uL Final    Baso # 03/15/2022 0.02  0.00 - 0.20 K/uL Final    nRBC 03/15/2022 0  0 /100 WBC Final    Gran % 03/15/2022 68.5  38.0 - 73.0 % Final    Lymph % 03/15/2022 20.1  18.0 - 48.0 % Final    Mono %  03/15/2022 8.7  4.0 - 15.0 % Final    Eosinophil % 03/15/2022 2.0  0.0 - 8.0 % Final    Basophil % 03/15/2022 0.5  0.0 - 1.9 % Final    Differential Method 03/15/2022 Automated   Final    Sodium 03/15/2022 136  136 - 145 mmol/L Final    Potassium 03/15/2022 4.3  3.5 - 5.1 mmol/L Final    Chloride 03/15/2022 100  95 - 110 mmol/L Final    CO2 03/15/2022 26  23 - 29 mmol/L Final    Glucose 03/15/2022 117 (A) 70 - 110 mg/dL Final    BUN 03/15/2022 38 (A) 8 - 23 mg/dL Final    Creatinine 03/15/2022 2.4 (A) 0.5 - 1.4 mg/dL Final    Calcium 03/15/2022 10.1  8.7 - 10.5 mg/dL Final    Total Protein 03/15/2022 7.8  6.0 - 8.4 g/dL Final    Albumin 03/15/2022 3.8  3.5 - 5.2 g/dL Final    Total Bilirubin 03/15/2022 0.4  0.1 - 1.0 mg/dL Final    Comment: For infants and newborns, interpretation of results should be based  on gestational age, weight and in agreement with clinical  observations.    Premature Infant recommended reference ranges:  Up to 24 hours.............<8.0 mg/dL  Up to 48 hours............<12.0 mg/dL  3-5 days..................<15.0 mg/dL  6-29 days.................<15.0 mg/dL      Alkaline Phosphatase 03/15/2022 41 (A) 55 - 135 U/L Final    AST 03/15/2022 37  10 - 40 U/L Final    ALT 03/15/2022 24  10 - 44 U/L Final    Anion Gap 03/15/2022 10  8 - 16 mmol/L Final    eGFR if  03/15/2022 28 (A) >60 mL/min/1.73 m^2 Final    eGFR if non  03/15/2022 24 (A) >60 mL/min/1.73 m^2 Final    Comment: Calculation used to obtain the estimated glomerular filtration  rate (eGFR) is the CKD-EPI equation.       Specimen UA 03/15/2022 Urine, Clean Catch   Final    Color, UA 03/15/2022 Colorless (A) Yellow, Straw, Sade Final    Appearance, UA 03/15/2022 Clear  Clear Final    pH, UA 03/15/2022 6.0  5.0 - 8.0 Final    Specific Gravity, UA 03/15/2022 1.010  1.005 - 1.030 Final    Protein, UA 03/15/2022 Negative  Negative Final    Comment: Recommend a 24 hour urine protein or  a urine   protein/creatinine ratio if globulin induced proteinuria is  clinically suspected.      Glucose, UA 03/15/2022 Negative  Negative Final    Ketones, UA 03/15/2022 Negative  Negative Final    Bilirubin (UA) 03/15/2022 Negative  Negative Final    Occult Blood UA 03/15/2022 Negative  Negative Final    Nitrite, UA 03/15/2022 Negative  Negative Final    Urobilinogen, UA 03/15/2022 Negative  <2.0 EU/dL Final    Leukocytes, UA 03/15/2022 Trace (A) Negative Final    WBC, UA 03/15/2022 2  0 - 5 /hpf Final    Bacteria 03/15/2022 Rare  None-Occ /hpf Final    Microscopic Comment 03/15/2022 SEE COMMENT   Final    Comment: Other formed elements not mentioned in the report are not   present in the microscopic examination.      Lab Visit on 03/14/2022   Component Date Value Ref Range Status    Microalbumin, Urine 03/14/2022 39.0  ug/mL Final    Creatinine, Urine 03/14/2022 121.0  23.0 - 375.0 mg/dL Final    Microalb/Creat Ratio 03/14/2022 32.2 (A) 0.0 - 30.0 ug/mg Final   Lab Visit on 03/14/2022   Component Date Value Ref Range Status    Sodium 03/14/2022 138  136 - 145 mmol/L Final    Potassium 03/14/2022 4.2  3.5 - 5.1 mmol/L Final    Chloride 03/14/2022 98  95 - 110 mmol/L Final    CO2 03/14/2022 27  23 - 29 mmol/L Final    Glucose 03/14/2022 138 (A) 70 - 110 mg/dL Final    BUN 03/14/2022 43 (A) 8 - 23 mg/dL Final    Creatinine 03/14/2022 2.4 (A) 0.5 - 1.4 mg/dL Final    Calcium 03/14/2022 10.0  8.7 - 10.5 mg/dL Final    Total Protein 03/14/2022 7.6  6.0 - 8.4 g/dL Final    Albumin 03/14/2022 3.6  3.5 - 5.2 g/dL Final    Total Bilirubin 03/14/2022 0.4  0.1 - 1.0 mg/dL Final    Comment: For infants and newborns, interpretation of results should be based  on gestational age, weight and in agreement with clinical  observations.    Premature Infant recommended reference ranges:  Up to 24 hours.............<8.0 mg/dL  Up to 48 hours............<12.0 mg/dL  3-5 days..................<15.0 mg/dL  6-29  days.................<15.0 mg/dL      Alkaline Phosphatase 03/14/2022 42 (A) 55 - 135 U/L Final    AST 03/14/2022 30  10 - 40 U/L Final    ALT 03/14/2022 24  10 - 44 U/L Final    Anion Gap 03/14/2022 13  8 - 16 mmol/L Final    eGFR if  03/14/2022 28 (A) >60 mL/min/1.73 m^2 Final    eGFR if non  03/14/2022 24 (A) >60 mL/min/1.73 m^2 Final    Comment: Calculation used to obtain the estimated glomerular filtration  rate (eGFR) is the CKD-EPI equation.       WBC 03/14/2022 3.50 (A) 3.90 - 12.70 K/uL Final    RBC 03/14/2022 3.67 (A) 4.60 - 6.20 M/uL Final    Hemoglobin 03/14/2022 10.5 (A) 14.0 - 18.0 g/dL Final    Hematocrit 03/14/2022 33.4 (A) 40.0 - 54.0 % Final    MCV 03/14/2022 91  82 - 98 fL Final    MCH 03/14/2022 28.6  27.0 - 31.0 pg Final    MCHC 03/14/2022 31.4 (A) 32.0 - 36.0 g/dL Final    RDW 03/14/2022 14.0  11.5 - 14.5 % Final    Platelets 03/14/2022 310  150 - 450 K/uL Final    MPV 03/14/2022 9.4  9.2 - 12.9 fL Final    Immature Granulocytes 03/14/2022 0.3  0.0 - 0.5 % Final    Gran # (ANC) 03/14/2022 2.3  1.8 - 7.7 K/uL Final    Immature Grans (Abs) 03/14/2022 0.01  0.00 - 0.04 K/uL Final    Comment: Mild elevation in immature granulocytes is non specific and   can be seen in a variety of conditions including stress response,   acute inflammation, trauma and pregnancy. Correlation with other   laboratory and clinical findings is essential.      Lymph # 03/14/2022 0.7 (A) 1.0 - 4.8 K/uL Final    Mono # 03/14/2022 0.4  0.3 - 1.0 K/uL Final    Eos # 03/14/2022 0.1  0.0 - 0.5 K/uL Final    Baso # 03/14/2022 0.03  0.00 - 0.20 K/uL Final    nRBC 03/14/2022 0  0 /100 WBC Final    Gran % 03/14/2022 65.7  38.0 - 73.0 % Final    Lymph % 03/14/2022 21.1  18.0 - 48.0 % Final    Mono % 03/14/2022 10.0  4.0 - 15.0 % Final    Eosinophil % 03/14/2022 2.0  0.0 - 8.0 % Final    Basophil % 03/14/2022 0.9  0.0 - 1.9 % Final    Differential Method 03/14/2022  Automated   Final    Hemoglobin A1C 03/14/2022 7.3 (A) 4.0 - 5.6 % Final    Comment: ADA Screening Guidelines:  5.7-6.4%  Consistent with prediabetes  >or=6.5%  Consistent with diabetes    High levels of fetal hemoglobin interfere with the HbA1C  assay. Heterozygous hemoglobin variants (HbS, HgC, etc)do  not significantly interfere with this assay.   However, presence of multiple variants may affect accuracy.      Estimated Avg Glucose 03/14/2022 163 (A) 68 - 131 mg/dL Final    Cholesterol 03/14/2022 118 (A) 120 - 199 mg/dL Final    Comment: The National Cholesterol Education Program (NCEP) has set the  following guidelines (reference ranges) for Cholesterol:  Optimal.....................<200 mg/dL  Borderline High.............200-239 mg/dL  High........................> or = 240 mg/dL      Triglycerides 03/14/2022 86  30 - 150 mg/dL Final    Comment: The National Cholesterol Education Program (NCEP) has set the  following guidelines (reference values) for triglycerides:  Normal......................<150 mg/dL  Borderline High.............150-199 mg/dL  High........................200-499 mg/dL      HDL 03/14/2022 41  40 - 75 mg/dL Final    Comment: The National Cholesterol Education Program (NCEP) has set the  following guidelines (reference values) for HDL Cholesterol:  Low...............<40 mg/dL  Optimal...........>60 mg/dL      LDL Cholesterol 03/14/2022 59.8 (A) 63.0 - 159.0 mg/dL Final    Comment: The National Cholesterol Education Program (NCEP) has set the  following guidelines (reference values) for LDL Cholesterol:  Optimal.......................<130 mg/dL  Borderline High...............130-159 mg/dL  High..........................160-189 mg/dL  Very High.....................>190 mg/dL      HDL/Cholesterol Ratio 03/14/2022 34.7  20.0 - 50.0 % Final    Total Cholesterol/HDL Ratio 03/14/2022 2.9  2.0 - 5.0 Final    Non-HDL Cholesterol 03/14/2022 77  mg/dL Final    Comment: Risk category and Non-HDL  cholesterol goals:  Coronary heart disease (CHD)or equivalent (10-year risk of CHD >20%):  Non-HDL cholesterol goal     <130 mg/dL  Two or more CHD risk factors and 10-year risk of CHD <= 20%:  Non-HDL cholesterol goal     <160 mg/dL  0 to 1 CHD risk factor:  Non-HDL cholesterol goal     <190 mg/dL      Iron 03/14/2022 61  45 - 160 ug/dL Final    Transferrin 03/14/2022 193 (A) 200 - 375 mg/dL Final    TIBC 03/14/2022 286  250 - 450 ug/dL Final    Saturated Iron 03/14/2022 21  20 - 50 % Final    Ferritin 03/14/2022 87  20.0 - 300.0 ng/mL Final    TSH 03/14/2022 1.337  0.400 - 4.000 uIU/mL Final       Assessment:       Problem List Items Addressed This Visit        Cardiac/Vascular    Essential hypertension - Primary (Chronic)    Relevant Medications    olmesartan (BENICAR) 40 MG tablet      Other Visit Diagnoses     Stage 4 chronic kidney disease        Relevant Orders    Ambulatory referral/consult to Nephrology    PTH, intact    CBC Auto Differential    Diabetes mellitus with nephropathy        Relevant Orders    Comprehensive Metabolic Panel    Hemoglobin A1C    Constipation, unspecified constipation type        Atherosclerosis of aorta        Chronic constipation        Relevant Medications    lactulose (CHRONULAC) 10 gram/15 mL solution    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  Bp medication reviewed.    Stage 4 chronic kidney disease  -     Ambulatory referral/consult to Nephrology; Future  -     PTH, intact; Future  -     CBC Auto Differential; Future  Continue good hydration and diabetes and htn control    Diabetes mellitus with nephropathy  -     Comprehensive Metabolic Panel; Future  -     Hemoglobin A1C; Future  Continue current therapy    Atherosclerosis of aorta  Continue statin    Chronic constipation  -      lactulose (CHRONULAC) 10 gram/15 mL solution; Take 30 mLs (20 g total) by mouth 2 (two) times daily as needed (constipation).  Course of lactuloise as needed    Anemia due to stage 4 chronic kidney disease  Continue to monitor

## 2022-04-05 ENCOUNTER — TELEPHONE (OUTPATIENT)
Dept: FAMILY MEDICINE | Facility: CLINIC | Age: 87
End: 2022-04-05
Payer: MEDICARE

## 2022-04-05 NOTE — TELEPHONE ENCOUNTER
----- Message from Mariana Luna sent at 4/5/2022  3:57 PM CDT -----  Type: Patient Call Back    Who called: self     What is the request in detail: Pt returning call     Can the clinic reply by [facility]?    Would the patient rather a call back or a response via [facility]? Call     Best call back number [phone]

## 2022-04-06 ENCOUNTER — TELEPHONE (OUTPATIENT)
Dept: GASTROENTEROLOGY | Facility: CLINIC | Age: 87
End: 2022-04-06
Payer: MEDICARE

## 2022-04-06 NOTE — TELEPHONE ENCOUNTER
----- Message from Jorge Taylor sent at 4/6/2022  9:14 AM CDT -----  Regarding: call back to reschedule appt that was on 4/27  Type: Patient Call Back    Who called:Peter     What is the request in detail: the patient is returning a call back to the staff to reschedule his appt with Dr. Cruz on 4/27. Patient stated that he can only do Monday-Wednesday, he works Thursday- Saturday. Please return call at earliest convenience.    Can the clinic reply by [facility]?no    Would the patient rather a call back or a response via [facility]? Call back     Best call back number:[phone]

## 2022-04-08 DIAGNOSIS — R33.9 INCOMPLETE BLADDER EMPTYING: ICD-10-CM

## 2022-04-08 DIAGNOSIS — R39.12 WEAK URINARY STREAM: ICD-10-CM

## 2022-04-08 NOTE — TELEPHONE ENCOUNTER
"----- Message from Cinda Hui sent at 4/8/2022  2:04 PM CDT -----  Regarding: [facility]  .Type: RX Refill Request    [facility]:  [facility]"    Have you contacted your pharmacy: yes     Refill or New Rx: refill     RX Name and Strength: tamsulosin 24 hr capsule 0.4 mg         Is this a 30 day or 90 day RX: 90 day    Preferred Pharmacy with phone number: .  [id number] - [place], [place] - [address] [place] [address]  Phone: [phone] Fax: [phone]    Local or Mail Order: local       Would the patient rather a call back or a response via [facility]? Call back     Best Call Back Number: [phone]        "

## 2022-04-08 NOTE — TELEPHONE ENCOUNTER
"----- Message from Hilary Ames sent at 4/8/2022  4:02 PM CDT -----  Regarding: [facility]  Regarding: [facility]  .Type: RX Refill Request     Who Called:  [facility]"     Have you contacted your pharmacy: yes      Refill or New Rx: refill      RX Name and Strength: tamsulosin 24 hr capsule 0.4 mg     Flomax(sp?)         Is this a 30 day or 90 day RX: 90 day     Preferred Pharmacy with phone number: .  [facility] - [place], [place] - [address] [place] [address]  Phone: [phone] Fax: [phone]     Local or Mail Order: local         Would the patient rather a call back or a response via [facility]? Call back      Best Call Back Number: [phone]        Second call     "

## 2022-04-08 NOTE — TELEPHONE ENCOUNTER
----- Message from Janet La sent at 4/8/2022  9:17 AM CDT -----  Type: Patient Call Back    [facility]    What is the request in detail: Checking on the status of patient's tamsulosin (FLOMAX) 0.4 mg Cap, states he is completely out. Already sent in a refill request.      Can the clinic reply by [facility]? No     Would the patient rather a call back or a response via [facility]? Call back     Best call back number: Phone: [phone] Fax: [phone]

## 2022-04-08 NOTE — TELEPHONE ENCOUNTER
No new care gaps identified.  Powered by [employer]. Reference number: [id number].   4/08/2022 9:16:16 AM CDT

## 2022-04-09 RX ORDER — TAMSULOSIN HYDROCHLORIDE 0.4 MG/1
CAPSULE ORAL
Qty: 83 CAPSULE | Refills: 0 | OUTPATIENT
Start: 2022-04-09

## 2022-04-09 NOTE — TELEPHONE ENCOUNTER
Quick DC. Inappropriate Request    Refill Authorization Note   Peter Lopez  is requesting a refill authorization.  Brief Assessment and Rationale for Refill:  Quick Discontinue  Medication Therapy Plan:  D/C 1/5/22    Medication Reconciliation Completed:  No      Comments:     Note composed:4:05 PM 04/09/2022

## 2022-04-11 ENCOUNTER — TELEPHONE (OUTPATIENT)
Dept: FAMILY MEDICINE | Facility: CLINIC | Age: 87
End: 2022-04-11
Payer: MEDICARE

## 2022-04-11 NOTE — TELEPHONE ENCOUNTER
"That medication was discontinued by the urologist in January, who was prescribing it. According to the note from urology "Flomax discontinued as patient has already had a bladder outlet procedure performed"  "

## 2022-04-11 NOTE — TELEPHONE ENCOUNTER
Patient is asking for a refill of medication :     Tamsulosin 0.4 mg   Take 1 cap daily    lov 03/21/22

## 2022-04-12 ENCOUNTER — TELEPHONE (OUTPATIENT)
Dept: FAMILY MEDICINE | Facility: CLINIC | Age: 87
End: 2022-04-12
Payer: MEDICARE

## 2022-04-12 NOTE — TELEPHONE ENCOUNTER
Spoken with the patient to let him know that we are unable to refill his medication it have discontinue from him urology. Patient understand what was told to him

## 2022-05-09 ENCOUNTER — OFFICE VISIT (OUTPATIENT)
Dept: GASTROENTEROLOGY | Facility: CLINIC | Age: 87
End: 2022-05-09
Payer: MEDICARE

## 2022-05-09 VITALS — WEIGHT: 170.63 LBS | HEIGHT: 71 IN | BODY MASS INDEX: 23.89 KG/M2

## 2022-05-09 DIAGNOSIS — K59.00 CONSTIPATION, UNSPECIFIED CONSTIPATION TYPE: ICD-10-CM

## 2022-05-09 PROCEDURE — 1159F PR MEDICATION LIST DOCUMENTED IN MEDICAL RECORD: ICD-10-PCS | Mod: CPTII,S$GLB,, | Performed by: STUDENT IN AN ORGANIZED HEALTH CARE EDUCATION/TRAINING PROGRAM

## 2022-05-09 PROCEDURE — 99499 RISK ADDL DX/OHS AUDIT: ICD-10-PCS | Mod: S$GLB,,, | Performed by: STUDENT IN AN ORGANIZED HEALTH CARE EDUCATION/TRAINING PROGRAM

## 2022-05-09 PROCEDURE — 1159F MED LIST DOCD IN RCRD: CPT | Mod: CPTII,S$GLB,, | Performed by: STUDENT IN AN ORGANIZED HEALTH CARE EDUCATION/TRAINING PROGRAM

## 2022-05-09 PROCEDURE — 1126F AMNT PAIN NOTED NONE PRSNT: CPT | Mod: CPTII,S$GLB,, | Performed by: STUDENT IN AN ORGANIZED HEALTH CARE EDUCATION/TRAINING PROGRAM

## 2022-05-09 PROCEDURE — 1126F PR PAIN SEVERITY QUANTIFIED, NO PAIN PRESENT: ICD-10-PCS | Mod: CPTII,S$GLB,, | Performed by: STUDENT IN AN ORGANIZED HEALTH CARE EDUCATION/TRAINING PROGRAM

## 2022-05-09 PROCEDURE — 99999 PR PBB SHADOW E&M-EST. PATIENT-LVL III: CPT | Mod: PBBFAC,,, | Performed by: STUDENT IN AN ORGANIZED HEALTH CARE EDUCATION/TRAINING PROGRAM

## 2022-05-09 PROCEDURE — 99203 OFFICE O/P NEW LOW 30 MIN: CPT | Mod: S$GLB,,, | Performed by: STUDENT IN AN ORGANIZED HEALTH CARE EDUCATION/TRAINING PROGRAM

## 2022-05-09 PROCEDURE — 99499 UNLISTED E&M SERVICE: CPT | Mod: S$GLB,,, | Performed by: STUDENT IN AN ORGANIZED HEALTH CARE EDUCATION/TRAINING PROGRAM

## 2022-05-09 PROCEDURE — 99203 PR OFFICE/OUTPT VISIT, NEW, LEVL III, 30-44 MIN: ICD-10-PCS | Mod: S$GLB,,, | Performed by: STUDENT IN AN ORGANIZED HEALTH CARE EDUCATION/TRAINING PROGRAM

## 2022-05-09 PROCEDURE — 99999 PR PBB SHADOW E&M-EST. PATIENT-LVL III: ICD-10-PCS | Mod: PBBFAC,,, | Performed by: STUDENT IN AN ORGANIZED HEALTH CARE EDUCATION/TRAINING PROGRAM

## 2022-05-09 NOTE — PROGRESS NOTES
[facility] Consultation Note    Reason for Consult:  Constipation     PCP:   Lucas Lloyd Jr   [address] [facility] / [place] [address]    Referring MD:  Monroe Malave, Np  [address] [facility] [address]    HPI:  This is a 86 y.o. male with PMHx of COPD, DM, CKD, HCV s/p treatment with SVR, Prostate cancer here for evaluation of constipation. He currently has 1-2 BM per week which are associated with straining. No abdominal pain, blood in stool, weight loss. He does have occasional bloating. He saw his PCP for this and was given lactulose to take PRN. He takes this and does feel that it helps sometimes. He does not take anything daily for his constipation. Has also tried miralax a few times but not consistently and takes magnesium citrate sometimes. CBC showed Hg was 10. Iron panel more consistent with AOCD related to his CKD. No prior endoscopy on file. He states he had three cscopes in his life time which removed a few polyps. Last was 9-10 years ago. No FH of CRC.     ROS:  Constitutional: No fevers, chills, No weight loss  ENT: No allergies  CV: No chest pain  Pulm: No cough, No shortness of breath  Ophtho: No vision changes  GI: see HPI  Derm: No rash  Heme: No lymphadenopathy, No bruising  MSK: No arthritis  : No dysuria, No hematuria  Endo: No hot or cold intolerance  Neuro: No syncope, No seizure  Psych: No anxiety, No depression    Medical History:  has a past medical history of BPH (benign prostatic hyperplasia), Chronic hepatitis C without mention of hepatic coma, CKD (chronic kidney disease), COPD (chronic obstructive pulmonary disease), Diabetes mellitus type I, H/O colonoscopy, [place] (hard of hearing), Hypertension, On home oxygen therapy, 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    Surgical History:  has a past surgical history that includes No past surgeries (05/20/2021) and Transurethral resection  of prostate using bipolar cautery (N/A, 6/22/2021).    Family History: family history includes Asbestos in his father; Cancer in his mother..     Social History:  reports that he quit smoking about 30 years ago. He has a 40.00 pack-year smoking history. He has never used smokeless tobacco. He reports previous alcohol use. He reports that he does not use drugs.    Review of patient's allergies indicates:  No Known Allergies    Current Outpatient Rx   Medication Sig Dispense Refill    olmesartan (BENICAR) 40 MG tablet Take 1 tablet (40 mg total) by mouth once daily. 90 tablet 1    SPIRIVA RESPIMAT 2.5 mcg/actuation inhaler INHALE 2 SPRAY(S) BY MOUTH ONCE DAILY 4 g 2    albuterol (PROAIR HFA) 90 mcg/actuation inhaler Inhale 2 puffs into the lungs every 4 (four) hours as needed for Wheezing (cough/shortness of breath). Rescue (Patient not taking: Reported on 5/9/2022) 18 g 0    atorvastatin (LIPITOR) 20 MG tablet Take 1 tablet (20 mg total) by mouth every evening. (Patient not taking: Reported on 5/9/2022) 90 tablet 3    blood sugar diagnostic Strp To check BG 1 times daily, to use with insurance preferred meter (Patient not taking: Reported on 5/9/2022) 50 each 11    blood-glucose meter kit To check BG 1 times daily, to use with insurance preferred meter (Patient not taking: Reported on 5/9/2022) 1 each 0    hydrOXYzine HCL (ATARAX) 25 MG tablet Take 1 tablet (25 mg total) by mouth 3 (three) times daily as needed for Itching. (Patient not taking: Reported on 5/9/2022) 60 tablet 0    lactulose (CHRONULAC) 10 gram/15 mL solution Take 30 mLs (20 g total) by mouth 2 (two) times daily as needed (constipation). (Patient not taking: Reported on 5/9/2022) 473 mL 1    lancets Misc To check BG 1 times daily, to use with insurance preferred meter (Patient not taking: Reported on 5/9/2022) 50 each 11    ondansetron (ZOFRAN) 4 MG tablet Take 1 tablet (4 mg total) by mouth every 8 (eight) hours as needed for Nausea.  "(Patient not taking: Reported on 5/9/2022) 30 tablet 0    PREVNAR 13, PF, 0.5 mL Syrg       STOOL SOFTENER 100 mg capsule Take 1 capsule by mouth once daily (Patient not taking: Reported on 5/9/2022) 60 capsule 0       Objective Findings:    Vital Signs:  Ht 5' 11" (1.803 m)   Wt 77.4 kg (170 lb 10.2 oz)   BMI 23.80 kg/m²   Body mass index is 23.8 kg/m².    Physical Exam:  General Appearance: Well appearing in no acute distress  Head:   Normocephalic, without obvious abnormality  Eyes:    No scleral icterus, EOMI  ENT: Neck supple, Lips, mucosa, and tongue normal    Lungs: CTA bilaterally in anterior and posterior fields, no wheezes, no crackles.  Heart:  Regular rate and rhythm, S1, S2 normal, no murmurs heard  Abdomen: Soft, non tender, non distended with positive bowel sounds in all four quadrants. No hepatosplenomegaly, ascites, or mass  Extremities: 2+ pulses, no clubbing, cyanosis or edema  Skin: No rash  Neurologic: no focal deficits       Labs:  Lab Results   Component Value Date    WBC 4.03 03/15/2022    HGB 10.7 (L) 03/15/2022    HCT 33.1 (L) 03/15/2022     03/15/2022    CHOL 118 (L) 03/14/2022    TRIG 86 03/14/2022    HDL 41 03/14/2022    ALT 24 03/15/2022    AST 37 03/15/2022     03/15/2022    K 4.3 03/15/2022     03/15/2022    CREATININE 2.4 (H) 03/15/2022    BUN 38 (H) 03/15/2022    CO2 26 03/15/2022    TSH 1.337 03/14/2022    PSA 19.0 (H) 07/10/2017    INR 0.9 03/28/2020    HGBA1C 7.3 (H) 03/14/2022       Imaging:    CT 3/2022   FINDINGS:  Lower chest: Heart size is normal. Linear pulmonary opacities in the bilateral lower lobes and bibasilar pulmonary emphysema, similar to the prior study in 2021.  No pleural or pericardial effusion.     Abdomen:     Evaluation of the solid abdominal organs and bowel is limited in the absence of IV contrast.     Liver is normal in size and contour without focal contour deforming lesion.  Stable small hepatic hypodensities, likely cysts.  " Gallbladder is unremarkable. No calcified gallstones. No intra-or extrahepatic biliary ductal dilatation.     Spleen, adrenals, and pancreas are unremarkable.     Kidneys are symmetric.  No renal or ureteral stones. No hydronephrosis.     No distended loops of small bowel. Appendix is normal.  Mild stool burden throughout the colon.     Abdominal aorta is normal in course and caliber with moderate to severe calcific atherosclerosis.     No abdominal lymphadenopathy.     No abdominal free fluid or pneumoperitoneum.     Pelvis: Urinary bladder is decompressed and demonstrates symmetric wall prominence.  Rectum is unremarkable.  No free fluid in the pelvis.     Bones and soft tissues: No aggressive osseous lesions. Multilevel degenerative changes in the spine.  Extraperitoneal soft tissues are negative for acute finding.     Impression:     No evidence of nephrolithiasis or obstructive uropathy.  Suggest correlation with urinalysis if there is concern for urinary tract infection.     Stable bibasilar linear pulmonary opacities/fibrotic change and pulmonary emphysema.  Similar findings were present on the prior study dated 06/11/2021.     Advanced aortoiliac atherosclerosis.     Additional incidental findings discussed in the body of the report.       Endoscopy:  None     Assessment:    1. Chronic Constipation  2. Normocytic anemia, AOCD   3. CKD     Recommendations:    - Recommend adeqaute hydration 6-8 glasses of water a day   - 20-30g of dietary fiber and recommend psyllium fiber supplement   - Given handout on fiber administration and dietary fiber   - Start miralax daily   - Can continue lactulose for now PRN as he has been as rescue medication if there is no BM in 2-3 days   - Squatty potty use recommended   - No recommendation for colonoscopy at this time due to patient's age for CRC screening     RTC 3 months     Order summary:         Thank you so much for allowing me to participate in the care of Peter  Jessica Ackerman MD  Gastroenterology   [facility]

## 2022-05-09 NOTE — PATIENT INSTRUCTIONS
Start miralax daily     Start daily fiber.  Take 1 tsp of fiber powder (psyllium or other sugar-free powder).  Mix in 8 oz of water.  Take x 3-5 days.  Then, increase fiber by 1 tsp every 3-5 days until stool is easy to pass.  Stop and continue at that dose.   Do not exceed 6 tsps/day. GOAL:  More well-formed stool (one continuous well-formed piece vs. Pellets) and minimize straining with initiation.    [facility]  DIET RECOMMENDATIONS    Fruits:  Use all fruits and juices liberally; fresh, cooked, dried or canned. Eat fruit raw and with skins when possible. Have at least four servings of fruit daily including a citrus fruit and a stewed dried fruit. Hard seeds of fruits (berries, figs, Grapes, mangoes, tomatoes) etc. may be removed.    Vegetables: Use all vegetables liberally. Green leafy vegetables, such as cabbage, spinach, lettuce, broccoli, and other greens are particularly good.    Potato: As desired. Serve baked frequently and eat the skin. Other starchy foods such as rice, macaroni, etc., may be occasionally substituted. Chew popcorn well and do not eat hard kernels.    Meat, Fish, Poultry: One or two servings daily.    Eggs: One daily if you are not on a low cholesterol diet.    Milk: Include at least one-half pint daily. Whole milk or skimmed may be used.     Cereals: Use whole grain breads and cereals such as bran, bran flakes, grape nut flakes, puffed wheat, oatmeal, Wheaties, etc., as much as possible. Refined breaks and cereals are not restricted; however, they do not contain the bulk necessary for this diet.     Sugars, Sweets: Use very moderately. Depend on fruit as dessert.    Fats: Use butter or margarine as desired. Oil or dressing on salads as desired. Fried foods may be used in moderation. Nuts may be eaten if you chew them well and may be ground or finely chopped for cooking.   Sample Menu                                                                                 Breakfast                           Lunch  Orange juice, 4 ounces                                                Vegetable soup                    Stewed fruit                                                                 Fresh fruit plate with cottage cheese  Shredded wheat                                                           Whole wheat toast  Scrambled eggs                                                           Butter  Whole wheat toast                                                       Coffee or tea  Dinner                                                                         Bedtime  Large glass tomato juice                                             1 glass milk  Roast beef                                                                   stewed fruit  Baked potato with skin  Buttered spinach  Raw vegetable salad  Baked apple with skin   Coffee or tea      [facility]  High Fiber Diet    15 grams of fiber per day is recommended  Fiber cereal = 5 grams (Raisin Bran, Shredded Wheat, Grape Nuts)  Konsyl 1 teaspoon = 6 grams  Metamucil 1 tablespoon = 3 grams  Citrucel 1 tablespoon = 2 grams  Fiber Choice = 3-4 per day    This diet furnishes adequate amounts of all the essential nutrients needed by the body and a very liberal fiber or roughage content. Roughage is indigestible fiber found in fruits, vegetables and whole grain cereals. It provides bulk to the large intestine and, accompanied by an adequate fluid intake, is a stimulant to elimination. Regular eating and elimination habits are vital to good health.     How to Increase Your Fiber Intake    Drink at least 4-5 glasses of liquids per day or the fiber can be constipating rather then stimulating to your gut.  Boil and bake potatoes in their skin. Eat the skin, too.  Include fresh fruits and raw vegetables in your daily diet. Raw fruits and vegetables have more useful fiber than those that have been peeled, cooked, pureed, or otherwise  processed.  Eat a wide variety of fibrous foods in reasonable amounts. Increase fiber intake slowly especially if you have been on a low-fiber diet.  Eat more legumes-peas, beans, soybeans.  For snacks, try dried fruit, whole wheat and rye crackers.  Avoid instant-cook hot cereals. Use the longer cooking cereals.  Use bran whenever possible. Sprinkle it on top of cereal, mix it into mashed potatoes or hamburger meat, or use it in combination dishes such as meat loaf.   Substitute whole grain, whole wheat and bran products for white flour products.  Eat slowly and chew your food thoroughly.

## 2022-05-23 ENCOUNTER — TELEPHONE (OUTPATIENT)
Dept: FAMILY MEDICINE | Facility: CLINIC | Age: 87
End: 2022-05-23
Payer: MEDICARE

## 2022-05-23 NOTE — TELEPHONE ENCOUNTER
----- Message from Susan Parekh sent at 5/23/2022 12:47 PM CDT -----  Regarding: appt  Patient saw [facility] and his blood pressure was high and they requested that he make an appt to see you sooner that the appt he already has set up. Would like doctor to call him so that he can discuss what's going on.

## 2022-05-30 ENCOUNTER — OFFICE VISIT (OUTPATIENT)
Dept: FAMILY MEDICINE | Facility: CLINIC | Age: 87
End: 2022-05-30
Payer: MEDICARE

## 2022-05-30 ENCOUNTER — TELEPHONE (OUTPATIENT)
Dept: FAMILY MEDICINE | Facility: CLINIC | Age: 87
End: 2022-05-30

## 2022-05-30 ENCOUNTER — HOSPITAL ENCOUNTER (OUTPATIENT)
Dept: RADIOLOGY | Facility: HOSPITAL | Age: 87
Discharge: HOME | End: 2022-05-30
Attending: NURSE PRACTITIONER
Payer: MEDICARE

## 2022-05-30 VITALS
HEART RATE: 103 BPM | OXYGEN SATURATION: 96 % | WEIGHT: 168.88 LBS | BODY MASS INDEX: 23.64 KG/M2 | TEMPERATURE: 98 F | SYSTOLIC BLOOD PRESSURE: 138 MMHG | RESPIRATION RATE: 18 BRPM | HEIGHT: 71 IN | DIASTOLIC BLOOD PRESSURE: 72 MMHG

## 2022-05-30 DIAGNOSIS — S09.90XA TRAUMATIC INJURY OF HEAD, INITIAL ENCOUNTER: ICD-10-CM

## 2022-05-30 DIAGNOSIS — N18.4 STAGE 4 CHRONIC KIDNEY DISEASE: ICD-10-CM

## 2022-05-30 DIAGNOSIS — I10 ESSENTIAL HYPERTENSION: Primary | Chronic | ICD-10-CM

## 2022-05-30 PROCEDURE — 3078F PR MOST RECENT DIASTOLIC BLOOD PRESSURE < 80 MM HG: ICD-10-PCS | Mod: CPTII,S$GLB,, | Performed by: NURSE PRACTITIONER

## 2022-05-30 PROCEDURE — 99214 OFFICE O/P EST MOD 30 MIN: CPT | Mod: S$GLB,,, | Performed by: NURSE PRACTITIONER

## 2022-05-30 PROCEDURE — 99214 PR OFFICE/OUTPT VISIT, EST, LEVL IV, 30-39 MIN: ICD-10-PCS | Mod: S$GLB,,, | Performed by: NURSE PRACTITIONER

## 2022-05-30 PROCEDURE — 3288F FALL RISK ASSESSMENT DOCD: CPT | Mod: CPTII,S$GLB,, | Performed by: NURSE PRACTITIONER

## 2022-05-30 PROCEDURE — 3075F SYST BP GE 130 - 139MM HG: CPT | Mod: CPTII,S$GLB,, | Performed by: NURSE PRACTITIONER

## 2022-05-30 PROCEDURE — 1101F PT FALLS ASSESS-DOCD LE1/YR: CPT | Mod: CPTII,S$GLB,, | Performed by: NURSE PRACTITIONER

## 2022-05-30 PROCEDURE — 70450 CT HEAD WITHOUT CONTRAST: ICD-10-PCS | Mod: 26,,, | Performed by: RADIOLOGY

## 2022-05-30 PROCEDURE — 3078F DIAST BP <80 MM HG: CPT | Mod: CPTII,S$GLB,, | Performed by: NURSE PRACTITIONER

## 2022-05-30 PROCEDURE — 3075F PR MOST RECENT SYSTOLIC BLOOD PRESS GE 130-139MM HG: ICD-10-PCS | Mod: CPTII,S$GLB,, | Performed by: NURSE PRACTITIONER

## 2022-05-30 PROCEDURE — 1160F PR REVIEW ALL MEDS BY PRESCRIBER/CLIN PHARMACIST DOCUMENTED: ICD-10-PCS | Mod: CPTII,S$GLB,, | Performed by: NURSE PRACTITIONER

## 2022-05-30 PROCEDURE — 99999 PR PBB SHADOW E&M-EST. PATIENT-LVL IV: ICD-10-PCS | Mod: PBBFAC,,, | Performed by: NURSE PRACTITIONER

## 2022-05-30 PROCEDURE — 70450 CT HEAD/BRAIN W/O DYE: CPT | Mod: 26,,, | Performed by: RADIOLOGY

## 2022-05-30 PROCEDURE — 99499 RISK ADDL DX/OHS AUDIT: ICD-10-PCS | Mod: S$GLB,,, | Performed by: NURSE PRACTITIONER

## 2022-05-30 PROCEDURE — 3288F PR FALLS RISK ASSESSMENT DOCUMENTED: ICD-10-PCS | Mod: CPTII,S$GLB,, | Performed by: NURSE PRACTITIONER

## 2022-05-30 PROCEDURE — 99499 UNLISTED E&M SERVICE: CPT | Mod: S$GLB,,, | Performed by: NURSE PRACTITIONER

## 2022-05-30 PROCEDURE — 1126F AMNT PAIN NOTED NONE PRSNT: CPT | Mod: CPTII,S$GLB,, | Performed by: NURSE PRACTITIONER

## 2022-05-30 PROCEDURE — 99999 PR PBB SHADOW E&M-EST. PATIENT-LVL IV: CPT | Mod: PBBFAC,,, | Performed by: NURSE PRACTITIONER

## 2022-05-30 PROCEDURE — 1159F MED LIST DOCD IN RCRD: CPT | Mod: CPTII,S$GLB,, | Performed by: NURSE PRACTITIONER

## 2022-05-30 PROCEDURE — 1160F RVW MEDS BY RX/DR IN RCRD: CPT | Mod: CPTII,S$GLB,, | Performed by: NURSE PRACTITIONER

## 2022-05-30 PROCEDURE — 1101F PR PT FALLS ASSESS DOC 0-1 FALLS W/OUT INJ PAST YR: ICD-10-PCS | Mod: CPTII,S$GLB,, | Performed by: NURSE PRACTITIONER

## 2022-05-30 PROCEDURE — 1159F PR MEDICATION LIST DOCUMENTED IN MEDICAL RECORD: ICD-10-PCS | Mod: CPTII,S$GLB,, | Performed by: NURSE PRACTITIONER

## 2022-05-30 PROCEDURE — 1126F PR PAIN SEVERITY QUANTIFIED, NO PAIN PRESENT: ICD-10-PCS | Mod: CPTII,S$GLB,, | Performed by: NURSE PRACTITIONER

## 2022-05-30 PROCEDURE — 70450 CT HEAD/BRAIN W/O DYE: CPT | Mod: TC

## 2022-05-30 NOTE — PROGRESS NOTES
Routine Office Visit    Patient Name: Peter Lopez    : 1935  MRN: [id number]    Chief Complaint:  Fall, BP follow-up    Subjective:  Peter is a 86 y.o. male who presents today for:    1. Fall, BP follow-up - patient who is known to me with a history of diabetes, CKD, hypertension reports today for evaluation.  On May 12th he had an annual Medicare wellness exam and states that his blood pressure at that time was in the 150s over 80s.  He is compliant with his olmesartan every day.  He reports today for a BP recheck.  Denies any chest pain or shortness of breath or other cardiac or respiratory complaints.  Yesterday he did have a mechanical fall after tripping over low step at his house.  He did hit his head at the left eyebrow and reports some tenderness and swelling to the area.  He states he deals with occasional vertigo but denies any dizziness, headache, lightheadedness, numbness, tingling, or weakness today.  He took a Tylenol after the head injury which helped.  This is the extent of his concerns at this time.    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  Vaccinated.  C A       Family History  Family History   Problem Relation Age of Onset    Cancer Mother     Asbestos Father     Liver disease Neg Hx        Social History  Social History     Socioeconomic History    Marital status:    Tobacco Use    Smoking status: Former Smoker     Packs/day: 1.00     Years: 40.00     Pack years: 40.00     Quit date: 1991     Years since quittin.5    Smokeless tobacco: Never Used   Substance and Sexual Activity    Alcohol use: Not Currently     Alcohol/week: 0.0 standard drinks     Comment: 2 bottles beer on weekends    Drug use: No    Sexual activity: Yes     Partners: Female     Comment:     Social History Narrative    , resides in [place]. 7 children but lost one. 16 grandchildren. 4 great-grandchildren. Works at [employer]       Current Medications  Current Outpatient Medications on File Prior to Visit   Medication Sig Dispense Refill    atorvastatin (LIPITOR) 20 MG tablet Take 1 tablet (20 mg total) by mouth every evening. 90 tablet 3    blood sugar diagnostic Strp To check BG 1 times daily, to use with insurance preferred meter 50 each 11    blood-glucose meter kit To check BG 1 times daily, to use with insurance preferred meter 1 each 0    hydrOXYzine HCL (ATARAX) 25 MG tablet Take 1 tablet (25 mg total) by mouth 3 (three) times daily as needed for Itching. 60 tablet 0    lancets Misc To check BG 1 times daily, to use with insurance preferred meter 50 each 11    olmesartan (BENICAR) 40 MG tablet Take 1 tablet (40 mg total) by mouth once daily. 90 tablet 1    ondansetron (ZOFRAN) 4 MG tablet Take 1 tablet (4 mg total) by mouth every 8 (eight) hours as needed for Nausea. 30 tablet 0    PREVNAR 13, PF, 0.5 mL Syrg       SPIRIVA RESPIMAT 2.5 mcg/actuation inhaler INHALE 2 SPRAY(S) BY MOUTH ONCE DAILY 4 g 2    albuterol (PROAIR HFA) 90 mcg/actuation inhaler Inhale 2 puffs into the lungs every 4 (four) hours as  "needed for Wheezing (cough/shortness of breath). Rescue (Patient not taking: Reported on 5/30/2022) 18 g 0    lactulose (CHRONULAC) 10 gram/15 mL solution Take 30 mLs (20 g total) by mouth 2 (two) times daily as needed (constipation). (Patient not taking: Reported on 5/30/2022) 473 mL 1    STOOL SOFTENER 100 mg capsule Take 1 capsule by mouth once daily (Patient not taking: Reported on 5/30/2022) 60 capsule 0    [DISCONTINUED] leuprolide, 6 month, (LUPRON DEPOT, 6 MONTH,) 45 mg SyKt injection Inject 45 mg into the muscle every 6 (six) months. 2 each 0     Current Facility-Administered Medications on File Prior to Visit   Medication Dose Route Frequency Provider Last Rate Last Admin    leuprolide (6 month) injection 45 mg  45 mg Intramuscular 1 time in Clinic/HOD Jesus Cole MD           Allergies   Review of patient's allergies indicates:  No Known Allergies    Review of Systems (Pertinent positives)  Review of Systems   Constitutional: Negative.  Negative for chills, diaphoresis, fever, malaise/fatigue and weight loss.   HENT: Negative.    Eyes: Negative for blurred vision, double vision, photophobia, pain, discharge and redness.   Respiratory: Negative.    Cardiovascular: Negative.  Negative for chest pain, palpitations and orthopnea.   Gastrointestinal: Negative.    Genitourinary: Negative.    Musculoskeletal: Negative.    Skin: Negative.    Neurological: Negative for dizziness, tingling, tremors, sensory change, speech change, focal weakness and headaches.   Endo/Heme/Allergies: Negative.    Psychiatric/Behavioral: Negative.        /72 (BP Location: Right arm, Patient Position: Sitting, BP Method: Medium (Manual))   Pulse 103   Temp 98.4 °F (36.9 °C) (Oral)   Resp 18   Ht 5' 11" (1.803 m)   Wt 76.6 kg (168 lb 14 oz)   SpO2 96%   BMI 23.55 kg/m²     Physical Exam  Vitals reviewed.   Constitutional:       General: He is not in acute distress.     Appearance: Normal appearance. He is not " ill-appearing, toxic-appearing or diaphoretic.   HENT:      Head: No abrasion, contusion, masses, right periorbital erythema or left periorbital erythema.        Nose: Nose normal. No congestion or rhinorrhea.      Mouth/Throat:      Mouth: Mucous membranes are moist.      Pharynx: Oropharynx is clear. No oropharyngeal exudate or posterior oropharyngeal erythema.   Eyes:      Extraocular Movements: Extraocular movements intact.      Conjunctiva/sclera: Conjunctivae normal.      Pupils: Pupils are equal, round, and reactive to light.   Cardiovascular:      Rate and Rhythm: Normal rate and regular rhythm.      Pulses: Normal pulses.      Heart sounds: Normal heart sounds.   Pulmonary:      Effort: Pulmonary effort is normal.      Breath sounds: Normal breath sounds.   Skin:     General: Skin is warm and dry.      Capillary Refill: Capillary refill takes less than 2 seconds.   Neurological:      General: No focal deficit present.      Mental Status: He is alert and oriented to person, place, and time.      Motor: No weakness.      Gait: Gait normal.   Psychiatric:         Mood and Affect: Mood normal.         Behavior: Behavior normal.          Assessment/Plan:  Peter Lopez is a 86 y.o. male who presents today for :    Diagnoses and all orders for this visit:    Essential hypertension    Controlled on recheck today.  Continue current medications.  Strongly recommended patient to maintain appointment with Nephrology.    Stage 4 chronic kidney disease    As above.    Traumatic injury of head, initial encounter  -     Cancel: CT Head Without Contrast; Future  -     CT Head Without Contrast; Future    Out of abundance of caution will check stat CT head today.  No bleeding lacerations on examination.  Normal neuro exam.    Follow-up with PCP as scheduled.        This office note has been dictated.  This dictation has been generated using M-Modal Fluency Direct dictation; some phonetic errors may occur.   My collaborating  physician is Dr. Suresh Can.

## 2022-05-30 NOTE — TELEPHONE ENCOUNTER
Patient notified of lab results, verbalized understanding. Instructed to contact office with any questions or concerns.   ----- Message from Monroe Malave NP sent at 5/30/2022  4:44 PM CDT -----  Please call patient and let him know the CT scan is normal.  Thank you

## 2022-06-14 ENCOUNTER — TELEPHONE (OUTPATIENT)
Dept: NEPHROLOGY | Facility: CLINIC | Age: 87
End: 2022-06-14
Payer: MEDICARE

## 2022-06-14 NOTE — TELEPHONE ENCOUNTER
Called pt no answer l/m   ----- Message from Raul Joya sent at 6/14/2022  8:33 AM CDT -----  Contact: PT  Pt's requesting a call back regarding rescheduling tomorrows appt.... Pt has sandoval issues and workers are coming out tomorrow.. Pt wants to reschedule for Monday or Tuesday       Confirmed contact info below:  Contact Name: Peter Lopez  Phone Number: [phone]

## 2022-06-21 ENCOUNTER — LAB VISIT (OUTPATIENT)
Dept: LAB | Facility: HOSPITAL | Age: 87
End: 2022-06-21
Attending: FAMILY MEDICINE
Payer: MEDICARE

## 2022-06-21 DIAGNOSIS — E11.21 DIABETES MELLITUS WITH NEPHROPATHY: ICD-10-CM

## 2022-06-21 DIAGNOSIS — N18.4 STAGE 4 CHRONIC KIDNEY DISEASE: ICD-10-CM

## 2022-06-21 LAB
ALBUMIN SERPL BCP-MCNC: 3.7 G/DL (ref 3.5–5.2)
ALP SERPL-CCNC: 57 U/L (ref 55–135)
ALT SERPL W/O P-5'-P-CCNC: 13 U/L (ref 10–44)
ANION GAP SERPL CALC-SCNC: 8 MMOL/L (ref 8–16)
AST SERPL-CCNC: 23 U/L (ref 10–40)
BASOPHILS # BLD AUTO: 0.02 K/UL (ref 0–0.2)
BASOPHILS NFR BLD: 0.6 % (ref 0–1.9)
BILIRUB SERPL-MCNC: 0.4 MG/DL (ref 0.1–1)
BUN SERPL-MCNC: 26 MG/DL (ref 8–23)
CALCIUM SERPL-MCNC: 10.1 MG/DL (ref 8.7–10.5)
CHLORIDE SERPL-SCNC: 103 MMOL/L (ref 95–110)
CO2 SERPL-SCNC: 30 MMOL/L (ref 23–29)
CREAT SERPL-MCNC: 2 MG/DL (ref 0.5–1.4)
DIFFERENTIAL METHOD: ABNORMAL
EOSINOPHIL # BLD AUTO: 0.1 K/UL (ref 0–0.5)
EOSINOPHIL NFR BLD: 1.5 % (ref 0–8)
ERYTHROCYTE [DISTWIDTH] IN BLOOD BY AUTOMATED COUNT: 13.7 % (ref 11.5–14.5)
EST. GFR  (AFRICAN AMERICAN): 34 ML/MIN/1.73 M^2
EST. GFR  (NON AFRICAN AMERICAN): 30 ML/MIN/1.73 M^2
ESTIMATED AVG GLUCOSE: 166 MG/DL (ref 68–131)
GLUCOSE SERPL-MCNC: 219 MG/DL (ref 70–110)
HBA1C MFR BLD: 7.4 % (ref 4–5.6)
HCT VFR BLD AUTO: 35.8 % (ref 40–54)
HGB BLD-MCNC: 11.2 G/DL (ref 14–18)
IMM GRANULOCYTES # BLD AUTO: 0.02 K/UL (ref 0–0.04)
IMM GRANULOCYTES NFR BLD AUTO: 0.6 % (ref 0–0.5)
LYMPHOCYTES # BLD AUTO: 0.8 K/UL (ref 1–4.8)
LYMPHOCYTES NFR BLD: 23 % (ref 18–48)
MCH RBC QN AUTO: 28 PG (ref 27–31)
MCHC RBC AUTO-ENTMCNC: 31.3 G/DL (ref 32–36)
MCV RBC AUTO: 90 FL (ref 82–98)
MONOCYTES # BLD AUTO: 0.2 K/UL (ref 0.3–1)
MONOCYTES NFR BLD: 6.9 % (ref 4–15)
NEUTROPHILS # BLD AUTO: 2.3 K/UL (ref 1.8–7.7)
NEUTROPHILS NFR BLD: 67.4 % (ref 38–73)
NRBC BLD-RTO: 0 /100 WBC
PLATELET # BLD AUTO: 297 K/UL (ref 150–450)
PMV BLD AUTO: 9.7 FL (ref 9.2–12.9)
POTASSIUM SERPL-SCNC: 4.7 MMOL/L (ref 3.5–5.1)
PROT SERPL-MCNC: 7.6 G/DL (ref 6–8.4)
PTH-INTACT SERPL-MCNC: 71.4 PG/ML (ref 9–77)
RBC # BLD AUTO: 4 M/UL (ref 4.6–6.2)
SODIUM SERPL-SCNC: 141 MMOL/L (ref 136–145)
WBC # BLD AUTO: 3.35 K/UL (ref 3.9–12.7)

## 2022-06-21 PROCEDURE — 83970 ASSAY OF PARATHORMONE: CPT | Performed by: FAMILY MEDICINE

## 2022-06-21 PROCEDURE — 36415 COLL VENOUS BLD VENIPUNCTURE: CPT | Mod: PN | Performed by: FAMILY MEDICINE

## 2022-06-21 PROCEDURE — 83036 HEMOGLOBIN GLYCOSYLATED A1C: CPT | Performed by: FAMILY MEDICINE

## 2022-06-21 PROCEDURE — 85025 COMPLETE CBC W/AUTO DIFF WBC: CPT | Performed by: FAMILY MEDICINE

## 2022-06-21 PROCEDURE — 80053 COMPREHEN METABOLIC PANEL: CPT | Performed by: FAMILY MEDICINE

## 2022-06-23 NOTE — PROGRESS NOTES
Your blood sugar levels are still elevated.  Please discuss this with Dr. Lloyd.  You have done better in the past.  I know you can continue to improve your blood sugar numbers.  Please keep your appointment with Dr. Lloyd.  Everything else looks stable.

## 2022-06-29 ENCOUNTER — OFFICE VISIT (OUTPATIENT)
Dept: FAMILY MEDICINE | Facility: CLINIC | Age: 87
End: 2022-06-29
Payer: MEDICARE

## 2022-06-29 VITALS
HEIGHT: 71 IN | BODY MASS INDEX: 23.71 KG/M2 | SYSTOLIC BLOOD PRESSURE: 108 MMHG | WEIGHT: 169.38 LBS | OXYGEN SATURATION: 98 % | TEMPERATURE: 99 F | DIASTOLIC BLOOD PRESSURE: 62 MMHG | HEART RATE: 98 BPM

## 2022-06-29 DIAGNOSIS — D72.9 ABNORMAL WHITE BLOOD CELL: ICD-10-CM

## 2022-06-29 DIAGNOSIS — I10 ESSENTIAL HYPERTENSION: ICD-10-CM

## 2022-06-29 DIAGNOSIS — Z00.00 ROUTINE MEDICAL EXAM: Primary | ICD-10-CM

## 2022-06-29 DIAGNOSIS — K59.09 CHRONIC CONSTIPATION: ICD-10-CM

## 2022-06-29 DIAGNOSIS — N18.4 STAGE 4 CHRONIC KIDNEY DISEASE: ICD-10-CM

## 2022-06-29 DIAGNOSIS — J44.9 CHRONIC OBSTRUCTIVE PULMONARY DISEASE, UNSPECIFIED COPD TYPE: ICD-10-CM

## 2022-06-29 DIAGNOSIS — E11.21 DIABETES MELLITUS WITH NEPHROPATHY: ICD-10-CM

## 2022-06-29 PROCEDURE — 1101F PT FALLS ASSESS-DOCD LE1/YR: CPT | Mod: CPTII,S$GLB,, | Performed by: FAMILY MEDICINE

## 2022-06-29 PROCEDURE — 1126F AMNT PAIN NOTED NONE PRSNT: CPT | Mod: CPTII,S$GLB,, | Performed by: FAMILY MEDICINE

## 2022-06-29 PROCEDURE — 99999 PR PBB SHADOW E&M-EST. PATIENT-LVL IV: ICD-10-PCS | Mod: PBBFAC,,, | Performed by: FAMILY MEDICINE

## 2022-06-29 PROCEDURE — 3074F SYST BP LT 130 MM HG: CPT | Mod: CPTII,S$GLB,, | Performed by: FAMILY MEDICINE

## 2022-06-29 PROCEDURE — 99499 RISK ADDL DX/OHS AUDIT: ICD-10-PCS | Mod: S$GLB,,, | Performed by: FAMILY MEDICINE

## 2022-06-29 PROCEDURE — 3078F PR MOST RECENT DIASTOLIC BLOOD PRESSURE < 80 MM HG: ICD-10-PCS | Mod: CPTII,S$GLB,, | Performed by: FAMILY MEDICINE

## 2022-06-29 PROCEDURE — 1159F MED LIST DOCD IN RCRD: CPT | Mod: CPTII,S$GLB,, | Performed by: FAMILY MEDICINE

## 2022-06-29 PROCEDURE — 1160F PR REVIEW ALL MEDS BY PRESCRIBER/CLIN PHARMACIST DOCUMENTED: ICD-10-PCS | Mod: CPTII,S$GLB,, | Performed by: FAMILY MEDICINE

## 2022-06-29 PROCEDURE — 3074F PR MOST RECENT SYSTOLIC BLOOD PRESSURE < 130 MM HG: ICD-10-PCS | Mod: CPTII,S$GLB,, | Performed by: FAMILY MEDICINE

## 2022-06-29 PROCEDURE — 99499 UNLISTED E&M SERVICE: CPT | Mod: S$GLB,,, | Performed by: FAMILY MEDICINE

## 2022-06-29 PROCEDURE — 3051F PR MOST RECENT HEMOGLOBIN A1C LEVEL 7.0 - < 8.0%: ICD-10-PCS | Mod: CPTII,S$GLB,, | Performed by: FAMILY MEDICINE

## 2022-06-29 PROCEDURE — 1101F PR PT FALLS ASSESS DOC 0-1 FALLS W/OUT INJ PAST YR: ICD-10-PCS | Mod: CPTII,S$GLB,, | Performed by: FAMILY MEDICINE

## 2022-06-29 PROCEDURE — 99397 PR PREVENTIVE VISIT,EST,65 & OVER: ICD-10-PCS | Mod: GZ,S$GLB,, | Performed by: FAMILY MEDICINE

## 2022-06-29 PROCEDURE — 3288F FALL RISK ASSESSMENT DOCD: CPT | Mod: CPTII,S$GLB,, | Performed by: FAMILY MEDICINE

## 2022-06-29 PROCEDURE — 99999 PR PBB SHADOW E&M-EST. PATIENT-LVL IV: CPT | Mod: PBBFAC,,, | Performed by: FAMILY MEDICINE

## 2022-06-29 PROCEDURE — 1126F PR PAIN SEVERITY QUANTIFIED, NO PAIN PRESENT: ICD-10-PCS | Mod: CPTII,S$GLB,, | Performed by: FAMILY MEDICINE

## 2022-06-29 PROCEDURE — 3051F HG A1C>EQUAL 7.0%<8.0%: CPT | Mod: CPTII,S$GLB,, | Performed by: FAMILY MEDICINE

## 2022-06-29 PROCEDURE — 1160F RVW MEDS BY RX/DR IN RCRD: CPT | Mod: CPTII,S$GLB,, | Performed by: FAMILY MEDICINE

## 2022-06-29 PROCEDURE — 1159F PR MEDICATION LIST DOCUMENTED IN MEDICAL RECORD: ICD-10-PCS | Mod: CPTII,S$GLB,, | Performed by: FAMILY MEDICINE

## 2022-06-29 PROCEDURE — 3078F DIAST BP <80 MM HG: CPT | Mod: CPTII,S$GLB,, | Performed by: FAMILY MEDICINE

## 2022-06-29 PROCEDURE — 3288F PR FALLS RISK ASSESSMENT DOCUMENTED: ICD-10-PCS | Mod: CPTII,S$GLB,, | Performed by: FAMILY MEDICINE

## 2022-06-29 PROCEDURE — 99397 PER PM REEVAL EST PAT 65+ YR: CPT | Mod: GZ,S$GLB,, | Performed by: FAMILY MEDICINE

## 2022-06-29 RX ORDER — LINAGLIPTIN 5 MG/1
5 TABLET, FILM COATED ORAL DAILY
Qty: 90 TABLET | Refills: 1 | Status: SHIPPED | OUTPATIENT
Start: 2022-06-29 | End: 2022-12-07

## 2022-06-29 RX ORDER — LACTULOSE 10 G/15ML
20 SOLUTION ORAL; RECTAL 2 TIMES DAILY PRN
Qty: 473 ML | Refills: 11 | Status: SHIPPED | OUTPATIENT
Start: 2022-06-29 | End: 2023-04-11

## 2022-06-30 ENCOUNTER — TELEPHONE (OUTPATIENT)
Dept: UROLOGY | Facility: CLINIC | Age: 87
End: 2022-06-30
Payer: MEDICARE

## 2022-06-30 NOTE — TELEPHONE ENCOUNTER
Pt notified of lab appt 7/5/22 @ 9am-[facility]        ----- Message from Jesus Cole MD sent at 6/30/2022  2:12 PM CDT -----  Regarding: psa  Please call patient, he needs psa prior to July 6th appt

## 2022-07-04 NOTE — PROGRESS NOTES
Subjective:       Patient ID: Peter Lopez is a 86 y.o. male.    Chief Complaint: Annual Exam    HPI   86 year old male with diabetes, hypertension, chronic kidney disease, COPD, and constipation comes in for annual exam. Taking medications as prescribed. Requesting refill on constipation medication (lactulose) as it works well for him.    Review of Systems   Constitutional: Negative for unexpected weight change.   HENT: Negative for ear pain and sore throat.    Eyes: Negative for visual disturbance.   Respiratory: Negative for shortness of breath.    Cardiovascular: Negative for chest pain and palpitations.   Gastrointestinal: Positive for constipation. Negative for abdominal pain and blood in stool.   Endocrine: Negative for cold intolerance, heat intolerance, polydipsia, polyphagia and polyuria.   Genitourinary: Negative for dysuria and frequency.   Musculoskeletal: Negative for neck pain.   Neurological: Negative for weakness, numbness and headaches.   Hematological: Negative for adenopathy.   Psychiatric/Behavioral: Negative for decreased concentration and suicidal ideas. The patient is not nervous/anxious.          Objective:      Physical Exam  Vitals reviewed.   Constitutional:       Appearance: He is well-developed. He is not diaphoretic.   HENT:      Head: Normocephalic.      Right Ear: External ear normal.      Left Ear: External ear normal.      Nose: Nose normal.      Mouth/Throat:      Pharynx: No pharyngeal swelling, oropharyngeal exudate or posterior oropharyngeal erythema.   Neck:      Trachea: No tracheal deviation.   Cardiovascular:      Rate and Rhythm: Normal rate and regular rhythm.      Heart sounds: Normal heart sounds.   Pulmonary:      Effort: Pulmonary effort is normal.      Breath sounds: Normal breath sounds. No wheezing or rales.   Abdominal:      General: Bowel sounds are normal.      Palpations: Abdomen is soft. Abdomen is not rigid. There is no mass.      Tenderness: There is no  abdominal tenderness. There is no guarding or rebound.   Musculoskeletal:      Cervical back: Normal range of motion and neck supple.   Lymphadenopathy:      Cervical: No cervical adenopathy.   Neurological:      Mental Status: He is oriented to person, place, and time.      Sensory: No sensory deficit.      Motor: No atrophy.      Gait: Gait normal.      Deep Tendon Reflexes:      Reflex Scores:       Patellar reflexes are 2+ on the right side and 2+ on the left side.        Lab Visit on 06/21/2022   Component Date Value Ref Range Status    Sodium 06/21/2022 141  136 - 145 mmol/L Final    Potassium 06/21/2022 4.7  3.5 - 5.1 mmol/L Final    Chloride 06/21/2022 103  95 - 110 mmol/L Final    CO2 06/21/2022 30 (A) 23 - 29 mmol/L Final    Glucose 06/21/2022 219 (A) 70 - 110 mg/dL Final    BUN 06/21/2022 26 (A) 8 - 23 mg/dL Final    Creatinine 06/21/2022 2.0 (A) 0.5 - 1.4 mg/dL Final    Calcium 06/21/2022 10.1  8.7 - 10.5 mg/dL Final    Total Protein 06/21/2022 7.6  6.0 - 8.4 g/dL Final    Albumin 06/21/2022 3.7  3.5 - 5.2 g/dL Final    Total Bilirubin 06/21/2022 0.4  0.1 - 1.0 mg/dL Final    Comment: For infants and newborns, interpretation of results should be based  on gestational age, weight and in agreement with clinical  observations.    Premature Infant recommended reference ranges:  Up to 24 hours.............<8.0 mg/dL  Up to 48 hours............<12.0 mg/dL  3-5 days..................<15.0 mg/dL  6-29 days.................<15.0 mg/dL      Alkaline Phosphatase 06/21/2022 57  55 - 135 U/L Final    AST 06/21/2022 23  10 - 40 U/L Final    ALT 06/21/2022 13  10 - 44 U/L Final    Anion Gap 06/21/2022 8  8 - 16 mmol/L Final    eGFR if  06/21/2022 34 (A) >60 mL/min/1.73 m^2 Final    eGFR if non African American 06/21/2022 30 (A) >60 mL/min/1.73 m^2 Final    Comment: Calculation used to obtain the estimated glomerular filtration  rate (eGFR) is the CKD-EPI equation.       Hemoglobin A1C  06/21/2022 7.4 (A) 4.0 - 5.6 % Final    Comment: ADA Screening Guidelines:  5.7-6.4%  Consistent with prediabetes  >or=6.5%  Consistent with diabetes    High levels of fetal hemoglobin interfere with the HbA1C  assay. Heterozygous hemoglobin variants (HbS, HgC, etc)do  not significantly interfere with this assay.   However, presence of multiple variants may affect accuracy.      Estimated Avg Glucose 06/21/2022 166 (A) 68 - 131 mg/dL Final    PTH, Intact 06/21/2022 71.4  9.0 - 77.0 pg/mL Final    WBC 06/21/2022 3.35 (A) 3.90 - 12.70 K/uL Final    RBC 06/21/2022 4.00 (A) 4.60 - 6.20 M/uL Final    Hemoglobin 06/21/2022 11.2 (A) 14.0 - 18.0 g/dL Final    Hematocrit 06/21/2022 35.8 (A) 40.0 - 54.0 % Final    MCV 06/21/2022 90  82 - 98 fL Final    MCH 06/21/2022 28.0  27.0 - 31.0 pg Final    MCHC 06/21/2022 31.3 (A) 32.0 - 36.0 g/dL Final    RDW 06/21/2022 13.7  11.5 - 14.5 % Final    Platelets 06/21/2022 297  150 - 450 K/uL Final    MPV 06/21/2022 9.7  9.2 - 12.9 fL Final    Immature Granulocytes 06/21/2022 0.6 (A) 0.0 - 0.5 % Final    Gran # (ANC) 06/21/2022 2.3  1.8 - 7.7 K/uL Final    Immature Grans (Abs) 06/21/2022 0.02  0.00 - 0.04 K/uL Final    Comment: Mild elevation in immature granulocytes is non specific and   can be seen in a variety of conditions including stress response,   acute inflammation, trauma and pregnancy. Correlation with other   laboratory and clinical findings is essential.      Lymph # 06/21/2022 0.8 (A) 1.0 - 4.8 K/uL Final    Mono # 06/21/2022 0.2 (A) 0.3 - 1.0 K/uL Final    Eos # 06/21/2022 0.1  0.0 - 0.5 K/uL Final    Baso # 06/21/2022 0.02  0.00 - 0.20 K/uL Final    nRBC 06/21/2022 0  0 /100 WBC Final    Gran % 06/21/2022 67.4  38.0 - 73.0 % Final    Lymph % 06/21/2022 23.0  18.0 - 48.0 % Final    Mono % 06/21/2022 6.9  4.0 - 15.0 % Final    Eosinophil % 06/21/2022 1.5  0.0 - 8.0 % Final    Basophil % 06/21/2022 0.6  0.0 - 1.9 % Final    Differential Method  06/21/2022 Automated   Final       Assessment:       Problem List Items Addressed This Visit        Cardiac/Vascular    Essential hypertension (Chronic)      Other Visit Diagnoses     Routine medical exam    -  Primary    Diabetes mellitus with nephropathy        Relevant Medications    linaGLIPtin (TRADJENTA) 5 mg Tab tablet    Other Relevant Orders    Comprehensive Metabolic Panel    Hemoglobin A1C    Chronic obstructive pulmonary disease, unspecified COPD type        Abnormal white blood cell        Relevant Orders    Ambulatory referral/consult to Hematology / Oncology    Stage 4 chronic kidney disease        Chronic constipation        Relevant Medications    lactulose (CHRONULAC) 10 gram/15 mL solution          Plan:       Peter was seen today for annual exam.    Diagnoses and all orders for this visit:    Routine medical exam  Age appropriate recommendations reviewed.  Screening labs ordered.    Diabetes mellitus with nephropathy  -     linaGLIPtin (TRADJENTA) 5 mg Tab tablet; Take 1 tablet (5 mg total) by mouth once daily.  -     Comprehensive Metabolic Panel; Future  -     Hemoglobin A1C; Future  Given renal function cannot continue/restart Metformin. As A1c is rising will start Tradjenta    Essential hypertension  Current therapy effective, will continue    Chronic obstructive pulmonary disease, unspecified COPD type  No acute concerns reported    Abnormal white blood cell  -     Ambulatory referral/consult to Hematology / Oncology; Future    Stage 4 chronic kidney disease  Advised good hydration  Advised avoiding NSAIDs    Chronic constipation  -     lactulose (CHRONULAC) 10 gram/15 mL solution; Take 30 mLs (20 g total) by mouth 2 (two) times daily as needed (constipation).  Continue lactulose as needed.

## 2022-07-05 ENCOUNTER — LAB VISIT (OUTPATIENT)
Dept: LAB | Facility: HOSPITAL | Age: 87
End: 2022-07-05
Attending: STUDENT IN AN ORGANIZED HEALTH CARE EDUCATION/TRAINING PROGRAM
Payer: MEDICARE

## 2022-07-05 DIAGNOSIS — C61 MALIGNANT NEOPLASM OF PROSTATE: ICD-10-CM

## 2022-07-05 LAB — COMPLEXED PSA SERPL-MCNC: 0.02 NG/ML (ref 0–4)

## 2022-07-05 PROCEDURE — 84153 ASSAY OF PSA TOTAL: CPT | Performed by: STUDENT IN AN ORGANIZED HEALTH CARE EDUCATION/TRAINING PROGRAM

## 2022-07-05 PROCEDURE — 36415 COLL VENOUS BLD VENIPUNCTURE: CPT | Mod: PN | Performed by: STUDENT IN AN ORGANIZED HEALTH CARE EDUCATION/TRAINING PROGRAM

## 2022-07-06 ENCOUNTER — OFFICE VISIT (OUTPATIENT)
Dept: UROLOGY | Facility: CLINIC | Age: 87
End: 2022-07-06
Payer: MEDICARE

## 2022-07-06 DIAGNOSIS — C61 MALIGNANT NEOPLASM OF PROSTATE: Primary | ICD-10-CM

## 2022-07-06 PROCEDURE — 99999 PR PBB SHADOW E&M-EST. PATIENT-LVL II: CPT | Mod: PBBFAC,,, | Performed by: STUDENT IN AN ORGANIZED HEALTH CARE EDUCATION/TRAINING PROGRAM

## 2022-07-06 PROCEDURE — 1159F PR MEDICATION LIST DOCUMENTED IN MEDICAL RECORD: ICD-10-PCS | Mod: CPTII,S$GLB,, | Performed by: STUDENT IN AN ORGANIZED HEALTH CARE EDUCATION/TRAINING PROGRAM

## 2022-07-06 PROCEDURE — 1160F PR REVIEW ALL MEDS BY PRESCRIBER/CLIN PHARMACIST DOCUMENTED: ICD-10-PCS | Mod: CPTII,S$GLB,, | Performed by: STUDENT IN AN ORGANIZED HEALTH CARE EDUCATION/TRAINING PROGRAM

## 2022-07-06 PROCEDURE — 99213 OFFICE O/P EST LOW 20 MIN: CPT | Mod: S$GLB,,, | Performed by: STUDENT IN AN ORGANIZED HEALTH CARE EDUCATION/TRAINING PROGRAM

## 2022-07-06 PROCEDURE — 1159F MED LIST DOCD IN RCRD: CPT | Mod: CPTII,S$GLB,, | Performed by: STUDENT IN AN ORGANIZED HEALTH CARE EDUCATION/TRAINING PROGRAM

## 2022-07-06 PROCEDURE — 99999 PR PBB SHADOW E&M-EST. PATIENT-LVL II: ICD-10-PCS | Mod: PBBFAC,,, | Performed by: STUDENT IN AN ORGANIZED HEALTH CARE EDUCATION/TRAINING PROGRAM

## 2022-07-06 PROCEDURE — 1160F RVW MEDS BY RX/DR IN RCRD: CPT | Mod: CPTII,S$GLB,, | Performed by: STUDENT IN AN ORGANIZED HEALTH CARE EDUCATION/TRAINING PROGRAM

## 2022-07-06 PROCEDURE — 99213 PR OFFICE/OUTPT VISIT, EST, LEVL III, 20-29 MIN: ICD-10-PCS | Mod: S$GLB,,, | Performed by: STUDENT IN AN ORGANIZED HEALTH CARE EDUCATION/TRAINING PROGRAM

## 2022-07-06 NOTE — PROGRESS NOTES
Patient ID: Peter Lopez is a 86 y.o. male.    Chief Complaint: Follow up, prostate cancer      HPI  86 y.o. who presents to the Urology clinic for evaluation of hx of prostate cancer s/p XRT and ongoing ADT for high risk prostate cancer. PSA at time of diagnosis 54.8, Last PSA 0.02.   Last Lupron injection Feb 2022. Patient notes symptoms of fatigue but is otherwise doing well. Denies weight loss. Notes strong appetite. Denies focal bone pain.    Hx of channel TURP due to bladder outlet obstruction caused by prostate cancer, doing very well. He continues to work at [employer]. He is not taking any multivitamins.   Medically Necessary ROS documented in HPI    Past Medical History  Active Ambulatory Problems     Diagnosis Date Noted    Essential hypertension 10/02/2012    Type 2 diabetes mellitus with diabetic polyneuropathy, without long-term current use of insulin 10/16/2013    Aortic atherosclerosis 05/03/2019    Chronic kidney disease, stage 3a 01/28/2020    ILD (interstitial lung disease) 04/04/2020    Reactive depression 04/14/2020    COPD with chronic bronchitis and emphysema 08/10/2020    Tachycardia with heart rate 121-140 beats per minute 09/03/2020    Sleep-related breathing disorder 12/20/2020    Chronic fatigue     Complex sleep apnea syndrome     Pulmonary hypertension 03/08/2021    Dyspnea 03/11/2021    Iron deficiency anemia 03/11/2021    Malignant neoplasm of prostate 06/14/2021    Urinary retention 06/22/2021     Resolved Ambulatory Problems     Diagnosis Date Noted    Elevated PSA 01/10/2013    Hepatitis C 10/16/2013    BPH (benign prostatic hyperplasia) 04/05/2016    Hypoglycemia     Hypoxia 03/29/2020    Anemia 04/04/2020    Elevated troponin 04/04/2020    Chronic respiratory failure with hypoxia 04/13/2020    Discharge planning issues 04/14/2020    Gait instability 09/03/2020    Chest pain 05/20/2021     Past Medical History:   Diagnosis Date    Chronic hepatitis C without  mention of hepatic coma     CKD (chronic kidney disease)     COPD (chronic obstructive pulmonary disease)     Diabetes mellitus type I     H/O colonoscopy     [place] (hard of hearing)     Hypertension     On home oxygen therapy     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  Vaccinated.  C A       Social History  Social Connections: Not on file       Medications    Current Outpatient Medications:     albuterol (PROAIR HFA) 90 mcg/actuation inhaler, Inhale 2 puffs into the lungs every 4 (four) hours as needed for Wheezing (cough/shortness of breath). Rescue, Disp: 18 g, Rfl: 0    atorvastatin (LIPITOR) 20 MG tablet, Take 1 tablet (20 mg total) by mouth every evening., Disp: 90 tablet, Rfl: 3    blood sugar diagnostic Strp, To check BG 1 times daily, to use with insurance preferred meter, Disp: 50 each, Rfl: 11    blood-glucose meter kit, To check BG 1 times daily, to use with insurance preferred meter, Disp: 1 each, Rfl: 0    lactulose (CHRONULAC) 10 gram/15 mL solution, Take 30 mLs (20 g total) by mouth 2 (two) times daily as needed (constipation)., Disp: 473 mL, Rfl: 11    linaGLIPtin (TRADJENTA) 5 mg Tab tablet, Take 1 tablet (5 mg total) by mouth once daily., Disp: 90 tablet, Rfl: 1    olmesartan (BENICAR) 40 MG tablet, Take 1 tablet (40 mg total) by mouth once daily., Disp: 90 tablet, Rfl: 1    SPIRIVA RESPIMAT 2.5 mcg/actuation inhaler, INHALE 2 SPRAY(S) BY MOUTH ONCE DAILY, Disp: 4 g, Rfl: 0    Current Facility-Administered Medications:     leuprolide (6 month) injection 45 mg, 45  mg, Intramuscular, 1 time in Clinic/HOD, Jesus Cole MD    leuprolide acetate (6 month) injection 45 mg, 45 mg, Intramuscular, 1 time in Clinic/HOD, Jesus Cole MD    Allergies  Review of patient's allergies indicates:  No Known Allergies    Patient's PMH, FH, Social hx, Medications, allergies reviewed and updated as pertinent to today's visit    Objective:      Physical Exam        Lab Results   Component Value Date    PSADIAG 0.02 07/05/2022    PSADIAG 0.07 11/26/2021    PSADIAG 54.8 (H) 11/25/2019        Assessment:       1. Malignant neoplasm of prostate        Plan:       PSA within acceptable limits from 0.07 to 0.02  Follow up with Dr. Isidro 12/2022  Urology thereafter  Next injection 8/2022, scheduled  Patient to start vitd/ calcium to optimize bone health

## 2022-07-27 NOTE — TELEPHONE ENCOUNTER
----- Message from Mariam Victoria MA sent at 8/17/2020  4:48 PM CDT -----  Regarding: FW: Pt Inquiry  Patient states that he needs a letter stating that he is under your care for Job.    Thank you  Mariam  ----- Message -----  From: Nicole Ho  Sent: 8/17/2020  11:49 AM CDT  To: [facility] Staff  Subject: Pt Inquiry                                       Pt daughter Mckinley requesting a call back in regards to pt previous appointments for short term disability ( job related )       [phone] (home)       
Awaiting paperwork  
no

## 2022-08-03 ENCOUNTER — CLINICAL SUPPORT (OUTPATIENT)
Dept: UROLOGY | Facility: CLINIC | Age: 87
End: 2022-08-03
Payer: MEDICARE

## 2022-08-03 VITALS — BODY MASS INDEX: 23.97 KG/M2 | WEIGHT: 171.88 LBS

## 2022-08-03 DIAGNOSIS — C61 MALIGNANT NEOPLASM OF PROSTATE: Primary | ICD-10-CM

## 2022-08-03 PROCEDURE — 96402 CHEMO HORMON ANTINEOPL SQ/IM: CPT | Mod: S$GLB,,, | Performed by: STUDENT IN AN ORGANIZED HEALTH CARE EDUCATION/TRAINING PROGRAM

## 2022-08-03 PROCEDURE — 99999 PR PBB SHADOW E&M-EST. PATIENT-LVL II: CPT | Mod: PBBFAC,,,

## 2022-08-03 PROCEDURE — 96402 PR CHEMOTHER HORMON ANTINEOPL SUB-Q/IM: ICD-10-PCS | Mod: S$GLB,,, | Performed by: STUDENT IN AN ORGANIZED HEALTH CARE EDUCATION/TRAINING PROGRAM

## 2022-08-03 PROCEDURE — 99999 PR PBB SHADOW E&M-EST. PATIENT-LVL II: ICD-10-PCS | Mod: PBBFAC,,,

## 2022-08-03 NOTE — NURSING
Lupron administered (see MAR).  Pt tolerated well.  Instructed to sit in waiting room for 15 min to self-monitor for signs and symptoms of reaction.

## 2022-08-09 ENCOUNTER — LAB VISIT (OUTPATIENT)
Dept: LAB | Facility: HOSPITAL | Age: 87
End: 2022-08-09
Attending: INTERNAL MEDICINE
Payer: MEDICARE

## 2022-08-09 ENCOUNTER — OFFICE VISIT (OUTPATIENT)
Dept: HEMATOLOGY/ONCOLOGY | Facility: CLINIC | Age: 87
End: 2022-08-09
Payer: MEDICARE

## 2022-08-09 VITALS
SYSTOLIC BLOOD PRESSURE: 163 MMHG | BODY MASS INDEX: 24.51 KG/M2 | HEIGHT: 71 IN | OXYGEN SATURATION: 97 % | WEIGHT: 175.06 LBS | HEART RATE: 92 BPM | DIASTOLIC BLOOD PRESSURE: 89 MMHG

## 2022-08-09 DIAGNOSIS — Z79.899 OTHER LONG TERM (CURRENT) DRUG THERAPY: ICD-10-CM

## 2022-08-09 DIAGNOSIS — D72.9 ABNORMAL WHITE BLOOD CELL: ICD-10-CM

## 2022-08-09 LAB
ALBUMIN SERPL BCP-MCNC: 3.7 G/DL (ref 3.5–5.2)
ALP SERPL-CCNC: 56 U/L (ref 55–135)
ALT SERPL W/O P-5'-P-CCNC: 17 U/L (ref 10–44)
ANION GAP SERPL CALC-SCNC: 7 MMOL/L (ref 8–16)
AST SERPL-CCNC: 29 U/L (ref 10–40)
BASOPHILS # BLD AUTO: 0.02 K/UL (ref 0–0.2)
BASOPHILS NFR BLD: 0.7 % (ref 0–1.9)
BILIRUB SERPL-MCNC: 0.4 MG/DL (ref 0.1–1)
BUN SERPL-MCNC: 23 MG/DL (ref 8–23)
CALCIUM SERPL-MCNC: 10.1 MG/DL (ref 8.7–10.5)
CHLORIDE SERPL-SCNC: 106 MMOL/L (ref 95–110)
CO2 SERPL-SCNC: 27 MMOL/L (ref 23–29)
CREAT SERPL-MCNC: 1.9 MG/DL (ref 0.5–1.4)
DIFFERENTIAL METHOD: ABNORMAL
EOSINOPHIL # BLD AUTO: 0.1 K/UL (ref 0–0.5)
EOSINOPHIL NFR BLD: 2.5 % (ref 0–8)
ERYTHROCYTE [DISTWIDTH] IN BLOOD BY AUTOMATED COUNT: 14.2 % (ref 11.5–14.5)
EST. GFR  (NO RACE VARIABLE): 34 ML/MIN/1.73 M^2
GLUCOSE SERPL-MCNC: 102 MG/DL (ref 70–110)
HCT VFR BLD AUTO: 34.7 % (ref 40–54)
HGB BLD-MCNC: 11 G/DL (ref 14–18)
IMM GRANULOCYTES # BLD AUTO: 0.01 K/UL (ref 0–0.04)
IMM GRANULOCYTES NFR BLD AUTO: 0.4 % (ref 0–0.5)
LYMPHOCYTES # BLD AUTO: 0.7 K/UL (ref 1–4.8)
LYMPHOCYTES NFR BLD: 26.4 % (ref 18–48)
MCH RBC QN AUTO: 27.8 PG (ref 27–31)
MCHC RBC AUTO-ENTMCNC: 31.7 G/DL (ref 32–36)
MCV RBC AUTO: 88 FL (ref 82–98)
MONOCYTES # BLD AUTO: 0.3 K/UL (ref 0.3–1)
MONOCYTES NFR BLD: 9 % (ref 4–15)
NEUTROPHILS # BLD AUTO: 1.7 K/UL (ref 1.8–7.7)
NEUTROPHILS NFR BLD: 61 % (ref 38–73)
NRBC BLD-RTO: 1 /100 WBC
PATH REV BLD -IMP: NORMAL
PLATELET # BLD AUTO: 313 K/UL (ref 150–450)
PMV BLD AUTO: 9.2 FL (ref 9.2–12.9)
POTASSIUM SERPL-SCNC: 4.4 MMOL/L (ref 3.5–5.1)
PROT SERPL-MCNC: 7.5 G/DL (ref 6–8.4)
RBC # BLD AUTO: 3.95 M/UL (ref 4.6–6.2)
SODIUM SERPL-SCNC: 140 MMOL/L (ref 136–145)
WBC # BLD AUTO: 2.77 K/UL (ref 3.9–12.7)

## 2022-08-09 PROCEDURE — 3079F PR MOST RECENT DIASTOLIC BLOOD PRESSURE 80-89 MM HG: ICD-10-PCS | Mod: CPTII,S$GLB,, | Performed by: INTERNAL MEDICINE

## 2022-08-09 PROCEDURE — 82607 VITAMIN B-12: CPT | Performed by: INTERNAL MEDICINE

## 2022-08-09 PROCEDURE — 85060 BLOOD SMEAR INTERPRETATION: CPT | Mod: ,,, | Performed by: PATHOLOGY

## 2022-08-09 PROCEDURE — 84165 PROTEIN E-PHORESIS SERUM: CPT | Mod: 26,,, | Performed by: PATHOLOGY

## 2022-08-09 PROCEDURE — 1126F PR PAIN SEVERITY QUANTIFIED, NO PAIN PRESENT: ICD-10-PCS | Mod: CPTII,S$GLB,, | Performed by: INTERNAL MEDICINE

## 2022-08-09 PROCEDURE — 3077F PR MOST RECENT SYSTOLIC BLOOD PRESSURE >= 140 MM HG: ICD-10-PCS | Mod: CPTII,S$GLB,, | Performed by: INTERNAL MEDICINE

## 2022-08-09 PROCEDURE — 99999 PR PBB SHADOW E&M-EST. PATIENT-LVL III: ICD-10-PCS | Mod: PBBFAC,,, | Performed by: INTERNAL MEDICINE

## 2022-08-09 PROCEDURE — 36415 COLL VENOUS BLD VENIPUNCTURE: CPT | Performed by: INTERNAL MEDICINE

## 2022-08-09 PROCEDURE — 3288F FALL RISK ASSESSMENT DOCD: CPT | Mod: CPTII,S$GLB,, | Performed by: INTERNAL MEDICINE

## 2022-08-09 PROCEDURE — 86334 PATHOLOGIST INTERPRETATION IFE: ICD-10-PCS | Mod: 26,,, | Performed by: PATHOLOGY

## 2022-08-09 PROCEDURE — 85025 COMPLETE CBC W/AUTO DIFF WBC: CPT | Performed by: INTERNAL MEDICINE

## 2022-08-09 PROCEDURE — 84165 PROTEIN E-PHORESIS SERUM: CPT | Performed by: INTERNAL MEDICINE

## 2022-08-09 PROCEDURE — 1159F MED LIST DOCD IN RCRD: CPT | Mod: CPTII,S$GLB,, | Performed by: INTERNAL MEDICINE

## 2022-08-09 PROCEDURE — 1159F PR MEDICATION LIST DOCUMENTED IN MEDICAL RECORD: ICD-10-PCS | Mod: CPTII,S$GLB,, | Performed by: INTERNAL MEDICINE

## 2022-08-09 PROCEDURE — 99999 PR PBB SHADOW E&M-EST. PATIENT-LVL III: CPT | Mod: PBBFAC,,, | Performed by: INTERNAL MEDICINE

## 2022-08-09 PROCEDURE — 80053 COMPREHEN METABOLIC PANEL: CPT | Performed by: INTERNAL MEDICINE

## 2022-08-09 PROCEDURE — 1101F PT FALLS ASSESS-DOCD LE1/YR: CPT | Mod: CPTII,S$GLB,, | Performed by: INTERNAL MEDICINE

## 2022-08-09 PROCEDURE — 86334 IMMUNOFIX E-PHORESIS SERUM: CPT | Performed by: INTERNAL MEDICINE

## 2022-08-09 PROCEDURE — 3079F DIAST BP 80-89 MM HG: CPT | Mod: CPTII,S$GLB,, | Performed by: INTERNAL MEDICINE

## 2022-08-09 PROCEDURE — 83921 ORGANIC ACID SINGLE QUANT: CPT | Performed by: INTERNAL MEDICINE

## 2022-08-09 PROCEDURE — 99204 PR OFFICE/OUTPT VISIT, NEW, LEVL IV, 45-59 MIN: ICD-10-PCS | Mod: S$GLB,,, | Performed by: INTERNAL MEDICINE

## 2022-08-09 PROCEDURE — 86038 ANTINUCLEAR ANTIBODIES: CPT | Performed by: INTERNAL MEDICINE

## 2022-08-09 PROCEDURE — 84165 PATHOLOGIST INTERPRETATION SPE: ICD-10-PCS | Mod: 26,,, | Performed by: PATHOLOGY

## 2022-08-09 PROCEDURE — 85060 PATHOLOGIST REVIEW: ICD-10-PCS | Mod: ,,, | Performed by: PATHOLOGY

## 2022-08-09 PROCEDURE — 99204 OFFICE O/P NEW MOD 45 MIN: CPT | Mod: S$GLB,,, | Performed by: INTERNAL MEDICINE

## 2022-08-09 PROCEDURE — 1101F PR PT FALLS ASSESS DOC 0-1 FALLS W/OUT INJ PAST YR: ICD-10-PCS | Mod: CPTII,S$GLB,, | Performed by: INTERNAL MEDICINE

## 2022-08-09 PROCEDURE — 3077F SYST BP >= 140 MM HG: CPT | Mod: CPTII,S$GLB,, | Performed by: INTERNAL MEDICINE

## 2022-08-09 PROCEDURE — 86334 IMMUNOFIX E-PHORESIS SERUM: CPT | Mod: 26,,, | Performed by: PATHOLOGY

## 2022-08-09 PROCEDURE — 3288F PR FALLS RISK ASSESSMENT DOCUMENTED: ICD-10-PCS | Mod: CPTII,S$GLB,, | Performed by: INTERNAL MEDICINE

## 2022-08-09 PROCEDURE — 1126F AMNT PAIN NOTED NONE PRSNT: CPT | Mod: CPTII,S$GLB,, | Performed by: INTERNAL MEDICINE

## 2022-08-09 NOTE — PROGRESS NOTES
Subjective:       Patient ID: Peter Lopez is a 86 y.o. male.    Chief Complaint: No chief complaint on file.  Reason For Consultation: abnormal white blood cells.  HPI     Patient is an 87 y/o male with medical diagnoses as listed seen today in consultation for abnormal white blood cells.He is a poor historian. He is unaware of any prior history of abnormal blood counts.  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  He has a history of an elevated PSA. He underwent TURP on 6/11/21. There was Brittanie 9 (4+5) adenocarcinoma noted in the prostate chips. He began hormonal therapy with Casodex and Lupron given on 7/9/21.  He completed radiotherapy to the prostate, seminal vesicles and pelvic nodes on 9/29/21. He undergoes LUPRON inj q6 mos. Last inj this month. No fevers, night sweats or wt loss.He reports minor fatigue after receiving injection.No fam hx of blood or bleeding d/o. Pt was taken off metformin a couple of months ago. No rashes. Appetite and weight stable. CBC on 6/21/22 shows 3.35 11.2/35.8 MCV 90 plt 297. He is here for further evaluation.     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  Vaccinated.  C A     Social History     Tobacco Use    Smoking status: Former     Packs/day: 1.00     Years: 40.00     Pack years: 40.00     Types: Cigarettes     Quit date: 1991     Years since quittin.8    Smokeless tobacco: Never   Substance Use Topics    Alcohol use: Not Currently     Alcohol/week: 0.0 standard drinks     Comment: 2 bottles beer on weekends    Drug use: No        Review of Systems   Constitutional: Negative for appetite change, fatigue, fever and unexpected weight change.   HENT: Negative for mouth sores.    Eyes: Negative for visual disturbance.   Respiratory: Negative for cough and shortness of breath.    Cardiovascular: Negative for chest pain.   Gastrointestinal: Negative for abdominal pain and diarrhea.   Genitourinary: Negative for frequency.   Musculoskeletal: Negative for back pain.   Integumentary:  Negative for rash.   Neurological: Negative for headaches.   Hematological: Negative for adenopathy.   Psychiatric/Behavioral: The patient is not nervous/anxious.        SocHx: Former smoker. Quit >10 yrs ago . 40 pack yr  Objective:       Vitals:    22 1410   BP: (!) 163/89   BP Location: Left arm   Patient Position: Sitting   BP Method: Large (Automatic)   Pulse: 92   SpO2: 97%   Weight: 79.4 kg (175 lb 0.7 oz)   Height: 5' 11" (1.803 m)       Physical Exam  Constitutional:       Appearance: He is well-developed.   HENT:      Head: Normocephalic.      Mouth/Throat:      Pharynx: No oropharyngeal exudate.   Eyes:      General: No scleral icterus.        Right eye: No discharge.         Left eye: No discharge.      Conjunctiva/sclera: Conjunctivae normal.   Neck:      Thyroid: No thyromegaly.   Cardiovascular:      Rate and Rhythm: Normal rate and regular rhythm.      Heart sounds: Normal heart sounds. No murmur heard.  Pulmonary:      Effort: Pulmonary effort is normal.      Breath sounds: Normal breath sounds. No wheezing or rales.   Chest:   Breasts:    "   Right: No supraclavicular adenopathy.      Left: No supraclavicular adenopathy.       Abdominal:      General: Bowel sounds are normal.      Palpations: Abdomen is soft.      Tenderness: There is no abdominal tenderness. There is no guarding or rebound.   Musculoskeletal:         General: No swelling. Normal range of motion.      Cervical back: Normal range of motion and neck supple.   Lymphadenopathy:      Cervical: No cervical adenopathy.      Upper Body:      Right upper body: No supraclavicular adenopathy.      Left upper body: No supraclavicular adenopathy.   Skin:     Findings: No erythema or rash.   Neurological:      Mental Status: He is alert and oriented to person, place, and time.      Cranial Nerves: No cranial nerve deficit.   Psychiatric:         Mood and Affect: Mood normal.         Behavior: Behavior normal.         Lab Results   Component Value Date    WBC 3.35 (L) 06/21/2022    HGB 11.2 (L) 06/21/2022    HCT 35.8 (L) 06/21/2022    MCV 90 06/21/2022     06/21/2022         Assessment/Plan         Problem List Items Addressed This Visit    None  Visit Diagnoses       Abnormal white blood cell      87y/o male with mild leukopenia of unknown etiology and duration Plan appropriate testing.     Relevant Orders    CBC Auto Differential (Completed)    Comprehensive Metabolic Panel (Completed)    Immunofixation Electrophoresis (Completed)    Protein Electrophoresis, Serum (Completed)    WILIAM Screen w/Reflex (Completed)    Vitamin B12 (Completed)    Methylmalonic Acid, Serum (Completed)    Pathologist Interpretation Differential (Completed)    CBC Auto Differential    Other long term (current) drug therapy         Relevant Orders    Vitamin B12 (Completed)                           Follow-up :         Follow-up in 1 month with labs

## 2022-08-10 LAB
PATH REV BLD -IMP: NORMAL
VIT B12 SERPL-MCNC: 247 PG/ML (ref 210–950)

## 2022-08-11 LAB
ALBUMIN SERPL ELPH-MCNC: 3.76 G/DL (ref 3.35–5.55)
ALPHA1 GLOB SERPL ELPH-MCNC: 0.4 G/DL (ref 0.17–0.41)
ALPHA2 GLOB SERPL ELPH-MCNC: 1.4 G/DL (ref 0.43–0.99)
ANA SER QL IF: NORMAL
B-GLOBULIN SERPL ELPH-MCNC: 0.68 G/DL (ref 0.5–1.1)
GAMMA GLOB SERPL ELPH-MCNC: 0.96 G/DL (ref 0.67–1.58)
INTERPRETATION SERPL IFE-IMP: NORMAL
PATHOLOGIST INTERPRETATION IFE: NORMAL
PROT SERPL-MCNC: 7.2 G/DL (ref 6–8.4)

## 2022-08-12 LAB — PATHOLOGIST INTERPRETATION SPE: NORMAL

## 2022-08-13 LAB — METHYLMALONATE SERPL-SCNC: 0.55 UMOL/L

## 2022-08-29 ENCOUNTER — LAB VISIT (OUTPATIENT)
Dept: LAB | Facility: HOSPITAL | Age: 87
End: 2022-08-29
Attending: INTERNAL MEDICINE
Payer: MEDICARE

## 2022-08-29 DIAGNOSIS — D72.9 ABNORMAL WHITE BLOOD CELL: ICD-10-CM

## 2022-08-29 LAB
BASOPHILS # BLD AUTO: 0.02 K/UL (ref 0–0.2)
BASOPHILS NFR BLD: 0.6 % (ref 0–1.9)
DIFFERENTIAL METHOD: ABNORMAL
EOSINOPHIL # BLD AUTO: 0.1 K/UL (ref 0–0.5)
EOSINOPHIL NFR BLD: 1.4 % (ref 0–8)
ERYTHROCYTE [DISTWIDTH] IN BLOOD BY AUTOMATED COUNT: 14.8 % (ref 11.5–14.5)
HCT VFR BLD AUTO: 32.9 % (ref 40–54)
HGB BLD-MCNC: 10.7 G/DL (ref 14–18)
IMM GRANULOCYTES # BLD AUTO: 0.01 K/UL (ref 0–0.04)
IMM GRANULOCYTES NFR BLD AUTO: 0.3 % (ref 0–0.5)
LYMPHOCYTES # BLD AUTO: 0.9 K/UL (ref 1–4.8)
LYMPHOCYTES NFR BLD: 25.3 % (ref 18–48)
MCH RBC QN AUTO: 28.2 PG (ref 27–31)
MCHC RBC AUTO-ENTMCNC: 32.5 G/DL (ref 32–36)
MCV RBC AUTO: 87 FL (ref 82–98)
MONOCYTES # BLD AUTO: 0.3 K/UL (ref 0.3–1)
MONOCYTES NFR BLD: 9.1 % (ref 4–15)
NEUTROPHILS # BLD AUTO: 2.2 K/UL (ref 1.8–7.7)
NEUTROPHILS NFR BLD: 63.3 % (ref 38–73)
NRBC BLD-RTO: 0 /100 WBC
PLATELET # BLD AUTO: 272 K/UL (ref 150–450)
PMV BLD AUTO: 9.3 FL (ref 9.2–12.9)
RBC # BLD AUTO: 3.8 M/UL (ref 4.6–6.2)
WBC # BLD AUTO: 3.52 K/UL (ref 3.9–12.7)

## 2022-08-29 PROCEDURE — 85025 COMPLETE CBC W/AUTO DIFF WBC: CPT | Performed by: INTERNAL MEDICINE

## 2022-08-29 PROCEDURE — 36415 COLL VENOUS BLD VENIPUNCTURE: CPT | Mod: PN | Performed by: INTERNAL MEDICINE

## 2022-09-06 DIAGNOSIS — J44.9 CHRONIC OBSTRUCTIVE PULMONARY DISEASE, UNSPECIFIED COPD TYPE: ICD-10-CM

## 2022-09-06 NOTE — TELEPHONE ENCOUNTER
Message sent to TIMMY Cedeno CMA  Pulm/Sleep [facility]  [phone]                 ----- Message from Mariana Luna sent at 9/6/2022  9:49 AM CDT -----  Type: Patient Call Back    Who called: self     What is the request in detail: pt is asking that his medication be called into the pharmacy asap, he is there waiting he is out he states and really need it   SPIRIVA RESPIMAT 2.5 mcg/actuation inhaler    Can the clinic reply by [facility]?    Would the patient rather a call back or a response via [facility]?     Best call back number: [phone] (home)       Additional Information:  [facility] [address] [place] [address] - [address] [place] [address]  Phone: [phone] Fax: [phone]

## 2022-09-07 LAB
LEFT EYE DM RETINOPATHY: NEGATIVE
RIGHT EYE DM RETINOPATHY: NEGATIVE

## 2022-09-07 RX ORDER — TIOTROPIUM BROMIDE INHALATION SPRAY 3.12 UG/1
SPRAY, METERED RESPIRATORY (INHALATION)
Qty: 4 G | Refills: 0 | Status: SHIPPED | OUTPATIENT
Start: 2022-09-07 | End: 2022-10-07

## 2022-09-12 ENCOUNTER — PATIENT OUTREACH (OUTPATIENT)
Dept: ADMINISTRATIVE | Facility: HOSPITAL | Age: 87
End: 2022-09-12
Payer: MEDICARE

## 2022-09-22 LAB
LEFT EYE DM RETINOPATHY: POSITIVE
RIGHT EYE DM RETINOPATHY: POSITIVE

## 2022-09-23 ENCOUNTER — PATIENT OUTREACH (OUTPATIENT)
Dept: ADMINISTRATIVE | Facility: HOSPITAL | Age: 87
End: 2022-09-23
Payer: MEDICARE

## 2022-10-10 ENCOUNTER — TELEPHONE (OUTPATIENT)
Dept: UROLOGY | Facility: CLINIC | Age: 87
End: 2022-10-10
Payer: MEDICARE

## 2022-10-10 NOTE — TELEPHONE ENCOUNTER
"The pt came into the office today wanting to see Dr. Mcmillan. I explained to him that she has no soon availabilities as her schedule is booked out and that I would send her a message. The pt had a TURP done back in 06/21. He states for the past two weeks, he has been having bleeding from around the top of his penis and he believes it might be from the surgery. I spoke with Dr. Cole and informed the pt that Dr. Cole said we could get him in for an appointment but if he felt like he needed to be seen same day, then she is recommending him to go to the ER. The pt stated he would wait for an appointment with Dr. Cole as she is the one who performed his most recent surgery. He stated he is not going to the ER because he does not like the procedures and steps they take. He stated; "I do not want to go the emergency room as they will not be able to find out what is going on." An appointment with Dr. Cole has been scheduled.  "

## 2022-10-18 ENCOUNTER — OFFICE VISIT (OUTPATIENT)
Dept: PULMONOLOGY | Facility: CLINIC | Age: 87
End: 2022-10-18
Payer: MEDICARE

## 2022-10-18 VITALS
HEART RATE: 106 BPM | BODY MASS INDEX: 25.02 KG/M2 | DIASTOLIC BLOOD PRESSURE: 92 MMHG | SYSTOLIC BLOOD PRESSURE: 147 MMHG | HEIGHT: 71 IN | OXYGEN SATURATION: 93 % | WEIGHT: 178.69 LBS | TEMPERATURE: 98 F

## 2022-10-18 DIAGNOSIS — G47.31 COMPLEX SLEEP APNEA SYNDROME: ICD-10-CM

## 2022-10-18 DIAGNOSIS — J43.9 COPD WITH CHRONIC BRONCHITIS AND EMPHYSEMA: Primary | Chronic | ICD-10-CM

## 2022-10-18 DIAGNOSIS — J44.89 COPD WITH CHRONIC BRONCHITIS AND EMPHYSEMA: Primary | Chronic | ICD-10-CM

## 2022-10-18 PROCEDURE — 99999 PR PBB SHADOW E&M-EST. PATIENT-LVL III: ICD-10-PCS | Mod: PBBFAC,,, | Performed by: NURSE PRACTITIONER

## 2022-10-18 PROCEDURE — 3288F PR FALLS RISK ASSESSMENT DOCUMENTED: ICD-10-PCS | Mod: CPTII,S$GLB,, | Performed by: NURSE PRACTITIONER

## 2022-10-18 PROCEDURE — 1101F PR PT FALLS ASSESS DOC 0-1 FALLS W/OUT INJ PAST YR: ICD-10-PCS | Mod: CPTII,S$GLB,, | Performed by: NURSE PRACTITIONER

## 2022-10-18 PROCEDURE — 1126F PR PAIN SEVERITY QUANTIFIED, NO PAIN PRESENT: ICD-10-PCS | Mod: CPTII,S$GLB,, | Performed by: NURSE PRACTITIONER

## 2022-10-18 PROCEDURE — 99214 OFFICE O/P EST MOD 30 MIN: CPT | Mod: S$GLB,,, | Performed by: NURSE PRACTITIONER

## 2022-10-18 PROCEDURE — 3080F PR MOST RECENT DIASTOLIC BLOOD PRESSURE >= 90 MM HG: ICD-10-PCS | Mod: CPTII,S$GLB,, | Performed by: NURSE PRACTITIONER

## 2022-10-18 PROCEDURE — 1159F MED LIST DOCD IN RCRD: CPT | Mod: CPTII,S$GLB,, | Performed by: NURSE PRACTITIONER

## 2022-10-18 PROCEDURE — 3288F FALL RISK ASSESSMENT DOCD: CPT | Mod: CPTII,S$GLB,, | Performed by: NURSE PRACTITIONER

## 2022-10-18 PROCEDURE — 3077F PR MOST RECENT SYSTOLIC BLOOD PRESSURE >= 140 MM HG: ICD-10-PCS | Mod: CPTII,S$GLB,, | Performed by: NURSE PRACTITIONER

## 2022-10-18 PROCEDURE — 1126F AMNT PAIN NOTED NONE PRSNT: CPT | Mod: CPTII,S$GLB,, | Performed by: NURSE PRACTITIONER

## 2022-10-18 PROCEDURE — 1101F PT FALLS ASSESS-DOCD LE1/YR: CPT | Mod: CPTII,S$GLB,, | Performed by: NURSE PRACTITIONER

## 2022-10-18 PROCEDURE — 1159F PR MEDICATION LIST DOCUMENTED IN MEDICAL RECORD: ICD-10-PCS | Mod: CPTII,S$GLB,, | Performed by: NURSE PRACTITIONER

## 2022-10-18 PROCEDURE — 99999 PR PBB SHADOW E&M-EST. PATIENT-LVL III: CPT | Mod: PBBFAC,,, | Performed by: NURSE PRACTITIONER

## 2022-10-18 PROCEDURE — 3080F DIAST BP >= 90 MM HG: CPT | Mod: CPTII,S$GLB,, | Performed by: NURSE PRACTITIONER

## 2022-10-18 PROCEDURE — 99214 PR OFFICE/OUTPT VISIT, EST, LEVL IV, 30-39 MIN: ICD-10-PCS | Mod: S$GLB,,, | Performed by: NURSE PRACTITIONER

## 2022-10-18 PROCEDURE — 3077F SYST BP >= 140 MM HG: CPT | Mod: CPTII,S$GLB,, | Performed by: NURSE PRACTITIONER

## 2022-10-18 RX ORDER — LEUPROLIDE ACETATE 1 MG/0.2ML
VIAL (ML) SUBCUTANEOUS
COMMUNITY
End: 2024-02-07

## 2022-10-18 RX ORDER — TIOTROPIUM BROMIDE INHALATION SPRAY 3.12 UG/1
SPRAY, METERED RESPIRATORY (INHALATION)
Qty: 4 G | Refills: 11 | Status: SHIPPED | OUTPATIENT
Start: 2022-10-18 | End: 2023-10-16 | Stop reason: SDUPTHER

## 2022-10-18 NOTE — PATIENT INSTRUCTIONS
Information regarding testing ordered by your provider today:    IN LAB, INITIAL DIAGNOSTIC STUDY:  Your provider has ordered a sleep study. This order has been sent to our billing/pre-service department to be approved by your insurance company. Once the study has been approved (this can take up to 14 business days depending on your insurance), we will call you to schedule an appointment. Once the appointment is scheduled, our [facility] Call Center will be able to provide you with an anticipated out of pocket cost, such as a co-payment or unmet deductible amount. The Financial Clearance Call Center can be reached at [phone]. You may also call your insurance company directly and give them the procedure code CPT 74259 to receive an estimate of your out of pocket cost.

## 2022-10-18 NOTE — PROGRESS NOTES
HISTORY OF PRESENT ILLNESS: Peter Lopez is a 86 y.o. 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  Patient smoked .75 ppd x 40 years.  Patient quit smoking in 1990s.  Patient history covid 19 hospitalized 4/3/20  until 4/22/20 for hypoxic respiratory failure requiring nrb.  Subsequently discharged to [facility] [place].  Patient was discharged to home with 2 lpm.  Recovery was slow following covid.     Pt lost to follow up after LOV 6/8/2021 due to prostate cancer concerns. On interval he has obtain xrt and lupron injection follow by oncology and urology.    He reports feeling well on interim. He is not using oxygen. Walks 4 blocks in the evening without dyspnea. Working at [employer] 3 days a week (MTW). He is using spiriva and finds this helpful.    CT chest 12/7/2020Emphysema.   Minimal scarring at the bilateral lung basis.  PFT 12/7/2020 ratio 58, fev1 78, fc 103 dlco 38- results stable compare to June 2020    Pt reports fatigue, restless sleep, waking up every 2 hours, falls asleep watching tv, sleepy during day. Sleep study 1/7/2021 AHI 44 with emerging CA on cpap. ASV titration defer by pt.   ECHO 1/25/2021 EF 65%, est pasp 67 mmHg  ECHO 5/20/2021 ef 60% pasp 57 mmHg    PAST MEDICAL HISTORY:    Active Ambulatory Problems     Diagnosis Date Noted    Essential hypertension 10/02/2012    Type 2 diabetes mellitus with diabetic polyneuropathy, without long-term current use of insulin 10/16/2013    Aortic atherosclerosis 05/03/2019    Chronic kidney disease, stage 3a 01/28/2020    ILD (interstitial lung disease) 04/04/2020    Reactive depression 04/14/2020    COPD with chronic bronchitis and emphysema 08/10/2020    Tachycardia with heart rate 121-140 beats per minute 09/03/2020    Sleep-related breathing disorder 12/20/2020    Chronic fatigue     Complex sleep apnea syndrome     Pulmonary  hypertension 2021    Dyspnea 2021    Iron deficiency anemia 2021    Malignant neoplasm of prostate 2021    Urinary retention 2021     Resolved Ambulatory Problems     Diagnosis Date Noted    Elevated PSA 01/10/2013    Hepatitis C 10/16/2013    BPH (benign prostatic hyperplasia) 2016    Hypoglycemia     Hypoxia 2020    Anemia 2020    Elevated troponin 2020    Chronic respiratory failure with hypoxia 2020    Discharge planning issues 2020    Gait instability 2020    Chest pain 2021     Past Medical History:   Diagnosis Date    Chronic hepatitis C without mention of hepatic coma     CKD (chronic kidney disease)     COPD (chronic obstructive pulmonary disease)     Diabetes mellitus type I     H/O colonoscopy     [place] (hard of hearing)     Hypertension     On home oxygen therapy     Retinopathy due to secondary diabetes mellitus     Wears hearing aid in both ears                 PAST SURGICAL HISTORY:    Past Surgical History:   Procedure Laterality Date    NO PAST SURGERIES  2021    TRANSURETHRAL RESECTION OF PROSTATE USING BIPOLAR CAUTERY N/A 2021    Procedure: (TURP) Transuretheral Resection of Prostate with BIPOLAR CAUTERY;  Surgeon: Jesus Cole MD;  Location: [facility];  Service: Urology;  Laterality: N/A;  RN Pre OP 21.  Vaccinated.  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 90 tablet 3    lactulose (CHRONULAC) 10 gram/15 mL solution Take 30 mLs (20 g total) by mouth 2 (two) times daily as needed (constipation). 473 mL 11    leuprolide (LUPRON) 1 mg/0.2 mL injection Inject into the skin.      linaGLIPtin (TRADJENTA) 5 mg Tab tablet Take 1 tablet (5 mg total) by mouth once daily. 90 tablet 1    olmesartan (BENICAR) 40 MG tablet Take 1 tablet by mouth once daily 90 tablet 1    albuterol (PROAIR HFA) 90 mcg/actuation inhaler Inhale 2 puffs into the lungs every 4 (four) hours as needed for Wheezing (cough/shortness of breath). Rescue (Patient not taking: Reported on 10/18/2022) 18 g 0    blood sugar diagnostic Strp To check BG 1 times daily, to use with insurance preferred meter (Patient not taking: Reported on 10/18/2022) 50 each 11    blood-glucose meter kit To check BG 1 times daily, to use with insurance preferred meter (Patient not taking: Reported on 10/18/2022) 1 each 0    calcium carbonate-vitamin D3 600 mg-5 mcg (200 unit) Cap Take 1 capsule by mouth once. for 1 dose 90 capsule 5    tiotropium bromide (SPIRIVA RESPIMAT) 2.5 mcg/actuation inhaler INHALE 2 SPRAY(S) BY MOUTH ONCE DAILY 4 g 11     No current facility-administered medications for this visit.                  REVIEW OF SYSTEMS:     Review of Systems   Constitutional:  Positive for fatigue (paces self).   Respiratory:  Negative for cough, sputum production, wheezing and dyspnea on extertion.    Cardiovascular:  Negative for chest pain and palpitations.   Musculoskeletal:  Positive for gait problem (walks with cane).   Gastrointestinal:  Positive for acid reflux.   Neurological:  Negative for dizziness and headaches.     "    PHYSICAL EXAM:  Vitals:    10/18/22 1442   BP: (!) 147/92   Pulse: 106   Temp: 97.8 °F (36.6 °C)   TempSrc: Temporal   SpO2: (!) 93%   Weight: 81.1 kg (178 lb 10.9 oz)   Height: 5' 11" (1.803 m)   PainSc: 0-No pain     Body mass index is 24.92 kg/m².     Physical Exam   Constitutional: He is oriented to person, place, and time. He appears well-developed. No distress.   HENT:   Head: Normocephalic.   Cardiovascular: Regular rhythm and normal heart sounds.   Tachycardic   Pulmonary/Chest: Normal expansion, effort normal and breath sounds normal.   Musculoskeletal:         General: No edema.      Cervical back: Neck supple.   Neurological: He is alert and oriented to person, place, and time.   ambulatory   Skin: He is not diaphoretic.   Psychiatric: He has a normal mood and affect. His behavior is normal. Judgment and thought content normal.       ASSESSMENT/PLAN  Problem List Items Addressed This Visit          Unprioritized    COPD with chronic bronchitis and emphysema - Primary (Chronic)    Overview     Maintenance inhaler-spiriva  Albuterol prn-not needing  Oxygen prn following covid but not needing now  Pulmonary rehab- completed some but stop after prostate cancer  uptodate flu/covid/pneumonia vaccine.             Relevant Medications    tiotropium bromide (SPIRIVA RESPIMAT) 2.5 mcg/actuation inhaler    Other Relevant Orders    Six Minute Walk Test to qualify for Home Oxygen    Complete PFT with bronchodilator    Complex sleep apnea syndrome    Overview     Pt reports fatigue, restless sleep, waking up every 2 hours, falls asleep watching tv, sleepy during day. Pt had long covid at the time. Sleep study 1/7/2021 AHI 44 with emerging CA on cpap. ASV titration defer due to prostate problems.   Recommend reevaluation post covid         Relevant Orders    Polysomnogram (CPAP will be added if patient meets diagnostic criteria.)     Follow up in about 1 year (around 10/18/2023) for contact for return if test " abnormal.

## 2022-11-02 ENCOUNTER — HOSPITAL ENCOUNTER (OUTPATIENT)
Dept: RESPIRATORY THERAPY | Facility: HOSPITAL | Age: 87
Discharge: HOME | End: 2022-11-02
Attending: NURSE PRACTITIONER
Payer: MEDICARE

## 2022-11-02 VITALS — OXYGEN SATURATION: 98 % | HEART RATE: 103 BPM | RESPIRATION RATE: 18 BRPM

## 2022-11-02 DIAGNOSIS — J43.9 COPD WITH CHRONIC BRONCHITIS AND EMPHYSEMA: Chronic | ICD-10-CM

## 2022-11-02 DIAGNOSIS — J44.89 COPD WITH CHRONIC BRONCHITIS AND EMPHYSEMA: Chronic | ICD-10-CM

## 2022-11-02 LAB
6MWT: NORMAL
BRPFT: NORMAL
DLCO ADJ PRE: 8.37 ML/(MIN*MMHG)
DLCO SINGLE BREATH LLN: 18.23
DLCO SINGLE BREATH PRE REF: 33.3 %
DLCO SINGLE BREATH REF: 25.15
DLCOC SBVA LLN: 2.35
DLCOC SBVA PRE REF: 60.3 %
DLCOC SBVA REF: 3.44
DLCOC SINGLE BREATH LLN: 18.23
DLCOC SINGLE BREATH PRE REF: 33.3 %
DLCOC SINGLE BREATH REF: 25.15
DLCOVA LLN: 2.35
DLCOVA PRE REF: 60.3 %
DLCOVA PRE: 2.08 ML/(MIN*MMHG*L)
DLCOVA REF: 3.44
DLVAADJ PRE: 2.08 ML/(MIN*MMHG*L)
ERVN2 LLN: -16449.1
ERVN2 PRE REF: 96.5 %
ERVN2 PRE: 0.87 L
ERVN2 REF: 0.9
FEF 25 75 CHG: -10.9 %
FEF 25 75 LLN: 0.45
FEF 25 75 POST REF: 26.5 %
FEF 25 75 PRE REF: 29.8 %
FEF 25 75 REF: 1.66
FET100 CHG: 58.3 %
FEV1 CHG: 14.2 %
FEV1 FVC CHG: -7.2 %
FEV1 FVC LLN: 60
FEV1 FVC POST REF: 66.6 %
FEV1 FVC PRE REF: 71.8 %
FEV1 FVC REF: 75
FEV1 LLN: 1.53
FEV1 POST REF: 68.6 %
FEV1 PRE REF: 60.1 %
FEV1 REF: 2.4
FRCN2 LLN: 2.89
FRCN2 PRE REF: 69.4 %
FRCN2 REF: 3.88
FVC CHG: 23.1 %
FVC LLN: 2.24
FVC POST REF: 101.9 %
FVC PRE REF: 82.8 %
FVC REF: 3.25
IVC PRE: 2.69 L
IVC SINGLE BREATH LLN: 2.24
IVC SINGLE BREATH PRE REF: 82.5 %
IVC SINGLE BREATH REF: 3.25
PEF CHG: -6.7 %
PEF LLN: 4.93
PEF POST REF: 34.5 %
PEF PRE REF: 37 %
PEF REF: 7.65
POST FEF 25 75: 0.44 L/S
POST FET 100: 14.45 SEC
POST FEV1 FVC: 49.64 %
POST FEV1: 1.65 L
POST FVC: 3.32 L
POST PEF: 2.64 L/S
PRE DLCO: 8.37 ML/(MIN*MMHG)
PRE FEF 25 75: 0.49 L/S
PRE FET 100: 9.13 SEC
PRE FEV1 FVC: 53.49 %
PRE FEV1: 1.44 L
PRE FRC N2: 2.69 L
PRE FVC: 2.69 L
PRE PEF: 2.83 L/S
RVN2 LLN: 2.3
RVN2 PRE REF: 61.2 %
RVN2 PRE: 1.82 L
RVN2 REF: 2.98
RVN2TLCN2 LLN: 37.74
RVN2TLCN2 PRE REF: 85.4 %
RVN2TLCN2 PRE: 39.91 %
RVN2TLCN2 REF: 46.72
TLCN2 LLN: 6.15
TLCN2 PRE REF: 62.4 %
TLCN2 PRE: 4.56 L
TLCN2 REF: 7.3
VA PRE: 4.04 L
VA SINGLE BREATH LLN: 7.15
VA SINGLE BREATH PRE REF: 56.4 %
VA SINGLE BREATH REF: 7.15
VCMAXN2 LLN: 2.24
VCMAXN2 PRE REF: 84.2 %
VCMAXN2 PRE: 2.74 L
VCMAXN2 REF: 3.25

## 2022-11-02 PROCEDURE — 94060 EVALUATION OF WHEEZING: CPT | Mod: 26,59,, | Performed by: INTERNAL MEDICINE

## 2022-11-02 PROCEDURE — 25000242 PHARM REV CODE 250 ALT 637 W/ HCPCS: Performed by: NURSE PRACTITIONER

## 2022-11-02 PROCEDURE — 94010 BREATHING CAPACITY TEST: CPT

## 2022-11-02 PROCEDURE — 94060 PR EVAL OF BRONCHOSPASM: ICD-10-PCS | Mod: 26,59,, | Performed by: INTERNAL MEDICINE

## 2022-11-02 PROCEDURE — 94618 PULMONARY STRESS TESTING: CPT | Mod: 26,,, | Performed by: INTERNAL MEDICINE

## 2022-11-02 PROCEDURE — 94729 DIFFUSING CAPACITY: CPT

## 2022-11-02 PROCEDURE — 94727 GAS DIL/WSHOT DETER LNG VOL: CPT | Mod: 26,,, | Performed by: INTERNAL MEDICINE

## 2022-11-02 PROCEDURE — 94729 DIFFUSING CAPACITY: CPT | Mod: 26,,, | Performed by: INTERNAL MEDICINE

## 2022-11-02 PROCEDURE — 94729 PR C02/MEMBANE DIFFUSE CAPACITY: ICD-10-PCS | Mod: 26,,, | Performed by: INTERNAL MEDICINE

## 2022-11-02 PROCEDURE — 94618 PULMONARY STRESS TESTING: ICD-10-PCS | Mod: 26,,, | Performed by: INTERNAL MEDICINE

## 2022-11-02 PROCEDURE — 94727 GAS DIL/WSHOT DETER LNG VOL: CPT

## 2022-11-02 PROCEDURE — 94727 PR PULM FUNCTION TEST BY GAS: ICD-10-PCS | Mod: 26,,, | Performed by: INTERNAL MEDICINE

## 2022-11-02 PROCEDURE — 94618 PULMONARY STRESS TESTING: CPT

## 2022-11-02 RX ORDER — ALBUTEROL SULFATE 2.5 MG/.5ML
2.5 SOLUTION RESPIRATORY (INHALATION) ONCE
Status: COMPLETED | OUTPATIENT
Start: 2022-11-02 | End: 2022-11-02

## 2022-11-02 RX ADMIN — ALBUTEROL SULFATE 2.5 MG: 2.5 SOLUTION RESPIRATORY (INHALATION) at 02:11

## 2022-11-09 ENCOUNTER — OFFICE VISIT (OUTPATIENT)
Dept: UROLOGY | Facility: CLINIC | Age: 87
End: 2022-11-09
Payer: MEDICARE

## 2022-11-09 ENCOUNTER — TELEPHONE (OUTPATIENT)
Dept: UROLOGY | Facility: CLINIC | Age: 87
End: 2022-11-09

## 2022-11-09 VITALS — WEIGHT: 178.56 LBS | BODY MASS INDEX: 24.91 KG/M2

## 2022-11-09 DIAGNOSIS — C61 MALIGNANT NEOPLASM OF PROSTATE: ICD-10-CM

## 2022-11-09 DIAGNOSIS — N47.5 PENILE ADHESIONS: ICD-10-CM

## 2022-11-09 DIAGNOSIS — N47.8 FORESKIN PROBLEM: Primary | ICD-10-CM

## 2022-11-09 PROBLEM — R33.9 URINARY RETENTION: Status: RESOLVED | Noted: 2021-06-22 | Resolved: 2022-11-09

## 2022-11-09 PROCEDURE — 1159F PR MEDICATION LIST DOCUMENTED IN MEDICAL RECORD: ICD-10-PCS | Mod: CPTII,S$GLB,, | Performed by: STUDENT IN AN ORGANIZED HEALTH CARE EDUCATION/TRAINING PROGRAM

## 2022-11-09 PROCEDURE — 1159F MED LIST DOCD IN RCRD: CPT | Mod: CPTII,S$GLB,, | Performed by: STUDENT IN AN ORGANIZED HEALTH CARE EDUCATION/TRAINING PROGRAM

## 2022-11-09 PROCEDURE — 1126F AMNT PAIN NOTED NONE PRSNT: CPT | Mod: CPTII,S$GLB,, | Performed by: STUDENT IN AN ORGANIZED HEALTH CARE EDUCATION/TRAINING PROGRAM

## 2022-11-09 PROCEDURE — 1126F PR PAIN SEVERITY QUANTIFIED, NO PAIN PRESENT: ICD-10-PCS | Mod: CPTII,S$GLB,, | Performed by: STUDENT IN AN ORGANIZED HEALTH CARE EDUCATION/TRAINING PROGRAM

## 2022-11-09 PROCEDURE — 3288F PR FALLS RISK ASSESSMENT DOCUMENTED: ICD-10-PCS | Mod: CPTII,S$GLB,, | Performed by: STUDENT IN AN ORGANIZED HEALTH CARE EDUCATION/TRAINING PROGRAM

## 2022-11-09 PROCEDURE — 99214 PR OFFICE/OUTPT VISIT, EST, LEVL IV, 30-39 MIN: ICD-10-PCS | Mod: S$GLB,,, | Performed by: STUDENT IN AN ORGANIZED HEALTH CARE EDUCATION/TRAINING PROGRAM

## 2022-11-09 PROCEDURE — 99999 PR PBB SHADOW E&M-EST. PATIENT-LVL III: CPT | Mod: PBBFAC,,, | Performed by: STUDENT IN AN ORGANIZED HEALTH CARE EDUCATION/TRAINING PROGRAM

## 2022-11-09 PROCEDURE — 3288F FALL RISK ASSESSMENT DOCD: CPT | Mod: CPTII,S$GLB,, | Performed by: STUDENT IN AN ORGANIZED HEALTH CARE EDUCATION/TRAINING PROGRAM

## 2022-11-09 PROCEDURE — 1160F RVW MEDS BY RX/DR IN RCRD: CPT | Mod: CPTII,S$GLB,, | Performed by: STUDENT IN AN ORGANIZED HEALTH CARE EDUCATION/TRAINING PROGRAM

## 2022-11-09 PROCEDURE — 1101F PT FALLS ASSESS-DOCD LE1/YR: CPT | Mod: CPTII,S$GLB,, | Performed by: STUDENT IN AN ORGANIZED HEALTH CARE EDUCATION/TRAINING PROGRAM

## 2022-11-09 PROCEDURE — 1101F PR PT FALLS ASSESS DOC 0-1 FALLS W/OUT INJ PAST YR: ICD-10-PCS | Mod: CPTII,S$GLB,, | Performed by: STUDENT IN AN ORGANIZED HEALTH CARE EDUCATION/TRAINING PROGRAM

## 2022-11-09 PROCEDURE — 99999 PR PBB SHADOW E&M-EST. PATIENT-LVL III: ICD-10-PCS | Mod: PBBFAC,,, | Performed by: STUDENT IN AN ORGANIZED HEALTH CARE EDUCATION/TRAINING PROGRAM

## 2022-11-09 PROCEDURE — 1160F PR REVIEW ALL MEDS BY PRESCRIBER/CLIN PHARMACIST DOCUMENTED: ICD-10-PCS | Mod: CPTII,S$GLB,, | Performed by: STUDENT IN AN ORGANIZED HEALTH CARE EDUCATION/TRAINING PROGRAM

## 2022-11-09 PROCEDURE — 99214 OFFICE O/P EST MOD 30 MIN: CPT | Mod: S$GLB,,, | Performed by: STUDENT IN AN ORGANIZED HEALTH CARE EDUCATION/TRAINING PROGRAM

## 2022-11-09 RX ORDER — CLOTRIMAZOLE AND BETAMETHASONE DIPROPIONATE 10; .64 MG/G; MG/G
CREAM TOPICAL 2 TIMES DAILY
Qty: 15 G | Refills: 1 | Status: SHIPPED | OUTPATIENT
Start: 2022-11-09 | End: 2022-11-16

## 2022-11-09 NOTE — TELEPHONE ENCOUNTER
Pt notified his appt for 1/4/23 with Dr. Cole has been cancelled.  Pt notified he will get his Lupron injection on 2/6/23 when he comes in to see Dr. Arroyo.        ----- Message from Jesus Cole MD sent at 11/9/2022  4:30 PM CST -----  Regarding: Lupron  Patient needs RN visit for Lupron / Mon- Wednesday preferred- Feb 2023. Pls let me know if new order needed.

## 2022-11-19 ENCOUNTER — PATIENT MESSAGE (OUTPATIENT)
Dept: PULMONOLOGY | Facility: CLINIC | Age: 87
End: 2022-11-19
Payer: MEDICARE

## 2022-11-19 NOTE — TELEPHONE ENCOUNTER
Left VM informing pt of abnormal pulmonary studies. Would like pt to follow up. Pt noted to have canceled recommended sleep study as well. Recommend pt schedule a follow up to address concerns. Spoke to daughter Dianelys Goldberg recommend pt schedule a follow up appointment.

## 2022-11-21 ENCOUNTER — TELEPHONE (OUTPATIENT)
Dept: FAMILY MEDICINE | Facility: CLINIC | Age: 87
End: 2022-11-21
Payer: MEDICARE

## 2022-11-21 ENCOUNTER — TELEPHONE (OUTPATIENT)
Dept: PULMONOLOGY | Facility: CLINIC | Age: 87
End: 2022-11-21
Payer: MEDICARE

## 2022-11-21 NOTE — TELEPHONE ENCOUNTER
Spoke with patient scheduled an appointment to see provider.    ANDREEA Henderson                 ----- Message from Beatriz Thibodeaux MA sent at 11/21/2022  7:41 AM CST -----  Please schedule pt follow up abnormal test results. Thank you

## 2022-11-21 NOTE — TELEPHONE ENCOUNTER
Patient states he is having sore throat, declined wanting to go to urgent care. Would like to be seen today or tomorrow - was unable to find availabilities for [facility] or [facility]. Set an appointment to be seen by Annabel Flores 11/22/22. Instructed to refer to urgent care if symptoms worsen, patient verbalized understanding.

## 2022-11-21 NOTE — TELEPHONE ENCOUNTER
----- Message from Verenice Sparks sent at 11/21/2022 10:25 AM CST -----  Regarding: patient call back  Type: Patient Call Back    Who called: Self     What is the request in detail: Said he would like to speak with the nurse.     Can the clinic reply by [facility]? No     Would the patient rather a call back or a response via [facility]? Call     Best call back number: .[phone]

## 2022-11-29 ENCOUNTER — OFFICE VISIT (OUTPATIENT)
Dept: PULMONOLOGY | Facility: CLINIC | Age: 87
End: 2022-11-29
Payer: MEDICARE

## 2022-11-29 VITALS
DIASTOLIC BLOOD PRESSURE: 96 MMHG | HEIGHT: 71 IN | OXYGEN SATURATION: 95 % | SYSTOLIC BLOOD PRESSURE: 178 MMHG | HEART RATE: 94 BPM | BODY MASS INDEX: 24.77 KG/M2 | WEIGHT: 176.94 LBS

## 2022-11-29 DIAGNOSIS — J84.9 ILD (INTERSTITIAL LUNG DISEASE): Chronic | ICD-10-CM

## 2022-11-29 DIAGNOSIS — J44.89 COPD WITH CHRONIC BRONCHITIS AND EMPHYSEMA: Primary | Chronic | ICD-10-CM

## 2022-11-29 DIAGNOSIS — J43.9 COPD WITH CHRONIC BRONCHITIS AND EMPHYSEMA: Primary | Chronic | ICD-10-CM

## 2022-11-29 DIAGNOSIS — J84.9 INTERSTITIAL PULMONARY DISEASE, UNSPECIFIED: ICD-10-CM

## 2022-11-29 DIAGNOSIS — I27.20 PULMONARY HYPERTENSION: Chronic | ICD-10-CM

## 2022-11-29 PROCEDURE — 1101F PR PT FALLS ASSESS DOC 0-1 FALLS W/OUT INJ PAST YR: ICD-10-PCS | Mod: CPTII,S$GLB,, | Performed by: NURSE PRACTITIONER

## 2022-11-29 PROCEDURE — 3080F PR MOST RECENT DIASTOLIC BLOOD PRESSURE >= 90 MM HG: ICD-10-PCS | Mod: CPTII,S$GLB,, | Performed by: NURSE PRACTITIONER

## 2022-11-29 PROCEDURE — 99999 PR PBB SHADOW E&M-EST. PATIENT-LVL IV: CPT | Mod: PBBFAC,,, | Performed by: NURSE PRACTITIONER

## 2022-11-29 PROCEDURE — 3080F DIAST BP >= 90 MM HG: CPT | Mod: CPTII,S$GLB,, | Performed by: NURSE PRACTITIONER

## 2022-11-29 PROCEDURE — 99499 RISK ADDL DX/OHS AUDIT: ICD-10-PCS | Mod: S$GLB,,, | Performed by: NURSE PRACTITIONER

## 2022-11-29 PROCEDURE — 3077F PR MOST RECENT SYSTOLIC BLOOD PRESSURE >= 140 MM HG: ICD-10-PCS | Mod: CPTII,S$GLB,, | Performed by: NURSE PRACTITIONER

## 2022-11-29 PROCEDURE — 3288F FALL RISK ASSESSMENT DOCD: CPT | Mod: CPTII,S$GLB,, | Performed by: NURSE PRACTITIONER

## 2022-11-29 PROCEDURE — 1126F AMNT PAIN NOTED NONE PRSNT: CPT | Mod: CPTII,S$GLB,, | Performed by: NURSE PRACTITIONER

## 2022-11-29 PROCEDURE — 99499 UNLISTED E&M SERVICE: CPT | Mod: S$GLB,,, | Performed by: NURSE PRACTITIONER

## 2022-11-29 PROCEDURE — 99999 PR PBB SHADOW E&M-EST. PATIENT-LVL IV: ICD-10-PCS | Mod: PBBFAC,,, | Performed by: NURSE PRACTITIONER

## 2022-11-29 PROCEDURE — 99214 PR OFFICE/OUTPT VISIT, EST, LEVL IV, 30-39 MIN: ICD-10-PCS | Mod: S$GLB,,, | Performed by: NURSE PRACTITIONER

## 2022-11-29 PROCEDURE — 3077F SYST BP >= 140 MM HG: CPT | Mod: CPTII,S$GLB,, | Performed by: NURSE PRACTITIONER

## 2022-11-29 PROCEDURE — 3288F PR FALLS RISK ASSESSMENT DOCUMENTED: ICD-10-PCS | Mod: CPTII,S$GLB,, | Performed by: NURSE PRACTITIONER

## 2022-11-29 PROCEDURE — 1126F PR PAIN SEVERITY QUANTIFIED, NO PAIN PRESENT: ICD-10-PCS | Mod: CPTII,S$GLB,, | Performed by: NURSE PRACTITIONER

## 2022-11-29 PROCEDURE — 1101F PT FALLS ASSESS-DOCD LE1/YR: CPT | Mod: CPTII,S$GLB,, | Performed by: NURSE PRACTITIONER

## 2022-11-29 PROCEDURE — 99214 OFFICE O/P EST MOD 30 MIN: CPT | Mod: S$GLB,,, | Performed by: NURSE PRACTITIONER

## 2022-11-29 NOTE — PROGRESS NOTES
"  HISTORY OF PRESENT ILLNESS: Peter Lopez is a 87 y.o. male  has a past medical history of Chronic hepatitis C without mention of hepatic coma, Diabetes mellitus type I, Hypertension, and Retinopathy due to secondary diabetes mellitus, prostate cancer, copd.      Patient smoked .75 ppd x 40 years.  Patient quit smoking in 1990s.  Patient history covid 19 hospitalized 4/3/20  until 4/22/20 for hypoxic respiratory failure requiring nrb.  Subsequently discharged to [facility] [place].  Patient was discharged to home with 2 lpm.  Recovery was slow following covid.     Pt lost to follow up after LOV 6/8/2021 due to prostate cancer concerns. On interval he has obtain xrt and lupron injection follow by oncology and urology.    He reports feeling well on interim. He is not using oxygen. "Don't walk too much" Working at [employer] 3 days a week (MTW). He is using spiriva and finds this helpful. Pt was walked in the clinic with oxygen desaturation. Rest 95, walk RA 86, corrected on walk to 95% on 2 L NC    CT chest 12/7/2020Emphysema.   Minimal scarring at the bilateral lung basis.  CXR 5/20/2021: Cardiomediastinal silhouette is within normal limits.   No focal consolidation, overt interstitial edema, sizable pleural effusion or pneumothorax.  Symmetric, moderate to severe chronic fibrotic/emphysematous changes with basoapical gradient, unchanged.  Symmetric bibasilar chronic interstitial coarsening, unchanged.   Multilevel degenerative changes of the imaged spine.   Impression:   1. No acute cardiopulmonary process appreciated.    VQ 3/17/2021 low probability  PFT 12/7/2020 ratio 58, fev1 1.93 ( 78), fvc 3.43 (103) dlco 38- results stable compare to June 2020  PFT 11/2/2022:  Ratio 53 FEV1 1.44 (60) FVC 2.69 (82) TLC 62 DLCO 33  Spirometry shows significant obstruction. Lung volume determination shows moderate restriction is also present. Overall there is moderate ventilatory impairment. Spirometry is improved following " bronchodilator administration. DLCO is severely decrease  Walk test 11/2/2022: During exercise, there was significant desaturation from 93% to 84% while breathing room air. Oxygen saturation improved and remained above 88% during exertion with use of supplemental oxygen at 2 lpm.     Pt reports fatigue, restless sleep, waking up every 2 hours, falls asleep watching tv, sleepy during day. Sleep study 1/7/2021 AHI 44 with emerging CA on cpap. ASV titration defer by pt.   ECHO 1/25/2021 EF 65%, est pasp 67 mmHg  ECHO 5/20/2021 ef 60% pasp 57 mmHg  Nuclear stress 5/21/2021 normal myocardial perfusion    PAST MEDICAL HISTORY:    Active Ambulatory Problems     Diagnosis Date Noted    Essential hypertension 10/02/2012    Type 2 diabetes mellitus with diabetic polyneuropathy, without long-term current use of insulin 10/16/2013    Aortic atherosclerosis 05/03/2019    Chronic kidney disease, stage 3a 01/28/2020    ILD (interstitial lung disease) 04/04/2020    Chronic respiratory failure with hypoxia 04/13/2020    Reactive depression 04/14/2020    COPD with chronic bronchitis and emphysema 08/10/2020    Tachycardia with heart rate 121-140 beats per minute 09/03/2020    Sleep-related breathing disorder 12/20/2020    Chronic fatigue     Complex sleep apnea syndrome     Pulmonary hypertension 03/08/2021    Dyspnea 03/11/2021    Iron deficiency anemia 03/11/2021    Malignant neoplasm of prostate 06/14/2021    Pulmonary hypertension due to left ventricular systolic dysfunction 12/13/2022     Resolved Ambulatory Problems     Diagnosis Date Noted    Elevated PSA 01/10/2013    Hepatitis C 10/16/2013    BPH (benign prostatic hyperplasia) 04/05/2016    Hypoglycemia     Hypoxia 03/29/2020    Anemia 04/04/2020    Elevated troponin 04/04/2020    Discharge planning issues 04/14/2020    Gait instability 09/03/2020    Chest pain 05/20/2021    Urinary retention 06/22/2021     Past Medical History:   Diagnosis Date    Chronic hepatitis C  without mention of hepatic coma     CKD (chronic kidney disease)     COPD (chronic obstructive pulmonary disease)     Diabetes mellitus type I     H/O colonoscopy     [place] (hard of hearing)     Hypertension     On home oxygen therapy     Retinopathy due to secondary diabetes mellitus     Wears hearing aid in both ears                 PAST SURGICAL HISTORY:    Past Surgical History:   Procedure Laterality Date    NO PAST SURGERIES  2021    TRANSURETHRAL RESECTION OF PROSTATE USING BIPOLAR CAUTERY N/A 2021    Procedure: (TURP) Transuretheral Resection of Prostate with BIPOLAR CAUTERY;  Surgeon: Jesus Cole MD;  Location: [facility];  Service: Urology;  Laterality: N/A;  RN Pre OP 21.  Vaccinated.  C A         FAMILY HISTORY:                Family History   Problem Relation Age of Onset    Cancer Mother     Asbestos Father     Liver disease Neg Hx        SOCIAL HISTORY:          Tobacco:   Social History     Tobacco Use   Smoking Status Former    Packs/day: 1.00    Years: 40.00    Pack years: 40.00    Types: Cigarettes    Quit date: 1991    Years since quittin.1   Smokeless Tobacco Former    Quit date: 1986     alcohol use:    Social History     Substance and Sexual Activity   Alcohol Use Not Currently    Alcohol/week: 0.0 standard drinks    Comment: 2 bottles beer on weekends               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 Rescue 18 g 0    atorvastatin (LIPITOR) 20 MG tablet Take 1 tablet (20 mg total) by mouth every evening. 90 tablet 3    blood sugar diagnostic Strp To check BG 1 times daily, to use with insurance preferred meter 50 each 11    blood-glucose meter kit  "To check BG 1 times daily, to use with insurance preferred meter 1 each 0    lactulose (CHRONULAC) 10 gram/15 mL solution Take 30 mLs (20 g total) by mouth 2 (two) times daily as needed (constipation). 473 mL 11    leuprolide (LUPRON) 1 mg/0.2 mL injection Inject into the skin.      tiotropium bromide (SPIRIVA RESPIMAT) 2.5 mcg/actuation inhaler INHALE 2 SPRAY(S) BY MOUTH ONCE DAILY 4 g 11    amLODIPine (NORVASC) 5 MG tablet Take 1 tablet (5 mg total) by mouth once daily. 90 tablet 0    calcium carbonate-vitamin D3 600 mg-5 mcg (200 unit) Cap Take 1 capsule by mouth once. for 1 dose 90 capsule 5    cholecalciferol, vitamin D3, (VITAMIN D3) 25 mcg (1,000 unit) capsule Take 1,000 Units by mouth once daily.      linaGLIPtin (TRADJENTA) 5 mg Tab tablet Take 1 tablet by mouth once daily 90 tablet 0    olmesartan (BENICAR) 40 MG tablet Take 1 tablet by mouth once daily 90 tablet 0     No current facility-administered medications for this visit.                  REVIEW OF SYSTEMS:     Review of Systems   Constitutional:  Positive for fatigue (paces self).   Respiratory:  Positive for dyspnea on extertion (if walk too much). Negative for cough, sputum production and wheezing.    Cardiovascular:  Negative for chest pain, palpitations and leg swelling.   Musculoskeletal:  Positive for gait problem (walks with cane).        Wobbly hold on cane   Gastrointestinal:  Positive for acid reflux.   Neurological:  Negative for dizziness and headaches.        PHYSICAL EXAM:  Vitals:    11/29/22 0805   BP: (!) 178/96   Pulse: 94   SpO2: 95%   Weight: 80.3 kg (176 lb 14.7 oz)   Height: 5' 11" (1.803 m)   PainSc: 0-No pain       Body mass index is 24.68 kg/m².     Physical Exam   Constitutional: He is oriented to person, place, and time. He appears well-developed. No distress.   HENT:   Head: Normocephalic.   Cardiovascular: Normal rate, regular rhythm and normal heart sounds.   Pulmonary/Chest: Normal expansion, effort normal and breath " sounds normal.   Musculoskeletal:         General: No edema.      Cervical back: Neck supple.   Neurological: He is alert and oriented to person, place, and time.   ambulatory   Skin: He is not diaphoretic.   Psychiatric: He has a normal mood and affect. His behavior is normal. Judgment and thought content normal.       ASSESSMENT/PLAN  Problem List Items Addressed This Visit          Unprioritized    COPD with chronic bronchitis and emphysema - Primary (Chronic)    Overview     Maintenance inhaler-spiriva  Albuterol prn-not needing  Oxygen prn following covid but not needing now however walk test indicates that he would still benefit from this  Pulmonary rehab- completed some but stop after prostate cancer  uptodate flu/covid/pneumonia vaccine.             Relevant Orders    OXYGEN FOR HOME USE    ILD (interstitial lung disease) (Chronic)    Overview     Bilateral airspace and interstitial disease on imaging. Possibly residual changes from covid. Will repeat CT chest  in Dec along with PFT for reevaluation         Relevant Orders    CT Chest Without Contrast (Completed)    Pulmonary hypertension (Chronic)    Overview     Likely cardiopulmonary dz, [place]         Relevant Orders    CT Chest Without Contrast (Completed)    Echo (Completed)     Other Visit Diagnoses       Interstitial pulmonary disease, unspecified        Relevant Orders    CT Chest Without Contrast (Completed)

## 2022-12-06 ENCOUNTER — HOSPITAL ENCOUNTER (OUTPATIENT)
Dept: CARDIOLOGY | Facility: HOSPITAL | Age: 87
Discharge: HOME | End: 2022-12-06
Attending: NURSE PRACTITIONER
Payer: MEDICARE

## 2022-12-06 ENCOUNTER — OFFICE VISIT (OUTPATIENT)
Dept: PODIATRY | Facility: CLINIC | Age: 87
End: 2022-12-06
Payer: MEDICARE

## 2022-12-06 ENCOUNTER — HOSPITAL ENCOUNTER (OUTPATIENT)
Dept: RADIOLOGY | Facility: HOSPITAL | Age: 87
Discharge: HOME | End: 2022-12-06
Attending: NURSE PRACTITIONER
Payer: MEDICARE

## 2022-12-06 VITALS — BODY MASS INDEX: 24.78 KG/M2 | WEIGHT: 177 LBS | HEIGHT: 71 IN

## 2022-12-06 DIAGNOSIS — I27.20 PULMONARY HYPERTENSION: ICD-10-CM

## 2022-12-06 DIAGNOSIS — E11.49 TYPE II DIABETES MELLITUS WITH NEUROLOGICAL MANIFESTATIONS: Primary | ICD-10-CM

## 2022-12-06 DIAGNOSIS — J84.9 INTERSTITIAL PULMONARY DISEASE, UNSPECIFIED: ICD-10-CM

## 2022-12-06 DIAGNOSIS — B35.1 ONYCHOMYCOSIS DUE TO DERMATOPHYTE: ICD-10-CM

## 2022-12-06 DIAGNOSIS — J84.9 ILD (INTERSTITIAL LUNG DISEASE): ICD-10-CM

## 2022-12-06 LAB
ASCENDING AORTA: 3.66 CM
AV INDEX (PROSTH): 0.71
AV MEAN GRADIENT: 2 MMHG
AV PEAK GRADIENT: 4 MMHG
AV VALVE AREA: 3.38 CM2
AV VELOCITY RATIO: 0.66
CV ECHO LV RWT: 0.41 CM
DOP CALC AO PEAK VEL: 0.95 M/S
DOP CALC AO VTI: 19.7 CM
DOP CALC LVOT AREA: 4.8 CM2
DOP CALC LVOT DIAMETER: 2.46 CM
DOP CALC LVOT PEAK VEL: 0.63 M/S
DOP CALC LVOT STROKE VOLUME: 66.51 CM3
DOP CALCLVOT PEAK VEL VTI: 14 CM
E/E' RATIO: 12 M/S
ECHO LV POSTERIOR WALL: 0.9 CM (ref 0.6–1.1)
EJECTION FRACTION: 50 %
FRACTIONAL SHORTENING: 29 % (ref 28–44)
INTERVENTRICULAR SEPTUM: 0.84 CM (ref 0.6–1.1)
IVC DIAMETER: 1 CM
IVRT: 176.97 MSEC
LA MAJOR: 4.4 CM
LA MINOR: 3.72 CM
LA WIDTH: 3.7 CM
LEFT ATRIUM SIZE: 3.52 CM
LEFT ATRIUM VOLUME: 44.63 CM3
LEFT INTERNAL DIMENSION IN SYSTOLE: 3.16 CM (ref 2.1–4)
LEFT VENTRICLE DIASTOLIC VOLUME: 89.7 ML
LEFT VENTRICLE SYSTOLIC VOLUME: 39.71 ML
LEFT VENTRICULAR INTERNAL DIMENSION IN DIASTOLE: 4.44 CM (ref 3.5–6)
LEFT VENTRICULAR MASS: 124.15 G
LV LATERAL E/E' RATIO: 12 M/S
LV SEPTAL E/E' RATIO: 12 M/S
LVOT MG: 1.03 MMHG
LVOT MV: 0.49 CM/S
MV PEAK E VEL: 0.48 M/S
PISA TR MAX VEL: 3.23 M/S
PULM VEIN S/D RATIO: 2.56
PV PEAK D VEL: 0.25 M/S
PV PEAK S VEL: 0.64 M/S
PV PEAK VELOCITY: 0.62 CM/S
RA MAJOR: 4.67 CM
RA PRESSURE: 3 MMHG
RA WIDTH: 2.98 CM
RIGHT VENTRICULAR END-DIASTOLIC DIMENSION: 3.02 CM
SINUS: 3.93 CM
STJ: 3.26 CM
TDI LATERAL: 0.04 M/S
TDI SEPTAL: 0.04 M/S
TDI: 0.04 M/S
TR MAX PG: 42 MMHG
TRICUSPID ANNULAR PLANE SYSTOLIC EXCURSION: 1.87 CM
TV PEAK GRADIENT: 1.78 MMHG
TV REST PULMONARY ARTERY PRESSURE: 45 MMHG

## 2022-12-06 PROCEDURE — 3288F PR FALLS RISK ASSESSMENT DOCUMENTED: ICD-10-PCS | Mod: CPTII,S$GLB,, | Performed by: PODIATRIST

## 2022-12-06 PROCEDURE — 3288F FALL RISK ASSESSMENT DOCD: CPT | Mod: CPTII,S$GLB,, | Performed by: PODIATRIST

## 2022-12-06 PROCEDURE — 93306 ECHO (CUPID ONLY): ICD-10-PCS | Mod: 26,,, | Performed by: INTERNAL MEDICINE

## 2022-12-06 PROCEDURE — 1101F PR PT FALLS ASSESS DOC 0-1 FALLS W/OUT INJ PAST YR: ICD-10-PCS | Mod: CPTII,S$GLB,, | Performed by: PODIATRIST

## 2022-12-06 PROCEDURE — 99499 UNLISTED E&M SERVICE: CPT | Mod: S$GLB,,, | Performed by: PODIATRIST

## 2022-12-06 PROCEDURE — 11721 PR DEBRIDEMENT OF NAILS, 6 OR MORE: ICD-10-PCS | Mod: Q9,S$GLB,, | Performed by: PODIATRIST

## 2022-12-06 PROCEDURE — 1159F PR MEDICATION LIST DOCUMENTED IN MEDICAL RECORD: ICD-10-PCS | Mod: CPTII,S$GLB,, | Performed by: PODIATRIST

## 2022-12-06 PROCEDURE — 99499 NO LOS: ICD-10-PCS | Mod: S$GLB,,, | Performed by: PODIATRIST

## 2022-12-06 PROCEDURE — 99999 PR PBB SHADOW E&M-EST. PATIENT-LVL III: CPT | Mod: PBBFAC,,, | Performed by: PODIATRIST

## 2022-12-06 PROCEDURE — 93306 TTE W/DOPPLER COMPLETE: CPT

## 2022-12-06 PROCEDURE — 1126F PR PAIN SEVERITY QUANTIFIED, NO PAIN PRESENT: ICD-10-PCS | Mod: CPTII,S$GLB,, | Performed by: PODIATRIST

## 2022-12-06 PROCEDURE — 71250 CT CHEST WITHOUT CONTRAST: ICD-10-PCS | Mod: 26,,, | Performed by: RADIOLOGY

## 2022-12-06 PROCEDURE — 1126F AMNT PAIN NOTED NONE PRSNT: CPT | Mod: CPTII,S$GLB,, | Performed by: PODIATRIST

## 2022-12-06 PROCEDURE — 1159F MED LIST DOCD IN RCRD: CPT | Mod: CPTII,S$GLB,, | Performed by: PODIATRIST

## 2022-12-06 PROCEDURE — 1101F PT FALLS ASSESS-DOCD LE1/YR: CPT | Mod: CPTII,S$GLB,, | Performed by: PODIATRIST

## 2022-12-06 PROCEDURE — 93306 TTE W/DOPPLER COMPLETE: CPT | Mod: 26,,, | Performed by: INTERNAL MEDICINE

## 2022-12-06 PROCEDURE — 71250 CT THORAX DX C-: CPT | Mod: TC

## 2022-12-06 PROCEDURE — 71250 CT THORAX DX C-: CPT | Mod: 26,,, | Performed by: RADIOLOGY

## 2022-12-06 PROCEDURE — 11721 DEBRIDE NAIL 6 OR MORE: CPT | Mod: Q9,S$GLB,, | Performed by: PODIATRIST

## 2022-12-06 PROCEDURE — 99999 PR PBB SHADOW E&M-EST. PATIENT-LVL III: ICD-10-PCS | Mod: PBBFAC,,, | Performed by: PODIATRIST

## 2022-12-07 ENCOUNTER — LAB VISIT (OUTPATIENT)
Dept: LAB | Facility: HOSPITAL | Age: 87
End: 2022-12-07
Attending: RADIOLOGY
Payer: MEDICARE

## 2022-12-07 DIAGNOSIS — C61 MALIGNANT NEOPLASM OF PROSTATE: ICD-10-CM

## 2022-12-07 DIAGNOSIS — E11.21 DIABETES MELLITUS WITH NEPHROPATHY: ICD-10-CM

## 2022-12-07 LAB
ALBUMIN SERPL BCP-MCNC: 3.9 G/DL (ref 3.5–5.2)
ALP SERPL-CCNC: 52 U/L (ref 55–135)
ALT SERPL W/O P-5'-P-CCNC: 17 U/L (ref 10–44)
ANION GAP SERPL CALC-SCNC: 11 MMOL/L (ref 8–16)
AST SERPL-CCNC: 25 U/L (ref 10–40)
BILIRUB SERPL-MCNC: 0.6 MG/DL (ref 0.1–1)
BUN SERPL-MCNC: 24 MG/DL (ref 8–23)
CALCIUM SERPL-MCNC: 9.7 MG/DL (ref 8.7–10.5)
CHLORIDE SERPL-SCNC: 103 MMOL/L (ref 95–110)
CO2 SERPL-SCNC: 26 MMOL/L (ref 23–29)
COMPLEXED PSA SERPL-MCNC: <0.01 NG/ML (ref 0–4)
CREAT SERPL-MCNC: 2 MG/DL (ref 0.5–1.4)
EST. GFR  (NO RACE VARIABLE): 32 ML/MIN/1.73 M^2
ESTIMATED AVG GLUCOSE: 160 MG/DL (ref 68–131)
GLUCOSE SERPL-MCNC: 222 MG/DL (ref 70–110)
HBA1C MFR BLD: 7.2 % (ref 4–5.6)
POTASSIUM SERPL-SCNC: 3.9 MMOL/L (ref 3.5–5.1)
PROT SERPL-MCNC: 7.5 G/DL (ref 6–8.4)
SODIUM SERPL-SCNC: 140 MMOL/L (ref 136–145)

## 2022-12-07 PROCEDURE — 36415 COLL VENOUS BLD VENIPUNCTURE: CPT | Mod: PN | Performed by: RADIOLOGY

## 2022-12-07 PROCEDURE — 83036 HEMOGLOBIN GLYCOSYLATED A1C: CPT | Performed by: FAMILY MEDICINE

## 2022-12-07 PROCEDURE — 84153 ASSAY OF PSA TOTAL: CPT | Performed by: RADIOLOGY

## 2022-12-07 PROCEDURE — 80053 COMPREHEN METABOLIC PANEL: CPT | Performed by: FAMILY MEDICINE

## 2022-12-08 NOTE — PROGRESS NOTES
Subjective:      Patient ID: Peter Lopez is a 86 y.o. male.    Chief Complaint: Diabetes Mellitus (6/29/22 Dr Lloyd PCP) and Nail Care    Peter is a 86 y.o. male who presents to the clinic upon referral from Dr. Steele ref. provider found  for evaluation and treatment of diabetic feet. 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 Patient relates no major problem with feet. Reports elongated nails that are difficult to trim. Requesting nail trimming.     PCP: Lucas Lloyd Jr, MD    Date Last Seen by PCP: per above    Current shoe gear: Tennis shoes    Hemoglobin A1C   Date Value Ref Range Status   12/07/2022 7.2 (H) 4.0 - 5.6 % Final     Comment:     ADA Screening Guidelines:  5.7-6.4%  Consistent with prediabetes  >or=6.5%  Consistent with diabetes    High levels of fetal hemoglobin interfere with the HbA1C  assay. Heterozygous hemoglobin variants (HbS, HgC, etc)do  not significantly interfere with this assay.   However, presence of multiple variants may affect accuracy.     06/21/2022 7.4 (H) 4.0 - 5.6 % Final     Comment:     ADA Screening Guidelines:  5.7-6.4%  Consistent with prediabetes  >or=6.5%  Consistent with diabetes    High levels of fetal hemoglobin interfere with the HbA1C  assay. Heterozygous hemoglobin variants (HbS, HgC, etc)do  not significantly interfere with this assay.   However, presence of multiple variants may affect accuracy.     03/14/2022 7.3 (H) 4.0 - 5.6 % Final     Comment:     ADA Screening Guidelines:  5.7-6.4%  Consistent with prediabetes  >or=6.5%  Consistent with diabetes    High levels of fetal hemoglobin interfere with the  HbA1C  assay. Heterozygous hemoglobin variants (HbS, HgC, etc)do  not significantly interfere with this assay.   However, presence of multiple variants may affect accuracy.             Review of Systems   Constitutional: Negative for chills, diaphoresis and fever.   Cardiovascular:  Negative for claudication, cyanosis, leg swelling and syncope.   Respiratory:  Negative for cough and shortness of breath.    Skin:  Positive for color change and nail changes. Negative for suspicious lesions.   Musculoskeletal:  Negative for falls, joint pain, muscle cramps and muscle weakness.   Gastrointestinal:  Negative for diarrhea, nausea and vomiting.   Neurological:  Positive for paresthesias. Negative for disturbances in coordination, numbness, sensory change, tremors and weakness.   Psychiatric/Behavioral:  Negative for altered mental status.          Objective:      Physical Exam  Constitutional:       Appearance: He is well-developed.      Comments: Oriented to time, place, and person.   Cardiovascular:      Comments: DP and PT pulses are palpable bilaterally. 3 sec capillary refill time and toes and feet are warm to touch proximally .       Musculoskeletal:      Comments: Equinus noted b/l ankles with < 10 deg DF noted. MMT 5/5 in DF/PF/Inv/Ev resistance with no reproduction of pain in any direction. Passive range of motion of ankle and pedal joints is painless b/l.     Feet:      Right foot:      Skin integrity: No callus or dry skin.      Left foot:      Skin integrity: No callus or dry skin.   Lymphadenopathy:      Comments: Negative lymphadenopathy bilateral popliteal fossa and tarsal tunnel.   Skin:     Comments: Toenails 1-5 bilaterally are elongated by 2-3 mm, thickened by 2-3 mm, discolored/yellowed, dystrophic, brittle with subungual debris.    Focal hyperkeratotic lesion consisting entirely of hyperkeratotic tissue without open skin, drainage, pus, fluctuance, malodor, or signs of infection: right plantar medial  foot.        Neurological:      Mental Status: He is alert.      Comments: Light touch, proprioception, and sharp/dull sensation are all diminished bilaterally. Protective threshold with the [place]ienstein monofilament is diminished  bilaterally.  Subjective paresthesias with no clearly identifiable source or trigger.      Psychiatric:         Behavior: Behavior is cooperative.             Assessment:       Encounter Diagnoses   Name Primary?    Type II diabetes mellitus with neurological manifestations Yes    Onychomycosis due to dermatophyte          Plan:       Peter was seen today for diabetes mellitus and nail care.    Diagnoses and all orders for this visit:    Type II diabetes mellitus with neurological manifestations    Onychomycosis due to dermatophyte    I counseled the patient on his conditions, their implications and medical management.    - Shoe inspection. Diabetic Foot Education. Patient reminded of the importance of good nutrition and blood sugar control to help prevent podiatric complications of diabetes. Patient instructed on proper foot hygeine. We discussed wearing proper shoe gear, daily foot inspections, never walking without protective shoe gear, never putting sharp instruments to feet, routine podiatric nail visits every 3  months.   - With patient's permission, nails were aggressively reduced and debrided x 10 to their soft tissue attachment mechanically, removing all offending nail and debris. Patient relates relief following the procedure. He will continue to monitor the areas daily, inspect his feet, wear protective shoe gear when ambulatory, moisturizer to maintain skin integrity and follow in this office in approximately      F/u 3 months     Lucia Aguiar DPM

## 2022-12-12 ENCOUNTER — TELEPHONE (OUTPATIENT)
Dept: PULMONOLOGY | Facility: CLINIC | Age: 87
End: 2022-12-12
Payer: MEDICARE

## 2022-12-12 ENCOUNTER — OFFICE VISIT (OUTPATIENT)
Dept: RADIATION ONCOLOGY | Facility: CLINIC | Age: 87
End: 2022-12-12
Attending: RADIOLOGY
Payer: MEDICARE

## 2022-12-12 VITALS
RESPIRATION RATE: 18 BRPM | HEIGHT: 71 IN | BODY MASS INDEX: 24.95 KG/M2 | DIASTOLIC BLOOD PRESSURE: 97 MMHG | HEART RATE: 107 BPM | WEIGHT: 178.19 LBS | SYSTOLIC BLOOD PRESSURE: 178 MMHG

## 2022-12-12 DIAGNOSIS — C61 MALIGNANT NEOPLASM OF PROSTATE: Primary | ICD-10-CM

## 2022-12-12 PROCEDURE — 1160F PR REVIEW ALL MEDS BY PRESCRIBER/CLIN PHARMACIST DOCUMENTED: ICD-10-PCS | Mod: CPTII,S$GLB,, | Performed by: RADIOLOGY

## 2022-12-12 PROCEDURE — 3077F SYST BP >= 140 MM HG: CPT | Mod: CPTII,S$GLB,, | Performed by: RADIOLOGY

## 2022-12-12 PROCEDURE — 99212 OFFICE O/P EST SF 10 MIN: CPT | Mod: S$GLB,,, | Performed by: RADIOLOGY

## 2022-12-12 PROCEDURE — 1159F MED LIST DOCD IN RCRD: CPT | Mod: CPTII,S$GLB,, | Performed by: RADIOLOGY

## 2022-12-12 PROCEDURE — 3080F PR MOST RECENT DIASTOLIC BLOOD PRESSURE >= 90 MM HG: ICD-10-PCS | Mod: CPTII,S$GLB,, | Performed by: RADIOLOGY

## 2022-12-12 PROCEDURE — 99999 PR PBB SHADOW E&M-EST. PATIENT-LVL III: CPT | Mod: PBBFAC,,, | Performed by: RADIOLOGY

## 2022-12-12 PROCEDURE — 1159F PR MEDICATION LIST DOCUMENTED IN MEDICAL RECORD: ICD-10-PCS | Mod: CPTII,S$GLB,, | Performed by: RADIOLOGY

## 2022-12-12 PROCEDURE — 99999 PR PBB SHADOW E&M-EST. PATIENT-LVL III: ICD-10-PCS | Mod: PBBFAC,,, | Performed by: RADIOLOGY

## 2022-12-12 PROCEDURE — 99212 PR OFFICE/OUTPT VISIT, EST, LEVL II, 10-19 MIN: ICD-10-PCS | Mod: S$GLB,,, | Performed by: RADIOLOGY

## 2022-12-12 PROCEDURE — 3077F PR MOST RECENT SYSTOLIC BLOOD PRESSURE >= 140 MM HG: ICD-10-PCS | Mod: CPTII,S$GLB,, | Performed by: RADIOLOGY

## 2022-12-12 PROCEDURE — 1160F RVW MEDS BY RX/DR IN RCRD: CPT | Mod: CPTII,S$GLB,, | Performed by: RADIOLOGY

## 2022-12-12 PROCEDURE — 3080F DIAST BP >= 90 MM HG: CPT | Mod: CPTII,S$GLB,, | Performed by: RADIOLOGY

## 2022-12-12 NOTE — TELEPHONE ENCOUNTER
Left VM recommending pt obtain oxygen prn however it likely will not get service or help outside state.

## 2022-12-12 NOTE — TELEPHONE ENCOUNTER
----- Message from Alma Ordoñez sent at 12/2/2022 10:49 AM CST -----  Regarding: O2  Susan[employer] was processing the order for the pt to get O2. We are a local company. If the pt would like to travel out of town with O2 he will have to rent it from another provider or he will need to be set up with a national company. If the patient is set up with us and leaves our service area then we are not able to assist him if something happens to his O2. Please let us know how you would like us to proceed with this order.       Thank you,      Alma

## 2022-12-12 NOTE — PROGRESS NOTES
Subjective:       Patient ID: Peter Lopez is a 86 y.o. male.    Chief Complaint: Prostate Cancer    This patient returns for follow up visit.     Mr. 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  He presented after repeat PSA in November of 2019 was 54.8 ng/ml. Biopsy of the prostate on 5/27/21 revealed adenocarcinoma in 12 of 12 cores. There was [place] 9 (4+5) disease noted in the Lt. medial base, Lt. medial apex and Rt. medial base. The remaining biopsies revealed [place] 8 (4+4) disease. Metastatic work up was negative. develop recurrent urinary retention and underwent TURP on 6/11/21. There was [place] 9 (4+5) adenocarcinoma noted in the prostate chips. He began hormonal therapy with Casodex and Lupron given on 7/9/21.  He completed radiotherapy to the prostate, seminal vesicles and pelvic nodes on 9/29/21.  He has continued on hormonal therapy since that time.  Today the patient states he feels well.  No fatigue or hot flashes.  Currently lives alone, continues to work as  at [employer].      Review of Systems   Constitutional:  Negative for activity change, appetite change, chills and fatigue.   Respiratory:  Negative for cough and shortness of breath (notes some dyspnea on exertion).    Gastrointestinal:  Negative for abdominal pain, constipation, diarrhea and fecal incontinence.   Genitourinary:  Negative for bladder incontinence, difficulty urinating, dysuria, frequency and hematuria.       Objective:      Physical Exam  Constitutional:       General: He is not in acute distress.     Appearance: Normal appearance.   Pulmonary:      Effort: Pulmonary effort is normal. No respiratory distress.   Abdominal:      General: Abdomen is flat. There is no distension.   Neurological:      Mental Status: He is alert and oriented to person, place, and time.   Psychiatric:         Mood and Affect: Mood normal.         Judgment: Judgment normal.        Latest Reference Range  & Units 11/25/19 11:42 11/26/21 08:40 07/05/22 08:36 12/07/22 08:44   PSA Diagnostic 0.00 - 4.00 ng/mL 54.8 (H) 0.07 0.02 <0.01   (H): Data is abnormally high  Assessment:       Problem List Items Addressed This Visit       Malignant neoplasm of prostate - Primary         Plan:       Doing well, PSA now undetectable.  He is planned for another Lupron injection in February.  Would consider stopping Lupron after February injection.  Plan follow up with us in one year with PSA.

## 2022-12-12 NOTE — TELEPHONE ENCOUNTER
Pt inform of abnormal echo, pt schedule to f/u 4 pm tomorrow because he wishes to come in and discuss results.

## 2022-12-13 ENCOUNTER — OFFICE VISIT (OUTPATIENT)
Dept: PULMONOLOGY | Facility: CLINIC | Age: 87
End: 2022-12-13
Payer: MEDICARE

## 2022-12-13 VITALS
WEIGHT: 175.81 LBS | HEART RATE: 103 BPM | DIASTOLIC BLOOD PRESSURE: 100 MMHG | SYSTOLIC BLOOD PRESSURE: 166 MMHG | BODY MASS INDEX: 24.61 KG/M2 | HEIGHT: 71 IN | OXYGEN SATURATION: 94 %

## 2022-12-13 DIAGNOSIS — J44.89 COPD WITH CHRONIC BRONCHITIS AND EMPHYSEMA: Primary | Chronic | ICD-10-CM

## 2022-12-13 DIAGNOSIS — I27.20 PULMONARY HYPERTENSION: Chronic | ICD-10-CM

## 2022-12-13 DIAGNOSIS — J96.11 CHRONIC RESPIRATORY FAILURE WITH HYPOXIA: ICD-10-CM

## 2022-12-13 DIAGNOSIS — G47.31 COMPLEX SLEEP APNEA SYNDROME: ICD-10-CM

## 2022-12-13 DIAGNOSIS — J43.9 COPD WITH CHRONIC BRONCHITIS AND EMPHYSEMA: Primary | Chronic | ICD-10-CM

## 2022-12-13 DIAGNOSIS — I27.22 PULMONARY HYPERTENSION DUE TO LEFT VENTRICULAR SYSTOLIC DYSFUNCTION: ICD-10-CM

## 2022-12-13 PROCEDURE — 99999 PR PBB SHADOW E&M-EST. PATIENT-LVL V: ICD-10-PCS | Mod: PBBFAC,,, | Performed by: NURSE PRACTITIONER

## 2022-12-13 PROCEDURE — 99213 OFFICE O/P EST LOW 20 MIN: CPT | Mod: S$GLB,,, | Performed by: NURSE PRACTITIONER

## 2022-12-13 PROCEDURE — 3080F PR MOST RECENT DIASTOLIC BLOOD PRESSURE >= 90 MM HG: ICD-10-PCS | Mod: CPTII,S$GLB,, | Performed by: NURSE PRACTITIONER

## 2022-12-13 PROCEDURE — 99999 PR PBB SHADOW E&M-EST. PATIENT-LVL V: CPT | Mod: PBBFAC,,, | Performed by: NURSE PRACTITIONER

## 2022-12-13 PROCEDURE — 3288F FALL RISK ASSESSMENT DOCD: CPT | Mod: CPTII,S$GLB,, | Performed by: NURSE PRACTITIONER

## 2022-12-13 PROCEDURE — 99213 PR OFFICE/OUTPT VISIT, EST, LEVL III, 20-29 MIN: ICD-10-PCS | Mod: S$GLB,,, | Performed by: NURSE PRACTITIONER

## 2022-12-13 PROCEDURE — 99499 UNLISTED E&M SERVICE: CPT | Mod: S$GLB,,, | Performed by: NURSE PRACTITIONER

## 2022-12-13 PROCEDURE — 1126F AMNT PAIN NOTED NONE PRSNT: CPT | Mod: CPTII,S$GLB,, | Performed by: NURSE PRACTITIONER

## 2022-12-13 PROCEDURE — 3077F SYST BP >= 140 MM HG: CPT | Mod: CPTII,S$GLB,, | Performed by: NURSE PRACTITIONER

## 2022-12-13 PROCEDURE — 1101F PT FALLS ASSESS-DOCD LE1/YR: CPT | Mod: CPTII,S$GLB,, | Performed by: NURSE PRACTITIONER

## 2022-12-13 PROCEDURE — 3080F DIAST BP >= 90 MM HG: CPT | Mod: CPTII,S$GLB,, | Performed by: NURSE PRACTITIONER

## 2022-12-13 PROCEDURE — 3288F PR FALLS RISK ASSESSMENT DOCUMENTED: ICD-10-PCS | Mod: CPTII,S$GLB,, | Performed by: NURSE PRACTITIONER

## 2022-12-13 PROCEDURE — 99499 RISK ADDL DX/OHS AUDIT: ICD-10-PCS | Mod: S$GLB,,, | Performed by: NURSE PRACTITIONER

## 2022-12-13 PROCEDURE — 1101F PR PT FALLS ASSESS DOC 0-1 FALLS W/OUT INJ PAST YR: ICD-10-PCS | Mod: CPTII,S$GLB,, | Performed by: NURSE PRACTITIONER

## 2022-12-13 PROCEDURE — 1126F PR PAIN SEVERITY QUANTIFIED, NO PAIN PRESENT: ICD-10-PCS | Mod: CPTII,S$GLB,, | Performed by: NURSE PRACTITIONER

## 2022-12-13 PROCEDURE — 3077F PR MOST RECENT SYSTOLIC BLOOD PRESSURE >= 140 MM HG: ICD-10-PCS | Mod: CPTII,S$GLB,, | Performed by: NURSE PRACTITIONER

## 2022-12-13 NOTE — PROGRESS NOTES
"  HISTORY OF PRESENT ILLNESS: Peter Lopez is a 87 y.o. 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      Patient smoked .75 ppd x 40 years.  Patient quit smoking in 1990s.  Patient history covid 19 hospitalized 4/3/20  until 4/22/20 for hypoxic respiratory failure requiring nrb.  Subsequently discharged to [facility] [place].  Patient was discharged to home with 2 lpm.  Recovery was slow following covid.   "Don't walk too much" going to [place] [facility] -[facility]   Pt lost to follow up after LOV 6/8/2021 due to prostate cancer concerns. On interval he has obtain xrt and lupron injection follow by oncology and urology.    He reports feeling well on interim. He is not using oxygen. His walk test consistently indicates SpO2 desaturation and he was recommended to resume oxygen with activities. Working at [employer] 3 days a week (MTW). He is using spiriva and finds this helpful.    CT chest 12/7/2020Emphysema.   Minimal scarring at the bilateral lung basis.  CT chest 12/6/2022: Impression:     Bilateral pulmonary emphysema with similar fibrotic scarring at the bases.  No suspicious new nodule.     Mild dilatation of the main pulmonary artery, nonspecific though can be seen in the setting of pulmonary hypertension.        CXR 5/20/2021: Cardiomediastinal silhouette is within normal limits.   No focal consolidation, overt interstitial edema, sizable pleural effusion or pneumothorax.  Symmetric, moderate to severe chronic fibrotic/emphysematous changes with basoapical gradient, unchanged.  Symmetric bibasilar chronic interstitial coarsening, unchanged.   Multilevel degenerative changes of the imaged spine.   Impression:   1. No acute cardiopulmonary process appreciated.    VQ 3/17/2021 low probability  PFT 12/7/2020 ratio 58, fev1 1.93 ( 78), fvc 3.43 (103) dlco 38- " results stable compare to June 2020  PFT 11/2/2022:  Ratio 53 FEV1 1.44 (60) FVC 2.69 (82) TLC 62 DLCO 33  Spirometry shows significant obstruction. Lung volume determination shows moderate restriction is also present. Overall there is moderate ventilatory impairment. Spirometry is improved following bronchodilator administration. DLCO is severely decrease  Walk test 11/2/2022: During exercise, there was significant desaturation from 93% to 84% while breathing room air. Oxygen saturation improved and remained above 88% during exertion with use of supplemental oxygen at 2 lpm.     Pt reports fatigue, restless sleep, waking up every 2 hours, falls asleep watching tv, sleepy during day. Sleep study 1/7/2021 AHI 44 with emerging CA on cpap. ASV titration defer by pt.   ECHO 1/25/2021 EF 65%, est pasp 67 mmHg  ECHO 5/20/2021 ef 60% pasp 57 mmHg  Nuclear stress 5/21/2021 normal myocardial perfusion  ECHO 12/6/2022: ef 50% pasp 45 mmHg    PAST MEDICAL HISTORY:    Active Ambulatory Problems     Diagnosis Date Noted    Essential hypertension 10/02/2012    Diabetes mellitus with nephropathy 10/16/2013    Aortic atherosclerosis 05/03/2019    ILD (interstitial lung disease) 04/04/2020    Chronic respiratory failure with hypoxia 04/13/2020    Reactive depression 04/14/2020    Chronic obstructive pulmonary disease 08/10/2020    Sleep-related breathing disorder 12/20/2020    Chronic fatigue     Complex sleep apnea syndrome     Pulmonary hypertension 03/08/2021    Iron deficiency anemia 03/11/2021    Malignant neoplasm of prostate 06/14/2021    Stage 3b chronic kidney disease 12/18/2022    IWONA (obstructive sleep apnea) 12/18/2022     Resolved Ambulatory Problems     Diagnosis Date Noted    Elevated PSA 01/10/2013    Hepatitis C 10/16/2013    BPH (benign prostatic hyperplasia) 04/05/2016    Chronic kidney disease, stage 3a 01/28/2020    Hypoglycemia     Hypoxia 03/29/2020    Anemia 04/04/2020    Elevated troponin 04/04/2020     Discharge planning issues 2020    Gait instability 2020    Tachycardia with heart rate 121-140 beats per minute 2020    Dyspnea 2021    Chest pain 2021    Urinary retention 2021    Pulmonary hypertension due to left ventricular systolic dysfunction 2022     Past Medical History:   Diagnosis Date    Chronic hepatitis C without mention of hepatic coma     CKD (chronic kidney disease)     COPD (chronic obstructive pulmonary disease)     Diabetes mellitus type I     H/O colonoscopy     [place] (hard of hearing)     Hypertension     On home oxygen therapy     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;  Service: Urology;  Laterality: N/A;  RN Pre OP 21.  Vaccinated.  C A         FAMILY HISTORY:                Family History   Problem Relation Age of Onset    Cancer Mother     Asbestos Father     Liver disease Neg Hx        SOCIAL HISTORY:          Tobacco:   Social History     Tobacco Use   Smoking Status Former    Packs/day: 1.00    Years: 40.00    Pack years: 40.00    Types: Cigarettes    Quit date: 1991    Years since quittin.2   Smokeless Tobacco Former    Quit date: 1986     alcohol use:    Social History     Substance and Sexual Activity   Alcohol Use Not Currently    Alcohol/week: 0.0 standard drinks    Comment: 2 bottles beer on weekends               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 Rescue (Patient not taking: Reported on 1/11/2023) 18 g 0    blood sugar diagnostic Strp To check BG 1 times daily, to use with insurance preferred meter 50 each 11    blood-glucose meter kit To check BG 1 times daily, to use with insurance preferred meter 1 each 0    lactulose (CHRONULAC) 10 gram/15 mL solution Take 30 mLs (20 g total) by mouth 2 (two) times daily as needed (constipation). 473 mL 11    leuprolide (LUPRON) 1 mg/0.2 mL injection Inject into the skin.      olmesartan (BENICAR) 40 MG tablet Take 1 tablet by mouth once daily 90 tablet 0    tiotropium bromide (SPIRIVA RESPIMAT) 2.5 mcg/actuation inhaler INHALE 2 SPRAY(S) BY MOUTH ONCE DAILY 4 g 11    amLODIPine (NORVASC) 5 MG tablet Take 1 tablet (5 mg total) by mouth once daily. 90 tablet 3    atorvastatin (LIPITOR) 20 MG tablet Take 1 tablet (20 mg total) by mouth every evening. 90 tablet 3    cholecalciferol, vitamin D3, (VITAMIN D3) 25 mcg (1,000 unit) capsule Take 1,000 Units by mouth once daily.      diclofenac sodium (VOLTAREN) 1 % Gel Apply 2 gram to affected finger/hand TID-PRN for pain 100 g 5    linaGLIPtin (TRADJENTA) 5 mg Tab tablet Take 1 tablet (5 mg total) by mouth once daily. 90 tablet 1     No current facility-administered medications for this visit.                  REVIEW OF SYSTEMS:     Review of Systems   Constitutional:  Positive for fatigue (paces self).   Respiratory:  Positive for dyspnea on extertion (if walk too much). Negative for cough, sputum production and wheezing.    Cardiovascular:  Negative for chest pain, palpitations and leg swelling.   Musculoskeletal:  Positive for gait problem (walks with cane).        Wobbly hold on cane   Gastrointestinal:  Positive for acid reflux.   Neurological:  Negative for dizziness and headaches.        PHYSICAL EXAM:  Vitals:    12/13/22 1319   BP: (!) 166/100   Pulse: 103  "  SpO2: (!) 94%   Weight: 79.8 kg (175 lb 13.1 oz)   Height: 5' 11" (1.803 m)   PainSc: 0-No pain         Body mass index is 24.52 kg/m².     Physical Exam   Constitutional: He is oriented to person, place, and time. He appears well-developed. No distress.   HENT:   Head: Normocephalic.   Cardiovascular: Normal rate, regular rhythm and normal heart sounds.   Pulmonary/Chest: Normal expansion, effort normal and breath sounds normal.   Musculoskeletal:         General: No edema.      Cervical back: Neck supple.   Neurological: He is alert and oriented to person, place, and time.   ambulatory   Skin: He is not diaphoretic.   Psychiatric: He has a normal mood and affect. His behavior is normal. Judgment and thought content normal.       ASSESSMENT/PLAN  Problem List Items Addressed This Visit          Unprioritized    Pulmonary hypertension (Chronic)    Overview     Likely cardiopulmonary dz, IWONA  Echo with pasp 45 mmHg ef 50%         Chronic obstructive pulmonary disease - Primary    Overview     Maintenance inhaler-spiriva  Albuterol prn-not needing  Oxygen prn following covid but not needing now however walk test indicates that he would still benefit from this. Admits to less activity. Advise to obtain oxygen and maintain fitness with oxygen prn  Pulmonary rehab- completed some but stop after prostate cancer  uptodate flu/covid/pneumonia vaccine.             Chronic respiratory failure with hypoxia    Overview     s/p hypoxic respiratory failure following covid.    Walk test 11/2/2022: During exercise, there was significant desaturation from 93% to 84% while breathing room air. Oxygen saturation improved and remained above 88% during exertion with use of supplemental oxygen at 2 lpm.   Pt has pulmonary htn with ef 50% on recent echo and advise to f/u with cardiology         Complex sleep apnea syndrome    Overview     Pt reports fatigue, restless sleep, waking up every 2 hours, falls asleep watching tv, sleepy during " day. Pt had long covid at the time. Sleep study 1/7/2021 AHI 44 with emerging CA on cpap. ASV titration defer due to prostate problems.   Recommend reevaluation post covid however pt declines         RESOLVED: Pulmonary hypertension due to left ventricular systolic dysfunction    Relevant Orders    Ambulatory referral/consult to Cardiology       Follow up for follow up trip.

## 2022-12-13 NOTE — PATIENT INSTRUCTIONS
[facility] :    Durable Medical Equipment -[facility] [phone] option 2 for oxygen

## 2022-12-14 ENCOUNTER — OFFICE VISIT (OUTPATIENT)
Dept: FAMILY MEDICINE | Facility: CLINIC | Age: 87
End: 2022-12-14
Payer: MEDICARE

## 2022-12-14 VITALS
OXYGEN SATURATION: 95 % | HEIGHT: 71 IN | DIASTOLIC BLOOD PRESSURE: 88 MMHG | SYSTOLIC BLOOD PRESSURE: 152 MMHG | WEIGHT: 177.69 LBS | BODY MASS INDEX: 24.88 KG/M2 | HEART RATE: 111 BPM | TEMPERATURE: 98 F

## 2022-12-14 DIAGNOSIS — N18.32 STAGE 3B CHRONIC KIDNEY DISEASE: Chronic | ICD-10-CM

## 2022-12-14 DIAGNOSIS — I70.0 AORTIC ATHEROSCLEROSIS: Chronic | ICD-10-CM

## 2022-12-14 DIAGNOSIS — I27.20 PULMONARY HYPERTENSION: Chronic | ICD-10-CM

## 2022-12-14 DIAGNOSIS — E11.21 DIABETES MELLITUS WITH NEPHROPATHY: Primary | Chronic | ICD-10-CM

## 2022-12-14 DIAGNOSIS — J44.9 CHRONIC OBSTRUCTIVE PULMONARY DISEASE, UNSPECIFIED COPD TYPE: Chronic | ICD-10-CM

## 2022-12-14 DIAGNOSIS — J84.9 ILD (INTERSTITIAL LUNG DISEASE): Chronic | ICD-10-CM

## 2022-12-14 DIAGNOSIS — I10 ESSENTIAL HYPERTENSION: Chronic | ICD-10-CM

## 2022-12-14 DIAGNOSIS — G47.33 OSA (OBSTRUCTIVE SLEEP APNEA): Chronic | ICD-10-CM

## 2022-12-14 PROCEDURE — 99214 OFFICE O/P EST MOD 30 MIN: CPT | Mod: S$GLB,,, | Performed by: FAMILY MEDICINE

## 2022-12-14 PROCEDURE — 3079F DIAST BP 80-89 MM HG: CPT | Mod: CPTII,S$GLB,, | Performed by: FAMILY MEDICINE

## 2022-12-14 PROCEDURE — 3288F FALL RISK ASSESSMENT DOCD: CPT | Mod: CPTII,S$GLB,, | Performed by: FAMILY MEDICINE

## 2022-12-14 PROCEDURE — 1101F PR PT FALLS ASSESS DOC 0-1 FALLS W/OUT INJ PAST YR: ICD-10-PCS | Mod: CPTII,S$GLB,, | Performed by: FAMILY MEDICINE

## 2022-12-14 PROCEDURE — 99999 PR PBB SHADOW E&M-EST. PATIENT-LVL IV: CPT | Mod: PBBFAC,,, | Performed by: FAMILY MEDICINE

## 2022-12-14 PROCEDURE — 99214 PR OFFICE/OUTPT VISIT, EST, LEVL IV, 30-39 MIN: ICD-10-PCS | Mod: S$GLB,,, | Performed by: FAMILY MEDICINE

## 2022-12-14 PROCEDURE — 3077F SYST BP >= 140 MM HG: CPT | Mod: CPTII,S$GLB,, | Performed by: FAMILY MEDICINE

## 2022-12-14 PROCEDURE — 1101F PT FALLS ASSESS-DOCD LE1/YR: CPT | Mod: CPTII,S$GLB,, | Performed by: FAMILY MEDICINE

## 2022-12-14 PROCEDURE — 99999 PR PBB SHADOW E&M-EST. PATIENT-LVL IV: ICD-10-PCS | Mod: PBBFAC,,, | Performed by: FAMILY MEDICINE

## 2022-12-14 PROCEDURE — 3077F PR MOST RECENT SYSTOLIC BLOOD PRESSURE >= 140 MM HG: ICD-10-PCS | Mod: CPTII,S$GLB,, | Performed by: FAMILY MEDICINE

## 2022-12-14 PROCEDURE — 1126F PR PAIN SEVERITY QUANTIFIED, NO PAIN PRESENT: ICD-10-PCS | Mod: CPTII,S$GLB,, | Performed by: FAMILY MEDICINE

## 2022-12-14 PROCEDURE — 3079F PR MOST RECENT DIASTOLIC BLOOD PRESSURE 80-89 MM HG: ICD-10-PCS | Mod: CPTII,S$GLB,, | Performed by: FAMILY MEDICINE

## 2022-12-14 PROCEDURE — 1126F AMNT PAIN NOTED NONE PRSNT: CPT | Mod: CPTII,S$GLB,, | Performed by: FAMILY MEDICINE

## 2022-12-14 PROCEDURE — 3288F PR FALLS RISK ASSESSMENT DOCUMENTED: ICD-10-PCS | Mod: CPTII,S$GLB,, | Performed by: FAMILY MEDICINE

## 2022-12-14 RX ORDER — VIT C/E/ZN/COPPR/LUTEIN/ZEAXAN 250MG-90MG
1000 CAPSULE ORAL DAILY
COMMUNITY

## 2022-12-14 RX ORDER — AMLODIPINE BESYLATE 5 MG/1
5 TABLET ORAL DAILY
Qty: 90 TABLET | Refills: 0 | Status: SHIPPED | OUTPATIENT
Start: 2022-12-14 | End: 2023-01-11 | Stop reason: SDUPTHER

## 2022-12-14 NOTE — PATIENT INSTRUCTIONS
Start amlodipine 5 mg once a day. Don't stop your other blood blood pressure medication.  Keep your appointment with the heart doctor on 1/10/2022   I will see you 1/11/2023 at 8 am

## 2022-12-14 NOTE — PROGRESS NOTES
Subjective:       Patient ID: Peter Lopez is a 87 y.o. male.    Chief Complaint: Diabetes, Hypertension, COPD, and Chronic Kidney Disease    Diabetes  He presents for his follow-up diabetic visit. He has type 2 diabetes mellitus. His disease course has been stable. Pertinent negatives for hypoglycemia include no headaches, nervousness/anxiousness or sweats. Pertinent negatives for diabetes include no blurred vision, no chest pain, no fatigue, no foot paresthesias, no foot ulcerations, no polydipsia, no polyphagia, no polyuria, no visual change, no weakness and no weight loss. Symptoms are stable. Diabetic complications include nephropathy. Risk factors for coronary artery disease include diabetes mellitus, hypertension, male sex and dyslipidemia. Current diabetic treatment includes oral agent (monotherapy). He is compliant with treatment all of the time. Exercise: does light weights at home and leg exercises at home. Home blood sugar record trend: not checking, as he misplaced his machine. An ACE inhibitor/angiotensin II receptor blocker is being taken.   Hypertension  This is a chronic problem. The problem is uncontrolled. Associated symptoms include shortness of breath. Pertinent negatives include no blurred vision, chest pain, headaches, neck pain, palpitations or sweats. Risk factors for coronary artery disease include male gender, diabetes mellitus and dyslipidemia. Past treatments include angiotensin blockers. The current treatment provides significant improvement. Hypertensive end-organ damage includes kidney disease.   COPD  Pertinent negatives include no abdominal pain, chest pain, fatigue, headaches, neck pain, numbness, sore throat, visual change or weakness.   Review of Systems   Constitutional:  Negative for fatigue, unexpected weight change and weight loss.   HENT:  Negative for ear pain and sore throat.    Eyes:  Negative for blurred vision and visual disturbance.   Respiratory:  Positive for shortness  of breath.    Cardiovascular:  Negative for chest pain and palpitations.   Gastrointestinal:  Positive for constipation. Negative for abdominal pain and blood in stool.   Endocrine: Negative for cold intolerance, heat intolerance, polydipsia, polyphagia and polyuria.   Genitourinary:  Negative for dysuria and frequency.   Musculoskeletal:  Negative for neck pain.   Neurological:  Negative for weakness, numbness and headaches.   Hematological:  Negative for adenopathy.   Psychiatric/Behavioral:  Negative for decreased concentration and suicidal ideas. The patient is not nervous/anxious.        Objective:      Physical Exam  Vitals reviewed.   Constitutional:       Appearance: He is well-developed. He is not diaphoretic.   HENT:      Head: Normocephalic.      Right Ear: External ear normal.      Left Ear: External ear normal.      Nose: Nose normal.      Mouth/Throat:      Pharynx: No pharyngeal swelling, oropharyngeal exudate or posterior oropharyngeal erythema.   Neck:      Trachea: No tracheal deviation.   Cardiovascular:      Rate and Rhythm: Regular rhythm. Tachycardia present.      Heart sounds: Normal heart sounds.   Pulmonary:      Effort: Pulmonary effort is normal.      Breath sounds: Normal breath sounds. No wheezing or rales.   Abdominal:      General: Bowel sounds are normal.      Palpations: Abdomen is soft. Abdomen is not rigid. There is no mass.      Tenderness: There is no abdominal tenderness. There is no guarding or rebound.   Musculoskeletal:      Cervical back: Normal range of motion and neck supple.   Lymphadenopathy:      Cervical: No cervical adenopathy.   Neurological:      Mental Status: He is oriented to person, place, and time.      Sensory: No sensory deficit.      Motor: No atrophy.      Gait: Gait normal.      Deep Tendon Reflexes:      Reflex Scores:       Patellar reflexes are 2+ on the right side and 2+ on the left side.      Lab Visit on 12/07/2022   Component Date Value Ref Range  Status    PSA Diagnostic 12/07/2022 <0.01  0.00 - 4.00 ng/mL Final    Comment: The testing method is a chemiluminescent microparticle immunoassay   manufactured by [employer] and performed on the [employer]   or   [employer] system. Values obtained with different assay manufacturers   for   methods may be different and cannot be used interchangeably.  PSA Expected levels:  Hormonal Therapy: <0.05 ng/ml  Prostatectomy: <0.01 ng/ml  Radiation Therapy: <1.00 ng/ml      Sodium 12/07/2022 140  136 - 145 mmol/L Final    Potassium 12/07/2022 3.9  3.5 - 5.1 mmol/L Final    Chloride 12/07/2022 103  95 - 110 mmol/L Final    CO2 12/07/2022 26  23 - 29 mmol/L Final    Glucose 12/07/2022 222 (H)  70 - 110 mg/dL Final    BUN 12/07/2022 24 (H)  8 - 23 mg/dL Final    Creatinine 12/07/2022 2.0 (H)  0.5 - 1.4 mg/dL Final    Calcium 12/07/2022 9.7  8.7 - 10.5 mg/dL Final    Total Protein 12/07/2022 7.5  6.0 - 8.4 g/dL Final    Albumin 12/07/2022 3.9  3.5 - 5.2 g/dL Final    Total Bilirubin 12/07/2022 0.6  0.1 - 1.0 mg/dL Final    Comment: For infants and newborns, interpretation of results should be based  on gestational age, weight and in agreement with clinical  observations.    Premature Infant recommended reference ranges:  Up to 24 hours.............<8.0 mg/dL  Up to 48 hours............<12.0 mg/dL  3-5 days..................<15.0 mg/dL  6-29 days.................<15.0 mg/dL      Alkaline Phosphatase 12/07/2022 52 (L)  55 - 135 U/L Final    AST 12/07/2022 25  10 - 40 U/L Final    ALT 12/07/2022 17  10 - 44 U/L Final    Anion Gap 12/07/2022 11  8 - 16 mmol/L Final    eGFR 12/07/2022 32 (A)  >60 mL/min/1.73 m^2 Final    Hemoglobin A1C 12/07/2022 7.2 (H)  4.0 - 5.6 % Final    Comment: ADA Screening Guidelines:  5.7-6.4%  Consistent with prediabetes  >or=6.5%  Consistent with diabetes    High levels of fetal hemoglobin interfere with the HbA1C  assay. Heterozygous hemoglobin variants (HbS, HgC, etc)do  not significantly  interfere with this assay.   However, presence of multiple variants may affect accuracy.      Estimated Avg Glucose 12/07/2022 160 (H)  68 - 131 mg/dL Final   Hospital Outpatient Visit on 12/06/2022   Component Date Value Ref Range Status    TDI SEPTAL 12/06/2022 0.04  m/s Final    LV LATERAL E/E' RATIO 12/06/2022 12.00  m/s Final    LV SEPTAL E/E' RATIO 12/06/2022 12.00  m/s Final    LA WIDTH 12/06/2022 3.70  cm Final    IVC diameter 12/06/2022 1.00  cm Final    Left Ventricular Outflow Tract Vania* 12/06/2022 0.49  cm/s Final    Left Ventricular Outflow Tract Vania* 12/06/2022 1.03  mmHg Final    TDI LATERAL 12/06/2022 0.04  m/s Final    PV PEAK VELOCITY 12/06/2022 0.62  cm/s Final    LVIDd 12/06/2022 4.44  3.5 - 6.0 cm Final    IVS 12/06/2022 0.84  0.6 - 1.1 cm Final    Posterior Wall 12/06/2022 0.90  0.6 - 1.1 cm Final    LVIDs 12/06/2022 3.16  2.1 - 4.0 cm Final    FS 12/06/2022 29  28 - 44 % Final    LA volume 12/06/2022 44.63  cm3 Final    Sinus 12/06/2022 3.93  cm Final    STJ 12/06/2022 3.26  cm Final    Ascending aorta 12/06/2022 3.66  cm Final    LV mass 12/06/2022 124.15  g Final    LA size 12/06/2022 3.52  cm Final    RVDD 12/06/2022 3.02  cm Final    TAPSE 12/06/2022 1.87  cm Final    Left Ventricle Relative Wall Thick* 12/06/2022 0.41  cm Final    AV mean gradient 12/06/2022 2  mmHg Final    AV valve area 12/06/2022 3.38  cm2 Final    AV Velocity Ratio 12/06/2022 0.66   Final    AV index (prosthetic) 12/06/2022 0.71   Final    Mean e' 12/06/2022 0.04  m/s Final    IVRT 12/06/2022 176.97  msec Final    Pulm vein S/D ratio 12/06/2022 2.56   Final    LVOT diameter 12/06/2022 2.46  cm Final    LVOT area 12/06/2022 4.8  cm2 Final    LVOT peak norman 12/06/2022 0.63  m/s Final    LVOT peak VTI 12/06/2022 14.00  cm Final    Ao peak norman 12/06/2022 0.95  m/s Final    Ao VTI 12/06/2022 19.7  cm Final    LVOT stroke volume 12/06/2022 66.51  cm3 Final    AV peak gradient 12/06/2022 4  mmHg Final    TV peak gradient  12/06/2022 1.78  mmHg Final    E/E' ratio 12/06/2022 12.00  m/s Final    MV Peak E Daniel 12/06/2022 0.48  m/s Final    TR Max Daniel 12/06/2022 3.23  m/s Final    PV Peak S Daniel 12/06/2022 0.64  m/s Final    PV Peak D Daniel 12/06/2022 0.25  m/s Final    LV Systolic Volume 12/06/2022 39.71  mL Final    LV Diastolic Volume 12/06/2022 89.70  mL Final    RA Major Axis 12/06/2022 4.67  cm Final    Left Atrium Minor Axis 12/06/2022 3.72  cm Final    Left Atrium Major Axis 12/06/2022 4.40  cm Final    Triscuspid Valve Regurgitation Pea* 12/06/2022 42  mmHg Final    RA Width 12/06/2022 2.98  cm Final    Right Atrial Pressure (from IVC) 12/06/2022 3  mmHg Final    EF 12/06/2022 50  % Final    TV rest pulmonary artery pressure 12/06/2022 45  mmHg Final       Assessment:       Problem List Items Addressed This Visit          Pulmonary    ILD (interstitial lung disease) (Chronic)    Pulmonary hypertension (Chronic)    Chronic obstructive pulmonary disease       Cardiac/Vascular    Essential hypertension (Chronic)    Relevant Medications    amLODIPine (NORVASC) 5 MG tablet    Aortic atherosclerosis       Renal/    Stage 3b chronic kidney disease       Endocrine    Diabetes mellitus with nephropathy - Primary     Other Visit Diagnoses       IWONA (obstructive sleep apnea)  (Chronic)                 Plan:       Peter was seen today for diabetes, hypertension, copd and chronic kidney disease.    Diagnoses and all orders for this visit:    Diabetes mellitus with nephropathy  -     Microalbumin/creatinine urine ratio; Future  -     CBC auto differential; Future  -     Comprehensive metabolic panel; Future  -     Hemoglobin A1c; Future  -     Lipid panel; Future  A1c at goal,  Continue current regimen    Essential hypertension  -     amLODIPine (NORVASC) 5 MG tablet; Take 1 tablet (5 mg total) by mouth once daily.  -     Microalbumin/creatinine urine ratio; Future  -     CBC auto differential; Future  -     Comprehensive metabolic panel;  Future  -     Lipid panel; Future  -     PTH, intact; Future  BP not controlled  Add Norvasc 5 mg   Recheck BP in 2-3 weeks    Stage 3b chronic kidney disease  -     Microalbumin/creatinine urine ratio; Future  -     CBC auto differential; Future  -     Comprehensive metabolic panel; Future  Continue avoiding nephrotoxic agents  Continue monitoring renal function    Chronic obstructive pulmonary disease, unspecified COPD type/ILD (interstitial lung disease)  Recently saw pulmonary, has further tests pending    IWONA (obstructive sleep apnea)  Has further studies pending    Pulmonary hypertension  Has cardiology appt coming up    Aortic atherosclerosis  On statin.  Continue statin

## 2022-12-17 ENCOUNTER — HOSPITAL ENCOUNTER (EMERGENCY)
Facility: HOSPITAL | Age: 87
Discharge: HOME | End: 2022-12-17
Attending: EMERGENCY MEDICINE
Payer: MEDICARE

## 2022-12-17 VITALS
HEIGHT: 71 IN | SYSTOLIC BLOOD PRESSURE: 161 MMHG | DIASTOLIC BLOOD PRESSURE: 97 MMHG | BODY MASS INDEX: 24.92 KG/M2 | OXYGEN SATURATION: 95 % | TEMPERATURE: 99 F | HEART RATE: 107 BPM | RESPIRATION RATE: 20 BRPM | WEIGHT: 178 LBS

## 2022-12-17 DIAGNOSIS — N18.9 CHRONIC KIDNEY DISEASE, UNSPECIFIED CKD STAGE: ICD-10-CM

## 2022-12-17 DIAGNOSIS — R10.13 EPIGASTRIC PAIN: Primary | ICD-10-CM

## 2022-12-17 LAB
ALBUMIN SERPL BCP-MCNC: 3.8 G/DL (ref 3.5–5.2)
ALP SERPL-CCNC: 56 U/L (ref 55–135)
ALT SERPL W/O P-5'-P-CCNC: 18 U/L (ref 10–44)
ANION GAP SERPL CALC-SCNC: 12 MMOL/L (ref 8–16)
AST SERPL-CCNC: 30 U/L (ref 10–40)
BASOPHILS # BLD AUTO: 0.02 K/UL (ref 0–0.2)
BASOPHILS NFR BLD: 1 % (ref 0–1.9)
BILIRUB SERPL-MCNC: 0.4 MG/DL (ref 0.1–1)
BILIRUB UR QL STRIP: NEGATIVE
BNP SERPL-MCNC: 18 PG/ML (ref 0–99)
BUN SERPL-MCNC: 29 MG/DL (ref 8–23)
CALCIUM SERPL-MCNC: 9.7 MG/DL (ref 8.7–10.5)
CHLORIDE SERPL-SCNC: 106 MMOL/L (ref 95–110)
CLARITY UR: CLEAR
CO2 SERPL-SCNC: 22 MMOL/L (ref 23–29)
COLOR UR: COLORLESS
CREAT SERPL-MCNC: 1.8 MG/DL (ref 0.5–1.4)
DIFFERENTIAL METHOD: ABNORMAL
EOSINOPHIL # BLD AUTO: 0.1 K/UL (ref 0–0.5)
EOSINOPHIL NFR BLD: 2.9 % (ref 0–8)
ERYTHROCYTE [DISTWIDTH] IN BLOOD BY AUTOMATED COUNT: 14.3 % (ref 11.5–14.5)
EST. GFR  (NO RACE VARIABLE): 36 ML/MIN/1.73 M^2
GLUCOSE SERPL-MCNC: 155 MG/DL (ref 70–110)
GLUCOSE UR QL STRIP: NEGATIVE
HCT VFR BLD AUTO: 35.8 % (ref 40–54)
HGB BLD-MCNC: 11.6 G/DL (ref 14–18)
HGB UR QL STRIP: ABNORMAL
IMM GRANULOCYTES # BLD AUTO: 0.02 K/UL (ref 0–0.04)
IMM GRANULOCYTES NFR BLD AUTO: 1 % (ref 0–0.5)
KETONES UR QL STRIP: NEGATIVE
LEUKOCYTE ESTERASE UR QL STRIP: NEGATIVE
LIPASE SERPL-CCNC: 71 U/L (ref 4–60)
LYMPHOCYTES # BLD AUTO: 0.5 K/UL (ref 1–4.8)
LYMPHOCYTES NFR BLD: 22.1 % (ref 18–48)
MCH RBC QN AUTO: 28 PG (ref 27–31)
MCHC RBC AUTO-ENTMCNC: 32.4 G/DL (ref 32–36)
MCV RBC AUTO: 87 FL (ref 82–98)
MONOCYTES # BLD AUTO: 0.2 K/UL (ref 0.3–1)
MONOCYTES NFR BLD: 10.1 % (ref 4–15)
NEUTROPHILS # BLD AUTO: 1.3 K/UL (ref 1.8–7.7)
NEUTROPHILS NFR BLD: 62.9 % (ref 38–73)
NITRITE UR QL STRIP: NEGATIVE
NRBC BLD-RTO: 0 /100 WBC
PH UR STRIP: 6 [PH] (ref 5–8)
PLATELET # BLD AUTO: 103 K/UL (ref 150–450)
PMV BLD AUTO: 10.5 FL (ref 9.2–12.9)
POTASSIUM SERPL-SCNC: 3.9 MMOL/L (ref 3.5–5.1)
PROT SERPL-MCNC: 7.3 G/DL (ref 6–8.4)
PROT UR QL STRIP: NEGATIVE
RBC # BLD AUTO: 4.14 M/UL (ref 4.6–6.2)
SODIUM SERPL-SCNC: 140 MMOL/L (ref 136–145)
SP GR UR STRIP: 1.01 (ref 1–1.03)
TROPONIN I SERPL DL<=0.01 NG/ML-MCNC: 0.01 NG/ML (ref 0–0.03)
TROPONIN I SERPL DL<=0.01 NG/ML-MCNC: 0.02 NG/ML (ref 0–0.03)
URN SPEC COLLECT METH UR: ABNORMAL
UROBILINOGEN UR STRIP-ACNC: NEGATIVE EU/DL
WBC # BLD AUTO: 2.08 K/UL (ref 3.9–12.7)

## 2022-12-17 PROCEDURE — 83880 ASSAY OF NATRIURETIC PEPTIDE: CPT | Performed by: NURSE PRACTITIONER

## 2022-12-17 PROCEDURE — 25000003 PHARM REV CODE 250: Performed by: NURSE PRACTITIONER

## 2022-12-17 PROCEDURE — 93010 ELECTROCARDIOGRAM REPORT: CPT | Mod: ,,, | Performed by: INTERNAL MEDICINE

## 2022-12-17 PROCEDURE — 81003 URINALYSIS AUTO W/O SCOPE: CPT | Performed by: NURSE PRACTITIONER

## 2022-12-17 PROCEDURE — 93005 ELECTROCARDIOGRAM TRACING: CPT

## 2022-12-17 PROCEDURE — 85025 COMPLETE CBC W/AUTO DIFF WBC: CPT | Performed by: NURSE PRACTITIONER

## 2022-12-17 PROCEDURE — 83690 ASSAY OF LIPASE: CPT | Performed by: NURSE PRACTITIONER

## 2022-12-17 PROCEDURE — 80053 COMPREHEN METABOLIC PANEL: CPT | Performed by: NURSE PRACTITIONER

## 2022-12-17 PROCEDURE — 93010 EKG 12-LEAD: ICD-10-PCS | Mod: ,,, | Performed by: INTERNAL MEDICINE

## 2022-12-17 PROCEDURE — 84484 ASSAY OF TROPONIN QUANT: CPT | Mod: 91 | Performed by: NURSE PRACTITIONER

## 2022-12-17 PROCEDURE — 99285 EMERGENCY DEPT VISIT HI MDM: CPT | Mod: 25

## 2022-12-17 RX ORDER — LIDOCAINE HYDROCHLORIDE 20 MG/ML
15 SOLUTION OROPHARYNGEAL ONCE
Status: COMPLETED | OUTPATIENT
Start: 2022-12-17 | End: 2022-12-17

## 2022-12-17 RX ORDER — VALSARTAN 80 MG/1
320 TABLET ORAL
Status: COMPLETED | OUTPATIENT
Start: 2022-12-17 | End: 2022-12-17

## 2022-12-17 RX ORDER — AMLODIPINE BESYLATE 5 MG/1
5 TABLET ORAL
Status: COMPLETED | OUTPATIENT
Start: 2022-12-17 | End: 2022-12-17

## 2022-12-17 RX ORDER — INFLUENZA A VIRUS A/VICTORIA/2570/2019 IVR-215 (H1N1) ANTIGEN (FORMALDEHYDE INACTIVATED), INFLUENZA A VIRUS A/DARWIN/9/2021 SAN-010 (H3N2) ANTIGEN (FORMALDEHYDE INACTIVATED), INFLUENZA B VIRUS B/PHUKET/3073/2013 ANTIGEN (FORMALDEHYDE INACTIVATED), AND INFLUENZA B VIRUS B/MICHIGAN/01/2021 ANTIGEN (FORMALDEHYDE INACTIVATED) 60; 60; 60; 60 UG/.7ML; UG/.7ML; UG/.7ML; UG/.7ML
INJECTION, SUSPENSION INTRAMUSCULAR
COMMUNITY
Start: 2022-09-20 | End: 2023-01-11 | Stop reason: ALTCHOICE

## 2022-12-17 RX ORDER — MAG HYDROX/ALUMINUM HYD/SIMETH 200-200-20
30 SUSPENSION, ORAL (FINAL DOSE FORM) ORAL ONCE
Status: COMPLETED | OUTPATIENT
Start: 2022-12-17 | End: 2022-12-17

## 2022-12-17 RX ORDER — MODERNA COVID-19 VACCINE, BIVALENT 25; 25 UG/.5ML; UG/.5ML
INJECTION, SUSPENSION INTRAMUSCULAR
COMMUNITY
Start: 2022-09-20 | End: 2023-01-11 | Stop reason: ALTCHOICE

## 2022-12-17 RX ADMIN — ALUMINUM HYDROXIDE, MAGNESIUM HYDROXIDE, AND DIMETHICONE 30 ML: 200; 20; 200 SUSPENSION ORAL at 05:12

## 2022-12-17 RX ADMIN — LIDOCAINE HYDROCHLORIDE 15 ML: 20 SOLUTION ORAL at 05:12

## 2022-12-17 RX ADMIN — AMLODIPINE BESYLATE 5 MG: 5 TABLET ORAL at 05:12

## 2022-12-17 RX ADMIN — VALSARTAN 320 MG: 80 TABLET, FILM COATED ORAL at 05:12

## 2022-12-17 NOTE — ED NOTES
Pt reports passing gas before going to xray and on the way to xray.    Pt reports passing gas gave pt relief of abdominal discomfort.   Pt reports he couldn't pass gas at home.

## 2022-12-17 NOTE — ED NOTES
Received report from DARIEL Freedman. PT is TAMIKA White. SBP slightly elevated, was given BP meds during night shift. Troponin II was drawn at this time. Has call light within reach, family member at bedside.

## 2022-12-17 NOTE — DISCHARGE INSTRUCTIONS
Please follow-up with your regular doctor this week.  Return if you get worse or if new symptoms develop.  Please take all medications as prescribed.

## 2022-12-17 NOTE — ED TRIAGE NOTES
"Pt present to ED reporting abdominal fullness; (I eat too much since thanksgiving").    Pt reports that he can't pass gas at home, especially after eating cabbage.   Pt reports LBM 3-4 days ago; constipation; has stool softener that pt states is not [place] with his routine meds.  "I just don't feel like taking them.  I'm bad about taking my meds as I should".   Pt denies any N/V/D or SOB Pt AAOX4  "

## 2022-12-17 NOTE — ED PROVIDER NOTES
"Encounter Date: 12/17/2022       History     Chief Complaint   Patient presents with    Chest Pain     "Gas pain" to epigastric area that radiates to LEFT chest. "It feels like I just can't get the gas out". Denies SOB, N/V, diaphoresis, or dizziness     87-year-old male with a history of hypertension, type 2 diabetes, CKD, interstitial lung disease, COPD, pulmonary hypertension, constipation, and others presenting for evaluation of epigastric abdominal pain that began earlier today.  Despite triage note patient adamantly denies chest pain.  Patient reports pain began after eating pizza with his son-in-law who is in town visiting.  Patient reports that he does not usually eat much and today he ate more than he usually does.  Reports that he generally stays away from foods such as pizza and he believes it is the cause of his epigastric pain.  He also reports constipation.  States that his last bowel movement was 2-3 days ago.  Reports that he is supposed to take MiraLax and Colace in the morning but that he is "stubborn" and does not often take them.  Reports that he was on home oxygen but is not any longer.  He denies any history of cholecystectomy, appendectomy, or any other surgeries.  He denies chest pain, shortness of breath, nausea, vomiting, fever, URI symptoms, diarrhea, or any others.  He has not taken any medications or attempted any other treatments for his epigastric pain.    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  Vaccinated.  C A     Family History   Problem Relation Age of Onset    Cancer Mother     Asbestos Father     Liver disease Neg Hx      Social History     Tobacco Use    Smoking status: Former     Packs/day: 1.00     Years: 40.00     Pack years: 40.00     Types: Cigarettes     Quit date: 1991     Years since quittin.1    Smokeless tobacco: Former     Quit date: 1986   Substance Use Topics    Alcohol use: Not Currently     Alcohol/week: 0.0 standard drinks     Comment: 2 bottles beer on weekends    Drug use: No     Review of Systems   Constitutional:  Negative for chills and fever.   HENT:  Negative for congestion, postnasal drip, rhinorrhea, sinus pressure and sore throat.    Eyes:  Negative for pain and redness.   Respiratory:  Negative for apnea, cough, choking, chest tightness, shortness of breath, wheezing and stridor.    Cardiovascular:  Negative for chest pain, palpitations and leg swelling.   Gastrointestinal:  Positive for abdominal pain (epigastric) and constipation. Negative for abdominal distention, anal bleeding, blood in stool, diarrhea, nausea, rectal pain and vomiting.   Genitourinary:  Negative for dysuria, flank pain, penile pain and urgency.   Musculoskeletal:  Negative for arthralgias, back pain, gait problem, joint swelling, myalgias, neck pain and neck stiffness.   Skin:  Negative for color change, pallor, rash and wound.   Neurological:  Negative for dizziness, tremors, seizures, syncope and light-headedness.   Psychiatric/Behavioral:  Negative for confusion. The patient is not nervous/anxious.      Physical  Exam     Initial Vitals [12/17/22 0245]   BP Pulse Resp Temp SpO2   (!) 167/90 (!) 111 20 97.8 °F (36.6 °C) (!) 94 %      MAP       --         Physical Exam    Nursing note and vitals reviewed.  Constitutional: He appears well-developed and well-nourished. He is not diaphoretic. No distress.   HENT:   Head: Normocephalic and atraumatic.   Right Ear: External ear normal.   Left Ear: External ear normal.   Nose: Nose normal.   Eyes: EOM are normal. Right eye exhibits no discharge. Left eye exhibits no discharge.   Neck: Neck supple. No tracheal deviation present.   Normal range of motion.  Cardiovascular:  Normal rate.           Pulmonary/Chest: No stridor. No respiratory distress.   Abdominal: Abdomen is soft. He exhibits no distension. There is no abdominal tenderness.   Mild epigastric and right upper quadrant abdominal tenderness to palpation.  No rigidity or guarding.   Musculoskeletal:         General: No tenderness. Normal range of motion.      Cervical back: Normal range of motion and neck supple.     Neurological: He is alert and oriented to person, place, and time. He has normal strength. No cranial nerve deficit.   Skin: Skin is warm and dry.   Psychiatric: He has a normal mood and affect. His behavior is normal. Judgment and thought content normal.       ED Course   Procedures  Labs Reviewed   CBC W/ AUTO DIFFERENTIAL - Abnormal; Notable for the following components:       Result Value    WBC 2.08 (*)     RBC 4.14 (*)     Hemoglobin 11.6 (*)     Hematocrit 35.8 (*)     Platelets 103 (*)     Immature Granulocytes 1.0 (*)     Gran # (ANC) 1.3 (*)     Lymph # 0.5 (*)     Mono # 0.2 (*)     All other components within normal limits   COMPREHENSIVE METABOLIC PANEL - Abnormal; Notable for the following components:    CO2 22 (*)     Glucose 155 (*)     BUN 29 (*)     Creatinine 1.8 (*)     eGFR 36 (*)     All other components within normal limits   LIPASE - Abnormal; Notable for the following components:     Lipase 71 (*)     All other components within normal limits   URINALYSIS, REFLEX TO URINE CULTURE - Abnormal; Notable for the following components:    Color, UA Colorless (*)     Occult Blood UA Trace (*)     All other components within normal limits    Narrative:     Specimen Source->Urine   TROPONIN I   B-TYPE NATRIURETIC PEPTIDE   TROPONIN I          Imaging Results              US Abdomen Limited (Final result)  Result time 12/17/22 05:15:34      Final result by Kaveh Ladd MD (12/17/22 05:15:34)                   Impression:      There is no sonographic evidence for cholelithiasis or acute cholecystitis.    The common duct appears dilated measuring 9.1 mm compared to 7.8 mm on the prior exam.      Electronically signed by: Kaveh Ladd  Date:    12/17/2022  Time:    05:15               Narrative:    EXAMINATION:  US ABDOMEN LIMITED    CLINICAL HISTORY:  r/o gallbladder pathology;    TECHNIQUE:  Limited ultrasound of the right upper quadrant of the abdomen (including pancreas, liver, gallbladder, common bile duct) was performed.    COMPARISON:  Ultrasound abdomen limited October 21, 2013    FINDINGS:  The pancreas is not seen in its entirety, the pancreatic duct is upper normal caliber measuring approximately 2.9 mm, otherwise the visualized pancreas appears unremarkable.    The gallbladder is identified, there is no sonographic evidence for cholelithiasis.  There is no abnormal gallbladder wall thickening or pericholecystic fluid.  The common duct appears dilated measuring approximately 9.1 mm, there is however no evidence for intrahepatic biliary dilatation.  Note is made the common duct measures approximately 7.8 mm on the prior examination.  The technologist indicates a negative sonographic Wilson's sign, however indicates the patient did receive pain medication.    Limited imaging of the liver demonstrates no evidence for focal hepatic mass lesion.                                       X-Ray  Abdomen Flat And Erect (Final result)  Result time 12/17/22 04:10:05      Final result by Kaveh Ladd MD (12/17/22 04:10:05)                   Impression:      Radiographic findings as above.      Electronically signed by: Kaveh Ladd  Date:    12/17/2022  Time:    04:10               Narrative:    EXAMINATION:  XR ABDOMEN FLAT AND ERECT    CLINICAL HISTORY:  Epigastric pain    TECHNIQUE:  Flat and erect AP views of the abdomen were performed.    COMPARISON:  Abdominal radiograph March 11, 2022    FINDINGS:  Limited imaging of the lung bases demonstrates appearance of chronic and atelectatic change, intrathoracic findings are dictated separately on the dedicated chest radiograph of the same date.  Upright and supine images are submitted.  There is no evidence for free intraperitoneal air.  There is no evidence for abnormal small bowel distention.  There is mild prominence of the colon with air and stool without abnormal distention.    Vascular calcifications and suspected phleboliths are noted.  The osseous structures demonstrate chronic change.                                       X-Ray Chest AP Portable (Final result)  Result time 12/17/22 04:10:00      Final result by Kaveh Ladd MD (12/17/22 04:10:00)                   Impression:      Chronic and atelectatic change noted without evidence for superimposed acute intrathoracic process.      Electronically signed by: Kaveh Ladd  Date:    12/17/2022  Time:    04:10               Narrative:    EXAMINATION:  XR CHEST AP PORTABLE    CLINICAL HISTORY:  Epigastric pain    TECHNIQUE:  Single frontal view of the chest was performed.    COMPARISON:  Chest radiograph May 20, 2021    FINDINGS:  Single portable chest view is submitted.  The appearance of the cardiomediastinal silhouette is stable.  Aortic atherosclerotic change noted.  Lungs demonstrate appearance of chronic and atelectatic change, most notably at the lung bases, the appearance is  stable.    There is no evidence for superimposed confluent infiltrate or consolidation, significant pleural effusion or pneumothorax.  The visualized osseous structures appear intact.                                       Medications   aluminum-magnesium hydroxide-simethicone 200-200-20 mg/5 mL suspension 30 mL (30 mLs Oral Given 12/17/22 0528)     And   LIDOcaine HCl 2% oral solution 15 mL (15 mLs Oral Given 12/17/22 0528)   valsartan tablet 320 mg (320 mg Oral Given 12/17/22 0525)   amLODIPine tablet 5 mg (5 mg Oral Given 12/17/22 0526)     Medical Decision Making:   History:   Old Medical Records: I decided to obtain old medical records.  Old Records Summarized: records from clinic visits.       <> Summary of Records: Normal nuclear stress test on 5/21/2021  Clinical Tests:   Lab Tests: Ordered and Reviewed  Radiological Study: Ordered and Reviewed  Medical Tests: Ordered and Reviewed  ED Management:  HPI and physical exam as above.  Labs consistent with baseline.  No concerning acute findings.  Patient denies chest pain or shortness of breath.  Patient reportedly passed gas while in the ED and states that his epigastric pain completely resolved afterward.  Imaging without evidence of concerning acute findings.  Given the above I feel that the etiology of his symptoms is solely gastrointestinal and resolved at this time.  I have considered but doubt pancreatitis, cholecystitis, bowel obstruction, GI bleed, or significant constipation.  I feel it is very unlikely that patient's symptoms are cardiogenic, however given the patient's age and risk factors I will obtain a 2nd troponin prior to discharge out of in abundance of caution.                        Clinical Impression:   Final diagnoses:  [R10.13] Epigastric pain (Primary)  [N18.9] Chronic kidney disease, unspecified CKD stage               Rodrick Oliva NP  12/17/22 0614       Rodrick Oliva NP  12/17/22 0614

## 2022-12-18 PROBLEM — R00.0 TACHYCARDIA WITH HEART RATE 121-140 BEATS PER MINUTE: Status: RESOLVED | Noted: 2020-09-03 | Resolved: 2022-12-18

## 2022-12-18 PROBLEM — G47.33 OSA (OBSTRUCTIVE SLEEP APNEA): Chronic | Status: ACTIVE | Noted: 2022-12-18

## 2022-12-18 PROBLEM — R06.00 DYSPNEA: Status: RESOLVED | Noted: 2021-03-11 | Resolved: 2022-12-18

## 2022-12-18 PROBLEM — I27.22 PULMONARY HYPERTENSION DUE TO LEFT VENTRICULAR SYSTOLIC DYSFUNCTION: Status: RESOLVED | Noted: 2022-12-13 | Resolved: 2022-12-18

## 2022-12-18 PROBLEM — N18.32 STAGE 3B CHRONIC KIDNEY DISEASE: Status: ACTIVE | Noted: 2022-12-18

## 2022-12-18 PROBLEM — J96.11 CHRONIC RESPIRATORY FAILURE WITH HYPOXIA: Status: RESOLVED | Noted: 2020-04-13 | Resolved: 2022-12-18

## 2022-12-19 DIAGNOSIS — E11.21 DIABETES MELLITUS WITH NEPHROPATHY: ICD-10-CM

## 2022-12-19 RX ORDER — LINAGLIPTIN 5 MG/1
5 TABLET, FILM COATED ORAL DAILY
Qty: 90 TABLET | Refills: 1 | Status: SHIPPED | OUTPATIENT
Start: 2022-12-19 | End: 2023-01-21

## 2022-12-19 NOTE — TELEPHONE ENCOUNTER
Returned pt son in law Veloz call , pt is out of town and needs refill sent to [place]. Asked for full address , [facility]  [address] , [place] [address]

## 2022-12-19 NOTE — TELEPHONE ENCOUNTER
Care Due:                  Date            Visit Type   Department     Provider  --------------------------------------------------------------------------------                                [facility]                              PRIMARY      MEDICINE/  Last Visit: 12-      CARE ([facility])   INTERNAL MED   Lucas Lloyd                              [facility]                              PRIMARY      MEDICINE/  Next Visit: 01-      CARE ([facility])   INTERNAL MED   Lucas Lloyd                                                            Last  Test          Frequency    Reason                     Performed    Due Date  --------------------------------------------------------------------------------    Lipid Panel.  12 months..  atorvastatin.............  03- 03-    [employer] Embedded Care Gaps. Reference number: [id number]. 12/19/2022   9:21:53 AM CST

## 2022-12-19 NOTE — TELEPHONE ENCOUNTER
----- Message from Olinda Davis sent at 12/19/2022  8:30 AM CST -----  Type: Patient Call Back        Who called: Magdiel Rodriguez - Son  in law         What is the request in detail: Pt states he is out of town and is trying to get this medication approved and refill linaGLIPtin (TRADJENTA) 5 mg Tab tablet ...         Can the clinic reply by [facility]? No         Would the patient rather a call back or a response via [facility]? Yes         Best call back number:  [phone]                  Thank You

## 2023-01-10 ENCOUNTER — OFFICE VISIT (OUTPATIENT)
Dept: CARDIOLOGY | Facility: CLINIC | Age: 88
End: 2023-01-10
Payer: MEDICARE

## 2023-01-10 VITALS
HEIGHT: 71 IN | WEIGHT: 177.13 LBS | DIASTOLIC BLOOD PRESSURE: 82 MMHG | HEART RATE: 103 BPM | SYSTOLIC BLOOD PRESSURE: 122 MMHG | BODY MASS INDEX: 24.8 KG/M2 | OXYGEN SATURATION: 95 % | RESPIRATION RATE: 18 BRPM

## 2023-01-10 DIAGNOSIS — I70.0 AORTIC ATHEROSCLEROSIS: ICD-10-CM

## 2023-01-10 DIAGNOSIS — J44.9 CHRONIC OBSTRUCTIVE PULMONARY DISEASE, UNSPECIFIED COPD TYPE: ICD-10-CM

## 2023-01-10 DIAGNOSIS — E11.21 DIABETES MELLITUS WITH NEPHROPATHY: ICD-10-CM

## 2023-01-10 DIAGNOSIS — N18.31 CHRONIC KIDNEY DISEASE, STAGE 3A: Chronic | ICD-10-CM

## 2023-01-10 DIAGNOSIS — G47.33 OSA (OBSTRUCTIVE SLEEP APNEA): Chronic | ICD-10-CM

## 2023-01-10 DIAGNOSIS — R06.09 DOE (DYSPNEA ON EXERTION): Primary | ICD-10-CM

## 2023-01-10 DIAGNOSIS — R07.9 CHEST PAIN, UNSPECIFIED TYPE: ICD-10-CM

## 2023-01-10 DIAGNOSIS — I27.20 PULMONARY HYPERTENSION: Chronic | ICD-10-CM

## 2023-01-10 DIAGNOSIS — I10 ESSENTIAL HYPERTENSION: Chronic | ICD-10-CM

## 2023-01-10 DIAGNOSIS — J84.9 ILD (INTERSTITIAL LUNG DISEASE): Chronic | ICD-10-CM

## 2023-01-10 DIAGNOSIS — N18.32 STAGE 3B CHRONIC KIDNEY DISEASE: ICD-10-CM

## 2023-01-10 PROCEDURE — 3074F PR MOST RECENT SYSTOLIC BLOOD PRESSURE < 130 MM HG: ICD-10-PCS | Mod: CPTII,S$GLB,, | Performed by: INTERNAL MEDICINE

## 2023-01-10 PROCEDURE — 1101F PT FALLS ASSESS-DOCD LE1/YR: CPT | Mod: CPTII,S$GLB,, | Performed by: INTERNAL MEDICINE

## 2023-01-10 PROCEDURE — 3079F PR MOST RECENT DIASTOLIC BLOOD PRESSURE 80-89 MM HG: ICD-10-PCS | Mod: CPTII,S$GLB,, | Performed by: INTERNAL MEDICINE

## 2023-01-10 PROCEDURE — 99999 PR PBB SHADOW E&M-EST. PATIENT-LVL IV: ICD-10-PCS | Mod: PBBFAC,,, | Performed by: INTERNAL MEDICINE

## 2023-01-10 PROCEDURE — 1126F PR PAIN SEVERITY QUANTIFIED, NO PAIN PRESENT: ICD-10-PCS | Mod: CPTII,S$GLB,, | Performed by: INTERNAL MEDICINE

## 2023-01-10 PROCEDURE — 1160F RVW MEDS BY RX/DR IN RCRD: CPT | Mod: CPTII,S$GLB,, | Performed by: INTERNAL MEDICINE

## 2023-01-10 PROCEDURE — 1101F PR PT FALLS ASSESS DOC 0-1 FALLS W/OUT INJ PAST YR: ICD-10-PCS | Mod: CPTII,S$GLB,, | Performed by: INTERNAL MEDICINE

## 2023-01-10 PROCEDURE — 1159F PR MEDICATION LIST DOCUMENTED IN MEDICAL RECORD: ICD-10-PCS | Mod: CPTII,S$GLB,, | Performed by: INTERNAL MEDICINE

## 2023-01-10 PROCEDURE — 3079F DIAST BP 80-89 MM HG: CPT | Mod: CPTII,S$GLB,, | Performed by: INTERNAL MEDICINE

## 2023-01-10 PROCEDURE — 99999 PR PBB SHADOW E&M-EST. PATIENT-LVL IV: CPT | Mod: PBBFAC,,, | Performed by: INTERNAL MEDICINE

## 2023-01-10 PROCEDURE — 1126F AMNT PAIN NOTED NONE PRSNT: CPT | Mod: CPTII,S$GLB,, | Performed by: INTERNAL MEDICINE

## 2023-01-10 PROCEDURE — 99214 PR OFFICE/OUTPT VISIT, EST, LEVL IV, 30-39 MIN: ICD-10-PCS | Mod: S$GLB,,, | Performed by: INTERNAL MEDICINE

## 2023-01-10 PROCEDURE — 1160F PR REVIEW ALL MEDS BY PRESCRIBER/CLIN PHARMACIST DOCUMENTED: ICD-10-PCS | Mod: CPTII,S$GLB,, | Performed by: INTERNAL MEDICINE

## 2023-01-10 PROCEDURE — 3074F SYST BP LT 130 MM HG: CPT | Mod: CPTII,S$GLB,, | Performed by: INTERNAL MEDICINE

## 2023-01-10 PROCEDURE — 1159F MED LIST DOCD IN RCRD: CPT | Mod: CPTII,S$GLB,, | Performed by: INTERNAL MEDICINE

## 2023-01-10 PROCEDURE — 3288F FALL RISK ASSESSMENT DOCD: CPT | Mod: CPTII,S$GLB,, | Performed by: INTERNAL MEDICINE

## 2023-01-10 PROCEDURE — 3288F PR FALLS RISK ASSESSMENT DOCUMENTED: ICD-10-PCS | Mod: CPTII,S$GLB,, | Performed by: INTERNAL MEDICINE

## 2023-01-10 PROCEDURE — 99214 OFFICE O/P EST MOD 30 MIN: CPT | Mod: S$GLB,,, | Performed by: INTERNAL MEDICINE

## 2023-01-10 NOTE — PROGRESS NOTES
CARDIOVASCULAR CONSULTATION    REASON FOR CONSULT:   Peter Lopez is a 87 y.o. male who presents for evaluation of frequent PVCs.      HISTORY OF PRESENT ILLNESS:     Patient is 83-year-old pleasant person.  Has been referred to me for frequent PVCs.  We did an EKG in the clinic today and I did not see any frequent PVCs on this EKG.  I also reviewed his past EKGs which have been scanned in the computer and did not see any frequent PVCs.  Patient denies any palpitations patient denies any chest pains at rest on exertion, orthopnea, PND.  However he had an EKG performed on 11/11/2019 and as per the report that EKG showed frequent PVCs as per the notes.  However, it is not scanned in the system and I am unable to see it myself.    Notes from October 2020:  Patient here for follow-up.  Denies any chest pains at rest on exertion, orthopnea, PND.  States his recovering from school with infection.  EKG done in the clinic today was personally reviewed and demonstrated sinus tachycardia at a heart rate of 106 beats per minute, nonspecific ST changes.  Patient denies any chest at rest on exertion, orthopnea, PND, swelling feet.      Concentric left ventricular hypertrophy.  Normal left ventricular systolic function. The estimated ejection fraction is 55%  Normal LV diastolic function.  Normal right ventricular systolic function.  No pulmonary hypertension present.        Normal central venous pressure (3 mm Hg).  The estimated PA systolic pressure is 28 mm Hg  Technically adequate study  Avg  bpm  Rare PVCs, very rare PACs.  No atrial fibrillation.  Sinus tachycardia - 18 hours, 50 minutes.      Notes from June 2020:  Patient here for follow-up.  Recently hospitalized.  Stress test did not show any significant ischemia.  Echo showed normal left ventricle systolic function.      Normal myocardial perfusion scan. There is no evidence of myocardial ischemia or infarction.    There is a  mild to moderate intensity fixed defect  in the inferobasilar wall of the left ventricle secondary to diaphragm attenuation.    The visually estimated ejection fraction is normal during stress.    There is normal wall motion post stress.      The left ventricle is normal in size with concentric hypertrophy and normal systolic function.  The estimated ejection fraction is 60%.  Indeterminate left ventricular diastolic function.  Normal right ventricular size with normal right ventricular systolic function.  Mild tricuspid regurgitation.  Normal central venous pressure (3 mmHg).  The estimated PA systolic pressure is 57 mmHg.  There is pulmonary hypertension.      From January 23: Patient here for follow-up after a long hiatus.  Came into the ER with some chest pain and tightness.  Currently chest pain-free.  Main complaint is chronic dyspnea on exertion.        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  Vaccinated.  C A       ALLERGIES AND MEDICATION:   Review of patient's allergies indicates:  No Known Allergies     Medication List            Accurate as of January 10, 2023  2:53 PM. If you have any  questions, ask your nurse or doctor.                CONTINUE taking these medications      albuterol 90 mcg/actuation inhaler  Commonly known as: PROAIR HFA  Inhale 2 puffs into the lungs every 4 (four) hours as needed for Wheezing (cough/shortness of breath). Rescue     amLODIPine 5 MG tablet  Commonly known as: NORVASC  Take 1 tablet (5 mg total) by mouth once daily.     atorvastatin 20 MG tablet  Commonly known as: LIPITOR  Take 1 tablet (20 mg total) by mouth every evening.     blood sugar diagnostic Strp  To check BG 1 times daily, to use with insurance preferred meter     blood-glucose meter kit  To check BG 1 times daily, to use with insurance preferred meter     calcium carbonate-vitamin D3 600 mg-5 mcg (200 unit) Cap  Take 1 capsule by mouth once. for 1 dose     cholecalciferol (vitamin D3) 25 mcg (1,000 unit) capsule  Commonly known as: VITAMIN D3     FLUZONE HIGHDOSE QUAD 22-23  mcg/0.7 mL Syrg  Generic drug: flu vacc [id number](65yr up)-PF     lactulose 10 gram/15 mL solution  Commonly known as: CHRONULAC  Take 30 mLs (20 g total) by mouth 2 (two) times daily as needed (constipation).     leuprolide 1 mg/0.2 mL injection  Commonly known as: LUPRON     MODERNA COVID BIVAL(6Y UP)(PF) 50 mcg/0.5 mL injection  Generic drug: sars-cov-2 (covid-19 omicron)     olmesartan 40 MG tablet  Commonly known as: BENICAR  Take 1 tablet by mouth once daily     SPIRIVA RESPIMAT 2.5 mcg/actuation inhaler  Generic drug: tiotropium bromide  INHALE 2 SPRAY(S) BY MOUTH ONCE DAILY     TRADJENTA 5 mg Tab tablet  Generic drug: linaGLIPtin  Take 1 tablet (5 mg total) by mouth once daily.              SOCIAL HISTORY:     Social History     Socioeconomic History    Marital status:    Tobacco Use    Smoking status: Former     Packs/day: 1.00     Years: 40.00     Pack years: 40.00     Types: Cigarettes     Quit date: 1991     Years since quittin.1    Smokeless tobacco: Former     Quit date: 1986   Substance and  "Sexual Activity    Alcohol use: Not Currently     Alcohol/week: 0.0 standard drinks     Comment: 2 bottles beer on weekends    Drug use: No    Sexual activity: Yes     Partners: Female     Comment:     Social History Narrative    , resides in [place]. 7 children but lost one. 16 grandchildren. 4 great-grandchildren. Works at [employer]       FAMILY HISTORY:     Family History   Problem Relation Age of Onset    Cancer Mother     Asbestos Father     Liver disease Neg Hx        REVIEW OF SYSTEMS:   Review of Systems   Constitutional: Negative.   HENT: Negative.     Eyes: Negative.    Cardiovascular:  Positive for dyspnea on exertion.   Respiratory: Negative.     Endocrine: Negative.    Hematologic/Lymphatic: Negative.    Skin: Negative.    Musculoskeletal: Negative.    Gastrointestinal: Negative.    Genitourinary: Negative.    Neurological: Negative.    Psychiatric/Behavioral: Negative.     Allergic/Immunologic: Negative.      A 10 point review of systems was performed and all the pertinent positives have been mentioned. Rest of review of systems was negative.        PHYSICAL EXAM:     Vitals:    01/10/23 1420   BP: 122/82   Pulse: 103   Resp: 18    Body mass index is 24.71 kg/m².  Weight: 80.3 kg (177 lb 2.2 oz)   Height: 5' 11" (180.3 cm)     Physical Exam  Vitals reviewed.   Constitutional:       Appearance: He is well-developed.   HENT:      Head: Normocephalic.   Eyes:      Conjunctiva/sclera: Conjunctivae normal.      Pupils: Pupils are equal, round, and reactive to light.   Cardiovascular:      Rate and Rhythm: Normal rate and regular rhythm.      Heart sounds: Normal heart sounds.   Pulmonary:      Effort: Pulmonary effort is normal.      Breath sounds: Normal breath sounds.   Abdominal:      General: Bowel sounds are normal.      Palpations: Abdomen is soft.   Musculoskeletal:      Cervical back: Normal range of motion and neck supple.   Skin:     General: Skin is warm. "   Neurological:      Mental Status: He is alert and oriented to person, place, and time.         DATA:     Laboratory:  CBC:  Recent Labs   Lab 08/09/22  1506 08/29/22  0923 12/17/22  0328   WBC 2.77 L 3.52 L 2.08 L   Hemoglobin 11.0 L 10.7 L 11.6 L   Hematocrit 34.7 L 32.9 L 35.8 L   Platelets 313 272 103 L         CHEMISTRIES:  Recent Labs   Lab 04/10/20  0755 04/11/20  0534 04/13/20  0459 04/15/20  0445 03/14/22  0843 03/15/22  1050 06/21/22  0859 08/09/22  1506 12/07/22  0844 12/17/22  0328   Glucose 115 H 131 H 126 H   < > 138 H 117 H 219 H 102 222 H 155 H   Sodium 137 134 L 136   < > 138 136 141 140 140 140   Potassium 4.4 4.3 4.2   < > 4.2 4.3 4.7 4.4 3.9 3.9   BUN 11 12 12   < > 43 H 38 H 26 H 23 24 H 29 H   Creatinine 1.0 1.0 0.9   < > 2.4 H 2.4 H 2.0 H 1.9 H 2.0 H 1.8 H   eGFR if  >60 >60 >60   < > 28 A 28 A 34 A  --   --   --    eGFR if non African American >60 >60 >60   < > 24 A 24 A 30 A  --   --   --    Calcium 8.9 8.9 9.1   < > 10.0 10.1 10.1 10.1 9.7 9.7   Magnesium 2.1 2.0 2.0  --   --   --   --   --   --   --     < > = values in this interval not displayed.         CARDIAC BIOMARKERS:  Recent Labs   Lab 04/03/20  1509 04/03/20  2316 04/15/20  0631 05/20/21  1132 05/21/21  1408 12/17/22  0328 12/17/22  0722     --  65  --   --   --   --    Troponin I 0.116 H   < > <0.006   < > 0.012 0.017 0.013    < > = values in this interval not displayed.         COAGS:  Recent Labs   Lab 03/28/20  1938 03/28/20  2253   INR 1.2 0.9         LIPIDS/LFTS:  Recent Labs   Lab 05/21/21  0415 09/13/21  1138 12/15/21  0759 03/14/22  0843 03/15/22  1050 08/09/22  1506 12/07/22  0844 12/17/22  0328   Cholesterol 122 117 L  --  118 L  --   --   --   --    Triglycerides 67 76  --  86  --   --   --   --    HDL 44 44  --  41  --   --   --   --    LDL Cholesterol 64.6 57.8 L  --  59.8 L  --   --   --   --    Non-HDL Cholesterol 78 73  --  77  --   --   --   --    AST  --  34   < > 30   < > 29 25 30   ALT   --  20   < > 24   < > 17 17 18    < > = values in this interval not displayed.         Hemoglobin A1C   Date Value Ref Range Status   12/07/2022 7.2 (H) 4.0 - 5.6 % Final     Comment:     ADA Screening Guidelines:  5.7-6.4%  Consistent with prediabetes  >or=6.5%  Consistent with diabetes    High levels of fetal hemoglobin interfere with the HbA1C  assay. Heterozygous hemoglobin variants (HbS, HgC, etc)do  not significantly interfere with this assay.   However, presence of multiple variants may affect accuracy.     06/21/2022 7.4 (H) 4.0 - 5.6 % Final     Comment:     ADA Screening Guidelines:  5.7-6.4%  Consistent with prediabetes  >or=6.5%  Consistent with diabetes    High levels of fetal hemoglobin interfere with the HbA1C  assay. Heterozygous hemoglobin variants (HbS, HgC, etc)do  not significantly interfere with this assay.   However, presence of multiple variants may affect accuracy.     03/14/2022 7.3 (H) 4.0 - 5.6 % Final     Comment:     ADA Screening Guidelines:  5.7-6.4%  Consistent with prediabetes  >or=6.5%  Consistent with diabetes    High levels of fetal hemoglobin interfere with the HbA1C  assay. Heterozygous hemoglobin variants (HbS, HgC, etc)do  not significantly interfere with this assay.   However, presence of multiple variants may affect accuracy.         TSH  Recent Labs   Lab 03/28/20  1753 05/20/21  1132 03/14/22  0843   TSH 2.079 0.990 1.337         The ASCVD Risk score ([place] DK, et al., 2019) failed to calculate for the following reasons:    The 2019 ASCVD risk score is only valid for ages 40 to 79           Cardiovascular Testing:    EKG: (personally reviewed tracing)  EKG performed in clinic today demonstrated sinus tachycardia.      ASSESSMENT AND PLAN     Patient Active Problem List   Diagnosis    Essential hypertension    Diabetes mellitus with nephropathy    Aortic atherosclerosis    Chronic kidney disease, stage 3a    ILD (interstitial lung disease)    Reactive depression    Chronic  obstructive pulmonary disease    Sleep-related breathing disorder    Chronic fatigue    Complex sleep apnea syndrome    Pulmonary hypertension    Iron deficiency anemia    Malignant neoplasm of prostate    Stage 3b chronic kidney disease    IWONA (obstructive sleep apnea)       Frequent PVCs as per notes from primary care.  Further evaluation with the help of a Holter monitor as well as a 2D echocardiogram.  2D echocardiogram showed normal left ventricle systolic function.  Holter monitor did not show very large amount of PVCs.      Chest pain:  Check stress echo.     Hypertension:  Well controlled on current therapy.    Pulmonary hypertension        Follow-up after testing    Thank you very much for involving me in the care of your patient.  Please do not hesitate to contact me if there are any questions.      Sean Schafer MD, FACC, [facility]  Interventional Cardiologist, [facility].           This note was dictated with the help of speech recognition software.  There might be un-intended errors and/or substitutions.

## 2023-01-11 ENCOUNTER — OFFICE VISIT (OUTPATIENT)
Dept: FAMILY MEDICINE | Facility: CLINIC | Age: 88
End: 2023-01-11
Payer: MEDICARE

## 2023-01-11 VITALS
HEIGHT: 71 IN | HEART RATE: 114 BPM | DIASTOLIC BLOOD PRESSURE: 84 MMHG | SYSTOLIC BLOOD PRESSURE: 128 MMHG | OXYGEN SATURATION: 94 % | BODY MASS INDEX: 24.75 KG/M2 | WEIGHT: 176.81 LBS | RESPIRATION RATE: 18 BRPM | TEMPERATURE: 98 F

## 2023-01-11 DIAGNOSIS — M20.001 FINGER DEFORMITY, ACQUIRED, RIGHT: ICD-10-CM

## 2023-01-11 DIAGNOSIS — E11.21 DIABETES MELLITUS WITH NEPHROPATHY: Chronic | ICD-10-CM

## 2023-01-11 DIAGNOSIS — C61 MALIGNANT NEOPLASM OF PROSTATE: ICD-10-CM

## 2023-01-11 DIAGNOSIS — I10 ESSENTIAL HYPERTENSION: Primary | Chronic | ICD-10-CM

## 2023-01-11 DIAGNOSIS — N18.32 STAGE 3B CHRONIC KIDNEY DISEASE: Chronic | ICD-10-CM

## 2023-01-11 PROBLEM — N18.31 CHRONIC KIDNEY DISEASE, STAGE 3A: Chronic | Status: RESOLVED | Noted: 2020-01-28 | Resolved: 2023-01-11

## 2023-01-11 PROCEDURE — 3074F SYST BP LT 130 MM HG: CPT | Mod: CPTII,S$GLB,, | Performed by: FAMILY MEDICINE

## 2023-01-11 PROCEDURE — 3288F FALL RISK ASSESSMENT DOCD: CPT | Mod: CPTII,S$GLB,, | Performed by: FAMILY MEDICINE

## 2023-01-11 PROCEDURE — 1160F RVW MEDS BY RX/DR IN RCRD: CPT | Mod: CPTII,S$GLB,, | Performed by: FAMILY MEDICINE

## 2023-01-11 PROCEDURE — 99499 UNLISTED E&M SERVICE: CPT | Mod: S$GLB,,, | Performed by: FAMILY MEDICINE

## 2023-01-11 PROCEDURE — 1159F PR MEDICATION LIST DOCUMENTED IN MEDICAL RECORD: ICD-10-PCS | Mod: CPTII,S$GLB,, | Performed by: FAMILY MEDICINE

## 2023-01-11 PROCEDURE — 99214 PR OFFICE/OUTPT VISIT, EST, LEVL IV, 30-39 MIN: ICD-10-PCS | Mod: S$GLB,,, | Performed by: FAMILY MEDICINE

## 2023-01-11 PROCEDURE — 3288F PR FALLS RISK ASSESSMENT DOCUMENTED: ICD-10-PCS | Mod: CPTII,S$GLB,, | Performed by: FAMILY MEDICINE

## 2023-01-11 PROCEDURE — 1159F MED LIST DOCD IN RCRD: CPT | Mod: CPTII,S$GLB,, | Performed by: FAMILY MEDICINE

## 2023-01-11 PROCEDURE — 3079F PR MOST RECENT DIASTOLIC BLOOD PRESSURE 80-89 MM HG: ICD-10-PCS | Mod: CPTII,S$GLB,, | Performed by: FAMILY MEDICINE

## 2023-01-11 PROCEDURE — 1101F PT FALLS ASSESS-DOCD LE1/YR: CPT | Mod: CPTII,S$GLB,, | Performed by: FAMILY MEDICINE

## 2023-01-11 PROCEDURE — 1126F PR PAIN SEVERITY QUANTIFIED, NO PAIN PRESENT: ICD-10-PCS | Mod: CPTII,S$GLB,, | Performed by: FAMILY MEDICINE

## 2023-01-11 PROCEDURE — 3079F DIAST BP 80-89 MM HG: CPT | Mod: CPTII,S$GLB,, | Performed by: FAMILY MEDICINE

## 2023-01-11 PROCEDURE — 1126F AMNT PAIN NOTED NONE PRSNT: CPT | Mod: CPTII,S$GLB,, | Performed by: FAMILY MEDICINE

## 2023-01-11 PROCEDURE — 99999 PR PBB SHADOW E&M-EST. PATIENT-LVL IV: CPT | Mod: PBBFAC,,, | Performed by: FAMILY MEDICINE

## 2023-01-11 PROCEDURE — 3074F PR MOST RECENT SYSTOLIC BLOOD PRESSURE < 130 MM HG: ICD-10-PCS | Mod: CPTII,S$GLB,, | Performed by: FAMILY MEDICINE

## 2023-01-11 PROCEDURE — 1101F PR PT FALLS ASSESS DOC 0-1 FALLS W/OUT INJ PAST YR: ICD-10-PCS | Mod: CPTII,S$GLB,, | Performed by: FAMILY MEDICINE

## 2023-01-11 PROCEDURE — 99999 PR PBB SHADOW E&M-EST. PATIENT-LVL IV: ICD-10-PCS | Mod: PBBFAC,,, | Performed by: FAMILY MEDICINE

## 2023-01-11 PROCEDURE — 99499 RISK ADDL DX/OHS AUDIT: ICD-10-PCS | Mod: S$GLB,,, | Performed by: FAMILY MEDICINE

## 2023-01-11 PROCEDURE — 1160F PR REVIEW ALL MEDS BY PRESCRIBER/CLIN PHARMACIST DOCUMENTED: ICD-10-PCS | Mod: CPTII,S$GLB,, | Performed by: FAMILY MEDICINE

## 2023-01-11 PROCEDURE — 99214 OFFICE O/P EST MOD 30 MIN: CPT | Mod: S$GLB,,, | Performed by: FAMILY MEDICINE

## 2023-01-11 RX ORDER — DICLOFENAC SODIUM 10 MG/G
GEL TOPICAL
Qty: 100 G | Refills: 5 | Status: SHIPPED | OUTPATIENT
Start: 2023-01-11 | End: 2023-04-11

## 2023-01-11 RX ORDER — AMLODIPINE BESYLATE 5 MG/1
5 TABLET ORAL DAILY
Qty: 90 TABLET | Refills: 3 | Status: SHIPPED | OUTPATIENT
Start: 2023-01-11 | End: 2024-02-18

## 2023-01-11 RX ORDER — ATORVASTATIN CALCIUM 20 MG/1
20 TABLET, FILM COATED ORAL NIGHTLY
Qty: 90 TABLET | Refills: 3 | Status: SHIPPED | OUTPATIENT
Start: 2023-01-11 | End: 2023-12-04 | Stop reason: SDUPTHER

## 2023-01-17 ENCOUNTER — TELEPHONE (OUTPATIENT)
Dept: FAMILY MEDICINE | Facility: CLINIC | Age: 88
End: 2023-01-17
Payer: MEDICARE

## 2023-01-17 NOTE — TELEPHONE ENCOUNTER
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

## 2023-01-21 DIAGNOSIS — E11.21 DIABETES MELLITUS WITH NEPHROPATHY: ICD-10-CM

## 2023-01-21 RX ORDER — LINAGLIPTIN 5 MG/1
TABLET, FILM COATED ORAL
Qty: 90 TABLET | Refills: 1 | Status: SHIPPED | OUTPATIENT
Start: 2023-01-21 | End: 2023-08-26 | Stop reason: SDUPTHER

## 2023-01-21 NOTE — TELEPHONE ENCOUNTER
No new care gaps identified.  [facility] Embedded Care Gaps. Reference number: [id number]. 1/21/2023   3:47:12 PM CST

## 2023-01-22 NOTE — PROGRESS NOTES
Subjective:       Patient ID: Peter Lopez is a 87 y.o. male.    Chief Complaint: Diabetes and Hypertension    Diabetes  He presents for his follow-up diabetic visit. He has type 2 diabetes mellitus. No MedicAlert identification noted. His disease course has been stable. Pertinent negatives for hypoglycemia include no confusion, dizziness, headaches, hunger, mood changes, nervousness/anxiousness, pallor, seizures, sleepiness, speech difficulty, sweats or tremors. Pertinent negatives for diabetes include no blurred vision, no chest pain, no fatigue, no foot paresthesias, no foot ulcerations, no polydipsia, no polyphagia, no polyuria, no visual change, no weakness and no weight loss. Pertinent negatives for hypoglycemia complications include no blackouts, no hospitalization, no nocturnal hypoglycemia, no required assistance and no required glucagon injection. Symptoms are stable. Diabetic complications include nephropathy. Risk factors for coronary artery disease include diabetes mellitus, hypertension, male sex and dyslipidemia. Current diabetic treatment includes oral agent (monotherapy). He is compliant with treatment all of the time. Exercise: does light weights at home and leg exercises at home. Home blood sugar record trend: not checking. An ACE inhibitor/angiotensin II receptor blocker is being taken.   Hypertension  This is a chronic problem. The problem is uncontrolled. Pertinent negatives include no blurred vision, chest pain, headaches, neck pain, palpitations or sweats. Risk factors for coronary artery disease include male gender, diabetes mellitus and dyslipidemia. Past treatments include angiotensin blockers. The current treatment provides significant improvement. Hypertensive end-organ damage includes kidney disease.   Hand Pain   Incident onset: several months. There was no injury mechanism. Pain location: complains the right 3rd finger has a deformity. The quality of the pain is described as aching. The  pain does not radiate. Pertinent negatives include no chest pain or numbness.   Review of Systems   Constitutional:  Negative for fatigue, unexpected weight change and weight loss.   HENT:  Negative for sore throat.    Eyes:  Negative for blurred vision.   Respiratory:  Negative for wheezing.    Cardiovascular:  Negative for chest pain and palpitations.   Gastrointestinal:  Negative for abdominal pain.   Endocrine: Negative for polydipsia, polyphagia and polyuria.   Genitourinary:  Negative for dysuria and hematuria.   Musculoskeletal:  Negative for neck pain.   Integumentary:  Negative for pallor.   Neurological:  Negative for dizziness, tremors, seizures, speech difficulty, weakness, numbness and headaches.   Psychiatric/Behavioral:  Negative for confusion. The patient is not nervous/anxious.        Objective:      Physical Exam  Vitals reviewed.   Constitutional:       Appearance: He is well-developed. He is not diaphoretic.   HENT:      Head: Normocephalic.      Right Ear: External ear normal.      Left Ear: External ear normal.      Nose: Nose normal.      Mouth/Throat:      Pharynx: No pharyngeal swelling, oropharyngeal exudate or posterior oropharyngeal erythema.   Neck:      Trachea: No tracheal deviation.   Cardiovascular:      Rate and Rhythm: Regular rhythm. Tachycardia present.      Heart sounds: Normal heart sounds.   Pulmonary:      Effort: Pulmonary effort is normal.      Breath sounds: Normal breath sounds. No wheezing or rales.   Abdominal:      General: Bowel sounds are normal.      Palpations: Abdomen is soft. Abdomen is not rigid. There is no mass.      Tenderness: There is no abdominal tenderness. There is no guarding or rebound.   Musculoskeletal:      Cervical back: Normal range of motion and neck supple.   Lymphadenopathy:      Cervical: No cervical adenopathy.   Neurological:      Mental Status: He is oriented to person, place, and time.      Sensory: No sensory deficit.      Motor: No  atrophy.      Gait: Gait normal.      Deep Tendon Reflexes:      Reflex Scores:       Patellar reflexes are 2+ on the right side and 2+ on the left side.      Admission on 12/17/2022, Discharged on 12/17/2022   Component Date Value Ref Range Status    WBC 12/17/2022 2.08 (L)  3.90 - 12.70 K/uL Final    RBC 12/17/2022 4.14 (L)  4.60 - 6.20 M/uL Final    Hemoglobin 12/17/2022 11.6 (L)  14.0 - 18.0 g/dL Final    Hematocrit 12/17/2022 35.8 (L)  40.0 - 54.0 % Final    MCV 12/17/2022 87  82 - 98 fL Final    MCH 12/17/2022 28.0  27.0 - 31.0 pg Final    MCHC 12/17/2022 32.4  32.0 - 36.0 g/dL Final    RDW 12/17/2022 14.3  11.5 - 14.5 % Final    Platelets 12/17/2022 103 (L)  150 - 450 K/uL Final    MPV 12/17/2022 10.5  9.2 - 12.9 fL Final    Immature Granulocytes 12/17/2022 1.0 (H)  0.0 - 0.5 % Final    Gran # (ANC) 12/17/2022 1.3 (L)  1.8 - 7.7 K/uL Final    Immature Grans (Abs) 12/17/2022 0.02  0.00 - 0.04 K/uL Final    Comment: Mild elevation in immature granulocytes is non specific and   can be seen in a variety of conditions including stress response,   acute inflammation, trauma and pregnancy. Correlation with other   laboratory and clinical findings is essential.      Lymph # 12/17/2022 0.5 (L)  1.0 - 4.8 K/uL Final    Mono # 12/17/2022 0.2 (L)  0.3 - 1.0 K/uL Final    Eos # 12/17/2022 0.1  0.0 - 0.5 K/uL Final    Baso # 12/17/2022 0.02  0.00 - 0.20 K/uL Final    nRBC 12/17/2022 0  0 /100 WBC Final    Gran % 12/17/2022 62.9  38.0 - 73.0 % Final    Lymph % 12/17/2022 22.1  18.0 - 48.0 % Final    Mono % 12/17/2022 10.1  4.0 - 15.0 % Final    Eosinophil % 12/17/2022 2.9  0.0 - 8.0 % Final    Basophil % 12/17/2022 1.0  0.0 - 1.9 % Final    Differential Method 12/17/2022 Automated   Final    Sodium 12/17/2022 140  136 - 145 mmol/L Final    Potassium 12/17/2022 3.9  3.5 - 5.1 mmol/L Final    Specimen slightly hemolyzed    Chloride 12/17/2022 106  95 - 110 mmol/L Final    CO2 12/17/2022 22 (L)  23 - 29 mmol/L Final    Glucose  12/17/2022 155 (H)  70 - 110 mg/dL Final    BUN 12/17/2022 29 (H)  8 - 23 mg/dL Final    Creatinine 12/17/2022 1.8 (H)  0.5 - 1.4 mg/dL Final    Calcium 12/17/2022 9.7  8.7 - 10.5 mg/dL Final    Total Protein 12/17/2022 7.3  6.0 - 8.4 g/dL Final    Albumin 12/17/2022 3.8  3.5 - 5.2 g/dL Final    Total Bilirubin 12/17/2022 0.4  0.1 - 1.0 mg/dL Final    Comment: For infants and newborns, interpretation of results should be based  on gestational age, weight and in agreement with clinical  observations.    Premature Infant recommended reference ranges:  Up to 24 hours.............<8.0 mg/dL  Up to 48 hours............<12.0 mg/dL  3-5 days..................<15.0 mg/dL  6-29 days.................<15.0 mg/dL      Alkaline Phosphatase 12/17/2022 56  55 - 135 U/L Final    AST 12/17/2022 30  10 - 40 U/L Final    ALT 12/17/2022 18  10 - 44 U/L Final    Anion Gap 12/17/2022 12  8 - 16 mmol/L Final    eGFR 12/17/2022 36 (A)  >60 mL/min/1.73 m^2 Final    Lipase 12/17/2022 71 (H)  4 - 60 U/L Final    Troponin I 12/17/2022 0.017  0.000 - 0.026 ng/mL Final    Comment: The reference interval for Troponin I represents the 99th percentile   cutoff   for our facility and is consistent with 3rd generation assay   performance.      BNP 12/17/2022 18  0 - 99 pg/mL Final    Values of less than 100 pg/ml are consistent with non-CHF populations.    Specimen UA 12/17/2022 Urine, Clean Catch   Final    Color, UA 12/17/2022 Colorless (A)  Yellow, Straw, Sade Final    Appearance, UA 12/17/2022 Clear  Clear Final    pH, UA 12/17/2022 6.0  5.0 - 8.0 Final    Specific Gravity, UA 12/17/2022 1.010  1.005 - 1.030 Final    Protein, UA 12/17/2022 Negative  Negative Final    Comment: Recommend a 24 hour urine protein or a urine   protein/creatinine ratio if globulin induced proteinuria is  clinically suspected.      Glucose, UA 12/17/2022 Negative  Negative Final    Ketones, UA 12/17/2022 Negative  Negative Final    Bilirubin (UA) 12/17/2022 Negative   Negative Final    Occult Blood UA 12/17/2022 Trace (A)  Negative Final    Nitrite, UA 12/17/2022 Negative  Negative Final    Urobilinogen, UA 12/17/2022 Negative  <2.0 EU/dL Final    Leukocytes, UA 12/17/2022 Negative  Negative Final    Troponin I 12/17/2022 0.013  0.000 - 0.026 ng/mL Final    Comment: The reference interval for Troponin I represents the 99th percentile   cutoff   for our facility and is consistent with 3rd generation assay   performance.         Assessment:       Problem List Items Addressed This Visit          Cardiac/Vascular    Essential hypertension - Primary (Chronic)    Relevant Medications    amLODIPine (NORVASC) 5 MG tablet       Renal/    Stage 3b chronic kidney disease       Oncology    Malignant neoplasm of prostate       Endocrine    Diabetes mellitus with nephropathy    Relevant Medications    atorvastatin (LIPITOR) 20 MG tablet     Other Visit Diagnoses       Finger deformity, acquired, right        Relevant Medications    diclofenac sodium (VOLTAREN) 1 % Gel    Other Relevant Orders    X-Ray Hand Complete Right (Completed)              Plan:       Peter was seen today for diabetes and hypertension.    Diagnoses and all orders for this visit:    Essential hypertension  -     amLODIPine (NORVASC) 5 MG tablet; Take 1 tablet (5 mg total) by mouth once daily.  Current therapy effective, will continue    Diabetes mellitus with nephropathy  -     atorvastatin (LIPITOR) 20 MG tablet; Take 1 tablet (20 mg total) by mouth every evening.  Continue Tradjenta    Stage 3b chronic kidney disease  Advised avoiding nephrotoxic agents and staying well hydrated.    Malignant neoplasm of prostate  Managed by urology and radiation oncology    Finger deformity, acquired, right  -     X-Ray Hand Complete Right; Future  -     diclofenac sodium (VOLTAREN) 1 % Gel; Apply 2 gram to affected finger/hand TID-PRN for pain  Check xray of finger

## 2023-01-22 NOTE — TELEPHONE ENCOUNTER
Refill Decision Note   Peter Lopez  is requesting a refill authorization.  Brief Assessment and Rationale for Refill:  Approve     Medication Therapy Plan:       Medication Reconciliation Completed: No   Comments:     No Care Gaps recommended.     Note composed:9:50 PM 01/21/2023

## 2023-01-23 ENCOUNTER — HOSPITAL ENCOUNTER (OUTPATIENT)
Dept: CARDIOLOGY | Facility: HOSPITAL | Age: 88
Discharge: HOME | End: 2023-01-23
Attending: INTERNAL MEDICINE
Payer: MEDICARE

## 2023-01-23 DIAGNOSIS — R07.9 CHEST PAIN, UNSPECIFIED TYPE: ICD-10-CM

## 2023-01-23 DIAGNOSIS — R06.09 DOE (DYSPNEA ON EXERTION): ICD-10-CM

## 2023-01-23 LAB
AORTIC ROOT ANNULUS: 3.01 CM
AORTIC VALVE CUSP SEPERATION: 2.13 CM
ASCENDING AORTA: 3.37 CM
AV PEAK GRADIENT: 4 MMHG
AV VELOCITY RATIO: 0.71
CV ECHO LV RWT: 0.63 CM
CV STRESS BASE HR: 100 BPM
DIASTOLIC BLOOD PRESSURE: 94 MMHG
DOP CALC AO PEAK VEL: 0.94 M/S
DOP CALC LVOT AREA: 3.5 CM2
DOP CALC LVOT DIAMETER: 2.12 CM
DOP CALC LVOT PEAK VEL: 0.67 M/S
DOP CALC LVOT STROKE VOLUME: 41.28 CM3
DOP CALCLVOT PEAK VEL VTI: 11.7 CM
E WAVE DECELERATION TIME: 76.34 MSEC
E/A RATIO: 0.38
ECHO LV POSTERIOR WALL: 1.08 CM (ref 0.6–1.1)
EJECTION FRACTION: 55 %
FRACTIONAL SHORTENING: 25 % (ref 28–44)
INTERVENTRICULAR SEPTUM: 1.74 CM (ref 0.6–1.1)
IVC DIAMETER: 0.77 CM
LA MAJOR: 4.05 CM
LA MINOR: 4.68 CM
LA WIDTH: 3.6 CM
LEFT ATRIUM SIZE: 2.32 CM
LEFT ATRIUM VOLUME: 30.83 CM3
LEFT INTERNAL DIMENSION IN SYSTOLE: 2.56 CM (ref 2.1–4)
LEFT VENTRICLE DIASTOLIC VOLUME: 47.79 ML
LEFT VENTRICLE SYSTOLIC VOLUME: 23.7 ML
LEFT VENTRICULAR INTERNAL DIMENSION IN DIASTOLE: 3.41 CM (ref 3.5–6)
LEFT VENTRICULAR MASS: 168.79 G
LV LATERAL E/E' RATIO: 8.6 M/S
LVOT MG: 1.03 MMHG
LVOT MV: 0.48 CM/S
MV PEAK A VEL: 1.12 M/S
MV PEAK E VEL: 0.43 M/S
MV STENOSIS PRESSURE HALF TIME: 30.25 MS
MV VALVE AREA P 1/2 METHOD: 7.27 CM2
OHS CV CPX 1 MINUTE RECOVERY HEART RATE: 125 BPM
OHS CV CPX 85 PERCENT MAX PREDICTED HEART RATE MALE: 115
OHS CV CPX ESTIMATED METS: 5
OHS CV CPX MAX PREDICTED HEART RATE: 135
OHS CV CPX PATIENT IS FEMALE: 0
OHS CV CPX PATIENT IS MALE: 1
OHS CV CPX PEAK DIASTOLIC BLOOD PRESSURE: 75 MMHG
OHS CV CPX PEAK HEAR RATE: 136 BPM
OHS CV CPX PEAK RATE PRESSURE PRODUCT: ABNORMAL
OHS CV CPX PEAK SYSTOLIC BLOOD PRESSURE: 185 MMHG
OHS CV CPX PERCENT MAX PREDICTED HEART RATE ACHIEVED: 101
OHS CV CPX RATE PRESSURE PRODUCT PRESENTING: ABNORMAL
PISA TR MAX VEL: 2.66 M/S
PV PEAK VELOCITY: 0.62 CM/S
RA MAJOR: 4.22 CM
RA PRESSURE: 3 MMHG
RA WIDTH: 3.6 CM
RIGHT VENTRICULAR END-DIASTOLIC DIMENSION: 3.4 CM
SINUS: 3.8 CM
STJ: 3.01 CM
STRESS ECHO POST EXERCISE DUR MIN: 2 MINUTES
STRESS ECHO POST EXERCISE DUR SEC: 11 SECONDS
SYSTOLIC BLOOD PRESSURE: 155 MMHG
TDI LATERAL: 0.05 M/S
TR MAX PG: 28 MMHG
TRICUSPID ANNULAR PLANE SYSTOLIC EXCURSION: 2.4 CM
TV REST PULMONARY ARTERY PRESSURE: 31 MMHG

## 2023-01-23 PROCEDURE — 93320 DOPPLER ECHO COMPLETE: CPT | Mod: 26,,, | Performed by: INTERNAL MEDICINE

## 2023-01-23 PROCEDURE — 93325 DOPPLER ECHO COLOR FLOW MAPG: CPT

## 2023-01-23 PROCEDURE — 93325 STRESS ECHO (CUPID ONLY): ICD-10-PCS | Mod: 26,,, | Performed by: INTERNAL MEDICINE

## 2023-01-23 PROCEDURE — 93351 STRESS ECHO (CUPID ONLY): ICD-10-PCS | Mod: 26,,, | Performed by: INTERNAL MEDICINE

## 2023-01-23 PROCEDURE — 93320 STRESS ECHO (CUPID ONLY): ICD-10-PCS | Mod: 26,,, | Performed by: INTERNAL MEDICINE

## 2023-01-23 PROCEDURE — 93351 STRESS TTE COMPLETE: CPT | Mod: 26,,, | Performed by: INTERNAL MEDICINE

## 2023-01-23 PROCEDURE — 93325 DOPPLER ECHO COLOR FLOW MAPG: CPT | Mod: 26,,, | Performed by: INTERNAL MEDICINE

## 2023-01-23 NOTE — PROGRESS NOTES
Patient, Peter Lopez (MRN #[id number]), presented with a recent Platelet count less than 150 K/uL consistent with the definition of thrombocytopenia (ICD10 - D69.6).    Platelets   Date Value Ref Range Status   12/17/2022 103 (L) 150 - 450 K/uL Final     The patient's thrombocytopenia was monitored, evaluated, addressed and/or treated. This addendum to the medical record is made on 01/22/2023.

## 2023-01-31 NOTE — PROGRESS NOTES
Subjective:       Peter Lopez is a 87 y.o. male who is an established patient with Dr Cole, though is new to me was seen  for evaluation of prostate cancer.      Last saw Kelsey 11/2022. He has history of high risk prostate cancer s/p XRT on ADT. PSA at time of diagnosis 54.8 in 11/2019. Also s/p channel TURP for UR. Last injection 8/2022, due now.     Lotrisone working well.     PSA 12/2022 - < 0.01    The following portions of the patient's history were reviewed and updated as appropriate: allergies, current medications, past family history, past medical history, past social history, past surgical history and problem list.    Review of Systems  Twelve point review of systems completed. Pertinent positive and negatives listed in HPI.      Objective:    Vitals: Wt 81.9 kg (180 lb 8.9 oz)   BMI 25.18 kg/m²     Physical Exam   General: well developed, well nourished in no acute distress  Head: normocephalic, atraumatic  Neck: supple, trachea midline, no obvious enlargement of thyroid  HEENT: EOMI, mucus membranes moist, sclera anicteric, no hearing impairment  Lungs: symmetric expansion, non-labored breathing  Skin: no rashes or lesions  Neuro: alert and oriented x 3, no gross deficits  Psych: normal judgment and insight, normal mood/affect and non-anxious  Genitourinary:   deferred   NIMISHA: deferred      Lab Review   Urine analysis today in clinic shows +blood 5-10    Lab Results   Component Value Date    WBC 2.08 (L) 12/17/2022    HGB 11.6 (L) 12/17/2022    HCT 35.8 (L) 12/17/2022    MCV 87 12/17/2022     (L) 12/17/2022     Lab Results   Component Value Date    CREATININE 1.8 (H) 12/17/2022    BUN 29 (H) 12/17/2022     Lab Results   Component Value Date    PSA 19.0 (H) 07/10/2017     Lab Results   Component Value Date    PSADIAG <0.01 12/07/2022       Imaging  CT, bone scan, MRI reviewed  Reports and images were personally reviewed by me and discussed with the patient today         Assessment/Plan:      1.  Malignant neoplasm of prostate    - s/p XRT on ADT (x 3 year per last notes)   - Lupron today   - PSA with good response   - PSA/T 3 months     2. Foreskin problem    - Cont Lotrisone     3. Urinary retention    - s/p channel TURP       Follow up in 3 months with Dr Cole

## 2023-02-06 ENCOUNTER — OFFICE VISIT (OUTPATIENT)
Dept: UROLOGY | Facility: CLINIC | Age: 88
End: 2023-02-06
Payer: MEDICARE

## 2023-02-06 VITALS — WEIGHT: 180.56 LBS | BODY MASS INDEX: 25.18 KG/M2

## 2023-02-06 DIAGNOSIS — N47.8 FORESKIN PROBLEM: ICD-10-CM

## 2023-02-06 DIAGNOSIS — R33.9 URINARY RETENTION: ICD-10-CM

## 2023-02-06 DIAGNOSIS — C61 MALIGNANT NEOPLASM OF PROSTATE: Primary | ICD-10-CM

## 2023-02-06 PROCEDURE — 1101F PT FALLS ASSESS-DOCD LE1/YR: CPT | Mod: CPTII,S$GLB,, | Performed by: UROLOGY

## 2023-02-06 PROCEDURE — 1126F PR PAIN SEVERITY QUANTIFIED, NO PAIN PRESENT: ICD-10-PCS | Mod: CPTII,S$GLB,, | Performed by: UROLOGY

## 2023-02-06 PROCEDURE — 99999 PR PBB SHADOW E&M-EST. PATIENT-LVL III: CPT | Mod: PBBFAC,,, | Performed by: UROLOGY

## 2023-02-06 PROCEDURE — 96402 PR CHEMOTHER HORMON ANTINEOPL SUB-Q/IM: ICD-10-PCS | Mod: S$GLB,,, | Performed by: UROLOGY

## 2023-02-06 PROCEDURE — 1159F MED LIST DOCD IN RCRD: CPT | Mod: CPTII,S$GLB,, | Performed by: UROLOGY

## 2023-02-06 PROCEDURE — 96402 CHEMO HORMON ANTINEOPL SQ/IM: CPT | Mod: S$GLB,,, | Performed by: UROLOGY

## 2023-02-06 PROCEDURE — 3288F FALL RISK ASSESSMENT DOCD: CPT | Mod: CPTII,S$GLB,, | Performed by: UROLOGY

## 2023-02-06 PROCEDURE — 1160F PR REVIEW ALL MEDS BY PRESCRIBER/CLIN PHARMACIST DOCUMENTED: ICD-10-PCS | Mod: CPTII,S$GLB,, | Performed by: UROLOGY

## 2023-02-06 PROCEDURE — 99214 PR OFFICE/OUTPT VISIT, EST, LEVL IV, 30-39 MIN: ICD-10-PCS | Mod: 25,S$GLB,, | Performed by: UROLOGY

## 2023-02-06 PROCEDURE — 99999 PR PBB SHADOW E&M-EST. PATIENT-LVL III: ICD-10-PCS | Mod: PBBFAC,,, | Performed by: UROLOGY

## 2023-02-06 PROCEDURE — 3288F PR FALLS RISK ASSESSMENT DOCUMENTED: ICD-10-PCS | Mod: CPTII,S$GLB,, | Performed by: UROLOGY

## 2023-02-06 PROCEDURE — 1101F PR PT FALLS ASSESS DOC 0-1 FALLS W/OUT INJ PAST YR: ICD-10-PCS | Mod: CPTII,S$GLB,, | Performed by: UROLOGY

## 2023-02-06 PROCEDURE — 1126F AMNT PAIN NOTED NONE PRSNT: CPT | Mod: CPTII,S$GLB,, | Performed by: UROLOGY

## 2023-02-06 PROCEDURE — 99214 OFFICE O/P EST MOD 30 MIN: CPT | Mod: 25,S$GLB,, | Performed by: UROLOGY

## 2023-02-06 PROCEDURE — 1159F PR MEDICATION LIST DOCUMENTED IN MEDICAL RECORD: ICD-10-PCS | Mod: CPTII,S$GLB,, | Performed by: UROLOGY

## 2023-02-06 PROCEDURE — 1160F RVW MEDS BY RX/DR IN RCRD: CPT | Mod: CPTII,S$GLB,, | Performed by: UROLOGY

## 2023-02-15 ENCOUNTER — TELEPHONE (OUTPATIENT)
Dept: FAMILY MEDICINE | Facility: CLINIC | Age: 88
End: 2023-02-15
Payer: MEDICARE

## 2023-02-15 NOTE — TELEPHONE ENCOUNTER
Spoke with the Patient and scheduled an [facility] appointment for 03/14/23 @ 3:00pm  Patient verbally understands.

## 2023-03-07 ENCOUNTER — OFFICE VISIT (OUTPATIENT)
Dept: PODIATRY | Facility: CLINIC | Age: 88
End: 2023-03-07
Payer: MEDICARE

## 2023-03-07 VITALS — HEIGHT: 71 IN | BODY MASS INDEX: 25.28 KG/M2 | WEIGHT: 180.56 LBS

## 2023-03-07 DIAGNOSIS — E11.49 TYPE II DIABETES MELLITUS WITH NEUROLOGICAL MANIFESTATIONS: Primary | ICD-10-CM

## 2023-03-07 DIAGNOSIS — B35.1 ONYCHOMYCOSIS DUE TO DERMATOPHYTE: ICD-10-CM

## 2023-03-07 DIAGNOSIS — L84 CORN OR CALLUS: ICD-10-CM

## 2023-03-07 PROCEDURE — 11056 PR TRIM BENIGN HYPERKERATOTIC SKIN LESION,2-4: ICD-10-PCS | Mod: Q9,S$GLB,, | Performed by: PODIATRIST

## 2023-03-07 PROCEDURE — 1126F AMNT PAIN NOTED NONE PRSNT: CPT | Mod: CPTII,S$GLB,, | Performed by: PODIATRIST

## 2023-03-07 PROCEDURE — 11721 PR DEBRIDEMENT OF NAILS, 6 OR MORE: ICD-10-PCS | Mod: Q9,59,S$GLB, | Performed by: PODIATRIST

## 2023-03-07 PROCEDURE — 1159F PR MEDICATION LIST DOCUMENTED IN MEDICAL RECORD: ICD-10-PCS | Mod: CPTII,S$GLB,, | Performed by: PODIATRIST

## 2023-03-07 PROCEDURE — 1101F PT FALLS ASSESS-DOCD LE1/YR: CPT | Mod: CPTII,S$GLB,, | Performed by: PODIATRIST

## 2023-03-07 PROCEDURE — 1101F PR PT FALLS ASSESS DOC 0-1 FALLS W/OUT INJ PAST YR: ICD-10-PCS | Mod: CPTII,S$GLB,, | Performed by: PODIATRIST

## 2023-03-07 PROCEDURE — 99499 NO LOS: ICD-10-PCS | Mod: S$GLB,,, | Performed by: PODIATRIST

## 2023-03-07 PROCEDURE — 3288F PR FALLS RISK ASSESSMENT DOCUMENTED: ICD-10-PCS | Mod: CPTII,S$GLB,, | Performed by: PODIATRIST

## 2023-03-07 PROCEDURE — 99999 PR PBB SHADOW E&M-EST. PATIENT-LVL III: CPT | Mod: PBBFAC,,, | Performed by: PODIATRIST

## 2023-03-07 PROCEDURE — 11721 DEBRIDE NAIL 6 OR MORE: CPT | Mod: Q9,59,S$GLB, | Performed by: PODIATRIST

## 2023-03-07 PROCEDURE — 11056 PARNG/CUTG B9 HYPRKR LES 2-4: CPT | Mod: Q9,S$GLB,, | Performed by: PODIATRIST

## 2023-03-07 PROCEDURE — 99999 PR PBB SHADOW E&M-EST. PATIENT-LVL III: ICD-10-PCS | Mod: PBBFAC,,, | Performed by: PODIATRIST

## 2023-03-07 PROCEDURE — 3288F FALL RISK ASSESSMENT DOCD: CPT | Mod: CPTII,S$GLB,, | Performed by: PODIATRIST

## 2023-03-07 PROCEDURE — 1159F MED LIST DOCD IN RCRD: CPT | Mod: CPTII,S$GLB,, | Performed by: PODIATRIST

## 2023-03-07 PROCEDURE — 99499 UNLISTED E&M SERVICE: CPT | Mod: S$GLB,,, | Performed by: PODIATRIST

## 2023-03-07 PROCEDURE — 1126F PR PAIN SEVERITY QUANTIFIED, NO PAIN PRESENT: ICD-10-PCS | Mod: CPTII,S$GLB,, | Performed by: PODIATRIST

## 2023-03-13 ENCOUNTER — PES CALL (OUTPATIENT)
Dept: ADMINISTRATIVE | Facility: CLINIC | Age: 88
End: 2023-03-13
Payer: MEDICARE

## 2023-03-13 ENCOUNTER — OFFICE VISIT (OUTPATIENT)
Dept: CARDIOLOGY | Facility: CLINIC | Age: 88
End: 2023-03-13
Payer: MEDICARE

## 2023-03-13 VITALS
HEART RATE: 104 BPM | WEIGHT: 181.56 LBS | DIASTOLIC BLOOD PRESSURE: 88 MMHG | BODY MASS INDEX: 25.42 KG/M2 | HEIGHT: 71 IN | SYSTOLIC BLOOD PRESSURE: 149 MMHG | RESPIRATION RATE: 18 BRPM | OXYGEN SATURATION: 95 %

## 2023-03-13 DIAGNOSIS — R53.82 CHRONIC FATIGUE: ICD-10-CM

## 2023-03-13 DIAGNOSIS — E11.21 DIABETES MELLITUS WITH NEPHROPATHY: ICD-10-CM

## 2023-03-13 DIAGNOSIS — D50.9 IRON DEFICIENCY ANEMIA, UNSPECIFIED IRON DEFICIENCY ANEMIA TYPE: Chronic | ICD-10-CM

## 2023-03-13 DIAGNOSIS — J96.11 CHRONIC RESPIRATORY FAILURE WITH HYPOXIA: ICD-10-CM

## 2023-03-13 DIAGNOSIS — N18.32 STAGE 3B CHRONIC KIDNEY DISEASE: ICD-10-CM

## 2023-03-13 DIAGNOSIS — G47.33 OSA (OBSTRUCTIVE SLEEP APNEA): Chronic | ICD-10-CM

## 2023-03-13 DIAGNOSIS — G47.31 COMPLEX SLEEP APNEA SYNDROME: ICD-10-CM

## 2023-03-13 DIAGNOSIS — I10 ESSENTIAL HYPERTENSION: Primary | Chronic | ICD-10-CM

## 2023-03-13 DIAGNOSIS — I27.20 PULMONARY HYPERTENSION: Chronic | ICD-10-CM

## 2023-03-13 DIAGNOSIS — J84.9 ILD (INTERSTITIAL LUNG DISEASE): Chronic | ICD-10-CM

## 2023-03-13 DIAGNOSIS — I70.0 AORTIC ATHEROSCLEROSIS: ICD-10-CM

## 2023-03-13 DIAGNOSIS — C61 MALIGNANT NEOPLASM OF PROSTATE: ICD-10-CM

## 2023-03-13 DIAGNOSIS — J44.9 CHRONIC OBSTRUCTIVE PULMONARY DISEASE, UNSPECIFIED COPD TYPE: ICD-10-CM

## 2023-03-13 PROCEDURE — 99999 PR PBB SHADOW E&M-EST. PATIENT-LVL IV: CPT | Mod: PBBFAC,,, | Performed by: INTERNAL MEDICINE

## 2023-03-13 PROCEDURE — 99214 OFFICE O/P EST MOD 30 MIN: CPT | Mod: S$GLB,,, | Performed by: INTERNAL MEDICINE

## 2023-03-13 PROCEDURE — 1159F MED LIST DOCD IN RCRD: CPT | Mod: CPTII,S$GLB,, | Performed by: INTERNAL MEDICINE

## 2023-03-13 PROCEDURE — 3288F PR FALLS RISK ASSESSMENT DOCUMENTED: ICD-10-PCS | Mod: CPTII,S$GLB,, | Performed by: INTERNAL MEDICINE

## 2023-03-13 PROCEDURE — 3079F PR MOST RECENT DIASTOLIC BLOOD PRESSURE 80-89 MM HG: ICD-10-PCS | Mod: CPTII,S$GLB,, | Performed by: INTERNAL MEDICINE

## 2023-03-13 PROCEDURE — 3079F DIAST BP 80-89 MM HG: CPT | Mod: CPTII,S$GLB,, | Performed by: INTERNAL MEDICINE

## 2023-03-13 PROCEDURE — 1101F PT FALLS ASSESS-DOCD LE1/YR: CPT | Mod: CPTII,S$GLB,, | Performed by: INTERNAL MEDICINE

## 2023-03-13 PROCEDURE — 1126F AMNT PAIN NOTED NONE PRSNT: CPT | Mod: CPTII,S$GLB,, | Performed by: INTERNAL MEDICINE

## 2023-03-13 PROCEDURE — 99999 PR PBB SHADOW E&M-EST. PATIENT-LVL IV: ICD-10-PCS | Mod: PBBFAC,,, | Performed by: INTERNAL MEDICINE

## 2023-03-13 PROCEDURE — 3288F FALL RISK ASSESSMENT DOCD: CPT | Mod: CPTII,S$GLB,, | Performed by: INTERNAL MEDICINE

## 2023-03-13 PROCEDURE — 1126F PR PAIN SEVERITY QUANTIFIED, NO PAIN PRESENT: ICD-10-PCS | Mod: CPTII,S$GLB,, | Performed by: INTERNAL MEDICINE

## 2023-03-13 PROCEDURE — 99214 PR OFFICE/OUTPT VISIT, EST, LEVL IV, 30-39 MIN: ICD-10-PCS | Mod: S$GLB,,, | Performed by: INTERNAL MEDICINE

## 2023-03-13 PROCEDURE — 1160F RVW MEDS BY RX/DR IN RCRD: CPT | Mod: CPTII,S$GLB,, | Performed by: INTERNAL MEDICINE

## 2023-03-13 PROCEDURE — 3077F PR MOST RECENT SYSTOLIC BLOOD PRESSURE >= 140 MM HG: ICD-10-PCS | Mod: CPTII,S$GLB,, | Performed by: INTERNAL MEDICINE

## 2023-03-13 PROCEDURE — 1101F PR PT FALLS ASSESS DOC 0-1 FALLS W/OUT INJ PAST YR: ICD-10-PCS | Mod: CPTII,S$GLB,, | Performed by: INTERNAL MEDICINE

## 2023-03-13 PROCEDURE — 1160F PR REVIEW ALL MEDS BY PRESCRIBER/CLIN PHARMACIST DOCUMENTED: ICD-10-PCS | Mod: CPTII,S$GLB,, | Performed by: INTERNAL MEDICINE

## 2023-03-13 PROCEDURE — 1159F PR MEDICATION LIST DOCUMENTED IN MEDICAL RECORD: ICD-10-PCS | Mod: CPTII,S$GLB,, | Performed by: INTERNAL MEDICINE

## 2023-03-13 PROCEDURE — 3077F SYST BP >= 140 MM HG: CPT | Mod: CPTII,S$GLB,, | Performed by: INTERNAL MEDICINE

## 2023-03-13 NOTE — PROGRESS NOTES
CARDIOVASCULAR CONSULTATION    REASON FOR CONSULT:   Peter Lopez is a 87 y.o. male who presents for evaluation of frequent PVCs.      HISTORY OF PRESENT ILLNESS:     Patient is 83-year-old pleasant person.  Has been referred to me for frequent PVCs.  We did an EKG in the clinic today and I did not see any frequent PVCs on this EKG.  I also reviewed his past EKGs which have been scanned in the computer and did not see any frequent PVCs.  Patient denies any palpitations patient denies any chest pains at rest on exertion, orthopnea, PND.  However he had an EKG performed on 11/11/2019 and as per the report that EKG showed frequent PVCs as per the notes.  However, it is not scanned in the system and I am unable to see it myself.    Notes from October 2020:  Patient here for follow-up.  Denies any chest pains at rest on exertion, orthopnea, PND.  States his recovering from school with infection.  EKG done in the clinic today was personally reviewed and demonstrated sinus tachycardia at a heart rate of 106 beats per minute, nonspecific ST changes.  Patient denies any chest at rest on exertion, orthopnea, PND, swelling feet.      Concentric left ventricular hypertrophy.  Normal left ventricular systolic function. The estimated ejection fraction is 55%  Normal LV diastolic function.  Normal right ventricular systolic function.  No pulmonary hypertension present.        Normal central venous pressure (3 mm Hg).  The estimated PA systolic pressure is 28 mm Hg  Technically adequate study  Avg  bpm  Rare PVCs, very rare PACs.  No atrial fibrillation.  Sinus tachycardia - 18 hours, 50 minutes.      Notes from June 2020:  Patient here for follow-up.  Recently hospitalized.  Stress test did not show any significant ischemia.  Echo showed normal left ventricle systolic function.      Normal myocardial perfusion scan. There is no evidence of myocardial ischemia or infarction.    There is a  mild to moderate intensity fixed defect  in the inferobasilar wall of the left ventricle secondary to diaphragm attenuation.    The visually estimated ejection fraction is normal during stress.    There is normal wall motion post stress.      The left ventricle is normal in size with concentric hypertrophy and normal systolic function.  The estimated ejection fraction is 60%.  Indeterminate left ventricular diastolic function.  Normal right ventricular size with normal right ventricular systolic function.  Mild tricuspid regurgitation.  Normal central venous pressure (3 mmHg).  The estimated PA systolic pressure is 57 mmHg.  There is pulmonary hypertension.      From January 23: Patient here for follow-up after a long hiatus.  Came into the ER with some chest pain and tightness.  Currently chest pain-free.  Main complaint is chronic dyspnea on exertion.    From March 23:  Patient here for follow-up.  Blood pressure mildly elevated in clinic, states that usually at home stays well controlled.  Stress echo did not show any significant ischemia      The left ventricle is normal in size with concentric hypertrophy and normal systolic function.  There were no arrhythmias during stress.  The estimated ejection fraction is 55%.  Normal left ventricular diastolic function.  Normal right ventricular size with normal right ventricular systolic function.  Normal central venous pressure (3 mmHg).  The estimated PA systolic pressure is 31 mmHg.  The sinuses of Valsalva is mildly dilated. 3.8 cm  The ECG portion of this study is negative for myocardial ischemia.  The stress echo portion of this study is negative for myocardial ischemia.         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  Vaccinated.  C A       ALLERGIES AND MEDICATION:   Review of patient's allergies indicates:  No Known Allergies     Medication List            Accurate as of March 13, 2023  9:28 AM. If you have any questions, ask your nurse or doctor.                CONTINUE taking these medications      albuterol 90 mcg/actuation inhaler  Commonly known as: PROAIR HFA  Inhale 2 puffs into the lungs every 4 (four) hours as needed for Wheezing (cough/shortness of breath). Rescue     amLODIPine 5 MG tablet  Commonly known as: NORVASC  Take 1 tablet (5 mg total) by mouth once daily.     atorvastatin 20 MG tablet  Commonly known as: LIPITOR  Take 1 tablet (20 mg total) by mouth every evening.     blood sugar diagnostic Strp  To check BG 1 times daily, to use with insurance preferred meter     blood-glucose meter kit  To check BG 1 times daily, to use with insurance preferred meter     cholecalciferol (vitamin D3) 25 mcg (1,000 unit) capsule  Commonly known as: VITAMIN D3     diclofenac sodium 1 % Gel  Commonly known as: VOLTAREN  Apply 2 gram to affected finger/hand TID-PRN for pain     lactulose 10 gram/15 mL solution  Commonly known as: CHRONULAC  Take 30 mLs (20 g total) by mouth 2 (two) times daily as needed (constipation).     leuprolide 1 mg/0.2 mL injection  Commonly known as: LUPRON     olmesartan 40 MG tablet  Commonly known as: BENICAR  Take 1 tablet  "by mouth once daily     SPIRIVA RESPIMAT 2.5 mcg/actuation inhaler  Generic drug: tiotropium bromide  INHALE 2 SPRAY(S) BY MOUTH ONCE DAILY     TRADJENTA 5 mg Tab tablet  Generic drug: linaGLIPtin  Take 1 tablet by mouth once daily              SOCIAL HISTORY:     Social History     Socioeconomic History    Marital status:    Tobacco Use    Smoking status: Former     Packs/day: 1.00     Years: 40.00     Pack years: 40.00     Types: Cigarettes     Quit date: 1991     Years since quittin.3    Smokeless tobacco: Former     Quit date: 1986   Substance and Sexual Activity    Alcohol use: Not Currently     Alcohol/week: 0.0 standard drinks     Comment: 2 bottles beer on weekends    Drug use: No    Sexual activity: Yes     Partners: Female     Comment:     Social History Narrative    , resides in [place]. 7 children but lost one. 16 grandchildren. 4 great-grandchildren. Works at [employer]       FAMILY HISTORY:     Family History   Problem Relation Age of Onset    Cancer Mother     Asbestos Father     Liver disease Neg Hx        REVIEW OF SYSTEMS:   Review of Systems   Constitutional: Negative.   HENT: Negative.     Eyes: Negative.    Cardiovascular:  Positive for dyspnea on exertion.   Respiratory: Negative.     Endocrine: Negative.    Hematologic/Lymphatic: Negative.    Skin: Negative.    Musculoskeletal: Negative.    Gastrointestinal: Negative.    Genitourinary: Negative.    Neurological: Negative.    Psychiatric/Behavioral: Negative.     Allergic/Immunologic: Negative.      A 10 point review of systems was performed and all the pertinent positives have been mentioned. Rest of review of systems was negative.        PHYSICAL EXAM:     Vitals:    23 0912   BP: (!) 158/82   Pulse: 104   Resp: 18    Body mass index is 25.32 kg/m².  Weight: 82.3 kg (181 lb 8.8 oz)   Height: 5' 11" (180.3 cm)     Physical Exam  Vitals reviewed.   Constitutional:       Appearance: He is " well-developed.   HENT:      Head: Normocephalic.   Eyes:      Conjunctiva/sclera: Conjunctivae normal.      Pupils: Pupils are equal, round, and reactive to light.   Cardiovascular:      Rate and Rhythm: Normal rate and regular rhythm.      Heart sounds: Normal heart sounds.   Pulmonary:      Effort: Pulmonary effort is normal.      Breath sounds: Normal breath sounds.   Abdominal:      General: Bowel sounds are normal.      Palpations: Abdomen is soft.   Musculoskeletal:      Cervical back: Normal range of motion and neck supple.   Skin:     General: Skin is warm.   Neurological:      Mental Status: He is alert and oriented to person, place, and time.         DATA:     Laboratory:  CBC:  Recent Labs   Lab 08/09/22  1506 08/29/22  0923 12/17/22  0328   WBC 2.77 L 3.52 L 2.08 L   Hemoglobin 11.0 L 10.7 L 11.6 L   Hematocrit 34.7 L 32.9 L 35.8 L   Platelets 313 272 103 L         CHEMISTRIES:  Recent Labs   Lab 04/10/20  0755 04/11/20  0534 04/13/20  0459 04/15/20  0445 03/14/22  0843 03/15/22  1050 06/21/22  0859 08/09/22  1506 12/07/22  0844 12/17/22  0328   Glucose 115 H 131 H 126 H   < > 138 H 117 H 219 H 102 222 H 155 H   Sodium 137 134 L 136   < > 138 136 141 140 140 140   Potassium 4.4 4.3 4.2   < > 4.2 4.3 4.7 4.4 3.9 3.9   BUN 11 12 12   < > 43 H 38 H 26 H 23 24 H 29 H   Creatinine 1.0 1.0 0.9   < > 2.4 H 2.4 H 2.0 H 1.9 H 2.0 H 1.8 H   eGFR if  >60 >60 >60   < > 28 A 28 A 34 A  --   --   --    eGFR if non African American >60 >60 >60   < > 24 A 24 A 30 A  --   --   --    Calcium 8.9 8.9 9.1   < > 10.0 10.1 10.1 10.1 9.7 9.7   Magnesium 2.1 2.0 2.0  --   --   --   --   --   --   --     < > = values in this interval not displayed.         CARDIAC BIOMARKERS:  Recent Labs   Lab 04/03/20  1509 04/03/20  2316 04/15/20  0631 05/20/21  1132 05/21/21  1408 12/17/22  0328 12/17/22  0722     --  65  --   --   --   --    Troponin I 0.116 H   < > <0.006   < > 0.012 0.017 0.013    < > = values in  this interval not displayed.         COAGS:  Recent Labs   Lab 03/28/20  1938 03/28/20  2253   INR 1.2 0.9         LIPIDS/LFTS:  Recent Labs   Lab 05/21/21  0415 09/13/21  1138 12/15/21  0759 03/14/22  0843 03/15/22  1050 08/09/22  1506 12/07/22  0844 12/17/22  0328   Cholesterol 122 117 L  --  118 L  --   --   --   --    Triglycerides 67 76  --  86  --   --   --   --    HDL 44 44  --  41  --   --   --   --    LDL Cholesterol 64.6 57.8 L  --  59.8 L  --   --   --   --    Non-HDL Cholesterol 78 73  --  77  --   --   --   --    AST  --  34   < > 30   < > 29 25 30   ALT  --  20   < > 24   < > 17 17 18    < > = values in this interval not displayed.         Hemoglobin A1C   Date Value Ref Range Status   12/07/2022 7.2 (H) 4.0 - 5.6 % Final     Comment:     ADA Screening Guidelines:  5.7-6.4%  Consistent with prediabetes  >or=6.5%  Consistent with diabetes    High levels of fetal hemoglobin interfere with the HbA1C  assay. Heterozygous hemoglobin variants (HbS, HgC, etc)do  not significantly interfere with this assay.   However, presence of multiple variants may affect accuracy.     06/21/2022 7.4 (H) 4.0 - 5.6 % Final     Comment:     ADA Screening Guidelines:  5.7-6.4%  Consistent with prediabetes  >or=6.5%  Consistent with diabetes    High levels of fetal hemoglobin interfere with the HbA1C  assay. Heterozygous hemoglobin variants (HbS, HgC, etc)do  not significantly interfere with this assay.   However, presence of multiple variants may affect accuracy.     03/14/2022 7.3 (H) 4.0 - 5.6 % Final     Comment:     ADA Screening Guidelines:  5.7-6.4%  Consistent with prediabetes  >or=6.5%  Consistent with diabetes    High levels of fetal hemoglobin interfere with the HbA1C  assay. Heterozygous hemoglobin variants (HbS, HgC, etc)do  not significantly interfere with this assay.   However, presence of multiple variants may affect accuracy.         TSH  Recent Labs   Lab 03/28/20  1753 05/20/21  1132 03/14/22  0843   TSH  2.079 0.990 1.337         The ASCVD Risk score (Param DK, et al., 2019) failed to calculate for the following reasons:    The 2019 ASCVD risk score is only valid for ages 40 to 79           Cardiovascular Testing:    EKG: (personally reviewed tracing)  EKG performed in clinic today demonstrated sinus tachycardia.      ASSESSMENT AND PLAN     Patient Active Problem List   Diagnosis    Essential hypertension    Diabetes mellitus with nephropathy    Aortic atherosclerosis    ILD (interstitial lung disease)    Chronic respiratory failure with hypoxia    Reactive depression    Chronic obstructive pulmonary disease    Sleep-related breathing disorder    Chronic fatigue    Complex sleep apnea syndrome    Pulmonary hypertension    Iron deficiency anemia    Malignant neoplasm of prostate    Stage 3b chronic kidney disease    IWONA (obstructive sleep apnea)       Frequent PVCs as per notes from primary care.  Further evaluation with the help of a Holter monitor as well as a 2D echocardiogram.  2D echocardiogram showed normal left ventricle systolic function.  Holter monitor did not show very large amount of PVCs.       Stress echo did not show any significant ischemia    Hypertension:  Well controlled on current therapy.    Pulmonary hypertension        Follow-up after 3-4 m    Thank you very much for involving me in the care of your patient.  Please do not hesitate to contact me if there are any questions.      Sean Schafer MD, FACC, [facility]  Interventional Cardiologist, [facility].           This note was dictated with the help of speech recognition software.  There might be un-intended errors and/or substitutions.

## 2023-04-10 ENCOUNTER — TELEPHONE (OUTPATIENT)
Dept: ADMINISTRATIVE | Facility: CLINIC | Age: 88
End: 2023-04-10
Payer: MEDICARE

## 2023-04-10 NOTE — TELEPHONE ENCOUNTER
Called pt, informed pt I was calling to remind pt of his in office EAWV on 4/11/23; clinic location provided to patient; pt confirmed appointment

## 2023-04-11 ENCOUNTER — OFFICE VISIT (OUTPATIENT)
Dept: FAMILY MEDICINE | Facility: CLINIC | Age: 88
End: 2023-04-11
Payer: MEDICARE

## 2023-04-11 VITALS
BODY MASS INDEX: 25.16 KG/M2 | HEART RATE: 100 BPM | OXYGEN SATURATION: 96 % | TEMPERATURE: 98 F | RESPIRATION RATE: 17 BRPM | SYSTOLIC BLOOD PRESSURE: 132 MMHG | DIASTOLIC BLOOD PRESSURE: 78 MMHG | WEIGHT: 179.69 LBS | HEIGHT: 71 IN

## 2023-04-11 DIAGNOSIS — J44.9 CHRONIC OBSTRUCTIVE PULMONARY DISEASE, UNSPECIFIED COPD TYPE: ICD-10-CM

## 2023-04-11 DIAGNOSIS — E11.40 TYPE 2 DIABETES MELLITUS WITH DIABETIC NEUROPATHY, WITHOUT LONG-TERM CURRENT USE OF INSULIN: ICD-10-CM

## 2023-04-11 DIAGNOSIS — I27.20 PULMONARY HYPERTENSION: Chronic | ICD-10-CM

## 2023-04-11 DIAGNOSIS — C61 MALIGNANT NEOPLASM OF PROSTATE: ICD-10-CM

## 2023-04-11 DIAGNOSIS — N18.32 STAGE 3B CHRONIC KIDNEY DISEASE: ICD-10-CM

## 2023-04-11 DIAGNOSIS — I70.0 AORTIC ATHEROSCLEROSIS: ICD-10-CM

## 2023-04-11 DIAGNOSIS — E11.21 DIABETES MELLITUS WITH NEPHROPATHY: ICD-10-CM

## 2023-04-11 DIAGNOSIS — Z00.00 ENCOUNTER FOR PREVENTIVE HEALTH EXAMINATION: Primary | ICD-10-CM

## 2023-04-11 PROCEDURE — 1160F RVW MEDS BY RX/DR IN RCRD: CPT | Mod: CPTII,S$GLB,, | Performed by: PHYSICIAN ASSISTANT

## 2023-04-11 PROCEDURE — 3078F PR MOST RECENT DIASTOLIC BLOOD PRESSURE < 80 MM HG: ICD-10-PCS | Mod: CPTII,S$GLB,, | Performed by: PHYSICIAN ASSISTANT

## 2023-04-11 PROCEDURE — 1170F PR FUNCTIONAL STATUS ASSESSED: ICD-10-PCS | Mod: CPTII,S$GLB,, | Performed by: PHYSICIAN ASSISTANT

## 2023-04-11 PROCEDURE — 3288F FALL RISK ASSESSMENT DOCD: CPT | Mod: CPTII,S$GLB,, | Performed by: PHYSICIAN ASSISTANT

## 2023-04-11 PROCEDURE — 3078F DIAST BP <80 MM HG: CPT | Mod: CPTII,S$GLB,, | Performed by: PHYSICIAN ASSISTANT

## 2023-04-11 PROCEDURE — 3075F PR MOST RECENT SYSTOLIC BLOOD PRESS GE 130-139MM HG: ICD-10-PCS | Mod: CPTII,S$GLB,, | Performed by: PHYSICIAN ASSISTANT

## 2023-04-11 PROCEDURE — 3288F PR FALLS RISK ASSESSMENT DOCUMENTED: ICD-10-PCS | Mod: CPTII,S$GLB,, | Performed by: PHYSICIAN ASSISTANT

## 2023-04-11 PROCEDURE — G0439 PPPS, SUBSEQ VISIT: HCPCS | Mod: S$GLB,,, | Performed by: PHYSICIAN ASSISTANT

## 2023-04-11 PROCEDURE — G0439 PR MEDICARE ANNUAL WELLNESS SUBSEQUENT VISIT: ICD-10-PCS | Mod: S$GLB,,, | Performed by: PHYSICIAN ASSISTANT

## 2023-04-11 PROCEDURE — 1159F MED LIST DOCD IN RCRD: CPT | Mod: CPTII,S$GLB,, | Performed by: PHYSICIAN ASSISTANT

## 2023-04-11 PROCEDURE — 1101F PR PT FALLS ASSESS DOC 0-1 FALLS W/OUT INJ PAST YR: ICD-10-PCS | Mod: CPTII,S$GLB,, | Performed by: PHYSICIAN ASSISTANT

## 2023-04-11 PROCEDURE — 3075F SYST BP GE 130 - 139MM HG: CPT | Mod: CPTII,S$GLB,, | Performed by: PHYSICIAN ASSISTANT

## 2023-04-11 PROCEDURE — 1170F FXNL STATUS ASSESSED: CPT | Mod: CPTII,S$GLB,, | Performed by: PHYSICIAN ASSISTANT

## 2023-04-11 PROCEDURE — 1101F PT FALLS ASSESS-DOCD LE1/YR: CPT | Mod: CPTII,S$GLB,, | Performed by: PHYSICIAN ASSISTANT

## 2023-04-11 PROCEDURE — 1159F PR MEDICATION LIST DOCUMENTED IN MEDICAL RECORD: ICD-10-PCS | Mod: CPTII,S$GLB,, | Performed by: PHYSICIAN ASSISTANT

## 2023-04-11 PROCEDURE — 1160F PR REVIEW ALL MEDS BY PRESCRIBER/CLIN PHARMACIST DOCUMENTED: ICD-10-PCS | Mod: CPTII,S$GLB,, | Performed by: PHYSICIAN ASSISTANT

## 2023-04-11 PROCEDURE — 99999 PR PBB SHADOW E&M-EST. PATIENT-LVL IV: ICD-10-PCS | Mod: PBBFAC,,, | Performed by: PHYSICIAN ASSISTANT

## 2023-04-11 PROCEDURE — 99999 PR PBB SHADOW E&M-EST. PATIENT-LVL IV: CPT | Mod: PBBFAC,,, | Performed by: PHYSICIAN ASSISTANT

## 2023-04-11 NOTE — PATIENT INSTRUCTIONS
Counseling and Referral of Other Preventative  (Italic type indicates deductible and co-insurance are waived)    Patient Name: Peter Lopez  Today's Date: 4/11/2023    Health Maintenance       Date Due Completion Date    Shingles Vaccine (1 of 2) Never done ---    Lipid Panel 03/14/2023 3/14/2022    Diabetes Urine Screening 03/14/2023 3/14/2022    Hemoglobin A1c 06/07/2023 12/7/2022    TETANUS VACCINE 02/16/2028 2/16/2018 (Declined)    Override on 2/16/2018: Declined        No orders of the defined types were placed in this encounter.      The following information is provided to all patients.  This information is to help you find resources for any of the problems found today that may be affecting your health:                Living healthy guide: www.[facility].gov      Understanding Diabetes: www.diabetes.org      Eating healthy: www.cdc.gov/healthyweight      CDC home safety checklist: www.cdc.gov/steadi/patient.html      Agency on Aging: www.goea.[place].[facility]      Alcoholics anonymous (AA): www.aa.org      Physical Activity: www.saad.nih.gov/br8wdae      Tobacco use: www.quitwithusla.org

## 2023-04-20 ENCOUNTER — TELEPHONE (OUTPATIENT)
Dept: FAMILY MEDICINE | Facility: CLINIC | Age: 88
End: 2023-04-20
Payer: MEDICARE

## 2023-04-20 NOTE — TELEPHONE ENCOUNTER
----- Message from Jose David Stover sent at 4/20/2023  3:08 PM CDT -----  Type: Patient Call Back    Who called:Self     What is the request in detail: Asking for a call regarding appt that he had , information that he was given regarding a gift card and a code     Can the clinic reply by [facility]? NO     Would the patient rather a call back or a response via [facility]? CALL     Best call back number: [phone]

## 2023-04-24 ENCOUNTER — LAB VISIT (OUTPATIENT)
Dept: LAB | Facility: HOSPITAL | Age: 88
End: 2023-04-24
Attending: UROLOGY
Payer: MEDICARE

## 2023-04-24 DIAGNOSIS — C61 MALIGNANT NEOPLASM OF PROSTATE: ICD-10-CM

## 2023-04-24 PROBLEM — J96.11 CHRONIC RESPIRATORY FAILURE WITH HYPOXIA: Status: RESOLVED | Noted: 2020-04-13 | Resolved: 2023-04-24

## 2023-04-24 PROCEDURE — 36415 COLL VENOUS BLD VENIPUNCTURE: CPT | Mod: PN | Performed by: UROLOGY

## 2023-04-24 PROCEDURE — 84403 ASSAY OF TOTAL TESTOSTERONE: CPT | Performed by: UROLOGY

## 2023-04-24 PROCEDURE — 84153 ASSAY OF PSA TOTAL: CPT | Performed by: UROLOGY

## 2023-04-25 LAB
COMPLEXED PSA SERPL-MCNC: <0.01 NG/ML (ref 0–4)
TESTOST SERPL-MCNC: 10 NG/DL (ref 304–1227)

## 2023-04-28 ENCOUNTER — LAB VISIT (OUTPATIENT)
Dept: LAB | Facility: HOSPITAL | Age: 88
End: 2023-04-28
Attending: RADIOLOGY
Payer: MEDICARE

## 2023-04-28 DIAGNOSIS — C61 MALIGNANT NEOPLASM OF PROSTATE: ICD-10-CM

## 2023-04-28 PROCEDURE — 84153 ASSAY OF PSA TOTAL: CPT | Performed by: RADIOLOGY

## 2023-04-28 PROCEDURE — 36415 COLL VENOUS BLD VENIPUNCTURE: CPT | Performed by: RADIOLOGY

## 2023-04-29 LAB — COMPLEXED PSA SERPL-MCNC: 0.01 NG/ML (ref 0–4)

## 2023-05-05 ENCOUNTER — OFFICE VISIT (OUTPATIENT)
Dept: UROLOGY | Facility: CLINIC | Age: 88
End: 2023-05-05
Payer: MEDICARE

## 2023-05-05 VITALS — BODY MASS INDEX: 25.31 KG/M2 | WEIGHT: 180.75 LBS | HEIGHT: 71 IN

## 2023-05-05 DIAGNOSIS — C61 MALIGNANT NEOPLASM OF PROSTATE: ICD-10-CM

## 2023-05-05 DIAGNOSIS — R39.15 URINARY URGENCY: Primary | ICD-10-CM

## 2023-05-05 PROCEDURE — 1126F AMNT PAIN NOTED NONE PRSNT: CPT | Mod: CPTII,S$GLB,, | Performed by: STUDENT IN AN ORGANIZED HEALTH CARE EDUCATION/TRAINING PROGRAM

## 2023-05-05 PROCEDURE — 1101F PR PT FALLS ASSESS DOC 0-1 FALLS W/OUT INJ PAST YR: ICD-10-PCS | Mod: CPTII,S$GLB,, | Performed by: STUDENT IN AN ORGANIZED HEALTH CARE EDUCATION/TRAINING PROGRAM

## 2023-05-05 PROCEDURE — 99999 PR PBB SHADOW E&M-EST. PATIENT-LVL III: CPT | Mod: PBBFAC,,, | Performed by: STUDENT IN AN ORGANIZED HEALTH CARE EDUCATION/TRAINING PROGRAM

## 2023-05-05 PROCEDURE — 99214 PR OFFICE/OUTPT VISIT, EST, LEVL IV, 30-39 MIN: ICD-10-PCS | Mod: S$GLB,,, | Performed by: STUDENT IN AN ORGANIZED HEALTH CARE EDUCATION/TRAINING PROGRAM

## 2023-05-05 PROCEDURE — 1126F PR PAIN SEVERITY QUANTIFIED, NO PAIN PRESENT: ICD-10-PCS | Mod: CPTII,S$GLB,, | Performed by: STUDENT IN AN ORGANIZED HEALTH CARE EDUCATION/TRAINING PROGRAM

## 2023-05-05 PROCEDURE — 1159F PR MEDICATION LIST DOCUMENTED IN MEDICAL RECORD: ICD-10-PCS | Mod: CPTII,S$GLB,, | Performed by: STUDENT IN AN ORGANIZED HEALTH CARE EDUCATION/TRAINING PROGRAM

## 2023-05-05 PROCEDURE — 99214 OFFICE O/P EST MOD 30 MIN: CPT | Mod: S$GLB,,, | Performed by: STUDENT IN AN ORGANIZED HEALTH CARE EDUCATION/TRAINING PROGRAM

## 2023-05-05 PROCEDURE — 3288F PR FALLS RISK ASSESSMENT DOCUMENTED: ICD-10-PCS | Mod: CPTII,S$GLB,, | Performed by: STUDENT IN AN ORGANIZED HEALTH CARE EDUCATION/TRAINING PROGRAM

## 2023-05-05 PROCEDURE — 1159F MED LIST DOCD IN RCRD: CPT | Mod: CPTII,S$GLB,, | Performed by: STUDENT IN AN ORGANIZED HEALTH CARE EDUCATION/TRAINING PROGRAM

## 2023-05-05 PROCEDURE — 1160F RVW MEDS BY RX/DR IN RCRD: CPT | Mod: CPTII,S$GLB,, | Performed by: STUDENT IN AN ORGANIZED HEALTH CARE EDUCATION/TRAINING PROGRAM

## 2023-05-05 PROCEDURE — 99999 PR PBB SHADOW E&M-EST. PATIENT-LVL III: ICD-10-PCS | Mod: PBBFAC,,, | Performed by: STUDENT IN AN ORGANIZED HEALTH CARE EDUCATION/TRAINING PROGRAM

## 2023-05-05 PROCEDURE — 1160F PR REVIEW ALL MEDS BY PRESCRIBER/CLIN PHARMACIST DOCUMENTED: ICD-10-PCS | Mod: CPTII,S$GLB,, | Performed by: STUDENT IN AN ORGANIZED HEALTH CARE EDUCATION/TRAINING PROGRAM

## 2023-05-05 PROCEDURE — 3288F FALL RISK ASSESSMENT DOCD: CPT | Mod: CPTII,S$GLB,, | Performed by: STUDENT IN AN ORGANIZED HEALTH CARE EDUCATION/TRAINING PROGRAM

## 2023-05-05 PROCEDURE — 1101F PT FALLS ASSESS-DOCD LE1/YR: CPT | Mod: CPTII,S$GLB,, | Performed by: STUDENT IN AN ORGANIZED HEALTH CARE EDUCATION/TRAINING PROGRAM

## 2023-05-05 NOTE — PROGRESS NOTES
Patient ID: Peter Lopez is a 87 y.o. male.    Chief Complaint: Follow-up (3 months follow up)    Referral: No referring provider defined for this encounter.     HPI  87 y.o. who presents to the Urology clinic for evaluation of hx of prostate cancer. Patient w/ High risk prostate cancer s/p XRT w/ ADT for 2 years, still ongoing. Notes urinary urgency and nocturia x 4. Patient denies dysuria, pain with urination. He has hx of a channel TURP.   Denies unexplained weight loss, gross hematuria, kane tenderness, new back pain.      Medically Necessary ROS documented in HPI    Past Medical History  Active Ambulatory Problems     Diagnosis Date Noted    Essential hypertension 10/02/2012    Diabetes mellitus with nephropathy 10/16/2013    Aortic atherosclerosis 05/03/2019    ILD (interstitial lung disease) 04/04/2020    Reactive depression 04/14/2020    Chronic obstructive pulmonary disease 08/10/2020    Sleep-related breathing disorder 12/20/2020    Chronic fatigue     Complex sleep apnea syndrome     Pulmonary hypertension 03/08/2021    Iron deficiency anemia 03/11/2021    Malignant neoplasm of prostate 06/14/2021    Stage 3b chronic kidney disease 12/18/2022    IWONA (obstructive sleep apnea) 12/18/2022    Type 2 diabetes mellitus with diabetic neuropathy, without long-term current use of insulin 04/11/2023     Resolved Ambulatory Problems     Diagnosis Date Noted    Elevated PSA 01/10/2013    Hepatitis C 10/16/2013    BPH (benign prostatic hyperplasia) 04/05/2016    Chronic kidney disease, stage 3a 01/28/2020    Hypoglycemia     Hypoxia 03/29/2020    Anemia 04/04/2020    Elevated troponin 04/04/2020    Chronic respiratory failure with hypoxia 04/13/2020    Discharge planning issues 04/14/2020    Gait instability 09/03/2020    Tachycardia with heart rate 121-140 beats per minute 09/03/2020    Dyspnea 03/11/2021    Chest pain 05/20/2021    Urinary retention 06/22/2021    Pulmonary hypertension due to left ventricular systolic  dysfunction 12/13/2022     Past Medical History:   Diagnosis Date    Chronic hepatitis C without mention of hepatic coma     CKD (chronic kidney disease)     COPD (chronic obstructive pulmonary disease)     Diabetes mellitus type I     Diabetes with neurologic complications     H/O colonoscopy     [place] (hard of hearing)     Hypertension     On home oxygen therapy     Prostate cancer     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  Vaccinated.  C A       Social History  Social Connections: Moderately Integrated    Frequency of Communication with Friends and Family: More than three times a week    Frequency of Social Gatherings with Friends and Family: Once a week    Attends Confucianist Services: More than 4 times per year    Active Member of Clubs or Organizations: Yes    Attends Club or Organization Meetings: More than 4 times per year    Marital Status:        Medications    Current Outpatient Medications:     amLODIPine (NORVASC) 5 MG tablet, Take 1 tablet (5 mg total) by mouth once daily., Disp: 90 tablet, Rfl: 3    atorvastatin (LIPITOR) 20 MG tablet, Take 1 tablet (20 mg total) by mouth every evening., Disp: 90 tablet, Rfl: 3    blood sugar diagnostic Strp, To check BG 1 times daily, to use with insurance preferred meter, Disp: 50 each, Rfl: 11    blood-glucose meter kit, To check BG 1 times daily, to use with insurance preferred meter, Disp: 1 each, Rfl: 0    cholecalciferol, vitamin D3, (VITAMIN D3) 25 mcg (1,000 unit) capsule, Take 1,000 Units by mouth once daily., Disp: , Rfl:     leuprolide  (LUPRON) 1 mg/0.2 mL injection, Inject into the skin., Disp: , Rfl:     olmesartan (BENICAR) 40 MG tablet, Take 1 tablet by mouth once daily, Disp: 90 tablet, Rfl: 0    tiotropium bromide (SPIRIVA RESPIMAT) 2.5 mcg/actuation inhaler, INHALE 2 SPRAY(S) BY MOUTH ONCE DAILY, Disp: 4 g, Rfl: 11    TRADJENTA 5 mg Tab tablet, Take 1 tablet by mouth once daily, Disp: 90 tablet, Rfl: 1    vibegron 75 mg Tab, Take 75 mg by mouth once daily., Disp: 30 tablet, Rfl: 3    Allergies  Review of patient's allergies indicates:  No Known Allergies    Patient's PMH, FH, Social hx, Medications, allergies reviewed and updated as pertinent to today's visit    Objective:      Physical Exam        Lab Results   Component Value Date    PSADIAG 0.01 04/28/2023    PSADIAG <0.01 04/24/2023    PSADIAG <0.01 12/07/2022      Assessment:       1. Urinary urgency    2. Malignant neoplasm of prostate        Plan:         Urinary urgency  Vibegron Rx trial provided as patient unable to monitor BP at home. Reviewed side effects    Prostate cancer  PSA 0.01- 1 week ago  Lupron shot today  RTC 6 months- anticipate at least 1 more year of ADT injections

## 2023-05-08 ENCOUNTER — OFFICE VISIT (OUTPATIENT)
Dept: FAMILY MEDICINE | Facility: CLINIC | Age: 88
End: 2023-05-08
Payer: MEDICARE

## 2023-05-08 VITALS
HEART RATE: 108 BPM | DIASTOLIC BLOOD PRESSURE: 82 MMHG | OXYGEN SATURATION: 95 % | WEIGHT: 160.69 LBS | SYSTOLIC BLOOD PRESSURE: 136 MMHG | HEIGHT: 71 IN | BODY MASS INDEX: 22.5 KG/M2 | TEMPERATURE: 98 F | RESPIRATION RATE: 16 BRPM

## 2023-05-08 DIAGNOSIS — M79.89 LEG SWELLING: Primary | ICD-10-CM

## 2023-05-08 DIAGNOSIS — J44.9 CHRONIC OBSTRUCTIVE PULMONARY DISEASE, UNSPECIFIED COPD TYPE: ICD-10-CM

## 2023-05-08 DIAGNOSIS — I10 ESSENTIAL HYPERTENSION: Chronic | ICD-10-CM

## 2023-05-08 PROCEDURE — 3288F FALL RISK ASSESSMENT DOCD: CPT | Mod: CPTII,S$GLB,, | Performed by: NURSE PRACTITIONER

## 2023-05-08 PROCEDURE — 1160F PR REVIEW ALL MEDS BY PRESCRIBER/CLIN PHARMACIST DOCUMENTED: ICD-10-PCS | Mod: CPTII,S$GLB,, | Performed by: NURSE PRACTITIONER

## 2023-05-08 PROCEDURE — 3288F PR FALLS RISK ASSESSMENT DOCUMENTED: ICD-10-PCS | Mod: CPTII,S$GLB,, | Performed by: NURSE PRACTITIONER

## 2023-05-08 PROCEDURE — 1101F PT FALLS ASSESS-DOCD LE1/YR: CPT | Mod: CPTII,S$GLB,, | Performed by: NURSE PRACTITIONER

## 2023-05-08 PROCEDURE — 99999 PR PBB SHADOW E&M-EST. PATIENT-LVL IV: CPT | Mod: PBBFAC,,, | Performed by: NURSE PRACTITIONER

## 2023-05-08 PROCEDURE — 1159F PR MEDICATION LIST DOCUMENTED IN MEDICAL RECORD: ICD-10-PCS | Mod: CPTII,S$GLB,, | Performed by: NURSE PRACTITIONER

## 2023-05-08 PROCEDURE — 99999 PR PBB SHADOW E&M-EST. PATIENT-LVL IV: ICD-10-PCS | Mod: PBBFAC,,, | Performed by: NURSE PRACTITIONER

## 2023-05-08 PROCEDURE — 3075F PR MOST RECENT SYSTOLIC BLOOD PRESS GE 130-139MM HG: ICD-10-PCS | Mod: CPTII,S$GLB,, | Performed by: NURSE PRACTITIONER

## 2023-05-08 PROCEDURE — 1160F RVW MEDS BY RX/DR IN RCRD: CPT | Mod: CPTII,S$GLB,, | Performed by: NURSE PRACTITIONER

## 2023-05-08 PROCEDURE — 1126F AMNT PAIN NOTED NONE PRSNT: CPT | Mod: CPTII,S$GLB,, | Performed by: NURSE PRACTITIONER

## 2023-05-08 PROCEDURE — 1101F PR PT FALLS ASSESS DOC 0-1 FALLS W/OUT INJ PAST YR: ICD-10-PCS | Mod: CPTII,S$GLB,, | Performed by: NURSE PRACTITIONER

## 2023-05-08 PROCEDURE — 99213 PR OFFICE/OUTPT VISIT, EST, LEVL III, 20-29 MIN: ICD-10-PCS | Mod: S$GLB,,, | Performed by: NURSE PRACTITIONER

## 2023-05-08 PROCEDURE — 3079F PR MOST RECENT DIASTOLIC BLOOD PRESSURE 80-89 MM HG: ICD-10-PCS | Mod: CPTII,S$GLB,, | Performed by: NURSE PRACTITIONER

## 2023-05-08 PROCEDURE — 99213 OFFICE O/P EST LOW 20 MIN: CPT | Mod: S$GLB,,, | Performed by: NURSE PRACTITIONER

## 2023-05-08 PROCEDURE — 1159F MED LIST DOCD IN RCRD: CPT | Mod: CPTII,S$GLB,, | Performed by: NURSE PRACTITIONER

## 2023-05-08 PROCEDURE — 3079F DIAST BP 80-89 MM HG: CPT | Mod: CPTII,S$GLB,, | Performed by: NURSE PRACTITIONER

## 2023-05-08 PROCEDURE — 3075F SYST BP GE 130 - 139MM HG: CPT | Mod: CPTII,S$GLB,, | Performed by: NURSE PRACTITIONER

## 2023-05-08 PROCEDURE — 1126F PR PAIN SEVERITY QUANTIFIED, NO PAIN PRESENT: ICD-10-PCS | Mod: CPTII,S$GLB,, | Performed by: NURSE PRACTITIONER

## 2023-05-08 NOTE — PROGRESS NOTES
Routine Office Visit    Patient Name: Peter Lopez    : 1935  MRN: [id number]    Chief Complaint:  Leg swelling    Subjective:  Peter is a 87 y.o. male who presents today for:    1. Leg swelling - patient who is known to me with history of CKD, COPD, prostate cancer, hypertension reports today for evaluation.  For the last 2 months he has been having what he feels like as swelling on his legs.  He has not noticed a pattern to the swelling and is unsure what worsens it.  Sometimes he will just look down at his legs and notice that they may be swollen.  He reports that his breathing is stable without any shortness of breath and he denies any chest pain or palpitations.  He sometimes gets a cramp on the back of his right leg but denies any pain today.  He states he will be seeing his pulmonologist in .  He used to have to be on oxygen but is currently not on oxygen but would like to check to see if he needs oxygen.  He does have some fatigue on exertion.  He noticed that elevating his legs does help with the swelling.  Earlier this year he did have a stress echocardiogram without significant ischemia.  He did see Cardiology for this.    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  Vaccinated.  C A       Family History  Family History   Problem Relation Age of Onset    Cancer Mother     Asbestos Father     Liver disease Neg Hx        Social History  Social History     Socioeconomic History    Marital status:     Number of children: 7    Highest education level: 12th grade   Tobacco Use    Smoking status: Former     Packs/day: 1.00     Years: 40.00     Pack years: 40.00     Types: Cigarettes     Quit date: 1991     Years since quittin.5    Smokeless tobacco: Former     Quit date: 1986   Substance and Sexual Activity    Alcohol use: Not Currently     Alcohol/week: 0.0 standard drinks     Comment: 2 bottles beer on weekends    Drug use: No    Sexual activity: Yes     Partners: Female     Comment:     Social History Narrative    , resides in [place]. 7 children but lost one. 16 grandchildren. 4 great-grandchildren. Works at [employer]     Social Determinants of Health     Financial Resource Strain: Medium Risk    Difficulty of Paying Living Expenses: Somewhat hard   Food Insecurity: No Food Insecurity    Worried About Running Out of Food in the Last Year: Never true    Ran Out of Food in the Last Year: Never true   Transportation Needs: No Transportation Needs    Lack of Transportation (Medical): No    Lack of Transportation (Non-Medical): No   Physical Activity: Inactive    Days of Exercise per Week: 0 days    Minutes of Exercise per Session: 0 min   Stress: No Stress Concern Present    Feeling of Stress : Not at all   Social Connections: Moderately Integrated    Frequency of Communication with Friends and Family: More than three times a week    Frequency of Social Gatherings with Friends and Family: Once a week    Attends Confucianist Services: More than 4 times per year    Active  Member of Clubs or Organizations: Yes    Attends Club or Organization Meetings: More than 4 times per year    Marital Status:    Housing Stability: Unknown    Unable to Pay for Housing in the Last Year: No    Unstable Housing in the Last Year: No       Current Medications  Current Outpatient Medications on File Prior to Visit   Medication Sig Dispense Refill    amLODIPine (NORVASC) 5 MG tablet Take 1 tablet (5 mg total) by mouth once daily. 90 tablet 3    atorvastatin (LIPITOR) 20 MG tablet Take 1 tablet (20 mg total) by mouth every evening. 90 tablet 3    blood sugar diagnostic Strp To check BG 1 times daily, to use with insurance preferred meter 50 each 11    blood-glucose meter kit To check BG 1 times daily, to use with insurance preferred meter 1 each 0    cholecalciferol, vitamin D3, (VITAMIN D3) 25 mcg (1,000 unit) capsule Take 1,000 Units by mouth once daily.      leuprolide (LUPRON) 1 mg/0.2 mL injection Inject into the skin.      olmesartan (BENICAR) 40 MG tablet Take 1 tablet by mouth once daily 90 tablet 0    tiotropium bromide (SPIRIVA RESPIMAT) 2.5 mcg/actuation inhaler INHALE 2 SPRAY(S) BY MOUTH ONCE DAILY 4 g 11    TRADJENTA 5 mg Tab tablet Take 1 tablet by mouth once daily 90 tablet 1    vibegron 75 mg Tab Take 75 mg by mouth once daily. 30 tablet 3     No current facility-administered medications on file prior to visit.       Allergies   Review of patient's allergies indicates:  No Known Allergies    Review of Systems (Pertinent positives)  Review of Systems   Constitutional:  Positive for malaise/fatigue (exertional). Negative for chills, fever and weight loss.   HENT: Negative.     Eyes: Negative.    Cardiovascular:  Positive for leg swelling. Negative for chest pain, palpitations, orthopnea, claudication and PND.   Gastrointestinal: Negative.    Genitourinary: Negative.    Musculoskeletal: Negative.    Skin: Negative.    Neurological:  Negative for dizziness, tingling, tremors, sensory  "change and headaches.   Endo/Heme/Allergies: Negative.    Psychiatric/Behavioral: Negative.       /82 (BP Location: Right arm, Patient Position: Sitting, BP Method: Large (Manual))   Pulse 108   Temp 97.8 °F (36.6 °C) (Oral)   Resp 16   Ht 5' 11" (1.803 m)   Wt 72.9 kg (160 lb 11.5 oz)   SpO2 95%   BMI 22.42 kg/m²     Physical Exam  Vitals reviewed.   Constitutional:       General: He is not in acute distress.     Appearance: Normal appearance. He is not ill-appearing, toxic-appearing or diaphoretic.   HENT:      Head: Normocephalic and atraumatic.   Cardiovascular:      Rate and Rhythm: Normal rate and regular rhythm.      Pulses: Normal pulses.      Heart sounds: Normal heart sounds.      Comments: No calf TTP on examination.  No edema bilaterally.  Calf circumference grossly symmetric bilaterally  Pulmonary:      Effort: Pulmonary effort is normal. No respiratory distress.      Breath sounds: Normal breath sounds. No wheezing.   Musculoskeletal:         General: No swelling, tenderness or deformity. Normal range of motion.   Skin:     General: Skin is warm and dry.      Capillary Refill: Capillary refill takes less than 2 seconds.   Neurological:      General: No focal deficit present.      Mental Status: He is alert and oriented to person, place, and time.   Psychiatric:         Mood and Affect: Mood normal.         Behavior: Behavior normal.        Assessment/Plan:  Peter Lopez is a 87 y.o. male who presents today for :    Peter was seen today for leg swelling and marium leg cramp.    Diagnoses and all orders for this visit:    Leg swelling    Likely due to dependent edema.  No alarm signs or symptoms on examination.  No signs to suggest venous disease.  Offered ultrasound to check for insufficiency but patient declines.  He would like to monitor things for now.  No need for stat imaging.  No signs or symptoms to suggest DVT.  Recommended patient elevate legs as much as possible and decrease salt intake.  " I also recommended the patient to monitor for any specific pattern with his leg swelling, such as worsening with standing, improved in the morning, etc.    Chronic obstructive pulmonary disease, unspecified COPD type    No acute concerns.  Followed by pulmonology.    Essential hypertension    Controlled.  No acute concerns.        This office note has been dictated.  This dictation has been generated using M-Modal Fluency Direct dictation; some phonetic errors may occur.   My collaborating physician is Dr. Suresh Can.

## 2023-05-25 DIAGNOSIS — I10 ESSENTIAL HYPERTENSION: ICD-10-CM

## 2023-05-25 NOTE — TELEPHONE ENCOUNTER
Care Due:                  Date            Visit Type   Department     Provider  --------------------------------------------------------------------------------                                [facility]                              PRIMARY      MEDICINE/  Last Visit: 01-      CARE ([facility])   INTERNAL SUSAN Lloyd                              [facility]                              PRIMARY      MEDICINE/  Next Visit: 06-      CARE ([facility])   INTERNAL MED   Lucas Lloyd                                                            Last  Test          Frequency    Reason                     Performed    Due Date  --------------------------------------------------------------------------------    HBA1C.......  6 months...  TRADJENTA................  12- 06-    Lipid Panel.  12 months..  atorvastatin.............  03- 03-    [facility] Embedded Care Due Messages. Reference number: [id number].   5/25/2023 2:56:46 PM CDT

## 2023-05-25 NOTE — TELEPHONE ENCOUNTER
Refill Routing Note   Medication(s) are not appropriate for processing by [facility] for the following reason(s):      Required labs outdated    [facility] action(s):  Defer None identified     Medication Therapy Plan: FLOS      Appointments  past 12m or future 3m with PCP    Date Provider   Last Visit   1/11/2023 Lucas Lloyd Jr., MD   Next Visit   6/15/2023 Lucas Lloyd Jr., MD   ED visits in past 90 days: 0        Note composed:3:14 PM 05/25/2023

## 2023-05-26 ENCOUNTER — TELEPHONE (OUTPATIENT)
Dept: FAMILY MEDICINE | Facility: CLINIC | Age: 88
End: 2023-05-26
Payer: MEDICARE

## 2023-05-26 RX ORDER — OLMESARTAN MEDOXOMIL 40 MG/1
TABLET ORAL
Qty: 90 TABLET | Refills: 0 | Status: SHIPPED | OUTPATIENT
Start: 2023-05-26 | End: 2023-08-26 | Stop reason: SDUPTHER

## 2023-05-26 NOTE — TELEPHONE ENCOUNTER
----- Message from Lucas Lloyd Jr., MD sent at 5/26/2023  3:17 PM CDT -----  I am not sure, as this was being prescribed by pulmonary. He does have an upcoming appt  ----- Message -----  From: Servando Valdes RN  Sent: 5/25/2023   3:31 PM CDT  To: Lucas Lloyd Jr., MD    Please advise if there are any alternatives  ----- Message -----  From: Cathie Torrez  Sent: 5/25/2023   3:25 PM CDT  To: [facility]av[facility] Staff    .Type: Patient Call Back    Who called:[employer]     What is the request in detail:tiotropium bromide (SPIRIVA RESPIMAT) 2.5 mcg/actuation inhaler -patient states medication is to expensive requesting an alternative    Can the clinic reply by [facility]? call    Would the patient rather a call back or a response via [facility]? call    Best call back number:.[phone]     Additional Information:

## 2023-05-26 NOTE — TELEPHONE ENCOUNTER
Please advise if there are other alternatives or if he will need to wait until his appt with Dr. Hyman

## 2023-06-06 ENCOUNTER — LAB VISIT (OUTPATIENT)
Dept: LAB | Facility: HOSPITAL | Age: 88
End: 2023-06-06
Attending: FAMILY MEDICINE
Payer: MEDICARE

## 2023-06-06 DIAGNOSIS — N18.32 STAGE 3B CHRONIC KIDNEY DISEASE: Chronic | ICD-10-CM

## 2023-06-06 DIAGNOSIS — I10 ESSENTIAL HYPERTENSION: Chronic | ICD-10-CM

## 2023-06-06 DIAGNOSIS — E11.21 DIABETES MELLITUS WITH NEPHROPATHY: Chronic | ICD-10-CM

## 2023-06-06 LAB
ALBUMIN SERPL BCP-MCNC: 3.7 G/DL (ref 3.5–5.2)
ALP SERPL-CCNC: 50 U/L (ref 55–135)
ALT SERPL W/O P-5'-P-CCNC: 15 U/L (ref 10–44)
ANION GAP SERPL CALC-SCNC: 8 MMOL/L (ref 8–16)
AST SERPL-CCNC: 29 U/L (ref 10–40)
BASOPHILS # BLD AUTO: 0.03 K/UL (ref 0–0.2)
BASOPHILS NFR BLD: 0.9 % (ref 0–1.9)
BILIRUB SERPL-MCNC: 0.5 MG/DL (ref 0.1–1)
BUN SERPL-MCNC: 24 MG/DL (ref 8–23)
CALCIUM SERPL-MCNC: 9.9 MG/DL (ref 8.7–10.5)
CHLORIDE SERPL-SCNC: 104 MMOL/L (ref 95–110)
CHOLEST SERPL-MCNC: 122 MG/DL (ref 120–199)
CHOLEST/HDLC SERPL: 2.7 {RATIO} (ref 2–5)
CO2 SERPL-SCNC: 26 MMOL/L (ref 23–29)
CREAT SERPL-MCNC: 1.8 MG/DL (ref 0.5–1.4)
DIFFERENTIAL METHOD: ABNORMAL
EOSINOPHIL # BLD AUTO: 0.1 K/UL (ref 0–0.5)
EOSINOPHIL NFR BLD: 1.8 % (ref 0–8)
ERYTHROCYTE [DISTWIDTH] IN BLOOD BY AUTOMATED COUNT: 14.4 % (ref 11.5–14.5)
EST. GFR  (NO RACE VARIABLE): 36.4 ML/MIN/1.73 M^2
ESTIMATED AVG GLUCOSE: 163 MG/DL (ref 68–131)
GLUCOSE SERPL-MCNC: 150 MG/DL (ref 70–110)
HBA1C MFR BLD: 7.3 % (ref 4–5.6)
HCT VFR BLD AUTO: 36.7 % (ref 40–54)
HDLC SERPL-MCNC: 46 MG/DL (ref 40–75)
HDLC SERPL: 37.7 % (ref 20–50)
HGB BLD-MCNC: 11.4 G/DL (ref 14–18)
IMM GRANULOCYTES # BLD AUTO: 0.01 K/UL (ref 0–0.04)
IMM GRANULOCYTES NFR BLD AUTO: 0.3 % (ref 0–0.5)
LDLC SERPL CALC-MCNC: 64.4 MG/DL (ref 63–159)
LYMPHOCYTES # BLD AUTO: 0.8 K/UL (ref 1–4.8)
LYMPHOCYTES NFR BLD: 22.5 % (ref 18–48)
MCH RBC QN AUTO: 28.5 PG (ref 27–31)
MCHC RBC AUTO-ENTMCNC: 31.1 G/DL (ref 32–36)
MCV RBC AUTO: 92 FL (ref 82–98)
MONOCYTES # BLD AUTO: 0.3 K/UL (ref 0.3–1)
MONOCYTES NFR BLD: 9 % (ref 4–15)
NEUTROPHILS # BLD AUTO: 2.2 K/UL (ref 1.8–7.7)
NEUTROPHILS NFR BLD: 65.5 % (ref 38–73)
NONHDLC SERPL-MCNC: 76 MG/DL
NRBC BLD-RTO: 0 /100 WBC
PLATELET # BLD AUTO: 265 K/UL (ref 150–450)
PMV BLD AUTO: 10.2 FL (ref 9.2–12.9)
POTASSIUM SERPL-SCNC: 4.3 MMOL/L (ref 3.5–5.1)
PROT SERPL-MCNC: 7.2 G/DL (ref 6–8.4)
PTH-INTACT SERPL-MCNC: 38.3 PG/ML (ref 9–77)
RBC # BLD AUTO: 4 M/UL (ref 4.6–6.2)
SODIUM SERPL-SCNC: 138 MMOL/L (ref 136–145)
TRIGL SERPL-MCNC: 58 MG/DL (ref 30–150)
WBC # BLD AUTO: 3.34 K/UL (ref 3.9–12.7)

## 2023-06-06 PROCEDURE — 83036 HEMOGLOBIN GLYCOSYLATED A1C: CPT | Performed by: FAMILY MEDICINE

## 2023-06-06 PROCEDURE — 80053 COMPREHEN METABOLIC PANEL: CPT | Performed by: FAMILY MEDICINE

## 2023-06-06 PROCEDURE — 36415 COLL VENOUS BLD VENIPUNCTURE: CPT | Mod: PO | Performed by: FAMILY MEDICINE

## 2023-06-06 PROCEDURE — 85025 COMPLETE CBC W/AUTO DIFF WBC: CPT | Performed by: FAMILY MEDICINE

## 2023-06-06 PROCEDURE — 80061 LIPID PANEL: CPT | Performed by: FAMILY MEDICINE

## 2023-06-06 PROCEDURE — 83970 ASSAY OF PARATHORMONE: CPT | Performed by: FAMILY MEDICINE

## 2023-06-19 ENCOUNTER — LAB VISIT (OUTPATIENT)
Dept: LAB | Facility: HOSPITAL | Age: 88
End: 2023-06-19
Attending: FAMILY MEDICINE
Payer: MEDICARE

## 2023-06-19 ENCOUNTER — OFFICE VISIT (OUTPATIENT)
Dept: FAMILY MEDICINE | Facility: CLINIC | Age: 88
End: 2023-06-19
Payer: MEDICARE

## 2023-06-19 VITALS
TEMPERATURE: 98 F | OXYGEN SATURATION: 94 % | HEART RATE: 104 BPM | HEIGHT: 71 IN | SYSTOLIC BLOOD PRESSURE: 128 MMHG | DIASTOLIC BLOOD PRESSURE: 78 MMHG | WEIGHT: 182.56 LBS | BODY MASS INDEX: 25.56 KG/M2

## 2023-06-19 DIAGNOSIS — E55.9 VITAMIN D DEFICIENCY: ICD-10-CM

## 2023-06-19 DIAGNOSIS — N18.32 STAGE 3B CHRONIC KIDNEY DISEASE: Chronic | ICD-10-CM

## 2023-06-19 DIAGNOSIS — I70.0 AORTIC ATHEROSCLEROSIS: Chronic | ICD-10-CM

## 2023-06-19 DIAGNOSIS — D64.9 NORMOCYTIC ANEMIA: ICD-10-CM

## 2023-06-19 DIAGNOSIS — D51.9 ANEMIA DUE TO VITAMIN B12 DEFICIENCY, UNSPECIFIED B12 DEFICIENCY TYPE: ICD-10-CM

## 2023-06-19 DIAGNOSIS — E11.21 DIABETES MELLITUS WITH NEPHROPATHY: Chronic | ICD-10-CM

## 2023-06-19 DIAGNOSIS — R79.89 HIGH SERUM METHYLMALONIC ACID: ICD-10-CM

## 2023-06-19 DIAGNOSIS — I10 ESSENTIAL HYPERTENSION: Primary | Chronic | ICD-10-CM

## 2023-06-19 DIAGNOSIS — D72.819 LEUKOPENIA, UNSPECIFIED TYPE: ICD-10-CM

## 2023-06-19 LAB
25(OH)D3+25(OH)D2 SERPL-MCNC: 94 NG/ML (ref 30–96)
FERRITIN SERPL-MCNC: 33 NG/ML (ref 20–300)
IRON SERPL-MCNC: 101 UG/DL (ref 45–160)
SATURATED IRON: 30 % (ref 20–50)
TOTAL IRON BINDING CAPACITY: 340 UG/DL (ref 250–450)
TRANSFERRIN SERPL-MCNC: 230 MG/DL (ref 200–375)
VIT B12 SERPL-MCNC: 268 PG/ML (ref 210–950)

## 2023-06-19 PROCEDURE — 86334 PATHOLOGIST INTERPRETATION IFE: ICD-10-PCS | Mod: 26,,, | Performed by: PATHOLOGY

## 2023-06-19 PROCEDURE — 3074F PR MOST RECENT SYSTOLIC BLOOD PRESSURE < 130 MM HG: ICD-10-PCS | Mod: CPTII,S$GLB,, | Performed by: FAMILY MEDICINE

## 2023-06-19 PROCEDURE — 82607 VITAMIN B-12: CPT | Performed by: FAMILY MEDICINE

## 2023-06-19 PROCEDURE — 99214 OFFICE O/P EST MOD 30 MIN: CPT | Mod: S$GLB,,, | Performed by: FAMILY MEDICINE

## 2023-06-19 PROCEDURE — 3288F PR FALLS RISK ASSESSMENT DOCUMENTED: ICD-10-PCS | Mod: CPTII,S$GLB,, | Performed by: FAMILY MEDICINE

## 2023-06-19 PROCEDURE — 36415 COLL VENOUS BLD VENIPUNCTURE: CPT | Mod: PN | Performed by: FAMILY MEDICINE

## 2023-06-19 PROCEDURE — 82728 ASSAY OF FERRITIN: CPT | Performed by: FAMILY MEDICINE

## 2023-06-19 PROCEDURE — 3074F SYST BP LT 130 MM HG: CPT | Mod: CPTII,S$GLB,, | Performed by: FAMILY MEDICINE

## 2023-06-19 PROCEDURE — 84165 PROTEIN E-PHORESIS SERUM: CPT | Mod: 26,,, | Performed by: PATHOLOGY

## 2023-06-19 PROCEDURE — 99999 PR PBB SHADOW E&M-EST. PATIENT-LVL IV: CPT | Mod: PBBFAC,,, | Performed by: FAMILY MEDICINE

## 2023-06-19 PROCEDURE — 1101F PR PT FALLS ASSESS DOC 0-1 FALLS W/OUT INJ PAST YR: ICD-10-PCS | Mod: CPTII,S$GLB,, | Performed by: FAMILY MEDICINE

## 2023-06-19 PROCEDURE — 99214 PR OFFICE/OUTPT VISIT, EST, LEVL IV, 30-39 MIN: ICD-10-PCS | Mod: S$GLB,,, | Performed by: FAMILY MEDICINE

## 2023-06-19 PROCEDURE — 3078F DIAST BP <80 MM HG: CPT | Mod: CPTII,S$GLB,, | Performed by: FAMILY MEDICINE

## 2023-06-19 PROCEDURE — 82306 VITAMIN D 25 HYDROXY: CPT | Performed by: FAMILY MEDICINE

## 2023-06-19 PROCEDURE — 84466 ASSAY OF TRANSFERRIN: CPT | Performed by: FAMILY MEDICINE

## 2023-06-19 PROCEDURE — 1160F PR REVIEW ALL MEDS BY PRESCRIBER/CLIN PHARMACIST DOCUMENTED: ICD-10-PCS | Mod: CPTII,S$GLB,, | Performed by: FAMILY MEDICINE

## 2023-06-19 PROCEDURE — 1159F PR MEDICATION LIST DOCUMENTED IN MEDICAL RECORD: ICD-10-PCS | Mod: CPTII,S$GLB,, | Performed by: FAMILY MEDICINE

## 2023-06-19 PROCEDURE — 1101F PT FALLS ASSESS-DOCD LE1/YR: CPT | Mod: CPTII,S$GLB,, | Performed by: FAMILY MEDICINE

## 2023-06-19 PROCEDURE — 86334 IMMUNOFIX E-PHORESIS SERUM: CPT | Performed by: FAMILY MEDICINE

## 2023-06-19 PROCEDURE — 3288F FALL RISK ASSESSMENT DOCD: CPT | Mod: CPTII,S$GLB,, | Performed by: FAMILY MEDICINE

## 2023-06-19 PROCEDURE — 83921 ORGANIC ACID SINGLE QUANT: CPT | Performed by: FAMILY MEDICINE

## 2023-06-19 PROCEDURE — 86334 IMMUNOFIX E-PHORESIS SERUM: CPT | Mod: 26,,, | Performed by: PATHOLOGY

## 2023-06-19 PROCEDURE — 1160F RVW MEDS BY RX/DR IN RCRD: CPT | Mod: CPTII,S$GLB,, | Performed by: FAMILY MEDICINE

## 2023-06-19 PROCEDURE — 84165 PATHOLOGIST INTERPRETATION SPE: ICD-10-PCS | Mod: 26,,, | Performed by: PATHOLOGY

## 2023-06-19 PROCEDURE — 99999 PR PBB SHADOW E&M-EST. PATIENT-LVL IV: ICD-10-PCS | Mod: PBBFAC,,, | Performed by: FAMILY MEDICINE

## 2023-06-19 PROCEDURE — 3078F PR MOST RECENT DIASTOLIC BLOOD PRESSURE < 80 MM HG: ICD-10-PCS | Mod: CPTII,S$GLB,, | Performed by: FAMILY MEDICINE

## 2023-06-19 PROCEDURE — 1126F AMNT PAIN NOTED NONE PRSNT: CPT | Mod: CPTII,S$GLB,, | Performed by: FAMILY MEDICINE

## 2023-06-19 PROCEDURE — 1126F PR PAIN SEVERITY QUANTIFIED, NO PAIN PRESENT: ICD-10-PCS | Mod: CPTII,S$GLB,, | Performed by: FAMILY MEDICINE

## 2023-06-19 PROCEDURE — 84165 PROTEIN E-PHORESIS SERUM: CPT | Performed by: FAMILY MEDICINE

## 2023-06-19 PROCEDURE — 1159F MED LIST DOCD IN RCRD: CPT | Mod: CPTII,S$GLB,, | Performed by: FAMILY MEDICINE

## 2023-06-20 ENCOUNTER — OFFICE VISIT (OUTPATIENT)
Dept: PULMONOLOGY | Facility: CLINIC | Age: 88
End: 2023-06-20
Payer: MEDICARE

## 2023-06-20 VITALS
HEART RATE: 108 BPM | HEIGHT: 71 IN | DIASTOLIC BLOOD PRESSURE: 77 MMHG | SYSTOLIC BLOOD PRESSURE: 145 MMHG | WEIGHT: 185.19 LBS | OXYGEN SATURATION: 94 % | BODY MASS INDEX: 25.93 KG/M2

## 2023-06-20 DIAGNOSIS — J43.2 CENTRILOBULAR EMPHYSEMA: ICD-10-CM

## 2023-06-20 DIAGNOSIS — G47.31 COMPLEX SLEEP APNEA SYNDROME: ICD-10-CM

## 2023-06-20 DIAGNOSIS — I27.20 PULMONARY HYPERTENSION: Chronic | ICD-10-CM

## 2023-06-20 PROBLEM — G47.30 SLEEP-RELATED BREATHING DISORDER: Status: RESOLVED | Noted: 2020-12-20 | Resolved: 2023-06-20

## 2023-06-20 PROBLEM — G47.39 COMPLEX SLEEP APNEA SYNDROME: Status: ACTIVE | Noted: 2022-12-18

## 2023-06-20 LAB
ALBUMIN SERPL ELPH-MCNC: 4.01 G/DL (ref 3.35–5.55)
ALPHA1 GLOB SERPL ELPH-MCNC: 0.26 G/DL (ref 0.17–0.41)
ALPHA2 GLOB SERPL ELPH-MCNC: 1.17 G/DL (ref 0.43–0.99)
B-GLOBULIN SERPL ELPH-MCNC: 0.7 G/DL (ref 0.5–1.1)
GAMMA GLOB SERPL ELPH-MCNC: 0.96 G/DL (ref 0.67–1.58)
PROT SERPL-MCNC: 7.1 G/DL (ref 6–8.4)

## 2023-06-20 PROCEDURE — 1159F MED LIST DOCD IN RCRD: CPT | Mod: CPTII,S$GLB,, | Performed by: INTERNAL MEDICINE

## 2023-06-20 PROCEDURE — 1126F PR PAIN SEVERITY QUANTIFIED, NO PAIN PRESENT: ICD-10-PCS | Mod: CPTII,S$GLB,, | Performed by: INTERNAL MEDICINE

## 2023-06-20 PROCEDURE — 3078F PR MOST RECENT DIASTOLIC BLOOD PRESSURE < 80 MM HG: ICD-10-PCS | Mod: CPTII,S$GLB,, | Performed by: INTERNAL MEDICINE

## 2023-06-20 PROCEDURE — 3288F FALL RISK ASSESSMENT DOCD: CPT | Mod: CPTII,S$GLB,, | Performed by: INTERNAL MEDICINE

## 2023-06-20 PROCEDURE — 3288F PR FALLS RISK ASSESSMENT DOCUMENTED: ICD-10-PCS | Mod: CPTII,S$GLB,, | Performed by: INTERNAL MEDICINE

## 2023-06-20 PROCEDURE — 3078F DIAST BP <80 MM HG: CPT | Mod: CPTII,S$GLB,, | Performed by: INTERNAL MEDICINE

## 2023-06-20 PROCEDURE — 3077F PR MOST RECENT SYSTOLIC BLOOD PRESSURE >= 140 MM HG: ICD-10-PCS | Mod: CPTII,S$GLB,, | Performed by: INTERNAL MEDICINE

## 2023-06-20 PROCEDURE — 99999 PR PBB SHADOW E&M-EST. PATIENT-LVL III: ICD-10-PCS | Mod: PBBFAC,,, | Performed by: INTERNAL MEDICINE

## 2023-06-20 PROCEDURE — 99214 PR OFFICE/OUTPT VISIT, EST, LEVL IV, 30-39 MIN: ICD-10-PCS | Mod: S$GLB,,, | Performed by: INTERNAL MEDICINE

## 2023-06-20 PROCEDURE — 1101F PR PT FALLS ASSESS DOC 0-1 FALLS W/OUT INJ PAST YR: ICD-10-PCS | Mod: CPTII,S$GLB,, | Performed by: INTERNAL MEDICINE

## 2023-06-20 PROCEDURE — 1126F AMNT PAIN NOTED NONE PRSNT: CPT | Mod: CPTII,S$GLB,, | Performed by: INTERNAL MEDICINE

## 2023-06-20 PROCEDURE — 99999 PR PBB SHADOW E&M-EST. PATIENT-LVL III: CPT | Mod: PBBFAC,,, | Performed by: INTERNAL MEDICINE

## 2023-06-20 PROCEDURE — 3077F SYST BP >= 140 MM HG: CPT | Mod: CPTII,S$GLB,, | Performed by: INTERNAL MEDICINE

## 2023-06-20 PROCEDURE — 1101F PT FALLS ASSESS-DOCD LE1/YR: CPT | Mod: CPTII,S$GLB,, | Performed by: INTERNAL MEDICINE

## 2023-06-20 PROCEDURE — 1159F PR MEDICATION LIST DOCUMENTED IN MEDICAL RECORD: ICD-10-PCS | Mod: CPTII,S$GLB,, | Performed by: INTERNAL MEDICINE

## 2023-06-20 PROCEDURE — 99214 OFFICE O/P EST MOD 30 MIN: CPT | Mod: S$GLB,,, | Performed by: INTERNAL MEDICINE

## 2023-06-20 NOTE — PROGRESS NOTES
Peter Lopez  was seen as a follow up.  Our last encounter was 6/12/20.    CHIEF COMPLAINT:  COPD      HISTORY OF PRESENT ILLNESS: Peter Lopez is a 87 y.o. 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  Our first encounter was 6/12/20.  Patient smoked .75 ppd x 40 years.  Patient quit smoking in 1990s.  Patient was diagnosed with covid 19 on 3/28/20.  Initially, patient was discharged to home with home monitored.  Patient represented to ED of 3/29/20 and was discharged to home.  Represented back to ed on 4/3/20.  Hospitalized until 4/22/20 for hypoxic respiratory failure requiring nrb.  Subsequently discharged to [facility] [place].  Patient was discharged to home with 2 lpm.      Since our last encounter, patient has been followed by [facility].  Patient was diagnosed with grace with ahi of 44 with emergent of central during cpap titration.  Patient declined asv in the past due to dealing with prostate cancer.      SLEEP ROUTINE:  Activity the hour prior to sleep: watch tv or read in bed    Bed partner:  alone  Time to bed:  9 pm   Lights off:  night light is on  Sleep onset latency:  few minutes         Disruptions or awakenings:    2-3 times for bathroom (no issue going back to sleep)    Wakeup time:      5 am   Perceived sleep quality:  rested       Daytime naps:     none  Weekend sleep routine:      same  Caffeine use: 1 cup of coffee in am   exercise habit:   light weight        PAST MEDICAL HISTORY:    Active Ambulatory Problems     Diagnosis Date Noted    Essential hypertension 10/02/2012    Diabetes mellitus  with nephropathy 10/16/2013    Aortic atherosclerosis 05/03/2019    ILD (interstitial lung disease) 04/04/2020    Reactive depression 04/14/2020    Chronic obstructive pulmonary disease 08/10/2020    Chronic fatigue     Pulmonary hypertension 03/08/2021    Iron deficiency anemia 03/11/2021    Malignant neoplasm of prostate 06/14/2021    Stage 3b chronic kidney disease 12/18/2022    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    Type 2 diabetes mellitus with diabetic neuropathy, without long-term current use of insulin 04/11/2023     Resolved Ambulatory Problems     Diagnosis Date Noted    Elevated PSA 01/10/2013    Hepatitis C 10/16/2013    BPH (benign prostatic hyperplasia) 04/05/2016    Chronic kidney disease, stage 3a 01/28/2020    Hypoglycemia     Hypoxia 03/29/2020    Anemia 04/04/2020    Elevated troponin 04/04/2020    Chronic respiratory failure with hypoxia 04/13/2020    Discharge planning issues 04/14/2020    Gait instability 09/03/2020    Tachycardia with heart rate 121-140 beats per minute 09/03/2020    Sleep-related breathing disorder 12/20/2020    Complex sleep apnea syndrome     Dyspnea 03/11/2021    Chest pain 05/20/2021    Urinary retention 06/22/2021    Pulmonary hypertension due to left ventricular systolic dysfunction 12/13/2022     Past Medical History:   Diagnosis Date    Chronic hepatitis C without mention of hepatic coma     CKD (chronic kidney disease)     COPD (chronic obstructive pulmonary disease)     Diabetes mellitus type I     Diabetes with neurologic complications     H/O colonoscopy     [place] (hard of hearing)     Hypertension     On home oxygen therapy     Prostate cancer     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  Vaccinated.  C A         FAMILY HISTORY:                Family History   Problem Relation Age of Onset    Cancer Mother     Asbestos Father     Liver disease Neg Hx        SOCIAL HISTORY:          Tobacco:   Social History     Tobacco Use   Smoking Status Former    Packs/day: 1.00    Years: 40.00    Pack years: 40.00    Types: Cigarettes    Quit date: 1991    Years since quittin.6   Smokeless Tobacco Former    Quit date: 1986     alcohol use:    Social History     Substance and Sexual Activity   Alcohol Use Not Currently    Alcohol/week: 0.0 standard drinks    Comment: 2 bottles beer on weekends               Occupation:  [employer]     ALLERGIES:  Review of patient's allergies indicates:  No Known Allergies    CURRENT MEDICATIONS:    Current Outpatient Medications   Medication Sig Dispense Refill    amLODIPine (NORVASC) 5 MG tablet Take 1 tablet (5 mg total) by mouth once daily. 90 tablet 3    atorvastatin (LIPITOR) 20 MG tablet Take 1 tablet (20 mg total) by mouth every evening. 90 tablet 3    blood sugar diagnostic Strp To check BG 1 times daily, to use with insurance preferred meter 50 each 11    blood-glucose meter kit To check BG 1 times daily, to use with insurance preferred meter 1 each 0    cholecalciferol, vitamin D3, (VITAMIN D3) 25 mcg (1,000 unit) capsule Take 1,000 Units by mouth once daily.      leuprolide (LUPRON) 1 mg/0.2 mL injection Inject into the skin.      olmesartan (BENICAR) 40 MG tablet Take 1 tablet by mouth once daily 90 tablet 0    tiotropium bromide (SPIRIVA RESPIMAT) 2.5 mcg/actuation inhaler INHALE 2 SPRAY(S) BY MOUTH ONCE DAILY 4 g 11    TRADJENTA 5 mg Tab tablet Take 1 tablet by mouth once daily 90 tablet 1    vibegron 75 mg Tab Take 75 mg by mouth once daily. 30 tablet 3     No current facility-administered  "medications for this visit.                  REVIEW OF SYSTEMS:     Pulmonary related symptoms as per HPI.  Gen:  no weight loss, no fever, no night sweat  HEENT:  no visual changes, no sore throat, + hearing loss  CV:  No chest pain, no orthopnea, no PND  GI:  no melena, no hematochezia, no diarhea, no constipation.  :  no dysuria, no hematuria, no hesistancy, no dribbling  Neuro:  no syncope, no vertigo, no tinitus  Psych:  No homocide or suicide ideation; no depression.  Endocrine:  No heat or cold intolerance.  Sleep:  No snoring; no witnessed apnea.  Feeling rested upon awake.    Otherwise, a balance of systems reviewed is negative.          PHYSICAL EXAM:  Vitals:    06/20/23 1112   BP: (!) 145/77   Pulse: 108   SpO2: (!) 94%   Weight: 84 kg (185 lb 3 oz)   Height: 5' 11" (1.803 m)   PainSc: 0-No pain     Body mass index is 25.83 kg/m².     GENERAL:  well develop; no apparent distress  HEENT:  no nasal congestion; no discharge noted; class 2 modified mallampatti.  +denture   NECK:  supple; no palpable masses.  CARDIO: regular rate and rhythm  PULM:  clear to auscultation bilaterally; no intercostals retractions; no accessory muscle usage   ABDOMEN:  soft nontender/nondistended.  +bowel sound  EXTREMITIES no cce  NEURO:  CN II-XII intact.  5/5 motor in all extremities.  sensation grossly intact   to light touch.  PSYCH:  normal affect.  Alert and oriented x 4    LABS  Pulmonary Functions Testing Results(personally reviewed):    PFT 12/7/20 Ratio of 56%; FVC 3.43 L (104%); FEV1 1.93 L (79%); TLC n/a L (n/a%); dlco 9.95 (39%)   PFT 11/2/22 Ratio of 53%; FVC 2.69 L (83%); FEV1 1.44 L (60%); TLC 4.56 L (62%); dlco 8.4 (33%) discrepancy between VA and TLC makes dlco interpretation unreliable.      ABG (personally reviewed):  4/4/20 7.48/38/90/28 on nrb  CXR (personally reviewed):  4/15/20 bilateral airspace disease  CT CHEST(personally reviewed):  11/29/22 diffuse emphysematous changes bilaterally    Split study " 1/7/2021 AHI 44 with emerging CA on cpap. ASV titration defer due to prostate problems.     covid 19 3/29/20 positive.      Echo 11/29/22  The left ventricle is normal in size with eccentric hypertrophy and low normal systolic function.  The estimated ejection fraction is 50%.  Indeterminate left ventricular diastolic function.  Normal right ventricular size with normal right ventricular systolic function.  Mild tricuspid regurgitation.  There is moderate pulmonary hypertension.  Normal central venous pressure (3 mmHg).  The estimated PA systolic pressure is 45 mmHg.        ASSESSMENT/PLAN  Problem List Items Addressed This Visit       Pulmonary hypertension (Chronic)    Overview     Echo with pasp 45 mmHg ef 50%  Group 2 and 3 given ddx and copd         Chronic obstructive pulmonary disease    Overview     FEV1 60  Maintenance inhaler-spiriva  Quit in 1980s  Albuterol prn-not needing   Oxygen prn following covid but not needing now however walk test indicates that he would still benefit from this. Admits to less activity. Advise to obtain oxygen and maintain fitness with oxygen prn  Pulmonary rehab- completed some but stop after prostate cancer  uptodate flu/covid/pneumonia vaccine.             Complex sleep apnea syndrome    Overview     ahi of 44  Result d/w patient. +emergence of central apnea during cpap titration.  Will set up with asv titration.           Relevant Orders    BiPAP/CPAP Titration ((Must have dx of IWONA from previous sleep study)       covid 19 - diagnosed 3/28/20.  Doing better.      Tobacco abuse - quit since 1991.  Encourage continue with smoking cessation.      25 minutes of total time spent on the encounter, which includes face to face time and non-face to face time preparing to see the patient (eg, review of tests), Obtaining and/or reviewing separately obtained history, documenting clinical information in the electronic or other health record, independently interpreting results (not  separately reported) and communicating results to the patient/family/caregiver, or Care coordination (not separately reported).        Patient will No follow-ups on file. with md/np.

## 2023-06-21 ENCOUNTER — OFFICE VISIT (OUTPATIENT)
Dept: PODIATRY | Facility: CLINIC | Age: 88
End: 2023-06-21
Payer: MEDICARE

## 2023-06-21 VITALS — HEIGHT: 71 IN | WEIGHT: 185.19 LBS | BODY MASS INDEX: 25.93 KG/M2

## 2023-06-21 DIAGNOSIS — E11.49 TYPE II DIABETES MELLITUS WITH NEUROLOGICAL MANIFESTATIONS: Primary | ICD-10-CM

## 2023-06-21 DIAGNOSIS — B35.1 ONYCHOMYCOSIS DUE TO DERMATOPHYTE: ICD-10-CM

## 2023-06-21 LAB
INTERPRETATION SERPL IFE-IMP: NORMAL
PATHOLOGIST INTERPRETATION SPE: NORMAL

## 2023-06-21 PROCEDURE — 11721 PR DEBRIDEMENT OF NAILS, 6 OR MORE: ICD-10-PCS | Mod: Q9,S$GLB,, | Performed by: PODIATRIST

## 2023-06-21 PROCEDURE — 99999 PR PBB SHADOW E&M-EST. PATIENT-LVL III: CPT | Mod: PBBFAC,,, | Performed by: PODIATRIST

## 2023-06-21 PROCEDURE — 99999 PR PBB SHADOW E&M-EST. PATIENT-LVL III: ICD-10-PCS | Mod: PBBFAC,,, | Performed by: PODIATRIST

## 2023-06-21 PROCEDURE — 99499 NO LOS: ICD-10-PCS | Mod: S$GLB,,, | Performed by: PODIATRIST

## 2023-06-21 PROCEDURE — 11721 DEBRIDE NAIL 6 OR MORE: CPT | Mod: Q9,S$GLB,, | Performed by: PODIATRIST

## 2023-06-21 PROCEDURE — 99499 UNLISTED E&M SERVICE: CPT | Mod: S$GLB,,, | Performed by: PODIATRIST

## 2023-06-22 NOTE — PROGRESS NOTES
Subjective:      Patient ID: Peter Lopez is a 87 y.o. male.    Chief Complaint: Diabetes Mellitus (6/19/23 Dr Lloyd PCP) and Nail Care      Peter is a 87 y.o. male who presents to the clinic upon referral from Dr. Steele ref. provider found  for evaluation and treatment of diabetic feet. 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 Patient relates no major problem with feet. Reports elongated nails that are difficult to trim. Requesting nail trimming.     PCP: Lucas Lloyd Jr, MD    Date Last Seen by PCP: per above    Current shoe gear: Tennis shoes    Hemoglobin A1C   Date Value Ref Range Status   06/06/2023 7.3 (H) 4.0 - 5.6 % Final     Comment:     ADA Screening Guidelines:  5.7-6.4%  Consistent with prediabetes  >or=6.5%  Consistent with diabetes    High levels of fetal hemoglobin interfere with the HbA1C  assay. Heterozygous hemoglobin variants (HbS, HgC, etc)do  not significantly interfere with this assay.   However, presence of multiple variants may affect accuracy.     12/07/2022 7.2 (H) 4.0 - 5.6 % Final     Comment:     ADA Screening Guidelines:  5.7-6.4%  Consistent with prediabetes  >or=6.5%  Consistent with diabetes    High levels of fetal hemoglobin interfere with the HbA1C  assay. Heterozygous hemoglobin variants (HbS, HgC, etc)do  not significantly interfere with this assay.   However, presence of multiple variants may affect accuracy.     06/21/2022 7.4 (H) 4.0 - 5.6 % Final     Comment:     ADA Screening Guidelines:  5.7-6.4%  Consistent with prediabetes  >or=6.5%  Consistent with  diabetes    High levels of fetal hemoglobin interfere with the HbA1C  assay. Heterozygous hemoglobin variants (HbS, HgC, etc)do  not significantly interfere with this assay.   However, presence of multiple variants may affect accuracy.             Review of Systems   Constitutional: Negative for chills, diaphoresis and fever.   Cardiovascular:  Negative for claudication, cyanosis, leg swelling and syncope.   Respiratory:  Negative for cough and shortness of breath.    Skin:  Positive for color change and nail changes. Negative for suspicious lesions.   Musculoskeletal:  Negative for falls, joint pain, muscle cramps and muscle weakness.   Gastrointestinal:  Negative for diarrhea, nausea and vomiting.   Neurological:  Positive for paresthesias. Negative for disturbances in coordination, numbness, sensory change, tremors and weakness.   Psychiatric/Behavioral:  Negative for altered mental status.          Objective:      Physical Exam  Constitutional:       Appearance: He is well-developed.      Comments: Oriented to time, place, and person.   Cardiovascular:      Comments: DP and PT pulses are palpable bilaterally. 3 sec capillary refill time and toes and feet are warm to touch proximally .       Musculoskeletal:      Comments: Equinus noted b/l ankles with < 10 deg DF noted. MMT 5/5 in DF/PF/Inv/Ev resistance with no reproduction of pain in any direction. Passive range of motion of ankle and pedal joints is painless b/l.     Feet:      Right foot:      Skin integrity: No callus or dry skin.      Left foot:      Skin integrity: No callus or dry skin.   Lymphadenopathy:      Comments: Negative lymphadenopathy bilateral popliteal fossa and tarsal tunnel.   Skin:     Comments: Toenails 1-5 bilaterally are elongated by 2-3 mm, thickened by 2-3 mm, discolored/yellowed, dystrophic, brittle with subungual debris.    Focal hyperkeratotic lesion consisting entirely of hyperkeratotic tissue without open skin, drainage, pus,  fluctuance, malodor, or signs of infection: right plantar medial foot. Left plantar forefoot.        Neurological:      Mental Status: He is alert.      Comments: Light touch, proprioception, and sharp/dull sensation are all diminished bilaterally. Protective threshold with the Divide-Wienstein monofilament is diminished  bilaterally.  Subjective paresthesias with no clearly identifiable source or trigger.      Psychiatric:         Behavior: Behavior is cooperative.             Assessment:       Encounter Diagnoses   Name Primary?    Type II diabetes mellitus with neurological manifestations Yes    Onychomycosis due to dermatophyte              Plan:       Peter was seen today for diabetes mellitus and nail care.    Diagnoses and all orders for this visit:    Type II diabetes mellitus with neurological manifestations    Onychomycosis due to dermatophyte          I counseled the patient on his conditions, their implications and medical management.    - Shoe inspection. Diabetic Foot Education. Patient reminded of the importance of good nutrition and blood sugar control to help prevent podiatric complications of diabetes. Patient instructed on proper foot hygeine. We discussed wearing proper shoe gear, daily foot inspections, never walking without protective shoe gear, never putting sharp instruments to feet, routine podiatric nail visits every 2-3 months.   - With patient's permission, nails were aggressively reduced and debrided x 10 to their soft tissue attachment mechanically and with electric , removing all offending nail and debris. Patient relates relief following the procedure. He will continue to monitor the areas daily, inspect his feet, wear protective shoe gear when ambulatory, moisturizer to maintain skin integrity and follow in this office in approximately 2-3 months, sooner p.r.n.   - After cleansing the area w/ alcohol prep pad the above mentioned hyperkeratosis was trimmed utilizing No 15 scapel,  to a smooth base with out incident.     F/u 3 months     Lucia Aguiar DPM

## 2023-06-23 LAB
METHYLMALONATE SERPL-SCNC: 0.58 UMOL/L
PATHOLOGIST INTERPRETATION IFE: NORMAL

## 2023-06-26 ENCOUNTER — TELEPHONE (OUTPATIENT)
Dept: FAMILY MEDICINE | Facility: CLINIC | Age: 88
End: 2023-06-26
Payer: MEDICARE

## 2023-06-26 DIAGNOSIS — E11.21 DIABETES MELLITUS WITH NEPHROPATHY: ICD-10-CM

## 2023-06-26 NOTE — TELEPHONE ENCOUNTER
----- Message from Lucas Lloyd Jr., MD sent at 6/22/2023  4:12 AM CDT -----  No acute concern on labs  
Notified patient regarding labs, he verbalized understanding.  
Patient also mentioned that he spoke with you about an alternative for a medication he couldn't afford, he couldn't remember what it was called, but it was discussed during your office visit with him on 6/19/23.  Also, when would you like to follow up with him? I didn't see one on file  
13-May-2018

## 2023-06-30 PROBLEM — R79.89 HIGH SERUM METHYLMALONIC ACID: Status: ACTIVE | Noted: 2023-06-30

## 2023-06-30 PROBLEM — E11.40 TYPE 2 DIABETES MELLITUS WITH DIABETIC NEUROPATHY, WITHOUT LONG-TERM CURRENT USE OF INSULIN: Status: RESOLVED | Noted: 2023-04-11 | Resolved: 2023-06-30

## 2023-06-30 PROBLEM — E55.9 VITAMIN D DEFICIENCY: Chronic | Status: ACTIVE | Noted: 2023-06-30

## 2023-06-30 PROBLEM — D72.819 LEUKOPENIA: Status: ACTIVE | Noted: 2023-06-30

## 2023-06-30 PROBLEM — E55.9 VITAMIN D DEFICIENCY: Status: ACTIVE | Noted: 2023-06-30

## 2023-06-30 NOTE — PROGRESS NOTES
"  Patient Name: Peter Lopez    : 1935  MRN: [id number]      Subjective:     Patient ID: Peter is a 87 y.o. male    Chief Complaint:  Foot Swelling and Choking (Problem swallowing)    87 year old male with chronic conditions as per problem list comes in for routine follow up on these. Reports taking all medications as prescribed. Denies any acute side effects.        Review of Systems   Constitutional:  Negative for unexpected weight change.   HENT:  Negative for sore throat.    Respiratory:  Negative for wheezing.    Cardiovascular:  Negative for chest pain and palpitations.   Gastrointestinal:  Negative for abdominal pain, blood in stool, change in bowel habit and change in bowel habit.   Endocrine: Negative for polydipsia, polyphagia and polyuria.   Genitourinary:  Negative for dysuria and hematuria.   Musculoskeletal:  Negative for neck pain.   Integumentary:  Negative for pallor.   Neurological:  Negative for dizziness, seizures, numbness and headaches.   Psychiatric/Behavioral:  Negative for confusion. The patient is not nervous/anxious.       Objective:   /78 (BP Location: Left arm, Patient Position: Sitting, BP Method: X-Large (Manual))   Pulse 104   Temp 98.1 °F (36.7 °C) (Oral)   Ht 5' 11" (1.803 m)   Wt 82.8 kg (182 lb 8.7 oz)   SpO2 (!) 94%   BMI 25.46 kg/m²     Physical Exam  Vitals reviewed.   Constitutional:       General: He is not in acute distress.     Appearance: He is well-developed. He is not ill-appearing or diaphoretic.   HENT:      Head: Normocephalic.      Right Ear: External ear normal.      Left Ear: External ear normal.      Nose: Nose normal.      Mouth/Throat:      Pharynx: No pharyngeal swelling, oropharyngeal exudate or posterior oropharyngeal erythema.   Neck:      Trachea: No tracheal deviation.   Cardiovascular:      Rate and Rhythm: Regular rhythm. Tachycardia present.      Heart sounds: Normal heart sounds.   Pulmonary:      Effort: Pulmonary effort is normal.      " Breath sounds: Normal breath sounds. No wheezing or rales.   Abdominal:      General: Bowel sounds are normal.      Palpations: Abdomen is soft. Abdomen is not rigid. There is no mass.      Tenderness: There is no abdominal tenderness. There is no guarding or rebound.   Musculoskeletal:      Cervical back: Normal range of motion and neck supple.   Lymphadenopathy:      Cervical: No cervical adenopathy.   Neurological:      Mental Status: He is alert and oriented to person, place, and time.      Sensory: No sensory deficit.      Motor: No atrophy.      Gait: Gait normal.      Deep Tendon Reflexes:      Reflex Scores:       Patellar reflexes are 2+ on the right side and 2+ on the left side.     Assessment        ICD-10-CM ICD-9-CM   1. Essential hypertension  I10 401.9   2. Diabetes mellitus with nephropathy  E11.21 250.40     583.81   3. Stage 3b chronic kidney disease  N18.32 585.3   4. Aortic atherosclerosis  I70.0 440.0   5. Leukopenia, unspecified type  D72.819 288.50   6. Normocytic anemia  D64.9 285.9   7. Anemia due to vitamin B12 deficiency, unspecified B12 deficiency type  D51.9 281.1   8. High serum methylmalonic acid  R79.89 790.99   9. Vitamin D deficiency  E55.9 268.9         Plan:     1. Essential hypertension  Overview:  BP Readings from Last 3 Encounters:   06/19/23 128/78   05/08/23 136/82   04/11/23 132/78        ACC/AHA guidelines on blood pressure goals reviewed.  Reinforced correct way of measuring blood pressure.     Assessment & Plan:  Continue current regimen.      2. Diabetes mellitus with nephropathy  Overview:  Lab Results   Component Value Date    HGBA1C 7.3 (H) 06/06/2023    HGBA1C 7.2 (H) 12/07/2022    HGBA1C 7.4 (H) 06/21/2022       Assessment & Plan:  A1c stable, continue current regimen.      3. Stage 3b chronic kidney disease  Overview:  Lab Results   Component Value Date    EGFRNORACEVR 36.4 (A) 06/06/2023    EGFRNORACEVR 36 (A) 12/17/2022    EGFRNORACEVR 32 (A) 12/07/2022     EGFRNORACEVR 34 (A) 08/09/2022        Assessment & Plan:  Renal function stable.  Patient missed appointment with nephrology last year. Will reschedule.    Orders:  -     Ambulatory referral/consult to Nephrology; Future; Expected date: 06/26/2023    4. Aortic atherosclerosis  Overview:  05- CXR  There is extensive aortic atherosclerosis    Assessment & Plan:  Continue statin      5. Leukopenia, unspecified type  Assessment & Plan:  Check labs.     Orders:  -     Iron and TIBC; Future; Expected date: 06/19/2023  -     METHYLMALONIC ACID, SERUM; Future; Expected date: 06/19/2023  -     Vitamin B12; Future; Expected date: 06/19/2023  -     Protein Electrophoresis, Serum; Future; Expected date: 06/19/2023  -     Ferritin; Future; Expected date: 06/19/2023    6. Normocytic anemia  Assessment & Plan:  Check anemia studies.     Orders:  -     Iron and TIBC; Future; Expected date: 06/19/2023  -     METHYLMALONIC ACID, SERUM; Future; Expected date: 06/19/2023  -     Vitamin B12; Future; Expected date: 06/19/2023  -     Protein Electrophoresis, Serum; Future; Expected date: 06/19/2023  -     Ferritin; Future; Expected date: 06/19/2023    7. Anemia due to vitamin B12 deficiency, unspecified B12 deficiency type  Assessment & Plan:  Check anemia studies.     Orders:  -     METHYLMALONIC ACID, SERUM; Future; Expected date: 06/19/2023  -     Vitamin B12; Future; Expected date: 06/19/2023    8. High serum methylmalonic acid  -     METHYLMALONIC ACID, SERUM; Future; Expected date: 06/19/2023  -     Vitamin B12; Future; Expected date: 06/19/2023    9. Vitamin D deficiency  Assessment & Plan:  Check Vit D level.    Orders:  -     Vitamin B12; Future; Expected date: 06/19/2023  -     Vitamin D; Future; Expected date: 06/19/2023           -Lucas Lloyd Jr., MD, AAHIVS      This office note has been dictated.  This dictation has been generated using M-Modal Fluency Direct dictation; some phonetic errors may occur.          There are no Patient Instructions on file for this visit.      Future Appointments   Date Time Provider Department Center   7/3/2023  7:30 PM LAB, SLEEP [facility] SLEEP [facility]   8/2/2023 11:15 AM Jesus Cole MD [facility] URO [facility]   9/25/2023 11:30 AM Lucia Aguiar DPM [facility] POD [place]   10/5/2023 10:00 AM Yakov Maldonado Jr., MD [facility]

## 2023-07-03 ENCOUNTER — HOSPITAL ENCOUNTER (OUTPATIENT)
Dept: SLEEP MEDICINE | Facility: HOSPITAL | Age: 88
Discharge: HOME | End: 2023-07-03
Attending: INTERNAL MEDICINE
Payer: MEDICARE

## 2023-07-03 DIAGNOSIS — G47.31 COMPLEX SLEEP APNEA SYNDROME: ICD-10-CM

## 2023-07-03 PROCEDURE — 95811 POLYSOM 6/>YRS CPAP 4/> PARM: CPT

## 2023-07-04 NOTE — PROGRESS NOTES
An ASV titration study was performed on Peter Lopez. The entire procedure was explained, including Bio calibration procedure, patient was informed that there may be a need to enter the room during the night to fix lead or make adjustments to the equipment. Questions were answered prior to start of study. He was given instructions on how to call out for help including how to use the nurse call unit in the bathroom. Patient was given [employer] phone number to call if patient needed tech for anything, in case tech was out of tech room.  Patient education was performed. This includes the possible use of CPAP machine and different CPAP masks. He  was given the after visit summary.   The lowest oxygen saturation observed during sleep was 88%    The EKG appeared to be NSR, PVC   Technical difficulties during the study: Reprep leads, pt is bald. Multiple wires had to be fixed multiple times after the patient used an urinal. The patient used the urinal 4 times during the study.  The patient was titrated wit ASV auto setting EPAP 4-10, PS 0-15, Max Pressure 25 Eson nasal mask was used with chin strap, humidity. The mask was changed to a Simplus full face mask due to high mask leak not controlled by the chin strap. The optimal pressure of ASV auto EPAP 4-10, PS 0-15  is believed to eliminate most of the events. Supine REM sleep was obtained during the study. His reaction to PAP was it was ok

## 2023-07-05 ENCOUNTER — TELEPHONE (OUTPATIENT)
Dept: FAMILY MEDICINE | Facility: CLINIC | Age: 88
End: 2023-07-05
Payer: MEDICARE

## 2023-07-05 NOTE — TELEPHONE ENCOUNTER
----- Message from Lucas Lloyd Jr., MD sent at 6/30/2023  4:27 AM CDT -----  Please schedule 4-6 month follow up with me

## 2023-07-12 ENCOUNTER — TELEPHONE (OUTPATIENT)
Dept: PULMONOLOGY | Facility: HOSPITAL | Age: 88
End: 2023-07-12
Payer: MEDICARE

## 2023-07-12 DIAGNOSIS — G47.31 COMPLEX SLEEP APNEA SYNDROME: Primary | ICD-10-CM

## 2023-07-12 PROCEDURE — 95811 PR POLYSOMNOGRAPHY W/CPAP: ICD-10-PCS | Mod: 26,,, | Performed by: INTERNAL MEDICINE

## 2023-07-12 PROCEDURE — 95811 POLYSOM 6/>YRS CPAP 4/> PARM: CPT | Mod: 26,,, | Performed by: INTERNAL MEDICINE

## 2023-07-12 NOTE — TELEPHONE ENCOUNTER
Please let patient know that the titration went well and I would like to set patient up with ASV via dme of choice.  Clinic follow up in approximately 8 weeks after cpap usage.   Please advise patient to bring ASV unit to clinic visit.

## 2023-07-12 NOTE — PROCEDURES
"Dear Provider,     You have ordered sleep LAB services to perform the sleep study for Peter Lopez.  The sleep study that you ordered is complete.      Please find Sleep Study result in "Chart Review" under the "Media tab."      As the ordering provider, you are responsible for reviewing the results and implementing a treatment plan with your patient.    If you need a Sleep Medicine provider to explain the sleep study findings and arrange treatment for the patient, please refer patient for consultation to our Sleep Clinic via [facility] with Ambulatory Consult Sleep.    To do that please place an order for an  "Ambulatory Consult Sleep" - it will go to our clinic work queue for our Medical Assistant to contact the patient for an appointment.     For any questions, please contact our clinic staff at [phone] to talk to clinical staff.   "

## 2023-07-14 ENCOUNTER — TELEPHONE (OUTPATIENT)
Dept: PULMONOLOGY | Facility: CLINIC | Age: 88
End: 2023-07-14
Payer: MEDICARE

## 2023-07-14 NOTE — TELEPHONE ENCOUNTER
Gave patient results below:          ----- Message from Beatriz Thibodeaux MA sent at 7/13/2023  7:34 AM CDT -----  Please let patient know that the titration went well and I would like to set patient up with ASV via dme of choice.  Clinic follow up in approximately 8 weeks after cpap usage.   Please advise patient to bring ASV unit to clinic visit.

## 2023-07-17 ENCOUNTER — HOSPITAL ENCOUNTER (EMERGENCY)
Facility: HOSPITAL | Age: 88
Discharge: HOME | End: 2023-07-18
Attending: EMERGENCY MEDICINE
Payer: MEDICARE

## 2023-07-17 DIAGNOSIS — K59.00 CONSTIPATION, UNSPECIFIED CONSTIPATION TYPE: Primary | ICD-10-CM

## 2023-07-17 DIAGNOSIS — R10.9 ABDOMINAL PAIN: ICD-10-CM

## 2023-07-17 LAB
ALBUMIN SERPL BCP-MCNC: 3.8 G/DL (ref 3.5–5.2)
ALP SERPL-CCNC: 64 U/L (ref 55–135)
ALT SERPL W/O P-5'-P-CCNC: 21 U/L (ref 10–44)
ANION GAP SERPL CALC-SCNC: 8 MMOL/L (ref 8–16)
AST SERPL-CCNC: 28 U/L (ref 10–40)
BASOPHILS # BLD AUTO: 0.03 K/UL (ref 0–0.2)
BASOPHILS NFR BLD: 0.8 % (ref 0–1.9)
BILIRUB SERPL-MCNC: 0.3 MG/DL (ref 0.1–1)
BILIRUB UR QL STRIP: NEGATIVE
BUN SERPL-MCNC: 24 MG/DL (ref 8–23)
CALCIUM SERPL-MCNC: 10.1 MG/DL (ref 8.7–10.5)
CHLORIDE SERPL-SCNC: 104 MMOL/L (ref 95–110)
CLARITY UR: CLEAR
CO2 SERPL-SCNC: 27 MMOL/L (ref 23–29)
COLOR UR: COLORLESS
CREAT SERPL-MCNC: 2.1 MG/DL (ref 0.5–1.4)
DIFFERENTIAL METHOD: ABNORMAL
EOSINOPHIL # BLD AUTO: 0.1 K/UL (ref 0–0.5)
EOSINOPHIL NFR BLD: 2.3 % (ref 0–8)
ERYTHROCYTE [DISTWIDTH] IN BLOOD BY AUTOMATED COUNT: 14.4 % (ref 11.5–14.5)
EST. GFR  (NO RACE VARIABLE): 30 ML/MIN/1.73 M^2
GLUCOSE SERPL-MCNC: 179 MG/DL (ref 70–110)
GLUCOSE UR QL STRIP: NEGATIVE
HCT VFR BLD AUTO: 35.4 % (ref 40–54)
HGB BLD-MCNC: 11.6 G/DL (ref 14–18)
HGB UR QL STRIP: NEGATIVE
IMM GRANULOCYTES # BLD AUTO: 0.01 K/UL (ref 0–0.04)
IMM GRANULOCYTES NFR BLD AUTO: 0.3 % (ref 0–0.5)
KETONES UR QL STRIP: NEGATIVE
LEUKOCYTE ESTERASE UR QL STRIP: NEGATIVE
LIPASE SERPL-CCNC: 71 U/L (ref 4–60)
LYMPHOCYTES # BLD AUTO: 0.7 K/UL (ref 1–4.8)
LYMPHOCYTES NFR BLD: 20.4 % (ref 18–48)
MCH RBC QN AUTO: 28.4 PG (ref 27–31)
MCHC RBC AUTO-ENTMCNC: 32.8 G/DL (ref 32–36)
MCV RBC AUTO: 87 FL (ref 82–98)
MONOCYTES # BLD AUTO: 0.3 K/UL (ref 0.3–1)
MONOCYTES NFR BLD: 7.9 % (ref 4–15)
NEUTROPHILS # BLD AUTO: 2.4 K/UL (ref 1.8–7.7)
NEUTROPHILS NFR BLD: 68.3 % (ref 38–73)
NITRITE UR QL STRIP: NEGATIVE
NRBC BLD-RTO: 0 /100 WBC
PH UR STRIP: 7 [PH] (ref 5–8)
PLATELET # BLD AUTO: ABNORMAL K/UL (ref 150–450)
PMV BLD AUTO: ABNORMAL FL (ref 9.2–12.9)
POTASSIUM SERPL-SCNC: 4.5 MMOL/L (ref 3.5–5.1)
PROT SERPL-MCNC: 7.4 G/DL (ref 6–8.4)
PROT UR QL STRIP: NEGATIVE
RBC # BLD AUTO: 4.09 M/UL (ref 4.6–6.2)
SODIUM SERPL-SCNC: 139 MMOL/L (ref 136–145)
SP GR UR STRIP: 1.01 (ref 1–1.03)
TROPONIN I SERPL DL<=0.01 NG/ML-MCNC: 0.02 NG/ML (ref 0–0.03)
URN SPEC COLLECT METH UR: ABNORMAL
UROBILINOGEN UR STRIP-ACNC: NEGATIVE EU/DL
WBC # BLD AUTO: 3.53 K/UL (ref 3.9–12.7)

## 2023-07-17 PROCEDURE — 84484 ASSAY OF TROPONIN QUANT: CPT | Performed by: EMERGENCY MEDICINE

## 2023-07-17 PROCEDURE — 80053 COMPREHEN METABOLIC PANEL: CPT | Performed by: EMERGENCY MEDICINE

## 2023-07-17 PROCEDURE — 25000003 PHARM REV CODE 250: Performed by: EMERGENCY MEDICINE

## 2023-07-17 PROCEDURE — 93010 ELECTROCARDIOGRAM REPORT: CPT | Mod: ,,, | Performed by: INTERNAL MEDICINE

## 2023-07-17 PROCEDURE — 63600175 PHARM REV CODE 636 W HCPCS: Performed by: EMERGENCY MEDICINE

## 2023-07-17 PROCEDURE — 99285 EMERGENCY DEPT VISIT HI MDM: CPT | Mod: 25

## 2023-07-17 PROCEDURE — 96374 THER/PROPH/DIAG INJ IV PUSH: CPT

## 2023-07-17 PROCEDURE — 85025 COMPLETE CBC W/AUTO DIFF WBC: CPT | Performed by: EMERGENCY MEDICINE

## 2023-07-17 PROCEDURE — 93010 EKG 12-LEAD: ICD-10-PCS | Mod: ,,, | Performed by: INTERNAL MEDICINE

## 2023-07-17 PROCEDURE — 93005 ELECTROCARDIOGRAM TRACING: CPT

## 2023-07-17 PROCEDURE — 83690 ASSAY OF LIPASE: CPT | Performed by: EMERGENCY MEDICINE

## 2023-07-17 PROCEDURE — 81003 URINALYSIS AUTO W/O SCOPE: CPT | Performed by: EMERGENCY MEDICINE

## 2023-07-17 RX ORDER — LACTULOSE 10 G/15ML
10 SOLUTION ORAL 3 TIMES DAILY PRN
Qty: 473 ML | Refills: 0 | Status: SHIPPED | OUTPATIENT
Start: 2023-07-17 | End: 2023-07-22

## 2023-07-17 RX ORDER — ADHESIVE BANDAGE
30 BANDAGE TOPICAL
Status: COMPLETED | OUTPATIENT
Start: 2023-07-17 | End: 2023-07-17

## 2023-07-17 RX ORDER — ONDANSETRON 4 MG/1
4 TABLET, ORALLY DISINTEGRATING ORAL EVERY 8 HOURS PRN
Qty: 14 TABLET | Refills: 0 | Status: SHIPPED | OUTPATIENT
Start: 2023-07-17 | End: 2024-03-04 | Stop reason: SDUPTHER

## 2023-07-17 RX ORDER — DEXTROMETHORPHAN POLISTIREX 30 MG/5 ML
1 SUSPENSION, EXTENDED RELEASE 12 HR ORAL ONCE
Status: COMPLETED | OUTPATIENT
Start: 2023-07-17 | End: 2023-07-17

## 2023-07-17 RX ORDER — METOCLOPRAMIDE HYDROCHLORIDE 5 MG/ML
5 INJECTION INTRAMUSCULAR; INTRAVENOUS
Status: COMPLETED | OUTPATIENT
Start: 2023-07-17 | End: 2023-07-17

## 2023-07-17 RX ADMIN — METOCLOPRAMIDE 5 MG: 5 INJECTION, SOLUTION INTRAMUSCULAR; INTRAVENOUS at 10:07

## 2023-07-17 RX ADMIN — MAGNESIUM HYDROXIDE 2400 MG: 400 SUSPENSION ORAL at 10:07

## 2023-07-17 RX ADMIN — MINERAL OIL 1 ENEMA: 100 ENEMA RECTAL at 10:07

## 2023-07-18 VITALS
BODY MASS INDEX: 25.1 KG/M2 | WEIGHT: 180 LBS | RESPIRATION RATE: 22 BRPM | SYSTOLIC BLOOD PRESSURE: 164 MMHG | DIASTOLIC BLOOD PRESSURE: 97 MMHG | TEMPERATURE: 98 F | OXYGEN SATURATION: 98 % | HEART RATE: 103 BPM

## 2023-07-18 NOTE — ED PROVIDER NOTES
"Encounter Date: 7/17/2023    SCRIBE #1 NOTE: I, David Jarvis, am scribing for, and in the presence of,  Rich Mcleod MD. I have scribed the following portions of the note - Other sections scribed: HPI, ROS, PE.     History     Chief Complaint   Patient presents with    Abdominal Pain     To left upper quad since yesterday at [facility], Hx of prostate cancer, nausea and emesis yesterday     Peter Lopez is a 87 y.o. male with a PMHx of BPH, HTN, DM type 1, prostate cancer, COPD, Hep C, and CKD, who presents to the ED for evaluation of LUQ abdominal pain since yesterday. Patient reports he was at [facility] yesterday when he began having complaints of LUQ abdominal pain, endorsing associated nausea and vomiting. He further endorses some abdominal distension. He reports his last BM took place yesterday, but describes it as "hard." He endorses he is still passing gas.  No medications taken PTA. No alleviating or exacerbating factors noted. Denies fever, chills, dysuria, cough, congestion, hematemesis, CP, SOB, diarrhea, or other associated symptoms. Denies previous abdominal surgeries. NKDA.     The history is provided by the patient. No  was used.   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  Vaccinated.  C A     Family History   Problem Relation Age of Onset    Cancer Mother     Asbestos Father     Liver disease Neg Hx      Social History     Tobacco Use    Smoking status: Former     Packs/day: 1.00     Years: 40.00     Pack years: 40.00     Types: Cigarettes     Quit date: 1991     Years since quittin.7    Smokeless tobacco: Former     Quit date: 1986   Substance Use Topics    Alcohol use: Not Currently     Alcohol/week: 0.0 standard drinks     Comment: 2 bottles beer on weekends    Drug use: No     Review of Systems   Constitutional:  Negative for fever.   HENT:  Negative for congestion and sore throat.    Eyes:  Negative for pain.   Respiratory:  Negative for shortness of breath.    Cardiovascular:  Negative for chest pain.   Gastrointestinal:  Positive for abdominal distention, abdominal pain, nausea and vomiting. Negative for blood in stool and diarrhea.   Genitourinary:  Negative for dysuria.   Musculoskeletal:  Negative for back pain.   Skin:  Negative for rash.   Neurological:  Negative for headaches.     Physical Exam     Initial Vitals [23]   BP Pulse Resp Temp SpO2   (!) 164/83 101 (!) 22 98.8 °F (37.1 °C) (!) 93 %      MAP       --         Physical Exam    Nursing note and vitals reviewed.  Constitutional: He appears well-developed and well-nourished.   HENT:   Head: Normocephalic and atraumatic.   Eyes: EOM are normal. Pupils are equal, round, and reactive to light.   Neck: Neck supple. No thyromegaly present. No JVD present.   Normal range of motion.  Cardiovascular:  Regular rhythm.   Tachycardia present.   Exam reveals no gallop and no friction rub.       No murmur heard.  HR: 108 bpm.   Pulmonary/Chest: Breath sounds normal. No respiratory distress.    SpO2 90-91% RA.   Abdominal: Abdomen is soft. He exhibits distension. Bowel sounds are decreased. There is no abdominal tenderness.   Musculoskeletal:         General: No tenderness or edema. Normal range of motion.      Cervical back: Normal range of motion and neck supple.      Right lower leg: No edema.      Left lower leg: No edema.     Neurological: He is alert and oriented to person, place, and time. He has normal strength. GCS score is 15. GCS eye subscore is 4. GCS verbal subscore is 5. GCS motor subscore is 6.   Skin: Skin is warm. Capillary refill takes less than 2 seconds.   Psychiatric: He has a normal mood and affect. His behavior is normal. Thought content normal.       ED Course   Procedures  Labs Reviewed   COMPREHENSIVE METABOLIC PANEL - Abnormal; Notable for the following components:       Result Value    Glucose 179 (*)     BUN 24 (*)     Creatinine 2.1 (*)     eGFR 30 (*)     All other components within normal limits   CBC W/ AUTO DIFFERENTIAL - Abnormal; Notable for the following components:    WBC 3.53 (*)     RBC 4.09 (*)     Hemoglobin 11.6 (*)     Hematocrit 35.4 (*)     Lymph # 0.7 (*)     All other components within normal limits   URINALYSIS - Abnormal; Notable for the following components:    Color, UA Colorless (*)     All other components within normal limits   LIPASE - Abnormal; Notable for the following components:    Lipase 71 (*)     All other components within normal limits   TROPONIN I   LACTIC ACID, PLASMA     EKG Readings: (Independently Interpreted)   EKGs independently interpreted by Rich Mcleod MD reads: Sinus tachycardia. Rate of 104. Normal conduction. No ectopy. Normal axis. Normal ST segments. Normal T-waves.      Imaging Results              CT Abdomen Pelvis  Without Contrast (Final result)  Result time 07/17/23 21:54:20      Final result by Dorene Phillips MD (07/17/23 21:54:20)                   Impression:      No acute intra-abdominal abnormalities.  No  evidence of bowel obstruction, as clinically questioned.  Additional findings as detailed above.      Electronically signed by: Dorene Phillips MD  Date:    07/17/2023  Time:    21:54               Narrative:    EXAMINATION:  CT ABDOMEN PELVIS WITHOUT CONTRAST    CLINICAL HISTORY:  Bowel obstruction suspected;    TECHNIQUE:  Low dose axial images, sagittal and coronal reformations were obtained from the lung bases to the pubic symphysis.  Oral contrast was not administered.    COMPARISON:  CT abdomen pelvis from March 2022.    FINDINGS:  The visualized portion of the heart is unremarkable.  Bilateral lower lobe atelectasis or scarring is seen.    Several small scattered hepatic hypodensities, probable cysts are seen.  There is no intra-or extrahepatic biliary ductal dilatation.  The gallbladder is unremarkable.  The stomach, pancreas, spleen, and adrenal glands are unremarkable.    Kidneys show no evidence of stones or hydronephrosis.  No abnormalities are seen along the ureteral courses.  Urinary bladder and prostate are unremarkable.    Appendix is visualized and is unremarkable.  The visualized loops of small and large bowel show no evidence of obstruction or inflammation.  Moderate volume retained stool is seen throughout the colon.  No free air or free fluid.    Aorta tapers normally with moderate atherosclerosis seen extending into the bilateral iliacs.    No acute osseous abnormality identified.  Multilevel degenerative changes are seen throughout the lumbar spine.  Subcutaneous soft tissues show no significant abnormalities.                                       X-Ray Abdomen AP 1 View (KUB) (Final result)  Result time 07/17/23 20:34:38      Final result by Dorene Phillips MD (07/17/23 20:34:38)                   Impression:      Nonobstructive bowel gas pattern.  Moderate volume retained stool seen throughout the colon.      Electronically signed by: Dorene Phillips MD  Date:    07/17/2023  Time:    20:34                Narrative:    EXAMINATION:  XR ABDOMEN AP 1 VIEW    CLINICAL HISTORY:  Unspecified abdominal pain    TECHNIQUE:  AP View(s) of the abdomen was performed.    COMPARISON:  December 2022.    FINDINGS:  Nonspecific bowel gas pattern.  No evidence to suggest obstruction.  Moderate volume retained stool is seen throughout the colon.  No free air seen.  There is mild bibasilar atelectasis.                                       Medications   mineral oil enema 1 enema (1 enema Rectal Given 7/17/23 2224)   metoclopramide HCl injection 5 mg (5 mg Intravenous Given 7/17/23 2220)   magnesium hydroxide 400 mg/5 ml suspension 2,400 mg (2,400 mg Oral Given 7/17/23 2220)     Medical Decision Making:   History:   Old Medical Records: I decided to obtain old medical records.  Old Records Summarized: other records.       <> Summary of Records: External documents reviewed.  Initial Assessment:   This is an emergent evaluation of a 87 y.o. male who presents with LUQ abdominal pain, nausea, and vomiting since yesterday. The patient was seen and examined. The history and physical exam was obtained. On exam, he is tachycardic at 108 bpm, SpO2 is of 90-91% RA, has hypoactive bowel sounds, and abdominal distension. The nursing notes and vital signs were reviewed. Secondary to symptoms and examination findings, I ordered X-ray abdomen, troponin I level, CMP, CBC, urinalysis, lipase level, EKG 12-lead.   Differential Diagnosis:   This includes, but it is not limited to: gastritis, gastroenteritis, gastric ulcer, SBO, colitis, diverticulitis, enteritis, gastroparesis, AAA, mesenteric ischemia, bowel perforation.  Independently Interpreted Test(s):   I have ordered and independently interpreted EKG Reading(s) - see prior notes  Clinical Tests:   Lab Tests: Ordered and Reviewed  Radiological Study: Ordered and Reviewed  Medical Tests: Ordered and Reviewed  ED Management:    Shared decision making with patient.            Sandeep  Attestation:   Scribe #1: I performed the above scribed service and the documentation accurately describes the services I performed. I attest to the accuracy of the note.                   Clinical Impression:   Final diagnoses:  [R10.9] Abdominal pain  [K59.00] Constipation, unspecified constipation type (Primary)        ED Disposition Condition    Discharge Stable          ED Prescriptions       Medication Sig Dispense Start Date End Date Auth. Provider    lactulose 10 gram/15 ml (CHRONULAC) 10 gram/15 mL (15 mL) solution Take 15 mLs (10 g total) by mouth 3 (three) times daily as needed (constipation). 473 mL 7/17/2023 7/22/2023 Rich Mcleod MD    ondansetron (ZOFRAN-ODT) 4 MG TbDL Take 1 tablet (4 mg total) by mouth every 8 (eight) hours as needed (Nausea). 14 tablet 7/17/2023 -- Rich Mcleod MD          Follow-up Information       Follow up With Specialties Details Why Contact Info    Lucas Lloyd Jr., MD Family Medicine In 2 days  [address] [facility] [address]  [phone]      [facility] [place] - Emergency Dept Emergency Medicine  As needed, If symptoms worsen or new symptoms develop [address] [facility] [place] [address] [phone]            I, Rich Mcleod, personally performed the services described in this documentation. All medical record entries made by the scribe were at my direction and in my presence. I have reviewed the chart and agree that the record reflects my personal performance and is accurate and complete.       Rich Mcleod MD  07/18/23 0250

## 2023-07-18 NOTE — ED NOTES
Pt presents to ED c/o generalized abd pain mostly in the LUQ. Pt reports pain started while  at [facility]. Pt reports difficulty having BM yesterday with nausea and vomiting. Pt also noticed some abd distension.

## 2023-07-18 NOTE — FIRST PROVIDER EVALUATION
Medical screening examination initiated.  I have conducted a focused provider triage encounter, findings are as follows:    Brief history of present illness:  86 yo with left upper abd pain that started yesterday. Passing stool but not as much as usual. +vomit yesterday.   +hx prostate CA.    There were no vitals filed for this visit.    Pertinent physical exam:  A&O RRR unlabored.      Brief workup plan:  UA, labs, KUB    Preliminary workup initiated; this workup will be continued and followed by the physician or advanced practice provider that is assigned to the patient when roomed.

## 2023-07-19 ENCOUNTER — TELEPHONE (OUTPATIENT)
Dept: FAMILY MEDICINE | Facility: CLINIC | Age: 88
End: 2023-07-19
Payer: MEDICARE

## 2023-07-19 ENCOUNTER — PATIENT OUTREACH (OUTPATIENT)
Dept: EMERGENCY MEDICINE | Facility: HOSPITAL | Age: 88
End: 2023-07-19
Payer: MEDICARE

## 2023-07-19 NOTE — TELEPHONE ENCOUNTER
----- Message from Niesha Salinas sent at 7/19/2023  9:59 AM CDT -----  Regarding: Post ED visit follow up appt within 7 days of d/c date 7/17/23  Good morning: Pt was seen in ED on 7/17/23 and requires a Post ED visit follow up appt within 7 days of d/c date. Please contact pt to schedule a fu appt by 7/24/23 if possible.     Thank you  Niesha Salinas

## 2023-07-19 NOTE — PROGRESS NOTES
I spoke with pt regarding ED visit on 7/18/23. I contacted pcp for assistance with scheduling a Post ED visit fu appt within 7 days of d/c. Pt was contacted and scheduled a fu appt on 7/26/23 outside of 7 days with Mirna Ochoa. Pt was acceptable with this date. Pt states that he does not have transportation issues and has no additional needs at this time.Pt was scheduled an appointment reminder.     Niesha Salinas

## 2023-07-24 ENCOUNTER — PATIENT OUTREACH (OUTPATIENT)
Dept: EMERGENCY MEDICINE | Facility: HOSPITAL | Age: 88
End: 2023-07-24

## 2023-07-26 ENCOUNTER — TELEPHONE (OUTPATIENT)
Dept: PHARMACY | Facility: CLINIC | Age: 88
End: 2023-07-26
Payer: MEDICARE

## 2023-07-26 ENCOUNTER — PATIENT MESSAGE (OUTPATIENT)
Dept: PHARMACY | Facility: CLINIC | Age: 88
End: 2023-07-26
Payer: MEDICARE

## 2023-07-26 ENCOUNTER — OFFICE VISIT (OUTPATIENT)
Dept: FAMILY MEDICINE | Facility: CLINIC | Age: 88
End: 2023-07-26
Payer: MEDICARE

## 2023-07-26 VITALS
OXYGEN SATURATION: 94 % | HEART RATE: 104 BPM | TEMPERATURE: 98 F | SYSTOLIC BLOOD PRESSURE: 106 MMHG | WEIGHT: 187.19 LBS | DIASTOLIC BLOOD PRESSURE: 68 MMHG | HEIGHT: 71 IN | BODY MASS INDEX: 26.21 KG/M2

## 2023-07-26 DIAGNOSIS — J84.9 ILD (INTERSTITIAL LUNG DISEASE): Chronic | ICD-10-CM

## 2023-07-26 DIAGNOSIS — N18.32 STAGE 3B CHRONIC KIDNEY DISEASE: Chronic | ICD-10-CM

## 2023-07-26 DIAGNOSIS — E11.21 DIABETES MELLITUS WITH NEPHROPATHY: Chronic | ICD-10-CM

## 2023-07-26 DIAGNOSIS — J44.9 CHRONIC OBSTRUCTIVE PULMONARY DISEASE, UNSPECIFIED COPD TYPE: ICD-10-CM

## 2023-07-26 DIAGNOSIS — K59.00 CONSTIPATION, UNSPECIFIED CONSTIPATION TYPE: Primary | ICD-10-CM

## 2023-07-26 DIAGNOSIS — R10.9 ABDOMINAL PAIN, UNSPECIFIED ABDOMINAL LOCATION: ICD-10-CM

## 2023-07-26 DIAGNOSIS — C61 MALIGNANT NEOPLASM OF PROSTATE: ICD-10-CM

## 2023-07-26 DIAGNOSIS — I10 ESSENTIAL HYPERTENSION: ICD-10-CM

## 2023-07-26 PROCEDURE — 99214 OFFICE O/P EST MOD 30 MIN: CPT | Mod: S$GLB,,, | Performed by: NURSE PRACTITIONER

## 2023-07-26 PROCEDURE — 3074F SYST BP LT 130 MM HG: CPT | Mod: CPTII,S$GLB,, | Performed by: NURSE PRACTITIONER

## 2023-07-26 PROCEDURE — 1126F AMNT PAIN NOTED NONE PRSNT: CPT | Mod: CPTII,S$GLB,, | Performed by: NURSE PRACTITIONER

## 2023-07-26 PROCEDURE — 99214 PR OFFICE/OUTPT VISIT, EST, LEVL IV, 30-39 MIN: ICD-10-PCS | Mod: S$GLB,,, | Performed by: NURSE PRACTITIONER

## 2023-07-26 PROCEDURE — 3078F DIAST BP <80 MM HG: CPT | Mod: CPTII,S$GLB,, | Performed by: NURSE PRACTITIONER

## 2023-07-26 PROCEDURE — 1160F PR REVIEW ALL MEDS BY PRESCRIBER/CLIN PHARMACIST DOCUMENTED: ICD-10-PCS | Mod: CPTII,S$GLB,, | Performed by: NURSE PRACTITIONER

## 2023-07-26 PROCEDURE — 1159F MED LIST DOCD IN RCRD: CPT | Mod: CPTII,S$GLB,, | Performed by: NURSE PRACTITIONER

## 2023-07-26 PROCEDURE — 1159F PR MEDICATION LIST DOCUMENTED IN MEDICAL RECORD: ICD-10-PCS | Mod: CPTII,S$GLB,, | Performed by: NURSE PRACTITIONER

## 2023-07-26 PROCEDURE — 3074F PR MOST RECENT SYSTOLIC BLOOD PRESSURE < 130 MM HG: ICD-10-PCS | Mod: CPTII,S$GLB,, | Performed by: NURSE PRACTITIONER

## 2023-07-26 PROCEDURE — 99999 PR PBB SHADOW E&M-EST. PATIENT-LVL IV: ICD-10-PCS | Mod: PBBFAC,,, | Performed by: NURSE PRACTITIONER

## 2023-07-26 PROCEDURE — 3078F PR MOST RECENT DIASTOLIC BLOOD PRESSURE < 80 MM HG: ICD-10-PCS | Mod: CPTII,S$GLB,, | Performed by: NURSE PRACTITIONER

## 2023-07-26 PROCEDURE — 1126F PR PAIN SEVERITY QUANTIFIED, NO PAIN PRESENT: ICD-10-PCS | Mod: CPTII,S$GLB,, | Performed by: NURSE PRACTITIONER

## 2023-07-26 PROCEDURE — 1101F PT FALLS ASSESS-DOCD LE1/YR: CPT | Mod: CPTII,S$GLB,, | Performed by: NURSE PRACTITIONER

## 2023-07-26 PROCEDURE — 3288F FALL RISK ASSESSMENT DOCD: CPT | Mod: CPTII,S$GLB,, | Performed by: NURSE PRACTITIONER

## 2023-07-26 PROCEDURE — 3288F PR FALLS RISK ASSESSMENT DOCUMENTED: ICD-10-PCS | Mod: CPTII,S$GLB,, | Performed by: NURSE PRACTITIONER

## 2023-07-26 PROCEDURE — 99999 PR PBB SHADOW E&M-EST. PATIENT-LVL IV: CPT | Mod: PBBFAC,,, | Performed by: NURSE PRACTITIONER

## 2023-07-26 PROCEDURE — 1160F RVW MEDS BY RX/DR IN RCRD: CPT | Mod: CPTII,S$GLB,, | Performed by: NURSE PRACTITIONER

## 2023-07-26 PROCEDURE — 1101F PR PT FALLS ASSESS DOC 0-1 FALLS W/OUT INJ PAST YR: ICD-10-PCS | Mod: CPTII,S$GLB,, | Performed by: NURSE PRACTITIONER

## 2023-07-26 RX ORDER — LACTULOSE 10 G/15ML
SOLUTION ORAL; RECTAL
COMMUNITY
Start: 2023-07-18 | End: 2023-12-20 | Stop reason: SDUPTHER

## 2023-07-26 NOTE — TELEPHONE ENCOUNTER
I have reached out to Peter Lopez to inform him of the [facility] application process for Spiriva and Tradjenta and whats required to apply.  Peter Lopez did not answer. I left a voicemail and mailed a letter introducing him to the pharmacy patient assistance program. I will follow up in 5 business days.

## 2023-07-26 NOTE — PROGRESS NOTES
"Chief Complaint  Chief Complaint   Patient presents with    Hospital Follow Up     ER, patient have question concerning spiriva medication       HPI    HPI   Mr. Peter vasquez is a 87 y.o. male with medical problems as listed below. The patient presents to clinic for ED follow up.     ED course as follows:   Abdominal Pain       To left upper quad since yesterday at [facility], Hx of prostate cancer, nausea and emesis yesterday      Peter Vasquez is a 87 y.o. male with a PMHx of BPH, HTN, DM type 1, prostate cancer, COPD, Hep C, and CKD, who presents to the ED for evaluation of LUQ abdominal pain since yesterday. Patient reports he was at [facility] yesterday when he began having complaints of LUQ abdominal pain, endorsing associated nausea and vomiting. He further endorses some abdominal distension. He reports his last BM took place yesterday, but describes it as "hard." He endorses he is still passing gas.  No medications taken PTA. No alleviating or exacerbating factors noted. Denies fever, chills, dysuria, cough, congestion, hematemesis, CP, SOB, diarrhea, or other associated symptoms. Denies previous abdominal surgeries. NKDA.     Medical Decision Making:   History:   Old Medical Records: I decided to obtain old medical records.  Old Records Summarized: other records.       <> Summary of Records: External documents reviewed.  Initial Assessment:   This is an emergent evaluation of a 87 y.o. male who presents with LUQ abdominal pain, nausea, and vomiting since yesterday. The patient was seen and examined. The history and physical exam was obtained. On exam, he is tachycardic at 108 bpm, SpO2 is of 90-91% RA, has hypoactive bowel sounds, and abdominal distension. The nursing notes and vital signs were reviewed. Secondary to symptoms and examination findings, I ordered X-ray abdomen, troponin I level, CMP, CBC, urinalysis, lipase level, EKG 12-lead.   Differential Diagnosis:   This includes, but it is not limited to: gastritis, " gastroenteritis, gastric ulcer, SBO, colitis, diverticulitis, enteritis, gastroparesis, AAA, mesenteric ischemia, bowel perforation.  Independently Interpreted Test(s):   I have ordered and independently interpreted EKG Reading(s) - see prior notes  Clinical Tests:   Lab Tests: Ordered and Reviewed  Radiological Study: Ordered and Reviewed  Medical Tests: Ordered and Reviewed  ED Management:     Shared decision making with patient.      Since discharge:    The patient states that he has been doing well since discharge. He has not been having constipation and denies any N/V. They did not do an enema in the ED. They sent him home with lactulose which he has not been taking.    Has been taking the miralax which has been helping.Has been taking it every 2 or so days.     He has been having problem with medication cost. He states his tradjenta and Spiriva are both 140 each/month. He Went to [id number] and they were not able to help him.        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  Vaccinated.  C A        SOCIAL HISTORY:  Social History     Socioeconomic History    Marital status:     Number of children: 7    Highest education level: 12th grade   Tobacco Use    Smoking status: Former     Packs/day: 1.00     Years: 40.00     Pack years: 40.00     Types: Cigarettes     Quit date: 1991     Years since quittin.7    Smokeless tobacco: Former     Quit date: 1986   Substance and Sexual Activity    Alcohol use: Not Currently     Alcohol/week: 0.0 standard drinks     Comment: 2 bottles beer on weekends    Drug use: No    Sexual activity: Yes     Partners: Female     Comment:     Social History Narrative    , resides in [place]. 7 children but lost one. 16 grandchildren. 4 great-grandchildren. Works at [employer]     Social Determinants of Health     Financial Resource Strain: Medium Risk    Difficulty of Paying Living Expenses: Somewhat hard   Food Insecurity: No Food Insecurity    Worried About Running Out of Food in the Last Year: Never true    Ran Out of Food in the Last Year: Never true   Transportation Needs: No Transportation Needs    Lack of Transportation (Medical): No    Lack of Transportation (Non-Medical): No   Physical Activity: Inactive    Days of Exercise per Week: 0 days    Minutes of Exercise per Session: 0 min   Stress: No Stress Concern Present    Feeling of Stress : Not at all   Social Connections: Moderately Integrated    Frequency of Communication with Friends and Family: More than three times a week    Frequency of Social Gatherings with Friends and Family: Once a week    Attends [employer] Services: More than 4 times per year    Active Member of Clubs or Organizations: Yes    Attends Club or Organization Meetings: More than 4 times per year    Marital Status:    Housing Stability: Unknown    Unable to Pay for Housing in the Last Year: No    Unstable Housing in the Last Year: No       FAMILY HISTORY:  Family History   Problem Relation Age of Onset     Cancer Mother     Asbestos Father     Liver disease Neg Hx        ALLERGIES AND MEDICATIONS: updated and reviewed.  Review of patient's allergies indicates:  No Known Allergies  Current Outpatient Medications   Medication Sig Dispense Refill    amLODIPine (NORVASC) 5 MG tablet Take 1 tablet (5 mg total) by mouth once daily. 90 tablet 3    atorvastatin (LIPITOR) 20 MG tablet Take 1 tablet (20 mg total) by mouth every evening. 90 tablet 3    blood sugar diagnostic Strp To check BG 1 times daily, to use with insurance preferred meter 50 each 11    blood-glucose meter kit To check BG 1 times daily, to use with insurance preferred meter 1 each 0    cholecalciferol, vitamin D3, (VITAMIN D3) 25 mcg (1,000 unit) capsule Take 1,000 Units by mouth once daily.      leuprolide (LUPRON) 1 mg/0.2 mL injection Inject into the skin.      olmesartan (BENICAR) 40 MG tablet Take 1 tablet by mouth once daily 90 tablet 0    ondansetron (ZOFRAN-ODT) 4 MG TbDL Take 1 tablet (4 mg total) by mouth every 8 (eight) hours as needed (Nausea). 14 tablet 0    tiotropium bromide (SPIRIVA RESPIMAT) 2.5 mcg/actuation inhaler INHALE 2 SPRAY(S) BY MOUTH ONCE DAILY 4 g 11    TRADJENTA 5 mg Tab tablet Take 1 tablet by mouth once daily 90 tablet 1    vibegron 75 mg Tab Take 75 mg by mouth once daily. 30 tablet 3    lactulose (CHRONULAC) 10 gram/15 mL solution TAKE 15 ML BY MOUTH THREE TIMES DAILY AS NEEDED FOR CONSTIPATION       No current facility-administered medications for this visit.       Patient Care Team:  Lucas Lloyd Jr., MD as PCP - General (Family Medicine)  Gerber Albert MA as Care Coordinator  Harry Olsen OD (Optometry)  Sharita Melton LCSW as   Sharita Melton LCSW as   Niesha Salinas as ED Navigator  Jojo Mueller as ED Navigator    CASPER  Review of Systems   Constitutional:  Negative for chills, fatigue, fever and unexpected weight change.   HENT:  Negative for congestion, ear pain, sore  "throat and voice change.    Eyes:  Negative for photophobia, pain, discharge and visual disturbance.   Respiratory:  Negative for cough, shortness of breath and wheezing.    Cardiovascular:  Negative for chest pain, palpitations and leg swelling.   Gastrointestinal:  Negative for abdominal pain, blood in stool, constipation, diarrhea, nausea and vomiting.   Genitourinary:  Negative for dysuria and frequency.   Musculoskeletal:  Negative for gait problem, joint swelling and neck stiffness.   Skin:  Negative for color change and rash.   Neurological:  Negative for seizures, weakness and headaches.   Hematological:  Negative for adenopathy. Does not bruise/bleed easily.   Psychiatric/Behavioral:  Negative for behavioral problems and dysphoric mood. The patient is not nervous/anxious.          Physical Exam  Vitals:    07/26/23 0927   BP: 106/68   BP Location: Left arm   Patient Position: Sitting   BP Method: Medium (Manual)   Pulse: 104   Temp: 98.4 °F (36.9 °C)   TempSrc: Oral   SpO2: (!) 94%   Weight: 84.9 kg (187 lb 2.7 oz)   Height: 5' 11" (1.803 m)    Body mass index is 26.11 kg/m².  Weight: 84.9 kg (187 lb 2.7 oz)   Height: 5' 11" (180.3 cm)     Physical Exam  Constitutional:       Appearance: He is well-developed.   HENT:      Head: Normocephalic and atraumatic.   Eyes:      Conjunctiva/sclera: Conjunctivae normal.      Pupils: Pupils are equal, round, and reactive to light.   Neck:      Thyroid: No thyromegaly.   Cardiovascular:      Rate and Rhythm: Normal rate.   Pulmonary:      Effort: Pulmonary effort is normal.   Musculoskeletal:         General: Normal range of motion.      Cervical back: Normal range of motion and neck supple.   Skin:     General: Skin is warm and dry.   Neurological:      Mental Status: He is alert and oriented to person, place, and time.   Psychiatric:         Behavior: Behavior normal.         Thought Content: Thought content normal.         Judgment: Judgment normal.           Health " Maintenance         Date Due Completion Date    Shingles Vaccine (1 of 2) Never done ---    COVID-19 Vaccine (6 - Moderna series) 01/20/2023 9/20/2022    Influenza Vaccine (1) 09/01/2023 9/20/2022    Hemoglobin A1c 12/06/2023 6/6/2023    Diabetes Urine Screening 06/06/2024 6/6/2023    Lipid Panel 06/06/2024 6/6/2023    TETANUS VACCINE 02/16/2028 2/16/2018 (Declined)    Override on 2/16/2018: Declined        Health maintenance reviewed at this time.    Assessment & Plan  Constipation, unspecified constipation type    Abdominal pain, unspecified abdominal location      Has since gotten better.    Has been taking miralax about every 2 days which has been helping.    Diabetes mellitus with nephropathy  -     Ambulatory referral/consult to Pharmacy Assistance; Future; Expected date: 08/02/2023  The current medical regimen is effective;  continue present plan and medications.    Chronic obstructive pulmonary disease, unspecified COPD type  -     Ambulatory referral/consult to Pharmacy Assistance; Future; Expected date: 08/02/2023  The current medical regimen is effective;  continue present plan and medications.    ILD (interstitial lung disease)  The current medical regimen is effective;  continue present plan and medications.    Malignant neoplasm of prostate  Followed by urology. Has upcoming appointment.    Essential hypertension  The current medical regimen is effective;  continue present plan and medications.    Stage 3b chronic kidney disease  Stable. No acute concerns.  Has an appointment with nephrology.         Follow-up: Follow up if symptoms worsen or fail to improve.

## 2023-07-29 DIAGNOSIS — E11.40 TYPE 2 DIABETES MELLITUS WITH DIABETIC NEUROPATHY, WITHOUT LONG-TERM CURRENT USE OF INSULIN: ICD-10-CM

## 2023-07-29 DIAGNOSIS — I10 ESSENTIAL HYPERTENSION: Primary | ICD-10-CM

## 2023-07-29 DIAGNOSIS — D51.9 ANEMIA DUE TO VITAMIN B12 DEFICIENCY, UNSPECIFIED B12 DEFICIENCY TYPE: ICD-10-CM

## 2023-07-29 DIAGNOSIS — N18.32 STAGE 3B CHRONIC KIDNEY DISEASE: ICD-10-CM

## 2023-07-29 RX ORDER — CYANOCOBALAMIN 1000 UG/ML
1000 INJECTION, SOLUTION INTRAMUSCULAR; SUBCUTANEOUS
Status: COMPLETED | OUTPATIENT
Start: 2023-07-31 | End: 2023-11-01

## 2023-07-31 ENCOUNTER — TELEPHONE (OUTPATIENT)
Dept: FAMILY MEDICINE | Facility: CLINIC | Age: 88
End: 2023-07-31
Payer: MEDICARE

## 2023-07-31 NOTE — TELEPHONE ENCOUNTER
----- Message from Lucas Lloyd Jr., MD sent at 7/29/2023  2:07 AM CDT -----  Patient's B12 on low side. Please schedule monthly b12 injection 1000 mcg, x 4 months. Schedule fasting labs a week prior to visit with me in December

## 2023-08-01 ENCOUNTER — CLINICAL SUPPORT (OUTPATIENT)
Dept: UROLOGY | Facility: CLINIC | Age: 88
End: 2023-08-01
Payer: MEDICARE

## 2023-08-01 ENCOUNTER — CLINICAL SUPPORT (OUTPATIENT)
Dept: FAMILY MEDICINE | Facility: CLINIC | Age: 88
End: 2023-08-01
Payer: MEDICARE

## 2023-08-01 VITALS — BODY MASS INDEX: 25.77 KG/M2 | WEIGHT: 184.75 LBS

## 2023-08-01 DIAGNOSIS — C61 MALIGNANT NEOPLASM OF PROSTATE: Primary | ICD-10-CM

## 2023-08-01 DIAGNOSIS — D51.9 ANEMIA DUE TO VITAMIN B12 DEFICIENCY, UNSPECIFIED B12 DEFICIENCY TYPE: Primary | ICD-10-CM

## 2023-08-01 PROCEDURE — 96372 PR INJECTION,THERAP/PROPH/DIAG2ST, IM OR SUBCUT: ICD-10-PCS | Mod: S$GLB,,, | Performed by: FAMILY MEDICINE

## 2023-08-01 PROCEDURE — 96402 CHEMO HORMON ANTINEOPL SQ/IM: CPT | Mod: S$GLB,,, | Performed by: STUDENT IN AN ORGANIZED HEALTH CARE EDUCATION/TRAINING PROGRAM

## 2023-08-01 PROCEDURE — 96372 THER/PROPH/DIAG INJ SC/IM: CPT | Mod: S$GLB,,, | Performed by: FAMILY MEDICINE

## 2023-08-01 PROCEDURE — 99999 PR PBB SHADOW E&M-EST. PATIENT-LVL III: ICD-10-PCS | Mod: PBBFAC,,,

## 2023-08-01 PROCEDURE — 96402 PR CHEMOTHER HORMON ANTINEOPL SUB-Q/IM: ICD-10-PCS | Mod: S$GLB,,, | Performed by: STUDENT IN AN ORGANIZED HEALTH CARE EDUCATION/TRAINING PROGRAM

## 2023-08-01 PROCEDURE — 96373 PR INJECTION,THERAP/PROPH/DIAGNOST, INTRA-ARTERIAL: ICD-10-PCS | Mod: S$GLB,,, | Performed by: FAMILY MEDICINE

## 2023-08-01 PROCEDURE — 99499 NO LOS: ICD-10-PCS | Mod: S$GLB,,, | Performed by: FAMILY MEDICINE

## 2023-08-01 PROCEDURE — 99499 UNLISTED E&M SERVICE: CPT | Mod: S$GLB,,, | Performed by: FAMILY MEDICINE

## 2023-08-01 PROCEDURE — 99999 PR PBB SHADOW E&M-EST. PATIENT-LVL III: CPT | Mod: PBBFAC,,,

## 2023-08-01 PROCEDURE — 96373 THER/PROPH/DIAG INJ IA: CPT | Mod: S$GLB,,, | Performed by: FAMILY MEDICINE

## 2023-08-01 RX ADMIN — CYANOCOBALAMIN 1000 MCG: 1000 INJECTION, SOLUTION INTRAMUSCULAR; SUBCUTANEOUS at 08:08

## 2023-08-04 ENCOUNTER — HOSPITAL ENCOUNTER (EMERGENCY)
Facility: HOSPITAL | Age: 88
Discharge: HOME | End: 2023-08-04
Attending: EMERGENCY MEDICINE
Payer: MEDICARE

## 2023-08-04 VITALS
WEIGHT: 184 LBS | TEMPERATURE: 98 F | SYSTOLIC BLOOD PRESSURE: 160 MMHG | DIASTOLIC BLOOD PRESSURE: 82 MMHG | RESPIRATION RATE: 18 BRPM | BODY MASS INDEX: 25.76 KG/M2 | OXYGEN SATURATION: 95 % | HEIGHT: 71 IN | HEART RATE: 105 BPM

## 2023-08-04 DIAGNOSIS — K59.00 CONSTIPATION, UNSPECIFIED CONSTIPATION TYPE: Primary | ICD-10-CM

## 2023-08-04 PROCEDURE — 99282 EMERGENCY DEPT VISIT SF MDM: CPT

## 2023-08-04 RX ORDER — DOCUSATE SODIUM 100 MG/1
200 CAPSULE, LIQUID FILLED ORAL 2 TIMES DAILY
Qty: 60 CAPSULE | Refills: 0 | Status: SHIPPED | OUTPATIENT
Start: 2023-08-04 | End: 2024-03-04

## 2023-08-04 RX ORDER — POLYETHYLENE GLYCOL 3350 17 G/17G
17 POWDER, FOR SOLUTION ORAL 2 TIMES DAILY PRN
Qty: 72 EACH | Refills: 0 | Status: SHIPPED | OUTPATIENT
Start: 2023-08-04 | End: 2023-08-05 | Stop reason: SDUPTHER

## 2023-08-04 NOTE — DISCHARGE INSTRUCTIONS

## 2023-08-04 NOTE — ED PROVIDER NOTES
"Encounter Date: 8/4/2023    SCRIBE #1 NOTE: I, Petra Dsouza, am scribing for, and in the presence of,  Narinder Danielson PA-C. I have scribed the following portions of the note - Other sections scribed: HPI, ROS.       History     Chief Complaint   Patient presents with    Constipation     Pt c/o constipation. Reports last BM two days ago and presents with hard stool. Denies N/V/D.      Peter Lopez is a 87 y.o. male with Hx of BPH, HTN, Dm type 1, CKD, and COPD who presents to the ED for chief complaint of Constipation. Patient reports that he ate Cabb[employer] and Pig Tails that he thinks is "stopping me up" and that he had a MiraLax this morning and yesterday. Patient also reports last bowel movement at 12:00 today at work where it was "very dark".  Patient notes frequent and recent Pepto-Bismol use.  Patient was undergoing treatment for prostate cancer, last shot was on Tuesday. Patient states that he is non compliant with prescribed medication for Constipation. Patient also states that he "felt the urge to go but didn't want to strain." No other alleviating or exacerbating factors. This is the extent of the patient's complaints in the ED. NKDA.    The history is provided by the patient. No  was used.     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  Vaccinated.  C A     Family History   Problem Relation Age of Onset    Cancer Mother     Asbestos Father     Liver disease Neg Hx      Social History     Tobacco Use    Smoking status: Former     Current packs/day: 0.00     Average packs/day: 1 pack/day for 40.0 years (40.0 ttl pk-yrs)     Types: Cigarettes     Start date: 1951     Quit date: 1991     Years since quittin.7    Smokeless tobacco: Former     Quit date: 1986   Substance Use Topics    Alcohol use: Not Currently     Alcohol/week: 0.0 standard drinks of alcohol     Comment: 2 bottles beer on weekends    Drug use: No     Review of Systems   Constitutional:  Negative for appetite change, chills, diaphoresis and fever.   HENT:  Negative for sore throat.    Eyes:  Negative for visual disturbance.   Respiratory:  Negative for cough, chest tightness, shortness of breath and wheezing.    Cardiovascular:  Negative for chest pain and palpitations.   Gastrointestinal:  Positive for constipation. Negative for abdominal pain.   Genitourinary:  Negative for dysuria.   Musculoskeletal:  Negative for back pain.   Neurological:  Negative for weakness.   Hematological:  Does not bruise/bleed easily.       Physical Exam     Initial Vitals [23 1306]   BP Pulse Resp Temp SpO2   (!) 160/82 105 18 98.1 °F (36.7 °C) 95 %      MAP       --         Physical Exam    Nursing note and vitals reviewed.  Constitutional: He appears well-developed.   HENT:   Head: Normocephalic and atraumatic.   Mouth/Throat: Oropharynx is clear and moist.   Eyes: EOM are normal. Pupils are equal, round, and reactive to light.   Neck: Neck supple.   Normal range of motion.  Cardiovascular:  Normal rate,  regular rhythm, normal heart sounds and intact distal pulses.           Regular rate on my exam.  Heart rate 94 beats per minute.   Pulmonary/Chest: Breath sounds normal.   Abdominal: Abdomen is soft and flat. Bowel sounds are normal. He exhibits no distension. There is no abdominal tenderness.   Bowel sounds are present.  No distention.  No tenderness to palpation. There is no rebound and no guarding.   Musculoskeletal:         General: Normal range of motion.      Cervical back: Normal range of motion and neck supple.     Neurological: He is alert and oriented to person, place, and time.   Skin: Skin is warm and dry.   Psychiatric: He has a normal mood and affect.         ED Course   Procedures  Labs Reviewed - No data to display       Imaging Results    None          Medications - No data to display  Medical Decision Making:   Initial Assessment:   87-year-old male with a past medical history of prostate cancer presenting to the emergency department with a chief complaint of constipation.  Has been passing stool it was just harder than normal.  Took 1 dose of MiraLax yesterday and 1 dose of MiraLax today.  Was previously prescribed another constipation medication but he has never picked up the prescription.  Denies any pain.  On physical exam, patient was clinically well-appearing and in no acute distress.  Differential Diagnosis:   Differential diagnosis includes but is not limited to constipation, medication noncompliance, medication side effect cancer treatment drugs  ED Management:  Patient presenting to the emergency department with a chief complaint of constipation.  History and physical exam findings as above.  Presentation consistent with mild constipation.  Patient was still able to pass stool it is just harder than his baseline.  Colace and MiraLax electronically prescribed and sent to the patient's preferred pharmacy.  Patient instructed to take plenty of fluids with these medications.  Patient voiced  his understanding.  Return precautions discussed at length and in detail.    Return precautions were discussed, all patient questions were answered, and the patient was agreeable to the plan of care.  He was discharged home in stable condition and will follow up with his primary care provider or return to the emergency department if his symptoms worsen or do not improve.           Scribe Attestation:   Scribe #1: I performed the above scribed service and the documentation accurately describes the services I performed. I attest to the accuracy of the note.                     Clinical Impression:   Final diagnoses:  [K59.00] Constipation, unspecified constipation type (Primary)        ED Disposition Condition    Discharge Stable          ED Prescriptions       Medication Sig Dispense Start Date End Date Auth. Provider    docusate sodium (COLACE) 100 MG capsule Take 2 capsules (200 mg total) by mouth 2 (two) times daily. 60 capsule 8/4/2023 -- Narinder Danielson PA-C    polyethylene glycol (GLYCOLAX) 17 gram PwPk Take 17 g by mouth 2 (two) times daily as needed (Constipation). 72 each 8/4/2023 -- Narinder Danielson PA-C          Follow-up Information       Follow up With Specialties Details Why Contact Info    Lucas Lloyd Jr., MD Family Medicine Schedule an appointment as soon as possible for a visit  As needed, If symptoms worsen [address] [facility] [address]  [phone]      [facility] [place] - Emergency Dept Emergency Medicine Go to  If symptoms worsen [address]  [facility] acute hospital [id number] [phone]             Narinder Danielson PA-C  08/04/23 1424

## 2023-08-07 ENCOUNTER — OFFICE VISIT (OUTPATIENT)
Dept: UROLOGY | Facility: CLINIC | Age: 88
End: 2023-08-07
Payer: MEDICARE

## 2023-08-07 ENCOUNTER — PATIENT OUTREACH (OUTPATIENT)
Dept: EMERGENCY MEDICINE | Facility: HOSPITAL | Age: 88
End: 2023-08-07

## 2023-08-07 VITALS — WEIGHT: 182.56 LBS | BODY MASS INDEX: 25.46 KG/M2

## 2023-08-07 DIAGNOSIS — C61 MALIGNANT NEOPLASM OF PROSTATE: Primary | ICD-10-CM

## 2023-08-07 PROCEDURE — 1101F PR PT FALLS ASSESS DOC 0-1 FALLS W/OUT INJ PAST YR: ICD-10-PCS | Mod: CPTII,S$GLB,, | Performed by: STUDENT IN AN ORGANIZED HEALTH CARE EDUCATION/TRAINING PROGRAM

## 2023-08-07 PROCEDURE — 99213 OFFICE O/P EST LOW 20 MIN: CPT | Mod: S$GLB,,, | Performed by: STUDENT IN AN ORGANIZED HEALTH CARE EDUCATION/TRAINING PROGRAM

## 2023-08-07 PROCEDURE — 1159F PR MEDICATION LIST DOCUMENTED IN MEDICAL RECORD: ICD-10-PCS | Mod: CPTII,S$GLB,, | Performed by: STUDENT IN AN ORGANIZED HEALTH CARE EDUCATION/TRAINING PROGRAM

## 2023-08-07 PROCEDURE — 1101F PT FALLS ASSESS-DOCD LE1/YR: CPT | Mod: CPTII,S$GLB,, | Performed by: STUDENT IN AN ORGANIZED HEALTH CARE EDUCATION/TRAINING PROGRAM

## 2023-08-07 PROCEDURE — 1126F PR PAIN SEVERITY QUANTIFIED, NO PAIN PRESENT: ICD-10-PCS | Mod: CPTII,S$GLB,, | Performed by: STUDENT IN AN ORGANIZED HEALTH CARE EDUCATION/TRAINING PROGRAM

## 2023-08-07 PROCEDURE — 99999 PR PBB SHADOW E&M-EST. PATIENT-LVL III: ICD-10-PCS | Mod: PBBFAC,,, | Performed by: STUDENT IN AN ORGANIZED HEALTH CARE EDUCATION/TRAINING PROGRAM

## 2023-08-07 PROCEDURE — 1126F AMNT PAIN NOTED NONE PRSNT: CPT | Mod: CPTII,S$GLB,, | Performed by: STUDENT IN AN ORGANIZED HEALTH CARE EDUCATION/TRAINING PROGRAM

## 2023-08-07 PROCEDURE — 3288F FALL RISK ASSESSMENT DOCD: CPT | Mod: CPTII,S$GLB,, | Performed by: STUDENT IN AN ORGANIZED HEALTH CARE EDUCATION/TRAINING PROGRAM

## 2023-08-07 PROCEDURE — 99213 PR OFFICE/OUTPT VISIT, EST, LEVL III, 20-29 MIN: ICD-10-PCS | Mod: S$GLB,,, | Performed by: STUDENT IN AN ORGANIZED HEALTH CARE EDUCATION/TRAINING PROGRAM

## 2023-08-07 PROCEDURE — 1159F MED LIST DOCD IN RCRD: CPT | Mod: CPTII,S$GLB,, | Performed by: STUDENT IN AN ORGANIZED HEALTH CARE EDUCATION/TRAINING PROGRAM

## 2023-08-07 PROCEDURE — 3288F PR FALLS RISK ASSESSMENT DOCUMENTED: ICD-10-PCS | Mod: CPTII,S$GLB,, | Performed by: STUDENT IN AN ORGANIZED HEALTH CARE EDUCATION/TRAINING PROGRAM

## 2023-08-07 PROCEDURE — 99999 PR PBB SHADOW E&M-EST. PATIENT-LVL III: CPT | Mod: PBBFAC,,, | Performed by: STUDENT IN AN ORGANIZED HEALTH CARE EDUCATION/TRAINING PROGRAM

## 2023-08-07 NOTE — PROGRESS NOTES
Patient ID: Peter Lopez is a 87 y.o. male.    Chief Complaint: Follow-up    Referral: No referring provider defined for this encounter.     HPI  87 y.o. who presents to the Urology clinic for evaluation of hx of high risk prostate cancer. S/p XRT, channel TURP, ADT. Patient denies weight loss, difficulty voiding, unexplained weight loss. Denies dysuria, hematuria, flank pain.     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    Leukopenia 06/30/2023    Vitamin D deficiency 06/30/2023    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     Resolved Ambulatory Problems     Diagnosis Date Noted    Elevated PSA 01/10/2013    Hepatitis C 10/16/2013    BPH (benign prostatic hyperplasia) 04/05/2016    Chronic kidney disease, stage 3a 01/28/2020    Hypoglycemia     Hypoxia 03/29/2020    Elevated troponin 04/04/2020    Chronic respiratory failure with hypoxia 04/13/2020    Discharge planning issues 04/14/2020    Gait instability 09/03/2020    Tachycardia with heart rate 121-140 beats per minute 09/03/2020    Sleep-related breathing disorder 12/20/2020    Complex sleep apnea syndrome     Dyspnea 03/11/2021    Chest pain 05/20/2021    Urinary retention 06/22/2021    Pulmonary hypertension due to left ventricular systolic dysfunction 12/13/2022    Type 2 diabetes mellitus with diabetic neuropathy, without long-term current use of insulin 04/11/2023     Past  Medical History:   Diagnosis Date    Chronic hepatitis C without mention of hepatic coma     CKD (chronic kidney disease)     COPD (chronic obstructive pulmonary disease)     Diabetes mellitus type I     Diabetes with neurologic complications     H/O colonoscopy     [place] (hard of hearing)     Hypertension     On home oxygen therapy     Prostate cancer     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  Vaccinated.  C A       Social History  Social Connections: Moderately Integrated (4/11/2023)    Social Connection and Isolation Panel [NHANES]     Frequency of Communication with Friends and Family: More than three times a week     Frequency of Social Gatherings with Friends and Family: Once a week     Attends [employer] Services: More than 4 times per year     Active Member of Clubs or Organizations: Yes     Attends Club or Organization Meetings: More than 4 times per year     Marital Status:        Medications    Current Outpatient Medications:     amLODIPine (NORVASC) 5 MG tablet, Take 1 tablet (5 mg total) by mouth once daily., Disp: 90 tablet, Rfl: 3    atorvastatin (LIPITOR) 20 MG tablet, Take 1 tablet (20 mg total) by mouth every evening., Disp: 90 tablet, Rfl: 3    blood sugar diagnostic Strp, To check BG 1 times daily, to use with insurance preferred meter, Disp: 50 each, Rfl: 11    blood-glucose meter kit, To check BG 1 times daily, to use with insurance preferred meter, Disp: 1 each, Rfl: 0    cholecalciferol, vitamin D3, (VITAMIN D3) 25 mcg (1,000 unit) capsule, Take 1,000 Units by mouth once  daily., Disp: , Rfl:     docusate sodium (COLACE) 100 MG capsule, Take 2 capsules (200 mg total) by mouth 2 (two) times daily., Disp: 60 capsule, Rfl: 0    lactulose (CHRONULAC) 10 gram/15 mL solution, TAKE 15 ML BY MOUTH THREE TIMES DAILY AS NEEDED FOR CONSTIPATION, Disp: , Rfl:     leuprolide (LUPRON) 1 mg/0.2 mL injection, Inject into the skin., Disp: , Rfl:     olmesartan (BENICAR) 40 MG tablet, Take 1 tablet by mouth once daily, Disp: 90 tablet, Rfl: 0    ondansetron (ZOFRAN-ODT) 4 MG TbDL, Take 1 tablet (4 mg total) by mouth every 8 (eight) hours as needed (Nausea)., Disp: 14 tablet, Rfl: 0    polyethylene glycol (GLYCOLAX) 17 gram PwPk, Take this medication EVERY DAY to prevent constipation.  You may take it up to twice a day, but start at only once a day.  SKIP THIS MEDICATION IF YOU DEVELOP DIARRHEA., Disp: 72 each, Rfl: 3    tiotropium bromide (SPIRIVA RESPIMAT) 2.5 mcg/actuation inhaler, INHALE 2 SPRAY(S) BY MOUTH ONCE DAILY, Disp: 4 g, Rfl: 11    TRADJENTA 5 mg Tab tablet, Take 1 tablet by mouth once daily, Disp: 90 tablet, Rfl: 1    Current Facility-Administered Medications:     cyanocobalamin injection 1,000 mcg, 1,000 mcg, Intramuscular, Q30 Days, Lucas Lloyd Jr., MD, 1,000 mcg at 08/01/23 0847    Allergies  Review of patient's allergies indicates:  No Known Allergies    Patient's PMH, FH, Social hx, Medications, allergies reviewed and updated as pertinent to today's visit    Objective:      Physical Exam  Constitutional:       General: He is not in acute distress.     Appearance: He is well-developed. He is not ill-appearing, toxic-appearing or diaphoretic.   HENT:      Head: Normocephalic and atraumatic.      Mouth/Throat:      Mouth: Mucous membranes are moist.   Eyes:      Conjunctiva/sclera: Conjunctivae normal.   Pulmonary:      Effort: Pulmonary effort is normal. No respiratory distress.   Abdominal:      General: Abdomen is flat. There is no distension.      Palpations: Abdomen is soft.       Tenderness: There is no abdominal tenderness.   Musculoskeletal:         General: No swelling or deformity.      Cervical back: Neck supple.   Skin:     General: Skin is warm.      Capillary Refill: Capillary refill takes less than 2 seconds.      Findings: No rash.   Neurological:      Mental Status: He is alert and oriented to person, place, and time.      Gait: Gait normal.   Psychiatric:         Mood and Affect: Mood normal.         Thought Content: Thought content normal.         Judgment: Judgment normal.             Lab Results   Component Value Date    PSADIAG 0.01 04/28/2023    PSADIAG <0.01 04/24/2023    PSADIAG <0.01 12/07/2022      I  Assessment:       1. Malignant neoplasm of prostate        Plan:         PSA due    RTC 6 months     No additional ADT required

## 2023-08-07 NOTE — TELEPHONE ENCOUNTER
A 2nd attempt has been made to establish contact with Peter Lopez  via LETTER. The final contact attempt will be made in 5 business days

## 2023-08-10 ENCOUNTER — HOSPITAL ENCOUNTER (INPATIENT)
Facility: HOSPITAL | Age: 88
LOS: 1 days | Discharge: HOME | DRG: 191 | End: 2023-08-11
Attending: STUDENT IN AN ORGANIZED HEALTH CARE EDUCATION/TRAINING PROGRAM | Admitting: STUDENT IN AN ORGANIZED HEALTH CARE EDUCATION/TRAINING PROGRAM
Payer: MEDICARE

## 2023-08-10 DIAGNOSIS — R07.9 CHEST PAIN: ICD-10-CM

## 2023-08-10 DIAGNOSIS — R19.7 DIARRHEA: ICD-10-CM

## 2023-08-10 DIAGNOSIS — J44.1 COPD EXACERBATION: Primary | ICD-10-CM

## 2023-08-10 DIAGNOSIS — R06.02 SOB (SHORTNESS OF BREATH): ICD-10-CM

## 2023-08-10 LAB
ALBUMIN SERPL BCP-MCNC: 4 G/DL (ref 3.5–5.2)
ALP SERPL-CCNC: 52 U/L (ref 55–135)
ALT SERPL W/O P-5'-P-CCNC: 23 U/L (ref 10–44)
ANION GAP SERPL CALC-SCNC: 11 MMOL/L (ref 8–16)
ANION GAP SERPL CALC-SCNC: 16 MMOL/L (ref 8–16)
AST SERPL-CCNC: 27 U/L (ref 10–40)
BACTERIA #/AREA URNS HPF: NORMAL /HPF
BASOPHILS # BLD AUTO: 0.01 K/UL (ref 0–0.2)
BASOPHILS NFR BLD: 0.2 % (ref 0–1.9)
BILIRUB SERPL-MCNC: 0.6 MG/DL (ref 0.1–1)
BILIRUB UR QL STRIP: NEGATIVE
BUN SERPL-MCNC: 26 MG/DL (ref 6–30)
BUN SERPL-MCNC: 26 MG/DL (ref 8–23)
CALCIUM SERPL-MCNC: 10.2 MG/DL (ref 8.7–10.5)
CHLORIDE SERPL-SCNC: 100 MMOL/L (ref 95–110)
CHLORIDE SERPL-SCNC: 101 MMOL/L (ref 95–110)
CLARITY UR: CLEAR
CO2 SERPL-SCNC: 27 MMOL/L (ref 23–29)
COLOR UR: COLORLESS
CREAT SERPL-MCNC: 2.1 MG/DL (ref 0.5–1.4)
CREAT SERPL-MCNC: 2.2 MG/DL (ref 0.5–1.4)
DIFFERENTIAL METHOD: ABNORMAL
EOSINOPHIL # BLD AUTO: 0 K/UL (ref 0–0.5)
EOSINOPHIL NFR BLD: 0.6 % (ref 0–8)
ERYTHROCYTE [DISTWIDTH] IN BLOOD BY AUTOMATED COUNT: 14.1 % (ref 11.5–14.5)
EST. GFR  (NO RACE VARIABLE): 30 ML/MIN/1.73 M^2
GLUCOSE SERPL-MCNC: 219 MG/DL (ref 70–110)
GLUCOSE SERPL-MCNC: 222 MG/DL (ref 70–110)
GLUCOSE UR QL STRIP: ABNORMAL
HCT VFR BLD AUTO: 40.8 % (ref 40–54)
HCT VFR BLD CALC: 40 %PCV (ref 36–54)
HGB BLD-MCNC: 13 G/DL (ref 14–18)
HGB UR QL STRIP: ABNORMAL
IMM GRANULOCYTES # BLD AUTO: 0.02 K/UL (ref 0–0.04)
IMM GRANULOCYTES NFR BLD AUTO: 0.4 % (ref 0–0.5)
KETONES UR QL STRIP: NEGATIVE
LACTATE SERPL-SCNC: 1.6 MMOL/L (ref 0.5–2.2)
LEUKOCYTE ESTERASE UR QL STRIP: NEGATIVE
LIPASE SERPL-CCNC: 45 U/L (ref 4–60)
LYMPHOCYTES # BLD AUTO: 0.3 K/UL (ref 1–4.8)
LYMPHOCYTES NFR BLD: 5.1 % (ref 18–48)
MCH RBC QN AUTO: 27.9 PG (ref 27–31)
MCHC RBC AUTO-ENTMCNC: 31.9 G/DL (ref 32–36)
MCV RBC AUTO: 88 FL (ref 82–98)
MICROSCOPIC COMMENT: NORMAL
MONOCYTES # BLD AUTO: 0.2 K/UL (ref 0.3–1)
MONOCYTES NFR BLD: 3.6 % (ref 4–15)
NEUTROPHILS # BLD AUTO: 4.7 K/UL (ref 1.8–7.7)
NEUTROPHILS NFR BLD: 90.1 % (ref 38–73)
NITRITE UR QL STRIP: NEGATIVE
NRBC BLD-RTO: 0 /100 WBC
PH UR STRIP: 6 [PH] (ref 5–8)
PLATELET # BLD AUTO: 269 K/UL (ref 150–450)
PMV BLD AUTO: 9.6 FL (ref 9.2–12.9)
POC IONIZED CALCIUM: 1.28 MMOL/L (ref 1.06–1.42)
POC TCO2 (MEASURED): 29 MMOL/L (ref 23–29)
POCT GLUCOSE: 270 MG/DL (ref 70–110)
POTASSIUM BLD-SCNC: 4.3 MMOL/L (ref 3.5–5.1)
POTASSIUM SERPL-SCNC: 4.4 MMOL/L (ref 3.5–5.1)
PROT SERPL-MCNC: 7.8 G/DL (ref 6–8.4)
PROT UR QL STRIP: NEGATIVE
RBC # BLD AUTO: 4.66 M/UL (ref 4.6–6.2)
RBC #/AREA URNS HPF: 0 /HPF (ref 0–4)
SAMPLE: ABNORMAL
SODIUM BLD-SCNC: 139 MMOL/L (ref 136–145)
SODIUM SERPL-SCNC: 139 MMOL/L (ref 136–145)
SP GR UR STRIP: 1.01 (ref 1–1.03)
SQUAMOUS #/AREA URNS HPF: 1 /HPF
URN SPEC COLLECT METH UR: ABNORMAL
UROBILINOGEN UR STRIP-ACNC: NEGATIVE EU/DL
WBC # BLD AUTO: 5.26 K/UL (ref 3.9–12.7)
YEAST URNS QL MICRO: NORMAL

## 2023-08-10 PROCEDURE — 80047 BASIC METABLC PNL IONIZED CA: CPT

## 2023-08-10 PROCEDURE — 96361 HYDRATE IV INFUSION ADD-ON: CPT

## 2023-08-10 PROCEDURE — 81000 URINALYSIS NONAUTO W/SCOPE: CPT | Performed by: STUDENT IN AN ORGANIZED HEALTH CARE EDUCATION/TRAINING PROGRAM

## 2023-08-10 PROCEDURE — 25000242 PHARM REV CODE 250 ALT 637 W/ HCPCS: Performed by: STUDENT IN AN ORGANIZED HEALTH CARE EDUCATION/TRAINING PROGRAM

## 2023-08-10 PROCEDURE — 83690 ASSAY OF LIPASE: CPT | Performed by: STUDENT IN AN ORGANIZED HEALTH CARE EDUCATION/TRAINING PROGRAM

## 2023-08-10 PROCEDURE — 83605 ASSAY OF LACTIC ACID: CPT | Performed by: STUDENT IN AN ORGANIZED HEALTH CARE EDUCATION/TRAINING PROGRAM

## 2023-08-10 PROCEDURE — 27000207 HC ISOLATION

## 2023-08-10 PROCEDURE — 93010 EKG 12-LEAD: ICD-10-PCS | Mod: ,,, | Performed by: INTERNAL MEDICINE

## 2023-08-10 PROCEDURE — 80053 COMPREHEN METABOLIC PANEL: CPT | Performed by: STUDENT IN AN ORGANIZED HEALTH CARE EDUCATION/TRAINING PROGRAM

## 2023-08-10 PROCEDURE — 25000003 PHARM REV CODE 250: Performed by: STUDENT IN AN ORGANIZED HEALTH CARE EDUCATION/TRAINING PROGRAM

## 2023-08-10 PROCEDURE — 27000221 HC OXYGEN, UP TO 24 HOURS

## 2023-08-10 PROCEDURE — 85025 COMPLETE CBC W/AUTO DIFF WBC: CPT | Performed by: STUDENT IN AN ORGANIZED HEALTH CARE EDUCATION/TRAINING PROGRAM

## 2023-08-10 PROCEDURE — 93010 ELECTROCARDIOGRAM REPORT: CPT | Mod: ,,, | Performed by: INTERNAL MEDICINE

## 2023-08-10 PROCEDURE — 84132 ASSAY OF SERUM POTASSIUM: CPT

## 2023-08-10 PROCEDURE — 82565 ASSAY OF CREATININE: CPT

## 2023-08-10 PROCEDURE — 84295 ASSAY OF SERUM SODIUM: CPT

## 2023-08-10 PROCEDURE — 94640 AIRWAY INHALATION TREATMENT: CPT

## 2023-08-10 PROCEDURE — 99285 EMERGENCY DEPT VISIT HI MDM: CPT | Mod: 25

## 2023-08-10 PROCEDURE — 63600175 PHARM REV CODE 636 W HCPCS: Performed by: STUDENT IN AN ORGANIZED HEALTH CARE EDUCATION/TRAINING PROGRAM

## 2023-08-10 PROCEDURE — 82330 ASSAY OF CALCIUM: CPT

## 2023-08-10 PROCEDURE — 93005 ELECTROCARDIOGRAM TRACING: CPT

## 2023-08-10 PROCEDURE — 85014 HEMATOCRIT: CPT

## 2023-08-10 PROCEDURE — 99900035 HC TECH TIME PER 15 MIN (STAT)

## 2023-08-10 PROCEDURE — 11000001 HC ACUTE MED/SURG PRIVATE ROOM

## 2023-08-10 PROCEDURE — 96374 THER/PROPH/DIAG INJ IV PUSH: CPT

## 2023-08-10 PROCEDURE — 94761 N-INVAS EAR/PLS OXIMETRY MLT: CPT

## 2023-08-10 RX ORDER — SODIUM CHLORIDE 0.9 % (FLUSH) 0.9 %
10 SYRINGE (ML) INJECTION EVERY 12 HOURS PRN
Status: DISCONTINUED | OUTPATIENT
Start: 2023-08-10 | End: 2023-08-11 | Stop reason: HOSPADM

## 2023-08-10 RX ORDER — MAG HYDROX/ALUMINUM HYD/SIMETH 200-200-20
30 SUSPENSION, ORAL (FINAL DOSE FORM) ORAL 4 TIMES DAILY PRN
Status: DISCONTINUED | OUTPATIENT
Start: 2023-08-10 | End: 2023-08-11 | Stop reason: HOSPADM

## 2023-08-10 RX ORDER — SIMETHICONE 80 MG
1 TABLET,CHEWABLE ORAL 4 TIMES DAILY PRN
Status: DISCONTINUED | OUTPATIENT
Start: 2023-08-10 | End: 2023-08-11 | Stop reason: HOSPADM

## 2023-08-10 RX ORDER — ATORVASTATIN CALCIUM 10 MG/1
20 TABLET, FILM COATED ORAL NIGHTLY
Status: DISCONTINUED | OUTPATIENT
Start: 2023-08-10 | End: 2023-08-11 | Stop reason: HOSPADM

## 2023-08-10 RX ORDER — POLYETHYLENE GLYCOL 3350 17 G/17G
17 POWDER, FOR SOLUTION ORAL DAILY
Status: DISCONTINUED | OUTPATIENT
Start: 2023-08-11 | End: 2023-08-11 | Stop reason: HOSPADM

## 2023-08-10 RX ORDER — BISACODYL 10 MG
10 SUPPOSITORY, RECTAL RECTAL DAILY PRN
Status: DISCONTINUED | OUTPATIENT
Start: 2023-08-10 | End: 2023-08-11 | Stop reason: HOSPADM

## 2023-08-10 RX ORDER — LANOLIN ALCOHOL/MO/W.PET/CERES
800 CREAM (GRAM) TOPICAL
Status: DISCONTINUED | OUTPATIENT
Start: 2023-08-10 | End: 2023-08-11 | Stop reason: HOSPADM

## 2023-08-10 RX ORDER — IPRATROPIUM BROMIDE AND ALBUTEROL SULFATE 2.5; .5 MG/3ML; MG/3ML
3 SOLUTION RESPIRATORY (INHALATION)
Status: ACTIVE | OUTPATIENT
Start: 2023-08-10 | End: 2023-08-10

## 2023-08-10 RX ORDER — IPRATROPIUM BROMIDE AND ALBUTEROL SULFATE 2.5; .5 MG/3ML; MG/3ML
3 SOLUTION RESPIRATORY (INHALATION)
Status: DISCONTINUED | OUTPATIENT
Start: 2023-08-10 | End: 2023-08-11 | Stop reason: HOSPADM

## 2023-08-10 RX ORDER — GLUCAGON 1 MG
1 KIT INJECTION
Status: DISCONTINUED | OUTPATIENT
Start: 2023-08-10 | End: 2023-08-11 | Stop reason: HOSPADM

## 2023-08-10 RX ORDER — ONDANSETRON 2 MG/ML
4 INJECTION INTRAMUSCULAR; INTRAVENOUS EVERY 8 HOURS PRN
Status: DISCONTINUED | OUTPATIENT
Start: 2023-08-10 | End: 2023-08-11 | Stop reason: HOSPADM

## 2023-08-10 RX ORDER — ACETAMINOPHEN 325 MG/1
650 TABLET ORAL EVERY 4 HOURS PRN
Status: DISCONTINUED | OUTPATIENT
Start: 2023-08-10 | End: 2023-08-11 | Stop reason: HOSPADM

## 2023-08-10 RX ORDER — HEPARIN SODIUM 5000 [USP'U]/ML
5000 INJECTION, SOLUTION INTRAVENOUS; SUBCUTANEOUS EVERY 8 HOURS
Status: DISCONTINUED | OUTPATIENT
Start: 2023-08-10 | End: 2023-08-11 | Stop reason: HOSPADM

## 2023-08-10 RX ORDER — INSULIN ASPART 100 [IU]/ML
0-5 INJECTION, SOLUTION INTRAVENOUS; SUBCUTANEOUS
Status: DISCONTINUED | OUTPATIENT
Start: 2023-08-10 | End: 2023-08-11 | Stop reason: HOSPADM

## 2023-08-10 RX ORDER — SODIUM,POTASSIUM PHOSPHATES 280-250MG
2 POWDER IN PACKET (EA) ORAL
Status: DISCONTINUED | OUTPATIENT
Start: 2023-08-10 | End: 2023-08-11 | Stop reason: HOSPADM

## 2023-08-10 RX ORDER — IPRATROPIUM BROMIDE AND ALBUTEROL SULFATE 2.5; .5 MG/3ML; MG/3ML
3 SOLUTION RESPIRATORY (INHALATION) EVERY 4 HOURS PRN
Status: DISCONTINUED | OUTPATIENT
Start: 2023-08-10 | End: 2023-08-11 | Stop reason: HOSPADM

## 2023-08-10 RX ORDER — AMLODIPINE BESYLATE 5 MG/1
5 TABLET ORAL DAILY
Status: DISCONTINUED | OUTPATIENT
Start: 2023-08-11 | End: 2023-08-11 | Stop reason: HOSPADM

## 2023-08-10 RX ORDER — IBUPROFEN 200 MG
16 TABLET ORAL
Status: DISCONTINUED | OUTPATIENT
Start: 2023-08-10 | End: 2023-08-11 | Stop reason: HOSPADM

## 2023-08-10 RX ORDER — IPRATROPIUM BROMIDE AND ALBUTEROL SULFATE 2.5; .5 MG/3ML; MG/3ML
3 SOLUTION RESPIRATORY (INHALATION)
Status: DISPENSED | OUTPATIENT
Start: 2023-08-10 | End: 2023-08-10

## 2023-08-10 RX ORDER — ONDANSETRON 2 MG/ML
4 INJECTION INTRAMUSCULAR; INTRAVENOUS
Status: COMPLETED | OUTPATIENT
Start: 2023-08-10 | End: 2023-08-10

## 2023-08-10 RX ORDER — METHYLPREDNISOLONE SOD SUCC 125 MG
125 VIAL (EA) INJECTION
Status: COMPLETED | OUTPATIENT
Start: 2023-08-10 | End: 2023-08-10

## 2023-08-10 RX ORDER — ACETAMINOPHEN 325 MG/1
650 TABLET ORAL EVERY 8 HOURS PRN
Status: DISCONTINUED | OUTPATIENT
Start: 2023-08-10 | End: 2023-08-11 | Stop reason: HOSPADM

## 2023-08-10 RX ORDER — IBUPROFEN 200 MG
24 TABLET ORAL
Status: DISCONTINUED | OUTPATIENT
Start: 2023-08-10 | End: 2023-08-11 | Stop reason: HOSPADM

## 2023-08-10 RX ORDER — PREDNISONE 20 MG/1
40 TABLET ORAL DAILY
Status: DISCONTINUED | OUTPATIENT
Start: 2023-08-11 | End: 2023-08-11 | Stop reason: HOSPADM

## 2023-08-10 RX ORDER — NALOXONE HCL 0.4 MG/ML
0.02 VIAL (ML) INJECTION
Status: DISCONTINUED | OUTPATIENT
Start: 2023-08-10 | End: 2023-08-11 | Stop reason: HOSPADM

## 2023-08-10 RX ORDER — TALC
6 POWDER (GRAM) TOPICAL NIGHTLY PRN
Status: DISCONTINUED | OUTPATIENT
Start: 2023-08-10 | End: 2023-08-11 | Stop reason: HOSPADM

## 2023-08-10 RX ORDER — PROCHLORPERAZINE EDISYLATE 5 MG/ML
5 INJECTION INTRAMUSCULAR; INTRAVENOUS EVERY 6 HOURS PRN
Status: DISCONTINUED | OUTPATIENT
Start: 2023-08-10 | End: 2023-08-11 | Stop reason: HOSPADM

## 2023-08-10 RX ORDER — LOSARTAN POTASSIUM 25 MG/1
100 TABLET ORAL DAILY
Status: DISCONTINUED | OUTPATIENT
Start: 2023-08-11 | End: 2023-08-11 | Stop reason: HOSPADM

## 2023-08-10 RX ADMIN — ATORVASTATIN CALCIUM 20 MG: 10 TABLET, FILM COATED ORAL at 09:08

## 2023-08-10 RX ADMIN — CEFTRIAXONE 1 G: 1 INJECTION, POWDER, FOR SOLUTION INTRAMUSCULAR; INTRAVENOUS at 04:08

## 2023-08-10 RX ADMIN — IPRATROPIUM BROMIDE AND ALBUTEROL SULFATE 3 ML: .5; 3 SOLUTION RESPIRATORY (INHALATION) at 06:08

## 2023-08-10 RX ADMIN — SODIUM CHLORIDE 1000 ML: 9 INJECTION, SOLUTION INTRAVENOUS at 02:08

## 2023-08-10 RX ADMIN — HEPARIN SODIUM 5000 UNITS: 5000 INJECTION INTRAVENOUS; SUBCUTANEOUS at 09:08

## 2023-08-10 RX ADMIN — AZITHROMYCIN MONOHYDRATE 500 MG: 500 INJECTION, POWDER, LYOPHILIZED, FOR SOLUTION INTRAVENOUS at 06:08

## 2023-08-10 RX ADMIN — ONDANSETRON 4 MG: 2 INJECTION INTRAMUSCULAR; INTRAVENOUS at 02:08

## 2023-08-10 RX ADMIN — METHYLPREDNISOLONE SODIUM SUCCINATE 125 MG: 125 INJECTION, POWDER, FOR SOLUTION INTRAMUSCULAR; INTRAVENOUS at 04:08

## 2023-08-10 NOTE — PLAN OF CARE
Case Management Assessment     PCP: Dr MOE Lloyd  Pharmacy: [facility]     Patient Arrived From: home  Existing Help at Home: brother or sister    Barriers to Discharge: none    Discharge Plan:    A. home   B. tbd        This patient has been screened for Case Management needs.  Treatment is ongoing in the ED at this time awaiting transport to med surg/tele.     Patient stated that he is a  and lives alone.  He has 2 daughters.   He works as a  at [employer] on [address].       08/10/23 1996   Discharge Assessment   Assessment Type Discharge Planning Assessment   Confirmed/corrected address, phone number and insurance Yes   Confirmed Demographics Correct on Facesheet   Source of Information patient   Communicated GOLDEN with patient/caregiver Date not available/Unable to determine   People in Home alone   Do you expect to return to your current living situation? Yes   Do you have help at home or someone to help you manage your care at home? Yes   Prior to hospitilization cognitive status: Alert/Oriented   Current cognitive status: Alert/Oriented   Walking or Climbing Stairs ambulation difficulty, requires equipment   Home Layout Able to live on 1st floor   Equipment Currently Used at Home cane, straight   Readmission within 30 days? No   Patient currently being followed by outpatient case management? No   Do you currently have service(s) that help you manage your care at home? No   Do you take prescription medications? Yes   Do you have prescription coverage? Yes   Coverage PHN   Do you have any problems affording any of your prescribed medications? No   Is the patient taking medications as prescribed? yes   Who is going to help you get home at discharge? brother or sister   How do you get to doctors appointments? car, drives self   Are you on dialysis? No   Do you take coumadin? No   Discharge Plan A Home   Discharge Plan B   (tbd)   DME Needed Upon Discharge    (tbd)   Discharge Plan discussed with: Patient    Transition of Care Barriers None

## 2023-08-10 NOTE — ED NOTES
"86 yo male presents to the ED with c/o diarrhea since last night as well as nausea and fatigue. Patient reports that he may have ingested some "bad fruits".  Patient is AAOx4, VSS, NAD. Denies CP, SOB, N/V/D, cough, fever, numbness, or tingling. Bed locked, in lowest position, bed rails up x2, call light in reach, all monitoring attached.   "

## 2023-08-10 NOTE — ED PROVIDER NOTES
"Encounter Date: 8/10/2023       History     Chief Complaint   Patient presents with    Diarrhea     Pt reports diarrhea since last night. Reports going approx 12 times. Reports recently here on Friday for constipation and given an enema. Reports some nausea, denies emesis. Reports feeling fatigue. States took zofran 1 hour ago with no relief.      87 year old male who presents with multiple episodes of non-bloody diarrhea beginning yesterday. He says that he ate "some fruit" that he doesn't typically do yesterday at work and soon afterward starting feeling unwell and started having diarrhea. No abdominal pain. No nausea or vomiting. Approx 12 episodes over the course of a day. Reduced PO intake. No fevers. No travel. No chest pain or shortness of breath. Does have dark stools but is taking pepto bismol.      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     genotype 1b; treatment naive; s/p treatment    CKD (chronic kidney disease)     COPD (chronic obstructive pulmonary disease)     Diabetes mellitus type I     Diabetes with neurologic complications     H/O colonoscopy     [place] (hard of hearing)     Hypertension     On home oxygen therapy     as needed    Prostate cancer     Retinopathy due to secondary diabetes mellitus     Urinary retention     Wears hearing aid in both ears      Past Surgical History:   Procedure Laterality Date    NO PAST SURGERIES  05/20/2021    TRANSURETHRAL RESECTION OF PROSTATE USING BIPOLAR CAUTERY N/A 6/22/2021    Procedure: (TURP) Transuretheral Resection of Prostate with BIPOLAR CAUTERY;  Surgeon: Jesus Cole MD;  Location: [facility];  Service: Urology;  Laterality: N/A;  RN Pre OP 6-21-21.  Vaccinated.  C A     Family History   Problem Relation Age of Onset    Cancer Mother     Asbestos Father     Liver disease Neg Hx      Social " History     Tobacco Use    Smoking status: Former     Current packs/day: 0.00     Average packs/day: 1 pack/day for 40.0 years (40.0 ttl pk-yrs)     Types: Cigarettes     Start date: 1951     Quit date: 1991     Years since quittin.7    Smokeless tobacco: Former     Quit date: 1986   Substance Use Topics    Alcohol use: Not Currently     Alcohol/week: 0.0 standard drinks of alcohol     Comment: 2 bottles beer on weekends    Drug use: No     Review of Systems   All other systems reviewed and are negative.      Physical Exam     Initial Vitals [08/10/23 1350]   BP Pulse Resp Temp SpO2   136/75 (!) 118 16 98.6 °F (37 °C) (!) 93 %      MAP       --         Physical Exam    Nursing note and vitals reviewed.  Constitutional: He appears well-developed and well-nourished.   HENT:   Head: Normocephalic and atraumatic.   MMD   Eyes: EOM are normal. Pupils are equal, round, and reactive to light.   Neck: Neck supple. No JVD present.   Normal range of motion.  Cardiovascular:  Normal rate and regular rhythm.           Pulmonary/Chest: Breath sounds normal. No stridor. No respiratory distress.   Abdominal: Abdomen is soft. There is no abdominal tenderness.   Musculoskeletal:         General: No tenderness or edema. Normal range of motion.      Cervical back: Normal range of motion and neck supple.     Neurological: He is alert and oriented to person, place, and time. GCS score is 15. GCS eye subscore is 4. GCS verbal subscore is 5. GCS motor subscore is 6.   Skin: Skin is warm and dry. Capillary refill takes less than 2 seconds.   Psychiatric: He has a normal mood and affect. Thought content normal.         ED Course   Procedures  Labs Reviewed   CBC W/ AUTO DIFFERENTIAL - Abnormal; Notable for the following components:       Result Value    Hemoglobin 13.0 (*)     MCHC 31.9 (*)     Lymph # 0.3 (*)     Mono # 0.2 (*)     Gran % 90.1 (*)     Lymph % 5.1 (*)     Mono % 3.6 (*)     All other components within  normal limits   COMPREHENSIVE METABOLIC PANEL - Abnormal; Notable for the following components:    Glucose 222 (*)     BUN 26 (*)     Creatinine 2.1 (*)     Alkaline Phosphatase 52 (*)     eGFR 30 (*)     All other components within normal limits   ISTAT PROCEDURE - Abnormal; Notable for the following components:    POC Glucose 219 (*)     POC Creatinine 2.2 (*)     All other components within normal limits   LIPASE   LACTIC ACID, PLASMA   URINALYSIS, REFLEX TO URINE CULTURE   ISTAT CHEM8          Imaging Results    None          Medications   albuterol-ipratropium 2.5 mg-0.5 mg/3 mL nebulizer solution 3 mL (has no administration in time range)   methylPREDNISolone sodium succinate injection 125 mg (has no administration in time range)   cefTRIAXone (ROCEPHIN) 1 g in dextrose 5 % in water (D5W) 100 mL IVPB (MB+) (has no administration in time range)   azithromycin (ZITHROMAX) 500 mg in dextrose 5 % (D5W) 250 mL IVPB (Vial-Mate) (has no administration in time range)   sodium chloride 0.9% bolus 1,000 mL 1,000 mL (0 mLs Intravenous Stopped 8/10/23 1527)   ondansetron injection 4 mg (4 mg Intravenous Given 8/10/23 1439)     Medical Decision Making:   Initial Assessment:   Hemodynamically stable. Afebrile. Phonating and protecting the airway spontaneously. No clinical evidence for cardiovascular instability or impending airway compromise. Examination as above. Additional historians include daughter. Prior medical records reviewed. Pt with recent ED vists for constipation, this has resolved. Current co-morbidities considered that will impact clinical decision making include DM, CKD.    Plan:  Labs, Ivf, antiemetics. Offered analgesia but he did not want this. Suspect gastroenteritis from fruit which is outside of his usual diet. Abdomen is soft, nontender. No bloody stool. No AC/DAPT or AF. Will plan for IVF and reassess.                ED Course as of 08/10/23 1619   Thu Aug 10, 2023   1510 Labs reviewed. CBC stable.  Lactic acid negative. CMP with CKD, stable from prior. Will notify and discuss w/ patient.  [BG]   1554 Twelve lead EKG per my independent interpretation reveals sinus tachycardia at a rate of 111.  Normal axis, normal interval.  No regional ST segment elevation.   [BG]   1617 Reassessed. Pt is hypoxic to 86% and tachycardic to 112. This is new for him. Reports chest tightness and short of breath. Conversational. No respiratory distress. Long discussion with him and his daughter. Will plan for admission due to emphysema exacerbation.    The patient was reassessed frequently and managed aggressively while in the emergency department. Due to the clinical workup and presentation, the patient's condition is concerning and will require additional evaluation. I discussed the objective findings with him including laboratory studies, diagnostic imaging, and any consultant involvement. He was educated on their clinical presentation and all questions were answered. He acknowledged and verbally agreed to the treatment plan. The patient will be admitted to the hospital for further management.     DISCLAIMER: This note was prepared with [employer] voice recognition transcription software. Garbled syntax, mangled pronouns, and other bizarre constructions may be attributed to that software system.     [BG]   1618 Critical care time spent on the evaluation and treatment of severe organ dysfunction, review of pertinent labs and imaging studies, discussions with consulting providers and discussions with patient/family: 35 minutes.   [BG]      ED Course User Index  [BG] José Miguel Benitez MD                 Clinical Impression:   Final diagnoses:  [R19.7] Diarrhea  [R06.02] SOB (shortness of breath)  [J44.1] COPD exacerbation        ED Disposition Condition    Admit                 José Miguel Benitez MD  08/10/23 2100

## 2023-08-11 ENCOUNTER — TELEPHONE (OUTPATIENT)
Dept: FAMILY MEDICINE | Facility: CLINIC | Age: 88
End: 2023-08-11
Payer: MEDICARE

## 2023-08-11 VITALS
BODY MASS INDEX: 25.48 KG/M2 | DIASTOLIC BLOOD PRESSURE: 76 MMHG | OXYGEN SATURATION: 93 % | RESPIRATION RATE: 18 BRPM | WEIGHT: 182 LBS | HEIGHT: 71 IN | HEART RATE: 106 BPM | TEMPERATURE: 98 F | SYSTOLIC BLOOD PRESSURE: 146 MMHG

## 2023-08-11 LAB
ANION GAP SERPL CALC-SCNC: 11 MMOL/L (ref 8–16)
BASOPHILS # BLD AUTO: 0 K/UL (ref 0–0.2)
BASOPHILS NFR BLD: 0 % (ref 0–1.9)
BUN SERPL-MCNC: 25 MG/DL (ref 8–23)
CALCIUM SERPL-MCNC: 9.3 MG/DL (ref 8.7–10.5)
CHLORIDE SERPL-SCNC: 102 MMOL/L (ref 95–110)
CO2 SERPL-SCNC: 22 MMOL/L (ref 23–29)
CREAT SERPL-MCNC: 1.9 MG/DL (ref 0.5–1.4)
DIFFERENTIAL METHOD: ABNORMAL
EOSINOPHIL # BLD AUTO: 0 K/UL (ref 0–0.5)
EOSINOPHIL NFR BLD: 0 % (ref 0–8)
ERYTHROCYTE [DISTWIDTH] IN BLOOD BY AUTOMATED COUNT: 13.8 % (ref 11.5–14.5)
EST. GFR  (NO RACE VARIABLE): 34 ML/MIN/1.73 M^2
GLUCOSE SERPL-MCNC: 291 MG/DL (ref 70–110)
HCT VFR BLD AUTO: 36.2 % (ref 40–54)
HGB BLD-MCNC: 11.4 G/DL (ref 14–18)
IMM GRANULOCYTES # BLD AUTO: 0.02 K/UL (ref 0–0.04)
IMM GRANULOCYTES NFR BLD AUTO: 0.7 % (ref 0–0.5)
LYMPHOCYTES # BLD AUTO: 0.3 K/UL (ref 1–4.8)
LYMPHOCYTES NFR BLD: 11.6 % (ref 18–48)
MAGNESIUM SERPL-MCNC: 1.9 MG/DL (ref 1.6–2.6)
MCH RBC QN AUTO: 27.9 PG (ref 27–31)
MCHC RBC AUTO-ENTMCNC: 31.5 G/DL (ref 32–36)
MCV RBC AUTO: 89 FL (ref 82–98)
MONOCYTES # BLD AUTO: 0 K/UL (ref 0.3–1)
MONOCYTES NFR BLD: 1.4 % (ref 4–15)
NEUTROPHILS # BLD AUTO: 2.5 K/UL (ref 1.8–7.7)
NEUTROPHILS NFR BLD: 86.3 % (ref 38–73)
NRBC BLD-RTO: 0 /100 WBC
PHOSPHATE SERPL-MCNC: 3.8 MG/DL (ref 2.7–4.5)
PLATELET # BLD AUTO: 259 K/UL (ref 150–450)
PMV BLD AUTO: 10 FL (ref 9.2–12.9)
POCT GLUCOSE: 262 MG/DL (ref 70–110)
POCT GLUCOSE: 279 MG/DL (ref 70–110)
POTASSIUM SERPL-SCNC: 5 MMOL/L (ref 3.5–5.1)
RBC # BLD AUTO: 4.08 M/UL (ref 4.6–6.2)
SODIUM SERPL-SCNC: 135 MMOL/L (ref 136–145)
WBC # BLD AUTO: 2.93 K/UL (ref 3.9–12.7)

## 2023-08-11 PROCEDURE — 36415 COLL VENOUS BLD VENIPUNCTURE: CPT | Performed by: STUDENT IN AN ORGANIZED HEALTH CARE EDUCATION/TRAINING PROGRAM

## 2023-08-11 PROCEDURE — 94640 AIRWAY INHALATION TREATMENT: CPT

## 2023-08-11 PROCEDURE — 94761 N-INVAS EAR/PLS OXIMETRY MLT: CPT

## 2023-08-11 PROCEDURE — 84100 ASSAY OF PHOSPHORUS: CPT | Performed by: STUDENT IN AN ORGANIZED HEALTH CARE EDUCATION/TRAINING PROGRAM

## 2023-08-11 PROCEDURE — 83735 ASSAY OF MAGNESIUM: CPT | Performed by: STUDENT IN AN ORGANIZED HEALTH CARE EDUCATION/TRAINING PROGRAM

## 2023-08-11 PROCEDURE — 27000221 HC OXYGEN, UP TO 24 HOURS

## 2023-08-11 PROCEDURE — 80048 BASIC METABOLIC PNL TOTAL CA: CPT | Performed by: STUDENT IN AN ORGANIZED HEALTH CARE EDUCATION/TRAINING PROGRAM

## 2023-08-11 PROCEDURE — 25000242 PHARM REV CODE 250 ALT 637 W/ HCPCS: Performed by: STUDENT IN AN ORGANIZED HEALTH CARE EDUCATION/TRAINING PROGRAM

## 2023-08-11 PROCEDURE — 25000003 PHARM REV CODE 250: Performed by: STUDENT IN AN ORGANIZED HEALTH CARE EDUCATION/TRAINING PROGRAM

## 2023-08-11 PROCEDURE — 85025 COMPLETE CBC W/AUTO DIFF WBC: CPT | Performed by: STUDENT IN AN ORGANIZED HEALTH CARE EDUCATION/TRAINING PROGRAM

## 2023-08-11 PROCEDURE — 63600175 PHARM REV CODE 636 W HCPCS: Performed by: STUDENT IN AN ORGANIZED HEALTH CARE EDUCATION/TRAINING PROGRAM

## 2023-08-11 RX ORDER — PREDNISONE 20 MG/1
20 TABLET ORAL DAILY
Qty: 5 TABLET | Refills: 0 | Status: SHIPPED | OUTPATIENT
Start: 2023-08-11 | End: 2023-08-16

## 2023-08-11 RX ADMIN — IPRATROPIUM BROMIDE AND ALBUTEROL SULFATE 3 ML: 2.5; .5 SOLUTION RESPIRATORY (INHALATION) at 11:08

## 2023-08-11 RX ADMIN — LOSARTAN POTASSIUM 100 MG: 25 TABLET, FILM COATED ORAL at 09:08

## 2023-08-11 RX ADMIN — HEPARIN SODIUM 5000 UNITS: 5000 INJECTION INTRAVENOUS; SUBCUTANEOUS at 02:08

## 2023-08-11 RX ADMIN — AMLODIPINE BESYLATE 5 MG: 5 TABLET ORAL at 09:08

## 2023-08-11 RX ADMIN — INSULIN ASPART 3 UNITS: 100 INJECTION, SOLUTION INTRAVENOUS; SUBCUTANEOUS at 12:08

## 2023-08-11 RX ADMIN — HEPARIN SODIUM 5000 UNITS: 5000 INJECTION INTRAVENOUS; SUBCUTANEOUS at 05:08

## 2023-08-11 RX ADMIN — PREDNISONE 40 MG: 20 TABLET ORAL at 09:08

## 2023-08-11 RX ADMIN — IPRATROPIUM BROMIDE AND ALBUTEROL SULFATE 3 ML: 2.5; .5 SOLUTION RESPIRATORY (INHALATION) at 07:08

## 2023-08-11 NOTE — PROGRESS NOTES
[facility] Medicine  Progress Note    Patient Name: Peter Lopez  MRN: [id number]  Patient Class: IP- Inpatient   Admission Date: 8/10/2023  Length of Stay: 1 days  Attending Physician: Jorge Rogers MD  Primary Care Provider: Lucas Lloyd Jr., MD        Subjective:     Principal Problem:COPD exacerbation        HPI:  This is an 87-year-old male with a past medical history of COPD (on O2 PRN), hypertension, type 2 diabetes, CKD 3, prostate cancer, who presents with diarrhea.    Patient initially presented to the ED on 8/4 with constipation and was prescribed Colace.  He later returned on 08/05 for persistent constipation and abdominal discomfort.  He received an enema, magnesium citrate and was discharged home.  He reports improvement of his symptoms after being discharged, however a day prior to presentation he reports that he developed nausea, several episodes of diarrhea (more than 10 per day) and generalized fatigue.  He thinks his symptoms started after eating his breakfast which consisted of fruits a day prior.  Of note, the patient is prescribed oxygen p.r.n. but does not currently use it.    In the ED, the patient was tachycardic (110), tachypneic, and hypoxic (SpO2:  86%, requiring 2 L of supplemental oxygen).  Labs were remarkable for an elevated creatinine (2.1 -around baseline).  Chest x-ray showed no acute cardiopulmonary disease with chronic discoid opacities at both lung bases, likely representing scarring.  Patient was given 1 L of NS, Solu-Medrol 125 mg IV, ceftriaxone, azithromycin, DuoNebs, and Zofran 4 mg IV.  He was admitted for further management.      Overview/Hospital Course:  Patient was admitted for COPD exacerbation. By the following morning- he was sitting up and eating on RA and was requesting discharge. No further diarrhea.  Will be discharged to home. Activity as tolerated. Follow up with PCP in one week. Diet ADA 2000 dwight diet.       Interval History: sitting  up eating. No 02.    Review of Systems   Constitutional:  Negative for activity change.   HENT:  Negative for congestion.    Respiratory:  Negative for chest tightness, shortness of breath and wheezing.    Cardiovascular:  Negative for chest pain.   Genitourinary:  Negative for difficulty urinating.   Neurological:  Negative for dizziness.     Objective:     Vital Signs (Most Recent):  Temp: 97.7 °F (36.5 °C) (08/11/23 0712)  Pulse: 92 (08/11/23 0717)  Resp: 16 (08/11/23 0717)  BP: 130/73 (08/11/23 0712)  SpO2: 97 % (08/11/23 0717) Vital Signs (24h Range):  Temp:  [97.7 °F (36.5 °C)-98.7 °F (37.1 °C)] 97.7 °F (36.5 °C)  Pulse:  [] 92  Resp:  [15-31] 16  SpO2:  [86 %-98 %] 97 %  BP: (127-156)/(73-85) 130/73     Weight: 82.6 kg (182 lb)  Body mass index is 25.38 kg/m².    Intake/Output Summary (Last 24 hours) at 8/11/2023 0802  Last data filed at 8/10/2023 2213  Gross per 24 hour   Intake 1340 ml   Output 275 ml   Net 1065 ml         Physical Exam  Constitutional:       Appearance: Normal appearance.   Cardiovascular:      Rate and Rhythm: Normal rate.   Pulmonary:      Effort: Pulmonary effort is normal. No respiratory distress.   Neurological:      General: No focal deficit present.      Mental Status: He is alert.             Significant Labs: All pertinent labs within the past 24 hours have been reviewed.  BMP:   Recent Labs   Lab 08/11/23  0544   *   *   K 5.0      CO2 22*   BUN 25*   CREATININE 1.9*   CALCIUM 9.3   MG 1.9     CBC:   Recent Labs   Lab 08/10/23  1436 08/10/23  1438 08/11/23  0544   WBC  --  5.26 2.93*   HGB  --  13.0* 11.4*   HCT 40 40.8 36.2*   PLT  --  269 259       Significant Imaging: I have reviewed all pertinent imaging results/findings within the past 24 hours.      Assessment/Plan:      * COPD exacerbation  Intermediate suspicion for exacerbation  Patient reports being prescribed oxygen, however does not use it at this time because he does not have  "supplies  Continue prednisone, scheduled DuoNebs   Prescribe oxygen on discharge      Diarrhea  Related to medication side effects (recent laxative prescription) versus infectious process  Rule out infectious etiology  Obtain stool studies    Stage 3b chronic kidney disease  At baseline.  Continue to monitor.      Malignant neoplasm of prostate  Outpatient follow-up      Pulmonary hypertension  Optimize volume status  Results for orders placed during the hospital encounter of 12/06/22    Echo    Interpretation Summary  · The left ventricle is normal in size with eccentric hypertrophy and low normal systolic function.  · The estimated ejection fraction is 50%.  · Indeterminate left ventricular diastolic function.  · Normal right ventricular size with normal right ventricular systolic function.  · Mild tricuspid regurgitation.  · There is moderate pulmonary hypertension.  · Normal central venous pressure (3 mmHg).  · The estimated PA systolic pressure is 45 mmHg.        Chronic respiratory failure with hypoxia  Patient is prescribed oxygen p.r.n.  Was noted to be hypoxic while in the ED  Could be the setting of COPD exacerbation  Resume oxygen, wean as tolerated    Diabetes mellitus with nephropathy  Patient's FSGs are controlled on current medication regimen.  Last A1c reviewed-   Lab Results   Component Value Date    HGBA1C 7.3 (H) 06/06/2023     Most recent fingerstick glucose reviewed- No results for input(s): "POCTGLUCOSE" in the last 24 hours.  Current correctional scale  Low  Maintain anti-hyperglycemic dose as follows-   Antihyperglycemics (From admission, onward)    Start     Stop Route Frequency Ordered    08/10/23 2042  insulin aspart U-100 pen 0-5 Units         -- SubQ Before meals & nightly PRN 08/10/23 1942        Hold Oral hypoglycemics while patient is in the hospital.    Essential hypertension  Resume home medications        VTE Risk Mitigation (From admission, onward)         Ordered     heparin " (porcine) injection 5,000 Units  Every 8 hours         08/10/23 1942     IP VTE HIGH RISK PATIENT  Once         08/10/23 1942     Place sequential compression device  Until discontinued         08/10/23 1942                Discharge Planning   GOLDEN:      Code Status: Full Code   Is the patient medically ready for discharge?:     Reason for patient still in hospital (select all that apply): Patient unstable  Discharge Plan A: Home        Likely home later. Checking room air sats           Jorge Campos MD  Department of Hospital Medicine   [facility]

## 2023-08-11 NOTE — ASSESSMENT & PLAN NOTE
"Patient's FSGs are controlled on current medication regimen.  Last A1c reviewed-   Lab Results   Component Value Date    HGBA1C 7.3 (H) 06/06/2023     Most recent fingerstick glucose reviewed- No results for input(s): "POCTGLUCOSE" in the last 24 hours.  Current correctional scale  Low  Maintain anti-hyperglycemic dose as follows-   Antihyperglycemics (From admission, onward)    Start     Stop Route Frequency Ordered    08/10/23 2042  insulin aspart U-100 pen 0-5 Units         -- SubQ Before meals & nightly PRN 08/10/23 1942        Hold Oral hypoglycemics while patient is in the hospital.  "

## 2023-08-11 NOTE — ASSESSMENT & PLAN NOTE
Optimize volume status  Results for orders placed during the hospital encounter of 12/06/22    Echo    Interpretation Summary  · The left ventricle is normal in size with eccentric hypertrophy and low normal systolic function.  · The estimated ejection fraction is 50%.  · Indeterminate left ventricular diastolic function.  · Normal right ventricular size with normal right ventricular systolic function.  · Mild tricuspid regurgitation.  · There is moderate pulmonary hypertension.  · Normal central venous pressure (3 mmHg).  · The estimated PA systolic pressure is 45 mmHg.

## 2023-08-11 NOTE — ASSESSMENT & PLAN NOTE
Patient is prescribed oxygen p.r.n.  Was noted to be hypoxic while in the ED  Could be the setting of COPD exacerbation  Resume oxygen, wean as tolerated

## 2023-08-11 NOTE — TELEPHONE ENCOUNTER
----- Message from Natalia Redd sent at 8/11/2023  9:25 AM CDT -----  Regarding: Referral  .Type:  Patient Requesting Referral    Who Called:Daughter-Dianelys Goldberg     Referral to What Specialty:Gastroenterology      Reason for Referral:constipation, stomach pain and diarrhea     Does the patient want the referral with a specific physician?:No    [facility]     Would the patient rather a call back or a response via [facility]? Call     Best Call Back Number: .[phone]       Additional Information:

## 2023-08-11 NOTE — HOSPITAL COURSE
Patient was admitted for COPD exacerbation. By the following morning- he was sitting up and eating on RA and was requesting discharge. No further diarrhea.  Will be discharged to home. Activity as tolerated. Follow up with PCP in one week. Diet ADA 2000 dwight diet.

## 2023-08-11 NOTE — ASSESSMENT & PLAN NOTE
Related to medication side effects (recent laxative prescription) versus infectious process  Rule out infectious etiology  Obtain stool studies

## 2023-08-11 NOTE — PLAN OF CARE
Follow up appointment with pcp scheduled and added to wait list for sooner visit, listed on avs. Reviewed follow up with patient, verbalized understanding. Pt stated his daughter will be helping him home at discharge.     Per [facility], patient approved for home oxygen will reach out to patient for co-pay and to be delivered to bedside.     In basket message sent for Dr Hyman pulmonary clinic to contact patient to schedule follow up.

## 2023-08-11 NOTE — H&P
[facility]  History & Physical    Patient Name: Peter Lopez  MRN: [id number]  Patient Class: IP- Inpatient  Admission Date: 8/10/2023  Attending Physician: Jorge Rogers MD   Primary Care Provider: Lucas Lloyd Jr., MD         Patient information was obtained from patient and ER records.     Subjective:     Principal Problem:COPD exacerbation    Chief Complaint:   Chief Complaint   Patient presents with    Diarrhea     Pt reports diarrhea since last night. Reports going approx 12 times. Reports recently here on Friday for constipation and given an enema. Reports some nausea, denies emesis. Reports feeling fatigue. States took zofran 1 hour ago with no relief.         HPI: This is an 87-year-old male with a past medical history of COPD (on O2 PRN), hypertension, type 2 diabetes, CKD 3, prostate cancer, who presents with diarrhea.    Patient initially presented to the ED on 8/4 with constipation and was prescribed Colace.  He later returned on 08/05 for persistent constipation and abdominal discomfort.  He received an enema, magnesium citrate and was discharged home.  He reports improvement of his symptoms after being discharged, however a day prior to presentation he reports that he developed nausea, several episodes of diarrhea (more than 10 per day) and generalized fatigue.  He thinks his symptoms started after eating his breakfast which consisted of fruits a day prior.  Of note, the patient is prescribed oxygen p.r.n. but does not currently use it.    In the ED, the patient was tachycardic (110), tachypneic, and hypoxic (SpO2:  86%, requiring 2 L of supplemental oxygen).  Labs were remarkable for an elevated creatinine (2.1 -around baseline).  Chest x-ray showed no acute cardiopulmonary disease with chronic discoid opacities at both lung bases, likely representing scarring.  Patient was given 1 L of NS, Solu-Medrol 125 mg IV, ceftriaxone, azithromycin, DuoNebs, and Zofran 4 mg  IV.  He was admitted for further management.      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  Vaccinated.  C A       Review of patient's allergies indicates:  No Known Allergies    No current facility-administered medications on file prior to encounter.     Current Outpatient Medications on File Prior to Encounter   Medication Sig    amLODIPine (NORVASC) 5 MG tablet Take 1 tablet (5 mg total) by mouth once daily.    atorvastatin (LIPITOR) 20 MG tablet Take 1 tablet (20 mg total) by mouth every evening.    blood sugar diagnostic Strp To check BG 1 times daily, to use with insurance preferred meter    blood-glucose meter kit To check BG 1 times daily, to use with insurance preferred meter    cholecalciferol, vitamin D3, (VITAMIN D3) 25 mcg (1,000 unit) capsule Take 1,000 Units by mouth once daily.    docusate sodium (COLACE) 100 MG capsule Take 2 capsules (200 mg total) by mouth 2 (two) times daily.    lactulose (CHRONULAC) 10 gram/15 mL solution TAKE 15 ML BY MOUTH THREE TIMES DAILY AS NEEDED FOR CONSTIPATION    leuprolide (LUPRON) 1  mg/0.2 mL injection Inject into the skin.    olmesartan (BENICAR) 40 MG tablet Take 1 tablet by mouth once daily    ondansetron (ZOFRAN-ODT) 4 MG TbDL Take 1 tablet (4 mg total) by mouth every 8 (eight) hours as needed (Nausea).    polyethylene glycol (GLYCOLAX) 17 gram PwPk Take this medication EVERY DAY to prevent constipation.  You may take it up to twice a day, but start at only once a day.  SKIP THIS MEDICATION IF YOU DEVELOP DIARRHEA.    tiotropium bromide (SPIRIVA RESPIMAT) 2.5 mcg/actuation inhaler INHALE 2 SPRAY(S) BY MOUTH ONCE DAILY    TRADJENTA 5 mg Tab tablet Take 1 tablet by mouth once daily     Family History       Problem Relation (Age of Onset)    Asbestos Father    Cancer Mother          Tobacco Use    Smoking status: Former     Current packs/day: 0.00     Average packs/day: 1 pack/day for 40.0 years (40.0 ttl pk-yrs)     Types: Cigarettes     Start date: 1951     Quit date: 1991     Years since quittin.7    Smokeless tobacco: Former     Quit date: 1986   Substance and Sexual Activity    Alcohol use: Not Currently     Alcohol/week: 0.0 standard drinks of alcohol     Comment: 2 bottles beer on weekends    Drug use: No    Sexual activity: Not on file     Comment:       Review of Systems   Constitutional:  Positive for activity change and appetite change.   HENT: Negative.     Eyes: Negative.    Respiratory:  Positive for cough (at baseline) and shortness of breath (at baseline).    Cardiovascular: Negative.    Gastrointestinal:  Positive for diarrhea and nausea.   Endocrine: Negative.    Genitourinary: Negative.    Musculoskeletal: Negative.    Skin: Negative.    Allergic/Immunologic: Negative.    Neurological: Negative.    Psychiatric/Behavioral: Negative.       Objective:     Vital Signs (Most Recent):  Temp: 98.7 °F (37.1 °C) (08/10/23 1901)  Pulse: (!) 116 (08/10/23 1901)  Resp: 18 (08/10/23 1901)  BP: (!) 146/77 (08/10/23 1901)  SpO2: (!) 94 % (08/10/23 1901)  Vital Signs (24h Range):  Temp:  [98.6 °F (37 °C)-98.7 °F (37.1 °C)] 98.7 °F (37.1 °C)  Pulse:  [109-118] 116  Resp:  [15-31] 18  SpO2:  [86 %-97 %] 94 %  BP: (127-156)/(75-85) 146/77     Weight: 82.6 kg (182 lb)  Body mass index is 25.38 kg/m².     Physical Exam  Vitals and nursing note reviewed.   Constitutional:       General: He is not in acute distress.     Appearance: Normal appearance. He is not ill-appearing.   HENT:      Head: Normocephalic and atraumatic.      Nose: Nose normal.      Mouth/Throat:      Mouth: Mucous membranes are moist.   Eyes:      Extraocular Movements: Extraocular movements intact.   Cardiovascular:      Rate and Rhythm: Normal rate.      Pulses: Normal pulses.      Heart sounds: No murmur heard.  Pulmonary:      Effort: Pulmonary effort is normal. No respiratory distress.      Comments: On O2  Abdominal:      General: Abdomen is flat.      Palpations: Abdomen is soft.      Tenderness: There is no abdominal tenderness.   Musculoskeletal:      Right lower leg: No edema.      Left lower leg: No edema.   Skin:     General: Skin is warm.      Capillary Refill: Capillary refill takes less than 2 seconds.   Neurological:      General: No focal deficit present.      Mental Status: He is alert.   Psychiatric:         Mood and Affect: Mood normal.                Significant Labs: All pertinent labs within the past 24 hours have been reviewed.    Significant Imaging: I have reviewed all pertinent imaging results/findings within the past 24 hours.    Assessment/Plan:     * COPD exacerbation  Intermediate suspicion for exacerbation  Patient reports being prescribed oxygen, however does not use it at this time because he does not have supplies  Continue prednisone, scheduled DuoNebs   Prescribe oxygen on discharge      Diarrhea  Related to medication side effects (recent laxative prescription) versus infectious process  Rule out infectious etiology  Obtain stool studies    Stage 3b chronic kidney  "disease  At baseline.  Continue to monitor.      Malignant neoplasm of prostate  Outpatient follow-up      Pulmonary hypertension  Optimize volume status  Results for orders placed during the hospital encounter of 12/06/22    Echo    Interpretation Summary  · The left ventricle is normal in size with eccentric hypertrophy and low normal systolic function.  · The estimated ejection fraction is 50%.  · Indeterminate left ventricular diastolic function.  · Normal right ventricular size with normal right ventricular systolic function.  · Mild tricuspid regurgitation.  · There is moderate pulmonary hypertension.  · Normal central venous pressure (3 mmHg).  · The estimated PA systolic pressure is 45 mmHg.        Chronic respiratory failure with hypoxia  Patient is prescribed oxygen p.r.n.  Was noted to be hypoxic while in the ED  Could be the setting of COPD exacerbation  Resume oxygen, wean as tolerated    Diabetes mellitus with nephropathy  Patient's FSGs are controlled on current medication regimen.  Last A1c reviewed-   Lab Results   Component Value Date    HGBA1C 7.3 (H) 06/06/2023     Most recent fingerstick glucose reviewed- No results for input(s): "POCTGLUCOSE" in the last 24 hours.  Current correctional scale  Low  Maintain anti-hyperglycemic dose as follows-   Antihyperglycemics (From admission, onward)    Start     Stop Route Frequency Ordered    08/10/23 2042  insulin aspart U-100 pen 0-5 Units         -- SubQ Before meals & nightly PRN 08/10/23 1942        Hold Oral hypoglycemics while patient is in the hospital.    Essential hypertension  Resume home medications        VTE Risk Mitigation (From admission, onward)         Ordered     heparin (porcine) injection 5,000 Units  Every 8 hours         08/10/23 1942     IP VTE HIGH RISK PATIENT  Once         08/10/23 1942     Place sequential compression device  Until discontinued         08/10/23 1942                           Johan Shahid MD  Department of " [facility]

## 2023-08-11 NOTE — PLAN OF CARE
[place] - Med Surg  Discharge Final Note    Patient clear to discharge from case management stand point. Reviewed follow up appointments with pt and daughter, verbalized understanding. Home oxygen to be delivered to bedside. Pt's daughter will be helping him home.     Primary Care Provider: Lucas Lloyd Jr., MD    Expected Discharge Date: 8/11/2023    Final Discharge Note (most recent)       Final Note - 08/11/23 1154          Final Note    Assessment Type Final Discharge Note (P)      Anticipated Discharge Disposition Home or Self Care (P)      Hospital Resources/Appts/Education Provided Provided patient/caregiver with written discharge plan information (P)         Post-Acute Status    Discharge Delays None known at this time (P)                      Important Message from Medicare             Contact Info       [facility]   Specialty: DME Provider    [address]  [place] [facility] [address]   Phone: [phone]       Next Steps: Follow up    Instructions: Home oxygen    Sarkis Hyman MD   Specialty: Pulmonary Disease, Sleep Medicine    [address] [facility] [address]  [address]  [facility] [address]   Phone: [phone]       Next Steps: Schedule an appointment as soon as possible for a visit    Instructions: Message sent for clinic to contact patient to schedule

## 2023-08-11 NOTE — HPI
This is an 87-year-old male with a past medical history of COPD (on O2 PRN), hypertension, type 2 diabetes, CKD 3, prostate cancer, who presents with diarrhea.    Patient initially presented to the ED on 8/4 with constipation and was prescribed Colace.  He later returned on 08/05 for persistent constipation and abdominal discomfort.  He received an enema, magnesium citrate and was discharged home.  He reports improvement of his symptoms after being discharged, however a day prior to presentation he reports that he developed nausea, several episodes of diarrhea (more than 10 per day) and generalized fatigue.  He thinks his symptoms started after eating his breakfast which consisted of fruits a day prior.  Of note, the patient is prescribed oxygen p.r.n. but does not currently use it.    In the ED, the patient was tachycardic (110), tachypneic, and hypoxic (SpO2:  86%, requiring 2 L of supplemental oxygen).  Labs were remarkable for an elevated creatinine (2.1 -around baseline).  Chest x-ray showed no acute cardiopulmonary disease with chronic discoid opacities at both lung bases, likely representing scarring.  Patient was given 1 L of NS, Solu-Medrol 125 mg IV, ceftriaxone, azithromycin, DuoNebs, and Zofran 4 mg IV.  He was admitted for further management.

## 2023-08-11 NOTE — SUBJECTIVE & OBJECTIVE
Interval History: sitting up eating. No 02.    Review of Systems   Constitutional:  Negative for activity change.   HENT:  Negative for congestion.    Respiratory:  Negative for chest tightness, shortness of breath and wheezing.    Cardiovascular:  Negative for chest pain.   Genitourinary:  Negative for difficulty urinating.   Neurological:  Negative for dizziness.     Objective:     Vital Signs (Most Recent):  Temp: 97.7 °F (36.5 °C) (08/11/23 0712)  Pulse: 92 (08/11/23 0717)  Resp: 16 (08/11/23 0717)  BP: 130/73 (08/11/23 0712)  SpO2: 97 % (08/11/23 0717) Vital Signs (24h Range):  Temp:  [97.7 °F (36.5 °C)-98.7 °F (37.1 °C)] 97.7 °F (36.5 °C)  Pulse:  [] 92  Resp:  [15-31] 16  SpO2:  [86 %-98 %] 97 %  BP: (127-156)/(73-85) 130/73     Weight: 82.6 kg (182 lb)  Body mass index is 25.38 kg/m².    Intake/Output Summary (Last 24 hours) at 8/11/2023 0802  Last data filed at 8/10/2023 2213  Gross per 24 hour   Intake 1340 ml   Output 275 ml   Net 1065 ml         Physical Exam  Constitutional:       Appearance: Normal appearance.   Cardiovascular:      Rate and Rhythm: Normal rate.   Pulmonary:      Effort: Pulmonary effort is normal. No respiratory distress.   Neurological:      General: No focal deficit present.      Mental Status: He is alert.             Significant Labs: All pertinent labs within the past 24 hours have been reviewed.  BMP:   Recent Labs   Lab 08/11/23  0544   *   *   K 5.0      CO2 22*   BUN 25*   CREATININE 1.9*   CALCIUM 9.3   MG 1.9     CBC:   Recent Labs   Lab 08/10/23  1436 08/10/23  1438 08/11/23  0544   WBC  --  5.26 2.93*   HGB  --  13.0* 11.4*   HCT 40 40.8 36.2*   PLT  --  269 259       Significant Imaging: I have reviewed all pertinent imaging results/findings within the past 24 hours.

## 2023-08-11 NOTE — ASSESSMENT & PLAN NOTE
Intermediate suspicion for exacerbation  Patient reports being prescribed oxygen, however does not use it at this time because he does not have supplies  Continue prednisone, scheduled DuoNebs   Prescribe oxygen on discharge

## 2023-08-11 NOTE — SUBJECTIVE & OBJECTIVE
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  Vaccinated.  C A       Review of patient's allergies indicates:  No Known Allergies    No current facility-administered medications on file prior to encounter.     Current Outpatient Medications on File Prior to Encounter   Medication Sig    amLODIPine (NORVASC) 5 MG tablet Take 1 tablet (5 mg total) by mouth once daily.    atorvastatin (LIPITOR) 20 MG tablet Take 1 tablet (20 mg total) by mouth every evening.    blood sugar diagnostic Strp To check BG 1 times daily, to use with insurance preferred meter    blood-glucose meter kit To check BG 1 times daily, to use with insurance preferred meter    cholecalciferol, vitamin D3, (VITAMIN D3) 25 mcg (1,000 unit) capsule Take 1,000 Units by mouth once daily.    docusate sodium (COLACE) 100 MG capsule Take 2 capsules (200 mg total) by mouth 2 (two) times daily.    lactulose (CHRONULAC) 10 gram/15 mL solution TAKE 15 ML BY MOUTH THREE TIMES DAILY AS NEEDED FOR CONSTIPATION    leuprolide (LUPRON) 1 mg/0.2 mL injection Inject into the skin.    olmesartan (BENICAR) 40 MG  tablet Take 1 tablet by mouth once daily    ondansetron (ZOFRAN-ODT) 4 MG TbDL Take 1 tablet (4 mg total) by mouth every 8 (eight) hours as needed (Nausea).    polyethylene glycol (GLYCOLAX) 17 gram PwPk Take this medication EVERY DAY to prevent constipation.  You may take it up to twice a day, but start at only once a day.  SKIP THIS MEDICATION IF YOU DEVELOP DIARRHEA.    tiotropium bromide (SPIRIVA RESPIMAT) 2.5 mcg/actuation inhaler INHALE 2 SPRAY(S) BY MOUTH ONCE DAILY    TRADJENTA 5 mg Tab tablet Take 1 tablet by mouth once daily     Family History       Problem Relation (Age of Onset)    Asbestos Father    Cancer Mother          Tobacco Use    Smoking status: Former     Current packs/day: 0.00     Average packs/day: 1 pack/day for 40.0 years (40.0 ttl pk-yrs)     Types: Cigarettes     Start date: 1951     Quit date: 1991     Years since quittin.7    Smokeless tobacco: Former     Quit date: 1986   Substance and Sexual Activity    Alcohol use: Not Currently     Alcohol/week: 0.0 standard drinks of alcohol     Comment: 2 bottles beer on weekends    Drug use: No    Sexual activity: Not on file     Comment:       Review of Systems   Constitutional:  Positive for activity change and appetite change.   HENT: Negative.     Eyes: Negative.    Respiratory:  Positive for cough (at baseline) and shortness of breath (at baseline).    Cardiovascular: Negative.    Gastrointestinal:  Positive for diarrhea and nausea.   Endocrine: Negative.    Genitourinary: Negative.    Musculoskeletal: Negative.    Skin: Negative.    Allergic/Immunologic: Negative.    Neurological: Negative.    Psychiatric/Behavioral: Negative.       Objective:     Vital Signs (Most Recent):  Temp: 98.7 °F (37.1 °C) (08/10/23 1901)  Pulse: (!) 116 (08/10/23 1901)  Resp: 18 (08/10/23 1901)  BP: (!) 146/77 (08/10/23 1901)  SpO2: (!) 94 % (08/10/23 1901) Vital Signs (24h Range):  Temp:  [98.6 °F (37 °C)-98.7 °F (37.1 °C)] 98.7 °F (37.1  °C)  Pulse:  [109-118] 116  Resp:  [15-31] 18  SpO2:  [86 %-97 %] 94 %  BP: (127-156)/(75-85) 146/77     Weight: 82.6 kg (182 lb)  Body mass index is 25.38 kg/m².     Physical Exam  Vitals and nursing note reviewed.   Constitutional:       General: He is not in acute distress.     Appearance: Normal appearance. He is not ill-appearing.   HENT:      Head: Normocephalic and atraumatic.      Nose: Nose normal.      Mouth/Throat:      Mouth: Mucous membranes are moist.   Eyes:      Extraocular Movements: Extraocular movements intact.   Cardiovascular:      Rate and Rhythm: Normal rate.      Pulses: Normal pulses.      Heart sounds: No murmur heard.  Pulmonary:      Effort: Pulmonary effort is normal. No respiratory distress.      Comments: On O2  Abdominal:      General: Abdomen is flat.      Palpations: Abdomen is soft.      Tenderness: There is no abdominal tenderness.   Musculoskeletal:      Right lower leg: No edema.      Left lower leg: No edema.   Skin:     General: Skin is warm.      Capillary Refill: Capillary refill takes less than 2 seconds.   Neurological:      General: No focal deficit present.      Mental Status: He is alert.   Psychiatric:         Mood and Affect: Mood normal.                Significant Labs: All pertinent labs within the past 24 hours have been reviewed.    Significant Imaging: I have reviewed all pertinent imaging results/findings within the past 24 hours.

## 2023-08-11 NOTE — DISCHARGE SUMMARY
[facility] Medicine  Discharge Summary      Patient Name: Peter Lopez  MRN: [id number]  [facility]: [id number]  Patient Class: IP- Inpatient  Admission Date: 8/10/2023  Hospital Length of Stay: 1 days  Discharge Date and Time:  08/11/2023 8:23 AM  Attending Physician: Jorge Rogers MD   Discharging Provider: Jorge Rogers MD  Primary Care Provider: Lucas Lloyd Jr., MD    Primary Care Team: Networked reference to record PCT     HPI:   This is an 87-year-old male with a past medical history of COPD (on O2 PRN), hypertension, type 2 diabetes, CKD 3, prostate cancer, who presents with diarrhea.    Patient initially presented to the ED on 8/4 with constipation and was prescribed Colace.  He later returned on 08/05 for persistent constipation and abdominal discomfort.  He received an enema, magnesium citrate and was discharged home.  He reports improvement of his symptoms after being discharged, however a day prior to presentation he reports that he developed nausea, several episodes of diarrhea (more than 10 per day) and generalized fatigue.  He thinks his symptoms started after eating his breakfast which consisted of fruits a day prior.  Of note, the patient is prescribed oxygen p.r.n. but does not currently use it.    In the ED, the patient was tachycardic (110), tachypneic, and hypoxic (SpO2:  86%, requiring 2 L of supplemental oxygen).  Labs were remarkable for an elevated creatinine (2.1 -around baseline).  Chest x-ray showed no acute cardiopulmonary disease with chronic discoid opacities at both lung bases, likely representing scarring.  Patient was given 1 L of NS, Solu-Medrol 125 mg IV, ceftriaxone, azithromycin, DuoNebs, and Zofran 4 mg IV.  He was admitted for further management.      * No surgery found *      Hospital Course:   Patient was admitted for COPD exacerbation. By the following morning- he was sitting up and eating on RA and was requesting discharge. No further diarrhea.   Will be discharged to home. Activity as tolerated. Follow up with PCP in one week. Diet ADA 2000 dwight diet.        Goals of Care Treatment Preferences:  Code Status: Full Code      Consults:     No new Assessment & Plan notes have been filed under this hospital service since the last note was generated.  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    Significant Diagnostic Studies: N/A    Pending Diagnostic Studies:     None         Medications:  Reconciled Home Medications:      Medication List      CONTINUE taking these medications    amLODIPine 5 MG tablet  Commonly known as: NORVASC  Take 1 tablet (5 mg total) by mouth once daily.     atorvastatin 20 MG tablet  Commonly known as: LIPITOR  Take 1 tablet (20 mg total) by mouth every evening.     blood sugar diagnostic Strp  To check BG 1 times daily, to use with insurance preferred meter     blood-glucose meter kit  To check BG 1 times daily, to use with insurance preferred meter     cholecalciferol (vitamin D3) 25 mcg (1,000 unit) capsule  Commonly known as: VITAMIN D3  Take 1,000 Units by mouth once daily.     docusate sodium 100 MG capsule  Commonly known as: COLACE  Take 2 capsules (200 mg total) by mouth 2 (two) times daily.     lactulose 10  gram/15 mL solution  Commonly known as: CHRONULAC  TAKE 15 ML BY MOUTH THREE TIMES DAILY AS NEEDED FOR CONSTIPATION     leuprolide 1 mg/0.2 mL injection  Commonly known as: LUPRON  Inject into the skin.     olmesartan 40 MG tablet  Commonly known as: BENICAR  Take 1 tablet by mouth once daily     ondansetron 4 MG Tbdl  Commonly known as: ZOFRAN-ODT  Take 1 tablet (4 mg total) by mouth every 8 (eight) hours as needed (Nausea).     polyethylene glycol 17 gram Pwpk  Commonly known as: GLYCOLAX  Take this medication EVERY DAY to prevent constipation.  You may take it up to twice a day, but start at only once a day.  SKIP THIS MEDICATION IF YOU DEVELOP DIARRHEA.     SPIRIVA RESPIMAT 2.5 mcg/actuation inhaler  Generic drug: tiotropium bromide  INHALE 2 SPRAY(S) BY MOUTH ONCE DAILY     TRADJENTA 5 mg Tab tablet  Generic drug: linaGLIPtin  Take 1 tablet by mouth once daily            Indwelling Lines/Drains at time of discharge:   Lines/Drains/Airways     None                 Time spent on the discharge of patient: > 35  minutes         Jorge Campos MD  Department of Hospital Medicine  [facility] Surg

## 2023-08-14 ENCOUNTER — PATIENT OUTREACH (OUTPATIENT)
Dept: ADMINISTRATIVE | Facility: CLINIC | Age: 88
End: 2023-08-14
Payer: MEDICARE

## 2023-08-14 NOTE — PROGRESS NOTES
C3 nurse attempted to contact Peter Lopez for a TCC post hospital discharge follow up call. No answer. Left voicemail with callback information. The patient has a scheduled follow up with Lucas Little Jr., MD ([facility]) on 8/21/23 at 0920. Will route to Lucas Lloyd Jr., MD after speaking with the pt.

## 2023-08-15 ENCOUNTER — TELEPHONE (OUTPATIENT)
Dept: PULMONOLOGY | Facility: CLINIC | Age: 88
End: 2023-08-15
Payer: MEDICARE

## 2023-08-15 ENCOUNTER — PATIENT MESSAGE (OUTPATIENT)
Dept: ADMINISTRATIVE | Facility: CLINIC | Age: 88
End: 2023-08-15
Payer: MEDICARE

## 2023-08-15 DIAGNOSIS — J44.9 CHRONIC OBSTRUCTIVE PULMONARY DISEASE, UNSPECIFIED COPD TYPE: Primary | ICD-10-CM

## 2023-08-15 NOTE — PROGRESS NOTES
C3 nurse spoke with Peter Lopez for a TCC post hospital discharge follow up call. Pt denied any new symptoms. He spoke with his pulmonologist today and he ordered portable oxygen, he is just awaiting insurance to approve it. He says he hasn't started his discharge medication, Prednisone, because he did not know what it was for. Pt encouraged to follow medication regimen and he stated he would start the med today. The pt states he does not have any equipment to check his blood sugar, and has not had it for a long time. He states he plans to speak with Lucas Lloyd Jr., MD about obtaining a glucometer on Monday at his appointment. He says Lucas Lloyd Jr., MD is aware that he does not check his blood sugar. The patient has a scheduled follow up with Lucas Little Jr., MD ([facility]) on 8/21/23 at 0920. Message routed to Lucas Lloyd Jr., MD .

## 2023-08-15 NOTE — TELEPHONE ENCOUNTER
Please advise patient that we will need to repeat 6 min walk prior to ordering portable oxygen concentrator.    Please schedule 6 min walk test.

## 2023-08-15 NOTE — TELEPHONE ENCOUNTER
----- Message from Beatriz Thibodeaux MA sent at 8/15/2023 11:40 AM CDT -----  Please put in an order for the portable oxygen the one with the bag.

## 2023-08-16 ENCOUNTER — TELEPHONE (OUTPATIENT)
Dept: PULMONOLOGY | Facility: CLINIC | Age: 88
End: 2023-08-16
Payer: MEDICARE

## 2023-08-16 NOTE — TELEPHONE ENCOUNTER
Spoke with patient scheduled an appointment to have 6 min walk test.    ANDREEA Henderson             ----- Message from Beatriz Thibodeaux MA sent at 8/16/2023  7:33 AM CDT -----  Please advise patient that we will need to repeat 6 min walk prior to ordering portable oxygen concentrator.    Please schedule 6 min walk test.

## 2023-08-21 ENCOUNTER — HOSPITAL ENCOUNTER (OUTPATIENT)
Dept: RESPIRATORY THERAPY | Facility: HOSPITAL | Age: 88
Discharge: HOME | End: 2023-08-21
Attending: INTERNAL MEDICINE
Payer: MEDICARE

## 2023-08-21 ENCOUNTER — OFFICE VISIT (OUTPATIENT)
Dept: FAMILY MEDICINE | Facility: CLINIC | Age: 88
End: 2023-08-21
Payer: MEDICARE

## 2023-08-21 VITALS
OXYGEN SATURATION: 91 % | SYSTOLIC BLOOD PRESSURE: 135 MMHG | DIASTOLIC BLOOD PRESSURE: 76 MMHG | WEIGHT: 181 LBS | BODY MASS INDEX: 25.34 KG/M2 | HEIGHT: 71 IN | TEMPERATURE: 98 F | HEART RATE: 108 BPM

## 2023-08-21 DIAGNOSIS — K59.00 CONSTIPATION, UNSPECIFIED CONSTIPATION TYPE: Primary | ICD-10-CM

## 2023-08-21 DIAGNOSIS — J43.2 CENTRILOBULAR EMPHYSEMA: Chronic | ICD-10-CM

## 2023-08-21 DIAGNOSIS — N18.32 STAGE 3B CHRONIC KIDNEY DISEASE: Chronic | ICD-10-CM

## 2023-08-21 DIAGNOSIS — I70.0 AORTIC ATHEROSCLEROSIS: Chronic | ICD-10-CM

## 2023-08-21 DIAGNOSIS — I10 ESSENTIAL HYPERTENSION: Chronic | ICD-10-CM

## 2023-08-21 DIAGNOSIS — E11.21 DIABETES MELLITUS WITH NEPHROPATHY: Chronic | ICD-10-CM

## 2023-08-21 DIAGNOSIS — D51.9 ANEMIA DUE TO VITAMIN B12 DEFICIENCY, UNSPECIFIED B12 DEFICIENCY TYPE: ICD-10-CM

## 2023-08-21 DIAGNOSIS — J44.9 CHRONIC OBSTRUCTIVE PULMONARY DISEASE, UNSPECIFIED COPD TYPE: ICD-10-CM

## 2023-08-21 LAB — 6MWT: NORMAL

## 2023-08-21 PROCEDURE — 94618 PULMONARY STRESS TESTING: CPT

## 2023-08-21 PROCEDURE — 99214 PR OFFICE/OUTPT VISIT, EST, LEVL IV, 30-39 MIN: ICD-10-PCS | Mod: S$GLB,,, | Performed by: FAMILY MEDICINE

## 2023-08-21 PROCEDURE — 1159F MED LIST DOCD IN RCRD: CPT | Mod: CPTII,S$GLB,, | Performed by: FAMILY MEDICINE

## 2023-08-21 PROCEDURE — 94618 PULMONARY STRESS TESTING: ICD-10-PCS | Mod: 26,,, | Performed by: INTERNAL MEDICINE

## 2023-08-21 PROCEDURE — 99999 PR PBB SHADOW E&M-EST. PATIENT-LVL V: CPT | Mod: PBBFAC,,, | Performed by: FAMILY MEDICINE

## 2023-08-21 PROCEDURE — 3288F FALL RISK ASSESSMENT DOCD: CPT | Mod: CPTII,S$GLB,, | Performed by: FAMILY MEDICINE

## 2023-08-21 PROCEDURE — 3078F DIAST BP <80 MM HG: CPT | Mod: CPTII,S$GLB,, | Performed by: FAMILY MEDICINE

## 2023-08-21 PROCEDURE — 1101F PT FALLS ASSESS-DOCD LE1/YR: CPT | Mod: CPTII,S$GLB,, | Performed by: FAMILY MEDICINE

## 2023-08-21 PROCEDURE — 99999 PR PBB SHADOW E&M-EST. PATIENT-LVL V: ICD-10-PCS | Mod: PBBFAC,,, | Performed by: FAMILY MEDICINE

## 2023-08-21 PROCEDURE — 94618 PULMONARY STRESS TESTING: CPT | Mod: 26,,, | Performed by: INTERNAL MEDICINE

## 2023-08-21 PROCEDURE — 3078F PR MOST RECENT DIASTOLIC BLOOD PRESSURE < 80 MM HG: ICD-10-PCS | Mod: CPTII,S$GLB,, | Performed by: FAMILY MEDICINE

## 2023-08-21 PROCEDURE — 1160F RVW MEDS BY RX/DR IN RCRD: CPT | Mod: CPTII,S$GLB,, | Performed by: FAMILY MEDICINE

## 2023-08-21 PROCEDURE — 3288F PR FALLS RISK ASSESSMENT DOCUMENTED: ICD-10-PCS | Mod: CPTII,S$GLB,, | Performed by: FAMILY MEDICINE

## 2023-08-21 PROCEDURE — 1111F PR DISCHARGE MEDS RECONCILED W/ CURRENT OUTPATIENT MED LIST: ICD-10-PCS | Mod: CPTII,S$GLB,, | Performed by: FAMILY MEDICINE

## 2023-08-21 PROCEDURE — 1160F PR REVIEW ALL MEDS BY PRESCRIBER/CLIN PHARMACIST DOCUMENTED: ICD-10-PCS | Mod: CPTII,S$GLB,, | Performed by: FAMILY MEDICINE

## 2023-08-21 PROCEDURE — 99214 OFFICE O/P EST MOD 30 MIN: CPT | Mod: S$GLB,,, | Performed by: FAMILY MEDICINE

## 2023-08-21 PROCEDURE — 1101F PR PT FALLS ASSESS DOC 0-1 FALLS W/OUT INJ PAST YR: ICD-10-PCS | Mod: CPTII,S$GLB,, | Performed by: FAMILY MEDICINE

## 2023-08-21 PROCEDURE — 3075F SYST BP GE 130 - 139MM HG: CPT | Mod: CPTII,S$GLB,, | Performed by: FAMILY MEDICINE

## 2023-08-21 PROCEDURE — 1111F DSCHRG MED/CURRENT MED MERGE: CPT | Mod: CPTII,S$GLB,, | Performed by: FAMILY MEDICINE

## 2023-08-21 PROCEDURE — 1126F PR PAIN SEVERITY QUANTIFIED, NO PAIN PRESENT: ICD-10-PCS | Mod: CPTII,S$GLB,, | Performed by: FAMILY MEDICINE

## 2023-08-21 PROCEDURE — 1126F AMNT PAIN NOTED NONE PRSNT: CPT | Mod: CPTII,S$GLB,, | Performed by: FAMILY MEDICINE

## 2023-08-21 PROCEDURE — 3075F PR MOST RECENT SYSTOLIC BLOOD PRESS GE 130-139MM HG: ICD-10-PCS | Mod: CPTII,S$GLB,, | Performed by: FAMILY MEDICINE

## 2023-08-21 PROCEDURE — 1159F PR MEDICATION LIST DOCUMENTED IN MEDICAL RECORD: ICD-10-PCS | Mod: CPTII,S$GLB,, | Performed by: FAMILY MEDICINE

## 2023-08-21 NOTE — PATIENT INSTRUCTIONS
Foods high in B12  Here are just some of the vitamin B12 foods you can consume on a regular basis to make sure you get enough of this essential vitamin in your diet:    Beef liver: 1 ounce: 20 micrograms (over 300 percent DV)  Sardines: 3 ounces: 6.6 micrograms (over 100 percent DV)  Atlantic mackerel: 3 ounces: 7.4 micrograms (over 100 percent DV)  Chahal: 3 ounces: 2.7 micrograms (45 percent DV)  Wild-caught salmon: 3 ounces: 2.6 micrograms (42 percent DV)  Nutritional yeast: 1 tablespoon: 2.4 micrograms (40 percent DV)  Feta cheese: 0.5 cup: 1.25 micrograms (21 percent DV)  Grass-fed beef: 3 ounces: 1.2 micrograms (20 percent DV)  Cottage Cheese: 1 cup: 0.97 micrograms (16 percent DV)  Eggs: 1 large: 0.6 micrograms (11 percent DV)

## 2023-08-21 NOTE — TELEPHONE ENCOUNTER
Peter Lopez has not returned calls or messages regarding support for his Spiriva and Tradjenta after multiple attempts to reach him over 10 business days. A final letter has been mailed to the patient to reach back out to Pharmacy Patient Assistance to initiate this process. Please instruct the patient to reach out to Keely Guadarrama   @ [phone] or [email or web address] if he is still in need of assistance.

## 2023-08-25 NOTE — TELEPHONE ENCOUNTER
Peter Lopez has not returned calls or messages regarding support for his Tradjenta after multiple attempts to reach him over 10 business days. A final letter has been mailed to the patient to reach back out to Pharmacy Patient Assistance to initiate this process. Please instruct the patient to reach out to Keely Guadarrama   @ [phone] or [email or web address] if he is still in need of assistance.

## 2023-08-26 RX ORDER — LINAGLIPTIN 5 MG/1
5 TABLET, FILM COATED ORAL DAILY
Qty: 90 TABLET | Refills: 1 | Status: SHIPPED | OUTPATIENT
Start: 2023-08-26

## 2023-08-26 RX ORDER — OLMESARTAN MEDOXOMIL 40 MG/1
40 TABLET ORAL DAILY
Qty: 90 TABLET | Refills: 1 | Status: SHIPPED | OUTPATIENT
Start: 2023-08-26 | End: 2024-02-23

## 2023-08-26 NOTE — PROGRESS NOTES
"  Patient Name: Peter Lopez    : 1935  MRN: [id number]      Subjective:     Patient ID: Peter is a 87 y.o. male    Chief Complaint:  Follow-up    87 year old male with chronic conditions as per problem list comes in for emergency room follow up for constipation. Went to ER on 2023 for constipation for a few days. Was prescribed MiraLax. Subsequently developed diarrhea. Has been working on water intake and fiber intale. Reports symptoms improved.    States his Tradjenta and Spiriva have been expensive as he is in the Medicare Donut hole. Advised on pharamcy assistance however, he states he does not want to disclose his financial information as he has some saved up money         Review of Systems   Constitutional:  Negative for unexpected weight change.   HENT:  Negative for sore throat.    Respiratory:  Negative for wheezing.    Cardiovascular:  Negative for chest pain and palpitations.   Gastrointestinal:  Negative for abdominal pain, blood in stool, change in bowel habit and change in bowel habit.   Endocrine: Negative for polydipsia, polyphagia and polyuria.   Genitourinary:  Negative for dysuria and hematuria.   Musculoskeletal:  Negative for neck pain.   Integumentary:  Negative for pallor.   Neurological:  Negative for dizziness, seizures, numbness and headaches.   Psychiatric/Behavioral:  Negative for confusion. The patient is not nervous/anxious.         Objective:   /76 (BP Location: Right arm, Patient Position: Sitting, BP Method: Medium (Manual))   Pulse 108   Temp 98.4 °F (36.9 °C) (Oral)   Ht 5' 11" (1.803 m)   Wt 82.1 kg (181 lb)   SpO2 (!) 91%   BMI 25.24 kg/m²     Physical Exam  Vitals reviewed.   Constitutional:       General: He is not in acute distress.     Appearance: He is well-developed. He is not ill-appearing or diaphoretic.   HENT:      Head: Normocephalic.      Right Ear: External ear normal.      Left Ear: External ear normal.      Nose: Nose normal.      Mouth/Throat:      " Pharynx: No pharyngeal swelling, oropharyngeal exudate or posterior oropharyngeal erythema.   Neck:      Trachea: No tracheal deviation.   Cardiovascular:      Rate and Rhythm: Regular rhythm. Tachycardia present.      Heart sounds: Normal heart sounds.   Pulmonary:      Effort: Pulmonary effort is normal.      Breath sounds: Normal breath sounds. No wheezing or rales.   Abdominal:      General: Bowel sounds are normal.      Palpations: Abdomen is soft. Abdomen is not rigid. There is no mass.      Tenderness: There is no abdominal tenderness. There is no guarding or rebound.   Musculoskeletal:      Cervical back: Normal range of motion and neck supple.   Lymphadenopathy:      Cervical: No cervical adenopathy.   Neurological:      Mental Status: He is alert and oriented to person, place, and time.      Sensory: No sensory deficit.      Motor: No atrophy.      Gait: Gait normal.      Deep Tendon Reflexes:      Reflex Scores:       Patellar reflexes are 2+ on the right side and 2+ on the left side.       Assessment        ICD-10-CM ICD-9-CM   1. Constipation, unspecified constipation type  K59.00 564.00   2. Diabetes mellitus with nephropathy  E11.21 250.40     583.81   3. Stage 3b chronic kidney disease  N18.32 585.3   4. Essential hypertension  I10 401.9   5. Aortic atherosclerosis  I70.0 440.0   6. Centrilobular emphysema  J43.2 492.8   7. Anemia due to vitamin B12 deficiency, unspecified B12 deficiency type  D51.9 281.1         Plan:     1. Constipation, unspecified constipation type    2. Diabetes mellitus with nephropathy  Overview:  Lab Results   Component Value Date    HGBA1C 7.3 (H) 06/06/2023    HGBA1C 7.2 (H) 12/07/2022    HGBA1C 7.4 (H) 06/21/2022       Assessment & Plan:  A1c at goal, continue current regimen.     Orders:  -     Ambulatory referral/consult to Pharmacy Assistance; Future; Expected date: 08/28/2023  -     linaGLIPtin (TRADJENTA) 5 mg Tab tablet; Take 1 tablet (5 mg total) by mouth once  daily.  Dispense: 90 tablet; Refill: 1    3. Stage 3b chronic kidney disease  Overview:  Lab Results   Component Value Date    EGFRNORACEVR 34 (A) 08/11/2023    EGFRNORACEVR 30 (A) 08/10/2023    EGFRNORACEVR 30 (A) 07/17/2023    EGFRNORACEVR 36.4 (A) 06/06/2023        Assessment & Plan:  Renal function stable. Nephrology appointment scheduled for Oct 2023      4. Essential hypertension  Overview:  BP Readings from Last 3 Encounters:   08/21/23 135/76   08/11/23 (!) 146/76   08/05/23 (!) 148/86        ACC/AHA guidelines on blood pressure goals reviewed.  Reinforced correct way of measuring blood pressure.     Assessment & Plan:  Continue current regimen.    Orders:  -     olmesartan (BENICAR) 40 MG tablet; Take 1 tablet (40 mg total) by mouth once daily.  Dispense: 90 tablet; Refill: 1    5. Aortic atherosclerosis  Overview:  05- CXR  There is extensive aortic atherosclerosis    Assessment & Plan:  Continue atorvastatin      6. Centrilobular emphysema  Overview:  11-  CT Chest  Lungs are symmetrically expanded with centrilobular emphysema    Assessment & Plan:  States Spiriva cost high.   Will place order for pharmacy assistance    Orders:  -     Ambulatory referral/consult to Pharmacy Assistance; Future; Expected date: 08/28/2023    7. Anemia due to vitamin B12 deficiency, unspecified B12 deficiency type  Assessment & Plan:  Last labs show normocytic anemia. Declines labs today but agrees to do with next routine labs.    Orders:   -     CBC Auto Differential; Future; Expected date: 08/21/2023  -     Vitamin B12; Future; Expected date: 08/21/2023  -     METHYLMALONIC ACID, SERUM; Future; Expected date: 08/21/2023           -Lucas Lloyd Jr., MD, AAHIVS      This office note has been dictated.  This dictation has been generated using M-Modal Fluency Direct dictation; some phonetic errors may occur.         Patient Instructions   Foods high in B12  Here are just some of the vitamin B12 foods you can  consume on a regular basis to make sure you get enough of this essential vitamin in your diet:    Beef liver: 1 ounce: 20 micrograms (over 300 percent DV)  Sardines: 3 ounces: 6.6 micrograms (over 100 percent DV)  Atlantic mackerel: 3 ounces: 7.4 micrograms (over 100 percent DV)  Chahal: 3 ounces: 2.7 micrograms (45 percent DV)  Wild-caught salmon: 3 ounces: 2.6 micrograms (42 percent DV)  Nutritional yeast: 1 tablespoon: 2.4 micrograms (40 percent DV)  Feta cheese: 0.5 cup: 1.25 micrograms (21 percent DV)  Grass-fed beef: 3 ounces: 1.2 micrograms (20 percent DV)  Cottage Cheese: 1 cup: 0.97 micrograms (16 percent DV)  Eggs: 1 large: 0.6 micrograms (11 percent DV)    Future Appointments   Date Time Provider Department Center   8/29/2023  8:00 AM [facility]   9/5/2023  8:00 AM NURSE, [facility] FAM MED/INT MED [facility]   9/25/2023 11:30 AM Lucia Aguiar DPM [facility] [place]   10/2/2023  8:00 AM NURSE, [facility] FAM MED/INT MED [facility]   10/5/2023 10:00 AM Yakov Maldonado Jr., MD [facility] NEPHSHARMAINE [place]   11/1/2023  8:00 AM NURSE [facility] FAM MED/INT MED [facility]   11/29/2023  7:15 AM LAB, [facility] DRAW STATION [facility] LAB [facility]   12/4/2023  9:00 AM Lucas Lloyd Jr., MD [facility]   2/7/2024  9:30 AM Jesus Cole MD [facility]

## 2023-08-27 NOTE — PROGRESS NOTES
Subjective:      Patient ID: Peter Lopez is a 87 y.o. male.    Chief Complaint: Diabetes Mellitus (1/11/23 Dr Lloyd PCP) and Nail Care      Peter is a 87 y.o. male who presents to the clinic upon referral from Dr. Steele ref. provider found  for evaluation and treatment of diabetic feet. 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 Patient relates no major problem with feet. Reports elongated nails that are difficult to trim. Requesting nail trimming.     PCP: Lucas Lloyd Jr, MD    Date Last Seen by PCP: per above    Current shoe gear: Tennis shoes    Hemoglobin A1C   Date Value Ref Range Status   12/07/2022 7.2 (H) 4.0 - 5.6 % Final     Comment:     ADA Screening Guidelines:  5.7-6.4%  Consistent with prediabetes  >or=6.5%  Consistent with diabetes    High levels of fetal hemoglobin interfere with the HbA1C  assay. Heterozygous hemoglobin variants (HbS, HgC, etc)do  not significantly interfere with this assay.   However, presence of multiple variants may affect accuracy.     06/21/2022 7.4 (H) 4.0 - 5.6 % Final     Comment:     ADA Screening Guidelines:  5.7-6.4%  Consistent with prediabetes  >or=6.5%  Consistent with diabetes    High levels of fetal hemoglobin interfere with the HbA1C  assay. Heterozygous hemoglobin variants (HbS, HgC, etc)do  not significantly interfere with this assay.   However, presence of multiple variants may affect accuracy.     03/14/2022 7.3 (H) 4.0 - 5.6 % Final     Comment:     ADA Screening Guidelines:  5.7-6.4%  Consistent with prediabetes  >or=6.5%  Consistent with diabetes    High levels of fetal hemoglobin interfere with the  HbA1C  assay. Heterozygous hemoglobin variants (HbS, HgC, etc)do  not significantly interfere with this assay.   However, presence of multiple variants may affect accuracy.             Review of Systems   Constitutional: Negative for chills, diaphoresis and fever.   Cardiovascular:  Negative for claudication, cyanosis, leg swelling and syncope.   Respiratory:  Negative for cough and shortness of breath.    Skin:  Positive for color change and nail changes. Negative for suspicious lesions.   Musculoskeletal:  Negative for falls, joint pain, muscle cramps and muscle weakness.   Gastrointestinal:  Negative for diarrhea, nausea and vomiting.   Neurological:  Positive for paresthesias. Negative for disturbances in coordination, numbness, sensory change, tremors and weakness.   Psychiatric/Behavioral:  Negative for altered mental status.          Objective:      Physical Exam  Constitutional:       Appearance: He is well-developed.      Comments: Oriented to time, place, and person.   Cardiovascular:      Comments: DP and PT pulses are palpable bilaterally. 3 sec capillary refill time and toes and feet are warm to touch proximally .       Musculoskeletal:      Comments: Equinus noted b/l ankles with < 10 deg DF noted. MMT 5/5 in DF/PF/Inv/Ev resistance with no reproduction of pain in any direction. Passive range of motion of ankle and pedal joints is painless b/l.     Feet:      Right foot:      Skin integrity: No callus or dry skin.      Left foot:      Skin integrity: No callus or dry skin.   Lymphadenopathy:      Comments: Negative lymphadenopathy bilateral popliteal fossa and tarsal tunnel.   Skin:     Comments: Toenails 1-5 bilaterally are elongated by 2-3 mm, thickened by 2-3 mm, discolored/yellowed, dystrophic, brittle with subungual debris.    Focal hyperkeratotic lesion consisting entirely of hyperkeratotic tissue without open skin, drainage, pus, fluctuance, malodor, or signs of infection: right plantar medial  foot. Left plantar forefoot.        Neurological:      Mental Status: He is alert.      Comments: Light touch, proprioception, and sharp/dull sensation are all diminished bilaterally. Protective threshold with the [place]-Wienstein monofilament is diminished  bilaterally.  Subjective paresthesias with no clearly identifiable source or trigger.      Psychiatric:         Behavior: Behavior is cooperative.             Assessment:       Encounter Diagnoses   Name Primary?    Type II diabetes mellitus with neurological manifestations Yes    Onychomycosis due to dermatophyte     Corn or callus            Plan:       Peter was seen today for diabetes mellitus and nail care.    Diagnoses and all orders for this visit:    Type II diabetes mellitus with neurological manifestations    Onychomycosis due to dermatophyte    Corn or callus        I counseled the patient on his conditions, their implications and medical management.    - Shoe inspection. Diabetic Foot Education. Patient reminded of the importance of good nutrition and blood sugar control to help prevent podiatric complications of diabetes. Patient instructed on proper foot hygeine. We discussed wearing proper shoe gear, daily foot inspections, never walking without protective shoe gear, never putting sharp instruments to feet, routine podiatric nail visits every 2-3 months.   - With patient's permission, nails were aggressively reduced and debrided x 10 to their soft tissue attachment mechanically and with electric , removing all offending nail and debris. Patient relates relief following the procedure. He will continue to monitor the areas daily, inspect his feet, wear protective shoe gear when ambulatory, moisturizer to maintain skin integrity and follow in this office in approximately 2-3 months, sooner p.r.n.   - After cleansing the area w/ alcohol prep pad the above mentioned hyperkeratosis was trimmed utilizing No 15 scapel, to a smooth base with out  incident.     F/u 3 months     Lucia Aguiar DPM                         normal (ped)...

## 2023-08-29 ENCOUNTER — TELEPHONE (OUTPATIENT)
Dept: PULMONOLOGY | Facility: CLINIC | Age: 88
End: 2023-08-29

## 2023-08-29 ENCOUNTER — OFFICE VISIT (OUTPATIENT)
Dept: PULMONOLOGY | Facility: CLINIC | Age: 88
End: 2023-08-29
Payer: MEDICARE

## 2023-08-29 VITALS
HEART RATE: 101 BPM | SYSTOLIC BLOOD PRESSURE: 133 MMHG | BODY MASS INDEX: 25.2 KG/M2 | WEIGHT: 180 LBS | OXYGEN SATURATION: 94 % | HEIGHT: 71 IN | DIASTOLIC BLOOD PRESSURE: 79 MMHG

## 2023-08-29 DIAGNOSIS — G47.31 COMPLEX SLEEP APNEA SYNDROME: ICD-10-CM

## 2023-08-29 DIAGNOSIS — J43.2 CENTRILOBULAR EMPHYSEMA: ICD-10-CM

## 2023-08-29 DIAGNOSIS — J44.9 CHRONIC OBSTRUCTIVE PULMONARY DISEASE, UNSPECIFIED COPD TYPE: Primary | ICD-10-CM

## 2023-08-29 DIAGNOSIS — I27.20 PULMONARY HYPERTENSION: Chronic | ICD-10-CM

## 2023-08-29 PROCEDURE — 99214 OFFICE O/P EST MOD 30 MIN: CPT | Mod: S$GLB,,, | Performed by: INTERNAL MEDICINE

## 2023-08-29 PROCEDURE — 3075F SYST BP GE 130 - 139MM HG: CPT | Mod: CPTII,S$GLB,, | Performed by: INTERNAL MEDICINE

## 2023-08-29 PROCEDURE — 3075F PR MOST RECENT SYSTOLIC BLOOD PRESS GE 130-139MM HG: ICD-10-PCS | Mod: CPTII,S$GLB,, | Performed by: INTERNAL MEDICINE

## 2023-08-29 PROCEDURE — 99999 PR PBB SHADOW E&M-EST. PATIENT-LVL III: CPT | Mod: PBBFAC,,, | Performed by: INTERNAL MEDICINE

## 2023-08-29 PROCEDURE — 1159F PR MEDICATION LIST DOCUMENTED IN MEDICAL RECORD: ICD-10-PCS | Mod: CPTII,S$GLB,, | Performed by: INTERNAL MEDICINE

## 2023-08-29 PROCEDURE — 3078F PR MOST RECENT DIASTOLIC BLOOD PRESSURE < 80 MM HG: ICD-10-PCS | Mod: CPTII,S$GLB,, | Performed by: INTERNAL MEDICINE

## 2023-08-29 PROCEDURE — 1126F AMNT PAIN NOTED NONE PRSNT: CPT | Mod: CPTII,S$GLB,, | Performed by: INTERNAL MEDICINE

## 2023-08-29 PROCEDURE — 1111F DSCHRG MED/CURRENT MED MERGE: CPT | Mod: CPTII,S$GLB,, | Performed by: INTERNAL MEDICINE

## 2023-08-29 PROCEDURE — 3078F DIAST BP <80 MM HG: CPT | Mod: CPTII,S$GLB,, | Performed by: INTERNAL MEDICINE

## 2023-08-29 PROCEDURE — 1159F MED LIST DOCD IN RCRD: CPT | Mod: CPTII,S$GLB,, | Performed by: INTERNAL MEDICINE

## 2023-08-29 PROCEDURE — 1111F PR DISCHARGE MEDS RECONCILED W/ CURRENT OUTPATIENT MED LIST: ICD-10-PCS | Mod: CPTII,S$GLB,, | Performed by: INTERNAL MEDICINE

## 2023-08-29 PROCEDURE — 1126F PR PAIN SEVERITY QUANTIFIED, NO PAIN PRESENT: ICD-10-PCS | Mod: CPTII,S$GLB,, | Performed by: INTERNAL MEDICINE

## 2023-08-29 PROCEDURE — 99999 PR PBB SHADOW E&M-EST. PATIENT-LVL III: ICD-10-PCS | Mod: PBBFAC,,, | Performed by: INTERNAL MEDICINE

## 2023-08-29 PROCEDURE — 99214 PR OFFICE/OUTPT VISIT, EST, LEVL IV, 30-39 MIN: ICD-10-PCS | Mod: S$GLB,,, | Performed by: INTERNAL MEDICINE

## 2023-08-29 NOTE — TELEPHONE ENCOUNTER
Spoke with daughter about her dad she stated she will talk to her dad and try to explain to him what is going on.                  ----- Message from Beatriz Thibodeaux MA sent at 8/29/2023 10:55 AM CDT -----  Lisette Mccarthy Staff  Caller: Unspecified (Today, 10:49 AM)  .Type: Patient Call Back     Who called: Self     What is the request in detail: Stated the facility that Dr. Hyman referred him to told him he needed an appointment with Dr. Hyman and that he had to  machine from office. Ask that someone give him a call     Can the clinic reply by [facility]? No     Would the patient rather a call back or a response via [facility]? Call Back     Best call back number: .[phone] (home)       Additional Information:

## 2023-08-29 NOTE — PROGRESS NOTES
Peter Lopez  was seen as a follow up.  Our last encounter was 6/12/20.    CHIEF COMPLAINT:  COPD      HISTORY OF PRESENT ILLNESS: Peter Lopez is a 87 y.o. male  has a past medical history of BPH (benign prostatic hyperplasia), Chronic hepatitis C without mention of hepatic coma, CKD (chronic kidney disease), COPD (chronic obstructive pulmonary disease), Diabetes mellitus type I, Diabetes with neurologic complications, H/O colonoscopy, [place] (hard of hearing), Hypertension, On home oxygen therapy, Prostate cancer, 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  Our first encounter was 6/12/20.  Patient smoked .75 ppd x 40 years.  Patient quit smoking in 1990s.  Patient was diagnosed with covid 19 on 3/28/20.  Initially, patient was discharged to home with home monitored.  Patient represented to ED of 3/29/20 and was discharged to home.  Represented back to ed on 4/3/20.  Hospitalized until 4/22/20 for hypoxic respiratory failure requiring nrb.  Subsequently discharged on 4/22/20 to [facility] [place].  Patient was discharged to home with 2 lpm.      Patient was diagnosed with grace with ahi of 44 with emergent of central during cpap titration.  Patient declined asv in the past due to dealing with prostate cancer.      S/p 6 min walk and requesting portable concentrator.      Patient agreed to asv titraion and set up.  S/p titration 7/3/23.      SLEEP ROUTINE:  Activity the hour prior to sleep: watch tv or read in bed    Bed partner:  alone  Time to bed:  9 pm   Lights off:  night light is on  Sleep onset latency:  few minutes         Disruptions or awakenings:    2-3 times for bathroom (no issue going back to sleep)    Wakeup time:      5 am   Perceived sleep quality:  rested       Daytime naps:     none  Weekend sleep routine:      same  Caffeine use: 1 cup of coffee in am   exercise habit:   light weight        PAST MEDICAL HISTORY:    Active Ambulatory Problems      Diagnosis Date Noted    Essential hypertension 10/02/2012    Diabetes mellitus with nephropathy 10/16/2013    Aortic atherosclerosis 05/03/2019    ILD (interstitial lung disease) 04/04/2020    Normocytic anemia 04/04/2020    Reactive depression 04/14/2020    Centrilobular emphysema 08/10/2020    Chronic fatigue     Pulmonary hypertension 03/08/2021    Iron deficiency anemia 03/11/2021    Malignant neoplasm of prostate 06/14/2021    Stage 3b chronic kidney disease 12/18/2022    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    Leukopenia 06/30/2023    Vitamin D deficiency 06/30/2023    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    Diarrhea 08/10/2023     Resolved Ambulatory Problems     Diagnosis Date Noted    Elevated PSA 01/10/2013    Hepatitis C 10/16/2013    BPH (benign prostatic hyperplasia) 04/05/2016    Chronic kidney disease, stage 3a 01/28/2020    Hypoglycemia     Hypoxia 03/29/2020    Elevated troponin 04/04/2020    Centrilobular emphysema 04/13/2020    Discharge planning issues 04/14/2020    Gait instability 09/03/2020    Tachycardia with heart rate 121-140 beats per minute 09/03/2020    Sleep-related breathing disorder 12/20/2020    Complex sleep apnea syndrome     Dyspnea 03/11/2021    Chest pain 05/20/2021    Urinary retention 06/22/2021    Pulmonary hypertension due to left ventricular systolic dysfunction 12/13/2022    Type 2 diabetes mellitus with diabetic neuropathy, without long-term current use of insulin 04/11/2023     Past Medical History:   Diagnosis Date    Chronic hepatitis C without mention of hepatic coma     CKD (chronic kidney disease)     COPD (chronic obstructive pulmonary disease)     Diabetes mellitus type I     Diabetes with neurologic complications     H/O colonoscopy     [place] (hard of hearing)     Hypertension     On home oxygen therapy     Prostate cancer     Retinopathy due to secondary diabetes mellitus     Wears hearing aid in both ears                 PAST SURGICAL HISTORY:    Past  Surgical History:   Procedure Laterality Date    NO PAST SURGERIES  2021    TRANSURETHRAL RESECTION OF PROSTATE USING BIPOLAR CAUTERY N/A 2021    Procedure: (TURP) Transuretheral Resection of Prostate with BIPOLAR CAUTERY;  Surgeon: Jesus Cole MD;  Location: [facility];  Service: Urology;  Laterality: N/A;  RN Pre OP 21.  Vaccinated.  C A         FAMILY HISTORY:                Family History   Problem Relation Age of Onset    Cancer Mother     Asbestos Father     Liver disease Neg Hx        SOCIAL HISTORY:          Tobacco:   Social History     Tobacco Use   Smoking Status Former    Current packs/day: 0.00    Average packs/day: 1 pack/day for 40.0 years (40.0 ttl pk-yrs)    Types: Cigarettes    Start date: 1951    Quit date: 1991    Years since quittin.8   Smokeless Tobacco Former    Quit date: 1986     alcohol use:    Social History     Substance and Sexual Activity   Alcohol Use Not Currently    Alcohol/week: 0.0 standard drinks of alcohol    Comment: 2 bottles beer on weekends               Occupation:  [email or web address]    ALLERGIES:  Review of patient's allergies indicates:  No Known Allergies    CURRENT MEDICATIONS:    Current Outpatient Medications   Medication Sig Dispense Refill    amLODIPine (NORVASC) 5 MG tablet Take 1 tablet (5 mg total) by mouth once daily. 90 tablet 3    atorvastatin (LIPITOR) 20 MG tablet Take 1 tablet (20 mg total) by mouth every evening. 90 tablet 3    blood sugar diagnostic Strp To check BG 1 times daily, to use with insurance preferred meter 50 each 11    blood-glucose meter kit To check BG 1 times daily, to use with insurance preferred meter 1 each 0    cholecalciferol, vitamin D3, (VITAMIN D3) 25 mcg (1,000 unit) capsule Take 1,000 Units by mouth once daily.      docusate sodium (COLACE) 100 MG capsule Take 2 capsules (200 mg total) by mouth 2 (two) times daily. 60 capsule 0    lactulose (CHRONULAC) 10 gram/15 mL solution TAKE 15 ML BY MOUTH  "THREE TIMES DAILY AS NEEDED FOR CONSTIPATION      leuprolide (LUPRON) 1 mg/0.2 mL injection Inject into the skin.      linaGLIPtin (TRADJENTA) 5 mg Tab tablet Take 1 tablet (5 mg total) by mouth once daily. 90 tablet 1    olmesartan (BENICAR) 40 MG tablet Take 1 tablet (40 mg total) by mouth once daily. 90 tablet 1    ondansetron (ZOFRAN-ODT) 4 MG TbDL Take 1 tablet (4 mg total) by mouth every 8 (eight) hours as needed (Nausea). 14 tablet 0    polyethylene glycol (GLYCOLAX) 17 gram PwPk Take this medication EVERY DAY to prevent constipation.  You may take it up to twice a day, but start at only once a day.  SKIP THIS MEDICATION IF YOU DEVELOP DIARRHEA. 72 each 3    tiotropium bromide (SPIRIVA RESPIMAT) 2.5 mcg/actuation inhaler INHALE 2 SPRAY(S) BY MOUTH ONCE DAILY 4 g 11     Current Facility-Administered Medications   Medication Dose Route Frequency Provider Last Rate Last Admin    cyanocobalamin injection 1,000 mcg  1,000 mcg Intramuscular Q30 Days Lucas Lloyd Jr., MD   1,000 mcg at 08/01/23 0847                  REVIEW OF SYSTEMS:     Pulmonary related symptoms as per HPI.  Gen:  no weight loss, no fever, no night sweat  HEENT:  no visual changes, no sore throat, + hearing loss  CV:  No chest pain, no orthopnea, no PND  GI:  no melena, no hematochezia, no diarhea, no constipation.  :  no dysuria, no hematuria, no hesistancy, no dribbling  Neuro:  no syncope, no vertigo, no tinitus  Psych:  No homocide or suicide ideation; no depression.  Endocrine:  No heat or cold intolerance.  Sleep:  No snoring; no witnessed apnea.  Feeling rested upon awake.    Otherwise, a balance of systems reviewed is negative.          PHYSICAL EXAM:  Vitals:    08/29/23 0752   BP: 133/79   Pulse: 101   SpO2: (!) 94%   Weight: 81.7 kg (180 lb 0.1 oz)   Height: 5' 11" (1.803 m)   PainSc: 0-No pain     Body mass index is 25.11 kg/m².     GENERAL:  well develop; no apparent distress  HEENT:  no nasal congestion; no discharge noted; " class 2 modified mallampatti.  +denture   NECK:  supple; no palpable masses.  CARDIO: regular rate and rhythm  PULM:  clear to auscultation bilaterally; no intercostals retractions; no accessory muscle usage   ABDOMEN:  soft nontender/nondistended.  +bowel sound  EXTREMITIES no cce  NEURO:  CN II-XII intact.  5/5 motor in all extremities.  sensation grossly intact   to light touch.  PSYCH:  normal affect.  Alert and oriented x 4    LABS  Pulmonary Functions Testing Results(personally reviewed):    PFT 12/7/20 Ratio of 56%; FVC 3.43 L (104%); FEV1 1.93 L (79%); TLC n/a L (n/a%); dlco 9.95 (39%)   PFT 11/2/22 Ratio of 53%; FVC 2.69 L (83%); FEV1 1.44 L (60%); TLC 4.56 L (62%); dlco 8.4 (33%) discrepancy between VA and TLC makes dlco interpretation unreliable.      6 min walk 8/21/23 149 m 94-84-98    ABG (personally reviewed):  4/4/20 7.48/38/90/28 on nrb  CXR (personally reviewed):  4/15/20 bilateral airspace disease  CT CHEST(personally reviewed):  11/29/22 diffuse emphysematous changes bilaterally    Split study 1/7/2021 AHI 44 with emerging CA on cpap. ASV titration defer due to prostate problems.   ASV titration 7/3/23 ahi of 3.7 with asv standard settings.      covid 19 3/29/20 positive.      Echo 11/29/22  The left ventricle is normal in size with eccentric hypertrophy and low normal systolic function.  The estimated ejection fraction is 50%.  Indeterminate left ventricular diastolic function.  Normal right ventricular size with normal right ventricular systolic function.  Mild tricuspid regurgitation.  There is moderate pulmonary hypertension.  Normal central venous pressure (3 mmHg).  The estimated PA systolic pressure is 45 mmHg.        ASSESSMENT/PLAN  Problem List Items Addressed This Visit       Pulmonary hypertension (Chronic)    Overview     Echo with pasp 45 mmHg ef 50%  Group 2 and 3 given ddx and copd.         Centrilobular emphysema    Overview     FEV1 60  Maintenance inhaler-spiriva  Quit in  1980s  Albuterol prn-not needing   Oxygen prn following covid but not needing now however walk test indicates that he would still benefit from this.   Will provide portablec concentrator  Pulmonary rehab- completed some but stop after prostate cancer  uptodate flu/covid/pneumonia vaccine.             Complex sleep apnea syndrome    Overview     ahi of 44   +emergence of central apnea during cpap titration.    S/p asv titration and did well during study  Did not require oxygen during asv titration.    Await set up with asv set up.  Ordered placed 7/13.  Spoken with [facility], ASV authorization still pending.                Other Visit Diagnoses       Chronic obstructive pulmonary disease, unspecified COPD type    -  Primary    Relevant Orders    OXYGEN FOR HOME USE              covid 19 - diagnosed 3/28/20.  Doing better.      Tobacco abuse - quit since 1991.  Encourage continue with smoking cessation.      30 minutes of total time spent on the encounter, which includes face to face time and non-face to face time preparing to see the patient (eg, review of tests), Obtaining and/or reviewing separately obtained history, documenting clinical information in the electronic or other health record, independently interpreting results (not separately reported) and communicating results to the patient/family/caregiver, or Care coordination (not separately reported).        Patient will No follow-ups on file. with md/np.

## 2023-09-05 ENCOUNTER — CLINICAL SUPPORT (OUTPATIENT)
Dept: FAMILY MEDICINE | Facility: CLINIC | Age: 88
End: 2023-09-05
Payer: MEDICARE

## 2023-09-05 DIAGNOSIS — D51.9 ANEMIA DUE TO VITAMIN B12 DEFICIENCY, UNSPECIFIED B12 DEFICIENCY TYPE: Primary | ICD-10-CM

## 2023-09-05 PROCEDURE — 99499 UNLISTED E&M SERVICE: CPT | Mod: S$GLB,,, | Performed by: FAMILY MEDICINE

## 2023-09-05 PROCEDURE — 96372 THER/PROPH/DIAG INJ SC/IM: CPT | Mod: S$GLB,,, | Performed by: FAMILY MEDICINE

## 2023-09-05 PROCEDURE — 99499 NO LOS: ICD-10-PCS | Mod: S$GLB,,, | Performed by: FAMILY MEDICINE

## 2023-09-05 PROCEDURE — 96372 PR INJECTION,THERAP/PROPH/DIAG2ST, IM OR SUBCUT: ICD-10-PCS | Mod: S$GLB,,, | Performed by: FAMILY MEDICINE

## 2023-09-05 RX ADMIN — CYANOCOBALAMIN 1000 MCG: 1000 INJECTION, SOLUTION INTRAMUSCULAR; SUBCUTANEOUS at 07:09

## 2023-09-05 NOTE — PROGRESS NOTES
Verified patient's name, date of birth, and allergies. Patient given Cyanocobalamin 1,000 mcg/mL intramuscularly to left deltoid. Patient tolerated well, with no complaints.

## 2023-09-18 LAB
LEFT EYE DM RETINOPATHY: POSITIVE
RIGHT EYE DM RETINOPATHY: POSITIVE

## 2023-09-21 ENCOUNTER — PATIENT OUTREACH (OUTPATIENT)
Dept: ADMINISTRATIVE | Facility: HOSPITAL | Age: 88
End: 2023-09-21
Payer: MEDICARE

## 2023-10-02 ENCOUNTER — CLINICAL SUPPORT (OUTPATIENT)
Dept: FAMILY MEDICINE | Facility: CLINIC | Age: 88
End: 2023-10-02
Payer: MEDICARE

## 2023-10-02 DIAGNOSIS — D51.9 ANEMIA DUE TO VITAMIN B12 DEFICIENCY, UNSPECIFIED B12 DEFICIENCY TYPE: Primary | ICD-10-CM

## 2023-10-02 PROCEDURE — 96372 PR INJECTION,THERAP/PROPH/DIAG2ST, IM OR SUBCUT: ICD-10-PCS | Mod: S$GLB,,, | Performed by: FAMILY MEDICINE

## 2023-10-02 PROCEDURE — 96372 THER/PROPH/DIAG INJ SC/IM: CPT | Mod: S$GLB,,, | Performed by: FAMILY MEDICINE

## 2023-10-02 PROCEDURE — 99499 UNLISTED E&M SERVICE: CPT | Mod: S$GLB,,, | Performed by: FAMILY MEDICINE

## 2023-10-02 PROCEDURE — 99499 NO LOS: ICD-10-PCS | Mod: S$GLB,,, | Performed by: FAMILY MEDICINE

## 2023-10-02 RX ADMIN — CYANOCOBALAMIN 1000 MCG: 1000 INJECTION, SOLUTION INTRAMUSCULAR; SUBCUTANEOUS at 07:10

## 2023-10-02 NOTE — PROGRESS NOTES
Verified patient's name, date of birth, and allergies. Patient given Cyanocobalamin 1,000 mcg/mL intramuscularly to right deltoid. Patient tolerated well, with no complaints.

## 2023-10-16 DIAGNOSIS — J43.9 COPD WITH CHRONIC BRONCHITIS AND EMPHYSEMA: Chronic | ICD-10-CM

## 2023-10-16 DIAGNOSIS — J44.89 COPD WITH CHRONIC BRONCHITIS AND EMPHYSEMA: Chronic | ICD-10-CM

## 2023-10-16 RX ORDER — TIOTROPIUM BROMIDE INHALATION SPRAY 3.12 UG/1
SPRAY, METERED RESPIRATORY (INHALATION)
Qty: 4 G | Refills: 11 | Status: SHIPPED | OUTPATIENT
Start: 2023-10-16

## 2023-10-16 NOTE — TELEPHONE ENCOUNTER
Message sent to Dr. Hyman.    Idget, MA  Pulm/Sleep [facility] [place]  [phone]                     ----- Message from Latanya Aguilar sent at 10/16/2023 10:48 AM CDT -----  Type: RX Refill Request    Who Called: [facility] [phone]    Have you contacted your pharmacy:    Refill or New Rx:tiotropium bromide (SPIRIVA RESPIMAT) 2.5 mcg/actuation inhaler    RX Name and Strength:    How is the patient currently taking it? (ex. 1XDay):    Is this a 30 day or 90 day RX:    Preferred Pharmacy with phone number:    Local or Mail Order:    Ordering Provider:    Would the patient rather a call back or a response via [facility]?     Best Call Back Number:    Additional Information:

## 2023-10-19 LAB
LEFT EYE DM RETINOPATHY: POSITIVE
RIGHT EYE DM RETINOPATHY: POSITIVE

## 2023-11-01 ENCOUNTER — CLINICAL SUPPORT (OUTPATIENT)
Dept: FAMILY MEDICINE | Facility: CLINIC | Age: 88
End: 2023-11-01
Payer: MEDICARE

## 2023-11-01 DIAGNOSIS — D51.9 ANEMIA DUE TO VITAMIN B12 DEFICIENCY, UNSPECIFIED B12 DEFICIENCY TYPE: Primary | ICD-10-CM

## 2023-11-01 PROCEDURE — 96372 PR INJECTION,THERAP/PROPH/DIAG2ST, IM OR SUBCUT: ICD-10-PCS | Mod: S$GLB,,, | Performed by: FAMILY MEDICINE

## 2023-11-01 PROCEDURE — 96372 THER/PROPH/DIAG INJ SC/IM: CPT | Mod: S$GLB,,, | Performed by: FAMILY MEDICINE

## 2023-11-01 RX ADMIN — CYANOCOBALAMIN 1000 MCG: 1000 INJECTION, SOLUTION INTRAMUSCULAR; SUBCUTANEOUS at 08:11

## 2023-11-28 ENCOUNTER — PATIENT OUTREACH (OUTPATIENT)
Dept: ADMINISTRATIVE | Facility: HOSPITAL | Age: 88
End: 2023-11-28
Payer: MEDICARE

## 2023-11-29 ENCOUNTER — LAB VISIT (OUTPATIENT)
Dept: LAB | Facility: HOSPITAL | Age: 88
End: 2023-11-29
Attending: FAMILY MEDICINE
Payer: MEDICARE

## 2023-11-29 DIAGNOSIS — D51.9 ANEMIA DUE TO VITAMIN B12 DEFICIENCY, UNSPECIFIED B12 DEFICIENCY TYPE: ICD-10-CM

## 2023-11-29 DIAGNOSIS — N18.32 STAGE 3B CHRONIC KIDNEY DISEASE: ICD-10-CM

## 2023-11-29 DIAGNOSIS — E11.40 TYPE 2 DIABETES MELLITUS WITH DIABETIC NEUROPATHY, WITHOUT LONG-TERM CURRENT USE OF INSULIN: ICD-10-CM

## 2023-11-29 LAB
ALBUMIN SERPL BCP-MCNC: 3.9 G/DL (ref 3.5–5.2)
ALP SERPL-CCNC: 68 U/L (ref 55–135)
ALT SERPL W/O P-5'-P-CCNC: 21 U/L (ref 10–44)
ANION GAP SERPL CALC-SCNC: 11 MMOL/L (ref 8–16)
AST SERPL-CCNC: 30 U/L (ref 10–40)
BASOPHILS # BLD AUTO: 0.02 K/UL (ref 0–0.2)
BASOPHILS NFR BLD: 0.7 % (ref 0–1.9)
BILIRUB SERPL-MCNC: 0.6 MG/DL (ref 0.1–1)
BUN SERPL-MCNC: 18 MG/DL (ref 8–23)
CALCIUM SERPL-MCNC: 10.2 MG/DL (ref 8.7–10.5)
CHLORIDE SERPL-SCNC: 101 MMOL/L (ref 95–110)
CO2 SERPL-SCNC: 28 MMOL/L (ref 23–29)
CREAT SERPL-MCNC: 1.7 MG/DL (ref 0.5–1.4)
DIFFERENTIAL METHOD: ABNORMAL
EOSINOPHIL # BLD AUTO: 0.1 K/UL (ref 0–0.5)
EOSINOPHIL NFR BLD: 2.6 % (ref 0–8)
ERYTHROCYTE [DISTWIDTH] IN BLOOD BY AUTOMATED COUNT: 13.6 % (ref 11.5–14.5)
EST. GFR  (NO RACE VARIABLE): 39 ML/MIN/1.73 M^2
ESTIMATED AVG GLUCOSE: 260 MG/DL (ref 68–131)
GLUCOSE SERPL-MCNC: 241 MG/DL (ref 70–110)
HBA1C MFR BLD: 10.7 % (ref 4–5.6)
HCT VFR BLD AUTO: 39.7 % (ref 40–54)
HGB BLD-MCNC: 12.7 G/DL (ref 14–18)
IMM GRANULOCYTES # BLD AUTO: 0.01 K/UL (ref 0–0.04)
IMM GRANULOCYTES NFR BLD AUTO: 0.3 % (ref 0–0.5)
LYMPHOCYTES # BLD AUTO: 0.9 K/UL (ref 1–4.8)
LYMPHOCYTES NFR BLD: 29 % (ref 18–48)
MCH RBC QN AUTO: 28 PG (ref 27–31)
MCHC RBC AUTO-ENTMCNC: 32 G/DL (ref 32–36)
MCV RBC AUTO: 88 FL (ref 82–98)
MONOCYTES # BLD AUTO: 0.3 K/UL (ref 0.3–1)
MONOCYTES NFR BLD: 9.1 % (ref 4–15)
NEUTROPHILS # BLD AUTO: 1.8 K/UL (ref 1.8–7.7)
NEUTROPHILS NFR BLD: 58.3 % (ref 38–73)
NRBC BLD-RTO: 0 /100 WBC
PLATELET # BLD AUTO: ABNORMAL K/UL (ref 150–450)
PMV BLD AUTO: ABNORMAL FL (ref 9.2–12.9)
POTASSIUM SERPL-SCNC: 4.6 MMOL/L (ref 3.5–5.1)
PROT SERPL-MCNC: 7.1 G/DL (ref 6–8.4)
RBC # BLD AUTO: 4.53 M/UL (ref 4.6–6.2)
SODIUM SERPL-SCNC: 140 MMOL/L (ref 136–145)
VIT B12 SERPL-MCNC: 597 PG/ML (ref 210–950)
WBC # BLD AUTO: 3.07 K/UL (ref 3.9–12.7)

## 2023-11-29 PROCEDURE — 85025 COMPLETE CBC W/AUTO DIFF WBC: CPT | Performed by: FAMILY MEDICINE

## 2023-11-29 PROCEDURE — 82607 VITAMIN B-12: CPT | Performed by: FAMILY MEDICINE

## 2023-11-29 PROCEDURE — 80053 COMPREHEN METABOLIC PANEL: CPT | Performed by: FAMILY MEDICINE

## 2023-11-29 PROCEDURE — 83921 ORGANIC ACID SINGLE QUANT: CPT | Performed by: FAMILY MEDICINE

## 2023-11-29 PROCEDURE — 36415 COLL VENOUS BLD VENIPUNCTURE: CPT | Mod: PN | Performed by: FAMILY MEDICINE

## 2023-11-29 PROCEDURE — 83036 HEMOGLOBIN GLYCOSYLATED A1C: CPT | Performed by: FAMILY MEDICINE

## 2023-12-02 LAB — METHYLMALONATE SERPL-SCNC: 0.17 UMOL/L

## 2023-12-04 ENCOUNTER — OFFICE VISIT (OUTPATIENT)
Dept: FAMILY MEDICINE | Facility: CLINIC | Age: 88
End: 2023-12-04
Payer: MEDICARE

## 2023-12-04 VITALS
DIASTOLIC BLOOD PRESSURE: 70 MMHG | RESPIRATION RATE: 18 BRPM | WEIGHT: 179.69 LBS | HEART RATE: 111 BPM | HEIGHT: 71 IN | SYSTOLIC BLOOD PRESSURE: 136 MMHG | OXYGEN SATURATION: 95 % | BODY MASS INDEX: 25.16 KG/M2 | TEMPERATURE: 98 F

## 2023-12-04 DIAGNOSIS — N18.32 STAGE 3B CHRONIC KIDNEY DISEASE: Chronic | ICD-10-CM

## 2023-12-04 DIAGNOSIS — E11.21 DIABETES MELLITUS WITH NEPHROPATHY: Primary | Chronic | ICD-10-CM

## 2023-12-04 DIAGNOSIS — I10 ESSENTIAL HYPERTENSION: Chronic | ICD-10-CM

## 2023-12-04 DIAGNOSIS — D72.819 LEUKOPENIA, UNSPECIFIED TYPE: ICD-10-CM

## 2023-12-04 DIAGNOSIS — D47.2 MONOCLONAL GAMMOPATHY: ICD-10-CM

## 2023-12-04 PROCEDURE — 1101F PT FALLS ASSESS-DOCD LE1/YR: CPT | Mod: CPTII,S$GLB,, | Performed by: FAMILY MEDICINE

## 2023-12-04 PROCEDURE — 1126F AMNT PAIN NOTED NONE PRSNT: CPT | Mod: CPTII,S$GLB,, | Performed by: FAMILY MEDICINE

## 2023-12-04 PROCEDURE — 3075F PR MOST RECENT SYSTOLIC BLOOD PRESS GE 130-139MM HG: ICD-10-PCS | Mod: CPTII,S$GLB,, | Performed by: FAMILY MEDICINE

## 2023-12-04 PROCEDURE — 3288F PR FALLS RISK ASSESSMENT DOCUMENTED: ICD-10-PCS | Mod: CPTII,S$GLB,, | Performed by: FAMILY MEDICINE

## 2023-12-04 PROCEDURE — 99214 PR OFFICE/OUTPT VISIT, EST, LEVL IV, 30-39 MIN: ICD-10-PCS | Mod: S$GLB,,, | Performed by: FAMILY MEDICINE

## 2023-12-04 PROCEDURE — 1159F PR MEDICATION LIST DOCUMENTED IN MEDICAL RECORD: ICD-10-PCS | Mod: CPTII,S$GLB,, | Performed by: FAMILY MEDICINE

## 2023-12-04 PROCEDURE — 1160F PR REVIEW ALL MEDS BY PRESCRIBER/CLIN PHARMACIST DOCUMENTED: ICD-10-PCS | Mod: CPTII,S$GLB,, | Performed by: FAMILY MEDICINE

## 2023-12-04 PROCEDURE — 3288F FALL RISK ASSESSMENT DOCD: CPT | Mod: CPTII,S$GLB,, | Performed by: FAMILY MEDICINE

## 2023-12-04 PROCEDURE — 99999 PR PBB SHADOW E&M-EST. PATIENT-LVL IV: CPT | Mod: PBBFAC,,, | Performed by: FAMILY MEDICINE

## 2023-12-04 PROCEDURE — 99214 OFFICE O/P EST MOD 30 MIN: CPT | Mod: S$GLB,,, | Performed by: FAMILY MEDICINE

## 2023-12-04 PROCEDURE — 3078F PR MOST RECENT DIASTOLIC BLOOD PRESSURE < 80 MM HG: ICD-10-PCS | Mod: CPTII,S$GLB,, | Performed by: FAMILY MEDICINE

## 2023-12-04 PROCEDURE — 1101F PR PT FALLS ASSESS DOC 0-1 FALLS W/OUT INJ PAST YR: ICD-10-PCS | Mod: CPTII,S$GLB,, | Performed by: FAMILY MEDICINE

## 2023-12-04 PROCEDURE — 1159F MED LIST DOCD IN RCRD: CPT | Mod: CPTII,S$GLB,, | Performed by: FAMILY MEDICINE

## 2023-12-04 PROCEDURE — 3075F SYST BP GE 130 - 139MM HG: CPT | Mod: CPTII,S$GLB,, | Performed by: FAMILY MEDICINE

## 2023-12-04 PROCEDURE — 3078F DIAST BP <80 MM HG: CPT | Mod: CPTII,S$GLB,, | Performed by: FAMILY MEDICINE

## 2023-12-04 PROCEDURE — 1126F PR PAIN SEVERITY QUANTIFIED, NO PAIN PRESENT: ICD-10-PCS | Mod: CPTII,S$GLB,, | Performed by: FAMILY MEDICINE

## 2023-12-04 PROCEDURE — 1160F RVW MEDS BY RX/DR IN RCRD: CPT | Mod: CPTII,S$GLB,, | Performed by: FAMILY MEDICINE

## 2023-12-04 PROCEDURE — 99999 PR PBB SHADOW E&M-EST. PATIENT-LVL IV: ICD-10-PCS | Mod: PBBFAC,,, | Performed by: FAMILY MEDICINE

## 2023-12-04 RX ORDER — REPAGLINIDE 0.5 MG/1
0.5 TABLET ORAL
Qty: 90 TABLET | Refills: 3 | Status: SHIPPED | OUTPATIENT
Start: 2023-12-04

## 2023-12-04 RX ORDER — INSULIN PUMP SYRINGE, 3 ML
EACH MISCELLANEOUS
Qty: 1 EACH | Refills: 0 | Status: SHIPPED | OUTPATIENT
Start: 2023-12-04 | End: 2024-12-03

## 2023-12-04 RX ORDER — ATORVASTATIN CALCIUM 20 MG/1
20 TABLET, FILM COATED ORAL NIGHTLY
Qty: 90 TABLET | Refills: 3 | Status: SHIPPED | OUTPATIENT
Start: 2023-12-04 | End: 2024-12-03

## 2023-12-04 RX ORDER — LANCETS
EACH MISCELLANEOUS
Qty: 100 EACH | Refills: 11 | Status: SHIPPED | OUTPATIENT
Start: 2023-12-04

## 2023-12-04 NOTE — PROGRESS NOTES
Patient Name: Peter Lopez    : 1935  MRN: [id number]      Subjective:     Patient ID: Peter is a 87 y.o. male    Chief Complaint:  Hypertension    87-year-old male with hypertension, diabetes, chronic kidney disease, and emphysema presents for routine follow-up on his blood pressure and diabetes.  He is not checking his sugars routinely at home.  He does not check his blood pressures routinely at home.  He did lab tests a week ago.  He reports that he is taking his medications as prescribed.    Reviewing chart note, patient's saw Hematology last year but did not return for his follow-up appointment.  Lab testing done at that time was for leukopenia.  Results show that he had a monoclonal gammopathy.  Patient does not report any reason why he did not go for his follow-up appointment.    Recent lab testing shows a big increase in his A1c level.  Discussed in patients diet, he reports that he has been eating a lot of sweets.  He denies eating a lot of bread products are rice but is eating potatoes very often.    He reports no respiratory concerns and states that he has not been using oxygen some time.    Patient will be visiting his daughter in [place] later this month and will be there through new years.          Review of Systems   Constitutional:  Negative for unexpected weight change.   HENT:  Negative for sore throat.    Respiratory:  Negative for wheezing.    Cardiovascular:  Negative for chest pain and palpitations.   Gastrointestinal:  Negative for abdominal pain, blood in stool and change in bowel habit.   Endocrine: Negative for polydipsia, polyphagia and polyuria.   Genitourinary:  Negative for dysuria and hematuria.   Musculoskeletal:  Negative for neck pain.   Integumentary:  Negative for pallor.   Neurological:  Negative for dizziness, seizures, numbness and headaches.   Psychiatric/Behavioral:  Negative for confusion. The patient is not nervous/anxious.         Objective:   /70 (BP Location: Left  "arm, Patient Position: Sitting, BP Method: Large (Manual))   Pulse (!) 111   Temp 97.8 °F (36.6 °C) (Oral)   Resp 18   Ht 5' 11" (1.803 m)   Wt 81.5 kg (179 lb 10.8 oz)   SpO2 95%   BMI 25.06 kg/m²     Physical Exam  Vitals reviewed.   Constitutional:       General: He is not in acute distress.     Appearance: He is well-developed. He is not ill-appearing or diaphoretic.   HENT:      Head: Normocephalic.      Right Ear: External ear normal.      Left Ear: External ear normal.      Nose: Nose normal.      Mouth/Throat:      Pharynx: No pharyngeal swelling, oropharyngeal exudate or posterior oropharyngeal erythema.   Neck:      Trachea: No tracheal deviation.   Cardiovascular:      Rate and Rhythm: Regular rhythm. Tachycardia present.      Heart sounds: Normal heart sounds.   Pulmonary:      Effort: Pulmonary effort is normal.      Breath sounds: Normal breath sounds. No wheezing or rales.   Abdominal:      General: Bowel sounds are normal.      Palpations: Abdomen is soft. Abdomen is not rigid. There is no mass.      Tenderness: There is no abdominal tenderness. There is no guarding or rebound.   Musculoskeletal:      Cervical back: Normal range of motion and neck supple.   Lymphadenopathy:      Cervical: No cervical adenopathy.   Neurological:      Mental Status: He is alert and oriented to person, place, and time.      Sensory: No sensory deficit.      Motor: No atrophy.      Gait: Gait normal.      Deep Tendon Reflexes:      Reflex Scores:       Patellar reflexes are 2+ on the right side and 2+ on the left side.       Lab Visit on 11/29/2023   Component Date Value Ref Range Status    Sodium 11/29/2023 140  136 - 145 mmol/L Final    Potassium 11/29/2023 4.6  3.5 - 5.1 mmol/L Final    Chloride 11/29/2023 101  95 - 110 mmol/L Final    CO2 11/29/2023 28  23 - 29 mmol/L Final    Glucose 11/29/2023 241 (H)  70 - 110 mg/dL Final    BUN 11/29/2023 18  8 - 23 mg/dL Final    Creatinine 11/29/2023 1.7 (H)  0.5 - 1.4 " mg/dL Final    Calcium 11/29/2023 10.2  8.7 - 10.5 mg/dL Final    Total Protein 11/29/2023 7.1  6.0 - 8.4 g/dL Final    Albumin 11/29/2023 3.9  3.5 - 5.2 g/dL Final    Total Bilirubin 11/29/2023 0.6  0.1 - 1.0 mg/dL Final    Comment: For infants and newborns, interpretation of results should be based  on gestational age, weight and in agreement with clinical  observations.    Premature Infant recommended reference ranges:  Up to 24 hours.............<8.0 mg/dL  Up to 48 hours............<12.0 mg/dL  3-5 days..................<15.0 mg/dL  6-29 days.................<15.0 mg/dL      Alkaline Phosphatase 11/29/2023 68  55 - 135 U/L Final    AST 11/29/2023 30  10 - 40 U/L Final    ALT 11/29/2023 21  10 - 44 U/L Final    eGFR 11/29/2023 39 (A)  >60 mL/min/1.73 m^2 Final    Anion Gap 11/29/2023 11  8 - 16 mmol/L Final    WBC 11/29/2023 3.07 (L)  3.90 - 12.70 K/uL Final    RBC 11/29/2023 4.53 (L)  4.60 - 6.20 M/uL Final    Hemoglobin 11/29/2023 12.7 (L)  14.0 - 18.0 g/dL Final    Hematocrit 11/29/2023 39.7 (L)  40.0 - 54.0 % Final    MCV 11/29/2023 88  82 - 98 fL Final    MCH 11/29/2023 28.0  27.0 - 31.0 pg Final    MCHC 11/29/2023 32.0  32.0 - 36.0 g/dL Final    RDW 11/29/2023 13.6  11.5 - 14.5 % Final    Platelets 11/29/2023 SEE COMMENT  150 - 450 K/uL Final    Unable to report platelet count due to clumping.    MPV 11/29/2023 SEE COMMENT  9.2 - 12.9 fL Final    Result not available.    Immature Granulocytes 11/29/2023 0.3  0.0 - 0.5 % Final    Gran # (ANC) 11/29/2023 1.8  1.8 - 7.7 K/uL Final    Immature Grans (Abs) 11/29/2023 0.01  0.00 - 0.04 K/uL Final    Comment: Mild elevation in immature granulocytes is non specific and   can be seen in a variety of conditions including stress response,   acute inflammation, trauma and pregnancy. Correlation with other   laboratory and clinical findings is essential.      Lymph # 11/29/2023 0.9 (L)  1.0 - 4.8 K/uL Final    Mono # 11/29/2023 0.3  0.3 - 1.0 K/uL Final    Eos #  11/29/2023 0.1  0.0 - 0.5 K/uL Final    Baso # 11/29/2023 0.02  0.00 - 0.20 K/uL Final    nRBC 11/29/2023 0  0 /100 WBC Final    Gran % 11/29/2023 58.3  38.0 - 73.0 % Final    Lymph % 11/29/2023 29.0  18.0 - 48.0 % Final    Mono % 11/29/2023 9.1  4.0 - 15.0 % Final    Eosinophil % 11/29/2023 2.6  0.0 - 8.0 % Final    Basophil % 11/29/2023 0.7  0.0 - 1.9 % Final    Differential Method 11/29/2023 Automated   Final    Hemoglobin A1C 11/29/2023 10.7 (H)  4.0 - 5.6 % Final    Comment: ADA Screening Guidelines:  5.7-6.4%  Consistent with prediabetes  >or=6.5%  Consistent with diabetes    High levels of fetal hemoglobin interfere with the HbA1C  assay. Heterozygous hemoglobin variants (HbS, HgC, etc)do  not significantly interfere with this assay.   However, presence of multiple variants may affect accuracy.      Estimated Avg Glucose 11/29/2023 260 (H)  68 - 131 mg/dL Final    Vitamin B-12 11/29/2023 597  210 - 950 pg/mL Final    Methlymalonic Acid 11/29/2023 0.17  <0.40 umol/L Final    Comment: If applicable, any drug confirmation testing reported  here was developed and the performance characteristics  determined by [facility]. This   confirmation testing has not been cleared or approved  by the FDA. The laboratory is regulated under CLIA as  qualified to perform high-complexity testing. This test  is used for patient testing purposes. It should not be  regarded as investigational or for research.    Test performed at [facility],  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 Diabetes mellitus with nephropathy  E11.21 250.40     583.81   2. Essential hypertension  I10 401.9   3. Stage 3b chronic kidney  disease  N18.32 585.3   4. Leukopenia, unspecified type  D72.819 288.50   5. Monoclonal gammopathy  D47.2 273.1         Plan:     1. Diabetes mellitus with nephropathy  Overview:  Lab Results   Component Value Date    HGBA1C 10.7 (H) 11/29/2023    HGBA1C 7.3 (H) 06/06/2023    HGBA1C 7.2 (H) 12/07/2022       Assessment & Plan:  A1C significantly worsened previous.  The likely from dietary indiscretions as patient reports heating sweets often.    Discussed with patient importance of improving diet.    Patient reports taking Tradjenta although he states it is expensive.  He once again declines pharmacy assistance as he does not want to have his finances gone through.    Discussed starting Prandin low-dose with meals to help improve A1c.  He previously did not tolerate glimepiride secondary to hypoglycemia.  Discussed with patient that if he does not eat, he should skip the Prandin.  The this should help decrease the risk of hypoglycemia versus using a so far area.  Recheck labs in the next two months, re-evaluate sooner if patient having any concerns.    Orders:  -     blood-glucose meter kit; To check BG 3 times daily, to use with insurance preferred meter  Dispense: 1 each; Refill: 0  -     lancets Misc; To check BG 3 times daily, to use with insurance preferred meter  Dispense: 100 each; Refill: 11  -     blood sugar diagnostic Strp; To check BG 3 times daily, to use with insurance preferred meter  Dispense: 100 each; Refill: 11  -     Comprehensive Metabolic Panel; Future; Expected date: 02/04/2024  -     Hemoglobin A1C; Future; Expected date: 02/04/2024  -     repaglinide (PRANDIN) 0.5 MG tablet; Take 1 tablet (0.5 mg total) by mouth 3 (three) times daily before meals. Do not take if you skip a meal  Dispense: 90 tablet; Refill: 3  -     atorvastatin (LIPITOR) 20 MG tablet; Take 1 tablet (20 mg total) by mouth every evening.  Dispense: 90 tablet; Refill: 3    2. Essential hypertension  Assessment & Plan:  BP  Readings from Last 3 Encounters:   12/04/23 136/70   08/29/23 133/79   08/21/23 135/76      ACC/AHA guidelines on blood pressure goals reviewed.  Reinforced correct way of measuring blood pressure.   Fair blood pressure control on current regimen.  Continue current regimen.      3. Stage 3b chronic kidney disease  Overview:  Lab Results   Component Value Date    EGFRNORACEVR 39 (A) 11/29/2023    EGFRNORACEVR 34 (A) 08/11/2023    EGFRNORACEVR 30 (A) 08/10/2023    EGFRNORACEVR 30 (A) 07/17/2023        Assessment & Plan:  Renal function stable.    Continue good hydration.  Importance of improving blood glucose control and maintaining good blood pressure control discussed with patient.        4. Leukopenia, unspecified type  Assessment & Plan:  Reviewed previous lab results.  The    Orders:  -     Ambulatory referral/consult to Hematology / Oncology; Future; Expected date: 12/11/2023    5. Monoclonal gammopathy  Overview:  08-  SPEP/Immunofixation  IgA kappa and IgG lambda specific monoclonal bands are present.    06-  SPEP/Immunofixation  IgA kappa specific monoclonal protein band in alpha-2 and faint IgG lambda specific monocolonal protein band in gamma are identified.     Assessment & Plan:  Discussed with patient importance of following up with Hematology, given monoclonal gammopathy.  Discussed that although this is not a cancer, there is a risk of progression to a cancerous concern and as such I recommend he follow-up with Hematology/Oncology for further recommendations.    Orders:  -     Ambulatory referral/consult to Hematology / Oncology; Future; Expected date: 12/11/2023               -Lucas Llyod Jr., MD, AAHIVS      This office note has been dictated.  This dictation has been generated using M-Modal Fluency Direct dictation; some phonetic errors may occur.         Patient Instructions   I placed a referral to see the blood specialist again as you missed the follow up you had last  year.    You sugar is much higher than previous. To help with this, it is very important to work on decreasing sweets. Continue the Tradjenta, but I sent in a prescription call repaglinide. You take 1 pill 15 minutes prior to your meal, three times a day. You have to eat once you take it. If you are not going to eat skip it.    Follow Up  Follow up in about 2 months (around 2/4/2024) for Diabetes, labs a week prior.    Future Appointments   Date Time Provider Department Center   1/10/2024  9:00 AM Martine Kingsley MD [facility] HEM ONC [facility]   1/12/2024 11:00 AM Sarkis Hyman MD [facility] PULSM [facility]   1/30/2024  9:15 AM LAB, [facility] DRAW STATION [facility] ST [facility]   2/5/2024 10:30 AM Shereen Baldwin NP [facility] NEPHRO [address]   2/6/2024 10:20 AM Lucas Lloyd Jr., MD [facility] FM IM [facility]   2/7/2024  9:30 AM Jesus Cole MD [facility] URO [facility]

## 2023-12-04 NOTE — PATIENT INSTRUCTIONS
I placed a referral to see the blood specialist again as you missed the follow up you had last year.    You sugar is much higher than previous. To help with this, it is very important to work on decreasing sweets. Continue the Tradjenta, but I sent in a prescription call repaglinide. You take 1 pill 15 minutes prior to your meal, three times a day. You have to eat once you take it. If you are not going to eat skip it.

## 2023-12-05 NOTE — ASSESSMENT & PLAN NOTE
Renal function stable.    Continue good hydration.  Importance of improving blood glucose control and maintaining good blood pressure control discussed with patient.

## 2023-12-05 NOTE — ASSESSMENT & PLAN NOTE
BP Readings from Last 3 Encounters:   12/04/23 136/70   08/29/23 133/79   08/21/23 135/76      ACC/AHA guidelines on blood pressure goals reviewed.  Reinforced correct way of measuring blood pressure.   Fair blood pressure control on current regimen.  Continue current regimen.

## 2023-12-05 NOTE — ASSESSMENT & PLAN NOTE
A1C significantly worsened previous.  The likely from dietary indiscretions as patient reports heating sweets often.    Discussed with patient importance of improving diet.    Patient reports taking Tradjenta although he states it is expensive.  He once again declines pharmacy assistance as he does not want to have his finances gone through.    Discussed starting Prandin low-dose with meals to help improve A1c.  He previously did not tolerate glimepiride secondary to hypoglycemia.  Discussed with patient that if he does not eat, he should skip the Prandin.  The this should help decrease the risk of hypoglycemia versus using a so far area.  Recheck labs in the next two months, re-evaluate sooner if patient having any concerns.

## 2023-12-05 NOTE — ASSESSMENT & PLAN NOTE
Discussed with patient importance of following up with Hematology, given monoclonal gammopathy.  Discussed that although this is not a cancer, there is a risk of progression to a cancerous concern and as such I recommend he follow-up with Hematology/Oncology for further recommendations.

## 2023-12-13 ENCOUNTER — HOSPITAL ENCOUNTER (EMERGENCY)
Facility: HOSPITAL | Age: 88
Discharge: HOME | End: 2023-12-13
Attending: EMERGENCY MEDICINE
Payer: MEDICARE

## 2023-12-13 VITALS
RESPIRATION RATE: 19 BRPM | SYSTOLIC BLOOD PRESSURE: 150 MMHG | HEIGHT: 71 IN | HEART RATE: 100 BPM | OXYGEN SATURATION: 95 % | DIASTOLIC BLOOD PRESSURE: 80 MMHG | TEMPERATURE: 98 F | WEIGHT: 179.69 LBS | BODY MASS INDEX: 25.16 KG/M2

## 2023-12-13 DIAGNOSIS — K59.00 CONSTIPATION, UNSPECIFIED CONSTIPATION TYPE: Primary | ICD-10-CM

## 2023-12-13 DIAGNOSIS — K59.00 CONSTIPATION: ICD-10-CM

## 2023-12-13 LAB
ALBUMIN SERPL BCP-MCNC: 4 G/DL (ref 3.5–5.2)
ALP SERPL-CCNC: 61 U/L (ref 55–135)
ALT SERPL W/O P-5'-P-CCNC: 25 U/L (ref 10–44)
ANION GAP SERPL CALC-SCNC: 11 MMOL/L (ref 8–16)
AST SERPL-CCNC: 34 U/L (ref 10–40)
BACTERIA #/AREA URNS HPF: NORMAL /HPF
BASOPHILS # BLD AUTO: 0.01 K/UL (ref 0–0.2)
BASOPHILS NFR BLD: 0.3 % (ref 0–1.9)
BILIRUB SERPL-MCNC: 0.4 MG/DL (ref 0.1–1)
BILIRUB UR QL STRIP: NEGATIVE
BUN SERPL-MCNC: 18 MG/DL (ref 8–23)
CALCIUM SERPL-MCNC: 10.2 MG/DL (ref 8.7–10.5)
CHLORIDE SERPL-SCNC: 101 MMOL/L (ref 95–110)
CLARITY UR: CLEAR
CO2 SERPL-SCNC: 25 MMOL/L (ref 23–29)
COLOR UR: YELLOW
CREAT SERPL-MCNC: 1.8 MG/DL (ref 0.5–1.4)
DIFFERENTIAL METHOD: ABNORMAL
EOSINOPHIL # BLD AUTO: 0 K/UL (ref 0–0.5)
EOSINOPHIL NFR BLD: 0.9 % (ref 0–8)
ERYTHROCYTE [DISTWIDTH] IN BLOOD BY AUTOMATED COUNT: 13.6 % (ref 11.5–14.5)
EST. GFR  (NO RACE VARIABLE): 36 ML/MIN/1.73 M^2
GLUCOSE SERPL-MCNC: 190 MG/DL (ref 70–110)
GLUCOSE UR QL STRIP: ABNORMAL
HCT VFR BLD AUTO: 38.3 % (ref 40–54)
HGB BLD-MCNC: 12.4 G/DL (ref 14–18)
HGB UR QL STRIP: NEGATIVE
IMM GRANULOCYTES # BLD AUTO: 0.01 K/UL (ref 0–0.04)
IMM GRANULOCYTES NFR BLD AUTO: 0.3 % (ref 0–0.5)
KETONES UR QL STRIP: NEGATIVE
LACTATE SERPL-SCNC: 1.7 MMOL/L (ref 0.5–2.2)
LEUKOCYTE ESTERASE UR QL STRIP: NEGATIVE
LIPASE SERPL-CCNC: 67 U/L (ref 4–60)
LYMPHOCYTES # BLD AUTO: 0.7 K/UL (ref 1–4.8)
LYMPHOCYTES NFR BLD: 20 % (ref 18–48)
MCH RBC QN AUTO: 28.1 PG (ref 27–31)
MCHC RBC AUTO-ENTMCNC: 32.4 G/DL (ref 32–36)
MCV RBC AUTO: 87 FL (ref 82–98)
MICROSCOPIC COMMENT: NORMAL
MONOCYTES # BLD AUTO: 0.2 K/UL (ref 0.3–1)
MONOCYTES NFR BLD: 7 % (ref 4–15)
NEUTROPHILS # BLD AUTO: 2.4 K/UL (ref 1.8–7.7)
NEUTROPHILS NFR BLD: 71.5 % (ref 38–73)
NITRITE UR QL STRIP: NEGATIVE
NRBC BLD-RTO: 0 /100 WBC
PH UR STRIP: 6 [PH] (ref 5–8)
PLATELET # BLD AUTO: 259 K/UL (ref 150–450)
PMV BLD AUTO: 9.8 FL (ref 9.2–12.9)
POCT GLUCOSE: 207 MG/DL (ref 70–110)
POTASSIUM SERPL-SCNC: 4 MMOL/L (ref 3.5–5.1)
PROT SERPL-MCNC: 7.6 G/DL (ref 6–8.4)
PROT UR QL STRIP: ABNORMAL
RBC # BLD AUTO: 4.42 M/UL (ref 4.6–6.2)
RBC #/AREA URNS HPF: 3 /HPF (ref 0–4)
SODIUM SERPL-SCNC: 137 MMOL/L (ref 136–145)
SP GR UR STRIP: 1.01 (ref 1–1.03)
URN SPEC COLLECT METH UR: ABNORMAL
UROBILINOGEN UR STRIP-ACNC: NEGATIVE EU/DL
WBC # BLD AUTO: 3.3 K/UL (ref 3.9–12.7)
YEAST URNS QL MICRO: NORMAL

## 2023-12-13 PROCEDURE — 96360 HYDRATION IV INFUSION INIT: CPT | Mod: 59

## 2023-12-13 PROCEDURE — 99285 EMERGENCY DEPT VISIT HI MDM: CPT | Mod: 25

## 2023-12-13 PROCEDURE — 83690 ASSAY OF LIPASE: CPT | Performed by: EMERGENCY MEDICINE

## 2023-12-13 PROCEDURE — 85025 COMPLETE CBC W/AUTO DIFF WBC: CPT | Performed by: EMERGENCY MEDICINE

## 2023-12-13 PROCEDURE — 82962 GLUCOSE BLOOD TEST: CPT

## 2023-12-13 PROCEDURE — 83605 ASSAY OF LACTIC ACID: CPT | Performed by: EMERGENCY MEDICINE

## 2023-12-13 PROCEDURE — 96361 HYDRATE IV INFUSION ADD-ON: CPT

## 2023-12-13 PROCEDURE — 25500020 PHARM REV CODE 255: Performed by: EMERGENCY MEDICINE

## 2023-12-13 PROCEDURE — 25000003 PHARM REV CODE 250: Performed by: EMERGENCY MEDICINE

## 2023-12-13 PROCEDURE — 63600175 PHARM REV CODE 636 W HCPCS: Performed by: EMERGENCY MEDICINE

## 2023-12-13 PROCEDURE — 81000 URINALYSIS NONAUTO W/SCOPE: CPT

## 2023-12-13 PROCEDURE — 80053 COMPREHEN METABOLIC PANEL: CPT | Performed by: EMERGENCY MEDICINE

## 2023-12-13 RX ORDER — ONDANSETRON 4 MG/1
4 TABLET, ORALLY DISINTEGRATING ORAL EVERY 8 HOURS PRN
Qty: 20 TABLET | Refills: 0 | Status: SHIPPED | OUTPATIENT
Start: 2023-12-13

## 2023-12-13 RX ORDER — SYRING-NEEDL,DISP,INSUL,0.3 ML 29 G X1/2"
296 SYRINGE, EMPTY DISPOSABLE MISCELLANEOUS
Status: COMPLETED | OUTPATIENT
Start: 2023-12-13 | End: 2023-12-13

## 2023-12-13 RX ORDER — ONDANSETRON 2 MG/ML
4 INJECTION INTRAMUSCULAR; INTRAVENOUS
Status: COMPLETED | OUTPATIENT
Start: 2023-12-13 | End: 2023-12-13

## 2023-12-13 RX ADMIN — SODIUM CHLORIDE 1000 ML: 9 INJECTION, SOLUTION INTRAVENOUS at 06:12

## 2023-12-13 RX ADMIN — ONDANSETRON 4 MG: 2 INJECTION, SOLUTION INTRAMUSCULAR; INTRAVENOUS at 05:12

## 2023-12-13 RX ADMIN — IOHEXOL 75 ML: 350 INJECTION, SOLUTION INTRAVENOUS at 07:12

## 2023-12-13 RX ADMIN — BE HEALTH MAGNESIUM CITRATE ORAL SOLUTION - LEMON 296 ML: 1.75 LIQUID ORAL at 09:12

## 2023-12-13 NOTE — ED TRIAGE NOTES
Pt presents to ED with complaint of abdominal discomfort x few weeks. Pt states that he feels like there's too much gas in there. Pt states he tried to vomit but nothing came up.

## 2023-12-13 NOTE — ED PROVIDER NOTES
Encounter Date: 12/13/2023    SCRIBE #1 NOTE: I, Gadiel Miller, am scribing for, and in the presence of,  José Miguel Fenton MD.       History     Chief Complaint   Patient presents with    Abdominal Pain     86 yo male to triage for abd discomfort and nausea x 2 days. Last BM was 2 days ago, kind of hard. Suffers w/ constipation. Denies V/D, CP, SOB. Amb to triage, VSS, NAD, AAOx4     Peter Lopez is a 87 y.o. male with a PMHx of BPH (benign prostatic hyperplasia), CKD, DM type I, H/O colonoscopy, HTN, Prostate cancer who presents to the ED due to abdominal discomfort.  Patient reports experiencing abdominal discomfort that has worsened for the last 2 days. He describes it as a gas/pressure like sensation. He states that he is taking Miralax daily and began taking Lactulose today, with only minimal relief of a scant amount of hard stool during his bowel movement. Endorses abdominal distention, nausea, and one episode of clear vomit earlier today. Notes a mild cough. Denies dysuria, hematuria, congestion, and fever. Patient has a significant abdominal history and has an appt with a gastroenterologist on 01/12/2024.    The history is provided by the patient.     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  Vaccinated.  C A     Family History   Problem Relation Age of Onset    Cancer Mother     Asbestos Father     Liver disease Neg Hx      Social History     Tobacco Use    Smoking status: Former     Current packs/day: 0.00     Average packs/day: 1 pack/day for 40.0 years (40.0 ttl pk-yrs)     Types: Cigarettes     Start date: 1951     Quit date: 1991     Years since quittin.1    Smokeless tobacco: Former     Quit date: 1986   Substance Use Topics    Alcohol use: Not Currently     Alcohol/week: 0.0 standard drinks of alcohol     Comment: 2 bottles beer on weekends    Drug use: No     Review of Systems   Constitutional: Negative.    HENT: Negative.     Eyes: Negative.    Respiratory:  Positive for cough (mild).    Cardiovascular: Negative.    Gastrointestinal:  Positive for abdominal distention, abdominal pain, constipation, nausea and vomiting.   Genitourinary: Negative.    Musculoskeletal: Negative.    Skin: Negative.    Neurological: Negative.        Physical Exam     Initial Vitals [23 1658]   BP Pulse Resp Temp SpO2   (!) 152/82 106 18 98.3 °F (36.8 °C) 95 %      MAP       --         Physical Exam    Nursing note and vitals reviewed.  Constitutional: He appears well-developed and well-nourished. He is not diaphoretic. He appears distressed (mildy discomfort).   HENT:   Head: Normocephalic and atraumatic.   Nose: Nose normal.   Mouth/Throat: No oropharyngeal exudate.   Eyes: EOM are normal. Pupils are equal, round, and reactive to light.   Neck: Neck supple. No tracheal deviation present. No JVD present.   Normal range of motion.  Cardiovascular:  Normal rate, regular rhythm, normal heart sounds and intact distal pulses.           Pulmonary/Chest: Breath sounds normal. No respiratory  distress. He has no wheezes. He has no rhonchi. He has no rales.   Abdominal: Abdomen is soft. Bowel sounds are normal. He exhibits distension. There is abdominal tenderness (mild, diffuse). There is no rebound and no guarding.   Musculoskeletal:         General: No tenderness or edema. Normal range of motion.      Cervical back: Normal range of motion and neck supple.     Neurological: He is alert and oriented to person, place, and time. He has normal strength.   Skin: Skin is warm and dry. Capillary refill takes less than 2 seconds. No rash noted. No erythema.         ED Course   Procedures  Labs Reviewed   URINALYSIS, REFLEX TO URINE CULTURE - Abnormal; Notable for the following components:       Result Value    Protein, UA Trace (*)     Glucose, UA 3+ (*)     All other components within normal limits    Narrative:     Specimen Source->Urine   CBC W/ AUTO DIFFERENTIAL - Abnormal; Notable for the following components:    WBC 3.30 (*)     RBC 4.42 (*)     Hemoglobin 12.4 (*)     Hematocrit 38.3 (*)     Lymph # 0.7 (*)     Mono # 0.2 (*)     All other components within normal limits   COMPREHENSIVE METABOLIC PANEL - Abnormal; Notable for the following components:    Glucose 190 (*)     Creatinine 1.8 (*)     eGFR 36 (*)     All other components within normal limits   LIPASE - Abnormal; Notable for the following components:    Lipase 67 (*)     All other components within normal limits   POCT GLUCOSE - Abnormal; Notable for the following components:    POCT Glucose 207 (*)     All other components within normal limits   URINALYSIS MICROSCOPIC    Narrative:     Specimen Source->Urine   LACTIC ACID, PLASMA          Imaging Results              CT Abdomen Pelvis With IV Contrast NO Oral Contrast (Final result)  Result time 12/13/23 20:11:49      Final result by Dorene Phillips MD (12/13/23 20:11:49)                   Impression:      1. Significant retained stool seen within the colon which can be seen with  constipation.  2. Otherwise no acute intra-abdominal abnormalities identified.  No evidence of bowel obstruction.  3. Additional stable findings as detailed above.      Electronically signed by: Dorene Phillips MD  Date:    12/13/2023  Time:    20:11               Narrative:    EXAMINATION:  CT ABDOMEN PELVIS WITH IV CONTRAST    CLINICAL HISTORY:  Nausea/vomiting;Epigastric pain;    TECHNIQUE:  Low dose axial images, sagittal and coronal reformations were obtained from the lung bases to the pubic symphysis following the IV administration of 75 mL of Omnipaque 350 .  Oral contrast was not given.    COMPARISON:  CT abdomen and pelvis from 07/17/2023.    FINDINGS:  The visualized portion of the heart is unremarkable.  Bibasilar chronic atelectasis or scarring is seen.    Several small scattered hepatic hypodensities, probable cysts are again seen.  There is stable prominence of the common bile duct measuring 8 mm.  The gallbladder is unremarkable.  There is a small hiatal hernia.  Stomach is otherwise normal in appearance.  Pancreas, spleen, and adrenal glands are unremarkable.    Kidneys enhance normally with no evidence of hydronephrosis.  No abnormalities are seen along the ureteral courses.  Urinary bladder and prostate are unremarkable.    Appendix is visualized and is unremarkable.  The visualized loops of small and large bowel show no evidence of obstruction or inflammation.  Significant volume stool is seen in the colon, more pronounced in the right colon and transverse colon.  No free air or free fluid.    Aorta tapers normally with moderate atherosclerosis seen throughout and extending into the bilateral iliacs.    No acute osseous abnormality identified.  Degenerative changes are seen throughout the lumbar spine.  Subcutaneous soft tissues show no significant abnormalities.                                       X-Ray Abdomen Flat And Erect (Final result)  Result time 12/13/23 20:05:02      Final result by  Dorene Phillips MD (12/13/23 20:05:02)                   Impression:      Nonobstructive bowel gas pattern.  Prominent stool seen throughout the colon which may be seen with constipation.  See separate CT report for full intra-abdominal details.      Electronically signed by: Dorene Phillips MD  Date:    12/13/2023  Time:    20:05               Narrative:    EXAMINATION:  XR ABDOMEN FLAT AND ERECT    CLINICAL HISTORY:  Constipation, unspecified    TECHNIQUE:  Flat and erect AP views of the abdomen were performed.    COMPARISON:  Concurrent CT abdomen and pelvis.    FINDINGS:  Nonspecific bowel gas pattern.  No evidence to suggest obstruction.  No free air identified.  Prominent stool is seen throughout the colon.  Partially visualized lung bases are clear.                                       Medications   sodium chloride 0.9% bolus 1,000 mL 1,000 mL (0 mLs Intravenous Stopped 12/13/23 2104)   ondansetron injection 4 mg (4 mg Intravenous Given 12/13/23 1745)   iohexoL (OMNIPAQUE 350) injection 75 mL (75 mLs Intravenous Given 12/13/23 1928)   magnesium citrate solution 296 mL (296 mLs Oral Given 12/13/23 2107)     Medical Decision Making  Amount and/or Complexity of Data Reviewed  Labs: ordered. Decision-making details documented in ED Course.  Radiology: ordered. Decision-making details documented in ED Course.    Risk  OTC drugs.  Prescription drug management.      MDM:    88-year-old male with past medical history as noted above presenting with abdominal pain.  Physical exam as noted above.  ED workup notable for CBC with hemoglobin 12.4, CMP with creatinine 1.8, BUN 18, lactate 1.7, lipase 67, urinalysis unremarkable, CT abdomen pelvis shows significant amount of retained stool consistent with clinical history of constipation.  Differential Diagnosis includes:  Bowel obstruction, ileus, intussusception, malignancy, ischemic colitis.  Patient presentation consistent with constipation, discussed with patient at  bedside plan for further treatment decided on magnesium citrate at this time, he is able to tolerate oral liquids and will be stable for discharge home, discussed this with daughter bedside.  Vitals remained stable, all questions are answered patient's abdomen remains soft and will be stable for outpatient evaluation and continued management.  Do not suspect any additional surgical or medical emergency. Discussed diagnosis and further treatment with patient, including f/u.  Return precautions given and all questions answered.  Patient in understanding of plan.  Pt discharged to home improved and stable.        Note was created using voice recognition software. Note may have occasional typographical or grammatical errors, garbled syntax, and other bizarre constructions that may not have been identified and edited despite good eli initial review prior to signing.                                       Clinical Impression:  Final diagnoses:  [K59.00] Constipation  [K59.00] Constipation, unspecified constipation type (Primary)          ED Disposition Condition    Discharge Stable          ED Prescriptions       Medication Sig Dispense Start Date End Date Auth. Provider    ondansetron (ZOFRAN-ODT) 4 MG TbDL Take 1 tablet (4 mg total) by mouth every 8 (eight) hours as needed. 20 tablet 12/13/2023 -- José Miguel Fenton MD          Follow-up Information       Follow up With Specialties Details Why Contact Info    [facility] - Emergency Dept Emergency Medicine Go to  If symptoms worsen [address] [facility] [place] [address] [phone]    Lucas Lloyd Jr., MD Family Medicine Go in 1 week As needed [address] [facility] [address]  [phone]              IJosé Miguel M.D., personally performed the services described in this documentation. All medical record entries made by the scribe were at my direction and in my presence. I have reviewed the chart  and agree that the record reflects my personal performance and is accurate and complete.       José Miguel Fenton MD  12/17/23 1659

## 2023-12-14 ENCOUNTER — HOSPITAL ENCOUNTER (EMERGENCY)
Facility: HOSPITAL | Age: 88
Discharge: HOME | End: 2023-12-14
Attending: STUDENT IN AN ORGANIZED HEALTH CARE EDUCATION/TRAINING PROGRAM
Payer: MEDICARE

## 2023-12-14 VITALS
RESPIRATION RATE: 20 BRPM | DIASTOLIC BLOOD PRESSURE: 90 MMHG | OXYGEN SATURATION: 94 % | TEMPERATURE: 98 F | BODY MASS INDEX: 25.1 KG/M2 | SYSTOLIC BLOOD PRESSURE: 159 MMHG | WEIGHT: 180 LBS | HEART RATE: 102 BPM

## 2023-12-14 DIAGNOSIS — K59.00 CONSTIPATION, UNSPECIFIED CONSTIPATION TYPE: Primary | ICD-10-CM

## 2023-12-14 PROCEDURE — 99281 EMR DPT VST MAYX REQ PHY/QHP: CPT

## 2023-12-14 NOTE — ED TRIAGE NOTES
Patient presented to Ed with the complaints of abdominal discomfort/ constipation. Pt reports he was seen 9 hrs ago for abdominal discomfort and constipation, pt states being given MG citrate and he vomited and felt relief for abdominal discomfort but constipation hasn't resolved with lactulose and Miralax. He states that stool impaction was seen in xray. Pt AAO X4, calm and cooperative , denies other complaints at this time

## 2023-12-14 NOTE — ED PROVIDER NOTES
Encounter Date: 12/14/2023    SCRIBE #1 NOTE: I, Gadiel Miller, am scribing for, and in the presence of,  Marty Casarez MD.       History     Chief Complaint   Patient presents with    Abdominal Pain     Was seen and discharged at about 4:30 pm for same+ nausea and vomited, No diarrhea, last BM yesterday or day before     Peter Lopez is a 87 y.o. male with a PMHx of BPH (benign prostatic hyperplasia), CKD, DM type I, H/O colonoscopy, HTN, Prostate cancer who presents to the ED due to abdominal discomfort.  Patient was seen in ED approximately 9 hours ago for abdominal discomfort and constipation. He states that he was given magnesium citrate, which he vomited up, and felt relief for his abdominal discomfort. Constipation is persistent. No relief with Lactulose or Miralax. X-Rays showed mild to moderate stool burden.    The history is provided by the patient.     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  Vaccinated.  C A     Family History   Problem Relation Age of Onset    Cancer Mother      Asbestos Father     Liver disease Neg Hx      Social History     Tobacco Use    Smoking status: Former     Current packs/day: 0.00     Average packs/day: 1 pack/day for 40.0 years (40.0 ttl pk-yrs)     Types: Cigarettes     Start date: 1951     Quit date: 1991     Years since quittin.1    Smokeless tobacco: Former     Quit date: 1986   Substance Use Topics    Alcohol use: Not Currently     Alcohol/week: 0.0 standard drinks of alcohol     Comment: 2 bottles beer on weekends    Drug use: No     Review of Systems   Constitutional:  Negative for chills and fever.   HENT:  Negative for facial swelling and sore throat.    Eyes:  Negative for visual disturbance.   Respiratory:  Negative for cough and shortness of breath.    Cardiovascular:  Negative for chest pain and palpitations.   Gastrointestinal:  Positive for abdominal distention, constipation, nausea and vomiting.   Genitourinary:  Negative for dysuria and hematuria.   Musculoskeletal:  Negative for back pain.   Skin:  Negative for rash.   Neurological:  Negative for weakness and headaches.   Hematological:  Does not bruise/bleed easily.   Psychiatric/Behavioral: Negative.         Physical Exam     Initial Vitals [23 0122]   BP Pulse Resp Temp SpO2   130/81 110 (!) 22 97.7 °F (36.5 °C) (!) 93 %      MAP       --         Physical Exam    Nursing note and vitals reviewed.  Constitutional: He appears well-developed and well-nourished. He is not diaphoretic. No distress.   HENT:   Head: Normocephalic and atraumatic.   Right Ear: External ear normal.   Left Ear: External ear normal.   Nose: Nose normal.   Eyes: Conjunctivae are normal. No scleral icterus.   Neck: Neck supple. No tracheal deviation present.   Cardiovascular:  Normal rate, regular rhythm and normal heart sounds.     Exam reveals no gallop and no friction rub.       No murmur heard.  Pulmonary/Chest: Breath sounds normal. No respiratory distress.   Abdominal: Abdomen is soft. Bowel  sounds are normal. There is no abdominal tenderness.   Musculoskeletal:      Cervical back: Neck supple.     Neurological: He is alert and oriented to person, place, and time. GCS score is 15. GCS eye subscore is 4. GCS verbal subscore is 5. GCS motor subscore is 6.   Skin: Skin is warm and dry.   Psychiatric: He has a normal mood and affect. Thought content normal.         ED Course   Procedures  Labs Reviewed - No data to display       Imaging Results    None          Medications - No data to display  Medical Decision Making  Amount and/or Complexity of Data Reviewed  External Data Reviewed: labs, radiology and notes.  Labs:  Decision-making details documented in ED Course.  Radiology:  Decision-making details documented in ED Course.    Risk  Prescription drug management.               ED Course as of 12/14/23 0428   Thu Dec 14, 2023   0153 CT ABDOMEN PELVIS WITH IV CONTRAST     CLINICAL HISTORY:  Nausea/vomiting;Epigastric pain;     TECHNIQUE:  Low dose axial images, sagittal and coronal reformations were obtained from the lung bases to the pubic symphysis following the IV administration of 75 mL of Omnipaque 350 .  Oral contrast was not given.     COMPARISON:  CT abdomen and pelvis from 07/17/2023.     FINDINGS:  The visualized portion of the heart is unremarkable.  Bibasilar chronic atelectasis or scarring is seen.     Several small scattered hepatic hypodensities, probable cysts are again seen.  There is stable prominence of the common bile duct measuring 8 mm.  The gallbladder is unremarkable.  There is a small hiatal hernia.  Stomach is otherwise normal in appearance.  Pancreas, spleen, and adrenal glands are unremarkable.     Kidneys enhance normally with no evidence of hydronephrosis.  No abnormalities are seen along the ureteral courses.  Urinary bladder and prostate are unremarkable.     Appendix is visualized and is unremarkable.  The visualized loops of small and large bowel show no evidence of  obstruction or inflammation.  Significant volume stool is seen in the colon, more pronounced in the right colon and transverse colon.  No free air or free fluid.     Aorta tapers normally with moderate atherosclerosis seen throughout and extending into the bilateral iliacs.     No acute osseous abnormality identified.  Degenerative changes are seen throughout the lumbar spine.  Subcutaneous soft tissues show no significant abnormalities.     Impression:     1. Significant retained stool seen within the colon which can be seen with constipation.  2. Otherwise no acute intra-abdominal abnormalities identified.  No evidence of bowel obstruction.  3. Additional stable findings as detailed above.        Electronically signed by: Dorene Phillips MD  Date:                                            12/13/2023  Time:                                           20:11   [CC]   8385 Patient presents to the emergency department for evaluation of abdominal cramping and constipation.  Denies abdominal pain on initial evaluation.  He was seen earlier in the emergency department yesterday.  Stool burden noted on CT.  No other intra-abdominal abnormalities noted. [CC]   6520 Patient's lab work from earlier reviewed.  Lactic acid was normal earlier.  Mild creatinine elevation which is at patient's baseline but electrolytes within normal limits.  Mild leukopenia and anemia which is at patient's baseline.  Platelet count within normal limits.  UA not indicative of UTI. [CC]   0410 Patient was able to have a stool in the emergency department after soapsuds enema.  Counseled on need to continue lactulose and MiraLax as prescribed and follow up with his doctor and GI.  Strict return precautions given. I discussed with the patient/family the diagnosis, treatment plan, indications for return to the emergency department, and for expected follow-up. The patient/family verbalized an understanding. The patient/family is asked if there are any  questions or concerns. We discuss the case, until all issues are addressed to the patient/family's satisfaction. Patient/family understands and is agreeable to the plan. Patient is stable and ready for discharge.      [CC]      ED Course User Index  [CC] Marty Casarez MD                           Clinical Impression:  Final diagnoses:  [K59.00] Constipation, unspecified constipation type (Primary)          ED Disposition Condition    Discharge Stable          ED Prescriptions    None       Follow-up Information       Follow up With Specialties Details Why Contact Info    Lucas Lloyd Jr., MD Family Medicine Schedule an appointment as soon as possible for a visit in 2 days  [address] [facility] [address]  [phone]      [facility] Emergency Medicine Go to  If symptoms worsen [address] [facility] [place] [id number] [phone]            IMarty MD, personally performed the services described in this documentation. All medical record entries made by the scribe were at my direction and in my presence. I have reviewed the chart and agree that the record reflects my personal performance and is accurate and complete.       Marty Casarez MD  12/14/23 0411       Marty Casarez MD  12/14/23 0428

## 2023-12-14 NOTE — DISCHARGE INSTRUCTIONS
Continue taking your MiraLax as well as your lactulose for constipation.  Please follow-up with your doctor as soon as you can for re-evaluation.  Return to the ER with any new or worsening symptoms.

## 2023-12-15 ENCOUNTER — PATIENT OUTREACH (OUTPATIENT)
Dept: EMERGENCY MEDICINE | Facility: HOSPITAL | Age: 88
End: 2023-12-15
Payer: MEDICARE

## 2023-12-15 ENCOUNTER — TELEPHONE (OUTPATIENT)
Dept: FAMILY MEDICINE | Facility: CLINIC | Age: 88
End: 2023-12-15
Payer: MEDICARE

## 2023-12-15 NOTE — TELEPHONE ENCOUNTER
----- Message from Sia Peña sent at 12/15/2023 12:05 PM CST -----  Regarding: ED F/U  Hi! Peter Lopez was seen in the ED on 12/14/23 for constipation. They are part of the [facility] Medicare quality work for the system with 2 or more chronic conditions that requires a post ED 7 day appointment. Could you please contact this patient to schedule either an in-office or virtual F/U appt by 12/26/23 if possible? Thank you!

## 2023-12-18 NOTE — PROGRESS NOTES
Patient was seen in the ED on 12/14/23 for constipation. They have an appointment scheduled with Monroe Malave NP on 1/22/24 at 11:00. ED navigator will remind patient of appointment.

## 2023-12-20 NOTE — TELEPHONE ENCOUNTER
No care due was identified.  [facility] Embedded Care Due Messages. Reference number: [id number].   12/20/2023 8:07:09 AM CST

## 2023-12-26 RX ORDER — LACTULOSE 10 G/15ML
20 SOLUTION ORAL; RECTAL 2 TIMES DAILY PRN
Qty: 237 ML | Refills: 1 | Status: SHIPPED | OUTPATIENT
Start: 2023-12-26 | End: 2024-03-04

## 2024-01-10 ENCOUNTER — OFFICE VISIT (OUTPATIENT)
Dept: HEMATOLOGY/ONCOLOGY | Facility: CLINIC | Age: 89
End: 2024-01-10
Payer: MEDICARE

## 2024-01-10 ENCOUNTER — LAB VISIT (OUTPATIENT)
Dept: LAB | Facility: HOSPITAL | Age: 89
End: 2024-01-10
Attending: INTERNAL MEDICINE
Payer: MEDICARE

## 2024-01-10 VITALS
SYSTOLIC BLOOD PRESSURE: 133 MMHG | OXYGEN SATURATION: 93 % | BODY MASS INDEX: 25.5 KG/M2 | HEIGHT: 71 IN | DIASTOLIC BLOOD PRESSURE: 83 MMHG | HEART RATE: 104 BPM | WEIGHT: 182.13 LBS

## 2024-01-10 DIAGNOSIS — D64.9 ANEMIA, UNSPECIFIED TYPE: ICD-10-CM

## 2024-01-10 DIAGNOSIS — D47.2 MGUS (MONOCLONAL GAMMOPATHY OF UNKNOWN SIGNIFICANCE): ICD-10-CM

## 2024-01-10 DIAGNOSIS — D72.819 LEUKOPENIA, UNSPECIFIED TYPE: ICD-10-CM

## 2024-01-10 DIAGNOSIS — D72.819 LEUKOPENIA, UNSPECIFIED TYPE: Primary | ICD-10-CM

## 2024-01-10 LAB
BASOPHILS # BLD AUTO: 0.02 K/UL (ref 0–0.2)
BASOPHILS NFR BLD: 0.6 % (ref 0–1.9)
DAT IGG-SP REAG RBC-IMP: NORMAL
DIFFERENTIAL METHOD BLD: ABNORMAL
EOSINOPHIL # BLD AUTO: 0.1 K/UL (ref 0–0.5)
EOSINOPHIL NFR BLD: 1.7 % (ref 0–8)
ERYTHROCYTE [DISTWIDTH] IN BLOOD BY AUTOMATED COUNT: 14 % (ref 11.5–14.5)
FERRITIN SERPL-MCNC: 38 NG/ML (ref 20–300)
FOLATE SERPL-MCNC: 30.2 NG/ML (ref 4–24)
HAPTOGLOB SERPL-MCNC: 144 MG/DL (ref 30–250)
HCT VFR BLD AUTO: 39.5 % (ref 40–54)
HGB BLD-MCNC: 12.5 G/DL (ref 14–18)
IMM GRANULOCYTES # BLD AUTO: 0.01 K/UL (ref 0–0.04)
IMM GRANULOCYTES NFR BLD AUTO: 0.3 % (ref 0–0.5)
IRON SERPL-MCNC: 81 UG/DL (ref 45–160)
LDH SERPL L TO P-CCNC: 243 U/L (ref 110–260)
LYMPHOCYTES # BLD AUTO: 0.9 K/UL (ref 1–4.8)
LYMPHOCYTES NFR BLD: 23.9 % (ref 18–48)
MCH RBC QN AUTO: 27.8 PG (ref 27–31)
MCHC RBC AUTO-ENTMCNC: 31.6 G/DL (ref 32–36)
MCV RBC AUTO: 88 FL (ref 82–98)
MONOCYTES # BLD AUTO: 0.2 K/UL (ref 0.3–1)
MONOCYTES NFR BLD: 6.7 % (ref 4–15)
NEUTROPHILS # BLD AUTO: 2.4 K/UL (ref 1.8–7.7)
NEUTROPHILS NFR BLD: 66.8 % (ref 38–73)
NRBC BLD-RTO: 0 /100 WBC
PATH REV BLD -IMP: NORMAL
PATH REV BLD -IMP: NORMAL
PLATELET # BLD AUTO: 257 K/UL (ref 150–450)
PMV BLD AUTO: 9.8 FL (ref 9.2–12.9)
RBC # BLD AUTO: 4.49 M/UL (ref 4.6–6.2)
RETICS/RBC NFR AUTO: 1.8 % (ref 0.4–2)
SATURATED IRON: 24 % (ref 20–50)
TOTAL IRON BINDING CAPACITY: 343 UG/DL (ref 250–450)
TRANSFERRIN SERPL-MCNC: 232 MG/DL (ref 200–375)
TSH SERPL DL<=0.005 MIU/L-ACNC: 1.5 UIU/ML (ref 0.4–4)
VIT B12 SERPL-MCNC: 411 PG/ML (ref 210–950)
WBC # BLD AUTO: 3.56 K/UL (ref 3.9–12.7)

## 2024-01-10 PROCEDURE — 83521 IG LIGHT CHAINS FREE EACH: CPT | Mod: 59 | Performed by: INTERNAL MEDICINE

## 2024-01-10 PROCEDURE — 83010 ASSAY OF HAPTOGLOBIN QUANT: CPT | Performed by: INTERNAL MEDICINE

## 2024-01-10 PROCEDURE — 82607 VITAMIN B-12: CPT | Performed by: INTERNAL MEDICINE

## 2024-01-10 PROCEDURE — 84165 PROTEIN E-PHORESIS SERUM: CPT | Performed by: INTERNAL MEDICINE

## 2024-01-10 PROCEDURE — 85045 AUTOMATED RETICULOCYTE COUNT: CPT | Performed by: INTERNAL MEDICINE

## 2024-01-10 PROCEDURE — 84165 PROTEIN E-PHORESIS SERUM: CPT | Mod: 26,,, | Performed by: PATHOLOGY

## 2024-01-10 PROCEDURE — 85025 COMPLETE CBC W/AUTO DIFF WBC: CPT | Performed by: INTERNAL MEDICINE

## 2024-01-10 PROCEDURE — 36415 COLL VENOUS BLD VENIPUNCTURE: CPT | Performed by: INTERNAL MEDICINE

## 2024-01-10 PROCEDURE — 86880 COOMBS TEST DIRECT: CPT | Performed by: INTERNAL MEDICINE

## 2024-01-10 PROCEDURE — 86334 IMMUNOFIX E-PHORESIS SERUM: CPT | Performed by: INTERNAL MEDICINE

## 2024-01-10 PROCEDURE — 85060 BLOOD SMEAR INTERPRETATION: CPT | Mod: ,,, | Performed by: PATHOLOGY

## 2024-01-10 PROCEDURE — 83921 ORGANIC ACID SINGLE QUANT: CPT | Performed by: INTERNAL MEDICINE

## 2024-01-10 PROCEDURE — 86021 WBC ANTIBODY IDENTIFICATION: CPT | Performed by: INTERNAL MEDICINE

## 2024-01-10 PROCEDURE — 82746 ASSAY OF FOLIC ACID SERUM: CPT | Performed by: INTERNAL MEDICINE

## 2024-01-10 PROCEDURE — 82728 ASSAY OF FERRITIN: CPT | Performed by: INTERNAL MEDICINE

## 2024-01-10 PROCEDURE — 83540 ASSAY OF IRON: CPT | Performed by: INTERNAL MEDICINE

## 2024-01-10 PROCEDURE — 99999 PR PBB SHADOW E&M-EST. PATIENT-LVL IV: CPT | Mod: PBBFAC,,, | Performed by: INTERNAL MEDICINE

## 2024-01-10 PROCEDURE — 86334 IMMUNOFIX E-PHORESIS SERUM: CPT | Mod: 26,,, | Performed by: PATHOLOGY

## 2024-01-10 PROCEDURE — 99214 OFFICE O/P EST MOD 30 MIN: CPT | Mod: S$GLB,,, | Performed by: INTERNAL MEDICINE

## 2024-01-10 PROCEDURE — 84443 ASSAY THYROID STIM HORMONE: CPT | Performed by: INTERNAL MEDICINE

## 2024-01-10 PROCEDURE — 83615 LACTATE (LD) (LDH) ENZYME: CPT | Performed by: INTERNAL MEDICINE

## 2024-01-10 NOTE — PROGRESS NOTES
Subjective     Patient ID: Peter Lopez is a 88 y.o. male.    Chief Complaint: No chief complaint on file.    HPI: Peter Lopez is a 87 y.o. 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  Our first encounter was 6/12/20.  Patient smoked .75 ppd x 40 years.  Patient quit smoking in 1990s.  Patient was diagnosed with covid 19 on 3/28/20.  Initially, patient was discharged to home with home monitored.  Patient represented to ED of 3/29/20 and was discharged to home.  Represented back to ed on 4/3/20.  Hospitalized until 4/22/20 for hypoxic respiratory failure requiring nrb.  Subsequently discharged on 4/22/20 to [facility] [place].  Patient was discharged to home with 2 lpm.       Patient was diagnosed with grace with ahi of 44 with emergent of central during cpap titration.  Patient declined asv in the past due to dealing with prostate cancer.       S/p 6 min walk and requesting portable concentrator.       Patient agreed to asv titraion and set up.  S/p titration 7/3/23.         He remains on 2L02 supp     No 02 supp today  Appetite and weight stable  He has chronic mild [place], stable        Patient is an 87 y/o male with medical diagnoses as listed seen today in consultation for abnormal white blood cells.He is a poor historian. He is unaware of any prior history of abnormal blood counts.  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  He has a history of an elevated PSA. He underwent TURP on 6/11/21. There was Brittanie 9 (4+5) adenocarcinoma noted in the prostate chips. He began  hormonal therapy with Casodex and Lupron given on 21.  He completed radiotherapy to the prostate, seminal vesicles and pelvic nodes on 21. He undergoes LUPRON inj q6 mos. Last inj this month. No fevers, night sweats or wt loss.He reports minor fatigue after receiving injection.No fam hx of blood or bleeding d/o. Pt was taken off metformin a couple of months ago. No rashes. Appetite and weight stable. CBC on 22 shows 3.35 11.2/35.8 MCV 90 plt 297. He is here for further evaluation.          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;  Service: Urology;  Laterality: N/A;  RN Pre OP 21.  Vaccinated.  C A        Social History     Tobacco Use    Smoking status: Former     Current packs/day: 0.00     Average packs/day: 1 pack/day for 40.0 years (40.0 ttl pk-yrs)     Types: Cigarettes     Start date: 1951     Quit date: 1991     Years since quittin.1    Smokeless tobacco: Former     Quit date: 1986   Substance Use Topics    Alcohol use: Not Currently     Alcohol/week: 0.0 standard drinks of alcohol     Comment: 2 bottles beer on weekends    Drug use: No    Asbestos exposure    "oxana Martinez     Review of Systems   Constitutional:  Negative for appetite change, fatigue, fever and unexpected weight change.   HENT:  Negative for mouth sores.    Eyes:  Negative for visual disturbance.   Respiratory:  Positive for shortness of breath. Negative for cough.    Cardiovascular:  Negative for chest pain.   Gastrointestinal:  Negative for abdominal pain and diarrhea.   Genitourinary:  Negative for frequency.   Musculoskeletal:  Negative for back pain.   Integumentary:  Negative for rash.   Neurological:  Negative for headaches.   Hematological:  Negative for adenopathy.   Psychiatric/Behavioral:  The patient is not nervous/anxious.           Objective     Vitals:    01/10/24 0900   BP: 133/83   Pulse: 104   SpO2: (!) 93%   Weight: 82.6 kg (182 lb 1.6 oz)   Height: 5' 11" (1.803 m)       Physical Exam  Constitutional:       Appearance: He is well-developed.   HENT:      Head: Normocephalic.      Mouth/Throat:      Pharynx: No oropharyngeal exudate.   Eyes:      General: No scleral icterus.        Right eye: No discharge.         Left eye: No discharge.      Conjunctiva/sclera: Conjunctivae normal.   Neck:      Thyroid: No thyromegaly.   Cardiovascular:      Rate and Rhythm: Normal rate and regular rhythm.      Heart sounds: Normal heart sounds. No murmur heard.  Pulmonary:      Effort: Pulmonary effort is normal.      Breath sounds: Normal breath sounds. No wheezing or rales.   Abdominal:      General: Bowel sounds are normal.      Palpations: Abdomen is soft.      Tenderness: There is no abdominal tenderness. There is no guarding or rebound.   Musculoskeletal:         General: No swelling. Normal range of motion.      Cervical back: Normal range of motion and neck supple.   Lymphadenopathy:      Cervical: No cervical adenopathy.      Upper Body:      Right upper body: No supraclavicular adenopathy.      Left upper body: No supraclavicular adenopathy.   Skin:     Findings: No erythema or rash. "   Neurological:      Mental Status: He is alert and oriented to person, place, and time.      Cranial Nerves: No cranial nerve deficit.   Psychiatric:         Mood and Affect: Mood normal.         Behavior: Behavior normal.       Lab Results   Component Value Date    WBC 3.30 (L) 12/13/2023    HGB 12.4 (L) 12/13/2023    HCT 38.3 (L) 12/13/2023    MCV 87 12/13/2023     12/13/2023              Assessment and Plan     1. Leukopenia, unspecified type    2. Anemia, unspecified type    3. MGUS (monoclonal gammopathy of unknown significance)             Labs today   Follow up 1 mo

## 2024-01-11 LAB
ALBUMIN SERPL ELPH-MCNC: 4.18 G/DL (ref 3.35–5.55)
ALPHA1 GLOB SERPL ELPH-MCNC: 0.3 G/DL (ref 0.17–0.41)
ALPHA2 GLOB SERPL ELPH-MCNC: 1.2 G/DL (ref 0.43–0.99)
B-GLOBULIN SERPL ELPH-MCNC: 0.72 G/DL (ref 0.5–1.1)
GAMMA GLOB SERPL ELPH-MCNC: 0.89 G/DL (ref 0.67–1.58)
INTERPRETATION SERPL IFE-IMP: NORMAL
KAPPA LC SER QL IA: 5 MG/DL (ref 0.33–1.94)
KAPPA LC/LAMBDA SER IA: 1.87 (ref 0.26–1.65)
LAMBDA LC SER QL IA: 2.68 MG/DL (ref 0.57–2.63)
PROT SERPL-MCNC: 7.3 G/DL (ref 6–8.4)

## 2024-01-12 ENCOUNTER — OFFICE VISIT (OUTPATIENT)
Dept: PULMONOLOGY | Facility: CLINIC | Age: 89
End: 2024-01-12
Payer: MEDICARE

## 2024-01-12 VITALS
WEIGHT: 183.56 LBS | BODY MASS INDEX: 25.7 KG/M2 | HEIGHT: 71 IN | HEART RATE: 132 BPM | OXYGEN SATURATION: 94 % | DIASTOLIC BLOOD PRESSURE: 79 MMHG | SYSTOLIC BLOOD PRESSURE: 149 MMHG

## 2024-01-12 DIAGNOSIS — I27.20 PULMONARY HYPERTENSION: Chronic | ICD-10-CM

## 2024-01-12 DIAGNOSIS — G47.31 COMPLEX SLEEP APNEA SYNDROME: ICD-10-CM

## 2024-01-12 DIAGNOSIS — J43.2 CENTRILOBULAR EMPHYSEMA: ICD-10-CM

## 2024-01-12 DIAGNOSIS — J96.11 CHRONIC RESPIRATORY FAILURE WITH HYPOXIA: Primary | ICD-10-CM

## 2024-01-12 PROCEDURE — 99999 PR PBB SHADOW E&M-EST. PATIENT-LVL IV: CPT | Mod: PBBFAC,,, | Performed by: INTERNAL MEDICINE

## 2024-01-12 PROCEDURE — 99214 OFFICE O/P EST MOD 30 MIN: CPT | Mod: S$GLB,,, | Performed by: INTERNAL MEDICINE

## 2024-01-12 NOTE — PROGRESS NOTES
Peter Lopez  was seen as a follow up.     CHIEF COMPLAINT:  Apnea and COPD      HISTORY OF PRESENT ILLNESS: Peter Lopez is a 88 y.o. 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  Patient was seen by WENCESLAO Bridges for copd/grace.  Our first encounter was 6/12/20.  Patient smoked .75 ppd x 40 years.  Patient quit smoking in 1990s.  Patient was diagnosed with covid 19 on 3/28/20.  Initially, patient was discharged to home with home monitored.  Patient represented to ED of 3/29/20 and was discharged to home.  Represented back to ed on 4/3/20.  Hospitalized until 4/22/20 for hypoxic respiratory failure requiring nrb.  Subsequently discharged on 4/22/20 to [facility].  Patient was discharged to home with 2 lpm.      Patient was diagnosed with grace with ahi of 44 with emergent of central during cpap titration.  Patient declined asv in the past due to dealing with prostate cancer.  Agreed to asv.  S/p asv titration 7/2023.  Patient was set up with asv.  Using asv nightly.  Often take off mask in the middle of the night.      S/p 6 min walk and requesting portable concentrator.  Patient was set up with portable concentrator during last.  Have not been using portable concentrator and request that machine be returned.     Patient agreed to asv titraion and set up.  S/p titration 7/3/23.      SLEEP ROUTINE:  Activity the hour prior to sleep: watch tv or read in bed    Bed partner:  alone  Time to bed:  8-10 pm   Lights off:  night light is on  Sleep onset latency:  few minutes         Disruptions or awakenings:    2-3  times for bathroom (no issue going back to sleep)    Wakeup time:      5 am - 6 am  Perceived sleep quality:  rested       Daytime naps:     none  Weekend sleep routine:      same  Caffeine use: 1 cup of coffee in am   exercise habit:   light weight        PAST MEDICAL HISTORY:    Active Ambulatory Problems     Diagnosis Date Noted    Essential hypertension 10/02/2012    Diabetes mellitus with nephropathy 10/16/2013    Aortic atherosclerosis 05/03/2019    ILD (interstitial lung disease) 04/04/2020    Normocytic anemia 04/04/2020    Reactive depression 04/14/2020    Centrilobular emphysema 08/10/2020    Chronic fatigue     Pulmonary hypertension 03/08/2021    Iron deficiency anemia 03/11/2021    Malignant neoplasm of prostate 06/14/2021    Stage 3b chronic kidney disease 12/18/2022    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    Leukopenia 06/30/2023    Vitamin D deficiency 06/30/2023    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    Diarrhea 08/10/2023    Monoclonal gammopathy 12/04/2023    Chronic respiratory failure with hypoxia 01/12/2024     Resolved Ambulatory Problems     Diagnosis Date Noted    Elevated PSA 01/10/2013    Hepatitis C 10/16/2013    BPH (benign prostatic hyperplasia) 04/05/2016    Chronic kidney disease, stage 3a 01/28/2020    Hypoglycemia     Hypoxia 03/29/2020    Elevated troponin 04/04/2020    Centrilobular emphysema 04/13/2020    Discharge planning issues 04/14/2020    Gait instability 09/03/2020    Tachycardia with heart rate 121-140 beats per minute 09/03/2020    Sleep-related breathing disorder 12/20/2020    Complex sleep apnea syndrome     Dyspnea 03/11/2021    Chest pain 05/20/2021    Urinary retention 06/22/2021    Pulmonary hypertension due to left ventricular systolic dysfunction 12/13/2022    Type 2 diabetes mellitus with diabetic neuropathy, without long-term current use of insulin 04/11/2023     Past Medical History:   Diagnosis Date    Chronic hepatitis C without mention of hepatic  coma     CKD (chronic kidney disease)     COPD (chronic obstructive pulmonary disease)     Diabetes mellitus type I     Diabetes with neurologic complications     H/O colonoscopy     [place] (hard of hearing)     Hypertension     On home oxygen therapy     Prostate cancer     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  Vaccinated.  C A         FAMILY HISTORY:                Family History   Problem Relation Age of Onset    Cancer Mother     Asbestos Father     Liver disease Neg Hx        SOCIAL HISTORY:          Tobacco:   Social History     Tobacco Use   Smoking Status Former    Current packs/day: 0.00    Average packs/day: 1 pack/day for 40.0 years (40.0 ttl pk-yrs)    Types: Cigarettes    Start date: 1951    Quit date: 1991    Years since quittin.2   Smokeless Tobacco Former    Quit date: 1986     alcohol use:    Social History     Substance and Sexual Activity   Alcohol Use Not Currently    Alcohol/week: 0.0 standard drinks of alcohol    Comment: 2 bottles beer on weekends               Occupation:  [employer]     ALLERGIES:  Review of patient's allergies indicates:  No Known Allergies    CURRENT MEDICATIONS:    Current Outpatient Medications   Medication Sig Dispense Refill    atorvastatin (LIPITOR) 20 MG tablet Take 1 tablet (20 mg total) by mouth every evening. 90 tablet 3    blood sugar diagnostic Strp To check BG 3 times daily, to use with insurance preferred meter 100 each 11    blood-glucose meter kit To check BG 3 times daily, to use with insurance preferred meter 1  each 0    cholecalciferol, vitamin D3, (VITAMIN D3) 25 mcg (1,000 unit) capsule Take 1,000 Units by mouth once daily.      docusate sodium (COLACE) 100 MG capsule Take 2 capsules (200 mg total) by mouth 2 (two) times daily. 60 capsule 0    lactulose (CHRONULAC) 10 gram/15 mL solution Take 30 mLs (20 g total) by mouth 2 (two) times daily as needed (constipation). 237 mL 1    lancets Misc To check BG 3 times daily, to use with insurance preferred meter 100 each 11    leuprolide (LUPRON) 1 mg/0.2 mL injection Inject into the skin.      linaGLIPtin (TRADJENTA) 5 mg Tab tablet Take 1 tablet (5 mg total) by mouth once daily. 90 tablet 1    olmesartan (BENICAR) 40 MG tablet Take 1 tablet (40 mg total) by mouth once daily. 90 tablet 1    ondansetron (ZOFRAN-ODT) 4 MG TbDL Take 1 tablet (4 mg total) by mouth every 8 (eight) hours as needed (Nausea). 14 tablet 0    ondansetron (ZOFRAN-ODT) 4 MG TbDL Take 1 tablet (4 mg total) by mouth every 8 (eight) hours as needed. 20 tablet 0    polyethylene glycol (GLYCOLAX) 17 gram PwPk Take this medication EVERY DAY to prevent constipation.  You may take it up to twice a day, but start at only once a day.  SKIP THIS MEDICATION IF YOU DEVELOP DIARRHEA. 72 each 3    repaglinide (PRANDIN) 0.5 MG tablet Take 1 tablet (0.5 mg total) by mouth 3 (three) times daily before meals. Do not take if you skip a meal 90 tablet 3    tiotropium bromide (SPIRIVA RESPIMAT) 2.5 mcg/actuation inhaler INHALE 2 SPRAY(S) BY MOUTH ONCE DAILY 4 g 11    amLODIPine (NORVASC) 5 MG tablet Take 1 tablet (5 mg total) by mouth once daily. 90 tablet 3     No current facility-administered medications for this visit.                  REVIEW OF SYSTEMS:     Pulmonary related symptoms as per HPI.  Gen:  no weight loss, no fever, no night sweat  HEENT:  no visual changes, no sore throat, + hearing loss  CV:  No chest pain, no orthopnea, no PND  GI:  no melena, no hematochezia, no diarhea, no constipation.  :  no dysuria,  "no hematuria, no hesistancy, no dribbling  Neuro:  no syncope, no vertigo, no tinitus  Psych:  No homocide or suicide ideation; no depression.  Endocrine:  No heat or cold intolerance.  Sleep:  No snoring; no witnessed apnea.  Feeling rested upon awake.    Otherwise, a balance of systems reviewed is negative.          PHYSICAL EXAM:  Vitals:    01/12/24 1016   BP: (!) 149/79   Pulse: (!) 132   SpO2: (!) 94%   Weight: 83.3 kg (183 lb 8.5 oz)   Height: 5' 11" (1.803 m)   PainSc: 0-No pain       Body mass index is 25.6 kg/m².     GENERAL:  well develop; no apparent distress  HEENT:  no nasal congestion; no discharge noted; class 2 modified mallampatti.  +denture   NECK:  supple; no palpable masses.  CARDIO: regular rate and rhythm  PULM:  clear to auscultation bilaterally; no intercostals retractions; no accessory muscle usage   ABDOMEN:  soft nontender/nondistended.  +bowel sound  EXTREMITIES no cce  NEURO:  CN II-XII intact.  5/5 motor in all extremities.  sensation grossly intact   to light touch.  PSYCH:  normal affect.  Alert and oriented x 4    LABS  Pulmonary Functions Testing Results(personally reviewed):    PFT 12/7/20 Ratio of 56%; FVC 3.43 L (104%); FEV1 1.93 L (79%); TLC n/a L (n/a%); dlco 9.95 (39%)   PFT 11/2/22 Ratio of 53%; FVC 2.69 L (83%); FEV1 1.44 L (60%); TLC 4.56 L (62%); dlco 8.4 (33%) discrepancy between VA and TLC makes dlco interpretation unreliable.      6 min walk 8/21/23 149 m 94-84-98    ABG (personally reviewed):  4/4/20 7.48/38/90/28 on nrb  CXR (personally reviewed):  4/15/20 bilateral airspace disease  CT CHEST(personally reviewed):  11/29/22 diffuse emphysematous changes bilaterally    Split study 1/7/2021 AHI 44 with emerging CA on cpap. ASV titration defer due to prostate problems.   ASV titration 7/3/23 ahi of 3.7 with asv standard settings.      covid 19 3/29/20 positive.      Echo 11/29/22  The left ventricle is normal in size with eccentric hypertrophy and low normal systolic " function.  The estimated ejection fraction is 50%.  Indeterminate left ventricular diastolic function.  Normal right ventricular size with normal right ventricular systolic function.  Mild tricuspid regurgitation.  There is moderate pulmonary hypertension.  Normal central venous pressure (3 mmHg).  The estimated PA systolic pressure is 45 mmHg.        ASSESSMENT/PLAN  Problem List Items Addressed This Visit       Pulmonary hypertension (Chronic)    Overview     Echo with pasp 45 mmHg ef 50%.  Group 2 and 3 given ddx and copd.         Centrilobular emphysema    Overview     FEV1 60  Maintenance inhaler-spiriva  Quit in 1980s  Albuterol prn-not needing   Oxygen prn following covid but not needing now however walk test indicates that he would still benefit from this.   Will provide portablec concentrator  Pulmonary rehab- completed some but stop after prostate cancer  uptodate flu/covid/pneumonia vaccine.  Patient request that portable concentrator to be taken back.             Chronic respiratory failure with hypoxia - Primary    Relevant Orders    HME - OTHER    Complex sleep apnea syndrome    Overview     ahi of 44   +emergence of central apnea during cpap titration.    S/p asv titration and did well during study  Did not require oxygen during asv titration.    Unable to access asv usage data.  Advise patient to bring asv for next clinic appointment.                    covid 19 - diagnosed 3/28/20.  Doing better.      Tobacco abuse - quit since 1991.  Encourage continue with smoking cessation.      30 minutes of total time spent on the encounter, which includes face to face time and non-face to face time preparing to see the patient (eg, review of tests), Obtaining and/or reviewing separately obtained history, documenting clinical information in the electronic or other health record, independently interpreting results (not separately reported) and communicating results to the patient/family/caregiver, or Care  coordination (not separately reported).        Patient will No follow-ups on file. with md/np.

## 2024-01-15 LAB
GRANULOCYTES ANTIBODIES, SERUM: NEGATIVE
METHYLMALONATE SERPL-SCNC: 0.33 UMOL/L
PATHOLOGIST INTERPRETATION IFE: NORMAL
PATHOLOGIST INTERPRETATION SPE: NORMAL

## 2024-01-19 ENCOUNTER — PATIENT OUTREACH (OUTPATIENT)
Dept: EMERGENCY MEDICINE | Facility: HOSPITAL | Age: 89
End: 2024-01-19
Payer: MEDICARE

## 2024-01-19 NOTE — PROGRESS NOTES
ED Navigator reminded the patient of scheduled appointment with Monroe Malave NP on 1/22/24 at 11:00. Patient instructed to bring a photo I.D., health insurance card, and a list of current medications. Patient agreed to appointment date and time. Patient declined additional services. Follow up encounter closed.

## 2024-01-22 ENCOUNTER — OFFICE VISIT (OUTPATIENT)
Dept: FAMILY MEDICINE | Facility: CLINIC | Age: 89
End: 2024-01-22
Payer: MEDICARE

## 2024-01-22 VITALS
HEART RATE: 102 BPM | SYSTOLIC BLOOD PRESSURE: 132 MMHG | RESPIRATION RATE: 15 BRPM | DIASTOLIC BLOOD PRESSURE: 74 MMHG | OXYGEN SATURATION: 93 % | HEIGHT: 71 IN | WEIGHT: 184.06 LBS | BODY MASS INDEX: 25.77 KG/M2 | TEMPERATURE: 97 F

## 2024-01-22 DIAGNOSIS — I70.0 AORTIC ATHEROSCLEROSIS: ICD-10-CM

## 2024-01-22 DIAGNOSIS — N18.32 STAGE 3B CHRONIC KIDNEY DISEASE: ICD-10-CM

## 2024-01-22 DIAGNOSIS — E11.21 DIABETES MELLITUS WITH NEPHROPATHY: ICD-10-CM

## 2024-01-22 DIAGNOSIS — I10 ESSENTIAL HYPERTENSION: Chronic | ICD-10-CM

## 2024-01-22 DIAGNOSIS — R13.10 DYSPHAGIA, UNSPECIFIED TYPE: Primary | ICD-10-CM

## 2024-01-22 PROCEDURE — 99999 PR PBB SHADOW E&M-EST. PATIENT-LVL V: CPT | Mod: PBBFAC,,, | Performed by: NURSE PRACTITIONER

## 2024-01-22 PROCEDURE — 99214 OFFICE O/P EST MOD 30 MIN: CPT | Mod: S$GLB,,, | Performed by: NURSE PRACTITIONER

## 2024-01-22 NOTE — PROGRESS NOTES
Routine Office Visit    Patient Name: Peter Lopez    : 1935  MRN: [id number]    Chief Complaint:  ED follow-up    Subjective:  Peter is a 88 y.o. male who presents today for:    ED follow-up - patient reports today for an ED follow-up.  Recently went to the emergency room with constipation.  Had a CT scan with results as follows in bold:    Narrative & Impression  EXAMINATION:  CT ABDOMEN PELVIS WITH IV CONTRAST     CLINICAL HISTORY:  Nausea/vomiting;Epigastric pain;     TECHNIQUE:  Low dose axial images, sagittal and coronal reformations were obtained from the lung bases to the pubic symphysis following the IV administration of 75 mL of Omnipaque 350 .  Oral contrast was not given.     COMPARISON:  CT abdomen and pelvis from 2023.     FINDINGS:  The visualized portion of the heart is unremarkable.  Bibasilar chronic atelectasis or scarring is seen.     Several small scattered hepatic hypodensities, probable cysts are again seen.  There is stable prominence of the common bile duct measuring 8 mm.  The gallbladder is unremarkable.  There is a small hiatal hernia.  Stomach is otherwise normal in appearance.  Pancreas, spleen, and adrenal glands are unremarkable.     Kidneys enhance normally with no evidence of hydronephrosis.  No abnormalities are seen along the ureteral courses.  Urinary bladder and prostate are unremarkable.     Appendix is visualized and is unremarkable.  The visualized loops of small and large bowel show no evidence of obstruction or inflammation.  Significant volume stool is seen in the colon, more pronounced in the right colon and transverse colon.  No free air or free fluid.     Aorta tapers normally with moderate atherosclerosis seen throughout and extending into the bilateral iliacs.     No acute osseous abnormality identified.  Degenerative changes are seen throughout the lumbar spine.  Subcutaneous soft tissues show no significant abnormalities.     Impression:     1. Significant  "retained stool seen within the colon which can be seen with constipation.  2. Otherwise no acute intra-abdominal abnormalities identified.  No evidence of bowel obstruction.  3. Additional stable findings as detailed above.     He relates his constipation to eating junk" states that he has started to eat healthy foods and his constipation has been resolved with MiraLax p.r.n..  He reports some issues with food getting caught in his stomach when he swallows.  He states that when he swallows food he has to drink lots of water for to clear through his stomach.  He would like to see a gastroenterologist for this.    Otherwise he is doing well.  Reports no issues with his medications.    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 90 tablet 3    atorvastatin (LIPITOR) 20 MG tablet Take 1 tablet (20 mg total) by mouth every evening. 90 tablet 3    blood sugar diagnostic Strp To check BG 3 times daily, to use with insurance preferred meter 100 each 11    blood-glucose meter kit To check BG 3 times daily, to use with insurance preferred meter 1 each 0    " cholecalciferol, vitamin D3, (VITAMIN D3) 25 mcg (1,000 unit) capsule Take 1,000 Units by mouth once daily.      docusate sodium (COLACE) 100 MG capsule Take 2 capsules (200 mg total) by mouth 2 (two) times daily. 60 capsule 0    lancets Misc To check BG 3 times daily, to use with insurance preferred meter 100 each 11    leuprolide (LUPRON) 1 mg/0.2 mL injection Inject into the skin.      linaGLIPtin (TRADJENTA) 5 mg Tab tablet Take 1 tablet (5 mg total) by mouth once daily. 90 tablet 1    olmesartan (BENICAR) 40 MG tablet Take 1 tablet (40 mg total) by mouth once daily. 90 tablet 1    ondansetron (ZOFRAN-ODT) 4 MG TbDL Take 1 tablet (4 mg total) by mouth every 8 (eight) hours as needed (Nausea). 14 tablet 0    ondansetron (ZOFRAN-ODT) 4 MG TbDL Take 1 tablet (4 mg total) by mouth every 8 (eight) hours as needed. 20 tablet 0    polyethylene glycol (GLYCOLAX) 17 gram PwPk Take this medication EVERY DAY to prevent constipation.  You may take it up to twice a day, but start at only once a day.  SKIP THIS MEDICATION IF YOU DEVELOP DIARRHEA. 72 each 3    repaglinide (PRANDIN) 0.5 MG tablet Take 1 tablet (0.5 mg total) by mouth 3 (three) times daily before meals. Do not take if you skip a meal 90 tablet 3    tiotropium bromide (SPIRIVA RESPIMAT) 2.5 mcg/actuation inhaler INHALE 2 SPRAY(S) BY MOUTH ONCE DAILY 4 g 11    lactulose (CHRONULAC) 10 gram/15 mL solution Take 30 mLs (20 g total) by mouth 2 (two) times daily as needed (constipation). (Patient not taking: Reported on 1/22/2024) 237 mL 1     No current facility-administered medications on file prior to visit.       Allergies   Review of patient's allergies indicates:  No Known Allergies    Review of Systems (Pertinent positives)  Review of Systems   Constitutional: Negative.  Negative for chills and fever.   HENT: Negative.  Negative for congestion, sinus pain and sore throat.    Eyes: Negative.    Respiratory:  Negative for cough, shortness of breath and wheezing.   "  Cardiovascular:  Negative for chest pain, palpitations, orthopnea and claudication.   Gastrointestinal: Negative.  Negative for abdominal pain, blood in stool, constipation, diarrhea, melena, nausea and vomiting.   Genitourinary: Negative.  Negative for dysuria, frequency and urgency.   Musculoskeletal: Negative.  Negative for back pain, joint pain and neck pain.   Skin: Negative.    Neurological: Negative.  Negative for dizziness, tingling, loss of consciousness and headaches.   Endo/Heme/Allergies: Negative.    Psychiatric/Behavioral: Negative.         /74 (BP Location: Left arm, Patient Position: Sitting, BP Method: X-Large (Manual))   Pulse 102   Temp 97.2 °F (36.2 °C) (Oral)   Resp 15   Ht 5' 11" (1.803 m)   Wt 83.5 kg (184 lb 1.4 oz)   SpO2 (!) 93%   BMI 25.67 kg/m²     Physical Exam  Vitals reviewed.   Constitutional:       General: He is not in acute distress.     Appearance: Normal appearance. He is not ill-appearing, toxic-appearing or diaphoretic.   HENT:      Head: Normocephalic and atraumatic.   Cardiovascular:      Rate and Rhythm: Normal rate and regular rhythm.      Pulses: Normal pulses.      Heart sounds: Normal heart sounds.   Pulmonary:      Effort: Pulmonary effort is normal. No respiratory distress.      Breath sounds: Normal breath sounds. No wheezing.   Abdominal:      General: Bowel sounds are normal. There is no distension.      Palpations: Abdomen is soft.      Tenderness: There is no abdominal tenderness.   Musculoskeletal:         General: No swelling, tenderness or deformity. Normal range of motion.   Skin:     General: Skin is warm and dry.      Capillary Refill: Capillary refill takes less than 2 seconds.   Neurological:      General: No focal deficit present.      Mental Status: He is alert and oriented to person, place, and time.   Psychiatric:         Mood and Affect: Mood normal.         Behavior: Behavior normal.          Assessment/Plan:  Peter Lopez is a 88 y.o. " male who presents today for :    Peter was seen today for follow-up.    Diagnoses and all orders for this visit:    Dysphagia, unspecified type  -     Ambulatory referral/consult to Gastroenterology; Future    Questionable dysphagia.  Will consult Gastroenterology.  Constipation improved with as needed MiraLax.  Continue p.r.n..    Essential hypertension    Controlled on current medications.  Continue.    Diabetes mellitus with nephropathy    On linagliptin.  Followed by PCP.  Has upcoming follow-up.    Stage 3b chronic kidney disease    Has upcoming lab follow-up.    Aortic atherosclerosis    Shown on CT scan done last month.  On statin.  Continue.        This office note has been dictated.  This dictation has been generated using M-Modal Fluency Direct dictation; some phonetic errors may occur.

## 2024-01-30 ENCOUNTER — LAB VISIT (OUTPATIENT)
Dept: LAB | Facility: HOSPITAL | Age: 89
End: 2024-01-30
Attending: FAMILY MEDICINE
Payer: MEDICARE

## 2024-01-30 DIAGNOSIS — E11.21 DIABETES MELLITUS WITH NEPHROPATHY: Chronic | ICD-10-CM

## 2024-01-30 LAB
ALBUMIN SERPL BCP-MCNC: 3.8 G/DL (ref 3.5–5.2)
ALP SERPL-CCNC: 52 U/L (ref 55–135)
ALT SERPL W/O P-5'-P-CCNC: 27 U/L (ref 10–44)
ANION GAP SERPL CALC-SCNC: 6 MMOL/L (ref 8–16)
AST SERPL-CCNC: 41 U/L (ref 10–40)
BILIRUB SERPL-MCNC: 0.5 MG/DL (ref 0.1–1)
BUN SERPL-MCNC: 22 MG/DL (ref 8–23)
CALCIUM SERPL-MCNC: 9.6 MG/DL (ref 8.7–10.5)
CHLORIDE SERPL-SCNC: 107 MMOL/L (ref 95–110)
CO2 SERPL-SCNC: 25 MMOL/L (ref 23–29)
CREAT SERPL-MCNC: 2 MG/DL (ref 0.5–1.4)
EST. GFR  (NO RACE VARIABLE): 32 ML/MIN/1.73 M^2
ESTIMATED AVG GLUCOSE: 200 MG/DL (ref 68–131)
GLUCOSE SERPL-MCNC: 159 MG/DL (ref 70–110)
HBA1C MFR BLD: 8.6 % (ref 4–5.6)
POTASSIUM SERPL-SCNC: 4.9 MMOL/L (ref 3.5–5.1)
PROT SERPL-MCNC: 7.4 G/DL (ref 6–8.4)
SODIUM SERPL-SCNC: 138 MMOL/L (ref 136–145)

## 2024-01-30 PROCEDURE — 83036 HEMOGLOBIN GLYCOSYLATED A1C: CPT | Performed by: FAMILY MEDICINE

## 2024-01-30 PROCEDURE — 36415 COLL VENOUS BLD VENIPUNCTURE: CPT | Mod: PN | Performed by: FAMILY MEDICINE

## 2024-01-30 PROCEDURE — 80053 COMPREHEN METABOLIC PANEL: CPT | Performed by: FAMILY MEDICINE

## 2024-01-31 ENCOUNTER — OFFICE VISIT (OUTPATIENT)
Dept: PODIATRY | Facility: CLINIC | Age: 89
End: 2024-01-31
Payer: MEDICARE

## 2024-01-31 VITALS — BODY MASS INDEX: 25.77 KG/M2 | WEIGHT: 184.06 LBS | HEIGHT: 71 IN

## 2024-01-31 DIAGNOSIS — E11.49 TYPE II DIABETES MELLITUS WITH NEUROLOGICAL MANIFESTATIONS: Primary | ICD-10-CM

## 2024-01-31 DIAGNOSIS — L84 CORN OR CALLUS: ICD-10-CM

## 2024-01-31 DIAGNOSIS — B35.1 ONYCHOMYCOSIS DUE TO DERMATOPHYTE: ICD-10-CM

## 2024-01-31 PROCEDURE — 99999 PR PBB SHADOW E&M-EST. PATIENT-LVL III: CPT | Mod: PBBFAC,,, | Performed by: PODIATRIST

## 2024-01-31 PROCEDURE — 11056 PARNG/CUTG B9 HYPRKR LES 2-4: CPT | Mod: Q9,S$GLB,, | Performed by: PODIATRIST

## 2024-01-31 PROCEDURE — 11721 DEBRIDE NAIL 6 OR MORE: CPT | Mod: 59,Q9,S$GLB, | Performed by: PODIATRIST

## 2024-01-31 PROCEDURE — 99499 UNLISTED E&M SERVICE: CPT | Mod: S$GLB,,, | Performed by: PODIATRIST

## 2024-01-31 NOTE — PROGRESS NOTES
Patient ID: Peter Lopez is a 88 y.o. male.    Chief Complaint: Prostate cancer        HPI  88 y.o. who presents to the Urology clinic for evaluation of prostate cancer. Hx of channel TURP for urinary retention, noted to have high risk prostate cancer ( 2021) completed EBRT  and ADT. Patient is due for PSA screening. Denies unexplained weight loss, fevers, chills, dysuria, hematuria, voiding dysfunction, kane pain. Last lupron injection documented 8/1/23 . Follows w/ oncologist for leukopenia.     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    Leukopenia 06/30/2023    Vitamin D deficiency 06/30/2023    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    Diarrhea 08/10/2023    Monoclonal gammopathy 12/04/2023    Chronic respiratory failure with hypoxia 01/12/2024     Resolved Ambulatory Problems     Diagnosis Date Noted    Elevated PSA 01/10/2013    Hepatitis C 10/16/2013    BPH (benign prostatic hyperplasia) 04/05/2016    Chronic kidney disease, stage 3a 01/28/2020    Hypoglycemia     Hypoxia 03/29/2020    Elevated troponin 04/04/2020    Centrilobular emphysema 04/13/2020    Discharge planning issues 04/14/2020    Gait instability 09/03/2020    Tachycardia with heart rate 121-140 beats per minute 09/03/2020    Sleep-related breathing disorder 12/20/2020    Complex sleep apnea syndrome     Dyspnea 03/11/2021    Chest pain 05/20/2021    Urinary retention  06/22/2021    Pulmonary hypertension due to left ventricular systolic dysfunction 12/13/2022    Type 2 diabetes mellitus with diabetic neuropathy, without long-term current use of insulin 04/11/2023     Past Medical History:   Diagnosis Date    Chronic hepatitis C without mention of hepatic coma     CKD (chronic kidney disease)     COPD (chronic obstructive pulmonary disease)     Diabetes mellitus type I     Diabetes with neurologic complications     H/O colonoscopy     [place] (hard of hearing)     Hypertension     On home oxygen therapy     Prostate cancer     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  Vaccinated.  C A       Social History  Social Connections: Moderately Integrated (4/11/2023)    Social Connection and Isolation Panel [NHANES]     Frequency of Communication with Friends and Family: More than three times a week     Frequency of Social Gatherings with Friends and Family: Once a week     Attends Yazidism Services: More than 4 times per year     Active Member of Clubs or Organizations: Yes     Attends Club or Organization Meetings: More than 4 times per year     Marital Status:        Medications    Current Outpatient Medications:     amLODIPine (NORVASC) 5 MG tablet, Take 1 tablet (5 mg total) by mouth once daily., Disp: 90 tablet, Rfl: 3    atorvastatin (LIPITOR) 20 MG tablet, Take 1 tablet (20 mg total) by mouth every evening., Disp: 90 tablet, Rfl: 3    blood sugar diagnostic Strp, To check BG 3 times daily, to use with insurance preferred meter, Disp: 100 each, Rfl: 11     blood-glucose meter kit, To check BG 3 times daily, to use with insurance preferred meter, Disp: 1 each, Rfl: 0    cholecalciferol, vitamin D3, (VITAMIN D3) 25 mcg (1,000 unit) capsule, Take 1,000 Units by mouth once daily., Disp: , Rfl:     docusate sodium (COLACE) 100 MG capsule, Take 2 capsules (200 mg total) by mouth 2 (two) times daily., Disp: 60 capsule, Rfl: 0    lactulose (CHRONULAC) 10 gram/15 mL solution, Take 30 mLs (20 g total) by mouth 2 (two) times daily as needed (constipation)., Disp: 237 mL, Rfl: 1    lancets Misc, To check BG 3 times daily, to use with insurance preferred meter, Disp: 100 each, Rfl: 11    leuprolide (LUPRON) 1 mg/0.2 mL injection, Inject into the skin., Disp: , Rfl:     linaGLIPtin (TRADJENTA) 5 mg Tab tablet, Take 1 tablet (5 mg total) by mouth once daily., Disp: 90 tablet, Rfl: 1    olmesartan (BENICAR) 40 MG tablet, Take 1 tablet (40 mg total) by mouth once daily., Disp: 90 tablet, Rfl: 1    ondansetron (ZOFRAN-ODT) 4 MG TbDL, Take 1 tablet (4 mg total) by mouth every 8 (eight) hours as needed (Nausea)., Disp: 14 tablet, Rfl: 0    ondansetron (ZOFRAN-ODT) 4 MG TbDL, Take 1 tablet (4 mg total) by mouth every 8 (eight) hours as needed., Disp: 20 tablet, Rfl: 0    polyethylene glycol (GLYCOLAX) 17 gram PwPk, Take this medication EVERY DAY to prevent constipation.  You may take it up to twice a day, but start at only once a day.  SKIP THIS MEDICATION IF YOU DEVELOP DIARRHEA., Disp: 72 each, Rfl: 3    repaglinide (PRANDIN) 0.5 MG tablet, Take 1 tablet (0.5 mg total) by mouth 3 (three) times daily before meals. Do not take if you skip a meal, Disp: 90 tablet, Rfl: 3    tiotropium bromide (SPIRIVA RESPIMAT) 2.5 mcg/actuation inhaler, INHALE 2 SPRAY(S) BY MOUTH ONCE DAILY, Disp: 4 g, Rfl: 11    Allergies  Review of patient's allergies indicates:  No Known Allergies    Patient's PMH, FH, Social hx, Medications, allergies reviewed and updated as pertinent to today's visit    Objective:       Physical Exam  Constitutional:       General: He is not in acute distress.     Appearance: He is well-developed. He is not ill-appearing, toxic-appearing or diaphoretic.   HENT:      Head: Normocephalic and atraumatic.      Mouth/Throat:      Mouth: Mucous membranes are moist.   Eyes:      Conjunctiva/sclera: Conjunctivae normal.   Pulmonary:      Effort: Pulmonary effort is normal. No respiratory distress.   Abdominal:      General: Abdomen is flat. There is no distension.      Palpations: Abdomen is soft. There is no mass.      Tenderness: There is no right CVA tenderness or left CVA tenderness.   Musculoskeletal:         General: No swelling or deformity.      Cervical back: Neck supple.   Skin:     Findings: No rash.   Neurological:      Mental Status: He is alert and oriented to person, place, and time.      Gait: Gait normal.   Psychiatric:         Mood and Affect: Mood normal.         Thought Content: Thought content normal.         Judgment: Judgment normal.             Lab Results   Component Value Date    PSADIAG 0.01 08/09/2023    PSADIAG 0.01 04/28/2023    PSADIAG <0.01 04/24/2023        Assessment:       1. 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

## 2024-01-31 NOTE — PROGRESS NOTES
Subjective:      Patient ID: Peter Lopez is a 88 y.o. male.    Chief Complaint: Diabetic Foot Exam (1/22/24 Np Frieda) and Nail Care      Peter is a 88 y.o. male who presents to the clinic upon referral from Dr. Ivette abraham. provider found  for evaluation and treatment of diabetic feet. 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 Patient relates no major problem with feet. Reports elongated nails that are difficult to trim. Requesting nail trimming.     PCP: Lucas Lloyd Jr., MD    Date Last Seen by PCP: per above    Current shoe gear: Tennis shoes    Hemoglobin A1C   Date Value Ref Range Status   01/30/2024 8.6 (H) 4.0 - 5.6 % Final     Comment:     ADA Screening Guidelines:  5.7-6.4%  Consistent with prediabetes  >or=6.5%  Consistent with diabetes    High levels of fetal hemoglobin interfere with the HbA1C  assay. Heterozygous hemoglobin variants (HbS, HgC, etc)do  not significantly interfere with this assay.   However, presence of multiple variants may affect accuracy.     11/29/2023 10.7 (H) 4.0 - 5.6 % Final     Comment:     ADA Screening Guidelines:  5.7-6.4%  Consistent with prediabetes  >or=6.5%  Consistent with diabetes    High levels of fetal hemoglobin interfere with the HbA1C  assay. Heterozygous hemoglobin variants (HbS, HgC, etc)do  not significantly interfere with this assay.   However, presence of multiple variants may affect accuracy.     06/06/2023 7.3 (H) 4.0 - 5.6 % Final     Comment:     ADA Screening Guidelines:  5.7-6.4%  Consistent with prediabetes  >or=6.5%  Consistent with  diabetes    High levels of fetal hemoglobin interfere with the HbA1C  assay. Heterozygous hemoglobin variants (HbS, HgC, etc)do  not significantly interfere with this assay.   However, presence of multiple variants may affect accuracy.             Review of Systems   Constitutional: Negative for chills, diaphoresis and fever.   Cardiovascular:  Negative for claudication, cyanosis, leg swelling and syncope.   Respiratory:  Negative for cough and shortness of breath.    Skin:  Positive for color change and nail changes. Negative for suspicious lesions.   Musculoskeletal:  Negative for falls, joint pain, muscle cramps and muscle weakness.   Gastrointestinal:  Negative for diarrhea, nausea and vomiting.   Neurological:  Positive for paresthesias. Negative for disturbances in coordination, numbness, sensory change, tremors and weakness.   Psychiatric/Behavioral:  Negative for altered mental status.            Objective:      Physical Exam  Constitutional:       Appearance: He is well-developed.      Comments: Oriented to time, place, and person.   Cardiovascular:      Comments: DP and PT pulses are palpable bilaterally. 3 sec capillary refill time and toes and feet are warm to touch proximally .       Musculoskeletal:      Comments: Equinus noted b/l ankles with < 10 deg DF noted. MMT 5/5 in DF/PF/Inv/Ev resistance with no reproduction of pain in any direction. Passive range of motion of ankle and pedal joints is painless b/l.     Feet:      Right foot:      Skin integrity: No callus or dry skin.      Left foot:      Skin integrity: No callus or dry skin.   Lymphadenopathy:      Comments: Negative lymphadenopathy bilateral popliteal fossa and tarsal tunnel.   Skin:     Comments: Toenails 1-5 bilaterally are elongated by 2-3 mm, thickened by 2-3 mm, discolored/yellowed, dystrophic, brittle with subungual debris.    Focal hyperkeratotic lesion consisting entirely of hyperkeratotic tissue without open skin, drainage, pus,  fluctuance, malodor, or signs of infection: right plantar medial foot. Right 2nd toe right plantar foot.        Neurological:      Mental Status: He is alert.      Comments: Light touch, proprioception, and sharp/dull sensation are all diminished bilaterally. Protective threshold with the [place]ien[place] monofilament is diminished  bilaterally.  Subjective paresthesias with no clearly identifiable source or trigger.      Psychiatric:         Behavior: Behavior is cooperative.               Assessment:       Encounter Diagnoses   Name Primary?    Type II diabetes mellitus with neurological manifestations Yes    Onychomycosis due to dermatophyte     Corn or callus                Plan:       Peter was seen today for diabetic foot exam and nail care.    Diagnoses and all orders for this visit:    Type II diabetes mellitus with neurological manifestations    Onychomycosis due to dermatophyte    Corn or callus            I counseled the patient on his conditions, their implications and medical management.    - Shoe inspection. Diabetic Foot Education. Patient reminded of the importance of good nutrition and blood sugar control to help prevent podiatric complications of diabetes. Patient instructed on proper foot hygeine. We discussed wearing proper shoe gear, daily foot inspections, never walking without protective shoe gear, never putting sharp instruments to feet, routine podiatric nail visits every 2-3 months.   - With patient's permission, nails were aggressively reduced and debrided x 10 to their soft tissue attachment mechanically and with electric , removing all offending nail and debris. Patient relates relief following the procedure. He will continue to monitor the areas daily, inspect his feet, wear protective shoe gear when ambulatory, moisturizer to maintain skin integrity and follow in this office in approximately 2-3 months, sooner p.r.n.   - After cleansing the area w/ alcohol prep pad the above  mentioned hyperkeratosis was trimmed utilizing No 15 scapel, to a smooth base with out incident.     F/u 3 months     Lucia Aguiar DPM

## 2024-02-05 ENCOUNTER — LAB VISIT (OUTPATIENT)
Dept: LAB | Facility: HOSPITAL | Age: 89
End: 2024-02-05
Payer: MEDICARE

## 2024-02-05 ENCOUNTER — OFFICE VISIT (OUTPATIENT)
Dept: NEPHROLOGY | Facility: CLINIC | Age: 89
End: 2024-02-05
Payer: MEDICARE

## 2024-02-05 VITALS
DIASTOLIC BLOOD PRESSURE: 85 MMHG | OXYGEN SATURATION: 93 % | HEART RATE: 104 BPM | HEIGHT: 71 IN | WEIGHT: 178.56 LBS | SYSTOLIC BLOOD PRESSURE: 157 MMHG | BODY MASS INDEX: 25 KG/M2

## 2024-02-05 DIAGNOSIS — C61 MALIGNANT NEOPLASM OF PROSTATE: ICD-10-CM

## 2024-02-05 DIAGNOSIS — N18.32 STAGE 3B CHRONIC KIDNEY DISEASE: Chronic | ICD-10-CM

## 2024-02-05 DIAGNOSIS — I10 HYPERTENSION, UNSPECIFIED TYPE: ICD-10-CM

## 2024-02-05 DIAGNOSIS — D64.9 ANEMIA, UNSPECIFIED TYPE: ICD-10-CM

## 2024-02-05 DIAGNOSIS — D47.2 MONOCLONAL GAMMOPATHY: Chronic | ICD-10-CM

## 2024-02-05 DIAGNOSIS — E11.22 TYPE 2 DIABETES MELLITUS WITH STAGE 3 CHRONIC KIDNEY DISEASE, UNSPECIFIED WHETHER LONG TERM INSULIN USE, UNSPECIFIED WHETHER STAGE 3A OR 3B CKD: ICD-10-CM

## 2024-02-05 DIAGNOSIS — N18.32 STAGE 3B CHRONIC KIDNEY DISEASE: Primary | Chronic | ICD-10-CM

## 2024-02-05 DIAGNOSIS — N18.30 TYPE 2 DIABETES MELLITUS WITH STAGE 3 CHRONIC KIDNEY DISEASE, UNSPECIFIED WHETHER LONG TERM INSULIN USE, UNSPECIFIED WHETHER STAGE 3A OR 3B CKD: ICD-10-CM

## 2024-02-05 LAB
ALBUMIN/CREAT UR: 106.3 UG/MG (ref 0–30)
BILIRUB UR QL STRIP: NEGATIVE
CLARITY UR REFRACT.AUTO: CLEAR
COLOR UR AUTO: YELLOW
CREAT UR-MCNC: 48 MG/DL (ref 23–375)
CREAT UR-MCNC: 48 MG/DL (ref 23–375)
GLUCOSE UR QL STRIP: ABNORMAL
HGB UR QL STRIP: NEGATIVE
KETONES UR QL STRIP: NEGATIVE
LEUKOCYTE ESTERASE UR QL STRIP: NEGATIVE
MICROALBUMIN UR DL<=1MG/L-MCNC: 51 UG/ML
NITRITE UR QL STRIP: NEGATIVE
PH UR STRIP: 7 [PH] (ref 5–8)
PROT UR QL STRIP: NEGATIVE
PROT UR-MCNC: 16 MG/DL (ref 0–15)
PROT/CREAT UR: 0.33 MG/G{CREAT} (ref 0–0.2)
SP GR UR STRIP: 1.01 (ref 1–1.03)
URN SPEC COLLECT METH UR: ABNORMAL

## 2024-02-05 PROCEDURE — 99204 OFFICE O/P NEW MOD 45 MIN: CPT | Mod: S$GLB,,, | Performed by: NURSE PRACTITIONER

## 2024-02-05 PROCEDURE — 82043 UR ALBUMIN QUANTITATIVE: CPT | Performed by: NURSE PRACTITIONER

## 2024-02-05 PROCEDURE — 84156 ASSAY OF PROTEIN URINE: CPT | Performed by: NURSE PRACTITIONER

## 2024-02-05 PROCEDURE — G2211 COMPLEX E/M VISIT ADD ON: HCPCS | Mod: S$GLB,,, | Performed by: NURSE PRACTITIONER

## 2024-02-05 PROCEDURE — 99999 PR PBB SHADOW E&M-EST. PATIENT-LVL V: CPT | Mod: PBBFAC,,, | Performed by: NURSE PRACTITIONER

## 2024-02-05 PROCEDURE — 81003 URINALYSIS AUTO W/O SCOPE: CPT | Performed by: NURSE PRACTITIONER

## 2024-02-05 NOTE — Clinical Note
Good afternoon, [employer] pt. I sent referral to RD, but he has many questions about meal planning. Is there a diabetic educator he can be referred to? I believe you see him soon.  Thanks, Shereen

## 2024-02-05 NOTE — PROGRESS NOTES
"Subjective:       Patient ID: Peter Lopez is a 88 y.o. AA male who presents for new evaluation of of renal dysfunction.      HPI     Patient is new to me. New to clinic.  Prior pertinent chart reviewed since this is patient's first appointment with me.    Patient presents for new evaluation of CKD.  Baseline creatinine of 1.8- 2.0 mg/dL.    Had CT with contrast on 12/13/23.    Significant other medical problems include ILD, centrilobular emphysema, chronic respiratory failure, HTN, aortic atherosclerosis, longstanding HTN, monoclonal gammopathy, active prostate cancer, DM, sleep apnea, longstanding DM  Social history: . Daughter lives in [place]. Works part time still (as ?)    The patient denies taking NSAIDs, herbal supplements, or new antibiotics, recreational drugs, recent episode of dehydration, diarrhea, nausea or vomiting, acute illness. Takes Centrum 50+.    Significant family hx includes: cousin was on dialysis.    Last renal US: 2021, reviewed.    Review of Systems   HENT:  Positive for hearing loss (wears hearing aids).    Respiratory:  Positive for shortness of breath.    Cardiovascular:  Positive for leg swelling (sometimes).   Gastrointestinal:  Positive for constipation. Negative for diarrhea, nausea and vomiting.   Genitourinary:  Positive for urgency. Negative for difficulty urinating, dysuria and hematuria.        Nocturia - varying frequencies       Objective:       Blood pressure (!) 157/85, pulse 104, height 5' 11" (1.803 m), weight 81 kg (178 lb 9.2 oz), SpO2 (!) 93 %.  Physical Exam  Vitals reviewed.   Constitutional:       General: He is not in acute distress.     Appearance: He is well-developed.   Eyes:      Conjunctiva/sclera: Conjunctivae normal.   Cardiovascular:      Rate and Rhythm: Regular rhythm. Tachycardia present.   Pulmonary:      Effort: Pulmonary effort is normal. No respiratory distress.   Abdominal:      Tenderness: There is no right CVA tenderness or left CVA " "tenderness.   Musculoskeletal:      Cervical back: Neck supple.      Right lower leg: No edema.      Left lower leg: No edema.   Skin:     General: Skin is warm and dry.      Findings: No lesion or rash.   Neurological:      Mental Status: He is alert and oriented to person, place, and time.   Psychiatric:         Mood and Affect: Mood normal.         Behavior: Behavior normal.         Thought Content: Thought content normal.         Judgment: Judgment normal.           Lab Results   Component Value Date    CREATININE 2.0 (H) 01/30/2024     No results found for: "UTPCR"  Lab Results   Component Value Date     01/30/2024    K 4.9 01/30/2024    CO2 25 01/30/2024     01/30/2024     Lab Results   Component Value Date    PTH 38.3 06/06/2023    CALCIUM 9.6 01/30/2024    CAION 1.38 06/16/2020    PHOS 3.8 08/11/2023     Lab Results   Component Value Date    HGB 12.5 (L) 01/10/2024    WBC 3.56 (L) 01/10/2024    HCT 39.5 (L) 01/10/2024      Lab Results   Component Value Date    HGBA1C 8.6 (H) 01/30/2024     01/10/2024    BUN 22 01/30/2024     Lab Results   Component Value Date    LDLCALC 64.4 06/06/2023         Assessment:       1. Stage 3b chronic kidney disease    2. Type 2 diabetes mellitus with stage 3 chronic kidney disease, unspecified whether long term insulin use, unspecified whether stage 3a or 3b CKD    3. Anemia, unspecified type    4. Hypertension, unspecified type    5. Monoclonal gammopathy    6. Malignant neoplasm of prostate        Plan:   CKD stage 3B c eGFR 32-36 mL/min - had underlying CKD, clinically due to T2DM and HTN. Then had ARACELI in 2021 that appears to have occurred around time of treatment for prostate cancer. ARACELI did not resolve to previous baseline, but sCr has been stable since that time.    Educated patient to control BP, BG, remain well-hydrated, and avoid NSAIDs to prevent progression of CKD.    Intro to CKD education if his schedule allows.      UPCR Historically without " albuminuria.   Acid-base Bicarb WNL   Secondary hyperparathyroidism Ca okay   Anemia Hgb at goal for CKD. Followed by hem/onc for MGUS.   DM Longstanding; recently poorly-controlled. Documented retinopathy. Has many questions about diet.   Lipid Management On statin   ESRD planning Kidney Failure Risk Equation (Tangri)    Kidney Failure Risk at 2 years: 1.4%    Kidney Failure Risk at 5 years: 4.3%    Lab Results   Component Value Date    MICALBCREAT 27.8 06/06/2023    CREATININE 2.0 (H) 01/30/2024          HTN - High today on amlodipine 5 mg, olmesartan 40 mg    All questions patient had were answered.  Asked if further questions. None. F/u in clinic in 6 mos  with labs and urine prior to next visit or sooner if needed.  ER for emergency concerns.    Summary of Plan:  UA, UPCR, ACR  Diabetic nutritionist  avoid NSAID/ bactrim/ IV contrast/ gadolinium/ aminoglycoside where possible  RTC in 6 mos    Continued longitudinal management of serious, complex disease.

## 2024-02-06 ENCOUNTER — TELEPHONE (OUTPATIENT)
Dept: NEPHROLOGY | Facility: CLINIC | Age: 89
End: 2024-02-06
Payer: MEDICARE

## 2024-02-06 ENCOUNTER — OFFICE VISIT (OUTPATIENT)
Dept: FAMILY MEDICINE | Facility: CLINIC | Age: 89
End: 2024-02-06
Payer: MEDICARE

## 2024-02-06 VITALS
HEIGHT: 71 IN | TEMPERATURE: 98 F | HEART RATE: 106 BPM | BODY MASS INDEX: 25.24 KG/M2 | OXYGEN SATURATION: 96 % | WEIGHT: 180.31 LBS | DIASTOLIC BLOOD PRESSURE: 76 MMHG | SYSTOLIC BLOOD PRESSURE: 148 MMHG

## 2024-02-06 DIAGNOSIS — I10 ESSENTIAL HYPERTENSION: Chronic | ICD-10-CM

## 2024-02-06 DIAGNOSIS — I70.0 AORTIC ATHEROSCLEROSIS: Chronic | ICD-10-CM

## 2024-02-06 DIAGNOSIS — I27.20 PULMONARY HYPERTENSION: Chronic | ICD-10-CM

## 2024-02-06 DIAGNOSIS — N18.32 STAGE 3B CHRONIC KIDNEY DISEASE: ICD-10-CM

## 2024-02-06 DIAGNOSIS — E11.21 DIABETES MELLITUS WITH NEPHROPATHY: Primary | Chronic | ICD-10-CM

## 2024-02-06 DIAGNOSIS — J43.2 CENTRILOBULAR EMPHYSEMA: Chronic | ICD-10-CM

## 2024-02-06 DIAGNOSIS — J84.9 ILD (INTERSTITIAL LUNG DISEASE): Chronic | ICD-10-CM

## 2024-02-06 DIAGNOSIS — E11.3591 PROLIFERATIVE DIABETIC RETINOPATHY OF RIGHT EYE ASSOCIATED WITH TYPE 2 DIABETES MELLITUS, MACULAR EDEMA PRESENCE UNSPECIFIED: ICD-10-CM

## 2024-02-06 DIAGNOSIS — E11.3292 NONPROLIFERATIVE DIABETIC RETINOPATHY OF LEFT EYE: ICD-10-CM

## 2024-02-06 PROCEDURE — 99214 OFFICE O/P EST MOD 30 MIN: CPT | Mod: S$GLB,,, | Performed by: FAMILY MEDICINE

## 2024-02-06 PROCEDURE — 99999 PR PBB SHADOW E&M-EST. PATIENT-LVL IV: CPT | Mod: PBBFAC,,, | Performed by: FAMILY MEDICINE

## 2024-02-06 NOTE — PROGRESS NOTES
Pls call and inform pt that urine shows a little protein in it. This can be a sign of kidney damage from diabetes and high blood pressure. The olmesartan he is taking should help control it. He should continue to work on blood sugar and blood pressure control.

## 2024-02-06 NOTE — TELEPHONE ENCOUNTER
----- Message from Shereen Baldwin NP sent at 2/6/2024  8:33 AM CST -----  Pls call and inform pt that urine shows a little protein in it. This can be a sign of kidney damage from diabetes and high blood pressure. The olmesartan he is taking should help control it. He should continue to work on blood sugar and blood pressure control.

## 2024-02-07 ENCOUNTER — PATIENT OUTREACH (OUTPATIENT)
Dept: ADMINISTRATIVE | Facility: HOSPITAL | Age: 89
End: 2024-02-07
Payer: MEDICARE

## 2024-02-07 ENCOUNTER — OFFICE VISIT (OUTPATIENT)
Dept: UROLOGY | Facility: CLINIC | Age: 89
End: 2024-02-07
Payer: MEDICARE

## 2024-02-07 VITALS — BODY MASS INDEX: 25 KG/M2 | WEIGHT: 179.25 LBS

## 2024-02-07 DIAGNOSIS — C61 PROSTATE CANCER: Primary | ICD-10-CM

## 2024-02-07 PROCEDURE — G2211 COMPLEX E/M VISIT ADD ON: HCPCS | Mod: S$GLB,,, | Performed by: STUDENT IN AN ORGANIZED HEALTH CARE EDUCATION/TRAINING PROGRAM

## 2024-02-07 PROCEDURE — 99214 OFFICE O/P EST MOD 30 MIN: CPT | Mod: S$GLB,,, | Performed by: STUDENT IN AN ORGANIZED HEALTH CARE EDUCATION/TRAINING PROGRAM

## 2024-02-07 PROCEDURE — 99999 PR PBB SHADOW E&M-EST. PATIENT-LVL III: CPT | Mod: PBBFAC,,, | Performed by: STUDENT IN AN ORGANIZED HEALTH CARE EDUCATION/TRAINING PROGRAM

## 2024-02-07 NOTE — PROGRESS NOTES
Neph OV completed on 02/06/24. Nephrologist added to care team. Gap report updated.Immunization's updated/triggered.

## 2024-02-12 ENCOUNTER — TELEPHONE (OUTPATIENT)
Dept: UROLOGY | Facility: CLINIC | Age: 89
End: 2024-02-12
Payer: MEDICARE

## 2024-02-12 ENCOUNTER — LAB VISIT (OUTPATIENT)
Dept: LAB | Facility: HOSPITAL | Age: 89
End: 2024-02-12
Attending: STUDENT IN AN ORGANIZED HEALTH CARE EDUCATION/TRAINING PROGRAM
Payer: MEDICARE

## 2024-02-12 DIAGNOSIS — C61 PROSTATE CANCER: Primary | ICD-10-CM

## 2024-02-12 LAB
COMPLEXED PSA SERPL-MCNC: 0.01 NG/ML (ref 0–4)
TESTOST SERPL-MCNC: 11 NG/DL (ref 304–1227)

## 2024-02-12 PROCEDURE — 84403 ASSAY OF TOTAL TESTOSTERONE: CPT | Performed by: STUDENT IN AN ORGANIZED HEALTH CARE EDUCATION/TRAINING PROGRAM

## 2024-02-12 PROCEDURE — 84153 ASSAY OF PSA TOTAL: CPT | Performed by: STUDENT IN AN ORGANIZED HEALTH CARE EDUCATION/TRAINING PROGRAM

## 2024-02-12 PROCEDURE — 36415 COLL VENOUS BLD VENIPUNCTURE: CPT | Performed by: STUDENT IN AN ORGANIZED HEALTH CARE EDUCATION/TRAINING PROGRAM

## 2024-02-12 NOTE — TELEPHONE ENCOUNTER
----- Message from Jesus Cole MD sent at 2/12/2024  1:31 PM CST -----  Pls notify of acceptable PSA result, RTC 6 months with labs

## 2024-02-15 DIAGNOSIS — I10 ESSENTIAL HYPERTENSION: Chronic | ICD-10-CM

## 2024-02-15 NOTE — TELEPHONE ENCOUNTER
No care due was identified.  Health [facility] Embedded Care Due Messages. Reference number: [id number].   2/15/2024 5:00:49 PM CST

## 2024-02-16 ENCOUNTER — TELEPHONE (OUTPATIENT)
Dept: NEPHROLOGY | Facility: CLINIC | Age: 89
End: 2024-02-16
Payer: MEDICARE

## 2024-02-17 NOTE — TELEPHONE ENCOUNTER
Refill Routing Note   Medication(s) are not appropriate for processing by [facility] for the following reason(s):        Required vitals abnormal    [facility] action(s):  Defer               Appointments  past 12m or future 3m with PCP    Date Provider   Last Visit   2/6/2024 Lucas Lloyd Jr., MD   Next Visit   Visit date not found Lucas Lloyd Jr., MD   ED visits in past 90 days: 2        Note composed:6:12 PM 02/16/2024

## 2024-02-18 ENCOUNTER — TELEPHONE (OUTPATIENT)
Dept: FAMILY MEDICINE | Facility: CLINIC | Age: 89
End: 2024-02-18
Payer: MEDICARE

## 2024-02-18 PROBLEM — J96.11 CHRONIC RESPIRATORY FAILURE WITH HYPOXIA: Status: RESOLVED | Noted: 2024-01-12 | Resolved: 2024-02-18

## 2024-02-18 PROBLEM — J43.2 CENTRILOBULAR EMPHYSEMA: Chronic | Status: ACTIVE | Noted: 2020-08-10

## 2024-02-18 PROBLEM — E11.3292: Status: ACTIVE | Noted: 2024-02-18

## 2024-02-18 PROBLEM — E11.3591 PROLIFERATIVE DIABETIC RETINOPATHY OF RIGHT EYE ASSOCIATED WITH TYPE 2 DIABETES MELLITUS: Status: ACTIVE | Noted: 2024-02-18

## 2024-02-18 RX ORDER — AMLODIPINE BESYLATE 5 MG/1
5 TABLET ORAL
Qty: 90 TABLET | Refills: 3 | Status: SHIPPED | OUTPATIENT
Start: 2024-02-18

## 2024-02-18 NOTE — PROGRESS NOTES
"  Patient Name: Peter Lopez    : 1935  MRN: [id number]      Subjective:     Patient ID: Peter is a 88 y.o. male    Chief Complaint:  Diabetes    88-year-old male with hypertension, diabetes, chronic kidney disease, and emphysema presents for routine follow-up on his blood pressure and diabetes.  He is not checking his sugars or blood pressure routinely at home.  Recently saw pulmonology for ILD, and emphysema.         Review of Systems   Constitutional:  Negative for unexpected weight change.   HENT:  Negative for sore throat.    Respiratory:  Negative for wheezing.    Cardiovascular:  Negative for chest pain and palpitations.   Gastrointestinal:  Negative for abdominal pain, blood in stool and change in bowel habit.   Endocrine: Negative for polydipsia, polyphagia and polyuria.   Genitourinary:  Negative for dysuria and hematuria.   Musculoskeletal:  Negative for neck pain.   Integumentary:  Negative for pallor.   Neurological:  Negative for dizziness, seizures, numbness and headaches.   Psychiatric/Behavioral:  Negative for confusion. The patient is not nervous/anxious.         Objective:   BP (!) 148/76 (BP Location: Right arm, Patient Position: Sitting, BP Method: Medium (Manual))   Pulse 106   Temp 97.8 °F (36.6 °C) (Oral)   Ht 5' 11" (1.803 m)   Wt 81.8 kg (180 lb 5.4 oz)   SpO2 96%   BMI 25.15 kg/m²     Physical Exam  Vitals reviewed.   Constitutional:       General: He is not in acute distress.     Appearance: He is well-developed. He is not ill-appearing or diaphoretic.   HENT:      Head: Normocephalic.      Right Ear: External ear normal.      Left Ear: External ear normal.      Nose: Nose normal.      Mouth/Throat:      Pharynx: No pharyngeal swelling, oropharyngeal exudate or posterior oropharyngeal erythema.   Neck:      Trachea: No tracheal deviation.   Cardiovascular:      Rate and Rhythm: Regular rhythm. Tachycardia present.      Heart sounds: Normal heart sounds.   Pulmonary:      Effort: " Pulmonary effort is normal.      Breath sounds: Normal breath sounds. No wheezing or rales.   Abdominal:      General: Bowel sounds are normal.      Palpations: Abdomen is soft. Abdomen is not rigid. There is no mass.      Tenderness: There is no abdominal tenderness. There is no guarding or rebound.   Musculoskeletal:      Cervical back: Normal range of motion and neck supple.   Lymphadenopathy:      Cervical: No cervical adenopathy.   Neurological:      Mental Status: He is alert and oriented to person, place, and time.      Sensory: No sensory deficit.      Motor: No atrophy.      Gait: Gait normal.      Deep Tendon Reflexes:      Reflex Scores:       Patellar reflexes are 2+ on the right side and 2+ on the left side.       Assessment        ICD-10-CM ICD-9-CM   1. Diabetes mellitus with nephropathy  E11.21 250.40     583.81   2. Essential hypertension  I10 401.9   3. Stage 3b chronic kidney disease  N18.32 585.3   4. 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 Nonproliferative diabetic retinopathy of left eye  E11.3292 250.50     362.03   6. Aortic atherosclerosis  I70.0 440.0   7. Pulmonary hypertension  I27.20 416.8   8. ILD (interstitial lung disease)  J84.9 515   9. Centrilobular emphysema  J43.2 492.8         Plan:     1. Diabetes mellitus with nephropathy  Overview:  Lab Results   Component Value Date    HGBA1C 8.6 (H) 01/30/2024    HGBA1C 10.7 (H) 11/29/2023    HGBA1C 7.3 (H) 06/06/2023     Lab Results   Component Value Date    LDLCALC 64.4 06/06/2023     Lab Results   Component Value Date    MICALBCREAT 106.3 (H) 02/05/2024        Assessment & Plan:  A1c elevated but improving.  Patient prefers to continue working on diet.  Follow up in 3 months.    Orders:  -     Microalbumin/creatinine urine ratio; Future; Expected date: 05/18/2024  -     CBC auto differential; Future; Expected date:  05/18/2024  -     Comprehensive metabolic panel; Future; Expected date: 05/18/2024  -     Hemoglobin A1c; Future; Expected date: 05/18/2024  -     Lipid panel; Future; Expected date: 05/18/2024    2. Essential hypertension  Assessment & Plan:  Continue current regimen.    Orders:  -     Comprehensive metabolic panel; Future; Expected date: 05/18/2024    3. Stage 3b chronic kidney disease  Overview:  Lab Results   Component Value Date    EGFRNORACEVR 32 (A) 01/30/2024    EGFRNORACEVR 36 (A) 12/13/2023    EGFRNORACEVR 39 (A) 11/29/2023    EGFRNORACEVR 34 (A) 08/11/2023        Assessment & Plan:  Has nephrology follow up.  Continue good hydration and BP and glucose management.    Orders:  -     Microalbumin/creatinine urine ratio; Future; Expected date: 05/18/2024  -     CBC auto differential; Future; Expected date: 05/18/2024  -     Comprehensive metabolic panel; Future; Expected date: 05/18/2024    4. 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      5. Nonproliferative diabetic retinopathy of left eye  Overview:  10- Eye exam in media section    Management by ophthalmology.      6. Aortic atherosclerosis  Overview:  05- CXR  There is extensive aortic atherosclerosis    Assessment & Plan:  Continue statin      7. Pulmonary hypertension  Overview:  Echo with pasp 45 mmHg ef 50%.  Group 2 and 3 given ddx and copd.    Assessment & Plan:  Continue medical management      8. ILD (interstitial lung disease)  Overview:  12-  CT Chest  Lungs are symmetrically expanded with centrilobular emphysema. Similar fibrotic scarring present at the lung bases. No associated honeycombing. More subsegmental linear scarring at the inferomedial right middle lobe, also similar. No suspicious new nodule or mass.     Assessment & Plan:  Asymptomatic at present-  continue monitoring.      9. Centrilobular emphysema  Overview:  FEV1 60  Maintenance inhaler-spiriva  Quit in 1980s  Albuterol prn-not needing   Oxygen prn following covid but not needing now however walk test indicates that he would still benefit from this.   Will provide portablec concentrator  Pulmonary rehab- completed some but stop after prostate cancer  uptodate flu/covid/pneumonia vaccine.  Patient request that portable concentrator to be taken back.        Assessment & Plan:  Recommendations as per pulmonology.                  -Lucas Lloyd Jr., MD, AAHIVS      This office note has been dictated.  This dictation has been generated using M-Modal Fluency Direct dictation; some phonetic errors may occur.         There are no Patient Instructions on file for this visit.    Follow Up  No follow-ups on file.    Future Appointments   Date Time Provider Department Center   2/20/2024  2:00 PM Martine Kingsley MD [facility] HEM ONC [facility]   3/4/2024 11:40 AM Natalia Dominguez NP [facility] GASTRO [address]   4/29/2024  9:00 AM Lucia Aguiar DPM [facility] POD [place]   5/9/2024  9:00 AM CKD BASIC EDUCATION [facility] NEPHRO [address]   5/10/2024 10:00 AM Sarkis Hyman MD [facility] PULSM [facility]   8/7/2024  9:45 AM Jesus Cole MD [facility] URO [facility]

## 2024-02-20 ENCOUNTER — OFFICE VISIT (OUTPATIENT)
Dept: HEMATOLOGY/ONCOLOGY | Facility: CLINIC | Age: 89
End: 2024-02-20
Payer: MEDICARE

## 2024-02-20 VITALS
OXYGEN SATURATION: 95 % | HEART RATE: 106 BPM | BODY MASS INDEX: 24.85 KG/M2 | SYSTOLIC BLOOD PRESSURE: 133 MMHG | HEIGHT: 71 IN | DIASTOLIC BLOOD PRESSURE: 69 MMHG | WEIGHT: 177.5 LBS

## 2024-02-20 DIAGNOSIS — D47.2 MGUS (MONOCLONAL GAMMOPATHY OF UNKNOWN SIGNIFICANCE): ICD-10-CM

## 2024-02-20 DIAGNOSIS — D64.9 ANEMIA, UNSPECIFIED TYPE: ICD-10-CM

## 2024-02-20 DIAGNOSIS — D72.819 LEUKOPENIA, UNSPECIFIED TYPE: Primary | ICD-10-CM

## 2024-02-20 PROCEDURE — 99999 PR PBB SHADOW E&M-EST. PATIENT-LVL III: CPT | Mod: PBBFAC,,, | Performed by: INTERNAL MEDICINE

## 2024-02-20 PROCEDURE — 99214 OFFICE O/P EST MOD 30 MIN: CPT | Mod: S$GLB,,, | Performed by: INTERNAL MEDICINE

## 2024-02-20 NOTE — PROGRESS NOTES
Subjective     Patient ID: Peter Lopez is a 88 y.o. male.    Chief Complaint: No chief complaint on file.  Reason For Consultation: MGUS  HPI: Peter Lopez is a 87 y.o. male  has a past medical history of BPH (benign prostatic hyperplasia), Chronic hepatitis C without mention of hepatic coma, CKD (chronic kidney disease), COPD (chronic obstructive pulmonary disease), Diabetes mellitus type I, Diabetes with neurologic complications, H/O colonoscopy, [place] (hard of hearing), Hypertension, On home oxygen therapy, Prostate cancer, Retinopathy due to secondary diabetes mellitus,  seen today  for consultation for   MGUS. He is a poor historian. He was previously followed for leukopenia. Lost to follow up.   He also has  history of Stage IIIC (T3b, N0, M0, P>=20, G5) adenocarcinoma of the prostate.  He has a history of an elevated PSA. He underwent TURP on 6/11/21. There was Treece 9 (4+5) adenocarcinoma noted in the prostate chips. He began hormonal therapy with Casodex and Lupron given on 7/9/21.  He completed radiotherapy to the prostate, seminal vesicles and pelvic nodes on 9/29/21. He undergoes LUPRON inj q6 mos. No fevers, night sweats or wt loss..No fam hx of blood or bleeding d/o. Pt was taken off metformin a couple of months ago. Patient was also diagnosed with grace .Appetite and weight stable. He has chronic mild MONTELONGO, stable. He is here for further evaluation.     Past Medical History:   Diagnosis Date    BPH (benign prostatic hyperplasia)     Chronic hepatitis C without mention of hepatic coma     genotype 1b; treatment naive; s/p treatment    CKD (chronic kidney disease)     COPD (chronic obstructive pulmonary disease)     Diabetes mellitus type I     Diabetes with neurologic complications     H/O colonoscopy     [place] (hard of hearing)     Hypertension     On home oxygen therapy     as needed    Prostate cancer     Retinopathy due to secondary diabetes mellitus     Urinary retention     Wears hearing aid in both ears   "      Past Surgical History:   Procedure Laterality Date    NO PAST SURGERIES  2021    TRANSURETHRAL RESECTION OF PROSTATE USING BIPOLAR CAUTERY N/A 2021    Procedure: (TURP) Transuretheral Resection of Prostate with BIPOLAR CAUTERY;  Surgeon: Jesus Cole MD;  [facility]: [facility];  Service: Urology;  Laterality: N/A;  RN Pre OP 21.  Vaccinated.  C A        Social History     Tobacco Use    Smoking status: Former     Current packs/day: 0.00     Average packs/day: 1 pack/day for 40.0 years (40.0 ttl pk-yrs)     Types: Cigarettes     Start date: 1951     Quit date: 1991     Years since quittin.3    Smokeless tobacco: Former     Quit date: 1986   Substance Use Topics    Alcohol use: Not Currently     Alcohol/week: 0.0 standard drinks of alcohol     Comment: 2 bottles beer on weekends    Drug use: No    Asbestos exposure    ,      Review of Systems   Constitutional:  Negative for appetite change, fatigue, fever and unexpected weight change.   HENT:  Negative for mouth sores.    Eyes:  Negative for visual disturbance.   Respiratory:  Positive for shortness of breath. Negative for cough.    Cardiovascular:  Negative for chest pain.   Gastrointestinal:  Negative for abdominal pain and diarrhea.   Genitourinary:  Negative for frequency.   Musculoskeletal:  Negative for back pain.   Integumentary:  Negative for rash.   Neurological:  Negative for headaches.   Hematological:  Negative for adenopathy.   Psychiatric/Behavioral:  The patient is not nervous/anxious.           Objective     Vitals:    24 1400   BP: 133/69   Pulse: 106   SpO2: 95%   Weight: 80.5 kg (177 lb 7.5 oz)   Height: 5' 11" (1.803 m)       Physical Exam  Constitutional:       Appearance: He is well-developed.   HENT:      Head: Normocephalic.      Mouth/Throat:      Pharynx: No oropharyngeal exudate.   Eyes:      General: No scleral icterus.        Right eye: No discharge.         Left eye: No " discharge.      Conjunctiva/sclera: Conjunctivae normal.   Neck:      Thyroid: No thyromegaly.   Cardiovascular:      Rate and Rhythm: Normal rate and regular rhythm.      Heart sounds: Normal heart sounds. No murmur heard.  Pulmonary:      Effort: Pulmonary effort is normal.      Breath sounds: Normal breath sounds. No wheezing or rales.   Abdominal:      General: Bowel sounds are normal.      Palpations: Abdomen is soft.      Tenderness: There is no abdominal tenderness. There is no guarding or rebound.   Musculoskeletal:         General: No swelling. Normal range of motion.      Cervical back: Normal range of motion and neck supple.   Lymphadenopathy:      Cervical: No cervical adenopathy.      Upper Body:      Right upper body: No supraclavicular adenopathy.      Left upper body: No supraclavicular adenopathy.   Skin:     Findings: No erythema or rash.   Neurological:      Mental Status: He is alert and oriented to person, place, and time.      Cranial Nerves: No cranial nerve deficit.   Psychiatric:         Mood and Affect: Mood normal.         Behavior: Behavior normal.       Lab Results   Component Value Date    WBC 3.56 (L) 01/10/2024    HGB 12.5 (L) 01/10/2024    HCT 39.5 (L) 01/10/2024    MCV 88 01/10/2024     01/10/2024     Platelets-within normal limits   Red blood cells- decreased, normocytic hypochromic anemia   White blood cells-leukopenia with absolute lymphopenia   No morphologic abnormalities nor blasts on scanning     IgA kappa specific monoclonal protein band at the alpha-2/beta-1   junction identified and previously localized by immunotyping.   Persistent faint IgG lambda light chain specific monoclonal protein   band in gamma is also noted in this study.        Signed on 06/21/23 at 07:41   Normal total protein. There is a paraprotein band in the alpha-beta   junction (localized by immunotyping) = 0.21 g/dL, previously 0.28   g/dL.   Correlate with reflex MAGDY result.     Assessment and  Plan     1. Leukopenia, unspecified type    2. Anemia, unspecified type    3. MGUS (monoclonal gammopathy of unknown significance)    1. Leukopenia, unspecified type  Plan bmbx for further eval    2. Anemia, unspecified type  Labs reviewed   Plan bmbx for further eval    3. MGUS (monoclonal gammopathy of unknown significance)  Plan bmbx for further eval      SCHEDULE BMBX PER [facility]  BMBX CONSENT AND SCAN IN CHART  FOLLOW UP 2 WEEKS AFTER BMBX

## 2024-02-20 NOTE — Clinical Note
SCHEDULE BMBX PER [facility] BMBX CONSENT AND SCAN IN CHART FOLLOW UP 2 WEEKS AFTER BMBX  Epidermal Autograft Text: The defect edges were debeveled with a #15 scalpel blade. Given the location of the defect, shape of the defect and the proximity to free margins an epidermal autograft was deemed most appropriate. Using a sterile surgical marker, the primary defect shape was transferred to the donor site. The epidermal graft was then harvested.  The skin graft was then placed in the primary defect and oriented appropriately.

## 2024-02-22 DIAGNOSIS — I10 ESSENTIAL HYPERTENSION: Chronic | ICD-10-CM

## 2024-02-22 NOTE — TELEPHONE ENCOUNTER
Refill Routing Note   Medication(s) are not appropriate for processing by [facility] for the following reason(s):        Required labs abnormal    [facility] action(s):  Defer        Medication Therapy Plan: sCr out of range    Pharmacist review requested: Yes     Appointments  past 12m or future 3m with PCP    Date Provider   Last Visit   2/6/2024 Lucas Lloyd Jr., MD   Next Visit   5/20/2024 Lucas Lloyd Jr., MD   ED visits in past 90 days: 2        Note composed:9:49 AM 02/22/2024

## 2024-02-22 NOTE — TELEPHONE ENCOUNTER
No care due was identified.  Health [facility] Embedded Care Due Messages. Reference number: [id number].   2/22/2024 9:02:12 AM CST

## 2024-02-22 NOTE — TELEPHONE ENCOUNTER
Refill Routing Note   Medication(s) are not appropriate for processing by [facility] for the following reason(s):        Required labs abnormal-Max of 20mg daily recommended eGFR <40    [facility] action(s):  Defer        Medication Therapy Plan: sCr out of range    Pharmacist review requested: Yes     Appointments  past 12m or future 3m with PCP    Date Provider   Last Visit   2/6/2024 Lucas Lloyd Jr., MD   Next Visit   5/20/2024 Lucas Lloyd Jr., MD   ED visits in past 90 days: 2        Note composed:10:48 AM 02/22/2024

## 2024-02-23 RX ORDER — OLMESARTAN MEDOXOMIL 40 MG/1
40 TABLET ORAL
Qty: 90 TABLET | Refills: 3 | Status: SHIPPED | OUTPATIENT
Start: 2024-02-23

## 2024-03-01 ENCOUNTER — TELEPHONE (OUTPATIENT)
Dept: HEMATOLOGY/ONCOLOGY | Facility: CLINIC | Age: 89
End: 2024-03-01
Payer: MEDICARE

## 2024-03-01 NOTE — TELEPHONE ENCOUNTER
TC  from pt's daughter Mckinley inquiring as to why pt needed Bone Marrow biopsy . Reason explained in detail and all questions answered to patients daughter's  satisfaction  Explained toAthough other issues can cause this we want to make sure that he does not have an underlying malignancy  She acknowledged understanding.

## 2024-03-02 ENCOUNTER — HOSPITAL ENCOUNTER (EMERGENCY)
Facility: HOSPITAL | Age: 89
Discharge: HOME | End: 2024-03-02
Attending: EMERGENCY MEDICINE
Payer: MEDICARE

## 2024-03-02 VITALS
OXYGEN SATURATION: 95 % | SYSTOLIC BLOOD PRESSURE: 148 MMHG | TEMPERATURE: 98 F | HEART RATE: 98 BPM | BODY MASS INDEX: 25.2 KG/M2 | HEIGHT: 71 IN | RESPIRATION RATE: 16 BRPM | DIASTOLIC BLOOD PRESSURE: 86 MMHG | WEIGHT: 180 LBS

## 2024-03-02 DIAGNOSIS — R00.0 TACHYCARDIA: ICD-10-CM

## 2024-03-02 DIAGNOSIS — R10.13 DYSPEPSIA: Primary | ICD-10-CM

## 2024-03-02 DIAGNOSIS — R33.9 URINARY RETENTION: ICD-10-CM

## 2024-03-02 LAB
ALBUMIN SERPL BCP-MCNC: 3.9 G/DL (ref 3.5–5.2)
ALP SERPL-CCNC: 51 U/L (ref 55–135)
ALT SERPL W/O P-5'-P-CCNC: 28 U/L (ref 10–44)
ANION GAP SERPL CALC-SCNC: 11 MMOL/L (ref 8–16)
AST SERPL-CCNC: 35 U/L (ref 10–40)
BASOPHILS # BLD AUTO: 0.03 K/UL (ref 0–0.2)
BASOPHILS NFR BLD: 0.9 % (ref 0–1.9)
BILIRUB SERPL-MCNC: 0.5 MG/DL (ref 0.1–1)
BILIRUB UR QL STRIP: NEGATIVE
BUN SERPL-MCNC: 22 MG/DL (ref 8–23)
CALCIUM SERPL-MCNC: 10.1 MG/DL (ref 8.7–10.5)
CHLORIDE SERPL-SCNC: 104 MMOL/L (ref 95–110)
CLARITY UR: CLEAR
CO2 SERPL-SCNC: 26 MMOL/L (ref 23–29)
COLOR UR: COLORLESS
CREAT SERPL-MCNC: 2 MG/DL (ref 0.5–1.4)
CTP QC/QA: YES
CTP QC/QA: YES
DIFFERENTIAL METHOD BLD: ABNORMAL
EOSINOPHIL # BLD AUTO: 0.1 K/UL (ref 0–0.5)
EOSINOPHIL NFR BLD: 1.5 % (ref 0–8)
ERYTHROCYTE [DISTWIDTH] IN BLOOD BY AUTOMATED COUNT: 14.2 % (ref 11.5–14.5)
EST. GFR  (NO RACE VARIABLE): 32 ML/MIN/1.73 M^2
GLUCOSE SERPL-MCNC: 157 MG/DL (ref 70–110)
GLUCOSE UR QL STRIP: NEGATIVE
HCT VFR BLD AUTO: 37.7 % (ref 40–54)
HGB BLD-MCNC: 12.4 G/DL (ref 14–18)
HGB UR QL STRIP: NEGATIVE
IMM GRANULOCYTES # BLD AUTO: 0.01 K/UL (ref 0–0.04)
IMM GRANULOCYTES NFR BLD AUTO: 0.3 % (ref 0–0.5)
KETONES UR QL STRIP: NEGATIVE
LACTATE SERPL-SCNC: 1.1 MMOL/L (ref 0.5–2.2)
LEUKOCYTE ESTERASE UR QL STRIP: NEGATIVE
LIPASE SERPL-CCNC: 61 U/L (ref 4–60)
LYMPHOCYTES # BLD AUTO: 0.8 K/UL (ref 1–4.8)
LYMPHOCYTES NFR BLD: 22.4 % (ref 18–48)
MCH RBC QN AUTO: 29.7 PG (ref 27–31)
MCHC RBC AUTO-ENTMCNC: 32.9 G/DL (ref 32–36)
MCV RBC AUTO: 90 FL (ref 82–98)
MONOCYTES # BLD AUTO: 0.3 K/UL (ref 0.3–1)
MONOCYTES NFR BLD: 10 % (ref 4–15)
NEUTROPHILS # BLD AUTO: 2.2 K/UL (ref 1.8–7.7)
NEUTROPHILS NFR BLD: 64.9 % (ref 38–73)
NITRITE UR QL STRIP: NEGATIVE
NRBC BLD-RTO: 0 /100 WBC
PH UR STRIP: 7 [PH] (ref 5–8)
PLATELET # BLD AUTO: 240 K/UL (ref 150–450)
PMV BLD AUTO: 9.6 FL (ref 9.2–12.9)
POC MOLECULAR INFLUENZA A AGN: NEGATIVE
POC MOLECULAR INFLUENZA B AGN: NEGATIVE
POTASSIUM SERPL-SCNC: 4.7 MMOL/L (ref 3.5–5.1)
PROT SERPL-MCNC: 7.3 G/DL (ref 6–8.4)
PROT UR QL STRIP: NEGATIVE
RBC # BLD AUTO: 4.17 M/UL (ref 4.6–6.2)
SARS-COV-2 RDRP RESP QL NAA+PROBE: NEGATIVE
SODIUM SERPL-SCNC: 141 MMOL/L (ref 136–145)
SP GR UR STRIP: 1 (ref 1–1.03)
URN SPEC COLLECT METH UR: ABNORMAL
UROBILINOGEN UR STRIP-ACNC: NEGATIVE EU/DL
WBC # BLD AUTO: 3.4 K/UL (ref 3.9–12.7)

## 2024-03-02 PROCEDURE — 93005 ELECTROCARDIOGRAM TRACING: CPT

## 2024-03-02 PROCEDURE — 93010 ELECTROCARDIOGRAM REPORT: CPT | Mod: ,,, | Performed by: INTERNAL MEDICINE

## 2024-03-02 PROCEDURE — 96360 HYDRATION IV INFUSION INIT: CPT

## 2024-03-02 PROCEDURE — 81003 URINALYSIS AUTO W/O SCOPE: CPT | Performed by: EMERGENCY MEDICINE

## 2024-03-02 PROCEDURE — 25000003 PHARM REV CODE 250: Performed by: EMERGENCY MEDICINE

## 2024-03-02 PROCEDURE — 87502 INFLUENZA DNA AMP PROBE: CPT

## 2024-03-02 PROCEDURE — 83690 ASSAY OF LIPASE: CPT | Performed by: EMERGENCY MEDICINE

## 2024-03-02 PROCEDURE — 87635 SARS-COV-2 COVID-19 AMP PRB: CPT | Performed by: EMERGENCY MEDICINE

## 2024-03-02 PROCEDURE — 85025 COMPLETE CBC W/AUTO DIFF WBC: CPT | Performed by: EMERGENCY MEDICINE

## 2024-03-02 PROCEDURE — 99284 EMERGENCY DEPT VISIT MOD MDM: CPT | Mod: 25

## 2024-03-02 PROCEDURE — 83605 ASSAY OF LACTIC ACID: CPT | Performed by: EMERGENCY MEDICINE

## 2024-03-02 PROCEDURE — 80053 COMPREHEN METABOLIC PANEL: CPT | Performed by: EMERGENCY MEDICINE

## 2024-03-02 RX ORDER — PANTOPRAZOLE SODIUM 40 MG/1
40 TABLET, DELAYED RELEASE ORAL DAILY
Qty: 30 TABLET | Refills: 1 | Status: SHIPPED | OUTPATIENT
Start: 2024-03-02 | End: 2024-03-04 | Stop reason: SDUPTHER

## 2024-03-02 RX ORDER — LIDOCAINE HYDROCHLORIDE 20 MG/ML
15 SOLUTION OROPHARYNGEAL ONCE
Status: COMPLETED | OUTPATIENT
Start: 2024-03-02 | End: 2024-03-02

## 2024-03-02 RX ORDER — SUCRALFATE 1 G/1
1 TABLET ORAL
Qty: 40 TABLET | Refills: 0 | Status: SHIPPED | OUTPATIENT
Start: 2024-03-02 | End: 2024-03-12

## 2024-03-02 RX ORDER — ALUMINUM HYDROXIDE, MAGNESIUM HYDROXIDE, AND SIMETHICONE 1200; 120; 1200 MG/30ML; MG/30ML; MG/30ML
30 SUSPENSION ORAL ONCE
Status: COMPLETED | OUTPATIENT
Start: 2024-03-02 | End: 2024-03-02

## 2024-03-02 RX ADMIN — ALUMINUM HYDROXIDE, MAGNESIUM HYDROXIDE, AND DIMETHICONE 30 ML: 200; 20; 200 SUSPENSION ORAL at 06:03

## 2024-03-02 RX ADMIN — LIDOCAINE HYDROCHLORIDE 15 ML: 20 SOLUTION OROPHARYNGEAL at 06:03

## 2024-03-02 RX ADMIN — SODIUM CHLORIDE 1000 ML: 9 INJECTION, SOLUTION INTRAVENOUS at 06:03

## 2024-03-02 NOTE — ED TRIAGE NOTES
Pt reports burning sensation to throat when belches and epigastric pain x 4 days.  No relief with peptp bismol.  Denies chest pain, sob, constipation, bloody stools.

## 2024-03-03 NOTE — ED PROVIDER NOTES
Encounter Date: 3/2/2024    SCRIBE #1 NOTE: I, David Jarvis, am scribing for, and in the presence of,  Rich Mcleod MD.       History     Chief Complaint   Patient presents with    Abdominal Pain     Patient reports abdominal pain with cough and congestion that started last night, states good BM today     Peter Lopez is a 88 y.o. male with a PMHx of BPH, CKD stage 3b, ILD, centrilobular emphysema, COPD, DM type 1, HTN, prostate cancer s/p TURP, who presents to the ED for evaluation of intermittent burning generalized abdominal pain for 3-5 days. Patient reports his pain is mostly present/worsened with belching. He also reports a cough since yesterday. He notes he feels nervous due to being in the ED. He notes an upcoming appointment with GI on Monday. Denies previous abdominal surgeries. Reports normal BM today. No medications taken PTA. No alleviating or exacerbating factors noted. Denies fever, chills, dysuria, hematuria, sore throat, rhinorrhea, nausea, vomiting, decreased appetite, or other associated symptoms. NKDA.     The history is provided by the patient. No  was used.     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  Vaccinated.  C A     Family History   Problem Relation Age of Onset    Cancer Mother     Asbestos Father     Liver disease Neg Hx      Social History     Tobacco Use    Smoking status: Former     Current packs/day: 0.00     Average packs/day: 1 pack/day for 40.0 years (40.0 ttl pk-yrs)     Types: Cigarettes     Start date: 1951     Quit date: 1991     Years since quittin.3    Smokeless tobacco: Former     Quit date: 1986   Substance Use Topics    Alcohol use: Not Currently     Alcohol/week: 0.0 standard drinks of alcohol     Comment: 2 bottles beer on weekends    Drug use: No     Review of Systems   Constitutional:  Negative for appetite change, chills and fever.   HENT:  Negative for congestion, rhinorrhea and sore throat.    Respiratory:  Positive for cough. Negative for shortness of breath.    Gastrointestinal:  Positive for abdominal pain. Negative for diarrhea, nausea and vomiting.   Genitourinary:  Negative for dysuria.   Musculoskeletal:  Negative for back pain.   Skin:  Negative for rash.   Neurological:  Negative for headaches.       Physical Exam     Initial Vitals [24 1743]   BP Pulse Resp Temp SpO2   (!) 149/83 (!) 111 20 98 °F (36.7 °C) 96 %      MAP       --         Physical Exam    Nursing note and vitals reviewed.  Constitutional: He appears well-developed and well-nourished.   HENT:   Head: Atraumatic.   Eyes: EOM are normal. Pupils are equal, round, and reactive to light.   Neck: Neck supple. No JVD present.   Normal range of motion.  Cardiovascular:  Regular rhythm, normal heart sounds and intact distal pulses.   Tachycardia present.   Exam reveals no gallop and no friction rub.       No murmur heard.  HR in the 110s.   Pulmonary/Chest: Breath sounds normal. No respiratory distress.   Abdominal: Abdomen is soft. Bowel  sounds are normal. There is no abdominal tenderness. There is no rebound and no guarding.   Musculoskeletal:         General: Normal range of motion.      Cervical back: Normal range of motion and neck supple.     Lymphadenopathy:     He has no cervical adenopathy.   Neurological: He is alert and oriented to person, place, and time. He has normal strength.   Skin: Skin is warm and dry.   Psychiatric: He has a normal mood and affect. Thought content normal.         ED Course   Procedures  Labs Reviewed   CBC W/ AUTO DIFFERENTIAL - Abnormal; Notable for the following components:       Result Value    WBC 3.40 (*)     RBC 4.17 (*)     Hemoglobin 12.4 (*)     Hematocrit 37.7 (*)     Lymph # 0.8 (*)     All other components within normal limits   COMPREHENSIVE METABOLIC PANEL - Abnormal; Notable for the following components:    Glucose 157 (*)     Creatinine 2.0 (*)     Alkaline Phosphatase 51 (*)     eGFR 32 (*)     All other components within normal limits   LIPASE - Abnormal; Notable for the following components:    Lipase 61 (*)     All other components within normal limits   URINALYSIS, REFLEX TO URINE CULTURE - Abnormal; Notable for the following components:    Color, UA Colorless (*)     All other components within normal limits    Narrative:     Specimen Source->Urine   LACTIC ACID, PLASMA   SARS-COV-2 RDRP GENE   POCT INFLUENZA A/B MOLECULAR     EKG Readings: (Independently Interpreted)   Initial Reading: No STEMI. Rhythm: Sinus Tachycardia. Heart Rate: 101. Ectopy: No Ectopy. Conduction: Normal. ST Segments: Normal ST Segments. T Waves: Normal. Axis: Normal. Clinical Impression: Sinus Tachycardia       Imaging Results    None          Medications   sodium chloride 0.9% bolus 1,000 mL 1,000 mL (0 mLs Intravenous Stopped 3/2/24 1930)   aluminum-magnesium hydroxide-simethicone 200-200-20 mg/5 mL suspension 30 mL (30 mLs Oral Given 3/2/24 1831)     And   LIDOcaine viscous HCl 2% oral solution 15 mL (15 mLs Oral  Given 3/2/24 1831)     Medical Decision Making  This is an emergent evaluation of a 88 y.o. male who presents with a PMHx of BPH, CKD stage 3b, ILD, centrilobular emphysema, COPD, DM type 1, HTN, prostate cancer s/p TURP, who presents to the ED for evaluation of intermittent burning generalized abdominal pain for 3-5 days. Patient reports his pain is mostly present/worsened with belching. He also reports a cough since yesterday. He notes he feels nervous due to being in the ED. He notes an upcoming appointment with GI on Monday. The patient was seen and examined. The history and physical exam was obtained. On exam, patient is tachycardic in the 110s. Physical examination is otherwise unremarkable. The nursing notes and vital signs were reviewed. Secondary to symptoms and examination findings, I ordered CBC, CMP, UA, POC covid/influenza, lactic acid level, EKG 12-lead. Will treat with IV fluids, GI cocktail.      Differential diagnosis includes, but it is not limited to: GERD, gastritis, gastroenteritis, viral syndrome, ACS, dissection.      Amount and/or Complexity of Data Reviewed  Labs: ordered.  ECG/medicine tests: ordered and independent interpretation performed.     Details: EKGs independently interpreted by Rich Mcleod MD reads: see prior notes.     Risk  OTC drugs.  Prescription drug management.    Patient presents complaining of a burning sensation in his abdomen.  However it has not present at this time.  He has been ongoing for about 4 days.  No other associated symptoms.  Patient states he has significant improvement with GI cocktail although he stated did not really have any symptoms start with.  He states he feels much better after the GI cocktail.  Patient has no elevated white cell count.  No significant anemia.  His creatinine is 2.0 which is his baseline.  No other acute electrolyte or liver abnormalities.  Urinalysis shows no evidence of infection.  Patient has urinated 4 times while in the  emergency department.  Postvoid residual bladder scan shows a urine volume of 200.  This would be consistent with urinary retention.  However does not warrant Hay catheter placement and the possible complications of that at this time.  Patient states he has a urologist.  He states he gets a shot for his prostate.  Will not start on any prostate medications at this time.  Will refer to his urologist.  Will start PPI and Carafate.  Patient states he has a GI appointment Monday.  Repeat serial abdominal exams while in the emergency department show no tenderness rebound or guarding.  Clinically I doubt pancreatitis, bowel obstruction, other acute surgical intra-abdominal pathology.        Scribe Attestation:   Scribe #1: I performed the above scribed service and the documentation accurately describes the services I performed. I attest to the accuracy of the note.                             I, Rich Mcleod, personally performed the services described in this documentation. All medical record entries made by the scribe were at my direction and in my presence. I have reviewed the chart and agree that the record reflects my personal performance and is accurate and complete.    Clinical Impression:  Final diagnoses:  [R00.0] Tachycardia  [R10.13] Dyspepsia (Primary)  [R33.9] Urinary retention          ED Disposition Condition    Discharge Stable          ED Prescriptions       Medication Sig Dispense Start Date End Date Auth. Provider    pantoprazole (PROTONIX) 40 MG tablet Take 1 tablet (40 mg total) by mouth once daily. 30 tablet 3/2/2024 3/2/2025 Rich Mcleod MD    sucralfate (CARAFATE) 1 gram tablet Take 1 tablet (1 g total) by mouth 4 (four) times daily before meals and nightly. for 10 days 40 tablet 3/2/2024 3/12/2024 Rich Mcleod MD          Follow-up Information       Follow up With Specialties Details Why Contact Lucas Lopez Jr., MD Family Medicine Schedule an appointment as soon  as possible for a visit   [address] [facility] [address]  [phone]          Keep your Gastroenterology appointment for this week. Also call your Urologist for close recheck due to you not emptying your bladder and urianry frequency.    [facility] - Emergency Dept Emergency Medicine  As needed, If symptoms worsen or if new symtpoms develop such as fever, vomiting, inability to urinate. [address] [facility] [place] [id number] [phone]             Rich Mcleod MD  03/02/24 2006

## 2024-03-04 ENCOUNTER — TELEPHONE (OUTPATIENT)
Dept: ENDOSCOPY | Facility: HOSPITAL | Age: 89
End: 2024-03-04
Payer: MEDICARE

## 2024-03-04 ENCOUNTER — OFFICE VISIT (OUTPATIENT)
Dept: GASTROENTEROLOGY | Facility: CLINIC | Age: 89
End: 2024-03-04
Payer: MEDICARE

## 2024-03-04 VITALS
HEIGHT: 71 IN | SYSTOLIC BLOOD PRESSURE: 128 MMHG | BODY MASS INDEX: 25.31 KG/M2 | DIASTOLIC BLOOD PRESSURE: 79 MMHG | WEIGHT: 180.75 LBS | HEART RATE: 101 BPM

## 2024-03-04 DIAGNOSIS — R10.9 ABDOMINAL PAIN, UNSPECIFIED ABDOMINAL LOCATION: ICD-10-CM

## 2024-03-04 DIAGNOSIS — R13.10 DYSPHAGIA, UNSPECIFIED TYPE: ICD-10-CM

## 2024-03-04 DIAGNOSIS — K21.9 GASTROESOPHAGEAL REFLUX DISEASE, UNSPECIFIED WHETHER ESOPHAGITIS PRESENT: Primary | ICD-10-CM

## 2024-03-04 DIAGNOSIS — K21.9 GASTROESOPHAGEAL REFLUX DISEASE, UNSPECIFIED WHETHER ESOPHAGITIS PRESENT: ICD-10-CM

## 2024-03-04 DIAGNOSIS — R10.13 EPIGASTRIC PAIN: Primary | ICD-10-CM

## 2024-03-04 LAB
OHS QRS DURATION: 82 MS
OHS QTC CALCULATION: 477 MS

## 2024-03-04 PROCEDURE — 99214 OFFICE O/P EST MOD 30 MIN: CPT | Mod: S$GLB,,,

## 2024-03-04 PROCEDURE — 99999 PR PBB SHADOW E&M-EST. PATIENT-LVL IV: CPT | Mod: PBBFAC,,,

## 2024-03-04 RX ORDER — PANTOPRAZOLE SODIUM 40 MG/1
40 TABLET, DELAYED RELEASE ORAL DAILY
Qty: 30 TABLET | Refills: 5 | Status: SHIPPED | OUTPATIENT
Start: 2024-03-04 | End: 2024-08-31

## 2024-03-04 NOTE — TELEPHONE ENCOUNTER
Spoke to pt to schedule procedure(s) Upper Endoscopy (EGD)       Physician to perform procedure(s) Dr. AYDE Lima  Date of Procedure (s) 05/06/24  Arrival Time 7:00 AM  Time of Procedure(s) 8:00 AM   Location of Procedure(s) [place] 4th Floor  Type of Rx Prep sent to patient: N/A  Instructions provided to patient via Given in Clinic    Patient was informed on the following information and verbalized understanding. Screening questionnaire reviewed with patient and complete. If procedure requires anesthesia, a responsible adult needs to be present to accompany the patient home, patient cannot drive after receiving anesthesia. Appointment details are tentative, especially check-in time. Patient will receive a prep-op call 7 days prior to confirm check-in time for procedure. If applicable the patient should contact their pharmacy to verify Rx for procedure prep is ready for pick-up. Patient was advised to call the scheduling department at [phone] if pharmacy states no Rx is available. Patient was advised to call the endoscopy scheduling department if any questions or concerns arise.      [facility] Endoscopy Scheduling Department

## 2024-03-04 NOTE — PROGRESS NOTES
"GENERAL GI PATIENT INTAKE:    COVID symptoms in the last 7 days (runny nose, sore throat, congestion, cough, fever): No  PCP: Lucas Lloyd Jr.  If not PCP-  number given to establish [phone]: N/A    ALLERGIES REVIEWED:  Yes    CHIEF COMPLAINT:    Chief Complaint   Patient presents with    GI Problem     Burning sensation in stomach       VITAL SIGNS:  /79   Pulse 101   Ht 5' 11" (1.803 m)   Wt 82 kg (180 lb 12.4 oz)   BMI 25.21 kg/m²      Change in medical, surgical, family or social history: No      REVIEWED MEDICATION LIST RECONCILED INCLUDING ABOVE MEDS:  Yes     "

## 2024-03-04 NOTE — TELEPHONE ENCOUNTER
If you are taking any injectable medication (s) for weight loss and/or diabetes  weekly, please hold for 8 days prior to your scheduled procedure .    IMPORTANT INFORMATION TO KNOW BEFORE YOUR PROCEDURE    [facility] 4th Floor    If your procedure requires the administration of anesthesia, it is necessary for a responsible adult to drive you home. (Medical Transportation, Uber, Lyft, Taxi, etc. may ONLY be used if a responsible adult is present to accompany you home.  The responsible adult CAN'T be the  of the service).      person must be available to return to pick you up within 15 minutes of being notified of discharge.       Please bring a picture ID, insurance card, & copayment      Take Medications as directed below:        If you begin taking any blood thinning medications or injectable weight loss/diabetes medications (other than insulin) , please contact the endoscopy scheduling department listed below as soon as possible.    If you are diabetic see the attached instruction sheet regarding your medication.     If you take HEART, BLOOD PRESSURE, SEIZURE, PAIN, LUNG (including inhalers/nebulizers), ANTI-REJECTION (transplant patients), or PSYCHIATRIC medications, please take at your regular times with a sip of water or as directed by the scheduling nurse.     Important contact information:    Endoscopy [phone] Hours of operation Monday-Friday 8:00-4:30pm.    Questions about insurance or financial obligations call [phone] or [phone].    If you have questions regarding the prep or need to reschedule, please call [phone]. After hours questions requiring immediate assistance, contact [facility] nurse line at [phone] or [phone].   NOTE:     On occasion, unforeseen circumstances may cause a delay in your procedure start time. We respect your time and appreciate your patience during these circumstances.      Comments:           EGD Procedure Prep Instructions      Date of procedure: 05/06/24  Arrive at: 7:00AM      Location of Department: [address] [place] [id number]  Take the [facility] Elevators to 4th Floor Endoscopy Lab    How to prep:    Day Before Procedure: 05/05/24     You may have a light evening meal.   No solid food after 7:00 pm.   Continue drinking clear liquids.       Day of the Procedure:  05/06/24     You may have water/clear liquids until 4 hours before your procedure or as directed by the scheduling nurse  4:00AM. See below for list.    What You CANNOT do:   Do not drink milk or anything colored red.  Do not drink alcohol.  No gum chewing or candy morning of procedure.    Liquids That Are OK to Drink:   Water  Sports drinks (Gatorade, Power-Aid)  Coffee or tea (no cream or nondairy creamer)  Clear juices without pulp (apple, white grape)  Gelatin desserts (no fruit or toppings)  Clear soda (sprite, coke, ginger ale)  Chicken broth (until 12 midnight the night before procedure)      Comments:

## 2024-03-04 NOTE — PATIENT INSTRUCTIONS
--For GERD/Reflux:     Take your PPI (Pantoprazole) 30-45 minutes before your first protein containing meal (breakfast) every day. Take once daily. Will get EGD to further evaluate your symptoms. If they improve with medication, it would be ok to cancel the EGD.    Take the Carafate with meals for 2 weeks.      Take Pepcid 20mg every evening before bedtime to help with nocturnal symptoms, as needed.     Remain upright for at least 3 hours after eating.      Elevate the head of the bed for nighttime.      Avoid foods that you have noticed make your symptoms worse (possible triggers include: peppermint, alcohol, chocolate, caffeine, spicy foods, greasy/fried foods, acidic foods-citrus).

## 2024-03-04 NOTE — PROGRESS NOTES
Gastroenterology Clinic Consultation Note    Reason for Visit:  The primary encounter diagnosis was Epigastric pain. Diagnoses of Dysphagia, unspecified type and Gastroesophageal reflux disease, unspecified whether esophagitis present were also pertinent to this visit.    PCP:   Lucas Lloyd Jr.   [address] [facility] [address]      Initial HPI   This is a 88 y.o. male presenting for ER followup on 3/2/24 for his abdominal pain and dysphagia. Symptoms have been ongoing for a couple of months, but got worst last week. Was started on PPI and Carafate after his ER visit for his symptoms. Has started taking the Protonix, but not the Carafate yet. Patient reports stomach burning mid abdomen that will move towards his epigastric area. Worst with belching. Symptoms resolved after GI Cocktail given in ER. Feeling better since starting Protonix. Pain started again this AM, but improved after taking his Protonix. Denies melena, odynophagia. Does report some mild dysphagia to meat the last couple of months. Will drink water and this mobilizes his food bolus. Denies NSAID usage. Moving his bowels daily. No concerns with constipation. Denies unintentional weight loss. Previous smoker, but quit about 3 years ago.     ROS:  Review of Systems   Constitutional:  Negative for chills, diaphoresis, fever, malaise/fatigue and weight loss.   HENT:  Negative for sore throat.    Eyes:  Negative for redness.   Gastrointestinal:  Positive for abdominal pain and heartburn. Negative for blood in stool, constipation, diarrhea, melena, nausea and vomiting.   Skin:  Negative for itching and rash.   Neurological:  Negative for dizziness, loss of consciousness and weakness.        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    Surgical History:  has a past surgical history that includes No past surgeries (05/20/2021) and Transurethral resection of prostate using bipolar cautery (N/A, 6/22/2021).    Family History: family history includes Asbestos in his father; Cancer in his mother..       Review of patient's allergies indicates:  No Known Allergies    Current Outpatient Medications on File Prior to Visit   Medication Sig Dispense Refill    amLODIPine (NORVASC) 5 MG tablet Take 1 tablet by mouth once daily 90 tablet 3    atorvastatin (LIPITOR) 20 MG tablet Take 1 tablet (20 mg total) by mouth every evening. 90 tablet 3    blood sugar diagnostic Strp To check BG 3 times daily, to use with insurance preferred meter 100 each 11    blood-glucose meter kit To check BG 3 times daily, to use with insurance preferred meter 1 each 0    cholecalciferol, vitamin D3, (VITAMIN D3) 25 mcg (1,000 unit) capsule Take 1,000 Units by mouth once daily.      lancets Misc To check BG 3 times daily, to use with insurance preferred meter 100 each 11    linaGLIPtin (TRADJENTA) 5 mg Tab tablet Take 1 tablet (5 mg total) by mouth once daily. 90 tablet 1    olmesartan (BENICAR) 40 MG tablet Take 1 tablet by mouth once daily 90 tablet 3    ondansetron (ZOFRAN-ODT) 4 MG TbDL Take 1 tablet (4 mg total) by mouth every 8 (eight) hours as needed. 20 tablet 0    polyethylene glycol (GLYCOLAX) 17 gram PwPk Take this medication EVERY DAY to prevent constipation.  You may take it up to twice a day, but start at only once a day.  SKIP THIS MEDICATION IF YOU DEVELOP DIARRHEA. 72 each 3    repaglinide (PRANDIN) 0.5 MG tablet Take 1 tablet (0.5 mg total) by mouth 3 (three) times daily  "before meals. Do not take if you skip a meal 90 tablet 3    sucralfate (CARAFATE) 1 gram tablet Take 1 tablet (1 g total) by mouth 4 (four) times daily before meals and nightly. for 10 days 40 tablet 0    tiotropium bromide (SPIRIVA RESPIMAT) 2.5 mcg/actuation inhaler INHALE 2 SPRAY(S) BY MOUTH ONCE DAILY 4 g 11    [DISCONTINUED] pantoprazole (PROTONIX) 40 MG tablet Take 1 tablet (40 mg total) by mouth once daily. 30 tablet 1    [DISCONTINUED] docusate sodium (COLACE) 100 MG capsule Take 2 capsules (200 mg total) by mouth 2 (two) times daily. 60 capsule 0    [DISCONTINUED] lactulose (CHRONULAC) 10 gram/15 mL solution Take 30 mLs (20 g total) by mouth 2 (two) times daily as needed (constipation). 237 mL 1    [DISCONTINUED] ondansetron (ZOFRAN-ODT) 4 MG TbDL Take 1 tablet (4 mg total) by mouth every 8 (eight) hours as needed (Nausea). 14 tablet 0     No current facility-administered medications on file prior to visit.         Objective Findings:    Vital Signs:  /79   Pulse 101   Ht 5' 11" (1.803 m)   Wt 82 kg (180 lb 12.4 oz)   BMI 25.21 kg/m²   Body mass index is 25.21 kg/m².    Physical Exam:  Physical Exam  Vitals reviewed.   Constitutional:       Appearance: He is normal weight. He is not ill-appearing.   HENT:      Mouth/Throat:      Mouth: Mucous membranes are moist.      Pharynx: Oropharynx is clear.   Eyes:      General: No scleral icterus.  Abdominal:      General: Bowel sounds are normal. There is no distension.      Palpations: Abdomen is soft. There is no mass.   Skin:     General: Skin is warm and dry.      Capillary Refill: Capillary refill takes less than 2 seconds.      Coloration: Skin is not jaundiced or pale.   Neurological:      Mental Status: He is alert and oriented to person, place, and time. Mental status is at baseline.             Labs:  Lab Results   Component Value Date    WBC 3.40 (L) 03/02/2024    HGB 12.4 (L) 03/02/2024    HCT 37.7 (L) 03/02/2024     03/02/2024    CRP " 12.4 (H) 04/19/2020    CHOL 122 06/06/2023    TRIG 58 06/06/2023    HDL 46 06/06/2023    ALKPHOS 51 (L) 03/02/2024    LIPASE 61 (H) 03/02/2024    ALT 28 03/02/2024    AST 35 03/02/2024     03/02/2024    K 4.7 03/02/2024     03/02/2024    CREATININE 2.0 (H) 03/02/2024    BUN 22 03/02/2024    CO2 26 03/02/2024    TSH 1.496 01/10/2024    PSA 19.0 (H) 07/10/2017    INR 0.9 03/28/2020    HGBA1C 8.6 (H) 01/30/2024       Imaging reviewed: CT Abdomen 12/13/2023  Impression:     1. Significant retained stool seen within the colon which can be seen with constipation.  2. Otherwise no acute intra-abdominal abnormalities identified.  No evidence of bowel obstruction.  3. Additional stable findings as detailed above.        Electronically signed by: Dorene Phillips MD  Date:                                            12/13/2023  Time:                                           20:11      Endoscopy reviewed: No prior EGD performed.     Last colonoscopy he is uncertain of.       Assessment:  1. Epigastric pain    2. Dysphagia, unspecified type    3. Gastroesophageal reflux disease, unspecified whether esophagitis present      Orders Placed This Encounter    pantoprazole (PROTONIX) 40 MG tablet         Plan:  Continue PPI once daily. Instructions reviewed on how to take this. Can take Carafate with meals. Would limit to 2 weeks of use.  Referral placed for EGD for further evaluation for H. Pylori, esophagitis, PUD contributing to patients symptoms.   PPI, Carafate, and EGD.   RTC after EGD for further recs on PPI      Thank you for allowing me to participate in this patient's care.    Sincerely,     Natalia Dominguez NP  Gastroenterology Department  [facility]

## 2024-03-04 NOTE — TELEPHONE ENCOUNTER
"----- Message from Natalia Dominguez NP sent at 3/4/2024 10:54 AM CST -----  Regarding: EGD  Procedure: EGD    Diagnosis: Abdominal pain, GERD, and Dysphagia    Procedure Timin-8 weeks    #If within 4 weeks selected, please tom as high priority#    #If greater than 12 weeks, please select "5-12 weeks" and delay sending until 3 months prior to requested date#     Provider: Any GI provider    [place] [facility]    Additional Scheduling Information: Advanced cardiac or pulmonary disease and Diabetes    Prep Specifications:N/A    Is the patient taking a GLP-1 Agonist:no    Have you attached a patient to this message: yes      "

## 2024-03-05 NOTE — PROGRESS NOTES
PROCEDURE NOTE:  Bone Marrow Biopsy  Date: 3/19/2024  Indication: Leukopenia  Consent: Informed consent was obtained from patient.  Timeout: Done and documented.  Site: Left posterior illiac crest.  Prep: Betadine.  Needle used: 11 gauge Jamshidi needle.  Anesthetic: 2% lidocaine 10 cc.  Biopsy: The biopsy needle was introduced into the marrow cavity and an aspirate was obtained without complications. Core biopsy obtained and sent for routine histologic examination and cytogenetics. DNA/RNA hold and FISH Hold sent.   Complications: None.  Disposition: The patient was discharged home after applying pressure to site for 15 minutes and being check by an RN.  Minimal blood loss    Return to clinic in 2 weeks with MD appointment and labs.     Patient is in agreement with the proposed treatment plan. All questions were answered to the patient's satisfaction. Patient knows to call clinic for any new or worsening symptoms and if anything is needed before the next clinic visit.          Sri Harding, FNP-C  Hematology & Medical Oncology   [facility] [place] [address]  ph. [phone]  Fax. [phone]    Collaborating physician, Dr. Kingsley.

## 2024-03-19 ENCOUNTER — OFFICE VISIT (OUTPATIENT)
Dept: HEMATOLOGY/ONCOLOGY | Facility: CLINIC | Age: 89
End: 2024-03-19
Payer: MEDICARE

## 2024-03-19 VITALS — HEART RATE: 114 BPM

## 2024-03-19 DIAGNOSIS — D47.2 MONOCLONAL GAMMOPATHY: Primary | Chronic | ICD-10-CM

## 2024-03-19 PROCEDURE — 88189 FLOWCYTOMETRY/READ 16 & >: CPT | Mod: ,,, | Performed by: PATHOLOGY

## 2024-03-19 PROCEDURE — 99499 UNLISTED E&M SERVICE: CPT | Mod: S$GLB,,, | Performed by: NURSE PRACTITIONER

## 2024-03-19 PROCEDURE — 88313 SPECIAL STAINS GROUP 2: CPT | Mod: 59 | Performed by: PATHOLOGY

## 2024-03-19 PROCEDURE — 88305 TISSUE EXAM BY PATHOLOGIST: CPT | Mod: 26,,, | Performed by: PATHOLOGY

## 2024-03-19 PROCEDURE — 88184 FLOWCYTOMETRY/ TC 1 MARKER: CPT | Performed by: PATHOLOGY

## 2024-03-19 PROCEDURE — 88365 INSITU HYBRIDIZATION (FISH): CPT | Mod: 26,,, | Performed by: PATHOLOGY

## 2024-03-19 PROCEDURE — 88305 TISSUE EXAM BY PATHOLOGIST: CPT | Mod: 59 | Performed by: PATHOLOGY

## 2024-03-19 PROCEDURE — 88311 DECALCIFY TISSUE: CPT | Performed by: PATHOLOGY

## 2024-03-19 PROCEDURE — 88364 INSITU HYBRIDIZATION (FISH): CPT | Performed by: PATHOLOGY

## 2024-03-19 PROCEDURE — 88342 IMHCHEM/IMCYTCHM 1ST ANTB: CPT | Mod: 26,59,, | Performed by: PATHOLOGY

## 2024-03-19 PROCEDURE — 85097 BONE MARROW INTERPRETATION: CPT | Mod: ,,, | Performed by: PATHOLOGY

## 2024-03-19 PROCEDURE — 88264 CHROMOSOME ANALYSIS 20-25: CPT | Performed by: NURSE PRACTITIONER

## 2024-03-19 PROCEDURE — 88299 UNLISTED CYTOGENETIC STUDY: CPT | Performed by: NURSE PRACTITIONER

## 2024-03-19 PROCEDURE — 88341 IMHCHEM/IMCYTCHM EA ADD ANTB: CPT | Mod: 26,,, | Performed by: PATHOLOGY

## 2024-03-19 PROCEDURE — 88311 DECALCIFY TISSUE: CPT | Mod: 26,,, | Performed by: PATHOLOGY

## 2024-03-19 PROCEDURE — 88342 IMHCHEM/IMCYTCHM 1ST ANTB: CPT | Performed by: PATHOLOGY

## 2024-03-19 PROCEDURE — 38222 DX BONE MARROW BX & ASPIR: CPT | Mod: LT,S$GLB,, | Performed by: NURSE PRACTITIONER

## 2024-03-19 PROCEDURE — 88364 INSITU HYBRIDIZATION (FISH): CPT | Mod: 26,,, | Performed by: PATHOLOGY

## 2024-03-19 PROCEDURE — 88313 SPECIAL STAINS GROUP 2: CPT | Mod: 26,,, | Performed by: PATHOLOGY

## 2024-03-19 PROCEDURE — 99999 PR PBB SHADOW E&M-EST. PATIENT-LVL III: CPT | Mod: PBBFAC,,, | Performed by: NURSE PRACTITIONER

## 2024-03-19 PROCEDURE — 88341 IMHCHEM/IMCYTCHM EA ADD ANTB: CPT | Mod: 59 | Performed by: PATHOLOGY

## 2024-03-19 PROCEDURE — 88237 TISSUE CULTURE BONE MARROW: CPT | Performed by: NURSE PRACTITIONER

## 2024-03-19 PROCEDURE — 88185 FLOWCYTOMETRY/TC ADD-ON: CPT | Mod: 59 | Performed by: PATHOLOGY

## 2024-03-19 PROCEDURE — 88365 INSITU HYBRIDIZATION (FISH): CPT | Performed by: PATHOLOGY

## 2024-03-22 LAB
DNA/RNA EXTRACT AND HOLD RESULT: NORMAL
DNA/RNA EXTRACTION: NORMAL
EXHR SPECIMEN TYPE: NORMAL

## 2024-03-25 LAB
ANNOTATION COMMENT IMP: NORMAL
HOLDF INTERPRETATION: NORMAL
HOLDF REASON FOR REFERRAL: NORMAL
HOLDF RELEASED BY: NORMAL
HOLDF REQUESTED FISH TEST: NORMAL
HOLDF SOURCE: NORMAL
MOL DX INTERP BLD/T QL: NORMAL
REF LAB TEST METHOD: NORMAL
SPECIMEN TYPE: NORMAL

## 2024-03-26 LAB
BODY SITE - BONE MARROW: NORMAL
CLINICAL DIAGNOSIS - BONE MARROW: NORMAL
FLOW CYTOMETRY ANTIBODIES ANALYZED - BONE MARROW: NORMAL
FLOW CYTOMETRY COMMENT - BONE MARROW: NORMAL
FLOW CYTOMETRY INTERPRETATION - BONE MARROW: NORMAL

## 2024-04-01 LAB
CHROM BANDING METHOD: NORMAL
CHROMOSOME ANALYSIS BM ADDITIONAL INFORMATION: NORMAL
CHROMOSOME ANALYSIS BM RELEASED BY: NORMAL
CHROMOSOME ANALYSIS BM RESULT SUMMARY: NORMAL
CLINICAL CYTOGENETICIST REVIEW: NORMAL
KARYOTYP MAR: NORMAL
REASON FOR REFERRAL (NARRATIVE): NORMAL
REF LAB TEST METHOD: NORMAL
SPECIMEN SOURCE: NORMAL
SPECIMEN: NORMAL

## 2024-04-02 ENCOUNTER — OFFICE VISIT (OUTPATIENT)
Dept: HEMATOLOGY/ONCOLOGY | Facility: CLINIC | Age: 89
End: 2024-04-02
Payer: MEDICARE

## 2024-04-02 VITALS
WEIGHT: 179.69 LBS | SYSTOLIC BLOOD PRESSURE: 152 MMHG | BODY MASS INDEX: 25.16 KG/M2 | HEIGHT: 71 IN | DIASTOLIC BLOOD PRESSURE: 86 MMHG | HEART RATE: 100 BPM | OXYGEN SATURATION: 90 %

## 2024-04-02 DIAGNOSIS — D47.2 MONOCLONAL GAMMOPATHY: Primary | ICD-10-CM

## 2024-04-02 DIAGNOSIS — D64.9 ANEMIA, UNSPECIFIED TYPE: ICD-10-CM

## 2024-04-02 LAB
FINAL PATHOLOGIC DIAGNOSIS: NORMAL
GROSS: NORMAL
Lab: NORMAL
MICROSCOPIC EXAM: NORMAL
SUPPLEMENTAL DIAGNOSIS: NORMAL

## 2024-04-02 PROCEDURE — 1126F AMNT PAIN NOTED NONE PRSNT: CPT | Mod: CPTII,S$GLB,, | Performed by: INTERNAL MEDICINE

## 2024-04-02 PROCEDURE — 3288F FALL RISK ASSESSMENT DOCD: CPT | Mod: CPTII,S$GLB,, | Performed by: INTERNAL MEDICINE

## 2024-04-02 PROCEDURE — 99999 PR PBB SHADOW E&M-EST. PATIENT-LVL IV: CPT | Mod: PBBFAC,,, | Performed by: INTERNAL MEDICINE

## 2024-04-02 PROCEDURE — 99214 OFFICE O/P EST MOD 30 MIN: CPT | Mod: S$GLB,,, | Performed by: INTERNAL MEDICINE

## 2024-04-02 PROCEDURE — 1101F PT FALLS ASSESS-DOCD LE1/YR: CPT | Mod: CPTII,S$GLB,, | Performed by: INTERNAL MEDICINE

## 2024-04-02 NOTE — PROGRESS NOTES
Subjective     Patient ID: Peter Lopez is a 88 y.o. male.    Chief Complaint: No chief complaint on file.  Diagnosis: MGUS  HPI:  Patient is an 89 y/o male with medical diagnoses as listed seen today  for follow up for  MGUS. He is a poor historian. He was previously followed for leukopenia. Lost to follow up.   He also has  history of Stage IIIC (T3b, N0, M0, P>=20, G5) adenocarcinoma of the prostate.  He has a history of an elevated PSA. He underwent TURP on 6/11/21. There was Dorrance 9 (4+5) adenocarcinoma noted in the prostate chips. He began hormonal therapy with Casodex and Lupron given on 7/9/21.  He completed radiotherapy to the prostate, seminal vesicles and pelvic nodes on 9/29/21. He undergoes LUPRON inj q6 mos. No fevers, night sweats or wt loss..No fam hx of blood or bleeding d/o. Pt was taken off metformin a couple of months ago. Patient was also diagnosed with grace        Interval Hx: He underwent bmbx since last visit  Bmbx 3/19/24- neg for malignancy   He remains on 2L02 supp   No 02 supp today  Appetite and weight stable  He has chronic mild MONTELONGO, stable    Bone marrow, left iliac crest, aspirate, clot, and core biopsy: 3/19/24   --Normocellular marrow for age, ranging from less than 10% to very focally 40%, with trilineage hematopoiesis present, see comment  --Plasma cells estimated at less than 5% of total cellularity by  immunohistochemical stain with no clear light chain restriction identified, see comment  --No increase in CD34 positive blasts  --Adequate megakaryocytes  --Stainable iron is not increased      Past Medical History:   Diagnosis Date    BPH (benign prostatic hyperplasia)     Chronic hepatitis C without mention of hepatic coma     genotype 1b; treatment naive; s/p treatment    CKD (chronic kidney disease)     COPD (chronic obstructive pulmonary disease)     Diabetes mellitus type I     Diabetes with neurologic complications     H/O colonoscopy     [place] (hard of hearing)      "Hypertension     On home oxygen therapy     as needed    Prostate cancer     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  Vaccinated.  C A        Social History     Tobacco Use    Smoking status: Former     Current packs/day: 0.00     Average packs/day: 1 pack/day for 40.0 years (40.0 ttl pk-yrs)     Types: Cigarettes     Start date: 1951     Quit date: 1991     Years since quittin.4    Smokeless tobacco: Former     Quit date: 1986   Substance Use Topics    Alcohol use: Not Currently     Alcohol/week: 0.0 standard drinks of alcohol     Comment: 2 bottles beer on weekends    Drug use: No    Asbestos exposure    , oxana     Review of Systems   Constitutional:  Negative for appetite change, fatigue, fever and unexpected weight change.   HENT:  Negative for mouth sores.    Eyes:  Negative for visual disturbance.   Respiratory:  Positive for shortness of breath. Negative for cough.    Cardiovascular:  Negative for chest pain.   Gastrointestinal:  Negative for abdominal pain and diarrhea.   Genitourinary:  Negative for frequency.   Musculoskeletal:  Negative for back pain.   Integumentary:  Negative for rash.   Neurological:  Negative for headaches.   Hematological:  Negative for adenopathy.   Psychiatric/Behavioral:  The patient is not nervous/anxious.           Objective     Vitals:    24 1314   BP: (!) 152/86   Pulse: 100   SpO2: (!) 90%   Weight: 81.5 kg (179 lb 10.8 oz)   Height: 5' 11" (1.803 m)       Physical Exam  Constitutional:       Appearance: He is well-developed. "   HENT:      Head: Normocephalic.      Mouth/Throat:      Pharynx: No oropharyngeal exudate.   Eyes:      General: No scleral icterus.        Right eye: No discharge.         Left eye: No discharge.      Conjunctiva/sclera: Conjunctivae normal.   Neck:      Thyroid: No thyromegaly.   Cardiovascular:      Rate and Rhythm: Normal rate and regular rhythm.      Heart sounds: Normal heart sounds. No murmur heard.  Pulmonary:      Effort: Pulmonary effort is normal.      Breath sounds: Normal breath sounds. No wheezing or rales.   Abdominal:      General: Bowel sounds are normal.      Palpations: Abdomen is soft.      Tenderness: There is no abdominal tenderness. There is no guarding or rebound.   Musculoskeletal:         General: No swelling. Normal range of motion.      Cervical back: Normal range of motion and neck supple.   Lymphadenopathy:      Cervical: No cervical adenopathy.      Upper Body:      Right upper body: No supraclavicular adenopathy.      Left upper body: No supraclavicular adenopathy.   Skin:     Findings: No erythema or rash.   Neurological:      Mental Status: He is alert and oriented to person, place, and time.      Cranial Nerves: No cranial nerve deficit.   Psychiatric:         Mood and Affect: Mood normal.         Behavior: Behavior normal.       Lab Results   Component Value Date    WBC 3.40 (L) 03/02/2024    HGB 12.4 (L) 03/02/2024    HCT 37.7 (L) 03/02/2024    MCV 90 03/02/2024     03/02/2024     Platelets-within normal limits   Red blood cells- decreased, normocytic hypochromic anemia   White blood cells-leukopenia with absolute lymphopenia   No morphologic abnormalities nor blasts on scanning     IgA kappa specific monoclonal protein band at the alpha-2/beta-1   junction identified and previously localized by immunotyping.   Persistent faint IgG lambda light chain specific monoclonal protein   band in gamma is also noted in this study.        Signed on 06/21/23 at 07:41   Normal total  protein. There is a paraprotein band in the alpha-beta   junction (localized by immunotyping) = 0.21 g/dL, previously 0.28   g/dL.   Correlate with reflex MAGDY result.           Bone marrow, left iliac crest, aspirate, clot, and core biopsy: 3/19/24   --Normocellular marrow for age, ranging from less than 10% to very focally 40%, with trilineage hematopoiesis present, see comment  --Plasma cells estimated at less than 5% of total cellularity by  immunohistochemical stain with no clear light chain restriction identified, see comment  --No increase in CD34 positive blasts  --Adequate megakaryocytes  --Stainable iron is not increased    Comment:  Concomitantly submitted flow cytometric analysis detects no diagnostic abnormal hematopoietic population.  The lymphocyte gate shows populations of polyclonal B lymphocytes and T lymphocytes that are immunophenotypically unremarkable with a  subset of NK cells detected.  The blast gate is not increased.  A small population of CD38 bright plasma cells shows polytypic cytoplasmic light chain expression with no diagnostic abnormal immunophenotype.               Assessment and Plan     1. Monoclonal gammopathy  Overview:  08-  SPEP/Immunofixation  IgA kappa and IgG lambda specific monoclonal bands are present.    06-  SPEP/Immunofixation  IgA kappa specific monoclonal protein band in alpha-2 and faint IgG lambda specific monocolonal protein band in gamma are identified.   Bone marrow, left iliac crest, aspirate, clot, and core biopsy: 3/19/24   --Normocellular marrow for age, ranging from less than 10% to very focally 40%, with trilineage hematopoiesis present, see comment  --Plasma cells estimated at less than 5% of total cellularity by  immunohistochemical stain with no clear light chain restriction identified, see comment  --No increase in CD34 positive blasts  --Adequate megakaryocytes  --Stainable iron is not increased    Comment: Concomitantly  submitted flow cytometric analysis detects no diagnostic abnormal hematopoietic population. The lymphocyte gate shows populations of polyclonal B lymphocytes and T lymphocytes that are immunophenotypically unremarkable with a subset of NK cells detected. The blast gate is not increased. A small population of CD38 bright plasma cells shows polytypic cytoplasmic light chain expression with no diagnostic abnormal immunophenotype             [place]ongo red stain negative              Plan continue to  monitor              No indication to treat  2. Anemia, unspecified type  -     CBC Auto Differential; Future; Expected date: 04/02/2024  -     Comprehensive Metabolic Panel; Future; Expected date: 04/02/2024  -     Immunofixation Electrophoresis; Future; Expected date: 04/02/2024  -     Protein Electrophoresis, Serum; Future; Expected date: 04/02/2024  -     Iron and TIBC; Future; Expected date: 04/02/2024  -     Ferritin; Future; Expected date: 04/02/2024  -     Immunoglobulin Free LT Chains Blood; Future; Expected date: 04/02/2024        Labs 1 week prior to f/u 4 mos

## 2024-04-06 ENCOUNTER — HOSPITAL ENCOUNTER (EMERGENCY)
Facility: HOSPITAL | Age: 89
Discharge: HOME | End: 2024-04-06
Attending: EMERGENCY MEDICINE
Payer: MEDICARE

## 2024-04-06 VITALS
HEIGHT: 71 IN | DIASTOLIC BLOOD PRESSURE: 80 MMHG | SYSTOLIC BLOOD PRESSURE: 136 MMHG | BODY MASS INDEX: 25.2 KG/M2 | RESPIRATION RATE: 20 BRPM | TEMPERATURE: 98 F | WEIGHT: 180 LBS | HEART RATE: 109 BPM | OXYGEN SATURATION: 96 %

## 2024-04-06 DIAGNOSIS — J44.1 COPD EXACERBATION: ICD-10-CM

## 2024-04-06 DIAGNOSIS — J06.9 URI WITH COUGH AND CONGESTION: Primary | ICD-10-CM

## 2024-04-06 DIAGNOSIS — R00.0 TACHYCARDIA: ICD-10-CM

## 2024-04-06 LAB
CTP QC/QA: YES
INFLUENZA A ANTIGEN, POC: NEGATIVE
INFLUENZA B ANTIGEN, POC: NEGATIVE
POC RAPID STREP A: NEGATIVE
POCT GLUCOSE: 194 MG/DL (ref 70–110)
SARS-COV-2 RDRP RESP QL NAA+PROBE: NEGATIVE

## 2024-04-06 PROCEDURE — 87880 STREP A ASSAY W/OPTIC: CPT | Mod: ER

## 2024-04-06 PROCEDURE — 93005 ELECTROCARDIOGRAM TRACING: CPT | Mod: ER

## 2024-04-06 PROCEDURE — 94640 AIRWAY INHALATION TREATMENT: CPT | Mod: ER

## 2024-04-06 PROCEDURE — 25000242 PHARM REV CODE 250 ALT 637 W/ HCPCS: Mod: ER | Performed by: EMERGENCY MEDICINE

## 2024-04-06 PROCEDURE — 94760 N-INVAS EAR/PLS OXIMETRY 1: CPT | Mod: ER

## 2024-04-06 PROCEDURE — 93010 ELECTROCARDIOGRAM REPORT: CPT | Mod: ,,, | Performed by: INTERNAL MEDICINE

## 2024-04-06 PROCEDURE — 87635 SARS-COV-2 COVID-19 AMP PRB: CPT | Mod: ER | Performed by: EMERGENCY MEDICINE

## 2024-04-06 PROCEDURE — 87804 INFLUENZA ASSAY W/OPTIC: CPT | Mod: 59,ER

## 2024-04-06 PROCEDURE — 99284 EMERGENCY DEPT VISIT MOD MDM: CPT | Mod: 25,ER

## 2024-04-06 PROCEDURE — 63600175 PHARM REV CODE 636 W HCPCS: Mod: ER | Performed by: EMERGENCY MEDICINE

## 2024-04-06 PROCEDURE — 82962 GLUCOSE BLOOD TEST: CPT | Mod: ER

## 2024-04-06 PROCEDURE — 25000003 PHARM REV CODE 250: Mod: ER | Performed by: EMERGENCY MEDICINE

## 2024-04-06 RX ORDER — LEVOCETIRIZINE DIHYDROCHLORIDE 5 MG/1
5 TABLET, FILM COATED ORAL NIGHTLY
Qty: 30 TABLET | Refills: 0 | Status: SHIPPED | OUTPATIENT
Start: 2024-04-06 | End: 2025-04-06

## 2024-04-06 RX ORDER — ALBUTEROL SULFATE 90 UG/1
2 AEROSOL, METERED RESPIRATORY (INHALATION) EVERY 6 HOURS PRN
Qty: 18 G | Refills: 0 | Status: SHIPPED | OUTPATIENT
Start: 2024-04-06 | End: 2025-04-06

## 2024-04-06 RX ORDER — BENZONATATE 100 MG/1
200 CAPSULE ORAL
Status: COMPLETED | OUTPATIENT
Start: 2024-04-06 | End: 2024-04-06

## 2024-04-06 RX ORDER — ACETAMINOPHEN 500 MG
500 TABLET ORAL EVERY 6 HOURS PRN
Qty: 30 TABLET | Refills: 0 | Status: SHIPPED | OUTPATIENT
Start: 2024-04-06

## 2024-04-06 RX ORDER — IPRATROPIUM BROMIDE AND ALBUTEROL SULFATE 2.5; .5 MG/3ML; MG/3ML
3 SOLUTION RESPIRATORY (INHALATION) ONCE
Status: COMPLETED | OUTPATIENT
Start: 2024-04-06 | End: 2024-04-06

## 2024-04-06 RX ORDER — DEXAMETHASONE SODIUM PHOSPHATE 4 MG/ML
8 INJECTION, SOLUTION INTRA-ARTICULAR; INTRALESIONAL; INTRAMUSCULAR; INTRAVENOUS; SOFT TISSUE
Status: COMPLETED | OUTPATIENT
Start: 2024-04-06 | End: 2024-04-06

## 2024-04-06 RX ORDER — BENZONATATE 200 MG/1
200 CAPSULE ORAL 3 TIMES DAILY PRN
Qty: 30 CAPSULE | Refills: 0 | Status: SHIPPED | OUTPATIENT
Start: 2024-04-06 | End: 2024-04-15

## 2024-04-06 RX ADMIN — IPRATROPIUM BROMIDE AND ALBUTEROL SULFATE 3 ML: .5; 3 SOLUTION RESPIRATORY (INHALATION) at 05:04

## 2024-04-06 RX ADMIN — BENZONATATE 200 MG: 100 CAPSULE ORAL at 05:04

## 2024-04-06 RX ADMIN — DEXAMETHASONE SODIUM PHOSPHATE 8 MG: 4 INJECTION INTRA-ARTICULAR; INTRALESIONAL; INTRAMUSCULAR; INTRAVENOUS; SOFT TISSUE at 05:04

## 2024-04-06 NOTE — Clinical Note
"Peter"Laura Lopez was seen and treated in our emergency department on 4/6/2024.  He may return to work on 04/10/2024.       If you have any questions or concerns, please don't hesitate to call.      Carissa Navarro, DO"

## 2024-04-06 NOTE — ED PROVIDER NOTES
Encounter Date: 4/6/2024    SCRIBE #1 NOTE: I, Amna Merrill, am scribing for, and in the presence of,  Carissa Navarro DO.       History     Chief Complaint   Patient presents with    URI     Pt reports worsening URI symptoms, sore throat/congestion, since last night. Afebrile.      Peter Lopez is a 88 y.o. male, with a PMHx of BPH, CKD, COPD, T1DM, HTN, [place], on home oxygen therapy 2.5L, who presents to the ED with a sore throat, fever and congestion since this morning. No attempted Tx. Patient reports he works at [employer] and is in contact with many people daily. Endorses daily use of miralax and having regular bowel movements. No other exacerbating or alleviating factors. Denies CP, SOB, otalgia, cough, nausea, vomiting, diarrhea or other associated symptoms.       The history is provided by the patient. No  was used.     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  Vaccinated.  C A     Family History   Problem Relation Age of Onset    Cancer Mother     Asbestos Father     Liver  disease Neg Hx      Social History     Tobacco Use    Smoking status: Former     Current packs/day: 0.00     Average packs/day: 1 pack/day for 40.0 years (40.0 ttl pk-yrs)     Types: Cigarettes     Start date: 1951     Quit date: 1991     Years since quittin.4    Smokeless tobacco: Former     Quit date: 1986   Substance Use Topics    Alcohol use: Not Currently     Alcohol/week: 0.0 standard drinks of alcohol     Comment: 2 bottles beer on weekends    Drug use: No     Review of Systems   Constitutional:  Positive for fever.   HENT:  Positive for congestion and sore throat. Negative for ear pain and rhinorrhea.    Respiratory:  Negative for cough and shortness of breath.    Cardiovascular:  Negative for chest pain and leg swelling.   Gastrointestinal:  Negative for constipation, diarrhea, nausea and vomiting.   Skin:  Negative for rash.   Neurological:  Negative for numbness.   All other systems reviewed and are negative.      Physical Exam     Initial Vitals [24 1549]   BP Pulse Resp Temp SpO2   136/80 (!) 113 18 98.1 °F (36.7 °C) (!) 94 %      MAP       --         Physical Exam    Nursing note and vitals reviewed.  Constitutional: He appears well-developed and well-nourished.   HENT:   Head: Normocephalic and atraumatic.   Right Ear: Tympanic membrane and external ear normal.   Left Ear: Tympanic membrane and external ear normal.   Nose: Nose normal.   Mouth/Throat: Posterior oropharyngeal edema and posterior oropharyngeal erythema present. No oropharyngeal exudate.   Rhinorrhea. Mucosal edema. Postnasal drip.      Eyes: Conjunctivae are normal.   Neck: Neck supple.   Normal range of motion.  Cardiovascular:  Normal rate, regular rhythm and normal heart sounds.     Exam reveals no gallop and no friction rub.       No murmur heard.  Pulmonary/Chest: Breath sounds normal. No respiratory distress. He has no wheezes. He has no rhonchi. He has no rales.   Abdominal: Abdomen is soft. Bowel sounds  are normal. There is no abdominal tenderness. There is no rebound and no guarding.   Musculoskeletal:         General: No tenderness or edema. Normal range of motion.      Cervical back: Normal range of motion and neck supple.     Lymphadenopathy:     He has no cervical adenopathy.   Neurological: He is alert and oriented to person, place, and time.   Skin: Skin is warm and dry. Capillary refill takes less than 2 seconds. No rash noted.   Psychiatric: He has a normal mood and affect. His behavior is normal.       Patient gave consent to have physical exam performed.      ED Course   Procedures  Labs Reviewed   POCT GLUCOSE - Abnormal; Notable for the following components:       Result Value    POCT Glucose 194 (*)     All other components within normal limits   SARS-COV-2 RDRP GENE    Narrative:     This test utilizes isothermal nucleic acid amplification technology to detect the SARS-CoV-2 RdRp nucleic acid segment. The analytical sensitivity (limit of detection) is 500 copies/swab.     A POSITIVE result is indicative of the presence of SARS-CoV-2 RNA; clinical correlation with patient history and other diagnostic information is necessary to determine patient infection status.    A NEGATIVE result means that SARS-CoV-2 nucleic acids are not present above the limit of detection. A NEGATIVE result should be treated as presumptive. It does not rule out the possibility of COVID-19 and should not be the sole basis for treatment decisions. If COVID-19 is strongly suspected based on clinical and exposure history, re-testing using an alternate molecular assay should be considered.     Commercial kits are provided by [employer].   _________________________________________________________________   The authorized Fact Sheet for Healthcare Providers and the authorized Fact Sheet for Patients of the ID NOW COVID-19 are available on the FDA website:    https://www.fda.gov/media/479282/download       https://www.fda.gov/media/920511/download      POCT GLUCOSE MONITORING CONTINUOUS   POCT STREP A, RAPID   POCT RAPID INFLUENZA A/B     EKG Readings: (Independently Interpreted)   No STEMI. Rate of 112. Sinus Tachycardia. Normal Axis. Abnormal EKG. QTc at 466. When compared to prior EKG dated 3/2/2024 rate increased by 11 bpm.          Imaging Results              X-Ray Chest PA And Lateral (Final result)  Result time 04/06/24 16:44:13      Final result by Jill Rodarte MD (04/06/24 16:44:13)                   Impression:      No acute abnormality seen.    Small band of atelectasis or scarring at the bilateral lung basis, unchanged from the prior study.      Electronically signed by: Jill Rodarte MD  Date:    04/06/2024  Time:    16:44               Narrative:    EXAMINATION:  XR CHEST PA AND LATERAL    CLINICAL HISTORY:  cough;    TECHNIQUE:  PA and lateral views of the chest were performed.    COMPARISON:  09/18/2023    FINDINGS:  The cardiac silhouette is normal in size, midline.    The pulmonary vascularity is normal.    The pulmonary darwin appear normal bilaterally.    Linear band of atelectasis or scarring at the lung basis, unchanged.  No acute focal area of airspace disease.    No pleural effusion, no pneumothorax.    The osseous structures appear within normal limits for age.                                       Medications   dexAMETHasone injection 8 mg (8 mg Other Given 4/6/24 1704)   albuterol-ipratropium 2.5 mg-0.5 mg/3 mL nebulizer solution 3 mL (3 mLs Nebulization Given 4/6/24 1704)   benzonatate capsule 200 mg (200 mg Oral Given 4/6/24 1704)     Medical Decision Making  Amount and/or Complexity of Data Reviewed  External Data Reviewed: ECG.     Details: External documents reviewed for previous EKG comparison: see ED course.      Labs: ordered.  Radiology: ordered.  ECG/medicine tests: ordered and independent interpretation performed. Decision-making details documented in ED Course.      Details: EKGs independently interpreted by Dr. Navarro reads: see ED course. External documents reviewed for previous EKG comparison: see ED course.       Risk  OTC drugs.  Prescription drug management.    Medical Decision Making:    This is an evaluation of a 88 y.o. male that presents to the Emergency Department for sore throat, congestion, fever  Chief Complaint   Patient presents with    URI     Pt reports worsening URI symptoms, sore throat/congestion, since last night. Afebrile.        The patient is a non-toxic and well appearing patient. On physical exam, patient appears well hydrated with moist mucus membranes. Breath sounds are clear and equal bilaterally with no adventitious breath sounds, tachypnea or respiratory distress. Regular rate and rhythm. No murmurs. Abdomen soft and non tender. Patient is tolerating PO without difficulty. Rhinorrhea. Mucosal edema. Postnasal drip. Erythema and edema to posterior oropharynx, no exudate. Physical exam otherwise as above.     I have reviewed vital signs and nursing notes.   Vital Signs Are Reassuring. Patient is on home O2.  O2 sats above 94% after being placed on prescribed O2.     Based on the patient's symptoms, I am considering and evaluating for the following differential diagnoses: hyperglycemia, hypoglycemia, Viral Illness, Influenza A, Influenza B, Streptococcal Pharyngitis, COVID-19, otitis media, pneumonia    Consider hospitalization for: Cough/hypoxia    Patient is NOT agreeable to transfer and admission to [facility].  ED Course:Treatment in the ED included Physical Exam and medications given in ED  Medications   dexAMETHasone injection 8 mg (8 mg Other Given 4/6/24 1704)   albuterol-ipratropium 2.5 mg-0.5 mg/3 mL nebulizer solution 3 mL (3 mLs Nebulization Given 4/6/24 1704)   benzonatate capsule 200 mg (200 mg Oral Given 4/6/24 1704)   .   Patient reports feeling better after treatment in the ER.       External Data/Documents Reviewed: Previous medical  records and vital signs reviewed, see HPI and Physical exam.   Labs: ordered and reviewed. Covid negative.  Radiology: ordered as indicated and reviewed. No PTX  ECG/medicine tests: ordered and independent interpretation performed by Dr. Carissa Navarro DO. Decision-making details documented in ED Course.   Cardiac monitor placed for Duoneb treatment. Monitor shows Sinus Tachycardia with  rate of 108. Interpreted by Dr. Carissa Navarro DO.    Risk  Diagnosis or treatment significantly limited by the following social determinants of health: Body mass index is 25.1 kg/m².     In shared decision making with the patient, we discussed treatment, prescriptions, labs, and imaging results.    Discharge home with   ED Prescriptions       Medication Sig Dispense Start Date End Date Auth. Provider    albuterol (PROVENTIL/VENTOLIN HFA) 90 mcg/actuation inhaler Inhale 2 puffs into the lungs every 6 (six) hours as needed for Wheezing. Use with spacer  Dispense with 1 spacer 18 g 4/6/2024 4/6/2025 Carissa Navarro DO    acetaminophen (TYLENOL) 500 MG tablet Take 1 tablet (500 mg total) by mouth every 6 (six) hours as needed. 30 tablet 4/6/2024 -- Carissa Navarro DO    diphenhydrAMINE-aluminum-magnesium hydroxide-simethicone-LIDOcaine viscous HCl 2% Swish and spit 10 mLs every 4 (four) hours as needed (As needed for sore throat). 100 each 4/6/2024 -- Carissa Navarro DO    levocetirizine (XYZAL) 5 MG tablet Take 1 tablet (5 mg total) by mouth every evening. 30 tablet 4/6/2024 4/6/2025 Carissa Navarro DO    benzonatate (TESSALON) 200 MG capsule Take 1 capsule (200 mg total) by mouth 3 (three) times daily as needed for Cough. 30 capsule 4/6/2024 4/16/2024 Carissa Navarro DO          Fill and take prescriptions as directed.  Return to the ED if symptoms worsen or do not resolve.   Answered questions and discussed discharge plan.    Patient feels better and is ready for discharge.  Follow up with PCP/specialist in 1 day       At time of discharge  patient is awake alert oriented x4 speaking clearly in full sentences and moving all 4 extremities.     The following labs and imaging were reviewed:        Admission on 04/06/2024, Discharged on 04/06/2024   Component Date Value Ref Range Status    POC Rapid COVID 04/06/2024 Negative  Negative Final     Acceptable 04/06/2024 Yes   Final    POCT Glucose 04/06/2024 194 (H)  70 - 110 mg/dL Final    POC Rapid Strep A 04/06/2024 negative  Positive/Negative Final    Influenza B Ag 04/06/2024 negative  Positive/Negative Final    Inflenza A Ag 04/06/2024 negative  Positive/Negative Final        Imaging Results              X-Ray Chest PA And Lateral (Final result)  Result time 04/06/24 16:44:13      Final result by Jill Rodarte MD (04/06/24 16:44:13)                   Impression:      No acute abnormality seen.    Small band of atelectasis or scarring at the bilateral lung basis, unchanged from the prior study.      Electronically signed by: Jill Rodarte MD  Date:    04/06/2024  Time:    16:44               Narrative:    EXAMINATION:  XR CHEST PA AND LATERAL    CLINICAL HISTORY:  cough;    TECHNIQUE:  PA and lateral views of the chest were performed.    COMPARISON:  09/18/2023    FINDINGS:  The cardiac silhouette is normal in size, midline.    The pulmonary vascularity is normal.    The pulmonary darwin appear normal bilaterally.    Linear band of atelectasis or scarring at the lung basis, unchanged.  No acute focal area of airspace disease.    No pleural effusion, no pneumothorax.    The osseous structures appear within normal limits for age.                                            Scribe Attestation:   Scribe #1: I performed the above scribed service and the documentation accurately describes the services I performed. I attest to the accuracy of the note.                            I, Dr. Carissa Navarro, personally performed the services described in this documentation. This document  was produced by a scribe under my direction and in my presence. All medical record entries made by the scribe were at my direction and in my presence.  I have reviewed the chart and agree that the record reflects my personal performance and is accurate and complete. Carissa Navarro DO.     04/07/2024 1:43 PM     Clinical Impression:  Final diagnoses:  [R00.0] Tachycardia  [J06.9] URI with cough and congestion (Primary)  [J44.1] COPD exacerbation          ED Disposition Condition    Discharge Stable          ED Prescriptions       Medication Sig Dispense Start Date End Date Auth. Provider    albuterol (PROVENTIL/VENTOLIN HFA) 90 mcg/actuation inhaler Inhale 2 puffs into the lungs every 6 (six) hours as needed for Wheezing. Use with spacer  Dispense with 1 spacer 18 g 4/6/2024 4/6/2025 Carissa Navarro DO    acetaminophen (TYLENOL) 500 MG tablet Take 1 tablet (500 mg total) by mouth every 6 (six) hours as needed. 30 tablet 4/6/2024 -- Carissa Navarro DO    diphenhydrAMINE-aluminum-magnesium hydroxide-simethicone-LIDOcaine viscous HCl 2% Swish and spit 10 mLs every 4 (four) hours as needed (As needed for sore throat). 100 each 4/6/2024 -- Cairssa Navarro DO    levocetirizine (XYZAL) 5 MG tablet Take 1 tablet (5 mg total) by mouth every evening. 30 tablet 4/6/2024 4/6/2025 Carissa Navarro DO    benzonatate (TESSALON) 200 MG capsule Take 1 capsule (200 mg total) by mouth 3 (three) times daily as needed for Cough. 30 capsule 4/6/2024 4/16/2024 Carissa Navarro DO          Follow-up Information       Follow up With Specialties Details Why Contact Info    Lucas Lloyd Jr., MD Family Medicine Schedule an appointment as soon as possible for a visit in 1 day  [address] [facility] [address]  [phone]      [facility] Emergency Dept Emergency Medicine Go to  Please go to [facility] emergency department if symptoms worsen [address]  [facility] [place]  [address]  [phone]             Carissa Navarro,   04/07/24 134

## 2024-04-07 ENCOUNTER — HOSPITAL ENCOUNTER (EMERGENCY)
Facility: HOSPITAL | Age: 89
Discharge: HOME | End: 2024-04-07
Attending: EMERGENCY MEDICINE
Payer: MEDICARE

## 2024-04-07 VITALS
TEMPERATURE: 98 F | SYSTOLIC BLOOD PRESSURE: 142 MMHG | DIASTOLIC BLOOD PRESSURE: 85 MMHG | HEIGHT: 71 IN | HEART RATE: 110 BPM | WEIGHT: 180 LBS | BODY MASS INDEX: 25.2 KG/M2 | OXYGEN SATURATION: 95 % | RESPIRATION RATE: 23 BRPM

## 2024-04-07 DIAGNOSIS — J44.1 COPD EXACERBATION: Primary | ICD-10-CM

## 2024-04-07 DIAGNOSIS — R00.0 TACHYCARDIA: ICD-10-CM

## 2024-04-07 DIAGNOSIS — R06.02 SOB (SHORTNESS OF BREATH): ICD-10-CM

## 2024-04-07 DIAGNOSIS — R73.9 HYPERGLYCEMIA: ICD-10-CM

## 2024-04-07 LAB
ALBUMIN SERPL BCP-MCNC: 4 G/DL (ref 3.5–5.2)
ALP SERPL-CCNC: 70 U/L (ref 55–135)
ALT SERPL W/O P-5'-P-CCNC: 25 U/L (ref 10–44)
ANION GAP SERPL CALC-SCNC: 11 MMOL/L (ref 8–16)
AST SERPL-CCNC: 28 U/L (ref 10–40)
BASOPHILS # BLD AUTO: 0.01 K/UL (ref 0–0.2)
BASOPHILS NFR BLD: 0.2 % (ref 0–1.9)
BILIRUB SERPL-MCNC: 0.3 MG/DL (ref 0.1–1)
BNP SERPL-MCNC: 53 PG/ML (ref 0–99)
BUN SERPL-MCNC: 25 MG/DL (ref 8–23)
CALCIUM SERPL-MCNC: 10 MG/DL (ref 8.7–10.5)
CHLORIDE SERPL-SCNC: 105 MMOL/L (ref 95–110)
CO2 SERPL-SCNC: 23 MMOL/L (ref 23–29)
CREAT SERPL-MCNC: 2.1 MG/DL (ref 0.5–1.4)
CTP QC/QA: YES
CTP QC/QA: YES
DIFFERENTIAL METHOD BLD: ABNORMAL
EOSINOPHIL # BLD AUTO: 0 K/UL (ref 0–0.5)
EOSINOPHIL NFR BLD: 0.2 % (ref 0–8)
ERYTHROCYTE [DISTWIDTH] IN BLOOD BY AUTOMATED COUNT: 14.2 % (ref 11.5–14.5)
EST. GFR  (NO RACE VARIABLE): 30 ML/MIN/1.73 M^2
GLUCOSE SERPL-MCNC: 296 MG/DL (ref 70–110)
HCT VFR BLD AUTO: 38.7 % (ref 40–54)
HGB BLD-MCNC: 12.2 G/DL (ref 14–18)
IMM GRANULOCYTES # BLD AUTO: 0.01 K/UL (ref 0–0.04)
IMM GRANULOCYTES NFR BLD AUTO: 0.2 % (ref 0–0.5)
LYMPHOCYTES # BLD AUTO: 0.7 K/UL (ref 1–4.8)
LYMPHOCYTES NFR BLD: 11.8 % (ref 18–48)
MCH RBC QN AUTO: 28.2 PG (ref 27–31)
MCHC RBC AUTO-ENTMCNC: 31.5 G/DL (ref 32–36)
MCV RBC AUTO: 90 FL (ref 82–98)
MONOCYTES # BLD AUTO: 0.4 K/UL (ref 0.3–1)
MONOCYTES NFR BLD: 6.5 % (ref 4–15)
NEUTROPHILS # BLD AUTO: 5.1 K/UL (ref 1.8–7.7)
NEUTROPHILS NFR BLD: 81.1 % (ref 38–73)
NRBC BLD-RTO: 0 /100 WBC
PLATELET # BLD AUTO: 252 K/UL (ref 150–450)
PMV BLD AUTO: 10.1 FL (ref 9.2–12.9)
POC MOLECULAR INFLUENZA A AGN: NEGATIVE
POC MOLECULAR INFLUENZA B AGN: NEGATIVE
POTASSIUM SERPL-SCNC: 4 MMOL/L (ref 3.5–5.1)
PROT SERPL-MCNC: 7.7 G/DL (ref 6–8.4)
RBC # BLD AUTO: 4.32 M/UL (ref 4.6–6.2)
SARS-COV-2 RDRP RESP QL NAA+PROBE: NEGATIVE
SODIUM SERPL-SCNC: 139 MMOL/L (ref 136–145)
TROPONIN I SERPL DL<=0.01 NG/ML-MCNC: 0.01 NG/ML (ref 0–0.03)
WBC # BLD AUTO: 6.28 K/UL (ref 3.9–12.7)

## 2024-04-07 PROCEDURE — 94640 AIRWAY INHALATION TREATMENT: CPT | Mod: XB

## 2024-04-07 PROCEDURE — 96374 THER/PROPH/DIAG INJ IV PUSH: CPT

## 2024-04-07 PROCEDURE — 87502 INFLUENZA DNA AMP PROBE: CPT

## 2024-04-07 PROCEDURE — 63700000 PHARM REV CODE 250 ALT 637 W/O HCPCS: Performed by: EMERGENCY MEDICINE

## 2024-04-07 PROCEDURE — 80053 COMPREHEN METABOLIC PANEL: CPT | Performed by: EMERGENCY MEDICINE

## 2024-04-07 PROCEDURE — 99285 EMERGENCY DEPT VISIT HI MDM: CPT | Mod: 25

## 2024-04-07 PROCEDURE — 25000242 PHARM REV CODE 250 ALT 637 W/ HCPCS: Performed by: EMERGENCY MEDICINE

## 2024-04-07 PROCEDURE — 87635 SARS-COV-2 COVID-19 AMP PRB: CPT | Performed by: EMERGENCY MEDICINE

## 2024-04-07 PROCEDURE — 85025 COMPLETE CBC W/AUTO DIFF WBC: CPT | Performed by: EMERGENCY MEDICINE

## 2024-04-07 PROCEDURE — 63600175 PHARM REV CODE 636 W HCPCS: Performed by: EMERGENCY MEDICINE

## 2024-04-07 PROCEDURE — 84484 ASSAY OF TROPONIN QUANT: CPT | Performed by: EMERGENCY MEDICINE

## 2024-04-07 PROCEDURE — 93005 ELECTROCARDIOGRAM TRACING: CPT

## 2024-04-07 PROCEDURE — 83880 ASSAY OF NATRIURETIC PEPTIDE: CPT | Performed by: EMERGENCY MEDICINE

## 2024-04-07 PROCEDURE — 96361 HYDRATE IV INFUSION ADD-ON: CPT

## 2024-04-07 PROCEDURE — 93010 ELECTROCARDIOGRAM REPORT: CPT | Mod: ,,, | Performed by: INTERNAL MEDICINE

## 2024-04-07 RX ORDER — METHYLPREDNISOLONE SOD SUCC 125 MG
125 VIAL (EA) INJECTION
Status: COMPLETED | OUTPATIENT
Start: 2024-04-07 | End: 2024-04-07

## 2024-04-07 RX ORDER — AZITHROMYCIN 250 MG/1
500 TABLET, FILM COATED ORAL
Status: COMPLETED | OUTPATIENT
Start: 2024-04-07 | End: 2024-04-07

## 2024-04-07 RX ORDER — IPRATROPIUM BROMIDE AND ALBUTEROL SULFATE 2.5; .5 MG/3ML; MG/3ML
3 SOLUTION RESPIRATORY (INHALATION)
Status: COMPLETED | OUTPATIENT
Start: 2024-04-07 | End: 2024-04-07

## 2024-04-07 RX ORDER — PREDNISONE 20 MG/1
20 TABLET ORAL DAILY
Qty: 5 TABLET | Refills: 0 | Status: SHIPPED | OUTPATIENT
Start: 2024-04-07 | End: 2024-04-12

## 2024-04-07 RX ORDER — AZITHROMYCIN 250 MG/1
250 TABLET, FILM COATED ORAL DAILY
Qty: 4 TABLET | Refills: 0 | Status: SHIPPED | OUTPATIENT
Start: 2024-04-07

## 2024-04-07 RX ADMIN — IPRATROPIUM BROMIDE AND ALBUTEROL SULFATE 3 ML: .5; 3 SOLUTION RESPIRATORY (INHALATION) at 08:04

## 2024-04-07 RX ADMIN — AZITHROMYCIN DIHYDRATE 500 MG: 250 TABLET ORAL at 11:04

## 2024-04-07 RX ADMIN — SODIUM CHLORIDE, POTASSIUM CHLORIDE, SODIUM LACTATE AND CALCIUM CHLORIDE 500 ML: 600; 310; 30; 20 INJECTION, SOLUTION INTRAVENOUS at 09:04

## 2024-04-07 RX ADMIN — METHYLPREDNISOLONE SODIUM SUCCINATE 125 MG: 125 INJECTION, POWDER, FOR SOLUTION INTRAMUSCULAR; INTRAVENOUS at 08:04

## 2024-04-08 ENCOUNTER — PATIENT OUTREACH (OUTPATIENT)
Dept: EMERGENCY MEDICINE | Facility: HOSPITAL | Age: 89
End: 2024-04-08
Payer: MEDICARE

## 2024-04-08 LAB
OHS QRS DURATION: 78 MS
OHS QRS DURATION: 88 MS
OHS QTC CALCULATION: 437 MS
OHS QTC CALCULATION: 466 MS

## 2024-04-08 NOTE — DISCHARGE INSTRUCTIONS
Please closely monitor your blood sugars while on the steroids as this can increase them. Keep a very strict diabetic diet while on the steroids. Please return for any worsening shortness of breath, chest pain, fever or any other concerns.     Take your blood pressure at home twice a day for 3 days and write these numbers down, bring with you to your primary care doctor in 2-3 days for blood pressure recheck as you may need your medications changed. Return to the ED for chest pain, shortness of breath, numbness, weakness, loss of vision or any other concerns.       Thank you for coming to our Emergency Department today. As we discussed, it is important to remember that some problems are difficult to diagnose and may not be found during your Emergency Department visit. Be sure to follow up with your primary care doctor and review all labs/imaging/tests that were performed during this visit with them. Some labs/tests may be outside of the normal range and require non-emergent follow-up and further investigation to help diagnose/exclude/prevent complications or other medical conditions.    If you do not have a primary care doctor, you may contact the one listed on your discharge paperwork or you may also call the [facility] Appointment Desk at [phone] to schedule an appointment and establish care with one. It is important to your health that you have a primary care doctor.    Please take all medications as directed. All medications may potentially have side-effects and it is impossible to predict which medications may give you side-effects or what side-effects (if any) they will give you.. If you feel that you are having a negative effect or side-effect of any medication you should immediately stop taking them and seek medical attention. If you feel that you are having a life-threatening reaction call 911.    Return to the ER with any questions/concerns, new/concerning symptoms, worsening or failure to  improve.     Do not drive, swim, climb to height, take a bath or make any important decisions for 24 hours if you have received any pain medications, sedatives or mood altering drugs during your ER visit.

## 2024-04-08 NOTE — ED NOTES
Patient came in for cough, SOB, malaise x 2 days. Patient afebrile. Hx of COPD. Patient on 2L O2 at home. O2 sats 95-96 while on NC. Patient exhibiting frequent productive cough. Hx of HTN and prostate ca in 2020.

## 2024-04-08 NOTE — PROGRESS NOTES
Patient was seen in the ED on 4/6/24 and 4/8/24. Phoned patient to assist with Post ED Discharge Navigation. Patient agreed to assistance scheduling a PCP follow-up appointment within 7 days. In Basket message sent o PCP staff for scheduling assistance.  Ellie Ortega

## 2024-04-08 NOTE — ED PROVIDER NOTES
Encounter Date: 4/7/2024    SCRIBE #1 NOTE: I, David Jarvis, am scribing for, and in the presence of,  Migdalia Hurtado MD.       History     Chief Complaint   Patient presents with    Cough    Shortness of Breath     Pt presents to ER with complaints of SOB, coughing and chest tightness. Pt states he went to  yesterday for the same thing but has had no relief. Pt states the coughing is unbearable. Pt has a hx of COPD. Pt denies any other issues at this time.      Peter Lopez is a 88 y.o. male with a PMHx of BPH, Hep C, CKD stage 3b, COPD, DM type 1, HTN, prostate cancer s/p TURP, ILD, who presents to the ED for evaluation of a cough beginning yesterday. Patient reports complaints of a productive cough with white sputum, chest tightness, and dyspnea since yesterday. He notes he was seen at [facility] and given a prescription for an albuterol inhaler, tessalon pearls, and xyzal. He notes he initially felt mild relief, but reports his complaints have persisted through today. He notes he is usually on 2.5 L O2 at home. No other medications taken PTA. No alleviating or exacerbating factors noted. Denies fever, chills, CP, N/V, or other associated symptoms. Denies EtOH, tobacco, or other illicit drug usage. NKDA.     The history is provided by the patient. No  was used.     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  Vaccinated.  C A     Family History   Problem Relation Age of Onset    Cancer Mother     Asbestos Father     Liver disease Neg Hx      Social History     Tobacco Use    Smoking status: Former     Current packs/day: 0.00     Average packs/day: 1 pack/day for 40.0 years (40.0 ttl pk-yrs)     Types: Cigarettes     Start date: 1951     Quit date: 1991     Years since quittin.4    Smokeless tobacco: Former     Quit date: 1986   Substance Use Topics    Alcohol use: Not Currently     Alcohol/week: 0.0 standard drinks of alcohol     Comment: 2 bottles beer on weekends    Drug use: No     Review of Systems   Constitutional:  Negative for chills, diaphoresis and fever.   HENT:  Negative for drooling, sore throat and voice change.    Eyes:  Negative for photophobia and visual disturbance.   Respiratory:  Positive for cough, chest tightness and shortness of breath. Negative for wheezing.    Cardiovascular:  Negative for chest pain and leg swelling.   Gastrointestinal:  Negative for abdominal pain, blood in stool, constipation, diarrhea, nausea and vomiting.   Genitourinary:  Negative for dysuria, frequency, hematuria and urgency.   Musculoskeletal:  Negative for myalgias, neck pain and neck stiffness.   Skin:  Negative for rash and wound.   Neurological:  Negative for syncope, weakness, light-headedness, numbness and headaches.   Hematological:  Does not bruise/bleed easily.   Psychiatric/Behavioral:  Negative for agitation, confusion and suicidal ideas.    All other systems reviewed and are negative.      Physical Exam     Initial Vitals [24 1924]   BP  Pulse Resp Temp SpO2   (!) 149/83 (!) 116 20 99.2 °F (37.3 °C) (!) 94 %      MAP       --         Physical Exam    Nursing note and vitals reviewed.  Constitutional: He appears well-developed and well-nourished. He is not diaphoretic. No distress.   HENT:   Head: Normocephalic and atraumatic.   Right Ear: External ear normal.   Left Ear: External ear normal.   Mouth/Throat: Oropharynx is clear and moist. No oropharyngeal exudate.   Eyes: Conjunctivae and EOM are normal. Pupils are equal, round, and reactive to light. Right eye exhibits no discharge. Left eye exhibits no discharge.   Neck: Neck supple. No JVD present.   Normal range of motion.  Cardiovascular:  Regular rhythm, normal heart sounds and intact distal pulses.   Tachycardia present.   Exam reveals no gallop and no friction rub.       No murmur heard.  On 2L O2 NC.    Pulmonary/Chest: No respiratory distress. He has wheezes (scant). He has no rhonchi. He has no rales.   Dry cough on exam.   Abdominal: Abdomen is soft. Bowel sounds are normal. He exhibits no distension. There is no abdominal tenderness. There is no rebound and no guarding.   Musculoskeletal:         General: No tenderness or edema. Normal range of motion.      Cervical back: Normal range of motion and neck supple.     Lymphadenopathy:     He has no cervical adenopathy.   Neurological: He is alert and oriented to person, place, and time. He has normal strength. No cranial nerve deficit or sensory deficit. GCS score is 15. GCS eye subscore is 4. GCS verbal subscore is 5. GCS motor subscore is 6.   Skin: Skin is warm and dry. Capillary refill takes less than 2 seconds.   Psychiatric: He has a normal mood and affect. Thought content normal.         ED Course   Procedures  Labs Reviewed   CBC W/ AUTO DIFFERENTIAL - Abnormal; Notable for the following components:       Result Value    RBC 4.32 (*)     Hemoglobin 12.2 (*)     Hematocrit 38.7 (*)     MCHC 31.5 (*)     Lymph # 0.7 (*)     Gran %  "81.1 (*)     Lymph % 11.8 (*)     All other components within normal limits   COMPREHENSIVE METABOLIC PANEL - Abnormal; Notable for the following components:    Glucose 296 (*)     BUN 25 (*)     Creatinine 2.1 (*)     eGFR 30 (*)     All other components within normal limits   TROPONIN I   B-TYPE NATRIURETIC PEPTIDE   TROPONIN I   SARS-COV-2 RDRP GENE   POCT INFLUENZA A/B MOLECULAR          Imaging Results              X-Ray Chest 1 View (Final result)  Result time 04/07/24 20:42:49   Procedure changed from X-Ray Chest PA And Lateral     Final result by Rustam Holley MD (04/07/24 20:42:49)                   Impression:      No detrimental change when compared with 04/06/2024.      Electronically signed by: Rustam Holley MD  Date:    04/07/2024  Time:    20:42               Narrative:    EXAMINATION:  XR CHEST 1 VIEW    CLINICAL HISTORY:  Provided history is "Chest Pain;  ".    TECHNIQUE:  One view of the chest.    COMPARISON:  04/06/2024.    FINDINGS:  Cardiac wires overlie the chest.  Cardiomediastinal silhouette is stable.  Atherosclerotic calcifications overlie the aortic arch.  There are coarse interstitial lung markings and bibasilar subsegmental atelectasis.  Questionable calcified pleural plaques versus scarring again noted in the lung bases.  No confluent area of consolidation.  No sizable pleural effusion.  No pneumothorax.                                       Medications   azithromycin tablet 500 mg (has no administration in time range)   methylPREDNISolone sodium succinate injection 125 mg (125 mg Intravenous Given 4/7/24 2025)   albuterol-ipratropium 2.5 mg-0.5 mg/3 mL nebulizer solution 3 mL (3 mLs Nebulization Given 4/7/24 2047)   lactated ringers bolus 500 mL (500 mLs Intravenous New Bag 4/7/24 2157)     Medical Decision Making  Amount and/or Complexity of Data Reviewed  Labs: ordered.  Radiology: ordered.  ECG/medicine tests:  Decision-making details documented in ED " Course.    Risk  Prescription drug management.    MDM  88-year-old male with past history of COPD on 2.5 L nasal cannula presents with cough, shortness of breath and chest tightness.  Has improved since he was seen at [facility] yesterday and sent home on medications.  But still persisted so he presented to the ED.    Chest xray was negative for pneumonia, chf, obstruction, or pneumothorax. Patient given duoneb for wheezing.DDx Less likely URI, asthma, pertussis, sinusitis, or foreign body.     Patient noted to be tachycardic, this is after DuoNebs but per chart review patient has been tachycardic at every visit.  COVID and flu negative.  Hemoglobin 12.2.  No leukocytosis.  Creatinine of 2.1 at baseline.  BUN slightly elevated 500 mL of IV fluids was given.  Troponin BNP within normal limits.  Discussed with patient and his daughter with plans to go home for treatment.  They are in agreement plan and all questions answered.    Plan to DC home with PCP follow up in the next 3 days.  Discussed closely monitoring his blood glucoses given his history of diabetes as sending him home on steroids.  As patient has a history of COPD with slight flare versus bronchitis we will send home on azithromycin for anti-inflammatory reasons.  Will give 1st dose here.  Discussed primary care follow up in 2-3 days and strict return precautions.    Return precautions given, patient understands and agrees with plan. All questions answered.  Instructed to follow up with PCP.          Scribe Attestation:   Scribe #1: I performed the above scribed service and the documentation accurately describes the services I performed. I attest to the accuracy of the note.        ED Course as of 04/07/24 2314   Sun Apr 07, 2024   2133 EKG 12-lead  Sinus tachycardia 118.  No ST elevation or significant depression.  T-wave flattening in 1 and aVL.  Normal axis.  QTC is 437. [JT]      ED Course User Index  [JT] Migdalia Hurtado MD                         I,  Migdalia Hurtado, personally performed the services described in this documentation. All medical record entries made by the scribe were at my direction and in my presence. I have reviewed the chart and agree that the record reflects my personal performance and is accurate and complete.    Clinical Impression:  Final diagnoses:  [R06.02] SOB (shortness of breath)  [J44.1] COPD exacerbation (Primary)  [R00.0] Tachycardia  [R73.9] Migdalia Gresham MD  04/07/24 6980

## 2024-04-12 ENCOUNTER — PATIENT OUTREACH (OUTPATIENT)
Dept: EMERGENCY MEDICINE | Facility: HOSPITAL | Age: 89
End: 2024-04-12
Payer: MEDICARE

## 2024-04-12 NOTE — PROGRESS NOTES
Phoned patient for follow-up. Patient feeling a lot better. Patient declined the need for additional assistance at this time.  Ellie Ortega

## 2024-04-12 NOTE — PROGRESS NOTES
Reminded patient of upcoming appointment on 4/15/24 @ 8:30 with Monroe Malave NP [facility] [address], [facility] [place] [address] [phone].  Ellie Ortega

## 2024-04-15 ENCOUNTER — OFFICE VISIT (OUTPATIENT)
Dept: FAMILY MEDICINE | Facility: CLINIC | Age: 89
End: 2024-04-15
Payer: MEDICARE

## 2024-04-15 VITALS
WEIGHT: 175.69 LBS | HEART RATE: 105 BPM | OXYGEN SATURATION: 96 % | TEMPERATURE: 98 F | HEIGHT: 71 IN | SYSTOLIC BLOOD PRESSURE: 138 MMHG | DIASTOLIC BLOOD PRESSURE: 66 MMHG | RESPIRATION RATE: 18 BRPM | BODY MASS INDEX: 24.6 KG/M2

## 2024-04-15 DIAGNOSIS — J84.9 ILD (INTERSTITIAL LUNG DISEASE): Chronic | ICD-10-CM

## 2024-04-15 DIAGNOSIS — R05.9 COUGH IN ADULT PATIENT: Primary | ICD-10-CM

## 2024-04-15 PROCEDURE — 1159F MED LIST DOCD IN RCRD: CPT | Mod: CPTII,S$GLB,, | Performed by: NURSE PRACTITIONER

## 2024-04-15 PROCEDURE — 3288F FALL RISK ASSESSMENT DOCD: CPT | Mod: CPTII,S$GLB,, | Performed by: NURSE PRACTITIONER

## 2024-04-15 PROCEDURE — 1160F RVW MEDS BY RX/DR IN RCRD: CPT | Mod: CPTII,S$GLB,, | Performed by: NURSE PRACTITIONER

## 2024-04-15 PROCEDURE — 99999 PR PBB SHADOW E&M-EST. PATIENT-LVL V: CPT | Mod: PBBFAC,,, | Performed by: NURSE PRACTITIONER

## 2024-04-15 PROCEDURE — 1101F PT FALLS ASSESS-DOCD LE1/YR: CPT | Mod: CPTII,S$GLB,, | Performed by: NURSE PRACTITIONER

## 2024-04-15 PROCEDURE — 99213 OFFICE O/P EST LOW 20 MIN: CPT | Mod: S$GLB,,, | Performed by: NURSE PRACTITIONER

## 2024-04-15 PROCEDURE — 1126F AMNT PAIN NOTED NONE PRSNT: CPT | Mod: CPTII,S$GLB,, | Performed by: NURSE PRACTITIONER

## 2024-04-15 RX ORDER — BENZONATATE 200 MG/1
200 CAPSULE ORAL 3 TIMES DAILY PRN
Qty: 30 CAPSULE | Refills: 0 | Status: SHIPPED | OUTPATIENT
Start: 2024-04-15 | End: 2024-05-06

## 2024-04-15 NOTE — PROGRESS NOTES
Routine Office Visit    Patient Name: Peter Lopez    : 1935  MRN: [id number]    Chief Complaint:    ED follow-up    Subjective:  Peter is a 88 y.o. male who presents today for:     ED follow-up -  patient who is known to me with the below documented medical history reports today for follow-up from the emergency room.  Went to the emergency room twice last week for upper respiratory symptoms.  He tested negative for flu and COVID and  had chest x-ray there with   No acute findings.  He was treated with azithromycin and steroids as well as Tessalon.  Reports that the Tessalon does help with the cough and overall his symptoms have improved significantly since the ER.  Denies any worsening cough, shortness of breath, fevers, or chills.  Denies any other acute concerns  today.    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 30 tablet 0    albuterol (PROVENTIL/VENTOLIN HFA) 90 mcg/actuation inhaler Inhale 2 puffs into the lungs every 6 (six) hours as needed for Wheezing. Use with spacer  Dispense with 1 spacer 18 g 0    amLODIPine  (NORVASC) 5 MG tablet Take 1 tablet by mouth once daily 90 tablet 3    atorvastatin (LIPITOR) 20 MG tablet Take 1 tablet (20 mg total) by mouth every evening. 90 tablet 3    azithromycin (Z-BARAK) 250 MG tablet Take 1 tablet (250 mg total) by mouth once daily. 4 tablet 0    blood sugar diagnostic Strp To check BG 3 times daily, to use with insurance preferred meter 100 each 11    blood-glucose meter kit To check BG 3 times daily, to use with insurance preferred meter 1 each 0    cholecalciferol, vitamin D3, (VITAMIN D3) 25 mcg (1,000 unit) capsule Take 1,000 Units by mouth once daily.      diphenhydrAMINE-aluminum-magnesium hydroxide-simethicone-LIDOcaine viscous HCl 2% Swish and spit 10 mLs every 4 (four) hours as needed (As needed for sore throat). 100 each 0    lancets Misc To check BG 3 times daily, to use with insurance preferred meter 100 each 11    levocetirizine (XYZAL) 5 MG tablet Take 1 tablet (5 mg total) by mouth every evening. 30 tablet 0    linaGLIPtin (TRADJENTA) 5 mg Tab tablet Take 1 tablet (5 mg total) by mouth once daily. 90 tablet 1    olmesartan (BENICAR) 40 MG tablet Take 1 tablet by mouth once daily 90 tablet 3    ondansetron (ZOFRAN-ODT) 4 MG TbDL Take 1 tablet (4 mg total) by mouth every 8 (eight) hours as needed. 20 tablet 0    pantoprazole (PROTONIX) 40 MG tablet Take 1 tablet (40 mg total) by mouth once daily. 30 tablet 5    polyethylene glycol (GLYCOLAX) 17 gram PwPk Take this medication EVERY DAY to prevent constipation.  You may take it up to twice a day, but start at only once a day.  SKIP THIS MEDICATION IF YOU DEVELOP DIARRHEA. 72 each 3    repaglinide (PRANDIN) 0.5 MG tablet Take 1 tablet (0.5 mg total) by mouth 3 (three) times daily before meals. Do not take if you skip a meal 90 tablet 3    tiotropium bromide (SPIRIVA RESPIMAT) 2.5 mcg/actuation inhaler INHALE 2 SPRAY(S) BY MOUTH ONCE DAILY 4 g 11    [DISCONTINUED] benzonatate (TESSALON) 200 MG capsule Take 1 capsule (200 mg total)  "by mouth 3 (three) times daily as needed for Cough. 30 capsule 0     No current facility-administered medications on file prior to visit.       Allergies   Review of patient's allergies indicates:  No Known Allergies    Review of Systems (Pertinent positives)  Review of Systems   Constitutional: Negative.  Negative for chills and fever.   HENT: Negative.  Negative for congestion, sinus pain and sore throat.    Eyes: Negative.    Respiratory:  Positive for cough and sputum production. Negative for hemoptysis, shortness of breath and wheezing.    Cardiovascular:  Negative for chest pain, palpitations, orthopnea and claudication.   Gastrointestinal: Negative.  Negative for abdominal pain, diarrhea, nausea and vomiting.   Genitourinary: Negative.  Negative for dysuria, frequency and urgency.   Musculoskeletal: Negative.  Negative for back pain, joint pain and neck pain.   Skin: Negative.    Neurological: Negative.  Negative for dizziness, tingling, loss of consciousness and headaches.   Endo/Heme/Allergies: Negative.    Psychiatric/Behavioral: Negative.         /66 (BP Location: Right arm, Patient Position: Sitting, BP Method: Medium (Manual))   Pulse 105   Temp 98.1 °F (36.7 °C) (Oral)   Resp 18   Ht 5' 11" (1.803 m)   Wt 79.7 kg (175 lb 11.3 oz)   SpO2 96%   BMI 24.51 kg/m²     Physical Exam  Vitals reviewed.   Constitutional:       General: He is not in acute distress.     Appearance: Normal appearance. He is not ill-appearing, toxic-appearing or diaphoretic.   HENT:      Head: Normocephalic and atraumatic.   Cardiovascular:      Rate and Rhythm: Normal rate and regular rhythm.      Pulses: Normal pulses.      Heart sounds: Normal heart sounds.   Pulmonary:      Effort: Pulmonary effort is normal. No respiratory distress.      Breath sounds: Normal breath sounds. No wheezing.   Abdominal:      Tenderness: There is no abdominal tenderness.   Musculoskeletal:         General: No swelling, tenderness or " deformity. Normal range of motion.   Skin:     General: Skin is warm and dry.      Capillary Refill: Capillary refill takes less than 2 seconds.   Neurological:      General: No focal deficit present.      Mental Status: He is alert and oriented to person, place, and time.   Psychiatric:         Mood and Affect: Mood normal.         Behavior: Behavior normal.          Assessment/Plan:  Peter Lopez is a 88 y.o. male who presents today for :    Peter was seen today for hospital follow up.    Diagnoses and all orders for this visit:    Cough in adult patient  -     benzonatate (TESSALON) 200 MG capsule; Take 1 capsule (200 mg total) by mouth 3 (three) times daily as needed for Cough.    ILD (interstitial lung disease)      Discussed with patient that cough can linger for some time up to 6-8 weeks after COPD exacerbations and viral infections.  Breath sounds clear today.  Tessalon refilled.  No need for further antibiotics or steroids.  Monitor for any worsening.  All questions answered.        This office note has been dictated.  This dictation has been generated using M-Modal Fluency Direct dictation; some phonetic errors may occur.

## 2024-04-15 NOTE — PROGRESS NOTES
Health Maintenance Due   Topic     RSV Vaccine (Age 60+ and Pregnant patients) (1 - 1-dose 60+ series) Not offered at this office    Shingles Vaccine (2 of 2) Pt decline    COVID-19 Vaccine (7 - 2023-24 season) Not offered at this office    Hemoglobin A1c  Consult pcp

## 2024-04-26 NOTE — TELEPHONE ENCOUNTER
Patient stated he was having trouble redeeming his 50 dollar gift card that Annabel BHAKTA gave him at his last visit 4/11/2023. Patient stated he will try again and let us know how it turn out.    no

## 2024-05-04 DIAGNOSIS — R05.9 COUGH IN ADULT PATIENT: ICD-10-CM

## 2024-05-06 RX ORDER — BENZONATATE 200 MG/1
200 CAPSULE ORAL
Qty: 30 CAPSULE | Refills: 0 | Status: SHIPPED | OUTPATIENT
Start: 2024-05-06 | End: 2024-06-13

## 2024-05-14 NOTE — PROGRESS NOTES
"Peter Lopez presented for a  Medicare AWV and comprehensive Health Risk Assessment today. The following components were reviewed and updated:    Medical history  Family History  Social history  Allergies and Current Medications  Health Risk Assessment  Health Maintenance  Care Team         ** See Completed Assessments for Annual Wellness Visit within the encounter summary.**         The following assessments were completed:  Living Situation  CAGE  Depression Screening  Timed Get Up and Go  Whisper Test  Cognitive Function Screening  Nutrition Screening  ADL Screening  PAQ Screening        Vitals:    04/11/23 0844   BP: 132/78   BP Location: Left arm   Patient Position: Sitting   BP Method: Medium (Manual)   Pulse: 100   Resp: 17   Temp: 97.6 °F (36.4 °C)   TempSrc: Oral   SpO2: 96%   Weight: 81.5 kg (179 lb 10.8 oz)   Height: 5' 11" (1.803 m)     Body mass index is 25.06 kg/m².  Physical Exam          Diagnoses and health risks identified today and associated recommendations/orders:    1. Encounter for preventive health examination  Provided Peter with a 5-10 year written screening schedule and personal prevention plan. Recommendations were developed using the USPSTF age appropriate recommendations. Education, counseling, and referrals were provided as needed. After Visit Summary printed and given to patient which includes a list of additional screenings\tests needed.    2. Malignant neoplasm of prostate  Followed by urology    3. Stage 3b chronic kidney disease  Followed by pcp    4. Aortic atherosclerosis  Continue meds    5. Pulmonary hypertension  Followed by pulmonology     6. Chronic obstructive pulmonary disease, unspecified COPD type  Followed by pulmonology    7. Diabetes mellitus with nephropathy  Followed by pcp    8. Type 2 diabetes mellitus with diabetic neuropathy, without long-term current use of insulin  Followed by podiatry       Review for Opioid Screening: Patient does not have rx for " Opioids.    Review for Substance Use Disorders: Patient does not use substance.      No follow-ups on file.    Annabel Marie PA-C  I offered to discuss advanced care planning, including how to pick a person who would make decisions for you if you were unable to make them for yourself, called a health care power of , and what kind of decisions you might make such as use of life sustaining treatments such as ventilators and tube feeding when faced with a life limiting illness recorded on a living will that they will need to know. (How you want to be cared for as you near the end of your natural life)     X Patient is interested in learning more about how to make advanced directives.  I provided them paperwork and offered to discuss this with them.   No

## 2024-05-20 ENCOUNTER — OFFICE VISIT (OUTPATIENT)
Dept: FAMILY MEDICINE | Facility: CLINIC | Age: 89
End: 2024-05-20
Payer: MEDICARE

## 2024-05-20 ENCOUNTER — LAB VISIT (OUTPATIENT)
Dept: LAB | Facility: HOSPITAL | Age: 89
End: 2024-05-20
Attending: FAMILY MEDICINE
Payer: MEDICARE

## 2024-05-20 VITALS
SYSTOLIC BLOOD PRESSURE: 124 MMHG | DIASTOLIC BLOOD PRESSURE: 68 MMHG | HEART RATE: 107 BPM | RESPIRATION RATE: 18 BRPM | OXYGEN SATURATION: 93 % | TEMPERATURE: 97 F | BODY MASS INDEX: 24.6 KG/M2 | HEIGHT: 71 IN | WEIGHT: 175.69 LBS

## 2024-05-20 DIAGNOSIS — I10 ESSENTIAL HYPERTENSION: Chronic | ICD-10-CM

## 2024-05-20 DIAGNOSIS — E11.21 DIABETES MELLITUS WITH NEPHROPATHY: Primary | Chronic | ICD-10-CM

## 2024-05-20 DIAGNOSIS — N18.32 STAGE 3B CHRONIC KIDNEY DISEASE: ICD-10-CM

## 2024-05-20 DIAGNOSIS — E11.21 DIABETES MELLITUS WITH NEPHROPATHY: Chronic | ICD-10-CM

## 2024-05-20 DIAGNOSIS — N18.32 STAGE 3B CHRONIC KIDNEY DISEASE: Chronic | ICD-10-CM

## 2024-05-20 LAB
ALBUMIN SERPL BCP-MCNC: 3.7 G/DL (ref 3.5–5.2)
ALP SERPL-CCNC: 86 U/L (ref 55–135)
ALT SERPL W/O P-5'-P-CCNC: 25 U/L (ref 10–44)
ANION GAP SERPL CALC-SCNC: 9 MMOL/L (ref 8–16)
AST SERPL-CCNC: 29 U/L (ref 10–40)
BASOPHILS # BLD AUTO: 0.02 K/UL (ref 0–0.2)
BASOPHILS NFR BLD: 0.6 % (ref 0–1.9)
BILIRUB SERPL-MCNC: 0.4 MG/DL (ref 0.1–1)
BUN SERPL-MCNC: 22 MG/DL (ref 8–23)
CALCIUM SERPL-MCNC: 10 MG/DL (ref 8.7–10.5)
CHLORIDE SERPL-SCNC: 100 MMOL/L (ref 95–110)
CHOLEST SERPL-MCNC: 127 MG/DL (ref 120–199)
CHOLEST/HDLC SERPL: 2.6 {RATIO} (ref 2–5)
CO2 SERPL-SCNC: 27 MMOL/L (ref 23–29)
CREAT SERPL-MCNC: 2.3 MG/DL (ref 0.5–1.4)
DIFFERENTIAL METHOD BLD: ABNORMAL
EOSINOPHIL # BLD AUTO: 0.1 K/UL (ref 0–0.5)
EOSINOPHIL NFR BLD: 1.9 % (ref 0–8)
ERYTHROCYTE [DISTWIDTH] IN BLOOD BY AUTOMATED COUNT: 13.3 % (ref 11.5–14.5)
EST. GFR  (NO RACE VARIABLE): 27 ML/MIN/1.73 M^2
GLUCOSE SERPL-MCNC: 400 MG/DL (ref 70–110)
HCT VFR BLD AUTO: 38.9 % (ref 40–54)
HDLC SERPL-MCNC: 48 MG/DL (ref 40–75)
HDLC SERPL: 37.8 % (ref 20–50)
HGB BLD-MCNC: 12.6 G/DL (ref 14–18)
IMM GRANULOCYTES # BLD AUTO: 0.01 K/UL (ref 0–0.04)
IMM GRANULOCYTES NFR BLD AUTO: 0.3 % (ref 0–0.5)
LDLC SERPL CALC-MCNC: 65.2 MG/DL (ref 63–159)
LYMPHOCYTES # BLD AUTO: 0.9 K/UL (ref 1–4.8)
LYMPHOCYTES NFR BLD: 30.4 % (ref 18–48)
MCH RBC QN AUTO: 28 PG (ref 27–31)
MCHC RBC AUTO-ENTMCNC: 32.4 G/DL (ref 32–36)
MCV RBC AUTO: 86 FL (ref 82–98)
MONOCYTES # BLD AUTO: 0.4 K/UL (ref 0.3–1)
MONOCYTES NFR BLD: 11.3 % (ref 4–15)
NEUTROPHILS # BLD AUTO: 1.7 K/UL (ref 1.8–7.7)
NEUTROPHILS NFR BLD: 55.5 % (ref 38–73)
NONHDLC SERPL-MCNC: 79 MG/DL
NRBC BLD-RTO: 0 /100 WBC
PLATELET # BLD AUTO: 219 K/UL (ref 150–450)
PMV BLD AUTO: 11.2 FL (ref 9.2–12.9)
POTASSIUM SERPL-SCNC: 5.3 MMOL/L (ref 3.5–5.1)
PROT SERPL-MCNC: 7.4 G/DL (ref 6–8.4)
RBC # BLD AUTO: 4.5 M/UL (ref 4.6–6.2)
SODIUM SERPL-SCNC: 136 MMOL/L (ref 136–145)
TRIGL SERPL-MCNC: 69 MG/DL (ref 30–150)
WBC # BLD AUTO: 3.09 K/UL (ref 3.9–12.7)

## 2024-05-20 PROCEDURE — 1160F RVW MEDS BY RX/DR IN RCRD: CPT | Mod: CPTII,S$GLB,, | Performed by: FAMILY MEDICINE

## 2024-05-20 PROCEDURE — 83036 HEMOGLOBIN GLYCOSYLATED A1C: CPT | Performed by: FAMILY MEDICINE

## 2024-05-20 PROCEDURE — 80053 COMPREHEN METABOLIC PANEL: CPT | Performed by: FAMILY MEDICINE

## 2024-05-20 PROCEDURE — 1101F PT FALLS ASSESS-DOCD LE1/YR: CPT | Mod: CPTII,S$GLB,, | Performed by: FAMILY MEDICINE

## 2024-05-20 PROCEDURE — 80061 LIPID PANEL: CPT | Performed by: FAMILY MEDICINE

## 2024-05-20 PROCEDURE — 99214 OFFICE O/P EST MOD 30 MIN: CPT | Mod: S$GLB,,, | Performed by: FAMILY MEDICINE

## 2024-05-20 PROCEDURE — 1159F MED LIST DOCD IN RCRD: CPT | Mod: CPTII,S$GLB,, | Performed by: FAMILY MEDICINE

## 2024-05-20 PROCEDURE — 36415 COLL VENOUS BLD VENIPUNCTURE: CPT | Mod: PN | Performed by: FAMILY MEDICINE

## 2024-05-20 PROCEDURE — 3288F FALL RISK ASSESSMENT DOCD: CPT | Mod: CPTII,S$GLB,, | Performed by: FAMILY MEDICINE

## 2024-05-20 PROCEDURE — 1126F AMNT PAIN NOTED NONE PRSNT: CPT | Mod: CPTII,S$GLB,, | Performed by: FAMILY MEDICINE

## 2024-05-20 PROCEDURE — 85025 COMPLETE CBC W/AUTO DIFF WBC: CPT | Performed by: FAMILY MEDICINE

## 2024-05-20 PROCEDURE — 99999 PR PBB SHADOW E&M-EST. PATIENT-LVL V: CPT | Mod: PBBFAC,,, | Performed by: FAMILY MEDICINE

## 2024-05-20 PROCEDURE — G2211 COMPLEX E/M VISIT ADD ON: HCPCS | Mod: S$GLB,,, | Performed by: FAMILY MEDICINE

## 2024-05-20 NOTE — PROGRESS NOTES
"  Patient Name: Peter Lopez    : 1935  MRN: [id number]      Subjective:     Patient ID: Peter is a 88 y.o. male    Chief Complaint:  Follow-up    History of Present Illness  The patient presents for evaluation of diabetes.    The patient has not been monitoring his blood glucose levels at home. His breakfast typically consists of Cheerios, scrambled eggs, fruits, rocetarian chicken, legumes, and corn. Occasionally, he consumes a couple of donuts at work.    He did not do his labs prior to today's visit, as he missed the appointment last week.       Review of Systems   Constitutional:  Negative for unexpected weight change.   HENT:  Negative for sore throat.    Respiratory:  Negative for wheezing.    Cardiovascular:  Negative for chest pain and palpitations.   Gastrointestinal:  Negative for abdominal pain, blood in stool and change in bowel habit.   Endocrine: Negative for polydipsia, polyphagia and polyuria.   Genitourinary:  Negative for dysuria and hematuria.   Musculoskeletal:  Negative for neck pain.   Integumentary:  Negative for pallor.   Neurological:  Negative for dizziness, seizures, numbness and headaches.   Psychiatric/Behavioral:  Negative for confusion. The patient is not nervous/anxious.         Objective:   /68 (BP Location: Right arm, Patient Position: Sitting, BP Method: Medium (Manual))   Pulse 107   Temp 97.4 °F (36.3 °C) (Oral)   Resp 18   Ht 5' 11" (1.803 m)   Wt 79.7 kg (175 lb 11.3 oz)   SpO2 (!) 93%   BMI 24.51 kg/m²     Physical Exam    Physical Exam  Vitals reviewed.   Constitutional:       General: He is not in acute distress.     Appearance: He is well-developed. He is not ill-appearing or diaphoretic.   HENT:      Head: Normocephalic.      Right Ear: External ear normal.      Left Ear: External ear normal.      Nose: Nose normal.      Mouth/Throat:      Pharynx: No pharyngeal swelling, oropharyngeal exudate or posterior oropharyngeal erythema.   Neck:      Trachea: No " tracheal deviation.   Cardiovascular:      Rate and Rhythm: Regular rhythm. Tachycardia present.      Heart sounds: Normal heart sounds.   Pulmonary:      Effort: Pulmonary effort is normal.      Breath sounds: Normal breath sounds. No wheezing or rales.   Abdominal:      General: Bowel sounds are normal.      Palpations: Abdomen is soft. Abdomen is not rigid. There is no mass.      Tenderness: There is no abdominal tenderness. There is no guarding or rebound.   Musculoskeletal:      Cervical back: Normal range of motion and neck supple.   Lymphadenopathy:      Cervical: No cervical adenopathy.   Neurological:      Mental Status: He is alert and oriented to person, place, and time.      Sensory: No sensory deficit.      Motor: No atrophy.      Gait: Gait normal.      Deep Tendon Reflexes:      Reflex Scores:       Patellar reflexes are 2+ on the right side and 2+ on the left side.           Assessment        ICD-10-CM ICD-9-CM   1. Diabetes mellitus with nephropathy  E11.21 250.40     583.81   2. Essential hypertension  I10 401.9   3. Stage 3b chronic kidney disease  N18.32 585.3         Plan:     Assessment & Plan  1. Diabetes Mellitus.  The patient's last A1c levels were significantly elevated. Laboratory tests will be ordered for the patient. Additionally, a urine sample will be collected today. Strongly encouraged dietary changes.  If A1c higher than previous, will refer to endocrinology.    2. Hypertension.  Will continue current regimen.      3. Chronic kidney disease.  Strongly encouraged good hydration.  Discussed importance of avoiding nephrotoxic agents including nonsteroidal anti-inflammatories.  Will check renal function            -Lucas Lloyd Jr., MD, AAHIVS      This note was generated with the assistance of ambient listening technology. Verbal consent was obtained by the patient and accompanying visitor(s) for the recording of patient appointment to facilitate this note. I attest to having  reviewed and edited the generated note for accuracy, though some syntax or spelling errors may persist. Please contact the author of this note for any clarification.          There are no Patient Instructions on file for this visit.    Follow Up  No follow-ups on file.    Future Appointments   Date Time Provider Department Center   7/16/2024  1:00 PM Sarkis Hyman MD [facility] PULSM [facility]   7/29/2024 11:20 AM LAB, [facility] LAB [facility]   8/5/2024  2:00 PM Martine Kingsley MD [facility] HEM ONC [facility]   8/7/2024  9:45 AM Jesus Cole MD [facility] URO [facility]   8/20/2024  8:15 AM LAB, [facility] DRAW STATION [facility] LAB [facility]   8/20/2024  8:45 AM LAB, [facility] DRAW STATION [facility] LAB [facility]   8/26/2024 10:00 AM Lucas Lloyd Jr., MD [facility]

## 2024-05-21 ENCOUNTER — TELEPHONE (OUTPATIENT)
Dept: PULMONOLOGY | Facility: CLINIC | Age: 89
End: 2024-05-21
Payer: MEDICARE

## 2024-05-21 ENCOUNTER — TELEPHONE (OUTPATIENT)
Dept: PULMONOLOGY | Facility: HOSPITAL | Age: 89
End: 2024-05-21
Payer: MEDICARE

## 2024-05-21 DIAGNOSIS — J43.2 CENTRILOBULAR EMPHYSEMA: Primary | ICD-10-CM

## 2024-05-21 LAB
ESTIMATED AVG GLUCOSE: 335 MG/DL (ref 68–131)
HBA1C MFR BLD: 13.3 % (ref 4–5.6)

## 2024-05-21 NOTE — TELEPHONE ENCOUNTER
Left message to call office back.    ANDREEA Henderson  Pulm/Sleep [facility]  [phone]                   ----- Message from Beatriz Thibodeaux MA sent at 5/21/2024  8:39 AM CDT -----  Krzysztof Hernandez Staff  Caller: Unspecified (Today,  8:34 AM)  Type: Patient Call Back    Who called:Self    What is the request in detail:Oxygen tank    Can the clinic reply by [facility]?No    Would the patient rather a call back or a response via [facility]? Call    Best call back number:[phone] (home)      Additional Information:

## 2024-05-22 NOTE — TELEPHONE ENCOUNTER
----- Message from Beatriz Thibodeaux MA sent at 5/21/2024 11:27 AM CDT -----  I need you to put in new orders for O2 tanks and Discontinue portable concentrator only.

## 2024-05-24 ENCOUNTER — TELEPHONE (OUTPATIENT)
Dept: NEPHROLOGY | Facility: CLINIC | Age: 89
End: 2024-05-24
Payer: MEDICARE

## 2024-05-24 NOTE — TELEPHONE ENCOUNTER
CHW reached out to patient to reschedule CKD Basic Ed class. Patient stated he would like a return phone call in about 10 days. Reminder set to call patient back for CKD education. Will send message to nephrology staff to assist with scheduling nephrology f/u with Shereen Baldwin NP at that time.

## 2024-05-28 ENCOUNTER — TELEPHONE (OUTPATIENT)
Dept: PULMONOLOGY | Facility: CLINIC | Age: 89
End: 2024-05-28
Payer: MEDICARE

## 2024-05-28 ENCOUNTER — TELEPHONE (OUTPATIENT)
Dept: ENDOSCOPY | Facility: HOSPITAL | Age: 89
End: 2024-05-28
Payer: MEDICARE

## 2024-05-28 DIAGNOSIS — J44.9 CHRONIC OBSTRUCTIVE PULMONARY DISEASE, UNSPECIFIED COPD TYPE: Primary | ICD-10-CM

## 2024-05-28 NOTE — TELEPHONE ENCOUNTER
----- Message from Stephanie Bolton MA sent at 5/28/2024 11:45 AM CDT -----  Patient came in to check on order for Oxygen tank. I called [facility]. The person I spoke to said the order they received was incomplete. They need a new order with patients height and weight as well as his titration at rest and his litre flow, I explained to her that I would have a new order placed in Epic for the patient.

## 2024-05-29 ENCOUNTER — TELEPHONE (OUTPATIENT)
Dept: PULMONOLOGY | Facility: CLINIC | Age: 89
End: 2024-05-29
Payer: MEDICARE

## 2024-05-29 NOTE — TELEPHONE ENCOUNTER
Appt made already.                ----- Message from Beatriz Thibodeaux MA sent at 5/29/2024  7:37 AM CDT -----  Please schedule 6 min walk and will set up oxygen tank afterward.        Note    Sarkis Hyman MD15 hours ago (4:18 PM)      ----- Message from Stephanie Bolton MA sent at 5/28/2024 11:45 AM CDT -----  Patient came in to check on order for Oxygen tank. I called [facility]. The person I spoke to said the order they received was incomplete. They need a new order with patients height and weight as well as his titration at rest and his litre flow, I explained to her that I would have a new order placed in [facility] for the patient.

## 2024-06-03 ENCOUNTER — HOSPITAL ENCOUNTER (OUTPATIENT)
Dept: RESPIRATORY THERAPY | Facility: HOSPITAL | Age: 89
Discharge: HOME | End: 2024-06-03
Attending: INTERNAL MEDICINE
Payer: MEDICARE

## 2024-06-03 DIAGNOSIS — J44.9 CHRONIC OBSTRUCTIVE PULMONARY DISEASE, UNSPECIFIED COPD TYPE: ICD-10-CM

## 2024-06-03 PROCEDURE — 94618 PULMONARY STRESS TESTING: CPT | Mod: 26,,, | Performed by: INTERNAL MEDICINE

## 2024-06-03 PROCEDURE — 94618 PULMONARY STRESS TESTING: CPT

## 2024-06-04 DIAGNOSIS — J96.11 CHRONIC HYPOXEMIC RESPIRATORY FAILURE: ICD-10-CM

## 2024-06-04 DIAGNOSIS — J43.2 CENTRILOBULAR EMPHYSEMA: Primary | Chronic | ICD-10-CM

## 2024-06-05 ENCOUNTER — TELEPHONE (OUTPATIENT)
Dept: INFECTIOUS DISEASES | Facility: CLINIC | Age: 89
End: 2024-06-05
Payer: MEDICARE

## 2024-06-05 ENCOUNTER — TELEPHONE (OUTPATIENT)
Dept: NEPHROLOGY | Facility: CLINIC | Age: 89
End: 2024-06-05
Payer: MEDICARE

## 2024-06-05 NOTE — TELEPHONE ENCOUNTER
Patient states he has too many doctors to keep up with right now, he would like a call back in 1 month for CKD Basic Ed Stage 3.     Patient due for f/u with Nephrology in August with Shereen Baldwin NP. Patient asked to be reminded to schedule for that as well next time CHW checks in.

## 2024-06-10 DIAGNOSIS — E11.21 DIABETES MELLITUS WITH NEPHROPATHY: Primary | Chronic | ICD-10-CM

## 2024-06-11 ENCOUNTER — TELEPHONE (OUTPATIENT)
Dept: FAMILY MEDICINE | Facility: CLINIC | Age: 89
End: 2024-06-11
Payer: MEDICARE

## 2024-06-11 NOTE — TELEPHONE ENCOUNTER
----- Message from Lucas Lloyd Jr., MD sent at 6/10/2024 11:55 PM CDT -----  Please let patient know that his diabetes was much worse than previous.  Very important that he see endocrinology as this needs to be improved.  I placed a referral.

## 2024-06-12 ENCOUNTER — TELEPHONE (OUTPATIENT)
Dept: FAMILY MEDICINE | Facility: CLINIC | Age: 89
End: 2024-06-12
Payer: MEDICARE

## 2024-06-12 NOTE — TELEPHONE ENCOUNTER
----- Message from Renee Barcenas sent at 6/12/2024  9:35 AM CDT -----  Regarding: [facility] .[phone]  Type: Patient Call Back     What is the request in detail: [facility] is requesting that a script be sent over for a blood glucose monitor for the pt with directions on how often to check it.     Can the clinic reply by [facility]? No     Would the patient rather a call back or a response via [facility]? Call back    Best call back number: .[phone]      Additional Information: .    [facility] [address] [place] [address] - [address] [place] [address]  Phone: [phone] Fax: [phone]          Thank you.

## 2024-06-13 DIAGNOSIS — R05.9 COUGH IN ADULT PATIENT: ICD-10-CM

## 2024-06-13 RX ORDER — BENZONATATE 200 MG/1
200 CAPSULE ORAL
Qty: 30 CAPSULE | Refills: 0 | Status: SHIPPED | OUTPATIENT
Start: 2024-06-13

## 2024-06-17 ENCOUNTER — TELEPHONE (OUTPATIENT)
Dept: FAMILY MEDICINE | Facility: CLINIC | Age: 89
End: 2024-06-17
Payer: MEDICARE

## 2024-06-17 NOTE — TELEPHONE ENCOUNTER
Called PH back and she stated that on their system, with patient's elevated A1c, they're noticing he has prandin on system, but shows he's not taking it (50% coverage). She also wanted to see if patient has been checking sugars at home, since it showed that the meter and strips were sent in but not picked up.  I called patient and he stated that he has been taking his Tradjenta and 'some other diabetic medication', but does confirm he takes 2 diabetic meds. He stated that when he goes to work on Wednesday, he will check with pharmacy if the order for his bg meter is still active and  if so.

## 2024-06-17 NOTE — TELEPHONE ENCOUNTER
----- Message from Shivani Street sent at 6/17/2024 10:07 AM CDT -----  Name of Who is Calling: Jannette KUMAR with [employer] is calling on behalf of LAUREN SHERMAN [[id number]]          What is the request in detail: Pt  Jannette would like to know if pt is still taking medication Repaglinide 0.5 mg tab. Please assist.           Can the clinic reply by [facility]: No          What Number to Call Back if not in [facility]: [phone]

## 2024-06-18 ENCOUNTER — HOSPITAL ENCOUNTER (EMERGENCY)
Facility: HOSPITAL | Age: 89
Discharge: HOME | End: 2024-06-19
Attending: STUDENT IN AN ORGANIZED HEALTH CARE EDUCATION/TRAINING PROGRAM
Payer: MEDICARE

## 2024-06-18 DIAGNOSIS — K59.00 CONSTIPATION: Primary | ICD-10-CM

## 2024-06-18 PROCEDURE — 81000 URINALYSIS NONAUTO W/SCOPE: CPT | Performed by: STUDENT IN AN ORGANIZED HEALTH CARE EDUCATION/TRAINING PROGRAM

## 2024-06-18 PROCEDURE — 99283 EMERGENCY DEPT VISIT LOW MDM: CPT | Mod: 25

## 2024-06-18 PROCEDURE — 25000003 PHARM REV CODE 250: Performed by: STUDENT IN AN ORGANIZED HEALTH CARE EDUCATION/TRAINING PROGRAM

## 2024-06-18 RX ORDER — SYRING-NEEDL,DISP,INSUL,0.3 ML 29 G X1/2"
296 SYRINGE, EMPTY DISPOSABLE MISCELLANEOUS
Status: COMPLETED | OUTPATIENT
Start: 2024-06-18 | End: 2024-06-18

## 2024-06-18 RX ORDER — PSEUDOEPHEDRINE/ACETAMINOPHEN 30MG-500MG
100 TABLET ORAL
Status: COMPLETED | OUTPATIENT
Start: 2024-06-18 | End: 2024-06-18

## 2024-06-18 RX ADMIN — SODIUM CHLORIDE 500 ML: 9 INJECTION, SOLUTION INTRAVENOUS at 11:06

## 2024-06-18 RX ADMIN — MAGNESIUM CITRATE 296 ML: 1.75 LIQUID ORAL at 11:06

## 2024-06-18 RX ADMIN — Medication 100 ML: at 11:06

## 2024-06-19 VITALS
TEMPERATURE: 98 F | HEIGHT: 71 IN | OXYGEN SATURATION: 98 % | SYSTOLIC BLOOD PRESSURE: 129 MMHG | DIASTOLIC BLOOD PRESSURE: 71 MMHG | WEIGHT: 175 LBS | HEART RATE: 93 BPM | BODY MASS INDEX: 24.5 KG/M2 | RESPIRATION RATE: 18 BRPM

## 2024-06-19 LAB
BACTERIA #/AREA URNS HPF: NORMAL /HPF
BILIRUB UR QL STRIP: NEGATIVE
CLARITY UR: CLEAR
COLOR UR: COLORLESS
GLUCOSE UR QL STRIP: ABNORMAL
HGB UR QL STRIP: ABNORMAL
KETONES UR QL STRIP: NEGATIVE
LEUKOCYTE ESTERASE UR QL STRIP: NEGATIVE
MICROSCOPIC COMMENT: NORMAL
NITRITE UR QL STRIP: NEGATIVE
PH UR STRIP: 6 [PH] (ref 5–8)
PROT UR QL STRIP: NEGATIVE
RBC #/AREA URNS HPF: 1 /HPF (ref 0–4)
SP GR UR STRIP: 1.01 (ref 1–1.03)
URN SPEC COLLECT METH UR: ABNORMAL
UROBILINOGEN UR STRIP-ACNC: NEGATIVE EU/DL
YEAST URNS QL MICRO: NORMAL

## 2024-06-19 PROCEDURE — 25000003 PHARM REV CODE 250: Performed by: STUDENT IN AN ORGANIZED HEALTH CARE EDUCATION/TRAINING PROGRAM

## 2024-06-19 RX ORDER — ONDANSETRON 8 MG/1
8 TABLET, ORALLY DISINTEGRATING ORAL
Status: COMPLETED | OUTPATIENT
Start: 2024-06-19 | End: 2024-06-19

## 2024-06-19 RX ADMIN — ONDANSETRON 8 MG: 8 TABLET, ORALLY DISINTEGRATING ORAL at 12:06

## 2024-06-19 NOTE — ED PROVIDER NOTES
Encounter Date: 6/18/2024       History     Chief Complaint   Patient presents with    Constipation     States last BM was 3 days ago. Took dulcolax around 1600 and miralax this morning. Denies abd pain.      88 y.o. 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  Patient reports usually taking MiraLax and Dulcolax but has not been able to empty his bowels.  He reports having mild pressure in the rectal region.  Denies any dysuria hematuria.  Denies any nausea or vomiting.    The history is provided by the patient.     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  Vaccinated.  C A     Family History   Problem Relation Name Age of Onset    Cancer Mother      Asbestos Father      Liver disease Neg Hx       Social History     Tobacco Use    Smoking status: Former     Current packs/day: 0.00     Average packs/day: 1 pack/day for 40.0 years (40.0 ttl pk-yrs)     Types: Cigarettes     Start date: 1951     Quit date: 1991     Years since quittin.6    Smokeless tobacco: Former     Quit date: 1986   Substance Use Topics    Alcohol use: Not Currently     Alcohol/week: 0.0 standard drinks of alcohol     Comment: 2 bottles beer on weekends    Drug use: No     Review of Systems   Constitutional:  Negative for fever.   HENT:  Negative for sore throat.    Respiratory:  Negative for shortness of breath.    Cardiovascular:  Negative for chest pain.   Gastrointestinal:  Positive for constipation. Negative for nausea.   Genitourinary:  Negative for dysuria.   Musculoskeletal:  Negative for back pain.   Skin:  Negative for rash.   Neurological:  Negative for weakness.   Hematological:  Does not bruise/bleed easily.       Physical Exam     Initial Vitals [24]   BP Pulse Resp Temp SpO2   (!) 140/82 99 17 97.9 °F (36.6 °C) 95 %      MAP       --         Physical Exam    Nursing note and vitals reviewed.  Constitutional: He is not diaphoretic. No distress.   HENT:   Head: Normocephalic and atraumatic.   Eyes: Conjunctivae and EOM are normal. Pupils are equal, round, and reactive to light.   Neck:   Normal range of motion.  Cardiovascular:  Regular rhythm.           Pulses:       Radial pulses are 2+ on the right side and 2+ on the left side.        Posterior tibial pulses are 2+ on the right side and 2+ on the left side.   Pulmonary/Chest: Breath sounds normal. No respiratory distress.   Abdominal: Abdomen is soft. Bowel sounds are normal. He exhibits no distension. There is no abdominal tenderness. There  is no rebound and no guarding.   Musculoskeletal:         General: No tenderness. Normal range of motion.      Cervical back: Normal range of motion.     Lymphadenopathy:     He has no cervical adenopathy.   Neurological: He is alert and oriented to person, place, and time.   Skin: Skin is warm. Capillary refill takes less than 2 seconds.   Psychiatric: He has a normal mood and affect. His behavior is normal.         ED Course   Procedures  Labs Reviewed   URINALYSIS, REFLEX TO URINE CULTURE - Abnormal; Notable for the following components:       Result Value    Color, UA Colorless (*)     Glucose, UA 4+ (*)     Occult Blood UA Trace (*)     All other components within normal limits    Narrative:     Specimen Source->Urine   URINALYSIS MICROSCOPIC    Narrative:     Specimen Source->Urine   URINALYSIS, REFLEX TO URINE CULTURE          Imaging Results              X-Ray Abdomen Flat And Erect (Final result)  Result time 06/19/24 00:00:28      Final result by Lucia Ward MD (06/19/24 00:00:28)                   Impression:      Nonobstructive bowel gas pattern.  Question constipation.      Electronically signed by: Lucia Ward  Date:    06/19/2024  Time:    00:00               Narrative:    EXAMINATION:  ABDOMEN FLAT AND ERECT    CLINICAL HISTORY:  Constipation, unspecified    TECHNIQUE:  Abdomen flat and erect radiographs were submitted.    COMPARISON:  None.    FINDINGS:  Abdomen flat and erect radiographs demonstrate a nonspecific bowel gas pattern.  There are no dilated loops of small bowel or air-fluid levels detected.  There is no free air detected under the diaphragm.  Moderate large fecal material is present.  There is linear scarring or atelectasis at the lung bases.                                       Medications   glycerin 99.5% topical solution 100 mL (100 mLs Rectal Given 6/18/24 2344)     And   magnesium citrate solution 296 mL (296 mLs Rectal Given 6/18/24 2344)     And   sodium chloride  0.9% bolus 500 mL 500 mL (500 mLs Rectal New Bag 6/18/24 6961)   ondansetron disintegrating tablet 8 mg (8 mg Oral Given 6/19/24 0009)     Medical Decision Making:   History:   Old Medical Records: I decided to obtain old medical records.  Old Records Summarized: other records.       <> Summary of Records: Impression:     1. Significant retained stool seen within the colon which can be seen with constipation.  2. Otherwise no acute intra-abdominal abnormalities identified.  No evidence of bowel obstruction.  3. Additional stable findings as detailed above.        Electronically signed by:Dorene Phillips MD  Date:                                            12/13/2023  Time:                                           20:11    Initial Assessment:   88 y.o. 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  Patient in no acute distress, abdominal exam benign.  Will obtain a KUB to assess for any signs of significant infection.  Will get a UA to assess for possible urinary tract infection.  Will give brown bag enema and reassess.  Differential Diagnosis:   Differential Diagnosis includes, but is not limited to:  Mesenteric ischemia, perforated viscous, SBO/volvulus, incarcerated/strangulated hernia, intussusception, peritonitis, appendicitis, ileus, delayed transit/constipation, medication side-effect, surgical complication, dietary issue.      Clinical Tests:   Radiological Study: Ordered and Reviewed             ED Course  as of 06/19/24 0136   Wed Jun 19, 2024   0121 Urinalysis, Reflex to Urine Culture Urine, Clean Catch(!) [AS]   0129 Patient reports significant improvement of his symptoms after enema.  He was able to express large volume stool. Pt is currently stable for discharge. I see no indication of an emergent process beyond that addressed during our encounter but have duly counseled the patient/family regarding the need for prompt follow-up as well as the indications that should prompt immediate return to the emergency room should new or worrisome developments occur. I discussed the ED work up and diagnostic findings with the patient/family. The patient/family has been provided with verbal and printed direction regarding our final diagnosis(es) as well as instructions regarding use of OTC and/or Rx medications intended to manage the patient's aforementioned conditions. The patient/family verbalized an understanding. The patient/family is asked if there are any questions or concerns. We discuss the case, until all issues are addressed to the patient/family's satisfaction. Patient/family understands and is agreeable to the plan.  [AS]      ED Course User Index  [AS] Ryan Walker MD          Medical Decision Making  Amount and/or Complexity of Data Reviewed  Labs:  Decision-making details documented in ED Course.    Risk  OTC drugs.  Prescription drug management.           Clinical Impression:   Final diagnoses:  [K59.00] Constipation (Primary)          ED Disposition Condition    Discharge Stable          ED Prescriptions    None       Follow-up Information       Follow up With Specialties Details Why Contact Lucas Lopez Jr., MD Family Medicine Schedule an appointment as soon as possible for a visit  for reassesment [address] [facility] [address]  [phone]      [facility] Emergency Medicine  If symptoms worsen [address] [facility] [place]  [place] [address]  [phone]            DISCLAIMER: This note was prepared with [employer] voice recognition transcription software. Garbled syntax, mangled pronouns, and other bizarre constructions may be attributed to that software system.     Ryan Walker MD  06/19/24 0130       Ryan Walker MD  06/19/24 0136

## 2024-06-19 NOTE — DISCHARGE INSTRUCTIONS
Thank you for coming to our Emergency Department today. It is important to remember that some problems are difficult to diagnose and may not be found during your first visit. Be sure to follow up with your primary care doctor and review any labs/imaging that was performed with them. If you do not have a primary care doctor, you may contact the one listed on your discharge paperwork or you may also call the [facility] Appointment Desk at [phone] to schedule an appointment with one.     All medications may potentially have side effects and it is impossible to predict which medications may give you side effects. If you feel that you are having a negative effect of any medication you should immediately stop taking them and seek medical attention.    Return to the ER with any questions/concerns, new/concerning symptoms, worsening or failure to improve. Do not drive or make any important decisions for 24 hours if you have received any pain medications, sedatives or mood altering drugs during your ER visit.

## 2024-06-21 DIAGNOSIS — E11.21 DIABETES MELLITUS WITH NEPHROPATHY: Chronic | ICD-10-CM

## 2024-06-21 RX ORDER — LINAGLIPTIN 5 MG/1
5 TABLET, FILM COATED ORAL
Qty: 90 TABLET | Refills: 0 | Status: SHIPPED | OUTPATIENT
Start: 2024-06-21

## 2024-06-21 NOTE — TELEPHONE ENCOUNTER
No care due was identified.  [facility] Embedded Care Due Messages. Reference number: [id number].   6/21/2024 9:12:59 AM CDT

## 2024-07-10 ENCOUNTER — TELEPHONE (OUTPATIENT)
Facility: CLINIC | Age: 89
End: 2024-07-10
Payer: MEDICARE

## 2024-07-10 NOTE — TELEPHONE ENCOUNTER
CHW reached out to patient regarding CKD Basic Ed. Patient states he is interested in attending but has a lot going on. Patient stated he's back at work, working 5 days a week.     He appreciates follow up calls and would like to check in again in one month.     Patient is due for nephrology follow up with Shereen Baldwin NP in August 2024 per last visit note. Routing this call to staff for assistance with scheduling.

## 2024-07-16 ENCOUNTER — OFFICE VISIT (OUTPATIENT)
Dept: PULMONOLOGY | Facility: CLINIC | Age: 89
End: 2024-07-16
Payer: MEDICARE

## 2024-07-16 VITALS
OXYGEN SATURATION: 93 % | SYSTOLIC BLOOD PRESSURE: 148 MMHG | HEIGHT: 71 IN | WEIGHT: 173.19 LBS | HEART RATE: 103 BPM | DIASTOLIC BLOOD PRESSURE: 83 MMHG | BODY MASS INDEX: 24.25 KG/M2

## 2024-07-16 DIAGNOSIS — J43.9 COPD WITH CHRONIC BRONCHITIS AND EMPHYSEMA: Chronic | ICD-10-CM

## 2024-07-16 DIAGNOSIS — G47.31 COMPLEX SLEEP APNEA SYNDROME: Primary | ICD-10-CM

## 2024-07-16 DIAGNOSIS — I27.20 PULMONARY HYPERTENSION: Chronic | ICD-10-CM

## 2024-07-16 DIAGNOSIS — J43.2 CENTRILOBULAR EMPHYSEMA: Chronic | ICD-10-CM

## 2024-07-16 DIAGNOSIS — J44.89 COPD WITH CHRONIC BRONCHITIS AND EMPHYSEMA: Chronic | ICD-10-CM

## 2024-07-16 PROCEDURE — 1159F MED LIST DOCD IN RCRD: CPT | Mod: CPTII,S$GLB,, | Performed by: INTERNAL MEDICINE

## 2024-07-16 PROCEDURE — 1126F AMNT PAIN NOTED NONE PRSNT: CPT | Mod: CPTII,S$GLB,, | Performed by: INTERNAL MEDICINE

## 2024-07-16 PROCEDURE — 99999 PR PBB SHADOW E&M-EST. PATIENT-LVL III: CPT | Mod: PBBFAC,,, | Performed by: INTERNAL MEDICINE

## 2024-07-16 PROCEDURE — 99214 OFFICE O/P EST MOD 30 MIN: CPT | Mod: S$GLB,,, | Performed by: INTERNAL MEDICINE

## 2024-07-16 RX ORDER — TIOTROPIUM BROMIDE INHALATION SPRAY 3.12 UG/1
SPRAY, METERED RESPIRATORY (INHALATION)
Qty: 4 G | Refills: 11 | Status: SHIPPED | OUTPATIENT
Start: 2024-07-16

## 2024-07-16 NOTE — PROGRESS NOTES
Peter Lopez  was seen as a follow up.     CHIEF COMPLAINT:  Apnea      HISTORY OF PRESENT ILLNESS: Peter Lopez is a 88 y.o. 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  Patient was seen by WENCESLAO Bridges for copd/grace.      Our first encounter was 6/12/20.  Patient smoked .75 ppd x 40 years.  Patient quit smoking in 1990s.  Patient was diagnosed with covid 19 on 3/28/20.  Initially, patient was discharged to home with home monitored.  Patient represented to ED of 3/29/20 and was discharged to home.  Represented back to ed on 4/3/20.  Hospitalized until 4/22/20 for hypoxic respiratory failure requiring nrb.  Subsequently discharged on 4/22/20 to [facility].  Patient was discharged to home with 2 lpm.    S/p 6 min walk and requesting portable concentrator.  Patient was set up with portable concentrator.  Have not been using portable concentrator and request that machine be returned. Machine was taken back by dme.    Doing well in regard to wob.  Resumed working at [employer].      Patient was diagnosed with grace with ahi of 44 with emergent of central during cpap titration.  Patient declined asv in the past due to dealing with prostate cancer.  Agreed to asv.  S/p asv titration 7/2023.  Patient was set up with asv.  Per patient, he is using asv nightly.  Sleep is better with asv.          Patient agreed to asv titraion and set up.  S/p titration 7/3/23.      SLEEP ROUTINE:  Activity the hour prior to sleep: watch tv or read in bed    Bed partner:  alone  Time to bed:  8-10 pm   Lights off:  night light is on  Sleep  onset latency:  few minutes         Disruptions or awakenings:    2-3 times for bathroom (no issue going back to sleep)    Wakeup time:      5 am - 6 am  Perceived sleep quality:  rested       Daytime naps:     none  Weekend sleep routine:      same  Caffeine use: 1 cup of coffee in am   exercise habit:   light weight        PAST MEDICAL HISTORY:    Active Ambulatory Problems     Diagnosis Date Noted    Essential hypertension 10/02/2012    Diabetes mellitus with nephropathy 10/16/2013    Aortic atherosclerosis 05/03/2019    ILD (interstitial lung disease) 04/04/2020    Normocytic anemia 04/04/2020    Reactive depression 04/14/2020    Centrilobular emphysema 08/10/2020    Chronic fatigue     Pulmonary hypertension 03/08/2021    Iron deficiency anemia 03/11/2021    Malignant neoplasm of prostate 06/14/2021    Stage 3b chronic kidney disease 12/18/2022    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    Leukopenia 06/30/2023    Vitamin D deficiency 06/30/2023    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    Diarrhea 08/10/2023    Monoclonal gammopathy 12/04/2023    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 02/18/2024    Nonproliferative diabetic retinopathy of left eye 02/18/2024     Resolved Ambulatory Problems     Diagnosis Date Noted    Elevated PSA 01/10/2013    Hepatitis C 10/16/2013    BPH (benign prostatic hyperplasia) 04/05/2016    Chronic kidney disease, stage 3a 01/28/2020    Hypoglycemia     Hypoxia 03/29/2020    Elevated troponin 04/04/2020    Centrilobular emphysema 04/13/2020    Discharge planning issues 04/14/2020    Gait instability 09/03/2020    Tachycardia with heart rate 121-140 beats per minute 09/03/2020    Sleep-related breathing disorder 12/20/2020    Complex sleep apnea syndrome     Dyspnea 03/11/2021    Chest pain 05/20/2021    Urinary retention 06/22/2021    Pulmonary hypertension due to left ventricular systolic dysfunction 12/13/2022    Type 2 diabetes  mellitus with diabetic neuropathy, without long-term current use of insulin 2023    Chronic respiratory failure with hypoxia 2024     Past Medical History:   Diagnosis Date    Chronic hepatitis C without mention of hepatic coma     CKD (chronic kidney disease)     COPD (chronic obstructive pulmonary disease)     Diabetes mellitus type I     Diabetes with neurologic complications     H/O colonoscopy     [place] (hard of hearing)     Hypertension     On home oxygen therapy     Prostate cancer     Retinopathy due to secondary diabetes mellitus     Wears hearing aid in both ears                 PAST SURGICAL HISTORY:    Past Surgical History:   Procedure Laterality Date    NO PAST SURGERIES  2021    TRANSURETHRAL RESECTION OF PROSTATE USING BIPOLAR CAUTERY N/A 2021    Procedure: (TURP) Transuretheral Resection of Prostate with BIPOLAR CAUTERY;  Surgeon: Jesus Cole MD;  Location: [facility];  Service: Urology;  Laterality: N/A;  RN Pre OP 21.  Vaccinated.  C A         FAMILY HISTORY:                Family History   Problem Relation Name Age of Onset    Cancer Mother      Asbestos Father      Liver disease Neg Hx         SOCIAL HISTORY:          Tobacco:   Social History     Tobacco Use   Smoking Status Former    Current packs/day: 0.00    Average packs/day: 1 pack/day for 40.0 years (40.0 ttl pk-yrs)    Types: Cigarettes    Start date: 1951    Quit date: 1991    Years since quittin.7   Smokeless Tobacco Former    Quit date: 1986     alcohol use:    Social History     Substance and Sexual Activity   Alcohol Use Not Currently    Alcohol/week: 0.0 standard drinks of alcohol    Comment: 2 bottles beer on weekends               Occupation:  [employer]     ALLERGIES:  Review of patient's allergies indicates:  No Known Allergies    CURRENT MEDICATIONS:    Current Outpatient Medications   Medication Sig Dispense Refill    acetaminophen (TYLENOL) 500 MG tablet Take 1 tablet (500  mg total) by mouth every 6 (six) hours as needed. 30 tablet 0    albuterol (PROVENTIL/VENTOLIN HFA) 90 mcg/actuation inhaler Inhale 2 puffs into the lungs every 6 (six) hours as needed for Wheezing. Use with spacer  Dispense with 1 spacer 18 g 0    amLODIPine (NORVASC) 5 MG tablet Take 1 tablet by mouth once daily 90 tablet 3    atorvastatin (LIPITOR) 20 MG tablet Take 1 tablet (20 mg total) by mouth every evening. 90 tablet 3    azithromycin (Z-BRAAK) 250 MG tablet Take 1 tablet (250 mg total) by mouth once daily. 4 tablet 0    benzonatate (TESSALON) 200 MG capsule Take 1 capsule by mouth three times daily as needed for cough 30 capsule 0    blood sugar diagnostic Strp To check BG 1 times daily, to use with insurance preferred meter 50 each 11    blood-glucose meter kit To check BG 1 times daily, to use with insurance preferred meter 1 each 0    cholecalciferol, vitamin D3, (VITAMIN D3) 25 mcg (1,000 unit) capsule Take 1,000 Units by mouth once daily.      diphenhydrAMINE-aluminum-magnesium hydroxide-simethicone-LIDOcaine viscous HCl 2% Swish and spit 10 mLs every 4 (four) hours as needed (As needed for sore throat). 100 each 0    lancets Misc To check BG 1 times daily, to use with insurance preferred meter 50 each 11    levocetirizine (XYZAL) 5 MG tablet Take 1 tablet (5 mg total) by mouth every evening. 30 tablet 0    olmesartan (BENICAR) 40 MG tablet Take 1 tablet by mouth once daily 90 tablet 3    ondansetron (ZOFRAN-ODT) 4 MG TbDL Take 1 tablet (4 mg total) by mouth every 8 (eight) hours as needed. 20 tablet 0    pantoprazole (PROTONIX) 40 MG tablet Take 1 tablet (40 mg total) by mouth once daily. 30 tablet 5    polyethylene glycol (GLYCOLAX) 17 gram PwPk Take this medication EVERY DAY to prevent constipation.  You may take it up to twice a day, but start at only once a day.  SKIP THIS MEDICATION IF YOU DEVELOP DIARRHEA. 72 each 3    repaglinide (PRANDIN) 0.5 MG tablet Take 1 tablet (0.5 mg total) by mouth 3  "(three) times daily before meals. Do not take if you skip a meal 90 tablet 3    TRADJENTA 5 mg Tab tablet Take 1 tablet by mouth once daily 90 tablet 0    tiotropium bromide (SPIRIVA RESPIMAT) 2.5 mcg/actuation inhaler INHALE 2 SPRAY(S) BY MOUTH ONCE DAILY 4 g 11     No current facility-administered medications for this visit.                  REVIEW OF SYSTEMS:     Pulmonary related symptoms as per HPI.  Gen:  no weight loss, no fever, no night sweat  HEENT:  no visual changes, no sore throat, + hearing loss  CV:  No chest pain, no orthopnea, no PND  GI:  no melena, no hematochezia, no diarhea, no constipation.  :  no dysuria, no hematuria, no hesistancy, no dribbling  Neuro:  no syncope, no vertigo, no tinitus  Psych:  No homocide or suicide ideation; no depression.  Endocrine:  No heat or cold intolerance.  Sleep:  No snoring; no witnessed apnea.  Feeling rested upon awake.    Otherwise, a balance of systems reviewed is negative.          PHYSICAL EXAM:  Vitals:    07/16/24 1257   BP: (!) 148/83   Pulse: 103   SpO2: (!) 93%   Weight: 78.5 kg (173 lb 2.7 oz)   Height: 5' 11" (1.803 m)   PainSc: 0-No pain       Body mass index is 24.15 kg/m².     GENERAL:  well develop; no apparent distress  HEENT:  no nasal congestion; no discharge noted; class 2 modified mallampatti.  +denture   NECK:  supple; no palpable masses.  CARDIO: regular rate and rhythm  PULM:  clear to auscultation bilaterally; no intercostals retractions; no accessory muscle usage   ABDOMEN:  soft nontender/nondistended.  +bowel sound  EXTREMITIES no cce  NEURO:  CN II-XII intact.  5/5 motor in all extremities.  sensation grossly intact   to light touch.  PSYCH:  normal affect.  Alert and oriented x 4    LABS  Pulmonary Functions Testing Results(personally reviewed):    PFT 12/7/20 Ratio of 56%; FVC 3.43 L (104%); FEV1 1.93 L (79%); TLC n/a L (n/a%); dlco 9.95 (39%)   PFT 11/2/22 Ratio of 53%; FVC 2.69 L (83%); FEV1 1.44 L (60%); TLC 4.56 L (62%); " dlco 8.4 (33%) discrepancy between VA and TLC makes dlco interpretation unreliable.      6 min walk 8/21/23 149 m 94-84-98    ABG (personally reviewed):  4/4/20 7.48/38/90/28 on nrb  CXR (personally reviewed):  4/15/20 bilateral airspace disease  CT CHEST(personally reviewed):  11/29/22 diffuse emphysematous changes bilaterally    Split study 1/7/2021 AHI 44 with emerging CA on cpap. ASV titration defer due to prostate problems.   ASV titration 7/3/23 ahi of 3.7 with asv standard settings.      covid 19 3/29/20 positive.      Echo 11/29/22  The left ventricle is normal in size with eccentric hypertrophy and low normal systolic function.  The estimated ejection fraction is 50%.  Indeterminate left ventricular diastolic function.  Normal right ventricular size with normal right ventricular systolic function.  Mild tricuspid regurgitation.  There is moderate pulmonary hypertension.  Normal central venous pressure (3 mmHg).  The estimated PA systolic pressure is 45 mmHg.        ASSESSMENT/PLAN  Problem List Items Addressed This Visit       Centrilobular emphysema (Chronic)    Overview     FEV1 60  Maintenance inhaler-spiriva  Decline pharmacy assist.  Quit in 1980s  Albuterol prn-not needing   Oxygen prn following covid but not needing now however walk test indicates that he would still benefit from this.   Will provide portablec concentrator  Pulmonary rehab- completed some but stop after prostate cancer  uptodate flu/covid/pneumonia vaccine.  Off oxygen             Relevant Medications    tiotropium bromide (SPIRIVA RESPIMAT) 2.5 mcg/actuation inhaler    Pulmonary hypertension (Chronic)    Overview     Echo with pasp 45 mmHg ef 50%.  Group 2 and 3 given ddx and copd         Complex sleep apnea syndrome - Primary    Overview     ahi of 44   +emergence of central apnea during cpap titration.    S/p asv titration and did well during study  Did not require oxygen during asv titration.    Per patient he is using nightly  without issue.  Unable to access asv usage data.  Advise patient to bring asv for next clinic appointment.               Relevant Orders    CPAP/BIPAP SUPPLIES     Other Visit Diagnoses       COPD with chronic bronchitis and emphysema  (Chronic)       Relevant Medications    tiotropium bromide (SPIRIVA RESPIMAT) 2.5 mcg/actuation inhaler                covid 19 - diagnosed 3/28/20.  Doing better.      Tobacco abuse - quit since 1991.  Encourage continue with smoking cessation.      15 minutes of total time spent on the encounter, which includes face to face time and non-face to face time preparing to see the patient (eg, review of tests), Obtaining and/or reviewing separately obtained history, documenting clinical information in the electronic or other health record, independently interpreting results (not separately reported) and communicating results to the patient/family/caregiver, or Care coordination (not separately reported).        Patient will No follow-ups on file. with md/np.

## 2024-07-22 DIAGNOSIS — N18.32 STAGE 3B CHRONIC KIDNEY DISEASE: Primary | ICD-10-CM

## 2024-07-25 DIAGNOSIS — R05.9 COUGH IN ADULT PATIENT: ICD-10-CM

## 2024-07-26 RX ORDER — BENZONATATE 200 MG/1
200 CAPSULE ORAL
Qty: 30 CAPSULE | Refills: 0 | Status: SHIPPED | OUTPATIENT
Start: 2024-07-26

## 2024-07-29 ENCOUNTER — LAB VISIT (OUTPATIENT)
Dept: LAB | Facility: HOSPITAL | Age: 89
End: 2024-07-29
Attending: INTERNAL MEDICINE
Payer: MEDICARE

## 2024-07-29 DIAGNOSIS — D64.9 ANEMIA, UNSPECIFIED TYPE: ICD-10-CM

## 2024-07-29 LAB
ALBUMIN SERPL BCP-MCNC: 3.7 G/DL (ref 3.5–5.2)
ALP SERPL-CCNC: 62 U/L (ref 55–135)
ALT SERPL W/O P-5'-P-CCNC: 23 U/L (ref 10–44)
ANION GAP SERPL CALC-SCNC: 11 MMOL/L (ref 8–16)
AST SERPL-CCNC: 28 U/L (ref 10–40)
BASOPHILS # BLD AUTO: 0.02 K/UL (ref 0–0.2)
BASOPHILS NFR BLD: 0.7 % (ref 0–1.9)
BILIRUB SERPL-MCNC: 0.6 MG/DL (ref 0.1–1)
BUN SERPL-MCNC: 24 MG/DL (ref 8–23)
CALCIUM SERPL-MCNC: 10.3 MG/DL (ref 8.7–10.5)
CHLORIDE SERPL-SCNC: 100 MMOL/L (ref 95–110)
CO2 SERPL-SCNC: 23 MMOL/L (ref 23–29)
CREAT SERPL-MCNC: 2.2 MG/DL (ref 0.5–1.4)
DIFFERENTIAL METHOD BLD: ABNORMAL
EOSINOPHIL # BLD AUTO: 0.1 K/UL (ref 0–0.5)
EOSINOPHIL NFR BLD: 2.2 % (ref 0–8)
ERYTHROCYTE [DISTWIDTH] IN BLOOD BY AUTOMATED COUNT: 13.6 % (ref 11.5–14.5)
EST. GFR  (NO RACE VARIABLE): 28.5 ML/MIN/1.73 M^2
FERRITIN SERPL-MCNC: 48 NG/ML (ref 20–300)
GLUCOSE SERPL-MCNC: 357 MG/DL (ref 70–110)
HCT VFR BLD AUTO: 39.6 % (ref 40–54)
HGB BLD-MCNC: 12.8 G/DL (ref 14–18)
IMM GRANULOCYTES # BLD AUTO: 0.01 K/UL (ref 0–0.04)
IMM GRANULOCYTES NFR BLD AUTO: 0.4 % (ref 0–0.5)
IRON SERPL-MCNC: 98 UG/DL (ref 45–160)
LYMPHOCYTES # BLD AUTO: 0.7 K/UL (ref 1–4.8)
LYMPHOCYTES NFR BLD: 25.5 % (ref 18–48)
MCH RBC QN AUTO: 28.5 PG (ref 27–31)
MCHC RBC AUTO-ENTMCNC: 32.3 G/DL (ref 32–36)
MCV RBC AUTO: 88 FL (ref 82–98)
MONOCYTES # BLD AUTO: 0.3 K/UL (ref 0.3–1)
MONOCYTES NFR BLD: 10.3 % (ref 4–15)
NEUTROPHILS # BLD AUTO: 1.7 K/UL (ref 1.8–7.7)
NEUTROPHILS NFR BLD: 60.9 % (ref 38–73)
NRBC BLD-RTO: 0 /100 WBC
PLATELET # BLD AUTO: 266 K/UL (ref 150–450)
PMV BLD AUTO: 10 FL (ref 9.2–12.9)
POTASSIUM SERPL-SCNC: 4.5 MMOL/L (ref 3.5–5.1)
PROT SERPL-MCNC: 7.4 G/DL (ref 6–8.4)
RBC # BLD AUTO: 4.49 M/UL (ref 4.6–6.2)
SATURATED IRON: 28 % (ref 20–50)
SODIUM SERPL-SCNC: 134 MMOL/L (ref 136–145)
TOTAL IRON BINDING CAPACITY: 345 UG/DL (ref 250–450)
TRANSFERRIN SERPL-MCNC: 233 MG/DL (ref 200–375)
WBC # BLD AUTO: 2.71 K/UL (ref 3.9–12.7)

## 2024-07-29 PROCEDURE — 36415 COLL VENOUS BLD VENIPUNCTURE: CPT | Performed by: INTERNAL MEDICINE

## 2024-07-29 PROCEDURE — 86334 IMMUNOFIX E-PHORESIS SERUM: CPT | Performed by: INTERNAL MEDICINE

## 2024-07-29 PROCEDURE — 83521 IG LIGHT CHAINS FREE EACH: CPT | Performed by: INTERNAL MEDICINE

## 2024-07-29 PROCEDURE — 82728 ASSAY OF FERRITIN: CPT | Performed by: INTERNAL MEDICINE

## 2024-07-29 PROCEDURE — 84165 PROTEIN E-PHORESIS SERUM: CPT | Mod: 26,,, | Performed by: PATHOLOGY

## 2024-07-29 PROCEDURE — 85025 COMPLETE CBC W/AUTO DIFF WBC: CPT | Performed by: INTERNAL MEDICINE

## 2024-07-29 PROCEDURE — 86334 IMMUNOFIX E-PHORESIS SERUM: CPT | Mod: 26,,, | Performed by: PATHOLOGY

## 2024-07-29 PROCEDURE — 84466 ASSAY OF TRANSFERRIN: CPT | Performed by: INTERNAL MEDICINE

## 2024-07-29 PROCEDURE — 80053 COMPREHEN METABOLIC PANEL: CPT | Performed by: INTERNAL MEDICINE

## 2024-07-29 PROCEDURE — 84165 PROTEIN E-PHORESIS SERUM: CPT | Performed by: INTERNAL MEDICINE

## 2024-07-30 LAB
INTERPRETATION SERPL IFE-IMP: NORMAL
KAPPA LC SER QL IA: 4.43 MG/DL (ref 0.33–1.94)
KAPPA LC/LAMBDA SER IA: 1.65 (ref 0.26–1.65)
LAMBDA LC SER QL IA: 2.69 MG/DL (ref 0.57–2.63)

## 2024-07-31 LAB
ALBUMIN SERPL ELPH-MCNC: 3.86 G/DL (ref 3.35–5.55)
ALPHA1 GLOB SERPL ELPH-MCNC: 0.28 G/DL (ref 0.17–0.41)
ALPHA2 GLOB SERPL ELPH-MCNC: 0.89 G/DL (ref 0.43–0.99)
B-GLOBULIN SERPL ELPH-MCNC: 1.05 G/DL (ref 0.5–1.1)
GAMMA GLOB SERPL ELPH-MCNC: 0.82 G/DL (ref 0.67–1.58)
PATHOLOGIST INTERPRETATION IFE: NORMAL
PATHOLOGIST INTERPRETATION SPE: NORMAL
PROT SERPL-MCNC: 6.9 G/DL (ref 6–8.4)

## 2024-08-05 ENCOUNTER — OFFICE VISIT (OUTPATIENT)
Dept: HEMATOLOGY/ONCOLOGY | Facility: CLINIC | Age: 89
End: 2024-08-05
Payer: MEDICARE

## 2024-08-05 VITALS
SYSTOLIC BLOOD PRESSURE: 110 MMHG | RESPIRATION RATE: 16 BRPM | TEMPERATURE: 98 F | WEIGHT: 168.44 LBS | HEART RATE: 109 BPM | OXYGEN SATURATION: 98 % | DIASTOLIC BLOOD PRESSURE: 68 MMHG | HEIGHT: 70 IN | BODY MASS INDEX: 24.11 KG/M2

## 2024-08-05 DIAGNOSIS — D47.2 MGUS (MONOCLONAL GAMMOPATHY OF UNKNOWN SIGNIFICANCE): Primary | ICD-10-CM

## 2024-08-05 DIAGNOSIS — D64.9 ANEMIA, UNSPECIFIED TYPE: ICD-10-CM

## 2024-08-05 DIAGNOSIS — G47.33 OSA (OBSTRUCTIVE SLEEP APNEA): ICD-10-CM

## 2024-08-05 DIAGNOSIS — E11.21 DIABETES MELLITUS WITH NEPHROPATHY: Chronic | ICD-10-CM

## 2024-08-05 PROCEDURE — 3288F FALL RISK ASSESSMENT DOCD: CPT | Mod: CPTII,S$GLB,, | Performed by: INTERNAL MEDICINE

## 2024-08-05 PROCEDURE — 99214 OFFICE O/P EST MOD 30 MIN: CPT | Mod: S$GLB,,, | Performed by: INTERNAL MEDICINE

## 2024-08-05 PROCEDURE — 99999 PR PBB SHADOW E&M-EST. PATIENT-LVL III: CPT | Mod: PBBFAC,,, | Performed by: INTERNAL MEDICINE

## 2024-08-05 PROCEDURE — 1126F AMNT PAIN NOTED NONE PRSNT: CPT | Mod: CPTII,S$GLB,, | Performed by: INTERNAL MEDICINE

## 2024-08-05 PROCEDURE — 1101F PT FALLS ASSESS-DOCD LE1/YR: CPT | Mod: CPTII,S$GLB,, | Performed by: INTERNAL MEDICINE

## 2024-08-12 ENCOUNTER — OFFICE VISIT (OUTPATIENT)
Dept: UROLOGY | Facility: CLINIC | Age: 89
End: 2024-08-12
Payer: MEDICARE

## 2024-08-12 VITALS — BODY MASS INDEX: 24.66 KG/M2 | WEIGHT: 171.88 LBS

## 2024-08-12 DIAGNOSIS — C61 PROSTATE CANCER: Primary | ICD-10-CM

## 2024-08-12 PROCEDURE — 3288F FALL RISK ASSESSMENT DOCD: CPT | Mod: CPTII,S$GLB,, | Performed by: STUDENT IN AN ORGANIZED HEALTH CARE EDUCATION/TRAINING PROGRAM

## 2024-08-12 PROCEDURE — 1159F MED LIST DOCD IN RCRD: CPT | Mod: CPTII,S$GLB,, | Performed by: STUDENT IN AN ORGANIZED HEALTH CARE EDUCATION/TRAINING PROGRAM

## 2024-08-12 PROCEDURE — 99999 PR PBB SHADOW E&M-EST. PATIENT-LVL III: CPT | Mod: PBBFAC,,, | Performed by: STUDENT IN AN ORGANIZED HEALTH CARE EDUCATION/TRAINING PROGRAM

## 2024-08-12 PROCEDURE — 99212 OFFICE O/P EST SF 10 MIN: CPT | Mod: S$GLB,,, | Performed by: STUDENT IN AN ORGANIZED HEALTH CARE EDUCATION/TRAINING PROGRAM

## 2024-08-12 PROCEDURE — 1101F PT FALLS ASSESS-DOCD LE1/YR: CPT | Mod: CPTII,S$GLB,, | Performed by: STUDENT IN AN ORGANIZED HEALTH CARE EDUCATION/TRAINING PROGRAM

## 2024-08-12 PROCEDURE — 1126F AMNT PAIN NOTED NONE PRSNT: CPT | Mod: CPTII,S$GLB,, | Performed by: STUDENT IN AN ORGANIZED HEALTH CARE EDUCATION/TRAINING PROGRAM

## 2024-08-12 NOTE — PROGRESS NOTES
Patient ID: Peter Lopez is a 88 y.o. male.    Chief Complaint: Follow-up      HPI  88 y.o. who presents to the Urology clinic for evaluation of prostate cancer. Hx of channel TURP for urinary retention, noted to have high risk prostate cancer ( 2021) completed EBRT  and ADT. Patient is due for PSA screening. Denies unexplained weight loss, fevers, chills, dysuria, hematuria, voiding dysfunction, kane pain. Last lupron injection documented 8/1/23 . Follows w/ oncologist for leukopenia.   Denies unexplained weight loss, gross hematuria, kane tenderness.         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    Leukopenia 06/30/2023    Vitamin D deficiency 06/30/2023    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    Diarrhea 08/10/2023    Monoclonal gammopathy 12/04/2023    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 02/18/2024    Nonproliferative diabetic retinopathy of left eye 02/18/2024     Resolved Ambulatory Problems     Diagnosis Date Noted    Elevated PSA 01/10/2013    Hepatitis C 10/16/2013    BPH (benign prostatic hyperplasia) 04/05/2016    Chronic kidney disease, stage 3a 01/28/2020    Hypoglycemia     Hypoxia 03/29/2020    Elevated troponin 04/04/2020    Centrilobular emphysema 04/13/2020    Discharge planning issues 04/14/2020    Gait instability 09/03/2020    Tachycardia with heart rate 121-140 beats  per minute 09/03/2020    Sleep-related breathing disorder 12/20/2020    Complex sleep apnea syndrome     Dyspnea 03/11/2021    Chest pain 05/20/2021    Urinary retention 06/22/2021    Pulmonary hypertension due to left ventricular systolic dysfunction 12/13/2022    Type 2 diabetes mellitus with diabetic neuropathy, without long-term current use of insulin 04/11/2023    Chronic respiratory failure with hypoxia 01/12/2024     Past Medical History:   Diagnosis Date    Chronic hepatitis C without mention of hepatic coma     CKD (chronic kidney disease)     COPD (chronic obstructive pulmonary disease)     Diabetes mellitus type I     Diabetes with neurologic complications     H/O colonoscopy     [place] (hard of hearing)     Hypertension     On home oxygen therapy     Prostate cancer     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  Vaccinated.  C A       Social History       Medications    Current Outpatient Medications:     acetaminophen (TYLENOL) 500 MG tablet, Take 1 tablet (500 mg total) by mouth every 6 (six) hours as needed., Disp: 30 tablet, Rfl: 0    albuterol (PROVENTIL/VENTOLIN HFA) 90 mcg/actuation inhaler, Inhale 2 puffs into the lungs every 6 (six) hours as needed for Wheezing. Use with spacer Dispense with 1 spacer, Disp: 18 g, Rfl: 0    amLODIPine (NORVASC) 5 MG tablet, Take 1 tablet by mouth once daily, Disp: 90 tablet, Rfl: 3    atorvastatin (LIPITOR) 20 MG tablet, Take 1 tablet (20 mg total) by mouth every evening., Disp: 90 tablet, Rfl: 3    azithromycin (Z-BARAK) 250 MG tablet, Take 1 tablet (250  mg total) by mouth once daily., Disp: 4 tablet, Rfl: 0    benzonatate (TESSALON) 200 MG capsule, Take 1 capsule by mouth three times daily as needed for cough, Disp: 30 capsule, Rfl: 0    blood sugar diagnostic Strp, To check BG 1 times daily, to use with insurance preferred meter, Disp: 50 each, Rfl: 11    blood-glucose meter kit, To check BG 1 times daily, to use with insurance preferred meter, Disp: 1 each, Rfl: 0    cholecalciferol, vitamin D3, (VITAMIN D3) 25 mcg (1,000 unit) capsule, Take 1,000 Units by mouth once daily., Disp: , Rfl:     diphenhydrAMINE-aluminum-magnesium hydroxide-simethicone-LIDOcaine viscous HCl 2%, Swish and spit 10 mLs every 4 (four) hours as needed (As needed for sore throat)., Disp: 100 each, Rfl: 0    lancets Misc, To check BG 1 times daily, to use with insurance preferred meter, Disp: 50 each, Rfl: 11    levocetirizine (XYZAL) 5 MG tablet, Take 1 tablet (5 mg total) by mouth every evening., Disp: 30 tablet, Rfl: 0    olmesartan (BENICAR) 40 MG tablet, Take 1 tablet by mouth once daily, Disp: 90 tablet, Rfl: 3    ondansetron (ZOFRAN-ODT) 4 MG TbDL, Take 1 tablet (4 mg total) by mouth every 8 (eight) hours as needed., Disp: 20 tablet, Rfl: 0    pantoprazole (PROTONIX) 40 MG tablet, Take 1 tablet (40 mg total) by mouth once daily., Disp: 30 tablet, Rfl: 5    polyethylene glycol (GLYCOLAX) 17 gram PwPk, Take this medication EVERY DAY to prevent constipation.  You may take it up to twice a day, but start at only once a day.  SKIP THIS MEDICATION IF YOU DEVELOP DIARRHEA., Disp: 72 each, Rfl: 3    repaglinide (PRANDIN) 0.5 MG tablet, Take 1 tablet (0.5 mg total) by mouth 3 (three) times daily before meals. Do not take if you skip a meal, Disp: 90 tablet, Rfl: 3    tiotropium bromide (SPIRIVA RESPIMAT) 2.5 mcg/actuation inhaler, INHALE 2 SPRAY(S) BY MOUTH ONCE DAILY, Disp: 4 g, Rfl: 11    TRADJENTA 5 mg Tab tablet, Take 1 tablet by mouth once daily, Disp: 90 tablet, Rfl:  0    Allergies  Review of patient's allergies indicates:  No Known Allergies    Patient's PMH, FH, Social hx, Medications, allergies reviewed and updated as pertinent to today's visit    Objective:      Physical Exam  Constitutional:       General: He is not in acute distress.     Appearance: He is well-developed. He is not ill-appearing, toxic-appearing or diaphoretic.   HENT:      Head: Normocephalic and atraumatic.      Mouth/Throat:      Mouth: Mucous membranes are moist.   Eyes:      Conjunctiva/sclera: Conjunctivae normal.   Pulmonary:      Effort: Pulmonary effort is normal. No respiratory distress.   Abdominal:      General: There is no distension.      Palpations: Abdomen is soft. There is no mass.      Tenderness: There is no right CVA tenderness or left CVA tenderness.   Musculoskeletal:         General: No swelling or deformity.      Cervical back: Neck supple.   Skin:     Findings: No rash.   Neurological:      Mental Status: He is alert and oriented to person, place, and time.      Gait: Gait normal.   Psychiatric:         Mood and Affect: Mood normal.         Thought Content: Thought content normal.         Judgment: Judgment normal.           Lab Results   Component Value Date    PSADIAG 0.01 02/12/2024    PSADIAG 0.01 08/09/2023    PSADIAG 0.01 04/28/2023     Assessment:       1. Prostate cancer        Plan:       S/p Channel TURP, EBRT, ADT  Clinically stable, no evidence of disease recurrence   Stage IIIC (T3b, N0, M0, P>=20, G5) adenocarcinoma of the prostate  Due for PSA, if within acceptable range continue to monitor  If rising PSA, > 2 will advise PET scan given nature of high risk prostate cancer and high risk for progression

## 2024-08-15 ENCOUNTER — TELEPHONE (OUTPATIENT)
Dept: FAMILY MEDICINE | Facility: CLINIC | Age: 89
End: 2024-08-15
Payer: MEDICARE

## 2024-08-15 NOTE — TELEPHONE ENCOUNTER
----- Message from Natalia Redd sent at 8/15/2024  9:24 AM CDT -----  Regarding: Patient call back  .Type: Patient Call Back    Who called:self     What is the request in detail:calling in regards to double check that is appointment with Dr. Lloyd is scheduled for 10:00 AM on 08/26/2024 and not 3:30 pm. Patient was given schedule but he wants to confirm with the office     Can the clinic reply by [facility]?no     Would the patient rather a call back or a response via [facility]? Call     Best call back number:.[phone]      Additional Information:

## 2024-08-19 ENCOUNTER — OFFICE VISIT (OUTPATIENT)
Dept: FAMILY MEDICINE | Facility: CLINIC | Age: 89
End: 2024-08-19
Payer: MEDICARE

## 2024-08-19 ENCOUNTER — TELEPHONE (OUTPATIENT)
Dept: INTERNAL MEDICINE | Facility: CLINIC | Age: 89
End: 2024-08-19
Payer: MEDICARE

## 2024-08-19 ENCOUNTER — LAB VISIT (OUTPATIENT)
Dept: LAB | Facility: HOSPITAL | Age: 89
End: 2024-08-19
Attending: FAMILY MEDICINE
Payer: MEDICARE

## 2024-08-19 ENCOUNTER — HOSPITAL ENCOUNTER (EMERGENCY)
Facility: HOSPITAL | Age: 89
Discharge: HOME | End: 2024-08-20
Attending: STUDENT IN AN ORGANIZED HEALTH CARE EDUCATION/TRAINING PROGRAM
Payer: MEDICARE

## 2024-08-19 VITALS
OXYGEN SATURATION: 97 % | SYSTOLIC BLOOD PRESSURE: 108 MMHG | DIASTOLIC BLOOD PRESSURE: 70 MMHG | WEIGHT: 172.63 LBS | HEART RATE: 100 BPM | BODY MASS INDEX: 24.71 KG/M2 | HEIGHT: 70 IN | TEMPERATURE: 98 F

## 2024-08-19 DIAGNOSIS — R73.9 HYPERGLYCEMIA: ICD-10-CM

## 2024-08-19 DIAGNOSIS — E11.21 DIABETES MELLITUS WITH NEPHROPATHY: Primary | Chronic | ICD-10-CM

## 2024-08-19 DIAGNOSIS — I10 ESSENTIAL HYPERTENSION: Chronic | ICD-10-CM

## 2024-08-19 DIAGNOSIS — J43.2 CENTRILOBULAR EMPHYSEMA: Chronic | ICD-10-CM

## 2024-08-19 DIAGNOSIS — N18.32 STAGE 3B CHRONIC KIDNEY DISEASE: Chronic | ICD-10-CM

## 2024-08-19 LAB
ALBUMIN SERPL BCP-MCNC: 3.9 G/DL (ref 3.5–5.2)
ALLENS TEST: ABNORMAL
ALP SERPL-CCNC: 123 U/L (ref 55–135)
ALT SERPL W/O P-5'-P-CCNC: 21 U/L (ref 10–44)
ANION GAP SERPL CALC-SCNC: 14 MMOL/L (ref 8–16)
AST SERPL-CCNC: 26 U/L (ref 10–40)
B-OH-BUTYR BLD STRIP-SCNC: 0.2 MMOL/L (ref 0–0.5)
BACTERIA #/AREA URNS HPF: NORMAL /HPF
BASOPHILS # BLD AUTO: 0.02 K/UL (ref 0–0.2)
BASOPHILS NFR BLD: 0.6 % (ref 0–1.9)
BILIRUB SERPL-MCNC: 0.4 MG/DL (ref 0.1–1)
BILIRUB UR QL STRIP: NEGATIVE
BUN SERPL-MCNC: 25 MG/DL (ref 8–23)
CALCIUM SERPL-MCNC: 10.1 MG/DL (ref 8.7–10.5)
CHLORIDE SERPL-SCNC: 96 MMOL/L (ref 95–110)
CLARITY UR: CLEAR
CO2 SERPL-SCNC: 23 MMOL/L (ref 23–29)
COLOR UR: COLORLESS
CREAT SERPL-MCNC: 2.3 MG/DL (ref 0.5–1.4)
CREAT UR-MCNC: 43.1 MG/DL (ref 23–375)
DELSYS: ABNORMAL
DIFFERENTIAL METHOD BLD: ABNORMAL
EOSINOPHIL # BLD AUTO: 0.1 K/UL (ref 0–0.5)
EOSINOPHIL NFR BLD: 2.3 % (ref 0–8)
ERYTHROCYTE [DISTWIDTH] IN BLOOD BY AUTOMATED COUNT: 13.3 % (ref 11.5–14.5)
EST. GFR  (NO RACE VARIABLE): 27 ML/MIN/1.73 M^2
GLUCOSE SERPL-MCNC: 425 MG/DL (ref 70–110)
GLUCOSE UR QL STRIP: ABNORMAL
HCO3 UR-SCNC: 32.2 MMOL/L (ref 24–28)
HCT VFR BLD AUTO: 39.1 % (ref 40–54)
HGB BLD-MCNC: 13.1 G/DL (ref 14–18)
HGB UR QL STRIP: NEGATIVE
IMM GRANULOCYTES # BLD AUTO: 0 K/UL (ref 0–0.04)
IMM GRANULOCYTES NFR BLD AUTO: 0 % (ref 0–0.5)
KETONES UR QL STRIP: NEGATIVE
LEUKOCYTE ESTERASE UR QL STRIP: NEGATIVE
LYMPHOCYTES # BLD AUTO: 0.8 K/UL (ref 1–4.8)
LYMPHOCYTES NFR BLD: 25.8 % (ref 18–48)
MCH RBC QN AUTO: 29 PG (ref 27–31)
MCHC RBC AUTO-ENTMCNC: 33.5 G/DL (ref 32–36)
MCV RBC AUTO: 87 FL (ref 82–98)
MICROSCOPIC COMMENT: NORMAL
MODE: ABNORMAL
MONOCYTES # BLD AUTO: 0.3 K/UL (ref 0.3–1)
MONOCYTES NFR BLD: 10.3 % (ref 4–15)
NEUTROPHILS # BLD AUTO: 1.9 K/UL (ref 1.8–7.7)
NEUTROPHILS NFR BLD: 61 % (ref 38–73)
NITRITE UR QL STRIP: NEGATIVE
NRBC BLD-RTO: 0 /100 WBC
PCO2 BLDA: 58.5 MMHG (ref 35–45)
PH SMN: 7.35 [PH] (ref 7.35–7.45)
PH UR STRIP: 6 [PH] (ref 5–8)
PLATELET # BLD AUTO: 247 K/UL (ref 150–450)
PMV BLD AUTO: 9.8 FL (ref 9.2–12.9)
PO2 BLDA: 15 MMHG (ref 40–60)
POC BE: 5 MMOL/L
POC SATURATED O2: 18 % (ref 95–100)
POC TCO2: 34 MMOL/L (ref 24–29)
POCT GLUCOSE: 417 MG/DL (ref 70–110)
POTASSIUM SERPL-SCNC: 4.7 MMOL/L (ref 3.5–5.1)
PROT SERPL-MCNC: 7.4 G/DL (ref 6–8.4)
PROT UR QL STRIP: NEGATIVE
PROT UR-MCNC: <7 MG/DL
PROT/CREAT UR: NORMAL MG/G{CREAT} (ref 0–0.2)
RBC # BLD AUTO: 4.52 M/UL (ref 4.6–6.2)
SAMPLE: ABNORMAL
SITE: ABNORMAL
SODIUM SERPL-SCNC: 133 MMOL/L (ref 136–145)
SP GR UR STRIP: 1 (ref 1–1.03)
SP02: 97
URN SPEC COLLECT METH UR: ABNORMAL
UROBILINOGEN UR STRIP-ACNC: NEGATIVE EU/DL
WBC # BLD AUTO: 3.1 K/UL (ref 3.9–12.7)
YEAST URNS QL MICRO: NORMAL

## 2024-08-19 PROCEDURE — 82570 ASSAY OF URINE CREATININE: CPT | Performed by: FAMILY MEDICINE

## 2024-08-19 PROCEDURE — 93010 ELECTROCARDIOGRAM REPORT: CPT | Mod: ,,, | Performed by: INTERNAL MEDICINE

## 2024-08-19 PROCEDURE — 25000003 PHARM REV CODE 250: Performed by: NURSE PRACTITIONER

## 2024-08-19 PROCEDURE — 96360 HYDRATION IV INFUSION INIT: CPT

## 2024-08-19 PROCEDURE — 81000 URINALYSIS NONAUTO W/SCOPE: CPT | Performed by: NURSE PRACTITIONER

## 2024-08-19 PROCEDURE — 1126F AMNT PAIN NOTED NONE PRSNT: CPT | Mod: CPTII,S$GLB,, | Performed by: FAMILY MEDICINE

## 2024-08-19 PROCEDURE — 99285 EMERGENCY DEPT VISIT HI MDM: CPT | Mod: 25

## 2024-08-19 PROCEDURE — 1159F MED LIST DOCD IN RCRD: CPT | Mod: CPTII,S$GLB,, | Performed by: FAMILY MEDICINE

## 2024-08-19 PROCEDURE — 99999 PR PBB SHADOW E&M-EST. PATIENT-LVL V: CPT | Mod: PBBFAC,,, | Performed by: FAMILY MEDICINE

## 2024-08-19 PROCEDURE — 82803 BLOOD GASES ANY COMBINATION: CPT

## 2024-08-19 PROCEDURE — 85025 COMPLETE CBC W/AUTO DIFF WBC: CPT | Mod: 91 | Performed by: NURSE PRACTITIONER

## 2024-08-19 PROCEDURE — 82010 KETONE BODYS QUAN: CPT | Performed by: NURSE PRACTITIONER

## 2024-08-19 PROCEDURE — 99900035 HC TECH TIME PER 15 MIN (STAT)

## 2024-08-19 PROCEDURE — 80053 COMPREHEN METABOLIC PANEL: CPT | Mod: 91 | Performed by: NURSE PRACTITIONER

## 2024-08-19 PROCEDURE — 3288F FALL RISK ASSESSMENT DOCD: CPT | Mod: CPTII,S$GLB,, | Performed by: FAMILY MEDICINE

## 2024-08-19 PROCEDURE — 1101F PT FALLS ASSESS-DOCD LE1/YR: CPT | Mod: CPTII,S$GLB,, | Performed by: FAMILY MEDICINE

## 2024-08-19 PROCEDURE — 99214 OFFICE O/P EST MOD 30 MIN: CPT | Mod: S$GLB,,, | Performed by: FAMILY MEDICINE

## 2024-08-19 PROCEDURE — 82962 GLUCOSE BLOOD TEST: CPT

## 2024-08-19 PROCEDURE — 1160F RVW MEDS BY RX/DR IN RCRD: CPT | Mod: CPTII,S$GLB,, | Performed by: FAMILY MEDICINE

## 2024-08-19 PROCEDURE — 93005 ELECTROCARDIOGRAM TRACING: CPT

## 2024-08-19 RX ORDER — GLIPIZIDE 5 MG/1
5 TABLET ORAL
Qty: 30 TABLET | Refills: 1 | Status: SHIPPED | OUTPATIENT
Start: 2024-08-19

## 2024-08-19 RX ADMIN — SODIUM CHLORIDE 1000 ML: 9 INJECTION, SOLUTION INTRAVENOUS at 09:08

## 2024-08-19 NOTE — TELEPHONE ENCOUNTER
I am on-call doctor this evening. Called from lab about critical glucose level in 600s. No answer from patient when called at 1730hrs. Called daughter Mckinley who does not live locally. She was able to three-way call with father. Discussion of critically high glucose. After discussion, pt comfortable with going to emergency room for more urgent management of glucose level this evening. He reports no symptoms currently, feels in usual state of health, but is comfortable with reporting to ED after urging from daughter as well.

## 2024-08-19 NOTE — FIRST PROVIDER EVALUATION
"Medical screening examination initiated.  I have conducted a focused provider triage encounter, findings are as follows:    Brief history of present illness:  PCP sent in for elevated CBG    Vitals:    08/19/24 1846   BP: (!) 192/90   BP Location: Right arm   Patient Position: Sitting   Pulse: 80   Resp: 18   Temp: 97.9 °F (36.6 °C)   TempSrc: Oral   SpO2: 95%   Weight: 81.6 kg (180 lb)   Height: 5' 10" (1.778 m)       Pertinent physical exam:  NAD    Brief workup plan:  labs, EKG, UA    Preliminary workup initiated; this workup will be continued and followed by the physician or advanced practice provider that is assigned to the patient when roomed.  "

## 2024-08-20 ENCOUNTER — TELEPHONE (OUTPATIENT)
Dept: ENDOSCOPY | Facility: HOSPITAL | Age: 89
End: 2024-08-20
Payer: MEDICARE

## 2024-08-20 VITALS
DIASTOLIC BLOOD PRESSURE: 83 MMHG | SYSTOLIC BLOOD PRESSURE: 156 MMHG | WEIGHT: 180 LBS | HEIGHT: 70 IN | TEMPERATURE: 98 F | BODY MASS INDEX: 25.77 KG/M2 | HEART RATE: 100 BPM | OXYGEN SATURATION: 95 % | RESPIRATION RATE: 18 BRPM

## 2024-08-20 DIAGNOSIS — R13.10 DYSPHAGIA, UNSPECIFIED TYPE: ICD-10-CM

## 2024-08-20 DIAGNOSIS — R10.9 ABDOMINAL PAIN, UNSPECIFIED ABDOMINAL LOCATION: Primary | ICD-10-CM

## 2024-08-20 DIAGNOSIS — K21.9 GASTROESOPHAGEAL REFLUX DISEASE, UNSPECIFIED WHETHER ESOPHAGITIS PRESENT: ICD-10-CM

## 2024-08-20 LAB
OHS QRS DURATION: 80 MS
OHS QTC CALCULATION: 462 MS
POCT GLUCOSE: 403 MG/DL (ref 70–110)

## 2024-08-20 NOTE — DISCHARGE INSTRUCTIONS
Restart your Glucotrol tomorrow.  Return for any new or concerning symptoms including abdominal pain, nausea, vomiting, fever, chills, general malaise.  Be sure to check her blood sugars regularly.  Follow up with the regular provider otherwise.

## 2024-08-20 NOTE — ED PROVIDER NOTES
Encounter Date: 8/19/2024    SCRIBE #1 NOTE: I, Lara Mcpherson, am scribing for, and in the presence of,  Silvestre Hensley MD. I have scribed the following portions of the note - Other sections scribed: HPI,ROS,PE.       History     Chief Complaint   Patient presents with    Hyperglycemia     Pt was sent to ED by PCP for hyperglycemia, glucose above 600 at the doctor's office.  in triage. Pt denies cp, sob, n/v/d.     Peter Lopez is a 88 y.o. male, with a PMHx of DM, CKD, COPD, HTN and prostate cancer, who presents to the ED with hyperglycemia onset PTA. Patient reports seeing his PCP, where is glucose measured to be 500, PCP instructed the patient to come to the ED. Patient endorses taking tradjenta today(morning medication) and reports that his PCP is changing him to glipizide starting tomorrow. Patient notes that he sleeps with at home oxygen.  He denies any symptoms at this time and notes he feels like his usual self.  No other exacerbating or alleviating factors. Denies abdominal pain, nausea, vomiting, fever, chills or other associated symptoms.      The history is provided by the patient. No  was used.     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  Vaccinated.  C A     Family History   Problem Relation Name Age of Onset    Cancer Mother      Asbestos Father      Liver disease Neg Hx       Social History     Tobacco Use    Smoking status: Former     Current packs/day: 0.00     Average packs/day: 1 pack/day for 40.0 years (40.0 ttl pk-yrs)     Types: Cigarettes     Start date: 1951     Quit date: 1991     Years since quittin.8    Smokeless tobacco: Former     Quit date: 1986   Substance Use Topics    Alcohol use: Not Currently     Alcohol/week: 0.0 standard drinks of alcohol     Comment: 2 bottles beer on weekends    Drug use: No     Review of Systems   Constitutional:  Negative for chills and fever.   HENT:  Negative for sore throat.    Eyes:  Negative for visual disturbance.   Respiratory:  Negative for shortness of breath.    Cardiovascular:  Negative for chest pain.   Gastrointestinal:  Negative for diarrhea, nausea and vomiting.   Genitourinary:  Negative for dysuria.   Skin:  Negative for rash.   Neurological:  Negative for syncope.       Physical Exam     Initial Vitals [24 1846]   BP Pulse Resp Temp SpO2   (!) 192/90 80 18 97.9 °F (36.6 °C) 95 %      MAP       --         Physical Exam    Constitutional: Vital signs are normal. He appears well-developed and well-nourished.  Non-toxic appearance. He does not have a sickly appearance. He does not appear ill.   HENT:   Head: Normocephalic and atraumatic.   Mouth/Throat: Mucous membranes are normal.   Eyes: EOM are normal.   Neck: Neck supple.   Cardiovascular:  Normal rate and regular rhythm.           Abdominal: Abdomen is soft. Bowel sounds are normal. There is no abdominal tenderness.   Musculoskeletal:      Cervical back: Neck supple.     Neurological: He is alert.   Skin: Skin is  warm and dry. No rash noted.   Psychiatric: He has a normal mood and affect.         ED Course   Procedures  Labs Reviewed   CBC W/ AUTO DIFFERENTIAL - Abnormal       Result Value    WBC 3.10 (*)     RBC 4.52 (*)     Hemoglobin 13.1 (*)     Hematocrit 39.1 (*)     MCV 87      MCH 29.0      MCHC 33.5      RDW 13.3      Platelets 247      MPV 9.8      Immature Granulocytes 0.0      Gran # (ANC) 1.9      Immature Grans (Abs) 0.00      Lymph # 0.8 (*)     Mono # 0.3      Eos # 0.1      Baso # 0.02      nRBC 0      Gran % 61.0      Lymph % 25.8      Mono % 10.3      Eosinophil % 2.3      Basophil % 0.6      Differential Method Automated     COMPREHENSIVE METABOLIC PANEL - Abnormal    Sodium 133 (*)     Potassium 4.7      Chloride 96      CO2 23      Glucose 425 (*)     BUN 25 (*)     Creatinine 2.3 (*)     Calcium 10.1      Total Protein 7.4      Albumin 3.9      Total Bilirubin 0.4      Alkaline Phosphatase 123      AST 26      ALT 21      eGFR 27 (*)     Anion Gap 14     URINALYSIS, REFLEX TO URINE CULTURE - Abnormal    Specimen UA Urine, Clean Catch      Color, UA Colorless (*)     Appearance, UA Clear      pH, UA 6.0      Specific Gravity, UA 1.005      Protein, UA Negative      Glucose, UA 4+ (*)     Ketones, UA Negative      Bilirubin (UA) Negative      Occult Blood UA Negative      Nitrite, UA Negative      Urobilinogen, UA Negative      Leukocytes, UA Negative      Narrative:     Specimen Source->Urine   POCT GLUCOSE - Abnormal    POCT Glucose 417 (*)    ISTAT PROCEDURE - Abnormal    POC PH 7.348 (*)     POC PCO2 58.5 (*)     POC PO2 15 (*)     POC HCO3 32.2 (*)     POC BE 5 (*)     POC SATURATED O2 18      POC TCO2 34 (*)     Sample VENOUS      Site Other      Allens Test N/A      DelSys Room Air      Mode SPONT      Sp02 97     BETA - HYDROXYBUTYRATE, SERUM    Beta-Hydroxybutyrate 0.2     URINALYSIS MICROSCOPIC    Bacteria None      Yeast, UA None      Microscopic Comment SEE COMMENT      Narrative:      Specimen Source->Urine   POCT GLUCOSE MONITORING CONTINUOUS          Imaging Results              X-Ray Chest AP Portable (Final result)  Result time 08/19/24 19:34:20      Final result by Edd Woo MD (08/19/24 19:34:20)                   Impression:      No acute process.      Electronically signed by: Edd Woo MD  Date:    08/19/2024  Time:    19:34               Narrative:    EXAMINATION:  XR CHEST AP PORTABLE    CLINICAL HISTORY:  hyperglycemia;    TECHNIQUE:  Single frontal view of the chest was performed.    COMPARISON:  04/07/2024.    FINDINGS:  The trachea is unremarkable.  There are calcifications of the aortic knob.  The cardiomediastinal silhouette is within normal limits.  There is no evidence of free air beneath the hemidiaphragms.  There are no pleural effusions.  There is no evidence of a pneumothorax.  There is no evidence of pneumomediastinum.  There is bibasilar subsegmental atelectasis.  There is no focal consolidation.  The osseous structures demonstrate degenerative changes.                                       Medications   sodium chloride 0.9% bolus 1,000 mL 1,000 mL (1,000 mLs Intravenous New Bag 8/19/24 1362)     Medical Decision Making  Amount and/or Complexity of Data Reviewed  External Data Reviewed: notes.  Labs: ordered.  Radiology: ordered.  ECG/medicine tests: ordered.    Vitals unremarkable   Patient is well-appearing in no acute distress  Instructed to come here discussed with sugars elevated   Patient does not have DKA by evidence of lab work   Advised the patient to start his glipizide tomorrow   Advised to return for any new or concerning abdominal pain,, vomiting fever, chills   Patient verbalized understanding agreement plan  Patient is stable for discharge        Scribe Attestation:   Scribe #1: I performed the above scribed service and the documentation accurately describes the services I performed. I attest to the accuracy of the note.              Silvestre MESA  MD Shellie, personally performed the services described in this documentation. All medical record entries made by the scribe were at my direction and in my presence. I have reviewed the chart and agree that the record reflects my personal performance and is accurate and complete.      DISCLAIMER: This note was prepared with [employer] voice recognition transcription software. Garbled syntax, mangled pronouns, and other bizarre constructions may be attributed to that software system. 2                 Clinical Impression:  Final diagnoses:  [R73.9] Hyperglycemia          ED Disposition Condition    Discharge Stable          ED Prescriptions    None       Follow-up Information    None          Sivlestre Hensley MD  08/19/24 1726

## 2024-08-22 ENCOUNTER — NURSE TRIAGE (OUTPATIENT)
Dept: ADMINISTRATIVE | Facility: CLINIC | Age: 89
End: 2024-08-22
Payer: MEDICARE

## 2024-08-22 ENCOUNTER — TELEPHONE (OUTPATIENT)
Dept: FAMILY MEDICINE | Facility: CLINIC | Age: 89
End: 2024-08-22
Payer: MEDICARE

## 2024-08-22 NOTE — TELEPHONE ENCOUNTER
Blood sugar was high on carlos bs was 480 he went to Er. Yesterday it was 400. Last night it was 319. This am it is 309. Caregiver said she called pt this am and his speech was slurred when he had just woke up. Speech is no longer slurred. Care advice recommend pt receives a call from MD office within 1 hour. Pt/cg  instructed if Md does not return call go to nearest [facility]. Cg verbalized understanding.   Reason for Disposition   Blood glucose > 300 mg/dL (16.7 mmol/L) AND two or more times in a row    Additional Information   Negative: Unconscious or difficult to awaken   Negative: Acting confused (e.g., disoriented, slurred speech)   Negative: Very weak (can't stand)   Negative: Sounds like a life-threatening emergency to the triager   Negative: Vomiting and signs of dehydration (e.g., very dry mouth, lightheaded, dark urine)   Negative: Blood glucose > 240 mg/dL (13.3 mmol/L) and rapid breathing   Negative: Blood glucose > 500 mg/dL (27.8 mmol/L)   Negative: Blood glucose > 240 mg/dL (13.3 mmol/L) AND urine ketones moderate-large (or more than 1+)   Negative: Blood glucose > 240 mg/dL (13.3 mmol/L) and blood ketones > 1.4 mmol/L   Negative: Blood glucose > 240 mg/dL (13.3 mmol/L) AND vomiting AND unable to check for ketones (in blood or urine)   Negative: Vomiting lasting > 4 hours   Negative: Patient sounds very sick or weak to the triager   Negative: Fever > 100.4 F (38.0 C)   Negative: Caller has URGENT medication or insulin pump question and triager unable to answer question   Negative: Blood glucose > 400 mg/dL (22.2 mmol/L)    Protocols used: Diabetes - High Blood Sugar-A-OH

## 2024-08-22 NOTE — TELEPHONE ENCOUNTER
Called patient's sister back. She stated that his BG has been elevated, however, they've been checking the sugar levels after he eats.  Patient's voice over the phone was not slurred, he was oriented and able to answer all questions. He reports that he's been Glipizide every single day since it's been prescribed. Assisted with scheduling a f/u tomorrow with PCP, instructed to hydrate more today, and avoid extra sweets, and check BG throughout the day prior to his meals.

## 2024-08-22 NOTE — TELEPHONE ENCOUNTER
----- Message from Kaity Holt sent at 8/22/2024  7:59 AM CDT -----  Regarding: sister  Type:  Needs Medical Advice/Symptom-based Call     Who Called:sister     Symptoms (please be specific):high sugar level 309 this morning      How long has patient had these symptoms:  3 days     Pharmacy:   [facility] [address] [facility] [address] [facility] [address]  [address] [place] [address]  Phone: [phone] Fax: [phone]             Would the patient rather a call back or a response via [facility]?call     Best Call Back Number:[phone] Ms June,      Additional Information:

## 2024-08-23 ENCOUNTER — OFFICE VISIT (OUTPATIENT)
Dept: FAMILY MEDICINE | Facility: CLINIC | Age: 89
End: 2024-08-23
Payer: MEDICARE

## 2024-08-23 ENCOUNTER — LAB VISIT (OUTPATIENT)
Dept: LAB | Facility: HOSPITAL | Age: 89
End: 2024-08-23
Attending: FAMILY MEDICINE
Payer: MEDICARE

## 2024-08-23 VITALS
HEART RATE: 102 BPM | WEIGHT: 174.63 LBS | HEIGHT: 70 IN | BODY MASS INDEX: 25 KG/M2 | RESPIRATION RATE: 16 BRPM | SYSTOLIC BLOOD PRESSURE: 110 MMHG | DIASTOLIC BLOOD PRESSURE: 60 MMHG | OXYGEN SATURATION: 93 % | TEMPERATURE: 98 F

## 2024-08-23 DIAGNOSIS — N18.32 STAGE 3B CHRONIC KIDNEY DISEASE: ICD-10-CM

## 2024-08-23 DIAGNOSIS — N18.32 STAGE 3B CHRONIC KIDNEY DISEASE: Chronic | ICD-10-CM

## 2024-08-23 DIAGNOSIS — E11.40 TYPE 2 DIABETES MELLITUS WITH DIABETIC NEUROPATHY, WITHOUT LONG-TERM CURRENT USE OF INSULIN: Primary | Chronic | ICD-10-CM

## 2024-08-23 DIAGNOSIS — I10 ESSENTIAL HYPERTENSION: Chronic | ICD-10-CM

## 2024-08-23 LAB
BACTERIA #/AREA URNS HPF: NORMAL /HPF
BILIRUB UR QL STRIP: NEGATIVE
CLARITY UR: CLEAR
COLOR UR: COLORLESS
CREAT UR-MCNC: 30.5 MG/DL (ref 23–375)
GLUCOSE SERPL-MCNC: 262 MG/DL (ref 70–110)
GLUCOSE UR QL STRIP: ABNORMAL
HGB UR QL STRIP: NEGATIVE
KETONES UR QL STRIP: NEGATIVE
LEUKOCYTE ESTERASE UR QL STRIP: NEGATIVE
MICROSCOPIC COMMENT: NORMAL
NITRITE UR QL STRIP: NEGATIVE
PH UR STRIP: 6 [PH] (ref 5–8)
PROT UR QL STRIP: NEGATIVE
PROT UR-MCNC: <7 MG/DL
PROT/CREAT UR: NORMAL MG/G{CREAT} (ref 0–0.2)
SP GR UR STRIP: 1 (ref 1–1.03)
URN SPEC COLLECT METH UR: ABNORMAL
UROBILINOGEN UR STRIP-ACNC: NEGATIVE EU/DL
WBC #/AREA URNS HPF: 3 /HPF (ref 0–5)
YEAST URNS QL MICRO: NORMAL

## 2024-08-23 PROCEDURE — 1101F PT FALLS ASSESS-DOCD LE1/YR: CPT | Mod: CPTII,S$GLB,, | Performed by: FAMILY MEDICINE

## 2024-08-23 PROCEDURE — 84156 ASSAY OF PROTEIN URINE: CPT | Performed by: NURSE PRACTITIONER

## 2024-08-23 PROCEDURE — 1160F RVW MEDS BY RX/DR IN RCRD: CPT | Mod: CPTII,S$GLB,, | Performed by: FAMILY MEDICINE

## 2024-08-23 PROCEDURE — 99999 PR PBB SHADOW E&M-EST. PATIENT-LVL IV: CPT | Mod: PBBFAC,,, | Performed by: FAMILY MEDICINE

## 2024-08-23 PROCEDURE — 3288F FALL RISK ASSESSMENT DOCD: CPT | Mod: CPTII,S$GLB,, | Performed by: FAMILY MEDICINE

## 2024-08-23 PROCEDURE — G2211 COMPLEX E/M VISIT ADD ON: HCPCS | Mod: S$GLB,,, | Performed by: FAMILY MEDICINE

## 2024-08-23 PROCEDURE — 82962 GLUCOSE BLOOD TEST: CPT | Mod: S$GLB,,, | Performed by: FAMILY MEDICINE

## 2024-08-23 PROCEDURE — 99214 OFFICE O/P EST MOD 30 MIN: CPT | Mod: S$GLB,,, | Performed by: FAMILY MEDICINE

## 2024-08-23 PROCEDURE — 1159F MED LIST DOCD IN RCRD: CPT | Mod: CPTII,S$GLB,, | Performed by: FAMILY MEDICINE

## 2024-08-23 PROCEDURE — 81000 URINALYSIS NONAUTO W/SCOPE: CPT | Performed by: NURSE PRACTITIONER

## 2024-08-23 PROCEDURE — 82043 UR ALBUMIN QUANTITATIVE: CPT | Performed by: NURSE PRACTITIONER

## 2024-08-23 PROCEDURE — 82570 ASSAY OF URINE CREATININE: CPT | Mod: 91 | Performed by: NURSE PRACTITIONER

## 2024-08-23 RX ORDER — INSULIN GLARGINE 100 [IU]/ML
10 INJECTION, SOLUTION SUBCUTANEOUS NIGHTLY
Qty: 15 ML | Refills: 0 | Status: SHIPPED | OUTPATIENT
Start: 2024-08-23

## 2024-08-23 RX ORDER — PEN NEEDLE, DIABETIC 30 GX3/16"
NEEDLE, DISPOSABLE MISCELLANEOUS
Qty: 50 EACH | Refills: 11 | Status: SHIPPED | OUTPATIENT
Start: 2024-08-23

## 2024-08-23 NOTE — PATIENT INSTRUCTIONS
Call your Medicare and ask about a Special Needs Plan as you have Diabetes, Chronic Kidney Disease, Interstitial Lung Disease, and Pulmonary Hypertension.

## 2024-08-24 LAB
ALBUMIN/CREAT UR: 64.5 UG/MG (ref 0–30)
CREAT UR-MCNC: 31 MG/DL (ref 23–375)
MICROALBUMIN UR DL<=1MG/L-MCNC: 20 UG/ML

## 2024-08-26 ENCOUNTER — TELEPHONE (OUTPATIENT)
Dept: FAMILY MEDICINE | Facility: CLINIC | Age: 89
End: 2024-08-26
Payer: MEDICARE

## 2024-08-26 NOTE — TELEPHONE ENCOUNTER
----- Message from Verenice Sparks sent at 8/26/2024  2:35 PM CDT -----  Regarding: patient call back  Type: Patient Call Back    Who called: Kaylie- daughter     What is the request in detail: has another question     Can the clinic reply by [facility] no     Would the patient rather a call back or a response via [facility]? Call     Best call back number: [phone]

## 2024-08-26 NOTE — TELEPHONE ENCOUNTER
----- Message from Mak Parekh sent at 8/26/2024 12:22 PM CDT -----  Regarding: daughter  Type: Patient Call Back    Who called:Daughter     What is the request in detail:pt had a trip to [place] last week, his glucose levels was high and can he come in to see the nurse about the proper way to use the insulin injections     Can the clinic reply by [facility]?no    Would the patient rather a call back or a response via [facility]? callback    Best call back number:[phone]    Additional Information:

## 2024-08-27 ENCOUNTER — CLINICAL SUPPORT (OUTPATIENT)
Dept: FAMILY MEDICINE | Facility: CLINIC | Age: 89
End: 2024-08-27
Payer: MEDICARE

## 2024-08-27 DIAGNOSIS — Z71.9 ENCOUNTER FOR EDUCATION: Primary | ICD-10-CM

## 2024-08-27 NOTE — PROGRESS NOTES
"  Patient Name: Peter Lopez    : 1935  MRN: [id number]      Subjective:     Patient ID: Peter is a 88 y.o. male    Chief Complaint:  Follow-up (Patient was seen in ER for high blood sugar in )    History of Present Illness  The patient is an 88-year-old male who presents for evaluation of diabetes. He is accompanied by his sister.    He has been monitoring his blood sugar levels, which were recorded as 259 this morning. His sister reports that he was previously unaware of the difference between low blood sugar and diabetes. He has increased his water intake and reduced his consumption of sweets since Tuesday. He has never used insulin before. He is currently on glipizide.    He had to discontinue Jardiance due to its high cost, which increased from $45 to $144 per month. He does not have Medicaid coverage.    He is also experiencing sleep disturbances.    He is currently on atorvastatin, taking two doses in the morning and two in the afternoon, along with Spiriva.      Review of Systems   Constitutional:  Negative for unexpected weight change.   HENT:  Negative for sore throat.    Respiratory:  Negative for wheezing.    Cardiovascular:  Negative for chest pain and palpitations.   Gastrointestinal:  Negative for abdominal pain, blood in stool and change in bowel habit.   Endocrine: Negative for polydipsia, polyphagia and polyuria.   Genitourinary:  Negative for dysuria and hematuria.   Musculoskeletal:  Negative for neck pain.   Integumentary:  Negative for pallor.   Neurological:  Negative for dizziness, seizures, numbness and headaches.   Psychiatric/Behavioral:  Negative for confusion. The patient is not nervous/anxious.         Objective:   /60 (BP Location: Right arm, Patient Position: Sitting, BP Method: Large (Manual))   Pulse 102   Temp 97.6 °F (36.4 °C) (Oral)   Resp 16   Ht 5' 10" (1.778 m)   Wt 79.2 kg (174 lb 9.7 oz)   SpO2 (!) 93% Comment: patient states that he has O2 at home 1.5L  " BMI 25.05 kg/m²     Physical Exam    Physical Exam  Vitals reviewed.   Constitutional:       General: He is not in acute distress.     Appearance: He is well-developed. He is not ill-appearing or diaphoretic.   HENT:      Head: Normocephalic.      Right Ear: External ear normal.      Left Ear: External ear normal.      Nose: Nose normal.      Mouth/Throat:      Pharynx: No pharyngeal swelling, oropharyngeal exudate or posterior oropharyngeal erythema.   Neck:      Trachea: No tracheal deviation.   Cardiovascular:      Rate and Rhythm: Regular rhythm. Tachycardia present.      Heart sounds: Normal heart sounds.   Pulmonary:      Effort: Pulmonary effort is normal.      Breath sounds: Normal breath sounds. No wheezing or rales.   Abdominal:      General: Bowel sounds are normal.      Palpations: Abdomen is soft. Abdomen is not rigid. There is no mass.      Tenderness: There is no abdominal tenderness. There is no guarding or rebound.   Musculoskeletal:      Cervical back: Normal range of motion and neck supple.   Lymphadenopathy:      Cervical: No cervical adenopathy.   Neurological:      Mental Status: He is alert and oriented to person, place, and time.      Sensory: No sensory deficit.      Motor: No atrophy.      Gait: Gait normal.      Deep Tendon Reflexes:      Reflex Scores:       Patellar reflexes are 2+ on the right side and 2+ on the left side.           Assessment        ICD-10-CM ICD-9-CM   1. Type 2 diabetes mellitus with diabetic neuropathy, without long-term current use of insulin  E11.40 250.60     357.2   2. Essential hypertension  I10 401.9   3. Stage 3b chronic kidney disease  N18.32 585.3         Plan:     1. Type 2 diabetes mellitus with diabetic neuropathy, without long-term current use of insulin  Overview:  Lab Results   Component Value Date    HGBA1C >14.0 (H) 08/19/2024    HGBA1C 13.3 (H) 05/20/2024    HGBA1C 8.6 (H) 01/30/2024     Lab Results   Component Value Date    LDLCALC 54.2 (L)  "08/19/2024     Lab Results   Component Value Date    MICALBCREAT 64.5 (H) 08/23/2024         Assessment & Plan:  His A1c levels have been progressively increasing, with the most recent reading exceeding 14. This has been impacting his renal function. His blood glucose levels were within the normal range two years ago, but have since escalated. He is currently on glipizide, which he takes in the morning with breakfast. His target A1c level is between 7 and 8. He has an upcoming appointment with a diabetic educator on 09/09/2024 at 2:00 PM, followed by a consultation with me at 3:40 PM. He is also scheduled to see a diabetes specialist on 09/23/2024 at 1:00 PM. He will continue his glipizide regimen until these appointments. A urine test will be conducted today. He is advised to bring his insulin pen and meter to the appointment with the diabetic educator. He will commence a low-dose insulin regimen, starting with 10 units once daily in the evening. He should maintain a diet of three meals per day. A video demonstrating the correct use of the insulin pen was shown to him. He is advised to consume a can of soda or a cup of orange juice if his blood glucose levels drop significantly. He should also consider enrolling in a Medicare special needs plan due to his chronic conditions, including diabetes, chronic kidney disease, interstitial lung disease, and pulmonary hypertension.    Orders:  -     POCT Glucose, Hand-Held Device  -     insulin glargine U-100, Lantus, (LANTUS SOLOSTAR U-100 INSULIN) 100 unit/mL (3 mL) InPn pen; Inject 10 Units into the skin every evening.  Dispense: 15 mL; Refill: 0  -     pen needle, diabetic 32 gauge x 5/32" Ndle; Use with insulin pen as directed  Dispense: 50 each; Refill: 11    2. Essential hypertension  Assessment & Plan:  Fair blood pressure control.  Continue current regimen.      3. Stage 3b chronic kidney disease  Overview:  Lab Results   Component Value Date    EGFRNORACEVR 27 (A) " 08/19/2024    EGFRNORACEVR 26 (A) 08/19/2024    EGFRNORACEVR 28.5 (A) 07/29/2024    EGFRNORACEVR 27 (A) 05/20/2024        Assessment & Plan:  Advised on importance of good hydration.  Patient has a appointment with Nephrology scheduled he was advised to keep this.               -Lucas Lloyd Jr., MD, AAHIVS      This note was generated with the assistance of ambient listening technology. Verbal consent was obtained by the patient and accompanying visitor(s) for the recording of patient appointment to facilitate this note. I attest to having reviewed and edited the generated note for accuracy, though some syntax or spelling errors may persist. Please contact the author of this note for any clarification.          Patient Instructions   Call your Medicare and ask about a Special Needs Plan as you have Diabetes, Chronic Kidney Disease, Interstitial Lung Disease, and Pulmonary Hypertension.     Follow Up  No follow-ups on file.    Future Appointments   Date Time Provider Department Center   9/9/2024  2:00 PM Alexus Sorto RN [facility] DIAMGT [facility]   9/9/2024  3:40 PM Lucas Lloyd Jr., MD [facility]   9/23/2024  1:00 PM Malena Leon NP [facility] ENDOCRN [facility]   9/24/2024 10:15 AM LAB, [facility] [place]   9/24/2024 10:30 AM SPECIMEN, [facility] SPECLAB [place]   10/1/2024  8:00 AM Shereen Baldwin NP [facility] NEPHRO [address]   12/12/2024  8:00 AM LAB, [facility] DRAW STATION [facility] LAB [facility]   12/20/2024  2:20 PM Lucas Lloyd Jr., MD [facility]

## 2024-08-27 NOTE — ASSESSMENT & PLAN NOTE
His A1c levels have been progressively increasing, with the most recent reading exceeding 14. This has been impacting his renal function. His blood glucose levels were within the normal range two years ago, but have since escalated. He is currently on glipizide, which he takes in the morning with breakfast. His target A1c level is between 7 and 8. He has an upcoming appointment with a diabetic educator on 09/09/2024 at 2:00 PM, followed by a consultation with me at 3:40 PM. He is also scheduled to see a diabetes specialist on 09/23/2024 at 1:00 PM. He will continue his glipizide regimen until these appointments. A urine test will be conducted today. He is advised to bring his insulin pen and meter to the appointment with the diabetic educator. He will commence a low-dose insulin regimen, starting with 10 units once daily in the evening. He should maintain a diet of three meals per day. A video demonstrating the correct use of the insulin pen was shown to him. He is advised to consume a can of soda or a cup of orange juice if his blood glucose levels drop significantly. He should also consider enrolling in a Medicare special needs plan due to his chronic conditions, including diabetes, chronic kidney disease, interstitial lung disease, and pulmonary hypertension.

## 2024-08-27 NOTE — ASSESSMENT & PLAN NOTE
Advised on importance of good hydration.  Patient has a appointment with Nephrology scheduled he was advised to keep this.

## 2024-08-27 NOTE — PROGRESS NOTES
Instructed patient with daughter on how to use LANTUS insulin injections for nightly use. Pt perform training well without difficulty. Advise pt and family to reach out to the office with any question or concerns. BRANDON

## 2024-08-28 NOTE — PROGRESS NOTES
"  Patient Name: Peter Lopez    : 1935  MRN: [id number]      Subjective:     Patient ID: Peter is a 88 y.o. male    Chief Complaint:  Follow-up (HTN,MD AND CKD)    History of Present Illness  The patient presents for evaluation of multiple medical concerns.    He is not monitoring his blood glucose levels at home due to unfamiliarity with the process. He has been taking Tradjenta daily in the morning, but states that he can not continue to afford this. H experiences occasional dizziness.      Review of Systems   Constitutional:  Negative for unexpected weight change.   HENT:  Negative for sore throat.    Respiratory:  Negative for wheezing.    Cardiovascular:  Negative for chest pain and palpitations.   Gastrointestinal:  Negative for abdominal pain, blood in stool and change in bowel habit.   Endocrine: Negative for polydipsia, polyphagia and polyuria.   Genitourinary:  Negative for dysuria and hematuria.   Musculoskeletal:  Negative for neck pain.   Integumentary:  Negative for pallor.   Neurological:  Negative for dizziness, seizures, numbness and headaches.   Psychiatric/Behavioral:  Negative for confusion. The patient is not nervous/anxious.         Objective:   /70 (BP Location: Right arm, Patient Position: Sitting, BP Method: Medium (Manual))   Pulse 100   Temp 98.1 °F (36.7 °C) (Oral)   Ht 5' 10" (1.778 m)   Wt 78.3 kg (172 lb 9.9 oz)   SpO2 97%   BMI 24.77 kg/m²     Physical Exam    Physical Exam  Vitals reviewed.   Constitutional:       General: He is not in acute distress.     Appearance: He is well-developed. He is not ill-appearing or diaphoretic.   HENT:      Head: Normocephalic.      Right Ear: External ear normal.      Left Ear: External ear normal.      Nose: Nose normal.      Mouth/Throat:      Pharynx: No pharyngeal swelling, oropharyngeal exudate or posterior oropharyngeal erythema.   Neck:      Trachea: No tracheal deviation.   Cardiovascular:      Rate and Rhythm: Regular rhythm. " Tachycardia present.      Heart sounds: Normal heart sounds.   Pulmonary:      Effort: Pulmonary effort is normal.      Breath sounds: Normal breath sounds. No wheezing or rales.   Abdominal:      General: Bowel sounds are normal.      Palpations: Abdomen is soft. Abdomen is not rigid. There is no mass.      Tenderness: There is no abdominal tenderness. There is no guarding or rebound.   Musculoskeletal:      Cervical back: Normal range of motion and neck supple.   Lymphadenopathy:      Cervical: No cervical adenopathy.   Neurological:      Mental Status: He is alert and oriented to person, place, and time.      Sensory: No sensory deficit.      Motor: No atrophy.      Gait: Gait normal.      Deep Tendon Reflexes:      Reflex Scores:       Patellar reflexes are 2+ on the right side and 2+ on the left side.           Assessment        ICD-10-CM ICD-9-CM   1. Diabetes mellitus with nephropathy  E11.21 250.40     583.81   2. Essential hypertension  I10 401.9   3. Centrilobular emphysema  J43.2 492.8         Plan:     Assessment & Plan  1. Diabetes Mellitus.  His blood glucose level is significantly elevated at 532, indicating that Tradjenta alone is insufficient to manage her diabetes.  In addition patient is discontinue Tradjenta due to its cost.  Will start patient on glipizide 5 milligrams daily and follow-up closely to re-evaluate.    2. Hypertension  Blood pressure appears controlled.  Continue current blood pressure regimen of amlodipine 5 milligrams daily and olmesartan 40 milligrams daily    3. Emphysema  Currently asymptomatic.  Continue Spiriva.     ORDERS  1. Diabetes mellitus with nephropathy  Overview:  Lab Results   Component Value Date    HGBA1C 8.6 (H) 01/30/2024    HGBA1C 10.7 (H) 11/29/2023    HGBA1C 7.3 (H) 06/06/2023     Lab Results   Component Value Date    LDLCALC 64.4 06/06/2023     Lab Results   Component Value Date    MICALBCREAT 106.3 (H) 02/05/2024        Orders:  -     glipiZIDE (GLUCOTROL)  5 MG tablet; Take 1 tablet (5 mg total) by mouth daily with breakfast.  Dispense: 30 tablet; Refill: 1  -     Ambulatory referral/consult to Diabetes Education; Future; Expected date: 08/26/2024  -     Microalbumin/creatinine urine ratio; Future; Expected date: 12/19/2024  -     CBC auto differential; Future; Expected date: 12/19/2024  -     Comprehensive metabolic panel; Future; Expected date: 12/19/2024  -     Hemoglobin A1c; Future; Expected date: 12/19/2024  -     Lipid panel; Future; Expected date: 12/19/2024    2. Essential hypertension    3. Centrilobular emphysema  Overview:  FEV1 60  Maintenance inhaler-spiriva  Decline pharmacy assist.  Quit in 1980s  Albuterol prn-not needing   Oxygen prn following covid but not needing now however walk test indicates that he would still benefit from this.   Will provide portablec concentrator  Pulmonary rehab- completed some but stop after prostate cancer  uptodate flu/covid/pneumonia vaccine.  Off oxygen        Orders:  -     Ambulatory referral/consult to Pharmacy Assistance; Future; Expected date: 08/26/2024             -Lucas Lloyd Jr., MD, AAHIVS      This note was generated with the assistance of ambient listening technology. Verbal consent was obtained by the patient and accompanying visitor(s) for the recording of patient appointment to facilitate this note. I attest to having reviewed and edited the generated note for accuracy, though some syntax or spelling errors may persist. Please contact the author of this note for any clarification.          Patient Instructions   Stop the Tradjenta and start Glipizide once a day. Take it in the morning prior to breakfast. Once you take it, you have to eat within 15 minutes    Follow Up  Follow up in about 4 months (around 12/19/2024) for Diabetes, Hypertension, Chronic Kidney Disease, Lipids (labs a week prior fasting).    Future Appointments   Date Time Provider Department Center   9/9/2024  2:00 PM Alexus Sorto  DARIEL [facility] DIAMGT [facility]   9/9/2024  3:40 PM Lucas Lloyd Jr., MD [facility]   9/23/2024  1:00 PM Malena Leon, WENCESLOA [facility] ENDOCRN [facility]   9/24/2024 10:15 AM LAB, [facility] ALG LAB [place]   9/24/2024 10:30 AM SPECIMEN, [facility] SPECLAB [place]   10/1/2024  8:00 AM Shereen Baldwin NP [facility] NEPHRO [place]   12/12/2024  8:00 AM LAB, [facility] DRAW STATION [facility]   12/20/2024  2:20 PM Lucas Lloyd Jr., MD [facility]

## 2024-08-30 NOTE — TELEPHONE ENCOUNTER
IMPORTANT INFORMATION TO KNOW BEFORE YOUR PROCEDURE    [facility] 2nd Floor         If your procedure requires the administration of anesthesia, it is necessary for a responsible adult to drive you home. (Medical Transportation, Uber, Lyft, Taxi, etc. may ONLY be used if a responsible adult is present to accompany you home.  The responsible adult CAN'T be the  of the service).      person must be available to return to pick you up within 15 minutes of being notified of discharge.       Please bring a picture ID, insurance card, & copayment      Take Medications as directed below:        If you are taking the oral medication Adipex (Phentermine), hold 4 days prior to your procedure, please stop taking on     If you begin taking any blood thinning medications, injectable weight loss/diabetes medications (other than insulin) , or Adipex (Phentermine) please contact the endoscopy scheduling department listed below as soon as possible.    If you are diabetic see the attached instruction sheet regarding your medication.     If you take HEART, BLOOD PRESSURE, SEIZURE, PAIN, LUNG (including inhalers/nebulizers), ANTI-REJECTION (transplant patients), or PSYCHIATRIC medications, please take at your regular times with a sip of water or as directed by the scheduling nurse.     Important contact information:    Endoscopy [phone] Hours of operation Monday-Friday 8:00-4:30pm.    Questions about insurance or financial obligations call [phone] or [phone].    If you have questions regarding the prep or need to reschedule, please call [phone]. After hours questions requiring immediate assistance, contact [facility] nurse line at [phone] or [phone].   NOTE:     On occasion, unforeseen circumstances may cause a delay in your procedure start time. We respect your time and appreciate your patience during these circumstances.      Comments:           EGD Procedure Prep Instructions      Date of procedure: 11/05/24 Arrive at: 7:00AM      Location of Department: [address] [place] [address]  Take the [facility] Elevators to 2nd Floor Outpatient Surgery    How to prep:    Day Before Procedure: 11/04/24     You may have a light evening meal.   No solid food after 7:00 pm.   Continue drinking clear liquids.       Day of the Procedure:  11/05/24     You may have water/clear liquids until 4 hours before your procedure or as directed by the scheduling nurse  4:00AM. See below for list.    What You CANNOT do:   Do not drink milk or anything colored red.  Do not drink alcohol.  No gum chewing or candy morning of procedure.    Liquids That Are OK to Drink:   Water  Sports drinks (Gatorade, Power-Aid)  Coffee or tea (no cream or nondairy creamer)  Clear juices without pulp (apple, white grape)  Gelatin desserts (no fruit or toppings)  Clear soda (sprite, coke, ginger ale)  Chicken broth (until 12 midnight the night before procedure)      Comments:    If you are unsure about any of these instructions, call [facility] at [phone].                          Oral Medicine  Day of Prep  Day of Procedure           Glyburide    Do NOT take morning of procedure. If you         Glucotrol (Glipizide)  Do NOT take. take twice daily, take with dinner.         Amaryl (Glimepiride)                                Glucophage    Do NOT take morning of procedure. Take         Glumetza  Take as  when you start eating again.          Fortamet (Metformin)  prescribed.                              Januvia (Sitaglipitin)    Do NOT take morning of procedure. Take         Nesina (Aloglipitin)    when you start eating again.          Onglyza (Saxaglipitin)  Take as              Tradjenta (Linaglipitin)  prescribed.                              Invokana (Canagliflozin)               Farxiga (Dapagliflozin)  Stop 2 days  Do NOT take morning of procedure. Take          Jardiance(Empagliflozin) before prep.  when you start eating again.                          Actos (Pioglitazone)  Take as  Do NOT take morning of procedure. Take           prescribed.  when you start eating again.                          Injectable & Combination Daily Dose  Weekly Dose           Medicine                Adlyxin (Lixisenatide)  If you take these For weekly dose, hold dose at least 8 days prior        Byetta (Exenatide)  medications daily to appointment. You may take the dose after your        Bydureon (Exenatide XL) on the day of your procedure.            Ozempic (Semaglutide)  appointment hold              Trulicity (Dulaglutide)  that dose until              Victoza (Liraglutide)  after your              Mounjaro (Tirzepatide)  procedure.              Jessica Anderson                It is important to monitor your blood sugar while doing the bowel preparation. On the day of your bowel   prep, when you are on a clear liquid diet, you may drink beverages with sugar as your source of glucose.   Be sure to mix the prep with water or sugar free liquid only. Below are instructions on how to adjust your   diabetic medications prior to your scheduled procedure. Call the healthcare provider who manages your   diabetes if you have questions.   Insulin for Type 1   Diabetes Mellitus Day of Prep                                         Day of procedure          Basaglar If you use in the morning, take as prescribed.  If you take in the morning,         Lantus If you use in the evening, inject 70% of dose. inject 80% of dose. If you take        Levemir      in the evenings, inject usual         Toujeo      dose.           Muna Almonte Count carbs and adjust dose   Do NOT take morning of procedure.         Apidra accordingly. If not carb counting,  Take when you start eating again.         Humalog 100 take 25% of usual meal dose.              Humalog 200 May use correction dose every              Novolog 4 hours. Do NOT use once sugar-free             liquid prep is started.                             Insulin Pump Count carbs and adjust your   May use temp basal rate at 70 % starting          dose as needed. May use temp basal at midnight until after procedure.          rate at 70 % after sugar-free clear liquid             prep is started until midnight.                              Insulin for Type 2   Diabetes Mellitus Day of Prep                                         Day of procedure          Basaglar If you use in the morning, take as prescribed.  If you take in the morning,         Lantus If you use in the evening, inject 70% of dose. inject 80% of dose. If you take        Levemir      in the evenings, inject usual         Toujeo      dose.           Tresiba                                                Afrezza Inject 50 % of dose with clear liquid diet.   Do NOT take morning of         Apidra Do NOT use once sugar-free clear liquid prep procedure. Take when you         Fiasp is started. If you are using a correction scale,  start eating meals again.         Humalog 100 take dose every 4 hours as needed.             Humalog 200                Novolog                                NPH  Inject 50% of breakfast and dinner   Do NOT take morning of         doses with clear liquid diet.    procedure. Take usual dose              when you eat dinner.                         Regular  Inject 50% of breakfast dose and do NOT take Do NOT take morning of          dinner dose once sugar-free liquid prep has   procedure. Take usual dose          started. If using correction scale, make take dose when you eat dinner.          every 6 hours as needed.                              Novolog Mix 70/30 Inject 50% of breakfast dose. Inject 25%  Do NOT take breakfast dose.         Humolog Mix 75/25 of dinner dose.     Take usual dose when you eat         Humolog  Mix 50/50      dinner.                           U500 Take 50% of AM or breakfast unit tom dose.  Do NOT take mornining of           Take 25% of lunch or dinner or PM unit tom dose.  procedure. Take when you               start eating again.                          V-Go  Continue to wear your V-Go device. Do NOT click  Coninue to wear your V-Go         once sugar-free clear liquid prep is started.   device. Resume clicks with               meals.

## 2024-08-31 ENCOUNTER — HOSPITAL ENCOUNTER (EMERGENCY)
Facility: HOSPITAL | Age: 89
Discharge: HOME | End: 2024-08-31
Attending: EMERGENCY MEDICINE
Payer: MEDICARE

## 2024-08-31 VITALS
BODY MASS INDEX: 25.11 KG/M2 | OXYGEN SATURATION: 96 % | HEART RATE: 106 BPM | TEMPERATURE: 98 F | SYSTOLIC BLOOD PRESSURE: 169 MMHG | DIASTOLIC BLOOD PRESSURE: 86 MMHG | RESPIRATION RATE: 19 BRPM | WEIGHT: 175 LBS

## 2024-08-31 DIAGNOSIS — R73.9 HYPERGLYCEMIA: Primary | ICD-10-CM

## 2024-08-31 LAB
ANION GAP SERPL CALC-SCNC: 14 MMOL/L (ref 8–16)
BUN SERPL-MCNC: 21 MG/DL (ref 8–23)
CALCIUM SERPL-MCNC: 9.8 MG/DL (ref 8.7–10.5)
CHLORIDE SERPL-SCNC: 110 MMOL/L (ref 95–110)
CO2 SERPL-SCNC: 14 MMOL/L (ref 23–29)
CREAT SERPL-MCNC: 1.8 MG/DL (ref 0.5–1.4)
EST. GFR  (NO RACE VARIABLE): 36 ML/MIN/1.73 M^2
GLUCOSE SERPL-MCNC: 170 MG/DL (ref 70–110)
POCT GLUCOSE: 174 MG/DL (ref 70–110)
POTASSIUM SERPL-SCNC: 4.7 MMOL/L (ref 3.5–5.1)
SODIUM SERPL-SCNC: 138 MMOL/L (ref 136–145)

## 2024-08-31 PROCEDURE — 99283 EMERGENCY DEPT VISIT LOW MDM: CPT | Mod: 25

## 2024-08-31 PROCEDURE — 82962 GLUCOSE BLOOD TEST: CPT

## 2024-08-31 PROCEDURE — 80048 BASIC METABOLIC PNL TOTAL CA: CPT | Performed by: PHYSICIAN ASSISTANT

## 2024-08-31 NOTE — ED PROVIDER NOTES
Encounter Date: 8/31/2024    SCRIBE #1 NOTE: IRut, am scribing for, and in the presence of,  Jevon Gregory PA-C. I have scribed the following portions of the note - Other sections scribed: HPI, ROS.       History     Chief Complaint   Patient presents with    Hyperglycemia     Pt to ED reporting his CBG was in the 600's this morning. Pt denies symptoms. Pt reporting compliance with meds.      88 y.o. male with a PMHx of BPH, CKD, COPD, DM, HTN, ILD, aortic atherosclerosis presents to the ED for emergent evaluation of a high blood glucose reading this morning. Patient reports that he had a self-reading of 600, states he has two machines, but is unsure if he may have mixed up the strips.  He notes that he has been compliant with his daily medications. He states that he has no other complaints. Denies dizziness, weakness, chest pain, dyspnea, nausea, vomiting, diarrhea, or to her associated symptoms.     The history is provided by the patient. No  was used.     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  Vaccinated.  C A     Family History   Problem Relation Name Age of Onset    Cancer Mother      Asbestos Father      Liver disease Neg Hx       Social History     Tobacco Use    Smoking status: Former     Current packs/day: 0.00     Average packs/day: 1 pack/day for 40.0 years (40.0 ttl pk-yrs)     Types: Cigarettes     Start date: 1951     Quit date: 1991     Years since quittin.8    Smokeless tobacco: Former     Quit date: 1986   Substance Use Topics    Alcohol use: Not Currently     Alcohol/week: 0.0 standard drinks of alcohol     Comment: 2 bottles beer on weekends    Drug use: No     Review of Systems   Constitutional:  Negative for fever.   HENT:  Negative for congestion, hearing loss, sore throat and trouble swallowing.    Eyes:  Negative for visual disturbance.   Respiratory:  Negative for cough and shortness of breath.    Cardiovascular:  Negative for chest pain and leg swelling.   Gastrointestinal:  Negative for abdominal pain, constipation, diarrhea, nausea and vomiting.   Genitourinary:  Negative for dysuria, flank pain, frequency and urgency.   Musculoskeletal:  Negative for back pain.   Skin:  Negative for rash.   Neurological:  Negative for dizziness, syncope, weakness, light-headedness, numbness and headaches.   All other systems reviewed and are negative.      Physical Exam     Initial Vitals [24 1311]   BP Pulse Resp Temp SpO2   (!) 169/86 (!) 116 19 97.9 °F (36.6 °C) (!) 94 %      MAP       --         Physical Exam    Nursing note and vitals reviewed.  Constitutional: He appears well-developed and well-nourished. He is not diaphoretic. No distress.   HENT:   Head: Normocephalic and atraumatic.   Nose: Nose normal.   Eyes: Conjunctivae and EOM are normal. Right eye exhibits no discharge. Left eye exhibits no discharge.   Neck: No tracheal deviation present. No JVD present.   Normal range of motion.  Cardiovascular:  Regular  rhythm.   Tachycardia present.         Pulmonary/Chest: No accessory muscle usage or stridor. No tachypnea. No respiratory distress.   Musculoskeletal:         General: Normal range of motion.      Cervical back: Normal range of motion.     Neurological: He is alert and oriented to person, place, and time. He displays no tremor. He displays no seizure activity. Coordination and gait normal.   Skin: Skin is warm and dry. No pallor.   Psychiatric: His mood appears anxious.         ED Course   Procedures  Labs Reviewed   BASIC METABOLIC PANEL - Abnormal       Result Value    Sodium 138      Potassium 4.7      Chloride 110      CO2 14 (*)     Glucose 170 (*)     BUN 21      Creatinine 1.8 (*)     Calcium 9.8      Anion Gap 14      eGFR 36 (*)           Imaging Results    None          Medications - No data to display  Medical Decision Making  Mild hyperglycemia without DKA.  Initially tachycardic but patient attributes this to anxiety surrounding bridge traffic.  Patient has 2 different glucometers at home that require different test strips; perhaps he mixed up glucometer with wrong strips?  He is completely asymptomatic.  Very well-appearing.  He has no other complaints or concerns and is ready to go home.    Amount and/or Complexity of Data Reviewed  Labs: ordered. Decision-making details documented in ED Course.            Scribe Attestation:   Scribe #1: I performed the above scribed service and the documentation accurately describes the services I performed. I attest to the accuracy of the note.                               Clinical Impression:  Final diagnoses:  [R73.9] Hyperglycemia (Primary)          ED Disposition Condition    Discharge Stable          ED Prescriptions    None       Follow-up Information       Follow up With Specialties Details Why Contact Lucas Lopez Jr., MD Family Medicine Schedule an appointment as soon as possible for a visit in 2 days For re-evaluation [address]  [facility] [address]  [phone]      [facility] Emergency Dept Emergency Medicine Go to  If symptoms worsen or new symptoms develop [address] [facility] [place] [address] [phone]          I, Jevon Gregory PA-C, personally performed the services described in this documentation. All medical record entries made by the scribe were at my direction and in my presence. I have reviewed the chart and agree that the record reflects my personal performance and is accurate and complete.      DISCLAIMER: This note was prepared with [employer] voice recognition transcription software. Garbled syntax, mangled pronouns, and other bizarre constructions may be attributed to that software system.       Jevon Gregory PA-C  08/31/24 2137

## 2024-08-31 NOTE — DISCHARGE INSTRUCTIONS
Thank you for coming to our Emergency Department today. It is important to remember that some problems or medical conditions are difficult to diagnose and may not be found or addressed during your Emergency Department visit.  These conditions often start with non-specific symptoms and can only be diagnosed on follow up visits with your primary care physician or specialist when the symptoms continue or change. Please remember that all medical conditions can change, and we cannot predict how you will be feeling tomorrow or the next day. Return to the ER with any questions/concerns, new/concerning symptoms, worsening or failure to improve.       Be sure to follow up with your primary care doctor and review all labs/imaging/tests that were performed during your ER visit with them. It is very common for us to identify non-emergent incidental findings which must be followed up with your primary care physician.  Some labs/imaging/tests may be outside of the normal range, and require non-emergent follow-up and/or further investigation/treatment/procedures/testing to help diagnose/exclude/prevent complications or other potentially serious medical conditions. Some abnormalities may not have been discussed or addressed during your ER visit.     An ER visit does not replace a primary care visit, and many screening tests or follow-up tests cannot be ordered by an ER doctor or performed by the ER. Some tests may even require pre-approval.    If you do not have a primary care doctor, you may contact the one listed on your discharge paperwork or you may also call the [facility] Appointment Desk at [phone] , or [address] [place] [address] at  [phone] to schedule an appointment, or establish care with a primary care doctor or even a specialist and to obtain information about local resources. It is important to your health that you have a primary care doctor.    Please take all medications as directed. We have done our best to select  a medication for you that will treat your condition however, all medications may potentially have side-effects and it is impossible to predict which medications may give you side-effects or what those side-effects (if any) those medications may give you.  If you feel that you are having a negative effect or side-effect of any medication you should stop taking those medications immediately and seek medical attention. If you feel that you are having a life-threatening reaction call 911.        Do not drive, swim, climb to height, take a bath, operate heavy machinery, drink alcohol or take potentially sedating medications, sign any legal documents or make any important decisions for 24 hours if you have received any pain medications, sedatives or mood altering drugs during your ER visit or within 24 hours of taking them if they have been prescribed to you.     You can find additional resources for Dentists, hearing aids, durable medical equipment, low cost pharmacies and other resources at [email or web address]

## 2024-09-03 ENCOUNTER — PATIENT OUTREACH (OUTPATIENT)
Dept: EMERGENCY MEDICINE | Facility: HOSPITAL | Age: 89
End: 2024-09-03
Payer: MEDICARE

## 2024-09-03 NOTE — PROGRESS NOTES
Patient was seen in the ED on 8/31/24. Phoned patient to assist with Post ED Discharge Navigation. Patient is doing better. Patient has a scheduled PCP appointment. Patient declined assistance scheduling PCP appointment sooner. Appointment reminder set.  Ellie Ortega

## 2024-09-06 NOTE — NURSING
Home Oxygen Evaluation    Date Performed: 2023    1) Patient's Home O2 Sat on room air, while at rest: 89%        If O2 sats on room air at rest are 88% or below, patient qualifies. No additional testing needed. Document N/A in steps 2 and 3. If 89% or above, complete steps 2.      2) Patient's O2 Sat on room air while exercisin%        If O2 sats on room air while exercising remain 89% or above patient does not qualify, no further testing needed Document N/A in step 3. If O2 sats on room air while exercising are 88% or below, continue to step 3.      3) Patient's O2 Sat while on O2: SPO2 97% at 2 LPM         (Must show improvement from #2 for patients to qualify)    If O2 sats improve on oxygen, patient qualifies for portable oxygen. If not, the patient does not qualify.        
 Report received and care assumed. Discussed plan of care and safety with patient . Reviewed call system. No acute distress noted . [place] message must give face to face because he don't have hearing aid [facility] [place]  Nurses Note -- 4 Eyes      8/11/2023       Skin assessed on: Q Shift      [x] No Pressure Injuries Present    []Prevention Measures Documented    [] Yes LDA  for Pressure Injury Previously documented     [] Yes New Pressure Injury Discovered   [] LDA for New Pressure Injury Added      Attending RN:  Gill Peter RN     Second RN:  Tayler Ford LPN       
 Via wheelchair to main entrance for discharge with oxygen infusing  
Discharge instructions reviewed and handed to patient. Pt verbalized understanding. Denies any questions. IV discontinued  Cardiac monitor DC and tele monitor tech notified. Vital signs stable, NADN, and afebrile. Discharged home via wheelchair with family at bedside,   
Pt arrived on unit via stretcher, awake alert and oriented. No distress noted. Safety measures maintained. Will cont to monitor  
Urine sample tubed down to lab  
06-Sep-2024 19:20

## 2024-09-09 ENCOUNTER — CLINICAL SUPPORT (OUTPATIENT)
Dept: DIABETES | Facility: CLINIC | Age: 89
End: 2024-09-09
Payer: MEDICARE

## 2024-09-09 ENCOUNTER — OFFICE VISIT (OUTPATIENT)
Dept: FAMILY MEDICINE | Facility: CLINIC | Age: 89
End: 2024-09-09
Payer: MEDICARE

## 2024-09-09 VITALS — BODY MASS INDEX: 24.62 KG/M2 | WEIGHT: 172 LBS | HEIGHT: 70 IN

## 2024-09-09 VITALS
HEART RATE: 91 BPM | BODY MASS INDEX: 24.61 KG/M2 | HEIGHT: 70 IN | RESPIRATION RATE: 18 BRPM | DIASTOLIC BLOOD PRESSURE: 82 MMHG | WEIGHT: 171.94 LBS | SYSTOLIC BLOOD PRESSURE: 136 MMHG | OXYGEN SATURATION: 98 %

## 2024-09-09 DIAGNOSIS — E11.21 DIABETES MELLITUS WITH NEPHROPATHY: Primary | Chronic | ICD-10-CM

## 2024-09-09 DIAGNOSIS — H92.02 OTALGIA OF LEFT EAR: ICD-10-CM

## 2024-09-09 DIAGNOSIS — E11.21 DIABETES MELLITUS WITH NEPHROPATHY: Chronic | ICD-10-CM

## 2024-09-09 DIAGNOSIS — I10 ESSENTIAL HYPERTENSION: Chronic | ICD-10-CM

## 2024-09-09 PROCEDURE — 99214 OFFICE O/P EST MOD 30 MIN: CPT | Mod: S$GLB,,, | Performed by: FAMILY MEDICINE

## 2024-09-09 PROCEDURE — 1101F PT FALLS ASSESS-DOCD LE1/YR: CPT | Mod: CPTII,S$GLB,, | Performed by: FAMILY MEDICINE

## 2024-09-09 PROCEDURE — G0108 DIAB MANAGE TRN  PER INDIV: HCPCS | Mod: S$GLB,,, | Performed by: FAMILY MEDICINE

## 2024-09-09 PROCEDURE — 3288F FALL RISK ASSESSMENT DOCD: CPT | Mod: CPTII,S$GLB,, | Performed by: FAMILY MEDICINE

## 2024-09-09 PROCEDURE — G2211 COMPLEX E/M VISIT ADD ON: HCPCS | Mod: S$GLB,,, | Performed by: FAMILY MEDICINE

## 2024-09-09 PROCEDURE — 1126F AMNT PAIN NOTED NONE PRSNT: CPT | Mod: CPTII,S$GLB,, | Performed by: FAMILY MEDICINE

## 2024-09-09 PROCEDURE — 99999 PR PBB SHADOW E&M-EST. PATIENT-LVL V: CPT | Mod: PBBFAC,,, | Performed by: FAMILY MEDICINE

## 2024-09-09 PROCEDURE — 99999 PR PBB SHADOW E&M-EST. PATIENT-LVL II: CPT | Mod: PBBFAC,,,

## 2024-09-09 RX ORDER — OFLOXACIN 3 MG/ML
5 SOLUTION AURICULAR (OTIC) 2 TIMES DAILY
Qty: 5 ML | Refills: 0 | Status: SHIPPED | OUTPATIENT
Start: 2024-09-09

## 2024-09-09 RX ORDER — GLIPIZIDE 5 MG/1
5 TABLET ORAL
Qty: 90 TABLET | Refills: 2 | Status: SHIPPED | OUTPATIENT
Start: 2024-09-09

## 2024-09-09 RX ORDER — LANCETS
EACH MISCELLANEOUS
Qty: 100 EACH | Refills: 11 | Status: SHIPPED | OUTPATIENT
Start: 2024-09-09

## 2024-09-09 NOTE — PROGRESS NOTES
"Diabetes Care Specialist Progress Note  Author: Alexus Sorto RN  Date: 9/9/2024        Intake  Program Intake  Reason for Diabetes Program Visit:: Initial Diabetes Assessment  Current diabetes risk level:: low  In the last 12 months, have you:: used emergency room services  Was the ER or hospital admission related to diabetes?: Yes  Permission to speak with others about care:: yes    Current Diabetes Treatment: Oral Medications, Insulin  Oral Medication Type/Dose: Glipizide 5mg daily w/breakfast  Method of insulin delivery?: Injections  Injection Type: Pens  Pen Type/Dose: Lantus 10U daily    Continuous Glucose Monitoring  Patient has CGM: No    Lab Results   Component Value Date    HGBA1C >14.0 (H) 08/19/2024       Weight: 78 kg (172 lb)   Height: 5' 10" (177.8 cm)   Body mass index is 24.68 kg/m².    Lifestyle Coping Support & Clinical  Lifestyle/Coping/Support  Does anyone in your family have diabetes or does anyone in your family support you in your diabetes care?: Sister support patient in diabetes care.  List anything about Diabetes that causes you stress?: Blood sugar elevation  How do you deal with stress/distress?: Getting information helps.  Learning Barriers:: None  Culture or Lutheran beliefs that may impact ability to access healthcare: No  Psychosocial/Coping Skills Assessment Completed: : Yes  Assessment indicates:: Instruction Needed  Area of need?: No      Diabetes Self-Management Skills Assessment  Medication Skills Assessment  Patient is able to identify current diabetes medications, dosages, and appropriate timing of medications.: yes  Patient reports problems or concerns with current medication regimen.: no  Patient is  aware that some diabetes medications can cause low blood sugar?: No  Medication Skills Assessment Completed:: Yes  Assessment indicates:: Instruction Needed, Knowledge deficit  Area of need?: Yes    Diabetes Disease Process/Treatment Options  Diabetes Type?: Type II  When " were you diagnosed?: > 2yrs ago  If previous diabetes education, when/where:: None  What are your goals for this education session?: Learn how to manage diabetes better.  Is patient aware of what causes diabetes?: No  Does patient understand the pathophysiology of diabetes?: No  Diabetes Disease Process/Treatment Options: Skills Assessment Completed: Yes  Assessment indicates:: Knowledge deficit, Instruction Needed  Area of need?: Yes    Nutrition/Healthy Eating  Meal Plan 24 Hour Recall - Breakfast: oatmeal or grits and eggs  Meal Plan 24 Hour Recall - Lunch: baked chicken, green beans, and mac/cheese  Meal Plan 24 Hour Recall - Dinner: sandwich and glucerna  Meal Plan 24 Hour Recall - Snack: vero crackers or crackers  Meal Plan 24 Hour Recall - Beverage: water  Who shops/cooks?: sister  Patient can identify foods that impact blood sugar.: no (see comments)  Challenges to healthy eating:: portion control  Nutrition/Healthy Eating Skills Assessment Completed:: Yes  Assessment indicates:: Instruction Needed, Knowledge deficit  Area of need?: Yes    Physical Activity/Exercise  Physical Activity/Exercise Skills Assessment Completed: : No  Deffered due to:: Time    Home Blood Glucose Monitoring  Patient states that blood sugar is checked at home daily.: yes  Monitoring Method:: home glucometer  Home glucometer meter type:: Insurance Preferred Meter  Fasting BG range history:: see attachment and/or .....100's up to 170's  Premeal BG range history:: see attachment and/or   What are your blood glucose targets?:   How often do you check your blood sugar?: TID  What is your A1c Target?: 7.0%  Home Blood Glucose Monitoring Skills Assessment Completed: : Yes  Assessment indicates:: Instruction Needed, Knowledge deficit  Area of need?: Yes    Acute Complications  Have you ever had hypoglycemia (low BG 70 or less)?: no  Do you know the symptoms of low blood sugar and how to treat?: no  Have you  ever had hyperglycemia (high  or more)?: no   Do you know the symptoms of high blood sugar and how to treat: no  Have you ever had DKA?: no  Do you ever test for ketones?: no  Do you have a sick day plan?: no  Acute Complications Skills Assessment Completed: : Yes  Assessment indicates:: Instruction Needed, Knowledge deficit  Area of need?: Yes    Chronic Complications  Chronic Complications Skills Assessment Completed: : No  Deferred due to:: Time      Assessment Summary and Plan    Based on today's diabetes care assessment, the following areas of need were identified:          9/9/2024    12:01 AM   Areas of Need   Medications/Current Diabetes Treatment Yes   Lifestyle Coping Support No   Diabetes Disease Process/Treatment Options Yes- Provided DM management guide and discussed what is diabetes and the significance of current A1c and blood glucose goals.   Nutrition/Healthy Eating Yes- see care planning   Home Blood Glucose Monitoring Yes- - Discussed BS goals and importance of monitoring and recording BS for review and maintenance of medication. Patient to continue documenting glucose results and bringing them to every clinic visit.   Acute Complications Yes- -   Discussed blood sugar values, prevention, detection, signs and symptoms, and treatment of hypoglycemia following rule of 15.     Today's interventions were provided through individual discussion, instruction, and written materials were provided.      Patient verbalized understanding of instruction and written materials.  Pt was able to return back demonstration of instructions today. Patient understood key points, needs reinforcement and further instruction.     Diabetes Self-Management Care Plan:    Today's Diabetes Self-Management Care Plan was developed with Peter's input. Peter has agreed to work toward the following goal(s) to improve his/her overall diabetes control.      Care Plan: Diabetes Management   Updates made since 8/10/2024 12:00 AM         Problem: Healthy Eating         Goal: Eat 2-3 meals daily with 30-45g/2-3 servings of Carbohydrate per meal. Limit snacking in between meal to 1 serving (15 grams).    Start Date: 9/9/2024   Expected End Date: 9/23/2024   Priority: High   Barriers: No Barriers Identified   Note:    9/5/24 - - We reviewed eating right with diabetes. We discussed the importance of eating balanced meals with vegetables, fruits, lean meats, and whole grains. We concentrated on portion sizes and overall meal planning with various choices for meals. We discussed carb sources of foods, appropriate amount of carbs to have at meals/snacks and acceptable serving sizes of individual carb items. Obtained a 24-hr food recall from patient. We discussed various meal plan options to promote healthy eating.      - - Started reviewing how to read food labels with patient focusing on serving size and total carbohydrate intake (not sugar intake). Instructed on appropriate serving sizes of specific carb containing foods. Stressed importance of eating a protein at each meal and include non-starchy vegetables at lunch/dinner. Instructed pt to aim for 2-3 evenly spaced meals or a small carb snack in place of a missed meal. Written education information provided to patient for use at home. Pt verbalized understanding of all the above.       Task: Reviewed the sources and role of Carbohydrate, Protein, and Fat and how each nutrient impacts blood sugar. Completed 9/9/2024        Task: Provided visual examples using dry measuring cups, food models, and other familiar objects such as computer mouse, deck or cards, tennis ball etc. to help with visualization of portions. Completed 9/9/2024     Task: Recommended replacing beverages containing high sugar content with noncaloric/sugar free options and/or water. Completed 9/9/2024        Task: Review the importance of balancing carbohydrates with each meal using portion control techniques to count servings of  carbohydrate and label reading to identify serving size and amount of total carbs per serving. Completed 9/9/2024        Task: Provided Sample plate method and reviewed the use of the plate to estimate amounts of carbohydrate per meal. Completed 9/9/2024        Follow Up Plan   Follow up in about 2 weeks (around 9/23/2024) for F/U review BS readings, label reading, and meal planning.    Today's care plan and follow up schedule was discussed with patient.  Peter verbalized understanding of the care plan, goals, and agrees to follow up plan.        The patient was encouraged to communicate with his/her health care provider/physician and care team regarding his/her condition(s) and treatment.  I provided the patient with my contact information today and encouraged to contact me via phone or [facility] Patient Portal as needed.     Length of Visit   Total Time: 60 Minutes

## 2024-09-23 ENCOUNTER — CLINICAL SUPPORT (OUTPATIENT)
Dept: DIABETES | Facility: CLINIC | Age: 89
End: 2024-09-23
Payer: MEDICARE

## 2024-09-23 ENCOUNTER — OFFICE VISIT (OUTPATIENT)
Dept: ENDOCRINOLOGY | Facility: CLINIC | Age: 89
End: 2024-09-23
Payer: MEDICARE

## 2024-09-23 VITALS
HEART RATE: 90 BPM | TEMPERATURE: 98 F | BODY MASS INDEX: 24.39 KG/M2 | DIASTOLIC BLOOD PRESSURE: 70 MMHG | SYSTOLIC BLOOD PRESSURE: 136 MMHG | WEIGHT: 170 LBS

## 2024-09-23 VITALS — HEIGHT: 70 IN | WEIGHT: 170 LBS | BODY MASS INDEX: 24.34 KG/M2

## 2024-09-23 DIAGNOSIS — I10 ESSENTIAL HYPERTENSION: Chronic | ICD-10-CM

## 2024-09-23 DIAGNOSIS — E11.21 DIABETES MELLITUS WITH NEPHROPATHY: Primary | Chronic | ICD-10-CM

## 2024-09-23 DIAGNOSIS — E11.21 DIABETES MELLITUS WITH NEPHROPATHY: Primary | ICD-10-CM

## 2024-09-23 PROCEDURE — G0108 DIAB MANAGE TRN  PER INDIV: HCPCS | Mod: S$GLB,,, | Performed by: FAMILY MEDICINE

## 2024-09-23 PROCEDURE — 99999 PR PBB SHADOW E&M-EST. PATIENT-LVL IV: CPT | Mod: PBBFAC,,, | Performed by: NURSE PRACTITIONER

## 2024-09-23 PROCEDURE — 1160F RVW MEDS BY RX/DR IN RCRD: CPT | Mod: CPTII,S$GLB,, | Performed by: NURSE PRACTITIONER

## 2024-09-23 PROCEDURE — 1159F MED LIST DOCD IN RCRD: CPT | Mod: CPTII,S$GLB,, | Performed by: NURSE PRACTITIONER

## 2024-09-23 PROCEDURE — 99999 PR PBB SHADOW E&M-EST. PATIENT-LVL I: CPT | Mod: PBBFAC,,,

## 2024-09-23 PROCEDURE — 99204 OFFICE O/P NEW MOD 45 MIN: CPT | Mod: S$GLB,,, | Performed by: NURSE PRACTITIONER

## 2024-09-23 NOTE — PROGRESS NOTES
CC: This 88 y.o. Black or  male  is here for evaluation of  T2DM along with comorbidities indicated in the Visit Diagnosis section of this encounter.    HPI: Peter Lopez was diagnosed with T2DM in his early 70s.     DM COMPLICATIONS: nephropathy    New to Endocrine. Referred by Primary. Arrives with son-in-law.   Tradjenta stopped in August d/t cost. He was switched to glipizide and insulin started last month as well. He has stopped eating candy. BGs previously in the 400s.   He does not want to apply for Pharmacy Assistance because he doesn't want to give out personal financial information.   Prelunch BG today was 69 - he was asymptomatic. Unclear why BG was lower than usual as he ate a typical breakfast.     LAST DIABETES EDUCATION: 9/2024      HOSPITALIZED FOR DIABETES -  No.    SIGNIFICANT DIABETES MED HISTORY:   Insulin started 8/2024    PRESCRIBED DIABETES MEDICATIONS:   Glipizide 5 mg once daily with breakfast   Lantus 10 units once daily at 6 pm     Misses medication doses - No    Rotates injections: RLQ and LLQ abdomen   Changes insulin pen needles: yes     SELF MONITORING BLOOD GLUCOSE: Monitors blood glucose  3x/day. Declines personal CGM.                       HYPOGLYCEMIC EPISODES: denies overnight sweats.      CURRENT DIET: drinks water and Glucerna. Eats 3 meals/day.   Breakfast is oatmeal. Dinner was turkey, brown rice, steamed broccoli.     CURRENT EXERCISE:        /70   Pulse 90   Temp 98 °F (36.7 °C)   Wt 77.1 kg (170 lb)   BMI 24.39 kg/m²     ROS:   CONSTITUTIONAL: Appetite good, denies fatigue  NEURO: + tingling to feet.           PHYSICAL EXAM:  GENERAL: Well developed, well nourished. No acute distress.   PSYCH: AAOx3, appropriate mood and affect, conversant, well-groomed. Judgement and insight good.   NEURO: Cranial nerves grossly intact. Speech clear.   CHEST: Respirations even and unlabored.        Hemoglobin A1C   Date Value Ref Range Status   08/19/2024 >14.0 (H)  "4.0 - 5.6 % Final     Comment:     ADA Screening Guidelines:  5.7-6.4%  Consistent with prediabetes  >or=6.5%  Consistent with diabetes    High levels of fetal hemoglobin interfere with the HbA1C  assay. Heterozygous hemoglobin variants (HbS, HgC, etc)do  not significantly interfere with this assay.   However, presence of multiple variants may affect accuracy.     05/20/2024 13.3 (H) 4.0 - 5.6 % Final     Comment:     ADA Screening Guidelines:  5.7-6.4%  Consistent with prediabetes  >or=6.5%  Consistent with diabetes    High levels of fetal hemoglobin interfere with the HbA1C  assay. Heterozygous hemoglobin variants (HbS, HgC, etc)do  not significantly interfere with this assay.   However, presence of multiple variants may affect accuracy.     01/30/2024 8.6 (H) 4.0 - 5.6 % Final     Comment:     ADA Screening Guidelines:  5.7-6.4%  Consistent with prediabetes  >or=6.5%  Consistent with diabetes    High levels of fetal hemoglobin interfere with the HbA1C  assay. Heterozygous hemoglobin variants (HbS, HgC, etc)do  not significantly interfere with this assay.   However, presence of multiple variants may affect accuracy.         No results found for: "CPEPTIDE", "GLUTAMICACID", "ISLETCELLANT", "FRUCTOSAMINE"     Lab Results   Component Value Date    CHOL 128 08/19/2024    CHOL 127 05/20/2024    CHOL 122 06/06/2023     Lab Results   Component Value Date    HDL 48 08/19/2024    HDL 48 05/20/2024    HDL 46 06/06/2023     Lab Results   Component Value Date    LDLCALC 54.2 (L) 08/19/2024    LDLCALC 65.2 05/20/2024    LDLCALC 64.4 06/06/2023     Lab Results   Component Value Date    TRIG 129 08/19/2024    TRIG 69 05/20/2024    TRIG 58 06/06/2023     Lab Results   Component Value Date    CHOLHDL 37.5 08/19/2024    CHOLHDL 37.8 05/20/2024    CHOLHDL 37.7 06/06/2023           Component Value Date/Time     08/31/2024 1339    K 4.7 08/31/2024 1339     08/31/2024 1339    CO2 14 (L) 08/31/2024 1339    BUN 21 08/31/2024 " 1339    CREATININE 1.8 (H) 08/31/2024 1339     (H) 08/31/2024 1339    CALCIUM 9.8 08/31/2024 1339    ALKPHOS 123 08/19/2024 1943    AST 26 08/19/2024 1943    ALT 21 08/19/2024 1943    BILITOT 0.4 08/19/2024 1943    EGFRNORACEVR 36 (A) 08/31/2024 1339    ESTGFRAFRICA 34 (A) 06/21/2022 0859         Lab Results   Component Value Date    LABMICR 20.0 08/23/2024    CREATRANDUR 31.0 08/23/2024    MICALBCREAT 64.5 (H) 08/23/2024                ASSESSMENT and PLAN:    A1C GOAL:     1. Diabetes mellitus with nephropathy  Glucoses overall at goal.   There is concern for hypoglycemia with sulfonylurea and CKD stage 3b. Unfortunately, pt cannot afford Tradjenta d/t rx gap and he does not want to apply for financial assistance.   Continue current therapy. Reviewed how to rotate insulin injection sites.   Make sure to eat with gipizide and continue testing BGs regularly. After much discussion, he declines personal CGM.     Keep appt with Diabetes Education today.     Rtc prn.     Ambulatory referral/consult to Endocrinology    POCT Glucose, Hand-Held Device      2. Essential hypertension  Controlled.           Orders Placed This Encounter   Procedures    POCT Glucose, Hand-Held Device        Follow up if symptoms worsen or fail to improve.     Thank you very much for allowing me to participate in Peter Lopez's care.

## 2024-09-23 NOTE — PROGRESS NOTES
"Diabetes Care Specialist Progress Note  Author: Alexus Sorto RN  Date: 9/23/2024              Intake  Program Intake  Reason for Diabetes Program Visit:: Intervention  Type of Intervention:: Individual  Individual: Education  Education: Self-Management Skill Review, Nutrition and Meal Planning  Current diabetes risk level:: high  Was the ER or hospital admission related to diabetes?: Yes  Permission to speak with others about care:: yes    Current Diabetes Treatment: Oral Medications, Insulin  Oral Medication Type/Dose: Glipizide 5mg daily w/breakfast  Method of insulin delivery?: Injections  Injection Type: Pens  Pen Type/Dose: Lantus 10U daily    Continuous Glucose Monitoring  Patient has CGM: No    Lab Results   Component Value Date    HGBA1C >14.0 (H) 08/19/2024       Weight: 77.1 kg (170 lb)   Height: 5' 10" (177.8 cm)   Body mass index is 24.39 kg/m².    Lifestyle Coping Support & Clinical  Lifestyle/Coping/Support  Psychosocial/Coping Skills Assessment Completed: : No  Assessment indicates:: Instruction Needed  Area of need?: No    Diabetes Self-Management Skills Assessment  Medication Skills Assessment  Patient is able to identify current diabetes medications, dosages, and appropriate timing of medications.: yes  Patient reports problems or concerns with current medication regimen.: no  Patient is  aware that some diabetes medications can cause low blood sugar?: No  Assessment indicates:: Instruction Needed, Knowledge deficit  Area of need?: Yes    Diabetes Disease Process/Treatment Options  When were you diagnosed?: >2 yrsg  If previous diabetes education, when/where:: 9/9/24 @ [facility]  What are your goals for this education session?: Learn how to better manage eating right with diabetes  Is patient aware of what causes diabetes?: Yes  Does patient understand the pathophysiology of diabetes?: No  Diabetes Disease Process/Treatment Options: Skills Assessment Completed: Yes  Assessment indicates:: " Instruction Needed  Area of need?: Yes    Nutrition/Healthy Eating  Meal Plan 24 Hour Recall - Breakfast: oatmeal or grits and eggs  Meal Plan 24 Hour Recall - Lunch: baked chicken, broccoli, and mac/cheese  Meal Plan 24 Hour Recall - Dinner: sandwich  Meal Plan 24 Hour Recall - Snack: vero crackers or crackers  Meal Plan 24 Hour Recall - Beverage: water  Who shops/cooks?: sister  Patient can identify foods that impact blood sugar.: yes  Challenges to healthy eating:: portion control  Nutrition/Healthy Eating Skills Assessment Completed:: Yes  Assessment indicates:: Instruction Needed, Knowledge deficit    Physical Activity/Exercise  Physical Activity/Exercise Skills Assessment Completed: : No  Deffered due to:: Time    Home Blood Glucose Monitoring  Patient states that blood sugar is checked at home daily.: yes  Monitoring Method:: home glucometer  Home glucometer meter type:: Insurance Preferred Meter  Fasting BG range history:: see attachment  What are your blood glucose targets?:   How often do you check your blood sugar?: TID  What is your A1c Target?: 7.0%  Home Blood Glucose Monitoring Skills Assessment Completed: : Yes  Assessment indicates:: Instruction Needed  Area of need?: Yes    Acute Complications  Have you ever had hypoglycemia (low BG 70 or less)?: no  Do you know the symptoms of low blood sugar and how to treat?: yes  Have you ever had hyperglycemia (high  or more)?: no   Do you know the symptoms of high blood sugar and how to treat: no  Have you ever had DKA?: no  Do you ever test for ketones?: no  Do you have a sick day plan?: no  Acute Complications Skills Assessment Completed: : Yes  Assessment indicates:: Instruction Needed, Knowledge deficit  Area of need?: Yes    Chronic Complications  Chronic Complications Skills Assessment Completed: : No  Deferred due to:: Time      Assessment Summary and Plan    Based on today's diabetes care assessment, the following areas of need were  identified:             9/23/2024    12:02 AM   Areas of Need   Medications/Current Diabetes Treatment Yes- Discussed the MOA and side-effects of Glipizide and how lantus works.   Diabetes Disease Process/Treatment Options Yes- Provided DM management guide and discussed what is diabetes and the progression of the disease. Discussed significance of current A1c and blood glucose goals.   Nutrition/Healthy Eating Yes- see care planning   Home Blood Glucose Monitoring Yes- Discussed BS goals and importance of monitoring and recording BS for review and maintenance of medication. Patient encouraged to document glucose results and bring them to every clinic visit.   Acute Complications Yes- Discussed blood sugar values, prevention, detection, signs and symptoms, and treatment of hypoglycemia following rule of 15.      Today's interventions were provided through individual discussion, instruction, and written materials were provided.      Patient verbalized understanding of instruction and written materials.  Pt was able to return back demonstration of instructions today. Patient understood key points, needs reinforcement and further instruction.     Diabetes Self-Management Care Plan:    Today's Diabetes Self-Management Care Plan was developed with Peter's input. Peter has agreed to work toward the following goal(s) to improve his/her overall diabetes control.      Care Plan: Diabetes Management   Updates made since 8/24/2024 12:00 AM        Problem: Healthy Eating         Goal: Eat 2-3 meals daily with 30-45g/2-3 servings of Carbohydrate per meal. Limit snacking in between meal to 1 serving (15 grams).    Start Date: 9/9/2024   Expected End Date: 9/23/2024   This Visit's Progress: No change   Priority: High   Barriers: No Barriers Identified   Note:    9/5/24 - - We reviewed eating right with diabetes. We discussed the importance of eating balanced meals with vegetables, fruits, lean meats, and whole grains. We concentrated  on portion sizes and overall meal planning with various choices for meals. We discussed carb sources of foods, appropriate amount of carbs to have at meals/snacks and acceptable serving sizes of individual carb items. Obtained a 24-hr food recall from patient. We discussed various meal plan options to promote healthy eating.      - - Started reviewing how to read food labels with patient focusing on serving size and total carbohydrate intake (not sugar intake). Instructed on appropriate serving sizes of specific carb containing foods. Stressed importance of eating a protein at each meal and include non-starchy vegetables at lunch/dinner. Instructed pt to aim for 2-3 evenly spaced meals or a small carb snack in place of a missed meal. Written education information provided to patient for use at home. Pt verbalized understanding of all the above.    Care Plan Update 9/23/24 - - Pt says she eats three meals a day. We reviewed eating right with diabetes. We also discussed spacing out carbohydrates throughout the day, portion sizes, and pairing carbs with a source of protein. We reviewed the importance of eating balanced meals with vegetables, fruits, lean meats, and whole grains. Carb sources of foods was reviewed and appropriate amount of carbs to have at meals/snacks and acceptable serving sizes of individual carb items. Pt verbalized understanding of all the above.      Task: Reviewed the sources and role of Carbohydrate, Protein, and Fat and how each nutrient impacts blood sugar. Completed 9/9/2024        Task: Provided visual examples using dry measuring cups, food models, and other familiar objects such as computer mouse, deck or cards, tennis ball etc. to help with visualization of portions. Completed 9/9/2024     Task: Recommended replacing beverages containing high sugar content with noncaloric/sugar free options and/or water. Completed 9/9/2024        Task: Review the importance of balancing carbohydrates with  each meal using portion control techniques to count servings of carbohydrate and label reading to identify serving size and amount of total carbs per serving. Completed 9/9/2024        Task: Provided Sample plate method and reviewed the use of the plate to estimate amounts of carbohydrate per meal. Completed 9/9/2024        Follow Up Plan   No follow-ups on file.    Today's care plan and follow up schedule was discussed with patient.  Peter verbalized understanding of the care plan, goals, and agrees to follow up plan.        The patient was encouraged to communicate with his/her health care provider/physician and care team regarding his/her condition(s) and treatment.  I provided the patient with my contact information today and encouraged to contact me via phone or [facility] as needed.     Length of Visit   Total Time: 60 Minutes

## 2024-09-24 ENCOUNTER — LAB VISIT (OUTPATIENT)
Dept: LAB | Facility: HOSPITAL | Age: 89
End: 2024-09-24
Payer: MEDICARE

## 2024-09-24 DIAGNOSIS — C61 PROSTATE CANCER: ICD-10-CM

## 2024-09-24 DIAGNOSIS — N18.32 STAGE 3B CHRONIC KIDNEY DISEASE: ICD-10-CM

## 2024-09-24 LAB
25(OH)D3+25(OH)D2 SERPL-MCNC: 46 NG/ML (ref 30–96)
ALBUMIN SERPL BCP-MCNC: 4.1 G/DL (ref 3.5–5.2)
ANION GAP SERPL CALC-SCNC: 13 MMOL/L (ref 8–16)
BASOPHILS # BLD AUTO: 0.02 K/UL (ref 0–0.2)
BASOPHILS NFR BLD: 0.7 % (ref 0–1.9)
BUN SERPL-MCNC: 27 MG/DL (ref 8–23)
CALCIUM SERPL-MCNC: 10.2 MG/DL (ref 8.7–10.5)
CHLORIDE SERPL-SCNC: 104 MMOL/L (ref 95–110)
CO2 SERPL-SCNC: 22 MMOL/L (ref 23–29)
COMPLEXED PSA SERPL-MCNC: 0.04 NG/ML (ref 0–4)
CREAT SERPL-MCNC: 2 MG/DL (ref 0.5–1.4)
DIFFERENTIAL METHOD BLD: ABNORMAL
EOSINOPHIL # BLD AUTO: 0.1 K/UL (ref 0–0.5)
EOSINOPHIL NFR BLD: 1.8 % (ref 0–8)
ERYTHROCYTE [DISTWIDTH] IN BLOOD BY AUTOMATED COUNT: 13.9 % (ref 11.5–14.5)
EST. GFR  (NO RACE VARIABLE): 31.9 ML/MIN/1.73 M^2
GLUCOSE SERPL-MCNC: 182 MG/DL (ref 70–110)
HCT VFR BLD AUTO: 41.5 % (ref 40–54)
HGB BLD-MCNC: 13 G/DL (ref 14–18)
IMM GRANULOCYTES # BLD AUTO: 0 K/UL (ref 0–0.04)
IMM GRANULOCYTES NFR BLD AUTO: 0 % (ref 0–0.5)
LYMPHOCYTES # BLD AUTO: 0.9 K/UL (ref 1–4.8)
LYMPHOCYTES NFR BLD: 32.7 % (ref 18–48)
MCH RBC QN AUTO: 28.3 PG (ref 27–31)
MCHC RBC AUTO-ENTMCNC: 31.3 G/DL (ref 32–36)
MCV RBC AUTO: 90 FL (ref 82–98)
MONOCYTES # BLD AUTO: 0.3 K/UL (ref 0.3–1)
MONOCYTES NFR BLD: 10.5 % (ref 4–15)
NEUTROPHILS # BLD AUTO: 1.5 K/UL (ref 1.8–7.7)
NEUTROPHILS NFR BLD: 54.3 % (ref 38–73)
NRBC BLD-RTO: 0 /100 WBC
PHOSPHATE SERPL-MCNC: 3.3 MG/DL (ref 2.7–4.5)
PLATELET # BLD AUTO: 298 K/UL (ref 150–450)
PMV BLD AUTO: 10.4 FL (ref 9.2–12.9)
POTASSIUM SERPL-SCNC: 4.1 MMOL/L (ref 3.5–5.1)
PTH-INTACT SERPL-MCNC: 65.2 PG/ML (ref 9–77)
RBC # BLD AUTO: 4.59 M/UL (ref 4.6–6.2)
SODIUM SERPL-SCNC: 139 MMOL/L (ref 136–145)
WBC # BLD AUTO: 2.75 K/UL (ref 3.9–12.7)

## 2024-09-24 PROCEDURE — 84153 ASSAY OF PSA TOTAL: CPT | Performed by: STUDENT IN AN ORGANIZED HEALTH CARE EDUCATION/TRAINING PROGRAM

## 2024-09-24 PROCEDURE — 36415 COLL VENOUS BLD VENIPUNCTURE: CPT | Mod: PO | Performed by: STUDENT IN AN ORGANIZED HEALTH CARE EDUCATION/TRAINING PROGRAM

## 2024-09-24 PROCEDURE — 80069 RENAL FUNCTION PANEL: CPT | Performed by: NURSE PRACTITIONER

## 2024-09-24 PROCEDURE — 82306 VITAMIN D 25 HYDROXY: CPT | Performed by: NURSE PRACTITIONER

## 2024-09-24 PROCEDURE — 83970 ASSAY OF PARATHORMONE: CPT | Performed by: NURSE PRACTITIONER

## 2024-09-24 PROCEDURE — 85025 COMPLETE CBC W/AUTO DIFF WBC: CPT | Performed by: NURSE PRACTITIONER

## 2024-09-27 ENCOUNTER — TELEPHONE (OUTPATIENT)
Dept: UROLOGY | Facility: CLINIC | Age: 89
End: 2024-09-27
Payer: MEDICARE

## 2024-09-27 NOTE — TELEPHONE ENCOUNTER
----- Message from Jesus Cole MD sent at 9/26/2024  5:27 PM CDT -----  Fu in 6 months, pls arrange

## 2024-09-29 NOTE — PROGRESS NOTES
"  Patient Name: Peter Lopez    : 1935  MRN: [id number]      Subjective:     Patient ID: Peter is a 88 y.o. male    Chief Complaint:  Follow-up    History of Present Illness  The patient is an 88-year-old male who presents for evaluation of multiple medical concerns. He is accompanied by his sister.    He reports that his blood sugar levels have been in the 100s, with a reading of 97 yesterday morning. His current medication regimen includes a daily injection of 10 units of insulin at 6 PM and glipizide taken once a day with breakfast. He reports no side effects from these medications and no episodes of hypoglycemia, such as shaking or sweating. He monitors his blood sugar levels three times a day. He has an upcoming appointment with a dietitian on 2024.    He believes he has a sufficient supply of his blood pressure and cholesterol medications. He takes his cholesterol medication in the evening and his heart medication in the morning.    He suspects he has an ear infection as he experiences pain when touching his ears. He also reports swelling in his eyes and ears. The pain intensifies when he chews food on the left side. To alleviate the discomfort, he places cotton in his ear at night.    He takes Spiriva 3 in the morning and 2 in the evening.      Review of Systems   Constitutional:  Negative for unexpected weight change.   HENT:  Positive for ear pain. Negative for sore throat.    Respiratory:  Negative for wheezing.    Cardiovascular:  Negative for chest pain and palpitations.   Gastrointestinal:  Negative for abdominal pain, blood in stool and change in bowel habit.   Genitourinary:  Negative for dysuria and hematuria.   Integumentary:  Negative for pallor.   Neurological:  Negative for dizziness, seizures, numbness and headaches.        Objective:   /82 (BP Location: Left arm, Patient Position: Sitting, BP Method: Medium (Manual))   Pulse 91   Resp 18   Ht 5' 10" (1.778 m)   Wt 78 kg (171 lb " 15.3 oz)   SpO2 98%   BMI 24.67 kg/m²     Physical Exam  Inflammation noted in the left ear.  Physical Exam  Vitals reviewed.   Constitutional:       General: He is not in acute distress.     Appearance: He is well-developed. He is not ill-appearing or diaphoretic.   HENT:      Head: Normocephalic.      Right Ear: External ear normal.      Left Ear: External ear normal. A middle ear effusion is present.      Nose: Nose normal.      Mouth/Throat:      Pharynx: No pharyngeal swelling, oropharyngeal exudate or posterior oropharyngeal erythema.   Neck:      Trachea: No tracheal deviation.   Cardiovascular:      Rate and Rhythm: Normal rate and regular rhythm.   Pulmonary:      Effort: Pulmonary effort is normal.      Breath sounds: Normal breath sounds. No wheezing or rales.   Abdominal:      General: Bowel sounds are normal.      Palpations: Abdomen is soft. Abdomen is not rigid. There is no mass.      Tenderness: There is no abdominal tenderness. There is no guarding or rebound.   Musculoskeletal:      Cervical back: Normal range of motion and neck supple.   Lymphadenopathy:      Cervical: No cervical adenopathy.   Neurological:      Mental Status: He is alert and oriented to person, place, and time.      Sensory: No sensory deficit.      Motor: No atrophy.      Gait: Gait normal.      Deep Tendon Reflexes:      Reflex Scores:       Patellar reflexes are 2+ on the right side and 2+ on the left side.           Assessment        ICD-10-CM ICD-9-CM   1. Diabetes mellitus with nephropathy  E11.21 250.40     583.81   2. Essential hypertension  I10 401.9   3. Otalgia of left ear  H92.02 388.70         Plan:     Assessment & Plan  1. Diabetes Mellitus.  His recent blood glucose readings have been within the target range, with morning readings around 97 and afternoon readings in the 100s. The current insulin regimen of 10 units once a day and glipizide taken once daily with breakfast will be maintained. Refills for Lantus  and test strips have been provided. He is advised to continue monitoring his blood sugar levels and report any significant changes. If there are issues with obtaining supplies due to insurance, adjustments will be made to the prescription instructions to ensure adequate supply.    2. Hypertension.  He is advised to persist with his current antihypertensive medications. He should continue monitoring his blood pressure and report any significant changes or concerns.    3. Left Ear Pain.  A prescription for antibiotic ear drops has been issued. He is instructed to administer 5 drops twice daily for a duration of 7 to 10 days. The drops will be sent to his [facility] pharmacy.         ORDERS  1. Diabetes mellitus with nephropathy  Overview:  Lab Results   Component Value Date    HGBA1C >14.0 (H) 08/19/2024    HGBA1C 13.3 (H) 05/20/2024    HGBA1C 8.6 (H) 01/30/2024     Lab Results   Component Value Date    LDLCALC 54.2 (L) 08/19/2024     Lab Results   Component Value Date    MICALBCREAT 28.8 09/24/2024        Orders:  -     blood sugar diagnostic Strp; To check BG 3 times daily, to use with insurance preferred meter  Dispense: 100 each; Refill: 11  -     lancets Misc; To check BG 3 times daily, to use with insurance preferred meter  Dispense: 100 each; Refill: 11  -     glipiZIDE (GLUCOTROL) 5 MG tablet; Take 1 tablet (5 mg total) by mouth daily with breakfast.  Dispense: 90 tablet; Refill: 2    2. Essential hypertension    3. Otalgia of left ear  -     ofloxacin (FLOXIN) 0.3 % otic solution; Place 5 drops into the left ear 2 (two) times daily.  Dispense: 5 mL; Refill: 0             -Lucas Lloyd Jr., MD, AAHIVS      This note was generated with the assistance of ambient listening technology. Verbal consent was obtained by the patient and accompanying visitor(s) for the recording of patient appointment to facilitate this note. I attest to having reviewed and edited the generated note for accuracy, though some syntax or  spelling errors may persist. Please contact the author of this note for any clarification.          There are no Patient Instructions on file for this visit.    Follow Up  No follow-ups on file.    Future Appointments   Date Time Provider Department Center   10/1/2024  8:00 AM Shereen Baldwin NP [facility] NEPHRO [place] [facility]   12/12/2024  8:00 AM LAB, [facility] DRAW STATION [facility] LAB [facility]   12/20/2024  2:20 PM Lucas Lloyd Jr., MD [facility] -    2/17/2025 10:30 AM Jesus Cole MD [facility]

## 2024-10-01 ENCOUNTER — OFFICE VISIT (OUTPATIENT)
Dept: NEPHROLOGY | Facility: CLINIC | Age: 89
End: 2024-10-01
Payer: MEDICARE

## 2024-10-01 VITALS
DIASTOLIC BLOOD PRESSURE: 72 MMHG | OXYGEN SATURATION: 99 % | BODY MASS INDEX: 24.18 KG/M2 | HEART RATE: 85 BPM | SYSTOLIC BLOOD PRESSURE: 132 MMHG | HEIGHT: 70 IN | WEIGHT: 168.88 LBS

## 2024-10-01 DIAGNOSIS — E11.22 TYPE 2 DIABETES MELLITUS WITH STAGE 3B CHRONIC KIDNEY DISEASE, UNSPECIFIED WHETHER LONG TERM INSULIN USE: ICD-10-CM

## 2024-10-01 DIAGNOSIS — I10 HYPERTENSION, UNSPECIFIED TYPE: ICD-10-CM

## 2024-10-01 DIAGNOSIS — N18.32 TYPE 2 DIABETES MELLITUS WITH STAGE 3B CHRONIC KIDNEY DISEASE, UNSPECIFIED WHETHER LONG TERM INSULIN USE: ICD-10-CM

## 2024-10-01 DIAGNOSIS — D47.2 MONOCLONAL GAMMOPATHY: ICD-10-CM

## 2024-10-01 DIAGNOSIS — D64.9 ANEMIA, UNSPECIFIED TYPE: ICD-10-CM

## 2024-10-01 DIAGNOSIS — N18.32 STAGE 3B CHRONIC KIDNEY DISEASE: Primary | ICD-10-CM

## 2024-10-01 PROCEDURE — 99214 OFFICE O/P EST MOD 30 MIN: CPT | Mod: S$GLB,,, | Performed by: NURSE PRACTITIONER

## 2024-10-01 PROCEDURE — 99999 PR PBB SHADOW E&M-EST. PATIENT-LVL IV: CPT | Mod: PBBFAC,,, | Performed by: NURSE PRACTITIONER

## 2024-10-01 PROCEDURE — 1159F MED LIST DOCD IN RCRD: CPT | Mod: CPTII,S$GLB,, | Performed by: NURSE PRACTITIONER

## 2024-10-01 PROCEDURE — G2211 COMPLEX E/M VISIT ADD ON: HCPCS | Mod: S$GLB,,, | Performed by: NURSE PRACTITIONER

## 2024-10-01 PROCEDURE — 1160F RVW MEDS BY RX/DR IN RCRD: CPT | Mod: CPTII,S$GLB,, | Performed by: NURSE PRACTITIONER

## 2024-10-01 NOTE — PROGRESS NOTES
"Subjective:       Patient ID: Peter Lopez is a 88 y.o. AA male who presents for new evaluation of of renal dysfunction.      HPI     Patient is new to me. New to clinic.  Prior pertinent chart reviewed since this is patient's first appointment with me.    Patient presents for new evaluation of CKD.  Baseline creatinine of 1.8- 2.0 mg/dL.    Had CT with contrast on 12/13/23.    Significant other medical problems include ILD, centrilobular emphysema, chronic respiratory failure, HTN, aortic atherosclerosis, longstanding HTN, monoclonal gammopathy, active prostate cancer, DM, sleep apnea, longstanding DM  Social history: . Daughter lives in [place]. Works part time still (as ?)    The patient denies taking NSAIDs, herbal supplements, or new antibiotics, recreational drugs, recent episode of dehydration, diarrhea, nausea or vomiting, acute illness. Takes Centrum 50+.    Significant family hx includes: cousin was on dialysis.    Last renal US: 2021, reviewed.    Update 10/1/24:  Presents for f/u of CKD.  Last seen in Feb.  Baseline sCr 1.8-2.0 but has has some lability up to 2.4.   Home BPs: does not take  Has had multiple ER visits for hyperglycemia.    Review of Systems   HENT:  Positive for hearing loss (wears hearing aids).    Respiratory:  Negative for shortness of breath.    Cardiovascular:  Negative for leg swelling.   Gastrointestinal:  Positive for constipation. Negative for diarrhea, nausea and vomiting.   Genitourinary:  Negative for difficulty urinating, dysuria and hematuria.       Objective:       Blood pressure 132/72, pulse 85, height 5' 10" (1.778 m), weight 76.6 kg (168 lb 14 oz), SpO2 99%.  Physical Exam  Vitals reviewed.   Constitutional:       General: He is not in acute distress.     Appearance: Normal appearance. He is well-developed.   Eyes:      Conjunctiva/sclera: Conjunctivae normal.   Pulmonary:      Effort: Pulmonary effort is normal. No respiratory distress.   Abdominal:      " Tenderness: There is no right CVA tenderness or left CVA tenderness.   Musculoskeletal:      Cervical back: Neck supple.      Right lower leg: No edema.      Left lower leg: No edema.   Skin:     General: Skin is warm and dry.      Findings: No lesion or rash.   Neurological:      Mental Status: He is alert and oriented to person, place, and time.   Psychiatric:         Mood and Affect: Mood normal.         Behavior: Behavior normal.         Thought Content: Thought content normal.         Judgment: Judgment normal.           Lab Results   Component Value Date    CREATININE 2.0 (H) 09/24/2024     Prot/Creat Ratio, Urine   Date Value Ref Range Status   09/24/2024 0.12 0.00 - 0.20 Final   08/23/2024 Unable to calculate 0.00 - 0.20 Final   08/19/2024 Unable to calculate 0.00 - 0.20 Final     Lab Results   Component Value Date     09/24/2024    K 4.1 09/24/2024    CO2 22 (L) 09/24/2024     09/24/2024     Lab Results   Component Value Date    PTH 65.2 09/24/2024    CALCIUM 10.2 09/24/2024    CAION 1.38 06/16/2020    PHOS 3.3 09/24/2024     Lab Results   Component Value Date    HGB 13.0 (L) 09/24/2024    WBC 2.75 (L) 09/24/2024    HCT 41.5 09/24/2024      Lab Results   Component Value Date    HGBA1C >14.0 (H) 08/19/2024     09/24/2024    BUN 27 (H) 09/24/2024     Lab Results   Component Value Date    LDLCALC 54.2 (L) 08/19/2024         Assessment:       1. Stage 3b chronic kidney disease    2. Type 2 diabetes mellitus with stage 3b chronic kidney disease, unspecified whether long term insulin use    3. Anemia, unspecified type    4. Hypertension, unspecified type    5. Monoclonal gammopathy          Plan:   CKD stage 3B  - had underlying CKD, clinically due to T2DM and HTN. Then had ARACELI in 2021 that appears to have occurred around time of treatment for prostate cancer. ARACELI did not resolve to previous baseline, but sCr has been relatively stable since that time    Educated patient to control BP, BG,  remain well-hydrated, and avoid NSAIDs to prevent progression of CKD.    Previously: Declined offer for Intro to CKD due to scheduling issues.      UPCR No significant proteinuria. On ARB.   Acid-base Bicarb slightly low.   Secondary hyperparathyroidism Ca, phos, vit D, PTH okay   Anemia Hgb at goal for CKD. Followed by hem/onc for MGUS.   DM Longstanding; recently poorly-controlled. Documented retinopathy.    Lipid Management On statin   ESRD planning Kidney Failure Risk Equation (Tangri)    Kidney Failure Risk at 2 years: 1.4%    Kidney Failure Risk at 5 years: 4.3%    Lab Results   Component Value Date    MICALBCREAT 28.8 09/24/2024    CREATININE 2.0 (H) 09/24/2024          HTN - WNL on second check on amlodipine 5 mg, olmesartan 40 mg    All questions patient had were answered.  Asked if further questions. None. F/u in clinic in 6 mos  with labs and urine prior to next visit or sooner if needed.  ER for emergency concerns.    Summary of Plan:  Continue to work on glucose control  avoid NSAID/ bactrim/ IV contrast/ gadolinium/ aminoglycoside where possible  RTC in 6 mos on [place] per pt request (ideally Mon/Tues due to his work schedule) - message sent    Visit today included increased complexity associated with the care of  managing the longitudinal care of the patient due to the serious and/or complex managed problem(s) CKD.

## 2024-10-01 NOTE — Clinical Note
Good morning, This pt is interested in switching care over to the [facility]. Can you please assist with scheduling him/ putting him on callback for a f/u in 6 mos with either Dr. Mendez or Dr. Iniguez? He prefers Monday or Tuesday if possible due to his work schedule.  Thanks, Shereen

## 2024-10-01 NOTE — PATIENT INSTRUCTIONS
You have chronic kidney disease. Your kidney function is stable, which is the best we can ask for. Your kidneys work between 26-36%.

## 2024-10-18 NOTE — PROGRESS NOTES
[facility] Hospital Medicine  Progress Note    Patient Name: Peter Lopez  MRN: [id number]  Patient Class: IP- Inpatient   Admission Date: 4/3/2020  Length of Stay: 3 days  Attending Physician: Lisset Hernandez MD  Primary Care Provider: Lucas Lloyd Jr, MD        Subjective:     Principal Problem:Acute hypoxemic respiratory failure        HPI:  Pt is a 85 yo M with a h/o NIDDM2, HTN, chronic HCV, and recent COVID-19 who presents to the ER for worsening weakness, persistent fevers, and dyspnea.  Pt was brought by EMS who found him with an oxygen saturation of 78%.  Pt was placed on non-rebreather and sat's did come up to 98%.  He was on nasal canula prior to arrival but the tubing was long per reports and he was noted to be tachypneic and tachycardic on it.  Pt could speak only a few sentences due to the dyspnea.  Of note patient was on antibiotics as outpatient and was compliant.  He does have a productive cough, decreased po intake, and subjective fevers.      Overview/Hospital Course:  Admitted with hypoxic respiratory failure due to COVID. Started on NRB. Remains stable. Weaned to ventimask on 4/4.     Interval History:   Still on VM but saturating well. He admits he still feels weak. Was cold last night and could not sleep. Not sob while on VM though. Eating a little bit today though not a large appetite.    Review of Systems   Constitutional: Positive for activity change, appetite change and fatigue.   Respiratory: Negative for shortness of breath.    Gastrointestinal: Negative for abdominal pain and nausea.     Objective:     Vital Signs (Most Recent):  Temp: 98.3 °F (36.8 °C) (04/06/20 1100)  Pulse: 102 (04/06/20 1100)  Resp: (!) 21 (04/06/20 1100)  BP: (!) 141/79 (04/06/20 1100)  SpO2: (!) 92 % (04/06/20 1100) Vital Signs (24h Range):  Temp:  [97.6 °F (36.4 °C)-99.5 °F (37.5 °C)] 98.3 °F (36.8 °C)  Pulse:  [102-120] 102  Resp:  [14-33] 21  SpO2:  [88 %-96 %] 92 %  BP: (122-157)/(61-85) 141/79      Weight: 74 kg (163 lb 2.2 oz)  Body mass index is 23.41 kg/m².    Intake/Output Summary (Last 24 hours) at 4/6/2020 1240  Last data filed at 4/5/2020 2125  Gross per 24 hour   Intake --   Output 200 ml   Net -200 ml      Physical Exam   Constitutional: He is oriented to person, place, and time. He appears well-developed and well-nourished. No distress.   Pulmonary/Chest: Effort normal.   Abdominal: Soft. Bowel sounds are normal.   Neurological: He is alert and oriented to person, place, and time.   Psychiatric:   A little frustrated with temp of room today but overall very cooperative and conversant. Normally uses glasses and hearing aids but he was actually quite communicative and understanding despite not having these this admission.       Significant Labs:   CMP:   Recent Labs   Lab 04/05/20  0530 04/06/20  0515   * 136   K 3.6 3.6   CL 95 95   CO2 27 28   * 112*   BUN 12 12   CREATININE 0.9 0.9   CALCIUM 8.4* 8.5*   PROT 6.4 6.4   ALBUMIN 2.3* 2.2*   BILITOT 0.5 0.5   ALKPHOS 49* 53*   AST 44* 39   ALT 22 21   ANIONGAP 12 13   EGFRNONAA >60 >60       Assessment/Plan:      * Acute hypoxemic respiratory failure  Due to COVID19 infection .  Initially on NRB, weaned to ventimask 50% on 4/5- remaining stable.  Wean O2 as tolerated.    COVID-19  Weaned from NRB to VM previously. Has been stable overnight on VM. Wean as tolerated.  Continuing course of Azithro and HCQ    Elevated troponin  Likely due to demand from COVID infection. No signs of ischemia      Anemia  Stable. No signs of bleeding.    Diabetes mellitus with nephropathy  Holding home metformin.   Meds: SSI PRN to maintain goal 140-180-- not requiring.  ADA diet, accuchecks ACHS, hypoglycemic protocol.        HTN (hypertension)  Continue to monitor add home meds if needed.        VTE Risk Mitigation (From admission, onward)         Ordered     heparin (porcine) injection 5,000 Units  Every 8 hours      04/03/20 2114     IP VTE HIGH RISK  PATIENT  Once      04/03/20 2051     Place sequential compression device  Until discontinued      04/03/20 2051     Place DARIEL hose  Until discontinued      04/03/20 2051                      Bret Sims MD  Department of Hospital Medicine   [facility]     [employer]

## 2024-10-29 ENCOUNTER — TELEPHONE (OUTPATIENT)
Dept: FAMILY MEDICINE | Facility: CLINIC | Age: 89
End: 2024-10-29
Payer: MEDICARE

## 2024-11-04 DIAGNOSIS — R13.10 DYSPHAGIA, UNSPECIFIED TYPE: ICD-10-CM

## 2024-11-04 RX ORDER — PANTOPRAZOLE SODIUM 40 MG/1
40 TABLET, DELAYED RELEASE ORAL
Qty: 30 TABLET | Refills: 0 | Status: SHIPPED | OUTPATIENT
Start: 2024-11-04

## 2024-11-07 DIAGNOSIS — R13.10 DYSPHAGIA, UNSPECIFIED TYPE: ICD-10-CM

## 2024-11-08 RX ORDER — PANTOPRAZOLE SODIUM 40 MG/1
40 TABLET, DELAYED RELEASE ORAL
Qty: 30 TABLET | Refills: 0 | OUTPATIENT
Start: 2024-11-08

## 2024-11-12 ENCOUNTER — TELEPHONE (OUTPATIENT)
Dept: ENDOSCOPY | Facility: HOSPITAL | Age: 89
End: 2024-11-12
Payer: MEDICARE

## 2024-11-12 VITALS — BODY MASS INDEX: 24.05 KG/M2 | WEIGHT: 168 LBS | HEIGHT: 70 IN

## 2024-11-12 DIAGNOSIS — R10.9 ABDOMINAL PAIN, UNSPECIFIED ABDOMINAL LOCATION: Primary | ICD-10-CM

## 2024-11-12 DIAGNOSIS — K21.9 GASTROESOPHAGEAL REFLUX DISEASE, UNSPECIFIED WHETHER ESOPHAGITIS PRESENT: ICD-10-CM

## 2024-11-12 DIAGNOSIS — R13.10 DYSPHAGIA, UNSPECIFIED TYPE: ICD-10-CM

## 2024-11-12 NOTE — TELEPHONE ENCOUNTER
Referral for procedure from [facility]      Spoke to patient to schedule procedure(s) Upper Endoscopy (EGD)       Physician to perform procedure(s) Dr. MARE Cruz  Date of Procedure (s) 01/07/2025  Arrival Time 7:45 Am  Time of Procedure(s) 8:45 AM    Location of Procedure(s) [address]   Type of Rx Prep sent to patient: N/A  Instructions provided to patient via Postal Mail    Patient was informed on the following information and verbalized understanding. Screening questionnaire reviewed with patient and complete. If procedure requires anesthesia, a responsible adult needs to be present to accompany the patient home, patient cannot drive after receiving anesthesia. Appointment details are tentative, especially check-in time. Patient will receive a prep-op call 7 days prior to confirm check-in time for procedure. If applicable the patient should contact their pharmacy to verify Rx for procedure prep is ready for pick-up. Patient was advised to call the scheduling department at [phone] if pharmacy states no Rx is available. Patient was advised to call the endoscopy scheduling department if any questions or concerns arise.       Endoscopy Scheduling Department

## 2024-11-12 NOTE — TELEPHONE ENCOUNTER
----- Message from Kassi sent at 11/5/2024  9:35 AM CST -----  Regarding: FW: r/s upcoming proc 11/5  Contact: [phone]    ----- Message -----  From: Jessica Esquivel MA  Sent: 11/4/2024   1:15 PM CST  To: [facility]  Subject: r/s upcoming proc 11/5                             ----- Message -----  From: Jossue Carr  Sent: 11/4/2024   1:08 PM CST  To: [facility] Staff  Subject: Appt                                             Patient is calling to reschedule procedure that is pending November 5 he would like it to be for next year due to he will be out of town. Please contact pt

## 2024-11-14 NOTE — TELEPHONE ENCOUNTER
EGD Procedure Prep Instructions        Date of procedure: 01/07/2025 Arrive at: 7:15 AM         Location of Department: [address] [place] [address] [place] [address]  Take the Elevators to 2nd Floor Endoscopy Procedural Area     How to prep:     Day Before Procedure: 01/06/2025      You may have a light evening meal.   No solid food after 7:00 pm.   Continue drinking clear liquids.         Day of the Procedure:  01/07/2025      You may have water/clear liquids until 2 hours before your procedure or as directed by the scheduling nurse  6:45 AM  See below for list.     What You CANNOT do:   Do not drink milk or anything colored red.  Do not drink alcohol.  No gum chewing or candy morning of procedure.     Liquids That Are OK to Drink:   Water  Sports drinks (Gatorade, Power-Aid)  Coffee or tea (no cream or nondairy creamer)  Clear juices without pulp (apple, white grape)  Gelatin desserts (no fruit or toppings)  Clear soda (sprite, coke, ginger ale)  Chicken broth (until 12 midnight the night before procedure)        Comments:                       IMPORTANT INFORMATION TO KNOW BEFORE YOUR PROCEDURE     [facility] 2nd Floor         When you arrive:  If your procedure is Monday - Friday 5am - 7pm - Please enter through the front door near [facility]. Please proceed up the first set of elevators to the 2nd floor where you will check in at the endo registration desk.      If your procedure is on a Saturday (weekend), enter through the back Outpatient entrance. Please note this entrance is diagonal from the Emergency Department entrance.                  If your procedure requires the administration of anesthesia, it is necessary for a responsible adult to drive you home. (Medical Transportation, Uber, Lyft, Taxi, etc. may ONLY be used if a responsible adult is present to accompany you home.  The responsible adult CAN'T be the  of the service).       person must be available to  return to pick you up within 15 minutes of being notified of discharge.         Please bring a picture ID, insurance card, & copayment        Take Medications as directed below:        If you begin taking any blood thinning medications, injectable weight loss/diabetes medications (other than insulin) , or Adipex (Phentermine) please contact the endoscopy scheduling department listed below as soon as possible.     If you are diabetic see the attached instruction sheet regarding your medication.      If you take HEART, BLOOD PRESSURE, SEIZURE, PAIN, LUNG (including inhalers/nebulizers), ANTI-REJECTION (transplant patients), or PSYCHIATRIC medications, please take at your regular times with a sip of water or as directed by the scheduling nurse.      Important contact information:     Endoscopy [phone] Hours of operation Monday-Friday 8:00-4:30pm.     Questions about insurance or financial obligations call [phone] or [phone].     If you have questions regarding the prep or need to reschedule, please call [phone]. After hours questions requiring immediate assistance, contact [facility] nurse line at [phone] or [phone].   NOTE:      On occasion, unforeseen circumstances may cause a delay in your procedure start time. We respect your time and appreciate your patience during these circumstances.            Comments:

## 2024-11-18 DIAGNOSIS — J43.9 COPD WITH CHRONIC BRONCHITIS AND EMPHYSEMA: Chronic | ICD-10-CM

## 2024-11-18 DIAGNOSIS — J44.89 COPD WITH CHRONIC BRONCHITIS AND EMPHYSEMA: Chronic | ICD-10-CM

## 2024-11-18 DIAGNOSIS — E11.40 TYPE 2 DIABETES MELLITUS WITH DIABETIC NEUROPATHY, WITHOUT LONG-TERM CURRENT USE OF INSULIN: Chronic | ICD-10-CM

## 2024-11-18 RX ORDER — TIOTROPIUM BROMIDE INHALATION SPRAY 3.12 UG/1
SPRAY, METERED RESPIRATORY (INHALATION)
Qty: 4 G | Refills: 0 | Status: SHIPPED | OUTPATIENT
Start: 2024-11-18

## 2024-11-18 NOTE — TELEPHONE ENCOUNTER
Care Due:                  Date            Visit Type   Department     Provider  --------------------------------------------------------------------------------                                [facility] FAMILY                              PRIMARY      MEDICINE/  Last Visit: 09-      CARE ([facility])   INTERNAL MED   Lucas Lloyd                              [facility]                              PRIMARY      MEDICINE/  Next Visit: 12-      CARE ([facility])   INTERNAL MED   Lucas Lloyd                                                            Last  Test          Frequency    Reason                     Performed    Due Date  --------------------------------------------------------------------------------    HBA1C.......  6 months...  glipiZIDE, insulin.......  08- 02-    [facility] Embedded Care Due Messages. Reference number: [id number].   11/18/2024 11:55:51 AM CST

## 2024-11-19 RX ORDER — INSULIN GLARGINE 100 [IU]/ML
INJECTION, SOLUTION SUBCUTANEOUS
Qty: 15 ML | Refills: 0 | Status: SHIPPED | OUTPATIENT
Start: 2024-11-19

## 2024-11-19 NOTE — TELEPHONE ENCOUNTER
Refill Routing Note   Medication(s) are not appropriate for processing by [facility] for the following reason(s):        Required labs abnormal  New or recently adjusted medication    [facility] action(s):  Defer        Medication Therapy Plan: FLOS      Appointments  past 12m or future 3m with PCP    Date Provider   Last Visit   9/9/2024 Lucas Lloyd Jr., MD   Next Visit   12/20/2024 Lucas Lloyd Jr., MD   ED visits in past 90 days: 2        Note composed:6:34 PM 11/18/2024

## 2024-11-22 DIAGNOSIS — R13.10 DYSPHAGIA, UNSPECIFIED TYPE: ICD-10-CM

## 2024-11-28 RX ORDER — PANTOPRAZOLE SODIUM 40 MG/1
40 TABLET, DELAYED RELEASE ORAL
Qty: 30 TABLET | Refills: 0 | Status: SHIPPED | OUTPATIENT
Start: 2024-11-28

## 2025-01-06 ENCOUNTER — ANESTHESIA EVENT (OUTPATIENT)
Dept: ENDOSCOPY | Facility: HOSPITAL | Age: 89
End: 2025-01-06
Payer: MEDICARE

## 2025-01-06 NOTE — H&P
Short Stay Endoscopy History and Physical    PCP - Lucas Lloyd Jr., MD     Procedure - EGD  ASA - per anesthesia  Mallampati - per anesthesia  History of Anesthesia problems - no  Family history Anesthesia problems -  no   Plan of anesthesia - General    HPI:  This is a 89 y.o. male here for evaluation of : Dysphagia, GERD          ROS:  Constitutional: No fevers, chills, No weight loss  CV: No chest pain  Pulm: No cough, No shortness of breath  Ophtho: No vision changes  GI: see HPI  Derm: No rash    Medical History:  has a past medical history of BPH (benign prostatic hyperplasia), Chronic hepatitis C without mention of hepatic coma, CKD (chronic kidney disease), COPD (chronic obstructive pulmonary disease), Diabetes mellitus type I, Diabetes with neurologic complications, H/O colonoscopy, [place] (hard of hearing), Hypertension, On home oxygen therapy, Prostate cancer, 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    Surgical History:  has a past surgical history that includes No past surgeries (05/20/2021) and Transurethral resection of prostate using bipolar cautery (N/A, 6/22/2021).    Family History: family history includes Asbestos in his father; Cancer in his mother.. Otherwise no colon cancer, inflammatory bowel disease, or GI malignancies.    Social History:  reports that he quit smoking about 33 years ago. His smoking use included cigarettes. He started smoking about 73 years ago. He has a 40 pack-year smoking history. He quit smokeless tobacco use about 39 years ago. He reports that he does not currently use alcohol. He reports that he does not use drugs.    Review of patient's allergies indicates:  No Known Allergies    Medications:   No medications prior to admission.       Physical Exam:    Vital Signs: There were no vitals filed for this visit.    Gen: NAD, lying comfortably  HENT: NCAT, oropharynx clear  Eyes: anicteric sclerae, EOMI  grossly  Neck: supple, no visible masses/goiter  Cardiac: RRR  Lungs: non-labored breathing  Abd: soft, NT/ND, normoactive BS  Ext: no LE edema, warm, well perfused  Skin: skin intact on exposed body parts, no visible rashes, lesions  Neuro: A&Ox4, neuro exam grossly intact, moves all extremities  Psych: appropriate mood, affect      Labs:  Lab Results   Component Value Date    WBC 2.75 (L) 09/24/2024    HGB 13.0 (L) 09/24/2024    HCT 41.5 09/24/2024     09/24/2024    CHOL 128 08/19/2024    TRIG 129 08/19/2024    HDL 48 08/19/2024    ALT 21 08/19/2024    AST 26 08/19/2024     09/24/2024    K 4.1 09/24/2024     09/24/2024    CREATININE 2.0 (H) 09/24/2024    BUN 27 (H) 09/24/2024    CO2 22 (L) 09/24/2024    TSH 1.496 01/10/2024    PSA 19.0 (H) 07/10/2017    INR 0.9 03/28/2020    HGBA1C >14.0 (H) 08/19/2024       Plan:  EGD for dysphagia, GERD.    I have explained the risks and benefits of endoscopy procedures to the patient including but not limited to bleeding, perforation, infection, and death.  The patient was asked if they understand and allowed to ask any further questions to their satisfaction.      Angelic Cruz MD

## 2025-01-07 ENCOUNTER — HOSPITAL ENCOUNTER (OUTPATIENT)
Facility: HOSPITAL | Age: 89
Discharge: HOME | End: 2025-01-07
Attending: INTERNAL MEDICINE | Admitting: INTERNAL MEDICINE
Payer: MEDICARE

## 2025-01-07 ENCOUNTER — ANESTHESIA (OUTPATIENT)
Dept: ENDOSCOPY | Facility: HOSPITAL | Age: 89
End: 2025-01-07
Payer: MEDICARE

## 2025-01-07 DIAGNOSIS — R13.10 DYSPHAGIA: ICD-10-CM

## 2025-01-07 PROCEDURE — 94640 AIRWAY INHALATION TREATMENT: CPT

## 2025-01-07 PROCEDURE — 27000221 HC OXYGEN, UP TO 24 HOURS

## 2025-01-07 PROCEDURE — 43235 EGD DIAGNOSTIC BRUSH WASH: CPT | Performed by: INTERNAL MEDICINE

## 2025-01-07 PROCEDURE — 25000003 PHARM REV CODE 250: Performed by: NURSE ANESTHETIST, CERTIFIED REGISTERED

## 2025-01-07 PROCEDURE — 37000009 HC ANESTHESIA EA ADD 15 MINS: Performed by: INTERNAL MEDICINE

## 2025-01-07 PROCEDURE — 63600175 PHARM REV CODE 636 W HCPCS: Performed by: NURSE ANESTHETIST, CERTIFIED REGISTERED

## 2025-01-07 PROCEDURE — 37000008 HC ANESTHESIA 1ST 15 MINUTES: Performed by: INTERNAL MEDICINE

## 2025-01-07 PROCEDURE — 25000242 PHARM REV CODE 250 ALT 637 W/ HCPCS: Performed by: ANESTHESIOLOGY

## 2025-01-07 PROCEDURE — 94761 N-INVAS EAR/PLS OXIMETRY MLT: CPT

## 2025-01-07 PROCEDURE — 43235 EGD DIAGNOSTIC BRUSH WASH: CPT | Mod: ,,, | Performed by: INTERNAL MEDICINE

## 2025-01-07 RX ORDER — LIDOCAINE HYDROCHLORIDE 40 MG/ML
SOLUTION TOPICAL
Status: DISCONTINUED | OUTPATIENT
Start: 2025-01-07 | End: 2025-01-07

## 2025-01-07 RX ORDER — LIDOCAINE HYDROCHLORIDE 20 MG/ML
INJECTION INTRAVENOUS
Status: DISCONTINUED | OUTPATIENT
Start: 2025-01-07 | End: 2025-01-07

## 2025-01-07 RX ORDER — IPRATROPIUM BROMIDE AND ALBUTEROL SULFATE 2.5; .5 MG/3ML; MG/3ML
3 SOLUTION RESPIRATORY (INHALATION) ONCE
Status: COMPLETED | OUTPATIENT
Start: 2025-01-07 | End: 2025-01-07

## 2025-01-07 RX ORDER — PROPOFOL 10 MG/ML
VIAL (ML) INTRAVENOUS
Status: DISCONTINUED | OUTPATIENT
Start: 2025-01-07 | End: 2025-01-07

## 2025-01-07 RX ORDER — LIDOCAINE HYDROCHLORIDE 20 MG/ML
INJECTION, SOLUTION EPIDURAL; INFILTRATION; INTRACAUDAL; PERINEURAL
Status: DISCONTINUED
Start: 2025-01-07 | End: 2025-01-07 | Stop reason: HOSPADM

## 2025-01-07 RX ORDER — PROPOFOL 10 MG/ML
VIAL (ML) INTRAVENOUS
Status: DISCONTINUED
Start: 2025-01-07 | End: 2025-01-07 | Stop reason: HOSPADM

## 2025-01-07 RX ADMIN — SODIUM CHLORIDE: 0.9 INJECTION, SOLUTION INTRAVENOUS at 08:01

## 2025-01-07 RX ADMIN — IPRATROPIUM BROMIDE AND ALBUTEROL SULFATE 3 ML: 2.5; .5 SOLUTION RESPIRATORY (INHALATION) at 08:01

## 2025-01-07 RX ADMIN — PROPOFOL 10 MG: 10 INJECTION, EMULSION INTRAVENOUS at 08:01

## 2025-01-07 RX ADMIN — PROPOFOL 60 MG: 10 INJECTION, EMULSION INTRAVENOUS at 08:01

## 2025-01-07 RX ADMIN — LIDOCAINE HYDROCHLORIDE 4 ML: 40 SOLUTION TOPICAL at 08:01

## 2025-01-07 RX ADMIN — LIDOCAINE HYDROCHLORIDE 50 MG: 20 INJECTION, SOLUTION INTRAVENOUS at 08:01

## 2025-01-07 NOTE — PLAN OF CARE
Procedure and recovery complete. Awake and alert. Resp. Even and unlabored. SAT's 94-95% on room air. No c/o SOB. Discharge instructions given. Verbalized understanding. No acute distress noted.

## 2025-01-07 NOTE — ANESTHESIA POSTPROCEDURE EVALUATION
Anesthesia Post Evaluation    Patient: Peter Lopez    Procedure(s) Performed: Procedure(s) (LRB):  EGD (ESOPHAGOGASTRODUODENOSCOPY) (N/A)    Final Anesthesia Type: general      Patient location during evaluation: GI PACU  Patient participation: Yes- Able to Participate  Level of consciousness: awake and alert  Post-procedure vital signs: reviewed and stable  Airway patency: patent    PONV status at discharge: No PONV  Anesthetic complications: no      Cardiovascular status: blood pressure returned to baseline and hemodynamically stable  Respiratory status: unassisted, spontaneous ventilation and room air  Hydration status: euvolemic  Follow-up not needed.              Vitals Value Taken Time   /82 01/07/25 0908   Temp 36 °C (96.8 °F) 01/07/25 0838   Pulse 91 01/07/25 0908   Resp 15 01/07/25 0908   SpO2 94 % 01/07/25 0908         Event Time   Out of Recovery 09:26:13         Pain/Sandy Score: Sandy Score: 10 (1/7/2025  9:08 AM)

## 2025-01-07 NOTE — TRANSFER OF CARE
Anesthesia Transfer of Care Note    Patient: Peter Lopez    Procedure(s) Performed: Procedure(s) (LRB):  EGD (ESOPHAGOGASTRODUODENOSCOPY) (N/A)    Patient location: GI    Anesthesia Type: general    Transport from OR: Transported from OR on 2-3 L/min O2 by NC with adequate spontaneous ventilation    Post pain: adequate analgesia    Post assessment: no apparent anesthetic complications and tolerated procedure well    Post vital signs: stable    Level of consciousness: awake    Nausea/Vomiting: no nausea/vomiting    Complications: none    Transfer of care protocol was followed      Last vitals: Visit Vitals  BP (!) 115/91 (BP Location: Right arm, Patient Position: Lying)   Pulse 93   Temp 36 °C (96.8 °F) (Skin)   Resp 16   SpO2 98%

## 2025-01-07 NOTE — ANESTHESIA PREPROCEDURE EVALUATION
01/07/2025  Peter Lopez is a 89 y.o., male.      Pre-op Assessment    I have reviewed the Patient Summary Reports.     I have reviewed the Nursing Notes. I have reviewed the NPO Status.   I have reviewed the Medications.     Review of Systems  Anesthesia Hx:  No problems with previous Anesthesia             Denies Family Hx of Anesthesia complications.    Denies Personal Hx of Anesthesia complications.                    Social:  Former Smoker, No Alcohol Use       Hematology/Oncology:                        --  Cancer in past history:                     Cardiovascular:     Hypertension                                          Pulmonary:   COPD     Sleep Apnea ILD               Renal/:  Chronic Renal Disease, CKD                Hepatic/GI:       Hepatitis, C              Endocrine:  Diabetes, type 2               Physical Exam  General: Cooperative, Alert and Oriented    Airway:  Mallampati: II   TM Distance: Normal  Tongue: Normal  Neck ROM: Normal ROM    Dental:  Edentulous        Anesthesia Plan  Type of Anesthesia, risks & benefits discussed:    Anesthesia Type: Gen Natural Airway  Intra-op Monitoring Plan: Standard ASA Monitors  Induction:  IV  Informed Consent: Informed consent signed with the Patient and all parties understand the risks and agree with anesthesia plan.  All questions answered. Patient consented to blood products? No  ASA Score: 3    Ready For Surgery From Anesthesia Perspective.     .

## 2025-01-07 NOTE — PROVATION PATIENT INSTRUCTIONS
Discharge Summary/Instructions after an Endoscopic Procedure  Patient Name: Peter Lopez  Patient MRN: [id number]  Patient YOB: 1935     Tuesday, January 7, 2025  Angelic Cruz MD  Dear patient,  As a result of recent federal legislation (The Federal Cures Act), you may   receive lab or pathology results from your procedure in your [facility]   account before your physician is able to contact you. Your physician or   their representative will relay the results to you with their   recommendations at their soonest availability.  Thank you,  RESTRICTIONS:  During your procedure today, you received medications for sedation.  These   medications may affect your judgment, balance and coordination.  Therefore,   for 24 hours, you have the following restrictions:   - DO NOT drive a car, operate machinery, make legal/financial decisions,   sign important papers or drink alcohol.    ACTIVITY:  Today: no heavy lifting, straining or running due to procedural   sedation/anesthesia.  The following day: return to full activity including work.  DIET:  Eat and drink normally unless instructed otherwise.     TREATMENT FOR COMMON SIDE EFFECTS:  - Mild abdominal pain, nausea, belching, bloating or excessive gas:  rest,   eat lightly and use a heating pad.  - Sore Throat: treat with throat lozenges and/or gargle with warm salt   water.  - Because air was used during the procedure, expelling large amounts of air   from your rectum or belching is normal.  - If a bowel prep was taken, you may not have a bowel movement for 1-3 days.    This is normal.  SYMPTOMS TO WATCH FOR AND REPORT TO YOUR PHYSICIAN:  1. Abdominal pain or bloating, other than gas cramps.  2. Chest pain.  3. Back pain.  4. Signs of infection such as: chills or fever occurring within 24 hours   after the procedure.  5. Rectal bleeding, which would show as bright red, maroon, or black stools.   (A tablespoon of blood from the rectum is not serious, especially if    hemorrhoids are present.)  6. Vomiting.  7. Weakness or dizziness.  GO DIRECTLY TO THE NEAREST EMERGENCY ROOM IF YOU HAVE ANY OF THE FOLLOWING:      Difficulty breathing              Chills and/or fever over 101 F   Persistent vomiting and/or vomiting blood   Severe abdominal pain   Severe chest pain   Black, tarry stools   Bleeding- more than one tablespoon   Any other symptom or condition that you feel may need urgent attention  Your doctor recommends these additional instructions:  If any biopsies were taken, your doctors clinic will contact you in 1 to 2   weeks with any results.  - Discharge patient to home.   - Resume previous diet.   - Continue present medications.  For questions, problems or results please call your physician - Angelic Cruz MD at Work:  [phone].  [facility] can be reached at [phone]     IF A COMPLICATION OR EMERGENCY SITUATION ARISES AND YOU ARE UNABLE TO REACH   YOUR PHYSICIAN - GO DIRECTLY TO THE EMERGENCY ROOM.  Angelic Cruz MD  1/7/2025 8:32:34 AM  This report has been verified and signed electronically.  Dear patient,  As a result of recent federal legislation (The Federal Cures Act), you may   receive lab or pathology results from your procedure in your [facility]   account before your physician is able to contact you. Your physician or   their representative will relay the results to you with their   recommendations at their soonest availability.  Thank you,  PROVATION

## 2025-01-08 VITALS
HEART RATE: 91 BPM | DIASTOLIC BLOOD PRESSURE: 82 MMHG | RESPIRATION RATE: 15 BRPM | SYSTOLIC BLOOD PRESSURE: 152 MMHG | TEMPERATURE: 97 F | OXYGEN SATURATION: 94 %

## 2025-01-08 LAB — POCT GLUCOSE: 97 MG/DL (ref 70–110)

## 2025-01-13 ENCOUNTER — NURSE TRIAGE (OUTPATIENT)
Dept: ADMINISTRATIVE | Facility: CLINIC | Age: 89
End: 2025-01-13
Payer: MEDICARE

## 2025-01-13 DIAGNOSIS — R13.10 DYSPHAGIA, UNSPECIFIED TYPE: ICD-10-CM

## 2025-01-13 RX ORDER — PANTOPRAZOLE SODIUM 40 MG/1
40 TABLET, DELAYED RELEASE ORAL
Qty: 30 TABLET | Refills: 0 | Status: SHIPPED | OUTPATIENT
Start: 2025-01-13

## 2025-01-13 NOTE — TELEPHONE ENCOUNTER
Spoke with pt who reports that he went to [facility] pharmacy today, and was told that he would need a new prescription for blood glucose test strips, and was told that he was to check sugar once daily. Pt states that Dr. Lloyd advised him to take BG 3 times a day. States that he went to [facility] by daughter, and had to pay out of pocket for 25 test strips. States unsure why he does not have a prescription to be  for test strips. Informed pt that he is to check BG level 3 times a day per MAR. Pt states he only has 5-6 strips left.    Nurse triage spoke with [facility] pharmacy, and was told that prescription was transferred out to pharmacy in [place], and canceled.    Pt informed, and advised will send message to office regarding matter. Pt verbalized understanding        Reason for Disposition   Caller has NON-URGENT medicine question about med that PCP or specialist prescribed and triager unable to answer question    Additional Information   Negative: Intentional drug overdose and suicidal thoughts or ideas   Negative: MORE THAN A DOUBLE DOSE of a prescription or over-the-counter (OTC) drug   Negative: DOUBLE DOSE (an extra dose or lesser amount) of prescription drug and any symptoms (e.g., dizziness, nausea, pain, sleepiness)   Negative: DOUBLE DOSE (an extra dose or lesser amount) of over-the-counter (OTC) drug and any symptoms (e.g., dizziness, nausea, pain, sleepiness)   Negative: Took another person's prescription drug   Negative: DOUBLE DOSE (an extra dose or lesser amount) of prescription drug and NO symptoms  (Exception: A double dose of antibiotics.)   Negative: Diabetes drug error or overdose (e.g., took wrong type of insulin or took extra dose)   Negative: Caller has medication question about med NOT prescribed by PCP and triager unable to answer question (e.g., compatibility with other med, storage)   Negative: Prescription not at pharmacy and was prescribed by PCP recently  (Exception: triager has  access to EMR and prescription is recorded there. Go to Home Care and confirm for pharmacy.)   Negative: Pharmacy calling with prescription question and triager unable to answer question   Negative: Caller has URGENT medicine question about med that PCP or specialist prescribed and triager unable to answer question    Protocols used: Medication Question Call-A-[place]

## 2025-01-27 ENCOUNTER — OFFICE VISIT (OUTPATIENT)
Dept: PODIATRY | Facility: CLINIC | Age: 89
End: 2025-01-27
Payer: MEDICARE

## 2025-01-27 VITALS — WEIGHT: 168 LBS | BODY MASS INDEX: 24.05 KG/M2 | HEIGHT: 70 IN

## 2025-01-27 DIAGNOSIS — B35.1 ONYCHOMYCOSIS DUE TO DERMATOPHYTE: ICD-10-CM

## 2025-01-27 DIAGNOSIS — E11.49 TYPE II DIABETES MELLITUS WITH NEUROLOGICAL MANIFESTATIONS: Primary | ICD-10-CM

## 2025-01-27 PROCEDURE — 99499 UNLISTED E&M SERVICE: CPT | Mod: S$GLB,,, | Performed by: PODIATRIST

## 2025-01-27 PROCEDURE — 11721 DEBRIDE NAIL 6 OR MORE: CPT | Mod: Q9,S$GLB,, | Performed by: PODIATRIST

## 2025-01-27 PROCEDURE — 99999 PR PBB SHADOW E&M-EST. PATIENT-LVL III: CPT | Mod: PBBFAC,,, | Performed by: PODIATRIST

## 2025-01-27 NOTE — PROGRESS NOTES
Subjective:      Patient ID: Peter Lopez is a 89 y.o. male.    Chief Complaint: Diabetes Mellitus (9/9/24 Dr Lloyd) and Nail Care      Peter is a 89 y.o. male who presents to the clinic upon referral from Dr. Ivette abraham. provider found  for evaluation and treatment of diabetic feet. 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 Patient relates no major problem with feet. Reports elongated nails that are difficult to trim. Requesting nail trimming.     PCP: Lucas Lloyd Jr., MD    Date Last Seen by PCP: per above    Current shoe gear: Tennis shoes    Hemoglobin A1C   Date Value Ref Range Status   08/19/2024 >14.0 (H) 4.0 - 5.6 % Final     Comment:     ADA Screening Guidelines:  5.7-6.4%  Consistent with prediabetes  >or=6.5%  Consistent with diabetes    High levels of fetal hemoglobin interfere with the HbA1C  assay. Heterozygous hemoglobin variants (HbS, HgC, etc)do  not significantly interfere with this assay.   However, presence of multiple variants may affect accuracy.     05/20/2024 13.3 (H) 4.0 - 5.6 % Final     Comment:     ADA Screening Guidelines:  5.7-6.4%  Consistent with prediabetes  >or=6.5%  Consistent with diabetes    High levels of fetal hemoglobin interfere with the HbA1C  assay. Heterozygous hemoglobin variants (HbS, HgC, etc)do  not significantly interfere with this assay.   However, presence of multiple variants may affect accuracy.     01/30/2024 8.6 (H) 4.0 - 5.6 % Final     Comment:     ADA Screening Guidelines:  5.7-6.4%  Consistent with prediabetes  >or=6.5%  Consistent with  diabetes    High levels of fetal hemoglobin interfere with the HbA1C  assay. Heterozygous hemoglobin variants (HbS, HgC, etc)do  not significantly interfere with this assay.   However, presence of multiple variants may affect accuracy.             Review of Systems   Constitutional: Negative for chills, diaphoresis and fever.   Cardiovascular:  Negative for claudication, cyanosis, leg swelling and syncope.   Respiratory:  Negative for cough and shortness of breath.    Skin:  Positive for color change and nail changes. Negative for suspicious lesions.   Musculoskeletal:  Negative for falls, joint pain, muscle cramps and muscle weakness.   Gastrointestinal:  Negative for diarrhea, nausea and vomiting.   Neurological:  Positive for paresthesias. Negative for disturbances in coordination, numbness, sensory change, tremors and weakness.   Psychiatric/Behavioral:  Negative for altered mental status.            Objective:      Physical Exam  Constitutional:       Appearance: He is well-developed.      Comments: Oriented to time, place, and person.   Cardiovascular:      Comments: DP and PT pulses are palpable bilaterally. 3 sec capillary refill time and toes and feet are warm to touch proximally .       Musculoskeletal:      Comments: Equinus noted b/l ankles with < 10 deg DF noted. MMT 5/5 in DF/PF/Inv/Ev resistance with no reproduction of pain in any direction. Passive range of motion of ankle and pedal joints is painless b/l.     Feet:      Right foot:      Skin integrity: No callus or dry skin.      Left foot:      Skin integrity: No callus or dry skin.   Lymphadenopathy:      Comments: Negative lymphadenopathy bilateral popliteal fossa and tarsal tunnel.   Skin:     Comments: Toenails 1-5 bilaterally are elongated by 2-3 mm, thickened by 2-3 mm, discolored/yellowed, dystrophic, brittle with subungual debris.    Focal hyperkeratotic lesion consisting entirely of hyperkeratotic tissue without open skin, drainage, pus,  fluctuance, malodor, or signs of infection: right plantar medial foot. Right 2nd toe right plantar foot.        Neurological:      Mental Status: He is alert.      Comments: Light touch, proprioception, and sharp/dull sensation are all diminished bilaterally. Protective threshold with the [place]bourne-Wienstein monofilament is diminished  bilaterally.  Subjective paresthesias with no clearly identifiable source or trigger.      Psychiatric:         Behavior: Behavior is cooperative.               Assessment:       Encounter Diagnoses   Name Primary?    Type II diabetes mellitus with neurological manifestations Yes    Onychomycosis due to dermatophyte                  Plan:       Peter was seen today for diabetes mellitus and nail care.    Diagnoses and all orders for this visit:    Type II diabetes mellitus with neurological manifestations    Onychomycosis due to dermatophyte              I counseled the patient on his conditions, their implications and medical management.    - Shoe inspection. Diabetic Foot Education. Patient reminded of the importance of good nutrition and blood sugar control to help prevent podiatric complications of diabetes. Patient instructed on proper foot hygeine. We discussed wearing proper shoe gear, daily foot inspections, never walking without protective shoe gear, never putting sharp instruments to feet, routine podiatric nail visits every 2-3 months.   - With patient's permission, nails were aggressively reduced and debrided x 10 to their soft tissue attachment mechanically and with electric , removing all offending nail and debris. Patient relates relief following the procedure. He will continue to monitor the areas daily, inspect his feet, wear protective shoe gear when ambulatory, moisturizer to maintain skin integrity and follow in this office in approximately 2-3 months, sooner p.r.n.   - After cleansing the area w/ alcohol prep pad the above mentioned hyperkeratosis was trimmed  utilizing No 15 scapel, to a smooth base with out incident.     F/u 3 months     Lucia Aguiar DPM

## 2025-01-30 ENCOUNTER — TELEPHONE (OUTPATIENT)
Dept: FAMILY MEDICINE | Facility: CLINIC | Age: 89
End: 2025-01-30
Payer: MEDICARE

## 2025-01-30 DIAGNOSIS — E11.21 DIABETES MELLITUS WITH NEPHROPATHY: Chronic | ICD-10-CM

## 2025-01-30 RX ORDER — BLOOD SUGAR DIAGNOSTIC
1 STRIP MISCELLANEOUS 3 TIMES DAILY
Qty: 300 EACH | Refills: 3 | Status: SHIPPED | OUTPATIENT
Start: 2025-01-30

## 2025-01-30 NOTE — TELEPHONE ENCOUNTER
----- Message from Renee sent at 1/30/2025 11:40 AM CST -----  Regarding: [facility]   Type: Pharmacy Calling to Clarify an RX    Name of Caller: [facility]      [facility]: [facility]     Prescription Name:  blood sugar diagnostic Strp     What do they need to clarify?  New RX needs to be called in due to RX being sent to another pharmacy in different state     Can you be contacted via [facility]? Call back     Best Call Back Number: .  [facility] [address] [place], [place] - [facility] [place] [address]  Phone: [phone] Fax: [phone]          Additional Information:

## 2025-01-30 NOTE — TELEPHONE ENCOUNTER
----- Message from Sujatha sent at 1/30/2025 11:14 AM CST -----  [facility]      What is the request in detail:[facility] medication it reads 3times a day but doctor told him take one a day [facility] for [facility] to give [facility] a call      Can the clinic reply by [facility]?     Would the patient rather a call back or a response via [facility]?callback     Best call back number:[phone]     Additional Information:

## 2025-01-30 NOTE — TELEPHONE ENCOUNTER
Care Due:                  Date            Visit Type   Department     Provider  --------------------------------------------------------------------------------                                [facility]                              PRIMARY      MEDICINE/  Last Visit: 09-      CARE ([facility])   INTERNAL MED   Lucas Lloyd                              [facility]                              PRIMARY      MEDICINE/  Next Visit: 02-      CARE ([facility])   INTERNAL MED   Lucas Lloyd                                                            Last  Test          Frequency    Reason                     Performed    Due Date  --------------------------------------------------------------------------------    HBA1C.......  6 months...  LANTUS, glipiZIDE........  08- 02-    [facility] Embedded Care Due Messages. Reference number: [id number].   1/30/2025 11:33:24 AM CST

## 2025-01-30 NOTE — TELEPHONE ENCOUNTER
----- Message from Renee sent at 1/30/2025 11:40 AM CST -----  Regarding: [facility]   Type: Pharmacy Calling to Clarify an RX    Name of Caller: Katia      Pharmacy Name: [facility]     Prescription Name:  blood sugar diagnostic Strp     What do they need to clarify?  New RX needs to be called in due to RX being sent to another pharmacy in different state     Can you be contacted via [facility]? Call back     Best Call Back Number: .  [facility] [address] [place], [place] - [facility] [place] [address]  Phone: [phone] Fax: [phone]          Additional Information:

## 2025-01-30 NOTE — TELEPHONE ENCOUNTER
Refill Decision Note   Peter Lopez  is requesting a refill authorization.  Brief Assessment and Rationale for Refill:  Approve     Medication Therapy Plan:  FLOS      Comments:     Note composed:1:07 PM 01/30/2025

## 2025-02-07 DIAGNOSIS — R13.10 DYSPHAGIA, UNSPECIFIED TYPE: ICD-10-CM

## 2025-02-07 RX ORDER — PANTOPRAZOLE SODIUM 40 MG/1
40 TABLET, DELAYED RELEASE ORAL
Qty: 30 TABLET | Refills: 0 | Status: SHIPPED | OUTPATIENT
Start: 2025-02-07 | End: 2025-02-18 | Stop reason: SDUPTHER

## 2025-02-10 ENCOUNTER — PATIENT OUTREACH (OUTPATIENT)
Dept: ADMINISTRATIVE | Facility: HOSPITAL | Age: 89
End: 2025-02-10
Payer: MEDICARE

## 2025-02-10 DIAGNOSIS — N18.32 STAGE 3B CHRONIC KIDNEY DISEASE: Primary | ICD-10-CM

## 2025-02-11 ENCOUNTER — OFFICE VISIT (OUTPATIENT)
Dept: FAMILY MEDICINE | Facility: CLINIC | Age: 89
End: 2025-02-11
Payer: MEDICARE

## 2025-02-11 ENCOUNTER — LAB VISIT (OUTPATIENT)
Dept: LAB | Facility: HOSPITAL | Age: 89
End: 2025-02-11
Attending: FAMILY MEDICINE
Payer: MEDICARE

## 2025-02-11 VITALS
SYSTOLIC BLOOD PRESSURE: 120 MMHG | WEIGHT: 174.19 LBS | OXYGEN SATURATION: 95 % | BODY MASS INDEX: 24.94 KG/M2 | DIASTOLIC BLOOD PRESSURE: 68 MMHG | RESPIRATION RATE: 19 BRPM | HEART RATE: 105 BPM | HEIGHT: 70 IN | TEMPERATURE: 98 F

## 2025-02-11 DIAGNOSIS — J06.9 VIRAL URI WITH COUGH: Primary | ICD-10-CM

## 2025-02-11 DIAGNOSIS — I27.20 PULMONARY HYPERTENSION: Chronic | ICD-10-CM

## 2025-02-11 DIAGNOSIS — J43.2 CENTRILOBULAR EMPHYSEMA: Chronic | ICD-10-CM

## 2025-02-11 DIAGNOSIS — I70.0 AORTIC ATHEROSCLEROSIS: Chronic | ICD-10-CM

## 2025-02-11 DIAGNOSIS — E11.21 DIABETES MELLITUS WITH NEPHROPATHY: Chronic | ICD-10-CM

## 2025-02-11 DIAGNOSIS — E11.22 TYPE 2 DIABETES MELLITUS WITH STAGE 3B CHRONIC KIDNEY DISEASE, WITH LONG-TERM CURRENT USE OF INSULIN: ICD-10-CM

## 2025-02-11 DIAGNOSIS — I10 ESSENTIAL HYPERTENSION: Chronic | ICD-10-CM

## 2025-02-11 DIAGNOSIS — Z79.4 TYPE 2 DIABETES MELLITUS WITH STAGE 3B CHRONIC KIDNEY DISEASE, WITH LONG-TERM CURRENT USE OF INSULIN: ICD-10-CM

## 2025-02-11 DIAGNOSIS — N18.32 TYPE 2 DIABETES MELLITUS WITH STAGE 3B CHRONIC KIDNEY DISEASE, WITH LONG-TERM CURRENT USE OF INSULIN: ICD-10-CM

## 2025-02-11 LAB
ALBUMIN SERPL BCP-MCNC: 4 G/DL (ref 3.5–5.2)
ALP SERPL-CCNC: 72 U/L (ref 40–150)
ALT SERPL W/O P-5'-P-CCNC: 27 U/L (ref 10–44)
ANION GAP SERPL CALC-SCNC: 12 MMOL/L (ref 8–16)
AST SERPL-CCNC: 39 U/L (ref 10–40)
BASOPHILS # BLD AUTO: 0 K/UL (ref 0–0.2)
BASOPHILS NFR BLD: 0 % (ref 0–1.9)
BILIRUB SERPL-MCNC: 0.4 MG/DL (ref 0.1–1)
BUN SERPL-MCNC: 24 MG/DL (ref 8–23)
CALCIUM SERPL-MCNC: 9.9 MG/DL (ref 8.7–10.5)
CHLORIDE SERPL-SCNC: 107 MMOL/L (ref 95–110)
CHOLEST SERPL-MCNC: 132 MG/DL (ref 120–199)
CHOLEST/HDLC SERPL: 2 {RATIO} (ref 2–5)
CO2 SERPL-SCNC: 21 MMOL/L (ref 23–29)
CREAT SERPL-MCNC: 1.6 MG/DL (ref 0.5–1.4)
DIFFERENTIAL METHOD BLD: ABNORMAL
EOSINOPHIL # BLD AUTO: 0.1 K/UL (ref 0–0.5)
EOSINOPHIL NFR BLD: 1.6 % (ref 0–8)
ERYTHROCYTE [DISTWIDTH] IN BLOOD BY AUTOMATED COUNT: 13.6 % (ref 11.5–14.5)
EST. GFR  (NO RACE VARIABLE): 41 ML/MIN/1.73 M^2
GLUCOSE SERPL-MCNC: 136 MG/DL (ref 70–110)
HCT VFR BLD AUTO: 38.6 % (ref 40–54)
HDLC SERPL-MCNC: 66 MG/DL (ref 40–75)
HDLC SERPL: 50 % (ref 20–50)
HGB BLD-MCNC: 12.2 G/DL (ref 14–18)
IMM GRANULOCYTES # BLD AUTO: 0 K/UL (ref 0–0.04)
IMM GRANULOCYTES NFR BLD AUTO: 0 % (ref 0–0.5)
LDLC SERPL CALC-MCNC: 57.4 MG/DL (ref 63–159)
LYMPHOCYTES # BLD AUTO: 0.7 K/UL (ref 1–4.8)
LYMPHOCYTES NFR BLD: 18.8 % (ref 18–48)
MCH RBC QN AUTO: 28.4 PG (ref 27–31)
MCHC RBC AUTO-ENTMCNC: 31.6 G/DL (ref 32–36)
MCV RBC AUTO: 90 FL (ref 82–98)
MONOCYTES # BLD AUTO: 0.3 K/UL (ref 0.3–1)
MONOCYTES NFR BLD: 7.4 % (ref 4–15)
NEUTROPHILS # BLD AUTO: 2.7 K/UL (ref 1.8–7.7)
NEUTROPHILS NFR BLD: 72.2 % (ref 38–73)
NONHDLC SERPL-MCNC: 66 MG/DL
NRBC BLD-RTO: 0 /100 WBC
PLATELET # BLD AUTO: 297 K/UL (ref 150–450)
PMV BLD AUTO: 10.7 FL (ref 9.2–12.9)
POTASSIUM SERPL-SCNC: 4.6 MMOL/L (ref 3.5–5.1)
PROT SERPL-MCNC: 7.9 G/DL (ref 6–8.4)
RBC # BLD AUTO: 4.3 M/UL (ref 4.6–6.2)
SODIUM SERPL-SCNC: 140 MMOL/L (ref 136–145)
TRIGL SERPL-MCNC: 43 MG/DL (ref 30–150)
WBC # BLD AUTO: 3.67 K/UL (ref 3.9–12.7)

## 2025-02-11 PROCEDURE — 80061 LIPID PANEL: CPT | Performed by: FAMILY MEDICINE

## 2025-02-11 PROCEDURE — 83036 HEMOGLOBIN GLYCOSYLATED A1C: CPT | Performed by: FAMILY MEDICINE

## 2025-02-11 PROCEDURE — 0241U SARS-COV2 (COVID) WITH FLU/RSV BY PCR: CPT | Performed by: FAMILY MEDICINE

## 2025-02-11 PROCEDURE — 36415 COLL VENOUS BLD VENIPUNCTURE: CPT | Mod: PN | Performed by: FAMILY MEDICINE

## 2025-02-11 PROCEDURE — 80053 COMPREHEN METABOLIC PANEL: CPT | Performed by: FAMILY MEDICINE

## 2025-02-11 PROCEDURE — 85025 COMPLETE CBC W/AUTO DIFF WBC: CPT | Performed by: FAMILY MEDICINE

## 2025-02-11 RX ORDER — PROMETHAZINE HYDROCHLORIDE AND DEXTROMETHORPHAN HYDROBROMIDE 6.25; 15 MG/5ML; MG/5ML
5 SYRUP ORAL EVERY 4 HOURS PRN
Qty: 118 ML | Refills: 0 | Status: SHIPPED | OUTPATIENT
Start: 2025-02-11 | End: 2025-02-18 | Stop reason: SDUPTHER

## 2025-02-11 RX ORDER — LIDOCAINE HYDROCHLORIDE 20 MG/ML
SOLUTION OROPHARYNGEAL
Qty: 200 ML | Refills: 0 | Status: SHIPPED | OUTPATIENT
Start: 2025-02-11 | End: 2025-02-18 | Stop reason: SDUPTHER

## 2025-02-12 LAB
ESTIMATED AVG GLUCOSE: 151 MG/DL (ref 68–131)
HBA1C MFR BLD: 6.9 % (ref 4–5.6)

## 2025-02-13 ENCOUNTER — TELEPHONE (OUTPATIENT)
Dept: FAMILY MEDICINE | Facility: CLINIC | Age: 89
End: 2025-02-13
Payer: MEDICARE

## 2025-02-13 ENCOUNTER — HOSPITAL ENCOUNTER (EMERGENCY)
Facility: HOSPITAL | Age: 89
Discharge: HOME | End: 2025-02-13
Attending: EMERGENCY MEDICINE
Payer: MEDICARE

## 2025-02-13 VITALS
OXYGEN SATURATION: 94 % | SYSTOLIC BLOOD PRESSURE: 142 MMHG | HEART RATE: 100 BPM | DIASTOLIC BLOOD PRESSURE: 76 MMHG | BODY MASS INDEX: 24.97 KG/M2 | TEMPERATURE: 98 F | WEIGHT: 174 LBS | RESPIRATION RATE: 16 BRPM

## 2025-02-13 DIAGNOSIS — J02.9 SORE THROAT: Primary | ICD-10-CM

## 2025-02-13 DIAGNOSIS — R05.9 COUGH: ICD-10-CM

## 2025-02-13 DIAGNOSIS — J06.9 VIRAL UPPER RESPIRATORY INFECTION: ICD-10-CM

## 2025-02-13 PROCEDURE — 99284 EMERGENCY DEPT VISIT MOD MDM: CPT | Mod: 25

## 2025-02-13 PROCEDURE — 63600175 PHARM REV CODE 636 W HCPCS: Performed by: EMERGENCY MEDICINE

## 2025-02-13 PROCEDURE — 96372 THER/PROPH/DIAG INJ SC/IM: CPT | Performed by: EMERGENCY MEDICINE

## 2025-02-13 RX ORDER — DEXAMETHASONE SODIUM PHOSPHATE 4 MG/ML
8 INJECTION, SOLUTION INTRA-ARTICULAR; INTRALESIONAL; INTRAMUSCULAR; INTRAVENOUS; SOFT TISSUE
Status: COMPLETED | OUTPATIENT
Start: 2025-02-13 | End: 2025-02-13

## 2025-02-13 RX ADMIN — DEXAMETHASONE SODIUM PHOSPHATE 8 MG: 4 INJECTION INTRA-ARTICULAR; INTRALESIONAL; INTRAMUSCULAR; INTRAVENOUS; SOFT TISSUE at 09:02

## 2025-02-13 NOTE — ED PROVIDER NOTES
Encounter Date: 2/13/2025    SCRIBE #1 NOTE: I, Lucia Giles, am scribing for, and in the presence of,  Hero Rodriguez Jr., MD. I have scribed the following portions of the note - Other sections scribed: HPI, ROS.       History     Chief Complaint   Patient presents with    Sore Throat     Pt complaining of ongoing throat pain and cough unrelieved by prescribed pain medication. Pt states he does not have strep and doesn't want to be swabbed.      CC: Sore Throat    HPI: This is a 89 y.o. male with PMHx of COPD, CKD, DM, HTN, prostate cancer, who presents to the ED complaining of throat irritation x 2 weeks. Patient also reports associated cough and chest tightness secondary to cough. No other exacerbating or alleviating factors. Patient states he saw his PCP two days ago for same complaint and diagnosed with viral URI. Patient was prescribed lidocaine viscous HCl 2% and Promethazine-DM with some relief of sore throat. Denies dysphagia, SOB, chest pain, abdominal pain, vomiting, generalized myalgias, extremity swelling, or other associated symptoms.       The history is provided by the patient. No  was used.     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 DBM    NO PAST SURGERIES  2021    TRANSURETHRAL RESECTION OF PROSTATE USING BIPOLAR CAUTERY N/A 2021    Procedure: (TURP) Transuretheral Resection of Prostate with BIPOLAR CAUTERY;  Surgeon: Jesus Cole MD;  Location: [facility] OR;  Service: Urology;  Laterality: N/A;  RN Pre OP 21.  Vaccinated.  C A     Family History   Problem Relation Name Age of Onset    Cancer Mother      Asbestos Father      Liver disease Neg Hx       Social History     Tobacco Use    Smoking status: Former     Current packs/day: 0.00     Average packs/day: 1 pack/day for 40.0 years (40.0 ttl pk-yrs)     Types: Cigarettes     Start date: 1951     Quit date: 1991     Years since quittin.2    Smokeless tobacco: Former     Quit date: 1986   Substance Use Topics    Alcohol use: Not Currently     Alcohol/week: 0.0 standard drinks of alcohol     Comment: 2 bottles beer on weekends    Drug use: No     Review of Systems   Constitutional:  Negative for activity change and fatigue.   HENT:  Positive for sore throat. Negative for facial swelling and trouble swallowing.    Eyes:  Negative for pain.   Respiratory:  Positive for cough and chest tightness (2/2 cough). Negative for shortness of breath.    Cardiovascular:  Negative for chest pain and leg swelling.   Gastrointestinal:  Negative for abdominal pain, constipation, diarrhea, nausea and vomiting.   Genitourinary:  Negative for difficulty urinating and dysuria.   Musculoskeletal:  Negative for back pain and myalgias.   Skin:  Negative for rash.   Neurological:  Negative for weakness and headaches.   Hematological:  Negative for adenopathy.   Psychiatric/Behavioral:  Negative for behavioral problems.    All other systems reviewed and are negative.      Physical Exam     Initial  Vitals [02/13/25 0744]   BP Pulse Resp Temp SpO2   123/79 108 18 97.8 °F (36.6 °C) 96 %      MAP       --         Physical Exam    Nursing note and vitals reviewed.  Constitutional: He appears well-developed and well-nourished. He is not diaphoretic. No distress.   HENT:   Head: Normocephalic and atraumatic.   Right Ear: External ear normal.   Left Ear: External ear normal.   Nose: Nose normal. Mouth/Throat: Oropharynx is clear and moist. No oropharyngeal exudate.   No evidence of peritonsillar abscess.  Uvula midline.  No exudate or significant erythema in the posterior oropharynx.   Eyes: Conjunctivae and EOM are normal. Pupils are equal, round, and reactive to light. Right eye exhibits no discharge. Left eye exhibits no discharge. No scleral icterus.   Neck: Neck supple. No JVD present.   Normal range of motion.  Cardiovascular:  Normal rate, regular rhythm, normal heart sounds and intact distal pulses.     Exam reveals no gallop and no friction rub.       No murmur heard.  Pulmonary/Chest: Breath sounds normal. No stridor. No respiratory distress. He has no wheezes. He has no rhonchi. He has no rales. He exhibits no tenderness.   Abdominal: Abdomen is soft. He exhibits no distension and no mass. There is no abdominal tenderness. There is no rebound and no guarding.   Musculoskeletal:         General: No tenderness or edema. Normal range of motion.      Cervical back: Normal range of motion and neck supple.     Neurological: He is alert and oriented to person, place, and time. He has normal strength. No cranial nerve deficit or sensory deficit.   Skin: Skin is warm and dry. No rash noted. No erythema. No pallor.   Psychiatric: He has a normal mood and affect. His behavior is normal. Judgment and thought content normal.         ED Course   Procedures  Labs Reviewed - No data to display       Imaging Results              X-Ray Chest AP Portable (Final result)  Result time 02/13/25 08:38:10      Final result by  Adolfo Batista MD (02/13/25 08:38:10)                   Impression:      No acute intrathoracic abnormality.      Electronically signed by: Adolfo Batista  Date:    02/13/2025  Time:    08:38               Narrative:    EXAMINATION:  XR CHEST AP PORTABLE    CLINICAL HISTORY:  Cough, unspecified    TECHNIQUE:  Single frontal view of the chest was performed.    COMPARISON:  Chest radiograph 08/19/2024    FINDINGS:  Lungs are hyperexpanded.  Bibasilar atelectasis versus scarring.  No focal consolidation, pleural effusion, or pneumothorax.    Cardiomediastinal silhouette is unchanged in size.  Aortic atherosclerosis.  Mediastinal structures are midline    Osseous structures demonstrate no evidence for acute fracture or osseous destructive lesion.  Degenerative change.    No free intra-abdominal air.  Soft tissues are unremarkable.                                    X-Rays:   Independently Interpreted Readings:   Other Readings:  Chest x-ray reviewed by me reveals no evidence of infiltrate, consolidation, pleural effusion, pulmonary edema, or pneumothorax.    Medications - No data to display  Medical Decision Making  This is the emergent evaluation of an 89-year-old male presents emergency department with persistent cough and throat irritation for the past 2 weeks.  Differential diagnosis at the time of initial evaluation included, but was not limited to:  Viral illness, pneumonia.   Symptoms and physical examination not consistent with streptococcal pharyngitis.  Patient did not want to be swabbed again for streptococcal pharyngitis.  Lungs are clear.  He is breathing comfortably.  Chest x-ray reveals no evidence of infiltrate, consolidation, pleural effusion, pulmonary edema, or pneumothorax.  The patient has antitussive medication as well as lidocaine for his throat.  I will give him a dexamethasone injection in the emergency department.  I will advise Tylenol over-the-counter.  Advised return for new or worsening  symptoms such as difficulty swallowing or breathing.    Amount and/or Complexity of Data Reviewed  External Data Reviewed: notes.  Radiology: ordered and independent interpretation performed. Decision-making details documented in ED Course.    Risk  Prescription drug management.            Scribe Attestation:   Scribe #1: I performed the above scribed service and the documentation accurately describes the services I performed. I attest to the accuracy of the note.                             I, Hero Rodriguez MD, personally performed the services described in this documentation. All medical record entries made by the scribe were at my direction and in my presence. I have reviewed the chart and agree that the record reflects my personal performance and is accurate and complete.      DISCLAIMER: This note was prepared with [employer] voice recognition transcription software. Garbled syntax, mangled pronouns, and other bizarre constructions may be attributed to that software system.    Clinical Impression:  Final diagnoses:  [R05.9] Cough  [J02.9] Sore throat (Primary)  [J06.9] Viral upper respiratory infection                 Hero Rodriguez Jr., MD  02/13/25 1211

## 2025-02-13 NOTE — TELEPHONE ENCOUNTER
Patient notified of lab results, verbalized understanding. Instructed to contact office with any questions or concerns. ----- Message from Lucas Lloyd MD sent at 2/13/2025 10:57 AM CST -----  COVID test negative.

## 2025-02-13 NOTE — Clinical Note
"Peter Carlsony" Jessica was seen and treated in our emergency department on 2/13/2025.  He may return to work on 02/15/2025.       If you have any questions or concerns, please don't hesitate to call.      Gin Vallejo RN    "

## 2025-02-13 NOTE — Clinical Note
"Peter Carlsony" Jessica was seen and treated in our emergency department on 2/13/2025.  He may return to work on 02/15/2025.       If you have any questions or concerns, please don't hesitate to call.      Gin Vallejo RN    " Patient is a 44-year-old male who is HIV-positive and has been diagnosed with a high-grade B-cell, CD10+ lymphoma admitted for cycle 6 DA-R-EPOCH with dose reduction by 20% (same dosing as cycle 2) Patient received rituxan on 6/12 over 90 minutes. EPOCH started on 6/13. Patient received IV hydration, antiemetics, strict I/O, monitoring of CBC and electrolytes. PAtient had a LP on 6/12 and 6/15. Flow for CSF pending. Patient tolerated chemotherapy without adverse effects. To receive neulasta post discharge. Patient is a 44-year-old male who is HIV-positive and has been diagnosed with a high-grade B-cell, CD10+ lymphoma admitted for cycle 6 DA-R-EPOCH with no dose adjustment from previous cycle. Patient received Rituxan on 6/12 over 90 minutes. EPOCH started on 6/13. Patient received IV hydration, antiemetics, strict I/O, monitoring of CBC and electrolytes. PAtient had a LP on 6/12 and 6/15. Flow for CSF pending. Patient tolerated chemotherapy without adverse effects. To receive neulasta post discharge. Patient is a 44-year-old male who is HIV-positive and has been diagnosed with a high-grade B-cell, CD10+ lymphoma admitted for cycle 6 DA-R-EPOCH with no dose adjustment from previous cycle. Patient received Rituxan on 6/12 over 90 minutes. EPOCH started on 6/13. Patient received IV hydration, antiemetics, strict I/O, monitoring of CBC and electrolytes. Patient had a LP on 6/12 (-) for malignant cells and 6/15, flow pending on discharge. Flow for CSF pending. Patient tolerated chemotherapy without adverse effects. To receive Neulasta post discharge. Patient is a 44-year-old male who is HIV-positive and has been diagnosed with a high-grade B-cell, CD10+ lymphoma admitted for cycle 6 DA-R-EPOCH with no dose adjustment from previous cycle. Patient received Rituxan on 6/12 over 90 minutes. EPOCH started on 6/13. Patient received IV hydration, antiemetics, strict I/O, monitoring of CBC and electrolytes. Patient had a LP on 6/12 (-) for malignant cells and 6/15, flow pending on discharge. Patient tolerated chemotherapy without adverse effects. To receive Neulasta post discharge. Patient is a 44-year-old male who is HIV-positive and has been diagnosed with a high-grade B-cell, CD10+ lymphoma admitted for cycle 6 DA-R-EPOCH with no dose adjustment from previous cycle. Patient received Rituxan on 6/12 over 90 minutes. EPOCH started on 6/13. Patient received IV hydration, antiemetics, strict I/O, monitoring of CBC and electrolytes. Patient had a LP on 6/12 (-) for malignant cells and 6/15, flow pending on discharge. Patient tolerated chemotherapy without adverse effects. To receive Neulasta post discharge. Patient stable for discharge but continues to complain of neck pain relieved by tylenol. Patient to take tylenol as needed and follow up outpatient if if persists.

## 2025-02-13 NOTE — DISCHARGE INSTRUCTIONS
Please continue taking medications as prescribed.  You may also take over-the-counter Tylenol (acetaminophen) as directed on the over-the-counter bottle.  Return if you get worse or if new symptoms develop such as difficulty swallowing or difficulty breathing.

## 2025-02-14 LAB
INFLUENZA A, MOLECULAR: NOT DETECTED
INFLUENZA B, MOLECULAR: NOT DETECTED
RSV AG BY MOLECULAR METHOD: NOT DETECTED
SARS-COV-2 RNA RESP QL NAA+PROBE: NOT DETECTED

## 2025-02-17 ENCOUNTER — OFFICE VISIT (OUTPATIENT)
Dept: UROLOGY | Facility: CLINIC | Age: 89
End: 2025-02-17
Payer: MEDICARE

## 2025-02-17 VITALS — WEIGHT: 178.38 LBS | BODY MASS INDEX: 25.59 KG/M2

## 2025-02-17 DIAGNOSIS — Z85.46 HISTORY OF PROSTATE CANCER: Primary | ICD-10-CM

## 2025-02-17 PROCEDURE — 1159F MED LIST DOCD IN RCRD: CPT | Mod: CPTII,S$GLB,, | Performed by: STUDENT IN AN ORGANIZED HEALTH CARE EDUCATION/TRAINING PROGRAM

## 2025-02-17 PROCEDURE — 1101F PT FALLS ASSESS-DOCD LE1/YR: CPT | Mod: CPTII,S$GLB,, | Performed by: STUDENT IN AN ORGANIZED HEALTH CARE EDUCATION/TRAINING PROGRAM

## 2025-02-17 PROCEDURE — 3288F FALL RISK ASSESSMENT DOCD: CPT | Mod: CPTII,S$GLB,, | Performed by: STUDENT IN AN ORGANIZED HEALTH CARE EDUCATION/TRAINING PROGRAM

## 2025-02-17 PROCEDURE — 1160F RVW MEDS BY RX/DR IN RCRD: CPT | Mod: CPTII,S$GLB,, | Performed by: STUDENT IN AN ORGANIZED HEALTH CARE EDUCATION/TRAINING PROGRAM

## 2025-02-17 NOTE — PROGRESS NOTES
"  Patient Name: Peter Lpoez    : 1935  MRN: [id number]      Subjective:     Patient ID: Peter is a 89 y.o. male    Chief Complaint:  Sore Throat    History of Present Illness    Peter presents today for sore throat    He reports sore throat symptoms that started after drinking something that caused coughing with white phlegm production. He feels miserable and has difficulty sleeping. Upon self-exam with a flashlight, he noticed his throat was red. He denies fever, sweats, and nasal congestion, attributing the latter to regular morning neti pot use.    He checks blood sugar 3 times daily and maintains a log book of readings. He has an adequate supply of approximately 200 labs strips.      ROS:  General: -fever, -chills, -fatigue, -weight gain, -weight loss, -night sweats  Eyes: -vision changes, -redness, -discharge  ENT: -ear pain, -nasal congestion, +sore throat  Cardiovascular: -chest pain, -palpitations, -lower extremity edema  Respiratory: -cough, -shortness of breath  Gastrointestinal: -abdominal pain, -nausea, -vomiting, -diarrhea, -constipation, -blood in stool  Genitourinary: -dysuria, -hematuria, -frequency  Musculoskeletal: -joint pain, -muscle pain  Skin: -rash, -lesion  Neurological: -headache, -dizziness, -numbness, -tingling  Psychiatric: -anxiety, -depression, -sleep difficulty         Objective:   /68 (BP Location: Right arm, Patient Position: Sitting)   Pulse 105   Temp 98 °F (36.7 °C) (Oral)   Resp 19   Ht 5' 10" (1.778 m)   Wt 79 kg (174 lb 2.6 oz)   SpO2 95%   BMI 24.99 kg/m²     Physical Exam  Vitals reviewed.   Constitutional:       General: He is not in acute distress.     Appearance: He is well-developed. He is ill-appearing.   HENT:      Head: Normocephalic and atraumatic.      Right Ear: Ear canal and external ear normal.      Left Ear: Ear canal and external ear normal.      Nose: Mucosal edema, congestion and rhinorrhea present.      Right Sinus: No maxillary sinus tenderness. "      Left Sinus: No maxillary sinus tenderness.      Mouth/Throat:      Pharynx: Posterior oropharyngeal erythema present. No oropharyngeal exudate.   Eyes:      General: Lids are normal.      Pupils: Pupils are equal, round, and reactive to light.   Neck:      Thyroid: No thyroid mass.      Trachea: Trachea normal. No tracheal tenderness.   Cardiovascular:      Rate and Rhythm: Normal rate and regular rhythm.   Pulmonary:      Effort: Pulmonary effort is normal. No respiratory distress.      Breath sounds: Normal breath sounds. No wheezing or rales.   Musculoskeletal:      Cervical back: Normal range of motion.   Lymphadenopathy:      Cervical: No cervical adenopathy.   Psychiatric:         Behavior: Behavior normal.          Assessment        ICD-10-CM ICD-9-CM   1. Viral URI with cough  J06.9 465.9   2. Type 2 diabetes mellitus with stage 3b chronic kidney disease, with long-term current use of insulin  E11.22 250.40    N18.32 585.3    Z79.4 V58.67   3. Essential hypertension  I10 401.9   4. Aortic atherosclerosis  I70.0 440.0   5. Pulmonary hypertension  I27.20 416.8   6. Centrilobular emphysema  J43.2 492.8         Plan:   Assessment & Plan    IMPRESSION:  Assessed patient's sore throat and cough, likely due to viral infection  Ordered rapid test to rule out COVID, flu, and RSV  Prescribed symptomatic treatment with cough syrup and lidocaine gargle  Noted patient's ongoing diabetes management, deferring comprehensive review to upcoming appointment      URI  Assessed the condition as a probable viral infection.  Performed a swab test and physical exam.  Prescribed lidocaine gargle solution for throat pain relief.  Instructions: gargle 2 teaspoons for 30 seconds and spit out.  Prescribed syrup to help decrease coughing and congestion.    CHRONIC CONDITIONS- DIABETES, CHRONIC KIDNEY DISEASE,  HYPERTENSION, AORTIC ATHEROSCLEROSIS, PULMONARY HYPERTENSION, EMPHYSEMA  Patient agrees to have labs done four chronic  condition today and will keep follow-up appointment scheduled for February 18th.    He will continue current medications.    FOLLOW UP:  Scheduled a follow-up visit.           1. Viral URI with cough  -     LIDOcaine viscous HCl 2% (LIDOCAINE VISCOUS) 2 % Soln; Take 10 mL in oral cavity, gargle for 30 seconds then spit out every 6 hours as needed for sore throat  Dispense: 200 mL; Refill: 0  -     promethazine-dextromethorphan (PROMETHAZINE-DM) 6.25-15 mg/5 mL Syrp; Take 5 mLs by mouth every 4 (four) hours as needed (cough/congestion).  Dispense: 118 mL; Refill: 0  -     SARS-Cov2 (COVID) with FLU/RSV by PCR    2. Type 2 diabetes mellitus with stage 3b chronic kidney disease, with long-term current use of insulin    3. Essential hypertension    4. Aortic atherosclerosis  Overview:  05- CXR  There is extensive aortic atherosclerosis      5. Pulmonary hypertension  Overview:  Echo with pasp 45 mmHg ef 50%.  Group 2 and 3 given ddx and copd      6. Centrilobular emphysema  Overview:  FEV1 60  Maintenance inhaler-spiriva  Decline pharmacy assist.  Quit in 1980s  Albuterol prn-not needing   Oxygen prn following covid but not needing now however walk test indicates that he would still benefit from this.   Will provide portablec concentrator  Pulmonary rehab- completed some but stop after prostate cancer  uptodate flu/covid/pneumonia vaccine.  Off oxygen                   -Lucas Lloyd Jr., MD, AAHIVS      This note was generated with the assistance of ambient listening technology. Verbal consent was obtained by the patient and accompanying visitor(s) for the recording of patient appointment to facilitate this note. I attest to having reviewed and edited the generated note for accuracy, though some syntax or spelling errors may persist. Please contact the author of this note for any clarification.      There are no Patient Instructions on file for this visit.      No follow-ups on file.   Future Appointments    Date Time Provider Department Center   2/17/2025 10:30 AM Jesus Cole MD [facility] URO [facility]   2/18/2025 10:00 AM Lucas Lloyd Jr., MD [facility]   3/31/2025  9:40 AM LAB, [facility]   3/31/2025 10:00 AM LAB, [facility]   4/8/2025  2:30 PM Adis Mendez MD [facility] NEPHRO [facility]   4/28/2025 10:15 AM Lucia Aguiar DPM [facility] POD [place]   5/27/2025  2:15 PM Rosemary Allred OD [facility] OPTOMTY [place]

## 2025-02-17 NOTE — PROGRESS NOTES
Patient ID: Peter Lopez is a 89 y.o. male.    Chief Complaint: FU     HPI  89 y.o. who presents to the Urology clinic for evaluation of hx of prostate cancer. Hx of channel TURP for urinary retention, noted to have high risk prostate cancer ( 2021) completed EBRT  and ADT.  Denies unexplained weight loss, fevers, chills, dysuria, hematuria, voiding dysfunction, kane pain. Last lupron injection documented 8/1/23 . Follows w/ oncologist for leukopenia. Denies unexplained weight loss, gross hematuria, kane tenderness.     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    Type 2 diabetes mellitus with diabetic neuropathy, without long-term current use of insulin 04/11/2023    Leukopenia 06/30/2023    Vitamin D deficiency 06/30/2023    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    Diarrhea 08/10/2023    Monoclonal gammopathy 12/04/2023    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 02/18/2024    Nonproliferative diabetic retinopathy of left eye 02/18/2024    Type 2 diabetes mellitus with stage 3b chronic kidney disease 10/01/2024     Resolved Ambulatory Problems     Diagnosis Date Noted    Elevated PSA 01/10/2013    Hepatitis C 10/16/2013    BPH (benign prostatic hyperplasia) 04/05/2016    Chronic kidney disease, stage 3a 01/28/2020    Hypoglycemia     Hypoxia 03/29/2020    Elevated troponin 04/04/2020     Centrilobular emphysema 04/13/2020    Discharge planning issues 04/14/2020    Gait instability 09/03/2020    Tachycardia with heart rate 121-140 beats per minute 09/03/2020    Sleep-related breathing disorder 12/20/2020    Complex sleep apnea syndrome     Dyspnea 03/11/2021    Chest pain 05/20/2021    Urinary retention 06/22/2021    Pulmonary hypertension due to left ventricular systolic dysfunction 12/13/2022    Chronic respiratory failure with hypoxia 01/12/2024     Past Medical History:   Diagnosis Date    Chronic hepatitis C without mention of hepatic coma     CKD (chronic kidney disease)     COPD (chronic obstructive pulmonary disease)     Diabetes mellitus type I     Diabetes with neurologic complications     H/O colonoscopy     [place] (hard of hearing)     Hypertension     On home oxygen therapy     Prostate cancer     Retinopathy due to secondary diabetes mellitus     Wears hearing aid in both ears          Past Surgical History  Past Surgical History:   Procedure Laterality Date    ESOPHAGOGASTRODUODENOSCOPY N/A 1/7/2025    Procedure: EGD (ESOPHAGOGASTRODUODENOSCOPY);  Surgeon: Angelic Cruz MD;  Location: [facility] ENDO;  Service: Endoscopy;  Laterality: N/A;  11/12- R/s updated instru placed in mail, Hearing device, ASam  12/27/24- lvm on both contact numbers for pc. DBM    NO PAST SURGERIES  05/20/2021    TRANSURETHRAL RESECTION OF PROSTATE USING BIPOLAR CAUTERY N/A 6/22/2021    Procedure: (TURP) Transuretheral Resection of Prostate with BIPOLAR CAUTERY;  Surgeon: Jesus Cole MD;  Location: [facility] OR;  Service: Urology;  Laterality: N/A;  RN Pre OP 6-21-21.  Vaccinated.  C A       Social History       Medications  Current Medications[1]    Allergies  Review of patient's allergies indicates:  No Known Allergies    Patient's PMH, FH, Social hx, Medications, allergies reviewed and updated as pertinent to today's visit    Objective:      Physical Exam  Constitutional:       General: He is not in acute  distress.     Appearance: He is well-developed. He is not ill-appearing, toxic-appearing or diaphoretic.   HENT:      Head: Normocephalic and atraumatic.      Mouth/Throat:      Mouth: Mucous membranes are moist.   Eyes:      Conjunctiva/sclera: Conjunctivae normal.   Pulmonary:      Effort: Pulmonary effort is normal. No respiratory distress.   Abdominal:      General: There is no distension.      Palpations: Abdomen is soft. There is no mass.      Tenderness: There is no abdominal tenderness. There is no guarding.   Musculoskeletal:         General: No swelling or deformity.      Cervical back: Neck supple.   Skin:     General: Skin is warm.      Findings: No rash.   Neurological:      Mental Status: He is alert and oriented to person, place, and time.      Gait: Gait normal.   Psychiatric:         Mood and Affect: Mood normal.         Thought Content: Thought content normal.         Judgment: Judgment normal.             Lab Results   Component Value Date    PSADIAG 0.04 09/24/2024    PSADIAG 0.01 02/12/2024    PSADIAG 0.01 08/09/2023     Assessment:       1. History of prostate cancer        Plan:       High risk  prostate cancer  Doing well  PSA stable and low, no overt signs of recurrence  PSA Due in 2 months    RTC 6 months if PSA stable in 2 months        [1]   Current Outpatient Medications:     ACCU-CHEK GUIDE TEST STRIPS Strp, USE 1 STRIP TO CHECK GLUCOSE THREE TIMES DAILY, Disp: 300 each, Rfl: 3    amLODIPine (NORVASC) 5 MG tablet, Take 1 tablet by mouth once daily, Disp: 90 tablet, Rfl: 3    blood-glucose meter kit, To check BG 1 times daily, to use with insurance preferred meter, Disp: 1 each, Rfl: 0    glipiZIDE (GLUCOTROL) 5 MG tablet, Take 1 tablet (5 mg total) by mouth daily with breakfast., Disp: 90 tablet, Rfl: 2    lancets Misc, To check BG 3 times daily, to use with insurance preferred meter, Disp: 100 each, Rfl: 11    LANTUS SOLOSTAR U-100 INSULIN 100 unit/mL (3 mL) InPn pen, INJECT 10 UNITS  "SUBCUTANEOUSLY IN THE EVENING, Disp: 15 mL, Rfl: 0    LIDOcaine viscous HCl 2% (LIDOCAINE VISCOUS) 2 % Soln, Take 10 mL in oral cavity, gargle for 30 seconds then spit out every 6 hours as needed for sore throat, Disp: 200 mL, Rfl: 0    ofloxacin (FLOXIN) 0.3 % otic solution, Place 5 drops into the left ear 2 (two) times daily., Disp: 5 mL, Rfl: 0    olmesartan (BENICAR) 40 MG tablet, Take 1 tablet by mouth once daily, Disp: 90 tablet, Rfl: 3    pantoprazole (PROTONIX) 40 MG tablet, Take 1 tablet by mouth once daily, Disp: 30 tablet, Rfl: 0    pen needle, diabetic 32 gauge x 5/32" Ndle, Use with insulin pen as directed, Disp: 50 each, Rfl: 11    promethazine-dextromethorphan (PROMETHAZINE-DM) 6.25-15 mg/5 mL Syrp, Take 5 mLs by mouth every 4 (four) hours as needed (cough/congestion)., Disp: 118 mL, Rfl: 0    SPIRIVA RESPIMAT 2.5 mcg/actuation inhaler, INHALE 2 SPRAY(S) BY MOUTH ONCE DAILY, Disp: 4 g, Rfl: 0    atorvastatin (LIPITOR) 20 MG tablet, Take 1 tablet (20 mg total) by mouth every evening., Disp: 90 tablet, Rfl: 3    "

## 2025-02-18 ENCOUNTER — OFFICE VISIT (OUTPATIENT)
Dept: FAMILY MEDICINE | Facility: CLINIC | Age: 89
End: 2025-02-18
Payer: MEDICARE

## 2025-02-18 VITALS
DIASTOLIC BLOOD PRESSURE: 64 MMHG | RESPIRATION RATE: 19 BRPM | TEMPERATURE: 98 F | HEART RATE: 106 BPM | HEIGHT: 70 IN | OXYGEN SATURATION: 93 % | SYSTOLIC BLOOD PRESSURE: 130 MMHG | BODY MASS INDEX: 25.21 KG/M2 | WEIGHT: 176.13 LBS

## 2025-02-18 DIAGNOSIS — I10 ESSENTIAL HYPERTENSION: Chronic | ICD-10-CM

## 2025-02-18 DIAGNOSIS — E11.3591 PROLIFERATIVE DIABETIC RETINOPATHY OF RIGHT EYE ASSOCIATED WITH TYPE 2 DIABETES MELLITUS, UNSPECIFIED PROLIFERATIVE RETINOPATHY TYPE: ICD-10-CM

## 2025-02-18 DIAGNOSIS — C61 PROSTATE CANCER: ICD-10-CM

## 2025-02-18 DIAGNOSIS — N18.32 STAGE 3B CHRONIC KIDNEY DISEASE: Chronic | ICD-10-CM

## 2025-02-18 DIAGNOSIS — E11.21 DIABETES MELLITUS WITH NEPHROPATHY: Primary | Chronic | ICD-10-CM

## 2025-02-18 DIAGNOSIS — I70.0 AORTIC ATHEROSCLEROSIS: Chronic | ICD-10-CM

## 2025-02-18 DIAGNOSIS — R13.10 DYSPHAGIA, UNSPECIFIED TYPE: ICD-10-CM

## 2025-02-18 DIAGNOSIS — J06.9 VIRAL URI WITH COUGH: ICD-10-CM

## 2025-02-18 PROCEDURE — 1101F PT FALLS ASSESS-DOCD LE1/YR: CPT | Mod: CPTII,S$GLB,, | Performed by: FAMILY MEDICINE

## 2025-02-18 PROCEDURE — G2211 COMPLEX E/M VISIT ADD ON: HCPCS | Mod: S$GLB,,, | Performed by: FAMILY MEDICINE

## 2025-02-18 PROCEDURE — 99999 PR PBB SHADOW E&M-EST. PATIENT-LVL IV: CPT | Mod: PBBFAC,,, | Performed by: FAMILY MEDICINE

## 2025-02-18 PROCEDURE — 99214 OFFICE O/P EST MOD 30 MIN: CPT | Mod: S$GLB,,, | Performed by: FAMILY MEDICINE

## 2025-02-18 PROCEDURE — 3288F FALL RISK ASSESSMENT DOCD: CPT | Mod: CPTII,S$GLB,, | Performed by: FAMILY MEDICINE

## 2025-02-18 PROCEDURE — 1160F RVW MEDS BY RX/DR IN RCRD: CPT | Mod: CPTII,S$GLB,, | Performed by: FAMILY MEDICINE

## 2025-02-18 PROCEDURE — 1159F MED LIST DOCD IN RCRD: CPT | Mod: CPTII,S$GLB,, | Performed by: FAMILY MEDICINE

## 2025-02-18 PROCEDURE — 1126F AMNT PAIN NOTED NONE PRSNT: CPT | Mod: CPTII,S$GLB,, | Performed by: FAMILY MEDICINE

## 2025-02-18 RX ORDER — PANTOPRAZOLE SODIUM 40 MG/1
40 TABLET, DELAYED RELEASE ORAL DAILY
Qty: 30 TABLET | Refills: 11 | Status: SHIPPED | OUTPATIENT
Start: 2025-02-18

## 2025-02-18 RX ORDER — ATORVASTATIN CALCIUM 20 MG/1
20 TABLET, FILM COATED ORAL NIGHTLY
Qty: 90 TABLET | Refills: 3 | Status: SHIPPED | OUTPATIENT
Start: 2025-02-18 | End: 2026-02-18

## 2025-02-18 RX ORDER — GLIPIZIDE 5 MG/1
5 TABLET ORAL
Qty: 90 TABLET | Refills: 3 | Status: SHIPPED | OUTPATIENT
Start: 2025-02-18

## 2025-02-18 RX ORDER — AMLODIPINE BESYLATE 5 MG/1
5 TABLET ORAL DAILY
Qty: 90 TABLET | Refills: 3 | Status: SHIPPED | OUTPATIENT
Start: 2025-02-18

## 2025-02-18 RX ORDER — INSULIN GLARGINE 100 [IU]/ML
10 INJECTION, SOLUTION SUBCUTANEOUS NIGHTLY
Qty: 15 ML | Refills: 11 | Status: SHIPPED | OUTPATIENT
Start: 2025-02-18

## 2025-02-18 RX ORDER — OLMESARTAN MEDOXOMIL 40 MG/1
40 TABLET ORAL DAILY
Qty: 90 TABLET | Refills: 3 | Status: SHIPPED | OUTPATIENT
Start: 2025-02-18

## 2025-02-18 RX ORDER — PROMETHAZINE HYDROCHLORIDE AND DEXTROMETHORPHAN HYDROBROMIDE 6.25; 15 MG/5ML; MG/5ML
5 SYRUP ORAL EVERY 4 HOURS PRN
Qty: 118 ML | Refills: 0 | Status: SHIPPED | OUTPATIENT
Start: 2025-02-18 | End: 2025-02-28

## 2025-02-18 RX ORDER — LIDOCAINE HYDROCHLORIDE 20 MG/ML
SOLUTION OROPHARYNGEAL
Qty: 200 ML | Refills: 0 | Status: SHIPPED | OUTPATIENT
Start: 2025-02-18

## 2025-03-05 NOTE — PROGRESS NOTES
Patient Name: Peter Lopez    : 1935  MRN: [id number]        Subjective:     Patient ID: Peter is a 89 y.o. male    Chief Complaint:  Follow-up    Diabetes  He presents for his follow-up diabetic visit. He has type 2 diabetes mellitus. No MedicAlert identification noted. His disease course has been stable. Pertinent negatives for hypoglycemia include no confusion, dizziness, headaches, hunger, mood changes, nervousness/anxiousness, pallor, seizures, sleepiness, speech difficulty, sweats or tremors. Pertinent negatives for diabetes include no blurred vision, no chest pain, no fatigue, no foot paresthesias, no foot ulcerations, no polydipsia, no polyphagia, no polyuria, no visual change, no weakness and no weight loss. Pertinent negatives for hypoglycemia complications include no blackouts, no hospitalization, no nocturnal hypoglycemia, no required assistance and no required glucagon injection. Symptoms are stable. Diabetic complications include nephropathy. Risk factors for coronary artery disease include diabetes mellitus, hypertension, male sex and dyslipidemia. Current diabetic treatment includes oral agent (monotherapy). He is compliant with treatment all of the time. Exercise: does light weights at home and leg exercises at home. Home blood sugar record trend: not checking. An ACE inhibitor/angiotensin II receptor blocker is being taken.   Hypertension  This is a chronic problem. The problem is uncontrolled. Pertinent negatives include no blurred vision, chest pain, headaches, neck pain, palpitations or sweats. Risk factors for coronary artery disease include male gender, diabetes mellitus and dyslipidemia. Past treatments include angiotensin blockers. The current treatment provides significant improvement. Hypertensive end-organ damage includes kidney disease.   URI   Episode onset: 2-3 weeks. The problem has been gradually improving. There has been no fever. The cough is Non-productive. Associated symptoms  "include coughing and rhinorrhea. Pertinent negatives include no chest pain, diarrhea, dysuria, ear pain, headaches, neck pain, plugged ear sensation, sinus pain, swollen glands, vomiting or wheezing.        Review of Systems   Constitutional:  Negative for fatigue and weight loss.   HENT:  Positive for rhinorrhea. Negative for ear pain.    Eyes:  Negative for blurred vision.   Respiratory:  Positive for cough. Negative for wheezing.    Cardiovascular:  Negative for chest pain and palpitations.   Gastrointestinal:  Negative for diarrhea and vomiting.   Endocrine: Negative for polydipsia, polyphagia and polyuria.   Genitourinary:  Negative for dysuria.   Musculoskeletal:  Negative for neck pain.   Integumentary:  Negative for pallor.   Neurological:  Negative for dizziness, tremors, seizures, speech difficulty, weakness, numbness and headaches.   Psychiatric/Behavioral:  Negative for confusion. The patient is not nervous/anxious.         Objective:   /64 (BP Location: Right arm, Patient Position: Sitting)   Pulse 106   Temp 98 °F (36.7 °C) (Oral)   Resp 19   Ht 5' 10" (1.778 m)   Wt 79.9 kg (176 lb 2.4 oz)   SpO2 (!) 93%   BMI 25.27 kg/m²     Physical Exam  Vitals reviewed.   Constitutional:       Appearance: He is well-developed. He is not diaphoretic.   HENT:      Head: Normocephalic.      Right Ear: External ear normal.      Left Ear: External ear normal.      Nose: Nose normal.      Mouth/Throat:      Pharynx: No oropharyngeal exudate.   Neck:      Trachea: No tracheal deviation.   Cardiovascular:      Rate and Rhythm: Normal rate and regular rhythm.      Heart sounds: Normal heart sounds.   Pulmonary:      Effort: Pulmonary effort is normal.      Breath sounds: Normal breath sounds. No wheezing or rales.   Abdominal:      General: Bowel sounds are normal.      Palpations: Abdomen is soft. Abdomen is not rigid. There is no mass.      Tenderness: There is no abdominal tenderness. There is no guarding " or rebound.   Musculoskeletal:      Cervical back: Normal range of motion and neck supple.   Lymphadenopathy:      Cervical: No cervical adenopathy.   Neurological:      Mental Status: He is oriented to person, place, and time.      Gait: Gait normal.        Office Visit on 02/11/2025   Component Date Value Ref Range Status    SARS-CoV2 (COVID-19) Qualitative P* 02/11/2025 Not Detected  Not Detected Final    Comment: This test utilizes a real-time reverse transcription  polymerase chain reaction procedure to amplify and  detect the SARS-CoV-2 and detect the SARS-CoV-2 N2 and E nucleic  acid targets. The analytical sensitivity (limit of detection) of  this assay is 250 copies/mL.    A Detected result implies that the patient is infected with the  SARS-CoV-2 virus and is presumed to be contagious.  A Not Detected result implies that the SARS-CoV-2 target nucleic  acids are not present above the limit of detection. It does not  rule out the possibility of COVID-19 and should not be the sole  basis for treatment decisions. If COVID-19 is strongly suspected  based on clinical and epidemiological history, re-testing should  be considered.    This test is Food and Drug Administration (FDA) approved. Performance   characteristics of this has been independently verified by [facility] Department of Pathology and Laboratory Medicine.      Influenza A, Molecular 02/11/2025 Not Detected  Not Detected Final    Influenza B, Molecular 02/11/2025 Not Detected  Not Detected Final    RSV Ag by Molecular Method 02/11/2025 Not Detected  Not Detected Final   Lab Visit on 02/11/2025   Component Date Value Ref Range Status    Microalbumin, Urine 02/11/2025 72.0  ug/mL Final    Creatinine, Urine 02/11/2025 61.0  23.0 - 375.0 mg/dL Final    Microalb/Creat Ratio 02/11/2025 118.0 (H)  0.0 - 30.0 ug/mg Final   Lab Visit on 02/11/2025   Component Date Value Ref Range Status    WBC 02/11/2025 3.67 (L)  3.90 - 12.70 K/uL Final     RBC 02/11/2025 4.30 (L)  4.60 - 6.20 M/uL Final    Hemoglobin 02/11/2025 12.2 (L)  14.0 - 18.0 g/dL Final    Hematocrit 02/11/2025 38.6 (L)  40.0 - 54.0 % Final    MCV 02/11/2025 90  82 - 98 fL Final    MCH 02/11/2025 28.4  27.0 - 31.0 pg Final    MCHC 02/11/2025 31.6 (L)  32.0 - 36.0 g/dL Final    RDW 02/11/2025 13.6  11.5 - 14.5 % Final    Platelets 02/11/2025 297  150 - 450 K/uL Final    MPV 02/11/2025 10.7  9.2 - 12.9 fL Final    Immature Granulocytes 02/11/2025 0.0  0.0 - 0.5 % Final    Gran # (ANC) 02/11/2025 2.7  1.8 - 7.7 K/uL Final    Immature Grans (Abs) 02/11/2025 0.00  0.00 - 0.04 K/uL Final    Comment: Mild elevation in immature granulocytes is non specific and   can be seen in a variety of conditions including stress response,   acute inflammation, trauma and pregnancy. Correlation with other   laboratory and clinical findings is essential.      Lymph # 02/11/2025 0.7 (L)  1.0 - 4.8 K/uL Final    Mono # 02/11/2025 0.3  0.3 - 1.0 K/uL Final    Eos # 02/11/2025 0.1  0.0 - 0.5 K/uL Final    Baso # 02/11/2025 0.00  0.00 - 0.20 K/uL Final    nRBC 02/11/2025 0  0 /100 WBC Final    Gran % 02/11/2025 72.2  38.0 - 73.0 % Final    Lymph % 02/11/2025 18.8  18.0 - 48.0 % Final    Mono % 02/11/2025 7.4  4.0 - 15.0 % Final    Eosinophil % 02/11/2025 1.6  0.0 - 8.0 % Final    Basophil % 02/11/2025 0.0  0.0 - 1.9 % Final    Differential Method 02/11/2025 Automated   Final    Sodium 02/11/2025 140  136 - 145 mmol/L Final    Potassium 02/11/2025 4.6  3.5 - 5.1 mmol/L Final    Specimen slightly hemolyzed    Chloride 02/11/2025 107  95 - 110 mmol/L Final    CO2 02/11/2025 21 (L)  23 - 29 mmol/L Final    Glucose 02/11/2025 136 (H)  70 - 110 mg/dL Final    BUN 02/11/2025 24 (H)  8 - 23 mg/dL Final    Creatinine 02/11/2025 1.6 (H)  0.5 - 1.4 mg/dL Final    Calcium 02/11/2025 9.9  8.7 - 10.5 mg/dL Final    Total Protein 02/11/2025 7.9  6.0 - 8.4 g/dL Final    Albumin 02/11/2025 4.0  3.5 - 5.2 g/dL Final    Total Bilirubin  02/11/2025 0.4  0.1 - 1.0 mg/dL Final    Comment: For infants and newborns, interpretation of results should be based  on gestational age, weight and in agreement with clinical  observations.    Premature Infant recommended reference ranges:  Up to 24 hours.............<8.0 mg/dL  Up to 48 hours............<12.0 mg/dL  3-5 days..................<15.0 mg/dL  6-29 days.................<15.0 mg/dL      Alkaline Phosphatase 02/11/2025 72  40 - 150 U/L Final    AST 02/11/2025 39  10 - 40 U/L Final    ALT 02/11/2025 27  10 - 44 U/L Final    eGFR 02/11/2025 41 (A)  >60 mL/min/1.73 m^2 Final    Anion Gap 02/11/2025 12  8 - 16 mmol/L Final    Hemoglobin A1C 02/11/2025 6.9 (H)  4.0 - 5.6 % Final    Comment: ADA Screening Guidelines:  5.7-6.4%  Consistent with prediabetes  >or=6.5%  Consistent with diabetes    High levels of fetal hemoglobin interfere with the HbA1C  assay. Heterozygous hemoglobin variants (HbS, HgC, etc)do  not significantly interfere with this assay.   However, presence of multiple variants may affect accuracy.      Estimated Avg Glucose 02/11/2025 151 (H)  68 - 131 mg/dL Final    Cholesterol 02/11/2025 132  120 - 199 mg/dL Final    Comment: The National Cholesterol Education Program (NCEP) has set the  following guidelines (reference ranges) for Cholesterol:  Optimal.....................<200 mg/dL  Borderline High.............200-239 mg/dL  High........................> or = 240 mg/dL      Triglycerides 02/11/2025 43  30 - 150 mg/dL Final    Comment: The National Cholesterol Education Program (NCEP) has set the  following guidelines (reference values) for triglycerides:  Normal......................<150 mg/dL  Borderline High.............150-199 mg/dL  High........................200-499 mg/dL      HDL 02/11/2025 66  40 - 75 mg/dL Final    Comment: The National Cholesterol Education Program (NCEP) has set the  following guidelines (reference values) for HDL Cholesterol:  Low...............<40  mg/dL  Optimal...........>60 mg/dL      LDL Cholesterol 02/11/2025 57.4 (L)  63.0 - 159.0 mg/dL Final    Comment: The National Cholesterol Education Program (NCEP) has set the  following guidelines (reference values) for LDL Cholesterol:  Optimal.......................<130 mg/dL  Borderline High...............130-159 mg/dL  High..........................160-189 mg/dL  Very High.....................>190 mg/dL      HDL/Cholesterol Ratio 02/11/2025 50.0  20.0 - 50.0 % Final    Total Cholesterol/HDL Ratio 02/11/2025 2.0  2.0 - 5.0 Final    Non-HDL Cholesterol 02/11/2025 66  mg/dL Final    Comment: Risk category and Non-HDL cholesterol goals:  Coronary heart disease (CHD)or equivalent (10-year risk of CHD >20%):  Non-HDL cholesterol goal     <130 mg/dL  Two or more CHD risk factors and 10-year risk of CHD <= 20%:  Non-HDL cholesterol goal     <160 mg/dL  0 to 1 CHD risk factor:  Non-HDL cholesterol goal     <190 mg/dL        Assessment        ICD-10-CM ICD-9-CM   1. Diabetes mellitus with nephropathy  E11.21 250.40     583.81   2. 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 Dysphagia, unspecified type  R13.10 787.20   4. Stage 3b chronic kidney disease  N18.32 585.3   5. Essential hypertension  I10 401.9   6. Aortic atherosclerosis  I70.0 440.0   7. Prostate cancer  C61 185   8. Viral URI with cough  J06.9 465.9         Plan:     1. Diabetes mellitus with nephropathy  Overview:  Lab Results   Component Value Date    HGBA1C 6.9 (H) 02/11/2025    HGBA1C >14.0 (H) 08/19/2024    HGBA1C 13.3 (H) 05/20/2024     Lab Results   Component Value Date    LDLCALC 57.4 (L) 02/11/2025     Lab Results   Component Value Date    MICALBCREAT 118.0 (H) 02/11/2025        Orders:  -     atorvastatin (LIPITOR) 20 MG tablet; Take 1 tablet (20 mg total) by mouth every evening.  Dispense: 90 tablet; Refill: 3  -     glipiZIDE  (GLUCOTROL) 5 MG tablet; Take 1 tablet (5 mg total) by mouth daily with breakfast.  Dispense: 90 tablet; Refill: 3  -     LANTUS SOLOSTAR U-100 INSULIN 100 unit/mL (3 mL) InPn pen; Inject 10 Units into the skin every evening.  Dispense: 15 mL; Refill: 11  -     Microalbumin/creatinine urine ratio; Future; Expected date: 07/21/2025  -     CBC auto differential; Future; Expected date: 07/21/2025  -     Comprehensive metabolic panel; Future; Expected date: 07/21/2025  -     Hemoglobin A1c; Future; Expected date: 07/21/2025  -     Lipid panel; Future; Expected date: 07/21/2025  A1c improved.  Continue current diabetic regimen.    2. 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  Continue retinopathy management as per ophthalmology.    3. Dysphagia, unspecified type  -     pantoprazole (PROTONIX) 40 MG tablet; Take 1 tablet (40 mg total) by mouth once daily.  Dispense: 30 tablet; Refill: 11  Continue PPI    4. Stage 3b chronic kidney disease  Overview:  Lab Results   Component Value Date    EGFRNORACEVR 41 (A) 02/11/2025    EGFRNORACEVR 31.9 (A) 09/24/2024    EGFRNORACEVR 36 (A) 08/31/2024    EGFRNORACEVR 27 (A) 08/19/2024        Orders:  -     CBC auto differential; Future; Expected date: 07/21/2025  -     Comprehensive metabolic panel; Future; Expected date: 07/21/2025  Kidney functio stable.  Continue good hydration.    5. Essential hypertension  -     amLODIPine (NORVASC) 5 MG tablet; Take 1 tablet (5 mg total) by mouth once daily.  Dispense: 90 tablet; Refill: 3  -     olmesartan (BENICAR) 40 MG tablet; Take 1 tablet (40 mg total) by mouth once daily.  Dispense: 90 tablet; Refill: 3  -     Comprehensive metabolic panel; Future; Expected date: 07/21/2025  -     Lipid panel; Future; Expected date: 07/21/2025  BP at goal.  Continue current regimen.    6.  Aortic atherosclerosis  Overview:  05- CXR  There is extensive aortic atherosclerosis  Continue atorvastatin.    7. Prostate cancer  Continue active surveillance as per urology recommendations.    8. Viral URI with cough  -     promethazine-dextromethorphan (PROMETHAZINE-DM) 6.25-15 mg/5 mL Syrp; Take 5 mLs by mouth every 4 (four) hours as needed (cough/congestion).  Dispense: 118 mL; Refill: 0  -     LIDOcaine viscous HCl 2% (LIDOCAINE VISCOUS) 2 % Soln; Take 10 mL in oral cavity, gargle for 30 seconds then spit out every 6 hours as needed for sore throat  Dispense: 200 mL; Refill: 0  Advised symptomatic care with plenty of fluids, honey and lemon, rest, and prescribed regimen.  OTC Ibuprofen or Tylenol for pain.  Discussed course of a viral respiratory infections.  Explained that after respiratory infection is better, may continue to have a dry cough for 2-4 weeks, and sometimes longer.  Cough lasting more than 3 weeks may need further evaluation.  To help ease a cough at home, can try:  -Drinking water, warm broths, and teas to stay hydrated  -Drinking lemon and honey (not suitable before the age of 12 months)  -Avoiding irritants such as pollen, smoke, and dust  -Using a humidifier  -Breathing in steam from a hot shower or bath to open the airways         -Lucas Lloyd Jr., MD, AAHIVS      This office note has been dictated.  This dictation has been generated using M-Modal Fluency Direct dictation; some phonetic errors may occur.         There are no Patient Instructions on file for this visit.      Follow up in about 5 months (around 7/18/2025) for Diabetes, Hypertension, Chronic Kidney Disease (labs a week prior).   Future Appointments   Date Time Provider Department Center   3/31/2025  9:40 AM LAB, [facility] DRAW STATION [facility] LAB [facility]   3/31/2025 10:00 AM LAB, [facility] DRAW STATION [facility] LAB [facility]   4/8/2025  2:30 PM Adis Mendez MD [facility] NEPHRO [facility]   4/15/2025 10:00 AM VARINDER   [facility] LAB [facility]   4/28/2025 10:15 AM Lucia Aguiar DPM [facility] POD [place]   5/27/2025  2:15 PM Rosemary Allred OD [facility] OPTOMTY [place]   7/15/2025  8:10 AM LAB, [facility] DRAW STATION [facility] LAB [facility]   7/15/2025  8:30 AM LAB, [facility] DRAW STATION [facility]   7/22/2025 10:00 AM Lucas Lloyd Jr., MD [facility]   8/18/2025  9:00 AM Jesus Cole MD [facility]

## 2025-03-21 ENCOUNTER — HOSPITAL ENCOUNTER (INPATIENT)
Facility: HOSPITAL | Age: 89
LOS: 2 days | Discharge: HOME | DRG: 177 | End: 2025-03-23
Attending: EMERGENCY MEDICINE | Admitting: HOSPITALIST
Payer: MEDICARE

## 2025-03-21 DIAGNOSIS — R06.02 SOB (SHORTNESS OF BREATH): ICD-10-CM

## 2025-03-21 DIAGNOSIS — U07.1 COVID-19 VIRUS INFECTION: ICD-10-CM

## 2025-03-21 DIAGNOSIS — R09.02 HYPOXIA: ICD-10-CM

## 2025-03-21 DIAGNOSIS — M79.652 BILATERAL THIGH PAIN: Primary | ICD-10-CM

## 2025-03-21 DIAGNOSIS — M79.651 BILATERAL THIGH PAIN: Primary | ICD-10-CM

## 2025-03-21 DIAGNOSIS — R07.9 CHEST PAIN: ICD-10-CM

## 2025-03-21 LAB
ALBUMIN SERPL BCP-MCNC: 4.2 G/DL (ref 3.5–5.2)
ALP SERPL-CCNC: 64 U/L (ref 40–150)
ALT SERPL W/O P-5'-P-CCNC: 24 U/L (ref 10–44)
ANION GAP SERPL CALC-SCNC: 11 MMOL/L (ref 8–16)
APTT PPP: 27.7 SEC (ref 21–32)
AST SERPL-CCNC: 40 U/L (ref 10–40)
BASOPHILS # BLD AUTO: 0.02 K/UL (ref 0–0.2)
BASOPHILS NFR BLD: 0.5 % (ref 0–1.9)
BILIRUB SERPL-MCNC: 0.5 MG/DL (ref 0.1–1)
BILIRUB UR QL STRIP: NEGATIVE
BNP SERPL-MCNC: 65 PG/ML (ref 0–99)
BUN SERPL-MCNC: 28 MG/DL (ref 8–23)
CALCIUM SERPL-MCNC: 9.9 MG/DL (ref 8.7–10.5)
CHLORIDE SERPL-SCNC: 104 MMOL/L (ref 95–110)
CK SERPL-CCNC: 275 U/L (ref 20–200)
CLARITY UR: CLEAR
CO2 SERPL-SCNC: 24 MMOL/L (ref 23–29)
COLOR UR: COLORLESS
CREAT SERPL-MCNC: 1.6 MG/DL (ref 0.5–1.4)
CTP QC/QA: YES
CTP QC/QA: YES
D DIMER PPP IA.FEU-MCNC: 0.63 MG/L FEU
DIFFERENTIAL METHOD BLD: ABNORMAL
EOSINOPHIL # BLD AUTO: 0 K/UL (ref 0–0.5)
EOSINOPHIL NFR BLD: 0.9 % (ref 0–8)
ERYTHROCYTE [DISTWIDTH] IN BLOOD BY AUTOMATED COUNT: 14.3 % (ref 11.5–14.5)
ERYTHROCYTE [SEDIMENTATION RATE] IN BLOOD BY PHOTOMETRIC METHOD: 63 MM/HR (ref 0–23)
EST. GFR  (NO RACE VARIABLE): 41 ML/MIN/1.73 M^2
FERRITIN SERPL-MCNC: 39 NG/ML (ref 20–300)
GLUCOSE SERPL-MCNC: 90 MG/DL (ref 70–110)
GLUCOSE UR QL STRIP: NEGATIVE
HCT VFR BLD AUTO: 40.8 % (ref 40–54)
HGB BLD-MCNC: 13.3 G/DL (ref 14–18)
HGB UR QL STRIP: ABNORMAL
IMM GRANULOCYTES # BLD AUTO: 0.01 K/UL (ref 0–0.04)
IMM GRANULOCYTES NFR BLD AUTO: 0.2 % (ref 0–0.5)
INR PPP: 1 (ref 0.8–1.2)
KETONES UR QL STRIP: NEGATIVE
LACTATE SERPL-SCNC: 0.7 MMOL/L (ref 0.5–2.2)
LDH SERPL L TO P-CCNC: 270 U/L (ref 110–260)
LEUKOCYTE ESTERASE UR QL STRIP: NEGATIVE
LYMPHOCYTES # BLD AUTO: 0.6 K/UL (ref 1–4.8)
LYMPHOCYTES NFR BLD: 12.6 % (ref 18–48)
MCH RBC QN AUTO: 28.4 PG (ref 27–31)
MCHC RBC AUTO-ENTMCNC: 32.6 G/DL (ref 32–36)
MCV RBC AUTO: 87 FL (ref 82–98)
MONOCYTES # BLD AUTO: 0.3 K/UL (ref 0.3–1)
MONOCYTES NFR BLD: 6.8 % (ref 4–15)
NEUTROPHILS # BLD AUTO: 3.5 K/UL (ref 1.8–7.7)
NEUTROPHILS NFR BLD: 79 % (ref 38–73)
NITRITE UR QL STRIP: NEGATIVE
NRBC BLD-RTO: 0 /100 WBC
PH UR STRIP: 6 [PH] (ref 5–8)
PLATELET # BLD AUTO: 235 K/UL (ref 150–450)
PMV BLD AUTO: 9.3 FL (ref 9.2–12.9)
POC MOLECULAR INFLUENZA A AGN: NEGATIVE
POC MOLECULAR INFLUENZA B AGN: NEGATIVE
POCT GLUCOSE: 76 MG/DL (ref 70–110)
POTASSIUM SERPL-SCNC: 4.1 MMOL/L (ref 3.5–5.1)
PROT SERPL-MCNC: 8.4 G/DL (ref 6–8.4)
PROT UR QL STRIP: NEGATIVE
PROTHROMBIN TIME: 11 SEC (ref 9–12.5)
RBC # BLD AUTO: 4.68 M/UL (ref 4.6–6.2)
SARS-COV-2 RDRP RESP QL NAA+PROBE: POSITIVE
SODIUM SERPL-SCNC: 139 MMOL/L (ref 136–145)
SP GR UR STRIP: 1.01 (ref 1–1.03)
TROPONIN I SERPL DL<=0.01 NG/ML-MCNC: 0.01 NG/ML (ref 0–0.03)
TROPONIN I SERPL DL<=0.01 NG/ML-MCNC: 0.03 NG/ML (ref 0–0.03)
URN SPEC COLLECT METH UR: ABNORMAL
UROBILINOGEN UR STRIP-ACNC: NEGATIVE EU/DL
WBC # BLD AUTO: 4.44 K/UL (ref 3.9–12.7)

## 2025-03-21 PROCEDURE — 25000003 PHARM REV CODE 250: Performed by: EMERGENCY MEDICINE

## 2025-03-21 PROCEDURE — 94799 UNLISTED PULMONARY SVC/PX: CPT

## 2025-03-21 PROCEDURE — 85730 THROMBOPLASTIN TIME PARTIAL: CPT

## 2025-03-21 PROCEDURE — 85379 FIBRIN DEGRADATION QUANT: CPT

## 2025-03-21 PROCEDURE — 25000242 PHARM REV CODE 250 ALT 637 W/ HCPCS: Performed by: EMERGENCY MEDICINE

## 2025-03-21 PROCEDURE — 83880 ASSAY OF NATRIURETIC PEPTIDE: CPT | Performed by: EMERGENCY MEDICINE

## 2025-03-21 PROCEDURE — 83615 LACTATE (LD) (LDH) ENZYME: CPT

## 2025-03-21 PROCEDURE — 87635 SARS-COV-2 COVID-19 AMP PRB: CPT | Performed by: EMERGENCY MEDICINE

## 2025-03-21 PROCEDURE — 82550 ASSAY OF CK (CPK): CPT

## 2025-03-21 PROCEDURE — 84484 ASSAY OF TROPONIN QUANT: CPT | Mod: 91 | Performed by: EMERGENCY MEDICINE

## 2025-03-21 PROCEDURE — 87502 INFLUENZA DNA AMP PROBE: CPT

## 2025-03-21 PROCEDURE — 93010 ELECTROCARDIOGRAM REPORT: CPT | Mod: ,,, | Performed by: INTERNAL MEDICINE

## 2025-03-21 PROCEDURE — 63600175 PHARM REV CODE 636 W HCPCS: Mod: JZ,TB

## 2025-03-21 PROCEDURE — 81003 URINALYSIS AUTO W/O SCOPE: CPT | Performed by: EMERGENCY MEDICINE

## 2025-03-21 PROCEDURE — 94640 AIRWAY INHALATION TREATMENT: CPT

## 2025-03-21 PROCEDURE — 8E0ZXY6 ISOLATION: ICD-10-PCS | Performed by: HOSPITALIST

## 2025-03-21 PROCEDURE — 93005 ELECTROCARDIOGRAM TRACING: CPT

## 2025-03-21 PROCEDURE — 25000003 PHARM REV CODE 250

## 2025-03-21 PROCEDURE — 85652 RBC SED RATE AUTOMATED: CPT

## 2025-03-21 PROCEDURE — 96374 THER/PROPH/DIAG INJ IV PUSH: CPT

## 2025-03-21 PROCEDURE — 25500020 PHARM REV CODE 255: Performed by: EMERGENCY MEDICINE

## 2025-03-21 PROCEDURE — 83605 ASSAY OF LACTIC ACID: CPT | Performed by: EMERGENCY MEDICINE

## 2025-03-21 PROCEDURE — 85610 PROTHROMBIN TIME: CPT

## 2025-03-21 PROCEDURE — 99285 EMERGENCY DEPT VISIT HI MDM: CPT | Mod: 25

## 2025-03-21 PROCEDURE — 85025 COMPLETE CBC W/AUTO DIFF WBC: CPT | Performed by: EMERGENCY MEDICINE

## 2025-03-21 PROCEDURE — 94761 N-INVAS EAR/PLS OXIMETRY MLT: CPT

## 2025-03-21 PROCEDURE — 82962 GLUCOSE BLOOD TEST: CPT

## 2025-03-21 PROCEDURE — 82728 ASSAY OF FERRITIN: CPT

## 2025-03-21 PROCEDURE — 11000001 HC ACUTE MED/SURG PRIVATE ROOM

## 2025-03-21 PROCEDURE — 63600175 PHARM REV CODE 636 W HCPCS: Performed by: EMERGENCY MEDICINE

## 2025-03-21 PROCEDURE — 27000221 HC OXYGEN, UP TO 24 HOURS

## 2025-03-21 PROCEDURE — 99900035 HC TECH TIME PER 15 MIN (STAT)

## 2025-03-21 PROCEDURE — 80053 COMPREHEN METABOLIC PANEL: CPT | Performed by: EMERGENCY MEDICINE

## 2025-03-21 PROCEDURE — XW033E5 INTRODUCTION OF REMDESIVIR ANTI-INFECTIVE INTO PERIPHERAL VEIN, PERCUTANEOUS APPROACH, NEW TECHNOLOGY GROUP 5: ICD-10-PCS | Performed by: HOSPITALIST

## 2025-03-21 PROCEDURE — 63600175 PHARM REV CODE 636 W HCPCS

## 2025-03-21 PROCEDURE — 27000207 HC ISOLATION

## 2025-03-21 PROCEDURE — 21400001 HC TELEMETRY ROOM

## 2025-03-21 RX ORDER — IBUPROFEN 200 MG
16 TABLET ORAL
Status: DISCONTINUED | OUTPATIENT
Start: 2025-03-21 | End: 2025-03-23 | Stop reason: HOSPADM

## 2025-03-21 RX ORDER — DEXAMETHASONE SODIUM PHOSPHATE 4 MG/ML
8 INJECTION, SOLUTION INTRA-ARTICULAR; INTRALESIONAL; INTRAMUSCULAR; INTRAVENOUS; SOFT TISSUE
Status: COMPLETED | OUTPATIENT
Start: 2025-03-21 | End: 2025-03-21

## 2025-03-21 RX ORDER — SODIUM CHLORIDE 0.9 % (FLUSH) 0.9 %
10 SYRINGE (ML) INJECTION
Status: DISCONTINUED | OUTPATIENT
Start: 2025-03-21 | End: 2025-03-23 | Stop reason: HOSPADM

## 2025-03-21 RX ORDER — IPRATROPIUM BROMIDE AND ALBUTEROL SULFATE 2.5; .5 MG/3ML; MG/3ML
3 SOLUTION RESPIRATORY (INHALATION) ONCE
Status: COMPLETED | OUTPATIENT
Start: 2025-03-21 | End: 2025-03-21

## 2025-03-21 RX ORDER — IBUPROFEN 200 MG
24 TABLET ORAL
Status: DISCONTINUED | OUTPATIENT
Start: 2025-03-21 | End: 2025-03-23 | Stop reason: HOSPADM

## 2025-03-21 RX ORDER — IPRATROPIUM BROMIDE AND ALBUTEROL SULFATE 2.5; .5 MG/3ML; MG/3ML
3 SOLUTION RESPIRATORY (INHALATION)
Status: COMPLETED | OUTPATIENT
Start: 2025-03-21 | End: 2025-03-21

## 2025-03-21 RX ORDER — ASCORBIC ACID 500 MG
500 TABLET ORAL 2 TIMES DAILY
Status: DISCONTINUED | OUTPATIENT
Start: 2025-03-21 | End: 2025-03-23 | Stop reason: HOSPADM

## 2025-03-21 RX ORDER — ASPIRIN 325 MG
325 TABLET ORAL
Status: COMPLETED | OUTPATIENT
Start: 2025-03-21 | End: 2025-03-21

## 2025-03-21 RX ORDER — ENOXAPARIN SODIUM 100 MG/ML
1 INJECTION SUBCUTANEOUS 2 TIMES DAILY
Status: DISCONTINUED | OUTPATIENT
Start: 2025-03-21 | End: 2025-03-23 | Stop reason: HOSPADM

## 2025-03-21 RX ORDER — GLUCAGON 1 MG
1 KIT INJECTION
Status: DISCONTINUED | OUTPATIENT
Start: 2025-03-21 | End: 2025-03-23 | Stop reason: HOSPADM

## 2025-03-21 RX ADMIN — ENOXAPARIN SODIUM 80 MG: 80 INJECTION SUBCUTANEOUS at 08:03

## 2025-03-21 RX ADMIN — IPRATROPIUM BROMIDE AND ALBUTEROL SULFATE 3 ML: 2.5; .5 SOLUTION RESPIRATORY (INHALATION) at 02:03

## 2025-03-21 RX ADMIN — ASPIRIN 325 MG ORAL TABLET 325 MG: 325 PILL ORAL at 02:03

## 2025-03-21 RX ADMIN — IOHEXOL 75 ML: 350 INJECTION, SOLUTION INTRAVENOUS at 04:03

## 2025-03-21 RX ADMIN — REMDESIVIR 200 MG: 100 INJECTION, POWDER, LYOPHILIZED, FOR SOLUTION INTRAVENOUS at 07:03

## 2025-03-21 RX ADMIN — IPRATROPIUM BROMIDE AND ALBUTEROL SULFATE 3 ML: 2.5; .5 SOLUTION RESPIRATORY (INHALATION) at 06:03

## 2025-03-21 RX ADMIN — OXYCODONE HYDROCHLORIDE AND ACETAMINOPHEN 500 MG: 500 TABLET ORAL at 08:03

## 2025-03-21 RX ADMIN — DEXAMETHASONE SODIUM PHOSPHATE 8 MG: 4 INJECTION INTRA-ARTICULAR; INTRALESIONAL; INTRAMUSCULAR; INTRAVENOUS; SOFT TISSUE at 03:03

## 2025-03-21 NOTE — ED NOTES
Pt states glucose was 77 when checked in ED. States he feels weak and shaky. Okay per Dr. Navarro to give orange juice. Provided 8oz at this time.

## 2025-03-21 NOTE — HPI
"Peter Lopez is a 89 y.o. male with HFpEF  , Non-Insulin-dependent DM2, COPD (  on home oxygen 1.5L), HTN, HLD, CKD 3b, and Monoclonal gammopathy  who presented to [facility] ED on 03/21/2025 for eval and treatment of non-productive cough.  Has developed and worsened over the past few days.  States he had an episode of worsening cough that lasted for approximately 3 minutes, reporting it produced a white mucous. Notes he took Nyquil without relief. States that today he felt "feverish," "shaky," and "off" as well. Patient reports any activity makes him feel horrible. Shortness of breath worse with activity.  There is associated subjective fevers, chills, myalgias, dyspnea.  They deny associated CP, abdo pain, bowel/urinary issues.   No notable recent healthcare encounters.    ED course notable for the following: COVID (+).  Attempted get patient up to bedside to urinate however he became severely short of breath and had that lay back in the bed.   Required 3L LFNC to maintain sats.   Exam with clear lungs.  Labs ESR 63, , .  CTA chest: no PE, but patchy ground glass opacities noted.  ED therapy measures: Dexamethasone IV.  They were admitted to [facility] for further evaluation and treatment.    "

## 2025-03-21 NOTE — ED TRIAGE NOTES
Pt presents to ED via POV with c/o productive cough, generalized body aches, chills, and SOB x3 days. Took Nyquil last night for symptoms which did not improve. Afebrile in ED. Hx COPD, uses home O2 at 1.5L via NC at home PRN. O2 sats 92% on RA, and states typically sats are 94% baseline. States SOB is worse with exertion. Works in public setting. NAD noted.

## 2025-03-21 NOTE — ED PROVIDER NOTES
"Encounter Date: 3/21/2025    SCRIBE #1 NOTE: I, Jennifer Lau, am scribing for, and in the presence of,  Carissa Navarro DO. I have scribed the following portions of the note - Other sections scribed: HPI, ROS, PE.       History     Chief Complaint   Patient presents with    Cough     Pt w/ hx of COPD uses home O2 1.5 L PRN.  Pt presents w/ c/o non-prod cough,  worsened sob, body aches and "feverish".  Temp 98.1. O2 sat 92% RA, no s/s of resp distress, speaking in full sentences. Pt also c/o pain to bilat posterior thighs since today.   Denies CP     Peter Lopez is a 89 y.o. male with Hx of HTN, DM Type 1, prostate cancer with prostate resection, COPD, who presents to the ED for chief complaint of cough worsening last night. States he had an episode of worsening cough that lasted for approximately 3 minutes, reporting it produced a white mucous. Notes he took Nyquil without relief. States that today he felt "feverish," "shaky," and "off" as well.  Activity makes him feel very bad.  No other exacerbating or alleviating factors.       The history is provided by the patient. No  was used.     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 DBM    NO PAST SURGERIES  05/20/2021    TRANSURETHRAL RESECTION OF PROSTATE USING BIPOLAR CAUTERY N/A 6/22/2021    Procedure: (TURP) Transuretheral Resection of Prostate with BIPOLAR CAUTERY;  Surgeon: Jesus Cole MD;  Location: [facility];  Service: Urology;  Laterality: N/A;  RN Pre OP 6-21-21.  Vaccinated.  C A     Family History   Problem Relation Name Age of Onset    Cancer Mother      Asbestos Father      Liver disease Neg Hx       Social History[1]  Review of Systems   Constitutional:  Positive for chills and fever.   HENT:  Positive for congestion. Negative for sore throat.    Eyes:  Negative for pain.   Respiratory:  Positive for cough and wheezing. Negative for shortness of breath.    Cardiovascular:  Positive for chest pain.   Gastrointestinal:  Negative for abdominal pain.   Genitourinary:  Negative for dysuria.   Musculoskeletal:  Negative for back pain.   Skin:  Negative for rash.   Neurological:  Negative for headaches.   All other systems reviewed and are negative.      Physical Exam     Initial Vitals [03/21/25 1308]   BP Pulse Resp Temp SpO2   (!) 164/89 102 20 98.1 °F (36.7 °C) (!) 92 %      MAP       --         Physical Exam    Nursing note and vitals reviewed.  Constitutional: He appears well-developed and well-nourished. He is not diaphoretic. He appears distressed.   HENT:   Head: Atraumatic.   Clear nasal discharge.    Eyes: EOM are normal. Pupils are equal, round, and reactive to light.   Neck: Neck supple. No JVD present.   Normal range of motion.  Cardiovascular:  Regular rhythm, normal heart sounds and intact distal pulses.   Tachycardia present.   Exam reveals no gallop and no friction rub.       No murmur heard.  Tachycardic at 100 bpm.    Pulmonary/Chest: He is in respiratory distress. He has wheezes. He has no rhonchi. He has no  rales.   Diminished breath sounds with inspiratory and expiratory wheezes.    Abdominal: Abdomen is soft. Bowel sounds are normal. He exhibits no distension. There is no abdominal tenderness. There is no rebound and no guarding.   Musculoskeletal:         General: Normal range of motion.      Cervical back: Normal range of motion and neck supple.     Lymphadenopathy:     He has no cervical adenopathy.   Neurological: He is alert and oriented to person, place, and time. He has normal strength. No sensory deficit.   Skin: Skin is warm and dry. Capillary refill takes less than 2 seconds. No rash noted.   Psychiatric: He has a normal mood and affect. Thought content normal.         ED Course   Critical Care    Date/Time: 3/21/2025 5:24 PM    Performed by: Carissa Navarro DO  Authorized by: Carissa Navarro DO  Direct patient critical care time: 6 minutes  Additional history critical care time: 6 minutes  Ordering / reviewing critical care time: 6 minutes  Documentation critical care time: 6 minutes  Consulting other physicians critical care time: 6 minutes  Total critical care time (exclusive of procedural time) : 30 minutes  Critical care was necessary to treat or prevent imminent or life-threatening deterioration of the following conditions: circulatory failure and respiratory failure.  Critical care was time spent personally by me on the following activities: evaluation of patient's response to treatment, examination of patient, obtaining history from patient or surrogate, ordering and performing treatments and interventions, ordering and review of laboratory studies, ordering and review of radiographic studies, pulse oximetry, re-evaluation of patient's condition and review of old charts.        Labs Reviewed   CBC W/ AUTO DIFFERENTIAL - Abnormal       Result Value    WBC 4.44      RBC 4.68      Hemoglobin 13.3 (*)     Hematocrit 40.8      MCV 87      MCH 28.4      MCHC 32.6      RDW 14.3      Platelets 235      MPV 9.3       Immature Granulocytes 0.2      Gran # (ANC) 3.5      Immature Grans (Abs) 0.01      Lymph # 0.6 (*)     Mono # 0.3      Eos # 0.0      Baso # 0.02      nRBC 0      Gran % 79.0 (*)     Lymph % 12.6 (*)     Mono % 6.8      Eosinophil % 0.9      Basophil % 0.5      Differential Method Automated     COMPREHENSIVE METABOLIC PANEL - Abnormal    Sodium 139      Potassium 4.1      Chloride 104      CO2 24      Glucose 90      BUN 28 (*)     Creatinine 1.6 (*)     Calcium 9.9      Total Protein 8.4      Albumin 4.2      Total Bilirubin 0.5      Alkaline Phosphatase 64      AST 40      ALT 24      eGFR 41 (*)     Anion Gap 11     TROPONIN I - Abnormal    Troponin I 0.033 (*)    URINALYSIS, REFLEX TO URINE CULTURE - Abnormal    Specimen UA Urine, Clean Catch      Color, UA Colorless (*)     Appearance, UA Clear      pH, UA 6.0      Specific Gravity, UA 1.010      Protein, UA Negative      Glucose, UA Negative      Ketones, UA Negative      Bilirubin (UA) Negative      Occult Blood UA Trace (*)     Nitrite, UA Negative      Urobilinogen, UA Negative      Leukocytes, UA Negative      Narrative:     Specimen Source->Urine   SARS-COV-2 RDRP GENE - Abnormal    POC Rapid COVID Positive (*)      Acceptable Yes     B-TYPE NATRIURETIC PEPTIDE    BNP 65     LACTIC ACID, PLASMA    Lactate (Lactic Acid) 0.7     TROPONIN I   POCT INFLUENZA A/B MOLECULAR    POC Molecular Influenza A Ag Negative      POC Molecular Influenza B Ag Negative       Acceptable Yes     POCT GLUCOSE    POCT Glucose 76     POCT GLUCOSE MONITORING CONTINUOUS          Imaging Results              US Lower Extremity Veins Bilateral (Final result)  Result time 03/21/25 17:14:20   Procedure changed from US Lower Extremity Veins Bilateral     Final result by Dorene Phillips MD (03/21/25 17:14:20)                   Impression:      No evidence of lower extremity deep venous thrombosis.      Electronically signed by: Dorene Phillips  MD  Date:    03/21/2025  Time:    17:14               Narrative:    EXAMINATION:  US LOWER EXTREMITY VEINS BILATERAL    CLINICAL HISTORY:  pain to bilat posterior thighs since today; Pain in right thigh    TECHNIQUE:  Duplex and color flow Doppler evaluation of the bilateral lower extremity veins was performed.    COMPARISON:  March 2021.    FINDINGS:  No evidence of clot involving the bilateral common femoral, greater saphenous, femoral, popliteal veins.  All venous structures demonstrate normal respiratory phasicity and augment adequately.  No evidence of soft tissue mass or Baker's cyst.                                        CTA Chest Non-Coronary (PE Studies) (Final result)  Result time 03/21/25 17:00:53      Final result by Antoinette Landin MD (03/21/25 17:00:53)                   Impression:      1. No pulmonary embolus.  2. Stable dilated esophagus again noted with an air-fluid level in the mid esophagus raising risk for aspiration.  3. Stable severe centrilobular emphysema again noted.  4. Stable significant calcification of the thoracic aorta in addition to noncalcified atherosclerotic plaque with an area of questioned ulcerated plaque in the descending aorta as described above but no leakage identified.  This report was flagged in Epic as abnormal.      Electronically signed by: Antoinette Landin  Date:    03/21/2025  Time:    17:00               Narrative:    EXAMINATION:  Pulmonary embolism (PE) suspected, high prob;    CLINICAL HISTORY:  89 y/o  Male  Pulmonary embolism (PE) suspected, high prob;    TECHNIQUE:  75 cc omnipaque 350 IV contrast was administered, no oral contrast was administered    Arterial phase - chest    Reconstructions - coronal and sagittal    3-D imaging - axial MIP    COMPARISON:  Prior CT of the chest dated 12/06/2022.    FINDINGS:  Lines and tubes: None.    Cardiovascular: Technically adequate exam of the pulmonary arteries. No pulmonary embolism identified.    No  thoracic aortic aneurysm or dissection identified.  There is extensive vascular calcification.  There is also noncalcified plaque extending into the lumen of the distal thoracic aorta (see image number 127 series 2).  Also question slight ulcerated plaque noted in the descending aorta on image number 167 series 2.  See annotation.    No cardiomegaly or pericardial effusion.    Coronary artery atherosclerotic calcification: Small amount.    Mediastinum and neck: A calcified enlarged node is seen in the left hilum.  There is a upper normal 1 cm node in the right hilum.  The thyroid gland is normal.  The esophagus is moderate to severely dilated with a prominent air-fluid level noted at the level of the AP window.  This increases risk for aspiration.    Lungs and pleura: No pleural effusion. No pneumothorax. No central endobronchial mass.  Severe centrilobular emphysema noted diffusely throughout the lung fields.  There is moderate to severe reticulation noted in the lung bases.  No confluent opacity outside of atelectasis is noted in the lung bases.  .    Abdomen: Subcentimeter hypodensities present in the left and right lobe of the liver too small to characterize.  Findings stable compared to prior exam.  A small hiatal hernia is noted.    Musculoskeletal: No soft tissue masses. No aggressive appearing skeletal lesions.  Minimal degenerative changes of the thoracic spine are noted.  More moderate to severe degenerative changes of the Khalid Thu L ower manubrial joints are noted.  There is fusion of the manubriosternal joint.                                       X-Ray Chest AP Portable (Final result)  Result time 03/21/25 15:48:05      Final result by Germain Granados MD (03/21/25 15:48:05)                   Impression:      No detrimental change or radiographic acute intrathoracic process seen on this single view.      Electronically signed by: Germain Granados MD  Date:    03/21/2025  Time:    15:48                "Narrative:    EXAMINATION:  XR CHEST AP PORTABLE    CLINICAL HISTORY:  Chest Pain;    TECHNIQUE:  Single frontal view of the chest was performed.    COMPARISON:  Chest radiograph 02/13/2025, CT abdomen and pelvis 12/13/2023, chest CT 12/06/2022    FINDINGS:  Monitoring leads overlie the chest.    No detrimental change.  Cardiomediastinal silhouette is midline with similar calcification and tortuosity of the aorta.  Heart size is normal.  Pulmonary vasculature and hilar contours are within normal limits.    Bibasilar mild platelike scarring versus atelectasis similar to prior.  The lungs are well expanded without consolidation, pleural effusion or pneumothorax.    Osseous structures appear stable without acute findings.  PA and lateral views can be obtained.                                       Medications   albuterol-ipratropium 2.5 mg-0.5 mg/3 mL nebulizer solution 3 mL (has no administration in time range)   aspirin tablet 325 mg (325 mg Oral Given 3/21/25 1444)   albuterol-ipratropium 2.5 mg-0.5 mg/3 mL nebulizer solution 3 mL (3 mLs Nebulization Given 3/21/25 1408)   dexAMETHasone injection 8 mg (8 mg Intravenous Given 3/21/25 1550)   iohexoL (OMNIPAQUE 350) injection 75 mL (75 mLs Intravenous Given 3/21/25 1614)     Medical Decision Making  Amount and/or Complexity of Data Reviewed  Labs: ordered. Decision-making details documented in ED Course.  Radiology: ordered. Decision-making details documented in ED Course.    Risk  OTC drugs.  Prescription drug management.  Decision regarding hospitalization.    Medical Decision Making:    This is an evaluation of a 89 y.o. male that presents to the Emergency Department for   Chief Complaint   Patient presents with    Cough     Pt w/ hx of COPD uses home O2 1.5 L PRN.  Pt presents w/ c/o non-prod cough,  worsened sob, body aches and "feverish".  Temp 98.1. O2 sat 92% RA, no s/s of resp distress, speaking in full sentences. Pt also c/o pain to bilat posterior thighs " since today.   Denies CP         The patient is a non-toxic and well appearing patient. On physical exam, patient appears well hydrated with moist mucus membranes. Diminished breath sounds with inspiratory and expiratory wheezes. Clear nasal discharge. Pt was tachycardic at 100 bpm. Regular rhythm. No murmurs. Abdomen soft and non tender. Patient is tolerating PO without difficulty. Physical exam otherwise as above.     I have reviewed vital signs and nursing notes.   Vital Signs Are Reassuring.     Based on the patient's symptoms, I am considering and evaluating for the following differential diagnoses: Viral illness, influenza, COVID-19, pneumonia, hyperglycemia.     Consider hospitalization for:  Shortness of breath    Patient is agreeable to transfer and admission to [facility].  Patient would be like to admitted to the hospital as he does not feel good.    ED Course:Treatment in the ED included Physical Exam and medications given in ED  Medications   albuterol-ipratropium 2.5 mg-0.5 mg/3 mL nebulizer solution 3 mL (has no administration in time range)   aspirin tablet 325 mg (325 mg Oral Given 3/21/25 1444)   albuterol-ipratropium 2.5 mg-0.5 mg/3 mL nebulizer solution 3 mL (3 mLs Nebulization Given 3/21/25 1408)   dexAMETHasone injection 8 mg (8 mg Intravenous Given 3/21/25 1550)   iohexoL (OMNIPAQUE 350) injection 75 mL (75 mLs Intravenous Given 3/21/25 1614)   .   Patient reports feeling better after treatment in the ER.       External Data/Documents Reviewed: Previous medical records and vital signs reviewed, see HPI and Physical exam.   Labs: ordered and reviewed.  COVID-19 positive.  Radiology: ordered as indicated and reviewed.  No pneumothorax  ECG/medicine tests: ordered and independent interpretation performed by Dr. Carissa Navarro DO. Decision-making details documented in ED Course.   Cardiac monitor placed for shortness of breath. Monitor shows Sinus Tachycardia with  rate of 110.  Interpreted by Dr. Carissa Navarro DO.    Risk  Diagnosis or treatment significantly limited by the following social determinants of health: Body mass index is 24.97 kg/m².     In shared decision making with the patient, we discussed treatment, prescriptions, labs, and imaging results.  Requesting consultation with hospitalist for admission.   Discussed patient's presentation, past medical history, physical exam, labs, radiology results, vital signs, and ED course.      Patient accepted by Dr Baker on call for Dr Bull for transfer and admission at time 5:47pm   At this time patient will be transferred & admitted.  Patient will be transferred via EMS to accepting facility.      At time of admission patient is awake alert oriented x4 speaking clearly in full sentences and moving all 4 extremities.     The following labs and imaging were reviewed:      Admission on 03/21/2025   Component Date Value Ref Range Status    WBC 03/21/2025 4.44  3.90 - 12.70 K/uL Final    RBC 03/21/2025 4.68  4.60 - 6.20 M/uL Final    Hemoglobin 03/21/2025 13.3 (L)  14.0 - 18.0 g/dL Final    Hematocrit 03/21/2025 40.8  40.0 - 54.0 % Final    MCV 03/21/2025 87  82 - 98 fL Final    MCH 03/21/2025 28.4  27.0 - 31.0 pg Final    MCHC 03/21/2025 32.6  32.0 - 36.0 g/dL Final    RDW 03/21/2025 14.3  11.5 - 14.5 % Final    Platelets 03/21/2025 235  150 - 450 K/uL Final    MPV 03/21/2025 9.3  9.2 - 12.9 fL Final    Immature Granulocytes 03/21/2025 0.2  0.0 - 0.5 % Final    Gran # (ANC) 03/21/2025 3.5  1.8 - 7.7 K/uL Final    Immature Grans (Abs) 03/21/2025 0.01  0.00 - 0.04 K/uL Final    Comment: Mild elevation in immature granulocytes is non specific and   can be seen in a variety of conditions including stress response,   acute inflammation, trauma and pregnancy. Correlation with other   laboratory and clinical findings is essential.      Lymph # 03/21/2025 0.6 (L)  1.0 - 4.8 K/uL Final    Mono # 03/21/2025 0.3  0.3 - 1.0 K/uL Final    Eos #  03/21/2025 0.0  0.0 - 0.5 K/uL Final    Baso # 03/21/2025 0.02  0.00 - 0.20 K/uL Final    nRBC 03/21/2025 0  0 /100 WBC Final    Gran % 03/21/2025 79.0 (H)  38.0 - 73.0 % Final    Lymph % 03/21/2025 12.6 (L)  18.0 - 48.0 % Final    Mono % 03/21/2025 6.8  4.0 - 15.0 % Final    Eosinophil % 03/21/2025 0.9  0.0 - 8.0 % Final    Basophil % 03/21/2025 0.5  0.0 - 1.9 % Final    Differential Method 03/21/2025 Automated   Final    Sodium 03/21/2025 139  136 - 145 mmol/L Final    Potassium 03/21/2025 4.1  3.5 - 5.1 mmol/L Final    Specimen slightly hemolyzed    Chloride 03/21/2025 104  95 - 110 mmol/L Final    CO2 03/21/2025 24  23 - 29 mmol/L Final    Glucose 03/21/2025 90  70 - 110 mg/dL Final    BUN 03/21/2025 28 (H)  8 - 23 mg/dL Final    Creatinine 03/21/2025 1.6 (H)  0.5 - 1.4 mg/dL Final    Calcium 03/21/2025 9.9  8.7 - 10.5 mg/dL Final    Total Protein 03/21/2025 8.4  6.0 - 8.4 g/dL Final    Albumin 03/21/2025 4.2  3.5 - 5.2 g/dL Final    Total Bilirubin 03/21/2025 0.5  0.1 - 1.0 mg/dL Final    Comment: For infants and newborns, interpretation of results should be based  on gestational age, weight and in agreement with clinical  observations.    Premature Infant recommended reference ranges:  Up to 24 hours.............<8.0 mg/dL  Up to 48 hours............<12.0 mg/dL  3-5 days..................<15.0 mg/dL  6-29 days.................<15.0 mg/dL      Alkaline Phosphatase 03/21/2025 64  40 - 150 U/L Final    AST 03/21/2025 40  10 - 40 U/L Final    ALT 03/21/2025 24  10 - 44 U/L Final    eGFR 03/21/2025 41 (A)  >60 mL/min/1.73 m^2 Final    Anion Gap 03/21/2025 11  8 - 16 mmol/L Final    Troponin I 03/21/2025 0.033 (H)  0.000 - 0.026 ng/mL Final    Comment: The reference interval for Troponin I represents the 99th percentile   cutoff   for our facility and is consistent with 3rd generation assay   performance.      BNP 03/21/2025 65  0 - 99 pg/mL Final    Values of less than 100 pg/ml are consistent with non-CHF  populations.    Specimen UA 03/21/2025 Urine, Clean Catch   Final    Color, UA 03/21/2025 Colorless (A)  Yellow, Straw, Sade Final    Appearance, UA 03/21/2025 Clear  Clear Final    pH, UA 03/21/2025 6.0  5.0 - 8.0 Final    Specific Gravity, UA 03/21/2025 1.010  1.005 - 1.030 Final    Protein, UA 03/21/2025 Negative  Negative Final    Comment: Recommend a 24 hour urine protein or a urine   protein/creatinine ratio if globulin induced proteinuria is  clinically suspected.      Glucose, UA 03/21/2025 Negative  Negative Final    Ketones, UA 03/21/2025 Negative  Negative Final    Bilirubin (UA) 03/21/2025 Negative  Negative Final    Occult Blood UA 03/21/2025 Trace (A)  Negative Final    Nitrite, UA 03/21/2025 Negative  Negative Final    Urobilinogen, UA 03/21/2025 Negative  <2.0 EU/dL Final    Leukocytes, UA 03/21/2025 Negative  Negative Final    POC Molecular Influenza A Ag 03/21/2025 Negative  Negative Final    POC Molecular Influenza B Ag 03/21/2025 Negative  Negative Final     Acceptable 03/21/2025 Yes   Final    POC Rapid COVID 03/21/2025 Positive (A)  Negative Final     Acceptable 03/21/2025 Yes   Final    Lactate (Lactic Acid) 03/21/2025 0.7  0.5 - 2.2 mmol/L Final    Comment: Falsely low lactic acid results can be found in samples   containing >=13.0 mg/dL total bilirubin and/or >=3.5 mg/dL   direct bilirubin.      POCT Glucose 03/21/2025 76  70 - 110 mg/dL Final        Imaging Results              US Lower Extremity Veins Bilateral (Final result)  Result time 03/21/25 17:14:20   Procedure changed from US Lower Extremity Veins Bilateral     Final result by Dorene Phillips MD (03/21/25 17:14:20)                   Impression:      No evidence of lower extremity deep venous thrombosis.      Electronically signed by: Dorene Phillips MD  Date:    03/21/2025  Time:    17:14               Narrative:    EXAMINATION:  US LOWER EXTREMITY VEINS BILATERAL    CLINICAL HISTORY:  pain to bilat  posterior thighs since today; Pain in right thigh    TECHNIQUE:  Duplex and color flow Doppler evaluation of the bilateral lower extremity veins was performed.    COMPARISON:  March 2021.    FINDINGS:  No evidence of clot involving the bilateral common femoral, greater saphenous, femoral, popliteal veins.  All venous structures demonstrate normal respiratory phasicity and augment adequately.  No evidence of soft tissue mass or Baker's cyst.                                        CTA Chest Non-Coronary (PE Studies) (Final result)  Result time 03/21/25 17:00:53      Final result by Antoinette Landin MD (03/21/25 17:00:53)                   Impression:      1. No pulmonary embolus.  2. Stable dilated esophagus again noted with an air-fluid level in the mid esophagus raising risk for aspiration.  3. Stable severe centrilobular emphysema again noted.  4. Stable significant calcification of the thoracic aorta in addition to noncalcified atherosclerotic plaque with an area of questioned ulcerated plaque in the descending aorta as described above but no leakage identified.  This report was flagged in Epic as abnormal.      Electronically signed by: Antoinette Landin  Date:    03/21/2025  Time:    17:00               Narrative:    EXAMINATION:  Pulmonary embolism (PE) suspected, high prob;    CLINICAL HISTORY:  89 y/o  Male  Pulmonary embolism (PE) suspected, high prob;    TECHNIQUE:  75 cc omnipaque 350 IV contrast was administered, no oral contrast was administered    Arterial phase - chest    Reconstructions - coronal and sagittal    3-D imaging - axial MIP    COMPARISON:  Prior CT of the chest dated 12/06/2022.    FINDINGS:  Lines and tubes: None.    Cardiovascular: Technically adequate exam of the pulmonary arteries. No pulmonary embolism identified.    No thoracic aortic aneurysm or dissection identified.  There is extensive vascular calcification.  There is also noncalcified plaque extending into the lumen of  the distal thoracic aorta (see image number 127 series 2).  Also question slight ulcerated plaque noted in the descending aorta on image number 167 series 2.  See annotation.    No cardiomegaly or pericardial effusion.    Coronary artery atherosclerotic calcification: Small amount.    Mediastinum and neck: A calcified enlarged node is seen in the left hilum.  There is a upper normal 1 cm node in the right hilum.  The thyroid gland is normal.  The esophagus is moderate to severely dilated with a prominent air-fluid level noted at the level of the AP window.  This increases risk for aspiration.    Lungs and pleura: No pleural effusion. No pneumothorax. No central endobronchial mass.  Severe centrilobular emphysema noted diffusely throughout the lung fields.  There is moderate to severe reticulation noted in the lung bases.  No confluent opacity outside of atelectasis is noted in the lung bases.  .    Abdomen: Subcentimeter hypodensities present in the left and right lobe of the liver too small to characterize.  Findings stable compared to prior exam.  A small hiatal hernia is noted.    Musculoskeletal: No soft tissue masses. No aggressive appearing skeletal lesions.  Minimal degenerative changes of the thoracic spine are noted.  More moderate to severe degenerative changes of the Khalid Thu L ower manubrial joints are noted.  There is fusion of the manubriosternal joint.                                       X-Ray Chest AP Portable (Final result)  Result time 03/21/25 15:48:05      Final result by Germain Granados MD (03/21/25 15:48:05)                   Impression:      No detrimental change or radiographic acute intrathoracic process seen on this single view.      Electronically signed by: Germain Granados MD  Date:    03/21/2025  Time:    15:48               Narrative:    EXAMINATION:  XR CHEST AP PORTABLE    CLINICAL HISTORY:  Chest Pain;    TECHNIQUE:  Single frontal view of the chest was  performed.    COMPARISON:  Chest radiograph 02/13/2025, CT abdomen and pelvis 12/13/2023, chest CT 12/06/2022    FINDINGS:  Monitoring leads overlie the chest.    No detrimental change.  Cardiomediastinal silhouette is midline with similar calcification and tortuosity of the aorta.  Heart size is normal.  Pulmonary vasculature and hilar contours are within normal limits.    Bibasilar mild platelike scarring versus atelectasis similar to prior.  The lungs are well expanded without consolidation, pleural effusion or pneumothorax.    Osseous structures appear stable without acute findings.  PA and lateral views can be obtained.                                            Scribe Attestation:   Scribe #1: I performed the above scribed service and the documentation accurately describes the services I performed. I attest to the accuracy of the note.        ED Course as of 03/21/25 1747   Fri Mar 21, 2025   1348 EKG interpreted by Dr. Navarro.  No STEMI.  Normal sinus rhythm, ventricular rate of 96.  Abnormal EKG.  QTC prolonged at five hundred twenty-eight when compared to previous EKG done on 08/1924 rate has decreased by 3 beats per minute today. [RF]      ED Course User Index  [RF] Carissa Navarro DO                         I, Dr. Carissa Navarro, personally performed the services described in this documentation. This document was produced by a scribe under my direction and in my presence. All medical record entries made by the scribe were at my direction and in my presence.  I have reviewed the chart and agree that the record reflects my personal performance and is accurate and complete. Carissa Navarro DO.     03/21/2025 5:25 PM          Clinical Impression:  Final diagnoses:  [R06.02] SOB (shortness of breath)  [R07.9] Chest pain  [U07.1] COVID-19 virus infection  [R09.02] Hypoxia          ED Disposition Condition    Admit Stable                    [1]   Social History  Tobacco Use    Smoking status: Former      Current packs/day: 0.00     Average packs/day: 1 pack/day for 40.0 years (40.0 ttl pk-yrs)     Types: Cigarettes     Start date: 1951     Quit date: 1991     Years since quittin.3    Smokeless tobacco: Former     Quit date: 1986   Substance Use Topics    Alcohol use: Not Currently     Alcohol/week: 0.0 standard drinks of alcohol     Comment: 2 bottles beer on weekends    Drug use: No        Carissa Navarro DO  25 [id number]

## 2025-03-21 NOTE — SUBJECTIVE & OBJECTIVE
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 DBM    NO PAST SURGERIES  05/20/2021    TRANSURETHRAL RESECTION OF PROSTATE USING BIPOLAR CAUTERY N/A 6/22/2021    Procedure: (TURP) Transuretheral Resection of Prostate with BIPOLAR CAUTERY;  Surgeon: Jesus Cole MD;  Location: [facility] OR;  Service: Urology;  Laterality: N/A;  RN Pre OP 6-21-21.  Vaccinated.  C A       Review of patient's allergies indicates:  No Known Allergies    No current facility-administered medications on file prior to encounter.     Current Outpatient Medications on File Prior to Encounter   Medication Sig    ACCU-CHEK GUIDE TEST STRIPS Strp USE 1 STRIP TO CHECK GLUCOSE THREE TIMES DAILY    amLODIPine (NORVASC) 5 MG tablet Take 1 tablet (5 mg total) by mouth once daily.    atorvastatin (LIPITOR) 20 MG tablet Take 1 tablet (20 mg total) by mouth every evening.    blood-glucose meter kit To check BG 1 times daily, to use with insurance preferred meter    glipiZIDE (GLUCOTROL) 5 MG tablet Take 1 tablet (5 mg total) by mouth daily with breakfast.    lancets Misc To check BG 3 times daily,  "to use with insurance preferred meter    LANTUS SOLOSTAR U-100 INSULIN 100 unit/mL (3 mL) InPn pen Inject 10 Units into the skin every evening.    LIDOcaine viscous HCl 2% (LIDOCAINE VISCOUS) 2 % Soln Take 10 mL in oral cavity, gargle for 30 seconds then spit out every 6 hours as needed for sore throat    ofloxacin (FLOXIN) 0.3 % otic solution Place 5 drops into the left ear 2 (two) times daily.    olmesartan (BENICAR) 40 MG tablet Take 1 tablet (40 mg total) by mouth once daily.    pantoprazole (PROTONIX) 40 MG tablet Take 1 tablet (40 mg total) by mouth once daily.    pen needle, diabetic 32 gauge x 5/32" Ndle Use with insulin pen as directed    SPIRIVA RESPIMAT 2.5 mcg/actuation inhaler INHALE 2 SPRAY(S) BY MOUTH ONCE DAILY     Family History       Problem Relation (Age of Onset)    Asbestos Father    Cancer Mother          Tobacco Use    Smoking status: Former     Current packs/day: 0.00     Average packs/day: 1 pack/day for 40.0 years (40.0 ttl pk-yrs)     Types: Cigarettes     Start date: 1951     Quit date: 1991     Years since quittin.3    Smokeless tobacco: Former     Quit date: 1986   Substance and Sexual Activity    Alcohol use: Not Currently     Alcohol/week: 0.0 standard drinks of alcohol     Comment: 2 bottles beer on weekends    Drug use: No    Sexual activity: Not on file     Comment:       Review of Systems   Reason unable to perform ROS: ROS was performed and pertinent +s and -s are listed in HPI.     Objective:     Vital Signs (Most Recent):  Temp: 98.2 °F (36.8 °C) (25 1557)  Pulse: 94 (25 1700)  Resp: 20 (25 1408)  BP: (!) 153/74 (25 1700)  SpO2: 96 % (25 1700) Vital Signs (24h Range):  Temp:  [98 °F (36.7 °C)-98.2 °F (36.8 °C)] 98.2 °F (36.8 °C)  Pulse:  [] 94  Resp:  [16-20] 20  SpO2:  [90 %-96 %] 96 %  BP: (153-175)/(74-90) 153/74     Weight: 78.9 kg (174 lb)  Body mass index is 24.97 kg/m².     Physical Exam  Constitutional:       " Appearance: He is underweight. He is ill-appearing.   HENT:      Head: Atraumatic.   Cardiovascular:      Rate and Rhythm: Normal rate and regular rhythm.      Pulses: Normal pulses.      Heart sounds: Normal heart sounds.   Pulmonary:      Breath sounds: Rhonchi present. No rales.   Chest:      Chest wall: Tenderness present.   Abdominal:      Tenderness: There is no abdominal tenderness. There is no guarding.   Musculoskeletal:      Right lower leg: No edema.      Left lower leg: No edema.   Neurological:      General: No focal deficit present.      Mental Status: He is oriented to person, place, and time and easily aroused. He is lethargic.   Psychiatric:         Mood and Affect: Mood normal.         Behavior: Behavior normal.                Significant Labs: All pertinent labs within the past 24 hours have been reviewed.  CBC:   Recent Labs   Lab 03/21/25  1350   WBC 4.44   HGB 13.3*   HCT 40.8        CMP:   Recent Labs   Lab 03/21/25  1350      K 4.1      CO2 24   GLU 90   BUN 28*   CREATININE 1.6*   CALCIUM 9.9   PROT 8.4   ALBUMIN 4.2   BILITOT 0.5   ALKPHOS 64   AST 40   ALT 24   ANIONGAP 11     Cardiac Markers:   Recent Labs   Lab 03/21/25  1350   BNP 65     Lactic Acid:   Recent Labs   Lab 03/21/25  1354   LACTATE 0.7     Troponin:   Recent Labs   Lab 03/21/25  1350   TROPONINI 0.033*       Significant Imaging: I have reviewed all pertinent imaging results/findings within the past 24 hours.

## 2025-03-22 PROBLEM — J96.21 ACUTE ON CHRONIC RESPIRATORY FAILURE WITH HYPOXIA: Status: ACTIVE | Noted: 2025-03-22

## 2025-03-22 LAB
ABSOLUTE EOSINOPHIL (OHS): 0 K/UL
ABSOLUTE MONOCYTE (OHS): 0.36 K/UL (ref 0.3–1)
ABSOLUTE NEUTROPHIL COUNT (OHS): 3.14 K/UL (ref 1.8–7.7)
BASOPHILS # BLD AUTO: 0 K/UL
BASOPHILS NFR BLD AUTO: 0 %
ERYTHROCYTE [DISTWIDTH] IN BLOOD BY AUTOMATED COUNT: 14.1 % (ref 11.5–14.5)
HCT VFR BLD AUTO: 36 % (ref 40–54)
HGB BLD-MCNC: 11.7 GM/DL (ref 14–18)
IMM GRANULOCYTES # BLD AUTO: 0.02 K/UL (ref 0–0.04)
IMM GRANULOCYTES NFR BLD AUTO: 0.5 % (ref 0–0.5)
LYMPHOCYTES # BLD AUTO: 0.3 K/UL (ref 1–4.8)
MCH RBC QN AUTO: 28.4 PG (ref 27–50)
MCHC RBC AUTO-ENTMCNC: 32.5 G/DL (ref 32–36)
MCV RBC AUTO: 87 FL (ref 82–98)
NUCLEATED RBC (/100WBC) (OHS): 0 /100 WBC
PLATELET # BLD AUTO: 234 K/UL (ref 150–450)
PMV BLD AUTO: 9.8 FL (ref 9.2–12.9)
POCT GLUCOSE: 171 MG/DL (ref 70–110)
POCT GLUCOSE: 190 MG/DL (ref 70–110)
POCT GLUCOSE: 213 MG/DL (ref 70–110)
POCT GLUCOSE: 269 MG/DL (ref 70–110)
RBC # BLD AUTO: 4.12 M/UL (ref 4.6–6.2)
RELATIVE EOSINOPHIL (OHS): 0 %
RELATIVE LYMPHOCYTE (OHS): 7.9 % (ref 18–48)
RELATIVE MONOCYTE (OHS): 9.4 % (ref 4–15)
RELATIVE NEUTROPHIL (OHS): 82.2 % (ref 38–73)
WBC # BLD AUTO: 3.82 K/UL (ref 3.9–12.7)

## 2025-03-22 PROCEDURE — 25000003 PHARM REV CODE 250

## 2025-03-22 PROCEDURE — 99900035 HC TECH TIME PER 15 MIN (STAT)

## 2025-03-22 PROCEDURE — 25000242 PHARM REV CODE 250 ALT 637 W/ HCPCS: Performed by: NURSE PRACTITIONER

## 2025-03-22 PROCEDURE — 21400001 HC TELEMETRY ROOM

## 2025-03-22 PROCEDURE — 25000242 PHARM REV CODE 250 ALT 637 W/ HCPCS: Performed by: HOSPITALIST

## 2025-03-22 PROCEDURE — 63600175 PHARM REV CODE 636 W HCPCS

## 2025-03-22 PROCEDURE — 94640 AIRWAY INHALATION TREATMENT: CPT

## 2025-03-22 PROCEDURE — 94761 N-INVAS EAR/PLS OXIMETRY MLT: CPT

## 2025-03-22 PROCEDURE — 27000207 HC ISOLATION

## 2025-03-22 PROCEDURE — 85025 COMPLETE CBC W/AUTO DIFF WBC: CPT

## 2025-03-22 PROCEDURE — 99900031 HC PATIENT EDUCATION (STAT)

## 2025-03-22 PROCEDURE — 25000003 PHARM REV CODE 250: Performed by: HOSPITALIST

## 2025-03-22 PROCEDURE — 36415 COLL VENOUS BLD VENIPUNCTURE: CPT

## 2025-03-22 PROCEDURE — 27000221 HC OXYGEN, UP TO 24 HOURS

## 2025-03-22 RX ORDER — IPRATROPIUM BROMIDE 0.5 MG/2.5ML
0.5 SOLUTION RESPIRATORY (INHALATION) EVERY 6 HOURS
Status: DISCONTINUED | OUTPATIENT
Start: 2025-03-22 | End: 2025-03-22

## 2025-03-22 RX ORDER — FLUTICASONE FUROATE AND VILANTEROL 100; 25 UG/1; UG/1
1 POWDER RESPIRATORY (INHALATION) DAILY
Status: DISCONTINUED | OUTPATIENT
Start: 2025-03-22 | End: 2025-03-22

## 2025-03-22 RX ORDER — BUDESONIDE 0.5 MG/2ML
0.5 INHALANT ORAL EVERY 12 HOURS
Status: DISCONTINUED | OUTPATIENT
Start: 2025-03-22 | End: 2025-03-23 | Stop reason: HOSPADM

## 2025-03-22 RX ORDER — INSULIN ASPART 100 [IU]/ML
0-5 INJECTION, SOLUTION INTRAVENOUS; SUBCUTANEOUS
Status: DISCONTINUED | OUTPATIENT
Start: 2025-03-22 | End: 2025-03-23 | Stop reason: HOSPADM

## 2025-03-22 RX ORDER — ARFORMOTEROL TARTRATE 15 UG/2ML
15 SOLUTION RESPIRATORY (INHALATION) 2 TIMES DAILY
Status: DISCONTINUED | OUTPATIENT
Start: 2025-03-22 | End: 2025-03-23 | Stop reason: HOSPADM

## 2025-03-22 RX ORDER — MUPIROCIN 20 MG/G
OINTMENT TOPICAL 2 TIMES DAILY
Status: DISCONTINUED | OUTPATIENT
Start: 2025-03-22 | End: 2025-03-23 | Stop reason: HOSPADM

## 2025-03-22 RX ORDER — INSULIN GLARGINE 100 [IU]/ML
6 INJECTION, SOLUTION SUBCUTANEOUS NIGHTLY
Status: DISCONTINUED | OUTPATIENT
Start: 2025-03-22 | End: 2025-03-23 | Stop reason: HOSPADM

## 2025-03-22 RX ORDER — IPRATROPIUM BROMIDE AND ALBUTEROL SULFATE 2.5; .5 MG/3ML; MG/3ML
3 SOLUTION RESPIRATORY (INHALATION)
Status: DISCONTINUED | OUTPATIENT
Start: 2025-03-22 | End: 2025-03-23 | Stop reason: HOSPADM

## 2025-03-22 RX ORDER — DOCUSATE CALCIUM 240 MG
240 CAPSULE ORAL DAILY
Status: DISCONTINUED | OUTPATIENT
Start: 2025-03-23 | End: 2025-03-23 | Stop reason: HOSPADM

## 2025-03-22 RX ADMIN — REMDESIVIR 100 MG: 100 INJECTION, POWDER, LYOPHILIZED, FOR SOLUTION INTRAVENOUS at 09:03

## 2025-03-22 RX ADMIN — BUDESONIDE 0.5 MG: 0.5 INHALANT RESPIRATORY (INHALATION) at 08:03

## 2025-03-22 RX ADMIN — INSULIN ASPART 1 UNITS: 100 INJECTION, SOLUTION INTRAVENOUS; SUBCUTANEOUS at 08:03

## 2025-03-22 RX ADMIN — IPRATROPIUM BROMIDE AND ALBUTEROL SULFATE 3 ML: 2.5; .5 SOLUTION RESPIRATORY (INHALATION) at 08:03

## 2025-03-22 RX ADMIN — THERA TABS 1 TABLET: TAB at 09:03

## 2025-03-22 RX ADMIN — MUPIROCIN: 20 OINTMENT TOPICAL at 09:03

## 2025-03-22 RX ADMIN — INSULIN GLARGINE 6 UNITS: 100 INJECTION, SOLUTION SUBCUTANEOUS at 08:03

## 2025-03-22 RX ADMIN — MUPIROCIN: 20 OINTMENT TOPICAL at 08:03

## 2025-03-22 RX ADMIN — ENOXAPARIN SODIUM 80 MG: 80 INJECTION SUBCUTANEOUS at 09:03

## 2025-03-22 RX ADMIN — OXYCODONE HYDROCHLORIDE AND ACETAMINOPHEN 500 MG: 500 TABLET ORAL at 09:03

## 2025-03-22 RX ADMIN — OXYCODONE HYDROCHLORIDE AND ACETAMINOPHEN 500 MG: 500 TABLET ORAL at 08:03

## 2025-03-22 RX ADMIN — IPRATROPIUM BROMIDE 0.5 MG: 0.5 SOLUTION RESPIRATORY (INHALATION) at 07:03

## 2025-03-22 RX ADMIN — IPRATROPIUM BROMIDE AND ALBUTEROL SULFATE 3 ML: 2.5; .5 SOLUTION RESPIRATORY (INHALATION) at 02:03

## 2025-03-22 RX ADMIN — ARFORMOTEROL TARTRATE 15 MCG: 15 SOLUTION RESPIRATORY (INHALATION) at 07:03

## 2025-03-22 RX ADMIN — ENOXAPARIN SODIUM 80 MG: 80 INJECTION SUBCUTANEOUS at 08:03

## 2025-03-22 RX ADMIN — ARFORMOTEROL TARTRATE 15 MCG: 15 SOLUTION RESPIRATORY (INHALATION) at 08:03

## 2025-03-22 RX ADMIN — INSULIN GLARGINE 6 UNITS: 100 INJECTION, SOLUTION SUBCUTANEOUS at 12:03

## 2025-03-22 RX ADMIN — DEXAMETHASONE 6 MG: 2 TABLET ORAL at 09:03

## 2025-03-22 RX ADMIN — BUDESONIDE 0.5 MG: 0.5 INHALANT RESPIRATORY (INHALATION) at 07:03

## 2025-03-22 NOTE — NURSING
[facility] [place]  Nurses Note -- 4 Eyes        Date:  03/22/2025        Skin assessed on: Q shift        [x] No Pressure Injuries Present                 []Prevention Measures Documented     [] Yes LDA Previously documented      [] Yes New Pressure Injury Discovered              [] LDA Added        Attending RN:  DARIEL Haley     Second RN:  CAROLE Georges

## 2025-03-22 NOTE — ASSESSMENT & PLAN NOTE
Acute on chronic respiratory failure with hypoxia    Patient is identified as Severe COVID-19 based on hypoxemia with O2 saturations <94% on room air or on ambulation and High risk for severe complications of COVID 19 based on COVID risk score of 7   Initiate standard COVID protocols; COVID-19 testing ,Infection Control notification  and isolation- respiratory, contact and droplet per protocol    Diagnostics: CBC, CMP, Ferritin, CRP, LDH, Troponin, ECG, Rapid Flu, and Portable CXR    Management: Initiate targeted therapy with Remdesivir, 200mg IV x1, followed by 100mg IV daily x5 days total and Dexamethasone PO/IV 6mg daily x10 days, Maintain oxygen saturations 92-96% via Nasal Cannula 1.5 LPM and monitor with continuous/intermittent pulse oximetry. , Inhaled bronchodilators as needed for shortness of breath., and Continuous cardiac monitoring.    Advance Care Planning  Current advance care plan has not been discussed with patient/family/POA.

## 2025-03-22 NOTE — ASSESSMENT & PLAN NOTE
Creatine stable for now. BMP reviewed- noted Estimated Creatinine Clearance: 33.6 mL/min (A) (based on SCr of 1.6 mg/dL (H)). according to latest data. Based on current GFR, CKD stage is stage 3 - GFR 30-59.  Monitor UOP and serial BMP and adjust therapy as needed. Renally dose meds. Avoid nephrotoxic medications and procedures.

## 2025-03-22 NOTE — CARE UPDATE
Admission for COPD and COVID 19 infection. SOB and breathing improving 40% . Lowest O2 sat 90% on 1.5 L . Afebrile and non productive cough. Lung diminished without wheezes. Continue dexamethasone and remdesivir. Duoneb and LABA/ICS. Wean oxygen to keep >90%.     Alexandra Leon NP  Staff: Tino Bull MD

## 2025-03-22 NOTE — NURSING
Patient arrived to floor via stretcher via transporter from ED.  Patient transferred to bed independently.  AAOX4.  Patient was oriented to room, information on communication board, and medication regimen.  Bed low, adequate lighting provided, side rails x2 up, call bell within reach.  Admission assessment completed.  VSS.  Patient denied having any acute distress at this time.  None observed. Precautions initiated.  Will continue to monitor and follow treatment plan.           [facility]  Nurses Note -- 4 Eyes      3/21/2025       Skin assessed on: Admit      [x] No Pressure Injuries Present    []Prevention Measures Documented    [] Yes LDA  for Pressure Injury Previously documented     [] Yes New Pressure Injury Discovered   [] LDA for New Pressure Injury Added      Attending RN:  José Manuel Miller LPN     Second RN:  DARIEL Espinosa

## 2025-03-22 NOTE — ASSESSMENT & PLAN NOTE
Patient's blood pressure range in the last 24 hours was: BP  Min: 150/83  Max: 175/90.The patient's inpatient anti-hypertensive regimen is listed below:  Current Antihypertensives     Holding home amlodipine and olmesartan until clinically stable.

## 2025-03-22 NOTE — PLAN OF CARE
Problem: Adult Inpatient Plan of Care  Goal: Plan of Care Review  Outcome: Progressing  Goal: Patient-Specific Goal (Individualized)  Outcome: Progressing     Problem: Diabetes Comorbidity  Goal: Blood Glucose Level Within Targeted Range  Outcome: Progressing     Problem: Gas Exchange Impaired  Goal: Optimal Gas Exchange  Outcome: Progressing     Problem: COPD (Chronic Obstructive Pulmonary Disease)  Goal: Optimal Chronic Illness Coping  Outcome: Progressing

## 2025-03-22 NOTE — PLAN OF CARE
Case Management Assessment     PCP: Lucas Lloyd Jr., MD   Pharmacy:   [facility] [address] [place], [place] - [address]  [address]  [facility] [address]  Phone: [phone] Fax: [phone]    [facility]  [address]  [address]   [place] [address]  Phone: [phone] Fax: [phone]    [facility]  [address] [facility] [address]  [place] [address]  Phone: [phone] Fax: [phone]    [facility] [address] [facility] [address]  [address] [facility] [address]  Phone: [phone] Fax: [phone]     Patient Arrived From: Home  Existing Help at Home: Family  Barriers to Discharge: None    Discharge Plan:    A. Home; follow-ups   B. TBD    Independent at home; family assists if needed; no assist needed; no current medical services; uses [facility]           03/22/25 1017   Discharge Assessment   Assessment Type Discharge Planning Assessment   Confirmed/corrected address, phone number and insurance Yes   Confirmed Demographics Correct on Facesheet   Source of Information patient;health record   Communicated GOLDEN with patient/caregiver Date not available/Unable to determine   Reason For Admission SOB   People in Home alone   Facility Arrived From: Home   Do you expect to return to your current living situation? Yes   Do you have help at home or someone to help you manage your care at home? Yes   Who are your caregiver(s) and their phone number(s)? Brother; family; daughters-Nyra: [phone]   Prior to hospitilization cognitive status: Alert/Oriented   Current cognitive status: Alert/Oriented   Walking or Climbing Stairs Difficulty no   Dressing/Bathing Difficulty no   Home Accessibility wheelchair accessible   Home Layout Able to live on 1st floor   Equipment Currently Used at Home oxygen;other (see comments)  (Carlyle ELLIS)   Readmission within 30 days? No   Do you currently have service(s) that help you manage your care at home? No    Do you take prescription medications? Yes   Do you have prescription coverage? Yes   Coverage PHN   Do you have any problems affording any of your prescribed medications? No   Is the patient taking medications as prescribed? yes   Who is going to help you get home at discharge? Family   How do you get to doctors appointments? car, drives self   Are you on dialysis? No   Do you take coumadin? No   Discharge Plan A Home  (Follow-ups)   Discharge Plan B Other  (TBD)   DME Needed Upon Discharge  other (see comments)  (TBD)   Discharge Plan discussed with: Patient   Transition of Care Barriers None     SW Role explained to patient; two patient identifiers recognized; SW contact information placed on Communication board. Discussed patient managing health care at home and discussed discharge plans A and B; determined who would be helping patient at home with recovery: Brother and family will help with recovery at home.

## 2025-03-22 NOTE — ASSESSMENT & PLAN NOTE
Last A1c reviewed-   Lab Results   Component Value Date    HGBA1C 6.9 (H) 02/11/2025     Home antihyperglycemic regimen: gilpizide, Glargine 10U qhs    Recent Labs     03/21/25  1304   POCTGLUCOSE 76     Most recent inpatient antihyperglycemic regimen:   Antihyperglycemics (From admission, onward)      Start     Stop Route Frequency Ordered    03/22/25 0133  insulin aspart U-100 pen 0-5 Units         -- SubQ Before meals & nightly PRN 03/22/25 0033    03/22/25 0045  insulin glargine U-100 (Lantus) pen 6 Units         -- SubQ Nightly 03/22/25 0033          Goal blood glucose range while admitted: 140-180 per the NICE-SUGAR trial and American Diabetes Association guidelines  Diabetic diet 2000 dwight  Rechecking A1c if most recent result above > 6 months ago  Diabetic counseling and education (eg nutrition, insulin) to be given prior to discharge

## 2025-03-22 NOTE — H&P
"[facility] Medicine  History & Physical    Patient Name: Peter Lopez  MRN: [id number]  Patient Class: IP- Inpatient  Admission Date: 3/21/2025  Attending Physician: Tino Bull MD   Primary Care Provider: Lucas Lloyd Jr., MD         Patient information was obtained from patient, past medical records, and ER records.     Subjective:     Principal Problem:COVID-19    Chief Complaint:   Chief Complaint   Patient presents with    Cough     Pt w/ hx of COPD uses home O2 1.5 L PRN.  Pt presents w/ c/o non-prod cough,  worsened sob, body aches and "feverish".  Temp 98.1. O2 sat 92% RA, no s/s of resp distress, speaking in full sentences. Pt also c/o pain to bilat posterior thighs since today.   Denies CP        HPI: Peter Lopez is a 89 y.o. male with HFpEF  , Non-Insulin-dependent DM2, COPD (  on home oxygen 1.5L), HTN, HLD, CKD 3b, and Monoclonal gammopathy  who presented to [facility] ED on 03/21/2025 for eval and treatment of non-productive cough.  Has developed and worsened over the past few days.  States he had an episode of worsening cough that lasted for approximately 3 minutes, reporting it produced a white mucous. Notes he took Nyquil without relief. States that today he felt "feverish," "shaky," and "off" as well. Patient reports any activity makes him feel horrible. Shortness of breath worse with activity.  There is associated subjective fevers, chills, myalgias, dyspnea.  They deny associated CP, abdo pain, bowel/urinary issues.   No notable recent healthcare encounters.    ED course notable for the following: COVID (+).  Attempted get patient up to bedside to urinate however he became severely short of breath and had that lay back in the bed.   Required 3L LFNC to maintain sats.   Exam with clear lungs.  Labs ESR 63, , .  CTA chest: no PE, but patchy ground glass opacities noted.  ED therapy measures: Dexamethasone IV.  They were admitted to [facility] for further evaluation and treatment.      Past Medical History:   Diagnosis Date    BPH (benign prostatic hyperplasia)     Chronic hepatitis C without mention of hepatic coma     genotype 1b; treatment naive; s/p treatment    CKD (chronic kidney disease)     COPD (chronic obstructive pulmonary disease)     Diabetes mellitus type I     Diabetes with neurologic complications     H/O colonoscopy     [place] (hard of hearing)     Hypertension     On home oxygen therapy     as needed    Prostate cancer     Retinopathy due to secondary diabetes mellitus     Urinary retention     Wears hearing aid in both ears        Past Surgical History:   Procedure Laterality Date    ESOPHAGOGASTRODUODENOSCOPY N/A 1/7/2025    Procedure: EGD (ESOPHAGOGASTRODUODENOSCOPY);  Surgeon: Angelic Cruz MD;  Location: [facility] ENDO;  Service: Endoscopy;  Laterality: N/A;  11/12- R/s updated instru placed in mail, Hearing device, ASam  12/27/24- lvm on both contact numbers for pc. DBM    NO PAST SURGERIES  05/20/2021    TRANSURETHRAL RESECTION OF PROSTATE USING BIPOLAR CAUTERY N/A 6/22/2021    Procedure: (TURP) Transuretheral Resection of Prostate with BIPOLAR CAUTERY;  Surgeon: Jesus Cole MD;  Location: [facility] OR;  Service: Urology;  Laterality: N/A;  RN Pre OP 6-21-21.  Vaccinated.  C A       Review of patient's allergies indicates:  No Known Allergies    No current facility-administered medications on file prior to encounter.     Current Outpatient Medications on File Prior to Encounter   Medication Sig    ACCU-CHEK GUIDE TEST STRIPS Strp USE 1 STRIP TO CHECK GLUCOSE THREE TIMES DAILY    amLODIPine (NORVASC) 5 MG tablet Take 1 tablet (5 mg total) by mouth once daily.    atorvastatin (LIPITOR) 20 MG tablet Take 1 tablet (20 mg total) by mouth every evening.    blood-glucose meter kit To check BG 1 times daily, to use with insurance preferred meter    glipiZIDE (GLUCOTROL) 5 MG tablet Take 1 tablet (5 mg total) by mouth daily with  "breakfast.    lancets Misc To check BG 3 times daily, to use with insurance preferred meter    LANTUS SOLOSTAR U-100 INSULIN 100 unit/mL (3 mL) InPn pen Inject 10 Units into the skin every evening.    LIDOcaine viscous HCl 2% (LIDOCAINE VISCOUS) 2 % Soln Take 10 mL in oral cavity, gargle for 30 seconds then spit out every 6 hours as needed for sore throat    ofloxacin (FLOXIN) 0.3 % otic solution Place 5 drops into the left ear 2 (two) times daily.    olmesartan (BENICAR) 40 MG tablet Take 1 tablet (40 mg total) by mouth once daily.    pantoprazole (PROTONIX) 40 MG tablet Take 1 tablet (40 mg total) by mouth once daily.    pen needle, diabetic 32 gauge x 5/32" Ndle Use with insulin pen as directed    SPIRIVA RESPIMAT 2.5 mcg/actuation inhaler INHALE 2 SPRAY(S) BY MOUTH ONCE DAILY     Family History       Problem Relation (Age of Onset)    Asbestos Father    Cancer Mother          Tobacco Use    Smoking status: Former     Current packs/day: 0.00     Average packs/day: 1 pack/day for 40.0 years (40.0 ttl pk-yrs)     Types: Cigarettes     Start date: 1951     Quit date: 1991     Years since quittin.3    Smokeless tobacco: Former     Quit date: 1986   Substance and Sexual Activity    Alcohol use: Not Currently     Alcohol/week: 0.0 standard drinks of alcohol     Comment: 2 bottles beer on weekends    Drug use: No    Sexual activity: Not on file     Comment:       Review of Systems   Reason unable to perform ROS: ROS was performed and pertinent +s and -s are listed in HPI.     Objective:     Vital Signs (Most Recent):  Temp: 98.2 °F (36.8 °C) (25 1557)  Pulse: 94 (25 1700)  Resp: 20 (25 1408)  BP: (!) 153/74 (25 1700)  SpO2: 96 % (25 1700) Vital Signs (24h Range):  Temp:  [98 °F (36.7 °C)-98.2 °F (36.8 °C)] 98.2 °F (36.8 °C)  Pulse:  [] 94  Resp:  [16-20] 20  SpO2:  [90 %-96 %] 96 %  BP: (153-175)/(74-90) 153/74     Weight: 78.9 kg (174 lb)  Body mass index is " 24.97 kg/m².     Physical Exam  Constitutional:       Appearance: He is underweight. He is ill-appearing.   HENT:      Head: Atraumatic.   Cardiovascular:      Rate and Rhythm: Normal rate and regular rhythm.      Pulses: Normal pulses.      Heart sounds: Normal heart sounds.   Pulmonary:      Breath sounds: Rhonchi present. No rales.   Chest:      Chest wall: Tenderness present.   Abdominal:      Tenderness: There is no abdominal tenderness. There is no guarding.   Musculoskeletal:      Right lower leg: No edema.      Left lower leg: No edema.   Neurological:      General: No focal deficit present.      Mental Status: He is oriented to person, place, and time and easily aroused. He is lethargic.   Psychiatric:         Mood and Affect: Mood normal.         Behavior: Behavior normal.                Significant Labs: All pertinent labs within the past 24 hours have been reviewed.  CBC:   Recent Labs   Lab 03/21/25  1350   WBC 4.44   HGB 13.3*   HCT 40.8        CMP:   Recent Labs   Lab 03/21/25  1350      K 4.1      CO2 24   GLU 90   BUN 28*   CREATININE 1.6*   CALCIUM 9.9   PROT 8.4   ALBUMIN 4.2   BILITOT 0.5   ALKPHOS 64   AST 40   ALT 24   ANIONGAP 11     Cardiac Markers:   Recent Labs   Lab 03/21/25  1350   BNP 65     Lactic Acid:   Recent Labs   Lab 03/21/25  1354   LACTATE 0.7     Troponin:   Recent Labs   Lab 03/21/25  1350   TROPONINI 0.033*       Significant Imaging: I have reviewed all pertinent imaging results/findings within the past 24 hours.          Assessment/Plan:     * COVID-19  Acute on chronic respiratory failure with hypoxia    Patient is identified as Severe COVID-19 based on hypoxemia with O2 saturations <94% on room air or on ambulation and High risk for severe complications of COVID 19 based on COVID risk score of 7   Initiate standard COVID protocols; COVID-19 testing ,Infection Control notification  and isolation- respiratory, contact and droplet per  protocol    Diagnostics: CBC, CMP, Ferritin, CRP, LDH, Troponin, ECG, Rapid Flu, and Portable CXR    Management: Initiate targeted therapy with Remdesivir, 200mg IV x1, followed by 100mg IV daily x5 days total and Dexamethasone PO/IV 6mg daily x10 days, Maintain oxygen saturations 92-96% via Nasal Cannula 1.5 LPM and monitor with continuous/intermittent pulse oximetry. , Inhaled bronchodilators as needed for shortness of breath., and Continuous cardiac monitoring.    Advance Care Planning Current advance care plan has not been discussed with patient/family/POA.       Stage 3b chronic kidney disease  Creatine stable for now. BMP reviewed- noted Estimated Creatinine Clearance: 33.6 mL/min (A) (based on SCr of 1.6 mg/dL (H)). according to latest data. Based on current GFR, CKD stage is stage 3 - GFR 30-59.  Monitor UOP and serial BMP and adjust therapy as needed. Renally dose meds. Avoid nephrotoxic medications and procedures.    Type 2 diabetes mellitus with diabetic polyneuropathy, without long-term current use of insulin  Last A1c reviewed-   Lab Results   Component Value Date    HGBA1C 6.9 (H) 02/11/2025     Home antihyperglycemic regimen: gilpizide, Glargine 10U qhs    Recent Labs     03/21/25  1304   POCTGLUCOSE 76     Most recent inpatient antihyperglycemic regimen:   Antihyperglycemics (From admission, onward)      Start     Stop Route Frequency Ordered    03/22/25 0133  insulin aspart U-100 pen 0-5 Units         -- SubQ Before meals & nightly PRN 03/22/25 0033    03/22/25 0045  insulin glargine U-100 (Lantus) pen 6 Units         -- SubQ Nightly 03/22/25 0033          Goal blood glucose range while admitted: 140-180 per the NICE-SUGAR trial and American Diabetes Association guidelines  Diabetic diet 2000 dwight  Rechecking A1c if most recent result above > 6 months ago  Diabetic counseling and education (eg nutrition, insulin) to be given prior to discharge        Essential hypertension  Patient's blood pressure  range in the last 24 hours was: BP  Min: 150/83  Max: 175/90.The patient's inpatient anti-hypertensive regimen is listed below:  Current Antihypertensives     Holding home amlodipine and olmesartan until clinically stable.      VTE Risk Mitigation (From admission, onward)           Ordered     enoxaparin injection 80 mg  2 times daily         03/21/25 1402                                    Yassine Baker MD  Department of Hospital Medicine  [facility]

## 2025-03-22 NOTE — PLAN OF CARE
Patient sitting in recliner, oxygen in place via NC at 1.5 L, connector added, related to patient taking off oxygen and going to restroom, resp even and unlabored, alert and oriented, able to make needs known, denies pain and discomfort, recliner wheels locked, call light within reach.         Problem: Gas Exchange Impaired  Goal: Optimal Gas Exchange  Outcome: Progressing  Intervention: Optimize Oxygenation and Ventilation  Flowsheets (Taken 3/22/2025 1803)  Airway/Ventilation Management: airway patency maintained  Head of Bed (HOB) Positioning: HOB elevated     Problem: COPD (Chronic Obstructive Pulmonary Disease)  Goal: Optimal Chronic Illness Coping  Outcome: Progressing  Intervention: Support and Optimize Psychosocial Response  Flowsheets (Taken 3/22/2025 1803)  Supportive Measures:   active listening utilized   positive reinforcement provided   relaxation techniques promoted   self-care encouraged   verbalization of feelings encouraged  Family/Support System Care: self-care encouraged

## 2025-03-23 VITALS
HEART RATE: 100 BPM | DIASTOLIC BLOOD PRESSURE: 88 MMHG | OXYGEN SATURATION: 94 % | WEIGHT: 177.5 LBS | TEMPERATURE: 98 F | RESPIRATION RATE: 18 BRPM | SYSTOLIC BLOOD PRESSURE: 162 MMHG | BODY MASS INDEX: 25.41 KG/M2 | HEIGHT: 70 IN

## 2025-03-23 DIAGNOSIS — U07.1 COVID-19 VIRUS DETECTED: ICD-10-CM

## 2025-03-23 LAB
ABSOLUTE EOSINOPHIL (OHS): 0 K/UL
ABSOLUTE MONOCYTE (OHS): 0.38 K/UL (ref 0.3–1)
ABSOLUTE NEUTROPHIL COUNT (OHS): 2.61 K/UL (ref 1.8–7.7)
ALBUMIN SERPL BCP-MCNC: 3.4 G/DL (ref 3.5–5.2)
ALP SERPL-CCNC: 44 UNIT/L (ref 40–150)
ALT SERPL W/O P-5'-P-CCNC: 19 UNIT/L (ref 10–44)
ANION GAP (OHS): 7 MMOL/L (ref 8–16)
AST SERPL-CCNC: 25 UNIT/L (ref 11–45)
BASOPHILS # BLD AUTO: 0 K/UL
BASOPHILS NFR BLD AUTO: 0 %
BILIRUB SERPL-MCNC: 0.3 MG/DL (ref 0.1–1)
BUN SERPL-MCNC: 30 MG/DL (ref 8–23)
CALCIUM SERPL-MCNC: 9 MG/DL (ref 8.7–10.5)
CHLORIDE SERPL-SCNC: 107 MMOL/L (ref 95–110)
CO2 SERPL-SCNC: 23 MMOL/L (ref 23–29)
CREAT SERPL-MCNC: 1.5 MG/DL (ref 0.5–1.4)
ERYTHROCYTE [DISTWIDTH] IN BLOOD BY AUTOMATED COUNT: 14.2 % (ref 11.5–14.5)
GFR SERPLBLD CREATININE-BSD FMLA CKD-EPI: 45 ML/MIN/1.73/M2
GLUCOSE SERPL-MCNC: 132 MG/DL (ref 70–110)
HCT VFR BLD AUTO: 34.9 % (ref 40–54)
HGB BLD-MCNC: 11.1 GM/DL (ref 14–18)
IMM GRANULOCYTES # BLD AUTO: 0.01 K/UL (ref 0–0.04)
IMM GRANULOCYTES NFR BLD AUTO: 0.3 % (ref 0–0.5)
LYMPHOCYTES # BLD AUTO: 0.54 K/UL (ref 1–4.8)
MCH RBC QN AUTO: 28 PG (ref 27–50)
MCHC RBC AUTO-ENTMCNC: 31.8 G/DL (ref 32–36)
MCV RBC AUTO: 88 FL (ref 82–98)
NUCLEATED RBC (/100WBC) (OHS): 0 /100 WBC
OHS QRS DURATION: 132 MS
OHS QTC CALCULATION: 528 MS
PLATELET # BLD AUTO: 234 K/UL (ref 150–450)
PMV BLD AUTO: 10 FL (ref 9.2–12.9)
POCT GLUCOSE: 117 MG/DL (ref 70–110)
POCT GLUCOSE: 205 MG/DL (ref 70–110)
POTASSIUM SERPL-SCNC: 3.7 MMOL/L (ref 3.5–5.1)
PROT SERPL-MCNC: 6.7 GM/DL (ref 6–8.4)
RBC # BLD AUTO: 3.97 M/UL (ref 4.6–6.2)
RELATIVE EOSINOPHIL (OHS): 0 %
RELATIVE LYMPHOCYTE (OHS): 15.3 % (ref 18–48)
RELATIVE MONOCYTE (OHS): 10.7 % (ref 4–15)
RELATIVE NEUTROPHIL (OHS): 73.7 % (ref 38–73)
SODIUM SERPL-SCNC: 137 MMOL/L (ref 136–145)
WBC # BLD AUTO: 3.54 K/UL (ref 3.9–12.7)

## 2025-03-23 PROCEDURE — 36415 COLL VENOUS BLD VENIPUNCTURE: CPT | Performed by: NURSE PRACTITIONER

## 2025-03-23 PROCEDURE — 25000003 PHARM REV CODE 250

## 2025-03-23 PROCEDURE — 85025 COMPLETE CBC W/AUTO DIFF WBC: CPT

## 2025-03-23 PROCEDURE — 25000242 PHARM REV CODE 250 ALT 637 W/ HCPCS: Performed by: NURSE PRACTITIONER

## 2025-03-23 PROCEDURE — 25000242 PHARM REV CODE 250 ALT 637 W/ HCPCS: Performed by: HOSPITALIST

## 2025-03-23 PROCEDURE — 63600175 PHARM REV CODE 636 W HCPCS

## 2025-03-23 PROCEDURE — 94640 AIRWAY INHALATION TREATMENT: CPT

## 2025-03-23 PROCEDURE — 80053 COMPREHEN METABOLIC PANEL: CPT | Performed by: NURSE PRACTITIONER

## 2025-03-23 PROCEDURE — 94761 N-INVAS EAR/PLS OXIMETRY MLT: CPT

## 2025-03-23 PROCEDURE — 27000221 HC OXYGEN, UP TO 24 HOURS

## 2025-03-23 RX ADMIN — THERA TABS 1 TABLET: TAB at 09:03

## 2025-03-23 RX ADMIN — REMDESIVIR 100 MG: 100 INJECTION, POWDER, LYOPHILIZED, FOR SOLUTION INTRAVENOUS at 09:03

## 2025-03-23 RX ADMIN — ENOXAPARIN SODIUM 80 MG: 80 INJECTION SUBCUTANEOUS at 09:03

## 2025-03-23 RX ADMIN — IPRATROPIUM BROMIDE AND ALBUTEROL SULFATE 3 ML: 2.5; .5 SOLUTION RESPIRATORY (INHALATION) at 07:03

## 2025-03-23 RX ADMIN — OXYCODONE HYDROCHLORIDE AND ACETAMINOPHEN 500 MG: 500 TABLET ORAL at 09:03

## 2025-03-23 RX ADMIN — MUPIROCIN: 20 OINTMENT TOPICAL at 09:03

## 2025-03-23 RX ADMIN — IPRATROPIUM BROMIDE AND ALBUTEROL SULFATE 3 ML: 2.5; .5 SOLUTION RESPIRATORY (INHALATION) at 02:03

## 2025-03-23 RX ADMIN — ARFORMOTEROL TARTRATE 15 MCG: 15 SOLUTION RESPIRATORY (INHALATION) at 07:03

## 2025-03-23 RX ADMIN — DEXAMETHASONE 6 MG: 2 TABLET ORAL at 09:03

## 2025-03-23 RX ADMIN — DOCUSATE CALCIUM 240 MG: 240 CAPSULE, LIQUID FILLED ORAL at 09:03

## 2025-03-23 RX ADMIN — BUDESONIDE 0.5 MG: 0.5 INHALANT RESPIRATORY (INHALATION) at 07:03

## 2025-03-23 NOTE — ASSESSMENT & PLAN NOTE
Creatine stable for now. BMP reviewed- noted Estimated Creatinine Clearance: 35.8 mL/min (A) (based on SCr of 1.5 mg/dL (H)). according to latest data. Based on current GFR, CKD stage is stage 3 - GFR 30-59.  Monitor UOP and serial BMP and adjust therapy as needed. Renally dose meds. Avoid nephrotoxic medications and procedures.

## 2025-03-23 NOTE — PLAN OF CARE
Case Management Final Discharge Note    Discharge Disposition: Home     New DME ordered / company name: None     Relevant SDOH / Transition of Care Barriers:  None    Person available to provide assistance at home when needed and their contact information: Extended Emergency Contact Information  Primary Emergency Contact: MichaelMckinley  Mobile Phone: [phone]  Relation: Daughter  Preferred language: English   needed? No  Secondary Emergency Contact: GoldbergDianelys horton  Mobile Phone: [phone]  Relation: Daughter     Scheduled followup appointment: PCP- 03/25/25    Referrals placed: [facility]    Transportation: Patient will drive home upon discharge.    Patient and family educated on discharge services and updated on DC plan. Bedside RN Sudha Martínez notified, patient clear to discharge from Case Management Perspective.       03/23/25 1021   Final Note   Assessment Type Final Discharge Note   Anticipated Discharge Disposition Home   What phone number can be called within the next 1-3 days to see how you are doing after discharge? [phone]   Hospital Resources/Appts/Education Provided Appointments scheduled and added to AVS   Post-Acute Status   Coverage PHN   Discharge Delays None known at this time

## 2025-03-23 NOTE — DISCHARGE SUMMARY
"[facility] Medicine  Discharge Summary      Patient Name: Peter Lopez  MRN: [id number]  MATILDE: [id number]  Patient Class: IP- Inpatient  Admission Date: 3/21/2025  Hospital Length of Stay: 2 days  Discharge Date and Time: 3/23/2025  Attending Physician: Tino Bull MD   Discharging Provider: Alexandra Mcneill NP  Primary Care Provider: Lucas Lloyd Jr., MD    Primary Care Team: ALEXANDRA MCNEILL    HPI:   Peter Lopez is a 89 y.o. male with HFpEF  , Non-Insulin-dependent DM2, COPD (  on home oxygen 1.5L), HTN, HLD, CKD 3b, and Monoclonal gammopathy  who presented to [facility] ED on 03/21/2025 for eval and treatment of non-productive cough.  Has developed and worsened over the past few days.  States he had an episode of worsening cough that lasted for approximately 3 minutes, reporting it produced a white mucous. Notes he took Nyquil without relief. States that today he felt "feverish," "shaky," and "off" as well. Patient reports any activity makes him feel horrible. Shortness of breath worse with activity.  There is associated subjective fevers, chills, myalgias, dyspnea.  They deny associated CP, abdo pain, bowel/urinary issues.   No notable recent healthcare encounters.    ED course notable for the following: COVID (+).  Attempted get patient up to bedside to urinate however he became severely short of breath and had that lay back in the bed.   Required 3L LFNC to maintain sats.   Exam with clear lungs.  Labs ESR 63, , .  CTA chest: no PE, but patchy ground glass opacities noted.  ED therapy measures: Dexamethasone IV.  They were admitted to [facility] Medicine for further evaluation and treatment.      * No surgery found *      Hospital Course:   Admission for COPD and COVID 19 infection. No PNA. CTA no PE. Afebrile and non productive cough. Lung diminished without wheezes. Received dexamethasone and remdesivir  x 3 doses. O2 2 L NC 2 L wean to home O2 1.5 L. Patient " HDS for discharge home. Discharge COVID19 instruction on discharge.      Goals of Care Treatment Preferences:  Code Status: Full Code         Consults:     Assessment & Plan  COVID-19  Acute on chronic respiratory failure with hypoxia    Patient is identified as Severe COVID-19 based on hypoxemia with O2 saturations <94% on room air or on ambulation and High risk for severe complications of COVID 19 based on COVID risk score of 7   Initiate standard COVID protocols; COVID-19 testing ,Infection Control notification  and isolation- respiratory, contact and droplet per protocol    Diagnostics: CBC, CMP, Ferritin, CRP, LDH, Troponin, ECG, Rapid Flu, and Portable CXR    Management: Initiate targeted therapy with Remdesivir, 200mg IV x1, followed by 100mg IV daily x5 days total and Dexamethasone PO/IV 6mg daily x10 days, Maintain oxygen saturations 92-96% via Nasal Cannula 2 LPM and monitor with continuous/intermittent pulse oximetry. , Inhaled bronchodilators as needed for shortness of breath., and Continuous cardiac monitoring.    Advance Care Planning  Current advance care plan has not been discussed with patient/family/POA.     Essential hypertension  Patient's blood pressure range in the last 24 hours was: BP  Min: 125/72  Max: 155/91.The patient's inpatient anti-hypertensive regimen is listed below:  Current Antihypertensives     Holding home amlodipine and olmesartan until clinically stable.  Type 2 diabetes mellitus with diabetic polyneuropathy, without long-term current use of insulin  Last A1c reviewed-   Lab Results   Component Value Date    HGBA1C 6.9 (H) 02/11/2025     Home antihyperglycemic regimen: gilpizide, Glargine 10U qhs    Recent Labs     03/21/25  1304 03/22/25  0052 03/22/25  0737 03/22/25  1133 03/22/25  1559 03/23/25  0710   POCTGLUCOSE 76 213* 190* 171* 269* 117*     Most recent inpatient antihyperglycemic regimen:   Antihyperglycemics (From admission, onward)      Start     Stop Route Frequency  Ordered    03/22/25 0133  insulin aspart U-100 pen 0-5 Units         -- SubQ Before meals & nightly PRN 03/22/25 0033    03/22/25 0045  insulin glargine U-100 (Lantus) pen 6 Units         -- SubQ Nightly 03/22/25 0033          Goal blood glucose range while admitted: 140-180 per the NICE-SUGAR trial and American Diabetes Association guidelines  Diabetic diet 2000 dwight  Rechecking A1c if most recent result above > 6 months ago  Diabetic counseling and education (eg nutrition, insulin) to be given prior to discharge      Stage 3b chronic kidney disease  Creatine stable for now. BMP reviewed- noted Estimated Creatinine Clearance: 35.8 mL/min (A) (based on SCr of 1.5 mg/dL (H)). according to latest data. Based on current GFR, CKD stage is stage 3 - GFR 30-59.  Monitor UOP and serial BMP and adjust therapy as needed. Renally dose meds. Avoid nephrotoxic medications and procedures.  Chronic respiratory failure with hypoxia  Patient with Hypoxic Respiratory failure which is Chronic.  he is on home oxygen at 1.5 LPM. Supplemental oxygen was provided and noted-      .     Final Active Diagnoses:    Diagnosis Date Noted POA    PRINCIPAL PROBLEM:  COVID-19 [U07.1] 03/21/2025 Yes    Acute on chronic respiratory failure with hypoxia [J96.21] 03/22/2025 Yes    Stage 3b chronic kidney disease [N18.32] 12/18/2022 Yes     Chronic    Type 2 diabetes mellitus with diabetic polyneuropathy, without long-term current use of insulin [E11.42] 10/16/2013 Yes     Chronic    Essential hypertension [I10] 10/02/2012 Yes     Chronic      Problems Resolved During this Admission:       Discharged Condition: stable    Disposition: Home or Self Care    Follow Up:    Patient Instructions:      Ambulatory referral/consult to [facility]     Diet renal     Notify your health care provider if you experience any of the following:  temperature >100.4     Notify your health care provider if you experience any of the following:  redness,  tenderness, or signs of infection (pain, swelling, redness, odor or green/yellow discharge around incision site)     Notify your health care provider if you experience any of the following:  persistent dizziness, light-headedness, or visual disturbances     Activity as tolerated       Significant Diagnostic Studies: N/A    Pending Diagnostic Studies:       None           Medications:  Reconciled Home Medications:      Medication List        START taking these medications      pulse oximeter device  Commonly known as: pulse oximeter  by Apply Externally route 2 (two) times a day. Use twice daily at 8 AM and 3 PM and record the value in MyChart as directed.            CONTINUE taking these medications      ACCU-CHEK GUIDE TEST STRIPS Strp  Generic drug: blood sugar diagnostic  USE 1 STRIP TO CHECK GLUCOSE THREE TIMES DAILY     amLODIPine 5 MG tablet  Commonly known as: NORVASC  Take 1 tablet (5 mg total) by mouth once daily.     atorvastatin 20 MG tablet  Commonly known as: LIPITOR  Take 1 tablet (20 mg total) by mouth every evening.     glipiZIDE 5 MG tablet  Commonly known as: GLUCOTROL  Take 1 tablet (5 mg total) by mouth daily with breakfast.     lancets Misc  To check BG 3 times daily, to use with insurance preferred meter     LANTUS SOLOSTAR U-100 INSULIN 100 unit/mL (3 mL) Inpn pen  Generic drug: insulin glargine U-100 (Lantus)  Inject 10 Units into the skin every evening.     LIDOcaine viscous HCl 2% 2 % Soln  Commonly known as: LIDOcaine VISCOUS  Take 10 mL in oral cavity, gargle for 30 seconds then spit out every 6 hours as needed for sore throat     ofloxacin 0.3 % otic solution  Commonly known as: FLOXIN  Place 5 drops into the left ear 2 (two) times daily.     olmesartan 40 MG tablet  Commonly known as: BENICAR  Take 1 tablet (40 mg total) by mouth once daily.     pantoprazole 40 MG tablet  Commonly known as: PROTONIX  Take 1 tablet (40 mg total) by mouth once daily.     pen needle, diabetic 32 gauge x  "5/32" Ndle  Use with insulin pen as directed     SPIRIVA RESPIMAT 2.5 mcg/actuation inhaler  Generic drug: tiotropium bromide  INHALE 2 SPRAY(S) BY MOUTH ONCE DAILY              Indwelling Lines/Drains at time of discharge:   Lines/Drains/Airways       None                   Time spent on the discharge of patient: 35 minutes      Alexandra Leon NP  Department of Hospital Medicine  [facility]  "

## 2025-03-23 NOTE — DISCHARGE INSTRUCTIONS
Our goal at [facility] is to always give you outstanding care and exceptional service. You may receive a survey from [employer] by mail, text or e-mail in the next 24-48 hours asking about the care you received with us. The survey should only take 5-10 minutes to complete and is very important to us.     Your feedback provides us with a way to recognize our staff who work tirelessly to provide the best care! Also, your responses help us learn how to improve when your experience was below our aspiration of excellence. We are always looking for ways to improve your stay. We WILL use your feedback to continue making improvements to help us provide the highest quality care. We keep your personal information and feedback confidential. We appreciate your time completing this survey and can't wait to hear from you!!!    We look forward to your continued care with us! Thanks so much for choosing [facility] for your healthcare needs!

## 2025-03-23 NOTE — PLAN OF CARE
Patient remained safe/free from falls throughout the shift. AAOx4 w/ no acute distress noted. Tolerated meds well. PRN insulin given (1 unit/275 blood glucose). Bed is locked in lowest position w/ side rails up x's 2 with call light in reach. Plan of care is-going.    Problem: Adult Inpatient Plan of Care  Goal: Plan of Care Review  Outcome: Progressing     Problem: Diabetes Comorbidity  Goal: Blood Glucose Level Within Targeted Range  Outcome: Progressing     Problem: Gas Exchange Impaired  Goal: Optimal Gas Exchange  Outcome: Progressing     Problem: COPD (Chronic Obstructive Pulmonary Disease)  Goal: Optimal Level of Functional [place]  Outcome: Progressing     Problem: COPD (Chronic Obstructive Pulmonary Disease)  Goal: Effective Oxygenation and Ventilation  Outcome: Progressing

## 2025-03-23 NOTE — ASSESSMENT & PLAN NOTE
Patient's blood pressure range in the last 24 hours was: BP  Min: 125/72  Max: 155/91.The patient's inpatient anti-hypertensive regimen is listed below:  Current Antihypertensives     Holding home amlodipine and olmesartan until clinically stable.

## 2025-03-23 NOTE — HOSPITAL COURSE
Admission for COPD and COVID 19 infection. No PNA. CTA no PE. Afebrile and non productive cough. Lung diminished without wheezes. Received dexamethasone and remdesivir  x 3 doses. O2 2 L NC 2 L wean to home O2 1.5 L. Patient HDS for discharge home. Discharge COVID19 instruction on discharge.

## 2025-03-23 NOTE — ASSESSMENT & PLAN NOTE
Last A1c reviewed-   Lab Results   Component Value Date    HGBA1C 6.9 (H) 02/11/2025     Home antihyperglycemic regimen: gilpizide, Glargine 10U qhs    Recent Labs     03/21/25  1304 03/22/25  0052 03/22/25  0737 03/22/25  1133 03/22/25  1559 03/23/25  0710   POCTGLUCOSE 76 213* 190* 171* 269* 117*     Most recent inpatient antihyperglycemic regimen:   Antihyperglycemics (From admission, onward)      Start     Stop Route Frequency Ordered    03/22/25 0133  insulin aspart U-100 pen 0-5 Units         -- SubQ Before meals & nightly PRN 03/22/25 0033    03/22/25 0045  insulin glargine U-100 (Lantus) pen 6 Units         -- SubQ Nightly 03/22/25 0033          Goal blood glucose range while admitted: 140-180 per the NICE-SUGAR trial and American Diabetes Association guidelines  Diabetic diet 2000 dwight  Rechecking A1c if most recent result above > 6 months ago  Diabetic counseling and education (eg nutrition, insulin) to be given prior to discharge

## 2025-03-23 NOTE — NURSING
[facility], [facility]  Nurses Note -- 4 Eyes        Date: 03/23/2025        Skin assessed on: Discharge        [x] No Pressure Injuries Present                 []Prevention Measures Documented     [] Yes LDA Previously documented      [] Yes New Pressure Injury Discovered              [] LDA Added        Attending RN:  DARIEL Haley     Second RN:  MEG Marinelli

## 2025-03-23 NOTE — ASSESSMENT & PLAN NOTE
Patient with Hypoxic Respiratory failure which is Chronic.  he is on home oxygen at 1.5 LPM. Supplemental oxygen was provided and noted-      .

## 2025-03-23 NOTE — PLAN OF CARE
Problem: Adult Inpatient Plan of Care  Goal: Plan of Care Review  Outcome: Met  Goal: Patient-Specific Goal (Individualized)  Outcome: Met  Goal: Absence of Hospital-Acquired Illness or Injury  Outcome: Met  Goal: Optimal Comfort and Wellbeing  Outcome: Met  Goal: Readiness for Transition of Care  Outcome: Met     Problem: Diabetes Comorbidity  Goal: Blood Glucose Level Within Targeted Range  Outcome: Met     Problem: Gas Exchange Impaired  Goal: Optimal Gas Exchange  Outcome: Met     Problem: COPD (Chronic Obstructive Pulmonary Disease)  Goal: Optimal Chronic Illness Coping  Outcome: Met  Goal: Optimal Level of Functional [place]  Outcome: Met  Goal: Absence of Infection Signs and Symptoms  Outcome: Met  Goal: Improved Oral Intake  Outcome: Met  Goal: Effective Oxygenation and Ventilation  Outcome: Met

## 2025-03-23 NOTE — NURSING
[facility], [facility]  Nurses Note -- 4 Eyes      3/22/2025       Skin assessed on: Q Shift      [x] No Pressure Injuries Present    [x]Prevention Measures Documented    [] Yes LDA  for Pressure Injury Previously documented     [] Yes New Pressure Injury Discovered   [] LDA for New Pressure Injury Added      Attending RN:  James Germain LPN     Second RN:  Sudha Martínez RN

## 2025-03-23 NOTE — PROGRESS NOTES
AVS virtually reviewed with patient in its entirety with emphasis on diet, medications, follow-up appointments and reasons to return to the ED or contact the [facility] Nurse [facility] Line. Patient also encouraged to utilize their patient portal. Ease and convenience of use reiterated. Education complete and patient voiced understanding. All questions answered. Discharge teaching complete. Encouraged to complete patient survey. Encouraged to activate patient portal.

## 2025-03-23 NOTE — ASSESSMENT & PLAN NOTE
Acute on chronic respiratory failure with hypoxia    Patient is identified as Severe COVID-19 based on hypoxemia with O2 saturations <94% on room air or on ambulation and High risk for severe complications of COVID 19 based on COVID risk score of 7   Initiate standard COVID protocols; COVID-19 testing ,Infection Control notification  and isolation- respiratory, contact and droplet per protocol    Diagnostics: CBC, CMP, Ferritin, CRP, LDH, Troponin, ECG, Rapid Flu, and Portable CXR    Management: Initiate targeted therapy with Remdesivir, 200mg IV x1, followed by 100mg IV daily x5 days total and Dexamethasone PO/IV 6mg daily x10 days, Maintain oxygen saturations 92-96% via Nasal Cannula 2 LPM and monitor with continuous/intermittent pulse oximetry. , Inhaled bronchodilators as needed for shortness of breath., and Continuous cardiac monitoring.    Advance Care Planning  Current advance care plan has not been discussed with patient/family/POA.

## 2025-03-23 NOTE — NURSING
Discharge instructions handed to pt. Pt verbalizes understanding. Denies any questions. IV discontinued and catheter intact. Vital signs stable, NADN, and afebrile. Cardiac monitoring dc and tele monitor tech notified via phone. Discharged home via wheelchair with surgical mask in place.

## 2025-03-24 DIAGNOSIS — Z00.00 ENCOUNTER FOR MEDICARE ANNUAL WELLNESS EXAM: ICD-10-CM

## 2025-03-24 LAB
POCT GLUCOSE: 130 MG/DL (ref 70–110)
POCT GLUCOSE: 246 MG/DL (ref 70–110)
POCT GLUCOSE: 275 MG/DL (ref 70–110)

## 2025-03-25 ENCOUNTER — PATIENT OUTREACH (OUTPATIENT)
Dept: ADMINISTRATIVE | Facility: CLINIC | Age: 89
End: 2025-03-25
Payer: MEDICARE

## 2025-03-25 ENCOUNTER — OFFICE VISIT (OUTPATIENT)
Dept: FAMILY MEDICINE | Facility: CLINIC | Age: 89
End: 2025-03-25
Payer: MEDICARE

## 2025-03-25 VITALS
WEIGHT: 177.25 LBS | HEIGHT: 70 IN | TEMPERATURE: 98 F | RESPIRATION RATE: 16 BRPM | BODY MASS INDEX: 25.38 KG/M2 | OXYGEN SATURATION: 95 % | SYSTOLIC BLOOD PRESSURE: 136 MMHG | DIASTOLIC BLOOD PRESSURE: 74 MMHG | HEART RATE: 101 BPM

## 2025-03-25 DIAGNOSIS — J84.9 ILD (INTERSTITIAL LUNG DISEASE): Chronic | ICD-10-CM

## 2025-03-25 DIAGNOSIS — J96.21 ACUTE ON CHRONIC RESPIRATORY FAILURE WITH HYPOXIA: ICD-10-CM

## 2025-03-25 DIAGNOSIS — U07.1 COVID-19: Primary | ICD-10-CM

## 2025-03-25 DIAGNOSIS — J43.2 CENTRILOBULAR EMPHYSEMA: Chronic | ICD-10-CM

## 2025-03-25 PROCEDURE — 3288F FALL RISK ASSESSMENT DOCD: CPT | Mod: CPTII,S$GLB,, | Performed by: FAMILY MEDICINE

## 2025-03-25 PROCEDURE — 1126F AMNT PAIN NOTED NONE PRSNT: CPT | Mod: CPTII,S$GLB,, | Performed by: FAMILY MEDICINE

## 2025-03-25 PROCEDURE — 1111F DSCHRG MED/CURRENT MED MERGE: CPT | Mod: CPTII,S$GLB,, | Performed by: FAMILY MEDICINE

## 2025-03-25 PROCEDURE — 99214 OFFICE O/P EST MOD 30 MIN: CPT | Mod: S$GLB,,, | Performed by: FAMILY MEDICINE

## 2025-03-25 PROCEDURE — 1101F PT FALLS ASSESS-DOCD LE1/YR: CPT | Mod: CPTII,S$GLB,, | Performed by: FAMILY MEDICINE

## 2025-03-25 PROCEDURE — G2211 COMPLEX E/M VISIT ADD ON: HCPCS | Mod: S$GLB,,, | Performed by: FAMILY MEDICINE

## 2025-03-25 PROCEDURE — 99999 PR PBB SHADOW E&M-EST. PATIENT-LVL IV: CPT | Mod: PBBFAC,,, | Performed by: FAMILY MEDICINE

## 2025-03-25 RX ORDER — PROMETHAZINE HYDROCHLORIDE AND DEXTROMETHORPHAN HYDROBROMIDE 6.25; 15 MG/5ML; MG/5ML
5 SYRUP ORAL
Qty: 240 ML | Refills: 0 | Status: SHIPPED | OUTPATIENT
Start: 2025-03-25

## 2025-03-25 NOTE — PROGRESS NOTES
C3 nurse attempted to contact Peter Lopez for a TCC post hospital discharge follow up call. The patient is unable to conduct the call @ this time. The patient requested a callback.    The patient has a scheduled HOS appointment with Lucas Lloyd Jr., MD on 3/25/25 @ 120pm

## 2025-03-26 NOTE — PROGRESS NOTES
C3 nurse spoke with Peter Lopez for a TCC post hospital discharge follow up call. The patient had a HOSFU with Lucas Little Jr., MD ([facility]) on 3/25/25. No messages routed at this time.

## 2025-03-31 ENCOUNTER — LAB VISIT (OUTPATIENT)
Dept: LAB | Facility: HOSPITAL | Age: 89
End: 2025-03-31
Attending: STUDENT IN AN ORGANIZED HEALTH CARE EDUCATION/TRAINING PROGRAM
Payer: MEDICARE

## 2025-03-31 DIAGNOSIS — N18.32 STAGE 3B CHRONIC KIDNEY DISEASE: ICD-10-CM

## 2025-03-31 DIAGNOSIS — D72.819 LEUKOPENIA, UNSPECIFIED TYPE: ICD-10-CM

## 2025-03-31 LAB
BILIRUB UR QL STRIP.AUTO: NEGATIVE
CLARITY UR: CLEAR
COLOR UR AUTO: COLORLESS
CREAT UR-MCNC: 20.7 MG/DL (ref 23–375)
GLUCOSE UR QL STRIP: NEGATIVE
HGB UR QL STRIP: NEGATIVE
KETONES UR QL STRIP: NEGATIVE
LDH SERPL-CCNC: 269 U/L (ref 110–260)
LEUKOCYTE ESTERASE UR QL STRIP: NEGATIVE
NITRITE UR QL STRIP: NEGATIVE
PH UR STRIP: 7 [PH]
PROT UR QL STRIP: NEGATIVE
PROT UR-MCNC: <7 MG/DL
PROT/CREAT UR: ABNORMAL MG/G{CREAT}
SP GR UR STRIP: <1.005
UROBILINOGEN UR STRIP-ACNC: NEGATIVE EU/DL

## 2025-03-31 PROCEDURE — 84156 ASSAY OF PROTEIN URINE: CPT

## 2025-03-31 PROCEDURE — 83615 LACTATE (LD) (LDH) ENZYME: CPT

## 2025-03-31 PROCEDURE — 81003 URINALYSIS AUTO W/O SCOPE: CPT

## 2025-03-31 NOTE — PROGRESS NOTES
"  Patient Name: Peter Lopez    : 1935  MRN: [id number]      Subjective:     Patient ID: Peter is a 89 y.o. male    Chief Complaint:  Hospital Follow Up    History of Present Illness    Peter presents today for hospital follow-up after COVID infection    He recently required hospitalization for COVID-19 infection and received 3 days of Remdesivir treatment. He reports persistent productive cough. He has a history of previous COVID-19 infection that also required prolonged hospitalization. He is up to date with COVID-19 vaccinations.    He experiences shortness of breath with excessive movement. His oxygen requirements have increased since recent COVID infection. Prior to COVID, he used oxygen approximately 3 hours daily. Currently, he requires oxygen during sleep and approximately one hour during daytime rest periods.    He checks blood sugar twice daily and takes insulin 10 units. He requires a snack before lunch to prevent hypoglycemia.      ROS:  General: -fever, -chills, -fatigue, -weight gain, -weight loss  Eyes: -vision changes, -redness, -discharge, +blurry vision  ENT: -ear pain, -nasal congestion, -sore throat  Cardiovascular: -chest pain, -palpitations, -lower extremity edema  Respiratory: +cough, -shortness of breath, +dyspnea at rest, +exertional dyspnea, +productive cough, +difficulty breathing  Gastrointestinal: -abdominal pain, -nausea, -vomiting, -diarrhea, -constipation, -blood in stool  Genitourinary: -dysuria, -hematuria, -frequency  Musculoskeletal: -joint pain, -muscle pain  Skin: -rash, -lesion  Neurological: -headache, -dizziness, -numbness, -tingling  Psychiatric: -anxiety, -depression, -sleep difficulty       Objective:   /74 (BP Location: Left arm, Patient Position: Sitting)   Pulse 101   Temp 98.2 °F (36.8 °C) (Oral)   Resp 16   Ht 5' 10" (1.778 m)   Wt 80.4 kg (177 lb 4 oz)   SpO2 95%   BMI 25.43 kg/m²     Physical Exam  Vitals reviewed.   Constitutional:       Appearance: He " is well-developed. He is not diaphoretic.   HENT:      Head: Normocephalic.      Right Ear: External ear normal.      Left Ear: External ear normal.      Nose: Nose normal.      Mouth/Throat:      Pharynx: No oropharyngeal exudate.   Neck:      Trachea: No tracheal deviation.   Cardiovascular:      Rate and Rhythm: Normal rate and regular rhythm.      Heart sounds: Normal heart sounds.   Pulmonary:      Effort: Pulmonary effort is normal.      Breath sounds: Normal breath sounds. No wheezing or rales.   Abdominal:      General: Bowel sounds are normal.      Palpations: Abdomen is soft. Abdomen is not rigid. There is no mass.      Tenderness: There is no abdominal tenderness. There is no guarding or rebound.   Musculoskeletal:      Cervical back: Normal range of motion and neck supple.   Lymphadenopathy:      Cervical: No cervical adenopathy.   Neurological:      Mental Status: He is oriented to person, place, and time.      Gait: Gait normal.          Assessment        ICD-10-CM ICD-9-CM   1. COVID-19  U07.1 079.89   2. Centrilobular emphysema  J43.2 492.8   3. ILD (interstitial lung disease)  J84.9 515   4. Chronic respiratory failure with hypoxia  J96.21 518.84     799.02         Plan:   Assessment & Plan               1. COVID-19  -     promethazine-dextromethorphan (PROMETHAZINE-DM) 6.25-15 mg/5 mL Syrp; Take 5 mLs by mouth every 4 to 6 hours as needed (cough/congestion). (Patient not taking: Reported on 3/26/2025)  Dispense: 240 mL; Refill: 0  Overall doing better.    Patient requested refill on promethazine DM.    Prescription sent to pharmacy.      2. Centrilobular emphysema  Overview:  FEV1 60  Maintenance inhaler-spiriva  Decline pharmacy assist.  Quit in 1980s  Albuterol prn-not needing   Oxygen prn following covid but not needing now however walk test indicates that he would still benefit from this.   Will provide portablec concentrator  Pulmonary rehab- completed some but stop after prostate  cancer  uptodate flu/covid/pneumonia vaccine.  Off oxygen    Orders:  -     Ambulatory referral/consult to Pulmonology; Future; Expected date: 04/01/2025  Recommended follow up with Pulmonary.      3. ILD (interstitial lung disease)  Overview:  12-  CT Chest  Lungs are symmetrically expanded with centrilobular emphysema. Similar fibrotic scarring present at the lung bases. No associated honeycombing. More subsegmental linear scarring at the inferomedial right middle lobe, also similar. No suspicious new nodule or mass.       4. Chronic respiratory failure with hypoxia  -     Ambulatory referral/consult to Pulmonology; Future; Expected date: 04/01/2025  Recommended follow up with Pulmonary.               -Lucas Lloyd Jr., MD, AAHIVS      This note was generated with the assistance of ambient listening technology. Verbal consent was obtained by the patient and accompanying visitor(s) for the recording of patient appointment to facilitate this note. I attest to having reviewed and edited the generated note for accuracy, though some syntax or spelling errors may persist. Please contact the author of this note for any clarification.      There are no Patient Instructions on file for this visit.      No follow-ups on file.   Future Appointments   Date Time Provider Department Center   3/31/2025 11:00 AM LAB, [facility]   3/31/2025 11:10 AM SPECIMEN, [facility]   4/8/2025  2:30 PM Adis Mendez MD [facility] NEPHRO [facility]   4/15/2025 10:00 AM LAB, [facility]   4/28/2025 10:15 [facility] [place]   5/6/2025  1:00 PM Sarkis Hyman MD [facility] PULSM [facility]   5/27/2025  2:15 PM Rosemary Allred OD [facility] OPTOMTY [place]   7/15/2025  8:00 AM SPECIMEN, [facility]   7/15/2025  8:15 AM SPECIMEN, [facility]   7/22/2025 10:00 AM Lucas Lloyd Jr., MD [facility]   8/18/2025   9:00 AM Jesus Cole MD [facility]

## 2025-04-07 NOTE — PROGRESS NOTES
Subjective     Chief Complaint: CKD management    History of Present Illness:  Mr. Peter Lopez is a 89 y.o. male with active medical diagnosis of HFpEF, T2DM, COPD with home O2 1.5 liters, HTN, HLD, MGUS    Review of Systems   Constitutional:  Negative for chills and fever.   HENT:  Negative for ear discharge and ear pain.    Eyes:  Negative for blurred vision and double vision.   Respiratory:  Negative for cough and shortness of breath.    Cardiovascular:  Negative for chest pain and palpitations.   Gastrointestinal:  Negative for abdominal pain, constipation, diarrhea, nausea and vomiting.   Genitourinary:  Negative for dysuria and frequency.   Musculoskeletal:  Negative for myalgias and neck pain.   Skin:  Negative for itching and rash.   Neurological:  Negative for tingling and headaches.         ASSESSMENT & PLAN:     Stage 3b chronic kidney disease  -     US Retroperitoneal Complete; Future; Expected date: 04/08/2025  -     PTH, Intact; Future; Expected date: 10/08/2025  -     Ferritin; Future; Expected date: 10/08/2025  -     Iron and TIBC; Future; Expected date: 10/08/2025  -     Protein/Creatinine Ratio, Urine; Future; Expected date: 10/08/2025  -     Urinalysis; Future; Expected date: 10/08/2025  -     Renal Function Panel; Future; Expected date: 04/08/2026    Type 2 diabetes mellitus with stage 3b chronic kidney disease, unspecified whether long term insulin use    Hypertension, unspecified type    Monoclonal gammopathy    Chronic respiratory failure with hypoxia    Other orders  -     BLOOD PRESSURE CUFF Misc; 1 Units by Misc.(Non-Drug; Combo Route) route once daily.        #CKD3b - age related changes, DM, HTN  Baseline creatinine 1.5-2.0  UA no acute findings  UPCR 0    Creatinine   Date Value Ref Range Status   03/31/2025 1.7 (H) 0.5 - 1.4 mg/dL Final   03/23/2025 1.5 (H) 0.5 - 1.4 mg/dL Final   03/21/2025 1.6 (H) 0.5 - 1.4 mg/dL Final   02/11/2025 1.6 (H) 0.5 - 1.4 mg/dL Final   09/24/2024 2.0 (H) 0.5  - 1.4 mg/dL Final     eGFR   Date Value Ref Range Status   03/31/2025 39 (L) >60 mL/min/1.73/m2 Final     Comment:     Estimated GFR calculated using the CKD-EPI creatinine (2021) equation.   03/23/2025 45 (L) >60 mL/min/1.73/m2 Final     Comment:     Estimated GFR calculated using the CKD-EPI creatinine (2021) equation.   03/21/2025 41 (A) >60 mL/min/1.73 m^2 Final   02/11/2025 41 (A) >60 mL/min/1.73 m^2 Final   09/24/2024 31.9 (A) >60 mL/min/1.73 m^2 Final     Urine Protein/Creatinine Ratio   Date Value Ref Range Status   03/31/2025   Final     Comment:     UNABLE TO CALCULATE     Prot/Creat Ratio, Urine   Date Value Ref Range Status   09/24/2024 0.12 0.00 - 0.20 Final   08/23/2024 Unable to calculate 0.00 - 0.20 Final   08/19/2024 Unable to calculate 0.00 - 0.20 Final     Imaging: no recent dedicated studies  RP US ordered 4/8/2025    Advance Care Planning   We discussed the natural progression of kidney disease. He would be amenable to a time limited trial if his kidneys had a chance of recovery and understands he is able to stop should he want to, but he would not want to be on dialysis life long.         #DM  Hemoglobin A1c: greater than 14 on 8/19/2024  Lab Results   Component Value Date    HGBA1C 6.9 (H) 02/11/2025    HGBA1C >14.0 (H) 08/19/2024    HGBA1C 13.3 (H) 05/20/2024   We discussed SGLT-2 therapy in the context of his CKD, age and cormorbidites and will hold on therapy. Continue to monitor      #HTN  Home medications: olmesartan 40 qd, amlodipine 5 qd  In clinic: 130/82  Does not check at home and does not have a cuff. Discussed going to [facility] to obtain a cuff and will bring a BP log.     #Anemia  HGB   Date Value Ref Range Status   03/31/2025 12.6 (L) 14.0 - 18.0 gm/dL Final     Iron Level   Date Value Ref Range Status   03/31/2025 97 45 - 160 ug/dL Final     Transferrin   Date Value Ref Range Status   03/31/2025 223 200 - 375 mg/dL Final     Iron Binding Capacity Total   Date Value Ref  Range Status   03/31/2025 330 250 - 450 ug/dL Final     Saturated Iron   Date Value Ref Range Status   07/29/2024 28 20 - 50 % Final     Ferritin   Date Value Ref Range Status   03/31/2025 45.8 20.0 - 300.0 ng/mL Final   Stable, at goal. Continue to monitor    #Secondary hyperparathyroidism  Calcium   Date Value Ref Range Status   03/31/2025 9.8 8.7 - 10.5 mg/dL Final     Phosphorus Level   Date Value Ref Range Status   03/31/2025 2.8 2.7 - 4.5 mg/dL Final     PTH Intact   Date Value Ref Range Status   03/31/2025 82.4 (H) 9.0 - 77.0 pg/mL Final   Stable. 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 DBM    NO PAST SURGERIES  05/20/2021    TRANSURETHRAL RESECTION OF PROSTATE USING BIPOLAR CAUTERY N/A 6/22/2021    Procedure: (TURP) Transuretheral Resection of Prostate with BIPOLAR CAUTERY;  Surgeon: Jesus Cole MD;  Location: [facility] OR;  Service: Urology;  Laterality: N/A;  RN Pre OP 6-21-21.  Vaccinated.  C A       Family History   Problem Relation Name Age of Onset    Cancer Mother       Asbestos Father      Liver disease Neg Hx         Social History     Socioeconomic History    Marital status:     Number of children: 7    Highest education level: 12th grade   Tobacco Use    Smoking status: Former     Current packs/day: 0.00     Average packs/day: 1 pack/day for 40.0 years (40.0 ttl pk-yrs)     Types: Cigarettes     Start date: 1951     Quit date: 1991     Years since quittin.4    Smokeless tobacco: Former     Quit date: 1986   Substance and Sexual Activity    Alcohol use: Not Currently     Alcohol/week: 0.0 standard drinks of alcohol     Comment: 2 bottles beer on weekends    Drug use: No   Social History Narrative    , resides in [place]. 7 children but lost one. 16 grandchildren. 4 great-grandchildren. Works at [employer]     Social Drivers of Health     Financial Resource Strain: Patient Declined (3/23/2025)    Overall Financial Resource Strain (CARDIA)     Difficulty of Paying Living Expenses: Patient declined   Recent Concern: Financial Resource Strain - Medium Risk (3/23/2025)    Overall Financial Resource Strain (CARDIA)     Difficulty of Paying Living Expenses: Somewhat hard   Food Insecurity: Patient Declined (3/23/2025)    Hunger Vital Sign     Worried About Running Out of Food in the Last Year: Patient declined     Ran Out of Food in the Last Year: Patient declined   Transportation Needs: Patient Declined (3/23/2025)    PRAPARE - Transportation     Lack of Transportation (Medical): Patient declined     Lack of Transportation (Non-Medical): Patient declined   Physical Activity: Inactive (2023)    Exercise Vital Sign     Days of Exercise per Week: 0 days     Minutes of Exercise per Session: 0 min   Stress: Patient Declined (3/23/2025)    [employer] - Occupational Stress Questionnaire     Feeling of Stress : Patient declined   Housing Stability: Patient Declined (3/23/2025)    Housing Stability Vital Sign      "Unable to Pay for Housing in the Last Year: Patient declined     Homeless in the Last Year: Patient declined       MEDICATIONS & ALLERGIES:     Current Outpatient Medications on File Prior to Visit   Medication Sig    ACCU-CHEK GUIDE TEST STRIPS Strp USE 1 STRIP TO CHECK GLUCOSE THREE TIMES DAILY    amLODIPine (NORVASC) 5 MG tablet Take 1 tablet (5 mg total) by mouth once daily.    atorvastatin (LIPITOR) 20 MG tablet Take 1 tablet (20 mg total) by mouth every evening.    glipiZIDE (GLUCOTROL) 5 MG tablet Take 1 tablet (5 mg total) by mouth daily with breakfast.    lancets Misc To check BG 3 times daily, to use with insurance preferred meter    LANTUS SOLOSTAR U-100 INSULIN 100 unit/mL (3 mL) InPn pen Inject 10 Units into the skin every evening.    LIDOcaine viscous HCl 2% (LIDOCAINE VISCOUS) 2 % Soln Take 10 mL in oral cavity, gargle for 30 seconds then spit out every 6 hours as needed for sore throat (Patient not taking: Reported on 3/26/2025)    ofloxacin (FLOXIN) 0.3 % otic solution Place 5 drops into the left ear 2 (two) times daily. (Patient not taking: Reported on 3/26/2025)    olmesartan (BENICAR) 40 MG tablet Take 1 tablet (40 mg total) by mouth once daily.    pantoprazole (PROTONIX) 40 MG tablet Take 1 tablet (40 mg total) by mouth once daily.    pen needle, diabetic 32 gauge x 5/32" Ndle Use with insulin pen as directed    promethazine-dextromethorphan (PROMETHAZINE-DM) 6.25-15 mg/5 mL Syrp Take 5 mLs by mouth every 4 to 6 hours as needed (cough/congestion). (Patient not taking: Reported on 3/26/2025)    pulse oximeter (PULSE OXIMETER) device by Apply Externally route 2 (two) times a day. Use twice daily at 8 AM and 3 PM and [facility] as directed. (Patient not taking: Reported on 3/26/2025)    SPIRIVA RESPIMAT 2.5 mcg/actuation inhaler INHALE 2 SPRAY(S) BY MOUTH ONCE DAILY     No current facility-administered medications on file prior to visit.       Review of patient's allergies " indicates:  No Known Allergies    OBJECTIVE:     Vital Signs:  There were no vitals filed for this visit.    There is no height or weight on file to calculate BMI.     Physical Exam  Constitutional:       General: He is not in acute distress.     Appearance: Normal appearance. He is not ill-appearing or diaphoretic.   HENT:      Head: Normocephalic and atraumatic.      Mouth/Throat:      Pharynx: No oropharyngeal exudate or posterior oropharyngeal erythema.   Eyes:      General: No scleral icterus.        Right eye: No discharge.         Left eye: No discharge.      Extraocular Movements: Extraocular movements intact.      Conjunctiva/sclera: Conjunctivae normal.      Pupils: Pupils are equal, round, and reactive to light.   Neck:      Vascular: No JVD.      Trachea: No tracheal deviation.   Cardiovascular:      Rate and Rhythm: Normal rate and regular rhythm.      Heart sounds: No murmur heard.  Pulmonary:      Effort: Pulmonary effort is normal. No respiratory distress.      Breath sounds: Normal breath sounds. No wheezing or rales.   Abdominal:      General: Abdomen is flat. Bowel sounds are normal. There is no distension.      Palpations: Abdomen is soft. There is no mass.      Tenderness: There is no abdominal tenderness.   Musculoskeletal:         General: No swelling, tenderness or deformity. Normal range of motion.      Cervical back: Normal range of motion and neck supple.   Lymphadenopathy:      Cervical: No cervical adenopathy.   Skin:     General: Skin is warm and dry.      Coloration: Skin is not jaundiced or pale.      Findings: No bruising, erythema or rash.   Neurological:      General: No focal deficit present.      Mental Status: He is alert and oriented to person, place, and time. Mental status is at baseline.      Cranial Nerves: No cranial nerve deficit.         Laboratory  Sodium   Date Value Ref Range Status   03/31/2025 141 136 - 145 mmol/L Final   03/23/2025 137 136 - 145 mmol/L Final    03/21/2025 139 136 - 145 mmol/L Final   02/11/2025 140 136 - 145 mmol/L Final     Potassium   Date Value Ref Range Status   03/31/2025 4.6 3.5 - 5.1 mmol/L Final   03/23/2025 3.7 3.5 - 5.1 mmol/L Final   03/21/2025 4.1 3.5 - 5.1 mmol/L Final     Comment:     Specimen slightly hemolyzed   02/11/2025 4.6 3.5 - 5.1 mmol/L Final     Comment:     Specimen slightly hemolyzed     Chloride   Date Value Ref Range Status   03/31/2025 106 95 - 110 mmol/L Final   03/23/2025 107 95 - 110 mmol/L Final   03/21/2025 104 95 - 110 mmol/L Final   02/11/2025 107 95 - 110 mmol/L Final     CO2   Date Value Ref Range Status   03/31/2025 28 23 - 29 mmol/L Final   03/23/2025 23 23 - 29 mmol/L Final   03/21/2025 24 23 - 29 mmol/L Final   02/11/2025 21 (L) 23 - 29 mmol/L Final     BUN   Date Value Ref Range Status   03/31/2025 29 (H) 8 - 23 mg/dL Final   03/23/2025 30 (H) 8 - 23 mg/dL Final     Creatinine   Date Value Ref Range Status   03/31/2025 1.7 (H) 0.5 - 1.4 mg/dL Final   03/23/2025 1.5 (H) 0.5 - 1.4 mg/dL Final     eGFR   Date Value Ref Range Status   03/31/2025 39 (L) >60 mL/min/1.73/m2 Final     Comment:     Estimated GFR calculated using the CKD-EPI creatinine (2021) equation.   03/23/2025 45 (L) >60 mL/min/1.73/m2 Final     Comment:     Estimated GFR calculated using the CKD-EPI creatinine (2021) equation.   03/21/2025 41 (A) >60 mL/min/1.73 m^2 Final   02/11/2025 41 (A) >60 mL/min/1.73 m^2 Final     Glucose   Date Value Ref Range Status   03/31/2025 136 (H) 70 - 110 mg/dL Final   03/23/2025 132 (H) 70 - 110 mg/dL Final     Calcium   Date Value Ref Range Status   03/31/2025 9.8 8.7 - 10.5 mg/dL Final   03/23/2025 9.0 8.7 - 10.5 mg/dL Final   03/21/2025 9.9 8.7 - 10.5 mg/dL Final   02/11/2025 9.9 8.7 - 10.5 mg/dL Final     Phosphorus Level   Date Value Ref Range Status   03/31/2025 2.8 2.7 - 4.5 mg/dL Final     Phosphorus   Date Value Ref Range Status   09/24/2024 3.3 2.7 - 4.5 mg/dL Final   08/11/2023 3.8 2.7 - 4.5 mg/dL Final      Albumin   Date Value Ref Range Status   03/31/2025 3.6 3.5 - 5.2 g/dL Final   03/23/2025 3.4 (L) 3.5 - 5.2 g/dL Final   03/21/2025 4.2 3.5 - 5.2 g/dL Final   02/11/2025 4.0 3.5 - 5.2 g/dL Final       Diagnostic Results:      Health Maintenance Due   Topic Date Due    RSV Vaccine (Age 60+ and Pregnant patients) (1 - 1-dose 75+ series) Never done    Shingles Vaccine (2 of 2) 11/20/2023    COVID-19 Vaccine (8 - 2024-25 season) 11/01/2024         Visit today included increased complexity associated with the care of the episodic problem HTN addressed and managing the longitudinal care of the patient due to the serious and/or complex managed problem(s) CKD.    Adis Mendez MD  Nephrology [facility] [place]

## 2025-04-08 ENCOUNTER — OFFICE VISIT (OUTPATIENT)
Dept: NEPHROLOGY | Facility: CLINIC | Age: 89
End: 2025-04-08
Payer: MEDICARE

## 2025-04-08 VITALS
HEART RATE: 97 BPM | OXYGEN SATURATION: 95 % | HEIGHT: 70 IN | SYSTOLIC BLOOD PRESSURE: 130 MMHG | WEIGHT: 180 LBS | BODY MASS INDEX: 25.77 KG/M2 | RESPIRATION RATE: 18 BRPM | DIASTOLIC BLOOD PRESSURE: 82 MMHG

## 2025-04-08 DIAGNOSIS — N18.32 TYPE 2 DIABETES MELLITUS WITH STAGE 3B CHRONIC KIDNEY DISEASE, UNSPECIFIED WHETHER LONG TERM INSULIN USE: ICD-10-CM

## 2025-04-08 DIAGNOSIS — J96.21 ACUTE ON CHRONIC RESPIRATORY FAILURE WITH HYPOXIA: ICD-10-CM

## 2025-04-08 DIAGNOSIS — N18.32 STAGE 3B CHRONIC KIDNEY DISEASE: Primary | ICD-10-CM

## 2025-04-08 DIAGNOSIS — E11.22 TYPE 2 DIABETES MELLITUS WITH STAGE 3B CHRONIC KIDNEY DISEASE, UNSPECIFIED WHETHER LONG TERM INSULIN USE: ICD-10-CM

## 2025-04-08 DIAGNOSIS — I10 HYPERTENSION, UNSPECIFIED TYPE: ICD-10-CM

## 2025-04-08 DIAGNOSIS — D47.2 MONOCLONAL GAMMOPATHY: ICD-10-CM

## 2025-04-08 PROCEDURE — 1159F MED LIST DOCD IN RCRD: CPT | Mod: CPTII,S$GLB,, | Performed by: STUDENT IN AN ORGANIZED HEALTH CARE EDUCATION/TRAINING PROGRAM

## 2025-04-08 PROCEDURE — 1126F AMNT PAIN NOTED NONE PRSNT: CPT | Mod: CPTII,S$GLB,, | Performed by: STUDENT IN AN ORGANIZED HEALTH CARE EDUCATION/TRAINING PROGRAM

## 2025-04-08 PROCEDURE — 1101F PT FALLS ASSESS-DOCD LE1/YR: CPT | Mod: CPTII,S$GLB,, | Performed by: STUDENT IN AN ORGANIZED HEALTH CARE EDUCATION/TRAINING PROGRAM

## 2025-04-08 PROCEDURE — 99999 PR PBB SHADOW E&M-EST. PATIENT-LVL IV: CPT | Mod: PBBFAC,,, | Performed by: STUDENT IN AN ORGANIZED HEALTH CARE EDUCATION/TRAINING PROGRAM

## 2025-04-08 PROCEDURE — 99214 OFFICE O/P EST MOD 30 MIN: CPT | Mod: S$GLB,,, | Performed by: STUDENT IN AN ORGANIZED HEALTH CARE EDUCATION/TRAINING PROGRAM

## 2025-04-08 PROCEDURE — 3288F FALL RISK ASSESSMENT DOCD: CPT | Mod: CPTII,S$GLB,, | Performed by: STUDENT IN AN ORGANIZED HEALTH CARE EDUCATION/TRAINING PROGRAM

## 2025-04-08 RX ORDER — NEBULIZER AND COMPRESSOR
1 EACH MISCELLANEOUS DAILY
COMMUNITY
Start: 2025-04-08

## 2025-04-08 NOTE — PATIENT INSTRUCTIONS
You have chronic kidney disease, remember that this is a reflection that your kidneys have seen some use rather than a specific process. There are many uncontrollable factors that progress chronic kidney disease - mainly that we use our kidneys 24/7. We will do what we can to prolong the life of your kidney:    Stay plenty hydrated, if you get thirsty you know you need to drink more  Avoid NSAIDS (nonsteroidal anti-inflammatories) like ibuprofen, motrin, goodies powder, advil. Tylenol is okay    Lastly, control of your other medical conditions, weight loss and exercise will always help as well.    Please get a blood pressure cuff afterwards please check your blood pressure twice a day for the next week. Check first thing in the morning and right before bed. Keep a log either on your phone or on a piece of paper and write down what happened that day--it does not need to be extensive, but should include if it was a good day or a bad day. (We need to make sure your blood pressure isnt elevated due to stress, pain, etc)    You can either send it to me on [employer] or drop it off at the office, cc: Dr Mendez     We are also getting an ultrasound of your kidneys    I will see you back in 6 months

## 2025-04-15 ENCOUNTER — LAB VISIT (OUTPATIENT)
Dept: LAB | Facility: HOSPITAL | Age: 89
End: 2025-04-15
Attending: STUDENT IN AN ORGANIZED HEALTH CARE EDUCATION/TRAINING PROGRAM
Payer: MEDICARE

## 2025-04-15 DIAGNOSIS — C61 PROSTATE CANCER: ICD-10-CM

## 2025-04-15 LAB — PSA SERPL-MCNC: 0.16 NG/ML

## 2025-04-15 PROCEDURE — 36415 COLL VENOUS BLD VENIPUNCTURE: CPT

## 2025-04-15 PROCEDURE — 84153 ASSAY OF PSA TOTAL: CPT

## 2025-04-17 ENCOUNTER — TELEPHONE (OUTPATIENT)
Dept: UROLOGY | Facility: CLINIC | Age: 89
End: 2025-04-17
Payer: MEDICARE

## 2025-04-17 ENCOUNTER — RESULTS FOLLOW-UP (OUTPATIENT)
Dept: UROLOGY | Facility: HOSPITAL | Age: 89
End: 2025-04-17

## 2025-04-17 NOTE — TELEPHONE ENCOUNTER
----- Message from Jesus Cole MD sent at 4/17/2025  4:01 PM CDT -----  Please alert pt his psa is stable  Follow up in 6 months   ----- Message -----  From: Lab, Background User  Sent: 4/15/2025   2:56 PM CDT  To: Jesus Cole MD

## 2025-04-23 DIAGNOSIS — E11.21 DIABETES MELLITUS WITH NEPHROPATHY: Chronic | ICD-10-CM

## 2025-04-23 NOTE — TELEPHONE ENCOUNTER
No care due was identified.  Health [facility] Embedded Care Due Messages. Reference number: [id number].   4/23/2025 9:03:49 AM CDT

## 2025-04-27 RX ORDER — INSULIN GLARGINE 100 [IU]/ML
10 INJECTION, SOLUTION SUBCUTANEOUS NIGHTLY
Qty: 15 ML | Refills: 11 | Status: SHIPPED | OUTPATIENT
Start: 2025-04-27

## 2025-04-28 ENCOUNTER — OFFICE VISIT (OUTPATIENT)
Dept: PODIATRY | Facility: CLINIC | Age: 89
End: 2025-04-28
Payer: MEDICARE

## 2025-04-28 ENCOUNTER — HOSPITAL ENCOUNTER (OUTPATIENT)
Dept: RADIOLOGY | Facility: HOSPITAL | Age: 89
Discharge: HOME | End: 2025-04-28
Attending: STUDENT IN AN ORGANIZED HEALTH CARE EDUCATION/TRAINING PROGRAM
Payer: MEDICARE

## 2025-04-28 VITALS — HEIGHT: 70 IN | BODY MASS INDEX: 25.79 KG/M2 | WEIGHT: 180.13 LBS

## 2025-04-28 DIAGNOSIS — B35.1 ONYCHOMYCOSIS DUE TO DERMATOPHYTE: ICD-10-CM

## 2025-04-28 DIAGNOSIS — N18.32 STAGE 3B CHRONIC KIDNEY DISEASE: ICD-10-CM

## 2025-04-28 DIAGNOSIS — L84 CORN OR CALLUS: ICD-10-CM

## 2025-04-28 DIAGNOSIS — E11.49 TYPE II DIABETES MELLITUS WITH NEUROLOGICAL MANIFESTATIONS: Primary | ICD-10-CM

## 2025-04-28 PROCEDURE — 76770 US EXAM ABDO BACK WALL COMP: CPT | Mod: TC

## 2025-04-28 PROCEDURE — 99999 PR PBB SHADOW E&M-EST. PATIENT-LVL III: CPT | Mod: PBBFAC,,, | Performed by: PODIATRIST

## 2025-04-28 PROCEDURE — 11055 PARING/CUTG B9 HYPRKER LES 1: CPT | Mod: Q9,S$GLB,, | Performed by: PODIATRIST

## 2025-04-28 PROCEDURE — 76770 US EXAM ABDO BACK WALL COMP: CPT | Mod: 26,,, | Performed by: RADIOLOGY

## 2025-04-28 PROCEDURE — 11721 DEBRIDE NAIL 6 OR MORE: CPT | Mod: XS,Q9,S$GLB, | Performed by: PODIATRIST

## 2025-04-28 PROCEDURE — 99499 UNLISTED E&M SERVICE: CPT | Mod: S$GLB,,, | Performed by: PODIATRIST

## 2025-04-28 NOTE — PROGRESS NOTES
Subjective:      Patient ID: Peter Lopez is a 89 y.o. male.    Chief Complaint: Diabetes Mellitus (3/25/25 Dr Simms) and Nail Care      Peter is a 89 y.o. male who presents to the clinic upon referral from Dr. Ivette abraham. provider found  for evaluation and treatment of diabetic feet. 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 Patient relates no major problem with feet. Reports elongated nails that are difficult to trim. Requesting nail trimming.     PCP: Lucas Lloyd Jr., MD    Date Last Seen by PCP: per above    Current shoe gear: Tennis shoes    Hemoglobin A1C   Date Value Ref Range Status   02/11/2025 6.9 (H) 4.0 - 5.6 % Final     Comment:     ADA Screening Guidelines:  5.7-6.4%  Consistent with prediabetes  >or=6.5%  Consistent with diabetes    High levels of fetal hemoglobin interfere with the HbA1C  assay. Heterozygous hemoglobin variants (HbS, HgC, etc)do  not significantly interfere with this assay.   However, presence of multiple variants may affect accuracy.     08/19/2024 >14.0 (H) 4.0 - 5.6 % Final     Comment:     ADA Screening Guidelines:  5.7-6.4%  Consistent with prediabetes  >or=6.5%  Consistent with diabetes    High levels of fetal hemoglobin interfere with the HbA1C  assay. Heterozygous hemoglobin variants (HbS, HgC, etc)do  not significantly interfere with this assay.   However, presence of multiple variants may affect accuracy.     05/20/2024 13.3 (H) 4.0 - 5.6 % Final     Comment:     ADA Screening Guidelines:  5.7-6.4%  Consistent with prediabetes  >or=6.5%  Consistent with  diabetes    High levels of fetal hemoglobin interfere with the HbA1C  assay. Heterozygous hemoglobin variants (HbS, HgC, etc)do  not significantly interfere with this assay.   However, presence of multiple variants may affect accuracy.             Review of Systems   Constitutional: Negative for chills, diaphoresis and fever.   Cardiovascular:  Negative for claudication, cyanosis, leg swelling and syncope.   Respiratory:  Negative for cough and shortness of breath.    Skin:  Positive for color change and nail changes. Negative for suspicious lesions.   Musculoskeletal:  Negative for falls, joint pain, muscle cramps and muscle weakness.   Gastrointestinal:  Negative for diarrhea, nausea and vomiting.   Neurological:  Positive for paresthesias. Negative for disturbances in coordination, numbness, sensory change, tremors and weakness.   Psychiatric/Behavioral:  Negative for altered mental status.            Objective:      Physical Exam  Constitutional:       Appearance: He is well-developed.      Comments: Oriented to time, place, and person.   Cardiovascular:      Comments: DP and PT pulses are palpable bilaterally. 3 sec capillary refill time and toes and feet are warm to touch proximally .       Musculoskeletal:      Comments: Equinus noted b/l ankles with < 10 deg DF noted. MMT 5/5 in DF/PF/Inv/Ev resistance with no reproduction of pain in any direction. Passive range of motion of ankle and pedal joints is painless b/l.     Feet:      Right foot:      Skin integrity: No callus or dry skin.      Left foot:      Skin integrity: No callus or dry skin.   Lymphadenopathy:      Comments: Negative lymphadenopathy bilateral popliteal fossa and tarsal tunnel.   Skin:     Comments: Toenails 1-5 bilaterally are elongated by 2-3 mm, thickened by 2-3 mm, discolored/yellowed, dystrophic, brittle with subungual debris.    Focal hyperkeratotic lesion consisting entirely of hyperkeratotic tissue without open skin, drainage, pus,  fluctuance, malodor, or signs of infection: right plantar medial foot. Right medial foot.        Neurological:      Mental Status: He is alert.      Comments: Light touch, proprioception, and sharp/dull sensation are all diminished bilaterally. Protective threshold with the [place]-Wienstein monofilament is diminished  bilaterally.  Subjective paresthesias with no clearly identifiable source or trigger.      Psychiatric:         Behavior: Behavior is cooperative.               Assessment:       Encounter Diagnoses   Name Primary?    Type II diabetes mellitus with neurological manifestations Yes    Onychomycosis due to dermatophyte     Corn or callus                    Plan:       Peter was seen today for diabetes mellitus and nail care.    Diagnoses and all orders for this visit:    Type II diabetes mellitus with neurological manifestations    Onychomycosis due to dermatophyte    Corn or callus                I counseled the patient on his conditions, their implications and medical management.    - Shoe inspection. Diabetic Foot Education. Patient reminded of the importance of good nutrition and blood sugar control to help prevent podiatric complications of diabetes. Patient instructed on proper foot hygeine. We discussed wearing proper shoe gear, daily foot inspections, never walking without protective shoe gear, never putting sharp instruments to feet, routine podiatric nail visits every 2-3 months.   - With patient's permission, nails were aggressively reduced and debrided x 10 to their soft tissue attachment mechanically and with electric , removing all offending nail and debris. Patient relates relief following the procedure. He will continue to monitor the areas daily, inspect his feet, wear protective shoe gear when ambulatory, moisturizer to maintain skin integrity and follow in this office in approximately 2-3 months, sooner p.r.n.   - After cleansing the area w/ alcohol prep pad the above mentioned  hyperkeratosis was trimmed utilizing No 15 scapel, to a smooth base with out incident.     F/u 3 months     Lucia Aguiar DPM

## 2025-04-29 ENCOUNTER — TELEPHONE (OUTPATIENT)
Dept: NEPHROLOGY | Facility: CLINIC | Age: 89
End: 2025-04-29
Payer: MEDICARE

## 2025-04-29 DIAGNOSIS — I10 ESSENTIAL HYPERTENSION: Chronic | ICD-10-CM

## 2025-04-29 RX ORDER — CARVEDILOL 3.12 MG/1
3.12 TABLET ORAL 2 TIMES DAILY WITH MEALS
Qty: 180 TABLET | Refills: 3 | Status: SHIPPED | OUTPATIENT
Start: 2025-04-29 | End: 2026-04-29

## 2025-04-29 NOTE — PROGRESS NOTES
BP log as follows  4/9/2025 /94, /104  4/10/2025 AM -, /90  4/11/2025 /79, /100   4/12/2025 /98, /102  4/13/2025 /96, /93  4/14/2025 /84, /92  4/15/2025 /84, /88  4/16/2025 /94, /87  4/17/2025 /93, PM -  4/18/2025 /89, /85  4/19/2025 /89, /84  4/20/2025 /82, /90  4/21/2025 /88, /93  4/22/2025 /92, PM -  4/23/2025 /102, /93  4/24/2025 /97, /96  4/25/2025 /92, /94  4/26/2025 /89, /94  4/27/2025 /89, /95  4/28/2025 /98, PM -    Current regiment: olmesartan 40 qd, amlodipine 5 qd    Blood pressures not controlled: start corerg 3.125 BID. Follow up BP log after starting.

## 2025-04-29 NOTE — TELEPHONE ENCOUNTER
Patient contacted in reference of blood pressure log dropped off on yesterday and reviewed by Dr. Mendez. 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 The prescription is 3.125 mg taken twice a day with meals. Informed that if he notes any dizziness, lightheadedness, or passing out episodes, he should go to the emergency room. Patient verbalizes understanding and appreciative of the call.

## 2025-05-06 ENCOUNTER — OFFICE VISIT (OUTPATIENT)
Dept: PULMONOLOGY | Facility: CLINIC | Age: 89
End: 2025-05-06
Payer: MEDICARE

## 2025-05-06 VITALS
DIASTOLIC BLOOD PRESSURE: 98 MMHG | WEIGHT: 181.69 LBS | HEART RATE: 92 BPM | BODY MASS INDEX: 26.01 KG/M2 | HEIGHT: 70 IN | OXYGEN SATURATION: 86 % | SYSTOLIC BLOOD PRESSURE: 170 MMHG

## 2025-05-06 DIAGNOSIS — Z71.89 GOALS OF CARE, COUNSELING/DISCUSSION: ICD-10-CM

## 2025-05-06 DIAGNOSIS — G47.39 COMPLEX SLEEP APNEA SYNDROME: Primary | ICD-10-CM

## 2025-05-06 DIAGNOSIS — J96.21 ACUTE ON CHRONIC RESPIRATORY FAILURE WITH HYPOXIA: ICD-10-CM

## 2025-05-06 DIAGNOSIS — I27.20 PULMONARY HYPERTENSION: Chronic | ICD-10-CM

## 2025-05-06 DIAGNOSIS — J43.2 CENTRILOBULAR EMPHYSEMA: Chronic | ICD-10-CM

## 2025-05-06 PROCEDURE — 99999 PR PBB SHADOW E&M-EST. PATIENT-LVL IV: CPT | Mod: PBBFAC,,, | Performed by: INTERNAL MEDICINE

## 2025-05-06 NOTE — PROGRESS NOTES
Peter Lopez  was seen as a follow up.     CHIEF COMPLAINT:  Follow-up      HISTORY OF PRESENT ILLNESS: Peter Lopez is a 89 y.o. 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  Patient was seen by WENCESALO Bridges for copd/grace.      Our first encounter was 6/12/20.  Patient smoked .75 ppd x 40 years.  Patient quit smoking in 1990s.  Patient was diagnosed with covid 19 on 3/28/20.  Initially, patient was discharged to home with home monitored.  Patient represented to ED of 3/29/20 and was discharged to home.  Represented back to ed on 4/3/20.  Hospitalized until 4/22/20 for hypoxic respiratory failure requiring nrb.  Subsequently discharged on 4/22/20 to [facility].  Patient was discharged to home with 2 lpm.    S/p 6 min walk and requesting portable concentrator.  Patient was set up with portable concentrator.  Have not been using portable concentrator and request that machine be returned. Machine was taken back by dme.    Doing well in regard to wob.  Resumed working at [employer].      Patient was diagnosed with grace with ahi of 44 with emergent of central during cpap titration.  Patient declined asv in the past due to dealing with prostate cancer.  Agreed to asv.  S/p asv titration 7/2023.  Patient was set up with asv.  Per patient, he is using asv nightly until 2-3 weeks ago.  Patient stopped using asv. Only use oxygen.     Moderate copd with fev1 60%.  Using spiriva and oxygen.        SLEEP ROUTINE:  Activity the hour prior to sleep: watch tv or read in bed    Bed partner:  alone  Time to bed:  8-10 pm   Lights  off:  night light is on  Sleep onset latency:  few minutes         Disruptions or awakenings:    2-3 times for bathroom (no issue going back to sleep)    Wakeup time:      5 am - 6 am  Perceived sleep quality:  rested       Daytime naps:     none  Weekend sleep routine:      same  Caffeine use: 1 cup of coffee in am   exercise habit:   light weight        PAST MEDICAL HISTORY:    Active Ambulatory Problems     Diagnosis Date Noted    Essential hypertension 10/02/2012    Type 2 diabetes mellitus with diabetic polyneuropathy, without long-term current use of insulin 10/16/2013    Aortic atherosclerosis 05/03/2019    ILD (interstitial lung disease) 04/04/2020    Normocytic anemia 04/04/2020    Reactive depression 04/14/2020    Centrilobular emphysema 08/10/2020    Chronic fatigue     Pulmonary hypertension 03/08/2021    Iron deficiency anemia 03/11/2021    Malignant neoplasm of prostate 06/14/2021    Stage 3b chronic kidney disease 12/18/2022    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    Type 2 diabetes mellitus with diabetic neuropathy, without long-term current use of insulin 04/11/2023    Leukopenia 06/30/2023    Vitamin D deficiency 06/30/2023    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    Diarrhea 08/10/2023    Monoclonal gammopathy 12/04/2023    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 02/18/2024    Nonproliferative diabetic retinopathy of left eye 02/18/2024    Type 2 diabetes mellitus with stage 3b chronic kidney disease 10/01/2024    COVID-19 03/21/2025    Chronic respiratory failure with hypoxia 03/22/2025    Goals of care, counseling/discussion 05/06/2025     Resolved Ambulatory Problems     Diagnosis Date Noted    Elevated PSA 01/10/2013    Hepatitis C 10/16/2013    BPH (benign prostatic hyperplasia) 04/05/2016    Chronic kidney disease, stage 3a 01/28/2020    Hypoglycemia     Hypoxia 03/29/2020    Elevated troponin 04/04/2020    Centrilobular emphysema  04/13/2020    Discharge planning issues 04/14/2020    Gait instability 09/03/2020    Tachycardia with heart rate 121-140 beats per minute 09/03/2020    Sleep-related breathing disorder 12/20/2020    Complex sleep apnea syndrome     Dyspnea 03/11/2021    Chest pain 05/20/2021    Urinary retention 06/22/2021    Pulmonary hypertension due to left ventricular systolic dysfunction 12/13/2022    Chronic respiratory failure with hypoxia 01/12/2024     Past Medical History:   Diagnosis Date    Chronic hepatitis C without mention of hepatic coma     CKD (chronic kidney disease)     COPD (chronic obstructive pulmonary disease)     Diabetes mellitus type I     Diabetes with neurologic complications     H/O colonoscopy     [place] (hard of hearing)     Hypertension     On home oxygen therapy     Prostate cancer     Retinopathy due to secondary diabetes mellitus     Wears hearing aid in both ears                 PAST SURGICAL HISTORY:    Past Surgical History:   Procedure Laterality Date    ESOPHAGOGASTRODUODENOSCOPY N/A 1/7/2025    Procedure: EGD (ESOPHAGOGASTRODUODENOSCOPY);  Surgeon: Angelic Cruz MD;  Location: [facility] ENDO;  Service: Endoscopy;  Laterality: N/A;  11/12- R/s updated instru placed in mail, Hearing device, ASam  12/27/24- lvm on both contact numbers for pc. DBM    NO PAST SURGERIES  05/20/2021    TRANSURETHRAL RESECTION OF PROSTATE USING BIPOLAR CAUTERY N/A 6/22/2021    Procedure: (TURP) Transuretheral Resection of Prostate with BIPOLAR CAUTERY;  Surgeon: Jesus Cole MD;  Location: [facility] OR;  Service: Urology;  Laterality: N/A;  RN Pre OP 6-21-21.  Vaccinated.  C A         FAMILY HISTORY:                Family History   Problem Relation Name Age of Onset    Cancer Mother      Asbestos Father      Liver disease Neg Hx         SOCIAL HISTORY:          Tobacco:   Social History     Tobacco Use   Smoking Status Former    Current packs/day: 0.00    Average packs/day: 1 pack/day for 40.0 years (40.0 ttl pk-yrs)  "   Types: Cigarettes    Start date: 1951    Quit date: 1991    Years since quittin.5   Smokeless Tobacco Former    Quit date: 1986     alcohol use:    Social History     Substance and Sexual Activity   Alcohol Use Not Currently    Alcohol/week: 0.0 standard drinks of alcohol    Comment: 2 bottles beer on weekends               Occupation:  [employer]     ALLERGIES:  Review of patient's allergies indicates:  No Known Allergies    CURRENT MEDICATIONS:    Current Outpatient Medications   Medication Sig Dispense Refill    ACCU-CHEK GUIDE TEST STRIPS Strp USE 1 STRIP TO CHECK GLUCOSE THREE TIMES DAILY 300 each 3    amLODIPine (NORVASC) 5 MG tablet Take 1 tablet (5 mg total) by mouth once daily. 90 tablet 3    atorvastatin (LIPITOR) 20 MG tablet Take 1 tablet (20 mg total) by mouth every evening. 90 tablet 3    BLOOD PRESSURE CUFF Misc 1 Units by Misc.(Non-Drug; Combo Route) route once daily.      carvediloL (COREG) 3.125 MG tablet Take 1 tablet (3.125 mg total) by mouth 2 (two) times daily with meals. 180 tablet 3    glipiZIDE (GLUCOTROL) 5 MG tablet Take 1 tablet (5 mg total) by mouth daily with breakfast. 90 tablet 3    lancets Misc To check BG 3 times daily, to use with insurance preferred meter 100 each 11    LANTUS SOLOSTAR U-100 INSULIN 100 unit/mL (3 mL) InPn pen Inject 10 Units into the skin every evening. 15 mL 11    LIDOcaine viscous HCl 2% (LIDOCAINE VISCOUS) 2 % Soln Take 10 mL in oral cavity, gargle for 30 seconds then spit out every 6 hours as needed for sore throat 200 mL 0    ofloxacin (FLOXIN) 0.3 % otic solution Place 5 drops into the left ear 2 (two) times daily. 5 mL 0    olmesartan (BENICAR) 40 MG tablet Take 1 tablet (40 mg total) by mouth once daily. 90 tablet 3    pantoprazole (PROTONIX) 40 MG tablet Take 1 tablet (40 mg total) by mouth once daily. 30 tablet 11    pen needle, diabetic 32 gauge x 5/32" Ndle Use with insulin pen as directed 50 each 11    " "promethazine-dextromethorphan (PROMETHAZINE-DM) 6.25-15 mg/5 mL Syrp Take 5 mLs by mouth every 4 to 6 hours as needed (cough/congestion). 240 mL 0    pulse oximeter (PULSE OXIMETER) device by Apply Externally route 2 (two) times a day. Use twice daily at 8 AM and 3 PM and record the value in MyChart as directed. 1 each 0    SPIRIVA RESPIMAT 2.5 mcg/actuation inhaler INHALE 2 SPRAY(S) BY MOUTH ONCE DAILY 4 g 0    vit A/vit C/vit E/zinc/copper (PRESERVISION AREDS ORAL) Take by mouth.       No current facility-administered medications for this visit.                  REVIEW OF SYSTEMS:     Pulmonary related symptoms as per HPI.  Gen:  no weight loss, no fever, no night sweat  HEENT:  no visual changes, no sore throat, + hearing loss  CV:  No chest pain, no orthopnea, no PND  GI:  no melena, no hematochezia, no diarhea, no constipation.  :  no dysuria, no hematuria, no hesistancy, no dribbling  Neuro:  no syncope, no vertigo, no tinitus  Psych:  No homocide or suicide ideation; no depression.  Endocrine:  No heat or cold intolerance.  Sleep:  No snoring; no witnessed apnea.  Feeling rested upon awake.    Otherwise, a balance of systems reviewed is negative.          PHYSICAL EXAM:  Vitals:    05/06/25 1259   BP: (!) 170/98   Pulse: 92   SpO2: (!) 86%   Weight: 82.4 kg (181 lb 10.5 oz)   Height: 5' 10" (1.778 m)   PainSc: 0-No pain       Body mass index is 26.07 kg/m².     GENERAL:  well develop; no apparent distress  HEENT:  no nasal congestion; no discharge noted; class 2 modified mallampatti.  +denture   NECK:  supple; no palpable masses.  CARDIO: regular rate and rhythm  PULM:  clear to auscultation bilaterally; no intercostals retractions; no accessory muscle usage   ABDOMEN:  soft nontender/nondistended.  +bowel sound  EXTREMITIES no cce  NEURO:  CN II-XII intact.  5/5 motor in all extremities.  sensation grossly intact   to light touch.  PSYCH:  normal affect.  Alert and oriented x 4    LABS  Pulmonary Functions " Testing Results(personally reviewed):    PFT 12/7/20 Ratio of 56%; FVC 3.43 L (104%); FEV1 1.93 L (79%); TLC n/a L (n/a%); dlco 9.95 (39%)   PFT 11/2/22 Ratio of 53%; FVC 2.69 L (83%); FEV1 1.44 L (60%); TLC 4.56 L (62%); dlco 8.4 (33%) discrepancy between VA and TLC makes dlco interpretation unreliable.      6 min walk 8/21/23 149 m 94-84-98    ABG (personally reviewed):  4/4/20 7.48/38/90/28 on nrb  CXR (personally reviewed):  4/15/20 bilateral airspace disease  CT CHEST(personally reviewed):  11/29/22 diffuse emphysematous changes bilaterally    Split study 1/7/2021 AHI 44 with emerging CA on cpap. ASV titration defer due to prostate problems.   ASV titration 7/3/23 ahi of 3.7 with asv standard settings.      covid 19 3/29/20 positive.      Echo 11/29/22  The left ventricle is normal in size with eccentric hypertrophy and low normal systolic function.  The estimated ejection fraction is 50%.  Indeterminate left ventricular diastolic function.  Normal right ventricular size with normal right ventricular systolic function.  Mild tricuspid regurgitation.  There is moderate pulmonary hypertension.  Normal central venous pressure (3 mmHg).  The estimated PA systolic pressure is 45 mmHg.        ASSESSMENT/PLAN  Problem List Items Addressed This Visit       Centrilobular emphysema (Chronic)    Overview   FEV1 60  Maintenance inhaler-spiriva  Decline pharmacy assist.  Quit in 1980s  Albuterol prn-not needing   Oxygen prn following covid but not needing now however walk test indicates that he would still benefit from this.   Pulmonary rehab- completed some but stop after prostate cancer  Encourage covid and rsv vaccination.              Pulmonary hypertension (Chronic)    Overview   Echo with pasp 45 mmHg ef 50%.  Group 2 and 3 given ddx and copd.         Chronic respiratory failure with hypoxia    Complex sleep apnea syndrome - Primary    Overview   ahi of 44   +emergence of central apnea during cpap titration.    S/p  asv titration 7/2023 and did well during study  Did not require oxygen during asv titration.    Per patient he is using nightly without issue.  Unable to access asv usage data.  Patient stopped using asv and only use oxygen.  Advise patient that oxygen alone is not sufficient.    Advise patient to bring asv to next clinic visit.             Goals of care, counseling/discussion    Overview   During this visit, I engaged the patient in the advance care planning process.  The patient and I reviewed the role for advance directives and their purpose in directing future healthcare if the patient's unable to speak for him/herself.  At this point in time, the patient does have full decision-making capacity.  We discussed different extreme health states that patient could experience, and reviewed what kind of medical care patient would want in those situations.  The patient communicated that if he was comatose and had little chance of a meaningful recovery, he would deferred to daughters.  In addition to the above preference, patient  HAS NOT completed a living will to reflect these preferences.  The patient HAS designated his daughters, Sweta Ward and Mckinley Rodriguez, as healthcare power of  to make decisions on her behalf.  In the case of cardiopulmonary arrest, patient does wish for CPR, intubation or cardioversion.    16 minutes spent discussing GOC.              covid 19 - diagnosed 3/28/20.  Doing better.      Tobacco abuse - quit since 1991.  Encourage continue with smoking cessation.      20 minutes of total time spent on the encounter, which includes face to face time and non-face to face time preparing to see the patient (eg, review of tests), Obtaining and/or reviewing separately obtained history, documenting clinical information in the electronic or other health record, independently interpreting results (not separately reported) and communicating results to the patient/family/caregiver, or Care  coordination (not separately reported).        Patient will No follow-ups on file. with md/np.    Visit today included increased complexity associated with the care of the episodic problem copd, grace addressed and managing the longitudinal care of the patient due to the serious and/or complex managed problem(s) copd, grace.

## 2025-05-26 PROBLEM — E11.3591 PROLIFERATIVE DIABETIC RETINOPATHY OF RIGHT EYE ASSOCIATED WITH TYPE 2 DIABETES MELLITUS: Status: RESOLVED | Noted: 2024-02-18 | Resolved: 2025-05-26

## 2025-05-26 PROBLEM — E11.3292: Status: RESOLVED | Noted: 2024-02-18 | Resolved: 2025-05-26

## 2025-05-27 ENCOUNTER — OFFICE VISIT (OUTPATIENT)
Dept: OPTOMETRY | Facility: CLINIC | Age: 89
End: 2025-05-27
Payer: MEDICARE

## 2025-05-27 DIAGNOSIS — H52.7 REFRACTIVE ERROR: ICD-10-CM

## 2025-05-27 DIAGNOSIS — Z96.1 PSEUDOPHAKIA: ICD-10-CM

## 2025-05-27 DIAGNOSIS — E11.3593 PROLIFERATIVE DIABETIC RETINOPATHY OF BOTH EYES ASSOCIATED WITH TYPE 2 DIABETES MELLITUS, UNSPECIFIED PROLIFERATIVE RETINOPATHY TYPE: Primary | ICD-10-CM

## 2025-05-27 DIAGNOSIS — E11.3292: ICD-10-CM

## 2025-05-27 PROCEDURE — 99999 PR PBB SHADOW E&M-EST. PATIENT-LVL III: CPT | Mod: PBBFAC,,, | Performed by: OPTOMETRIST

## 2025-05-27 PROCEDURE — 1159F MED LIST DOCD IN RCRD: CPT | Mod: CPTII,S$GLB,, | Performed by: OPTOMETRIST

## 2025-05-27 PROCEDURE — 1101F PT FALLS ASSESS-DOCD LE1/YR: CPT | Mod: CPTII,S$GLB,, | Performed by: OPTOMETRIST

## 2025-05-27 PROCEDURE — 1160F RVW MEDS BY RX/DR IN RCRD: CPT | Mod: CPTII,S$GLB,, | Performed by: OPTOMETRIST

## 2025-05-27 PROCEDURE — 3288F FALL RISK ASSESSMENT DOCD: CPT | Mod: CPTII,S$GLB,, | Performed by: OPTOMETRIST

## 2025-05-27 PROCEDURE — 1126F AMNT PAIN NOTED NONE PRSNT: CPT | Mod: CPTII,S$GLB,, | Performed by: OPTOMETRIST

## 2025-05-27 PROCEDURE — 92015 DETERMINE REFRACTIVE STATE: CPT | Mod: S$GLB,,, | Performed by: OPTOMETRIST

## 2025-05-27 PROCEDURE — 99204 OFFICE O/P NEW MOD 45 MIN: CPT | Mod: S$GLB,,, | Performed by: OPTOMETRIST

## 2025-05-27 PROCEDURE — 2022F DILAT RTA XM EVC RTNOPTHY: CPT | Mod: CPTII,S$GLB,, | Performed by: OPTOMETRIST

## 2025-05-27 NOTE — PROGRESS NOTES
Subjective:       Patient ID: Peter Lopez is a 89 y.o. male      Chief Complaint   Patient presents with    Concerns About Ocular Health    Diabetic Eye Exam     History of Present Illness  Dls: 1 yr Dr. Pepe     89 y/o male presents today for diabetic eye exam.  Pt c/o blurry vision at distance and near ou.  Pt wears bifocal glasses.   Pt states was getting injection OD x 3 with Dr. Denney    LBS  133 today     No tearing  No itching  No burning  No pain  + ha's  + ou off/on floaters  No flashes    Eye meds  None    Pohx:   MD   S/p CE Bilateral     Fohx:  None    Hemoglobin A1C       Date                     Value               Ref Range             Status                02/11/2025               6.9 (H)             4.0 - 5.6 %           Final                   08/19/2024               >14.0 (H)           4.0 - 5.6 %           Final                 05/20/2024               13.3 (H)            4.0 - 5.6 %           Final                ----------       Assessment/Plan:     1. Proliferative diabetic retinopathy of both eyes associated with type 2 diabetes mellitus, unspecified proliferative retinopathy type (Primary)  2. Non-proliferative diabetic retinopathy, left eye  Pt was seen by Dr. Denney in the past, DLS 10/19/23? Per pt had injections OD x 3 but did not follow up. No OCT tech available today. Pt to establish care with retina service.       - Ambulatory referral/consult to Ophthalmology              3. Pseudophakia  Well-centered. Clear.       4. Refractive error  Pt advised to hold on MRx til after retina consult.    Eyeglass Final Rx       Eyeglass Final Rx         Sphere Cylinder Axis Add    Right -1.00 +2.00 005 +2.75    Left -1.50 +1.00 180 +2.75      Expiration Date: 5/27/2026                      Follow up for retina consult.

## 2025-06-13 ENCOUNTER — HOSPITAL ENCOUNTER (EMERGENCY)
Facility: HOSPITAL | Age: 89
Discharge: HOME | End: 2025-06-13
Attending: STUDENT IN AN ORGANIZED HEALTH CARE EDUCATION/TRAINING PROGRAM
Payer: MEDICARE

## 2025-06-13 VITALS
OXYGEN SATURATION: 95 % | HEIGHT: 70 IN | SYSTOLIC BLOOD PRESSURE: 178 MMHG | DIASTOLIC BLOOD PRESSURE: 88 MMHG | WEIGHT: 182 LBS | TEMPERATURE: 98 F | HEART RATE: 89 BPM | BODY MASS INDEX: 26.05 KG/M2 | RESPIRATION RATE: 20 BRPM

## 2025-06-13 DIAGNOSIS — I10 HYPERTENSION, UNSPECIFIED TYPE: Primary | ICD-10-CM

## 2025-06-13 LAB — POCT GLUCOSE: 149 MG/DL (ref 70–110)

## 2025-06-13 PROCEDURE — 25000003 PHARM REV CODE 250: Mod: ER

## 2025-06-13 PROCEDURE — 99283 EMERGENCY DEPT VISIT LOW MDM: CPT | Mod: ER

## 2025-06-13 PROCEDURE — 82962 GLUCOSE BLOOD TEST: CPT | Mod: ER

## 2025-06-13 RX ORDER — CARVEDILOL 3.12 MG/1
3.12 TABLET ORAL
Status: COMPLETED | OUTPATIENT
Start: 2025-06-13 | End: 2025-06-13

## 2025-06-13 RX ADMIN — CARVEDILOL 3.12 MG: 3.12 TABLET, FILM COATED ORAL at 09:06

## 2025-06-14 NOTE — PROVIDER PROGRESS NOTES - EMERGENCY DEPT.
Patient signed out to me at the change of shift. I refer you to the prior provider's history and physical examination for comprehensive evaluation. Remainder of my involvement in this patient's case will be dictated below.     DISCLAIMER: This note was prepared with [employer] voice recognition transcription software. Garbled syntax, mangled pronouns, and other bizarre constructions may be attributed to that software system.         10:12 PM   After discussion with the patient he reports missing 1 dose of his antihypertensive today.  I suspect that is the likely cause of his elevated blood pressure.  Encouraged patient to follow-up with his primary care doctor to assess need to adjust antihypertensive regimen. Pt is currently stable for discharge. I see no indication of an emergent process beyond that addressed during our encounter but have duly counseled the patient/family regarding the need for prompt follow-up as well as the indications that should prompt immediate return to the emergency room should new or worrisome developments occur. I discussed the ED work up and diagnostic findings with the patient/family. The patient/family has been provided with verbal and printed direction regarding our final diagnosis(es) as well as instructions regarding use of OTC and/or Rx medications intended to manage the patient's aforementioned conditions. The patient/family verbalized an understanding. The patient/family is asked if there are any questions or concerns. We discuss the case, until all issues are addressed to the patient/family's satisfaction. Patient/family understands and is agreeable to the plan.   Ryan Walker MD     Clinical Impression:   Final diagnoses:  [I10] Hypertension, unspecified type (Primary)          ED Disposition Condition    Discharge Stable          ED Prescriptions    None       Follow-up Information       Follow up With Specialties Details Why Contact Lucas Lopez Jr., MD Family  Medicine Schedule an appointment as soon as possible for a visit  for reassesment [address] [place] [address] [phone]      [facility] ED Emergency Medicine  If symptoms worsen [address]  [facility] [address] [phone]

## 2025-06-14 NOTE — ED PROVIDER NOTES
Encounter Date: 2025    SCRIBE #1 NOTE: I, Lara Mcpherson, am scribing for, and in the presence of,  Shereen Miller PA-C. I have scribed the following portions of the note - Other sections scribed: HPI,ROS,PE.       History     Chief Complaint   Patient presents with    Hypertension     Pt reports high (/103 and ) at home. BP in triage is 169 100 and ; denies CP and SOB.      Peter Lopez is a 89 y.o. male, with a PMHx of CKD, COPD, DM, HTN, prostate cancer and benign prostatic hyperplasia, who presents to the ED with elevated BP onset PTA. Patient reports associated symptoms of left sided headache and intermittent blurred vision. Patient reports that he measured his BP to be 165/65 while at home, he reports that he measure his BP BID(had a morning reading of 148/84). Patient reports that he measures his blood glucose TID(mornin, lunch: 119, PTA: took insulin, ate then measured to be 165), he reports that the evening measurement of his blood glucose and his BP made him nervous because they are not normally elevated. Patient endorses compliance with his insulin, amlodipine(BID) and carvedilol(BID). 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 No other exacerbating or alleviating factors. Denies chest pain SOB or other associated symptoms.      The history is provided by the patient. No  was used.     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 DBM    NO PAST SURGERIES  05/20/2021    TRANSURETHRAL RESECTION OF PROSTATE USING BIPOLAR CAUTERY N/A 06/22/2021    Procedure: (TURP) Transuretheral Resection of Prostate with BIPOLAR CAUTERY;  Surgeon: Jesus Cole MD;  Location: [facility] OR;  Service: Urology;  Laterality: N/A;  RN Pre OP 6-21-21.  Vaccinated.  C A     Family History   Problem Relation Name Age of Onset    Cancer Mother      Asbestos Father      No Known Problems Sister      No Known Problems Brother      No Known Problems Maternal Aunt      No Known Problems Maternal Uncle      No Known Problems Paternal Aunt      No Known Problems Paternal Uncle      No Known Problems Maternal Grandmother      No Known Problems Maternal Grandfather      No Known Problems Paternal Grandmother      No Known Problems Paternal Grandfather      No Known Problems Other      Liver disease Neg Hx       Social History[1]  Review of Systems   Constitutional:  Negative for chills and fever.   HENT:  Negative for congestion and sore throat.    Eyes:  Positive for visual disturbance.   Respiratory:  Negative for cough and shortness of breath.    Cardiovascular:  Negative for chest pain.   Gastrointestinal:  Negative for abdominal pain, nausea and vomiting.   Genitourinary:  Negative for dysuria.   Skin:  Negative for rash.   Neurological:  Positive for headaches.   Psychiatric/Behavioral:  Negative for confusion.        Physical Exam     Initial Vitals  [25]   BP Pulse Resp Temp SpO2   (!) 169/100 102 20 97.9 °F (36.6 °C) 95 %      MAP       --         Physical Exam    Nursing note and vitals reviewed.  Constitutional: He appears well-developed and well-nourished. He is not diaphoretic. No distress.   HENT:   Head: Normocephalic and atraumatic.   Right Ear: External ear normal.   Left Ear: External ear normal.   Eyes: Conjunctivae and EOM are normal.   Neck: No tracheal deviation present. No JVD present.   Normal range of motion.  Cardiovascular:  Normal rate.           Pulmonary/Chest: No stridor. No respiratory distress.   Musculoskeletal:      Cervical back: Normal range of motion.     Neurological: He is alert and oriented to person, place, and time.   Skin: No rash noted. No erythema.   Psychiatric: He has a normal mood and affect.         ED Course   Procedures  Labs Reviewed   POCT GLUCOSE - Abnormal       Result Value    POCT Glucose 149 (*)    POCT GLUCOSE MONITORING CONTINUOUS          Imaging Results    None          Medications   carvediloL tablet 3.125 mg (3.125 mg Oral Given 25)     Medical Decision Making  Peter Lopez is a 89 y.o. male, with a PMHx of CKD, COPD, DM, HTN, prostate cancer and benign prostatic hyperplasia, who presents to the ED with elevated BP onset PTA. Patient reports associated symptoms of left sided headache and intermittent blurred vision. Patient reports that he measured his BP to be 165/65 while at home, he reports that he measure his BP BID(had a morning reading of 148/84). Patient reports that he measures his blood glucose TID(mornin, lunch: 119, PTA: took insulin, ate then measured to be 165), he reports that the evening measurement of his blood glucose and his BP made him nervous because they are not normally elevated. Patient endorses compliance with his insulin, amlodipine(BID) and carvedilol(BID). Patient reports having blurred vision while at work( at [employer]) when he has to look at  customer's receipts. No other exacerbating or alleviating factors. Denies chest pain SOB or other associated symptoms.      Differentials include but are not limited to Hypertensive encephalopathy, CVA/TIA, intracranial hemorrhage, MI/ACS, aortic/carotid artery dissection, AAA, hypertensive nephropathy, medication non-compliance, anxiety, drug abuse/intoxication, essential hypertension    Patient asymptomatic at this time.  Has not taken his nightly Coreg.  Administered in the emergency department with improvement of his BP.  Discussed with the patient to follow up with his primary care for additional blood pressure management.  All questions and concerns addressed prior to discharge.  Patient given return precautions. I discussed with the patient the diagnosis, treatment plan, indications for return to the emergency department, and for expected follow-up. The patient verbalized an understanding. The patient is asked if there are any questions or concerns. We discuss the case, until all issues are addressed to the patient's satisfaction. Patient understands and is agreeable to the plan.      Risk  Prescription drug management.            Scribe Attestation:   Scribe #1: I performed the above scribed service and the documentation accurately describes the services I performed. I attest to the accuracy of the note.              I, Shereen Miller PA-C, personally performed the services described in this documentation. All medical record entries made by the scribe were at my direction and in my presence. I have reviewed the chart and agree that the record reflects my personal performance and is accurate and complete.      DISCLAIMER: This note was prepared with [employer] voice recognition transcription software. Garbled syntax, mangled pronouns, and other bizarre constructions may be attributed to that software system.                  Clinical Impression:  Final diagnoses:  [I10] Hypertension, unspecified type (Primary)           ED Disposition Condition    Discharge Stable          ED Prescriptions    None       Follow-up Information       Follow up With Specialties Details Why Contact Info    Lucas Lloyd Jr., MD Family Medicine Schedule an appointment as soon as possible for a visit  for reassesment [address]  [place] [address]  [phone]      [facility] Emergency Medicine  If symptoms worsen [address] [address] [phone]                   [1]   Social History  Tobacco Use    Smoking status: Former     Current packs/day: 0.00     Average packs/day: 1 pack/day for 40.0 years (40.0 ttl pk-yrs)     Types: Cigarettes     Start date: 1951     Quit date: 1991     Years since quittin.6    Smokeless tobacco: Former     Quit date: 1986   Substance Use Topics    Alcohol use: Not Currently     Alcohol/week: 0.0 standard drinks of alcohol     Comment: 2 bottles beer on weekends    Drug use: No        Shereen Miller PA-C  [phone]

## 2025-06-14 NOTE — DISCHARGE INSTRUCTIONS
Thank you for coming to our Emergency Department today. It is important to remember that some problems are difficult to diagnose and may not be found during your first visit. Be sure to follow up with your primary care doctor and review any labs/imaging that was performed with them. If you do not have a primary care doctor, you may contact the one listed on your discharge paperwork or you may also call the [facility] Appointment Desk at [phone] to schedule an appointment with one.     All medications may potentially have side effects and it is impossible to predict which medications may give you side effects. If you feel that you are having a negative effect of any medication you should immediately stop taking them and seek medical attention.    Return to the ER with any questions/concerns, new/concerning symptoms, worsening or failure to improve. Do not drive or make any important decisions for 24 hours if you have received any pain medications, sedatives or mood altering drugs during your ER visit.

## 2025-06-16 ENCOUNTER — PATIENT OUTREACH (OUTPATIENT)
Facility: OTHER | Age: 89
End: 2025-06-16
Payer: MEDICARE

## 2025-06-23 ENCOUNTER — OFFICE VISIT (OUTPATIENT)
Dept: FAMILY MEDICINE | Facility: CLINIC | Age: 89
End: 2025-06-23
Payer: MEDICARE

## 2025-06-23 VITALS
SYSTOLIC BLOOD PRESSURE: 120 MMHG | BODY MASS INDEX: 26.16 KG/M2 | RESPIRATION RATE: 18 BRPM | WEIGHT: 182.75 LBS | TEMPERATURE: 98 F | HEART RATE: 88 BPM | OXYGEN SATURATION: 95 % | DIASTOLIC BLOOD PRESSURE: 84 MMHG | HEIGHT: 70 IN

## 2025-06-23 DIAGNOSIS — I10 ESSENTIAL HYPERTENSION: Primary | Chronic | ICD-10-CM

## 2025-06-23 DIAGNOSIS — R51.9 CHRONIC NONINTRACTABLE HEADACHE, UNSPECIFIED HEADACHE TYPE: ICD-10-CM

## 2025-06-23 DIAGNOSIS — G89.29 CHRONIC NONINTRACTABLE HEADACHE, UNSPECIFIED HEADACHE TYPE: ICD-10-CM

## 2025-06-23 DIAGNOSIS — E11.319 DIABETIC RETINOPATHY OF BOTH EYES ASSOCIATED WITH TYPE 2 DIABETES MELLITUS, MACULAR EDEMA PRESENCE UNSPECIFIED, UNSPECIFIED RETINOPATHY SEVERITY: ICD-10-CM

## 2025-06-23 PROCEDURE — 1159F MED LIST DOCD IN RCRD: CPT | Mod: CPTII,S$GLB,, | Performed by: NURSE PRACTITIONER

## 2025-06-23 PROCEDURE — 99214 OFFICE O/P EST MOD 30 MIN: CPT | Mod: S$GLB,,, | Performed by: NURSE PRACTITIONER

## 2025-06-23 PROCEDURE — 1125F AMNT PAIN NOTED PAIN PRSNT: CPT | Mod: CPTII,S$GLB,, | Performed by: NURSE PRACTITIONER

## 2025-06-23 PROCEDURE — 1101F PT FALLS ASSESS-DOCD LE1/YR: CPT | Mod: CPTII,S$GLB,, | Performed by: NURSE PRACTITIONER

## 2025-06-23 PROCEDURE — 1160F RVW MEDS BY RX/DR IN RCRD: CPT | Mod: CPTII,S$GLB,, | Performed by: NURSE PRACTITIONER

## 2025-06-23 PROCEDURE — 99999 PR PBB SHADOW E&M-EST. PATIENT-LVL V: CPT | Mod: PBBFAC,,, | Performed by: NURSE PRACTITIONER

## 2025-06-23 PROCEDURE — 3288F FALL RISK ASSESSMENT DOCD: CPT | Mod: CPTII,S$GLB,, | Performed by: NURSE PRACTITIONER

## 2025-06-23 PROCEDURE — G2211 COMPLEX E/M VISIT ADD ON: HCPCS | Mod: S$GLB,,, | Performed by: NURSE PRACTITIONER

## 2025-06-23 NOTE — PROGRESS NOTES
Routine Office Visit    Patient Name: Peter Lopez    : 1935  MRN: [id number]    Chief Complaint:  ER follow-up    Subjective:  Peter is a 89 y.o. male who presents today for:    ER follow up - patient who is known to me with medical history as documented below reports today for evaluation.  He went to the emergency room recently for elevated systolic blood pressures in the 160s.  He was evaluated there and given his carvedilol with improvement in his blood pressure and discharged.  He reports compliance with all his medication including olmesartan, amlodipine, and carvedilol.  He checked his BP at home this morning and got 150 over 90s.  He states that he does not sit for a few minutes before checking his blood pressure.  He feels well today.  He reports stable blurry vision due to his diabetic retinopathy and will be seeing Ophthalmology next month for this.  He has been having a very mild intermittent headache maybe 2 to 3 times a week located just above his eyebrows which he believes is due to his eyesight.  He denies any associated fevers or chills, nausea, or vomiting, or light sensitivity associated with the headaches.  He sometimes takes Tylenol for the headaches which does help.  Other times, the headache resolves without intervention.    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     Urinary retention     Wears hearing aid in both ears        Family History  Family History   Problem Relation Name Age of  "Onset    Cancer Mother      Asbestos Father      No Known Problems Sister      No Known Problems Brother      No Known Problems Maternal Aunt      No Known Problems Maternal Uncle      No Known Problems Paternal Aunt      No Known Problems Paternal Uncle      No Known Problems Maternal Grandmother      No Known Problems Maternal Grandfather      No Known Problems Paternal Grandmother      No Known Problems Paternal Grandfather      No Known Problems Other      Liver disease Neg Hx         Current Medications  Medications Ordered Prior to Encounter[1]    Allergies   Review of patient's allergies indicates:  No Known Allergies    Review of Systems (Pertinent positives)  Review of Systems   Constitutional: Negative.  Negative for chills and fever.   HENT: Negative.  Negative for congestion, sinus pain and sore throat.    Eyes:  Positive for blurred vision (chronic). Negative for double vision, photophobia and pain.   Respiratory:  Negative for cough, shortness of breath and wheezing.    Cardiovascular:  Negative for chest pain, palpitations, orthopnea and claudication.   Gastrointestinal: Negative.  Negative for abdominal pain, diarrhea, nausea and vomiting.   Genitourinary: Negative.  Negative for dysuria, frequency and urgency.   Musculoskeletal: Negative.  Negative for back pain, joint pain and neck pain.   Skin: Negative.    Neurological:  Positive for headaches. Negative for dizziness, tingling, tremors, sensory change and loss of consciousness.   Endo/Heme/Allergies: Negative.    Psychiatric/Behavioral: Negative.         /84 (BP Location: Right arm, Patient Position: Sitting)   Pulse 88   Temp 98.1 °F (36.7 °C) (Oral)   Resp 18   Ht 5' 10" (1.778 m)   Wt 82.9 kg (182 lb 12.2 oz)   SpO2 95%   BMI 26.22 kg/m²     Physical Exam  Vitals reviewed.   Constitutional:       General: He is not in acute distress.     Appearance: Normal appearance. He is not ill-appearing, toxic-appearing or diaphoretic. "   HENT:      Head: Normocephalic and atraumatic.   Eyes:      General:         Right eye: No discharge.         Left eye: No discharge.      Conjunctiva/sclera: Conjunctivae normal.      Pupils: Pupils are equal, round, and reactive to light.   Cardiovascular:      Rate and Rhythm: Normal rate and regular rhythm.      Pulses: Normal pulses.      Heart sounds: Normal heart sounds.   Pulmonary:      Effort: Pulmonary effort is normal. No respiratory distress.      Breath sounds: Normal breath sounds. No wheezing.   Abdominal:      General: Bowel sounds are normal. There is no distension.      Palpations: Abdomen is soft.      Tenderness: There is no abdominal tenderness.   Musculoskeletal:         General: No swelling, tenderness or deformity.   Skin:     General: Skin is warm and dry.      Capillary Refill: Capillary refill takes less than 2 seconds.   Neurological:      General: No focal deficit present.      Mental Status: He is alert and oriented to person, place, and time.   Psychiatric:         Mood and Affect: Mood normal.         Behavior: Behavior normal.            Assessment/Plan:  Peter Lopez is a 89 y.o. male who presents today for :    Peter was seen today for follow-up.    Diagnoses and all orders for this visit:    Essential hypertension    BP much improved in clinic today.  Recommended patient to continue current medications.  Recommended patient to continue checking blood pressures at home.  Recommended patient to sit for about 5-10 minutes prior to checking blood pressure.  Recommended patient to bring logs and BP machine to next visit with PCP.    Chronic nonintractable headache, unspecified headache type  -     MRI Brain Without Contrast; Future    Patient with new headaches for the last year.  Will check brain MRI and consider referral to Neurology if worsening.  He states the headaches are easily managed with Tylenol as needed.    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        This office note has been dictated.  This dictation has been generated using M-Modal Fluency Direct dictation; some phonetic errors may occur.          [1]   Current Outpatient Medications on File Prior to Visit   Medication Sig Dispense Refill    ACCU-CHEK GUIDE TEST STRIPS Strp USE 1 STRIP TO CHECK GLUCOSE THREE TIMES DAILY 300 each 3    amLODIPine (NORVASC) 5 MG tablet Take 1 tablet (5 mg total) by mouth once daily. 90 tablet 3    atorvastatin (LIPITOR) 20 MG tablet Take 1 tablet (20 mg total) by mouth every evening. 90 tablet 3    BLOOD PRESSURE CUFF Misc 1 Units by Misc.(Non-Drug; Combo Route) route once daily.      carvediloL (COREG) 3.125 MG tablet Take 1 tablet (3.125 mg total) by mouth 2 (two) times daily with meals. 180 tablet 3    glipiZIDE (GLUCOTROL) 5 MG tablet Take 1 tablet (5 mg total) by mouth daily with breakfast. 90 tablet 3    lancets Misc To check BG 3 times daily, to use with insurance preferred meter 100 each 11    LANTUS SOLOSTAR U-100 INSULIN 100 unit/mL (3 mL) InPn pen Inject 10 Units into the skin every evening. 15 mL 11    LIDOcaine viscous HCl 2% (LIDOCAINE VISCOUS) 2 % Soln Take 10 mL in oral cavity, gargle for 30 seconds then spit out every 6 hours as needed for sore throat 200 mL 0    ofloxacin (FLOXIN) 0.3 % otic solution Place 5 drops into the left ear 2 (two) times daily. 5 mL 0    olmesartan (BENICAR) 40 MG tablet Take 1 tablet (40 mg total) by mouth once daily. 90 tablet 3    pantoprazole (PROTONIX) 40 MG tablet Take 1 tablet (40 mg total) by mouth once daily. 30 tablet 11    pen needle, diabetic 32 gauge x 5/32" Ndle Use with insulin pen as directed 50 each 11    promethazine-dextromethorphan (PROMETHAZINE-DM) 6.25-15 mg/5 mL Syrp Take 5 mLs by mouth every 4 to 6 hours as needed (cough/congestion). 240 mL 0    pulse oximeter (PULSE " OXIMETER) device by Apply Externally route 2 (two) times a day. Use twice daily at 8 AM and 3 PM and record the value in MyChart as directed. 1 each 0    SPIRIVA RESPIMAT 2.5 mcg/actuation inhaler INHALE 2 SPRAY(S) BY MOUTH ONCE DAILY 4 g 0    vit A/vit C/vit E/zinc/copper (PRESERVISION AREDS ORAL) Take by mouth.       No current facility-administered medications on file prior to visit.

## 2025-07-01 ENCOUNTER — HOSPITAL ENCOUNTER (OUTPATIENT)
Dept: RADIOLOGY | Facility: HOSPITAL | Age: 89
Discharge: HOME | End: 2025-07-01
Attending: NURSE PRACTITIONER
Payer: MEDICARE

## 2025-07-01 DIAGNOSIS — G89.29 CHRONIC NONINTRACTABLE HEADACHE, UNSPECIFIED HEADACHE TYPE: ICD-10-CM

## 2025-07-01 DIAGNOSIS — R51.9 CHRONIC NONINTRACTABLE HEADACHE, UNSPECIFIED HEADACHE TYPE: ICD-10-CM

## 2025-07-01 PROCEDURE — 70551 MRI BRAIN STEM W/O DYE: CPT | Mod: TC

## 2025-07-01 PROCEDURE — 70551 MRI BRAIN STEM W/O DYE: CPT | Mod: 26,,, | Performed by: RADIOLOGY

## 2025-07-02 ENCOUNTER — TELEPHONE (OUTPATIENT)
Dept: FAMILY MEDICINE | Facility: CLINIC | Age: 89
End: 2025-07-02
Payer: MEDICARE

## 2025-07-02 NOTE — TELEPHONE ENCOUNTER
Called patient and notified of MRI results showing chronic changes and potential amyloid buildup.  We discussed potential neurology referral which he declines.  He states that he believes his symptoms are due to his vision.  Recommended patient to follow up if he has any new symptoms or other neurologic symptoms that are concerning to him and we can consider a neurology follow up.  All questions answered

## 2025-07-06 ENCOUNTER — HOSPITAL ENCOUNTER (OUTPATIENT)
Facility: HOSPITAL | Age: 89
Discharge: HOME | End: 2025-07-07
Attending: EMERGENCY MEDICINE | Admitting: OPHTHALMOLOGY
Payer: MEDICARE

## 2025-07-06 DIAGNOSIS — H40.9 ACUTE GLAUCOMA OF RIGHT EYE: Primary | ICD-10-CM

## 2025-07-06 DIAGNOSIS — N18.9 CHRONIC RENAL IMPAIRMENT, UNSPECIFIED CKD STAGE: ICD-10-CM

## 2025-07-06 DIAGNOSIS — H54.61 VISION LOSS OF RIGHT EYE: ICD-10-CM

## 2025-07-06 DIAGNOSIS — H57.11 ACUTE RIGHT EYE PAIN: ICD-10-CM

## 2025-07-06 DIAGNOSIS — H53.8 BLURRED VISION, RIGHT EYE: ICD-10-CM

## 2025-07-06 LAB
ABSOLUTE EOSINOPHIL (OHS): 0.06 K/UL
ABSOLUTE MONOCYTE (OHS): 0.37 K/UL (ref 0.3–1)
ABSOLUTE NEUTROPHIL COUNT (OHS): 2.55 K/UL (ref 1.8–7.7)
ALBUMIN SERPL BCP-MCNC: 3.9 G/DL (ref 3.5–5.2)
ALP SERPL-CCNC: 59 UNIT/L (ref 40–150)
ALT SERPL W/O P-5'-P-CCNC: 26 UNIT/L (ref 10–44)
ANION GAP (OHS): 12 MMOL/L (ref 8–16)
AST SERPL-CCNC: 36 UNIT/L (ref 11–45)
BASOPHILS # BLD AUTO: 0.01 K/UL
BASOPHILS NFR BLD AUTO: 0.3 %
BILIRUB SERPL-MCNC: 0.4 MG/DL (ref 0.1–1)
BUN SERPL-MCNC: 26 MG/DL (ref 8–23)
CALCIUM SERPL-MCNC: 9.4 MG/DL (ref 8.7–10.5)
CHLORIDE SERPL-SCNC: 108 MMOL/L (ref 95–110)
CO2 SERPL-SCNC: 20 MMOL/L (ref 23–29)
CREAT SERPL-MCNC: 1.5 MG/DL (ref 0.5–1.4)
ERYTHROCYTE [DISTWIDTH] IN BLOOD BY AUTOMATED COUNT: 14.6 % (ref 11.5–14.5)
GFR SERPLBLD CREATININE-BSD FMLA CKD-EPI: 45 ML/MIN/1.73/M2
GLUCOSE SERPL-MCNC: 121 MG/DL (ref 70–110)
HCT VFR BLD AUTO: 39.5 % (ref 40–54)
HGB BLD-MCNC: 12.8 GM/DL (ref 14–18)
IMM GRANULOCYTES # BLD AUTO: 0 K/UL (ref 0–0.04)
IMM GRANULOCYTES NFR BLD AUTO: 0 % (ref 0–0.5)
LYMPHOCYTES # BLD AUTO: 0.86 K/UL (ref 1–4.8)
MCH RBC QN AUTO: 27.7 PG (ref 27–31)
MCHC RBC AUTO-ENTMCNC: 32.4 G/DL (ref 32–36)
MCV RBC AUTO: 86 FL (ref 82–98)
NUCLEATED RBC (/100WBC) (OHS): 0 /100 WBC
PLATELET # BLD AUTO: 228 K/UL (ref 150–450)
PMV BLD AUTO: 9.5 FL (ref 9.2–12.9)
POCT GLUCOSE: 110 MG/DL (ref 70–110)
POCT GLUCOSE: 159 MG/DL (ref 70–110)
POTASSIUM SERPL-SCNC: 4 MMOL/L (ref 3.5–5.1)
PROT SERPL-MCNC: 7.8 GM/DL (ref 6–8.4)
RBC # BLD AUTO: 4.62 M/UL (ref 4.6–6.2)
RELATIVE EOSINOPHIL (OHS): 1.6 %
RELATIVE LYMPHOCYTE (OHS): 22.3 % (ref 18–48)
RELATIVE MONOCYTE (OHS): 9.6 % (ref 4–15)
RELATIVE NEUTROPHIL (OHS): 66.2 % (ref 38–73)
SODIUM SERPL-SCNC: 140 MMOL/L (ref 136–145)
WBC # BLD AUTO: 3.85 K/UL (ref 3.9–12.7)

## 2025-07-06 PROCEDURE — 99285 EMERGENCY DEPT VISIT HI MDM: CPT | Mod: 25

## 2025-07-06 PROCEDURE — 80053 COMPREHEN METABOLIC PANEL: CPT | Performed by: EMERGENCY MEDICINE

## 2025-07-06 PROCEDURE — 96375 TX/PRO/DX INJ NEW DRUG ADDON: CPT

## 2025-07-06 PROCEDURE — G0378 HOSPITAL OBSERVATION PER HR: HCPCS

## 2025-07-06 PROCEDURE — 63600175 PHARM REV CODE 636 W HCPCS: Performed by: EMERGENCY MEDICINE

## 2025-07-06 PROCEDURE — 82962 GLUCOSE BLOOD TEST: CPT

## 2025-07-06 PROCEDURE — 85025 COMPLETE CBC W/AUTO DIFF WBC: CPT | Performed by: EMERGENCY MEDICINE

## 2025-07-06 PROCEDURE — 25000003 PHARM REV CODE 250: Performed by: EMERGENCY MEDICINE

## 2025-07-06 PROCEDURE — 96374 THER/PROPH/DIAG INJ IV PUSH: CPT

## 2025-07-06 PROCEDURE — 25000003 PHARM REV CODE 250: Performed by: PHYSICIAN ASSISTANT

## 2025-07-06 RX ORDER — BRIMONIDINE TARTRATE 1.5 MG/ML
1 SOLUTION/ DROPS OPHTHALMIC EVERY 8 HOURS
Status: DISCONTINUED | OUTPATIENT
Start: 2025-07-07 | End: 2025-07-06

## 2025-07-06 RX ORDER — HYDROCODONE BITARTRATE AND ACETAMINOPHEN 5; 325 MG/1; MG/1
1 TABLET ORAL EVERY 6 HOURS PRN
Qty: 12 TABLET | Refills: 0 | Status: SHIPPED | OUTPATIENT
Start: 2025-07-06 | End: 2025-07-07

## 2025-07-06 RX ORDER — ONDANSETRON 4 MG/1
4 TABLET, ORALLY DISINTEGRATING ORAL
Status: COMPLETED | OUTPATIENT
Start: 2025-07-06 | End: 2025-07-06

## 2025-07-06 RX ORDER — PROPARACAINE HYDROCHLORIDE 5 MG/ML
1 SOLUTION/ DROPS OPHTHALMIC
Status: COMPLETED | OUTPATIENT
Start: 2025-07-06 | End: 2025-07-06

## 2025-07-06 RX ORDER — ACETAZOLAMIDE 250 MG/1
250 TABLET ORAL 2 TIMES DAILY
Qty: 20 TABLET | Refills: 0 | Status: SHIPPED | OUTPATIENT
Start: 2025-07-06 | End: 2025-07-07

## 2025-07-06 RX ORDER — HYDROCODONE BITARTRATE AND ACETAMINOPHEN 5; 325 MG/1; MG/1
1 TABLET ORAL EVERY 6 HOURS PRN
Qty: 12 TABLET | Refills: 0 | Status: SHIPPED | OUTPATIENT
Start: 2025-07-06 | End: 2025-07-06

## 2025-07-06 RX ORDER — ACETAZOLAMIDE 250 MG/1
250 TABLET ORAL 2 TIMES DAILY
Qty: 20 TABLET | Refills: 0 | Status: SHIPPED | OUTPATIENT
Start: 2025-07-06 | End: 2025-07-06

## 2025-07-06 RX ORDER — HYDROMORPHONE HYDROCHLORIDE 1 MG/ML
1 INJECTION, SOLUTION INTRAMUSCULAR; INTRAVENOUS; SUBCUTANEOUS
Refills: 0 | Status: COMPLETED | OUTPATIENT
Start: 2025-07-06 | End: 2025-07-06

## 2025-07-06 RX ORDER — DORZOLAMIDE HCL 20 MG/ML
1 SOLUTION/ DROPS OPHTHALMIC 3 TIMES DAILY
Status: DISCONTINUED | OUTPATIENT
Start: 2025-07-07 | End: 2025-07-06

## 2025-07-06 RX ORDER — ACETAZOLAMIDE 500 MG/5ML
500 INJECTION, POWDER, LYOPHILIZED, FOR SOLUTION INTRAVENOUS ONCE
Status: COMPLETED | OUTPATIENT
Start: 2025-07-06 | End: 2025-07-06

## 2025-07-06 RX ORDER — BRIMONIDINE TARTRATE 1.5 MG/ML
1 SOLUTION/ DROPS OPHTHALMIC EVERY 8 HOURS
Status: DISCONTINUED | OUTPATIENT
Start: 2025-07-06 | End: 2025-07-07 | Stop reason: HOSPADM

## 2025-07-06 RX ORDER — TETRACAINE HYDROCHLORIDE 5 MG/ML
2 SOLUTION OPHTHALMIC
Status: COMPLETED | OUTPATIENT
Start: 2025-07-06 | End: 2025-07-06

## 2025-07-06 RX ORDER — DORZOLAMIDE HCL 20 MG/ML
1 SOLUTION/ DROPS OPHTHALMIC 3 TIMES DAILY
Status: DISCONTINUED | OUTPATIENT
Start: 2025-07-06 | End: 2025-07-07 | Stop reason: HOSPADM

## 2025-07-06 RX ADMIN — ONDANSETRON 4 MG: 4 TABLET, ORALLY DISINTEGRATING ORAL at 05:07

## 2025-07-06 RX ADMIN — FLUORESCEIN SODIUM 1 EACH: 1 STRIP OPHTHALMIC at 02:07

## 2025-07-06 RX ADMIN — HYDROMORPHONE HYDROCHLORIDE 1 MG: 1 INJECTION, SOLUTION INTRAMUSCULAR; INTRAVENOUS; SUBCUTANEOUS at 05:07

## 2025-07-06 RX ADMIN — TETRACAINE HYDROCHLORIDE 2 DROP: 5 SOLUTION OPHTHALMIC at 02:07

## 2025-07-06 RX ADMIN — FLUORESCEIN SODIUM 1 EACH: 1 STRIP OPHTHALMIC at 07:07

## 2025-07-06 RX ADMIN — PROPARACAINE HYDROCHLORIDE 1 DROP: 5 SOLUTION/ DROPS OPHTHALMIC at 07:07

## 2025-07-06 RX ADMIN — ACETAZOLAMIDE 500 MG: 500 INJECTION, POWDER, LYOPHILIZED, FOR SOLUTION INTRAVENOUS at 04:07

## 2025-07-06 NOTE — ED PROVIDER NOTES
Encounter Date: 7/6/2025       History     Chief Complaint   Patient presents with    Eye Pain     Pt to ER with reports of right eye pain starting yesterday morning. Pt stated around 11pm he noticed that blurred vision to same eye. Pt also reports non traumatic right elbow pain starting yesterday morning as well. Pt reports generalized weakness while in Religion today which prompted him to the ER.      HPI  This 89-year-old black male presents emergency room complaining of pain and blurry vision in the right eye over the course of the last 24 hours.  He denies foreign body sensation.  He reports it is tender when he touches it.  There is no history of trauma or fever.  The patient also complains of some pain in the right elbow and right knee worse with movement.  The extremity pain started 3 days ago.  The patient also has some achy pain around his right eye.  He is otherwise well.  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 DBM    NO PAST  SURGERIES  05/20/2021    TRANSURETHRAL RESECTION OF PROSTATE USING BIPOLAR CAUTERY N/A 06/22/2021    Procedure: (TURP) Transuretheral Resection of Prostate with BIPOLAR CAUTERY;  Surgeon: Jesus Cole MD;  Location: [facility];  Service: Urology;  Laterality: N/A;  RN Pre OP 6-21-21.  Vaccinated.  C A     Family History   Problem Relation Name Age of Onset    Cancer Mother      Asbestos Father      No Known Problems Sister      No Known Problems Brother      No Known Problems Maternal Aunt      No Known Problems Maternal Uncle      No Known Problems Paternal Aunt      No Known Problems Paternal Uncle      No Known Problems Maternal Grandmother      No Known Problems Maternal Grandfather      No Known Problems Paternal Grandmother      No Known Problems Paternal Grandfather      No Known Problems Other      Liver disease Neg Hx       Social History[1]  Review of Systems  The patient was questioned specifically with regard to the following.  General: Fever, chills, sweats. Neuro: Headache. Eyes: eye problems. ENT: Ear pain, sore throat. Cardiovascular: Chest pain. Respiratory: Cough, shortness of breath. Gastrointestinal: Abdominal pain, vomiting, diarrhea. Genitourinary: Painful urination.  Musculoskeletal: Arm and leg problems. Skin: Rash.  The review of systems was negative except for the following:  Right eye pain blurry vision.  Pain in the right elbow and right knee.  The patient has a history of COPD he is maintained on oxygen at home.  Does get short of breath with the exertion this is chronic  Physical Exam     Initial Vitals [07/06/25 1327]   BP Pulse Resp Temp SpO2   (!) 186/95 85 18 98.3 °F (36.8 °C) (!) 94 %      MAP       --         Physical Exam  The patient was examined specifically for the following:   General:No significant distress, Good color, Warm and dry. Head and neck:Scalp atraumatic, Neck supple. Neurological:Appropriate conversation, Gross motor deficits. Eyes:Conjugate gaze, Clear corneas.  ENT: No epistaxis. Cardiac: Regular rate and rhythm, Grossly normal heart tones. Pulmonary: Wheezing, Rales. Gastrointestinal: Abdominal tenderness, Abdominal distention. Musculoskeletal: Extremity deformity, Apparent pain with range of motion of the joints. Skin: Rash.   The findings on examination were normal except for the following:  There is no periorbital edema erythema warmth temporal tenderness enophthalmos or proptosis.  Extraocular movements are intact.  The corneas grossly clear.  The anterior chamber is grossly clear.  The pupil is small.  ED Course   Critical Care    Date/Time: 7/6/2025 4:12 PM    Performed by: Gilbert Hensley MD  Authorized by: Gilbert Hensley MD  Direct patient critical care time: 22 minutes  Additional history critical care time: 11 minutes  Ordering / reviewing critical care time: 11 minutes  Documentation critical care time: 11 minutes  Consulting other physicians critical care time: 11 minutes  Total critical care time (exclusive of procedural time) : 66 minutes  Critical care time was exclusive of separately billable procedures and treating other patients and teaching time.  Critical care was necessary to treat or prevent imminent or life-threatening deterioration of the following conditions: CNS failure or compromise.  Critical care was time spent personally by me on the following activities: discussions with consultants, examination of patient, obtaining history from patient or surrogate, ordering and performing treatments and interventions, ordering and review of laboratory studies, pulse oximetry, re-evaluation of patient's condition and review of old charts.        Labs Reviewed   COMPREHENSIVE METABOLIC PANEL   CBC W/ AUTO DIFFERENTIAL    Narrative:     The following orders were created for panel order CBC Auto Differential.  Procedure                               Abnormality         Status                     ---------                               -----------          ------                     CBC with Differential[[id number]]                                                        Please view results for these tests on the individual orders.   CBC WITH DIFFERENTIAL   POCT GLUCOSE       Result Value    POCT Glucose 110            Imaging Results    None          Medications   fluorescein ophthalmic strip 1 each (has no administration in time range)   TETRAcaine HCl (PF) 0.5 % Drop 2 drop (has no administration in time range)   acetaZOLAMIDE injection 500 mg (has no administration in time range)     Medical Decision Making  Amount and/or Complexity of Data Reviewed  Labs: ordered.    Risk  Prescription drug management.    Given the above a slit-lamp examination was performed and revealed stippled fluorescein uptake all over the cornea.  The anterior chamber was clear the cornea was not steamy.  The right lobe was hard to palpation.  Sam-Pen red over range.  Visual acuity is 20/200 in the right eye.  I spoke with Dr. Akbar, who recommended transfer to [facility].  He recommended Diamox 500 mg IV.  Discussed the patient's renal function.  I referenced his last chemistries.  He has a GFR of 45.  We will attempt to transfer.  Transfer order is in.                                  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)  [H57.11] Acute right eye pain  [H53.8] Blurred vision, right eye  [H54.61] Vision loss of right eye  [N18.9] Chronic renal impairment, unspecified CKD stage          ED Disposition Condition    Transfer to Another Facility Stable                      [1]   Social History  Tobacco Use    Smoking status: Former     Current packs/day: 0.00     Average packs/day: 1 pack/day for 40.0 years (40.0 ttl pk-yrs)     Types: Cigarettes     Start date: 1951     Quit date: 1991     Years since quittin.6    Smokeless tobacco: Former     Quit date: 1986   Substance Use Topics    Alcohol use: Not Currently      Alcohol/week: 0.0 standard drinks of alcohol     Comment: 2 bottles beer on weekends    Drug use: No        Gilbert Hensley MD  07/06/25 4468

## 2025-07-06 NOTE — ED NOTES
Pt transferred via stretcher with EMS to [facility]il[facility]. NAD noted, SR up x2.

## 2025-07-06 NOTE — ED NOTES
Spoke with Alea with transfer center. Updated pt's VS as requested. Cresencio with MD that pt will be a STAT transfer to main campus ED.

## 2025-07-07 ENCOUNTER — TELEPHONE (OUTPATIENT)
Dept: OPHTHALMOLOGY | Facility: CLINIC | Age: 89
End: 2025-07-07
Payer: MEDICARE

## 2025-07-07 ENCOUNTER — OFFICE VISIT (OUTPATIENT)
Dept: OPHTHALMOLOGY | Facility: CLINIC | Age: 89
End: 2025-07-07
Payer: MEDICARE

## 2025-07-07 VITALS
WEIGHT: 182 LBS | HEIGHT: 71 IN | DIASTOLIC BLOOD PRESSURE: 78 MMHG | RESPIRATION RATE: 18 BRPM | HEART RATE: 82 BPM | BODY MASS INDEX: 25.48 KG/M2 | OXYGEN SATURATION: 98 % | TEMPERATURE: 98 F | SYSTOLIC BLOOD PRESSURE: 158 MMHG

## 2025-07-07 DIAGNOSIS — H40.41X4 UVEITIC GLAUCOMA, RIGHT, INDETERMINATE STAGE: ICD-10-CM

## 2025-07-07 DIAGNOSIS — H20.9 UVEITIC GLAUCOMA, RIGHT, INDETERMINATE STAGE: ICD-10-CM

## 2025-07-07 DIAGNOSIS — H40.051 RAISED INTRAOCULAR PRESSURE OF RIGHT EYE: Primary | ICD-10-CM

## 2025-07-07 DIAGNOSIS — H20.00 ACUTE IRITIS, RIGHT EYE: ICD-10-CM

## 2025-07-07 DIAGNOSIS — Z96.1 PSEUDOPHAKIA OF BOTH EYES: ICD-10-CM

## 2025-07-07 DIAGNOSIS — E11.3553 STABLE PROLIFERATIVE DIABETIC RETINOPATHY OF BOTH EYES ASSOCIATED WITH TYPE 2 DIABETES MELLITUS: ICD-10-CM

## 2025-07-07 PROCEDURE — 3288F FALL RISK ASSESSMENT DOCD: CPT | Mod: CPTII,S$GLB,, | Performed by: OPHTHALMOLOGY

## 2025-07-07 PROCEDURE — 99999 PR PBB SHADOW E&M-EST. PATIENT-LVL III: CPT | Mod: PBBFAC,,, | Performed by: OPHTHALMOLOGY

## 2025-07-07 PROCEDURE — 1160F RVW MEDS BY RX/DR IN RCRD: CPT | Mod: CPTII,S$GLB,, | Performed by: OPHTHALMOLOGY

## 2025-07-07 PROCEDURE — 1101F PT FALLS ASSESS-DOCD LE1/YR: CPT | Mod: CPTII,S$GLB,, | Performed by: OPHTHALMOLOGY

## 2025-07-07 PROCEDURE — 92020 GONIOSCOPY: CPT | Mod: S$GLB,,, | Performed by: OPHTHALMOLOGY

## 2025-07-07 PROCEDURE — 25000003 PHARM REV CODE 250: Performed by: EMERGENCY MEDICINE

## 2025-07-07 PROCEDURE — 1126F AMNT PAIN NOTED NONE PRSNT: CPT | Mod: CPTII,S$GLB,, | Performed by: OPHTHALMOLOGY

## 2025-07-07 PROCEDURE — 1159F MED LIST DOCD IN RCRD: CPT | Mod: CPTII,S$GLB,, | Performed by: OPHTHALMOLOGY

## 2025-07-07 PROCEDURE — 99204 OFFICE O/P NEW MOD 45 MIN: CPT | Mod: S$GLB,,, | Performed by: OPHTHALMOLOGY

## 2025-07-07 RX ORDER — ATROPINE SULFATE 10 MG/ML
1 SOLUTION/ DROPS OPHTHALMIC DAILY
Qty: 5 ML | Refills: 0 | COMMUNITY
Start: 2025-07-07

## 2025-07-07 RX ORDER — DORZOLAMIDE HCL 20 MG/ML
1 SOLUTION/ DROPS OPHTHALMIC 3 TIMES DAILY
Qty: 5 ML | Refills: 12 | COMMUNITY
Start: 2025-07-07

## 2025-07-07 RX ORDER — BRIMONIDINE TARTRATE 2 MG/ML
1 SOLUTION/ DROPS OPHTHALMIC 3 TIMES DAILY
Qty: 5 ML | Refills: 12 | COMMUNITY
Start: 2025-07-07

## 2025-07-07 RX ORDER — ACETAZOLAMIDE 250 MG/1
250 TABLET ORAL 2 TIMES DAILY
Qty: 20 TABLET | Refills: 0 | COMMUNITY
Start: 2025-07-07 | End: 2025-07-17

## 2025-07-07 RX ORDER — BISACODYL 5 MG/1
1 TABLET, COATED ORAL DAILY
COMMUNITY

## 2025-07-07 RX ADMIN — BRIMONIDINE TARTRATE 1 DROP: 1.5 SOLUTION OPHTHALMIC at 12:07

## 2025-07-07 RX ADMIN — DORZOLAMIDE HCL 1 DROP: 20 SOLUTION/ DROPS OPHTHALMIC at 12:07

## 2025-07-07 NOTE — ED PROVIDER NOTES
Encounter Date: 7/6/2025       History     Chief Complaint   Patient presents with    Eye Pain     Pt transfer from  for a complaint of right eye pain and swelling, blurred vision. Pt denies any pain to the eye at this time.     The patient is an 89 year old male. He has a documented past medical history of HTN, HLD, type 2 DM, CKD, CHF, and COPD on home O2.  He is an ER to ER transfer, sent from [facility] ER for an emergent ophthalmology evaluation due to acute glaucoma of right eye. Pt reports acute atraumatic pain and blurred vision of right eye that started yesterday and progressively got worse. Pt was given Diamox PTA and pain medication PTA and reports improvement.        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 DBM    NO PAST SURGERIES  05/20/2021    TRANSURETHRAL RESECTION OF PROSTATE USING BIPOLAR CAUTERY N/A 06/22/2021    Procedure: (TURP) Transuretheral Resection of Prostate with BIPOLAR CAUTERY;  Surgeon: Jesus  MD Kelsey;  Location: [facility] OR;  Service: Urology;  Laterality: N/A;  RN Pre OP 6-21-21.  Vaccinated.  C A     Family History   Problem Relation Name Age of Onset    Cancer Mother      Asbestos Father      No Known Problems Sister      No Known Problems Brother      No Known Problems Maternal Aunt      No Known Problems Maternal Uncle      No Known Problems Paternal Aunt      No Known Problems Paternal Uncle      No Known Problems Maternal Grandmother      No Known Problems Maternal Grandfather      No Known Problems Paternal Grandmother      No Known Problems Paternal Grandfather      No Known Problems Other      Liver disease Neg Hx       Social History[1]  Review of Systems   Constitutional:  Negative for diaphoresis.   Eyes:  Positive for pain and visual disturbance.   Respiratory:  Negative for shortness of breath.    Cardiovascular:  Negative for chest pain.   Gastrointestinal:  Negative for abdominal pain, nausea and vomiting.   Genitourinary:  Negative for difficulty urinating.   Musculoskeletal:  Negative for back pain and neck pain.   Skin:  Negative for color change.   Neurological:  Negative for dizziness, syncope, facial asymmetry, speech difficulty, weakness, light-headedness, numbness and headaches.   Psychiatric/Behavioral:  Negative for confusion.        Physical Exam     Initial Vitals [07/06/25 1327]   BP Pulse Resp Temp SpO2   (!) 186/95 85 18 98.3 °F (36.8 °C) (!) 94 %      MAP       --         Physical Exam    Nursing note and vitals reviewed.  Constitutional: He appears well-developed and well-nourished. He is not diaphoretic.   Alert and interactive    HENT:   Head: Normocephalic.   Eyes: EOM are normal. Pupils are equal, round, and reactive to light.   Wearing glasses    Neck: Neck supple.   Cardiovascular:  Normal rate.           Pulmonary/Chest: No respiratory distress.   On supplemental O2    Abdominal: He exhibits no distension. There is no abdominal tenderness.   Musculoskeletal:          General: Normal range of motion.      Cervical back: Neck supple.     Neurological: He is alert and oriented to person, place, and time.   Skin: Skin is warm and dry.   Psychiatric: He has a normal mood and affect. His behavior is normal.         ED Course   Procedures  Labs Reviewed   COMPREHENSIVE METABOLIC PANEL - Abnormal       Result Value    Sodium 140      Potassium 4.0      Chloride 108      CO2 20 (*)     Glucose 121 (*)     BUN 26 (*)     Creatinine 1.5 (*)     Calcium 9.4      Protein Total 7.8      Albumin 3.9      Bilirubin Total 0.4      ALP 59      AST 36      ALT 26      Anion Gap 12      eGFR 45 (*)    CBC WITH DIFFERENTIAL - Abnormal    WBC 3.85 (*)     RBC 4.62      HGB 12.8 (*)     HCT 39.5 (*)     MCV 86      MCH 27.7      MCHC 32.4      RDW 14.6 (*)     Platelet Count 228      MPV 9.5      Nucleated RBC 0      Neut % 66.2      Lymph % 22.3      Mono % 9.6      Eos % 1.6      Basophil % 0.3      Imm Grans % 0.0      Neut # 2.55      Lymph # 0.86 (*)     Mono # 0.37      Eos # 0.06      Baso # 0.01      Imm Grans # 0.00     CBC W/ AUTO DIFFERENTIAL    Narrative:     The following orders were created for panel order CBC Auto Differential.  Procedure                               Abnormality         Status                     ---------                               -----------         ------                     CBC with Differential[[id number]]       Abnormal            Final result                 Please view results for these tests on the individual orders.   POCT GLUCOSE    POCT Glucose 110            Imaging Results    None          Medications   proparacaine 0.5 % ophthalmic solution 1 drop (has no administration in time range)   fluorescein ophthalmic strip 1 each (has no administration in time range)   fluorescein ophthalmic strip 1 each (1 each Right Eye Given 7/6/25 1430)   TETRAcaine HCl (PF) 0.5 % Drop 2 drop (2 drops Right Eye Given 7/6/25 1430)   acetaZOLAMIDE injection 500 mg  (500 mg Intravenous Given 25 1658)   HYDROmorphone injection 1 mg (1 mg Intravenous Given 25 174)   ondansetron disintegrating tablet 4 mg (4 mg Oral Given 25)     Medical Decision Making  Amount and/or Complexity of Data Reviewed  Labs: ordered.    Risk  Prescription drug management.                          Medical Decision Making:   History:   Old Medical Records: I decided to obtain old medical records.  Old Records Summarized: records from another hospital.  Initial Assessment:   88 yo M, ED to ED transfer sent for ophthalmology consult due to acute glaucoma of right eye   Differential Diagnosis:   Acute angle closure glaucoma, CRVO, vision loss, etc   ED Management:  Vital signs reviewed   Chart review completed   Pt reports improvement of eye pain and blurred vision after medications   Spoke with on call ophthalmologist - already aware of patient and on way to see patient in ER     Update: Ophthalmology evaluating pt at bedside in ED, recommendations pending. I gave report and signed out patient to PM ER attending physician who will assume further care and management due to end of my shift.     Other:   I have discussed this case with another health care provider.             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)  [H57.11] Acute right eye pain  [H53.8] Blurred vision, right eye  [H54.61] Vision loss of right eye  [N18.9] Chronic renal impairment, unspecified CKD stage          ED Disposition Condition    Transfer to Another Facility Stable                      [1]   Social History  Tobacco Use    Smoking status: Former     Current packs/day: 0.00     Average packs/day: 1 pack/day for 40.0 years (40.0 ttl pk-yrs)     Types: Cigarettes     Start date: 1951     Quit date: 1991     Years since quittin.6    Smokeless tobacco: Former     Quit date: 1986   Substance Use Topics    Alcohol use: Not Currently     Alcohol/week: 0.0  standard drinks of alcohol     Comment: 2 bottles beer on weekends    Drug use: No        Temo Ramirez, ELVIS  07/06/25 3300

## 2025-07-07 NOTE — PROVIDER PROGRESS NOTES - EMERGENCY DEPT.
Signout Note    I received signout from the previous provider.     Chief complaint:  Eye Pain (Pt transfer from  for a complaint of right eye pain and swelling, blurred vision. Pt denies any pain to the eye at this time.)      Pertinent history &exam:  Peter Lopez is a 89 y.o. male with pertinent PMH of hypertension, hyperlipidemia, type 2 diabetes, CKD, COPD on home oxygen who presented to emergency department as transfer from outside hospital for ophthalmology evaluation of acute angle closure glaucoma.  Patient was signed out to me pending ophthalmology recommendations for final disposition.    Vitals:    07/06/25 2300   BP: (!) 171/100   Pulse: 79   Resp:    Temp:        Imaging Studies:    No orders to display       Medications Given:  Medications   dorzolamide 2 % ophthalmic solution 1 drop (has no administration in time range)   brimonidine 0.15 % OPTH DROP ophthalmic solution 1 drop (has no administration in time range)   fluorescein ophthalmic strip 1 each (1 each Right Eye Given 7/6/25 1430)   TETRAcaine HCl (PF) 0.5 % Drop 2 drop (2 drops Right Eye Given 7/6/25 1430)   acetaZOLAMIDE injection 500 mg (500 mg Intravenous Given 7/6/25 1658)   HYDROmorphone injection 1 mg (1 mg Intravenous Given 7/6/25 1741)   ondansetron disintegrating tablet 4 mg (4 mg Oral Given 7/6/25 1740)   proparacaine 0.5 % ophthalmic solution 1 drop (1 drop Right Eye Given 7/6/25 1945)   fluorescein ophthalmic strip 1 each (1 each Right Eye Given 7/6/25 1945)       Pending Items/ MDM:  89 y.o. male with above complaints.      Ophthalmology have given the recommendations including dorzolamide and brimonidine drops which has been ordered from pharmacy.  Patient will receive those drops in the emergency department prior to discharge.  Prescription for renally dosed acetazolamide was sent to the pharmacy of his choice.  Opioid prescription also sent to that pharmacy.  Discharged in stable condition    Diagnostic Impression:    1. Acute  glaucoma of right eye    2. Acute right eye pain    3. Blurred vision, right eye    4. Vision loss of right eye    5. Chronic renal impairment, unspecified CKD stage         ED Disposition Condition    Discharge Stable          ED Prescriptions       Medication Sig Dispense Start Date End Date Auth. Provider    acetaZOLAMIDE (DIAMOX) 250 MG tablet  (Status: Discontinued) Take 1 tablet (250 mg total) by mouth 2 (two) times daily. for 10 days 20 tablet 7/6/2025 7/6/2025 Natalia Arredondo MD    HYDROcodone-acetaminophen (NORCO) 5-325 mg per tablet  (Status: Discontinued) Take 1 tablet by mouth every 6 (six) hours as needed for Pain. 12 tablet 7/6/2025 7/6/2025 Natalia Arredondo MD    acetaZOLAMIDE (DIAMOX) 250 MG tablet Take 1 tablet (250 mg total) by mouth 2 (two) times daily. for 10 days 20 tablet 7/6/2025 7/16/2025 Natalia Arredondo MD    HYDROcodone-acetaminophen (NORCO) 5-325 mg per tablet Take 1 tablet by mouth every 6 (six) hours as needed for Pain. 12 tablet 7/6/2025 7/9/2025 Natalia Arredondo MD          Follow-up Information       Follow up With Specialties Details Why Contact Info Additional Information    Lucas Lloyd Jr., MD Family Medicine Schedule an appointment as soon as possible for a visit   [address] [facility] [address] [phone]       [address] Ophthalmology Schedule an appointment as soon as possible for a visit   [address] [facility] [address] [phone] Please arrive on the 10th floor for check-in.            Patient understands the plan and is in agreement, verbalized understanding, questions answered    Natalia Arredondo MD  Emergency Medicine

## 2025-07-07 NOTE — CONSULTS
[facility]   Consult Service Note      Chief complaint/Reason for Consult: ED transfer sent here for acute glaucoma      History of Present Illness: Peter Lopez is a 89 y.o. male who presents as a transfer for ophthalmology due to high eye pressure at OSH concern for acute glaucoma of right eye. Per patient his right eye became painful and blurry on 7/5 and was worse this morning at [facility] as he was not able to see peoples faces or enjoy the [facility] service. Pt presented to the ED with IOP unreadable by tonopen given  mg Diamox. Pt reports his eye pain has improved since then and he also thinks his vision is improving. He reports hx of diabetic retinopathy and needing injections (last end of 2024) and possible laser. Denies flashes, floaters, curtains, nausea/vomiting.    Past Ocular Hx: PCIOL both eyes, hx of injections in the right eye possible laser     Current eye gtts: None      PMHx:  has a past medical history of BPH (benign prostatic hyperplasia), Chronic hepatitis C without mention of hepatic coma, CKD (chronic kidney disease), COPD (chronic obstructive pulmonary disease), Diabetes mellitus type I, Diabetes with neurologic complications, H/O colonoscopy, [place] IRA (hard of hearing), Hypertension, Macular degeneration, On home oxygen therapy, Prostate cancer, 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     PSurgHx:  has a past surgical history that includes No past surgeries (05/20/2021); Transurethral resection of prostate using bipolar cautery (N/A, 06/22/2021); Esophagogastroduodenoscopy (N/A, 01/07/2025); and Cataract extraction (Bilateral).     Medications: Reviewed     Allergies: has no known allergies.     Social Hx:  reports that he quit smoking about 33 years ago. His smoking use included cigarettes. He started smoking about 73 years ago. He has a 40 pack-year smoking history. He quit smokeless tobacco use about 39 years ago. He reports  that he does not currently use alcohol. He reports that he does not use drugs.     Family Hx: No family history of glaucoma, macular degeneration    ROS: Negative x 10 except for complaints as described in HPI     Ocular examination/Dilated fundus examination:  Base Eye Exam       Visual Acuity (Glynn Card)         Right Left    Dist sc 20/70 20/70    Dist ph sc NI NI              Tonometry (Tonopen, 8:09 PM)         Right Left    Pressure 33 10              Tonometry #2 (Tonopen, 8:30 PM)         Right Left    Pressure 27               Tonometry #3 (Tonopen, 8:45 PM)         Right Left    Pressure 24               Gonioscopy         Right Left    Temporal PTM PTM    Nasal PTM PTM    Superior No visible structures PTM    Inferior PTM PTM              Pupils         Dark Light Shape React APD    Right 2.5 2 Round Minimal None    Left 2.5 2 Round Minimal None              Visual Fields         Right Left    Restrictions Partial outer superior temporal deficiency Partial outer superior temporal, superior nasal deficiencies              Extraocular Movement         Right Left     Full Full              Neuro/Psych       Oriented x3: Yes                  Slit Lamp and Fundus Exam       External Exam         Right Left    External Normal Normal              Slit Lamp Exam         Right Left    Lids/Lashes upper and lower lid edema Normal    Conjunctiva/Sclera White and quiet White and quiet    Cornea PEEs, no significant corneal edema PEEs    Anterior Chamber Deep and quiet Deep and quiet    Iris No NVI No NVI    Lens Clear Clear    Anterior Vitreous Normal Normal              Fundus Exam         Right Left    Disc defered dilation due to concern for narrow angle Normal, No NVD    C/D Ratio  0.4    Macula  RPE mottling    Vessels  Normal    Periphery  drusens, scatterd DBH, Mas. No NVE                    Assessment/Plan:   Angle Closure Episode, OD  - Likely 2/2 narrow angles vs. Other secondary angle closure glaucoma  -  Initial IOP: unreadable at OSH, given  mg Diamox   - Upon arrival, IOP 33, improved to 26, 24 after multiple rounds of dorzolamide and brimonidine  - Reports pain resolved after diamox at OSH with improvementi n vision  - Gonioscopy: with narrow angles, no visible structures superiorly, no NVA  - no significant k edema, likely longstanding chronicity  - No KP, posterior synechiae, neovascularization   - Deferred dilation of right eye due to concern for narrow angles   - Plan: Brimonidine TID, dorzolamide TID, diamox  mg BID (renally dosed, CrCl 37), avoiding timolol given interstitial lung disease and difficulty breathing  - FU 1-2 days in ophthalmology glaucoma clinic     2. PDR, right eye   3. NPDR, left eye   - Most recent A1c 6.9 (2/2025), >14 previously  - previously following with Dr. Denney   - reports hx of laser and injections in right eye, last injection end of 2024; reports he got them every 3-4 months   - Defer dilation due to concern for narrow angles, no NVI or NVA  - Already scheduled for appointment with Dr. Carey 7/9/2025      Recommendations   - Start Brimonidine TID right eye, Dorzolamide TID right eye, Diamox  mg  - Follow up 1-2 days with glaucoma clinic, schedulers will call to arrange appt  - Already scheduled for appointment with Dr. Carey 7/9/2025    Seen with Dr. Mensah. Discussed with Dr. Naomie Diop MD  [facility] PGY2

## 2025-07-07 NOTE — PROGRESS NOTES
HPI    89yoM here for ED follow up for angle closure OD   Pt states his right eye started hurting Friday night and felt like   pressure in eye and it was tender to touch with blurred vision. Pt states   pain did not get better and became unbearable by Sunday morning.  Pt was seen in ED yesterday and given glaucoma drops (Dorzolamide and   Brimonidine). Pt states the pain and blurred vision has gotten much better   today and eye is not hurting now.  Pt was seen by  on 5/27/25 for diabetic eye exam and did not have any   symptoms that day.    1. Acute elevated IOP  2. Type 2 DM with PDR OD  3. Type 2 DM with NPDR OS  4. Dry ARMD OU    MEDS:  Dorzolamide TID OD  Brimonidine TID OD  Diamox 250mg BID OD = PT STATES HE WAS NEVER GIVEN IT AND SO HE HAS NOT   BEEN TAKING IT  Last edited by Kacy Henao MD on 7/7/2025  5:31 PM.            Assessment /Plan     For exam results, see Encounter Report.    Raised intraocular pressure of right eye  -     dexAMETHasone (DECADRON) 0.1 % DrpS; Place 1 drop into the right eye 3 (three) times daily.  Dispense: 5 mL; Refill: 0  -     atropine 1% (ISOPTO ATROPINE) 1 % Drop; Place 1 drop into the right eye Daily.  Dispense: 5 mL; Refill: 0  -     dorzolamide (TRUSOPT) 2 % ophthalmic solution; Place 1 drop into the right eye 3 (three) times daily.  Dispense: 5 mL; Refill: 12  -     brimonidine 0.2% (ALPHAGAN) 0.2 % Drop; Place 1 drop into the right eye 3 (three) times daily.  Dispense: 5 mL; Refill: 12  -     acetaZOLAMIDE (DIAMOX) 250 MG tablet; Take 1 tablet (250 mg total) by mouth 2 (two) times daily. for 10 days  Dispense: 20 tablet; Refill: 0  -     Posterior Segment OCT Optic Nerve- Both eyes; Future    Uveitic glaucoma, right, indeterminate stage  -     dexAMETHasone (DECADRON) 0.1 % DrpS; Place 1 drop into the right eye 3 (three) times daily.  Dispense: 5 mL; Refill: 0  -     atropine 1% (ISOPTO ATROPINE) 1 % Drop; Place 1 drop into the right eye Daily.  Dispense: 5  mL; Refill: 0  -     dorzolamide (TRUSOPT) 2 % ophthalmic solution; Place 1 drop into the right eye 3 (three) times daily.  Dispense: 5 mL; Refill: 12  -     brimonidine 0.2% (ALPHAGAN) 0.2 % Drop; Place 1 drop into the right eye 3 (three) times daily.  Dispense: 5 mL; Refill: 12  -     acetaZOLAMIDE (DIAMOX) 250 MG tablet; Take 1 tablet (250 mg total) by mouth 2 (two) times daily. 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 H/O DR - OU    S/P intrvitreal injections x 3 OD  w/ Dr Denney (~10/2023 - but did not F/U)     3. Refractive error    Myopia / astigmatism / presbyopia     4. PC IOL's ou    ? Heithmeyer / ? When     IMP   Glaucoma associated with inflammation   ? Glaucomatocyclitic vs other cause of iritis     PLAN  Cont glaucoma drops  Dorzolamide tid od - sample   Brimonidine tid od - sample   ADD - dexamethazone - tid  - sample given (no pred acetate available)   Atropine 1 x day - sample given   AVOID BB - + COPD  ADD DIAMOX 250 2 X A DAY (sampled - enough for 10 days) (has slight elevation of BUN and Cr - try and use half dosage and for short duration)     F/U Monday or Tuesday - in 1 week - that is the day he can get a ride from the [facility]    For IOP check and RNFL OCT   Once stable can F/U with [facility]   Also can F/U with [facility] the [facility]   Will eventually need VF's and disc photos ect.

## 2025-07-07 NOTE — DISCHARGE INSTRUCTIONS
- Start Brimonidine TID right eye, Dorzolamide TID right eye, Diamox  mg  - Follow up 1-2 days with glaucoma clinic, schedulers will call to arrange appt  - Already scheduled for appointment with Dr. Carey 7/9/2025

## 2025-07-07 NOTE — TELEPHONE ENCOUNTER
----- Message from Ailyn Diop sent at 7/6/2025  9:06 PM CDT -----  Regarding: ED follow-up  Hello,    This patient was seen as in the ED for angle closure episode, right eye. Can they please get follow-up in glaucoma clinic in 1-2 days?    Best contact number: [phone]    Thank you!    Ailyn Diop MD  [facility] PGY2

## 2025-07-09 ENCOUNTER — CLINICAL SUPPORT (OUTPATIENT)
Dept: OPHTHALMOLOGY | Facility: CLINIC | Age: 89
End: 2025-07-09
Payer: MEDICARE

## 2025-07-09 ENCOUNTER — OFFICE VISIT (OUTPATIENT)
Dept: OPHTHALMOLOGY | Facility: CLINIC | Age: 89
End: 2025-07-09
Attending: OPHTHALMOLOGY
Payer: MEDICARE

## 2025-07-09 ENCOUNTER — TELEPHONE (OUTPATIENT)
Dept: FAMILY MEDICINE | Facility: CLINIC | Age: 89
End: 2025-07-09
Payer: MEDICARE

## 2025-07-09 DIAGNOSIS — H40.41X4 UVEITIC GLAUCOMA, RIGHT, INDETERMINATE STAGE: ICD-10-CM

## 2025-07-09 DIAGNOSIS — Z79.4 TYPE 2 DIABETES MELLITUS WITH LEFT EYE AFFECTED BY MODERATE NONPROLIFERATIVE RETINOPATHY WITHOUT MACULAR EDEMA, WITH LONG-TERM CURRENT USE OF INSULIN: ICD-10-CM

## 2025-07-09 DIAGNOSIS — H20.9 UVEITIC GLAUCOMA, RIGHT, INDETERMINATE STAGE: ICD-10-CM

## 2025-07-09 DIAGNOSIS — E11.3591 TYPE 2 DIABETES MELLITUS WITH RIGHT EYE AFFECTED BY PROLIFERATIVE RETINOPATHY WITHOUT MACULAR EDEMA, WITH LONG-TERM CURRENT USE OF INSULIN: Primary | ICD-10-CM

## 2025-07-09 DIAGNOSIS — H35.3134 NONEXUDATIVE AGE-RELATED MACULAR DEGENERATION, BILATERAL, ADVANCED ATROPHIC WITH SUBFOVEAL INVOLVEMENT: ICD-10-CM

## 2025-07-09 DIAGNOSIS — Z79.4 TYPE 2 DIABETES MELLITUS WITH RIGHT EYE AFFECTED BY PROLIFERATIVE RETINOPATHY WITHOUT MACULAR EDEMA, WITH LONG-TERM CURRENT USE OF INSULIN: Primary | ICD-10-CM

## 2025-07-09 DIAGNOSIS — E11.3392 TYPE 2 DIABETES MELLITUS WITH LEFT EYE AFFECTED BY MODERATE NONPROLIFERATIVE RETINOPATHY WITHOUT MACULAR EDEMA, WITH LONG-TERM CURRENT USE OF INSULIN: ICD-10-CM

## 2025-07-09 DIAGNOSIS — H20.00 ACUTE IRITIS, RIGHT EYE: ICD-10-CM

## 2025-07-09 PROCEDURE — 92134 CPTRZ OPH DX IMG PST SGM RTA: CPT | Mod: S$GLB,,, | Performed by: OPHTHALMOLOGY

## 2025-07-09 PROCEDURE — 99999 PR PBB SHADOW E&M-EST. PATIENT-LVL II: CPT | Mod: PBBFAC,,, | Performed by: OPHTHALMOLOGY

## 2025-07-09 PROCEDURE — G2211 COMPLEX E/M VISIT ADD ON: HCPCS | Mod: S$GLB,,, | Performed by: OPHTHALMOLOGY

## 2025-07-09 PROCEDURE — 99214 OFFICE O/P EST MOD 30 MIN: CPT | Mod: S$GLB,,, | Performed by: OPHTHALMOLOGY

## 2025-07-09 PROCEDURE — 1159F MED LIST DOCD IN RCRD: CPT | Mod: CPTII,S$GLB,, | Performed by: OPHTHALMOLOGY

## 2025-07-09 PROCEDURE — 2022F DILAT RTA XM EVC RTNOPTHY: CPT | Mod: CPTII,S$GLB,, | Performed by: OPHTHALMOLOGY

## 2025-07-09 PROCEDURE — 1160F RVW MEDS BY RX/DR IN RCRD: CPT | Mod: CPTII,S$GLB,, | Performed by: OPHTHALMOLOGY

## 2025-07-09 NOTE — TELEPHONE ENCOUNTER
Return pt call, pt daughter did not answer, unable to move appointment up due to insurance purposes.   Copied from CRM #[id number]. Topic: General Inquiry - Patient Advice  >> Jul 9, 2025  8:53 AM Edvin wrote:  Type: Patient Call Back    Who called: pt's daughter     What is the request in detail: pt's appt for the 22nd is currently on the waiting list and the pt's daughter would like to be notified if theirs anything avail on the 14th or 15th     Can the clinic reply by [facility]?    Would the patient rather a call back or a response via [facility]? Call back     Best call back number: [phone]    Additional Information:

## 2025-07-10 ENCOUNTER — PATIENT OUTREACH (OUTPATIENT)
Dept: ADMINISTRATIVE | Facility: CLINIC | Age: 89
End: 2025-07-10
Payer: MEDICARE

## 2025-07-10 NOTE — PROGRESS NOTES
Subjective:       Patient ID: Peter Lopez is a 89 y.o. male      Chief Complaint   Patient presents with    Referral     History of Present Illness  HPI    NP- 2nd opinion.    Pt previously was seeing Dr. Denney for years and was getting injections.   Last injection was about a year ago per pt    *PT WILL NEED YOU TO FULLY EXPLAIN TO HIM WHY HE IS ON DROPS AND WHY HE   NEEDS INJECTIONS.     Pt states since seeing Dr. Henao he has been doing his drops as   directed.   Sometimes he has pain, but it does not last long. He rates his pain a 7   when it comes  Vision is stable OU    Dorzolamide 3/0  Brimonidine 3/0  PF 3/0  Atropine 1/0  Diamox 250mg BID OD     Last edited by Patricio Carey MD on 7/9/2025 10:41 PM.        Imaging:    See report    Assessment/Plan:     1. Type 2 diabetes mellitus with right eye affected by proliferative retinopathy without macular edema, with long-term current use of insulin (Primary)  2. Type 2 diabetes mellitus with left eye affected by moderate nonproliferative retinopathy without macular edema, with long-term current use of insulin  Diabetic Retinopathy discussed in detail, all questions answered  Stressed importance of good BS/BP/Chol Control  RTC immediately PRN any vision changes, millicent blurry vision, missing vision, floaters, distortions, etc    Unclear in had prior injections for DR or AMD  OD without prolif but old notes say prolif OD  S/p PRP OD    3. Nonexudative age-related macular degeneration, bilateral, advanced atrophic with subfoveal involvement  Discussed Dry and Wet AMD in detail  Recommend AREDS 2 Vitamins  Home Amsler Grid Testing  RTC immediately PRN any changes in vision    4. Uveitic glaucoma, right, indeterminate stage  5. Acute iritis, right eye  IOP better, inflammation controlled  CPM with meds:  Dorzolamide 3/0  Brimonidine 3/0  PF 3/0  Atropine 1/0  Diamox 250mg BID OD     F/u Dr Henao next week as planned        Follow up in about 3 months (around  10/9/2025), or if symptoms worsen or fail to improve, for Comprehensive Examination (DILATE OU), OCT Mac.

## 2025-07-12 NOTE — PROGRESS NOTES
HPI    DLS: 7/07/2025    Pt here for IOP Check;  Pt states he took the last of the Diamox pills yesterday morning.     F/U acute IOP elevation 2/2 uveitic glaucoma od - 7/7/2025     Meds;  Atropine QDAY OD  Brimonidine TID OD  Dexamethasone TID OD  Dorzolamide TID OD      1. Acute uveitic glaucoma w/ high IOP od - 7/7/2025  2. Type 2 DM with PDR OD   3. Type 2 DM with NPDR OS   4. Dry ARMD OU     Last edited by Kacy Henao MD on 7/15/2025  3:13 PM.            Assessment /Plan     For exam results, see Encounter Report.    Uveitic glaucoma, right, indeterminate stage  -     brimonidine 0.2% (ALPHAGAN) 0.2 % Drop; Place 1 drop into the right eye 3 (three) times daily.  Dispense: 10 mL; Refill: 12  -     dorzolamide (TRUSOPT) 2 % ophthalmic solution; Place 1 drop into the right eye 3 (three) times daily.  Dispense: 10 mL; Refill: 12    Acute iritis, right eye    Raised intraocular pressure of right eye  -     brimonidine 0.2% (ALPHAGAN) 0.2 % Drop; Place 1 drop into the right eye 3 (three) times daily.  Dispense: 10 mL; Refill: 12  -     dorzolamide (TRUSOPT) 2 % ophthalmic solution; Place 1 drop into the right eye 3 (three) times daily.  Dispense: 10 mL; Refill: 12    Stable proliferative diabetic retinopathy of both eyes associated with type 2 diabetes mellitus    Type 2 diabetes mellitus with right eye affected by proliferative retinopathy without macular edema, with long-term current use of insulin    Type 2 diabetes mellitus with left eye affected by moderate nonproliferative retinopathy without macular edema, with long-term current use of insulin    Nonexudative age-related macular degeneration, bilateral, advanced atrophic with subfoveal involvement    Pseudophakia of both eyes      F/U  elevated IOP / uveitic OD / carolyn schlossman  OD   1 day F/U ED visit - for elevated IOP od and eye pain OD  (7/6/2024)   7/15/2025 - IOP ok / eye quiet       Uvetic glaucoma od    (Seen by Dr Allred - 5/27/2025 about 6  weeks ago - and IOP was ok ou @21/20)  IOP 31/8  Angle open ou +3 ou - there is 90 degrees of heme in schlemm canal ou - but NO angle chema )   No rubeosis / no angle chema   + cell and flare od     2. H/O DR - OU    S/P intrvitreal injections x 3 OD  w/ Dr Denney (~10/2023 - but did not F/U)     3. Refractive error    Myopia / astigmatism / presbyopia     4. PC IOL's ou    ? Heithmeyer / ? When     IMP   Glaucoma associated with inflammation   ? Glaucomatocyclitic vs other cause of iritis     PLAN  Lev schlossman / glaucomatocyclitic crisis od   Cont glaucoma drops  Dorzolamide tid od -- cont   Brimonidine tid od - cont   - dexamethazone -  bid od for 1 week then 1 x day until bottle runs out   Atropine 1 x day - 1 x day od - stop when bottle runs out  AVOID BB - + COPD  ADD DIAMOX 250 2 X A DAY (- finished   IOP ok / and pain free and less iritis od - 7/15/2025     Pt [place] F[place] with [place]. Allred or with Dr Reilly - they can re-consult if IOP goes up again   Rec baseline HVF's / photos and RNFL OCT's

## 2025-07-14 ENCOUNTER — LAB VISIT (OUTPATIENT)
Dept: LAB | Facility: HOSPITAL | Age: 89
End: 2025-07-14
Attending: FAMILY MEDICINE
Payer: MEDICARE

## 2025-07-14 DIAGNOSIS — E11.21 DIABETES MELLITUS WITH NEPHROPATHY: Chronic | ICD-10-CM

## 2025-07-14 LAB
ALBUMIN/CREAT UR: 44.4 UG/MG
CREAT UR-MCNC: 99 MG/DL (ref 23–375)
MICROALBUMIN UR-MCNC: 44 UG/ML (ref ?–5000)

## 2025-07-14 PROCEDURE — 82570 ASSAY OF URINE CREATININE: CPT

## 2025-07-15 ENCOUNTER — OFFICE VISIT (OUTPATIENT)
Dept: OPHTHALMOLOGY | Facility: CLINIC | Age: 89
End: 2025-07-15
Payer: MEDICARE

## 2025-07-15 DIAGNOSIS — H40.41X4 GLAUCOMATOCYCLITIC CRISIS OF RIGHT EYE, INDETERMINATE STAGE: Primary | ICD-10-CM

## 2025-07-15 DIAGNOSIS — E11.3392 TYPE 2 DIABETES MELLITUS WITH LEFT EYE AFFECTED BY MODERATE NONPROLIFERATIVE RETINOPATHY WITHOUT MACULAR EDEMA, WITH LONG-TERM CURRENT USE OF INSULIN: ICD-10-CM

## 2025-07-15 DIAGNOSIS — Z79.4 TYPE 2 DIABETES MELLITUS WITH LEFT EYE AFFECTED BY MODERATE NONPROLIFERATIVE RETINOPATHY WITHOUT MACULAR EDEMA, WITH LONG-TERM CURRENT USE OF INSULIN: ICD-10-CM

## 2025-07-15 DIAGNOSIS — E11.3553 STABLE PROLIFERATIVE DIABETIC RETINOPATHY OF BOTH EYES ASSOCIATED WITH TYPE 2 DIABETES MELLITUS: ICD-10-CM

## 2025-07-15 DIAGNOSIS — Z96.1 PSEUDOPHAKIA OF BOTH EYES: ICD-10-CM

## 2025-07-15 DIAGNOSIS — H20.00 ACUTE IRITIS, RIGHT EYE: ICD-10-CM

## 2025-07-15 DIAGNOSIS — H35.3134 NONEXUDATIVE AGE-RELATED MACULAR DEGENERATION, BILATERAL, ADVANCED ATROPHIC WITH SUBFOVEAL INVOLVEMENT: ICD-10-CM

## 2025-07-15 DIAGNOSIS — H20.9 UVEITIC GLAUCOMA, RIGHT, INDETERMINATE STAGE: ICD-10-CM

## 2025-07-15 DIAGNOSIS — H40.051 RAISED INTRAOCULAR PRESSURE OF RIGHT EYE: ICD-10-CM

## 2025-07-15 DIAGNOSIS — H40.41X4 UVEITIC GLAUCOMA, RIGHT, INDETERMINATE STAGE: ICD-10-CM

## 2025-07-15 DIAGNOSIS — Z79.4 TYPE 2 DIABETES MELLITUS WITH RIGHT EYE AFFECTED BY PROLIFERATIVE RETINOPATHY WITHOUT MACULAR EDEMA, WITH LONG-TERM CURRENT USE OF INSULIN: ICD-10-CM

## 2025-07-15 DIAGNOSIS — E11.3591 TYPE 2 DIABETES MELLITUS WITH RIGHT EYE AFFECTED BY PROLIFERATIVE RETINOPATHY WITHOUT MACULAR EDEMA, WITH LONG-TERM CURRENT USE OF INSULIN: ICD-10-CM

## 2025-07-15 PROCEDURE — 99999 PR PBB SHADOW E&M-EST. PATIENT-LVL III: CPT | Mod: PBBFAC,,, | Performed by: OPHTHALMOLOGY

## 2025-07-15 PROCEDURE — 92133 CPTRZD OPH DX IMG PST SGM ON: CPT | Mod: S$GLB,,, | Performed by: OPHTHALMOLOGY

## 2025-07-15 PROCEDURE — 1160F RVW MEDS BY RX/DR IN RCRD: CPT | Mod: CPTII,S$GLB,, | Performed by: OPHTHALMOLOGY

## 2025-07-15 PROCEDURE — 99214 OFFICE O/P EST MOD 30 MIN: CPT | Mod: S$GLB,,, | Performed by: OPHTHALMOLOGY

## 2025-07-15 PROCEDURE — 1159F MED LIST DOCD IN RCRD: CPT | Mod: CPTII,S$GLB,, | Performed by: OPHTHALMOLOGY

## 2025-07-15 RX ORDER — DORZOLAMIDE HCL 20 MG/ML
1 SOLUTION/ DROPS OPHTHALMIC 3 TIMES DAILY
Qty: 10 ML | Refills: 12 | Status: SHIPPED | OUTPATIENT
Start: 2025-07-15

## 2025-07-15 RX ORDER — BRIMONIDINE TARTRATE 2 MG/ML
1 SOLUTION/ DROPS OPHTHALMIC 3 TIMES DAILY
Qty: 10 ML | Refills: 12 | Status: SHIPPED | OUTPATIENT
Start: 2025-07-15

## 2025-07-15 NOTE — Clinical Note
Can you see this pt in 3-4 weeks on the [place] [facility] - he had glaucomatocyclitic crisis od - 7/7/2025 - it is better and he is tapering off the dexametazone gtts and atropine. He is to cont dorzolamide and brimonidine os . He has a lot of trouble getting rides over to the [facility]

## 2025-07-16 ENCOUNTER — TELEPHONE (OUTPATIENT)
Dept: NEPHROLOGY | Facility: CLINIC | Age: 89
End: 2025-07-16
Payer: MEDICARE

## 2025-07-22 ENCOUNTER — OFFICE VISIT (OUTPATIENT)
Dept: FAMILY MEDICINE | Facility: CLINIC | Age: 89
End: 2025-07-22
Payer: MEDICARE

## 2025-07-22 VITALS
SYSTOLIC BLOOD PRESSURE: 128 MMHG | BODY MASS INDEX: 25.34 KG/M2 | OXYGEN SATURATION: 95 % | DIASTOLIC BLOOD PRESSURE: 68 MMHG | HEIGHT: 71 IN | RESPIRATION RATE: 18 BRPM | TEMPERATURE: 98 F | WEIGHT: 181 LBS | HEART RATE: 95 BPM

## 2025-07-22 DIAGNOSIS — E11.40 TYPE 2 DIABETES MELLITUS WITH DIABETIC NEUROPATHY, WITHOUT LONG-TERM CURRENT USE OF INSULIN: Primary | Chronic | ICD-10-CM

## 2025-07-22 DIAGNOSIS — E11.21 DIABETES MELLITUS WITH NEPHROPATHY: Chronic | ICD-10-CM

## 2025-07-22 DIAGNOSIS — N18.32 STAGE 3B CHRONIC KIDNEY DISEASE: Chronic | ICD-10-CM

## 2025-07-22 DIAGNOSIS — R13.10 DYSPHAGIA, UNSPECIFIED TYPE: ICD-10-CM

## 2025-07-22 DIAGNOSIS — I70.0 AORTIC ATHEROSCLEROSIS: Chronic | ICD-10-CM

## 2025-07-22 DIAGNOSIS — I10 ESSENTIAL HYPERTENSION: Chronic | ICD-10-CM

## 2025-07-22 PROCEDURE — 1160F RVW MEDS BY RX/DR IN RCRD: CPT | Mod: CPTII,S$GLB,, | Performed by: FAMILY MEDICINE

## 2025-07-22 PROCEDURE — 99214 OFFICE O/P EST MOD 30 MIN: CPT | Mod: S$GLB,,, | Performed by: FAMILY MEDICINE

## 2025-07-22 PROCEDURE — 1126F AMNT PAIN NOTED NONE PRSNT: CPT | Mod: CPTII,S$GLB,, | Performed by: FAMILY MEDICINE

## 2025-07-22 PROCEDURE — 1159F MED LIST DOCD IN RCRD: CPT | Mod: CPTII,S$GLB,, | Performed by: FAMILY MEDICINE

## 2025-07-22 PROCEDURE — G2211 COMPLEX E/M VISIT ADD ON: HCPCS | Mod: S$GLB,,, | Performed by: FAMILY MEDICINE

## 2025-07-22 PROCEDURE — 3288F FALL RISK ASSESSMENT DOCD: CPT | Mod: CPTII,S$GLB,, | Performed by: FAMILY MEDICINE

## 2025-07-22 PROCEDURE — 1101F PT FALLS ASSESS-DOCD LE1/YR: CPT | Mod: CPTII,S$GLB,, | Performed by: FAMILY MEDICINE

## 2025-07-22 PROCEDURE — 99999 PR PBB SHADOW E&M-EST. PATIENT-LVL V: CPT | Mod: PBBFAC,,, | Performed by: FAMILY MEDICINE

## 2025-07-22 RX ORDER — OLMESARTAN MEDOXOMIL 40 MG/1
40 TABLET ORAL DAILY
Qty: 90 TABLET | Refills: 3 | Status: SHIPPED | OUTPATIENT
Start: 2025-07-22

## 2025-07-22 RX ORDER — CARVEDILOL 3.12 MG/1
3.12 TABLET ORAL 2 TIMES DAILY WITH MEALS
Qty: 180 TABLET | Refills: 3 | Status: SHIPPED | OUTPATIENT
Start: 2025-07-22 | End: 2026-07-22

## 2025-07-22 RX ORDER — INSULIN GLARGINE 100 [IU]/ML
10 INJECTION, SOLUTION SUBCUTANEOUS NIGHTLY
Qty: 15 ML | Refills: 11 | Status: SHIPPED | OUTPATIENT
Start: 2025-07-22

## 2025-07-22 RX ORDER — PEN NEEDLE, DIABETIC 30 GX3/16"
NEEDLE, DISPOSABLE MISCELLANEOUS
Qty: 50 EACH | Refills: 11 | Status: SHIPPED | OUTPATIENT
Start: 2025-07-22

## 2025-07-22 RX ORDER — ATORVASTATIN CALCIUM 20 MG/1
20 TABLET, FILM COATED ORAL NIGHTLY
Qty: 90 TABLET | Refills: 3 | Status: SHIPPED | OUTPATIENT
Start: 2025-07-22 | End: 2026-07-22

## 2025-07-22 RX ORDER — PANTOPRAZOLE SODIUM 40 MG/1
40 TABLET, DELAYED RELEASE ORAL DAILY
Qty: 90 TABLET | Refills: 3 | Status: SHIPPED | OUTPATIENT
Start: 2025-07-22

## 2025-07-22 RX ORDER — GLIPIZIDE 5 MG/1
5 TABLET ORAL
Qty: 90 TABLET | Refills: 3 | Status: SHIPPED | OUTPATIENT
Start: 2025-07-22

## 2025-07-22 RX ORDER — AMLODIPINE BESYLATE 5 MG/1
5 TABLET ORAL DAILY
Qty: 90 TABLET | Refills: 3 | Status: SHIPPED | OUTPATIENT
Start: 2025-07-22

## 2025-07-27 NOTE — PROGRESS NOTES
"  Patient Name: Peter Lopez    : 1935  MRN: [id number]      Subjective:     Patient ID: Peter is a 89 y.o. male    Chief Complaint:  Follow-up    History of Present Illness    Peter presents today for follow up of diabetes and blood pressure    He reports blood sugars mostly running in the 110s range while taking Lantus insulin 10 units at night. He has persistent visual blurring attributed to diabetes despite new glasses and recent prescription update. He has seen two different ophthalmologists for retinal evaluation. He uses eye drops twice daily for intraocular pressure management.    He sleeps approximately 5-6 hours per night and uses CPAP machine with mask for sleep management. He reports shortness of breath with prolonged walking, which is exacerbated by heat.      ROS:  General: -fever, -chills, +fatigue, -weight gain, -weight loss, +difficulty falling asleep, +difficulty staying asleep  Eyes: -vision changes, -redness, -discharge, +blurry vision  ENT: -ear pain, -nasal congestion, -sore throat  Cardiovascular: -chest pain, -palpitations, -lower extremity edema  Respiratory: -cough, -shortness of breath, +exertional dyspnea  Gastrointestinal: -abdominal pain, -nausea, -vomiting, -diarrhea, -constipation, -blood in stool  Genitourinary: -dysuria, -hematuria, -frequency  Musculoskeletal: -joint pain, -muscle pain  Skin: -rash, -lesion  Neurological: -headache, -dizziness, -numbness, -tingling  Psychiatric: -anxiety, -depression, -sleep difficulty         Objective:   /68 (BP Location: Left arm, Patient Position: Sitting)   Pulse 95   Temp 98 °F (36.7 °C) (Oral)   Resp 18   Ht 5' 11" (1.803 m)   Wt 82.1 kg (181 lb)   SpO2 95%   BMI 25.24 kg/m²     Physical Exam  Vitals reviewed.   Constitutional:       Appearance: He is well-developed. He is not diaphoretic.   HENT:      Head: Normocephalic.      Right Ear: External ear normal.      Left Ear: External ear normal.      Nose: Nose normal.      " Mouth/Throat:      Pharynx: No oropharyngeal exudate.   Neck:      Trachea: No tracheal deviation.   Cardiovascular:      Rate and Rhythm: Normal rate and regular rhythm.      Heart sounds: Normal heart sounds.   Pulmonary:      Effort: Pulmonary effort is normal.      Breath sounds: Normal breath sounds. No wheezing or rales.   Abdominal:      General: Bowel sounds are normal.      Palpations: Abdomen is soft. Abdomen is not rigid. There is no mass.      Tenderness: There is no abdominal tenderness. There is no guarding or rebound.   Musculoskeletal:      Cervical back: Normal range of motion and neck supple.   Lymphadenopathy:      Cervical: No cervical adenopathy.   Neurological:      Mental Status: He is oriented to person, place, and time.      Gait: Gait normal.        Lab Visit on 07/14/2025   Component Date Value Ref Range Status    Sodium 07/14/2025 142  136 - 145 mmol/L Final    Potassium 07/14/2025 4.3  3.5 - 5.1 mmol/L Final    Chloride 07/14/2025 114 (H)  95 - 110 mmol/L Final    CO2 07/14/2025 20 (L)  23 - 29 mmol/L Final    Glucose 07/14/2025 116 (H)  70 - 110 mg/dL Final    BUN 07/14/2025 34 (H)  8 - 23 mg/dL Final    Creatinine 07/14/2025 1.8 (H)  0.5 - 1.4 mg/dL Final    Calcium 07/14/2025 9.3  8.7 - 10.5 mg/dL Final    Protein Total 07/14/2025 8.0  6.0 - 8.4 gm/dL Final    Albumin 07/14/2025 4.0  3.5 - 5.2 g/dL Final    Bilirubin Total 07/14/2025 0.5  0.1 - 1.0 mg/dL Final    For infants and newborns, interpretation of results should be based   on gestational age, weight and in agreement with clinical   observations.    Premature Infant recommended reference ranges:   0-24 hours:  <8.0 mg/dL   24-48 hours: <12.0 mg/dL   3-5 days:    <15.0 mg/dL   6-29 days:   <15.0 mg/dL    ALP 07/14/2025 53  40 - 150 unit/L Final    AST 07/14/2025 33  11 - 45 unit/L Final    ALT 07/14/2025 24  10 - 44 unit/L Final    Anion Gap 07/14/2025 8  8 - 16 mmol/L Final    eGFR 07/14/2025 36 (L)  >60 mL/min/1.73/m2 Final     Estimated GFR calculated using the CKD-EPI creatinine (2021) equation.    Hemoglobin A1c 07/14/2025 6.4 (H)  4.0 - 5.6 % Final    ADA Screening Guidelines:  5.7-6.4%  Consistent with prediabetes  >=6.5%  Consistent with diabetes    High levels of fetal hemoglobin interfere with the HbA1C  assay. Heterozygous hemoglobin variants (HbS, HgC, etc)do  not significantly interfere with this assay.   However, presence of multiple variants may affect accuracy.    Estimated Average Glucose 07/14/2025 137 (H)  68 - 131 mg/dL Final    Cholesterol Total 07/14/2025 124  120 - 199 mg/dL Final    The National Cholesterol Education Program (NCEP) has set the  following guidelines (reference ranges) for Cholesterol:  Optimal.....................<200 mg/dL  Borderline High.............200-239 mg/dL  High........................> or = 240 mg/dL    Triglyceride 07/14/2025 109  30 - 150 mg/dL Final    The National Cholesterol Education Program (NCEP) has set the  following guidelines (reference values) for triglycerides:  Normal......................<150 mg/dL  Borderline High.............150-199 mg/dL  High........................200-499 mg/dL    HDL Cholesterol 07/14/2025 55  40 - 75 mg/dL Final    The National Cholesterol Education Program (NCEP) has set the   following guidelines (reference values) for HDL Cholesterol:   Low...............<40 mg/dL   Optimal...........>60 mg/dL    LDL Cholesterol 07/14/2025 47.2 (L)  63.0 - 159.0 mg/dL Final    The National Cholesterol Education Program (NCEP) has set the  following guidelines (reference values) for LDL Cholesterol:  Optimal.......................<130 mg/dL  Borderline High...............130-159 mg/dL  High..........................160-189 mg/dL  Very High.....................>190 mg/dL  LDL calculated using the Friedewald equation.    HDL/Cholesterol Ratio 07/14/2025 44.4  20.0 - 50.0 % Final    Cholesterol/HDL Ratio 07/14/2025 2.3  2.0 - 5.0 Final    Non HDL Cholesterol  07/14/2025 69  mg/dL Final    Risk category and Non-HDL cholesterol goals:  Coronary heart disease (CHD)or equivalent (10-year risk of CHD >20%):  Non-HDL cholesterol goal     <130 mg/dL  Two or more CHD risk factors and 10-year risk of CHD <= 20%:  Non-HDL cholesterol goal     <160 mg/dL  0 to 1 CHD risk factor:  Non-HDL cholesterol goal     <190 mg/dL    WBC 07/14/2025 3.47 (L)  3.90 - 12.70 K/uL Final    RBC 07/14/2025 4.71  4.60 - 6.20 M/uL Final    HGB 07/14/2025 12.8 (L)  14.0 - 18.0 gm/dL Final    HCT 07/14/2025 40.7  40.0 - 54.0 % Final    MCV 07/14/2025 86  82 - 98 fL Final    MCH 07/14/2025 27.2  27.0 - 31.0 pg Final    MCHC 07/14/2025 31.4 (L)  32.0 - 36.0 g/dL Final    RDW 07/14/2025 15.0 (H)  11.5 - 14.5 % Final    Platelet Count 07/14/2025 250  150 - 450 K/uL Final    MPV 07/14/2025 9.4  9.2 - 12.9 fL Final    Nucleated RBC 07/14/2025 0  <=0 /100 WBC Final    Neut % 07/14/2025 59.4  38 - 73 % Final    Lymph % 07/14/2025 28.2  18 - 48 % Final    Mono % 07/14/2025 8.9  4 - 15 % Final    Eos % 07/14/2025 2.0  <=8 % Final    Basophil % 07/14/2025 1.2  <=1.9 % Final    Imm Grans % 07/14/2025 0.3  0.0 - 0.5 % Final    Neut # 07/14/2025 2.06  1.8 - 7.7 K/uL Final    Lymph # 07/14/2025 0.98 (L)  1 - 4.8 K/uL Final    Mono # 07/14/2025 0.31  0.3 - 1 K/uL Final    Eos # 07/14/2025 0.07  <=0.5 K/uL Final    Baso # 07/14/2025 0.04  <=0.2 K/uL Final    Imm Grans # 07/14/2025 0.01  0.00 - 0.04 K/uL Final    Mild elevation in immature granulocytes is non specific and can be seen in a variety of conditions including stress response, acute inflammation, trauma and pregnancy. Correlation with other laboratory and clinical findings is essential.   Lab Visit on 07/14/2025   Component Date Value Ref Range Status    Urine Microalbumin 07/14/2025 44.0    ug/mL Final    Urine Creatinine 07/14/2025 99.0  23.0 - 375.0 mg/dL Final    Microalbumin/Creatinine Ratio Urine 07/14/2025 44.4 (H)  <=30.0 ug/mg Final   Admission on  07/06/2025, Discharged on 07/07/2025   Component Date Value Ref Range Status    POCT Glucose 07/06/2025 110  70 - 110 mg/dL Final    Sodium 07/06/2025 140  136 - 145 mmol/L Final    Potassium 07/06/2025 4.0  3.5 - 5.1 mmol/L Final    Chloride 07/06/2025 108  95 - 110 mmol/L Final    CO2 07/06/2025 20 (L)  23 - 29 mmol/L Final    Glucose 07/06/2025 121 (H)  70 - 110 mg/dL Final    BUN 07/06/2025 26 (H)  8 - 23 mg/dL Final    Creatinine 07/06/2025 1.5 (H)  0.5 - 1.4 mg/dL Final    Calcium 07/06/2025 9.4  8.7 - 10.5 mg/dL Final    Protein Total 07/06/2025 7.8  6.0 - 8.4 gm/dL Final    Albumin 07/06/2025 3.9  3.5 - 5.2 g/dL Final    Bilirubin Total 07/06/2025 0.4  0.1 - 1.0 mg/dL Final    For infants and newborns, interpretation of results should be based   on gestational age, weight and in agreement with clinical   observations.    Premature Infant recommended reference ranges:   0-24 hours:  <8.0 mg/dL   24-48 hours: <12.0 mg/dL   3-5 days:    <15.0 mg/dL   6-29 days:   <15.0 mg/dL    ALP 07/06/2025 59  40 - 150 unit/L Final    AST 07/06/2025 36  11 - 45 unit/L Final    ALT 07/06/2025 26  10 - 44 unit/L Final    Anion Gap 07/06/2025 12  8 - 16 mmol/L Final    eGFR 07/06/2025 45 (L)  >60 mL/min/1.73/m2 Final    Estimated GFR calculated using the CKD-EPI creatinine (2021) equation.    WBC 07/06/2025 3.85 (L)  3.90 - 12.70 K/uL Final    RBC 07/06/2025 4.62  4.60 - 6.20 M/uL Final    HGB 07/06/2025 12.8 (L)  14.0 - 18.0 gm/dL Final    HCT 07/06/2025 39.5 (L)  40.0 - 54.0 % Final    MCV 07/06/2025 86  82 - 98 fL Final    MCH 07/06/2025 27.7  27.0 - 31.0 pg Final    MCHC 07/06/2025 32.4  32.0 - 36.0 g/dL Final    RDW 07/06/2025 14.6 (H)  11.5 - 14.5 % Final    Platelet Count 07/06/2025 228  150 - 450 K/uL Final    MPV 07/06/2025 9.5  9.2 - 12.9 fL Final    Nucleated RBC 07/06/2025 0  <=0 /100 WBC Final    Neut % 07/06/2025 66.2  38 - 73 % Final    Lymph % 07/06/2025 22.3  18 - 48 % Final    Mono % 07/06/2025 9.6  4 - 15 %  "Final    Eos % 07/06/2025 1.6  <=8 % Final    Basophil % 07/06/2025 0.3  <=1.9 % Final    Imm Grans % 07/06/2025 0.0  0.0 - 0.5 % Final    Neut # 07/06/2025 2.55  1.8 - 7.7 K/uL Final    Lymph # 07/06/2025 0.86 (L)  1 - 4.8 K/uL Final    Mono # 07/06/2025 0.37  0.3 - 1 K/uL Final    Eos # 07/06/2025 0.06  <=0.5 K/uL Final    Baso # 07/06/2025 0.01  <=0.2 K/uL Final    Imm Grans # 07/06/2025 0.00  0.00 - 0.04 K/uL Final    POCT Glucose 07/06/2025 159 (H)  70 - 110 mg/dL Final         Assessment        ICD-10-CM ICD-9-CM   1. Type 2 diabetes mellitus with diabetic neuropathy, without long-term current use of insulin  E11.40 250.60     357.2   2. Diabetes mellitus with nephropathy  E11.21 250.40     583.81   3. Essential hypertension  I10 401.9   4. Aortic atherosclerosis  I70.0 440.0   5. Stage 3b chronic kidney disease  N18.32 585.3   6. Dysphagia, unspecified type  R13.10 787.20         Plan:   Assessment & Plan        1. Type 2 diabetes mellitus with diabetic neuropathy, without long-term current use of insulin/ 2. Diabetes mellitus with nephropathy  Overview:  Lab Results   Component Value Date    HGBA1C 6.4 (H) 07/14/2025    HGBA1C 6.9 (H) 02/11/2025    HGBA1C >14.0 (H) 08/19/2024     Lab Results   Component Value Date    LDLCALC 47.2 (L) 07/14/2025     Lab Results   Component Value Date    MICALBCREAT 44.4 (H) 07/14/2025         Orders:  -     glipiZIDE (GLUCOTROL) 5 MG tablet; Take 1 tablet (5 mg total) by mouth daily with breakfast.  Dispense: 90 tablet; Refill: 3  -     atorvastatin (LIPITOR) 20 MG tablet; Take 1 tablet (20 mg total) by mouth every evening.  Dispense: 90 tablet; Refill: 3  -     LANTUS SOLOSTAR U-100 INSULIN 100 unit/mL (3 mL) InPn pen; Inject 10 Units into the skin every evening.  Dispense: 15 mL; Refill: 11  -     pen needle, diabetic 32 gauge x 5/32" Ndle; Use with insulin pen as directed  Dispense: 50 each; Refill: 11  -     Lipid panel; Future; Expected date: 10/22/2025  -     Hemoglobin " A1c; Future; Expected date: 10/22/2025  -     Comprehensive metabolic panel; Future; Expected date: 10/22/2025  -     CBC auto differential; Future; Expected date: 10/22/2025  Continue current regimen.    A1c much improved.      3. Essential hypertension  -     amLODIPine (NORVASC) 5 MG tablet; Take 1 tablet (5 mg total) by mouth once daily.  Dispense: 90 tablet; Refill: 3  -     olmesartan (BENICAR) 40 MG tablet; Take 1 tablet (40 mg total) by mouth once daily.  Dispense: 90 tablet; Refill: 3  -     carvediloL (COREG) 3.125 MG tablet; Take 1 tablet (3.125 mg total) by mouth 2 (two) times daily with meals.  Dispense: 180 tablet; Refill: 3  -     Lipid panel; Future; Expected date: 10/22/2025  -     Comprehensive metabolic panel; Future; Expected date: 10/22/2025  Good blood pressure control.    Continue current regimen.      4. Aortic atherosclerosis  Overview:  05- CXR  There is extensive aortic atherosclerosis    Orders:  -     atorvastatin (LIPITOR) 20 MG tablet; Take 1 tablet (20 mg total) by mouth every evening.  Dispense: 90 tablet; Refill: 3  Continue atorvastatin.      5. Stage 3b chronic kidney disease  Overview:  Lab Results   Component Value Date    EGFRNORACEVR 36 (L) 07/14/2025    EGFRNORACEVR 45 (L) 07/06/2025    EGFRNORACEVR 39 (L) 03/31/2025      Lab Results   Component Value Date    CREATININE 1.8 (H) 07/14/2025    CREATININE 1.5 (H) 07/06/2025    CREATININE 1.7 (H) 03/31/2025      Lab Results   Component Value Date    MICALBCREAT 44.4 (H) 07/14/2025    MICALBCREAT 118.0 (H) 02/11/2025    MICALBCREAT 28.8 09/24/2024      Lab Results   Component Value Date    UTPCR  03/31/2025      Comment:      UNABLE TO CALCULATE    UTPCR 0.12 09/24/2024    UTPCR Unable to calculate 08/23/2024         Orders:  -     Comprehensive metabolic panel; Future; Expected date: 10/22/2025  -     CBC auto differential; Future; Expected date: 10/22/2025  Continue good hydration and avoiding nephrotoxic medications such  as nonsteroidal anti-inflammatories.      6. Dysphagia, unspecified type  -     pantoprazole (PROTONIX) 40 MG tablet; Take 1 tablet (40 mg total) by mouth once daily.  Dispense: 90 tablet; Refill: 3             -Lucas Lloyd Jr., MD, AAHIVS      This note was generated with the assistance of ambient listening technology. Verbal consent was obtained by the patient and accompanying visitor(s) for the recording of patient appointment to facilitate this note. I attest to having reviewed and edited the generated note for accuracy, though some syntax or spelling errors may persist. Please contact the author of this note for any clarification.      Patient Instructions   The blood pressure medications are:  Amlodipine   Carvedilol   Olmesartan      Follow up in about 3 months (around 10/22/2025) for Diabetes, Hypertension, Lipids.   Future Appointments   Date Time Provider Department Center   7/29/2025 10:15 AM Lucia Aguiar DPM [facility] POD [place]   8/12/2025 10:15 AM Rosemary Allred OD [facility] OPTOMTY [place]   8/18/2025  9:00 AM Jesus Cole MD [facility] URO [facility]   9/9/2025  1:00 PM Sarkis Hyman MD [facility] PULSM [facility]   10/8/2025  1:15 PM Patricio Carey MD [facility] OPHTHAL [place]   10/8/2025  1:30 PM LABBAILEY [facility] LAB [place]   10/8/2025  1:45 PM SPECIMEN, AGGARWAL [facility] LAB [place]   10/21/2025  2:00 PM Adis Mendez MD [facility] NEPHRO [facility]   11/4/2025  2:40 PM Lucas Lloyd Jr., MD [facility] -

## 2025-07-29 ENCOUNTER — OFFICE VISIT (OUTPATIENT)
Dept: PODIATRY | Facility: CLINIC | Age: 89
End: 2025-07-29
Payer: MEDICARE

## 2025-07-29 VITALS — BODY MASS INDEX: 25.34 KG/M2 | WEIGHT: 181 LBS | HEIGHT: 71 IN

## 2025-07-29 DIAGNOSIS — L84 CORN OR CALLUS: ICD-10-CM

## 2025-07-29 DIAGNOSIS — E11.49 TYPE II DIABETES MELLITUS WITH NEUROLOGICAL MANIFESTATIONS: Primary | ICD-10-CM

## 2025-07-29 DIAGNOSIS — B35.1 ONYCHOMYCOSIS DUE TO DERMATOPHYTE: ICD-10-CM

## 2025-07-29 PROCEDURE — 99999 PR PBB SHADOW E&M-EST. PATIENT-LVL III: CPT | Mod: PBBFAC,,, | Performed by: PODIATRIST

## 2025-07-29 NOTE — PROGRESS NOTES
Subjective:      Patient ID: Peter Lopez is a 89 y.o. male.    Chief Complaint: Diabetes Mellitus (07/22/25 Dr Lloyd) and Nail Care      Peter is a 89 y.o. male who presents to the clinic upon referral from Dr. Steele ref. provider found  for evaluation and treatment of diabetic feet. 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 Patient relates no major problem with feet. Reports elongated nails that are difficult to trim. Requesting nail trimming.     PCP: Lucas Lloyd Jr., MD    Date Last Seen by PCP: per above    Current shoe gear: Tennis shoes    Hemoglobin A1C   Date Value Ref Range Status   02/11/2025 6.9 (H) 4.0 - 5.6 % Final     Comment:     ADA Screening Guidelines:  5.7-6.4%  Consistent with prediabetes  >or=6.5%  Consistent with diabetes    High levels of fetal hemoglobin interfere with the HbA1C  assay. Heterozygous hemoglobin variants (HbS, HgC, etc)do  not significantly interfere with this assay.   However, presence of multiple variants may affect accuracy.     08/19/2024 >14.0 (H) 4.0 - 5.6 % Final     Comment:     ADA Screening Guidelines:  5.7-6.4%  Consistent with prediabetes  >or=6.5%  Consistent with diabetes    High levels of fetal hemoglobin interfere with the HbA1C  assay. Heterozygous hemoglobin variants (HbS, HgC, etc)do  not significantly interfere with this assay.   However, presence of multiple variants may affect accuracy.     05/20/2024 13.3 (H) 4.0 - 5.6 % Final     Comment:     ADA Screening Guidelines:  5.7-6.4%  Consistent with prediabetes  >or=6.5%   Consistent with diabetes    High levels of fetal hemoglobin interfere with the HbA1C  assay. Heterozygous hemoglobin variants (HbS, HgC, etc)do  not significantly interfere with this assay.   However, presence of multiple variants may affect accuracy.       Hemoglobin A1c   Date Value Ref Range Status   07/14/2025 6.4 (H) 4.0 - 5.6 % Final     Comment:     ADA Screening Guidelines:  5.7-6.4%  Consistent with prediabetes  >=6.5%  Consistent with diabetes    High levels of fetal hemoglobin interfere with the HbA1C  assay. Heterozygous hemoglobin variants (HbS, HgC, etc)do  not significantly interfere with this assay.   However, presence of multiple variants may affect accuracy.           Review of Systems   Constitutional: Negative for chills, diaphoresis and fever.   Cardiovascular:  Negative for claudication, cyanosis, leg swelling and syncope.   Respiratory:  Negative for cough and shortness of breath.    Skin:  Positive for color change and nail changes. Negative for suspicious lesions.   Musculoskeletal:  Negative for falls, joint pain, muscle cramps and muscle weakness.   Gastrointestinal:  Negative for diarrhea, nausea and vomiting.   Neurological:  Positive for paresthesias. Negative for disturbances in coordination, numbness, sensory change, tremors and weakness.   Psychiatric/Behavioral:  Negative for altered mental status.            Objective:      Physical Exam  Constitutional:       Appearance: He is well-developed.      Comments: Oriented to time, place, and person.   Cardiovascular:      Comments: DP and PT pulses are palpable bilaterally. 3 sec capillary refill time and toes and feet are warm to touch proximally .       Musculoskeletal:      Comments: Equinus noted b/l ankles with < 10 deg DF noted. MMT 5/5 in DF/PF/Inv/Ev resistance with no reproduction of pain in any direction. Passive range of motion of ankle and pedal joints is painless b/l.     Feet:      Right foot:      Skin integrity: No  callus or dry skin.      Left foot:      Skin integrity: No callus or dry skin.   Lymphadenopathy:      Comments: Negative lymphadenopathy bilateral popliteal fossa and tarsal tunnel.   Skin:     Comments: Toenails 1-5 bilaterally are elongated by 2-3 mm, thickened by 2-3 mm, discolored/yellowed, dystrophic, brittle with subungual debris.    Focal hyperkeratotic lesion consisting entirely of hyperkeratotic tissue without open skin, drainage, pus, fluctuance, malodor, or signs of infection: right plantar medial foot. Right medial foot.        Neurological:      Mental Status: He is alert.      Comments: Light touch, proprioception, and sharp/dull sensation are all diminished bilaterally. Protective threshold with the [place]-Wienstein monofilament is diminished  bilaterally.  Subjective paresthesias with no clearly identifiable source or trigger.      Psychiatric:         Behavior: Behavior is cooperative.               Assessment:       Encounter Diagnoses   Name Primary?    Type II diabetes mellitus with neurological manifestations Yes    Onychomycosis due to dermatophyte     Corn or callus                      Plan:       Peter was seen today for diabetes mellitus and nail care.    Diagnoses and all orders for this visit:    Type II diabetes mellitus with neurological manifestations    Onychomycosis due to dermatophyte    Corn or callus                  I counseled the patient on his conditions, their implications and medical management.    - Shoe inspection. Diabetic Foot Education. Patient reminded of the importance of good nutrition and blood sugar control to help prevent podiatric complications of diabetes. Patient instructed on proper foot hygeine. We discussed wearing proper shoe gear, daily foot inspections, never walking without protective shoe gear, never putting sharp instruments to feet, routine podiatric nail visits every 2-3 months.   - With patient's permission, nails were aggressively reduced and  debrided x 10 to their soft tissue attachment mechanically and with electric , removing all offending nail and debris. Patient relates relief following the procedure. He will continue to monitor the areas daily, inspect his feet, wear protective shoe gear when ambulatory, moisturizer to maintain skin integrity and follow in this office in approximately 2-3 months, sooner p.r.n.   - After cleansing the area w/ alcohol prep pad the above mentioned hyperkeratosis was trimmed utilizing No 15 scapel, to a smooth base with out incident.     F/u 3 months     Lucia Aguiar DPM

## 2025-08-01 ENCOUNTER — HOSPITAL ENCOUNTER (EMERGENCY)
Facility: HOSPITAL | Age: 89
Discharge: HOME | End: 2025-08-02
Attending: EMERGENCY MEDICINE
Payer: MEDICARE

## 2025-08-01 DIAGNOSIS — R42 LIGHTHEADEDNESS: Primary | ICD-10-CM

## 2025-08-01 LAB
ABSOLUTE EOSINOPHIL (OHS): 0.1 K/UL
ABSOLUTE MONOCYTE (OHS): 0.32 K/UL (ref 0.3–1)
ABSOLUTE NEUTROPHIL COUNT (OHS): 1.89 K/UL (ref 1.8–7.7)
ALBUMIN SERPL BCP-MCNC: 4 G/DL (ref 3.5–5.2)
ALP SERPL-CCNC: 60 UNIT/L (ref 40–150)
ALT SERPL W/O P-5'-P-CCNC: 23 UNIT/L (ref 10–44)
ANION GAP (OHS): 8 MMOL/L (ref 8–16)
AST SERPL-CCNC: 43 UNIT/L (ref 11–45)
BASOPHILS # BLD AUTO: 0.02 K/UL
BASOPHILS NFR BLD AUTO: 0.6 %
BILIRUB SERPL-MCNC: 0.3 MG/DL (ref 0.1–1)
BILIRUB UR QL STRIP.AUTO: NEGATIVE
BUN SERPL-MCNC: 30 MG/DL (ref 8–23)
CALCIUM SERPL-MCNC: 9.9 MG/DL (ref 8.7–10.5)
CHLORIDE SERPL-SCNC: 107 MMOL/L (ref 95–110)
CLARITY UR: CLEAR
CO2 SERPL-SCNC: 26 MMOL/L (ref 23–29)
COLOR UR AUTO: COLORLESS
CREAT SERPL-MCNC: 1.6 MG/DL (ref 0.5–1.4)
ERYTHROCYTE [DISTWIDTH] IN BLOOD BY AUTOMATED COUNT: 14.5 % (ref 11.5–14.5)
GFR SERPLBLD CREATININE-BSD FMLA CKD-EPI: 41 ML/MIN/1.73/M2
GLUCOSE SERPL-MCNC: 108 MG/DL (ref 70–110)
GLUCOSE UR QL STRIP: NEGATIVE
HCT VFR BLD AUTO: 39.1 % (ref 40–54)
HGB BLD-MCNC: 12.3 GM/DL (ref 14–18)
HGB UR QL STRIP: ABNORMAL
IMM GRANULOCYTES # BLD AUTO: 0.01 K/UL (ref 0–0.04)
IMM GRANULOCYTES NFR BLD AUTO: 0.3 % (ref 0–0.5)
KETONES UR QL STRIP: NEGATIVE
LEUKOCYTE ESTERASE UR QL STRIP: NEGATIVE
LYMPHOCYTES # BLD AUTO: 0.93 K/UL (ref 1–4.8)
MCH RBC QN AUTO: 27.4 PG (ref 27–31)
MCHC RBC AUTO-ENTMCNC: 31.5 G/DL (ref 32–36)
MCV RBC AUTO: 87 FL (ref 82–98)
MICROSCOPIC COMMENT: NORMAL
NITRITE UR QL STRIP: NEGATIVE
NT-PROBNP SERPL-MCNC: 408 PG/ML
NUCLEATED RBC (/100WBC) (OHS): 0 /100 WBC
PH UR STRIP: 6 [PH]
PLATELET # BLD AUTO: 235 K/UL (ref 150–450)
PMV BLD AUTO: 9.4 FL (ref 9.2–12.9)
POCT GLUCOSE: 118 MG/DL (ref 70–110)
POTASSIUM SERPL-SCNC: 4 MMOL/L (ref 3.5–5.1)
PROT SERPL-MCNC: 7.4 GM/DL (ref 6–8.4)
PROT UR QL STRIP: NEGATIVE
RBC # BLD AUTO: 4.49 M/UL (ref 4.6–6.2)
RBC #/AREA URNS AUTO: 3 /HPF (ref 0–4)
RELATIVE EOSINOPHIL (OHS): 3.1 %
RELATIVE LYMPHOCYTE (OHS): 28.4 % (ref 18–48)
RELATIVE MONOCYTE (OHS): 9.8 % (ref 4–15)
RELATIVE NEUTROPHIL (OHS): 57.8 % (ref 38–73)
SODIUM SERPL-SCNC: 141 MMOL/L (ref 136–145)
SP GR UR STRIP: 1.01
TROPONIN I SERPL HS-MCNC: 13 NG/L
TSH SERPL-ACNC: 2.38 UIU/ML (ref 0.4–4)
UROBILINOGEN UR STRIP-ACNC: NEGATIVE EU/DL
WBC # BLD AUTO: 3.27 K/UL (ref 3.9–12.7)

## 2025-08-01 PROCEDURE — 81003 URINALYSIS AUTO W/O SCOPE: CPT | Performed by: EMERGENCY MEDICINE

## 2025-08-01 PROCEDURE — 93005 ELECTROCARDIOGRAM TRACING: CPT

## 2025-08-01 PROCEDURE — 80053 COMPREHEN METABOLIC PANEL: CPT | Performed by: EMERGENCY MEDICINE

## 2025-08-01 PROCEDURE — 99285 EMERGENCY DEPT VISIT HI MDM: CPT | Mod: 25

## 2025-08-01 PROCEDURE — 84484 ASSAY OF TROPONIN QUANT: CPT | Performed by: EMERGENCY MEDICINE

## 2025-08-01 PROCEDURE — 82962 GLUCOSE BLOOD TEST: CPT

## 2025-08-01 PROCEDURE — 83880 ASSAY OF NATRIURETIC PEPTIDE: CPT | Performed by: EMERGENCY MEDICINE

## 2025-08-01 PROCEDURE — 84443 ASSAY THYROID STIM HORMONE: CPT | Performed by: EMERGENCY MEDICINE

## 2025-08-01 PROCEDURE — 93010 ELECTROCARDIOGRAM REPORT: CPT | Mod: ,,, | Performed by: INTERNAL MEDICINE

## 2025-08-01 PROCEDURE — 85025 COMPLETE CBC W/AUTO DIFF WBC: CPT | Performed by: EMERGENCY MEDICINE

## 2025-08-02 VITALS
BODY MASS INDEX: 25.2 KG/M2 | DIASTOLIC BLOOD PRESSURE: 91 MMHG | HEIGHT: 71 IN | TEMPERATURE: 98 F | RESPIRATION RATE: 14 BRPM | WEIGHT: 180 LBS | SYSTOLIC BLOOD PRESSURE: 158 MMHG | HEART RATE: 83 BPM | OXYGEN SATURATION: 96 %

## 2025-08-02 LAB — HOLD SPECIMEN: NORMAL

## 2025-08-02 RX ORDER — ACETAMINOPHEN 500 MG
500 TABLET ORAL EVERY 6 HOURS PRN
Qty: 13 TABLET | Refills: 0 | Status: SHIPPED | OUTPATIENT
Start: 2025-08-02

## 2025-08-05 ENCOUNTER — OFFICE VISIT (OUTPATIENT)
Dept: FAMILY MEDICINE | Facility: CLINIC | Age: 89
End: 2025-08-05
Payer: MEDICARE

## 2025-08-05 ENCOUNTER — TELEPHONE (OUTPATIENT)
Dept: FAMILY MEDICINE | Facility: CLINIC | Age: 89
End: 2025-08-05
Payer: MEDICARE

## 2025-08-05 VITALS
BODY MASS INDEX: 25.37 KG/M2 | HEART RATE: 97 BPM | WEIGHT: 181.19 LBS | OXYGEN SATURATION: 95 % | SYSTOLIC BLOOD PRESSURE: 140 MMHG | HEIGHT: 71 IN | RESPIRATION RATE: 19 BRPM | DIASTOLIC BLOOD PRESSURE: 84 MMHG | TEMPERATURE: 98 F

## 2025-08-05 DIAGNOSIS — R42 LIGHTHEADED: Primary | ICD-10-CM

## 2025-08-05 DIAGNOSIS — Z86.73 OLD PONTINE INFARCT WITHOUT LATE EFFECT: ICD-10-CM

## 2025-08-05 DIAGNOSIS — H93.12 TINNITUS OF LEFT EAR: ICD-10-CM

## 2025-08-05 DIAGNOSIS — E11.21 DIABETES MELLITUS WITH NEPHROPATHY: Chronic | ICD-10-CM

## 2025-08-05 LAB
OHS QRS DURATION: 134 MS
OHS QTC CALCULATION: 516 MS

## 2025-08-05 PROCEDURE — 1101F PT FALLS ASSESS-DOCD LE1/YR: CPT | Mod: CPTII,S$GLB,, | Performed by: FAMILY MEDICINE

## 2025-08-05 PROCEDURE — 1126F AMNT PAIN NOTED NONE PRSNT: CPT | Mod: CPTII,S$GLB,, | Performed by: FAMILY MEDICINE

## 2025-08-05 PROCEDURE — 99999 PR PBB SHADOW E&M-EST. PATIENT-LVL V: CPT | Mod: PBBFAC,,, | Performed by: FAMILY MEDICINE

## 2025-08-05 PROCEDURE — 99213 OFFICE O/P EST LOW 20 MIN: CPT | Mod: S$GLB,,, | Performed by: FAMILY MEDICINE

## 2025-08-05 PROCEDURE — 3288F FALL RISK ASSESSMENT DOCD: CPT | Mod: CPTII,S$GLB,, | Performed by: FAMILY MEDICINE

## 2025-08-05 RX ORDER — LANCETS
EACH MISCELLANEOUS
Qty: 100 EACH | Refills: 11 | Status: SHIPPED | OUTPATIENT
Start: 2025-08-05

## 2025-08-05 NOTE — TELEPHONE ENCOUNTER
Pt daughter would like to know with his blood pressure dropping and glucose is dropping is this causing the dizziness? Did his medication change recently? Are sending him to his cardiologist to determine what's going on. Pt daughter wants to know what are the next steps to moving forward?

## 2025-08-12 ENCOUNTER — OFFICE VISIT (OUTPATIENT)
Dept: OPTOMETRY | Facility: CLINIC | Age: 89
End: 2025-08-12
Payer: MEDICARE

## 2025-08-12 DIAGNOSIS — H40.41X4 GLAUCOMATOCYCLITIC CRISIS OF RIGHT EYE, INDETERMINATE STAGE: Primary | ICD-10-CM

## 2025-08-12 DIAGNOSIS — H20.9 UVEITIC GLAUCOMA OF RIGHT EYE, INDETERMINATE STAGE: ICD-10-CM

## 2025-08-12 DIAGNOSIS — H40.41X4 UVEITIC GLAUCOMA OF RIGHT EYE, INDETERMINATE STAGE: ICD-10-CM

## 2025-08-12 PROCEDURE — 99213 OFFICE O/P EST LOW 20 MIN: CPT | Mod: S$GLB,,, | Performed by: OPTOMETRIST

## 2025-08-12 PROCEDURE — 1159F MED LIST DOCD IN RCRD: CPT | Mod: CPTII,S$GLB,, | Performed by: OPTOMETRIST

## 2025-08-12 PROCEDURE — 1101F PT FALLS ASSESS-DOCD LE1/YR: CPT | Mod: CPTII,S$GLB,, | Performed by: OPTOMETRIST

## 2025-08-12 PROCEDURE — 3288F FALL RISK ASSESSMENT DOCD: CPT | Mod: CPTII,S$GLB,, | Performed by: OPTOMETRIST

## 2025-08-12 PROCEDURE — 1160F RVW MEDS BY RX/DR IN RCRD: CPT | Mod: CPTII,S$GLB,, | Performed by: OPTOMETRIST

## 2025-08-12 PROCEDURE — 99999 PR PBB SHADOW E&M-EST. PATIENT-LVL III: CPT | Mod: PBBFAC,,, | Performed by: OPTOMETRIST

## 2025-08-12 PROCEDURE — 1126F AMNT PAIN NOTED NONE PRSNT: CPT | Mod: CPTII,S$GLB,, | Performed by: OPTOMETRIST

## 2025-08-12 PROCEDURE — G2211 COMPLEX E/M VISIT ADD ON: HCPCS | Mod: S$GLB,,, | Performed by: OPTOMETRIST

## 2025-08-18 ENCOUNTER — OFFICE VISIT (OUTPATIENT)
Dept: OTOLARYNGOLOGY | Facility: CLINIC | Age: 89
End: 2025-08-18
Payer: MEDICARE

## 2025-08-18 ENCOUNTER — CLINICAL SUPPORT (OUTPATIENT)
Dept: AUDIOLOGY | Facility: CLINIC | Age: 89
End: 2025-08-18
Payer: MEDICARE

## 2025-08-18 ENCOUNTER — OFFICE VISIT (OUTPATIENT)
Dept: UROLOGY | Facility: CLINIC | Age: 89
End: 2025-08-18
Payer: MEDICARE

## 2025-08-18 VITALS
HEIGHT: 71 IN | SYSTOLIC BLOOD PRESSURE: 164 MMHG | BODY MASS INDEX: 25.22 KG/M2 | WEIGHT: 180.13 LBS | DIASTOLIC BLOOD PRESSURE: 87 MMHG

## 2025-08-18 DIAGNOSIS — R42 EPISODIC LIGHTHEADEDNESS: ICD-10-CM

## 2025-08-18 DIAGNOSIS — H93.12 TINNITUS OF LEFT EAR: ICD-10-CM

## 2025-08-18 DIAGNOSIS — H90.3 SENSORINEURAL HEARING LOSS (SNHL) OF BOTH EARS: Primary | ICD-10-CM

## 2025-08-18 DIAGNOSIS — H90.3 SENSORINEURAL HEARING LOSS (SNHL), BILATERAL: Primary | ICD-10-CM

## 2025-08-18 DIAGNOSIS — Z08 ENCOUNTER FOR FOLLOW-UP SURVEILLANCE OF PROSTATE CANCER: Primary | ICD-10-CM

## 2025-08-18 DIAGNOSIS — Z85.46 ENCOUNTER FOR FOLLOW-UP SURVEILLANCE OF PROSTATE CANCER: Primary | ICD-10-CM

## 2025-08-18 PROCEDURE — 1159F MED LIST DOCD IN RCRD: CPT | Mod: CPTII,S$GLB,, | Performed by: STUDENT IN AN ORGANIZED HEALTH CARE EDUCATION/TRAINING PROGRAM

## 2025-08-18 PROCEDURE — 99999 PR PBB SHADOW E&M-EST. PATIENT-LVL III: CPT | Mod: PBBFAC,,, | Performed by: STUDENT IN AN ORGANIZED HEALTH CARE EDUCATION/TRAINING PROGRAM

## 2025-08-18 PROCEDURE — 1159F MED LIST DOCD IN RCRD: CPT | Mod: CPTII,S$GLB,, | Performed by: NURSE PRACTITIONER

## 2025-08-18 PROCEDURE — 1160F RVW MEDS BY RX/DR IN RCRD: CPT | Mod: CPTII,S$GLB,, | Performed by: STUDENT IN AN ORGANIZED HEALTH CARE EDUCATION/TRAINING PROGRAM

## 2025-08-18 PROCEDURE — 92557 COMPREHENSIVE HEARING TEST: CPT | Mod: S$GLB,,,

## 2025-08-18 PROCEDURE — 1101F PT FALLS ASSESS-DOCD LE1/YR: CPT | Mod: CPTII,S$GLB,, | Performed by: NURSE PRACTITIONER

## 2025-08-18 PROCEDURE — 1126F AMNT PAIN NOTED NONE PRSNT: CPT | Mod: CPTII,S$GLB,, | Performed by: NURSE PRACTITIONER

## 2025-08-18 PROCEDURE — 99213 OFFICE O/P EST LOW 20 MIN: CPT | Mod: S$GLB,,, | Performed by: STUDENT IN AN ORGANIZED HEALTH CARE EDUCATION/TRAINING PROGRAM

## 2025-08-18 PROCEDURE — 92567 TYMPANOMETRY: CPT | Mod: S$GLB,,,

## 2025-08-18 PROCEDURE — 3288F FALL RISK ASSESSMENT DOCD: CPT | Mod: CPTII,S$GLB,, | Performed by: STUDENT IN AN ORGANIZED HEALTH CARE EDUCATION/TRAINING PROGRAM

## 2025-08-18 PROCEDURE — 1126F AMNT PAIN NOTED NONE PRSNT: CPT | Mod: CPTII,S$GLB,, | Performed by: STUDENT IN AN ORGANIZED HEALTH CARE EDUCATION/TRAINING PROGRAM

## 2025-08-18 PROCEDURE — 1101F PT FALLS ASSESS-DOCD LE1/YR: CPT | Mod: CPTII,S$GLB,, | Performed by: STUDENT IN AN ORGANIZED HEALTH CARE EDUCATION/TRAINING PROGRAM

## 2025-08-18 PROCEDURE — 99204 OFFICE O/P NEW MOD 45 MIN: CPT | Mod: S$GLB,,, | Performed by: NURSE PRACTITIONER

## 2025-08-18 PROCEDURE — 3288F FALL RISK ASSESSMENT DOCD: CPT | Mod: CPTII,S$GLB,, | Performed by: NURSE PRACTITIONER

## 2025-08-20 ENCOUNTER — TELEPHONE (OUTPATIENT)
Dept: OTOLARYNGOLOGY | Facility: CLINIC | Age: 89
End: 2025-08-20
Payer: MEDICARE

## 2025-08-25 DIAGNOSIS — J44.89 COPD WITH CHRONIC BRONCHITIS AND EMPHYSEMA: Chronic | ICD-10-CM

## 2025-08-25 DIAGNOSIS — J43.9 COPD WITH CHRONIC BRONCHITIS AND EMPHYSEMA: Chronic | ICD-10-CM

## 2025-08-25 RX ORDER — TIOTROPIUM BROMIDE INHALATION SPRAY 3.12 UG/1
SPRAY, METERED RESPIRATORY (INHALATION)
Qty: 4 G | Refills: 0 | Status: SHIPPED | OUTPATIENT
Start: 2025-08-25

## (undated) DEVICE — Device

## (undated) DEVICE — SYR 50ML CATH TIP

## (undated) DEVICE — SYR ONLY LUER LOCK 20CC

## (undated) DEVICE — SEE MEDLINE ITEM 146313

## (undated) DEVICE — SOL IRR NACL .9% 3000ML

## (undated) DEVICE — MAT QUICK 40X30 FLOOR FLUID LF

## (undated) DEVICE — BLANKET UPPER BODY 78.7X29.9IN

## (undated) DEVICE — SUPPORT ULNA NERVE PROTECTOR

## (undated) DEVICE — SEE MEDLINE ITEM 152467

## (undated) DEVICE — GLOVE SURGICAL LATEX SZ 6.5

## (undated) DEVICE — GLOVE BIOGEL PI MICRO SZ 7

## (undated) DEVICE — SOL 9P NACL IRR PIC IL

## (undated) DEVICE — SEE MEDLINE ITEM 157110

## (undated) DEVICE — ELECTRODE LOOP CUTTING BIPOLAR

## (undated) DEVICE — SOL IRR STRL WATER 500ML